# Patient Record
Sex: FEMALE | Race: WHITE | NOT HISPANIC OR LATINO | Employment: OTHER | URBAN - METROPOLITAN AREA
[De-identification: names, ages, dates, MRNs, and addresses within clinical notes are randomized per-mention and may not be internally consistent; named-entity substitution may affect disease eponyms.]

---

## 2017-01-03 ENCOUNTER — HOME CARE VISIT (OUTPATIENT)
Dept: HOME HEALTH SERVICES | Facility: HOME HEALTHCARE | Age: 67
End: 2017-01-03
Payer: MEDICARE

## 2017-01-04 VITALS
RESPIRATION RATE: 22 BRPM | HEART RATE: 89 BPM | TEMPERATURE: 98.5 F | DIASTOLIC BLOOD PRESSURE: 80 MMHG | SYSTOLIC BLOOD PRESSURE: 140 MMHG

## 2017-01-04 SDOH — ECONOMIC STABILITY: HOUSING INSECURITY: UNSAFE COOKING RANGE AREA: 0

## 2017-01-04 SDOH — ECONOMIC STABILITY: HOUSING INSECURITY: HOME SAFETY: HOME PRESENTING A FIRE HAZARD. NO EVIDENCE FOUND OF SMOKING MATERIALS PRESENT IN THE HOME.

## 2017-01-04 SDOH — ECONOMIC STABILITY: HOUSING INSECURITY
HOME SAFETY: OXYGEN SAFETY RISK ASSESSMENT PERFORMED. PATIENT DOES NOT HAVE A NO SMOKING SIGN POSTED IN THE HOME., PT WAS GIVEN A NO SMOKING SIGN AND PROVIDED EDUCATION ABOUT WHY IT IS IMPORTANT TO PLACE ONE. PATIENT DOES NOT HAVE A WORKING FIRE EXTINGUISHER PRES

## 2017-01-04 SDOH — ECONOMIC STABILITY: HOUSING INSECURITY
HOME SAFETY: ENT IN THE HOME., INSTRUCTED PATIENT/CAREGIVER TO GET ONE AS SOON AS POSSIBLE. SMOKE ALARMS ARE PRESENT AND FUNCTIONAL ON EACH LEVEL OF THE HOME. PATIENT DOES HAVE A FIRE ESCAPE PLAN DEVELOPED. PATIENT DOES NOT HAVE FLAMMABLE MATERIALS PRESENT IN THE

## 2017-01-04 SDOH — ECONOMIC STABILITY: HOUSING INSECURITY: UNSAFE APPLIANCES: 0

## 2017-01-04 ASSESSMENT — ACTIVITIES OF DAILY LIVING (ADL)
HOME_HEALTH_OASIS: 00
OASIS_M1830: 00

## 2017-01-10 VITALS
TEMPERATURE: 98.4 F | SYSTOLIC BLOOD PRESSURE: 140 MMHG | DIASTOLIC BLOOD PRESSURE: 80 MMHG | RESPIRATION RATE: 22 BRPM | HEART RATE: 89 BPM

## 2017-01-10 SDOH — ECONOMIC STABILITY: HOUSING INSECURITY: UNSAFE APPLIANCES: 0

## 2017-01-10 SDOH — ECONOMIC STABILITY: HOUSING INSECURITY: UNSAFE COOKING RANGE AREA: 0

## 2017-01-10 ASSESSMENT — ACTIVITIES OF DAILY LIVING (ADL)
OASIS_M1830: 00
HOME_HEALTH_OASIS: 00

## 2017-01-16 VITALS
TEMPERATURE: 97.8 F | DIASTOLIC BLOOD PRESSURE: 80 MMHG | HEART RATE: 89 BPM | SYSTOLIC BLOOD PRESSURE: 140 MMHG | RESPIRATION RATE: 16 BRPM

## 2017-01-16 ASSESSMENT — ACTIVITIES OF DAILY LIVING (ADL)
HOME_HEALTH_OASIS: 00
HOME_HEALTH_OASIS: 01
OASIS_M1830: 00

## 2017-01-16 ASSESSMENT — ENCOUNTER SYMPTOMS: DEBILITATING PAIN: 1

## 2017-01-27 ENCOUNTER — HOSPITAL ENCOUNTER (OUTPATIENT)
Dept: LAB | Facility: MEDICAL CENTER | Age: 67
End: 2017-01-27
Attending: INTERNAL MEDICINE
Payer: MEDICARE

## 2017-01-27 DIAGNOSIS — R79.89 LOW TSH LEVEL: ICD-10-CM

## 2017-01-27 DIAGNOSIS — E04.2 MULTIPLE THYROID NODULES: ICD-10-CM

## 2017-01-27 LAB
T3FREE SERPL-MCNC: 4.08 PG/ML (ref 2.4–4.2)
T4 FREE SERPL-MCNC: 1.29 NG/DL (ref 0.58–1.64)
TSH SERPL DL<=0.005 MIU/L-ACNC: 0.03 UIU/ML (ref 0.35–5.5)

## 2017-01-27 PROCEDURE — 36415 COLL VENOUS BLD VENIPUNCTURE: CPT

## 2017-01-27 PROCEDURE — 84481 FREE ASSAY (FT-3): CPT

## 2017-01-27 PROCEDURE — 84439 ASSAY OF FREE THYROXINE: CPT

## 2017-01-27 PROCEDURE — 84443 ASSAY THYROID STIM HORMONE: CPT

## 2017-02-07 ENCOUNTER — OFFICE VISIT (OUTPATIENT)
Dept: ENDOCRINOLOGY | Facility: MEDICAL CENTER | Age: 67
End: 2017-02-07
Payer: MEDICARE

## 2017-02-07 VITALS
DIASTOLIC BLOOD PRESSURE: 80 MMHG | OXYGEN SATURATION: 94 % | HEART RATE: 78 BPM | HEIGHT: 67 IN | WEIGHT: 183 LBS | BODY MASS INDEX: 28.72 KG/M2 | SYSTOLIC BLOOD PRESSURE: 122 MMHG

## 2017-02-07 DIAGNOSIS — E04.2 MULTIPLE THYROID NODULES: ICD-10-CM

## 2017-02-07 DIAGNOSIS — R79.89 LOW TSH LEVEL: Primary | ICD-10-CM

## 2017-02-07 PROCEDURE — G8482 FLU IMMUNIZE ORDER/ADMIN: HCPCS | Performed by: INTERNAL MEDICINE

## 2017-02-07 PROCEDURE — 1101F PT FALLS ASSESS-DOCD LE1/YR: CPT | Mod: 8P | Performed by: INTERNAL MEDICINE

## 2017-02-07 PROCEDURE — 4040F PNEUMOC VAC/ADMIN/RCVD: CPT | Performed by: INTERNAL MEDICINE

## 2017-02-07 PROCEDURE — 3014F SCREEN MAMMO DOC REV: CPT | Mod: 8P | Performed by: INTERNAL MEDICINE

## 2017-02-07 PROCEDURE — 1036F TOBACCO NON-USER: CPT | Performed by: INTERNAL MEDICINE

## 2017-02-07 PROCEDURE — 99214 OFFICE O/P EST MOD 30 MIN: CPT | Mod: 25 | Performed by: INTERNAL MEDICINE

## 2017-02-07 PROCEDURE — 3017F COLORECTAL CA SCREEN DOC REV: CPT | Mod: 8P | Performed by: INTERNAL MEDICINE

## 2017-02-07 PROCEDURE — G8420 CALC BMI NORM PARAMETERS: HCPCS | Performed by: INTERNAL MEDICINE

## 2017-02-07 PROCEDURE — G8598 ASA/ANTIPLAT THER USED: HCPCS | Performed by: INTERNAL MEDICINE

## 2017-02-07 RX ORDER — COLCHICINE 0.6 MG/1
0.6 TABLET ORAL DAILY
COMMUNITY
End: 2017-04-24

## 2017-02-07 NOTE — MR AVS SNAPSHOT
"        Latonia Clinton   2017 2:20 PM   Office Visit   MRN: [id number]    Department:  End[facility]inology [facility]   Dept Phone:  [phone]    Description:  Female : 1950   Provider:  Kishore Torrez M.D.           Allergies as of 2017     Allergen Noted Reactions    Nkda [No Known Drug Allergy] 2008         You were diagnosed with     Low TSH level   [037438]  -  Primary     Multiple thyroid nodules   [551866]         Vital Signs     Blood Pressure Pulse Height Weight Body Mass Index Oxygen Saturation    122/80 mmHg 78 1.702 m (5' 7\") 83.008 kg (183 lb) 28.66 kg/m2 94%    Smoking Status                   Former Smoker           Basic Information     Date Of Birth Sex Race Ethnicity Preferred Language    1950 Female White Non- English      Your appointments     Mar 27, 2017  3:00 PM   Established Patient Pul with Jessenia Byrne M.D.   [facility] (--)    [address]  [address]  [place] [address] [phone]            Aug 08, 2017  4:00 PM   Established Patient with Kishore Torrez M.D.   [facility])    [address]  [place] [address] [phone]           You will be receiving a confirmation call a few days before your appointment from our automated call confirmation system.              Problem List              ICD-10-CM Priority Class Noted - Resolved    Low TSH level R94.6   2015 - Present    Osteoporosis M81.0   Unknown - Present    Hyperlipidemia E78.5 Low  Unknown - Present    Hypertension I10   Unknown - Present    Renal disorder N28.9   Unknown - Present    Rheumatoid arthritis (CMS-[facility]) M06.9   Unknown - Present    Multiple thyroid nodules E04.2   2015 - Present    Thyrotoxicosis with toxic single thyroid nodule and without thyroid storm E05.10   2015 - Present    Acute bronchitis J20.9 High  2016 - Present    Acute respiratory failure (CMS-HCC) J96.00 " High  7/24/2016 - Present    Lung mass R91.8 Medium  7/24/2016 - Present    HTN (hypertension) I10 Low  7/24/2016 - Present    CAD (coronary artery disease) I25.10 Low  7/24/2016 - Present    Acute exacerbation of chronic obstructive pulmonary disease (COPD) (CMS-HCC) J44.1 High  7/24/2016 - Present    CKD (chronic kidney disease) stage 3, GFR 30-59 ml/min N18.3 Low  7/24/2016 - Present    Hyperthyroidism E05.90 Low  7/25/2016 - Present    DVT (deep venous thrombosis) (CMS-HCC) (Chronic) I82.409   7/28/2016 - Present    Deep vein thrombosis (DVT) (CMS-HCC) I82.409   7/28/2016 - Present    Lung nodule R91.1   10/27/2016 - Present    Lung cancer (CMS-HCC) C34.90   12/12/2016 - Present      Health Maintenance        Date Due Completion Dates    IMM DTaP/Tdap/Td Vaccine (1 - Tdap) 9/10/1968 ---    PAP SMEAR 9/10/1970 ---    COLONOSCOPY 9/10/1999 ---    IMM ZOSTER VACCINE 9/10/2009 ---    BONE DENSITY 9/10/2014 ---    MAMMOGRAM 11/27/2014 11/27/2013, 6/6/2012, 4/25/2011, 12/29/2009, 12/29/2009, 12/10/2008, 12/10/2008, 12/3/2007, 11/26/2007, 11/26/2007, 10/23/2006, 10/23/2006, 5/31/2005    IMM PNEUMOCOCCAL 65+ (ADULT) LOW/MEDIUM RISK SERIES (2 of 2 - PPSV23) 10/1/2016 10/1/2015            Current Immunizations     13-VALENT PCV PREVNAR 10/1/2015    Influenza TIV (IM) 10/5/2016      Below and/or attached are the medications your provider expects you to take. Review all of your home medications and newly ordered medications with your provider and/or pharmacist. Follow medication instructions as directed by your provider and/or pharmacist. Please keep your medication list with you and share with your provider. Update the information when medications are discontinued, doses are changed, or new medications (including over-the-counter products) are added; and carry medication information at all times in the event of emergency situations     Allergies:  NKDA - (reactions not documented)               Medications  Valid as of:  February 07, 2017 -  2:44 PM    Generic Name Brand Name Tablet Size Instructions for use    Acetaminophen (Tab) TYLENOL 500 MG Take 500 mg by mouth every 6 hours as needed.        Albuterol Sulfate (Aero Soln) albuterol 108 (90 BASE) MCG/ACT Inhale 2 Puffs by mouth every 6 hours as needed for Shortness of Breath.        Budesonide-Formoterol Fumarate (Aerosol) SYMBICORT 160-4.5 MCG/ACT Inhale 2 Puffs by mouth every day.        Colchicine (Tab) COLCRYS 0.6 MG Take 0.6 mg by mouth every day.        Dorzolamide HCl-Timolol Mal (Solution) COSOPT 22.3-6.8 MG/ML Place 1 Drop in both eyes 2 times a day. Both eyes        Doxycycline Hyclate (Tab) VIBRAMYCIN 100 MG Take 100 mg by mouth 2 times a day. 2 - ten day courses, she completed the first one and stopped the 2nd course last week.        Ergocalciferol   Take 1.25 mg by mouth every Monday. 1 tablet by mouth every 2 weeks        Hydrocodone-Acetaminophen (Tab) NORCO 5-325 MG Take 1-2 Tabs by mouth every four hours as needed.        Ipratropium-Albuterol (Solution) DUONEB 0.5-2.5 (3) MG/3ML 3 mL by Nebulization route every 6 hours as needed for Shortness of Breath.        Losartan Potassium (Tab) COZAAR 50 MG Take 50 mg by mouth every day.        Probiotic Product   Take 2 Caps by mouth every evening.        Propranolol HCl (Tab) INDERAL 60 MG Take 60 mg by mouth every day.        Simvastatin (Tab) ZOCOR 40 MG Take 40 mg by mouth every evening.        Warfarin Sodium (Tab) COUMADIN 3 MG Take 1.5-3 mg by mouth every day. 3 mg every day EXCEPT 1.5 mg every Monday and Friday        Zolpidem Tartrate (Tab) AMBIEN 10 MG Take 10 mg by mouth at bedtime as needed.        .                 Medicines prescribed today were sent to:     [id number] - [place], [place] - [address]    [address] [place] [address]    Phone: [phone] Fax: [phone]    Open 24 Hours?: No      Medication refill instructions:       If your prescription bottle indicates  you have medication refills left, it is not necessary to call your provider’s office. Please contact your pharmacy and they will refill your medication.    If your prescription bottle indicates you do not have any refills left, you may request refills at any time through one of the following ways: The online [employer] system (except Urgent Care), by calling your provider’s office, or by asking your pharmacy to contact your provider’s office with a refill request. Medication refills are processed only during regular business hours and may not be available until the next business day. Your provider may request additional information or to have a follow-up visit with you prior to refilling your medication.   *Please Note: Medication refills are assigned a new Rx number when refilled electronically. Your pharmacy may indicate that no refills were authorized even though a new prescription for the same medication is available at the pharmacy. Please request the medicine by name with the pharmacy before contacting your provider for a refill.        Your To Do List     Future Labs/Procedures Complete By Expires    FREE THYROXINE  As directed 8/10/2017    T3 FREE  As directed 8/10/2017    TSH  As directed 8/10/2017         [employer] Access Code: [email or web address]  Expires: 3/9/2017  2:44 PM    [employer]  A secure, online tool to manage your health information     [employer]’s [employer]® is a secure, online tool that connects you to your personalized health information from the privacy of your home -- day or night - making it very easy for you to manage your healthcare. Once the activation process is completed, you can even access your medical information using the [employer] tejinder, which is available for free in the Apple Tejinder store or Google Play store.     [employer] provides the following levels of access (as shown below):   My Chart Features   [facility] Doctor [facility]  Specialists [facility]  Urgent  Care Non-[facility]  Primary  Care  Doctor   Email your healthcare team securely and privately 24/7 X X X    Manage appointments: schedule your next appointment; view details of past/upcoming appointments X      Request prescription refills. X      View recent personal medical records, including lab and immunizations X X X X   View health record, including health history, allergies, medications X X X X   Read reports about your outpatient visits, procedures, consult and ER notes X X X X   See your discharge summary, which is a recap of your hospital and/or ER visit that includes your diagnosis, lab results, and care plan. X X       How to register for [employer]:  1. Go to  [email or web address].  2. Click on the Sign Up Now box, which takes you to the New Member Sign Up page. You will need to provide the following information:  a. Enter your [employer] Access Code exactly as it appears at the top of this page. (You will not need to use this code after you’ve completed the sign-up process. If you do not sign up before the expiration date, you must request a new code.)   b. Enter your date of birth.   c. Enter your home email address.   d. Click Submit, and follow the next screen’s instructions.  3. Create a [employer] ID. This will be your [employer] login ID and cannot be changed, so think of one that is secure and easy to remember.  4. Create a [employer] password. You can change your password at any time.  5. Enter your Password Reset Question and Answer. This can be used at a later time if you forget your password.   6. Enter your e-mail address. This allows you to receive e-mail notifications when new information is available in [employer].  7. Click Sign Up. You can now view your health information.    For assistance activating your [employer] account, call [phone]

## 2017-02-08 NOTE — PROGRESS NOTES
Chief Complaint   Patient presents with   • Follow-Up     low TSH / thyroid nodules        HPI:      1. Low TSH, euthyroid.    Everything remains about the same.  TSH remains suppressed at .03 but with normal free T4 of 1.2 and normal free T3 of 4.0.  No thyrotoxic symptoms.  Previously I have done pituitary screening and I can’t say that she has pituitary insufficiency either.  She does have an autonomous nodule in the right thyroid lobe.  These nodules can make predominantly T3.  I can’t say for sure that this is why her TSH is suppressed however.  On the other hand, I can’t bring myself to believe that she is thyrotoxic.  There is nothing about her that seems thyrotoxic and therefore treating her for that doesn’t make any sense to me.  So there is nothing I can do about that except observe.     2. Thyroid nodules.    Her thyroid ultrasound continues to show the nodules which are mostly unchanged.  Thyroid ultrasound from last August shows the 3 cm nodule in the right upper lobe perhaps slightly larger but this is the autonomous nodule so a low probability of being carcinoma.  It really has not changed that much.  2 mm bigger in one dimension, 3 mm smaller in another dimension and 4 mm bigger in another dimension.  There is internal flow but no microcalcifications.  There is another right lobe nodule that is predominantly cystic.     The patient has undergone resection of carcinoma from her lung and has tolerated that very well.  Apparently also had some pneumonia prior to that which is how the carcinoma was discovered.  She is recovering from that okay.      In general she is just not happy with how she feels.  She is still complaining of bad skin and finger nails and she has tried various remedies in the past like Biotin and other things that you can buy to help.  I quizzed her about her protein intake but she did not want to discuss that.     In terms of her bones, calcium, kidney stones, the nephrologist and  Dr. Rogers are monitoring all of that.     I will see her again in six months and update thyroid status.     ROS:  All other systems reported as negative or unchanged since last exam        Allergies:   Allergies   Allergen Reactions   • Nkda [No Known Drug Allergy]        Current medicines including changes today:  Current Outpatient Prescriptions   Medication Sig Dispense Refill   • colchicine (COLCRYS) 0.6 MG Tab Take 0.6 mg by mouth every day.     • acetaminophen (TYLENOL) 500 MG Tab Take 500 mg by mouth every 6 hours as needed.     • budesonide-formoterol (SYMBICORT) 160-4.5 MCG/ACT Aerosol Inhale 2 Puffs by mouth every day.     • propranolol (INDERAL) 60 MG tablet Take 60 mg by mouth every day.     • Ergocalciferol (VITAMIN D2 PO) Take 1.25 mg by mouth every Monday. 1 tablet by mouth every 2 weeks     • warfarin (COUMADIN) 3 MG Tab Take 1.5-3 mg by mouth every day. 3 mg every day EXCEPT 1.5 mg every Monday and Friday     • albuterol (PROAIR HFA) 108 (90 BASE) MCG/ACT Aero Soln inhalation aerosol Inhale 2 Puffs by mouth every 6 hours as needed for Shortness of Breath.     • Probiotic Product (PROBIOTIC DAILY PO) Take 2 Caps by mouth every evening.     • zolpidem (AMBIEN) 10 MG Tab Take 10 mg by mouth at bedtime as needed.     • simvastatin (ZOCOR) 40 MG Tab Take 40 mg by mouth every evening.     • losartan (COZAAR) 50 MG TABS Take 50 mg by mouth every day.     • COSOPT 2-0.5 % OP SOLN Place 1 Drop in both eyes 2 times a day. Both eyes     • hydrocodone-acetaminophen (NORCO) 5-325 MG Tab per tablet Take 1-2 Tabs by mouth every four hours as needed. 30 Tab 0   • doxycycline (VIBRAMYCIN) 100 MG Tab Take 100 mg by mouth 2 times a day. 2 - ten day courses, she completed the first one and stopped the 2nd course last week.     • ipratropium-albuterol (DUONEB) 0.5-2.5 (3) MG/3ML nebulizer solution 3 mL by Nebulization route every 6 hours as needed for Shortness of Breath. 30 Vial 0     No current facility-administered  "medications for this visit.        Past Medical History   Diagnosis Date   • CAD (coronary artery disease)      palpatations   • Renal disorder      had been on dialysis for 7 months for acute failure 2005   • Glaucoma      right eye   • Dialysis 2005     transient renal failure due to sepsis   • Backpain    • Hypertension    • Hyperlipidemia    • S/P appendectomy 1998    • S/P cholecystectomy 1998   • Osteoporosis    • S/P kyphoplasty 2006, 2007, 2013   • Renal stones 2013     post lithotripsy   • Rheumatoid arthritis (CMS-HCC)      question   • Multiple thyroid nodules 7/9/2015   • Rheumatoid arthritis (CMS-HCC)    • Bronchitis    • COPD (chronic obstructive pulmonary disease) (CMS-HCC)    • Hyperthyroidism    • Kidney stone    • Nasal drainage    • Chickenpox    • Emphysema of lung (CMS-HCC)    • Personal history of venous thrombosis and embolism 2004, 2012     arm dvt   • Asthma      inhalers daily   • Pneumonia 7/2016     per patient   • Lung cancer (CMS-HCC) 12/12/2016       PHYSICAL EXAM:    /80 mmHg  Pulse 78  Ht 1.702 m (5' 7\")  Wt 83.008 kg (183 lb)  BMI 28.66 kg/m2  SpO2 94%    Gen.   appears healthy     Skin   appropriate for sex and age    HEENT  unremarkable    Neck   thyroid gland is mildly enlarged right lobe greater than left lobe. No distinct nodules are palpable in the gland or elsewhere in the neck or supraclavicular areas.    Heart  regular    Extremities  no edema    Neuro  gait and station normal    Psych  appropriate    ASSESSMENT AND RECOMMENDATIONS    1. Low TSH level             Clinically euthyroid and no evidence for hypopituitarism  - FREE THYROXINE; Future  - T3 FREE; Future  - TSH; Future    2. Multiple thyroid nodules               Right lobe autonomous nodule but not causing clinical thyrotoxicosis      DISPOSITION: Return in about 6 months (around 8/7/2017).       Kishore Torrez M.D.    Copies to: Edwin Rogers M.D. [phone]  "

## 2017-02-13 ENCOUNTER — TELEPHONE (OUTPATIENT)
Dept: SLEEP MEDICINE | Facility: MEDICAL CENTER | Age: 67
End: 2017-02-13

## 2017-02-13 DIAGNOSIS — J44.9 CHRONIC OBSTRUCTIVE PULMONARY DISEASE, UNSPECIFIED COPD TYPE (HCC): ICD-10-CM

## 2017-02-13 RX ORDER — ALBUTEROL SULFATE 90 UG/1
2 AEROSOL, METERED RESPIRATORY (INHALATION) EVERY 6 HOURS PRN
Qty: 8.5 G | Refills: 5 | Status: SHIPPED | OUTPATIENT
Start: 2017-02-13 | End: 2017-06-16 | Stop reason: SDUPTHER

## 2017-02-14 NOTE — TELEPHONE ENCOUNTER
Pt is requesting samples of Spiriva 2.5 MCG and Symbicort 160- samples were given on 10/27/16  Pt is also requesting an rx for proair to be sent to pharmacy, states that her regular doctor will no longer prescribe it for her.      Last OV: 10/27/17  Next OV: 3/27/17  Acute exacerbation of COPD  Samples of Spiriva 2.5MCG and Symbicort 160, new rx for Proair

## 2017-03-27 ENCOUNTER — OFFICE VISIT (OUTPATIENT)
Dept: PULMONOLOGY | Facility: HOSPICE | Age: 67
End: 2017-03-27
Payer: MEDICARE

## 2017-03-27 VITALS
BODY MASS INDEX: 29.41 KG/M2 | SYSTOLIC BLOOD PRESSURE: 120 MMHG | TEMPERATURE: 97.3 F | HEIGHT: 67 IN | OXYGEN SATURATION: 96 % | RESPIRATION RATE: 16 BRPM | WEIGHT: 187.4 LBS | DIASTOLIC BLOOD PRESSURE: 76 MMHG | HEART RATE: 68 BPM

## 2017-03-27 DIAGNOSIS — C34.12 MALIGNANT NEOPLASM OF UPPER LOBE OF LEFT LUNG (HCC): ICD-10-CM

## 2017-03-27 DIAGNOSIS — J44.9 CHRONIC OBSTRUCTIVE PULMONARY DISEASE, UNSPECIFIED COPD TYPE (HCC): ICD-10-CM

## 2017-03-27 PROCEDURE — G8419 CALC BMI OUT NRM PARAM NOF/U: HCPCS | Performed by: INTERNAL MEDICINE

## 2017-03-27 PROCEDURE — 1101F PT FALLS ASSESS-DOCD LE1/YR: CPT | Mod: 8P | Performed by: INTERNAL MEDICINE

## 2017-03-27 PROCEDURE — 99214 OFFICE O/P EST MOD 30 MIN: CPT | Performed by: INTERNAL MEDICINE

## 2017-03-27 PROCEDURE — G8482 FLU IMMUNIZE ORDER/ADMIN: HCPCS | Performed by: INTERNAL MEDICINE

## 2017-03-27 PROCEDURE — 4040F PNEUMOC VAC/ADMIN/RCVD: CPT | Performed by: INTERNAL MEDICINE

## 2017-03-27 PROCEDURE — 1036F TOBACCO NON-USER: CPT | Performed by: INTERNAL MEDICINE

## 2017-03-27 PROCEDURE — G8598 ASA/ANTIPLAT THER USED: HCPCS | Performed by: INTERNAL MEDICINE

## 2017-03-27 PROCEDURE — 3017F COLORECTAL CA SCREEN DOC REV: CPT | Mod: 8P | Performed by: INTERNAL MEDICINE

## 2017-03-27 PROCEDURE — 3014F SCREEN MAMMO DOC REV: CPT | Mod: 8P | Performed by: INTERNAL MEDICINE

## 2017-03-27 PROCEDURE — G8432 DEP SCR NOT DOC, RNG: HCPCS | Performed by: INTERNAL MEDICINE

## 2017-03-27 NOTE — PROGRESS NOTES
Chief Complaint   Patient presents with   • Follow-Up     Follow up after surgery.       HPI: This patient is a 66 y.o. Female who returns for follow-up of lung cancer and COPD. She underwent thoracoscopic partial left upper lobectomy by Dr. Ganser 12/12/16 demonstrating adenocarcinoma with pleural involvement, pT2a.  She had an uneventful postoperative course, denies shortness of breath, wheezing or chest pain. She feels she has returned to her baseline. She has mild cough which she attributes to postnasal drainage. She has stage II COPD, on Symbicort 160 µg and Spiriva. Her FEV1 is 1.43 L or 54%. She has been relying on samples of her inhalers.  Over the past week she has had visual loss in the left eye, saw her ophthalmologist and has been referred to a retinal specialist this week.      Past Medical History   Diagnosis Date   • CAD (coronary artery disease)      palpatations   • Renal disorder      had been on dialysis for 7 months for acute failure 2005   • Glaucoma      right eye   • Dialysis 2005     transient renal failure due to sepsis   • Backpain    • Hypertension    • Hyperlipidemia    • S/P appendectomy 1998    • S/P cholecystectomy 1998   • Osteoporosis    • S/P kyphoplasty 2006, 2007, 2013   • Renal stones 2013     post lithotripsy   • Rheumatoid arthritis (CMS-HCC)      question   • Multiple thyroid nodules 7/9/2015   • Rheumatoid arthritis (CMS-HCC)    • Bronchitis    • COPD (chronic obstructive pulmonary disease) (CMS-HCC)    • Hyperthyroidism    • Kidney stone    • Nasal drainage    • Chickenpox    • Emphysema of lung (CMS-[facility])    • Personal history of venous thrombosis and embolism 2004, 2012     arm dvt   • Asthma      inhalers daily   • Pneumonia 7/2016     per patient   • Lung cancer (CMS-[facility]) 12/12/2016       Social History     Social History   • Marital Status: Single     Spouse Name: N/A   • Number of Children: N/A   • Years of Education: N/A     Occupational History   • Not on file.      Social History Main Topics   • Smoking status: Former Smoker -- 1.00 packs/day for 30 years     Types: Cigarettes     Quit date: 01/01/2000   • Smokeless tobacco: Never Used      Comment: 7 years ago   • Alcohol Use: 0.0 oz/week     0 Standard drinks or equivalent per week      Comment: x3 a week   • Drug Use: No   • Sexual Activity: Not on file     Other Topics Concern   • Not on file     Social History Narrative       Family History   Problem Relation Age of Onset   • Cancer Mother    • Heart Failure Father    • Heart Disease Brother        Current Outpatient Prescriptions on File Prior to Visit   Medication Sig Dispense Refill   • albuterol (PROAIR HFA) 108 (90 BASE) MCG/ACT Aero Soln inhalation aerosol Inhale 2 Puffs by mouth every 6 hours as needed for Shortness of Breath. 8.5 g 5   • colchicine (COLCRYS) 0.6 MG Tab Take 0.6 mg by mouth every day.     • acetaminophen (TYLENOL) 500 MG Tab Take 500 mg by mouth every 6 hours as needed.     • budesonide-formoterol (SYMBICORT) 160-4.5 MCG/ACT Aerosol Inhale 2 Puffs by mouth 2 Times a Day.     • ipratropium-albuterol (DUONEB) 0.5-2.5 (3) MG/3ML nebulizer solution 3 mL by Nebulization route every 6 hours as needed for Shortness of Breath. 30 Vial 0   • propranolol (INDERAL) 60 MG tablet Take 60 mg by mouth every day.     • Ergocalciferol (VITAMIN D2 PO) Take 1.25 mg by mouth every Monday. 1 tablet by mouth every 2 weeks     • warfarin (COUMADIN) 3 MG Tab Take 1.5-3 mg by mouth every day. 3 mg every day EXCEPT 1.5 mg every Monday and Friday     • Probiotic Product (PROBIOTIC DAILY PO) Take 2 Caps by mouth every evening.     • zolpidem (AMBIEN) 10 MG Tab Take 10 mg by mouth at bedtime as needed.     • simvastatin (ZOCOR) 40 MG Tab Take 40 mg by mouth every evening.     • losartan (COZAAR) 50 MG TABS Take 50 mg by mouth every day.     • COSOPT 2-0.5 % OP SOLN Place 1 Drop in both eyes 2 times a day. Both eyes     • hydrocodone-acetaminophen (NORCO) 5-325 MG Tab per  "tablet Take 1-2 Tabs by mouth every four hours as needed. (Patient not taking: Reported on 3/27/2017) 30 Tab 0   • doxycycline (VIBRAMYCIN) 100 MG Tab Take 100 mg by mouth 2 times a day. 2 - ten day courses, she completed the first one and stopped the 2nd course last week.       No current facility-administered medications on file prior to visit.       Allergies: Nkda    ROS:   Constitutional: Denies fevers, chills, night sweats, fatigue or weight loss  Eyes: +vision loss, denies pain, drainage, double vision  Ears, Nose, Throat: Denies earache, difficulty hearing, tinnitus, nasal congestion, hoarseness  Cardiovascular: Denies chest pain, tightness, palpitations, orthopnea or edema  Respiratory: As in history of present illness  Sleep: Denies daytime sleepiness, snoring, apneas, insomnia, morning headaches  GI: Denies heartburn, dysphagia, nausea, abdominal pain, diarrhea or constipation  : Denies frequent urination, hematuria, discharge or painful urination  Musculoskeletal: Denies back pain, painful joints, sore muscles  Neurological: Denies weakness or headaches  Skin: No rashes    Blood pressure 120/76, pulse 68, temperature 36.3 °C (97.3 °F), resp. rate 16, height 1.702 m (5' 7.01\"), weight 85.004 kg (187 lb 6.4 oz), SpO2 96 %.  Multi-Ox Readings  Multi Ox #1     O2 sat % at rest     O2 sat % on exertion     O2 sat average on exertion     Multi Ox #2     O2 sat % at rest     O2 sat % on exertion     O2 sat average on exertion       Oxygen Use     Oxygen Frequency     Duration of need     Is the patient mobile within the home?     CPAP Use?     BIPAP Use?     Servo Titration         Physical Exam:  Appearance: Well-nourished, well-developed, in no acute distress  HEENT: Normocephalic, atraumatic, white sclera, left pupil less reactive, oropharynx clear  Neck: No adenopathy or masses  Respiratory: no intercostal retractions or accessory muscle use  Lungs auscultation: Clear to auscultation " bilaterally  Cardiovascular: Regular rate rhythm. No murmurs, rubs or gallops.  No LE edema  Abdomen: soft, nondistended  Gait: Normal  Digits: No clubbing, cyanosis  Motor: No focal deficits  Orientation: Oriented to time, person and place    Diagnosis:  1. Malignant neoplasm of upper lobe of left lung (CMS-HCC)  CT-CHEST (THORAX) W/O    s/p partial [place] lobectomy 12/12/16   2. Chronic obstructive pulmonary disease, unspecified COPD type (CMS-HCC)     3.      Vision loss, unclear etiology     4.      Chronic anticoagulation      Plan:  The patient had successful resection of lung cancer, pT2a lesion, December 2016.  Surveillance chest CAT scan recommended in June 2017.  Her COPD is clinically stable. Continue Symbicort 160 µg and Spiriva. She is looking into her medical formulary regarding coverage.  She is pending evaluation by a retinal specialist for her visual impairment.  Return for after testing.

## 2017-03-27 NOTE — MR AVS SNAPSHOT
"        Latonia Clinton   3/27/2017 3:00 PM   Office Visit   MRN: [id number]    Department:  [facility]   Dept Phone:  [phone]    Description:  Female : 1950   Provider:  Jessenia Byrne M.D.           Reason for Visit     Follow-Up Follow up after surgery.      Allergies as of 3/27/2017     Allergen Noted Reactions    Nkda [No Known Drug Allergy] 2008         You were diagnosed with     Malignant neoplasm of upper lobe of left lung (CMS-HCC)   [2660640]   s/p partial AUSTIN lobectomy 16    Chronic obstructive pulmonary disease, unspecified COPD type (CMS-HCC)   [7008904]         Vital Signs     Blood Pressure Pulse Temperature Respirations Height Weight    120/76 mmHg 68 36.3 °C (97.3 °F) 16 1.702 m (5' 7.01\") 85.004 kg (187 lb 6.4 oz)    Body Mass Index Oxygen Saturation Smoking Status             29.34 kg/m2 96% Former Smoker         Basic Information     Date Of Birth Sex Race Ethnicity Preferred Language    1950 Female White Non- English      Your appointments     2017  3:20 PM   Established Patient Pul with Jessenia Byrne M.D.   [facility] (--)    [address]  [address]  [facility] [address] [phone]            Aug 08, 2017  4:00 PM   Established Patient with Ksihore Torrez M.D.   [facility]    [address], [address]  [facility] [address] [phone]           You will be receiving a confirmation call a few days before your appointment from our automated call confirmation system.              Problem List              ICD-10-CM Priority Class Noted - Resolved    Low TSH level R94.6   2015 - Present    Osteoporosis M81.0   Unknown - Present    Hyperlipidemia E78.5 Low  Unknown - Present    Hypertension I10   Unknown - Present    Renal disorder N28.9   Unknown - Present    Rheumatoid arthritis (CMS-HCC) M06.9   Unknown - Present    Multiple thyroid nodules E04.2   " 7/9/2015 - Present    Thyrotoxicosis with toxic single thyroid nodule and without thyroid storm E05.10   7/24/2015 - Present    Acute bronchitis J20.9 High  7/24/2016 - Present    Acute respiratory failure (CMS-HCC) J96.00 High  7/24/2016 - Present    Lung mass R91.8 Medium  7/24/2016 - Present    HTN (hypertension) I10 Low  7/24/2016 - Present    CAD (coronary artery disease) I25.10 Low  7/24/2016 - Present    Acute exacerbation of chronic obstructive pulmonary disease (COPD) (CMS-HCC) J44.1 High  7/24/2016 - Present    CKD (chronic kidney disease) stage 3, GFR 30-59 ml/min N18.3 Low  7/24/2016 - Present    Hyperthyroidism E05.90 Low  7/25/2016 - Present    DVT (deep venous thrombosis) (CMS-HCC) (Chronic) I82.409   7/28/2016 - Present    Deep vein thrombosis (DVT) (CMS-HCC) I82.409   7/28/2016 - Present    Lung nodule R91.1   10/27/2016 - Present    Lung cancer (CMS-HCC) C34.90   12/12/2016 - Present      Health Maintenance        Date Due Completion Dates    IMM DTaP/Tdap/Td Vaccine (1 - Tdap) 9/10/1968 ---    PAP SMEAR 9/10/1970 ---    COLONOSCOPY 9/10/1999 ---    IMM ZOSTER VACCINE 9/10/2009 ---    BONE DENSITY 9/10/2014 ---    MAMMOGRAM 11/27/2014 11/27/2013, 6/6/2012, 4/25/2011, 12/29/2009, 12/29/2009, 12/10/2008, 12/10/2008, 12/3/2007, 11/26/2007, 11/26/2007, 10/23/2006, 10/23/2006, 5/31/2005    IMM PNEUMOCOCCAL 65+ (ADULT) LOW/MEDIUM RISK SERIES (2 of 2 - PPSV23) 10/1/2016 10/1/2015            Current Immunizations     13-VALENT PCV PREVNAR 10/1/2015    Influenza TIV (IM) 10/5/2016      Below and/or attached are the medications your provider expects you to take. Review all of your home medications and newly ordered medications with your provider and/or pharmacist. Follow medication instructions as directed by your provider and/or pharmacist. Please keep your medication list with you and share with your provider. Update the information when medications are discontinued, doses are changed, or new medications  (including over-the-counter products) are added; and carry medication information at all times in the event of emergency situations     Allergies:  NKDA - (reactions not documented)               Medications  Valid as of: March 27, 2017 -  3:32 PM    Generic Name Brand Name Tablet Size Instructions for use    Acetaminophen (Tab) TYLENOL 500 MG Take 500 mg by mouth every 6 hours as needed.        Albuterol Sulfate (Aero Soln) albuterol 108 (90 BASE) MCG/ACT Inhale 2 Puffs by mouth every 6 hours as needed for Shortness of Breath.        Budesonide-Formoterol Fumarate (Aerosol) SYMBICORT 160-4.5 MCG/ACT Inhale 2 Puffs by mouth 2 Times a Day.        Colchicine (Tab) COLCRYS 0.6 MG Take 0.6 mg by mouth every day.        Dorzolamide HCl-Timolol Mal (Solution) COSOPT 22.3-6.8 MG/ML Place 1 Drop in both eyes 2 times a day. Both eyes        Doxycycline Hyclate (Tab) VIBRAMYCIN 100 MG Take 100 mg by mouth 2 times a day. 2 - ten day courses, she completed the first one and stopped the 2nd course last week.        Ergocalciferol   Take 1.25 mg by mouth every Monday. 1 tablet by mouth every 2 weeks        Hydrocodone-Acetaminophen (Tab) NORCO 5-325 MG Take 1-2 Tabs by mouth every four hours as needed.        Ipratropium-Albuterol (Solution) DUONEB 0.5-2.5 (3) MG/3ML 3 mL by Nebulization route every 6 hours as needed for Shortness of Breath.        Losartan Potassium (Tab) COZAAR 50 MG Take 50 mg by mouth every day.        Probiotic Product   Take 2 Caps by mouth every evening.        Propranolol HCl (Tab) INDERAL 60 MG Take 60 mg by mouth every day.        Simvastatin (Tab) ZOCOR 40 MG Take 40 mg by mouth every evening.        Warfarin Sodium (Tab) COUMADIN 3 MG Take 1.5-3 mg by mouth every day. 3 mg every day EXCEPT 1.5 mg every Monday and Friday        Zolpidem Tartrate (Tab) AMBIEN 10 MG Take 10 mg by mouth at bedtime as needed.        .                 Medicines prescribed today were sent to:     \[facility]\"" [facility] #[id number] -  [place], [place] - [address]    [address] [place] [address]    Phone: [phone] Fax: [phone]    Open 24 Hours?: No      Medication refill instructions:       If your prescription bottle indicates you have medication refills left, it is not necessary to call your provider’s office. Please contact your pharmacy and they will refill your medication.    If your prescription bottle indicates you do not have any refills left, you may request refills at any time through one of the following ways: The online HealthyTweet system (except Urgent Care), by calling your provider’s office, or by asking your pharmacy to contact your provider’s office with a refill request. Medication refills are processed only during regular business hours and may not be available until the next business day. Your provider may request additional information or to have a follow-up visit with you prior to refilling your medication.   *Please Note: Medication refills are assigned a new Rx number when refilled electronically. Your pharmacy may indicate that no refills were authorized even though a new prescription for the same medication is available at the pharmacy. Please request the medicine by name with the pharmacy before contacting your provider for a refill.        Your To Do List     Future Labs/Procedures Complete By Expires    CT-CHEST (THORAX) W/O  6/27/2017 3/27/2018         HealthyTweet Access Code: [email or web address]  Expires: 4/26/2017  3:32 PM    HealthyTweet  A secure, online tool to manage your health information     [employer]’s HealthyTweet® is a secure, online tool that connects you to your personalized health information from the privacy of your home -- day or night - making it very easy for you to manage your healthcare. Once the activation process is completed, you can even access your medical information using the HealthyTweet tejinder, which is available for free in the Apple Tejinder store or Google Play store.     HealthyTweet  provides the following levels of access (as shown below):   My Chart Features   [facility] Doctor [facility]  Specialists [facility]  Urgent  Care Non-[facility]  Primary Care  Doctor   Email your healthcare team securely and privately 24/7 X X X    Manage appointments: schedule your next appointment; view details of past/upcoming appointments X      Request prescription refills. X      View recent personal medical records, including lab and immunizations X X X X   View health record, including health history, allergies, medications X X X X   Read reports about your outpatient visits, procedures, consult and ER notes X X X X   See your discharge summary, which is a recap of your hospital and/or ER visit that includes your diagnosis, lab results, and care plan. X X       How to register for [employer]:  1. Go to  [email or web address].  2. Click on the Sign Up Now box, which takes you to the New Member Sign Up page. You will need to provide the following information:  a. Enter your Solutionary Access Code exactly as it appears at the top of this page. (You will not need to use this code after you’ve completed the sign-up process. If you do not sign up before the expiration date, you must request a new code.)   b. Enter your date of birth.   c. Enter your home email address.   d. Click Submit, and follow the next screen’s instructions.  3. Create a Solutionary ID. This will be your Solutionary login ID and cannot be changed, so think of one that is secure and easy to remember.  4. Create a Solutionary password. You can change your password at any time.  5. Enter your Password Reset Question and Answer. This can be used at a later time if you forget your password.   6. Enter your e-mail address. This allows you to receive e-mail notifications when new information is available in Solutionary.  7. Click Sign Up. You can now view your health information.    For assistance activating your Solutionary account, call [phone]

## 2017-04-11 ENCOUNTER — OFFICE VISIT (OUTPATIENT)
Dept: URGENT CARE | Facility: CLINIC | Age: 67
End: 2017-04-11
Payer: MEDICARE

## 2017-04-11 VITALS
OXYGEN SATURATION: 97 % | RESPIRATION RATE: 20 BRPM | DIASTOLIC BLOOD PRESSURE: 80 MMHG | SYSTOLIC BLOOD PRESSURE: 128 MMHG | HEART RATE: 80 BPM | TEMPERATURE: 98.6 F

## 2017-04-11 DIAGNOSIS — J22 LOWER RESPIRATORY INFECTION: ICD-10-CM

## 2017-04-11 DIAGNOSIS — B37.0 ORAL CANDIDIASIS: ICD-10-CM

## 2017-04-11 PROCEDURE — G8598 ASA/ANTIPLAT THER USED: HCPCS | Performed by: PHYSICIAN ASSISTANT

## 2017-04-11 PROCEDURE — G8432 DEP SCR NOT DOC, RNG: HCPCS | Performed by: PHYSICIAN ASSISTANT

## 2017-04-11 PROCEDURE — 1101F PT FALLS ASSESS-DOCD LE1/YR: CPT | Mod: 8P | Performed by: PHYSICIAN ASSISTANT

## 2017-04-11 PROCEDURE — 3017F COLORECTAL CA SCREEN DOC REV: CPT | Mod: 8P | Performed by: PHYSICIAN ASSISTANT

## 2017-04-11 PROCEDURE — 3014F SCREEN MAMMO DOC REV: CPT | Mod: 8P | Performed by: PHYSICIAN ASSISTANT

## 2017-04-11 PROCEDURE — G8419 CALC BMI OUT NRM PARAM NOF/U: HCPCS | Performed by: PHYSICIAN ASSISTANT

## 2017-04-11 PROCEDURE — 1036F TOBACCO NON-USER: CPT | Performed by: PHYSICIAN ASSISTANT

## 2017-04-11 PROCEDURE — 99214 OFFICE O/P EST MOD 30 MIN: CPT | Performed by: PHYSICIAN ASSISTANT

## 2017-04-11 PROCEDURE — 4040F PNEUMOC VAC/ADMIN/RCVD: CPT | Performed by: PHYSICIAN ASSISTANT

## 2017-04-11 RX ORDER — CLOTRIMAZOLE 10 MG/1
10 LOZENGE ORAL; TOPICAL
Qty: 35 TROCHE | Refills: 0 | Status: SHIPPED | OUTPATIENT
Start: 2017-04-11 | End: 2017-04-24

## 2017-04-11 RX ORDER — DOXYCYCLINE HYCLATE 100 MG
100 TABLET ORAL 2 TIMES DAILY
Qty: 20 TAB | Refills: 0 | Status: SHIPPED | OUTPATIENT
Start: 2017-04-11 | End: 2017-04-21

## 2017-04-11 ASSESSMENT — ENCOUNTER SYMPTOMS
FEVER: 0
SORE THROAT: 1
CHILLS: 0
MYALGIAS: 0
VOMITING: 0
DIARRHEA: 0
HEADACHES: 0
HEMOPTYSIS: 0
TINGLING: 0
DIZZINESS: 0
ABDOMINAL PAIN: 0
SENSORY CHANGE: 0
PALPITATIONS: 0
SPUTUM PRODUCTION: 1
RHINORRHEA: 1
NAUSEA: 0
COUGH: 1
WHEEZING: 1
FOCAL WEAKNESS: 0
SHORTNESS OF BREATH: 1

## 2017-04-11 ASSESSMENT — COPD QUESTIONNAIRES: COPD: 1

## 2017-04-12 NOTE — PROGRESS NOTES
Subjective:      Latonia Clinton is a 66 y.o. female who presents with Cough            Cough  This is a new problem. Episode onset: 1 week  The problem has been gradually worsening. The cough is productive of sputum. Associated symptoms include nasal congestion, postnasal drip, rhinorrhea, a sore throat, shortness of breath and wheezing. Pertinent negatives include no chest pain, chills, ear congestion, ear pain, fever, headaches, hemoptysis or myalgias. She has tried ipratropium inhaler, a beta-agonist inhaler and steroid inhaler for the symptoms. Her past medical history is significant for bronchitis, COPD and pneumonia. There is no history of asthma.   Patient has complicated history with history of pneumonia and respiratory failure.  She is susceptible to bronchitis and pneumonia.  She also complains of throat pain, tongue pain, and thinks she may have thrush- she complains of white spots on her tongue.     Past Medical History   Diagnosis Date   • CAD (coronary artery disease)      palpatations   • Renal disorder      had been on dialysis for 7 months for acute failure 2005   • Glaucoma      right eye   • Dialysis 2005     transient renal failure due to sepsis   • Backpain    • Hypertension    • Hyperlipidemia    • S/P appendectomy 1998    • S/P cholecystectomy 1998   • Osteoporosis    • S/P kyphoplasty 2006, 2007, 2013   • Renal stones 2013     post lithotripsy   • Rheumatoid arthritis (CMS-HCC)      question   • Multiple thyroid nodules 7/9/2015   • Rheumatoid arthritis (CMS-HCC)    • Bronchitis    • COPD (chronic obstructive pulmonary disease) (CMS-HCC)    • Hyperthyroidism    • Kidney stone    • Nasal drainage    • Chickenpox    • Emphysema of lung (CMS-HCC)    • Personal history of venous thrombosis and embolism 2004, 2012     arm dvt   • Asthma      inhalers daily   • Pneumonia 7/2016     per patient   • Lung cancer (CMS-[facility]) 12/12/2016       Past Surgical History   Procedure Laterality Date   •  Other     • Other abdominal surgery       gall bladder disease   • Pr enlarge breast with implant     • Pr reduction of large breast     • Other orthopedic surgery  2013     kyphoplasty   • Appendectomy       1990   • Cholecystectomy       1990   • Laminotomy     • Lung biopsy open     • Thoracoscopy Left 12/12/2016     Procedure: THORACOSCOPY W/WEDGE RESECTION UPPER LOBE MASS;  Surgeon: John H Ganser, M.D.;  Location: SURGERY [facility];  Service:        Family History   Problem Relation Age of Onset   • Cancer Mother    • Heart Failure Father    • Heart Disease Brother        Allergies   Allergen Reactions   • Nkda [No Known Drug Allergy]        Medications, Allergies, and current problem list reviewed today in Epic    Review of Systems   Constitutional: Positive for malaise/fatigue. Negative for fever and chills.   HENT: Positive for congestion, postnasal drip, rhinorrhea and sore throat. Negative for ear discharge and ear pain.    Respiratory: Positive for cough, sputum production, shortness of breath and wheezing. Negative for hemoptysis.    Cardiovascular: Negative for chest pain, palpitations and leg swelling.   Gastrointestinal: Negative for nausea, vomiting, abdominal pain and diarrhea.   Musculoskeletal: Negative for myalgias.   Neurological: Negative for dizziness, tingling, sensory change, focal weakness and headaches.     All other systems reviewed and are negative.        Objective:     /80 mmHg  Pulse 80  Temp(Src) 37 °C (98.6 °F)  Resp 20  SpO2 97%     Physical Exam   Constitutional: She is oriented to person, place, and time. She appears well-developed and well-nourished. No distress.   HENT:   Head: Normocephalic and atraumatic.   Right Ear: Tympanic membrane, external ear and ear canal normal.   Left Ear: Tympanic membrane, external ear and ear canal normal.   Nose: Mucosal edema and rhinorrhea (clear) present.   Mouth/Throat: Uvula is midline and mucous membranes are normal. No  oropharyngeal exudate or posterior oropharyngeal erythema.       Neck: Neck supple.   Cardiovascular: Normal rate, regular rhythm and normal heart sounds.  Exam reveals no gallop and no friction rub.    No murmur heard.  Pulmonary/Chest: Effort normal. No respiratory distress. She has wheezes (mild diffuse wheezes ). She has rhonchi (mild diffuse rhonchi).   Musculoskeletal: Normal range of motion. She exhibits no edema or tenderness.   Lymphadenopathy:     She has no cervical adenopathy.   Neurological: She is alert and oriented to person, place, and time. No cranial nerve deficit.   Skin: Skin is warm and dry. No rash noted.   Psychiatric: She has a normal mood and affect. Her behavior is normal. Judgment and thought content normal.               Assessment/Plan:     1. Lower respiratory infection  doxycycline (VIBRAMYCIN) 100 MG Tab   2. Oral candidiasis  clotrimazole (MYCELEX) 10 MG Kandy     Current outpatient prescriptions:   •  doxycycline (VIBRAMYCIN) 100 MG Tab, Take 1 Tab by mouth 2 times a day for 10 days., Disp: 20 Tab, Rfl: 0  •  clotrimazole (MYCELEX) 10 MG Kandy, Take 1 Kandy by mouth 5 Times a Day., Disp: 35 Kandy, Rfl: 0    - continue Rescue and maintenance inhalers.  - encouraged fluids, rest, humidification.    - informed patient that Doxycycline can affect INR. Monitor for abnormal bleeding. Suggested patient call her PCP,  tomorrow  To discuss checking her coumadin while on the antibiotic.      Differential diagnoses, Supportive care, and indications for immediate follow-up discussed with patient.   Instructed to return to clinic or nearest emergency department for any change in condition, further concerns, or worsening of symptoms.  The patient demonstrated a good understanding and agreed with the treatment plan.    Azucena Parker PA-C

## 2017-04-24 ENCOUNTER — APPOINTMENT (OUTPATIENT)
Dept: RADIOLOGY | Facility: MEDICAL CENTER | Age: 67
End: 2017-04-24
Attending: EMERGENCY MEDICINE
Payer: MEDICARE

## 2017-04-24 ENCOUNTER — HOSPITAL ENCOUNTER (EMERGENCY)
Facility: MEDICAL CENTER | Age: 67
End: 2017-04-24
Attending: EMERGENCY MEDICINE
Payer: MEDICARE

## 2017-04-24 VITALS
HEIGHT: 67 IN | DIASTOLIC BLOOD PRESSURE: 82 MMHG | WEIGHT: 184.08 LBS | RESPIRATION RATE: 24 BRPM | HEART RATE: 60 BPM | BODY MASS INDEX: 28.89 KG/M2 | SYSTOLIC BLOOD PRESSURE: 134 MMHG | TEMPERATURE: 98 F | OXYGEN SATURATION: 99 %

## 2017-04-24 DIAGNOSIS — M54.6 ACUTE BILATERAL THORACIC BACK PAIN: ICD-10-CM

## 2017-04-24 DIAGNOSIS — R07.89 CHEST WALL PAIN: ICD-10-CM

## 2017-04-24 LAB
ALBUMIN SERPL BCP-MCNC: 3.2 G/DL (ref 3.2–4.9)
ALBUMIN/GLOB SERPL: 1.4 G/DL
ALP SERPL-CCNC: 83 U/L (ref 30–99)
ALT SERPL-CCNC: 24 U/L (ref 2–50)
ANION GAP SERPL CALC-SCNC: 9 MMOL/L (ref 0–11.9)
APTT PPP: 49.6 SEC (ref 24.7–36)
AST SERPL-CCNC: 23 U/L (ref 12–45)
BASOPHILS # BLD AUTO: 0.3 % (ref 0–1.8)
BASOPHILS # BLD: 0.02 K/UL (ref 0–0.12)
BILIRUB SERPL-MCNC: 0.6 MG/DL (ref 0.1–1.5)
BNP SERPL-MCNC: 22 PG/ML (ref 0–100)
BUN SERPL-MCNC: 32 MG/DL (ref 8–22)
CALCIUM SERPL-MCNC: 9.6 MG/DL (ref 8.4–10.2)
CHLORIDE SERPL-SCNC: 113 MMOL/L (ref 96–112)
CO2 SERPL-SCNC: 18 MMOL/L (ref 20–33)
CREAT SERPL-MCNC: 1 MG/DL (ref 0.5–1.4)
EKG IMPRESSION: NORMAL
EOSINOPHIL # BLD AUTO: 0.09 K/UL (ref 0–0.51)
EOSINOPHIL NFR BLD: 1.2 % (ref 0–6.9)
ERYTHROCYTE [DISTWIDTH] IN BLOOD BY AUTOMATED COUNT: 50.9 FL (ref 35.9–50)
GFR SERPL CREATININE-BSD FRML MDRD: 55 ML/MIN/1.73 M 2
GLOBULIN SER CALC-MCNC: 2.3 G/DL (ref 1.9–3.5)
GLUCOSE SERPL-MCNC: 134 MG/DL (ref 65–99)
HCT VFR BLD AUTO: 43.9 % (ref 37–47)
HGB BLD-MCNC: 15 G/DL (ref 12–16)
IMM GRANULOCYTES # BLD AUTO: 0.02 K/UL (ref 0–0.11)
IMM GRANULOCYTES NFR BLD AUTO: 0.3 % (ref 0–0.9)
INR PPP: 3.62 (ref 0.87–1.13)
LYMPHOCYTES # BLD AUTO: 2.17 K/UL (ref 1–4.8)
LYMPHOCYTES NFR BLD: 29.4 % (ref 22–41)
MCH RBC QN AUTO: 34.4 PG (ref 27–33)
MCHC RBC AUTO-ENTMCNC: 34.2 G/DL (ref 33.6–35)
MCV RBC AUTO: 100.7 FL (ref 81.4–97.8)
MONOCYTES # BLD AUTO: 0.62 K/UL (ref 0–0.85)
MONOCYTES NFR BLD AUTO: 8.4 % (ref 0–13.4)
NEUTROPHILS # BLD AUTO: 4.45 K/UL (ref 2–7.15)
NEUTROPHILS NFR BLD: 60.4 % (ref 44–72)
NRBC # BLD AUTO: 0 K/UL
NRBC BLD AUTO-RTO: 0 /100 WBC
PLATELET # BLD AUTO: 148 K/UL (ref 164–446)
PMV BLD AUTO: 11.6 FL (ref 9–12.9)
POTASSIUM SERPL-SCNC: 3.7 MMOL/L (ref 3.6–5.5)
PROT SERPL-MCNC: 5.5 G/DL (ref 6–8.2)
PROTHROMBIN TIME: 35.8 SEC (ref 12–14.6)
RBC # BLD AUTO: 4.36 M/UL (ref 4.2–5.4)
SODIUM SERPL-SCNC: 140 MMOL/L (ref 135–145)
TROPONIN I SERPL-MCNC: <0.02 NG/ML (ref 0–0.04)
WBC # BLD AUTO: 7.4 K/UL (ref 4.8–10.8)

## 2017-04-24 PROCEDURE — 83880 ASSAY OF NATRIURETIC PEPTIDE: CPT

## 2017-04-24 PROCEDURE — 84484 ASSAY OF TROPONIN QUANT: CPT

## 2017-04-24 PROCEDURE — 99285 EMERGENCY DEPT VISIT HI MDM: CPT

## 2017-04-24 PROCEDURE — 96374 THER/PROPH/DIAG INJ IV PUSH: CPT

## 2017-04-24 PROCEDURE — 71250 CT THORAX DX C-: CPT

## 2017-04-24 PROCEDURE — 700102 HCHG RX REV CODE 250 W/ 637 OVERRIDE(OP): Performed by: EMERGENCY MEDICINE

## 2017-04-24 PROCEDURE — 80053 COMPREHEN METABOLIC PANEL: CPT

## 2017-04-24 PROCEDURE — 93005 ELECTROCARDIOGRAM TRACING: CPT

## 2017-04-24 PROCEDURE — 85730 THROMBOPLASTIN TIME PARTIAL: CPT

## 2017-04-24 PROCEDURE — 96376 TX/PRO/DX INJ SAME DRUG ADON: CPT

## 2017-04-24 PROCEDURE — 36415 COLL VENOUS BLD VENIPUNCTURE: CPT

## 2017-04-24 PROCEDURE — A9270 NON-COVERED ITEM OR SERVICE: HCPCS | Performed by: EMERGENCY MEDICINE

## 2017-04-24 PROCEDURE — 85025 COMPLETE CBC W/AUTO DIFF WBC: CPT

## 2017-04-24 PROCEDURE — 700105 HCHG RX REV CODE 258: Performed by: EMERGENCY MEDICINE

## 2017-04-24 PROCEDURE — 94760 N-INVAS EAR/PLS OXIMETRY 1: CPT

## 2017-04-24 PROCEDURE — 96375 TX/PRO/DX INJ NEW DRUG ADDON: CPT

## 2017-04-24 PROCEDURE — 72128 CT CHEST SPINE W/O DYE: CPT

## 2017-04-24 PROCEDURE — 700111 HCHG RX REV CODE 636 W/ 250 OVERRIDE (IP): Performed by: EMERGENCY MEDICINE

## 2017-04-24 PROCEDURE — 85610 PROTHROMBIN TIME: CPT

## 2017-04-24 RX ORDER — SODIUM CHLORIDE 9 MG/ML
1000 INJECTION, SOLUTION INTRAVENOUS ONCE
Status: COMPLETED | OUTPATIENT
Start: 2017-04-24 | End: 2017-04-24

## 2017-04-24 RX ORDER — DORZOLAMIDE HYDROCHLORIDE AND TIMOLOL MALEATE 20; 5 MG/ML; MG/ML
1 SOLUTION/ DROPS OPHTHALMIC 2 TIMES DAILY
Status: SHIPPED | COMMUNITY
End: 2018-06-17

## 2017-04-24 RX ORDER — ONDANSETRON 2 MG/ML
4 INJECTION INTRAMUSCULAR; INTRAVENOUS ONCE
Status: COMPLETED | OUTPATIENT
Start: 2017-04-24 | End: 2017-04-24

## 2017-04-24 RX ORDER — PROMETHAZINE HYDROCHLORIDE 25 MG/1
25 TABLET ORAL EVERY 6 HOURS PRN
Qty: 10 TAB | Refills: 0 | Status: SHIPPED | OUTPATIENT
Start: 2017-04-24 | End: 2017-07-14

## 2017-04-24 RX ORDER — ERGOCALCIFEROL 1.25 MG/1
50000 CAPSULE ORAL
Status: SHIPPED | COMMUNITY
End: 2019-10-04 | Stop reason: SDUPTHER

## 2017-04-24 RX ORDER — PROPRANOLOL HCL 60 MG
60 CAPSULE, EXTENDED RELEASE 24HR ORAL EVERY MORNING
Status: ON HOLD | COMMUNITY
End: 2019-02-28

## 2017-04-24 RX ORDER — ONDANSETRON 4 MG/1
4 TABLET, ORALLY DISINTEGRATING ORAL EVERY 8 HOURS PRN
Qty: 20 TAB | Refills: 0 | Status: SHIPPED | OUTPATIENT
Start: 2017-04-24 | End: 2018-06-17

## 2017-04-24 RX ORDER — TIOTROPIUM BROMIDE 18 UG/1
18 CAPSULE ORAL; RESPIRATORY (INHALATION) DAILY
Status: SHIPPED | COMMUNITY
End: 2017-07-14

## 2017-04-24 RX ORDER — HYDROCODONE BITARTRATE AND ACETAMINOPHEN 5; 325 MG/1; MG/1
1-2 TABLET ORAL EVERY 6 HOURS PRN
Qty: 20 TAB | Refills: 0 | Status: SHIPPED | OUTPATIENT
Start: 2017-04-24 | End: 2017-07-14

## 2017-04-24 RX ORDER — PROMETHAZINE HYDROCHLORIDE 25 MG/1
25 TABLET ORAL ONCE
Status: COMPLETED | OUTPATIENT
Start: 2017-04-24 | End: 2017-04-24

## 2017-04-24 RX ADMIN — HYDROMORPHONE HYDROCHLORIDE 1 MG: 1 INJECTION, SOLUTION INTRAMUSCULAR; INTRAVENOUS; SUBCUTANEOUS at 13:12

## 2017-04-24 RX ADMIN — ONDANSETRON 4 MG: 2 INJECTION, SOLUTION INTRAMUSCULAR; INTRAVENOUS at 16:35

## 2017-04-24 RX ADMIN — PROMETHAZINE HYDROCHLORIDE 25 MG: 25 TABLET ORAL at 18:01

## 2017-04-24 RX ADMIN — SODIUM CHLORIDE 1000 ML: 9 INJECTION, SOLUTION INTRAVENOUS at 13:12

## 2017-04-24 RX ADMIN — ONDANSETRON 4 MG: 2 INJECTION, SOLUTION INTRAMUSCULAR; INTRAVENOUS at 13:12

## 2017-04-24 RX ADMIN — HYDROMORPHONE HYDROCHLORIDE 1 MG: 1 INJECTION, SOLUTION INTRAMUSCULAR; INTRAVENOUS; SUBCUTANEOUS at 16:35

## 2017-04-24 ASSESSMENT — PAIN SCALES - GENERAL: PAINLEVEL_OUTOF10: 10

## 2017-04-24 NOTE — ED AVS SNAPSHOT
[employer] Access Code: [email or web address]  Expires: 4/26/2017  3:32 PM    Your email address is not on file at [facility].  Email Addresses are required for you to sign up for [employer], please contact [phone] to verify your personal information and to provide your email address prior to attempting to register for [employer].    Latonia Clinton  [address] [place] [address]    [employer]  A secure, online tool to manage your health information     [employer]’s [employer]® is a secure, online tool that connects you to your personalized health information from the privacy of your home -- day or night - making it very easy for you to manage your healthcare. Once the activation process is completed, you can even access your medical information using the [employer] tejinder, which is available for free in the Apple Tejinder store or Google Play store.     To learn more about [employer], visit [email or web address]    There are two levels of access available (as shown below):   My Chart Features  [facility] Primary Care Doctor [facility]  Specialists [facility]  Urgent  Care Non-[facility] Primary Care Doctor   Email your healthcare team securely and privately 24/7 X X X    Manage appointments: schedule your next appointment; view details of past/upcoming appointments X      Request prescription refills. X      View recent personal medical records, including lab and immunizations X X X X   View health record, including health history, allergies, medications X X X X   Read reports about your outpatient visits, procedures, consult and ER notes X X X X   See your discharge summary, which is a recap of your hospital and/or ER visit that includes your diagnosis, lab results, and care plan X X  X     How to register for [employer]:  Once your e-mail address has been verified, follow the following steps to sign up for [employer].     1. Go to  [email or web address]  2. Click on the Sign Up Now box, which takes you to the New Member Sign Up page. You will  need to provide the following information:  a. Enter your [employer] Access Code exactly as it appears at the top of this page. (You will not need to use this code after you’ve completed the sign-up process. If you do not sign up before the expiration date, you must request a new code.)   b. Enter your date of birth.   c. Enter your home email address.   d. Click Submit, and follow the next screen’s instructions.  3. Create a [employer] ID. This will be your [employer] login ID and cannot be changed, so think of one that is secure and easy to remember.  4. Create a [employer] password. You can change your password at any time.  5. Enter your Password Reset Question and Answer. This can be used at a later time if you forget your password.   6. Enter your e-mail address. This allows you to receive e-mail notifications when new information is available in [employer].  7. Click Sign Up. You can now view your health information.    For assistance activating your [employer] account, call [phone]

## 2017-04-24 NOTE — ED NOTES
C/o back pain radiating to chest x 5 days, states seen at urgent care 10 days ago for cough, ekg done no stemi

## 2017-04-24 NOTE — ED AVS SNAPSHOT
4/24/2017    Latonia Clinton  [address] [place] [address]    Dear Latonia:    [facility] wants to ensure your discharge home is safe and you or your loved ones have had all of your questions answered regarding your care after you leave the hospital.    Below is a list of resources and contact information should you have any questions regarding your hospital stay, follow-up instructions, or active medical symptoms.    Questions or Concerns Regarding… Contact   Medical Questions Related to Your Discharge  (7 days a week, 8am-5pm) Contact a Nurse Care Coordinator   [phone]   Medical Questions Not Related to Your Discharge  (24 hours a day / 7 days a week)  Contact the Nurse Health Line   [phone]    Medications or Discharge Instructions Refer to your discharge packet   or contact your [facility] Primary Care Provider   [phone]   Follow-up Appointment(s) Schedule your appointment via [employer]   or contact Scheduling [phone]   Billing Review your statement via [employer]  or contact Billing [phone]   Medical Records Review your records via [employer]   or contact Medical Records [phone]     You may receive a telephone call within two days of discharge. This call is to make certain you understand your discharge instructions and have the opportunity to have any questions answered. You can also easily access your medical information, test results and upcoming appointments via the [employer] free online health management tool. You can learn more and sign up at [employer]. For assistance setting up your [employer] account, please call [phone].    Once again, we want to ensure your discharge home is safe and that you have a clear understanding of any next steps in your care. If you have any questions or concerns, please do not hesitate to contact us, we are here for you. Thank you for choosing [facility] for your healthcare needs.    Sincerely,    Your [facility] Healthcare Team

## 2017-04-24 NOTE — ED PROVIDER NOTES
ED Provider Note    CHIEF COMPLAINT  Chief Complaint   Patient presents with   • Chest Pain   • Back Pain       HPI  Latonianavid Clinton is a 66 y.o. female who presents to the ED secondary to thoracic back pain. Patient has a history of lung cancer status post resection in December, also has a history of DVT and recent right eye blood clot. Patient states that she was treated for a bronchitis on the 11th, was given doxycycline for 10 days. She has been having a slight cough, 4-5 days ago she coughed and then had immediate thoracic back pain, bilateral, lower, radiating around bilateral lateral ribs into the central aspect of her chest. She does not feel short of breath, she's been taking Tylenol, the pain is sharp severe worse with movement and worse with breathing. The patient has no fevers no abdominal pain, some nausea.    REVIEW OF SYSTEMS  See HPI for further details. All other systems are negative.     PAST MEDICAL HISTORY  Past Medical History   Diagnosis Date   • CAD (coronary artery disease)      palpatations   • Renal disorder      had been on dialysis for 7 months for acute failure 2005   • Glaucoma      right eye   • Dialysis 2005     transient renal failure due to sepsis   • Backpain    • Hypertension    • Hyperlipidemia    • S/P appendectomy 1998    • S/P cholecystectomy 1998   • Osteoporosis    • S/P kyphoplasty 2006, 2007, 2013   • Renal stones 2013     post lithotripsy   • Rheumatoid arthritis (CMS-HCC)      question   • Multiple thyroid nodules 7/9/2015   • Rheumatoid arthritis (CMS-[facility])    • Bronchitis    • COPD (chronic obstructive pulmonary disease) (CMS-[facility])    • Hyperthyroidism    • Kidney stone    • Nasal drainage    • Chickenpox    • Emphysema of lung (CMS-[facility])    • Personal history of venous thrombosis and embolism 2004, 2012     arm dvt   • Asthma      inhalers daily   • Pneumonia 7/2016     per patient   • Lung cancer (CMS-[facility]) 12/12/2016       FAMILY HISTORY  Family History   Problem  Relation Age of Onset   • Cancer Mother    • Heart Failure Father    • Heart Disease Brother        SOCIAL HISTORY  Social History     Social History   • Marital Status: Single     Spouse Name: N/A   • Number of Children: N/A   • Years of Education: N/A     Social History Main Topics   • Smoking status: Former Smoker -- 1.00 packs/day for 30 years     Types: Cigarettes     Quit date: 01/01/2000   • Smokeless tobacco: Never Used      Comment: 7 years ago   • Alcohol Use: 0.0 oz/week     0 Standard drinks or equivalent per week      Comment: x3 a week   • Drug Use: No   • Sexual Activity: Not on file     Other Topics Concern   • Not on file     Social History Narrative       SURGICAL HISTORY  Past Surgical History   Procedure Laterality Date   • Other     • Other abdominal surgery       gall bladder disease   • Pr enlarge breast with implant     • Pr reduction of large breast     • Other orthopedic surgery  2013     kyphoplasty   • Appendectomy       1990   • Cholecystectomy       1990   • Laminotomy     • Lung biopsy open     • Thoracoscopy Left 12/12/2016     Procedure: THORACOSCOPY W/WEDGE RESECTION UPPER LOBE MASS;  Surgeon: John H Ganser, M.D.;  Location: SURGERY [facility];  Service:        CURRENT MEDICATIONS  Home Medications     Reviewed by Rosalina Lin (Pharmacy Tech) on 04/24/17 at 1141  Med List Status: Complete    Medication Last Dose Status    acetaminophen (TYLENOL) 500 MG Tab 4/24/2017 Active    albuterol (PROAIR HFA) 108 (90 BASE) MCG/ACT Aero Soln inhalation aerosol 4/23/2017 Active    budesonide-formoterol (SYMBICORT) 160-4.5 MCG/ACT Aerosol 4/24/2017 Active    dorzolamide-timolol (COSOPT) 22.3-6.8 MG/ML Solution 4/24/2017 Active    doxycycline (VIBRAMYCIN) 100 MG Tab complete Active    losartan (COZAAR) 50 MG TABS 4/24/2017 Active    propranolol LA (INDERAL LA) 60 MG CAPSULE SR 24 HR 4/24/2017 Active    simvastatin (ZOCOR) 40 MG Tab 4/23/2017 Active    tiotropium (SPIRIVA) 18 MCG Cap  "4/24/2017 Active    vitamin D, Ergocalciferol, (DRISDOL) 52345 UNITS Cap capsule 4/24/2017 Active    warfarin (COUMADIN) 3 MG Tab 4/23/2017 Active    zolpidem (AMBIEN) 10 MG Tab 4/23/2017 Active                ALLERGIES  Allergies   Allergen Reactions   • Nkda [No Known Drug Allergy]        PHYSICAL EXAM  VITAL SIGNS: /82 mmHg  Pulse 69  Temp(Src) 36.7 °C (98 °F)  Resp 16  Ht 1.702 m (5' 7\")  Wt 83.5 kg (184 lb 1.4 oz)  BMI 28.82 kg/m2  SpO2 98%  Constitutional: Well developed, Well nourished, mild to moderate distress, Non-toxic appearance.   HENT:  Atraumatic, Normocephalic, Oral pharynx with moist mucous membranes.   Eyes:  EOMI, PERRL, no scleral icterus.  Neck: Normal range of motion, No tenderness, Supple, No stridor.   Cardiovascular: Normal heart rate, Normal rhythm,   Thorax & Lungs: Normal breath sounds, No respiratory distress, No wheezing, No chest tenderness.   Abdomen: Bowel sounds normal, Soft, No tenderness, No masses, No pulsatile masses.   Skin: Warm, Dry, No erythema, No rash.   Back: Tenderness on palpation throughout the bilateral thoracic paraspinal musculature on the lower thoracic spine with tenderness of the bilateral lateral and inferior chest wall, inferior anterior chest wall, and parasternal area. No rashes.  Extremities: Intact distal pulses, No edema, No tenderness. No cords  Neurologic: Alert & oriented x 3, Normal motor function, Normal sensory function, No focal deficits noted. Gait wide based but no ataxia    RADIOLOGY/PROCEDURES  CT-CHEST (THORAX) W/O   Final Result      1.  Postoperative change of both upper lungs.   2.  No pneumonia or pneumothorax.   3.  Mild bilateral atelectasis.               CT-TSPINE W/O PLUS RECONS   Final Result         1. Mild superior endplate deformity of T4, age indeterminant.      2. Unchanged compression deformity of T5, T7 and T8 status post cement augmentation           EKG  Interpreted by myself shows normal sinus rhythm at rate of " 72, normal P waves, normal QRS, question of left atrial enlargement, normal ST and T waves, normal axis, normal intervals, overall nonspecific EKG, grossly unchanged from 8/1/16    COURSE & MEDICAL DECISION MAKING  Pertinent Labs & Imaging studies reviewed. (See chart for details)  Patient on blood thinners with history of DVT recently on doxycycline that can alter the INR is coming in with thoracic back pain worse with breathing, differential diagnosis includes muscular skeletal pain, pulmonary embolism, bleeding. Due to the wide differential I get a CT PE study if the patient's kidneys can tolerate it, give the patient pain medicine, check a CT of the thoracic spine, reevaluate    We're unable to get a appropriate IV for a CT PE study however the patient's INR is 3.3 and I doubt the patient has a pulmonary embolism with an INR of 3.3. I did a CT scan of the thoracic spine that shows a questionable endplate abnormality at C4, this could be the origination of the patient's symptoms however it is difficult to discern. Patient's CT scan of her chest does not show any bleeding or new growths. Patient is feeling improved after Dilaudid, Toradol, though somewhat nauseated after pain medicines, we'll give the patient a prescription for Phenergan, Zofran, Norco for pain, have the patient follow up with her spine specialist, return to the main hospital with worsening symptoms.    FINAL IMPRESSION  1. Acute bilateral thoracic back pain    2. Chest wall pain    .    Patient referred to primary care provider for blood pressure management    This dictation was created using voice recognition software. The accuracy of the dictation is limited to the abilities of the software. I expect there may be some errors of grammar and possibly content. The nursing notes were reviewed and certain aspects of this information were incorporated into this note.    Electronically signed by: Benny Cohen, 4/24/2017 12:02 PM

## 2017-04-24 NOTE — ED NOTES
Attempt IV, unable to obtain. Lance RN to attempt. Pt with call light in reach and gurney in low position for pt safety.

## 2017-04-24 NOTE — ED AVS SNAPSHOT
Home Care Instructions                                                                                                                Latonia Clinton   MRN: [id number]    Department:  [facility], Emergency Dept   Date of Visit:  4/24/2017            [facility], Emergency Dept    [address]    [place] [address]    Phone:  [phone]      You were seen by     Benny Cohen M.D.      Your Diagnosis Was     Acute bilateral thoracic back pain     M54.6       These are the medications you received during your hospitalization from 04/24/2017 1009 to 04/24/2017 1802     Date/Time Order Dose Route Action    04/24/2017 1312 NS infusion 1,000 mL 1,000 mL Intravenous New Bag    04/24/2017 1635 HYDROmorphone (DILAUDID) injection 0.5-1 mg 1 mg Intravenous Given    04/24/2017 1312 HYDROmorphone (DILAUDID) injection 0.5-1 mg 1 mg Intravenous Given    04/24/2017 1312 ondansetron (ZOFRAN) syringe/vial injection 4 mg 4 mg Intravenous Given    04/24/2017 1635 ondansetron (ZOFRAN) syringe/vial injection 4 mg 4 mg Intravenous Given    04/24/2017 1801 promethazine (PHENERGAN) tablet 25 mg 25 mg Oral Given      Follow-up Information     1. Follow up with [facility], Emergency Dept.    Specialty:  Emergency Medicine    Why:  If symptoms worsen    Contact information    [address] [id number] [phone]        2. Follow up with Edwin Rogers M.D..    Specialty:  Internal Medicine    Contact information    [address] [facility] [address]  [place] [address]  [phone]        Medication Information     Review all of your home medications and newly ordered medications with your primary doctor and/or pharmacist as soon as possible. Follow medication instructions as directed by your doctor and/or pharmacist.     Please keep your complete medication list with you and share with your physician. Update the information when medications  are discontinued, doses are changed, or new medications (including over-the-counter products) are added; and carry medication information at all times in the event of emergency situations.               Medication List      START taking these medications        Instructions    Morning Afternoon Evening Bedtime    hydrocodone-acetaminophen 5-325 MG Tabs per tablet   Commonly known as:  NORCO        Take 1-2 Tabs by mouth every 6 hours as needed.   Dose:  1-2 Tab                        ondansetron 4 MG Tbdp   Commonly known as:  ZOFRAN ODT        Take 1 Tab by mouth every 8 hours as needed for Nausea/Vomiting.   Dose:  4 mg                        promethazine 25 MG Tabs   Last time this was given:  25 mg on 4/24/2017  6:01 PM   Commonly known as:  PHENERGAN        Take 1 Tab by mouth every 6 hours as needed for Nausea/Vomiting.   Dose:  25 mg                          ASK your doctor about these medications        Instructions    Morning Afternoon Evening Bedtime    acetaminophen 500 MG Tabs   Commonly known as:  TYLENOL        Take 500 mg by mouth every 6 hours as needed.   Dose:  500 mg                        albuterol 108 (90 BASE) MCG/ACT Aers inhalation aerosol   Commonly known as:  PROAIR HFA        Inhale 2 Puffs by mouth every 6 hours as needed for Shortness of Breath.   Dose:  2 Puff                        budesonide-formoterol 160-4.5 MCG/ACT Aero   Commonly known as:  SYMBICORT        Inhale 2 Puffs by mouth 2 Times a Day.   Dose:  2 Puff                        dorzolamide-timolol 22.3-6.8 MG/ML Soln   Commonly known as:  COSOPT        Place 1 Drop in both eyes 2 times a day.   Dose:  1 Drop                        doxycycline 100 MG Tabs   Commonly known as:  VIBRAMYCIN        Take 100 mg by mouth 2 times a day. 2 - ten day courses, she completed the first one and stopped the 2nd course last week 4/21/2017   Dose:  100 mg                        losartan 50 MG Tabs   Commonly known as:  COZAAR        Take 50  mg by mouth every day.   Dose:  50 mg                        propranolol LA 60 MG Cp24   Commonly known as:  INDERAL LA        Take 60 mg by mouth every day.   Dose:  60 mg                        simvastatin 40 MG Tabs   Commonly known as:  ZOCOR        Take 40 mg by mouth every evening.   Dose:  40 mg                        tiotropium 18 MCG Caps   Commonly known as:  SPIRIVA        Inhale 18 mcg by mouth every day.   Dose:  18 mcg                        vitamin D (Ergocalciferol) 43340 UNITS Caps capsule   Commonly known as:  DRISDOL        Take 50,000 Units by mouth every 14 days.   Dose:  42206 Units                        warfarin 3 MG Tabs   Commonly known as:  COUMADIN        Take 1.5-3 mg by mouth every evening. 1.5 mg Mon/Wed/Fri and 3 mg all other days   Dose:  1.5-3 mg                        zolpidem 10 MG Tabs   Commonly known as:  AMBIEN        Take 10 mg by mouth at bedtime as needed for Sleep.   Dose:  10 mg                             Where to Get Your Medications      These medications were sent to [facility] #[id number] - [place] - [address]  [address] [address]     Phone:  [phone]    - ondansetron 4 MG Tbdp  - promethazine 25 MG Tabs      You can get these medications from any pharmacy     Bring a paper prescription for each of these medications    - hydrocodone-acetaminophen 5-325 MG Tabs per tablet            Procedures and tests performed during your visit     APTT    Btype Natriuretic Peptide    CBC w/ Differential    CONSENT FOR CONTRAST INJECTION    CT-CHEST (THORAX) W/O    CT-TSPINE W/O PLUS RECONS    Cardiac Monitoring    Complete Metabolic Panel (CMP)    EKG NOW    ESTIMATED GFR    PT/INR    Pulse Ox    Troponin STAT        Discharge Instructions       Please follow-up with your primary care provider for blood pressure management.      Back Pain, Adult  Back pain is very common in adults. The cause of back pain is rarely dangerous and the pain  often gets better over time. The cause of your back pain may not be known. Some common causes of back pain include:  · Strain of the muscles or ligaments supporting the spine.  · Wear and tear (degeneration) of the spinal disks.  · Arthritis.  · Direct injury to the back.  For many people, back pain may return. Since back pain is rarely dangerous, most people can learn to manage this condition on their own.  HOME CARE INSTRUCTIONS  Watch your back pain for any changes. The following actions may help to lessen any discomfort you are feeling:  · Remain active. It is stressful on your back to sit or  one place for long periods of time. Do not sit, drive, or  one place for more than 30 minutes at a time. Take short walks on even surfaces as soon as you are able. Try to increase the length of time you walk each day.  · Exercise regularly as directed by your health care provider. Exercise helps your back heal faster. It also helps avoid future injury by keeping your muscles strong and flexible.  · Do not stay in bed. Resting more than 1-2 days can delay your recovery.  · Pay attention to your body when you bend and lift. The most comfortable positions are those that put less stress on your recovering back. Always use proper lifting techniques, including:  ¨ Bending your knees.  ¨ Keeping the load close to your body.  ¨ Avoiding twisting.  · Find a comfortable position to sleep. Use a firm mattress and lie on your side with your knees slightly bent. If you lie on your back, put a pillow under your knees.  · Avoid feeling anxious or stressed. Stress increases muscle tension and can worsen back pain. It is important to recognize when you are anxious or stressed and learn ways to manage it, such as with exercise.  · Take medicines only as directed by your health care provider. Over-the-counter medicines to reduce pain and inflammation are often the most helpful. Your health care provider may prescribe muscle  relaxant drugs. These medicines help dull your pain so you can more quickly return to your normal activities and healthy exercise.  · Apply ice to the injured area:  ¨ Put ice in a plastic bag.  ¨ Place a towel between your skin and the bag.  ¨ Leave the ice on for 20 minutes, 2-3 times a day for the first 2-3 days. After that, ice and heat may be alternated to reduce pain and spasms.  · Maintain a healthy weight. Excess weight puts extra stress on your back and makes it difficult to maintain good posture.  SEEK MEDICAL CARE IF:  · You have pain that is not relieved with rest or medicine.  · You have increasing pain going down into the legs or buttocks.  · You have pain that does not improve in one week.  · You have night pain.  · You lose weight.  · You have a fever or chills.  SEEK IMMEDIATE MEDICAL CARE IF:   · You develop new bowel or bladder control problems.  · You have unusual weakness or numbness in your arms or legs.  · You develop nausea or vomiting.  · You develop abdominal pain.  · You feel faint.     This information is not intended to replace advice given to you by your health care provider. Make sure you discuss any questions you have with your health care provider.     Document Released: 12/18/2006 Document Revised: 01/08/2016 Document Reviewed: 04/21/2015  Elsevier Interactive Patient Education ©2016 [employer]            Patient Information     Patient Information    Following emergency treatment: all patient requiring follow-up care must return either to a private physician or a clinic if your condition worsens before you are able to obtain further medical attention, please return to the emergency room.     Billing Information    At [facility], we work to make the billing process streamlined for our patients.  Our Representatives are here to answer any questions you may have regarding your hospital bill.  If you have insurance coverage and have supplied your insurance information to us, we  will submit a claim to your insurer on your behalf.  Should you have any questions regarding your bill, we can be reached online or by phone as follows:  Online: You are able pay your bills online or live chat with our representatives about any billing questions you may have. We are here to help Monday - Friday from 8:00am to 7:30pm and 9:00am - 12:00pm on Saturdays.  Please visit [email or web address]  for more information.   Phone:  [phone] or [phone]    Please note that your emergency physician, surgeon, pathologist, radiologist, anesthesiologist, and other specialists are not employed by [facility] and will therefore bill separately for their services.  Please contact them directly for any questions concerning their bills at the numbers below:     Emergency Physician Services:  [phone]  [facility]:  [phone]  [facility]:  [phone]  [facility]:  [phone]    1. Your final bill may vary from the amount quoted upon discharge if all procedures are not complete at that time, or if your doctor has additional procedures of which we are not aware. You will receive an additional bill if you return to the Emergency Department at [facility] for suture removal regardless of the facility of which the sutures were placed.     2. Please arrange for settlement of this account at the emergency registration.    3. All self-pay accounts are due in full at the time of treatment.  If you are unable to meet this obligation then payment is expected within 4-5 days.     4. If you have had radiology studies (CT, X-ray, Ultrasound, MRI), you have received a preliminary result during your emergency department visit. Please contact the radiology department [phone] to receive a copy of your final result. Please discuss the Final result with your primary physician or with the follow up physician provided.     Crisis  Hotline:  [place] Crisis Hotline:  1-883-AWRXPJJ or 1-922.702.5383  [place] Crisis Hotline:    1-147.876.9398 or 320-614-0699         ED Discharge Follow Up Questions    1. In order to provide you with very good care, we would like to follow up with a phone call in the next few days.  May we have your permission to contact you?     YES /  NO    2. What is the best phone number to call you? (       )_____-__________    3. What is the best time to call you?      Morning  /  Afternoon  /  Evening                   Patient Signature:  ____________________________________________________________    Date:  ____________________________________________________________      Your appointments     Jul 03, 2017  3:20 PM   Established Patient Pul with Jessenia Byrne M.D.   [facility] (--)    [address]  [facility] [address]  [place] [id number] [phone]            Aug 08, 2017  4:00 PM   Established Patient with Kishore Torrez M.D.   [facility]    [address]  [place] [address] [phone]           You will be receiving a confirmation call a few days before your appointment from our automated call confirmation system.

## 2017-04-25 NOTE — DISCHARGE INSTRUCTIONS
Please follow-up with your primary care provider for blood pressure management.      Back Pain, Adult  Back pain is very common in adults. The cause of back pain is rarely dangerous and the pain often gets better over time. The cause of your back pain may not be known. Some common causes of back pain include:  · Strain of the muscles or ligaments supporting the spine.  · Wear and tear (degeneration) of the spinal disks.  · Arthritis.  · Direct injury to the back.  For many people, back pain may return. Since back pain is rarely dangerous, most people can learn to manage this condition on their own.  HOME CARE INSTRUCTIONS  Watch your back pain for any changes. The following actions may help to lessen any discomfort you are feeling:  · Remain active. It is stressful on your back to sit or  one place for long periods of time. Do not sit, drive, or  one place for more than 30 minutes at a time. Take short walks on even surfaces as soon as you are able. Try to increase the length of time you walk each day.  · Exercise regularly as directed by your health care provider. Exercise helps your back heal faster. It also helps avoid future injury by keeping your muscles strong and flexible.  · Do not stay in bed. Resting more than 1-2 days can delay your recovery.  · Pay attention to your body when you bend and lift. The most comfortable positions are those that put less stress on your recovering back. Always use proper lifting techniques, including:  ¨ Bending your knees.  ¨ Keeping the load close to your body.  ¨ Avoiding twisting.  · Find a comfortable position to sleep. Use a firm mattress and lie on your side with your knees slightly bent. If you lie on your back, put a pillow under your knees.  · Avoid feeling anxious or stressed. Stress increases muscle tension and can worsen back pain. It is important to recognize when you are anxious or stressed and learn ways to manage it, such as with  exercise.  · Take medicines only as directed by your health care provider. Over-the-counter medicines to reduce pain and inflammation are often the most helpful. Your health care provider may prescribe muscle relaxant drugs. These medicines help dull your pain so you can more quickly return to your normal activities and healthy exercise.  · Apply ice to the injured area:  ¨ Put ice in a plastic bag.  ¨ Place a towel between your skin and the bag.  ¨ Leave the ice on for 20 minutes, 2-3 times a day for the first 2-3 days. After that, ice and heat may be alternated to reduce pain and spasms.  · Maintain a healthy weight. Excess weight puts extra stress on your back and makes it difficult to maintain good posture.  SEEK MEDICAL CARE IF:  · You have pain that is not relieved with rest or medicine.  · You have increasing pain going down into the legs or buttocks.  · You have pain that does not improve in one week.  · You have night pain.  · You lose weight.  · You have a fever or chills.  SEEK IMMEDIATE MEDICAL CARE IF:   · You develop new bowel or bladder control problems.  · You have unusual weakness or numbness in your arms or legs.  · You develop nausea or vomiting.  · You develop abdominal pain.  · You feel faint.     This information is not intended to replace advice given to you by your health care provider. Make sure you discuss any questions you have with your health care provider.     Document Released: 12/18/2006 Document Revised: 01/08/2016 Document Reviewed: 04/21/2015  [employer] Patient Education ©2016 [employer]

## 2017-04-25 NOTE — ED NOTES
Discharge information and prescription information provided. Pt verbalized understanding of discharge instructions to follow up with PCP and to return to ER if condition worsens. Pt expressed the awareness of not driving or operating heavy machinery. Pt ambulated out of ER in a steady gait.  to drive home

## 2017-05-03 ENCOUNTER — HOSPITAL ENCOUNTER (OUTPATIENT)
Dept: RADIOLOGY | Facility: MEDICAL CENTER | Age: 67
End: 2017-05-03
Attending: ORTHOPAEDIC SURGERY
Payer: MEDICARE

## 2017-05-03 DIAGNOSIS — M54.6 PAIN IN THORACIC SPINE: ICD-10-CM

## 2017-05-03 PROCEDURE — 72070 X-RAY EXAM THORAC SPINE 2VWS: CPT

## 2017-05-15 ENCOUNTER — HOSPITAL ENCOUNTER (OUTPATIENT)
Dept: RADIOLOGY | Facility: MEDICAL CENTER | Age: 67
End: 2017-05-15
Attending: ORTHOPAEDIC SURGERY
Payer: MEDICARE

## 2017-05-15 DIAGNOSIS — M54.5 LOW BACK PAIN, UNSPECIFIED BACK PAIN LATERALITY, UNSPECIFIED CHRONICITY, WITH SCIATICA PRESENCE UNSPECIFIED: ICD-10-CM

## 2017-05-15 PROCEDURE — 72146 MRI CHEST SPINE W/O DYE: CPT

## 2017-05-23 ENCOUNTER — HOSPITAL ENCOUNTER (OUTPATIENT)
Facility: MEDICAL CENTER | Age: 67
End: 2017-05-23
Attending: ORTHOPAEDIC SURGERY | Admitting: ORTHOPAEDIC SURGERY
Payer: MEDICARE

## 2017-05-26 ENCOUNTER — HOSPITAL ENCOUNTER (OUTPATIENT)
Dept: RADIOLOGY | Facility: MEDICAL CENTER | Age: 67
End: 2017-05-26
Attending: ORTHOPAEDIC SURGERY
Payer: MEDICARE

## 2017-05-26 DIAGNOSIS — M54.5 LOW BACK PAIN, UNSPECIFIED BACK PAIN LATERALITY, UNSPECIFIED CHRONICITY, WITH SCIATICA PRESENCE UNSPECIFIED: ICD-10-CM

## 2017-05-26 PROCEDURE — 77080 DXA BONE DENSITY AXIAL: CPT

## 2017-06-16 DIAGNOSIS — R06.02 SOB (SHORTNESS OF BREATH): ICD-10-CM

## 2017-06-16 NOTE — TELEPHONE ENCOUNTER
Have we ever prescribed this med? Yes.  If yes, what date? 02/13/2017    Last OV: 03/27/2017 - Dr. Byrne    Next OV: 07/03/2017 - Dr. Byrne    DX: SOB    Medications: Proair

## 2017-06-22 ENCOUNTER — TELEPHONE (OUTPATIENT)
Dept: PULMONOLOGY | Facility: HOSPICE | Age: 67
End: 2017-06-22

## 2017-06-27 ENCOUNTER — HOSPITAL ENCOUNTER (OUTPATIENT)
Dept: RADIOLOGY | Facility: MEDICAL CENTER | Age: 67
End: 2017-06-27
Attending: INTERNAL MEDICINE
Payer: MEDICARE

## 2017-06-27 DIAGNOSIS — C34.12 MALIGNANT NEOPLASM OF UPPER LOBE OF LEFT LUNG (HCC): ICD-10-CM

## 2017-06-27 PROCEDURE — 71250 CT THORAX DX C-: CPT

## 2017-06-30 ENCOUNTER — HOSPITAL ENCOUNTER (OUTPATIENT)
Facility: MEDICAL CENTER | Age: 67
End: 2017-06-30
Attending: ORTHOPAEDIC SURGERY | Admitting: ORTHOPAEDIC SURGERY
Payer: MEDICARE

## 2017-07-03 ENCOUNTER — OFFICE VISIT (OUTPATIENT)
Dept: PULMONOLOGY | Facility: HOSPICE | Age: 67
End: 2017-07-03
Payer: MEDICARE

## 2017-07-03 VITALS
WEIGHT: 179.2 LBS | HEIGHT: 67 IN | HEART RATE: 80 BPM | DIASTOLIC BLOOD PRESSURE: 78 MMHG | BODY MASS INDEX: 28.12 KG/M2 | TEMPERATURE: 97.9 F | RESPIRATION RATE: 16 BRPM | OXYGEN SATURATION: 95 % | SYSTOLIC BLOOD PRESSURE: 118 MMHG

## 2017-07-03 DIAGNOSIS — R91.8 LUNG NODULES: ICD-10-CM

## 2017-07-03 DIAGNOSIS — Z85.118 HISTORY OF LUNG CANCER: ICD-10-CM

## 2017-07-03 DIAGNOSIS — J44.9 CHRONIC OBSTRUCTIVE PULMONARY DISEASE, UNSPECIFIED COPD TYPE (HCC): ICD-10-CM

## 2017-07-03 PROCEDURE — 99214 OFFICE O/P EST MOD 30 MIN: CPT | Performed by: INTERNAL MEDICINE

## 2017-07-03 RX ORDER — ALPRAZOLAM 0.25 MG/1
TABLET ORAL
Status: ON HOLD | COMMUNITY
Start: 2017-06-30 | End: 2018-06-18

## 2017-07-03 RX ORDER — HYDROCODONE BITARTRATE AND ACETAMINOPHEN 10; 325 MG/1; MG/1
0.5 TABLET ORAL EVERY 4 HOURS PRN
Status: ON HOLD | COMMUNITY
Start: 2017-05-18 | End: 2018-06-22

## 2017-07-03 RX ORDER — CEFDINIR 300 MG/1
CAPSULE ORAL
COMMUNITY
Start: 2017-05-31 | End: 2017-07-14

## 2017-07-03 RX ORDER — BUDESONIDE AND FORMOTEROL FUMARATE DIHYDRATE 160; 4.5 UG/1; UG/1
2 AEROSOL RESPIRATORY (INHALATION) 2 TIMES DAILY
Qty: 1 INHALER | Refills: 6 | Status: SHIPPED | OUTPATIENT
Start: 2017-07-03 | End: 2017-07-26

## 2017-07-03 NOTE — MR AVS SNAPSHOT
"        Latonia Clinton   7/3/2017 3:20 PM   Office Visit   MRN: [id number]    Department:  [facility]   Dept Phone:  [phone]    Description:  Female : 1950   Provider:  Jessenia Byrne M.D.           Reason for Visit     Results CT results.      Allergies as of 7/3/2017     Allergen Noted Reactions    Nkda [No Known Drug Allergy] 2008         You were diagnosed with     Lung nodules   [213557]         Vital Signs     Blood Pressure Pulse Temperature Respirations Height Weight    118/78 mmHg 80 36.6 °C (97.9 °F) 16 1.702 m (5' 7.01\") 81.285 kg (179 lb 3.2 oz)    Body Mass Index Oxygen Saturation Smoking Status             28.06 kg/m2 95% Former Smoker         Basic Information     Date Of Birth Sex Race Ethnicity Preferred Language    1950 Female White Non- English      Your appointments     Aug 08, 2017  4:00 PM   Established Patient with Kishore Torrez M.D.   [facility]    [address], [address]  [facility] [place] [facility] [address] [phone]           You will be receiving a confirmation call a few days before your appointment from our automated call confirmation system.            Georges 10, 2018  2:00 PM   Established Patient Pul with Jessenia Byrne M.D.   [facility] (--)    [address]  [facility] [address]  [facility] [address] [phone]              Problem List              ICD-10-CM Priority Class Noted - Resolved    Low TSH level R94.6   2015 - Present    Osteoporosis M81.0   Unknown - Present    Hyperlipidemia E78.5 Low  Unknown - Present    Hypertension I10   Unknown - Present    Renal disorder N28.9   Unknown - Present    Rheumatoid arthritis (CMS-HCC) M06.9   Unknown - Present    Multiple thyroid nodules E04.2   2015 - Present    Thyrotoxicosis with toxic single thyroid nodule and without thyroid storm E05.10   2015 - Present    Acute bronchitis J20.9 High  2016 - Present    Acute " respiratory failure (CMS-HCC) J96.00 High  7/24/2016 - Present    Lung mass R91.8 Medium  7/24/2016 - Present    HTN (hypertension) I10 Low  7/24/2016 - Present    CAD (coronary artery disease) I25.10 Low  7/24/2016 - Present    Acute exacerbation of chronic obstructive pulmonary disease (COPD) (CMS-HCC) J44.1 High  7/24/2016 - Present    CKD (chronic kidney disease) stage 3, GFR 30-59 ml/min N18.3 Low  7/24/2016 - Present    Hyperthyroidism E05.90 Low  7/25/2016 - Present    DVT (deep venous thrombosis) (CMS-HCC) (Chronic) I82.409   7/28/2016 - Present    Deep vein thrombosis (DVT) (CMS-HCC) I82.409   7/28/2016 - Present    Lung nodule R91.1   10/27/2016 - Present    Lung cancer (CMS-HCC) C34.90   12/12/2016 - Present      Health Maintenance        Date Due Completion Dates    IMM DTaP/Tdap/Td Vaccine (1 - Tdap) 9/10/1968 ---    PAP SMEAR 9/10/1970 ---    COLONOSCOPY 9/10/1999 ---    IMM ZOSTER VACCINE 9/10/2009 ---    MAMMOGRAM 11/27/2014 11/27/2013, 6/6/2012, 4/25/2011, 12/29/2009, 12/29/2009, 12/10/2008, 12/10/2008, 12/3/2007, 11/26/2007, 11/26/2007, 10/23/2006, 10/23/2006, 5/31/2005    IMM PNEUMOCOCCAL 65+ (ADULT) LOW/MEDIUM RISK SERIES (2 of 2 - PPSV23) 10/1/2016 10/1/2015    IMM INFLUENZA (1) 9/1/2017 10/5/2016    BONE DENSITY 5/26/2022 5/26/2017            Current Immunizations     13-VALENT PCV PREVNAR 10/1/2015    Influenza TIV (IM) 10/5/2016      Below and/or attached are the medications your provider expects you to take. Review all of your home medications and newly ordered medications with your provider and/or pharmacist. Follow medication instructions as directed by your provider and/or pharmacist. Please keep your medication list with you and share with your provider. Update the information when medications are discontinued, doses are changed, or new medications (including over-the-counter products) are added; and carry medication information at all times in the event of emergency situations      Allergies:  NKDA - (reactions not documented)               Medications  Valid as of: July 03, 2017 -  4:10 PM    Generic Name Brand Name Tablet Size Instructions for use    Acetaminophen (Tab) TYLENOL 500 MG Take 500 mg by mouth every 6 hours as needed.        Albuterol Sulfate (Aero Soln) PROAIR  (90 BASE) MCG/ACT INHALE TWO PUFFS BY MOUTH EVERY 6 HOURS AS NEEDED FOR SHORTNESS OF BREATH        ALPRAZolam (Tab) XANAX 0.25 MG         Budesonide-Formoterol Fumarate (Aerosol) SYMBICORT 160-4.5 MCG/ACT Inhale 2 Puffs by mouth 2 Times a Day.        Cefdinir (Cap) OMNICEF 300 MG         Dorzolamide HCl-Timolol Mal (Solution) COSOPT 22.3-6.8 MG/ML Place 1 Drop in both eyes 2 times a day.        Doxycycline Hyclate (Tab) VIBRAMYCIN 100 MG Take 100 mg by mouth 2 times a day. 2 - ten day courses, she completed the first one and stopped the 2nd course last week 4/21/2017        Ergocalciferol (Cap) DRISDOL 71928 UNITS Take 50,000 Units by mouth every 14 days.        Hydrocodone-Acetaminophen (Tab) NORCO 5-325 MG Take 1-2 Tabs by mouth every 6 hours as needed.        Hydrocodone-Acetaminophen (Tab) NORCO  MG         Losartan Potassium (Tab) COZAAR 50 MG Take 50 mg by mouth every day.        Ondansetron (TABLET DISPERSIBLE) ZOFRAN ODT 4 MG Take 1 Tab by mouth every 8 hours as needed for Nausea/Vomiting.        Promethazine HCl (Tab) PHENERGAN 25 MG Take 1 Tab by mouth every 6 hours as needed for Nausea/Vomiting.        Propranolol HCl (CAPSULE SR 24 HR) INDERAL LA 60 MG Take 60 mg by mouth every day.        Simvastatin (Tab) ZOCOR 40 MG Take 40 mg by mouth every evening.        Tiotropium Bromide Monohydrate (Cap) SPIRIVA 18 MCG Inhale 18 mcg by mouth every day.        Tiotropium Bromide Monohydrate (Aero Soln) Tiotropium Bromide Monohydrate 2.5 MCG/ACT Inhale 2 Inhalation by mouth every day. Assemble and prime.        Warfarin Sodium (Tab) COUMADIN 3 MG Take 1.5-3 mg by mouth every evening. 1.5 mg Mon/Wed/Fri and  3 mg all other days        Zolpidem Tartrate (Tab) AMBIEN 10 MG Take 10 mg by mouth at bedtime as needed for Sleep.        .                 Medicines prescribed today were sent to:     [id number] - [place], [place] - [address]    [address] [place] [address]    Phone: [phone] Fax: [phone]    Open 24 Hours?: No      Medication refill instructions:       If your prescription bottle indicates you have medication refills left, it is not necessary to call your provider’s office. Please contact your pharmacy and they will refill your medication.    If your prescription bottle indicates you do not have any refills left, you may request refills at any time through one of the following ways: The online Quartzy system (except Urgent Care), by calling your provider’s office, or by asking your pharmacy to contact your provider’s office with a refill request. Medication refills are processed only during regular business hours and may not be available until the next business day. Your provider may request additional information or to have a follow-up visit with you prior to refilling your medication.   *Please Note: Medication refills are assigned a new Rx number when refilled electronically. Your pharmacy may indicate that no refills were authorized even though a new prescription for the same medication is available at the pharmacy. Please request the medicine by name with the pharmacy before contacting your provider for a refill.        Your To Do List     Future Labs/Procedures Complete By Expires    CT-CHEST (THORAX) W/O  1/3/2018 7/3/2018         Quartzy Access Code: [email or web address]  Expires: 8/2/2017  3:13 PM    Quartzy  A secure, online tool to manage your health information     [employer]’s Quartzy® is a secure, online tool that connects you to your personalized health information from the privacy of your home -- day or night - making it very easy for you to manage your  healthcare. Once the activation process is completed, you can even access your medical information using the Learning Hyperdrive tejinder, which is available for free in the Apple Tejinder store or Google Play store.     Learning Hyperdrive provides the following levels of access (as shown below):   My Chart Features   [facility] Doctor [facility]  Specialists [facility]  Urgent  Care Non-[facility]  Primary Care  Doctor   Email your healthcare team securely and privately 24/7 X X X    Manage appointments: schedule your next appointment; view details of past/upcoming appointments X      Request prescription refills. X      View recent personal medical records, including lab and immunizations X X X X   View health record, including health history, allergies, medications X X X X   Read reports about your outpatient visits, procedures, consult and ER notes X X X X   See your discharge summary, which is a recap of your hospital and/or ER visit that includes your diagnosis, lab results, and care plan. X X       How to register for Learning Hyperdrive:  1. Go to  [email or web address].  2. Click on the Sign Up Now box, which takes you to the New Member Sign Up page. You will need to provide the following information:  a. Enter your Learning Hyperdrive Access Code exactly as it appears at the top of this page. (You will not need to use this code after you’ve completed the sign-up process. If you do not sign up before the expiration date, you must request a new code.)   b. Enter your date of birth.   c. Enter your home email address.   d. Click Submit, and follow the next screen’s instructions.  3. Create a Learning Hyperdrive ID. This will be your Learning Hyperdrive login ID and cannot be changed, so think of one that is secure and easy to remember.  4. Create a Learning Hyperdrive password. You can change your password at any time.  5. Enter your Password Reset Question and Answer. This can be used at a later time if you forget your password.   6. Enter your e-mail address. This allows you to receive e-mail  notifications when new information is available in [employer].  7. Click Sign Up. You can now view your health information.    For assistance activating your [employer] account, call [phone]

## 2017-07-03 NOTE — Clinical Note
Jessenia Byrne M.D.  [facility]   [address] [place] [address] [place] [address]  Phone: [phone] - Fax: [phone]           Encounter Date: 7/3/2017  Provider: Jessenia Byrne M.D.  Location of Care: [facility]      Patient:   Latonia Clinton   MR Number: [id number]   YOB: 1950     PROGRESS NOTE:  Chief Complaint   Patient presents with   • Results     CT results.       HPI: This patient is a 66 y.o. Female who returns for follow-up of lung cancer and COPD. She underwent thoracoscopic partial left upper lobectomy by Dr. Ganser 12/12/16 demonstrating adenocarcinoma with pleural involvement, pT2a. Follow-up chest CAT scan June 27, 2017 shows postsurgical changes in the left lung, with a new 6.2 mm left lower lobe nodule. There was also suggestion of left hydronephrosis with a possible obstructing stone. She has a known history of nephrolithiasis and follows with nephrology.   She has stage II COPD, on Symbicort 160 µg and Spiriva. Her exertional dyspnea has been stable. Denies cough, wheezing, hemoptysis, chest pain. Denies AECOPD. Her FEV1 is 1.43 L or 54%. She has been relying on samples of her inhalers on account of the expense.  She has developed a compression fracture and is pending kyphoplasty on July 25.      Past Medical History   Diagnosis Date   • CAD (coronary artery disease)      palpatations   • Renal disorder      had been on dialysis for 7 months for acute failure 2005   • Glaucoma      right eye   • Dialysis 2005     transient renal failure due to sepsis   • Backpain    • Hypertension    • Hyperlipidemia    • S/P appendectomy 1998    • S/P cholecystectomy 1998   • Osteoporosis    • S/P kyphoplasty 2006, 2007, 2013   • Renal stones 2013     post lithotripsy   • Rheumatoid arthritis (CMS-HCC)      question   • Multiple thyroid nodules 7/9/2015   • Rheumatoid arthritis (CMS-HCC)    • Bronchitis    • COPD  (chronic obstructive pulmonary disease) (CMS-HCC)    • Hyperthyroidism    • Kidney stone    • Nasal drainage    • Chickenpox    • Emphysema of lung (CMS-HCC)    • Personal history of venous thrombosis and embolism 2004, 2012     arm dvt   • Asthma      inhalers daily   • Pneumonia 7/2016     per patient   • Lung cancer (CMS-HCC) 12/12/2016       Social History     Social History   • Marital Status: Single     Spouse Name: N/A   • Number of Children: N/A   • Years of Education: N/A     Occupational History   • Not on file.     Social History Main Topics   • Smoking status: Former Smoker -- 1.00 packs/day for 30 years     Types: Cigarettes     Quit date: 01/01/2000   • Smokeless tobacco: Never Used      Comment: 7 years ago   • Alcohol Use: 0.0 oz/week     0 Standard drinks or equivalent per week      Comment: x3 a week   • Drug Use: No   • Sexual Activity: Not on file     Other Topics Concern   • Not on file     Social History Narrative       Family History   Problem Relation Age of Onset   • Cancer Mother    • Heart Failure Father    • Heart Disease Brother        Current Outpatient Prescriptions on File Prior to Visit   Medication Sig Dispense Refill   • PROAIR  (90 BASE) MCG/ACT Aero Soln inhalation aerosol INHALE TWO PUFFS BY MOUTH EVERY 6 HOURS AS NEEDED FOR SHORTNESS OF BREATH 1 Inhaler 5   • dorzolamide-timolol (COSOPT) 22.3-6.8 MG/ML Solution Place 1 Drop in both eyes 2 times a day.     • vitamin D, Ergocalciferol, (DRISDOL) 80683 UNITS Cap capsule Take 50,000 Units by mouth every 14 days.     • propranolol LA (INDERAL LA) 60 MG CAPSULE SR 24 HR Take 60 mg by mouth every day.     • tiotropium (SPIRIVA) 18 MCG Cap Inhale 18 mcg by mouth every day.     • ondansetron (ZOFRAN ODT) 4 MG TABLET DISPERSIBLE Take 1 Tab by mouth every 8 hours as needed for Nausea/Vomiting. 20 Tab 0   • promethazine (PHENERGAN) 25 MG Tab Take 1 Tab by mouth every 6 hours as needed for Nausea/Vomiting. 10 Tab 0   • acetaminophen  "(TYLENOL) 500 MG Tab Take 500 mg by mouth every 6 hours as needed.     • warfarin (COUMADIN) 3 MG Tab Take 1.5-3 mg by mouth every evening. 1.5 mg Mon/Wed/Fri and 3 mg all other days     • zolpidem (AMBIEN) 10 MG Tab Take 10 mg by mouth at bedtime as needed for Sleep.     • simvastatin (ZOCOR) 40 MG Tab Take 40 mg by mouth every evening.     • losartan (COZAAR) 50 MG TABS Take 50 mg by mouth every day.     • hydrocodone-acetaminophen (NORCO) 5-325 MG Tab per tablet Take 1-2 Tabs by mouth every 6 hours as needed. 20 Tab 0   • doxycycline (VIBRAMYCIN) 100 MG Tab Take 100 mg by mouth 2 times a day. 2 - ten day courses, she completed the first one and stopped the 2nd course last week 4/21/2017       No current facility-administered medications on file prior to visit.       Allergies: Nkda    ROS:   Constitutional: Denies fevers, chills, night sweats, fatigue or weight loss  Eyes: +vision loss, denies pain, drainage, double vision  Ears, Nose, Throat: Denies earache, difficulty hearing, tinnitus, nasal congestion, hoarseness  Cardiovascular: Denies chest pain, tightness, palpitations, orthopnea or edema  Respiratory: As in history of present illness  Sleep: Denies daytime sleepiness, snoring, apneas, insomnia, morning headaches  GI: Denies heartburn, dysphagia, nausea, abdominal pain, diarrhea or constipation  : Denies frequent urination, hematuria, discharge or painful urination  Musculoskeletal: + back pain, denies painful joints, sore muscles  Neurological: Denies weakness or headaches  Skin: No rashes    Blood pressure 118/78, pulse 80, temperature 36.6 °C (97.9 °F), resp. rate 16, height 1.702 m (5' 7.01\"), weight 81.285 kg (179 lb 3.2 oz), SpO2 95 %.    Physical Exam:  Appearance: Well-nourished, well-developed, in no acute distress  HEENT: Normocephalic, atraumatic, white sclera, PERRLA, oropharynx clear  Neck: No adenopathy or masses  Respiratory: no intercostal retractions or accessory muscle use  Lungs " auscultation: Clear to auscultation bilaterally  Cardiovascular: Regular rate rhythm. No murmurs, rubs or gallops.  No LE edema  Abdomen: soft, nondistended  Gait: Normal  Digits: No clubbing, cyanosis  Motor: No focal deficits  Orientation: Oriented to time, person and place    Diagnosis:  1. Lung nodules  budesonide-formoterol (SYMBICORT) 160-4.5 MCG/ACT Aerosol    Tiotropium Bromide Monohydrate (SPIRIVA RESPIMAT) 2.5 MCG/ACT Aero Soln    CT-CHEST (THORAX) W/O   2. History of lung cancer     3. Chronic obstructive pulmonary disease, unspecified COPD type (CMS-HCC)         Plan:  The patient had successful resection of her lung cancer, T2a lesion December 2016. Follow-up 6 month chest CAT scan identifies a new 6 mm nodule in the left lower lobe. The nodule is too small to be amenable to biopsy or PET imaging, and will require ongoing surveillance with next chest CAT scan in 6 months.  COPD is clinically stable. Continue Symbicort 160 µg and Spiriva.  Encourage follow-up with nephrology for her possible left hydronephrosis and nephrolithiasis.  Return in about 6 months (around 1/3/2018) for after CT scan.            Electronically signed by Jessenia Byrne M.D.  on 07/05/2017    No Recipients

## 2017-07-05 NOTE — PROGRESS NOTES
Chief Complaint   Patient presents with   • Results     CT results.       HPI: This patient is a 66 y.o. Female who returns for follow-up of lung cancer and COPD. She underwent thoracoscopic partial left upper lobectomy by Dr. Ganser 12/12/16 demonstrating adenocarcinoma with pleural involvement, pT2a. Follow-up chest CAT scan June 27, 2017 shows postsurgical changes in the left lung, with a new 6.2 mm left lower lobe nodule. There was also suggestion of left hydronephrosis with a possible obstructing stone. She has a known history of nephrolithiasis and follows with nephrology.   She has stage II COPD, on Symbicort 160 µg and Spiriva. Her exertional dyspnea has been stable. Denies cough, wheezing, hemoptysis, chest pain. Denies AECOPD. Her FEV1 is 1.43 L or 54%. She has been relying on samples of her inhalers on account of the expense.  She has developed a compression fracture and is pending kyphoplasty on July 25.      Past Medical History   Diagnosis Date   • CAD (coronary artery disease)      palpatations   • Renal disorder      had been on dialysis for 7 months for acute failure 2005   • Glaucoma      right eye   • Dialysis 2005     transient renal failure due to sepsis   • Backpain    • Hypertension    • Hyperlipidemia    • S/P appendectomy 1998    • S/P cholecystectomy 1998   • Osteoporosis    • S/P kyphoplasty 2006, 2007, 2013   • Renal stones 2013     post lithotripsy   • Rheumatoid arthritis (CMS-[facility])      question   • Multiple thyroid nodules 7/9/2015   • Rheumatoid arthritis (CMS-[facility])    • Bronchitis    • COPD (chronic obstructive pulmonary disease) (CMS-[facility])    • Hyperthyroidism    • Kidney stone    • Nasal drainage    • Chickenpox    • Emphysema of lung (CMS-[facility])    • Personal history of venous thrombosis and embolism 2004, 2012     arm dvt   • Asthma      inhalers daily   • Pneumonia 7/2016     per patient   • Lung cancer (CMS-[facility]) 12/12/2016       Social History     Social History   • Marital Status:  Single     Spouse Name: N/A   • Number of Children: N/A   • Years of Education: N/A     Occupational History   • Not on file.     Social History Main Topics   • Smoking status: Former Smoker -- 1.00 packs/day for 30 years     Types: Cigarettes     Quit date: 01/01/2000   • Smokeless tobacco: Never Used      Comment: 7 years ago   • Alcohol Use: 0.0 oz/week     0 Standard drinks or equivalent per week      Comment: x3 a week   • Drug Use: No   • Sexual Activity: Not on file     Other Topics Concern   • Not on file     Social History Narrative       Family History   Problem Relation Age of Onset   • Cancer Mother    • Heart Failure Father    • Heart Disease Brother        Current Outpatient Prescriptions on File Prior to Visit   Medication Sig Dispense Refill   • PROAIR  (90 BASE) MCG/ACT Aero Soln inhalation aerosol INHALE TWO PUFFS BY MOUTH EVERY 6 HOURS AS NEEDED FOR SHORTNESS OF BREATH 1 Inhaler 5   • dorzolamide-timolol (COSOPT) 22.3-6.8 MG/ML Solution Place 1 Drop in both eyes 2 times a day.     • vitamin D, Ergocalciferol, (DRISDOL) 03884 UNITS Cap capsule Take 50,000 Units by mouth every 14 days.     • propranolol LA (INDERAL LA) 60 MG CAPSULE SR 24 HR Take 60 mg by mouth every day.     • tiotropium (SPIRIVA) 18 MCG Cap Inhale 18 mcg by mouth every day.     • ondansetron (ZOFRAN ODT) 4 MG TABLET DISPERSIBLE Take 1 Tab by mouth every 8 hours as needed for Nausea/Vomiting. 20 Tab 0   • promethazine (PHENERGAN) 25 MG Tab Take 1 Tab by mouth every 6 hours as needed for Nausea/Vomiting. 10 Tab 0   • acetaminophen (TYLENOL) 500 MG Tab Take 500 mg by mouth every 6 hours as needed.     • warfarin (COUMADIN) 3 MG Tab Take 1.5-3 mg by mouth every evening. 1.5 mg Mon/Wed/Fri and 3 mg all other days     • zolpidem (AMBIEN) 10 MG Tab Take 10 mg by mouth at bedtime as needed for Sleep.     • simvastatin (ZOCOR) 40 MG Tab Take 40 mg by mouth every evening.     • losartan (COZAAR) 50 MG TABS Take 50 mg by mouth every  "day.     • hydrocodone-acetaminophen (NORCO) 5-325 MG Tab per tablet Take 1-2 Tabs by mouth every 6 hours as needed. 20 Tab 0   • doxycycline (VIBRAMYCIN) 100 MG Tab Take 100 mg by mouth 2 times a day. 2 - ten day courses, she completed the first one and stopped the 2nd course last week 4/21/2017       No current facility-administered medications on file prior to visit.       Allergies: Nkda    ROS:   Constitutional: Denies fevers, chills, night sweats, fatigue or weight loss  Eyes: +vision loss, denies pain, drainage, double vision  Ears, Nose, Throat: Denies earache, difficulty hearing, tinnitus, nasal congestion, hoarseness  Cardiovascular: Denies chest pain, tightness, palpitations, orthopnea or edema  Respiratory: As in history of present illness  Sleep: Denies daytime sleepiness, snoring, apneas, insomnia, morning headaches  GI: Denies heartburn, dysphagia, nausea, abdominal pain, diarrhea or constipation  : Denies frequent urination, hematuria, discharge or painful urination  Musculoskeletal: + back pain, denies painful joints, sore muscles  Neurological: Denies weakness or headaches  Skin: No rashes    Blood pressure 118/78, pulse 80, temperature 36.6 °C (97.9 °F), resp. rate 16, height 1.702 m (5' 7.01\"), weight 81.285 kg (179 lb 3.2 oz), SpO2 95 %.    Physical Exam:  Appearance: Well-nourished, well-developed, in no acute distress  HEENT: Normocephalic, atraumatic, white sclera, PERRLA, oropharynx clear  Neck: No adenopathy or masses  Respiratory: no intercostal retractions or accessory muscle use  Lungs auscultation: Clear to auscultation bilaterally  Cardiovascular: Regular rate rhythm. No murmurs, rubs or gallops.  No LE edema  Abdomen: soft, nondistended  Gait: Normal  Digits: No clubbing, cyanosis  Motor: No focal deficits  Orientation: Oriented to time, person and place    Diagnosis:  1. Lung nodules  budesonide-formoterol (SYMBICORT) 160-4.5 MCG/ACT Aerosol    Tiotropium Bromide Monohydrate " (SPIRIVA RESPIMAT) 2.5 MCG/ACT Aero Soln    CT-CHEST (THORAX) W/O   2. History of lung cancer     3. Chronic obstructive pulmonary disease, unspecified COPD type (CMS-HCC)         Plan:  The patient had successful resection of her lung cancer, T2a lesion December 2016. Follow-up 6 month chest CAT scan identifies a new 6 mm nodule in the left lower lobe. The nodule is too small to be amenable to biopsy or PET imaging, and will require ongoing surveillance with next chest CAT scan in 6 months.  COPD is clinically stable. Continue Symbicort 160 µg and Spiriva.  Encourage follow-up with nephrology for her possible left hydronephrosis and nephrolithiasis.  Return in about 6 months (around 1/3/2018) for after CT scan.

## 2017-07-12 DIAGNOSIS — E04.2 MULTIPLE THYROID NODULES: Primary | ICD-10-CM

## 2017-07-12 DIAGNOSIS — R79.89 LOW TSH LEVEL: ICD-10-CM

## 2017-07-13 ENCOUNTER — ANTICOAGULATION MONITORING (OUTPATIENT)
Dept: VASCULAR LAB | Facility: MEDICAL CENTER | Age: 67
End: 2017-07-13

## 2017-07-13 NOTE — PROGRESS NOTES
Spoke with pt on the phone. Her warfarin is managed by her PCP Dr. Edwin Rogers. Will discharge from our clinic    Yessi Grace, Nuno D

## 2017-07-14 ENCOUNTER — APPOINTMENT (OUTPATIENT)
Dept: ADMISSIONS | Facility: MEDICAL CENTER | Age: 67
End: 2017-07-14
Attending: ORTHOPAEDIC SURGERY
Payer: MEDICARE

## 2017-07-14 VITALS — BODY MASS INDEX: 27.85 KG/M2 | HEIGHT: 67 IN | WEIGHT: 177.47 LBS

## 2017-07-14 PROCEDURE — 85610 PROTHROMBIN TIME: CPT

## 2017-07-14 PROCEDURE — 36415 COLL VENOUS BLD VENIPUNCTURE: CPT

## 2017-07-14 PROCEDURE — 87086 URINE CULTURE/COLONY COUNT: CPT

## 2017-07-14 PROCEDURE — 81001 URINALYSIS AUTO W/SCOPE: CPT

## 2017-07-14 PROCEDURE — 85025 COMPLETE CBC W/AUTO DIFF WBC: CPT

## 2017-07-14 PROCEDURE — 87186 SC STD MICRODIL/AGAR DIL: CPT

## 2017-07-14 PROCEDURE — 85652 RBC SED RATE AUTOMATED: CPT

## 2017-07-14 PROCEDURE — 87389 HIV-1 AG W/HIV-1&-2 AB AG IA: CPT

## 2017-07-14 PROCEDURE — 85730 THROMBOPLASTIN TIME PARTIAL: CPT

## 2017-07-14 PROCEDURE — 87077 CULTURE AEROBIC IDENTIFY: CPT

## 2017-07-14 PROCEDURE — 80074 ACUTE HEPATITIS PANEL: CPT

## 2017-07-14 PROCEDURE — 80053 COMPREHEN METABOLIC PANEL: CPT

## 2017-07-17 NOTE — PROGRESS NOTES
Lab results faxed to Dr. Spain 7/17/17 @ 5123.  Left message for surgery scheduler at Dr. Spain's office 7/17/17 @ 1201 to notify of patient's positive urine result.

## 2017-07-26 ENCOUNTER — TELEPHONE (OUTPATIENT)
Dept: PULMONOLOGY | Facility: HOSPICE | Age: 67
End: 2017-07-26

## 2017-07-26 DIAGNOSIS — J44.9 CHRONIC OBSTRUCTIVE PULMONARY DISEASE, UNSPECIFIED COPD TYPE (HCC): ICD-10-CM

## 2017-07-26 NOTE — TELEPHONE ENCOUNTER
The pt called and stated that Symbicort is not covered under her insurance.  I offered to do a prior auth for her and she said that she is not really happy with Symbicort anyway.  She said that Davida is on her formulary and she wants to try it.  I told her that I would ask for approval for it and let her know.  Please advise, thank you.        Have we ever prescribed this med? No.  If yes, what date?     Last OV: 7/5/2017    Next OV: 1/10/2018    DX: COPD    Medications: Breo Ellipta 100/25 mcg

## 2017-08-21 ENCOUNTER — HOSPITAL ENCOUNTER (OUTPATIENT)
Dept: RADIOLOGY | Facility: MEDICAL CENTER | Age: 67
End: 2017-08-21
Attending: INTERNAL MEDICINE
Payer: MEDICARE

## 2017-08-21 ENCOUNTER — HOSPITAL ENCOUNTER (OUTPATIENT)
Dept: LAB | Facility: MEDICAL CENTER | Age: 67
End: 2017-08-21
Attending: INTERNAL MEDICINE
Payer: MEDICARE

## 2017-08-21 DIAGNOSIS — E04.2 MULTIPLE THYROID NODULES: ICD-10-CM

## 2017-08-21 DIAGNOSIS — R79.89 LOW TSH LEVEL: ICD-10-CM

## 2017-08-21 LAB
T3FREE SERPL-MCNC: 3.92 PG/ML (ref 2.4–4.2)
T4 FREE SERPL-MCNC: 1.53 NG/DL (ref 0.53–1.43)
TSH SERPL DL<=0.005 MIU/L-ACNC: 0.03 UIU/ML (ref 0.3–3.7)

## 2017-08-21 PROCEDURE — 76536 US EXAM OF HEAD AND NECK: CPT

## 2017-08-22 LAB — TSH RECEP AB SER-ACNC: <0.9 IU/L

## 2017-09-05 ENCOUNTER — OFFICE VISIT (OUTPATIENT)
Dept: ENDOCRINOLOGY | Facility: MEDICAL CENTER | Age: 67
End: 2017-09-05
Payer: MEDICARE

## 2017-09-05 VITALS
HEART RATE: 89 BPM | DIASTOLIC BLOOD PRESSURE: 82 MMHG | SYSTOLIC BLOOD PRESSURE: 128 MMHG | OXYGEN SATURATION: 94 % | BODY MASS INDEX: 28.09 KG/M2 | WEIGHT: 179 LBS | HEIGHT: 67 IN

## 2017-09-05 DIAGNOSIS — E04.2 MULTIPLE THYROID NODULES: ICD-10-CM

## 2017-09-05 DIAGNOSIS — E05.10 THYROTOXICOSIS WITH TOXIC SINGLE THYROID NODULE AND WITHOUT THYROID STORM: Primary | ICD-10-CM

## 2017-09-05 PROCEDURE — 99214 OFFICE O/P EST MOD 30 MIN: CPT | Performed by: INTERNAL MEDICINE

## 2017-09-06 NOTE — PROGRESS NOTES
Chief Complaint   Patient presents with   • Thyrotoxicosis     multiple thyroid nodules        HPI:        1. Multiple thyroid nodules.    Thyroid gland is asymptomatic.  Her most recent thyroid ultrasound done August 21 shows her multiple nodules to be basically unchanged.  The largest nodule in the right lobe is a hyper functioning nodule but the uptake was only 26% at five hours.  I am not sure that is enough uptake to consider treating with I-131 but I will consult .  It will leave other thyroid nodules untreated but this may be our best approach to her possible hypothyroidism.    Her situation has become more complicated.  Her TSH remains suppressed at .03.  Her free T4 now is slightly elevated at 1.5 (0.5-1.4) and free T3 upper normal at 3.9 (2.4-4.2).  The thyrotropin receptor antibody is negative.      My main problem is I am having difficulty deciding if she does have thyrotoxicosis and is it contributing to how she feels.  She is utterly exhausted and weak which could be part of the thyrotoxicosis.  She has other significant problems and these are indicated in the record.  Her most recent one is a thrombosis of a vessel in her left eye which has completely occluded her vision for the moment and we don’t know how much recovery she will get.  She also suffered another vertebral fracture with constant pain in her back and immobility.  Her most recent CT scan of her lung shows a new nodule which will be followed and not biopsied as of yet.  So she has a lot of things on her mind and is not in a position to make a decision about methimazole or I-131 therapy.  I am not sure it would do her much good but we should consider it.    ROS:  Three significant recent occurrences.           1.  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 6 weeks and no recovery of vision as yet.            2.  Recent vertebral fracture leading to  significant pain and immobility. Currently taking no medication for osteoporosis. She took Forteo for 18 months before was interrupted. I suggested that she consult Dr. Rogers to resume Forteo for at least the next 6 months which would help heal her vertebral fracture. She was going to have a kyphoplasty but it was determined to be too risky in her current condition          3.  Another pulmonary nodule has appeared. Biopsy deferred pending repeat CT scan in December    All other systems reported as negative or unchanged since last exam      Allergies:   Allergies   Allergen Reactions   • Nkda [No Known Drug Allergy]        Current medicines including changes today:  Current Outpatient Prescriptions   Medication Sig Dispense Refill   • Fluticasone Furoate-Vilanterol (BREO ELLIPTA) 200-25 MCG/INH AEROSOL POWDER, BREATH ACTIVATED Take 1 Inhalation by mouth every day. Rinse mouth after use. 1 Each 3   • hydrocodone/acetaminophen (NORCO)  MG Tab      • Tiotropium Bromide Monohydrate (SPIRIVA RESPIMAT) 2.5 MCG/ACT Aero Soln Inhale 2 Inhalation by mouth every day. Assemble and prime. 1 Inhaler 6   • PROAIR  (90 BASE) MCG/ACT Aero Soln inhalation aerosol INHALE TWO PUFFS BY MOUTH EVERY 6 HOURS AS NEEDED FOR SHORTNESS OF BREATH 1 Inhaler 5   • dorzolamide-timolol (COSOPT) 22.3-6.8 MG/ML Solution Place 1 Drop in both eyes 2 times a day.     • vitamin D, Ergocalciferol, (DRISDOL) 95651 UNITS Cap capsule Take 50,000 Units by mouth every 14 days.     • propranolol LA (INDERAL LA) 60 MG CAPSULE SR 24 HR Take 60 mg by mouth every morning.     • acetaminophen (TYLENOL) 500 MG Tab Take 500 mg by mouth every 6 hours as needed.     • warfarin (COUMADIN) 3 MG Tab Take 1.5-3 mg by mouth every evening. 1.5 mg  for 6 days and 3 mg for 2 days     • zolpidem (AMBIEN) 10 MG Tab Take 10 mg by mouth at bedtime as needed for Sleep.     • simvastatin (ZOCOR) 40 MG Tab Take 40 mg by mouth every evening.     • alprazolam (XANAX) 0.25  "MG Tab      • ondansetron (ZOFRAN ODT) 4 MG TABLET DISPERSIBLE Take 1 Tab by mouth every 8 hours as needed for Nausea/Vomiting. 20 Tab 0   • losartan (COZAAR) 50 MG TABS Take 50 mg by mouth every morning.       No current facility-administered medications for this visit.         Past Medical History:   Diagnosis Date   • Backpain 7/2017     thor. area   • Kidney stone 2017    bilateral   • Lung cancer (CMS-HCC) 12/12/2016   • Pneumonia 7/2016    per patient   • Cancer (CMS-HCC) 2016    left lung   • Multiple thyroid nodules 7/9/2015   • Renal stones 2013    post lithotripsy   • Sepsis (CMS-HCC) 2005   • Dialysis 2005    transient renal failure due to sepsis   • S/P appendectomy 1998    • S/P cholecystectomy 1998   • Asthma     inhalers daily   • Breath shortness     with exertion has O2 but does not use   • Bronchitis    • CAD (coronary artery disease)     palpatations   • Chickenpox    • COPD (chronic obstructive pulmonary disease) (CMS-HCC)    • Emphysema of lung (CMS-HCC)    • Glaucoma     right eye   • Hyperlipidemia    • Hypertension    • Hyperthyroidism    • Nasal drainage    • Osteoporosis    • Personal history of venous thrombosis and embolism 2004, 2012    right leg 2012, left arm dvt 2004 left eye 2017   • Renal disorder     had been on dialysis for 7 months for acute failure 2005   • Rheumatoid arthritis (CMS-HCC)     question   • Rheumatoid arthritis (CMS-HCC)    • S/P kyphoplasty 2006, 2007, 2013       PHYSICAL EXAM:    /82   Pulse 89   Ht 1.702 m (5' 7\")   Wt 81.2 kg (179 lb)   SpO2 94%   BMI 28.04 kg/m²      Gen.   Examined in her chair due to immobility and pain with movement    Skin   appropriate for sex and age    HEENT  unremarkable    Neck   thyroid gland is difficult to palpate in this position. Current ultrasound is basically unchanged    Heart  regular    Extremities  no edema, some tremor of the hands but not typical of thyrotoxicosis    Neuro  ambulation is very difficult and slow " due to pain and generalized weakness    Psych   very concerned about her condition and deteriorating health overall      ASSESSMENT AND RECOMMENDATIONS    1. Multiple thyroid nodules              Asymptomatic and stable by ultrasound. The largest nodule is a functional nodules    2. 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            She wants to defer for 3 months until she can reassess her general health.            Consider I-131 therapy of the toxic nodule  - FREE THYROXINE; Future  - T3 FREE; Future  - TSH; Future      DISPOSITION:   Discussed possible I-131 therapy with Dr. Hedrick                              Follow-up 3 months       Kishore Torrez M.D.    Copies to: Edwin Rogers M.D. [phone]

## 2017-09-08 ENCOUNTER — TELEPHONE (OUTPATIENT)
Dept: ENDOCRINOLOGY | Facility: MEDICAL CENTER | Age: 67
End: 2017-09-08

## 2017-09-08 DIAGNOSIS — E04.2 MULTIPLE THYROID NODULES: ICD-10-CM

## 2017-09-08 NOTE — TELEPHONE ENCOUNTER
Telephone conversation with Dr. Hedrick of nuclear medicine and subsequently with patient.    I reviewed the I-123 scan from 2015. There is definitely an autonomous functional nodule I think in the right side of the thyroid. Dr. Hedrick feels this would definitely be treatable with I-131. I discussed this prospect with the patient and she would be amenable to this approach. She has not had any IV contrast in recent times. The only people at home with her are 2 small dogs. If she was treated with I-131 she could board the dogs out to isolate them.    I will update an I-123 scan and uptake and further discuss with patient.    Kishore Torrez M.D.

## 2017-09-20 ENCOUNTER — HOSPITAL ENCOUNTER (OUTPATIENT)
Dept: RADIOLOGY | Facility: MEDICAL CENTER | Age: 67
End: 2017-09-20
Attending: INTERNAL MEDICINE
Payer: MEDICARE

## 2017-09-20 DIAGNOSIS — E04.2 MULTIPLE THYROID NODULES: ICD-10-CM

## 2017-09-20 PROCEDURE — 78014 THYROID IMAGING W/BLOOD FLOW: CPT

## 2017-10-08 ENCOUNTER — TELEPHONE (OUTPATIENT)
Dept: ENDOCRINOLOGY | Facility: MEDICAL CENTER | Age: 67
End: 2017-10-08

## 2017-10-08 NOTE — TELEPHONE ENCOUNTER
Telephone conversation with patient    I discussed the results of the second I-123 scan. Definitely a hot nodule or nodules in the right upper lobe. Not in the right lower lobe. We had previously discussed I-131 therapy. However this would not treat a thyroid cancer. The 2.8 cm nodule in the right lower lobe has some calcifications. I don't know that that makes it more suspicious. I would like to discuss this case again with Dr. Hedrick from nuclear medicine. We could treat with I-131 and then later find out what's left. There will be some nodules left that I know. We also could biopsy the right lower pole nodule to make sure that it is benign before proceeding with I-131 therapy. If the nodule turns out to be suspicious that the appropriate treatment would be thyroidectomy. I will further discuss with Dr. Hedrick.    Kishore Torrez M.D.    Copies to Dr. Edwin Rogers

## 2017-10-19 ENCOUNTER — TELEPHONE (OUTPATIENT)
Dept: ENDOCRINOLOGY | Facility: MEDICAL CENTER | Age: 67
End: 2017-10-19

## 2017-10-19 NOTE — TELEPHONE ENCOUNTER
Telephone conversation with patient    We reviewed her clinical circumstance. I have discussed I-131 therapy with Dr. Hedrick. He agrees the hot nodule is treatable with I-131. He is not very concerned about the other nodules in the gland that are not going to be treated. We can deal with those after receiving the results of her I-131 therapy. I'm not sure how much clinical benefit she will get from this treatment because she is only mildly thyrotoxic. She has overwhelming fatigue and muscle weakness which can be  symptoms of thyrotoxicosis. So I think it is worth treating.    However, she has other significant problems. She had a new insufficiency fracture of a thoracic vertebrae which is a recurrent event. She has serious osteoporosis that is not being treated with medication currently. She took Forteo for 18 months and switched insurance. The next insurance provider would not cover the remaining 6 months of Forteo. She switch to Prolia and developed some sort of skin problem after the shot. She does not think the shot gave her the skin problem but it was discontinued at that time. She has not had any for over a year. She is taking calcium and vitamin D.    The second significant problem is an undiagnosed pulmonary nodule. She is due to have another CT scan of her chest in January to see if this nodule then requires a biopsy. She is anticoagulated with Coumadin and therefore anything invasive becomes a major problem. 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    She would prefer to delay treating her mild thyrotoxicosis until after she finds out in January what she has to do about the nodule in her lung. I will see her at that time. In the meantime I will speak with Dr. Rogers about treating her osteoporosis more aggressively.    Kishore Torrez M.D.    Copies to Dr. Edwin Rogers

## 2017-11-13 DIAGNOSIS — R06.02 SOB (SHORTNESS OF BREATH): ICD-10-CM

## 2017-11-13 DIAGNOSIS — J44.9 CHRONIC OBSTRUCTIVE PULMONARY DISEASE, UNSPECIFIED COPD TYPE (HCC): ICD-10-CM

## 2017-11-13 NOTE — TELEPHONE ENCOUNTER
Caller Name: Latonia Clinton                 Call Back Number: [phone] (home)         Patient approves a detailed voicemail message: N\A    Have we ever prescribed this med? Yes.  If yes, what date? 6/16/17    Last OV: 7/3/17 MD Chavez     Next OV: 1/11/18 MD Chavez     DX: COPD     Medications:  Current Outpatient Prescriptions   Medication Sig Dispense Refill   • Fluticasone Furoate-Vilanterol (BREO ELLIPTA) 200-25 MCG/INH AEROSOL POWDER, BREATH ACTIVATED Take 1 Inhalation by mouth every day. Rinse mouth after use. 1 Each 3   • hydrocodone/acetaminophen (NORCO)  MG Tab      • alprazolam (XANAX) 0.25 MG Tab      • Tiotropium Bromide Monohydrate (SPIRIVA RESPIMAT) 2.5 MCG/ACT Aero Soln Inhale 2 Inhalation by mouth every day. Assemble and prime. 1 Inhaler 6   • PROAIR  (90 BASE) MCG/ACT Aero Soln inhalation aerosol INHALE TWO PUFFS BY MOUTH EVERY 6 HOURS AS NEEDED FOR SHORTNESS OF BREATH 1 Inhaler 5   • dorzolamide-timolol (COSOPT) 22.3-6.8 MG/ML Solution Place 1 Drop in both eyes 2 times a day.     • vitamin D, Ergocalciferol, (DRISDOL) 36175 UNITS Cap capsule Take 50,000 Units by mouth every 14 days.     • propranolol LA (INDERAL LA) 60 MG CAPSULE SR 24 HR Take 60 mg by mouth every morning.     • ondansetron (ZOFRAN ODT) 4 MG TABLET DISPERSIBLE Take 1 Tab by mouth every 8 hours as needed for Nausea/Vomiting. 20 Tab 0   • acetaminophen (TYLENOL) 500 MG Tab Take 500 mg by mouth every 6 hours as needed.     • warfarin (COUMADIN) 3 MG Tab Take 1.5-3 mg by mouth every evening. 1.5 mg  for 6 days and 3 mg for 2 days     • zolpidem (AMBIEN) 10 MG Tab Take 10 mg by mouth at bedtime as needed for Sleep.     • simvastatin (ZOCOR) 40 MG Tab Take 40 mg by mouth every evening.     • losartan (COZAAR) 50 MG TABS Take 50 mg by mouth every morning.       No current facility-administered medications for this visit.

## 2017-11-14 ENCOUNTER — HOSPITAL ENCOUNTER (OUTPATIENT)
Dept: RADIOLOGY | Facility: MEDICAL CENTER | Age: 67
End: 2017-11-14
Attending: FAMILY MEDICINE
Payer: MEDICARE

## 2017-11-14 DIAGNOSIS — Z12.31 VISIT FOR SCREENING MAMMOGRAM: ICD-10-CM

## 2017-11-14 PROCEDURE — G0202 SCR MAMMO BI INCL CAD: HCPCS

## 2017-11-21 DIAGNOSIS — J44.9 CHRONIC OBSTRUCTIVE PULMONARY DISEASE, UNSPECIFIED COPD TYPE (HCC): ICD-10-CM

## 2017-11-21 NOTE — TELEPHONE ENCOUNTER
Have we ever prescribed this med? Yes.  If yes, what date? 7/26/17    Last OV: 7/3/17    Next OV: 1/11/18    DX: COPD    Medications: Fluticasone Furoate-Vilanterol (BREO ELLIPTA) 200-25 MCG/INH AEROSOL POWDER, BREATH ACTIVATED

## 2018-01-09 ENCOUNTER — HOSPITAL ENCOUNTER (OUTPATIENT)
Dept: RADIOLOGY | Facility: MEDICAL CENTER | Age: 68
End: 2018-01-09
Attending: INTERNAL MEDICINE
Payer: MEDICARE

## 2018-01-09 DIAGNOSIS — R91.8 LUNG NODULES: ICD-10-CM

## 2018-01-09 PROCEDURE — 71250 CT THORAX DX C-: CPT

## 2018-01-11 ENCOUNTER — OFFICE VISIT (OUTPATIENT)
Dept: PULMONOLOGY | Facility: HOSPICE | Age: 68
End: 2018-01-11
Payer: MEDICARE

## 2018-01-11 VITALS
TEMPERATURE: 96.8 F | DIASTOLIC BLOOD PRESSURE: 74 MMHG | SYSTOLIC BLOOD PRESSURE: 118 MMHG | HEART RATE: 74 BPM | OXYGEN SATURATION: 97 % | BODY MASS INDEX: 28.79 KG/M2 | WEIGHT: 183.4 LBS | RESPIRATION RATE: 16 BRPM | HEIGHT: 67 IN

## 2018-01-11 DIAGNOSIS — Z85.118 HISTORY OF LUNG CANCER: ICD-10-CM

## 2018-01-11 DIAGNOSIS — J44.9 CHRONIC OBSTRUCTIVE PULMONARY DISEASE, UNSPECIFIED COPD TYPE (HCC): ICD-10-CM

## 2018-01-11 DIAGNOSIS — R91.8 LUNG NODULES: ICD-10-CM

## 2018-01-11 PROCEDURE — 99214 OFFICE O/P EST MOD 30 MIN: CPT | Performed by: INTERNAL MEDICINE

## 2018-01-11 RX ORDER — FLUTICASONE PROPIONATE AND SALMETEROL XINAFOATE 115; 21 UG/1; UG/1
2 AEROSOL, METERED RESPIRATORY (INHALATION) 2 TIMES DAILY
Qty: 1 INHALER | Refills: 6 | Status: SHIPPED | OUTPATIENT
Start: 2018-01-11 | End: 2018-05-21

## 2018-01-11 RX ORDER — ALBUTEROL SULFATE 2.5 MG/3ML
2.5 SOLUTION RESPIRATORY (INHALATION) EVERY 4 HOURS PRN
Qty: 30 BULLET | Refills: 2 | Status: ON HOLD | OUTPATIENT
Start: 2018-01-11 | End: 2018-06-18

## 2018-01-12 NOTE — PROGRESS NOTES
Chief Complaint   Patient presents with   • Results     CT results     HPI: This patient is a 67 y.o. Female who returns for follow-up of lung cancer and COPD. She underwent thoracoscopic partial left upper lobectomy by Dr. Ganser 12/12/16 demonstrating adenocarcinoma with pleural involvement, pT2a. Follow-up chest CAT scan June 27, 2017 shows postsurgical changes in the left lung, with a new 6.2 mm left lower lobe nodule. There was also suggestion of left hydronephrosis with a possible obstructing stone. (She has a known history of nephrolithiasis and follows with nephrology.) Follow-up chest CAT scan January 9, 2018 showed resolution of the left lower lobe nodule. There was a vague, groundglass opacity measuring 6 mm in the right lower lobe.   She has stage II COPD, on Breo and Spiriva. Her Breo is no longer covered on her formulary. Her exertional dyspnea has been stable. Denies cough, wheezing, hemoptysis, chest pain. Denies AECOPD. Her FEV1 is 1.43 L or 54%.       Past Medical History:   Diagnosis Date   • Asthma     inhalers daily   • Backpain 7/2017     thor. area   • Breath shortness     with exertion has O2 but does not use   • Bronchitis    • CAD (coronary artery disease)     palpatations   • Cancer (CMS-HCC) 2016    left lung   • Chickenpox    • COPD (chronic obstructive pulmonary disease) (CMS-HCC)    • Dialysis 2005    transient renal failure due to sepsis   • Emphysema of lung (CMS-HCC)    • Glaucoma     right eye   • Hyperlipidemia    • Hypertension    • Hyperthyroidism    • Kidney stone 2017    bilateral   • Lung cancer (CMS-HCC) 12/12/2016   • Multiple thyroid nodules 7/9/2015   • Nasal drainage    • Osteoporosis    • Personal history of venous thrombosis and embolism 2004, 2012    right leg 2012, left arm dvt 2004 left eye 2017   • Pneumonia 7/2016    per patient   • Renal disorder     had been on dialysis for 7 months for acute failure 2005   • Renal stones 2013    post lithotripsy   • Rheumatoid  arthritis (CMS-HCC)     question   • Rheumatoid arthritis (CMS-HCC)    • S/P appendectomy 1998    • S/P cholecystectomy 1998   • S/P kyphoplasty 2006, 2007, 2013   • Sepsis(534.86) 2005       Social History     Social History   • Marital status: Single     Spouse name: N/A   • Number of children: N/A   • Years of education: N/A     Occupational History   • Not on file.     Social History Main Topics   • Smoking status: Former Smoker     Packs/day: 1.00     Years: 30.00     Types: Cigarettes     Quit date: 1/1/2000   • Smokeless tobacco: Never Used      Comment: 7 years ago   • Alcohol use 0.0 oz/week      Comment: x3 a week   • Drug use: No   • Sexual activity: Not on file     Other Topics Concern   • Not on file     Social History Narrative   • No narrative on file       Family History   Problem Relation Age of Onset   • Cancer Mother    • Heart Failure Father    • Heart Disease Brother        Current Outpatient Prescriptions on File Prior to Visit   Medication Sig Dispense Refill   • BREO ELLIPTA 200-25 MCG/INH AEROSOL POWDER, BREATH ACTIVATED INHALE ONE DOSE BY MOUTH DAILY, RINSE MOUTH AFTER USE 1 Each 5   • PROAIR  (90 Base) MCG/ACT Aero Soln inhalation aerosol INHALE TWO PUFFS BY MOUTH EVERY 6 HOURS AS NEEDED FOR SHORTNESS OF BREATH 1 Inhaler 5   • hydrocodone/acetaminophen (NORCO)  MG Tab      • Tiotropium Bromide Monohydrate (SPIRIVA RESPIMAT) 2.5 MCG/ACT Aero Soln Inhale 2 Inhalation by mouth every day. Assemble and prime. 1 Inhaler 6   • dorzolamide-timolol (COSOPT) 22.3-6.8 MG/ML Solution Place 1 Drop in both eyes 2 times a day.     • vitamin D, Ergocalciferol, (DRISDOL) 10256 UNITS Cap capsule Take 50,000 Units by mouth every 14 days.     • propranolol LA (INDERAL LA) 60 MG CAPSULE SR 24 HR Take 60 mg by mouth every morning.     • acetaminophen (TYLENOL) 500 MG Tab Take 500 mg by mouth every 6 hours as needed.     • warfarin (COUMADIN) 3 MG Tab Take 1.5-3 mg by mouth every evening. 1.5 mg   "for 6 days and 3 mg for 2 days     • zolpidem (AMBIEN) 10 MG Tab Take 10 mg by mouth at bedtime as needed for Sleep.     • simvastatin (ZOCOR) 40 MG Tab Take 40 mg by mouth every evening.     • alprazolam (XANAX) 0.25 MG Tab      • ondansetron (ZOFRAN ODT) 4 MG TABLET DISPERSIBLE Take 1 Tab by mouth every 8 hours as needed for Nausea/Vomiting. 20 Tab 0   • losartan (COZAAR) 50 MG TABS Take 50 mg by mouth every morning.       No current facility-administered medications on file prior to visit.        Allergies: Nkda [no known drug allergy]    ROS:   Constitutional: Denies fevers, chills, night sweats, fatigue or weight loss  Eyes: Denies vision loss, pain, drainage, double vision  Ears, Nose, Throat: Denies earache, difficulty hearing, tinnitus, nasal congestion, hoarseness  Cardiovascular: Denies chest pain, tightness, palpitations, orthopnea or edema  Respiratory: As in history of present illness  Sleep: Denies daytime sleepiness, snoring, apneas, insomnia, morning headaches  GI: Denies heartburn, dysphagia, nausea, abdominal pain, diarrhea or constipation  : Denies frequent urination, hematuria, discharge or painful urination  Musculoskeletal: Denies back pain, painful joints, sore muscles  Neurological: Denies weakness or headaches  Skin: No rashes    Blood pressure 118/74, pulse 74, temperature 36 °C (96.8 °F), resp. rate 16, height 1.702 m (5' 7\"), weight 83.2 kg (183 lb 6.4 oz), SpO2 97 %.    Physical Exam:  Appearance: Well-nourished, well-developed, in no acute distress  HEENT: Normocephalic, atraumatic, white sclera, PERRLA, oropharynx clear  Neck: No adenopathy or masses  Respiratory: no intercostal retractions or accessory muscle use  Lungs auscultation: Clear to auscultation bilaterally, diminished breath sounds  Cardiovascular: Regular rate rhythm. No murmurs, rubs or gallops.  No LE edema  Abdomen: soft, nondistended  Gait: Normal  Digits: No clubbing, cyanosis  Motor: No focal deficits  Orientation: " Oriented to time, person and place    Diagnosis:  1. Chronic obstructive pulmonary disease, unspecified COPD type (CMS-HCC)     2. History of lung cancer  CT-CHEST (THORAX) W/O   3. Lung nodules         Plan:  The patient's COPD is clinically stable. We will switch Breo to Advair  µg 2 puffs twice a day. Continue Spiriva QD.  Her follow-up chest CAT scan shows no evidence of cancer recurrence. The left lower lobe nodule resolved. There is a vague opacity in the right lower lobe, which is nonspecific. We will continue surveillance with next chest CAT scan without contrast in 6 months.  Return in about 6 months (around 7/11/2018) for after CT scan.

## 2018-01-15 ENCOUNTER — APPOINTMENT (OUTPATIENT)
Dept: ENDOCRINOLOGY | Facility: MEDICAL CENTER | Age: 68
End: 2018-01-15
Payer: MEDICARE

## 2018-01-25 ENCOUNTER — HOSPITAL ENCOUNTER (OUTPATIENT)
Dept: LAB | Facility: MEDICAL CENTER | Age: 68
End: 2018-01-25
Attending: INTERNAL MEDICINE
Payer: MEDICARE

## 2018-01-25 ENCOUNTER — HOSPITAL ENCOUNTER (OUTPATIENT)
Dept: RADIOLOGY | Facility: MEDICAL CENTER | Age: 68
End: 2018-01-25
Attending: UROLOGY
Payer: MEDICARE

## 2018-01-25 DIAGNOSIS — E05.10 THYROTOXICOSIS WITH TOXIC SINGLE THYROID NODULE AND WITHOUT THYROID STORM: ICD-10-CM

## 2018-01-25 DIAGNOSIS — N20.0 URIC ACID NEPHROLITHIASIS: ICD-10-CM

## 2018-01-25 LAB
T3FREE SERPL-MCNC: 3.63 PG/ML (ref 2.4–4.2)
T4 FREE SERPL-MCNC: 1.16 NG/DL (ref 0.53–1.43)
TSH SERPL DL<=0.005 MIU/L-ACNC: <0.005 UIU/ML (ref 0.38–5.33)

## 2018-01-25 PROCEDURE — 74018 RADEX ABDOMEN 1 VIEW: CPT

## 2018-01-25 PROCEDURE — 84443 ASSAY THYROID STIM HORMONE: CPT

## 2018-01-25 PROCEDURE — 84481 FREE ASSAY (FT-3): CPT

## 2018-01-25 PROCEDURE — 36415 COLL VENOUS BLD VENIPUNCTURE: CPT

## 2018-01-25 PROCEDURE — 84439 ASSAY OF FREE THYROXINE: CPT

## 2018-01-27 ENCOUNTER — TELEPHONE (OUTPATIENT)
Dept: ENDOCRINOLOGY | Facility: MEDICAL CENTER | Age: 68
End: 2018-01-27

## 2018-01-27 DIAGNOSIS — E05.90 HYPERTHYROIDISM: ICD-10-CM

## 2018-01-27 DIAGNOSIS — R79.89 LOW TSH LEVEL: ICD-10-CM

## 2018-01-27 NOTE — TELEPHONE ENCOUNTER
Telephone conversation with patient    Repeat thyroid results are reviewed and they continue to evolve. TSH is now unmeasurable and previously low at 0.03. However, free T4 has come down from mildly elevated at 1.5 down to normal at 1.1. Free T3 was upper normal at 3.9 and now it is 3.6. Difficult to say that she is thyrotoxic based on these test results. So I think I will do some other pituitary screening to better understand the low TSH.    She has seen Dr. Rogers about osteoporosis and he will try to reinstitute Forteo for her last 6 months worth and follow that up with Prolia. That sounds like an excellent plan.    I will review these things again with her next month.    Kishore Torrez M.D.    Copies to Dr. Edwin Rogers

## 2018-02-20 ENCOUNTER — OFFICE VISIT (OUTPATIENT)
Dept: ENDOCRINOLOGY | Facility: MEDICAL CENTER | Age: 68
End: 2018-02-20
Payer: MEDICARE

## 2018-02-20 VITALS
HEART RATE: 91 BPM | HEIGHT: 67 IN | SYSTOLIC BLOOD PRESSURE: 124 MMHG | BODY MASS INDEX: 28.88 KG/M2 | WEIGHT: 184 LBS | DIASTOLIC BLOOD PRESSURE: 84 MMHG | OXYGEN SATURATION: 94 %

## 2018-02-20 DIAGNOSIS — E04.2 MULTIPLE THYROID NODULES: ICD-10-CM

## 2018-02-20 DIAGNOSIS — E05.10 THYROTOXICOSIS WITH TOXIC SINGLE THYROID NODULE AND WITHOUT THYROID STORM: ICD-10-CM

## 2018-02-20 PROCEDURE — 99214 OFFICE O/P EST MOD 30 MIN: CPT | Performed by: INTERNAL MEDICINE

## 2018-02-21 NOTE — PROGRESS NOTES
Chief Complaint   Patient presents with   • Thyrotoxicosis     multiple nodules        HPI:        1. Toxic thyroid nodule.    This patient is very difficult to sort symptoms in terms of cause and effect.  She has a very low grade hyperthyroidism related to an autonomous hot nodule in the right lobe.  Her TSH is completely suppressed.  Her free T4 however is mid range at 1.1 and her free T3 upper normal at 3.6.  She does not have definite thyrotoxic symptoms but she is weak and chronically fatigued which could be related to thyrotoxicosis and the myopathy that accompanies that.    My problem is she has other medical problems not the least right now is the discovery of kidney stones.  I think the prospect of lung cancer has been put on the back burner for the moment.      I have proposed a therapeutic trial of methimazole to lower her thyroid levels and see if she derives any benefit.  This may give us an indicator of whether or not she would benefit from I-131 ablation of her thyroid nodule.  She is not ready to undertake this because she is still dealing with the urologist about what to do with the kidney stones which may require some procedure.    Also she has put aside Forteo for the moment for the same reason.  She has six months of Forteo approved but she doesn’t want to take it to only have to stop it because of some other medical problem.      She will alert me when she is done with the kidney stone problem and I will prescribe methimazole.  Once we start methimazole I will see her a month later.    Finally, there is a question of whether or not the low TSH may have to do with pituitary insufficiency.  We addressed this back in 2015.  Her other pituitary hormones seem to be intact.  Cortisol was 22.5 with ACTH 14.  FSH appropriately elevated at 79.  IgF-1 normal and prolactin normal as well so I don’t think her low TSH is due to pituitary insufficiency.     ROS:  Chronic back pain primarily centered around  the sacroiliac joints bilaterally  Chronic fatigue  Has been approved for Forteo does not want to start it only to stop it again soon because of other problems   Kidney stones or the next priority.      Allergies:   Allergies   Allergen Reactions   • Nkda [No Known Drug Allergy]        Current medicines including changes today:  Current Outpatient Prescriptions   Medication Sig Dispense Refill   • BREO ELLIPTA 200-25 MCG/INH AEROSOL POWDER, BREATH ACTIVATED INHALE ONE DOSE BY MOUTH DAILY, RINSE MOUTH AFTER USE 1 Each 5   • PROAIR  (90 Base) MCG/ACT Aero Soln inhalation aerosol INHALE TWO PUFFS BY MOUTH EVERY 6 HOURS AS NEEDED FOR SHORTNESS OF BREATH 1 Inhaler 5   • hydrocodone/acetaminophen (NORCO)  MG Tab      • Tiotropium Bromide Monohydrate (SPIRIVA RESPIMAT) 2.5 MCG/ACT Aero Soln Inhale 2 Inhalation by mouth every day. Assemble and prime. 1 Inhaler 6   • dorzolamide-timolol (COSOPT) 22.3-6.8 MG/ML Solution Place 1 Drop in both eyes 2 times a day.     • vitamin D, Ergocalciferol, (DRISDOL) 93938 UNITS Cap capsule Take 50,000 Units by mouth every 14 days.     • propranolol LA (INDERAL LA) 60 MG CAPSULE SR 24 HR Take 60 mg by mouth every morning.     • acetaminophen (TYLENOL) 500 MG Tab Take 500 mg by mouth every 6 hours as needed.     • warfarin (COUMADIN) 3 MG Tab Take 1.5-3 mg by mouth every evening. 1.5 mg  for 6 days and 3 mg for 2 days     • zolpidem (AMBIEN) 10 MG Tab Take 10 mg by mouth at bedtime as needed for Sleep.     • simvastatin (ZOCOR) 40 MG Tab Take 40 mg by mouth every evening.     • losartan (COZAAR) 50 MG TABS Take 50 mg by mouth every morning.     • albuterol (PROVENTIL) 2.5mg/3ml Nebu Soln solution for nebulization 3 mL by Nebulization route every four hours as needed for Shortness of Breath. 30 Bullet 2   • fluticasone-salmeterol (ADVAIR HFA) 115-21 MCG/ACT inhaler Inhale 2 Puffs by mouth 2 times a day. Inhalation with spacer. Rinse mouth after each use. 1 Inhaler 6   •  "alprazolam (XANAX) 0.25 MG Tab      • ondansetron (ZOFRAN ODT) 4 MG TABLET DISPERSIBLE Take 1 Tab by mouth every 8 hours as needed for Nausea/Vomiting. 20 Tab 0     No current facility-administered medications for this visit.         Past Medical History:   Diagnosis Date   • Asthma     inhalers daily   • Backpain 7/2017     thor. area   • Breath shortness     with exertion has O2 but does not use   • Bronchitis    • CAD (coronary artery disease)     palpatations   • Cancer (CMS-HCC) 2016    left lung   • Chickenpox    • COPD (chronic obstructive pulmonary disease) (CMS-HCC)    • Dialysis 2005    transient renal failure due to sepsis   • Emphysema of lung (CMS-HCC)    • Glaucoma     right eye   • Hyperlipidemia    • Hypertension    • Hyperthyroidism    • Kidney stone 2017    bilateral   • Lung cancer (CMS-HCC) 12/12/2016   • Multiple thyroid nodules 7/9/2015   • Nasal drainage    • Osteoporosis    • Personal history of venous thrombosis and embolism 2004, 2012    right leg 2012, left arm dvt 2004 left eye 2017   • Pneumonia 7/2016    per patient   • Renal disorder     had been on dialysis for 7 months for acute failure 2005   • Renal stones 2013    post lithotripsy   • Rheumatoid arthritis (CMS-HCC)     question   • Rheumatoid arthritis (CMS-HCC)    • S/P appendectomy 1998    • S/P cholecystectomy 1998   • S/P kyphoplasty 2006, 2007, 2013   • Sepsis(995.91) 2005       PHYSICAL EXAM:    /84   Pulse 91   Ht 1.702 m (5' 7\")   Wt 83.5 kg (184 lb)   SpO2 94%   BMI 28.82 kg/m²       Gen.   appears tired, moves slowly    Skin   appropriate for sex and age    HEENT  unremarkable    Neck   nodule in the right thyroid lobe is easily palpable about 2 cm across. Nontender    Heart  regular    Extremities   small lesion in the popliteal fossa I think right leg. It's a cutaneous lesion. She is waiting for an appointment with a dermatologist    Neuro  gait and station normal but very slow and deliberate    Psych  " appropriate    ASSESSMENT AND RECOMMENDATIONS    1. Thyrotoxicosis with toxic single thyroid nodule           I believe she has a very low-grade thyrotoxicos it is impossible to determine how much of her symptomatology is related to this. I propose a therapeutic trial of methimazole after she settles her renal stone problems. I don't want to give her an ablative dose of I-131 until I am convinced this will help her            2. Multiple thyroid nodules           One definitely hot nodule but there are others that do not take up I-123      DISPOSITION: She will notify me when her renal stone problem is stabilized. At that point I will give her a therapeutic trial of low-dose methimazole       Kishore Torrez M.D.    Copies to: Edwin Rogers M.D. [phone]

## 2018-02-26 ENCOUNTER — HOSPITAL ENCOUNTER (OUTPATIENT)
Dept: RADIOLOGY | Facility: MEDICAL CENTER | Age: 68
End: 2018-02-26
Attending: PHYSICIAN ASSISTANT
Payer: MEDICARE

## 2018-02-26 DIAGNOSIS — N20.0 KIDNEY STONE: ICD-10-CM

## 2018-02-26 PROCEDURE — 76775 US EXAM ABDO BACK WALL LIM: CPT

## 2018-03-05 ENCOUNTER — HOSPITAL ENCOUNTER (OUTPATIENT)
Dept: RADIOLOGY | Facility: MEDICAL CENTER | Age: 68
End: 2018-03-05
Attending: UROLOGY
Payer: MEDICARE

## 2018-03-05 DIAGNOSIS — N20.0 CALCULUS OF KIDNEY: ICD-10-CM

## 2018-03-05 PROCEDURE — 74018 RADEX ABDOMEN 1 VIEW: CPT

## 2018-04-03 ENCOUNTER — OFFICE VISIT (OUTPATIENT)
Dept: URGENT CARE | Facility: CLINIC | Age: 68
End: 2018-04-03
Payer: MEDICARE

## 2018-04-03 VITALS
TEMPERATURE: 97.5 F | BODY MASS INDEX: 28.56 KG/M2 | HEART RATE: 77 BPM | HEIGHT: 67 IN | SYSTOLIC BLOOD PRESSURE: 162 MMHG | WEIGHT: 182 LBS | DIASTOLIC BLOOD PRESSURE: 98 MMHG | RESPIRATION RATE: 16 BRPM | OXYGEN SATURATION: 96 %

## 2018-04-03 DIAGNOSIS — L03.012 CELLULITIS OF FINGER OF LEFT HAND: ICD-10-CM

## 2018-04-03 PROCEDURE — 99213 OFFICE O/P EST LOW 20 MIN: CPT | Performed by: NURSE PRACTITIONER

## 2018-04-03 RX ORDER — CEFDINIR 300 MG/1
CAPSULE ORAL
COMMUNITY
Start: 2018-01-31 | End: 2018-06-17

## 2018-04-03 RX ORDER — BRIMONIDINE TARTRATE, TIMOLOL MALEATE 2; 5 MG/ML; MG/ML
1 SOLUTION/ DROPS OPHTHALMIC 2 TIMES DAILY
COMMUNITY
Start: 2018-03-28 | End: 2023-01-01

## 2018-04-03 RX ORDER — DOXYCYCLINE HYCLATE 100 MG
100 TABLET ORAL 2 TIMES DAILY
Qty: 14 TAB | Refills: 0 | Status: SHIPPED | OUTPATIENT
Start: 2018-04-03 | End: 2018-04-10

## 2018-04-04 NOTE — PROGRESS NOTES
Subjective:      Latonia Clinton is a 67 y.o. female who presents with Edema (Left index finger )            This is a new problem. Pt reports she had her INR checked 2 weeks ago and they pricked her left index finger. A few days later she noticed redness, swelling and tenderness to the same finger. The swelling and tenderness has only gotten worse in the last week. She denies any fever. There appears to be a build up of fluid under the skin where she was pricked. She reports she has been performing Epsom salt soaks with little relief.         Review of Systems   Musculoskeletal:        Swelling to left index finger   All other systems reviewed and are negative.    Past Medical History:   Diagnosis Date   • Asthma     inhalers daily   • Backpain 7/2017     thor. area   • Breath shortness     with exertion has O2 but does not use   • Bronchitis    • CAD (coronary artery disease)     palpatations   • Cancer (CMS-HCC) 2016    left lung   • Chickenpox    • COPD (chronic obstructive pulmonary disease) (CMS-HCC)    • Dialysis 2005    transient renal failure due to sepsis   • Emphysema of lung (CMS-HCC)    • Glaucoma     right eye   • Hyperlipidemia    • Hypertension    • Hyperthyroidism    • Kidney stone 2017    bilateral   • Lung cancer (CMS-HCC) 12/12/2016   • Multiple thyroid nodules 7/9/2015   • Nasal drainage    • Osteoporosis    • Personal history of venous thrombosis and embolism 2004, 2012    right leg 2012, left arm dvt 2004 left eye 2017   • Pneumonia 7/2016    per patient   • Renal disorder     had been on dialysis for 7 months for acute failure 2005   • Renal stones 2013    post lithotripsy   • Rheumatoid arthritis (CMS-HCC)     question   • Rheumatoid arthritis (CMS-HCC)    • S/P appendectomy 1998    • S/P cholecystectomy 1998   • S/P kyphoplasty 2006, 2007, 2013   • Sepsis(995.91) 2005      Past Surgical History:   Procedure Laterality Date   • THORACOSCOPY Left 12/12/2016    Procedure: THORACOSCOPY  "W/WEDGE RESECTION UPPER LOBE MASS;  Surgeon: John H Ganser, M.D.;  Location: SURGERY [facility];  Service:    • OTHER ORTHOPEDIC SURGERY  2013    kyphoplasty X3   • APPENDECTOMY      1990   • CHOLECYSTECTOMY      1990   • LAMINOTOMY     • LUNG BIOPSY OPEN     • OTHER     • OTHER ABDOMINAL SURGERY      gall bladder disease   • PB ENLARGE BREAST WITH IMPLANT     • PB REDUCTION OF LARGE BREAST        Social History     Social History   • Marital status: Single     Spouse name: N/A   • Number of children: N/A   • Years of education: N/A     Occupational History   • Not on file.     Social History Main Topics   • Smoking status: Former Smoker     Packs/day: 1.00     Years: 30.00     Types: Cigarettes     Quit date: 1/1/2000   • Smokeless tobacco: Never Used      Comment: 7 years ago   • Alcohol use 0.0 oz/week      Comment: x3 a week   • Drug use: No   • Sexual activity: Not on file     Other Topics Concern   • Not on file     Social History Narrative   • No narrative on file          Objective:     BP (!) 162/98   Pulse 77   Temp 36.4 °C (97.5 °F)   Resp 16   Ht 1.702 m (5' 7\")   Wt 82.6 kg (182 lb)   SpO2 96%   BMI 28.51 kg/m²      Physical Exam   Constitutional: She is oriented to person, place, and time. Vital signs are normal. She appears well-developed and well-nourished.   HENT:   Head: Normocephalic and atraumatic.   Eyes: EOM are normal. Pupils are equal, round, and reactive to light.   Neck: Normal range of motion.   Cardiovascular: Normal rate and regular rhythm.    Pulmonary/Chest: Effort normal.   Musculoskeletal: Normal range of motion.        Left hand: She exhibits tenderness and swelling.        Hands:  Neurological: She is alert and oriented to person, place, and time.   Skin: Skin is warm and dry. Capillary refill takes less than 2 seconds.   Psychiatric: She has a normal mood and affect. Her speech is normal and behavior is normal. Thought content normal.   Vitals reviewed.            "   Assessment/Plan:     1. Cellulitis of finger of left hand  - doxycycline (VIBRAMYCIN) 100 MG Tab; Take 1 Tab by mouth 2 times a day for 7 days.  Dispense: 14 Tab; Refill: 0    Discussed with patient her finger is in fact infected, likely from INR test  Continue Epsom salt soaks  Warm compresses TID to encourage drainage  Tylenol for pain  RTC in 2 days if no improvement or worsening symptoms  Supportive care, differential diagnoses, and indications for immediate follow-up discussed with patient.    Pathogenesis of diagnosis discussed including typical length and natural progression.      Instructed to return to  or nearest emergency department if symptoms fail to improve, for any change in condition, further concerns, or new concerning symptoms.  Patient states understanding of the plan of care and discharge instructions.

## 2018-04-16 DIAGNOSIS — R91.8 LUNG NODULES: ICD-10-CM

## 2018-04-16 DIAGNOSIS — J44.9 CHRONIC OBSTRUCTIVE PULMONARY DISEASE, UNSPECIFIED COPD TYPE (HCC): ICD-10-CM

## 2018-04-16 DIAGNOSIS — R06.02 SOB (SHORTNESS OF BREATH): ICD-10-CM

## 2018-04-16 RX ORDER — TIOTROPIUM BROMIDE INHALATION SPRAY 3.12 UG/1
SPRAY, METERED RESPIRATORY (INHALATION)
Qty: 1 INHALER | Refills: 5 | Status: ON HOLD | OUTPATIENT
Start: 2018-04-16 | End: 2018-06-18

## 2018-04-16 NOTE — TELEPHONE ENCOUNTER
Caller Name: Latonia Clinton                 Call Back Number: [phone] (home)         Patient approves a detailed voicemail message: N\A    Have we ever prescribed this med? Yes.  If yes, what date? 11/13/17    Last OV: 1/11/18 Dr. Byrne     Next OV: 7/16/18 Dr. Byrne     DX: COPD     Medications:  Current Outpatient Prescriptions   Medication Sig Dispense Refill   • cefdinir (OMNICEF) 300 MG Cap      • COMBIGAN 0.2-0.5 % Solution      • albuterol (PROVENTIL) 2.5mg/3ml Nebu Soln solution for nebulization 3 mL by Nebulization route every four hours as needed for Shortness of Breath. 30 Bullet 2   • fluticasone-salmeterol (ADVAIR HFA) 115-21 MCG/ACT inhaler Inhale 2 Puffs by mouth 2 times a day. Inhalation with spacer. Rinse mouth after each use. 1 Inhaler 6   • BREO ELLIPTA 200-25 MCG/INH AEROSOL POWDER, BREATH ACTIVATED INHALE ONE DOSE BY MOUTH DAILY, RINSE MOUTH AFTER USE 1 Each 5   • PROAIR  (90 Base) MCG/ACT Aero Soln inhalation aerosol INHALE TWO PUFFS BY MOUTH EVERY 6 HOURS AS NEEDED FOR SHORTNESS OF BREATH 1 Inhaler 5   • hydrocodone/acetaminophen (NORCO)  MG Tab      • alprazolam (XANAX) 0.25 MG Tab      • Tiotropium Bromide Monohydrate (SPIRIVA RESPIMAT) 2.5 MCG/ACT Aero Soln Inhale 2 Inhalation by mouth every day. Assemble and prime. 1 Inhaler 6   • dorzolamide-timolol (COSOPT) 22.3-6.8 MG/ML Solution Place 1 Drop in both eyes 2 times a day.     • vitamin D, Ergocalciferol, (DRISDOL) 27531 UNITS Cap capsule Take 50,000 Units by mouth every 14 days.     • propranolol LA (INDERAL LA) 60 MG CAPSULE SR 24 HR Take 60 mg by mouth every morning.     • ondansetron (ZOFRAN ODT) 4 MG TABLET DISPERSIBLE Take 1 Tab by mouth every 8 hours as needed for Nausea/Vomiting. 20 Tab 0   • acetaminophen (TYLENOL) 500 MG Tab Take 500 mg by mouth every 6 hours as needed.     • warfarin (COUMADIN) 3 MG Tab Take 1.5-3 mg by mouth every evening. 1.5 mg  for 6 days and 3 mg for 2 days     • zolpidem (AMBIEN) 10  MG Tab Take 10 mg by mouth at bedtime as needed for Sleep.     • simvastatin (ZOCOR) 40 MG Tab Take 40 mg by mouth every evening.     • losartan (COZAAR) 50 MG TABS Take 50 mg by mouth every morning.       No current facility-administered medications for this visit.

## 2018-04-16 NOTE — TELEPHONE ENCOUNTER
Caller Name: Latonia Clinton                 Call Back Number: [phone] (home)         Patient approves a detailed voicemail message: N\A    Have we ever prescribed this med? Yes.  If yes, what date? 7/3/18     Last OV: 1/11/18 Dr. chandra     Next OV: 7/16/18 Dr. Chandra     DX: COPD     Medications:  Current Outpatient Prescriptions   Medication Sig Dispense Refill   • cefdinir (OMNICEF) 300 MG Cap      • COMBIGAN 0.2-0.5 % Solution      • albuterol (PROVENTIL) 2.5mg/3ml Nebu Soln solution for nebulization 3 mL by Nebulization route every four hours as needed for Shortness of Breath. 30 Bullet 2   • fluticasone-salmeterol (ADVAIR HFA) 115-21 MCG/ACT inhaler Inhale 2 Puffs by mouth 2 times a day. Inhalation with spacer. Rinse mouth after each use. 1 Inhaler 6   • BREO ELLIPTA 200-25 MCG/INH AEROSOL POWDER, BREATH ACTIVATED INHALE ONE DOSE BY MOUTH DAILY, RINSE MOUTH AFTER USE 1 Each 5   • PROAIR  (90 Base) MCG/ACT Aero Soln inhalation aerosol INHALE TWO PUFFS BY MOUTH EVERY 6 HOURS AS NEEDED FOR SHORTNESS OF BREATH 1 Inhaler 5   • hydrocodone/acetaminophen (NORCO)  MG Tab      • alprazolam (XANAX) 0.25 MG Tab      • Tiotropium Bromide Monohydrate (SPIRIVA RESPIMAT) 2.5 MCG/ACT Aero Soln Inhale 2 Inhalation by mouth every day. Assemble and prime. 1 Inhaler 6   • dorzolamide-timolol (COSOPT) 22.3-6.8 MG/ML Solution Place 1 Drop in both eyes 2 times a day.     • vitamin D, Ergocalciferol, (DRISDOL) 18600 UNITS Cap capsule Take 50,000 Units by mouth every 14 days.     • propranolol LA (INDERAL LA) 60 MG CAPSULE SR 24 HR Take 60 mg by mouth every morning.     • ondansetron (ZOFRAN ODT) 4 MG TABLET DISPERSIBLE Take 1 Tab by mouth every 8 hours as needed for Nausea/Vomiting. 20 Tab 0   • acetaminophen (TYLENOL) 500 MG Tab Take 500 mg by mouth every 6 hours as needed.     • warfarin (COUMADIN) 3 MG Tab Take 1.5-3 mg by mouth every evening. 1.5 mg  for 6 days and 3 mg for 2 days     • zolpidem (AMBIEN) 10 MG  Tab Take 10 mg by mouth at bedtime as needed for Sleep.     • simvastatin (ZOCOR) 40 MG Tab Take 40 mg by mouth every evening.     • losartan (COZAAR) 50 MG TABS Take 50 mg by mouth every morning.       No current facility-administered medications for this visit.

## 2018-05-21 DIAGNOSIS — J44.9 CHRONIC OBSTRUCTIVE PULMONARY DISEASE, UNSPECIFIED COPD TYPE (HCC): ICD-10-CM

## 2018-05-21 NOTE — TELEPHONE ENCOUNTER
Have we ever prescribed this med? Yes.  If yes, what date? 11/21/2017    Last OV: 1/11/2018 - Dr. Byrne    Next OV: 07/16/2018 - Dr. Byrne    DX: COPD    Medications: Breo

## 2018-06-17 ENCOUNTER — HOSPITAL ENCOUNTER (INPATIENT)
Facility: MEDICAL CENTER | Age: 68
LOS: 5 days | DRG: 689 | End: 2018-06-22
Attending: EMERGENCY MEDICINE | Admitting: INTERNAL MEDICINE
Payer: MEDICARE

## 2018-06-17 ENCOUNTER — APPOINTMENT (OUTPATIENT)
Dept: RADIOLOGY | Facility: MEDICAL CENTER | Age: 68
DRG: 689 | End: 2018-06-17
Attending: EMERGENCY MEDICINE
Payer: MEDICARE

## 2018-06-17 DIAGNOSIS — J43.9 PULMONARY EMPHYSEMA, UNSPECIFIED EMPHYSEMA TYPE (HCC): ICD-10-CM

## 2018-06-17 DIAGNOSIS — S22.089A CLOSED FRACTURE OF ELEVENTH THORACIC VERTEBRA, UNSPECIFIED FRACTURE MORPHOLOGY, INITIAL ENCOUNTER (HCC): ICD-10-CM

## 2018-06-17 DIAGNOSIS — A49.9 URINARY TRACT BACTERIAL INFECTIONS: ICD-10-CM

## 2018-06-17 DIAGNOSIS — N39.0 URINARY TRACT BACTERIAL INFECTIONS: ICD-10-CM

## 2018-06-17 DIAGNOSIS — I82.4Z9 DEEP VEIN THROMBOSIS (DVT) OF DISTAL VEIN OF LOWER EXTREMITY, UNSPECIFIED CHRONICITY, UNSPECIFIED LATERALITY (HCC): Chronic | ICD-10-CM

## 2018-06-17 PROBLEM — R31.9 URINARY TRACT INFECTION WITH HEMATURIA: Status: ACTIVE | Noted: 2018-06-17

## 2018-06-17 PROBLEM — N20.1 OBSTRUCTION OF LEFT URETEROPELVIC JUNCTION (UPJ) DUE TO STONE: Status: ACTIVE | Noted: 2018-06-17

## 2018-06-17 LAB
ALBUMIN SERPL BCP-MCNC: 3.8 G/DL (ref 3.2–4.9)
ALBUMIN/GLOB SERPL: 1.4 G/DL
ALP SERPL-CCNC: 104 U/L (ref 30–99)
ALT SERPL-CCNC: 20 U/L (ref 2–50)
ANION GAP SERPL CALC-SCNC: 6 MMOL/L (ref 0–11.9)
APPEARANCE UR: ABNORMAL
APTT PPP: 36.3 SEC (ref 24.7–36)
AST SERPL-CCNC: 19 U/L (ref 12–45)
BACTERIA #/AREA URNS HPF: ABNORMAL /HPF
BASOPHILS # BLD AUTO: 0.3 % (ref 0–1.8)
BASOPHILS # BLD: 0.02 K/UL (ref 0–0.12)
BILIRUB SERPL-MCNC: 0.9 MG/DL (ref 0.1–1.5)
BILIRUB UR QL STRIP.AUTO: NEGATIVE
BUN SERPL-MCNC: 17 MG/DL (ref 8–22)
CALCIUM SERPL-MCNC: 9.5 MG/DL (ref 8.4–10.2)
CASTS URNS QL MICRO: ABNORMAL /LPF
CHLORIDE SERPL-SCNC: 109 MMOL/L (ref 96–112)
CO2 SERPL-SCNC: 21 MMOL/L (ref 20–33)
COLOR UR: YELLOW
CREAT SERPL-MCNC: 1.29 MG/DL (ref 0.5–1.4)
EOSINOPHIL # BLD AUTO: 0.17 K/UL (ref 0–0.51)
EOSINOPHIL NFR BLD: 2.5 % (ref 0–6.9)
EPI CELLS #/AREA URNS HPF: ABNORMAL /HPF
ERYTHROCYTE [DISTWIDTH] IN BLOOD BY AUTOMATED COUNT: 52.4 FL (ref 35.9–50)
GLOBULIN SER CALC-MCNC: 2.8 G/DL (ref 1.9–3.5)
GLUCOSE SERPL-MCNC: 117 MG/DL (ref 65–99)
GLUCOSE UR STRIP.AUTO-MCNC: NEGATIVE MG/DL
HCT VFR BLD AUTO: 40.4 % (ref 37–47)
HGB BLD-MCNC: 13.7 G/DL (ref 12–16)
IMM GRANULOCYTES # BLD AUTO: 0.02 K/UL (ref 0–0.11)
IMM GRANULOCYTES NFR BLD AUTO: 0.3 % (ref 0–0.9)
INR PPP: 1.91 (ref 0.87–1.13)
KETONES UR STRIP.AUTO-MCNC: NEGATIVE MG/DL
LACTATE BLD-SCNC: 1.4 MMOL/L (ref 0.5–2)
LEUKOCYTE ESTERASE UR QL STRIP.AUTO: ABNORMAL
LYMPHOCYTES # BLD AUTO: 1.09 K/UL (ref 1–4.8)
LYMPHOCYTES NFR BLD: 16.2 % (ref 22–41)
MCH RBC QN AUTO: 34.5 PG (ref 27–33)
MCHC RBC AUTO-ENTMCNC: 33.9 G/DL (ref 33.6–35)
MCV RBC AUTO: 101.8 FL (ref 81.4–97.8)
MICRO URNS: ABNORMAL
MONOCYTES # BLD AUTO: 0.53 K/UL (ref 0–0.85)
MONOCYTES NFR BLD AUTO: 7.9 % (ref 0–13.4)
MUCOUS THREADS #/AREA URNS HPF: ABNORMAL /HPF
NEUTROPHILS # BLD AUTO: 4.9 K/UL (ref 2–7.15)
NEUTROPHILS NFR BLD: 72.8 % (ref 44–72)
NITRITE UR QL STRIP.AUTO: POSITIVE
NRBC # BLD AUTO: 0 K/UL
NRBC BLD-RTO: 0 /100 WBC
PH UR STRIP.AUTO: 6.5 [PH]
PLATELET # BLD AUTO: 186 K/UL (ref 164–446)
PMV BLD AUTO: 10.4 FL (ref 9–12.9)
POTASSIUM SERPL-SCNC: 3.7 MMOL/L (ref 3.6–5.5)
PROT SERPL-MCNC: 6.6 G/DL (ref 6–8.2)
PROT UR QL STRIP: 30 MG/DL
PROTHROMBIN TIME: 21.6 SEC (ref 12–14.6)
RBC # BLD AUTO: 3.97 M/UL (ref 4.2–5.4)
RBC # URNS HPF: ABNORMAL /HPF
RBC UR QL AUTO: ABNORMAL
SODIUM SERPL-SCNC: 136 MMOL/L (ref 135–145)
SP GR UR STRIP.AUTO: 1.01
UNIDENT CRYS URNS QL MICRO: ABNORMAL /HPF
WBC # BLD AUTO: 6.7 K/UL (ref 4.8–10.8)
WBC #/AREA URNS HPF: ABNORMAL /HPF

## 2018-06-17 PROCEDURE — 80053 COMPREHEN METABOLIC PANEL: CPT

## 2018-06-17 PROCEDURE — 99223 1ST HOSP IP/OBS HIGH 75: CPT | Mod: AI | Performed by: INTERNAL MEDICINE

## 2018-06-17 PROCEDURE — 87077 CULTURE AEROBIC IDENTIFY: CPT

## 2018-06-17 PROCEDURE — 85025 COMPLETE CBC W/AUTO DIFF WBC: CPT

## 2018-06-17 PROCEDURE — 81001 URINALYSIS AUTO W/SCOPE: CPT

## 2018-06-17 PROCEDURE — 83605 ASSAY OF LACTIC ACID: CPT

## 2018-06-17 PROCEDURE — 700111 HCHG RX REV CODE 636 W/ 250 OVERRIDE (IP): Performed by: EMERGENCY MEDICINE

## 2018-06-17 PROCEDURE — 87086 URINE CULTURE/COLONY COUNT: CPT

## 2018-06-17 PROCEDURE — A9270 NON-COVERED ITEM OR SERVICE: HCPCS | Performed by: INTERNAL MEDICINE

## 2018-06-17 PROCEDURE — 74176 CT ABD & PELVIS W/O CONTRAST: CPT

## 2018-06-17 PROCEDURE — 96374 THER/PROPH/DIAG INJ IV PUSH: CPT

## 2018-06-17 PROCEDURE — 85610 PROTHROMBIN TIME: CPT

## 2018-06-17 PROCEDURE — 770006 HCHG ROOM/CARE - MED/SURG/GYN SEMI*

## 2018-06-17 PROCEDURE — 700111 HCHG RX REV CODE 636 W/ 250 OVERRIDE (IP)

## 2018-06-17 PROCEDURE — 36415 COLL VENOUS BLD VENIPUNCTURE: CPT

## 2018-06-17 PROCEDURE — 87186 SC STD MICRODIL/AGAR DIL: CPT

## 2018-06-17 PROCEDURE — 700111 HCHG RX REV CODE 636 W/ 250 OVERRIDE (IP): Performed by: INTERNAL MEDICINE

## 2018-06-17 PROCEDURE — 85730 THROMBOPLASTIN TIME PARTIAL: CPT

## 2018-06-17 PROCEDURE — 700102 HCHG RX REV CODE 250 W/ 637 OVERRIDE(OP): Performed by: INTERNAL MEDICINE

## 2018-06-17 PROCEDURE — 99285 EMERGENCY DEPT VISIT HI MDM: CPT

## 2018-06-17 PROCEDURE — 96375 TX/PRO/DX INJ NEW DRUG ADDON: CPT

## 2018-06-17 RX ORDER — POLYETHYLENE GLYCOL 3350 17 G/17G
1 POWDER, FOR SOLUTION ORAL
Status: DISCONTINUED | OUTPATIENT
Start: 2018-06-17 | End: 2018-06-22 | Stop reason: HOSPADM

## 2018-06-17 RX ORDER — LOSARTAN POTASSIUM 25 MG/1
50 TABLET ORAL EVERY MORNING
Status: DISCONTINUED | OUTPATIENT
Start: 2018-06-18 | End: 2018-06-22 | Stop reason: HOSPADM

## 2018-06-17 RX ORDER — ONDANSETRON 2 MG/ML
4 INJECTION INTRAMUSCULAR; INTRAVENOUS EVERY 4 HOURS PRN
Status: DISCONTINUED | OUTPATIENT
Start: 2018-06-17 | End: 2018-06-22 | Stop reason: HOSPADM

## 2018-06-17 RX ORDER — SIMVASTATIN 20 MG
40 TABLET ORAL NIGHTLY
Status: DISCONTINUED | OUTPATIENT
Start: 2018-06-18 | End: 2018-06-22 | Stop reason: HOSPADM

## 2018-06-17 RX ORDER — CYCLOBENZAPRINE HCL 10 MG
10 TABLET ORAL 3 TIMES DAILY PRN
Status: DISCONTINUED | OUTPATIENT
Start: 2018-06-17 | End: 2018-06-22 | Stop reason: HOSPADM

## 2018-06-17 RX ORDER — PROPRANOLOL HCL 60 MG
60 CAPSULE, EXTENDED RELEASE 24HR ORAL EVERY MORNING
Status: DISCONTINUED | OUTPATIENT
Start: 2018-06-18 | End: 2018-06-22 | Stop reason: HOSPADM

## 2018-06-17 RX ORDER — SULFAMETHOXAZOLE AND TRIMETHOPRIM 800; 160 MG/1; MG/1
1 TABLET ORAL 2 TIMES DAILY
Status: ON HOLD | COMMUNITY
Start: 2018-06-15 | End: 2018-06-22

## 2018-06-17 RX ORDER — HEPARIN SODIUM 1000 [USP'U]/ML
2600 INJECTION, SOLUTION INTRAVENOUS; SUBCUTANEOUS PRN
Status: DISCONTINUED | OUTPATIENT
Start: 2018-06-17 | End: 2018-06-18

## 2018-06-17 RX ORDER — MORPHINE SULFATE 4 MG/ML
2 INJECTION, SOLUTION INTRAMUSCULAR; INTRAVENOUS EVERY 4 HOURS PRN
Status: DISCONTINUED | OUTPATIENT
Start: 2018-06-17 | End: 2018-06-20

## 2018-06-17 RX ORDER — HEPARIN SODIUM 5000 [USP'U]/ML
5000 INJECTION, SOLUTION INTRAVENOUS; SUBCUTANEOUS EVERY 8 HOURS
Status: DISCONTINUED | OUTPATIENT
Start: 2018-06-17 | End: 2018-06-17

## 2018-06-17 RX ORDER — AMOXICILLIN 250 MG
2 CAPSULE ORAL 2 TIMES DAILY
Status: DISCONTINUED | OUTPATIENT
Start: 2018-06-17 | End: 2018-06-22 | Stop reason: HOSPADM

## 2018-06-17 RX ORDER — BISACODYL 10 MG
10 SUPPOSITORY, RECTAL RECTAL
Status: DISCONTINUED | OUTPATIENT
Start: 2018-06-17 | End: 2018-06-22 | Stop reason: HOSPADM

## 2018-06-17 RX ORDER — ONDANSETRON 2 MG/ML
4 INJECTION INTRAMUSCULAR; INTRAVENOUS ONCE
Status: COMPLETED | OUTPATIENT
Start: 2018-06-17 | End: 2018-06-17

## 2018-06-17 RX ORDER — MORPHINE SULFATE 4 MG/ML
INJECTION, SOLUTION INTRAMUSCULAR; INTRAVENOUS
Status: COMPLETED
Start: 2018-06-17 | End: 2018-06-17

## 2018-06-17 RX ORDER — HYDROCODONE BITARTRATE AND ACETAMINOPHEN 10; 325 MG/1; MG/1
1 TABLET ORAL EVERY 6 HOURS PRN
Status: DISCONTINUED | OUTPATIENT
Start: 2018-06-17 | End: 2018-06-20

## 2018-06-17 RX ORDER — SODIUM CHLORIDE 9 MG/ML
INJECTION, SOLUTION INTRAVENOUS CONTINUOUS
Status: DISCONTINUED | OUTPATIENT
Start: 2018-06-17 | End: 2018-06-20

## 2018-06-17 RX ORDER — ZOLPIDEM TARTRATE 5 MG/1
10 TABLET ORAL NIGHTLY PRN
Status: DISCONTINUED | OUTPATIENT
Start: 2018-06-17 | End: 2018-06-22 | Stop reason: HOSPADM

## 2018-06-17 RX ORDER — ONDANSETRON 4 MG/1
4 TABLET, ORALLY DISINTEGRATING ORAL EVERY 4 HOURS PRN
Status: DISCONTINUED | OUTPATIENT
Start: 2018-06-17 | End: 2018-06-22 | Stop reason: HOSPADM

## 2018-06-17 RX ORDER — ALPRAZOLAM 0.25 MG/1
0.25 TABLET ORAL
Status: DISCONTINUED | OUTPATIENT
Start: 2018-06-17 | End: 2018-06-17

## 2018-06-17 RX ADMIN — ONDANSETRON 4 MG: 2 INJECTION, SOLUTION INTRAMUSCULAR; INTRAVENOUS at 20:06

## 2018-06-17 RX ADMIN — ONDANSETRON 4 MG: 2 INJECTION, SOLUTION INTRAMUSCULAR; INTRAVENOUS at 22:58

## 2018-06-17 RX ADMIN — MORPHINE SULFATE 2 MG: 4 INJECTION INTRAVENOUS at 22:03

## 2018-06-17 RX ADMIN — HYDROCODONE BITARTRATE AND ACETAMINOPHEN 1 TABLET: 10; 325 TABLET ORAL at 22:58

## 2018-06-17 ASSESSMENT — ENCOUNTER SYMPTOMS
BLURRED VISION: 0
CHILLS: 0
DIZZINESS: 0
EYE DISCHARGE: 0
MYALGIAS: 0
NAUSEA: 0
EYE PAIN: 0
FEVER: 0
INSOMNIA: 0
WEIGHT LOSS: 0
DIARRHEA: 0
STRIDOR: 0
ABDOMINAL PAIN: 0
NECK PAIN: 0
EYE REDNESS: 0
COUGH: 0
BACK PAIN: 1
ORTHOPNEA: 0
SEIZURES: 0
HEARTBURN: 0
VOMITING: 0
FLANK PAIN: 1
FOCAL WEAKNESS: 0
NERVOUS/ANXIOUS: 0
HEADACHES: 0
PALPITATIONS: 0
SPUTUM PRODUCTION: 0
SHORTNESS OF BREATH: 0
DEPRESSION: 0

## 2018-06-17 ASSESSMENT — PAIN SCALES - GENERAL
PAINLEVEL_OUTOF10: 10
PAINLEVEL_OUTOF10: 10
PAINLEVEL_OUTOF10: 9

## 2018-06-17 ASSESSMENT — LIFESTYLE VARIABLES
EVER_SMOKED: YES
ALCOHOL_USE: NO

## 2018-06-17 ASSESSMENT — PATIENT HEALTH QUESTIONNAIRE - PHQ9
SUM OF ALL RESPONSES TO PHQ9 QUESTIONS 1 AND 2: 2
SUM OF ALL RESPONSES TO PHQ QUESTIONS 1-9: 4
8. MOVING OR SPEAKING SO SLOWLY THAT OTHER PEOPLE COULD HAVE NOTICED. OR THE OPPOSITE, BEING SO FIGETY OR RESTLESS THAT YOU HAVE BEEN MOVING AROUND A LOT MORE THAN USUAL: NOT AT ALL
7. TROUBLE CONCENTRATING ON THINGS, SUCH AS READING THE NEWSPAPER OR WATCHING TELEVISION: NOT AT ALL
3. TROUBLE FALLING OR STAYING ASLEEP OR SLEEPING TOO MUCH: SEVERAL DAYS
2. FEELING DOWN, DEPRESSED, IRRITABLE, OR HOPELESS: SEVERAL DAYS
1. LITTLE INTEREST OR PLEASURE IN DOING THINGS: SEVERAL DAYS
6. FEELING BAD ABOUT YOURSELF - OR THAT YOU ARE A FAILURE OR HAVE LET YOURSELF OR YOUR FAMILY DOWN: NOT AL ALL
9. THOUGHTS THAT YOU WOULD BE BETTER OFF DEAD, OR OF HURTING YOURSELF: NOT AT ALL
4. FEELING TIRED OR HAVING LITTLE ENERGY: SEVERAL DAYS
5. POOR APPETITE OR OVEREATING: NOT AT ALL

## 2018-06-18 ENCOUNTER — APPOINTMENT (OUTPATIENT)
Dept: RADIOLOGY | Facility: MEDICAL CENTER | Age: 68
DRG: 689 | End: 2018-06-18
Attending: UROLOGY
Payer: MEDICARE

## 2018-06-18 LAB
ALBUMIN SERPL BCP-MCNC: 3.7 G/DL (ref 3.2–4.9)
ALBUMIN/GLOB SERPL: 1.2 G/DL
ALP SERPL-CCNC: 108 U/L (ref 30–99)
ALT SERPL-CCNC: 18 U/L (ref 2–50)
ANION GAP SERPL CALC-SCNC: 12 MMOL/L (ref 0–11.9)
ANION GAP SERPL CALC-SCNC: 6 MMOL/L (ref 0–11.9)
APTT PPP: 114.2 SEC (ref 24.7–36)
APTT PPP: 36.5 SEC (ref 24.7–36)
APTT PPP: 66.6 SEC (ref 24.7–36)
AST SERPL-CCNC: 19 U/L (ref 12–45)
BASE EXCESS BLDA CALC-SCNC: -10 MMOL/L (ref -4–3)
BASOPHILS # BLD AUTO: 0.2 % (ref 0–1.8)
BASOPHILS # BLD: 0.02 K/UL (ref 0–0.12)
BILIRUB SERPL-MCNC: 0.7 MG/DL (ref 0.1–1.5)
BNP SERPL-MCNC: 190 PG/ML (ref 0–100)
BODY TEMPERATURE: 37.6 CENTIGRADE
BUN SERPL-MCNC: 16 MG/DL (ref 8–22)
BUN SERPL-MCNC: 17 MG/DL (ref 8–22)
CALCIUM SERPL-MCNC: 8.9 MG/DL (ref 8.4–10.2)
CALCIUM SERPL-MCNC: 9.3 MG/DL (ref 8.4–10.2)
CHLORIDE SERPL-SCNC: 108 MMOL/L (ref 96–112)
CHLORIDE SERPL-SCNC: 110 MMOL/L (ref 96–112)
CO2 SERPL-SCNC: 15 MMOL/L (ref 20–33)
CO2 SERPL-SCNC: 21 MMOL/L (ref 20–33)
CREAT SERPL-MCNC: 1.22 MG/DL (ref 0.5–1.4)
CREAT SERPL-MCNC: 1.43 MG/DL (ref 0.5–1.4)
EOSINOPHIL # BLD AUTO: 0.01 K/UL (ref 0–0.51)
EOSINOPHIL NFR BLD: 0.1 % (ref 0–6.9)
ERYTHROCYTE [DISTWIDTH] IN BLOOD BY AUTOMATED COUNT: 55.4 FL (ref 35.9–50)
ERYTHROCYTE [DISTWIDTH] IN BLOOD BY AUTOMATED COUNT: 55.8 FL (ref 35.9–50)
GLOBULIN SER CALC-MCNC: 3 G/DL (ref 1.9–3.5)
GLUCOSE SERPL-MCNC: 100 MG/DL (ref 65–99)
GLUCOSE SERPL-MCNC: 104 MG/DL (ref 65–99)
HCO3 BLDA-SCNC: 16 MMOL/L (ref 17–25)
HCT VFR BLD AUTO: 43.8 % (ref 37–47)
HCT VFR BLD AUTO: 45 % (ref 37–47)
HGB BLD-MCNC: 13.9 G/DL (ref 12–16)
HGB BLD-MCNC: 14.7 G/DL (ref 12–16)
IMM GRANULOCYTES # BLD AUTO: 0.03 K/UL (ref 0–0.11)
IMM GRANULOCYTES NFR BLD AUTO: 0.4 % (ref 0–0.9)
LACTATE BLD-SCNC: 1.4 MMOL/L (ref 0.5–2)
LYMPHOCYTES # BLD AUTO: 0.32 K/UL (ref 1–4.8)
LYMPHOCYTES NFR BLD: 3.8 % (ref 22–41)
MCH RBC QN AUTO: 34.2 PG (ref 27–33)
MCH RBC QN AUTO: 34.3 PG (ref 27–33)
MCHC RBC AUTO-ENTMCNC: 31.7 G/DL (ref 33.6–35)
MCHC RBC AUTO-ENTMCNC: 32.7 G/DL (ref 33.6–35)
MCV RBC AUTO: 105.1 FL (ref 81.4–97.8)
MCV RBC AUTO: 107.6 FL (ref 81.4–97.8)
MONOCYTES # BLD AUTO: 0.56 K/UL (ref 0–0.85)
MONOCYTES NFR BLD AUTO: 6.7 % (ref 0–13.4)
NEUTROPHILS # BLD AUTO: 7.46 K/UL (ref 2–7.15)
NEUTROPHILS NFR BLD: 88.8 % (ref 44–72)
NRBC # BLD AUTO: 0 K/UL
NRBC BLD-RTO: 0 /100 WBC
PCO2 BLDA: 36.5 MMHG (ref 26–37)
PCO2 TEMP ADJ BLDA: 37.5 MMHG (ref 26–37)
PH BLDA: 7.26 [PH] (ref 7.4–7.5)
PH TEMP ADJ BLDA: 7.25 [PH] (ref 7.4–7.5)
PLATELET # BLD AUTO: 151 K/UL (ref 164–446)
PLATELET # BLD AUTO: 167 K/UL (ref 164–446)
PMV BLD AUTO: 10.8 FL (ref 9–12.9)
PMV BLD AUTO: 10.9 FL (ref 9–12.9)
PO2 BLDA: 80.6 MMHG (ref 64–87)
PO2 TEMP ADJ BLDA: 83.8 MMHG (ref 64–87)
POTASSIUM SERPL-SCNC: 4.1 MMOL/L (ref 3.6–5.5)
POTASSIUM SERPL-SCNC: 4.2 MMOL/L (ref 3.6–5.5)
PROT SERPL-MCNC: 6.7 G/DL (ref 6–8.2)
RBC # BLD AUTO: 4.07 M/UL (ref 4.2–5.4)
RBC # BLD AUTO: 4.28 M/UL (ref 4.2–5.4)
SAO2 % BLDA: 94.9 % (ref 93–99)
SODIUM SERPL-SCNC: 135 MMOL/L (ref 135–145)
SODIUM SERPL-SCNC: 137 MMOL/L (ref 135–145)
WBC # BLD AUTO: 7 K/UL (ref 4.8–10.8)
WBC # BLD AUTO: 8.4 K/UL (ref 4.8–10.8)

## 2018-06-18 PROCEDURE — 700102 HCHG RX REV CODE 250 W/ 637 OVERRIDE(OP): Performed by: INTERNAL MEDICINE

## 2018-06-18 PROCEDURE — 94640 AIRWAY INHALATION TREATMENT: CPT

## 2018-06-18 PROCEDURE — 83880 ASSAY OF NATRIURETIC PEPTIDE: CPT

## 2018-06-18 PROCEDURE — 0TF48ZZ FRAGMENTATION IN LEFT KIDNEY PELVIS, VIA NATURAL OR ARTIFICIAL OPENING ENDOSCOPIC: ICD-10-PCS | Performed by: UROLOGY

## 2018-06-18 PROCEDURE — 501838 HCHG SUTURE GENERAL: Performed by: UROLOGY

## 2018-06-18 PROCEDURE — 80048 BASIC METABOLIC PNL TOTAL CA: CPT

## 2018-06-18 PROCEDURE — 700111 HCHG RX REV CODE 636 W/ 250 OVERRIDE (IP)

## 2018-06-18 PROCEDURE — 99233 SBSQ HOSP IP/OBS HIGH 50: CPT | Performed by: INTERNAL MEDICINE

## 2018-06-18 PROCEDURE — C1758 CATHETER, URETERAL: HCPCS | Performed by: UROLOGY

## 2018-06-18 PROCEDURE — 770022 HCHG ROOM/CARE - ICU (200)

## 2018-06-18 PROCEDURE — 83605 ASSAY OF LACTIC ACID: CPT

## 2018-06-18 PROCEDURE — C2617 STENT, NON-COR, TEM W/O DEL: HCPCS | Performed by: UROLOGY

## 2018-06-18 PROCEDURE — 85027 COMPLETE CBC AUTOMATED: CPT

## 2018-06-18 PROCEDURE — 160002 HCHG RECOVERY MINUTES (STAT): Performed by: UROLOGY

## 2018-06-18 PROCEDURE — 80053 COMPREHEN METABOLIC PANEL: CPT

## 2018-06-18 PROCEDURE — A9270 NON-COVERED ITEM OR SERVICE: HCPCS | Performed by: INTERNAL MEDICINE

## 2018-06-18 PROCEDURE — 501329 HCHG SET, CYSTO IRRIG Y TUR: Performed by: UROLOGY

## 2018-06-18 PROCEDURE — 36600 WITHDRAWAL OF ARTERIAL BLOOD: CPT

## 2018-06-18 PROCEDURE — 700101 HCHG RX REV CODE 250

## 2018-06-18 PROCEDURE — 160036 HCHG PACU - EA ADDL 30 MINS PHASE I: Performed by: UROLOGY

## 2018-06-18 PROCEDURE — 0T778DZ DILATION OF LEFT URETER WITH INTRALUMINAL DEVICE, VIA NATURAL OR ARTIFICIAL OPENING ENDOSCOPIC: ICD-10-PCS | Performed by: UROLOGY

## 2018-06-18 PROCEDURE — 700105 HCHG RX REV CODE 258: Performed by: INTERNAL MEDICINE

## 2018-06-18 PROCEDURE — 160039 HCHG SURGERY MINUTES - EA ADDL 1 MIN LEVEL 3: Performed by: UROLOGY

## 2018-06-18 PROCEDURE — 700101 HCHG RX REV CODE 250: Performed by: HOSPITALIST

## 2018-06-18 PROCEDURE — 94760 N-INVAS EAR/PLS OXIMETRY 1: CPT

## 2018-06-18 PROCEDURE — 700117 HCHG RX CONTRAST REV CODE 255

## 2018-06-18 PROCEDURE — 160028 HCHG SURGERY MINUTES - 1ST 30 MINS LEVEL 3: Performed by: UROLOGY

## 2018-06-18 PROCEDURE — BT1F1ZZ FLUOROSCOPY OF LEFT KIDNEY, URETER AND BLADDER USING LOW OSMOLAR CONTRAST: ICD-10-PCS | Performed by: UROLOGY

## 2018-06-18 PROCEDURE — 99233 SBSQ HOSP IP/OBS HIGH 50: CPT | Performed by: HOSPITALIST

## 2018-06-18 PROCEDURE — 160048 HCHG OR STATISTICAL LEVEL 1-5: Performed by: UROLOGY

## 2018-06-18 PROCEDURE — 160009 HCHG ANES TIME/MIN: Performed by: UROLOGY

## 2018-06-18 PROCEDURE — C1769 GUIDE WIRE: HCPCS | Performed by: UROLOGY

## 2018-06-18 PROCEDURE — 85730 THROMBOPLASTIN TIME PARTIAL: CPT

## 2018-06-18 PROCEDURE — 700111 HCHG RX REV CODE 636 W/ 250 OVERRIDE (IP): Performed by: INTERNAL MEDICINE

## 2018-06-18 PROCEDURE — 82803 BLOOD GASES ANY COMBINATION: CPT

## 2018-06-18 PROCEDURE — 85025 COMPLETE CBC W/AUTO DIFF WBC: CPT

## 2018-06-18 PROCEDURE — 160035 HCHG PACU - 1ST 60 MINS PHASE I: Performed by: UROLOGY

## 2018-06-18 PROCEDURE — 500879 HCHG PACK, CYSTO: Performed by: UROLOGY

## 2018-06-18 DEVICE — STENT UROLOGICAL POLARIS 6X26  ULTRA: Type: IMPLANTABLE DEVICE | Site: URETER | Status: NON-FUNCTIONAL

## 2018-06-18 RX ORDER — SODIUM CHLORIDE, SODIUM LACTATE, POTASSIUM CHLORIDE, CALCIUM CHLORIDE 600; 310; 30; 20 MG/100ML; MG/100ML; MG/100ML; MG/100ML
1000 INJECTION, SOLUTION INTRAVENOUS
Status: DISCONTINUED | OUTPATIENT
Start: 2018-06-18 | End: 2018-06-21

## 2018-06-18 RX ORDER — HALOPERIDOL 5 MG/ML
INJECTION INTRAMUSCULAR
Status: COMPLETED
Start: 2018-06-18 | End: 2018-06-18

## 2018-06-18 RX ORDER — HEPARIN SODIUM 1000 [USP'U]/ML
3200 INJECTION, SOLUTION INTRAVENOUS; SUBCUTANEOUS PRN
Status: DISCONTINUED | OUTPATIENT
Start: 2018-06-18 | End: 2018-06-20

## 2018-06-18 RX ORDER — WARFARIN SODIUM 2 MG/1
2 TABLET ORAL DAILY
Status: ON HOLD | COMMUNITY
End: 2018-06-22

## 2018-06-18 RX ORDER — SULFAMETHOXAZOLE AND TRIMETHOPRIM 800; 160 MG/1; MG/1
1 TABLET ORAL 2 TIMES DAILY
Status: ON HOLD | COMMUNITY
Start: 2018-05-17 | End: 2018-06-22

## 2018-06-18 RX ORDER — ONDANSETRON 2 MG/ML
INJECTION INTRAMUSCULAR; INTRAVENOUS
Status: COMPLETED
Start: 2018-06-18 | End: 2018-06-18

## 2018-06-18 RX ORDER — SODIUM CHLORIDE, SODIUM LACTATE, POTASSIUM CHLORIDE, CALCIUM CHLORIDE 600; 310; 30; 20 MG/100ML; MG/100ML; MG/100ML; MG/100ML
1000 INJECTION, SOLUTION INTRAVENOUS ONCE
Status: COMPLETED | OUTPATIENT
Start: 2018-06-18 | End: 2018-06-19

## 2018-06-18 RX ORDER — IPRATROPIUM BROMIDE AND ALBUTEROL SULFATE 2.5; .5 MG/3ML; MG/3ML
3 SOLUTION RESPIRATORY (INHALATION)
Status: DISCONTINUED | OUTPATIENT
Start: 2018-06-18 | End: 2018-06-20

## 2018-06-18 RX ORDER — LABETALOL HYDROCHLORIDE 5 MG/ML
INJECTION, SOLUTION INTRAVENOUS
Status: COMPLETED
Start: 2018-06-18 | End: 2018-06-18

## 2018-06-18 RX ORDER — TIOTROPIUM BROMIDE 18 UG/1
1 CAPSULE ORAL; RESPIRATORY (INHALATION)
Status: DISCONTINUED | OUTPATIENT
Start: 2018-06-19 | End: 2018-06-19

## 2018-06-18 RX ORDER — BUDESONIDE AND FORMOTEROL FUMARATE DIHYDRATE 160; 4.5 UG/1; UG/1
2 AEROSOL RESPIRATORY (INHALATION)
Status: DISCONTINUED | OUTPATIENT
Start: 2018-06-19 | End: 2018-06-19

## 2018-06-18 RX ORDER — ATROPA BELLADONNA AND OPIUM 16.2; 6 MG/1; MG/1
30 SUPPOSITORY RECTAL EVERY 8 HOURS PRN
Status: DISCONTINUED | OUTPATIENT
Start: 2018-06-18 | End: 2018-06-22 | Stop reason: HOSPADM

## 2018-06-18 RX ADMIN — SIMVASTATIN 40 MG: 20 TABLET, FILM COATED ORAL at 20:49

## 2018-06-18 RX ADMIN — LABETALOL HYDROCHLORIDE 10 MG: 5 INJECTION, SOLUTION INTRAVENOUS at 19:00

## 2018-06-18 RX ADMIN — PROPRANOLOL HYDROCHLORIDE 60 MG: 60 CAPSULE, EXTENDED RELEASE ORAL at 08:55

## 2018-06-18 RX ADMIN — ZOLPIDEM TARTRATE 10 MG: 5 TABLET, FILM COATED ORAL at 00:07

## 2018-06-18 RX ADMIN — HEPARIN SODIUM 25000 UNITS: 5000 INJECTION, SOLUTION INTRAVENOUS at 00:18

## 2018-06-18 RX ADMIN — CEFTRIAXONE SODIUM 2 G: 2 INJECTION, POWDER, FOR SOLUTION INTRAMUSCULAR; INTRAVENOUS at 08:49

## 2018-06-18 RX ADMIN — HALOPERIDOL LACTATE 1 MG: 5 INJECTION, SOLUTION INTRAMUSCULAR at 19:03

## 2018-06-18 RX ADMIN — STANDARDIZED SENNA CONCENTRATE AND DOCUSATE SODIUM 2 TABLET: 8.6; 5 TABLET, FILM COATED ORAL at 20:49

## 2018-06-18 RX ADMIN — ALBUTEROL SULFATE 2.5 MG: 2.5 SOLUTION RESPIRATORY (INHALATION) at 18:38

## 2018-06-18 RX ADMIN — HYDROCODONE BITARTRATE AND ACETAMINOPHEN 1 TABLET: 10; 325 TABLET ORAL at 13:25

## 2018-06-18 RX ADMIN — SODIUM CHLORIDE: 9 INJECTION, SOLUTION INTRAVENOUS at 00:22

## 2018-06-18 RX ADMIN — MORPHINE SULFATE 2 MG: 4 INJECTION INTRAVENOUS at 02:03

## 2018-06-18 RX ADMIN — LOSARTAN POTASSIUM 50 MG: 25 TABLET ORAL at 08:55

## 2018-06-18 RX ADMIN — MORPHINE SULFATE 2 MG: 4 INJECTION INTRAVENOUS at 11:01

## 2018-06-18 RX ADMIN — ONDANSETRON 4 MG: 2 INJECTION INTRAMUSCULAR; INTRAVENOUS at 18:30

## 2018-06-18 RX ADMIN — HEPARIN SODIUM 1200 UNITS/HR: 5000 INJECTION, SOLUTION INTRAVENOUS at 22:40

## 2018-06-18 RX ADMIN — IPRATROPIUM BROMIDE AND ALBUTEROL SULFATE 3 ML: .5; 3 SOLUTION RESPIRATORY (INHALATION) at 20:36

## 2018-06-18 RX ADMIN — SODIUM CHLORIDE, POTASSIUM CHLORIDE, SODIUM LACTATE AND CALCIUM CHLORIDE 1000 ML: 600; 310; 30; 20 INJECTION, SOLUTION INTRAVENOUS at 23:53

## 2018-06-18 RX ADMIN — SODIUM CHLORIDE: 9 INJECTION, SOLUTION INTRAVENOUS at 20:50

## 2018-06-18 RX ADMIN — SODIUM CHLORIDE: 9 INJECTION, SOLUTION INTRAVENOUS at 11:01

## 2018-06-18 RX ADMIN — MORPHINE SULFATE 2 MG: 4 INJECTION INTRAVENOUS at 20:49

## 2018-06-18 RX ADMIN — MORPHINE SULFATE 2 MG: 4 INJECTION INTRAVENOUS at 06:46

## 2018-06-18 RX ADMIN — LABETALOL HYDROCHLORIDE 10 MG: 5 INJECTION, SOLUTION INTRAVENOUS at 19:20

## 2018-06-18 ASSESSMENT — ENCOUNTER SYMPTOMS
BACK PAIN: 1
PALPITATIONS: 0
INSOMNIA: 0
CARDIOVASCULAR NEGATIVE: 1
BRUISES/BLEEDS EASILY: 0
SHORTNESS OF BREATH: 0
HEADACHES: 0
ABDOMINAL PAIN: 0
PSYCHIATRIC NEGATIVE: 1
NERVOUS/ANXIOUS: 0
EYE PAIN: 0
ORTHOPNEA: 0
LOSS OF CONSCIOUSNESS: 0
VOMITING: 0
SEIZURES: 0
FOCAL WEAKNESS: 0
DEPRESSION: 0
RESPIRATORY NEGATIVE: 1
MYALGIAS: 0
GASTROINTESTINAL NEGATIVE: 1
CHILLS: 0
NECK PAIN: 0
NEUROLOGICAL NEGATIVE: 1
DIZZINESS: 0
NAUSEA: 0
BLURRED VISION: 0
WEAKNESS: 1
EYE REDNESS: 0
EYE DISCHARGE: 0
HEARTBURN: 0
FEVER: 0
WEIGHT LOSS: 0
SPUTUM PRODUCTION: 0
CHILLS: 1
WEAKNESS: 0
FLANK PAIN: 1
DIARRHEA: 0
STRIDOR: 0
COUGH: 0

## 2018-06-18 ASSESSMENT — COPD QUESTIONNAIRES
COPD SCREENING SCORE: 6
DO YOU EVER COUGH UP ANY MUCUS OR PHLEGM?: YES, EVERY DAY
HAVE YOU SMOKED AT LEAST 100 CIGARETTES IN YOUR ENTIRE LIFE: YES
DURING THE PAST 4 WEEKS HOW MUCH DID YOU FEEL SHORT OF BREATH: NONE/LITTLE OF THE TIME

## 2018-06-18 ASSESSMENT — PAIN SCALES - GENERAL
PAINLEVEL_OUTOF10: 0
PAINLEVEL_OUTOF10: 2
PAINLEVEL_OUTOF10: 0
PAINLEVEL_OUTOF10: 8
PAINLEVEL_OUTOF10: 8
PAINLEVEL_OUTOF10: 0
PAINLEVEL_OUTOF10: ASSUMED PAIN PRESENT
PAINLEVEL_OUTOF10: 6
PAINLEVEL_OUTOF10: 0
PAINLEVEL_OUTOF10: 0
PAINLEVEL_OUTOF10: 8
PAINLEVEL_OUTOF10: 0
PAINLEVEL_OUTOF10: ASSUMED PAIN PRESENT
PAINLEVEL_OUTOF10: 5

## 2018-06-18 ASSESSMENT — PATIENT HEALTH QUESTIONNAIRE - PHQ9
2. FEELING DOWN, DEPRESSED, IRRITABLE, OR HOPELESS: NOT AT ALL
1. LITTLE INTEREST OR PLEASURE IN DOING THINGS: NOT AT ALL
SUM OF ALL RESPONSES TO PHQ9 QUESTIONS 1 AND 2: 0

## 2018-06-18 ASSESSMENT — LIFESTYLE VARIABLES: EVER_SMOKED: YES

## 2018-06-18 NOTE — PROGRESS NOTES
Med Rec partially complete.   Pt reports taking multiple course of ABX in the last 30 days but is unsure of medications, duration and instructions.  Pt pharmacy not open.  Will update med rec after speaking to pharmacy

## 2018-06-18 NOTE — PROGRESS NOTES
Med Rec complete per Pt at bedside   Allergies Reviewed    Pt started a 7 day course of BACTRIM DS on 6/15    Pt completed a 5 day course of BACTRIM DS on 5/21    Pt also completed 2 courses of ABX past 30 day window  12 day course of CEFDINIR on 5/17  7 day course of DOXYCYLINE in 04/2018

## 2018-06-18 NOTE — PROGRESS NOTES
[facility] Progress Note    Date of Service: 2018    Chief Complaint  67 y.o. female admitted 2018 with back pain, nephrolithiasis and UTI, on coumadin for DVT.    Interval Problem Update  Continued back pain, fatigue. Awaiting surgery.    Consultants/Specialty  Urology.    Disposition  TBD. Will need to be re-anticoagulated and stable after surgery.        Review of Systems   Constitutional: Positive for chills and malaise/fatigue. Negative for fever and weight loss.   HENT: Negative.    Respiratory: Negative.  Negative for cough and shortness of breath.    Cardiovascular: Negative.  Negative for chest pain and leg swelling.   Gastrointestinal: Negative.  Negative for abdominal pain, nausea and vomiting.   Genitourinary: Positive for flank pain. Negative for dysuria.   Musculoskeletal: Positive for back pain. Negative for myalgias.   Neurological: Negative.  Negative for dizziness, loss of consciousness and weakness.   Endo/Heme/Allergies: Negative.  Does not bruise/bleed easily.   Psychiatric/Behavioral: Negative.  Negative for depression. The patient is not nervous/anxious.    All other systems reviewed and are negative.     Physical Exam  Laboratory/Imaging   Hemodynamics  Temp (24hrs), Av.9 °C (98.4 °F), Min:36.4 °C (97.6 °F), Max:37.1 °C (98.8 °F)   Temperature: 36.9 °C (98.4 °F)  Pulse  Av.8  Min: 64  Max: 83    Blood Pressure : 108/57, NIBP: (!) 165/90      Respiratory      Respiration: 16, Pulse Oximetry: 92 %        RUL Breath Sounds: Clear, RML Breath Sounds: Clear, RLL Breath Sounds: Diminished, AUSTIN Breath Sounds: Clear, LLL Breath Sounds: Diminished    Fluids    Intake/Output Summary (Last 24 hours) at 18 1622  Last data filed at 18 1300   Gross per 24 hour   Intake                0 ml   Output              475 ml   Net             -475 ml       Nutrition  Orders Placed This Encounter   Procedures   • Diet NPO     Standing Status:   Standing     Number of  Occurrences:   1     Order Specific Question:   Restrict to:     Answer:   Ice Chips [2]     Physical Exam   Constitutional: She appears well-developed and well-nourished. No distress.   Patient seen and examined by me.   HENT:   Nose: Nose normal.   Mouth/Throat: Oropharynx is clear and moist. No oropharyngeal exudate.   Eyes: Conjunctivae are normal. Right eye exhibits no discharge. Left eye exhibits no discharge. No scleral icterus.   Neck: No JVD present. No tracheal deviation present.   Cardiovascular: Normal rate, regular rhythm and normal heart sounds.    Pulmonary/Chest: Effort normal and breath sounds normal. No stridor. No respiratory distress. She has no wheezes. She has no rales. She exhibits no tenderness.   Abdominal: Soft. Bowel sounds are normal. She exhibits no distension. There is no tenderness. There is CVA tenderness.   Musculoskeletal: She exhibits tenderness (mid back). She exhibits no edema.   Neurological: She is alert. No cranial nerve deficit. She exhibits normal muscle tone.   Skin: Skin is warm and dry. She is not diaphoretic. No pallor.   Psychiatric: She has a normal mood and affect. Her behavior is normal.   Nursing note and vitals reviewed.      Recent Labs      06/17/18 1922 06/18/18   0622   WBC  6.7  7.0   RBC  3.97*  4.28   HEMOGLOBIN  13.7  14.7   HEMATOCRIT  40.4  45.0   MCV  101.8*  105.1*   MCH  34.5*  34.3*   MCHC  33.9  32.7*   RDW  52.4*  55.4*   PLATELETCT  186  167   MPV  10.4  10.9     Recent Labs      06/17/18   1922 06/18/18   0622   SODIUM  136  135   POTASSIUM  3.7  4.2   CHLORIDE  109  108   CO2  21  21   GLUCOSE  117*  104*   BUN  17  16   CREATININE  1.29  1.22   CALCIUM  9.5  9.3     Recent Labs      06/17/18   1930 06/18/18   0622  06/18/18   1215   APTT  36.3*  66.6*  114.2*   INR  1.91*   --    --                   Assessment/Plan     Closed T11 fracture (HCC)- (present on admission)   Assessment & Plan    An L1 fracture subacute  The patient sees [facility]  regional spine and she has appointment with him in a few days  Please contact him once her kidney stone issue is addressed        Urinary tract infection with hematuria- (present on admission)   Assessment & Plan    On IV ceftriaxone  Follow cultures, continue post op        Obstruction of left ureteropelvic junction (UPJ) due to stone- (present on admission)   Assessment & Plan    2 cm in size  Dr. Barboza taking patient to OR for stent today  Pain control   IV ceftriaxone for associated infection        DVT (deep venous thrombosis) (HCC)- (present on admission)   Assessment & Plan    INR 1.6   heparin drip before intervention for her left kidney stone, held for procedure, resume post op and resume warfarin tomorrow as long as she doesn't have any hematuria          Quality-Core Measures   Reviewed items::  Labs reviewed, Medications reviewed and Radiology images reviewed  Gonzalez catheter::  No Gonzalez  DVT prophylaxis pharmacological::  Heparin

## 2018-06-18 NOTE — ED PROVIDER NOTES
ED Provider Note  CHIEF COMPLAINT  Chief Complaint   Patient presents with   • Flank Pain     Bilat since yesterday Hx chronic UTI x 1 mon also has chronic  back pain  Pt not sure which it is       HPI  Latonianavid Clinton is a 67 y.o. female who presents for evaluation of bilateral flank pain. The patient has a history of chronic urinary tract infection she recently has been on multiple courses of antibiotics to include Septra. She has a history of ureterolithiasis. She was started on antibiotics Friday by her urologist. She complains of bilateral lower back pain she has a history of chronic back pain is scheduled for some type of procedure next week I think possibly an epidural. She is taking Coumadin for history of DVT. She has no shortness of breath. She has a little nausea. Denies any acute cough    REVIEW OF SYSTEMS  See HPI for further details. 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 area   • Breath shortness     with exertion has O2 but does not use   • Bronchitis    • CAD (coronary artery disease)     palpatations   • Cancer (HCC) 2016    left lung   • Chickenpox    • COPD (chronic obstructive pulmonary disease) (HCC)    • Dialysis 2005    transient renal failure due to sepsis   • Emphysema of lung (HCC)    • Glaucoma     right eye   • Hyperlipidemia    • Hypertension    • Hyperthyroidism    • Kidney stone 2017    bilateral   • Lung cancer (HCC) 12/12/2016   • Multiple thyroid nodules 7/9/2015   • Nasal drainage    • Osteoporosis    • Personal history of venous thrombosis and embolism 2004, 2012    right leg 2012, left arm dvt 2004 left eye 2017   • Pneumonia 7/2016    per patient   • Renal disorder     had been on dialysis for 7 months for acute failure  "2005   • Renal stones 2013    post lithotripsy   • Rheumatoid arthritis (HCC)     question   • Rheumatoid arthritis (HCC)    • S/P appendectomy 1998    • S/P cholecystectomy 1998   • S/P kyphoplasty 2006, 2007, 2013   • Sepsis(636.94) 2005       FAMILY HISTORY  Family History   Problem Relation Age of Onset   • Cancer Mother    • Heart Failure Father    • Heart Disease Brother        SOCIAL HISTORY  Social History     Social History   • Marital status: Single     Spouse name: N/A   • Number of children: N/A   • Years of education: N/A     Social History Main Topics   • Smoking status: Former Smoker     Packs/day: 1.00     Years: 30.00     Types: Cigarettes     Quit date: 1/1/2000   • Smokeless tobacco: Never Used      Comment: 7 years ago   • Alcohol use 0.0 oz/week      Comment: x3 a week   • Drug use: No   • Sexual activity: Not on file     Other Topics Concern   • Not on file     Social History Narrative   • No narrative on file       SURGICAL HISTORY  Past Surgical History:   Procedure Laterality Date   • THORACOSCOPY Left 12/12/2016    Procedure: THORACOSCOPY W/WEDGE RESECTION UPPER LOBE MASS;  Surgeon: John H Ganser, M.D.;  Location: SURGERY [facility];  Service:    • OTHER ORTHOPEDIC SURGERY  2013    kyphoplasty X3   • APPENDECTOMY      1990   • CHOLECYSTECTOMY      1990   • LAMINOTOMY     • LUNG BIOPSY OPEN     • OTHER     • OTHER ABDOMINAL SURGERY      gall bladder disease   • PB ENLARGE BREAST WITH IMPLANT     • PB REDUCTION OF LARGE BREAST         CURRENT MEDICATIONS  Home Medications    **Home medications have not yet been reviewed for this encounter**          ALLERGIES  Allergies   Allergen Reactions   • Nkda [No Known Drug Allergy]        PHYSICAL EXAM  VITAL SIGNS: /81   Pulse 75   Temp 37.1 °C (98.8 °F)   Resp 16   Ht 1.702 m (5' 7\")   Wt 85.4 kg (188 lb 4.4 oz)   SpO2 93%   BMI 29.49 kg/m²   Constitutional :  Well developed, Well nourished, appears to be in moderate severe pain " Non-toxic appearance.   HENT: Head is atraumatic normocephalic moist mucous membranes  Eyes: Normal-appearing nonicteric  Neck: Normal range of motion, No tenderness, Supple, No stridor.   Lymphatic: No cervical adenopathy is noted.   Cardiovascular: Normal heart rate, Normal rhythm, No murmurs, No rubs, No gallops.   Thorax & Lungs: Equal breath sounds bilaterally no rales rhonchi  Skin: Warm, Dry, No erythema, No rash.   Abdomen is soft no tenderness no rebound guarding or tenderness left flank is noted tenderness in the lower thoracic lumbar region of her spine to palpation  Extremities no cyanosis or edema  Neurological the patient is awake alert without any focal neurological findings  Psychiatric no impairment of judgment appropriate mood and affect    CT-RENAL COLIC EVALUATION(A/P W/O)   Final Result      1.  2 cm left calculus has moved to the UPJ and causes new moderate hydronephrosis      2.  Stable 1 cm right nephrolith does not cause hydronephrosis and there are additional smaller left nephroliths      3.  Severe osteopenia with new from 2016 T11 and L1 fractures resulting in at most mild central canal encroachment at L1 and this is subacute-acute. T11 fracture is likely chronic but new from 2016      4.  Multiple chronic findings including prior cholecystectomy, renal and hepatic cysts, left adrenal gland masses compatible with benign adenomas          Results for orders placed or performed during the hospital encounter of 06/17/18   CBC WITH DIFFERENTIAL   Result Value Ref Range    WBC 6.7 4.8 - 10.8 K/uL    RBC 3.97 (L) 4.20 - 5.40 M/uL    Hemoglobin 13.7 12.0 - 16.0 g/dL    Hematocrit 40.4 37.0 - 47.0 %    .8 (H) 81.4 - 97.8 fL    MCH 34.5 (H) 27.0 - 33.0 pg    MCHC 33.9 33.6 - 35.0 g/dL    RDW 52.4 (H) 35.9 - 50.0 fL    Platelet Count 186 164 - 446 K/uL    MPV 10.4 9.0 - 12.9 fL    Neutrophils-Polys 72.80 (H) 44.00 - 72.00 %    Lymphocytes 16.20 (L) 22.00 - 41.00 %    Monocytes 7.90 0.00 -  13.40 %    Eosinophils 2.50 0.00 - 6.90 %    Basophils 0.30 0.00 - 1.80 %    Immature Granulocytes 0.30 0.00 - 0.90 %    Nucleated RBC 0.00 /100 WBC    Neutrophils (Absolute) 4.90 2.00 - 7.15 K/uL    Lymphs (Absolute) 1.09 1.00 - 4.80 K/uL    Monos (Absolute) 0.53 0.00 - 0.85 K/uL    Eos (Absolute) 0.17 0.00 - 0.51 K/uL    Baso (Absolute) 0.02 0.00 - 0.12 K/uL    Immature Granulocytes (abs) 0.02 0.00 - 0.11 K/uL    NRBC (Absolute) 0.00 K/uL   COMP METABOLIC PANEL   Result Value Ref Range    Sodium 136 135 - 145 mmol/L    Potassium 3.7 3.6 - 5.5 mmol/L    Chloride 109 96 - 112 mmol/L    Co2 21 20 - 33 mmol/L    Anion Gap 6.0 0.0 - 11.9    Glucose 117 (H) 65 - 99 mg/dL    Bun 17 8 - 22 mg/dL    Creatinine 1.29 0.50 - 1.40 mg/dL    Calcium 9.5 8.4 - 10.2 mg/dL    AST(SGOT) 19 12 - 45 U/L    ALT(SGPT) 20 2 - 50 U/L    Alkaline Phosphatase 104 (H) 30 - 99 U/L    Total Bilirubin 0.9 0.1 - 1.5 mg/dL    Albumin 3.8 3.2 - 4.9 g/dL    Total Protein 6.6 6.0 - 8.2 g/dL    Globulin 2.8 1.9 - 3.5 g/dL    A-G Ratio 1.4 g/dL   LACTIC ACID   Result Value Ref Range    Lactic Acid 1.4 0.5 - 2.0 mmol/L   URINALYSIS CULTURE, IF INDICATED   Result Value Ref Range    Color Yellow     Character Sl Cloudy (A)     Specific Gravity 1.010 <1.035    Ph 6.5 5.0 - 8.0    Glucose Negative Negative mg/dL    Ketones Negative Negative mg/dL    Protein 30 (A) Negative mg/dL    Bilirubin Negative Negative    Nitrite Positive (A) Negative    Leukocyte Esterase Large (A) Negative    Occult Blood Moderate (A) Negative    Micro Urine Req Microscopic    ESTIMATED GFR   Result Value Ref Range    GFR If African American 50 (A) >60 mL/min/1.73 m 2    GFR If Non  41 (A) >60 mL/min/1.73 m 2   URINE MICROSCOPIC (W/UA)   Result Value Ref Range    -150 (A) /hpf    RBC 10-20 (A) /hpf    Bacteria Moderate (A) None /hpf    Epithelial Cells Few Few /hpf    Mucous Threads Rare /hpf    Urine Crystals Few Amorphous /hpf    Urine Casts 0-2 Hyaline  /lpf         COURSE & MEDICAL DECISION MAKING  Pertinent Labs & Imaging studies reviewed. (See chart for details)  The patient is presenting for evaluation of pain to her flank and back. She is found to have multiple compression fractures probably subacute. In addition she is found to have evidence of acute urinary tract infection but no evidence of sepsis. She will require antibiotic treatment intravenously as she has been on doxycycline as an outpatient and Septra and Bactrim for 2 courses I believe. The findings were discussed with urologist Dr. perkins who will see the patient in consultation. In addition the patient is admitted to the hospital staff for medical therapy to include antibiotics hydration and management of anticoagulation    FINAL IMPRESSION  1. Urinary tract infection  2. 2 cm left UPJ calculus  3. Multiple compression fractures      Electronically signed by: Prateek Apodaca, 6/17/2018

## 2018-06-18 NOTE — PROGRESS NOTES
Admit to 309-1 from ED. Oriented to room and equipment. Call light instructions given, in reach at all times. Assessment completed. Medicated per MAR for sleep, nausea, and pain. New IV started left forearm, IVF and heparin infusing without issue.

## 2018-06-18 NOTE — PROGRESS NOTES
Resting with eyes closed. Respirations even and unlabored. No c/o. Repositions self. All lines patent, infusing without issue. Call light in reach.

## 2018-06-18 NOTE — CONSULTS
UROLOGY Consult Note:    Kameron Schultz  Date & Time note created:    6/18/2018   8:52 AM     Referring MD:  Dr. Apodaca    Patient ID:   Name:             Latonia Clinton   YOB: 1950  Age:                 67 y.o.  female   MRN:               [id number]                                                             Reason for Consult:      UPJ calculus, UTI    History of Present Illness:    66 yo female with history of stones and recurrent UTI with prior stone management by Dr. Tinoco and recent care for her UTIs through her PCP and Ryan Martínez. She has chronic pain in her upper and lower back managed with Norco with known compression fractures in the lumbar spine, however 2 days ago she experienced a different kind of pain described as sharp and ascending into her flank more so on the right than the left with radiation into her lower abdomen, again more so on the right than the left. She had some associated nausea but no emesis. She denies fevers sweats or chills. Over the past 2 months she has had a persistent UTI and has been on 2 courses of Doxycycline followed by Bactrim which she was on at time of presentation in the ER. She has not had dysuria but does endorse urinary frequency.     Review of Systems:      Cardiovascular: no chest pain   Respiratory: Cough with phlegm production.   Gastrointestinal/Hepatic: No constipation or diarrhea.  FROS was completed and is otherwise negative.          Past Medical History:   Past Medical History:   Diagnosis Date   • Asthma     inhalers daily   • Backpain 7/2017     thor. area   • Breath shortness     with exertion has O2 but does not use   • Bronchitis    • CAD (coronary artery disease)     palpatations   • Cancer (HCC) 2016    left lung   • Chickenpox    • COPD (chronic obstructive pulmonary disease) (HCC)    • Dialysis 2005    transient renal failure due to sepsis   • Emphysema of lung (HCC)    • Glaucoma     right eye   • Hyperlipidemia    •  Hypertension    • Hyperthyroidism    • Kidney stone 2017    bilateral   • Lung cancer (HCC) 12/12/2016   • Multiple thyroid nodules 7/9/2015   • Nasal drainage    • Osteoporosis    • Personal history of venous thrombosis and embolism 2004, 2012    right leg 2012, left arm dvt 2004 left eye 2017   • Pneumonia 7/2016    per patient   • Renal disorder     had been on dialysis for 7 months for acute failure 2005   • Renal stones 2013    post lithotripsy   • Rheumatoid arthritis (HCC)     question   • Rheumatoid arthritis (HCC)    • S/P appendectomy 1998    • S/P cholecystectomy 1998   • S/P kyphoplasty 2006, 2007, 2013   • Sepsis(995.91) 2005   Venous occlusion OS with limited vision      Active Hospital Problems    Diagnosis   • Obstruction of left ureteropelvic junction (UPJ) due to stone [N20.1]   • Urinary tract infection with hematuria [N39.0, R31.9]   • Closed T11 fracture ([facility]) [S22.089A]   • DVT (deep venous thrombosis) ([facility]) [I82.409]       Past Surgical History:  Past Surgical History:   Procedure Laterality Date   • THORACOSCOPY Left 12/12/2016    Procedure: THORACOSCOPY W/WEDGE RESECTION UPPER LOBE MASS;  Surgeon: John H Ganser, M.D.;  Location: SURGERY [facility];  Service:    • OTHER ORTHOPEDIC SURGERY  2013    kyphoplasty X3   • APPENDECTOMY      1990   • CHOLECYSTECTOMY      1990   • LAMINOTOMY     • LUNG BIOPSY OPEN     • OTHER     • OTHER ABDOMINAL SURGERY      gall bladder disease   • PB ENLARGE BREAST WITH IMPLANT     • PB REDUCTION OF LARGE BREAST     Lithotripsy with stent placement      Hospital Medications:    Current Facility-Administered Medications:   •  HYDROcodone/acetaminophen (NORCO)  MG per tablet 1 Tab, 1 Tab, Oral, Q6HRS PRN, Ravin Moe M.D., 1 Tab at 06/17/18 8351  •  losartan (COZAAR) tablet 50 mg, 50 mg, Oral, QARavin SHEN M.D.  •  propranolol LA (INDERAL LA) capsule 60 mg, 60 mg, Oral, Ravin RUBI M.D.  •  simvastatin (ZOCOR) tablet 40 mg, 40 mg, Oral, Nightly,  Ravin Moe M.D.  •  zolpidem (AMBIEN) tablet 10 mg, 10 mg, Oral, HS PRN, Ravin Moe M.D., 10 mg at 06/18/18 0007  •  senna-docusate (PERICOLACE or SENOKOT S) 8.6-50 MG per tablet 2 Tab, 2 Tab, Oral, BID **AND** polyethylene glycol/lytes (MIRALAX) PACKET 1 Packet, 1 Packet, Oral, QDAY PRN **AND** magnesium hydroxide (MILK OF MAGNESIA) suspension 30 mL, 30 mL, Oral, QDAY PRN **AND** bisacodyl (DULCOLAX) suppository 10 mg, 10 mg, Rectal, QDAY PRN, Ravin Moe M.D.  •  NS infusion, , Intravenous, Continuous, Ravin Moe M.D., Last Rate: 75 mL/hr at 06/18/18 0022  •  ondansetron (ZOFRAN) syringe/vial injection 4 mg, 4 mg, Intravenous, Q4HRS PRN, Ravin Moe M.D., 4 mg at 06/17/18 2258  •  ondansetron (ZOFRAN ODT) dispertab 4 mg, 4 mg, Oral, Q4HRS PRN, Ravin Moe M.D.  •  morphine (pf) 4 mg/ml injection 2 mg, 2 mg, Intravenous, Q4HRS PRN, Ravin Moe M.D., 2 mg at 06/18/18 0646  •  cefTRIAXone (ROCEPHIN) 2 g in  mL IVPB, 2 g, Intravenous, Q24HRS, Ravin Moe M.D.  •  cyclobenzaprine (FLEXERIL) tablet 10 mg, 10 mg, Oral, TID PRN, Ravin Moe M.D.  •  heparin injection 2,600 Units, 2,600 Units, Intravenous, PRN **AND** heparin infusion 25,000 units in 500 ml 0.45% nacl, , Intravenous, Continuous, Last Rate: 21 mL/hr at 06/18/18 0714, 1,050 Units at 06/18/18 0714 **AND** Action is required: Protocol 440 Heparin Weight Based has been implemented, , , Once **AND** Protocol 440 Heparin Weight Based DO NOT GIVE ANY HEPARIN BOLUS TO STROKE PATIENT, , , CONTINUOUS **AND** Protocol 440 Heparin Weight Based Discontinue Enoxaparin (Lovenox), Dabigatran (Pradaxa), Rivaroxaban (Xarelto), Apixaban (Eliquis), Edoxaban (Savaysa, Lixiana), Fondaparinux (Arixtra) and Argatroban prior to heparin administration, , , Once **AND** Protocol 440 Heparin Weight Based Draw baseline aPTT, PT, and platelet count if not already done, , , CONTINUOUS **AND** Protocol 440 Heparin Weight Based Draw aPTT 6 hours after beginning infusion. , , ,  CONTINUOUS **AND** Protocol 440 Heparin Weight Based Record Patient Data, , , CONTINUOUS **AND** Protocol 440 Heparin Weight Based INSTRUCTIONS, , , CONTINUOUS **AND** Protocol 440 Heparin Weight Based Review aPTT results 6 hours after infusion is begun as detailed, , , CONTINUOUS **AND** Protocol 440 Heparin Weight Based Draw Platelet count every three days. Contact MD if platelet is 50% lower than baseline count., , , CONTINUOUS **AND** Protocol 440 Heparin Weight Based Adjust heparin to maintain aPTT between 55-96 sec, , , CONTINUOUS **AND** Protocol 440 Heparin Weight Based Order aPTT 6 hours after any rate change or hold until aPTT is therapeutic (55-96 seconds), , , CONTINUOUS **AND** Protocol 440 Heparin Weight Based Documentation and verification, , , CONTINUOUS, Ravin Moe M.D.    Current Outpatient Medications:  Prescriptions Prior to Admission   Medication Sig Dispense Refill Last Dose   • Tiotropium Bromide Monohydrate (SPIRIVA RESPIMAT) 2.5 MCG/ACT Aero Soln Inhale 1 Puff by mouth 2 Times a Day.   6/17/2018 at 1600   • warfarin (COUMADIN) 2 MG Tab Take 2 mg by mouth every day.   6/17/2018 at 1600   • sulfamethoxazole-trimethoprim (BACTRIM DS) 800-160 MG tablet Take 1 Tab by mouth 2 times a day.      • BREO ELLIPTA 200-25 MCG/INH AEROSOL POWDER, BREATH ACTIVATED INHALE ONE DOSE BY MOUTH DAILY. RINSE MOUTH AFTER USE. 1 Each 11 6/17/2018 at 0600   • PROAIR  (90 Base) MCG/ACT Aero Soln inhalation aerosol INHALE TWO PUFFS BY MOUTH EVERY 6 HOURS AS NEEDED FOR SHORTNESS OF BREATH 1 Inhaler 3 6/17/2018 at 2300   • COMBIGAN 0.2-0.5 % Solution Place 1 Drop in both eyes 2 Times a Day.   6/17/2018 at 2100   • hydrocodone/acetaminophen (NORCO)  MG Tab Take 0.5 Tabs by mouth every four hours as needed.   6/17/2018 at 1600   • vitamin D, Ergocalciferol, (DRISDOL) 95524 UNITS Cap capsule Take 50,000 Units by mouth every 14 days.   6/11/2018 at AM   • propranolol LA (INDERAL LA) 60 MG CAPSULE SR 24 HR Take  "60 mg by mouth every morning.   6/17/2018 at 0600   • acetaminophen (TYLENOL) 500 MG Tab Take 500 mg by mouth every 6 hours as needed.   6/16/2018 at 0600   • zolpidem (AMBIEN) 10 MG Tab Take 10 mg by mouth at bedtime as needed for Sleep.   6/16/2018 at HS   • simvastatin (ZOCOR) 40 MG Tab Take 40 mg by mouth every evening.   6/17/2018 at 1600   • losartan (COZAAR) 50 MG TABS Take 50 mg by mouth every morning.   6/17/2018 at 0600       Medication Allergy:  Allergies   Allergen Reactions   • Nkda [No Known Drug Allergy]        Family History:  Family History   Problem Relation Age of Onset   • Cancer Mother    • Heart Failure Father    • Heart Disease Brother        Social History:  Social History     Social History   • Marital status: Single     Spouse name: N/A   • Number of children: N/A   • Years of education: N/A     Occupational History   • Not on file.     Social History Main Topics   • Smoking status: Former Smoker     Packs/day: 1.00     Years: 30.00     Types: Cigarettes     Quit date: 1/1/2000   • Smokeless tobacco: Never Used      Comment: 7 years ago   • Alcohol use 0.0 oz/week      Comment: x3 a week   • Drug use: No   • Sexual activity: Not on file     Other Topics Concern   • Not on file     Social History Narrative   • No narrative on file         Physical Exam:  Vitals/ General Appearance:   Weight/BMI: Body mass index is 28.9 kg/m².  Blood pressure 118/62, pulse 83, temperature 37 °C (98.6 °F), resp. rate 16, height 1.702 m (5' 7\"), weight 83.7 kg (184 lb 8.4 oz), SpO2 94 %, not currently breastfeeding.  Vitals:    06/17/18 2002 06/17/18 2210 06/17/18 2252 06/18/18 0734   BP:   (!) 164/83 118/62   Pulse: 73 79 64 83   Resp:   18 16   Temp:   36.4 °C (97.6 °F) 37 °C (98.6 °F)   SpO2: 94% 90% 90% 94%   Weight:   83.7 kg (184 lb 8.4 oz)    Height:         Oxygen Therapy:  Pulse Oximetry: 94 %, O2 (LPM): 3, O2 Delivery: Nasal Cannula    Constitutional:   Well developed, Well nourished, Mild " distress  HENMT:  Normocephalic, Atraumatic, Oropharynx moist mucous membranes, No oral exudates, Nose normal.    Eyes:  EOMI, Conjunctiva normal, No discharge.  Neck:  Normal range of motion  Cardiovascular:  Normal heart rate, Normal rhythm, No murmurs, No rubs, No gallops.     Lungs:  Normal breath sounds, breath sounds clear to auscultation bilaterally,  no crackles, no wheezing.   Abdomen: Abd protuberant. Hypoactive BS. Mild tenderness diffusely throughout.  Skin: Warm, Dry, No erythema, No rash, no induration.  Neurologic: Alert & oriented x 3, No focal deficits noted  Psychiatric: Affect normal, Judgment normal, Mood normal.      MDM (Data Review):     Records reviewed and summarized in current documentation    Lab Data Review:  Recent Results (from the past 24 hour(s))   CBC WITH DIFFERENTIAL    Collection Time: 06/17/18  7:22 PM   Result Value Ref Range    WBC 6.7 4.8 - 10.8 K/uL    RBC 3.97 (L) 4.20 - 5.40 M/uL    Hemoglobin 13.7 12.0 - 16.0 g/dL    Hematocrit 40.4 37.0 - 47.0 %    .8 (H) 81.4 - 97.8 fL    MCH 34.5 (H) 27.0 - 33.0 pg    MCHC 33.9 33.6 - 35.0 g/dL    RDW 52.4 (H) 35.9 - 50.0 fL    Platelet Count 186 164 - 446 K/uL    MPV 10.4 9.0 - 12.9 fL    Neutrophils-Polys 72.80 (H) 44.00 - 72.00 %    Lymphocytes 16.20 (L) 22.00 - 41.00 %    Monocytes 7.90 0.00 - 13.40 %    Eosinophils 2.50 0.00 - 6.90 %    Basophils 0.30 0.00 - 1.80 %    Immature Granulocytes 0.30 0.00 - 0.90 %    Nucleated RBC 0.00 /100 WBC    Neutrophils (Absolute) 4.90 2.00 - 7.15 K/uL    Lymphs (Absolute) 1.09 1.00 - 4.80 K/uL    Monos (Absolute) 0.53 0.00 - 0.85 K/uL    Eos (Absolute) 0.17 0.00 - 0.51 K/uL    Baso (Absolute) 0.02 0.00 - 0.12 K/uL    Immature Granulocytes (abs) 0.02 0.00 - 0.11 K/uL    NRBC (Absolute) 0.00 K/uL   COMP METABOLIC PANEL    Collection Time: 06/17/18  7:22 PM   Result Value Ref Range    Sodium 136 135 - 145 mmol/L    Potassium 3.7 3.6 - 5.5 mmol/L    Chloride 109 96 - 112 mmol/L    Co2 21 20 -  33 mmol/L    Anion Gap 6.0 0.0 - 11.9    Glucose 117 (H) 65 - 99 mg/dL    Bun 17 8 - 22 mg/dL    Creatinine 1.29 0.50 - 1.40 mg/dL    Calcium 9.5 8.4 - 10.2 mg/dL    AST(SGOT) 19 12 - 45 U/L    ALT(SGPT) 20 2 - 50 U/L    Alkaline Phosphatase 104 (H) 30 - 99 U/L    Total Bilirubin 0.9 0.1 - 1.5 mg/dL    Albumin 3.8 3.2 - 4.9 g/dL    Total Protein 6.6 6.0 - 8.2 g/dL    Globulin 2.8 1.9 - 3.5 g/dL    A-G Ratio 1.4 g/dL   LACTIC ACID    Collection Time: 06/17/18  7:22 PM   Result Value Ref Range    Lactic Acid 1.4 0.5 - 2.0 mmol/L   ESTIMATED GFR    Collection Time: 06/17/18  7:22 PM   Result Value Ref Range    GFR If African American 50 (A) >60 mL/min/1.73 m 2    GFR If Non  41 (A) >60 mL/min/1.73 m 2   APTT    Collection Time: 06/17/18  7:30 PM   Result Value Ref Range    APTT 36.3 (H) 24.7 - 36.0 sec   PROTHROMBIN TIME    Collection Time: 06/17/18  7:30 PM   Result Value Ref Range    PT 21.6 (H) 12.0 - 14.6 sec    INR 1.91 (H) 0.87 - 1.13   URINALYSIS CULTURE, IF INDICATED    Collection Time: 06/17/18  8:36 PM   Result Value Ref Range    Color Yellow     Character Sl Cloudy (A)     Specific Gravity 1.010 <1.035    Ph 6.5 5.0 - 8.0    Glucose Negative Negative mg/dL    Ketones Negative Negative mg/dL    Protein 30 (A) Negative mg/dL    Bilirubin Negative Negative    Nitrite Positive (A) Negative    Leukocyte Esterase Large (A) Negative    Occult Blood Moderate (A) Negative    Micro Urine Req Microscopic    URINE MICROSCOPIC (W/UA)    Collection Time: 06/17/18  8:36 PM   Result Value Ref Range    -150 (A) /hpf    RBC 10-20 (A) /hpf    Bacteria Moderate (A) None /hpf    Epithelial Cells Few Few /hpf    Mucous Threads Rare /hpf    Urine Crystals Few Amorphous /hpf    Urine Casts 0-2 Hyaline /lpf   CBC without Differential    Collection Time: 06/18/18  6:22 AM   Result Value Ref Range    WBC 7.0 4.8 - 10.8 K/uL    RBC 4.28 4.20 - 5.40 M/uL    Hemoglobin 14.7 12.0 - 16.0 g/dL    Hematocrit 45.0 37.0  - 47.0 %    .1 (H) 81.4 - 97.8 fL    MCH 34.3 (H) 27.0 - 33.0 pg    MCHC 32.7 (L) 33.6 - 35.0 g/dL    RDW 55.4 (H) 35.9 - 50.0 fL    Platelet Count 167 164 - 446 K/uL    MPV 10.9 9.0 - 12.9 fL   Comp Metabolic Panel (CMP)    Collection Time: 06/18/18  6:22 AM   Result Value Ref Range    Sodium 135 135 - 145 mmol/L    Potassium 4.2 3.6 - 5.5 mmol/L    Chloride 108 96 - 112 mmol/L    Co2 21 20 - 33 mmol/L    Anion Gap 6.0 0.0 - 11.9    Glucose 104 (H) 65 - 99 mg/dL    Calcium 9.3 8.4 - 10.2 mg/dL    Bun 16 8 - 22 mg/dL    Creatinine 1.22 0.50 - 1.40 mg/dL    AST(SGOT) 19 12 - 45 U/L    ALT(SGPT) 18 2 - 50 U/L    Alkaline Phosphatase 108 (H) 30 - 99 U/L    Total Bilirubin 0.7 0.1 - 1.5 mg/dL    Albumin 3.7 3.2 - 4.9 g/dL    Total Protein 6.7 6.0 - 8.2 g/dL    Globulin 3.0 1.9 - 3.5 g/dL    A-G Ratio 1.2 g/dL   APTT    Collection Time: 06/18/18  6:22 AM   Result Value Ref Range    APTT 66.6 (H) 24.7 - 36.0 sec   ESTIMATED GFR    Collection Time: 06/18/18  6:22 AM   Result Value Ref Range    GFR If  53 (A) >60 mL/min/1.73 m 2    GFR If Non  44 (A) >60 mL/min/1.73 m 2       Imaging/Procedures Review:    Reviewed    MDM (Assessment and Plan):     Active Hospital Problems    Diagnosis   • Obstruction of left ureteropelvic junction (UPJ) due to stone [N20.1]   • Urinary tract infection with hematuria [N39.0, R31.9]   • Closed T11 fracture ([facility]) [S22.379B]   • DVT (deep venous thrombosis) ([facility]) [I82.409]   Right renal stone      66 yo female with moderately obstructive 2 cm left UPJ stone, 1 cm non obstructing right renal stone and urinary tract infection. She is non toxic but does have new flank pain. Recommendation will be to place a left ureteral stent to decompress the kidney and continue with antibiotics to treat the UTI pending results of her culture with definitive stone treatment as an outpatient once her infection clears. She has had stent bother in the past and this certainly  could be bothersome to her again. As well, we discussed the other findings on her CT in relation to her spine which are also likely contributing to her pain so there is no guarantee that placing a stent will completely alleviate her pain complaints. Dr. Yang is aware of this consult and will coordinate OR. Thank you for this consult.

## 2018-06-18 NOTE — ASSESSMENT & PLAN NOTE
An L1 fracture subacute  The patient sees [facility] spine and she has appointment with him in a few days

## 2018-06-18 NOTE — RESPIRATORY CARE
COPD EDUCATION by COPD CLINICAL EDUCATOR  6/18/2018 at 3:32 PM by Linn Velez     Patient seen for COPD readmission education. Patient completed our program upon last admission in 2016 has been doing well, pt refused COPD program but agreed to a refresher on certain topics. Discussion included: determination of the factors that led to their current hospital admission, reiteration of the Action Plan and steps they can take to avoid an exacerbation, proper medication technique, and importance of obtaining a doctors appointment when they start feeling ill. Patient is not here for COPD but for an Obstruction (UPJ). Question and answer session followed.

## 2018-06-18 NOTE — PROGRESS NOTES
Resting on and off. Respirations shallow. C/o feeling short of breath. O2 sat on room air 82%, placed on 3L O2. Sat increased to 94%. Assist to bedpan to void, straining urine. IV infusing without issue. Call light in reach.

## 2018-06-18 NOTE — ASSESSMENT & PLAN NOTE
2 cm in size  Post LEFT RETROGRADE PYELOGRAM   LEFT FLEXIBLE URETEROSCOPY WITH LASER LITHOTRIPSY TO ALLOW GUIDE WIRE PLACEMENT - Wound Class: Clean Contaminated  LEFT STENT PLACEMENT   Pain control  Cont on IV abx and follow up cultures

## 2018-06-18 NOTE — H&P
Hospital Medicine History and Physical      Date of Service  6/17/2018    Chief Complaint  Chief Complaint   Patient presents with   • Flank Pain     Bilat since yesterday Hx chronic UTI x 1 mon also has chronic  back pain  Pt not sure which it is       History of Presenting Illness  Mary Beth is a 67 y.o. female PMH of DVT, COPD, multiple back surgeries, kidney stones, who presents with bilateral flank pain and low back pain since yesterday.  She had multiple back surgery in the past and she is see  from [facility] for her back and she has appointment with him in the coming days.   In the ER she has CT scan of the abdomen done and found to have 2 cm stone in the left UPJ junction with moderate hydronephrosis as well as multiple right kidney stone with no obstruction.  Urology Dr. Barboza was consulted in the ER.  She was also found to have urinary tract infection.  Denies any fever chills.    Primary Care Physician  Edwin Rogers M.D.      Code Status  Full code    Review of Systems  Review of Systems   Constitutional: Negative for chills, fever and weight loss.   HENT: Negative for congestion and nosebleeds.    Eyes: Negative for blurred vision, pain, discharge and redness.   Respiratory: Negative for cough, sputum production, shortness of breath and stridor.    Cardiovascular: Negative for chest pain, palpitations and orthopnea.   Gastrointestinal: Negative for abdominal pain, diarrhea, heartburn, nausea and vomiting.   Genitourinary: Positive for flank pain. Negative for dysuria, frequency and urgency.   Musculoskeletal: Positive for back pain. Negative for myalgias and neck pain.   Skin: Negative for itching and rash.   Neurological: Negative for dizziness, focal weakness, seizures and headaches.   Psychiatric/Behavioral: Negative for depression. The patient is not nervous/anxious and does not have insomnia.      Please see HPI, all other systems were reviewed and are negative (AMA/CMS  criteria)     Past Medical History  Past Medical History:   Diagnosis Date   • Backpain 7/2017     thor. area   • Kidney stone 2017    bilateral   • Lung cancer (HCC) 12/12/2016   • Pneumonia 7/2016    per patient   • Cancer (HCC) 2016    left lung   • Multiple thyroid nodules 7/9/2015   • Renal stones 2013    post lithotripsy   • Sepsis(995.91) 2005   • Dialysis 2005    transient renal failure due to sepsis   • S/P appendectomy 1998    • S/P cholecystectomy 1998   • Asthma     inhalers daily   • Breath shortness     with exertion has O2 but does not use   • Bronchitis    • CAD (coronary artery disease)     palpatations   • Chickenpox    • COPD (chronic obstructive pulmonary disease) (HCC)    • Emphysema of lung (HCC)    • Glaucoma     right eye   • Hyperlipidemia    • Hypertension    • Hyperthyroidism    • Nasal drainage    • Osteoporosis    • Personal history of venous thrombosis and embolism 2004, 2012    right leg 2012, left arm dvt 2004 left eye 2017   • Renal disorder     had been on dialysis for 7 months for acute failure 2005   • Rheumatoid arthritis (HCC)     question   • Rheumatoid arthritis (HCC)    • S/P kyphoplasty 2006, 2007, 2013       Surgical History  Past Surgical History:   Procedure Laterality Date   • THORACOSCOPY Left 12/12/2016    Procedure: THORACOSCOPY W/WEDGE RESECTION UPPER LOBE MASS;  Surgeon: John H Ganser, M.D.;  Location: SURGERY [facility];  Service:    • OTHER ORTHOPEDIC SURGERY  2013    kyphoplasty X3   • APPENDECTOMY      1990   • CHOLECYSTECTOMY      1990   • LAMINOTOMY     • LUNG BIOPSY OPEN     • OTHER     • OTHER ABDOMINAL SURGERY      gall bladder disease   • PB ENLARGE BREAST WITH IMPLANT     • PB REDUCTION OF LARGE BREAST         Medications  No current facility-administered medications on file prior to encounter.      Current Outpatient Prescriptions on File Prior to Encounter   Medication Sig Dispense Refill   • BREO ELLIPTA 200-25 MCG/INH AEROSOL POWDER, BREATH  ACTIVATED INHALE ONE DOSE BY MOUTH DAILY. RINSE MOUTH AFTER USE. 1 Each 11   • SPIRIVA RESPIMAT 2.5 MCG/ACT Aero Soln INHALE TWO PUFFS BY MOUTH DAILY. ASSEMBLE AND PRIME. 1 Inhaler 5   • PROAIR  (90 Base) MCG/ACT Aero Soln inhalation aerosol INHALE TWO PUFFS BY MOUTH EVERY 6 HOURS AS NEEDED FOR SHORTNESS OF BREATH 1 Inhaler 3   • COMBIGAN 0.2-0.5 % Solution      • albuterol (PROVENTIL) 2.5mg/3ml Nebu Soln solution for nebulization 3 mL by Nebulization route every four hours as needed for Shortness of Breath. 30 Bullet 2   • hydrocodone/acetaminophen (NORCO)  MG Tab      • alprazolam (XANAX) 0.25 MG Tab      • vitamin D, Ergocalciferol, (DRISDOL) 63317 UNITS Cap capsule Take 50,000 Units by mouth every 14 days.     • propranolol LA (INDERAL LA) 60 MG CAPSULE SR 24 HR Take 60 mg by mouth every morning.     • acetaminophen (TYLENOL) 500 MG Tab Take 500 mg by mouth every 6 hours as needed.     • warfarin (COUMADIN) 3 MG Tab Take 1.5-3 mg by mouth every evening. 1.5 mg  for 6 days and 3 mg for 2 days     • zolpidem (AMBIEN) 10 MG Tab Take 10 mg by mouth at bedtime as needed for Sleep.     • simvastatin (ZOCOR) 40 MG Tab Take 40 mg by mouth every evening.     • losartan (COZAAR) 50 MG TABS Take 50 mg by mouth every morning.       Family History  Family History   Problem Relation Age of Onset   • Cancer Mother    • Heart Failure Father    • Heart Disease Brother          Social History  Social History   Substance Use Topics   • Smoking status: Former Smoker     Packs/day: 1.00     Years: 30.00     Types: Cigarettes     Quit date: 2000   • Smokeless tobacco: Never Used      Comment: 7 years ago   • Alcohol use 0.0 oz/week      Comment: x3 a week       Allergies  Allergies   Allergen Reactions   • Nkda [No Known Drug Allergy]         Physical Exam  Laboratory   Hemodynamics  Temp (24hrs), Av.1 °C (98.8 °F), Min:37.1 °C (98.8 °F), Max:37.1 °C (98.8 °F)   Temperature: 37.1 °C (98.8 °F)  Pulse  Av   Min: 73  Max: 75    Blood Pressure : 145/81, NIBP: 141/80      Respiratory      Respiration: 16, Pulse Oximetry: 94 %             Physical Exam   Constitutional: She is oriented to person, place, and time. No distress.   HENT:   Head: Normocephalic and atraumatic.   Mouth/Throat: Oropharynx is clear and moist.   Eyes: Conjunctivae and EOM are normal. Pupils are equal, round, and reactive to light.   Neck: Normal range of motion. Neck supple. No tracheal deviation present. No thyromegaly present.   Cardiovascular: Normal rate and regular rhythm.    No murmur heard.  Pulmonary/Chest: Effort normal and breath sounds normal. No respiratory distress. She has no wheezes.   Abdominal: Soft. Bowel sounds are normal. She exhibits no distension. There is no tenderness.   Musculoskeletal: She exhibits tenderness. She exhibits no edema.   Neurological: She is alert and oriented to person, place, and time. No cranial nerve deficit.   Skin: Skin is warm and dry. She is not diaphoretic. No erythema.   Psychiatric: She has a normal mood and affect. Her behavior is normal. Thought content normal.       Recent Labs      06/17/18 1922   WBC  6.7   RBC  3.97*   HEMOGLOBIN  13.7   HEMATOCRIT  40.4   MCV  101.8*   MCH  34.5*   MCHC  33.9   RDW  52.4*   PLATELETCT  186   MPV  10.4     Recent Labs      06/17/18 1922   SODIUM  136   POTASSIUM  3.7   CHLORIDE  109   CO2  21   GLUCOSE  117*   BUN  17   CREATININE  1.29   CALCIUM  9.5     Recent Labs      06/17/18 1922   ALTSGPT  20   ASTSGOT  19   ALKPHOSPHAT  104*   TBILIRUBIN  0.9   GLUCOSE  117*                 Lab Results   Component Value Date    TROPONINI <0.02 04/24/2017       Imaging  CT-RENAL COLIC EVALUATION(A/P W/O)   Final Result      1.  2 cm left calculus has moved to the UPJ and causes new moderate hydronephrosis      2.  Stable 1 cm right nephrolith does not cause hydronephrosis and there are additional smaller left nephroliths      3.  Severe osteopenia with new from  2016 T11 and L1 fractures resulting in at most mild central canal encroachment at L1 and this is subacute-acute. T11 fracture is likely chronic but new from 2016      4.  Multiple chronic findings including prior cholecystectomy, renal and hepatic cysts, left adrenal gland masses compatible with benign adenomas             Assessment/Plan     I anticipate this patient will require at least two midnights for appropriate medical management, necessitating inpatient admission.    Closed T11 fracture (HCC)- (present on admission)   Assessment & Plan    An L1 fracture subacute  The patient see  from [facility] and she has appointment with him in a few days  Please contact him once her kidney stone issue is addressed        Urinary tract infection with hematuria- (present on admission)   Assessment & Plan    On IV ceftriaxone  Follow culture        Obstruction of left ureteropelvic junction (UPJ) due to stone- (present on admission)   Assessment & Plan    2 cm in size  Dr. Barboza consulted  N.p.o.  Pain control  On IV ceftriaxone        DVT (deep venous thrombosis) ([facility])- (present on admission)   Assessment & Plan    INR 1.6  Currently started on heparin drip for possible intervention for her left kidney stone            Prophylaxis:  heparin drip

## 2018-06-18 NOTE — PROGRESS NOTES
Urology PA notified pt is in a heparin gtt  States he notified surgeon  Currently in OR  No orders to stop heparin at this time  Pt remains NPO

## 2018-06-18 NOTE — ASSESSMENT & PLAN NOTE
she will would like to switch to xarelto, Sw faxed information to pharmacy and her copay is around $144 , pt states that she is ok with it. Discussed with pharmacy and will switch her over to xarelto.

## 2018-06-18 NOTE — ED NOTES
Assisted in pt care, blood sent to lab. Pt to CT via [email or web address]. Pt denies need for pain medication at this time. Pt unable to provide urine sample at this time but aware one is needed. Report to tSeph LEMUS

## 2018-06-19 ENCOUNTER — APPOINTMENT (OUTPATIENT)
Dept: RADIOLOGY | Facility: MEDICAL CENTER | Age: 68
DRG: 689 | End: 2018-06-19
Attending: INTERNAL MEDICINE
Payer: MEDICARE

## 2018-06-19 LAB
ANION GAP SERPL CALC-SCNC: 8 MMOL/L (ref 0–11.9)
APTT PPP: 179.3 SEC (ref 24.7–36)
APTT PPP: 179.4 SEC (ref 24.7–36)
APTT PPP: 54.8 SEC (ref 24.7–36)
BASOPHILS # BLD AUTO: 0.2 % (ref 0–1.8)
BASOPHILS # BLD: 0.02 K/UL (ref 0–0.12)
BUN SERPL-MCNC: 21 MG/DL (ref 8–22)
CALCIUM SERPL-MCNC: 8.7 MG/DL (ref 8.4–10.2)
CHLORIDE SERPL-SCNC: 106 MMOL/L (ref 96–112)
CO2 SERPL-SCNC: 20 MMOL/L (ref 20–33)
CREAT SERPL-MCNC: 1.68 MG/DL (ref 0.5–1.4)
EOSINOPHIL # BLD AUTO: 0.01 K/UL (ref 0–0.51)
EOSINOPHIL NFR BLD: 0.1 % (ref 0–6.9)
ERYTHROCYTE [DISTWIDTH] IN BLOOD BY AUTOMATED COUNT: 54.5 FL (ref 35.9–50)
GLUCOSE SERPL-MCNC: 244 MG/DL (ref 65–99)
HCT VFR BLD AUTO: 38.8 % (ref 37–47)
HGB BLD-MCNC: 12.7 G/DL (ref 12–16)
IMM GRANULOCYTES # BLD AUTO: 0.06 K/UL (ref 0–0.11)
IMM GRANULOCYTES NFR BLD AUTO: 0.5 % (ref 0–0.9)
INR PPP: 2.16 (ref 0.87–1.13)
LACTATE BLD-SCNC: 1.1 MMOL/L (ref 0.5–2)
LYMPHOCYTES # BLD AUTO: 0.41 K/UL (ref 1–4.8)
LYMPHOCYTES NFR BLD: 3.2 % (ref 22–41)
MCH RBC QN AUTO: 34.5 PG (ref 27–33)
MCHC RBC AUTO-ENTMCNC: 32.7 G/DL (ref 33.6–35)
MCV RBC AUTO: 105.4 FL (ref 81.4–97.8)
MONOCYTES # BLD AUTO: 0.54 K/UL (ref 0–0.85)
MONOCYTES NFR BLD AUTO: 4.2 % (ref 0–13.4)
NEUTROPHILS # BLD AUTO: 11.68 K/UL (ref 2–7.15)
NEUTROPHILS NFR BLD: 91.8 % (ref 44–72)
NRBC # BLD AUTO: 0 K/UL
NRBC BLD-RTO: 0 /100 WBC
PLATELET # BLD AUTO: 170 K/UL (ref 164–446)
PMV BLD AUTO: 10.9 FL (ref 9–12.9)
POTASSIUM SERPL-SCNC: 4.3 MMOL/L (ref 3.6–5.5)
PROTHROMBIN TIME: 23.8 SEC (ref 12–14.6)
RBC # BLD AUTO: 3.68 M/UL (ref 4.2–5.4)
SODIUM SERPL-SCNC: 134 MMOL/L (ref 135–145)
WBC # BLD AUTO: 12.7 K/UL (ref 4.8–10.8)

## 2018-06-19 PROCEDURE — 85025 COMPLETE CBC W/AUTO DIFF WBC: CPT

## 2018-06-19 PROCEDURE — 700102 HCHG RX REV CODE 250 W/ 637 OVERRIDE(OP): Performed by: UROLOGY

## 2018-06-19 PROCEDURE — 94760 N-INVAS EAR/PLS OXIMETRY 1: CPT

## 2018-06-19 PROCEDURE — 85730 THROMBOPLASTIN TIME PARTIAL: CPT

## 2018-06-19 PROCEDURE — 700111 HCHG RX REV CODE 636 W/ 250 OVERRIDE (IP): Performed by: INTERNAL MEDICINE

## 2018-06-19 PROCEDURE — 700105 HCHG RX REV CODE 258: Performed by: INTERNAL MEDICINE

## 2018-06-19 PROCEDURE — 71045 X-RAY EXAM CHEST 1 VIEW: CPT

## 2018-06-19 PROCEDURE — 85610 PROTHROMBIN TIME: CPT

## 2018-06-19 PROCEDURE — A9270 NON-COVERED ITEM OR SERVICE: HCPCS | Performed by: INTERNAL MEDICINE

## 2018-06-19 PROCEDURE — A9270 NON-COVERED ITEM OR SERVICE: HCPCS | Performed by: UROLOGY

## 2018-06-19 PROCEDURE — 94640 AIRWAY INHALATION TREATMENT: CPT

## 2018-06-19 PROCEDURE — 770022 HCHG ROOM/CARE - ICU (200)

## 2018-06-19 PROCEDURE — 99223 1ST HOSP IP/OBS HIGH 75: CPT | Performed by: INTERNAL MEDICINE

## 2018-06-19 PROCEDURE — 99233 SBSQ HOSP IP/OBS HIGH 50: CPT | Performed by: INTERNAL MEDICINE

## 2018-06-19 PROCEDURE — 700102 HCHG RX REV CODE 250 W/ 637 OVERRIDE(OP): Performed by: INTERNAL MEDICINE

## 2018-06-19 PROCEDURE — 700101 HCHG RX REV CODE 250: Performed by: HOSPITALIST

## 2018-06-19 PROCEDURE — 83605 ASSAY OF LACTIC ACID: CPT

## 2018-06-19 PROCEDURE — 80048 BASIC METABOLIC PNL TOTAL CA: CPT

## 2018-06-19 RX ORDER — WARFARIN SODIUM 2.5 MG/1
2.5 TABLET ORAL
Status: DISCONTINUED | OUTPATIENT
Start: 2018-06-19 | End: 2018-06-19

## 2018-06-19 RX ORDER — TIOTROPIUM BROMIDE 18 UG/1
1 CAPSULE ORAL; RESPIRATORY (INHALATION)
Status: DISCONTINUED | OUTPATIENT
Start: 2018-06-19 | End: 2018-06-20

## 2018-06-19 RX ORDER — WARFARIN SODIUM 2 MG/1
2 TABLET ORAL
Status: COMPLETED | OUTPATIENT
Start: 2018-06-19 | End: 2018-06-19

## 2018-06-19 RX ADMIN — MORPHINE SULFATE 2 MG: 4 INJECTION INTRAVENOUS at 20:23

## 2018-06-19 RX ADMIN — WARFARIN SODIUM 2 MG: 2 TABLET ORAL at 18:22

## 2018-06-19 RX ADMIN — IPRATROPIUM BROMIDE AND ALBUTEROL SULFATE 3 ML: .5; 3 SOLUTION RESPIRATORY (INHALATION) at 19:15

## 2018-06-19 RX ADMIN — TIOTROPIUM BROMIDE 1 CAPSULE: 18 CAPSULE ORAL; RESPIRATORY (INHALATION) at 08:13

## 2018-06-19 RX ADMIN — MORPHINE SULFATE 2 MG: 4 INJECTION INTRAVENOUS at 12:22

## 2018-06-19 RX ADMIN — SODIUM CHLORIDE: 9 INJECTION, SOLUTION INTRAVENOUS at 16:19

## 2018-06-19 RX ADMIN — HYDROCODONE BITARTRATE AND ACETAMINOPHEN 1 TABLET: 10; 325 TABLET ORAL at 18:25

## 2018-06-19 RX ADMIN — IPRATROPIUM BROMIDE AND ALBUTEROL SULFATE 3 ML: .5; 3 SOLUTION RESPIRATORY (INHALATION) at 15:45

## 2018-06-19 RX ADMIN — IPRATROPIUM BROMIDE AND ALBUTEROL SULFATE 3 ML: .5; 3 SOLUTION RESPIRATORY (INHALATION) at 06:59

## 2018-06-19 RX ADMIN — HYDROCODONE BITARTRATE AND ACETAMINOPHEN 1 TABLET: 10; 325 TABLET ORAL at 00:29

## 2018-06-19 RX ADMIN — ZOLPIDEM TARTRATE 10 MG: 5 TABLET, FILM COATED ORAL at 23:41

## 2018-06-19 RX ADMIN — MORPHINE SULFATE 2 MG: 4 INJECTION INTRAVENOUS at 07:32

## 2018-06-19 RX ADMIN — HYDROCODONE BITARTRATE AND ACETAMINOPHEN 1 TABLET: 10; 325 TABLET ORAL at 09:53

## 2018-06-19 RX ADMIN — LOSARTAN POTASSIUM 50 MG: 25 TABLET ORAL at 09:46

## 2018-06-19 RX ADMIN — CEFTRIAXONE SODIUM 2 G: 2 INJECTION, POWDER, FOR SOLUTION INTRAMUSCULAR; INTRAVENOUS at 09:46

## 2018-06-19 RX ADMIN — IPRATROPIUM BROMIDE AND ALBUTEROL SULFATE 3 ML: .5; 3 SOLUTION RESPIRATORY (INHALATION) at 10:29

## 2018-06-19 RX ADMIN — ZOLPIDEM TARTRATE 10 MG: 5 TABLET, FILM COATED ORAL at 00:32

## 2018-06-19 RX ADMIN — PROPRANOLOL HYDROCHLORIDE 60 MG: 60 CAPSULE, EXTENDED RELEASE ORAL at 09:47

## 2018-06-19 RX ADMIN — SIMVASTATIN 40 MG: 20 TABLET, FILM COATED ORAL at 20:24

## 2018-06-19 RX ADMIN — STANDARDIZED SENNA CONCENTRATE AND DOCUSATE SODIUM 2 TABLET: 8.6; 5 TABLET, FILM COATED ORAL at 20:24

## 2018-06-19 RX ADMIN — STANDARDIZED SENNA CONCENTRATE AND DOCUSATE SODIUM 2 TABLET: 8.6; 5 TABLET, FILM COATED ORAL at 09:46

## 2018-06-19 RX ADMIN — MORPHINE SULFATE 2 MG: 4 INJECTION INTRAVENOUS at 16:16

## 2018-06-19 ASSESSMENT — ENCOUNTER SYMPTOMS
HEADACHES: 0
PALPITATIONS: 0
BACK PAIN: 1
BLURRED VISION: 0
BRUISES/BLEEDS EASILY: 0
ABDOMINAL PAIN: 0
DIARRHEA: 0
VOMITING: 0
COUGH: 0
SHORTNESS OF BREATH: 0
DIZZINESS: 0
FLANK PAIN: 1
MYALGIAS: 0
FEVER: 0
WEAKNESS: 1
DEPRESSION: 0
EYE REDNESS: 0
STRIDOR: 0
HEARTBURN: 0
NAUSEA: 0

## 2018-06-19 ASSESSMENT — PAIN SCALES - GENERAL
PAINLEVEL_OUTOF10: 7
PAINLEVEL_OUTOF10: 9
PAINLEVEL_OUTOF10: 8
PAINLEVEL_OUTOF10: 7
PAINLEVEL_OUTOF10: 8
PAINLEVEL_OUTOF10: 7

## 2018-06-19 NOTE — PROGRESS NOTES
Meg from Lab called with critical result of pH - 7.25 at 2120. Critical lab result read back to Urick.   Dr. Zamora notified of critical lab result at 2135.  Critical lab result read back by Dr. Zamora.

## 2018-06-19 NOTE — PROGRESS NOTES
Received report over the telephone from [facility] PACU RN.  Patient transferred to ICU with 2 x RN assistance.  Patient is complaining of 8/10 flank pain.  VS stable.  Patient is spO2 > 94% on 12L oxymask.  Patient voided x 2, pink tinged, Dr. Moe aware.

## 2018-06-19 NOTE — PROGRESS NOTES
called with critical result of APTT of 179.4 at 1410. Result read back to . Changes to heparin drip per protocol, see MAR.

## 2018-06-19 NOTE — FLOWSHEET NOTE
06/19/18 1545   Interdisciplinary Plan of Care-Goals (Indications)   Bronchodilator Indications History / Diagnosis   Interdisciplinary Plan of Care-Outcomes    Bronchodilator Outcome Patient at Stable Baseline   RT Assessment of Delivered Medications   Evaluation of Medication Delivery Daily Yes-- Pt /Family has been Instructed in use of Respiratory Medications and Adverse Reactions   SVN Group   #SVN Performed Yes   Given By: Mouthpiece   Incentive Spirometry Group   Breathing Exercises Yes   Incentive Spirometer Volume 1250 mL   Chest Exam   Respiration (!) 21   Pulse 85   Heart Rate (Monitored) 83   Breath Sounds   Pre/Post Intervention Pre Intervention Assessment   RUL Breath Sounds Clear   RML Breath Sounds Diminished   RLL Breath Sounds Diminished   AUSTIN Breath Sounds Clear   LLL Breath Sounds Diminished   Oxygen   Pulse Oximetry 96 %   O2 (LPM) 3   O2 Daily Delivery Respiratory  Silicone Nasal Cannula

## 2018-06-19 NOTE — CARE PLAN
Problem: Pain Management  Goal: Pain level will decrease to patient's comfort goal  Outcome: PROGRESSING AS EXPECTED   06/19/18 0000 06/19/18 0600   OTHER   Pain Scale 0 - 10  8 --    Non Verbal Scale  --  Sleeping   Comfort Goal Sleep Comfortably --      Intervention: Follow pain managment plan developed in collaboration with patient and Interdisciplinary Team  IV Morphine / Norco given.  Frequent reassessment / reposition.

## 2018-06-19 NOTE — FLOWSHEET NOTE
06/19/18 1029   Interdisciplinary Plan of Care-Goals (Indications)   Bronchodilator Indications History / Diagnosis   Interdisciplinary Plan of Care-Outcomes    Bronchodilator Outcome Patient at Stable Baseline   SVN Group   #SVN Performed Yes   Given By: Mouthpiece   Incentive Spirometry Group   Incentive Spirometry Instruction Yes   Breathing Exercises Yes   Incentive Spirometer Volume 1250 mL   Incentive Spirometer Date Last Changed 06/19/18   Incentive Spirometer Next Change Date (Q 30 Days) 07/19/18   Respiratory WDL   Respiratory (WDL) X   Chest Exam   Respiration (!) 22   Pulse 93   Heart Rate (Monitored) 93   Breath Sounds   Pre/Post Intervention Pre Intervention Assessment   RUL Breath Sounds Diminished   RML Breath Sounds Diminished   RLL Breath Sounds Diminished   AUSTIN Breath Sounds Clear   LLL Breath Sounds Diminished   Oximetry   #Pulse Oximetry (Single Determination) Yes   Continuous Oximetry Yes   Oxygen   Home O2 Use Prior To Admission? No   Pulse Oximetry 93 %   O2 (LPM) 3   O2 Daily Delivery Respiratory  Silicone Nasal Cannula

## 2018-06-19 NOTE — PROGRESS NOTES
Communicated with Sissy at [facility] regarding Heparin GTT.  Instructed to treat first dose change as maintenance change.  PTT - 179.3.  Hold for 1 hour and reduce by large drip rate.

## 2018-06-19 NOTE — CARE PLAN
Problem: Urinary Elimination:  Goal: Ability to reestablish a normal urinary elimination pattern will improve  Outcome: PROGRESSING AS EXPECTED   06/19/18 0400   OTHER   Urinary Elimination Frequency

## 2018-06-19 NOTE — PROGRESS NOTES
Dr. Moe at bedside.  Discussed heparin gtts.  New orders received.  Will continue to monitor and coordinate care.

## 2018-06-19 NOTE — ASSESSMENT & PLAN NOTE
Likely related to anesthesia/sedation/atelectasis  Pt also has history of COPD  Still requiring O2.   Will monitor wean down as tolerated

## 2018-06-19 NOTE — FLOWSHEET NOTE
06/19/18 0814   MDI/DPI Group   #MDI/DPI Given MDI/DPI x 2   Chest Exam   Respiration (!) 29   Pulse 91   Heart Rate (Monitored) 92   Oxygen   Pulse Oximetry 90 %

## 2018-06-19 NOTE — PROGRESS NOTES
[facility] Hospitalist Progress Note    Date of Service: 2018    Chief Complaint  67 y.o. female admitted 2018 with back pain, nephrolithiasis and UTI, on coumadin for DVT.    Interval Problem Update  Pt seen and examined, her O2 requirement have dropped now on only 2-3 L of O2 NC.   Will start her back on warfarin today, for her h/o DVT will cont heparin for brodgin, will discus with SW to see if xarelto will be an option for her depending on her copay.  Urology following appreciate rec.     Consultants/Specialty  Urology.  PMA    Disposition  TBD. Will need to be re-anticoagulated and stable after surgery.        Review of Systems   Constitutional: Positive for malaise/fatigue. Negative for fever.   HENT: Negative for ear discharge.    Eyes: Negative for blurred vision and redness.   Respiratory: Negative for cough, shortness of breath and stridor.    Cardiovascular: Negative for chest pain and palpitations.   Gastrointestinal: Negative for abdominal pain, diarrhea, heartburn, nausea and vomiting.   Genitourinary: Positive for flank pain. Negative for dysuria.   Musculoskeletal: Positive for back pain. Negative for myalgias.   Skin: Negative for itching and rash.   Neurological: Positive for weakness. Negative for dizziness and headaches.   Endo/Heme/Allergies: Does not bruise/bleed easily.   Psychiatric/Behavioral: Negative for depression.      Physical Exam  Laboratory/Imaging   Hemodynamics  Temp (24hrs), Av.1 °C (98.7 °F), Min:36.3 °C (97.3 °F), Max:37.6 °C (99.7 °F)   Temperature: 36.7 °C (98.1 °F)  Pulse  Av.9  Min: 64  Max: 106 Heart Rate (Monitored): 83  Blood Pressure : (!) 223/104, NIBP: 115/60      Respiratory      Respiration: (!) 21, Pulse Oximetry: 96 %, O2 Daily Delivery Respiratory : Silicone Nasal Cannula     Given By:: Mouthpiece, #MDI/DPI Given: MDI/DPI x 2, Work Of Breathing / Effort: Shallow  RUL Breath Sounds: Clear, RML Breath Sounds: Diminished, RLL Breath Sounds: Diminished,  AUSTIN Breath Sounds: Clear, LLL Breath Sounds: Diminished    Fluids    Intake/Output Summary (Last 24 hours) at 06/19/18 1548  Last data filed at 06/19/18 1400   Gross per 24 hour   Intake           4761.5 ml   Output              850 ml   Net           3911.5 ml       Nutrition  Orders Placed This Encounter   Procedures   • DIET ORDER     Standing Status:   Standing     Number of Occurrences:   1     Order Specific Question:   Diet:     Answer:   Regular [1]     Physical Exam   Constitutional: She is oriented to person, place, and time. She appears well-developed and well-nourished. No distress.   Patient seen and examined by me.   HENT:   Head: Normocephalic and atraumatic.   Eyes: Conjunctivae and EOM are normal. Pupils are equal, round, and reactive to light. Right eye exhibits no discharge. Left eye exhibits no discharge. No scleral icterus.   Neck: Normal range of motion. Neck supple. No JVD present.   Cardiovascular: Normal rate, regular rhythm and normal heart sounds.    No murmur heard.  Pulmonary/Chest: Effort normal and breath sounds normal. No stridor. No respiratory distress. She has no wheezes.   Abdominal: Soft. Bowel sounds are normal. She exhibits no distension. There is no tenderness. There is CVA tenderness.   Musculoskeletal: She exhibits tenderness (mid back). She exhibits no edema.   Neurological: She is alert and oriented to person, place, and time. No cranial nerve deficit.   Skin: Skin is warm and dry. She is not diaphoretic. No erythema. No pallor.   Psychiatric: She has a normal mood and affect. Her behavior is normal. Thought content normal.   Nursing note and vitals reviewed.      Recent Labs      06/18/18   0622  06/18/18   2219  06/19/18   1025   WBC  7.0  8.4  12.7*   RBC  4.28  4.07*  3.68*   HEMOGLOBIN  14.7  13.9  12.7   HEMATOCRIT  45.0  43.8  38.8   MCV  105.1*  107.6*  105.4*   MCH  34.3*  34.2*  34.5*   MCHC  32.7*  31.7*  32.7*   RDW  55.4*  55.8*  54.5*   PLATELETCT  167  151*   170   MPV  10.9  10.8  10.9     Recent Labs      06/18/18   0622  06/18/18   2219  06/19/18   1025   SODIUM  135  137  134*   POTASSIUM  4.2  4.1  4.3   CHLORIDE  108  110  106   CO2  21  15*  20   GLUCOSE  104*  100*  244*   BUN  16  17  21   CREATININE  1.22  1.43*  1.68*   CALCIUM  9.3  8.9  8.7     Recent Labs      06/17/18   1930   06/18/18   2219  06/19/18   0440  06/19/18   1220  06/19/18   1239   APTT  36.3*   < >  36.5*  179.3*   --   179.4*   INR  1.91*   --    --    --   2.16*   --     < > = values in this interval not displayed.     Recent Labs      06/18/18 2219   BNPBTYPENAT  190*              Assessment/Plan     Acute respiratory failure with hypoxia (HCC)- (present on admission)   Assessment & Plan    Likely related to anesthesia/sedation/atelectasis  Improving  PMA following appreciate rec.         Closed T11 fracture (HCC)- (present on admission)   Assessment & Plan    An L1 fracture subacute  The patient sees [facility] spine and she has appointment with him in a few days  Please contact him once her kidney stone issue is addressed        Urinary tract infection with hematuria- (present on admission)   Assessment & Plan    On IV ceftriaxone  Follow cultures, continue post op        Obstruction of left ureteropelvic junction (UPJ) due to stone- (present on admission)   Assessment & Plan    2 cm in size  Post LEFT RETROGRADE PYELOGRAM   LEFT FLEXIBLE URETEROSCOPY WITH LASER LITHOTRIPSY TO ALLOW GUIDE WIRE PLACEMENT - Wound Class: Clean Contaminated  LEFT STENT PLACEMENT   Pain control  Cont on IV abx and follow up cultures         DVT (deep venous thrombosis) (HCC)- (present on admission)   Assessment & Plan    Was on heparin drip  Restarting back on warfarin today, will see if she can be able to afford the new AC, like xarelto           Quality-Core Measures   Reviewed items::  Labs reviewed, Medications reviewed and Radiology images reviewed  Gonzalez catheter::  No Gonzalez  DVT prophylaxis  pharmacological::  Heparin      Critical care time spent 40 minutes without overlap, exclude procedure time.

## 2018-06-19 NOTE — PROGRESS NOTES
[facility] Hospitalist Progress Note    Date of Service: 2018    Chief Complaint  67 y.o. female admitted 2018 with back pain, nephrolithiasis and UTI, on coumadin for DVT.    Interval Problem Update  Patient just came out from surgery and directly admitted to ICU due to hypoxia. I was informed by Dr. Yang about the patient status. PMA was consulted by Dr. Yang as well.  Patient was evaluated and seen in ICU. She is alert and orientated. Currently on 10L of o2 mask. Denies any chest pain, SOB. Still a little lethargic. Patient was intubated during her kidney stone procedure earlier today. Ordered ABG, CXR: stat, BNP.    Consultants/Specialty  Urology.  PMA    Disposition  TBD. Will need to be re-anticoagulated and stable after surgery.        Review of Systems   Constitutional: Positive for malaise/fatigue. Negative for chills, fever and weight loss.   HENT: Negative for congestion and nosebleeds.    Eyes: Negative for blurred vision, pain, discharge and redness.   Respiratory: Negative for cough, sputum production, shortness of breath and stridor.    Cardiovascular: Negative for chest pain, palpitations, orthopnea and leg swelling.   Gastrointestinal: Negative for abdominal pain, diarrhea, heartburn, nausea and vomiting.   Genitourinary: Positive for flank pain. Negative for dysuria, frequency and urgency.   Musculoskeletal: Positive for back pain. Negative for myalgias and neck pain.   Skin: Negative for itching and rash.   Neurological: Positive for weakness. Negative for dizziness, focal weakness, seizures, loss of consciousness and headaches.   Endo/Heme/Allergies: Does not bruise/bleed easily.   Psychiatric/Behavioral: Negative for depression. The patient is not nervous/anxious and does not have insomnia.       Physical Exam  Laboratory/Imaging   Hemodynamics  Temp (24hrs), Av.7 °C (98.1 °F), Min:36.3 °C (97.3 °F), Max:37 °C (98.6 °F)   Temperature: 37 °C (98.6 °F)  Pulse  Av.6  Min: 64  Max: 83  Heart Rate (Monitored): 78  Blood Pressure : (!) 223/104, NIBP: (!) 167/79      Respiratory      Respiration: 13, Pulse Oximetry: 97 %, O2 Daily Delivery Respiratory : OxyMask     Given By:: Mouthpiece, Work Of Breathing / Effort: Shallow  RUL Breath Sounds: Diminished;Expiratory Wheezes, RML Breath Sounds: Diminished;Expiratory Wheezes, RLL Breath Sounds: Diminished, AUSTIN Breath Sounds: Diminished;Expiratory Wheezes, LLL Breath Sounds: Diminished    Fluids    Intake/Output Summary (Last 24 hours) at 06/18/18 2100  Last data filed at 06/18/18 1942   Gross per 24 hour   Intake              950 ml   Output              475 ml   Net              475 ml       Nutrition  No orders of the defined types were placed in this encounter.    Physical Exam   Constitutional: She is oriented to person, place, and time. She appears well-developed and well-nourished. No distress.   Patient seen and examined by me.   HENT:   Head: Normocephalic and atraumatic.   Nose: Nose normal.   Mouth/Throat: Oropharynx is clear and moist. No oropharyngeal exudate.   Eyes: Conjunctivae and EOM are normal. Pupils are equal, round, and reactive to light. Right eye exhibits no discharge. Left eye exhibits no discharge. No scleral icterus.   Neck: Normal range of motion. Neck supple. No JVD present. No tracheal deviation present. No thyromegaly present.   Cardiovascular: Normal rate, regular rhythm and normal heart sounds.    No murmur heard.  Pulmonary/Chest: Effort normal and breath sounds normal. No stridor. No respiratory distress. She has no wheezes. She has no rales. She exhibits no tenderness.   Abdominal: Soft. Bowel sounds are normal. She exhibits no distension. There is no tenderness. There is CVA tenderness.   Musculoskeletal: She exhibits tenderness (mid back). She exhibits no edema.   Neurological: She is alert and oriented to person, place, and time. No cranial nerve deficit. She exhibits normal muscle tone.   Skin: Skin is warm and dry.  She is not diaphoretic. No erythema. No pallor.   Psychiatric: She has a normal mood and affect. Her behavior is normal. Thought content normal.   Nursing note and vitals reviewed.      Recent Labs      06/17/18 1922 06/18/18 0622   WBC  6.7  7.0   RBC  3.97*  4.28   HEMOGLOBIN  13.7  14.7   HEMATOCRIT  40.4  45.0   MCV  101.8*  105.1*   MCH  34.5*  34.3*   MCHC  33.9  32.7*   RDW  52.4*  55.4*   PLATELETCT  186  167   MPV  10.4  10.9     Recent Labs      06/17/18 1922 06/18/18 0622   SODIUM  136  135   POTASSIUM  3.7  4.2   CHLORIDE  109  108   CO2  21  21   GLUCOSE  117*  104*   BUN  17  16   CREATININE  1.29  1.22   CALCIUM  9.5  9.3     Recent Labs      06/17/18 1930 06/18/18 0622 06/18/18   1215   APTT  36.3*  66.6*  114.2*   INR  1.91*   --    --                   Assessment/Plan     Acute respiratory failure with hypoxia (HCC)- (present on admission)   Assessment & Plan    Likely related to anesthesia/sedation/atelectasis  On aggressive breathing treatment  RT protocol  IS  Check ABG, CXR, BNP  Titrate o2 as needed  PMA notified by Dr. Yang        Closed T11 fracture (HCC)- (present on admission)   Assessment & Plan    An L1 fracture subacute  The patient sees [facility] spine and she has appointment with him in a few days  Please contact him once her kidney stone issue is addressed        Urinary tract infection with hematuria- (present on admission)   Assessment & Plan    On IV ceftriaxone  Follow cultures, continue post op        Obstruction of left ureteropelvic junction (UPJ) due to stone- (present on admission)   Assessment & Plan    2 cm in size  Post LEFT RETROGRADE PYELOGRAM   LEFT FLEXIBLE URETEROSCOPY WITH LASER LITHOTRIPSY TO ALLOW GUIDE WIRE PLACEMENT - Wound Class: Clean Contaminated  LEFT STENT PLACEMENT   Pain control  IV ceftriaxone   Follow culture        DVT (deep venous thrombosis) (HCC)- (present on admission)   Assessment & Plan    INR 1.6  On warfarin  To restart AC  once ok by Dr. Yang          Quality-Core Measures   Reviewed items::  Labs reviewed, Medications reviewed and Radiology images reviewed  Gonzalez catheter::  No Gonzalez  DVT prophylaxis pharmacological::  Heparin      Critical care time spent 40 minutes without overlap, exclude procedure time.

## 2018-06-19 NOTE — PROGRESS NOTES
Inpatient Anticoagulation Service Note    Date: 6/19/2018  Reason for Anticoagulation: Deep Vein Thrombosis        Hemoglobin Value: 12.7  Hematocrit Value: 38.8  Lab Platelet Value: 170  Target INR: 2.0 to 3.0    INR from last 7 days     Date/Time INR Value    06/17/18 1930 (!)  1.91        06/19/18 1220                                                                         2.16     Dose from last 7 days     Date/Time Dose (mg)    06/19/18 1300  2        Average Dose (mg): 2  Significant Interactions: Antibiotics, Statin  Bridge Therapy: No     Reversal Agent Administered: Not Applicable    Plan:  Warfarin 2 mg tonight. Monitor INR closely.  Education Material Provided?: No  Pharmacist suggested discharge dosing: Resume home warfarin regimen.      Ang Garcia PharmD Candidate  Akanksha PAZ. Ph..

## 2018-06-19 NOTE — OR SURGEON
Immediate Post OP Note    PreOp Diagnosis: Left Proximal ureteral stone                                 Left flank pain                                 Left hydronephrosis                                 UTI    PostOp Diagnosis: As above                                  Impacted Left proximal Ureteral Stone    Procedure(s):  CYSTOSCOPY- Wound Class: Clean Contaminated  LEFT RETROGRADE PYELOGRAM   LEFT FLEXIBLE URETEROSCOPY WITH LASER LITHOTRIPSY TO ALLOW GUIDE WIRE PLACEMENT - Wound Class: Clean Contaminated  LEFT STENT PLACEMENT - Wound Class: Clean Contaminated    Surgeon(s):  Beck Yang M.D.    Anesthesiologist/Type of Anesthesia:  Anesthesiologist: Sherley Krishnamurthy M.D.  Anesthesia Technician: Medina Ivey/General    Surgical Staff:  Circulator: Esha Basilio R.N.  Scrub Person: Rosanne Cabezas  Radiology Technologist: Rosalinda Caceres    Specimens removed if any:  None    Estimated Blood Loss: 15ml    Findings: Retrograde pyelogram: High grade obstruction due to impacted stone with slight extravasation noted due to guide wire perforation of ureter.  Complications: Small guide wire perforation of the lateral ureter just distal to the edge of the dense and impacted stone        6/18/2018 6:20 PM Beck Yang M.D.

## 2018-06-19 NOTE — OR NURSING
1819- Pt to pacu via bed with side rails up.  VSS. Pt appears dyspneic, pt on NC.  O2 sat 87% on 4L NC. Switched to mask at 8L. O2 sat improved, now 97%.  1835- Pt was c/o nausea, see mar for antiemetic given. Pt states need to urinate, on bedpan now.  Pt c/o difficulty breathing, plan to give neb, see mar.  1843- Pt receiving neb treatment at this time.  1906- Report to Collette LEMUS.

## 2018-06-19 NOTE — OR NURSING
1906: Care assumed of dyspneic pt unable to speak in complete sentences, recently medicated for nausea and BP. Dr Krishnamurthy just updated by offgoing RN. Dr Yang at bedside and calling for consult to have pt transferred to ICU. Lower extremities mottled.  1915: Shirley care given and linens changed post pt voided small amt urine in bed    1930: Pt remains dyspneic  1942: Pt slightly less dyspneic, legs less mottled, now able to speak 2-3 words together, transferred to ICU via bed.

## 2018-06-19 NOTE — PROGRESS NOTES
Surgical Progress Note    Author: Kameron Schultz Date & Time created: 2018   9:37 AM     Interval Events:   POD #1  Patient seen and examined.  Left ureteral stent placement yesterday with Dr. Yang. Hypoxia subsequently and now in ICU. Less O2 demain (3.5 lpm) and without respiratory complaints. Pain remains in lower back and bilateral flanks. No fevers.     Review of Systems   Constitutional: Negative for fever.   Respiratory: Negative for cough.    Cardiovascular: Negative for chest pain.   Gastrointestinal: Negative for abdominal pain.   Genitourinary: Positive for flank pain.   Musculoskeletal: Positive for back pain.     Hemodynamics:  Temp (24hrs), Av.1 °C (98.7 °F), Min:36.3 °C (97.3 °F), Max:37.6 °C (99.7 °F)  Temperature: 36.9 °C (98.4 °F)  Pulse  Av.1  Min: 64  Max: 100Heart Rate (Monitored): 92  Blood Pressure : (!) 223/104, NIBP: 132/63     Respiratory:    Respiration: (!) 29, Pulse Oximetry: 90 %, O2 Daily Delivery Respiratory : Silicone Nasal Cannula     Given By:: Mouthpiece, #MDI/DPI Given: MDI/DPI x 2, Work Of Breathing / Effort: Shallow  RUL Breath Sounds: Clear, RML Breath Sounds: Clear;Diminished, RLL Breath Sounds: Diminished, AUSTIN Breath Sounds: Clear, LLL Breath Sounds: Diminished  Neuro:  GCS       Fluids:    Intake/Output Summary (Last 24 hours) at 18 0937  Last data filed at 18 0600   Gross per 24 hour   Intake             3544 ml   Output              900 ml   Net             2644 ml     Weight: 85.9 kg (189 lb 6 oz)  Current Diet Order   Procedures   • DIET ORDER     Physical Exam   Constitutional: She is oriented to person, place, and time. She appears well-developed and well-nourished. No distress.   Pulmonary/Chest: Effort normal.   Abdominal: Soft. She exhibits no distension. There is no tenderness.   Neurological: She is alert and oriented to person, place, and time.   Skin: Skin is warm and dry.   Psychiatric: She has a normal mood and affect.   Nursing  note and vitals reviewed.    Labs:  Recent Results (from the past 24 hour(s))   APTT    Collection Time: 06/18/18 12:15 PM   Result Value Ref Range    APTT 114.2 (HH) 24.7 - 36.0 sec   ABG - LAB    Collection Time: 06/18/18  8:45 PM   Result Value Ref Range    Ph 7.26 (LL) 7.40 - 7.50    Pco2 36.5 26.0 - 37.0 mmHg    Po2 80.6 64.0 - 87.0 mmHg    O2 Saturation 94.9 93.0 - 99.0 %    Hco3 16 (L) 17 - 25 mmol/L    Base Excess -10 (L) -4 - 3 mmol/L    Body Temp 37.6 Centigrade    Ph -TC 7.25 (LL) 7.40 - 7.50    Pco2 -TC 37.5 (H) 26.0 - 37.0 mmHg    Po2 -TC 83.8 64.0 - 87.0 mmHg   BTYPE NATRIURETIC PEPTIDE    Collection Time: 06/18/18 10:19 PM   Result Value Ref Range    B Natriuretic Peptide 190 (H) 0 - 100 pg/mL   CBC WITH DIFFERENTIAL    Collection Time: 06/18/18 10:19 PM   Result Value Ref Range    WBC 8.4 4.8 - 10.8 K/uL    RBC 4.07 (L) 4.20 - 5.40 M/uL    Hemoglobin 13.9 12.0 - 16.0 g/dL    Hematocrit 43.8 37.0 - 47.0 %    .6 (H) 81.4 - 97.8 fL    MCH 34.2 (H) 27.0 - 33.0 pg    MCHC 31.7 (L) 33.6 - 35.0 g/dL    RDW 55.8 (H) 35.9 - 50.0 fL    Platelet Count 151 (L) 164 - 446 K/uL    MPV 10.8 9.0 - 12.9 fL    Neutrophils-Polys 88.80 (H) 44.00 - 72.00 %    Lymphocytes 3.80 (L) 22.00 - 41.00 %    Monocytes 6.70 0.00 - 13.40 %    Eosinophils 0.10 0.00 - 6.90 %    Basophils 0.20 0.00 - 1.80 %    Immature Granulocytes 0.40 0.00 - 0.90 %    Nucleated RBC 0.00 /100 WBC    Neutrophils (Absolute) 7.46 (H) 2.00 - 7.15 K/uL    Lymphs (Absolute) 0.32 (L) 1.00 - 4.80 K/uL    Monos (Absolute) 0.56 0.00 - 0.85 K/uL    Eos (Absolute) 0.01 0.00 - 0.51 K/uL    Baso (Absolute) 0.02 0.00 - 0.12 K/uL    Immature Granulocytes (abs) 0.03 0.00 - 0.11 K/uL    NRBC (Absolute) 0.00 K/uL   BASIC METABOLIC PANEL    Collection Time: 06/18/18 10:19 PM   Result Value Ref Range    Sodium 137 135 - 145 mmol/L    Potassium 4.1 3.6 - 5.5 mmol/L    Chloride 110 96 - 112 mmol/L    Co2 15 (L) 20 - 33 mmol/L    Glucose 100 (H) 65 - 99 mg/dL    Bun 17  8 - 22 mg/dL    Creatinine 1.43 (H) 0.50 - 1.40 mg/dL    Calcium 8.9 8.4 - 10.2 mg/dL    Anion Gap 12.0 (H) 0.0 - 11.9   LACTIC ACID    Collection Time: 06/18/18 10:19 PM   Result Value Ref Range    Lactic Acid 1.4 0.5 - 2.0 mmol/L   APTT    Collection Time: 06/18/18 10:19 PM   Result Value Ref Range    APTT 36.5 (H) 24.7 - 36.0 sec   ESTIMATED GFR    Collection Time: 06/18/18 10:19 PM   Result Value Ref Range    GFR If  44 (A) >60 mL/min/1.73 m 2    GFR If Non  36 (A) >60 mL/min/1.73 m 2   LACTIC ACID    Collection Time: 06/19/18  4:40 AM   Result Value Ref Range    Lactic Acid 1.1 0.5 - 2.0 mmol/L   APTT    Collection Time: 06/19/18  4:40 AM   Result Value Ref Range    APTT 179.3 (HH) 24.7 - 36.0 sec     Medical Decision Making, by Problem:  Active Hospital Problems    Diagnosis   • Acute respiratory failure with hypoxia ([facility]) [J96.01]     Priority: High   • Obstruction of left ureteropelvic junction (UPJ) due to stone [N20.1]   • Urinary tract infection with hematuria [N39.0, R31.9]   • Closed T11 fracture ([facility]) [S22.089A]   • DVT (deep venous thrombosis) ([facility]) [I82.409]   S/P left ureteral stent placement 6/18      Plan:  Antibiotics pending cultures  Left stent to remain  Plan outpatient stone surgery once recovers and infection treated  Can resume Coumadin and no need to bridge for CULTS planned in 3-4 weeks    Quality Measures:  Quality-Core Measures   Reviewed items::  Labs reviewed and Medications reviewed      Discussed patient condition with RN, Patient and hospitalist and urology-Dr. Yang

## 2018-06-19 NOTE — OP REPORT
DATE OF SERVICE:  06/18/2018    PREOPERATIVE DIAGNOSES:  1.  Left proximal ureteral stone.  2.  Left flank pain.  3.  Left hydronephrosis.  4.  Urinary tract infection.    OPERATION PROCEDURE PERFORMED:  1.  Rigid cystourethroscopy.  2.  Attempted left stent placement.  3.  Left retrograde pyelogram.  4.  Left flexible ureteroscopy with holmium laser lithotripsy of outer edge of   stone to allow a guidewire placement in impacted stone.  5.  Left 6-Latvian x 26 cm double-J stent placement.    SURGEON:  Beck Yang MD    ANESTHESIA:  General laryngeal mask.    ANESTHESIOLOGIST:  Sherley Krishnamurthy MD    POSTOPERATIVE DIAGNOSES:  1.  Left proximal ureteral stone.  2.  Left flank pain.  3.  Left hydronephrosis.  4.  Urinary tract infection.  5.  Impacted ureteral stone with a guidewire microperforation of ureter.    INDICATIONS:  The patient is a 77-year-old woman with a history of a UPJ stone   for several months.  She has presented to the emergency room early this   morning with left flank pain, right back pain and has multiple medical   problems.  She had a urinary tract infection with nitrite positive urine and   was started on Rocephin and has hydroureteronephrosis as well as a large 18 mm   UPJ stone.  Given the presence of infection, I explained to the patient that   I have recommended cystoscopy and stent placement to allow drainage and then   we would attempt a secondary procedure to treat her stone.  The patient was   previously told she would likely require percutaneous nephrolithotripsy.  I   explained to the patient that retrograde approach can be performed even while   she is on Coumadin.  I discussed the importance of the stent to establish   drainage and adequate treatment for the UTI and then we could proceed with   definitive treatment.  Prior to surgery, I discussed the risk of procedure   including, but not limited to risk of urinary tract infection, which she   already has, risk of urosepsis,  which could be exacerbated by instrumentation,   a risk of inability to place the stent requiring a nephrostomy tube, a risk   of ureteral perforation with the need to leave the stent in for a longer   period of time and subsequent risk of stricture.  I explained to the patient   that a stone in this location can lead to stricture if it is not treated.  We   also discussed the perioperative risk of stent migration and associated stent   pain, urgency, frequency as well as the perioperative risk of deep vein   thrombosis, pulmonary embolism, aspiration pneumonia, heart attack, stroke and   death.  Informed consent was given to me by the patient to proceed with   cystoscopy and left stent placement under general anesthesia, she was marked   in preoperative holding area on her left thigh with my initials and letter Y   for proper site surgery.    DESCRIPTION OF DETAIL:  After informed consent was obtained, the patient was   brought to the operating room and placed in supine.  Bilateral sequential   compression devices were in place and activated.  General laryngeal mask   anesthetic administered in a balanced fashion.  The patient was positioned in   a modified lithotomy.  The operative area was Betadine prepped and draped in   the usual sterile fashion.  A surgical time-out was called and all members of   the operative team agree as to the patient's name and procedure to be   performed without objections, attention was directed to the procedure.  I   began the procedure by passing a 25-Jamaican rigid cystoscope with 30-degree   lens per urethra.  The urethra is normal.  The right ureteral orifice is   orthotopic and has clear efflux.  The bladder does not show evidence of   cystitis and the left ureter is normal, but there is no efflux.  I cannulated   the left ureteral orifice with a 0.38 straight tip ZIPwire.  I pushed the wire   up to the level of what appeared to be the stone under fluoroscopy and I   could not get  with the back and forward motion the guidewire to advance.  I   subsequently passed a 6-Hungarian open open-ended ureteral catheter over the wire   and passed it up to the level of stone with the back and forward motion and I   was unsuccessful and the guidewire kept coiling back at the level of the   stone.  Subsequently, I was concerned that the stone might be impacted so I   left the wire in place, withdrew the scope and then advanced without   difficulty a 10-Hungarian dual lumen access catheter over the wire up to the   level of the stone at which point, I shot a retrograde ureteropyelogram, which   showed high-grade obstruction and a small amount of contrast extravasated   what appeared to be lateral and there is some contrast in the renal pelvis,   but is clearly a high-grade obstructed stone.  At this point, it was clear to   me that because of the guidewire perforation that I would only be able to   establish drainage with either percutaneous nephrostomy, which would require   transfer in this patient who requires anticoagulant therapy versus   ureteroscopy and drilling a hole through the stone to get a stent in place.  I   elected to proceed with the procedure as the urine did not have any odor the   bladder was not inflamed and she does not appear to have any signs of toxicity   or sepsis.  Subsequently, with that safety wire in place at the level of the   stone, I passed a disposable Mixbook digital scope over the wire up   to the level of the stone.  Once I got to the stone with hydrodistention, I   was able to see where the guidewire had perforated the ureter is very small   and right at the level of the distal component of the stone.  The outer part   of the stone had the calcium oxalate dihydrate appearance, but the inner part   was strongly dense.  I used a 200 micron laser fiber at a frequency of 5 Hz   and originally 400 millijoules to remove the calcium oxalate dihydrate   component.  When I  got to the inner core, this was very dense and required 800   millijoules.  I was able to treat the stone, but I have had a hard time   finding the edge of the ureter.  I continued to use fluoroscopic imaging and   actually had anesthesia paralyze the patient to facilitate treatment, which   allowed me anteriorly to drill a hole through the stone and get free into the   kidney.  I was able to advance the scope into the kidney.  The laser wire was   withdrawn at this point in time, I passed a safety wire up into the upper pole   of hydronephrotic calyceal system and once I had my safety wire in place, I   withdrew the flexible ureteroscope.  I did not make an attempt to treat the   entire stone due to the consideration of the prior infection.  At this point   in time, with the safety wire in place, I backloaded the wire through the   cystoscope and pushed up a 6-Croatian x 26 cm double-J stent.  When I withdrew   the wire, it was coiled in the upper pole calyceal anatomy, I used a flexible   grasper to pull it back right into the renal pelvis and the stent had now   crossed the stone, the entire ureter and was draining under high pressure   blood-tinged urine into the bladder.  At the end of the case, the bladder was   drained.  She tolerated the procedure well without complication.  Her   laryngeal mask anesthetic was removed at the end of the case and she was   transferred to recovery room where she arrived in stable condition.       ____________________________________     MD ROGER RONDON / EDWINA    DD:  06/18/2018 18:38:45  DT:  06/18/2018 19:17:48    D#:  [id number]  Job#:  [id number]

## 2018-06-19 NOTE — FLOWSHEET NOTE
06/19/18 0700   Events/Summary/Plan   Non-Invasive Resp Device Site Inspection Completed Intact   Interdisciplinary Plan of Care-Goals (Indications)   Bronchodilator Indications History / Diagnosis   Interdisciplinary Plan of Care-Outcomes    Bronchodilator Outcome Patient at Stable Baseline   Education   Education Yes - Pt. / Family has been Instructed in use of Respiratory Equipment;Yes - Pt. / Family has been Instructed in use of Respiratory Medications and Adverse Reactions   RT Assessment of Delivered Medications   Evaluation of Medication Delivery Daily Yes-- Pt /Family has been Instructed in use of Respiratory Medications and Adverse Reactions   SVN Group   #SVN Performed Yes   Given By: Mouthpiece   Respiratory WDL   Respiratory (WDL) X   Chest Exam   Respiration (!) 22   Pulse 87   Heart Rate (Monitored) 88   Breath Sounds   Pre/Post Intervention Pre Intervention Assessment   RUL Breath Sounds Clear   RML Breath Sounds Clear;Diminished   RLL Breath Sounds Diminished   AUSTIN Breath Sounds Clear   LLL Breath Sounds Diminished   Oximetry   #Pulse Oximetry (Single Determination) Yes   Continuous Oximetry Yes   Oxygen   Home O2 Use Prior To Admission? No   Pulse Oximetry 96 %   O2 (LPM) 4  (titrated to 2 lpm.)   O2 Daily Delivery Respiratory  Silicone Nasal Cannula

## 2018-06-19 NOTE — CARE PLAN
Problem: Communication  Goal: The ability to communicate needs accurately and effectively will improve  Outcome: PROGRESSING AS EXPECTED  Communication will remain effective with patient. Updated on POC as changes made/initiated. Encouraged to contact RN if any questions/concerns arise.     Problem: Pain Management  Goal: Pain level will decrease to patient's comfort goal  Outcome: PROGRESSING SLOWER THAN EXPECTED  Pt able to verbalize and demonstrate use of 0-10 pain scale. Medicated as needed for lower/left side back and flank pain.

## 2018-06-20 LAB
ANION GAP SERPL CALC-SCNC: 4 MMOL/L (ref 0–11.9)
APTT PPP: 45.6 SEC (ref 24.7–36)
APTT PPP: 61.2 SEC (ref 24.7–36)
BACTERIA UR CULT: ABNORMAL
BACTERIA UR CULT: ABNORMAL
BASOPHILS # BLD AUTO: 0.1 % (ref 0–1.8)
BASOPHILS # BLD: 0.01 K/UL (ref 0–0.12)
BUN SERPL-MCNC: 29 MG/DL (ref 8–22)
CALCIUM SERPL-MCNC: 8.5 MG/DL (ref 8.4–10.2)
CHLORIDE SERPL-SCNC: 111 MMOL/L (ref 96–112)
CO2 SERPL-SCNC: 20 MMOL/L (ref 20–33)
CREAT SERPL-MCNC: 1.34 MG/DL (ref 0.5–1.4)
EOSINOPHIL # BLD AUTO: 0.01 K/UL (ref 0–0.51)
EOSINOPHIL NFR BLD: 0.1 % (ref 0–6.9)
ERYTHROCYTE [DISTWIDTH] IN BLOOD BY AUTOMATED COUNT: 55.8 FL (ref 35.9–50)
GLUCOSE SERPL-MCNC: 170 MG/DL (ref 65–99)
HCT VFR BLD AUTO: 34.6 % (ref 37–47)
HGB BLD-MCNC: 11.3 G/DL (ref 12–16)
IMM GRANULOCYTES # BLD AUTO: 0.04 K/UL (ref 0–0.11)
IMM GRANULOCYTES NFR BLD AUTO: 0.4 % (ref 0–0.9)
INR PPP: 1.91 (ref 0.87–1.13)
LYMPHOCYTES # BLD AUTO: 0.54 K/UL (ref 1–4.8)
LYMPHOCYTES NFR BLD: 5.5 % (ref 22–41)
MCH RBC QN AUTO: 34.9 PG (ref 27–33)
MCHC RBC AUTO-ENTMCNC: 32.7 G/DL (ref 33.6–35)
MCV RBC AUTO: 106.8 FL (ref 81.4–97.8)
MONOCYTES # BLD AUTO: 0.62 K/UL (ref 0–0.85)
MONOCYTES NFR BLD AUTO: 6.3 % (ref 0–13.4)
NEUTROPHILS # BLD AUTO: 8.65 K/UL (ref 2–7.15)
NEUTROPHILS NFR BLD: 87.6 % (ref 44–72)
NRBC # BLD AUTO: 0 K/UL
NRBC BLD-RTO: 0 /100 WBC
PLATELET # BLD AUTO: 132 K/UL (ref 164–446)
PMV BLD AUTO: 11.1 FL (ref 9–12.9)
POTASSIUM SERPL-SCNC: 4.8 MMOL/L (ref 3.6–5.5)
PROTHROMBIN TIME: 21.6 SEC (ref 12–14.6)
RBC # BLD AUTO: 3.24 M/UL (ref 4.2–5.4)
SIGNIFICANT IND 70042: ABNORMAL
SITE SITE: ABNORMAL
SODIUM SERPL-SCNC: 135 MMOL/L (ref 135–145)
SOURCE SOURCE: ABNORMAL
WBC # BLD AUTO: 9.9 K/UL (ref 4.8–10.8)

## 2018-06-20 PROCEDURE — 85025 COMPLETE CBC W/AUTO DIFF WBC: CPT

## 2018-06-20 PROCEDURE — 85730 THROMBOPLASTIN TIME PARTIAL: CPT

## 2018-06-20 PROCEDURE — 700105 HCHG RX REV CODE 258: Performed by: INTERNAL MEDICINE

## 2018-06-20 PROCEDURE — 700102 HCHG RX REV CODE 250 W/ 637 OVERRIDE(OP): Performed by: INTERNAL MEDICINE

## 2018-06-20 PROCEDURE — 99233 SBSQ HOSP IP/OBS HIGH 50: CPT | Performed by: INTERNAL MEDICINE

## 2018-06-20 PROCEDURE — 85610 PROTHROMBIN TIME: CPT

## 2018-06-20 PROCEDURE — 80048 BASIC METABOLIC PNL TOTAL CA: CPT

## 2018-06-20 PROCEDURE — 770020 HCHG ROOM/CARE - TELE (206)

## 2018-06-20 PROCEDURE — 700111 HCHG RX REV CODE 636 W/ 250 OVERRIDE (IP): Performed by: INTERNAL MEDICINE

## 2018-06-20 PROCEDURE — 94760 N-INVAS EAR/PLS OXIMETRY 1: CPT

## 2018-06-20 PROCEDURE — A9270 NON-COVERED ITEM OR SERVICE: HCPCS | Performed by: INTERNAL MEDICINE

## 2018-06-20 RX ORDER — ALBUTEROL SULFATE 90 UG/1
2 AEROSOL, METERED RESPIRATORY (INHALATION) EVERY 4 HOURS PRN
Status: DISCONTINUED | OUTPATIENT
Start: 2018-06-20 | End: 2018-06-22 | Stop reason: HOSPADM

## 2018-06-20 RX ORDER — TIOTROPIUM BROMIDE 18 UG/1
1 CAPSULE ORAL; RESPIRATORY (INHALATION) DAILY
Status: DISCONTINUED | OUTPATIENT
Start: 2018-06-20 | End: 2018-06-22 | Stop reason: HOSPADM

## 2018-06-20 RX ORDER — WARFARIN SODIUM 3 MG/1
3 TABLET ORAL
Status: DISCONTINUED | OUTPATIENT
Start: 2018-06-20 | End: 2018-06-20

## 2018-06-20 RX ORDER — LEVOFLOXACIN 5 MG/ML
750 INJECTION, SOLUTION INTRAVENOUS EVERY 24 HOURS
Status: DISCONTINUED | OUTPATIENT
Start: 2018-06-20 | End: 2018-06-21

## 2018-06-20 RX ORDER — IPRATROPIUM BROMIDE AND ALBUTEROL SULFATE 2.5; .5 MG/3ML; MG/3ML
3 SOLUTION RESPIRATORY (INHALATION)
Status: DISCONTINUED | OUTPATIENT
Start: 2018-06-20 | End: 2018-06-22 | Stop reason: HOSPADM

## 2018-06-20 RX ORDER — HYDROCODONE BITARTRATE AND ACETAMINOPHEN 10; 325 MG/1; MG/1
1 TABLET ORAL EVERY 4 HOURS PRN
Status: DISCONTINUED | OUTPATIENT
Start: 2018-06-20 | End: 2018-06-22 | Stop reason: HOSPADM

## 2018-06-20 RX ORDER — MORPHINE SULFATE 4 MG/ML
1 INJECTION, SOLUTION INTRAMUSCULAR; INTRAVENOUS EVERY 6 HOURS PRN
Status: DISCONTINUED | OUTPATIENT
Start: 2018-06-20 | End: 2018-06-22 | Stop reason: HOSPADM

## 2018-06-20 RX ADMIN — ONDANSETRON 4 MG: 2 INJECTION, SOLUTION INTRAMUSCULAR; INTRAVENOUS at 10:33

## 2018-06-20 RX ADMIN — ZOLPIDEM TARTRATE 10 MG: 5 TABLET, FILM COATED ORAL at 23:29

## 2018-06-20 RX ADMIN — LOSARTAN POTASSIUM 50 MG: 25 TABLET ORAL at 08:11

## 2018-06-20 RX ADMIN — CYCLOBENZAPRINE HYDROCHLORIDE 10 MG: 10 TABLET, FILM COATED ORAL at 21:35

## 2018-06-20 RX ADMIN — HYDROCODONE BITARTRATE AND ACETAMINOPHEN 1 TABLET: 10; 325 TABLET ORAL at 10:33

## 2018-06-20 RX ADMIN — HEPARIN SODIUM 3200 UNITS: 1000 INJECTION, SOLUTION INTRAVENOUS; SUBCUTANEOUS at 11:36

## 2018-06-20 RX ADMIN — HEPARIN SODIUM 850 UNITS/HR: 5000 INJECTION, SOLUTION INTRAVENOUS at 05:14

## 2018-06-20 RX ADMIN — HYDROCODONE BITARTRATE AND ACETAMINOPHEN 1 TABLET: 10; 325 TABLET ORAL at 15:14

## 2018-06-20 RX ADMIN — HYDROCODONE BITARTRATE AND ACETAMINOPHEN 1 TABLET: 10; 325 TABLET ORAL at 19:13

## 2018-06-20 RX ADMIN — MORPHINE SULFATE 2 MG: 4 INJECTION INTRAVENOUS at 08:03

## 2018-06-20 RX ADMIN — MORPHINE SULFATE 2 MG: 4 INJECTION INTRAVENOUS at 00:54

## 2018-06-20 RX ADMIN — SIMVASTATIN 40 MG: 20 TABLET, FILM COATED ORAL at 20:33

## 2018-06-20 RX ADMIN — CEFTRIAXONE SODIUM 2 G: 2 INJECTION, POWDER, FOR SOLUTION INTRAMUSCULAR; INTRAVENOUS at 08:04

## 2018-06-20 RX ADMIN — HYDROCODONE BITARTRATE AND ACETAMINOPHEN 1 TABLET: 10; 325 TABLET ORAL at 23:28

## 2018-06-20 RX ADMIN — LEVOFLOXACIN 750 MG: 5 INJECTION, SOLUTION INTRAVENOUS at 14:39

## 2018-06-20 RX ADMIN — MORPHINE SULFATE 1 MG: 4 INJECTION INTRAVENOUS at 14:37

## 2018-06-20 RX ADMIN — RIVAROXABAN 20 MG: 10 TABLET, FILM COATED ORAL at 17:42

## 2018-06-20 RX ADMIN — SODIUM CHLORIDE: 9 INJECTION, SOLUTION INTRAVENOUS at 08:10

## 2018-06-20 RX ADMIN — PROPRANOLOL HYDROCHLORIDE 60 MG: 60 CAPSULE, EXTENDED RELEASE ORAL at 08:11

## 2018-06-20 ASSESSMENT — ENCOUNTER SYMPTOMS
STRIDOR: 0
ABDOMINAL PAIN: 0
CHILLS: 0
BRUISES/BLEEDS EASILY: 0
NAUSEA: 0
DIZZINESS: 0
MYALGIAS: 0
DIARRHEA: 0
COUGH: 0
SHORTNESS OF BREATH: 0
BACK PAIN: 1
BLURRED VISION: 0
FLANK PAIN: 1
PALPITATIONS: 0
EYE REDNESS: 0
HEARTBURN: 0
WEAKNESS: 1
FEVER: 0
HEADACHES: 0
VOMITING: 0
DEPRESSION: 0

## 2018-06-20 ASSESSMENT — PAIN SCALES - GENERAL
PAINLEVEL_OUTOF10: 6
PAINLEVEL_OUTOF10: 7
PAINLEVEL_OUTOF10: 7
PAINLEVEL_OUTOF10: 6
PAINLEVEL_OUTOF10: 7
PAINLEVEL_OUTOF10: 3

## 2018-06-20 NOTE — PROGRESS NOTES
[phone] - report from Gonzales LEMUS.  Pt resting comfortably in bed.  A&O x 4.  Up to transfer to bs, steady on feet.  Fall precautions in effect.  Pt calls approp for asst oob.  c/o back/abd/gen pain, see mar for medication interventions. Denies sob, dizziness, nausea. See flowsheet for assess.  poc reviewed with pt, pt vu, all questions answered.  Tele = sr,  Vss.   Good po intake with am meal, sophie diet well.     Up freq t/o day to bs to void.  1730 - pt transferred to  312-2 via w/c with all belongings. Report to Sang LEMUS who assumes care.

## 2018-06-20 NOTE — FLOWSHEET NOTE
06/19/18 1915   Events/Summary/Plan   Events/Summary/Plan SVN with MP with pt upright in bed: IS not performed now as Dr Zamora here   Interdisciplinary Plan of Care-Goals (Indications)   Bronchodilator Indications History / Diagnosis;Strong Subjective / Objective Improvement   Interdisciplinary Plan of Care-Outcomes    Bronchodilator Outcome Improved Vital Signs and Measures of Gas Exchange   Education   Education Yes - Pt. / Family has been Instructed in use of Respiratory Medications and Adverse Reactions   RT Assessment of Delivered Medications   Evaluation of Medication Delivery Daily Yes-- Pt /Family has been Instructed in use of Respiratory Medications and Adverse Reactions   SVN Group   #SVN Performed Yes   Given By: Mouthpiece   Date SVN Last Changed 06/18/18   Date SVN Next Change Due (Q 7 Days) 06/25/18   Chest Exam   Work Of Breathing / Effort Increased Work of Breathing   Respiration (!) 26   Pulse 83   Heart Rate (Monitored) 82   Breath Sounds   Pre/Post Intervention Pre Intervention Assessment   RUL Breath Sounds Clear   RML Breath Sounds Diminished   RLL Breath Sounds Diminished   AUSTIN Breath Sounds Clear   LLL Breath Sounds Diminished   Secretions   Cough Non Productive   Oximetry   #Pulse Oximetry (Single Determination) Yes   Continuous Oximetry Yes   Oxygen   Home O2 Use Prior To Admission? No   Pulse Oximetry 93 %   O2 (LPM) 3   O2 Daily Delivery Respiratory  Silicone Nasal Cannula

## 2018-06-20 NOTE — CARE PLAN
Problem: Oxygenation:  Goal: Maintain adequate oxygenation dependent on patient condition  Outcome: PROGRESSING AS EXPECTED  Patient remains on 3 lpm, patient wears no oxygen at home.  Intervention: Manage oxygen therapy by monitoring pulse oximetry and/or ABG values  Patient remains on continuous pulse oximetry.  Intervention: Levels of oxygenation will improve to baseline  Patient wears no oxygen at home.      Problem: Bronchoconstriction:  Goal: Improve in air movement and diminished wheezing  Outcome: PROGRESSING AS EXPECTED  Patient receiving QID treatments per RT protocol, f/b  Breo and Spiriva.  Intervention: Implement inhaled treatments  Following QID per RT protocol.  Intervention: Evaluate and manage medication effects  Patient has a subjective improvement with SVN.

## 2018-06-20 NOTE — PROGRESS NOTES
Report to ALLAN Rolon. Pt resting in bed. No needs at this time. Gonzales aware of pt meds stored in pharmacy (bactrim and culturelle)

## 2018-06-20 NOTE — PROGRESS NOTES
Inpatient Anticoagulation Service Note    Date: 6/20/2018  Reason for Anticoagulation: Deep Vein Thrombosis        Hemoglobin Value: (!) 11.3  Hematocrit Value: (!) 34.6  Lab Platelet Value: (!) 132  Target INR: 2.0 to 3.0    INR from last 7 days     Date/Time INR Value    06/20/18 0415 (!)  1.91    06/19/18 1220 (!)  2.16    06/17/18 1930 (!)  1.91        Dose from last 7 days     Date/Time Dose (mg)    06/20/18 0415  2.5    06/19/18 1300  3        Average Dose (mg): 2  Significant Interactions: Antibiotics, Statin  Bridge Therapy: No    Reversal Agent Administered: Not Applicable    Plan:  Warfarin 3 mg tonight for INR 1.91. Monitor INR closely.  Education Material Provided?: No  Pharmacist suggested discharge dosing: Resume home warfarin regimen     Ang Garcia PharmD Candidate  Akanksha PAZ. Ph.

## 2018-06-20 NOTE — PROGRESS NOTES
Received bedside report from Jane LEMUS.  Patient is currently resting in bed without signs/symptoms of distress.  Pain treatment plan reviewed.  Patient is up with 1 x assist to commode and tolerating increasing activity.  Total UOP - 475 (frequent small voids without evidence of bleeding).  3L NC with spO2 > 90%.  Heparin gtt verified at 700U/hr with next APTT scheduled for 2100.  Warfarin given at 1800.  Will continue to monitor.  POC reviewed and safety protocols in place.

## 2018-06-20 NOTE — CARE PLAN
Problem: Safety  Goal: Will remain free from injury  Outcome: PROGRESSING AS EXPECTED  Injury risk due to decreased mobility and anticoagulation.  Safety protocols in place.    Problem: Pain Management  Goal: Pain level will decrease to patient's comfort goal  Outcome: PROGRESSING SLOWER THAN EXPECTED   06/19/18 0600 06/19/18 1400 06/20/18 0000   OTHER   Pain Scale 0 - 10  --  --  7   Non Verbal Scale  Sleeping --  --    CPOT Total Score --  0 --    Comfort Goal --  --  Comfort at Rest;Comfort with Movement     Intervention: Follow pain managment plan developed in collaboration with patient and Interdisciplinary Team  Patient has consistent 7/10 pain, but states that the current pain management strategy has been effective for her.  Frequent reassessments, medication, and comfort measures in place.      Problem: Respiratory:  Goal: Respiratory status will improve  Outcome: PROGRESSING AS EXPECTED  Patient admitted to ICU on 12L oxymask and is currently on 3L NC.  Will continue to coordinate with RT and encourage use of IS.    Problem: Urinary Elimination:  Goal: Ability to reestablish a normal urinary elimination pattern will improve  Outcome: PROGRESSING AS EXPECTED   06/19/18 2000   OTHER   Urinary Elimination Frequency;Urgency       Problem: Mobility  Goal: Risk for activity intolerance will decrease  Outcome: PROGRESSING AS EXPECTED  Patient mobilizing with 1 x assist to bedside commode.  Tolerating well and gaining strength.

## 2018-06-20 NOTE — FLOWSHEET NOTE
This note also relates to the following rows which could not be included:  Respiration - Cannot attach notes to unvalidated device data  Pulse - Cannot attach notes to unvalidated device data  Heart Rate (Monitored) - Cannot attach notes to unvalidated device data  Pulse Oximetry - Cannot attach notes to unvalidated device data       06/20/18 0145   Chest Exam   Work Of Breathing / Effort Mild   Oximetry   Continuous Oximetry Yes   Oxygen   Home O2 Use Prior To Admission? No   O2 (LPM) 3   O2 Daily Delivery Respiratory  OxyMask

## 2018-06-20 NOTE — FLOWSHEET NOTE
This note also relates to the following rows which could not be included:  Pulse Oximetry - Cannot attach notes to unvalidated device data       06/19/18 2100   Events/Summary/Plan   Events/Summary/Plan IS observed @ 1250 mls   Interdisciplinary Plan of Care-Goals (Indications)   Hyperinflation Protocol Indications Atelectasis Documented by Chest X-Ray   Interdisciplinary Plan of Care-Outcomes    Hyperinflation Protocol Goals/Outcome Improvement in Repeat CXR   Education   Education Yes - Pt. / Family has been Instructed in use of Respiratory Equipment   Incentive Spirometry Group   Incentive Spirometry Instruction Yes   Breathing Exercises Yes   Incentive Spirometer Volume 1250 mL   Incentive Spirometer Date Last Changed 06/19/18   Incentive Spirometer Next Change Date (Q 30 Days) 07/19/18   Chest Exam   Work Of Breathing / Effort Mild   Respiration 20   Pulse 83   Heart Rate (Monitored) 82   Breath Sounds   RUL Breath Sounds Clear   RML Breath Sounds Diminished   RLL Breath Sounds Diminished   AUSTIN Breath Sounds Clear   LLL Breath Sounds Diminished   Secretions   Cough Non Productive   Oximetry   Continuous Oximetry Yes   Oxygen   Home O2 Use Prior To Admission? No   O2 (LPM) 3   O2 Daily Delivery Respiratory  Silicone Nasal Cannula

## 2018-06-20 NOTE — FLOWSHEET NOTE
06/20/18 0127   Events/Summary/Plan   Events/Summary/Plan pt placed on oxy mask from n/c   Chest Exam   Respiration (!) 25   Pulse 72   Heart Rate (Monitored) 72   Oximetry   Continuous Oximetry Yes   Oxygen   Home O2 Use Prior To Admission? No   Pulse Oximetry 88 %   O2 (LPM) 3   O2 Daily Delivery Respiratory  OxyMask

## 2018-06-20 NOTE — PROGRESS NOTES
Pulmonary Critical Care Progress Note      REASON FOR CONSULTATION:  Acute hypoxic respiratory failure     Chief Complaint: Flank pain and SOB    HISTORY OF PRESENT ILLNESS:  67 yof pmhx of COPD followed by Dr. 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 Presented to the [facility] ER on 6/17/18 for flank pain, she was found to have a 2 cm obstructing stone at the left UPJ with moderate hydronephrosis and a UTI with moderate bacturia and 100-150 WBC/hpf. She was taken to the OR on 6/17/18 by Dr Yang for lithotripsy and left ureteral stent placement. Post-operatively the patient had increased dyspnea and supplemental O2 requirements (10L oxymask). A CXR demonstrated atelectasis and post-operative labs showed a metabolic acidosis which has since resolved. She was placed on the RT protocol and her home Spiriva/Breo Ellipta. These treatments in conjunction with pain control and IS have decreased her O2 requirements to 3L via NC currently and her work of breath has returned to normal.     PFTs 10/24/16: FEV1 1.43, 54% predicted     She takes spiriva, Breo ellipta and PRN albuterol at home, she has been on O2 in the past but does not currently use.  She has had a CT chest on 6/27/17 which showed a 6.2 mm LLL nodule, this was followed up with an additional CT on 1/9/18 in which the nodule was found to be resolved however showed a new RLL 6mm nodule. She Is scheduled for a repeat CT this month and is quiet anxious to have this completed during this hospitalization.         ROS:  Respiratory: positive shortness of breath, Cardiac: negative, GI: negative.  All other systems negative.    Physical Exam   GENERAL:  Well  nourished, well developed age appropriate appearing female in no acute distress  HEENT:  Normocephalic, atraumatic, EOMI, external ears and nose normal  NECK:  Supple, trachea midline  PULM:   diminished bilaterally without any appreciable wheezing, no tachypnea  CVS:  Regular rate and rhythm  ABDOMEN:  Soft, non-tender  EXTREMITIES:  No clubbing, cyanosis or edema  SKIN:  Warm, dry  NEURO: AOx4, no focal deficits noted     Interval Events:  24 hour interval history reviewed    6/20 - on low flow O2, weaning, on IV heparin, BP a bit lower this AM, HR 97; sats in the high 90s; WBC improved, more azotemia, cxr yesterday showed patch right basilar density and hypoinflation; complaints of surgical pain today; no respiratory distress        PFSH:  No change.    Respiratory:     Pulse Oximetry: 97 %  Chest Tube Drains:          Exam: unlabored respirations, no intercostal retractions or accessory muscle use  ImagingAvailable data reviewed   Recent Labs      06/18/18   2045   [id number]  7.26*   [id number]  36.5   [id number]  80.6   [id number]  94.9   ARTHCO3  16*   ARTBE  -10*       HemoDynamics:  Pulse: 74, Heart Rate (Monitored): 74  NIBP: (!) 94/60       Exam: regular rate and rhythm  Imaging: Available data reviewed  Recent Labs      06/18/18   2219   BNPBTYPENAT  190*       Neuro:  GCS         Exam: no focal deficits noted  Imaging: Available data reviewed    Fluids:  Intake/Output       06/18/18 0700 - 06/19/18 0659 06/19/18 0700 - 06/20/18 0659 06/20/18 0700 - 06/21/18 0659      4331-4804 2704-0656 Total 1254-1210 9987-1843 Total 7553-6434 5311-9171 Total       Intake    P.O.  --  240 240  1280  240 1520  --  -- --    P.O. --  240 1520 -- -- --    I.V.  --  3304 3304  967.7  492.1 1459.8  --  -- --    Crystalloid Intake -- 950 950 -- -- -- -- -- --    Heparin Volume -- 754 754 217.7 192.1 409.8 -- -- --    IV Volume (Fluid Bolus) -- 1000 1000 -- 300 300 -- -- --    IV Volume (IV Maintenance Fluid) -- 600 600  750 -- 750 -- -- --    Total Intake -- 3544 3544 2247.7 732.1 2979.8 -- -- --       Output    Urine  300  600 900  475  525 1000  --  -- --    Number of Times Voided -- 18 x 18 x 4 x 21 x 25 x -- -- --    Urine Void (mL) (non-catheter) 300 600 900  -- -- --    Total Output 300 600 900  -- -- --       Net I/O     -300 2944 2644 1772.7 207.1 1979.8 -- -- --           Recent Labs      18   1025  18   0415   SODIUM  137  134*  135   POTASSIUM  4.1  4.3  4.8   CHLORIDE  110  106  111   CO2  15*  20  20   BUN  17  21  29*   CREATININE  1.43*  1.68*  1.34   CALCIUM  8.9  8.7  8.5       GI/Nutrition:  Exam: abdomen is soft and non-tender  Imaging: Available data reviewed  NPO  Liver Function  Recent Labs      18   1025  18   0415   ALTSGPT     --    --    --    ASTSGOT     --    --    --    ALKPHOSPHAT  104*  108*   --    --    --    TBILIRUBIN  0.9  0.7   --    --    --    GLUCOSE  117*  104*  100*  244*  170*       Heme:  Recent Labs      18   19318   1025  18   1220  18   1239  18   2114  18   0415   RBC   --    < >  4.07*   --   3.68*   --    --    --   3.24*   HEMOGLOBIN   --    < >  13.9   --   12.7   --    --    --   11.3*   HEMATOCRIT   --    < >  43.8   --   38.8   --    --    --   34.6*   PLATELETCT   --    < >  151*   --   170   --    --    --   132*   PROTHROMBTM  21.6*   --    --    --    --   23.8*   --    --   21.6*   APTT  36.3*   < >  36.5*   < >   --    --   179.4*  54.8*  61.2*   INR  1.91*   --    --    --    --   2.16*   --    --   1.91*    < > = values in this interval not displayed.       Infectious Disease:  Temp  Av.8 °C (98.2 °F)  Min: 36.4 °C (97.6 °F)  Max: 37 °C (98.6 °F)  Micro: reviewed  Recent Labs      18   1922  18   0622  18   2219  18   1025  18   0415   WBC  6.7  7.0   8.4  12.7*  9.9   NEUTSPOLYS  72.80*   --   88.80*  91.80*  87.60*   LYMPHOCYTES  16.20*   --   3.80*  3.20*  5.50*   MONOCYTES  7.90   --   6.70  4.20  6.30   EOSINOPHILS  2.50   --   0.10  0.10  0.10   BASOPHILS  0.30   --   0.20  0.20  0.10   ASTSGOT  19  19   --    --    --    ALTSGPT  20  18   --    --    --    ALKPHOSPHAT  104*  108*   --    --    --    TBILIRUBIN  0.9  0.7   --    --    --      Current Facility-Administered Medications   Medication Dose Frequency Provider Last Rate Last Dose   • tiotropium (SPIRIVA) 18 MCG inhalation capsule 1 Cap  1 Cap QDAILY (RT) Beck Yang M.D.   1 Cap at 06/19/18 0813   • Fluticasone Furoate-Vilanterol (BREO) inhaler 1 Puff  1 Puff DAILY Arcelia Velasquez M.D.   1 Puff at 06/19/18 0814   • MD ALERT... warfarin (COUMADIN) per pharmacy protocol   pharmacy to dose Arcelia Velasquez M.D.       • lactated ringers infusion  1,000 mL Intra-Op Once PRN Beck Yang M.D.       • opium-belladonna (B&O SUPPRETTES) suppository 30 mg  30 mg Q8HRS PRN Beck Yang M.D.       • ipratropium-albuterol (DUONEB) nebulizer solution  3 mL 4X/DAY (RT) Jen Alford M.D.   3 mL at 06/19/18 1915   • Respiratory Care per Protocol   Continuous RT Jen Alford M.D.       • heparin injection 3,200 Units  3,200 Units PRN Ravin Moe M.D.        And   • heparin infusion 25,000 units in 500 ml 0.45% nacl   Continuous Ravin Moe M.D. 17 mL/hr at 06/20/18 0514 850 Units/hr at 06/20/18 0514   • albuterol (PROVENTIL) 2.5mg/0.5ml nebulizer solution 2.5 mg  2.5 mg Q4H PRN (RT) Ravin Moe M.D.       • HYDROcodone/acetaminophen (NORCO)  MG per tablet 1 Tab  1 Tab Q6HRS PRN Ravin Moe M.D.   1 Tab at 06/19/18 1825   • losartan (COZAAR) tablet 50 mg  50 mg ELICIA Moe M.D.   50 mg at 06/19/18 0946   • propranolol LA (INDERAL LA) capsule 60 mg  60 mg ELICIA Moe M.D.   60 mg at 06/19/18 0947   • simvastatin (ZOCOR) tablet 40 mg  40 mg Nightly Ravin Moe M.D.   40 mg at 06/19/18 2024   • zolpidem  (AMBIEN) tablet 10 mg  10 mg HS PRN Ravin Moe M.D.   10 mg at 06/19/18 2341   • senna-docusate (PERICOLACE or SENOKOT S) 8.6-50 MG per tablet 2 Tab  2 Tab BID aRvin Moe M.D.   2 Tab at 06/19/18 2024    And   • polyethylene glycol/lytes (MIRALAX) PACKET 1 Packet  1 Packet QDAY PRN Ravin Moe M.D.        And   • magnesium hydroxide (MILK OF MAGNESIA) suspension 30 mL  30 mL QDAY PRN Ravin Moe M.D.        And   • bisacodyl (DULCOLAX) suppository 10 mg  10 mg QDAY PRN Ravin Moe M.D.       • NS infusion   Continuous Ravin Moe M.D. 75 mL/hr at 06/19/18 1619     • ondansetron (ZOFRAN) syringe/vial injection 4 mg  4 mg Q4HRS PRN Ravin Moe M.D.   4 mg at 06/17/18 2258   • ondansetron (ZOFRAN ODT) dispertab 4 mg  4 mg Q4HRS PRN Ravin Moe M.D.       • morphine (pf) 4 mg/ml injection 2 mg  2 mg Q4HRS PRN Ravin Moe M.D.   2 mg at 06/20/18 0054   • cefTRIAXone (ROCEPHIN) 2 g in  mL IVPB  2 g Q24HRS Ravin Moe M.D.   Stopped at 06/19/18 1016   • cyclobenzaprine (FLEXERIL) tablet 10 mg  10 mg TID PRN Ravin Moe M.D.         Last reviewed on 6/18/2018  5:09 PM by Esha Basilio R.N.    Quality  Measures:  Radiology images reviewed, Medications reviewed, Labs reviewed and EKG reviewed                    ASSESSMENT/PLAN:  Acute on chronic hypoxic respiratory failure              -likely due to sedation and atelectasis compounded by baseline underlying lung disease              -continue aggressive pulmonary toilet, IS and mobility as tolerated              -RT/O2 Protocols              -Titrate supplemental FiO2 to maintain SpO2 >92%     COPD              -not currently bronchospastic on exam              -continue home breo and Spiriva              -will hold steroids for now as she is clinically improving              -RT/O2 Protocols              -Titrate supplemental FiO2 to maintain SpO2 >92%     Left UPJ stone with obstructive nephropathy              -POD #1 from lithotripsy and stent  placement              -complicated by ISAMAR              -monitor UOP              -Urology on board     UTI              -rocephin              -f/u culture     ISAMAR              -likely 2/2 obstructive stone              -strict I/Os              -IVF              -renal dose meds, avoid nephrotoxins     L1 and T11 fractures              -likely due to severe osteopenia              -f/u with [facility]              -pain control     History of multiple DVTs              -on chronic anticoagulation              -heparin infusion for bridging              -consider DOAC if able as patient states she struggles to maintain her INR check appointments     HTN              -continue home losartan (caution in ISAMAR) and proprolol     HLP              -continue zocor     Prophylaxis: heparin

## 2018-06-20 NOTE — FLOWSHEET NOTE
06/20/18 0715   Therapy Not Performed   Type of Therapy Not Performed SVN   Reason Therapy Not Performed PRN, No Indication   Incentive Spirometry Group   Breathing Exercises Yes   Incentive Spirometer Volume 1000 mL   Chest Exam   Respiration 16   Pulse 80   Heart Rate (Monitored) 79   Breath Sounds   RUL Breath Sounds Clear   RML Breath Sounds Clear;Diminished   RLL Breath Sounds Diminished   AUSTIN Breath Sounds Clear   LLL Breath Sounds Diminished   Oximetry   #Pulse Oximetry (Single Determination) Yes   Oxygen   Home O2 Use Prior To Admission? No   Pulse Oximetry 96 %   O2 (LPM) 2   O2 Daily Delivery Respiratory  Silicone Nasal Cannula

## 2018-06-20 NOTE — FLOWSHEET NOTE
06/20/18 0145   Events/Summary/Plan   Events/Summary/Plan pt Sp02 >92% on 3 LPM oxy mask   Chest Exam   Work Of Breathing / Effort Mild   Oximetry   Continuous Oximetry Yes   Oxygen   Home O2 Use Prior To Admission? No   O2 (LPM) 3   O2 Daily Delivery Respiratory  OxyMask

## 2018-06-20 NOTE — PROGRESS NOTES
Surgical Progress Note    Author: Kameron Schultz Date & Time created: 2018   8:53 AM     Interval Events:   POD #2  Patient seen and examined.  Less O2 demand. Hospitalist plans on resuming anticoagulation in the form of Xarelto or Coumadin. Still with pain in lower back and left flank, although states less stent bother than previous. Has urinary urgency. No fevers. Culture pending.    Review of Systems   Constitutional: Negative for chills and fever.   Genitourinary: Positive for flank pain, frequency and urgency.   Musculoskeletal: Positive for back pain.     Hemodynamics:  Temp (24hrs), Av.7 °C (98.1 °F), Min:36.4 °C (97.6 °F), Max:37 °C (98.6 °F)  Temperature: 36.5 °C (97.7 °F)  Pulse  Av.7  Min: 64  Max: 106Heart Rate (Monitored): 74  NIBP: (!) 99/62     Respiratory:    Respiration: 12, Pulse Oximetry: 95 %, O2 Daily Delivery Respiratory : Silicone Nasal Cannula     Given By:: Mouthpiece, Work Of Breathing / Effort: Mild  RUL Breath Sounds: Clear, RML Breath Sounds: Clear;Diminished, RLL Breath Sounds: Diminished, AUSTIN Breath Sounds: Clear, LLL Breath Sounds: Diminished  Neuro:  GCS       Fluids:    Intake/Output Summary (Last 24 hours) at 18 0853  Last data filed at 18 0600   Gross per 24 hour   Intake          3396.25 ml   Output              950 ml   Net          2446.25 ml        Current Diet Order   Procedures   • DIET ORDER     Physical Exam   Constitutional: She is oriented to person, place, and time. She appears well-developed and well-nourished. No distress.   Pulmonary/Chest: Effort normal.   Neurological: She is alert and oriented to person, place, and time.   Skin: Skin is warm and dry.   Psychiatric: She has a normal mood and affect.   Nursing note and vitals reviewed.    Labs:  Recent Results (from the past 24 hour(s))   CBC WITH DIFFERENTIAL    Collection Time: 18 10:25 AM   Result Value Ref Range    WBC 12.7 (H) 4.8 - 10.8 K/uL    RBC 3.68 (L) 4.20 - 5.40 M/uL     Hemoglobin 12.7 12.0 - 16.0 g/dL    Hematocrit 38.8 37.0 - 47.0 %    .4 (H) 81.4 - 97.8 fL    MCH 34.5 (H) 27.0 - 33.0 pg    MCHC 32.7 (L) 33.6 - 35.0 g/dL    RDW 54.5 (H) 35.9 - 50.0 fL    Platelet Count 170 164 - 446 K/uL    MPV 10.9 9.0 - 12.9 fL    Neutrophils-Polys 91.80 (H) 44.00 - 72.00 %    Lymphocytes 3.20 (L) 22.00 - 41.00 %    Monocytes 4.20 0.00 - 13.40 %    Eosinophils 0.10 0.00 - 6.90 %    Basophils 0.20 0.00 - 1.80 %    Immature Granulocytes 0.50 0.00 - 0.90 %    Nucleated RBC 0.00 /100 WBC    Neutrophils (Absolute) 11.68 (H) 2.00 - 7.15 K/uL    Lymphs (Absolute) 0.41 (L) 1.00 - 4.80 K/uL    Monos (Absolute) 0.54 0.00 - 0.85 K/uL    Eos (Absolute) 0.01 0.00 - 0.51 K/uL    Baso (Absolute) 0.02 0.00 - 0.12 K/uL    Immature Granulocytes (abs) 0.06 0.00 - 0.11 K/uL    NRBC (Absolute) 0.00 K/uL   BASIC METABOLIC PANEL    Collection Time: 06/19/18 10:25 AM   Result Value Ref Range    Sodium 134 (L) 135 - 145 mmol/L    Potassium 4.3 3.6 - 5.5 mmol/L    Chloride 106 96 - 112 mmol/L    Co2 20 20 - 33 mmol/L    Glucose 244 (H) 65 - 99 mg/dL    Bun 21 8 - 22 mg/dL    Creatinine 1.68 (H) 0.50 - 1.40 mg/dL    Calcium 8.7 8.4 - 10.2 mg/dL    Anion Gap 8.0 0.0 - 11.9   ESTIMATED GFR    Collection Time: 06/19/18 10:25 AM   Result Value Ref Range    GFR If  37 (A) >60 mL/min/1.73 m 2    GFR If Non African American 30 (A) >60 mL/min/1.73 m 2   PROTHROMBIN TIME    Collection Time: 06/19/18 12:20 PM   Result Value Ref Range    PT 23.8 (H) 12.0 - 14.6 sec    INR 2.16 (H) 0.87 - 1.13   APTT    Collection Time: 06/19/18 12:39 PM   Result Value Ref Range    APTT 179.4 (HH) 24.7 - 36.0 sec   APTT    Collection Time: 06/19/18  9:14 PM   Result Value Ref Range    APTT 54.8 (H) 24.7 - 36.0 sec   CBC WITH DIFFERENTIAL    Collection Time: 06/20/18  4:15 AM   Result Value Ref Range    WBC 9.9 4.8 - 10.8 K/uL    RBC 3.24 (L) 4.20 - 5.40 M/uL    Hemoglobin 11.3 (L) 12.0 - 16.0 g/dL    Hematocrit 34.6 (L)  37.0 - 47.0 %    .8 (H) 81.4 - 97.8 fL    MCH 34.9 (H) 27.0 - 33.0 pg    MCHC 32.7 (L) 33.6 - 35.0 g/dL    RDW 55.8 (H) 35.9 - 50.0 fL    Platelet Count 132 (L) 164 - 446 K/uL    MPV 11.1 9.0 - 12.9 fL    Neutrophils-Polys 87.60 (H) 44.00 - 72.00 %    Lymphocytes 5.50 (L) 22.00 - 41.00 %    Monocytes 6.30 0.00 - 13.40 %    Eosinophils 0.10 0.00 - 6.90 %    Basophils 0.10 0.00 - 1.80 %    Immature Granulocytes 0.40 0.00 - 0.90 %    Nucleated RBC 0.00 /100 WBC    Neutrophils (Absolute) 8.65 (H) 2.00 - 7.15 K/uL    Lymphs (Absolute) 0.54 (L) 1.00 - 4.80 K/uL    Monos (Absolute) 0.62 0.00 - 0.85 K/uL    Eos (Absolute) 0.01 0.00 - 0.51 K/uL    Baso (Absolute) 0.01 0.00 - 0.12 K/uL    Immature Granulocytes (abs) 0.04 0.00 - 0.11 K/uL    NRBC (Absolute) 0.00 K/uL   BASIC METABOLIC PANEL    Collection Time: 06/20/18  4:15 AM   Result Value Ref Range    Sodium 135 135 - 145 mmol/L    Potassium 4.8 3.6 - 5.5 mmol/L    Chloride 111 96 - 112 mmol/L    Co2 20 20 - 33 mmol/L    Glucose 170 (H) 65 - 99 mg/dL    Bun 29 (H) 8 - 22 mg/dL    Creatinine 1.34 0.50 - 1.40 mg/dL    Calcium 8.5 8.4 - 10.2 mg/dL    Anion Gap 4.0 0.0 - 11.9   PROTHROMBIN TIME    Collection Time: 06/20/18  4:15 AM   Result Value Ref Range    PT 21.6 (H) 12.0 - 14.6 sec    INR 1.91 (H) 0.87 - 1.13   APTT    Collection Time: 06/20/18  4:15 AM   Result Value Ref Range    APTT 61.2 (H) 24.7 - 36.0 sec   ESTIMATED GFR    Collection Time: 06/20/18  4:15 AM   Result Value Ref Range    GFR If  47 (A) >60 mL/min/1.73 m 2    GFR If Non  39 (A) >60 mL/min/1.73 m 2     Medical Decision Making, by Problem:  Active Hospital Problems    Diagnosis   • Acute respiratory failure with hypoxia ([facility]) [J96.01]     Priority: High   • Obstruction of left ureteropelvic junction (UPJ) due to stone [N20.1]   • Urinary tract infection with hematuria [N39.0, R31.9]   • Closed T11 fracture ([facility]) [S22.755X]   • DVT (deep venous thrombosis) ([facility])  [I82.409]     Plan:  Antibiotics pending culture  Ureteral stent to remain  Outpatient CULTS in about 3 weeks  OK to resume anticoagulation and no need to bridge prior to surgery    Quality Measures:  Quality-Core Measures   Reviewed items::  Labs reviewed and Medications reviewed  Gonzalez catheter::  No Gonzalez      Discussed patient condition with Patient and hospitalist and urology-Dr. Yang

## 2018-06-20 NOTE — CONSULTS
Pulmonary & Critical Care Consult Note    DATE OF CONSULTATION:  6/19/2018     REFERRING PHYSICIAN:  Arcelia Velasquez M.D.     CONSULTANT:  Meir Zamora DO     REASON FOR CONSULTATION:  Acute hypoxic respiratory failure     HISTORY OF PRESENT ILLNESS:  67 yof pmhx of COPD followed by Dr. 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 Presented to the [facility] on 6/17/18 for flank pain, she was found to have a 2 cm obstructing stone at the left UPJ with moderate hydronephrosis and a UTI with moderate bacturia and 100-150 WBC/hpf. She was taken to the OR on 6/17/18 by Dr Yang for lithotripsy and left ureteral stent placement. Post-operatively the patient had increased dyspnea and supplemental O2 requirements (10L oxymask). A CXR demonstrated atelectasis and post-operative labs showed a metabolic acidosis which has since resolved. She was placed on the RT protocol and her home Spiriva/Breo Ellipta. These treatments in conjunction with pain control and IS have decreased her O2 requirements to 3L via NC currently and her work of breath has returned to normal.     PFTs 10/24/16: FEV1 1.43, 54% predicted    She takes spiriva, Breo ellipta and PRN albuterol at home, she has been on O2 in the past but does not currently use.  She has had a CT chest on 6/27/17 which showed a 6.2 mm LLL nodule, this was followed up with an additional CT on 1/9/18 in which the nodule was found to be resolved however showed a new RLL 6mm nodule. She Is scheduled for a repeat CT this month and is quiet anxious to have this completed during this hospitalization.    PAST MEDICAL HISTORY:  Past Medical History:   Diagnosis Date   • Asthma      inhalers daily   • Backpain 7/2017     thor. area   • Breath shortness     with exertion has O2 but does not use   • Bronchitis    • CAD (coronary artery disease)     palpatations   • Cancer (HCC) 2016    left lung   • Chickenpox    • COPD (chronic obstructive pulmonary disease) (HCC)    • Dialysis 2005    transient renal failure due to sepsis   • Emphysema of lung (HCC)    • Glaucoma     right eye   • Hyperlipidemia    • Hypertension    • Hyperthyroidism    • Kidney stone 2017    bilateral   • Lung cancer (HCC) 12/12/2016   • Multiple thyroid nodules 7/9/2015   • Nasal drainage    • Osteoporosis    • Personal history of venous thrombosis and embolism 2004, 2012    right leg 2012, left arm dvt 2004 left eye 2017   • Pneumonia 7/2016    per patient   • Renal disorder     had been on dialysis for 7 months for acute failure 2005   • Renal stones 2013    post lithotripsy   • Rheumatoid arthritis (HCC)     question   • Rheumatoid arthritis (HCC)    • S/P appendectomy 1998    • S/P cholecystectomy 1998   • S/P kyphoplasty 2006, 2007, 2013   • Sepsis(995.91) 2005        PAST SURGICAL HISTORY:  Past Surgical History:   Procedure Laterality Date   • CYSTOSCOPY STENT PLACEMENT Left 6/18/2018    Procedure: CYSTOSCOPY STENT PLACEMENT;  Surgeon: Beck Yang M.D.;  Location: [facility];  Service: Urology   • LITHOTRIPSY Left 6/18/2018    Procedure: LITHOTRIPSY;  Surgeon: Beck Yang M.D.;  Location: [facility];  Service: Urology   • LASERTRIPSY Left 6/18/2018    Procedure: LASERTRIPSY;  Surgeon: Beck Yang M.D.;  Location: [facility];  Service: Urology   • URETEROSCOPY Left 6/18/2018    Procedure: URETEROSCOPY;  Surgeon: Beck Yagn M.D.;  Location: [facility];  Service: Urology   • THORACOSCOPY Left 12/12/2016    Procedure: THORACOSCOPY W/WEDGE RESECTION UPPER LOBE MASS;  Surgeon: John H Ganser, M.D.;  Location: [facility];  Service:    • OTHER  ORTHOPEDIC SURGERY  2013    kyphoplasty X3   • APPENDECTOMY      1990   • CHOLECYSTECTOMY      1990   • LAMINOTOMY     • LUNG BIOPSY OPEN     • OTHER     • OTHER ABDOMINAL SURGERY      gall bladder disease   • PB ENLARGE BREAST WITH IMPLANT     • PB REDUCTION OF LARGE BREAST          ALLERGIES:   Nkda [no known drug allergy]     MEDICATIONS PRIOR TO ADMISSION:  No current facility-administered medications on file prior to encounter.      Current Outpatient Prescriptions on File Prior to Encounter   Medication Sig Dispense Refill   • BREO ELLIPTA 200-25 MCG/INH AEROSOL POWDER, BREATH ACTIVATED INHALE ONE DOSE BY MOUTH DAILY. RINSE MOUTH AFTER USE. 1 Each 11   • PROAIR  (90 Base) MCG/ACT Aero Soln inhalation aerosol INHALE TWO PUFFS BY MOUTH EVERY 6 HOURS AS NEEDED FOR SHORTNESS OF BREATH 1 Inhaler 3   • COMBIGAN 0.2-0.5 % Solution Place 1 Drop in both eyes 2 Times a Day.     • hydrocodone/acetaminophen (NORCO)  MG Tab Take 0.5 Tabs by mouth every four hours as needed.     • vitamin D, Ergocalciferol, (DRISDOL) 28317 UNITS Cap capsule Take 50,000 Units by mouth every 14 days.     • propranolol LA (INDERAL LA) 60 MG CAPSULE SR 24 HR Take 60 mg by mouth every morning.     • acetaminophen (TYLENOL) 500 MG Tab Take 500 mg by mouth every 6 hours as needed.     • zolpidem (AMBIEN) 10 MG Tab Take 10 mg by mouth at bedtime as needed for Sleep.     • simvastatin (ZOCOR) 40 MG Tab Take 40 mg by mouth every evening.     • losartan (COZAAR) 50 MG TABS Take 50 mg by mouth every morning.         SOCIAL HISTORY:  Social History     Social History   • Marital status: Single     Spouse name: N/A   • Number of children: N/A   • Years of education: N/A     Occupational History   • Not on file.     Social History Main Topics   • Smoking status: Former Smoker     Packs/day: 1.00     Years: 30.00     Types: Cigarettes     Quit date: 1/1/2000   • Smokeless tobacco: Never Used      Comment: 7 years ago   • Alcohol use 0.0  "oz/week      Comment: x3 a week   • Drug use: No   • Sexual activity: Not on file     Other Topics Concern   • Not on file     Social History Narrative   • No narrative on file       FAMILY HISTORY:  Family History   Problem Relation Age of Onset   • Cancer Mother    • Heart Failure Father    • Heart Disease Brother         REVIEW OF SYSTEMS:  Constitutional: No fever, no chills, + malaise  Respiratory: No cough, no hemoptysis, no dyspnea  Cardiovascular: No chest pain, no palpitations, no orthopnea, no PND, no lower extremity swelling  GI: No nausea, no vomiting, no abdominal pain, no diarrhea, no constipation, no hematochezia, no melena  : no dysuria, no frequency, no urgency, +flank pain  Endocrine: No polyuria, no polydipsia, no hair loss  Hematology: No easy bruising, no bleeding  Musculoskeletal: No joint swelling, no joint erythema, no arthralgia, no myalgias, +back pain  Neuro: No seizures, no numbness, no tingling sensation, no extremity weakness, no change in vision.  Psychiatry: no depression, no suicidal ideation     PHYSICAL EXAMINATION:  BP (!) 223/104   Pulse 84   Temp 37 °C (98.6 °F)   Resp (!) 26   Ht 1.702 m (5' 7\")   Wt 85.9 kg (189 lb 6 oz)   SpO2 93%   Breastfeeding? No   BMI 29.66 kg/m²   GENERAL:  Well nourished, well developed age appropriate appearing female in no acute distress  HEENT:  Normocephalic, atraumatic, EOMI, external ears and nose normal  NECK:  Supple, trachea midline  PULM:   diminished bilaterally without any appreciable wheezing, no tachypnea  CVS:  Regular rate and rhythm  ABDOMEN:  Soft, non-tender  EXTREMITIES:  No clubbing, cyanosis or edema  SKIN:  Warm, dry  NEURO: AOx4, no focal deficits noted    LABORATORY DATA:    Lab Results   Component Value Date/Time    WBC 12.7 (H) 06/19/2018 10:25 AM    RBC 3.68 (L) 06/19/2018 10:25 AM    HEMOGLOBIN 12.7 06/19/2018 10:25 AM    HEMATOCRIT 38.8 06/19/2018 10:25 AM    .4 (H) 06/19/2018 10:25 AM    MCH 34.5 (H) " 06/19/2018 10:25 AM    MCHC 32.7 (L) 06/19/2018 10:25 AM    MPV 10.9 06/19/2018 10:25 AM    NEUTSPOLYS 91.80 (H) 06/19/2018 10:25 AM    LYMPHOCYTES 3.20 (L) 06/19/2018 10:25 AM    MONOCYTES 4.20 06/19/2018 10:25 AM    EOSINOPHILS 0.10 06/19/2018 10:25 AM    BASOPHILS 0.20 06/19/2018 10:25 AM      Lab Results   Component Value Date/Time    SODIUM 134 (L) 06/19/2018 10:25 AM    POTASSIUM 4.3 06/19/2018 10:25 AM    CHLORIDE 106 06/19/2018 10:25 AM    CO2 20 06/19/2018 10:25 AM    GLUCOSE 244 (H) 06/19/2018 10:25 AM    BUN 21 06/19/2018 10:25 AM    CREATININE 1.68 (H) 06/19/2018 10:25 AM    CREATININE 1.7 (H) 09/19/2008 08:40 PM      Lab Results   Component Value Date/Time    PROTHROMBTM 23.8 (H) 06/19/2018 12:20 PM    INR 2.16 (H) 06/19/2018 12:20 PM         IMAGING:   CXR (personally reviewed)  DX-PORTABLE FLUORO > 1 HOUR   Final Result         Portable fluoroscopy utilized for 165.2 seconds.         DX-CHEST-PORTABLE (1 VIEW)   Final Result      Mild hypoinflation of RIGHT lung base atelectasis.      CT-RENAL COLIC EVALUATION(A/P W/O)   Final Result      1.  2 cm left calculus has moved to the UPJ and causes new moderate hydronephrosis      2.  Stable 1 cm right nephrolith does not cause hydronephrosis and there are additional smaller left nephroliths      3.  Severe osteopenia with new from 2016 T11 and L1 fractures resulting in at most mild central canal encroachment at L1 and this is subacute-acute. T11 fracture is likely chronic but new from 2016      4.  Multiple chronic findings including prior cholecystectomy, renal and hepatic cysts, left adrenal gland masses compatible with benign adenomas         ASSESSMENT/PLAN:  Acute on chronic hypoxic respiratory failure   -likely due to sedation and atelectasis compounded by baseline underlying lung disease   -continue aggressive pulmonary toilet, IS and mobility as tolerated   -RT/O2 Protocols   -Titrate supplemental FiO2 to maintain SpO2 >92%    COPD   -not currently  bronchospastic on exam   -continue home breo and Spiriva   -will hold steroids for now as she is clinically improving   -RT/O2 Protocols   -Titrate supplemental FiO2 to maintain SpO2 >92%     Left UPJ stone with obstructive nephropathy   -POD #1 from lithotripsy and stent placement   -complicated by ISAMAR   -monitor UOP   -Urology on board    UTI   -rocephin   -f/u culture    ISAMAR   -likely 2/2 obstructive stone   -strict I/Os   -IVF   -renal dose meds, avoid nephrotoxins    L1 and T11 fractures   -likely due to severe osteopenia   -f/u with Any Spine   -pain control    History of multiple DVTs   -on chronic anticoagulation   -heparin infusion for bridging   -consider DOAC if able as patient states she struggles to maintain her INR check appointments    HTN   -continue home losartan (caution in ISAMAR) and proprolol    HLP   -continue zocor    Prophylaxis: heparin    Meir Zamora, DO  Critical Care Medicine    Please note that this dictation was created using voice recognition software. The accuracy of the dictation is limited to the abilities of the software.I have made every reasonable attempt to correct obvious errors, but I expect that there are errors of grammar and possibly content that I did not discover before finalizing the note.

## 2018-06-21 ENCOUNTER — PATIENT OUTREACH (OUTPATIENT)
Dept: HEALTH INFORMATION MANAGEMENT | Facility: OTHER | Age: 68
End: 2018-06-21

## 2018-06-21 LAB
ANION GAP SERPL CALC-SCNC: 4 MMOL/L (ref 0–11.9)
BASOPHILS # BLD AUTO: 0.1 % (ref 0–1.8)
BASOPHILS # BLD: 0.01 K/UL (ref 0–0.12)
BUN SERPL-MCNC: 24 MG/DL (ref 8–22)
CALCIUM SERPL-MCNC: 9.2 MG/DL (ref 8.4–10.2)
CHLORIDE SERPL-SCNC: 115 MMOL/L (ref 96–112)
CO2 SERPL-SCNC: 22 MMOL/L (ref 20–33)
CREAT SERPL-MCNC: 1.22 MG/DL (ref 0.5–1.4)
EOSINOPHIL # BLD AUTO: 0.11 K/UL (ref 0–0.51)
EOSINOPHIL NFR BLD: 1.6 % (ref 0–6.9)
ERYTHROCYTE [DISTWIDTH] IN BLOOD BY AUTOMATED COUNT: 57.4 FL (ref 35.9–50)
GLUCOSE SERPL-MCNC: 107 MG/DL (ref 65–99)
HCT VFR BLD AUTO: 36.4 % (ref 37–47)
HGB BLD-MCNC: 11.7 G/DL (ref 12–16)
IMM GRANULOCYTES # BLD AUTO: 0.04 K/UL (ref 0–0.11)
IMM GRANULOCYTES NFR BLD AUTO: 0.6 % (ref 0–0.9)
INR PPP: 3.47 (ref 0.87–1.13)
LYMPHOCYTES # BLD AUTO: 0.88 K/UL (ref 1–4.8)
LYMPHOCYTES NFR BLD: 13 % (ref 22–41)
MCH RBC QN AUTO: 34.4 PG (ref 27–33)
MCHC RBC AUTO-ENTMCNC: 32.1 G/DL (ref 33.6–35)
MCV RBC AUTO: 107.1 FL (ref 81.4–97.8)
MONOCYTES # BLD AUTO: 0.62 K/UL (ref 0–0.85)
MONOCYTES NFR BLD AUTO: 9.1 % (ref 0–13.4)
NEUTROPHILS # BLD AUTO: 5.13 K/UL (ref 2–7.15)
NEUTROPHILS NFR BLD: 75.6 % (ref 44–72)
NRBC # BLD AUTO: 0 K/UL
NRBC BLD-RTO: 0 /100 WBC
PLATELET # BLD AUTO: 158 K/UL (ref 164–446)
PMV BLD AUTO: 10.7 FL (ref 9–12.9)
POTASSIUM SERPL-SCNC: 4.3 MMOL/L (ref 3.6–5.5)
PROTHROMBIN TIME: 34.4 SEC (ref 12–14.6)
RBC # BLD AUTO: 3.4 M/UL (ref 4.2–5.4)
SODIUM SERPL-SCNC: 141 MMOL/L (ref 135–145)
WBC # BLD AUTO: 6.8 K/UL (ref 4.8–10.8)

## 2018-06-21 PROCEDURE — 700101 HCHG RX REV CODE 250: Performed by: INTERNAL MEDICINE

## 2018-06-21 PROCEDURE — 700102 HCHG RX REV CODE 250 W/ 637 OVERRIDE(OP): Performed by: INTERNAL MEDICINE

## 2018-06-21 PROCEDURE — 85610 PROTHROMBIN TIME: CPT

## 2018-06-21 PROCEDURE — 85025 COMPLETE CBC W/AUTO DIFF WBC: CPT

## 2018-06-21 PROCEDURE — 770020 HCHG ROOM/CARE - TELE (206)

## 2018-06-21 PROCEDURE — 94667 MNPJ CHEST WALL 1ST: CPT

## 2018-06-21 PROCEDURE — 94760 N-INVAS EAR/PLS OXIMETRY 1: CPT

## 2018-06-21 PROCEDURE — 94668 MNPJ CHEST WALL SBSQ: CPT

## 2018-06-21 PROCEDURE — 80048 BASIC METABOLIC PNL TOTAL CA: CPT

## 2018-06-21 PROCEDURE — 94640 AIRWAY INHALATION TREATMENT: CPT

## 2018-06-21 PROCEDURE — A9270 NON-COVERED ITEM OR SERVICE: HCPCS | Performed by: INTERNAL MEDICINE

## 2018-06-21 PROCEDURE — 99232 SBSQ HOSP IP/OBS MODERATE 35: CPT | Performed by: INTERNAL MEDICINE

## 2018-06-21 RX ORDER — LEVOFLOXACIN 250 MG/1
750 TABLET, FILM COATED ORAL EVERY 24 HOURS
Status: DISCONTINUED | OUTPATIENT
Start: 2018-06-21 | End: 2018-06-21

## 2018-06-21 RX ORDER — LEVOFLOXACIN 500 MG/1
500 TABLET, FILM COATED ORAL EVERY 24 HOURS
Status: DISCONTINUED | OUTPATIENT
Start: 2018-06-22 | End: 2018-06-22 | Stop reason: HOSPADM

## 2018-06-21 RX ADMIN — HYDROCODONE BITARTRATE AND ACETAMINOPHEN 1 TABLET: 10; 325 TABLET ORAL at 07:24

## 2018-06-21 RX ADMIN — HYDROCODONE BITARTRATE AND ACETAMINOPHEN 1 TABLET: 10; 325 TABLET ORAL at 22:36

## 2018-06-21 RX ADMIN — PROPRANOLOL HYDROCHLORIDE 60 MG: 60 CAPSULE, EXTENDED RELEASE ORAL at 08:56

## 2018-06-21 RX ADMIN — CYCLOBENZAPRINE HYDROCHLORIDE 10 MG: 10 TABLET, FILM COATED ORAL at 08:56

## 2018-06-21 RX ADMIN — LEVOFLOXACIN 750 MG: 250 TABLET, FILM COATED ORAL at 08:59

## 2018-06-21 RX ADMIN — IPRATROPIUM BROMIDE AND ALBUTEROL SULFATE 3 ML: .5; 3 SOLUTION RESPIRATORY (INHALATION) at 09:50

## 2018-06-21 RX ADMIN — HYDROCODONE BITARTRATE AND ACETAMINOPHEN 1 TABLET: 10; 325 TABLET ORAL at 11:33

## 2018-06-21 RX ADMIN — RIVAROXABAN 20 MG: 10 TABLET, FILM COATED ORAL at 17:32

## 2018-06-21 RX ADMIN — SIMVASTATIN 40 MG: 20 TABLET, FILM COATED ORAL at 22:35

## 2018-06-21 RX ADMIN — LOSARTAN POTASSIUM 50 MG: 25 TABLET ORAL at 08:56

## 2018-06-21 RX ADMIN — IPRATROPIUM BROMIDE AND ALBUTEROL SULFATE 3 ML: .5; 3 SOLUTION RESPIRATORY (INHALATION) at 14:41

## 2018-06-21 RX ADMIN — HYDROCODONE BITARTRATE AND ACETAMINOPHEN 1 TABLET: 10; 325 TABLET ORAL at 17:30

## 2018-06-21 RX ADMIN — CYCLOBENZAPRINE HYDROCHLORIDE 10 MG: 10 TABLET, FILM COATED ORAL at 17:30

## 2018-06-21 RX ADMIN — TIOTROPIUM BROMIDE 1 CAPSULE: 18 CAPSULE ORAL; RESPIRATORY (INHALATION) at 08:45

## 2018-06-21 ASSESSMENT — ENCOUNTER SYMPTOMS
BACK PAIN: 1
PALPITATIONS: 0
NAUSEA: 0
HEARTBURN: 0
DIZZINESS: 0
VOMITING: 0
FEVER: 0
BLURRED VISION: 0
STRIDOR: 0
BRUISES/BLEEDS EASILY: 0
MYALGIAS: 0
HEADACHES: 0
ABDOMINAL PAIN: 0
DIARRHEA: 0
SHORTNESS OF BREATH: 0
WEAKNESS: 1
FLANK PAIN: 1
DEPRESSION: 0
EYE REDNESS: 0
COUGH: 0

## 2018-06-21 ASSESSMENT — PAIN SCALES - GENERAL
PAINLEVEL_OUTOF10: 7
PAINLEVEL_OUTOF10: 6
PAINLEVEL_OUTOF10: 8

## 2018-06-21 NOTE — CARE PLAN
Problem: Communication  Goal: The ability to communicate needs accurately and effectively will improve  Outcome: PROGRESSING AS EXPECTED    Intervention: [facility] patient and significant other/support system to call light to alert staff of needs  Pt understands to use the call light before getting out of bed      Problem: Safety  Goal: Will remain free from injury  Outcome: PROGRESSING AS EXPECTED    Intervention: Provide assistance with mobility  Pt is steady on her feet

## 2018-06-21 NOTE — FLOWSHEET NOTE
Oximetry 97% on 2 L nasal cannula at rest.  Oximetry 90% on room air at rest.  Ambulated with patient down alex, difficult to get readings as her hands were very cold and not sure how accurate.  Room air walking oximetry 84-86% and 93 on 4 L nasal cannula walking.     06/21/18 1441   RT Assessment of Delivered Medications   Evaluation of Medication Delivery Daily Yes-- Pt /Family has been Instructed in use of Respiratory Medications and Adverse Reactions   SVN Group   #SVN Performed Yes   Given By: Mouthpiece   PEP/CPT Group   PEP/CPT/Airway Clearance Therapy Yes   #PEP/CPT (Manual) Subsequent Subsequent   PEP/CPT Method Positive Airway Pressure Device   Incentive Spirometry Group   Breathing Exercises Yes   Incentive Spirometer Volume 1100 mL  ([id number])   Chest Exam   Respiration 18   Pulse 70   Breath Sounds   RUL Breath Sounds Clear   RML Breath Sounds Clear   RLL Breath Sounds Diminished   AUSTIN Breath Sounds Clear   LLL Breath Sounds Diminished   Oxygen   Pulse Oximetry 97 %   O2 (LPM) 2   O2 Daily Delivery Respiratory  Silicone Nasal Cannula

## 2018-06-21 NOTE — PROGRESS NOTES
Assumed care of pt at 1900, report given by day RN  Pt is resting comfortably in bed, denies needs at this time. Pain meds given by day RN. No s/s of distress.  Safety check complete  WCTM

## 2018-06-21 NOTE — PROGRESS NOTES
[facility] Hospitalist Progress Note    Date of Service: 2018    Chief Complaint  67 y.o. female admitted 2018 with back pain, nephrolithiasis and UTI, on coumadin for DVT.    Interval Problem Update  - No overnight events , her O2 requirement have dropped now on only 2-3 L of O2 NC.   for her h/o DVT she will would like to switch to xarelto, Sw faxed information to pharmacy and her copay is around $144 , pt states that she is ok with it. Discussed with pharmacy and will switch her over to xarelto.  Urology following appreciate rec.     - Pt seen and examined, afebrile, no overnight events still requiring O2 2-3 L. Switched to xarelto yesterday.      Consultants/Specialty  Urology.  PMA    Disposition  TBD. Will need to be re-anticoagulated and stable after surgery.        Review of Systems   Constitutional: Positive for malaise/fatigue. Negative for fever.   HENT: Negative for ear discharge.    Eyes: Negative for blurred vision and redness.   Respiratory: Negative for cough, shortness of breath and stridor.    Cardiovascular: Negative for chest pain and palpitations.   Gastrointestinal: Negative for abdominal pain, diarrhea, heartburn, nausea and vomiting.   Genitourinary: Positive for flank pain. Negative for dysuria.   Musculoskeletal: Positive for back pain. Negative for myalgias.   Skin: Negative for itching and rash.   Neurological: Positive for weakness. Negative for dizziness and headaches.   Endo/Heme/Allergies: Does not bruise/bleed easily.   Psychiatric/Behavioral: Negative for depression.      Physical Exam  Laboratory/Imaging   Hemodynamics  Temp (24hrs), Av.7 °C (98.1 °F), Min:36.6 °C (97.9 °F), Max:36.8 °C (98.3 °F)   Temperature: 36.8 °C (98.3 °F)  Pulse  Av.4  Min: 59  Max: 106    Blood Pressure : 123/69      Respiratory      Respiration: 16, Pulse Oximetry: 95 %, O2 Daily Delivery Respiratory : Silicone Nasal Cannula     Given By:: Mouthpiece, #MDI/DPI Given:  (RN gave  inhalers), PEP/CPT Method: Positive Airway Pressure Device (TheraPEP), Work Of Breathing / Effort: Shallow  RUL Breath Sounds: Clear, RML Breath Sounds: Clear, RLL Breath Sounds: Diminished, AUSTIN Breath Sounds: Clear, LLL Breath Sounds: Diminished    Fluids    Intake/Output Summary (Last 24 hours) at 06/21/18 1401  Last data filed at 06/20/18 1700   Gross per 24 hour   Intake              150 ml   Output              150 ml   Net                0 ml       Nutrition  Orders Placed This Encounter   Procedures   • DIET ORDER     Standing Status:   Standing     Number of Occurrences:   1     Order Specific Question:   Diet:     Answer:   Regular [1]     Physical Exam   Constitutional: She is oriented to person, place, and time. She appears well-developed and well-nourished. No distress.   Patient seen and examined by me.   HENT:   Head: Normocephalic and atraumatic.   Eyes: Conjunctivae and EOM are normal. Pupils are equal, round, and reactive to light. Right eye exhibits no discharge. Left eye exhibits no discharge. No scleral icterus.   Neck: Normal range of motion. Neck supple. No JVD present.   Cardiovascular: Normal rate, regular rhythm and normal heart sounds.    No murmur heard.  Pulmonary/Chest: Effort normal and breath sounds normal. No stridor. No respiratory distress. She has no wheezes.   Abdominal: Soft. Bowel sounds are normal. She exhibits no distension. There is no tenderness. There is CVA tenderness.   Musculoskeletal: She exhibits tenderness (mid back). She exhibits no edema.   Neurological: She is alert and oriented to person, place, and time. No cranial nerve deficit.   Skin: Skin is warm and dry. She is not diaphoretic. No erythema. No pallor.   Psychiatric: She has a normal mood and affect. Her behavior is normal. Thought content normal.   Nursing note and vitals reviewed.      Recent Labs      06/19/18   1025  06/20/18   0415  06/21/18   0528   WBC  12.7*  9.9  6.8   RBC  3.68*  3.24*  3.40*    HEMOGLOBIN  12.7  11.3*  11.7*   HEMATOCRIT  38.8  34.6*  36.4*   MCV  105.4*  106.8*  107.1*   MCH  34.5*  34.9*  34.4*   MCHC  32.7*  32.7*  32.1*   RDW  54.5*  55.8*  57.4*   PLATELETCT  170  132*  158*   MPV  10.9  11.1  10.7     Recent Labs      06/19/18   1025  06/20/18   0415  06/21/18   0528   SODIUM  134*  135  141   POTASSIUM  4.3  4.8  4.3   CHLORIDE  106  111  115*   CO2  20  20  22   GLUCOSE  244*  170*  107*   BUN  21  29*  24*   CREATININE  1.68*  1.34  1.22   CALCIUM  8.7  8.5  9.2     Recent Labs      06/19/18   1220   06/19/18   2114  06/20/18   0415  06/20/18   1040   APTT   --    < >  54.8*  61.2*  45.6*   INR  2.16*   --    --   1.91*   --     < > = values in this interval not displayed.     Recent Labs      06/18/18   2219   BNPBTYPENAT  190*              Assessment/Plan     Acute respiratory failure with hypoxia (HCC)- (present on admission)   Assessment & Plan    Likely related to anesthesia/sedation/atelectasis  Pt also has history of COPD  Still requiring O2.   Will monitor wean down as tolerated         Closed T11 fracture (HCC)- (present on admission)   Assessment & Plan    An L1 fracture subacute  The patient sees [facility] and she has appointment with him in a few days          Urinary tract infection with hematuria- (present on admission)   Assessment & Plan    Ucx growing Citrobacter freundii   Switching to Levaquin as is resistant to ceftriaxone         Obstruction of left ureteropelvic junction (UPJ) due to stone- (present on admission)   Assessment & Plan    2 cm in size  Post LEFT RETROGRADE PYELOGRAM   LEFT FLEXIBLE URETEROSCOPY WITH LASER LITHOTRIPSY TO ALLOW GUIDE WIRE PLACEMENT - Wound Class: Clean Contaminated  LEFT STENT PLACEMENT   Pain control  Cont on IV abx and follow up cultures         DVT (deep venous thrombosis) (HCC)- (present on admission)   Assessment & Plan    she will would like to switch to xarelto, Sw faxed information to pharmacy and her copay  is around $144 , pt states that she is ok with it. Discussed with pharmacy and will switch her over to xarelto.            Quality-Core Measures   Reviewed items::  Labs reviewed, Medications reviewed and Radiology images reviewed  Gonzalez catheter::  No Gonzalez  DVT prophylaxis pharmacological::  Heparin      Critical care time spent 40 minutes without overlap, exclude procedure time.

## 2018-06-21 NOTE — FLOWSHEET NOTE
Per CNA, ambulating room air oximetry 84-86%.  Patient started on PEP device, encouraged to use her PEP and IS Q 1 and to walk a couple more times today in hopes of improving her room air oximetry.  She states that she has a concentrator at home that she does not use and a tank.  She states she cannot pull a tank around due to her back pain.     06/21/18 0940   Interdisciplinary Plan of Care-Goals (Indications)   Bronchodilator Indications History / Diagnosis   Hyperinflation Protocol Indications Atelectasis Documented by Chest X-Ray   RT Assessment of Delivered Medications   Evaluation of Medication Delivery Daily Yes-- Pt /Family has been Instructed in use of Respiratory Medications and Adverse Reactions   SVN Group   #SVN Performed Yes   Given By: Mouthpiece   MDI/DPI Group   #MDI/DPI Given (RN gave inhalers)   PEP/CPT Group   PEP/CPT/Airway Clearance Therapy Yes   #PEP/CPT (Manual) Initial Initial   PEP/CPT Method Positive Airway Pressure Device  (TheraPEP)   Incentive Spirometry Group   Incentive Spirometer Volume 1100 mL  (8903-6514 max)   Chest Exam   Respiration 16   Pulse 73   Breath Sounds   RUL Breath Sounds Clear   RML Breath Sounds Clear   RLL Breath Sounds Diminished   AUSTIN Breath Sounds Clear   LLL Breath Sounds Diminished   Secretions   Cough Moist;Non Productive   Oxygen   Home O2 Use Prior To Admission? (says she has a set up but does not use it)   Pulse Oximetry 95 %   O2 (LPM) 2   O2 Daily Delivery Respiratory  Silicone Nasal Cannula

## 2018-06-21 NOTE — CARE PLAN
Problem: Discharge Barriers/Planning  Goal: Patient's continuum of care needs will be met    Intervention: Collaborate with Transitional Care Team and Interdisciplinary Team to meet discharge needs  When asking for discharge, pt states that she has an oxygen concentrator in her living room that she was sent home with a couple of years ago and she only used it when she felt she needed it.        Problem: Respiratory:  Goal: Respiratory status will improve    Intervention: Assess and monitor pulmonary status  Trying to wean from oxygen and performed ambulation study and she desated to 84-86%.

## 2018-06-22 VITALS
OXYGEN SATURATION: 92 % | RESPIRATION RATE: 16 BRPM | DIASTOLIC BLOOD PRESSURE: 72 MMHG | WEIGHT: 189.38 LBS | BODY MASS INDEX: 29.72 KG/M2 | HEIGHT: 67 IN | TEMPERATURE: 97.5 F | HEART RATE: 77 BPM | SYSTOLIC BLOOD PRESSURE: 144 MMHG

## 2018-06-22 LAB
ANION GAP SERPL CALC-SCNC: 7 MMOL/L (ref 0–11.9)
BASOPHILS # BLD AUTO: 0.2 % (ref 0–1.8)
BASOPHILS # BLD: 0.01 K/UL (ref 0–0.12)
BUN SERPL-MCNC: 24 MG/DL (ref 8–22)
CALCIUM SERPL-MCNC: 9.4 MG/DL (ref 8.4–10.2)
CHLORIDE SERPL-SCNC: 113 MMOL/L (ref 96–112)
CO2 SERPL-SCNC: 19 MMOL/L (ref 20–33)
CREAT SERPL-MCNC: 1.24 MG/DL (ref 0.5–1.4)
EOSINOPHIL # BLD AUTO: 0.12 K/UL (ref 0–0.51)
EOSINOPHIL NFR BLD: 2.4 % (ref 0–6.9)
ERYTHROCYTE [DISTWIDTH] IN BLOOD BY AUTOMATED COUNT: 56.9 FL (ref 35.9–50)
GLUCOSE SERPL-MCNC: 97 MG/DL (ref 65–99)
HCT VFR BLD AUTO: 34.2 % (ref 37–47)
HGB BLD-MCNC: 11.1 G/DL (ref 12–16)
IMM GRANULOCYTES # BLD AUTO: 0.03 K/UL (ref 0–0.11)
IMM GRANULOCYTES NFR BLD AUTO: 0.6 % (ref 0–0.9)
INR PPP: 2.17 (ref 0.87–1.13)
LYMPHOCYTES # BLD AUTO: 1.13 K/UL (ref 1–4.8)
LYMPHOCYTES NFR BLD: 22.2 % (ref 22–41)
MCH RBC QN AUTO: 34.6 PG (ref 27–33)
MCHC RBC AUTO-ENTMCNC: 32.5 G/DL (ref 33.6–35)
MCV RBC AUTO: 106.5 FL (ref 81.4–97.8)
MONOCYTES # BLD AUTO: 0.64 K/UL (ref 0–0.85)
MONOCYTES NFR BLD AUTO: 12.6 % (ref 0–13.4)
NEUTROPHILS # BLD AUTO: 3.16 K/UL (ref 2–7.15)
NEUTROPHILS NFR BLD: 62 % (ref 44–72)
NRBC # BLD AUTO: 0 K/UL
NRBC BLD-RTO: 0 /100 WBC
PLATELET # BLD AUTO: 163 K/UL (ref 164–446)
PMV BLD AUTO: 11.1 FL (ref 9–12.9)
POTASSIUM SERPL-SCNC: 4.8 MMOL/L (ref 3.6–5.5)
PROTHROMBIN TIME: 23.9 SEC (ref 12–14.6)
RBC # BLD AUTO: 3.21 M/UL (ref 4.2–5.4)
SODIUM SERPL-SCNC: 139 MMOL/L (ref 135–145)
WBC # BLD AUTO: 5.1 K/UL (ref 4.8–10.8)

## 2018-06-22 PROCEDURE — 94668 MNPJ CHEST WALL SBSQ: CPT

## 2018-06-22 PROCEDURE — 700101 HCHG RX REV CODE 250: Performed by: INTERNAL MEDICINE

## 2018-06-22 PROCEDURE — 85025 COMPLETE CBC W/AUTO DIFF WBC: CPT

## 2018-06-22 PROCEDURE — A9270 NON-COVERED ITEM OR SERVICE: HCPCS | Performed by: INTERNAL MEDICINE

## 2018-06-22 PROCEDURE — 700102 HCHG RX REV CODE 250 W/ 637 OVERRIDE(OP): Performed by: INTERNAL MEDICINE

## 2018-06-22 PROCEDURE — 80048 BASIC METABOLIC PNL TOTAL CA: CPT

## 2018-06-22 PROCEDURE — 99239 HOSP IP/OBS DSCHRG MGMT >30: CPT | Performed by: INTERNAL MEDICINE

## 2018-06-22 PROCEDURE — 85610 PROTHROMBIN TIME: CPT

## 2018-06-22 PROCEDURE — 94640 AIRWAY INHALATION TREATMENT: CPT

## 2018-06-22 PROCEDURE — 94760 N-INVAS EAR/PLS OXIMETRY 1: CPT

## 2018-06-22 RX ORDER — LEVOFLOXACIN 500 MG/1
500 TABLET, FILM COATED ORAL EVERY 24 HOURS
Qty: 5 TAB | Refills: 0 | Status: SHIPPED | OUTPATIENT
Start: 2018-06-23 | End: 2018-07-06

## 2018-06-22 RX ORDER — HYDROCODONE BITARTRATE AND ACETAMINOPHEN 10; 325 MG/1; MG/1
0.5 TABLET ORAL EVERY 6 HOURS PRN
Qty: 12 TAB | Refills: 0 | Status: SHIPPED | OUTPATIENT
Start: 2018-06-22 | End: 2018-06-25

## 2018-06-22 RX ORDER — IPRATROPIUM BROMIDE AND ALBUTEROL SULFATE 2.5; .5 MG/3ML; MG/3ML
3 SOLUTION RESPIRATORY (INHALATION) EVERY 4 HOURS PRN
Qty: 30 BULLET | Refills: 0 | Status: SHIPPED | OUTPATIENT
Start: 2018-06-22 | End: 2018-07-06

## 2018-06-22 RX ORDER — CYCLOBENZAPRINE HCL 10 MG
10 TABLET ORAL 3 TIMES DAILY PRN
Qty: 10 TAB | Refills: 0 | Status: SHIPPED | OUTPATIENT
Start: 2018-06-22 | End: 2018-08-30

## 2018-06-22 RX ADMIN — PROPRANOLOL HYDROCHLORIDE 60 MG: 60 CAPSULE, EXTENDED RELEASE ORAL at 08:48

## 2018-06-22 RX ADMIN — CYCLOBENZAPRINE HYDROCHLORIDE 10 MG: 10 TABLET, FILM COATED ORAL at 08:47

## 2018-06-22 RX ADMIN — LOSARTAN POTASSIUM 50 MG: 25 TABLET ORAL at 08:47

## 2018-06-22 RX ADMIN — LEVOFLOXACIN 500 MG: 500 TABLET, FILM COATED ORAL at 08:46

## 2018-06-22 RX ADMIN — IPRATROPIUM BROMIDE AND ALBUTEROL SULFATE 3 ML: .5; 3 SOLUTION RESPIRATORY (INHALATION) at 09:48

## 2018-06-22 RX ADMIN — HYDROCODONE BITARTRATE AND ACETAMINOPHEN 1 TABLET: 10; 325 TABLET ORAL at 08:46

## 2018-06-22 RX ADMIN — TIOTROPIUM BROMIDE 1 CAPSULE: 18 CAPSULE ORAL; RESPIRATORY (INHALATION) at 08:52

## 2018-06-22 RX ADMIN — CYCLOBENZAPRINE HYDROCHLORIDE 10 MG: 10 TABLET, FILM COATED ORAL at 00:15

## 2018-06-22 RX ADMIN — HYDROCODONE BITARTRATE AND ACETAMINOPHEN 1 TABLET: 10; 325 TABLET ORAL at 15:40

## 2018-06-22 RX ADMIN — ZOLPIDEM TARTRATE 10 MG: 5 TABLET, FILM COATED ORAL at 00:15

## 2018-06-22 ASSESSMENT — PAIN SCALES - GENERAL: PAINLEVEL_OUTOF10: 6

## 2018-06-22 NOTE — PROGRESS NOTES
Bedside report received from Sang LEMUS. Plan of care discussed. Pt resting in bed with safety precautions in place.

## 2018-06-22 NOTE — DISCHARGE PLANNING
Received Choice form at 1100  Agency/Facility Name: [facility]  Referral sent per Choice form at 1130.  Choice obtained be Garret JAIN

## 2018-06-22 NOTE — PROGRESS NOTES
Pulmonary Critical Care Progress Note      REASON FOR CONSULTATION:  Acute hypoxic respiratory failure     Chief Complaint: Flank pain and SOB    HISTORY OF PRESENT ILLNESS:  67 yof pmhx of COPD followed by Dr. 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 Presented to the [facility] ER on 6/17/18 for flank pain, she was found to have a 2 cm obstructing stone at the left UPJ with moderate hydronephrosis and a UTI with moderate bacturia and 100-150 WBC/hpf. She was taken to the OR on 6/17/18 by Dr Yang for lithotripsy and left ureteral stent placement. Post-operatively the patient had increased dyspnea and supplemental O2 requirements (10L oxymask). A CXR demonstrated atelectasis and post-operative labs showed a metabolic acidosis which has since resolved. She was placed on the RT protocol and her home Spiriva/Breo Ellipta. These treatments in conjunction with pain control and IS have decreased her O2 requirements to 3L via NC currently and her work of breath has returned to normal.     PFTs 10/24/16: FEV1 1.43, 54% predicted     She takes spiriva, Breo ellipta and PRN albuterol at home, she has been on O2 in the past but does not currently use.  She has had a CT chest on 6/27/17 which showed a 6.2 mm LLL nodule, this was followed up with an additional CT on 1/9/18 in which the nodule was found to be resolved however showed a new RLL 6mm nodule. She Is scheduled for a repeat CT this month and is quiet anxious to have this completed during this hospitalization.    She is not particularly happy about the need for supplemental oxygen.  She says she can do it at night with a concentrator.  Physically handling oxygen  cylinders may be too much considering her history of back issues.         ROS:  Respiratory: positive shortness of breath, Cardiac: negative, GI: negative.  All other systems negative.    Physical Exam   GENERAL:  Well nourished, well developed age appropriate appearing female in no acute distress  HEENT:  Normocephalic, atraumatic, EOMI, external ears and nose normal  NECK:  Supple, trachea midline  PULM:   diminished bilaterally without any appreciable wheezing, no tachypnea  CVS:  Regular rate and rhythm  ABDOMEN:  Soft, non-tender  EXTREMITIES:  No clubbing, cyanosis or edema  SKIN:  Warm, dry  NEURO: AOx4, no focal deficits noted     Interval Events:  24 hour interval history reviewed            PFSH:  No change.    Respiratory:     Pulse Oximetry: 92 % (at rest)  Chest Tube Drains:          Exam: unlabored respirations, no intercostal retractions or accessory muscle use  ImagingAvailable data reviewed         Invalid input(s): [id number]    HemoDynamics:  Pulse: 77  Blood Pressure : 144/72       Exam: regular rate and rhythm  Imaging: Available data reviewed        Neuro:  GCS         Exam: no focal deficits noted  Imaging: Available data reviewed    Fluids:  Intake/Output       06/20/18 0700 - 06/21/18 0659 06/21/18 0700 - 06/22/18 0659 06/22/18 0700 - 06/23/18 0659      [id number] 3595-3408 Total 7669-3544 6848-9716 Total 0603-3138 8253-8028 Total       Intake    P.O.  740  -- 740  200  -- 200  --  -- --    P.O. 740 -- 740 200 -- 200 -- -- --    I.V.  748  -- 748  --  -- --  --  -- --    Heparin Volume 213 -- 213 -- -- -- -- -- --    IV Piggyback Volume (ABX) 250 -- 250 -- -- -- -- -- --    IV Volume (IV Maintenance Fluid) 285 -- 285 -- -- -- -- -- --    Total Intake 1488 -- 1488 200 -- 200 -- -- --       Output    Urine  850  -- 850  --  -- --  --  -- --    Number of Times Voided -- -- -- 3 x -- 3 x 1 x -- 1 x    Urine Void (mL) (non-catheter) 850 -- 850 -- -- -- -- -- --    Total Output 850 -- 850 -- --  -- -- -- --       Net I/O     638 -- 638 200 -- 200 -- -- --           Recent Labs      18   0510   SODIUM  135  141  139   POTASSIUM  4.8  4.3  4.8   CHLORIDE  111  115*  113*   CO2  20  22  19*   BUN  29*  24*  24*   CREATININE  1.34  1.22  1.24   CALCIUM  8.5  9.2  9.4       GI/Nutrition:  Exam: abdomen is soft and non-tender  Imaging: Available data reviewed  NPO  Liver Function  Recent Labs      18   0528  18   0510   GLUCOSE  170*  107*  97       Heme:  Recent Labs      18   2114  18   1040  18   0510  18   0511   RBC   --   3.24*   --   3.40*   --   3.21*   HEMOGLOBIN   --   11.3*   --   11.7*   --   11.1*   HEMATOCRIT   --   34.6*   --   36.4*   --   34.2*   PLATELETCT   --   132*   --   158*   --   163*   PROTHROMBTM   --   21.6*   --   34.4*  23.9*   --    APTT  54.8*  61.2*  45.6*   --    --    --    INR   --   1.91*   --   3.47*  2.17*   --        Infectious Disease:  Temp  Av.6 °C (97.8 °F)  Min: 36.4 °C (97.5 °F)  Max: 36.8 °C (98.2 °F)  Micro: reviewed  Recent Labs      1828  18   0511   WBC  9.9  6.8  5.1   NEUTSPOLYS  87.60*  75.60*  62.00   LYMPHOCYTES  5.50*  13.00*  22.20   MONOCYTES  6.30  9.10  12.60   EOSINOPHILS  0.10  1.60  2.40   BASOPHILS  0.10  0.10  0.20     Current Facility-Administered Medications   Medication Dose Frequency Provider Last Rate Last Dose   • levoFLOXacin (LEVAQUIN) tablet 500 mg  500 mg Q24HRS Arcelia Velasquez M.D.   500 mg at 18 0846   • tiotropium (SPIRIVA) 18 MCG inhalation capsule 1 Cap  1 Cap DAILY Miguel Christianson M.D.   1 Cap at 18 0852   • ipratropium-albuterol (DUONEB) nebulizer solution  3 mL Q4H PRN (RT) Miguel Christianson M.D.   3 mL at 18 0948   • albuterol inhaler 2 Puff  2 Puff Q4HRS PRN Miguel Christianson M.D.       • HYDROcodone/acetaminophen (NORCO)  MG per tablet 1 Tab  1 Tab  Q4HRS PRN Moe Velasquez M.D.   1 Tab at 06/22/18 0846   • morphine (pf) 4 mg/ml injection 1 mg  1 mg Q6HRS PRN Moe Velasquez M.D.   1 mg at 06/20/18 1437   • rivaroxaban (XARELTO) tablet 20 mg  20 mg PM MEAL Arcelia Velasuqez M.D.   20 mg at 06/21/18 1732   • Fluticasone Furoate-Vilanterol (BREO) inhaler 1 Puff  1 Puff DAILY Arcelia Velasquez M.D.   1 Puff at 06/22/18 0855   • opium-belladonna (B&O SUPPRETTES) suppository 30 mg  30 mg Q8HRS PRN Beck Yang M.D.       • Respiratory Care per Protocol   Continuous RT Jen Alford M.D.       • losartan (COZAAR) tablet 50 mg  50 mg ELICIA Moe M.D.   50 mg at 06/22/18 0847   • propranolol LA (INDERAL LA) capsule 60 mg  60 mg ELICIA Moe M.D.   60 mg at 06/22/18 0848   • simvastatin (ZOCOR) tablet 40 mg  40 mg Nightly Ravin Moe M.D.   40 mg at 06/21/18 2235   • zolpidem (AMBIEN) tablet 10 mg  10 mg HS PRN Ravin Moe M.D.   10 mg at 06/22/18 0015   • senna-docusate (PERICOLACE or SENOKOT S) 8.6-50 MG per tablet 2 Tab  2 Tab BID Ravin Moe M.D.   2 Tab at 06/19/18 2024    And   • polyethylene glycol/lytes (MIRALAX) PACKET 1 Packet  1 Packet QDAY PRN Ravin Moe M.D.        And   • magnesium hydroxide (MILK OF MAGNESIA) suspension 30 mL  30 mL QDAY PRN Ravin Moe M.D.        And   • bisacodyl (DULCOLAX) suppository 10 mg  10 mg QDAY PRN Ravin Moe M.D.       • ondansetron (ZOFRAN) syringe/vial injection 4 mg  4 mg Q4HRS PRN Ravin Moe M.D.   4 mg at 06/20/18 1033   • ondansetron (ZOFRAN ODT) dispertab 4 mg  4 mg Q4HRS PRN Ravin Moe M.D.       • cyclobenzaprine (FLEXERIL) tablet 10 mg  10 mg TID PRN Ravin Moe M.D.   10 mg at 06/22/18 0847     Last reviewed on 6/18/2018  5:09 PM by Esha Basilio R.N.    Quality  Measures:   Radiology images reviewed, Medications reviewed, Labs reviewed and EKG reviewed                    ASSESSMENT/PLAN:  Acute on chronic hypoxic respiratory failure              -likely due to sedation and atelectasis compounded by baseline  underlying lung disease              -continue aggressive pulmonary toilet, IS and mobility as tolerated              -RT/O2 Protocols              -Titrate supplemental FiO2 to maintain SpO2 >92%   -Probable discharge on supplemental oxygen     COPD              -not currently bronchospastic on exam              -continue home breo and Spiriva              -will hold steroids for now as she is clinically improving              -RT/O2 Protocols              -Titrate supplemental FiO2 to maintain SpO2 >92%    History of lung cancer and now small pulmonary nodules   -May have to delay repeat CT scanning   -She does have a small right lower lobe nodule but now that may be obscured by right lower lobe infiltrate/area of atelectasis   -Will need follow-up with Dr. Byrne     Left UPJ stone with obstructive nephropathy              -POD #1 from lithotripsy and stent placement              -complicated by ISAMAR              -monitor UOP              -Urology on board     UTI              -rocephin              -f/u culture     ISAMAR              -likely 2/2 obstructive stone              -strict I/Os              -IVF              -renal dose meds, avoid nephrotoxins     L1 and T11 fractures              -likely due to severe osteopenia              -f/u with [facility]              -pain control     History of multiple DVTs              -on chronic anticoagulation              -heparin infusion for bridging              -consider DOAC if able as patient states she struggles to maintain her INR check appointments     HTN              -continue home losartan (caution in ISAMAR) and proprolol     HLP              -continue zocor     Prophylaxis: heparin

## 2018-06-22 NOTE — PROGRESS NOTES
Report received from Sylvia pt sleeping at the time.  Pt awoke with vital signs and wanted her pain medication.  Medicated and morning assessment done about 0730.  poc discussed about weaning oxygen after breakfast.  Morning medications given about 0900 and flexeril.  CNA walked with pt in the hallways and she desated to 84-86% and came up to about 90-1 with ambulation.

## 2018-06-22 NOTE — CARE PLAN
Problem: Oxygenation:  Goal: Maintain adequate oxygenation dependent on patient condition    Intervention: Manage oxygen therapy by monitoring pulse oximetry and/or ABG values  Oximetry 97 on 2 L at rest, 92 on room air at rest.  CNA did walking oximetries, and got low numbers with room air walking 85% (same we bot got yesterday).  Home O2 has been ordered.      Problem: Bronchoconstriction:  Goal: Improve in air movement and diminished wheezing    Intervention: Implement inhaled treatments  Patient received 1 PRN nebulizer today (2 yesterday).  Breath sounds clear and diminished bases.  Both yestereday and today we worked frequently on her IS and PEP device.  She will be taking these home with her.  No SOB noted.  She mostly c/o post-surgical pain.

## 2018-06-22 NOTE — PROGRESS NOTES
Tele strip at 0657 shows SB at 64.      Measurements from am strip were as follows:  PA=0.20  QRS=0.08  QT=0.40    Tele Shift Summary:    Rhythm : SB-SR  Rate : 62-84  Ectopy : Per CCT Wendy, pt had occasional to rare PVCs.     Telemetry monitoring strips placed in pt's chart.

## 2018-06-22 NOTE — DISCHARGE SUMMARY
Discharge Summary    CHIEF COMPLAINT ON ADMISSION  Chief Complaint   Patient presents with   • Flank Pain     Bilat since yesterday Hx chronic UTI x 1 mon also has chronic  back pain  Pt not sure which it is       Reason for Admission  back/flank pain     Admission Date  6/17/2018    CODE STATUS  Full Code    HPI & HOSPITAL COURSE  This is a 67 y.o. female  PMH of DVT, COPD, multiple back surgeries, kidney stones, who presents with bilateral flank pain and low back pain. In the ER she has CT scan of the abdomen done and found to have 2 cm stone in the left UPJ junction with moderate hydronephrosis as well as multiple right kidney stone with no obstruction. Urology was consulted and pt had  Left ureteral stent placement. Pt become hypoxic after it, requiring 10 L of O2 mask. She was transferred to [facility] for close monitoring, her symptoms continued to improve, most likely related to anesthesia, but she also has history of COPD, and she also oxygen at home but never used it.   We did a home  O2 eval here and she will need to be discharged home on 2 L of O2   Pt also on warfarin as outpt for h/o DVT but wants to try the new anticoagulation, so we started pt on xarelto, SW faxed it to the pharmacy to se how much her copay would be and it came at $144, pt states that it is ok and she would like to continue on xarelto and can afford it.  Pt now feeling better.   She will be discharged home today and will need to follow up with urology as outpt     The patient met 2-midnight criteria for an inpatient stay at the time of discharge.    Discharge Date  6/22/18        DISCHARGE DIAGNOSES  Active Problems:    Acute respiratory failure with hypoxia (HCC) POA: Yes    DVT (deep venous thrombosis) ([facility]) (Chronic) POA: Yes    Obstruction of left ureteropelvic junction (UPJ) due to stone POA: Yes    Urinary tract infection with hematuria POA: Yes    Closed T11 fracture ([facility]) POA: Yes  Resolved Problems:    * No resolved hospital  problems. *      FOLLOW UP  Future Appointments  Date Time Provider Department Center   7/16/2018 2:40 PM Jessenia Byrne M.D. PULM None     Edwin Rogers M.D.  [address] [facility] [address]  [place] [address]  [phone]    Schedule an appointment as soon as possible for a visit in 1 week  Hospital follow-up appointment with PCP    Beck Yang M.D.  [address]  [address]  [place] [address]  [phone]    Go in 11 days  Appointment time at 1pm for pre-op apt and surgery on July 9th at 12 pm      MEDICATIONS ON DISCHARGE     Medication List      START taking these medications      Instructions   cyclobenzaprine 10 MG Tabs  Commonly known as:  FLEXERIL   Take 1 Tab by mouth 3 times a day as needed for Muscle Spasms.  Dose:  10 mg     ipratropium-albuterol 0.5-2.5 (3) MG/3ML nebulizer solution  Commonly known as:  DUONEB   3 mL by Nebulization route every four hours as needed for Shortness of Breath.  Dose:  3 mL     levoFLOXacin 500 MG tablet  Start taking on:  6/23/2018  Commonly known as:  LEVAQUIN   Take 1 Tab by mouth every 24 hours.  Dose:  500 mg     rivaroxaban 20 MG Tabs tablet  Commonly known as:  XARELTO   Take 1 Tab by mouth with dinner.  Dose:  20 mg        CHANGE how you take these medications      Instructions   HYDROcodone/acetaminophen  MG Tabs  What changed:  when to take this  Commonly known as:  NORCO   Take 0.5 Tabs by mouth every 6 hours as needed for up to 3 days.  Dose:  0.5 Tab        CONTINUE taking these medications      Instructions   BREO ELLIPTA 200-25 MCG/INH Aepb  Generic drug:  Fluticasone Furoate-Vilanterol   INHALE ONE DOSE BY MOUTH DAILY. RINSE MOUTH AFTER USE.     COMBIGAN 0.2-0.5 % Soln  Generic drug:  Brimonidine Tartrate-Timolol   Place 1 Drop in both eyes 2 Times a Day.  Dose:  1 Drop     losartan 50 MG Tabs  Commonly known as:  COZAAR   Take 50 mg by mouth every morning.  Dose:  50 mg     PROAIR  (90 Base) MCG/ACT Aers inhalation aerosol  Generic drug:  albuterol    INHALE TWO PUFFS BY MOUTH EVERY 6 HOURS AS NEEDED FOR SHORTNESS OF BREATH     propranolol LA 60 MG Cp24  Commonly known as:  INDERAL LA   Take 60 mg by mouth every morning.  Dose:  60 mg     simvastatin 40 MG Tabs  Commonly known as:  ZOCOR   Take 40 mg by mouth every evening.  Dose:  40 mg     SPIRIVA RESPIMAT 2.5 MCG/ACT Aers  Generic drug:  Tiotropium Bromide Monohydrate   Inhale 1 Puff by mouth 2 Times a Day.  Dose:  1 Puff     vitamin D (Ergocalciferol) 44841 units Caps capsule  Commonly known as:  DRISDOL   Take 50,000 Units by mouth every 14 days.  Dose:  42547 Units     zolpidem 10 MG Tabs  Commonly known as:  AMBIEN   Take 10 mg by mouth at bedtime as needed for Sleep.  Dose:  10 mg        STOP taking these medications    acetaminophen 500 MG Tabs  Commonly known as:  TYLENOL     sulfamethoxazole-trimethoprim 800-160 MG tablet  Commonly known as:  BACTRIM DS     warfarin 2 MG Tabs  Commonly known as:  COUMADIN            Allergies  Allergies   Allergen Reactions   • Nkda [No Known Drug Allergy]        DIET  Orders Placed This Encounter   Procedures   • DIET ORDER     Standing Status:   Standing     Number of Occurrences:   1     Order Specific Question:   Diet:     Answer:   Regular [1]       ACTIVITY  As tolerated.      CONSULTATIONS  Urology     PROCEDURES  Left ureteral stent placement     LABORATORY  Lab Results   Component Value Date    SODIUM 139 06/22/2018    POTASSIUM 4.8 06/22/2018    CHLORIDE 113 (H) 06/22/2018    CO2 19 (L) 06/22/2018    GLUCOSE 97 06/22/2018    BUN 24 (H) 06/22/2018    CREATININE 1.24 06/22/2018    CREATININE 1.7 (H) 09/19/2008        Lab Results   Component Value Date    WBC 5.1 06/22/2018    HEMOGLOBIN 11.1 (L) 06/22/2018    HEMATOCRIT 34.2 (L) 06/22/2018    PLATELETCT 163 (L) 06/22/2018        Total time of the discharge process exceeds 41 minutes.

## 2018-06-22 NOTE — FACE TO FACE
Face to Face Note  -  Durable Medical Equipment    Arcelia Velasquez M.D. - NPI: [id number]  I certify that this patient is under my care and that they had a durable medical equipment(DME)face to face encounter by myself that meets the physician DME face-to-face encounter requirements with this patient on:    Date of encounter:   Patient:                    MRN:                       YOB: 2019  Latonia Clinton  [id number]  1950     The encounter with the patient was in whole, or in part, for the following medical condition, which is the primary reason for durable medical equipment:  COPD    I certify that, based on my findings, the following durable medical equipment is medically necessary:  Oxygen.    HOME O2 Saturation Measurements:(Values must be present for Home Oxygen orders)  Room air sat at rest: 88  Room air sat with amb: 85  With liters of O2: 2, O2 sat at rest with O2: 91  With Liters of O2: 2, O2 sat with amb with O2 : 96  Is the patient mobile?: Yes    My Clinical findings support the need for the above equipment due to:  Hypoxia

## 2018-06-22 NOTE — DISCHARGE INSTRUCTIONS
Discharge Instructions    Discharged to home by taxi with self. Discharged via wheelchair, hospital escort: Yes.  Special equipment needed: Oxygen    Be sure to schedule a follow-up appointment with your primary care doctor or any specialists as instructed.     Discharge Plan:   Influenza Vaccine Indication: Not indicated: Previously immunized this influenza season and > 8 years of age    I understand that a diet low in cholesterol, fat, and sodium is recommended for good health. Unless I have been given specific instructions below for another diet, I accept this instruction as my diet prescription.   Other diet: keep yourself hydrated.      Special Instructions:   Ureteral Stent Implantation, Care After  Refer to this sheet in the next few weeks. These instructions provide you with information on caring for yourself after your procedure. Your health care provider may also give you more specific instructions. Your treatment has been planned according to current medical practices, but problems sometimes occur. Call your health care provider if you have any problems or questions after your procedure.  WHAT TO EXPECT AFTER THE PROCEDURE  You should be back to normal activity within 48 hours after the procedure. Nausea and vomiting may occur and are commonly the result of anesthesia.  It is common to experience sharp pain in the back or lower abdomen and penis with voiding. This is caused by movement of the ends of the stent with the act of urinating. It usually goes away within minutes after you have stopped urinating.  HOME CARE INSTRUCTIONS  Make sure to drink plenty of fluids. You may have small amounts of bleeding, causing your urine to be red. This is normal. Certain movements may trigger pain or a feeling that you need to urinate. You may be given medicines to prevent infection or bladder spasms. Be sure to take all medicines as directed. Only take over-the-counter or prescription medicines for pain, discomfort,  or fever as directed by your health care provider. Do not take aspirin, as this can make bleeding worse.  Your stent will be left in until the blockage is resolved. This may take 2 weeks or longer, depending on the reason for stent implantation. You may have an X-ray exam to make sure your ureter is open and that the stent has not moved out of position (migrated). The stent can be removed by your health care provider in the office. Medicines may be given for comfort while the stent is being removed. Be sure to keep all follow-up appointments so your health care provider can check that you are healing properly.  SEEK MEDICAL CARE IF:  · You experience increasing pain.  · Your pain medicine is not working.  SEEK IMMEDIATE MEDICAL CARE IF:  · Your urine is dark red or has blood clots.  · You are leaking urine (incontinent).  · You have a fever, chills, feeling sick to your stomach (nausea), or vomiting.  · Your pain is not relieved by pain medicine.  · The end of the stent comes out of the urethra.  · You are unable to urinate.     This information is not intended to replace advice given to you by your health care provider. Make sure you discuss any questions you have with your health care provider.     Document Released: 08/20/2014 Document Revised: 12/23/2014 Document Reviewed: 08/20/2014  [employer] Interactive Patient Education ©2016 [employer]      Ureteral Stent Implantation  Introduction  Ureteral stent implantation is a procedure to insert (implant) a flexible, soft, plastic tube (stent) into a tube (ureter) that drains urine from the kidneys. The stent supports the ureter while it heals and helps to drain urine from the kidneys. You may have a ureteral stent implanted after having a procedure to remove a blockage from the ureter (ureterolysis or pyeloplasty). You may also have a stent implanted to open the flow of urine when you have a blockage caused by a kidney stone, tumor, blood clot, or infection.  You  have two ureters, one on each side of the body. The ureters connect the kidneys to the organ that holds urine until it passes out of the body (bladder). The stent is placed so that one end is in the kidney, and one end is in the bladder. The stent is usually taken out after your ureter has healed. Depending on your condition, you may have a stent for just a few weeks, or you may have a long-term stent that will need to be replaced every few months.  Tell a health care provider about:  · Any allergies you have.  · All medicines you are taking, including vitamins, herbs, eye drops, creams, and over-the-counter medicines.  · Any problems you or family members have had with anesthetic medicines.  · Any blood disorders you have.  · Any surgeries you have had.  · Any medical conditions you have.  · Whether you are pregnant or may be pregnant.  What are the risks?  Generally, this is a safe procedure. However, problems may occur, including:  · Infection.  · Bleeding.  · Allergic reactions to medicines.  · Damage to other structures or organs. Tearing (perforation) of the ureter is possible.  · Movement of the stent away from where it is placed during surgery (migration).  What happens before the procedure?  · Ask your health care provider about:  ¨ Changing or stopping your regular medicines. This is especially important if you are taking diabetes medicines or blood thinners.  ¨ Taking medicines such as aspirin and ibuprofen. These medicines can thin your blood. Do not take these medicines before your procedure if your health care provider instructs you not to.  · Follow instructions from your health care provider about eating or drinking restrictions.  · Do not drink alcohol and do not use any tobacco products before your procedure, as told by your health care provider.  · You may be given antibiotic medicine to help prevent infection.  · Plan to have someone take you home after the procedure.  · If you go home right  after the procedure, plan to have someone with you for 24 hours.  What happens during the procedure?  · An IV tube will be inserted into one of your veins.  · You will be given a medicine to make you fall asleep (general anesthetic). You may also be given a medicine to help you relax (sedative).  · A thin, tube-shaped instrument with a light and tiny camera at the end (cystoscope) will be inserted into your urethra. The urethra is the tube that drains urine from the bladder out of the body. In men, the urethra opens at the end of the penis. In women, the urethra opens in front of the vaginal opening.  · The cystoscope will be passed into your bladder.  · A thin wire (guide wire) will be passed through your bladder and into your ureter. This is used to guide the stent into your ureter.  · The stent will be inserted into your ureter.  · The guide wire and the cystoscope will be removed.  · A flexible tube (catheter) will be inserted through your urethra so that one end is in your bladder. This helps to drain urine from your bladder.  The procedure may vary among hospitals and health care providers.  What happens after the procedure?  · Your blood pressure, heart rate, breathing rate, and blood oxygen level will be monitored often until the medicines you were given have worn off.  · You may continue to receive medicine and fluids through an IV tube.  · You may have some soreness or pain in your abdomen and urethra. Medicines will be available to help you.  · You will be encouraged to get up and walk around as soon as you can.  · You will have a catheter draining your urine.  · You will have some blood in your urine.  · Do not drive for 24 hours if you received a sedative.  This information is not intended to replace advice given to you by your health care provider. Make sure you discuss any questions you have with your health care provider.  Document Released: 12/15/2001 Document Revised: 05/25/2017 Document Reviewed:  07/01/2016  © 2017 [employer]      · Is patient discharged on Warfarin / Coumadin?   No     Depression / Suicide Risk    As you are discharged from this [facility] facility, it is important to learn how to keep safe from harming yourself.    Recognize the warning signs:  · Abrupt changes in personality, positive or negative- including increase in energy   · Giving away possessions  · Change in eating patterns- significant weight changes-  positive or negative  · Change in sleeping patterns- unable to sleep or sleeping all the time   · Unwillingness or inability to communicate  · Depression  · Unusual sadness, discouragement and loneliness  · Talk of wanting to die  · Neglect of personal appearance   · Rebelliousness- reckless behavior  · Withdrawal from people/activities they love  · Confusion- inability to concentrate     If you or a loved one observes any of these behaviors or has concerns about self-harm, here's what you can do:  · Talk about it- your feelings and reasons for harming yourself  · Remove any means that you might use to hurt yourself (examples: pills, rope, extension cords, firearm)  · Get professional help from the community (Mental Health, Substance Abuse, psychological counseling)  · Do not be alone:Call your Safe Contact- someone whom you trust who will be there for you.  · Call your local CRISIS HOTLINE 285-9654 or 699-618-8632  · Call your local Children's Mobile Crisis Response Team [place] (579) 037-8642 or www.[employer]  · Call the toll free National Suicide Prevention Hotlines   · National Suicide Prevention Lifeline 687-399-LKES (7145)  · National Hope Line Network 800-SUICIDE (049-7267)

## 2018-06-23 ENCOUNTER — PATIENT OUTREACH (OUTPATIENT)
Dept: HEALTH INFORMATION MANAGEMENT | Facility: OTHER | Age: 68
End: 2018-06-23

## 2018-06-25 ENCOUNTER — TELEPHONE (OUTPATIENT)
Dept: VASCULAR LAB | Facility: MEDICAL CENTER | Age: 68
End: 2018-06-25

## 2018-06-25 NOTE — TELEPHONE ENCOUNTER
Spoke with pt regarding anticoagulation referral we received at discharge.   Pt was previously on warfarin and being managed by her PCP, was switched to Xarelto inpatient.   At this time pt is not ready to make any appts. Not sure what she wants to do. However does know that she would like to speak with someone regarding HH services, which she originally declined at the time of DC. Will forward this note on to the [facility] RN that has outreached to pt to f/u with this.     Deja Cheatham, PharmD

## 2018-07-05 ENCOUNTER — TELEPHONE (OUTPATIENT)
Dept: PULMONOLOGY | Facility: HOSPICE | Age: 68
End: 2018-07-05

## 2018-07-05 NOTE — TELEPHONE ENCOUNTER
I left a voicemail to see if the patient had her CT chest without contrast prior to her upcoming appointment on 7/16/18. If not she may need to R/S her appointment.

## 2018-07-06 DIAGNOSIS — Z01.811 PRE-OPERATIVE RESPIRATORY EXAMINATION: ICD-10-CM

## 2018-07-06 DIAGNOSIS — Z01.812 PRE-PROCEDURAL LABORATORY EXAMINATION: ICD-10-CM

## 2018-07-06 LAB
ALBUMIN SERPL BCP-MCNC: 4 G/DL (ref 3.2–4.9)
ALBUMIN/GLOB SERPL: 1.3 G/DL
ALP SERPL-CCNC: 115 U/L (ref 30–99)
ALT SERPL-CCNC: 14 U/L (ref 2–50)
ANION GAP SERPL CALC-SCNC: 14 MMOL/L (ref 0–11.9)
APPEARANCE UR: ABNORMAL
AST SERPL-CCNC: 17 U/L (ref 12–45)
BACTERIA #/AREA URNS HPF: ABNORMAL /HPF
BILIRUB SERPL-MCNC: 0.6 MG/DL (ref 0.1–1.5)
BILIRUB UR QL STRIP.AUTO: NEGATIVE
BUN SERPL-MCNC: 35 MG/DL (ref 8–22)
CALCIUM SERPL-MCNC: 10.4 MG/DL (ref 8.5–10.5)
CHLORIDE SERPL-SCNC: 98 MMOL/L (ref 96–112)
CO2 SERPL-SCNC: 23 MMOL/L (ref 20–33)
COLOR UR: ABNORMAL
CREAT SERPL-MCNC: 2.27 MG/DL (ref 0.5–1.4)
EKG IMPRESSION: NORMAL
EPI CELLS #/AREA URNS HPF: ABNORMAL /HPF
GLOBULIN SER CALC-MCNC: 3.2 G/DL (ref 1.9–3.5)
GLUCOSE SERPL-MCNC: 120 MG/DL (ref 65–99)
GLUCOSE UR STRIP.AUTO-MCNC: NEGATIVE MG/DL
GRAN CASTS #/AREA URNS LPF: ABNORMAL /LPF
HYALINE CASTS #/AREA URNS LPF: ABNORMAL /LPF
KETONES UR STRIP.AUTO-MCNC: ABNORMAL MG/DL
LEUKOCYTE ESTERASE UR QL STRIP.AUTO: ABNORMAL
MICRO URNS: ABNORMAL
NITRITE UR QL STRIP.AUTO: NEGATIVE
PH UR STRIP.AUTO: 5 [PH]
POTASSIUM SERPL-SCNC: 4.9 MMOL/L (ref 3.6–5.5)
PROT SERPL-MCNC: 7.2 G/DL (ref 6–8.2)
PROT UR QL STRIP: 300 MG/DL
RBC # URNS HPF: >150 /HPF
RBC UR QL AUTO: ABNORMAL
SODIUM SERPL-SCNC: 135 MMOL/L (ref 135–145)
SP GR UR STRIP.AUTO: 1.02
UROBILINOGEN UR STRIP.AUTO-MCNC: 1 MG/DL
WBC #/AREA URNS HPF: ABNORMAL /HPF

## 2018-07-06 PROCEDURE — 80053 COMPREHEN METABOLIC PANEL: CPT

## 2018-07-06 PROCEDURE — 93010 ELECTROCARDIOGRAM REPORT: CPT | Performed by: INTERNAL MEDICINE

## 2018-07-06 PROCEDURE — 36415 COLL VENOUS BLD VENIPUNCTURE: CPT

## 2018-07-06 PROCEDURE — 87086 URINE CULTURE/COLONY COUNT: CPT

## 2018-07-06 PROCEDURE — 81001 URINALYSIS AUTO W/SCOPE: CPT

## 2018-07-06 PROCEDURE — 93005 ELECTROCARDIOGRAM TRACING: CPT

## 2018-07-06 RX ORDER — HYDROCODONE BITARTRATE AND ACETAMINOPHEN 10; 325 MG/1; MG/1
1 TABLET ORAL DAILY
COMMUNITY
End: 2019-04-29

## 2018-07-08 LAB
BACTERIA UR CULT: NORMAL
SIGNIFICANT IND 70042: NORMAL
SITE SITE: NORMAL
SOURCE SOURCE: NORMAL

## 2018-07-09 ENCOUNTER — APPOINTMENT (OUTPATIENT)
Dept: RADIOLOGY | Facility: MEDICAL CENTER | Age: 68
End: 2018-07-09
Attending: UROLOGY
Payer: MEDICARE

## 2018-07-09 ENCOUNTER — HOSPITAL ENCOUNTER (OUTPATIENT)
Facility: MEDICAL CENTER | Age: 68
End: 2018-07-09
Attending: UROLOGY | Admitting: UROLOGY
Payer: MEDICARE

## 2018-07-09 VITALS
WEIGHT: 172.4 LBS | SYSTOLIC BLOOD PRESSURE: 136 MMHG | BODY MASS INDEX: 27.06 KG/M2 | HEART RATE: 76 BPM | OXYGEN SATURATION: 95 % | RESPIRATION RATE: 16 BRPM | DIASTOLIC BLOOD PRESSURE: 75 MMHG | TEMPERATURE: 97.6 F | HEIGHT: 67 IN

## 2018-07-09 PROCEDURE — C1769 GUIDE WIRE: HCPCS | Performed by: UROLOGY

## 2018-07-09 PROCEDURE — 160009 HCHG ANES TIME/MIN: Performed by: UROLOGY

## 2018-07-09 PROCEDURE — C2617 STENT, NON-COR, TEM W/O DEL: HCPCS | Performed by: UROLOGY

## 2018-07-09 PROCEDURE — 160046 HCHG PACU - 1ST 60 MINS PHASE II: Performed by: UROLOGY

## 2018-07-09 PROCEDURE — 160047 HCHG PACU  - EA ADDL 30 MINS PHASE II: Performed by: UROLOGY

## 2018-07-09 PROCEDURE — 501329 HCHG SET, CYSTO IRRIG Y TUR: Performed by: UROLOGY

## 2018-07-09 PROCEDURE — 160002 HCHG RECOVERY MINUTES (STAT): Performed by: UROLOGY

## 2018-07-09 PROCEDURE — 700102 HCHG RX REV CODE 250 W/ 637 OVERRIDE(OP)

## 2018-07-09 PROCEDURE — 700111 HCHG RX REV CODE 636 W/ 250 OVERRIDE (IP)

## 2018-07-09 PROCEDURE — 500879 HCHG PACK, CYSTO: Performed by: UROLOGY

## 2018-07-09 PROCEDURE — 700101 HCHG RX REV CODE 250

## 2018-07-09 PROCEDURE — 160041 HCHG SURGERY MINUTES - EA ADDL 1 MIN LEVEL 4: Performed by: UROLOGY

## 2018-07-09 PROCEDURE — 160036 HCHG PACU - EA ADDL 30 MINS PHASE I: Performed by: UROLOGY

## 2018-07-09 PROCEDURE — 160025 RECOVERY II MINUTES (STATS): Performed by: UROLOGY

## 2018-07-09 PROCEDURE — 160035 HCHG PACU - 1ST 60 MINS PHASE I: Performed by: UROLOGY

## 2018-07-09 PROCEDURE — 160048 HCHG OR STATISTICAL LEVEL 1-5: Performed by: UROLOGY

## 2018-07-09 PROCEDURE — 160029 HCHG SURGERY MINUTES - 1ST 30 MINS LEVEL 4: Performed by: UROLOGY

## 2018-07-09 PROCEDURE — A9270 NON-COVERED ITEM OR SERVICE: HCPCS

## 2018-07-09 DEVICE — STENT UROLOGICAL POLARIS 6X26  ULTRA: Type: IMPLANTABLE DEVICE | Status: FUNCTIONAL

## 2018-07-09 RX ORDER — SODIUM CHLORIDE, SODIUM LACTATE, POTASSIUM CHLORIDE, CALCIUM CHLORIDE 600; 310; 30; 20 MG/100ML; MG/100ML; MG/100ML; MG/100ML
INJECTION, SOLUTION INTRAVENOUS CONTINUOUS
Status: DISCONTINUED | OUTPATIENT
Start: 2018-07-09 | End: 2018-07-09 | Stop reason: HOSPADM

## 2018-07-09 RX ORDER — SODIUM CHLORIDE 9 MG/ML
INJECTION, SOLUTION INTRAVENOUS CONTINUOUS
Status: DISCONTINUED | OUTPATIENT
Start: 2018-07-09 | End: 2018-07-09

## 2018-07-09 RX ORDER — MAGNESIUM HYDROXIDE 1200 MG/15ML
LIQUID ORAL
Status: DISCONTINUED | OUTPATIENT
Start: 2018-07-09 | End: 2018-07-09 | Stop reason: HOSPADM

## 2018-07-09 RX ORDER — OXYCODONE HCL 5 MG/5 ML
SOLUTION, ORAL ORAL
Status: COMPLETED
Start: 2018-07-09 | End: 2018-07-09

## 2018-07-09 RX ORDER — MAGNESIUM HYDROXIDE 1200 MG/15ML
LIQUID ORAL
Status: COMPLETED | OUTPATIENT
Start: 2018-07-09 | End: 2018-07-09

## 2018-07-09 RX ORDER — HALOPERIDOL 5 MG/ML
INJECTION INTRAMUSCULAR
Status: COMPLETED
Start: 2018-07-09 | End: 2018-07-09

## 2018-07-09 RX ORDER — LIDOCAINE HYDROCHLORIDE 10 MG/ML
0.5 INJECTION, SOLUTION INFILTRATION; PERINEURAL
Status: DISCONTINUED | OUTPATIENT
Start: 2018-07-09 | End: 2018-07-09 | Stop reason: HOSPADM

## 2018-07-09 RX ADMIN — HALOPERIDOL LACTATE 1 MG: 5 INJECTION, SOLUTION INTRAMUSCULAR at 15:49

## 2018-07-09 RX ADMIN — OXYCODONE HYDROCHLORIDE 10 MG: 5 SOLUTION ORAL at 15:48

## 2018-07-09 RX ADMIN — FENTANYL CITRATE 50 MCG: 50 INJECTION, SOLUTION INTRAMUSCULAR; INTRAVENOUS at 15:49

## 2018-07-09 RX ADMIN — SODIUM CHLORIDE, SODIUM LACTATE, POTASSIUM CHLORIDE, CALCIUM CHLORIDE: 600; 310; 30; 20 INJECTION, SOLUTION INTRAVENOUS at 14:15

## 2018-07-09 ASSESSMENT — PAIN SCALES - GENERAL
PAINLEVEL_OUTOF10: 1
PAINLEVEL_OUTOF10: 7
PAINLEVEL_OUTOF10: 4
PAINLEVEL_OUTOF10: 2
PAINLEVEL_OUTOF10: 2
PAINLEVEL_OUTOF10: 7
PAINLEVEL_OUTOF10: 0
PAINLEVEL_OUTOF10: 4

## 2018-07-09 NOTE — OR SURGEON
Immediate Post OP Note    PreOp Diagnosis: Left ureteral stone                                Left indwelling stent                                    PostOp Diagnosis: As above    Procedure(s):  Cystoscopy with left stent removal-Wound Class: Clean Contaminated  LEFT URETEROSCOPY WITH LASER LITHOTRIPSY OF URETERAL STONE - Wound Class: Clean Contaminated  LEFT STENT PLACEMENT    Surgeon(s):  Beck Yang M.D.    Anesthesiologist/Type of Anesthesia:  Anesthesiologist: Román Barker D.O.; Camilo Lucero III, M.D./General LMA    Surgical Staff:  Circulator: Clarissa Addison R.N.  Laser Staff: Elias Ventura Circulator: Joann Lipscomb R.N.  Relief Scrub: Douglas Miranda R.N.  Scrub Person: Satnam Rowan    Specimens removed if any: None    Estimated Blood Loss: N/A    Findings: 12mm stone in the proximal left ureter.    Complications: None  Drains: 6 Macedonian x 26 cm stent        7/9/2018 3:36 PM Beck Yang M.D.

## 2018-07-09 NOTE — DISCHARGE INSTRUCTIONS
ACTIVITY: Rest and take it easy for the first 24 hours.  A responsible adult is recommended to remain with you during that time.  It is normal to feel sleepy.  We encourage you to not do anything that requires balance, judgment or coordination.    MILD FLU-LIKE SYMPTOMS ARE NORMAL. YOU MAY EXPERIENCE GENERALIZED MUSCLE ACHES, THROAT IRRITATION, HEADACHE AND/OR SOME NAUSEA.    FOR 24 HOURS DO NOT:  Drive, operate machinery or run household appliances.  Drink beer or alcoholic beverages.   Make important decisions or sign legal documents.    SPECIAL INSTRUCTIONS:     Diet: Clears advance diet to regular.   Follow-up with Dr. Yang for cystoscopy and left stent removal in 2-3 days    Cystoscopy, Care After  Refer to this sheet in the next few weeks. These instructions provide you with information on caring for yourself after your procedure. Your caregiver may also give you more specific instructions. Your treatment has been planned according to current medical practices, but problems sometimes occur. Call your caregiver if you have any problems or questions after your procedure.  HOME CARE INSTRUCTIONS   Things you can do to ease any discomfort after your procedure include:  · Drinking enough water and fluids to keep your urine clear or pale yellow.  · Taking a warm bath to relieve any burning feelings.  SEEK IMMEDIATE MEDICAL CARE IF:   · You have an increase in blood in your urine.  · You notice blood clots in your urine.  · You have difficulty passing urine.  · You have the chills.  · You have abdominal pain.  · You have a fever or persistent symptoms for more than 2-3 days.  · You have a fever and your symptoms suddenly get worse.  MAKE SURE YOU:   · Understand these instructions.  · Will watch your condition.  · Will get help right away if you are not doing well or get worse.     This information is not intended to replace advice given to you by your health care provider. Make sure you discuss any questions you  have with your health care provider.      DIET: To avoid nausea, slowly advance diet as tolerated, avoiding spicy or greasy foods for the first day.  Add more substantial food to your diet according to your physician's instructions.  Babies can be fed formula or breast milk as soon as they are hungry.  INCREASE FLUIDS AND FIBER TO AVOID CONSTIPATION.    SURGICAL DRESSING/BATHING: Follow instructions given to you by MD. Call with any questions.    FOLLOW-UP APPOINTMENT:  A follow-up appointment should be arranged with your doctor in 2-3 days; call to schedule.    You should CALL YOUR PHYSICIAN if you develop:  Fever greater than 101 degrees F.  Pain not relieved by medication, or persistent nausea or vomiting.  Excessive bleeding (blood soaking through dressing) or unexpected drainage from the wound.  Extreme redness or swelling around the incision site, drainage of pus or foul smelling drainage.  Inability to urinate or empty your bladder within 8 hours.  Problems with breathing or chest pain.    You should call 911 if you develop problems with breathing or chest pain.  If you are unable to contact your doctor or surgical center, you should go to the nearest emergency room or urgent care center.  Physician's telephone #: [phone]    If any questions arise, call your doctor.  If your doctor is not available, please feel free to call the Surgical Center at [phone].  The Center is open Monday through Friday from 7AM to 7PM.  You can also call the [facility] HOTLINE open 24 hours/day, 7 days/week and speak to a nurse at [phone], or toll free at [phone].    A registered nurse may call you a few days after your surgery to see how you are doing after your procedure.    MEDICATIONS: Resume taking daily medication.  Take prescribed pain medication with food.  If no medication is prescribed, you may take non-aspirin pain medication if needed.  PAIN MEDICATION CAN BE VERY CONSTIPATING.  Take a stool  softener or laxative such as senokot, pericolace, or milk of magnesia if needed.    No prescriptions given.  Last pain medication given at 3:45pm.    If your physician has prescribed pain medication that includes Acetaminophen (Tylenol), do not take additional Acetaminophen (Tylenol) while taking the prescribed medication.    Depression / Suicide Risk    As you are discharged from this [facility] facility, it is important to learn how to keep safe from harming yourself.    Recognize the warning signs:  · Abrupt changes in personality, positive or negative- including increase in energy   · Giving away possessions  · Change in eating patterns- significant weight changes-  positive or negative  · Change in sleeping patterns- unable to sleep or sleeping all the time   · Unwillingness or inability to communicate  · Depression  · Unusual sadness, discouragement and loneliness  · Talk of wanting to die  · Neglect of personal appearance   · Rebelliousness- reckless behavior  · Withdrawal from people/activities they love  · Confusion- inability to concentrate     If you or a loved one observes any of these behaviors or has concerns about self-harm, here's what you can do:  · Talk about it- your feelings and reasons for harming yourself  · Remove any means that you might use to hurt yourself (examples: pills, rope, extension cords, firearm)  · Get professional help from the community (Mental Health, Substance Abuse, psychological counseling)  · Do not be alone:Call your Safe Contact- someone whom you trust who will be there for you.  · Call your local CRISIS HOTLINE 669-9980 or 706-659-8462  · Call your local Children's Mobile Crisis Response Team [place] (392) 185-7466 or www.[employer]  · Call the toll free National Suicide Prevention Hotlines   · National Suicide Prevention Lifeline 773-297-TQAF (6666)  · National Hope Line Network 800-SUICIDE (113-8243)

## 2018-07-10 NOTE — OR NURSING
Pt's VSS; denies N/V; states pain is at tolerable level. D/c orders received. IV dc'd. Pt changed into clothing with assistance. Pt up and ambulated to BR, steady gait, voided adequately. Discharge instructions given; pt and family verbalized understanding and questions answered. Patient states ready to d/c home. No prescriptions given. Pt dc'd in w/c with RN in stable condition.

## 2018-07-10 NOTE — OP REPORT
DATE OF SERVICE:  07/09/2018    PREOPERATIVE DIAGNOSES:  1.  Left proximal ureteral stone.  2.  Left indwelling stent.    POSTOPERATIVE DIAGNOSES:  1.  Left proximal ureteral stone.  2.  Left indwelling stent.    PROCEDURES PERFORMED:  1.  Rigid cystourethroscopy with removal of left stent.  2.  Left flexible ureteroscopy with laser lithotripsy of ureteral stone.  3.  Left 6-Iraqi x 26 cm stent placement.    SURGEON:  Beck Yang MD    ANESTHESIA:  General laryngeal mask.    ANESTHESIOLOGIST:  Román Barker DO    COMPLICATIONS:  None.    DRAINS:  A 6-Iraqi x 26 cm stent in the left ureter.    INDICATIONS:  The patient is a 67-year-old woman with a history of urosepsis   who had a proximal ureteral stone and underwent a cystoscopy and stent   placement at [facility].  During that placement, a ureteroscopy was   performed to facilitate drilling a hole in the stone as it was completely   occluded.  This resulted in placement of the stent, and with antibiotic, she   improved.  She presented to the office, discussed treatment options and I   recommended cystoscopy, retrograde ureteroscopy as opposed to shock wave   lithotripsy as the stone was quite dense and there was significant embedment   into the ureteral wall.  Prior to surgery, I discussed with the patient the   risks of procedure including but not limited to risk of perioperative urinary   tract infection, a risk of ureteral perforation, a risk of urosepsis, a risk   of need for secondary procedures.  She is also aware that a stent may be   repositioned and if it repositioned, it will need to be removed in the office   setting.  I have also discussed the risk of stent pain, associated risk of   stent migration as well as urgency, frequency and hematuria and the   perioperative risk of deep vein thrombosis, pulmonary embolism, aspiration   pneumonia, heart attack, stroke and death.  Informed consent was given to me   by the patient to proceed and she  is marked after reviewing her CAT scan in   the preoperative holding area with my initials and the letter Y for proper   site surgery on her left thigh.    DESCRIPTION IN DETAIL:  After informed consent was obtained, the patient was   brought to the operating room and placed in supine.  A general laryngeal mask   anesthetic was administered by Dr. Barker in a balanced fashion.  The   patient received intravenous Ancef and bilateral sequential compression   devices are in place and operational.  The patient was positioned in modified   lithotomy.  The operative area was Betadine prepped and draped in the usual   sterile fashion.  A surgical time-out was called.  All members of the   operative team agree as to the patient name, the fact that it is the left   side, without objections, attention was directed towards the procedure.    I began the procedure by passing a 25-[place] rigid cystoscope per urethra with   a 30-degree lens.  Urethra was normal.  Right ureteral orifice was   orthotopic, had clear efflux.  The left ureteral orifice showed edema and a   stent emanating from the left ureteral orifice.  I cannulated the left   ureteral orifice with the stent in place with a 0.35 sensor wire, pushed this   up into the kidney as documented in fluoroscopy and then removed the left   stent.  Over the guidewire, I passed an 11-[place] tapered to 13-[place]   ureteral access sheath up to the level of the proximal ureter just below the   stone.  I then advanced the digital disposable flexible ureteroscope up to the   level of the stone.  The stone was darkish black in color, appeared fairly   dense and it was embedded in the medial aspect of the ureteral wall.    Utilizing a 200 micron laser fiber at a frequency of 5 Hz and 800 millijoules,   I fragmented the stone into numerous small pieces.  Some of the stones were   stuck in the ureteral wall.  These were pushed out and advanced up into the   upper and mid pole calyceal  anatomy where they were treated under direct   vision with a 200 micron laser fiber.  After they were broken into very small   pieces, I reexamined the intrarenal anatomy, there were some stone debris in   the mid pole and upper pole, but none of the fragments were large.  Lower pole   anatomy was normal and the proximal ureter at the UPJ was normal where the   stone had previously been positioned and was stuck, there was a fair amount of   edema on the medial aspect of the ureteral wall, so I elected to leave the   stent, I pushed up a 0.35 sensor wire into the kidney, withdrew the   ureteroscope and access sheath, back loaded this into the cystoscope and   pushed up a 6-Turkish x 26 cm double-J stent in the usual fashion.  When I   withdrew the wire, there was a good coil in the bladder, good coil in the   renal pelvis as documented fluoroscopically and the eyelets were seen draining   blood tinged urine.  Bladder is drained.  She tolerated the procedure well   without any complication, was awakened in the operating room and transferred   to recovery room where she arrived in stable condition.       ____________________________________     MD ROGER RONDON / EDWINA    DD:  07/09/2018 17:01:03  DT:  07/09/2018 17:22:11    D#:  [id number]  Job#:  [id number]

## 2018-07-12 ENCOUNTER — TELEPHONE (OUTPATIENT)
Dept: VASCULAR LAB | Facility: MEDICAL CENTER | Age: 68
End: 2018-07-12

## 2018-07-13 NOTE — TELEPHONE ENCOUNTER
Spoke with pt regarding anticoagulation referral. She is not ready to make an appointment would like us to call her at a later dated to schedule.     Yessi Grace, Pharm D

## 2018-07-16 ENCOUNTER — OFFICE VISIT (OUTPATIENT)
Dept: PULMONOLOGY | Facility: HOSPICE | Age: 68
End: 2018-07-16
Payer: MEDICARE

## 2018-07-16 VITALS
SYSTOLIC BLOOD PRESSURE: 124 MMHG | WEIGHT: 171 LBS | HEART RATE: 81 BPM | OXYGEN SATURATION: 96 % | HEIGHT: 67 IN | TEMPERATURE: 97.2 F | RESPIRATION RATE: 20 BRPM | DIASTOLIC BLOOD PRESSURE: 82 MMHG | BODY MASS INDEX: 26.84 KG/M2

## 2018-07-16 DIAGNOSIS — Z85.118 HISTORY OF LUNG CANCER: ICD-10-CM

## 2018-07-16 DIAGNOSIS — J44.9 CHRONIC OBSTRUCTIVE PULMONARY DISEASE, UNSPECIFIED COPD TYPE (HCC): ICD-10-CM

## 2018-07-16 DIAGNOSIS — R91.8 PULMONARY NODULES: ICD-10-CM

## 2018-07-16 PROCEDURE — 99214 OFFICE O/P EST MOD 30 MIN: CPT | Performed by: INTERNAL MEDICINE

## 2018-07-16 NOTE — PROGRESS NOTES
"Chief Complaint   Patient presents with   • Hospital Follow-up     Pt was seen in [facility] by      HPI: This patient is a 67 y.o. Female who returns for follow-up of lung cancer and COPD. She underwent thoracoscopic partial left upper lobectomy by Dr. Ganser 12/12/16 demonstrating adenocarcinoma with pleural involvement, pT2a. Follow-up chest CAT scan June 27, 2017 shows postsurgical changes in the left lung, with a new 6.2 mm left lower lobe nodule. Follow-up chest CAT scan January 9, 2018 showed resolution of the left lower lobe nodule. There was a vague, groundglass opacity measuring 6 mm in the right lower lobe.   She was scheduled for her six-month follow-up chest CAT scan however was hospitalized in the interim with acute hypoxic respiratory failure following  lithotripsy and ureteral stent placement for hydronephrosis. She postoperatively required oxygen up to 10 L/min.  She was followed by my pulmonary colleagues and was ultimately discharged on oxygen, which she has has since weaned off daytime O2.  Her exertional dyspnea has been somehat worse from baseline however she also has pain related to her stent and back affecting her breathing.  She denies sputum purulence, wheezing, chest pain or edema.  Abdominal CAT scan during hospitalization showed a 4 mm right lower lobe groundglass nodule.  She has stage II COPD (FEV1 1.43 L or 54%), and has been compliant with Breo and Spiriva.       Past Medical History:   Diagnosis Date   • Anesthesia     \"Abnormal blood pressure and breathing\"  \"couldn't breathe\"   • Asthma     inhalers daily   • Backpain 7/2017     thor. area   • Blood clot in vein 07/06/2018    \"Currently have a blood clot in my left eye\"   • Bowel habit changes 07/06/2018    Constipation   • Breath shortness     with exertion has O2 but does not use   • Bronchitis    • CAD (coronary artery disease)     palpatations   • Cancer (HCC) 2016    left lung   • Chickenpox    • COPD (chronic obstructive " "pulmonary disease) (HCC)    • Dialysis 2005    transient renal failure due to sepsis   • Emphysema of lung (HCC)    • Glaucoma     right eye   • Hemorrhagic disorder (HCC)    • Hyperlipidemia    • Hypertension    • Hyperthyroidism    • Kidney stone     bilateral   • Lung cancer (HCC) 12/12/2016   • Multiple thyroid nodules 7/9/2015   • Nasal drainage    • Osteoporosis    • Pain 07/06/2018    Back pain   • Personal history of venous thrombosis and embolism 2004, 2012    right leg 2012, left arm dvt 2004 left eye 2017   • Pneumonia 7/2016    per patient   • Renal disorder     had been on dialysis for 7 months for acute failure 2005   • Renal stones 2013    post lithotripsy   • Rheumatoid arthritis (HCC)     question   • Rheumatoid arthritis (HCC)    • S/P appendectomy 1998    • S/P cholecystectomy 1998   • S/P kyphoplasty 2006, 2007, 2013   • Sepsis(995.91) 2005   • Shortness of breath 07/06/2018    Chronic current problem. \"A couple of years now\".  O2 concentrator at night - pt states she doesn't use it.       Social History     Social History   • Marital status: Single     Spouse name: N/A   • Number of children: N/A   • Years of education: N/A     Occupational History   • Not on file.     Social History Main Topics   • Smoking status: Former Smoker     Packs/day: 1.00     Years: 30.00     Types: Cigarettes     Quit date: 1/1/2000   • Smokeless tobacco: Never Used      Comment: 7 years ago   • Alcohol use 0.0 oz/week      Comment: x2 a week   • Drug use: No   • Sexual activity: Not on file     Other Topics Concern   • Not on file     Social History Narrative   • No narrative on file       Family History   Problem Relation Age of Onset   • Cancer Mother    • Heart Failure Father    • Heart Disease Brother        Current Outpatient Prescriptions on File Prior to Visit   Medication Sig Dispense Refill   • HYDROcodone/acetaminophen (NORCO)  MG Tab Take 1-2 Tabs by mouth as needed.     • rivaroxaban (XARELTO) 20 MG " Tab tablet Take 1 Tab by mouth with dinner. 30 Tab 0   • cyclobenzaprine (FLEXERIL) 10 MG Tab Take 1 Tab by mouth 3 times a day as needed for Muscle Spasms. 10 Tab 0   • Tiotropium Bromide Monohydrate (SPIRIVA RESPIMAT) 2.5 MCG/ACT Aero Soln Inhale 1 Puff by mouth 2 Times a Day.     • BREO ELLIPTA 200-25 MCG/INH AEROSOL POWDER, BREATH ACTIVATED INHALE ONE DOSE BY MOUTH DAILY. RINSE MOUTH AFTER USE. 1 Each 11   • PROAIR  (90 Base) MCG/ACT Aero Soln inhalation aerosol INHALE TWO PUFFS BY MOUTH EVERY 6 HOURS AS NEEDED FOR SHORTNESS OF BREATH 1 Inhaler 3   • COMBIGAN 0.2-0.5 % Solution Place 1 Drop in both eyes 2 Times a Day.     • vitamin D, Ergocalciferol, (DRISDOL) 78792 UNITS Cap capsule Take 50,000 Units by mouth every 14 days.     • propranolol LA (INDERAL LA) 60 MG CAPSULE SR 24 HR Take 60 mg by mouth every morning.     • zolpidem (AMBIEN) 10 MG Tab Take 10 mg by mouth at bedtime as needed for Sleep.     • simvastatin (ZOCOR) 40 MG Tab Take 40 mg by mouth every evening.     • losartan (COZAAR) 50 MG TABS Take 50 mg by mouth every morning.       No current facility-administered medications on file prior to visit.        Allergies: Nkda [no known drug allergy]    ROS:   Constitutional: Denies fevers, chills, night sweats, fatigue or weight loss  Eyes: Denies vision loss, pain, drainage, double vision  Ears, Nose, Throat: Denies earache, difficulty hearing, tinnitus, nasal congestion, hoarseness  Cardiovascular: Denies chest pain, tightness, palpitations, orthopnea or edema  Respiratory: As in HPI  Sleep: Denies daytime sleepiness, snoring, apneas, insomnia, morning headaches  GI: Denies heartburn, dysphagia, nausea, abdominal pain, diarrhea or constipation  : Denies frequent urination, hematuria, discharge or painful urination  Musculoskeletal: +back pain, denies painful joints, sore muscles  Neurological: Denies weakness or headaches  Skin: No rashes    Blood pressure 124/82, pulse 81, temperature 36.2 °C  "(97.2 °F), resp. rate 20, height 1.702 m (5' 7\"), weight 77.6 kg (171 lb), SpO2 96 %.    Physical Exam:  Appearance: Well-nourished, well-developed, in no acute distress  HEENT: Normocephalic, atraumatic, white sclera, PERRLA, oropharynx clear  Neck: No adenopathy or masses  Respiratory: no intercostal retractions or accessory muscle use  Lungs auscultation: Clear to auscultation bilaterally, diminished breath sounds  Cardiovascular: Regular rate rhythm. No murmurs, rubs or gallops.  No LE edema  Abdomen: soft, nondistended  Gait: Normal  Digits: No clubbing, cyanosis  Motor: No focal deficits  Orientation: Oriented to time, person and place    Diagnosis:  1. Chronic obstructive pulmonary disease, unspecified COPD type (HCC)     2. Pulmonary nodules  CT-CHEST (THORAX) W/O   3. History of lung cancer         Plan:  The patient suffered acute hypoxic respiratory failure following lithotripsy, has now weaned off daytime oxygen and is returning to baseline.  She is compliant with Breo and Spiriva inhalers for COPD.  She is due for her six-month follow-up chest CAT scan for surveillance of lung cancer, which we will reschedule as outpatient.  Return in about 3 months (around 10/16/2018).      "

## 2018-07-23 ENCOUNTER — TELEPHONE (OUTPATIENT)
Dept: VASCULAR LAB | Facility: MEDICAL CENTER | Age: 68
End: 2018-07-23

## 2018-07-23 NOTE — TELEPHONE ENCOUNTER
Called pt see about setting appt to establish care for Xarelto.   Pt prefers that we call back next week to try to schedule as she gets her stent out later this week.   Pt prefers [facility] in the afternoon.     Deja Cheatham, PharmD

## 2018-07-31 ENCOUNTER — TELEPHONE (OUTPATIENT)
Dept: VASCULAR LAB | Facility: MEDICAL CENTER | Age: 68
End: 2018-07-31

## 2018-08-01 NOTE — TELEPHONE ENCOUNTER
Pt not ready to schedule anticoagulation clinic. Reports will call our clinic when ready.     Yessi Grace, Pharm D

## 2018-08-10 DIAGNOSIS — J44.9 CHRONIC OBSTRUCTIVE PULMONARY DISEASE, UNSPECIFIED COPD TYPE (HCC): ICD-10-CM

## 2018-08-10 DIAGNOSIS — R06.02 SOB (SHORTNESS OF BREATH): ICD-10-CM

## 2018-08-10 NOTE — TELEPHONE ENCOUNTER
Have we ever prescribed this med? Yes.  If yes, what date? 4/16/18    Last OV: 7/16/18    Next OV: 10/30/18    DX: SOB (shortness of breath) (R06.02); Chronic obstructive pulmonary disease, unspecified COPD type (HCC) (J44.9    Medications: PROAIR  (90 Base) MCG/ACT Aero Soln inhalation aerosol

## 2018-08-22 ENCOUNTER — APPOINTMENT (OUTPATIENT)
Dept: RADIOLOGY | Facility: MEDICAL CENTER | Age: 68
End: 2018-08-22
Attending: PHYSICAL MEDICINE & REHABILITATION
Payer: MEDICARE

## 2018-08-30 ENCOUNTER — APPOINTMENT (OUTPATIENT)
Dept: RADIOLOGY | Facility: MEDICAL CENTER | Age: 68
End: 2018-08-30
Attending: EMERGENCY MEDICINE
Payer: MEDICARE

## 2018-08-30 ENCOUNTER — HOSPITAL ENCOUNTER (OUTPATIENT)
Facility: MEDICAL CENTER | Age: 68
End: 2018-09-02
Attending: EMERGENCY MEDICINE | Admitting: HOSPITALIST
Payer: MEDICARE

## 2018-08-30 DIAGNOSIS — K59.00 CONSTIPATION, UNSPECIFIED CONSTIPATION TYPE: ICD-10-CM

## 2018-08-30 PROBLEM — K59.03 DRUG-INDUCED CONSTIPATION: Status: ACTIVE | Noted: 2018-08-30

## 2018-08-30 LAB
ANION GAP SERPL CALC-SCNC: 8 MMOL/L (ref 0–11.9)
BASOPHILS # BLD AUTO: 0.2 % (ref 0–1.8)
BASOPHILS # BLD: 0.01 K/UL (ref 0–0.12)
BUN SERPL-MCNC: 36 MG/DL (ref 8–22)
CALCIUM SERPL-MCNC: 10.1 MG/DL (ref 8.4–10.2)
CHLORIDE SERPL-SCNC: 106 MMOL/L (ref 96–112)
CO2 SERPL-SCNC: 20 MMOL/L (ref 20–33)
CREAT SERPL-MCNC: 1.85 MG/DL (ref 0.5–1.4)
EOSINOPHIL # BLD AUTO: 0.16 K/UL (ref 0–0.51)
EOSINOPHIL NFR BLD: 2.6 % (ref 0–6.9)
ERYTHROCYTE [DISTWIDTH] IN BLOOD BY AUTOMATED COUNT: 46 FL (ref 35.9–50)
GLUCOSE SERPL-MCNC: 136 MG/DL (ref 65–99)
HCT VFR BLD AUTO: 39.5 % (ref 37–47)
HGB BLD-MCNC: 13.2 G/DL (ref 12–16)
IMM GRANULOCYTES # BLD AUTO: 0.02 K/UL (ref 0–0.11)
IMM GRANULOCYTES NFR BLD AUTO: 0.3 % (ref 0–0.9)
LYMPHOCYTES # BLD AUTO: 1.9 K/UL (ref 1–4.8)
LYMPHOCYTES NFR BLD: 31.3 % (ref 22–41)
MCH RBC QN AUTO: 33.4 PG (ref 27–33)
MCHC RBC AUTO-ENTMCNC: 33.4 G/DL (ref 33.6–35)
MCV RBC AUTO: 100 FL (ref 81.4–97.8)
MONOCYTES # BLD AUTO: 0.53 K/UL (ref 0–0.85)
MONOCYTES NFR BLD AUTO: 8.7 % (ref 0–13.4)
NEUTROPHILS # BLD AUTO: 3.45 K/UL (ref 2–7.15)
NEUTROPHILS NFR BLD: 56.9 % (ref 44–72)
NRBC # BLD AUTO: 0 K/UL
NRBC BLD-RTO: 0 /100 WBC
PLATELET # BLD AUTO: 154 K/UL (ref 164–446)
PMV BLD AUTO: 12.4 FL (ref 9–12.9)
POTASSIUM SERPL-SCNC: 5.2 MMOL/L (ref 3.6–5.5)
RBC # BLD AUTO: 3.95 M/UL (ref 4.2–5.4)
SODIUM SERPL-SCNC: 134 MMOL/L (ref 135–145)
TSH SERPL DL<=0.005 MIU/L-ACNC: 0.01 UIU/ML (ref 0.38–5.33)
WBC # BLD AUTO: 6.1 K/UL (ref 4.8–10.8)

## 2018-08-30 PROCEDURE — 700105 HCHG RX REV CODE 258: Performed by: EMERGENCY MEDICINE

## 2018-08-30 PROCEDURE — A9270 NON-COVERED ITEM OR SERVICE: HCPCS | Performed by: HOSPITALIST

## 2018-08-30 PROCEDURE — 85025 COMPLETE CBC W/AUTO DIFF WBC: CPT

## 2018-08-30 PROCEDURE — 94760 N-INVAS EAR/PLS OXIMETRY 1: CPT

## 2018-08-30 PROCEDURE — 84443 ASSAY THYROID STIM HORMONE: CPT

## 2018-08-30 PROCEDURE — 700102 HCHG RX REV CODE 250 W/ 637 OVERRIDE(OP): Performed by: HOSPITALIST

## 2018-08-30 PROCEDURE — 96372 THER/PROPH/DIAG INJ SC/IM: CPT

## 2018-08-30 PROCEDURE — 700111 HCHG RX REV CODE 636 W/ 250 OVERRIDE (IP): Performed by: EMERGENCY MEDICINE

## 2018-08-30 PROCEDURE — 74176 CT ABD & PELVIS W/O CONTRAST: CPT

## 2018-08-30 PROCEDURE — G0378 HOSPITAL OBSERVATION PER HR: HCPCS

## 2018-08-30 PROCEDURE — 36415 COLL VENOUS BLD VENIPUNCTURE: CPT

## 2018-08-30 PROCEDURE — 80048 BASIC METABOLIC PNL TOTAL CA: CPT

## 2018-08-30 PROCEDURE — 99285 EMERGENCY DEPT VISIT HI MDM: CPT

## 2018-08-30 PROCEDURE — 700105 HCHG RX REV CODE 258: Performed by: HOSPITALIST

## 2018-08-30 PROCEDURE — 99224 PR SUBSEQUENT OBSERVATION CARE,LEVEL I: CPT | Performed by: HOSPITALIST

## 2018-08-30 PROCEDURE — 700111 HCHG RX REV CODE 636 W/ 250 OVERRIDE (IP): Performed by: HOSPITALIST

## 2018-08-30 RX ORDER — BISACODYL 10 MG
10 SUPPOSITORY, RECTAL RECTAL EVERY 8 HOURS
Status: DISCONTINUED | OUTPATIENT
Start: 2018-08-30 | End: 2018-08-31

## 2018-08-30 RX ORDER — BISACODYL 10 MG
10 SUPPOSITORY, RECTAL RECTAL
Status: DISCONTINUED | OUTPATIENT
Start: 2018-08-30 | End: 2018-09-02 | Stop reason: HOSPADM

## 2018-08-30 RX ORDER — HYDROCODONE BITARTRATE AND ACETAMINOPHEN 5; 325 MG/1; MG/1
1 TABLET ORAL EVERY 6 HOURS PRN
Status: DISCONTINUED | OUTPATIENT
Start: 2018-08-30 | End: 2018-09-02 | Stop reason: HOSPADM

## 2018-08-30 RX ORDER — ACETAMINOPHEN 325 MG/1
650 TABLET ORAL EVERY 6 HOURS PRN
Status: DISCONTINUED | OUTPATIENT
Start: 2018-08-30 | End: 2018-09-02 | Stop reason: HOSPADM

## 2018-08-30 RX ORDER — ONDANSETRON 4 MG/1
4 TABLET, ORALLY DISINTEGRATING ORAL EVERY 4 HOURS PRN
Status: DISCONTINUED | OUTPATIENT
Start: 2018-08-30 | End: 2018-09-02 | Stop reason: HOSPADM

## 2018-08-30 RX ORDER — TIMOLOL MALEATE 5 MG/ML
1 SOLUTION/ DROPS OPHTHALMIC 2 TIMES DAILY
Status: DISCONTINUED | OUTPATIENT
Start: 2018-08-30 | End: 2018-08-30

## 2018-08-30 RX ORDER — BUDESONIDE AND FORMOTEROL FUMARATE DIHYDRATE 160; 4.5 UG/1; UG/1
2 AEROSOL RESPIRATORY (INHALATION)
Status: DISCONTINUED | OUTPATIENT
Start: 2018-08-30 | End: 2018-08-31

## 2018-08-30 RX ORDER — ZOLPIDEM TARTRATE 5 MG/1
10 TABLET ORAL NIGHTLY PRN
Status: DISCONTINUED | OUTPATIENT
Start: 2018-08-30 | End: 2018-09-02 | Stop reason: HOSPADM

## 2018-08-30 RX ORDER — BRIMONIDINE TARTRATE AND TIMOLOL MALEATE 2; 5 MG/ML; MG/ML
1 SOLUTION OPHTHALMIC 2 TIMES DAILY
Status: DISCONTINUED | OUTPATIENT
Start: 2018-08-30 | End: 2018-09-02 | Stop reason: HOSPADM

## 2018-08-30 RX ORDER — AMOXICILLIN 250 MG
2 CAPSULE ORAL 2 TIMES DAILY
Status: DISCONTINUED | OUTPATIENT
Start: 2018-08-30 | End: 2018-09-02 | Stop reason: HOSPADM

## 2018-08-30 RX ORDER — BUDESONIDE AND FORMOTEROL FUMARATE DIHYDRATE 160; 4.5 UG/1; UG/1
2 AEROSOL RESPIRATORY (INHALATION)
Status: DISCONTINUED | OUTPATIENT
Start: 2018-08-31 | End: 2018-08-30

## 2018-08-30 RX ORDER — TIOTROPIUM BROMIDE 18 UG/1
1 CAPSULE ORAL; RESPIRATORY (INHALATION) DAILY
Status: DISCONTINUED | OUTPATIENT
Start: 2018-08-31 | End: 2018-09-02 | Stop reason: HOSPADM

## 2018-08-30 RX ORDER — ALBUTEROL SULFATE 90 UG/1
2 AEROSOL, METERED RESPIRATORY (INHALATION) EVERY 6 HOURS PRN
Status: DISCONTINUED | OUTPATIENT
Start: 2018-08-30 | End: 2018-09-02 | Stop reason: HOSPADM

## 2018-08-30 RX ORDER — SODIUM CHLORIDE 9 MG/ML
INJECTION, SOLUTION INTRAVENOUS CONTINUOUS
Status: DISCONTINUED | OUTPATIENT
Start: 2018-08-30 | End: 2018-09-02 | Stop reason: HOSPADM

## 2018-08-30 RX ORDER — ALBUTEROL SULFATE 90 UG/1
2 AEROSOL, METERED RESPIRATORY (INHALATION) EVERY 6 HOURS PRN
Status: DISCONTINUED | OUTPATIENT
Start: 2018-08-30 | End: 2018-08-30

## 2018-08-30 RX ORDER — SIMVASTATIN 20 MG
40 TABLET ORAL NIGHTLY
Status: DISCONTINUED | OUTPATIENT
Start: 2018-08-30 | End: 2018-09-02 | Stop reason: HOSPADM

## 2018-08-30 RX ORDER — BISACODYL 10 MG
10 SUPPOSITORY, RECTAL RECTAL EVERY 4 HOURS
Status: DISCONTINUED | OUTPATIENT
Start: 2018-08-30 | End: 2018-08-30

## 2018-08-30 RX ORDER — SODIUM CHLORIDE 9 MG/ML
1000 INJECTION, SOLUTION INTRAVENOUS ONCE
Status: COMPLETED | OUTPATIENT
Start: 2018-08-30 | End: 2018-08-30

## 2018-08-30 RX ORDER — POLYETHYLENE GLYCOL 3350 17 G/17G
1 POWDER, FOR SOLUTION ORAL
Status: DISCONTINUED | OUTPATIENT
Start: 2018-08-30 | End: 2018-09-02 | Stop reason: HOSPADM

## 2018-08-30 RX ORDER — BRIMONIDINE TARTRATE 2 MG/ML
1 SOLUTION/ DROPS OPHTHALMIC 2 TIMES DAILY
Status: DISCONTINUED | OUTPATIENT
Start: 2018-08-30 | End: 2018-08-30

## 2018-08-30 RX ORDER — PROPRANOLOL HCL 60 MG
60 CAPSULE, EXTENDED RELEASE 24HR ORAL EVERY MORNING
Status: DISCONTINUED | OUTPATIENT
Start: 2018-08-31 | End: 2018-09-02 | Stop reason: HOSPADM

## 2018-08-30 RX ORDER — LOSARTAN POTASSIUM 25 MG/1
50 TABLET ORAL EVERY MORNING
Status: DISCONTINUED | OUTPATIENT
Start: 2018-08-31 | End: 2018-09-02 | Stop reason: HOSPADM

## 2018-08-30 RX ORDER — ERGOCALCIFEROL 1.25 MG/1
50000 CAPSULE ORAL
Status: DISCONTINUED | OUTPATIENT
Start: 2018-09-03 | End: 2018-09-02 | Stop reason: HOSPADM

## 2018-08-30 RX ORDER — BRIMONIDINE TARTRATE AND TIMOLOL MALEATE 2; 5 MG/ML; MG/ML
1 SOLUTION OPHTHALMIC 2 TIMES DAILY
Status: DISCONTINUED | OUTPATIENT
Start: 2018-08-30 | End: 2018-08-30

## 2018-08-30 RX ORDER — ONDANSETRON 2 MG/ML
4 INJECTION INTRAMUSCULAR; INTRAVENOUS EVERY 4 HOURS PRN
Status: DISCONTINUED | OUTPATIENT
Start: 2018-08-30 | End: 2018-09-02 | Stop reason: HOSPADM

## 2018-08-30 RX ADMIN — BUDESONIDE AND FORMOTEROL FUMARATE DIHYDRATE 2 PUFF: 160; 4.5 AEROSOL RESPIRATORY (INHALATION) at 20:05

## 2018-08-30 RX ADMIN — ALBUTEROL SULFATE 2 PUFF: 90 AEROSOL, METERED RESPIRATORY (INHALATION) at 18:15

## 2018-08-30 RX ADMIN — Medication 2 TABLET: at 18:12

## 2018-08-30 RX ADMIN — ONDANSETRON 4 MG: 4 TABLET, ORALLY DISINTEGRATING ORAL at 18:34

## 2018-08-30 RX ADMIN — SIMVASTATIN 40 MG: 20 TABLET, FILM COATED ORAL at 21:13

## 2018-08-30 RX ADMIN — METHYLNALTREXONE BROMIDE 12 MG: 12 INJECTION, SOLUTION SUBCUTANEOUS at 10:47

## 2018-08-30 RX ADMIN — BISACODYL 10 MG: 10 SUPPOSITORY RECTAL at 21:48

## 2018-08-30 RX ADMIN — SODIUM CHLORIDE 1000 ML: 9 INJECTION, SOLUTION INTRAVENOUS at 10:48

## 2018-08-30 RX ADMIN — BRIMONIDINE TARTRATE AND TIMOLOL MALEATE 1 DROP: 2; 5 SOLUTION OPHTHALMIC at 22:43

## 2018-08-30 RX ADMIN — RIVAROXABAN 20 MG: 20 TABLET, FILM COATED ORAL at 18:32

## 2018-08-30 RX ADMIN — SODIUM CHLORIDE: 9 INJECTION, SOLUTION INTRAVENOUS at 16:16

## 2018-08-30 RX ADMIN — BISACODYL 10 MG: 10 SUPPOSITORY RECTAL at 16:13

## 2018-08-30 RX ADMIN — ZOLPIDEM TARTRATE 10 MG: 5 TABLET, FILM COATED ORAL at 22:42

## 2018-08-30 ASSESSMENT — LIFESTYLE VARIABLES
ON A TYPICAL DAY WHEN YOU DRINK ALCOHOL HOW MANY DRINKS DO YOU HAVE: 1
ALCOHOL_USE: YES
AVERAGE NUMBER OF DAYS PER WEEK YOU HAVE A DRINK CONTAINING ALCOHOL: 3
TOTAL SCORE: 0
HOW MANY TIMES IN THE PAST YEAR HAVE YOU HAD 5 OR MORE DRINKS IN A DAY: 0
HAVE YOU EVER FELT YOU SHOULD CUT DOWN ON YOUR DRINKING: NO
CONSUMPTION TOTAL: NEGATIVE
HAVE PEOPLE ANNOYED YOU BY CRITICIZING YOUR DRINKING: NO
EVER HAD A DRINK FIRST THING IN THE MORNING TO STEADY YOUR NERVES TO GET RID OF A HANGOVER: NO
TOTAL SCORE: 0
EVER FELT BAD OR GUILTY ABOUT YOUR DRINKING: NO
EVER_SMOKED: YES
TOTAL SCORE: 0

## 2018-08-30 ASSESSMENT — PATIENT HEALTH QUESTIONNAIRE - PHQ9
SUM OF ALL RESPONSES TO PHQ9 QUESTIONS 1 AND 2: 0
1. LITTLE INTEREST OR PLEASURE IN DOING THINGS: NOT AT ALL
2. FEELING DOWN, DEPRESSED, IRRITABLE, OR HOPELESS: NOT AT ALL
SUM OF ALL RESPONSES TO PHQ9 QUESTIONS 1 AND 2: 0
2. FEELING DOWN, DEPRESSED, IRRITABLE, OR HOPELESS: NOT AT ALL
1. LITTLE INTEREST OR PLEASURE IN DOING THINGS: NOT AT ALL

## 2018-08-30 ASSESSMENT — COGNITIVE AND FUNCTIONAL STATUS - GENERAL
DAILY ACTIVITIY SCORE: 24
SUGGESTED CMS G CODE MODIFIER MOBILITY: CH
MOBILITY SCORE: 24
SUGGESTED CMS G CODE MODIFIER DAILY ACTIVITY: CH

## 2018-08-30 ASSESSMENT — PAIN SCALES - GENERAL
PAINLEVEL_OUTOF10: 0
PAINLEVEL_OUTOF10: 3
PAINLEVEL_OUTOF10: 3

## 2018-08-30 NOTE — H&P
Hospital Medicine History & Physical Note    Date of Service  8/30/2018    Primary Care Physician  Edwin Rogers M.D.    Consultants  None    Code Status  Full code    Chief Complaint  Constipation    History of Presenting Illness  67 y.o. female who presented 8/30/2018 with history of compression fractures to the spine on chronic Norco for 1 year. She states she was constipated for 1 week. She took over-the-counter milk of magnesia last week and developed lots of bowel movements she took Imodium twice a day 2 tablets on Tuesday Wednesday and Thursday of last week resulting in no bowel movement since Monday no bowel movement in the last 3 days. She did receive an enema as well as I will start in the ER she did have some liquidy stool in the bedside commode. She is complaining of persistent pain in her rectum. CT abdomen and pelvis showing obstipation with large amount of stool in the rectal vault. She has had decreased appetite but no emesis.  The patient is on anticoagulation for DVT and several other clots proximal 4 years ago. 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 She also has had chronic kidney disease stage III and had been on dialysis previously she currently is followed by Dr. Martinez nephrology.    Review of Systems  ROS    Past Medical History   has a past medical history of Anesthesia; Asthma; Backpain (7/2017); Blood clot in vein (07/06/2018); Bowel habit changes (07/06/2018); Breath shortness; Bronchitis; CAD (coronary artery disease); Cancer ([facility]) (2016); Chickenpox; COPD (chronic obstructive pulmonary disease) (HCC); Dialysis (2005); Emphysema of lung ([facility]); Glaucoma; Hemorrhagic disorder ([facility]); Hyperlipidemia; Hypertension; Hyperthyroidism; Kidney stone; Lung cancer ([facility]) (12/12/2016); Multiple thyroid nodules (7/9/2015); Nasal drainage; Osteoporosis; Pain (07/06/2018); Personal history of venous thrombosis and embolism (2004, 2012); Pneumonia (7/2016);  Renal disorder; Renal stones (2013); Rheumatoid arthritis (HCC); Rheumatoid arthritis (HCC); S/P appendectomy (1998 ); S/P cholecystectomy (1998); S/P kyphoplasty (2006, 2007, 2013); Sepsis(995.91) (2005); and Shortness of breath (07/06/2018). She also has no past medical history of Angina; Arrhythmia; Breast cancer (HCC); CATARACT; Congestive heart failure (HCC); Diabetes; Fall; Heart murmur; Heart valve disease; Indigestion; Jaundice; Liver disease; Myocardial infarct (HCC); Other specified symptom associated with female genital organs; Pacemaker; Psychiatric disorder; Psychiatric problem; Rheumatic fever; Seizure (HCC); Seizure disorder (HCC); Stroke (HCC); or Unspecified urinary incontinence.    Surgical History   has a past surgical history that includes other; other abdominal surgery; pr enlarge breast with implant; pr reduction of large breast; appendectomy; cholecystectomy; laminotomy; lung biopsy open; thoracoscopy (Left, 12/12/2016); other orthopedic surgery (2013); cystoscopy stent placement (Left, 6/18/2018); lithotripsy (Left, 6/18/2018); lasertripsy (Left, 6/18/2018); ureteroscopy (Left, 6/18/2018); cystoscopy stent placement (7/9/2018); ureteroscopy (Left, 7/9/2018); and lasertripsy (Left, 7/9/2018).     Family History  family history includes Cancer in her mother; Heart Disease in her brother; Heart Failure in her father.     Social History   reports that she quit smoking about 18 years ago. Her smoking use included Cigarettes. She has a 30.00 pack-year smoking history. She has never used smokeless tobacco. She reports that she drinks alcohol. She reports that she does not use drugs.    Allergies  Allergies   Allergen Reactions   • Nkda [No Known Drug Allergy]        Medications  Prior to Admission Medications   Prescriptions Last Dose Informant Patient Reported? Taking?   BREO ELLIPTA 200-25 MCG/INH AEROSOL POWDER, BREATH ACTIVATED 8/30/2018 at 0700 Patient No No   Sig: INHALE ONE DOSE BY MOUTH  DAILY. RINSE MOUTH AFTER USE.   COMBIGAN 0.2-0.5 % Solution 8/30/2018 at 0700 Patient Yes No   Sig: Place 1 Drop in both eyes 2 Times a Day.   HYDROcodone/acetaminophen (NORCO)  MG Tab 8/30/2018 at 0600 Patient Yes No   Sig: Take 1-2 Tabs by mouth as needed.   PROAIR  (90 Base) MCG/ACT Aero Soln inhalation aerosol PRN at PRN Patient No No   Sig: INHALE TWO PUFFS BY MOUTH EVERY 6 HOURS AS NEEDED FOR SHORTNESS OF BREATH   Tiotropium Bromide Monohydrate (SPIRIVA RESPIMAT) 2.5 MCG/ACT Aero Soln 8/30/2018 at 0700 Patient Yes No   Sig: Inhale 1 Puff by mouth 2 Times a Day.   losartan (COZAAR) 50 MG TABS 8/30/2018 at 0700 Patient Yes No   Sig: Take 50 mg by mouth every morning.   propranolol LA (INDERAL LA) 60 MG CAPSULE SR 24 HR 8/30/2018 at 0900 Patient Yes No   Sig: Take 60 mg by mouth every morning.   rivaroxaban (XARELTO) 20 MG Tab tablet 8/29/2018 at 2000 Patient No No   Sig: Take 1 Tab by mouth with dinner.   simvastatin (ZOCOR) 40 MG Tab 8/29/2018 at 2100 Patient Yes No   Sig: Take 40 mg by mouth every evening.   vitamin D, Ergocalciferol, (DRISDOL) 08518 UNITS Cap capsule 8/20/2018 at AM Patient Yes No   Sig: Take 50,000 Units by mouth every 14 days.   zolpidem (AMBIEN) 10 MG Tab 8/29/2018 at 0000 Patient Yes No   Sig: Take 10 mg by mouth at bedtime as needed for Sleep.      Facility-Administered Medications: None       Physical Exam  Blood Pressure : 110/78   Temperature: 36.7 °C (98.1 °F)   Pulse: 71   Respiration: 16   Pulse Oximetry: 100 %     Physical Exam    Laboratory:  Recent Labs      08/30/18   0945   WBC  6.1   RBC  3.95*   HEMOGLOBIN  13.2   HEMATOCRIT  39.5   MCV  100.0*   MCH  33.4*   MCHC  33.4*   RDW  46.0   PLATELETCT  154*   MPV  12.4     Recent Labs      08/30/18   0945   SODIUM  134*   POTASSIUM  5.2   CHLORIDE  106   CO2  20   GLUCOSE  136*   BUN  36*   CREATININE  1.85*   CALCIUM  10.1     Recent Labs      08/30/18   0945   GLUCOSE  136*                 Lab Results   Component  Value Date    TROPONINI <0.02 04/24/2017       Urinalysis:    No results found     Imaging:  CT-RENAL COLIC EVALUATION(A/P W/O)   Final Result      1.  Prominent constipation.      2.  No evidence of bowel obstruction or focal inflammatory change.      3.  Multiple bilateral renal calyceal stones which measure up to 12 mm in size. There are left ureteropelvic junction stones which measure up to 5 mm size. There is mild dilatation of the left renal pelvis.      4.  Hepatic and renal cysts.      5.  Prior cholecystectomy.      6.  Sigmoid diverticulosis.            Assessment/Plan:  I anticipate this patient is appropriate for observation status at this time.    * Drug-induced constipation   Assessment & Plan    Patient has chronic Norco use a 10/3/25 every 6 hours. She took milk of magnesia at home for constipation and then with loose bowel movements took Imodium for 3 days 2 pills twice daily. She is now back constipation. Currently she has CT evidence of large stool rectal vault. He is given an enema with some results in the ER. Will continue with MiraLAX and Dulcolax suppositories Cutter Norco back from 10    To 5 mg every 8 hours prn. She was given Relistor in the ER with some success.         Closed T11 fracture (HCC)- (present on admission)   Assessment & Plan    Multiple spine compression fractures per patient due to osteoporosis. Takes Norco 10/3/25 every 6 hours for pain.        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 She is currently on Xarelto 20 mg daily at bedtime. She states the last time she was taken off several toe she had a retinal blood clot and vision loss. To the left eye        Osteoporosis- (present on admission)   Assessment & Plan    History of multiple compression fractures giving rise to chronic pain syndrome.        Hyperthyroidism- (present on admission)   Assessment & Plan    Check a TSH level.         CKD (chronic kidney disease) stage 3, GFR 30-59 ml/min- (present on admission)   Assessment & Plan    Patient has chronic kidney disease stage III with GFR from 37-52.  Creatinine is currently at 1.8 to the GFR 33..        HTN (hypertension)- (present on admission)   Assessment & Plan    Continue home blood pressure medications            VTE prophylaxis: xarelto

## 2018-08-30 NOTE — ED PROVIDER NOTES
ED Provider Note    CHIEF COMPLAINT  Chief Complaint   Patient presents with   • Constipation     Last BM was on saturday after taking MOM       HPI  Latonia Clinton is a 67 y.o. female who presents to the Emergency Department with complaint of constipation, unable to have a bowel movement for a week.  She feels bloated, no actual abdominal pain.  Patient with multiple prior abdominal surgeries.  She did take MOM a week ago, that created a lot of diarrhea, then she took immodium.  She has not had any fevers or vomiting.  Patient is on chronic narcotics, on Vicodin, two thoracic fractures.   Discomfort feels like a band around her stomach.   Taking norco for over a year.        REVIEW OF SYSTEMS  Positive for diarrhea, vomiting negative for fever abdominal pain bloody stool or bloody vomit.  As above all other systems are negative.    PAST MEDICAL HISTORY   has a past medical history of Anesthesia; Asthma; Backpain (7/2017); Blood clot in vein (07/06/2018); Bowel habit changes (07/06/2018); Breath shortness; Bronchitis; CAD (coronary artery disease); Cancer ([facility]) (2016); Chickenpox; COPD (chronic obstructive pulmonary disease) ([facility]); Dialysis (2005); Emphysema of lung ([facility]); Glaucoma; Hemorrhagic disorder ([facility]); Hyperlipidemia; Hypertension; Hyperthyroidism; Kidney stone; Lung cancer ([facility]) (12/12/2016); Multiple thyroid nodules (7/9/2015); Nasal drainage; Osteoporosis; Pain (07/06/2018); Personal history of venous thrombosis and embolism (2004, 2012); Pneumonia (7/2016); Renal disorder; Renal stones (2013); Rheumatoid arthritis ([facility]); Rheumatoid arthritis ([facility]); S/P appendectomy (1998 ); S/P cholecystectomy (1998); S/P kyphoplasty (2006, 2007, 2013); Sepsis(995.91) (2005); and Shortness of breath (07/06/2018). She also has no past medical history of Angina; Arrhythmia; Breast cancer ([facility]); CATARACT; Congestive heart failure ([facility]); Diabetes; Fall; Heart murmur; Heart valve disease; Indigestion; Jaundice; Liver  disease; Myocardial infarct (HCC); Other specified symptom associated with female genital organs; Pacemaker; Psychiatric disorder; Psychiatric problem; Rheumatic fever; Seizure (HCC); Seizure disorder (HCC); Stroke (HCC); or Unspecified urinary incontinence.    FAMILY HISTORY  Family History   Problem Relation Age of Onset   • Cancer Mother    • Heart Failure Father    • Heart Disease Brother         SOCIAL HISTORY  Social History     Social History Main Topics   • Smoking status: Former Smoker     Packs/day: 1.00     Years: 30.00     Types: Cigarettes     Quit date: 1/1/2000   • Smokeless tobacco: Never Used      Comment: 7 years ago   • Alcohol use 0.0 oz/week      Comment: x2 a week   • Drug use: No   • Sexual activity: Not on file       SURGICAL HISTORY   has a past surgical history that includes other; other abdominal surgery; enlarge breast with implant; reduction of large breast; appendectomy; cholecystectomy; laminotomy; lung biopsy open; thoracoscopy (Left, 12/12/2016); other orthopedic surgery (2013); cystoscopy stent placement (Left, 6/18/2018); lithotripsy (Left, 6/18/2018); lasertripsy (Left, 6/18/2018); ureteroscopy (Left, 6/18/2018); cystoscopy stent placement (7/9/2018); ureteroscopy (Left, 7/9/2018); and lasertripsy (Left, 7/9/2018).    CURRENT MEDICATIONS  Reviewed.  See Encounter Summary.  Include No current facility-administered medications for this encounter.     Current Outpatient Prescriptions:   •  PROAIR  (90 Base) MCG/ACT Aero Soln inhalation aerosol, INHALE TWO PUFFS BY MOUTH EVERY 6 HOURS AS NEEDED FOR SHORTNESS OF BREATH, Disp: 1 Inhaler, Rfl: 2  •  HYDROcodone/acetaminophen (NORCO)  MG Tab, Take 1-2 Tabs by mouth as needed., Disp: , Rfl:   •  rivaroxaban (XARELTO) 20 MG Tab tablet, Take 1 Tab by mouth with dinner., Disp: 30 Tab, Rfl: 0  •  Tiotropium Bromide Monohydrate (SPIRIVA RESPIMAT) 2.5 MCG/ACT Aero Soln, Inhale 1 Puff by mouth 2 Times a Day., Disp: , Rfl:   •  BREO  "ELLIPTA 200-25 MCG/INH AEROSOL POWDER, BREATH ACTIVATED, INHALE ONE DOSE BY MOUTH DAILY. RINSE MOUTH AFTER USE., Disp: 1 Each, Rfl: 11  •  COMBIGAN 0.2-0.5 % Solution, Place 1 Drop in both eyes 2 Times a Day., Disp: , Rfl:   •  vitamin D, Ergocalciferol, (DRISDOL) 72920 UNITS Cap capsule, Take 50,000 Units by mouth every 14 days., Disp: , Rfl:   •  propranolol LA (INDERAL LA) 60 MG CAPSULE SR 24 HR, Take 60 mg by mouth every morning., Disp: , Rfl:   •  zolpidem (AMBIEN) 10 MG Tab, Take 10 mg by mouth at bedtime as needed for Sleep., Disp: , Rfl:   •  simvastatin (ZOCOR) 40 MG Tab, Take 40 mg by mouth every evening., Disp: , Rfl:   •  losartan (COZAAR) 50 MG TABS, Take 50 mg by mouth every morning., Disp: , Rfl:       ALLERGIES  Allergies   Allergen Reactions   • Nkda [No Known Drug Allergy]        PHYSICAL EXAM  VITAL SIGNS: /78   Pulse 88   Temp 36.7 °C (98.1 °F)   Resp 16   Ht 1.702 m (5' 7\")   Wt 76.5 kg (168 lb 10.4 oz)   SpO2 94%   BMI 26.41 kg/m²   Constitutional: Alert, able to answer questions  HENT: Nose is normal in appearance, external ears are normal,  moist mucous membranes  Eyes: Anicteric,  pupils are equal round and reactive, there is no conjunctival drainage or pallor   Neck: The trachea is midline, there is no obvious mass or meningeal signs  Cardiovascular: Good perfusion,  regular rate and rhythm without murmurs gallops or rubs  Thorax & Lungs: Respiratory rate and effort are normal. There is normal chest excursion with respiration.  No wheezes rhonchi or rales noted.  Abdomen: Abdomen is normal in appearance, no gross peritoneal signs  normal bowel sounds, no pain with cough  :   No CVA tenderness to palpation  Musculoskeletal: No deformities noted in all 4 extremities.   Skin: Visualized skin is warm without rash.  Neurologic:  Cranial nerves II through XII are intact there is no focal abnormality noted.  Psychiatric: Normal mood and mentation    RADIOLOGY/PROCEDURES  Imaging " Studies:    CT-RENAL COLIC EVALUATION(A/P W/O)   Final Result      1.  Prominent constipation.      2.  No evidence of bowel obstruction or focal inflammatory change.      3.  Multiple bilateral renal calyceal stones which measure up to 12 mm in size. There are left ureteropelvic junction stones which measure up to 5 mm size. There is mild dilatation of the left renal pelvis.      4.  Hepatic and renal cysts.      5.  Prior cholecystectomy.      6.  Sigmoid diverticulosis.            Pertinent Labs   Results for orders placed or performed during the hospital encounter of 08/30/18   CBC WITH DIFFERENTIAL   Result Value Ref Range    WBC 6.1 4.8 - 10.8 K/uL    RBC 3.95 (L) 4.20 - 5.40 M/uL    Hemoglobin 13.2 12.0 - 16.0 g/dL    Hematocrit 39.5 37.0 - 47.0 %    .0 (H) 81.4 - 97.8 fL    MCH 33.4 (H) 27.0 - 33.0 pg    MCHC 33.4 (L) 33.6 - 35.0 g/dL    RDW 46.0 35.9 - 50.0 fL    Platelet Count 154 (L) 164 - 446 K/uL    MPV 12.4 9.0 - 12.9 fL    Neutrophils-Polys 56.90 44.00 - 72.00 %    Lymphocytes 31.30 22.00 - 41.00 %    Monocytes 8.70 0.00 - 13.40 %    Eosinophils 2.60 0.00 - 6.90 %    Basophils 0.20 0.00 - 1.80 %    Immature Granulocytes 0.30 0.00 - 0.90 %    Nucleated RBC 0.00 /100 WBC    Neutrophils (Absolute) 3.45 2.00 - 7.15 K/uL    Lymphs (Absolute) 1.90 1.00 - 4.80 K/uL    Monos (Absolute) 0.53 0.00 - 0.85 K/uL    Eos (Absolute) 0.16 0.00 - 0.51 K/uL    Baso (Absolute) 0.01 0.00 - 0.12 K/uL    Immature Granulocytes (abs) 0.02 0.00 - 0.11 K/uL    NRBC (Absolute) 0.00 K/uL   BMP   Result Value Ref Range    Sodium 134 (L) 135 - 145 mmol/L    Potassium 5.2 3.6 - 5.5 mmol/L    Chloride 106 96 - 112 mmol/L    Co2 20 20 - 33 mmol/L    Glucose 136 (H) 65 - 99 mg/dL    Bun 36 (H) 8 - 22 mg/dL    Creatinine 1.85 (H) 0.50 - 1.40 mg/dL    Calcium 10.1 8.4 - 10.2 mg/dL    Anion Gap 8.0 0.0 - 11.9   ESTIMATED GFR   Result Value Ref Range    GFR If  33 (A) >60 mL/min/1.73 m 2    GFR If Non African  American 27 (A) >60 mL/min/1.73 m 2         COURSE & MEDICAL DECISION MAKING  Nursing notes and vital signs were reviewed. (See chart for details)  The patients  records were reviewed, history was obtained from the patient;     The patient presents with constipation, unable to be relieved with milk of magnesia but the patient did take Imodium which may be complicating process and additionally is on chronic  narcotics, and the differential diagnosis includes but is not limited to constipation, bowel obstruction, obstipation or ileus.       Initial orders in the Emergency Department included CT renal colic CBC CMP Rella*and initial treatment in the Emergency Department included Relistar as the patient has chronic narcotic use and has had no bowel movement for over 1 week and the patient received soapsuds enema    ED testing reveals no obstruction on CT with significant constipation.On repeat evaluation of the patient, there was a minimal no response to medications, *no signficantly abnormal vital signs, the patient's repeat exam was worseneing with increased constipation and some blood in stool.    Additional work-up planned includes admission to the hospital the hospitalist.  This was discussed with hospitalist    FINAL IMPRESSION  1.  Constipation  2.        DISPOSITION  Admit    Electronically signed by: Denita Topete, 8/30/2018 9:12 AM

## 2018-08-30 NOTE — ED NOTES
Pt unable to retain fluid any longer than during intake. Sitting on commode having second BM, blood produced with this BM. Enema stopped at this time. ERP updated. ERP approved stopping enema.

## 2018-08-30 NOTE — PROGRESS NOTES
Pt up to floor via gurney. Transferred to bed safely. Provided with communication folder and warm blanket. VS obtained.

## 2018-08-30 NOTE — ED NOTES
Enema started, pt has difficulty retaining fluid. One watery brown BM produced. Pt helped back to bed.

## 2018-08-30 NOTE — PROGRESS NOTES
Assessment and admission completed. Pt AAOx4, no complaints of pain, n/v, dizziness. Ambulated to bathroom on arrival with steady gait. Pt meds taken to pharmacy for identification/safekeeping, pt informed of home med policy. Pt provided with snacks/drinks per diet order. Will return for due medications.

## 2018-08-30 NOTE — CARE PLAN
Problem: Communication  Goal: The ability to communicate needs accurately and effectively will improve  Outcome: PROGRESSING AS EXPECTED  POC discussed including suppositories and stool softeners, mobility, hydration. Pt oriented to environment including call light and communication folder, MD rounding, unit routine. Pt verbalizes understanding of POC.    Problem: Bowel/Gastric:  Goal: Normal bowel function is maintained or improved  Outcome: PROGRESSING AS EXPECTED  Pt on bowel protocol including suppositories, stool softeners and diet orders. Pt encouraged to mobilize/ambulate to promote bowel motility. Normal bowel sounds noted. Pt verbalizes understanding of bowel POC.

## 2018-08-30 NOTE — ED NOTES
Pt assisted to reposition in [facility]. No needs or complaints at this time. VS stable, call bell use reinforced.

## 2018-08-31 LAB
ANION GAP SERPL CALC-SCNC: 12 MMOL/L (ref 0–11.9)
BASOPHILS # BLD AUTO: 0.3 % (ref 0–1.8)
BASOPHILS # BLD: 0.03 K/UL (ref 0–0.12)
BUN SERPL-MCNC: 28 MG/DL (ref 8–22)
CALCIUM SERPL-MCNC: 9.8 MG/DL (ref 8.4–10.2)
CHLORIDE SERPL-SCNC: 112 MMOL/L (ref 96–112)
CO2 SERPL-SCNC: 15 MMOL/L (ref 20–33)
CREAT SERPL-MCNC: 1.53 MG/DL (ref 0.5–1.4)
EOSINOPHIL # BLD AUTO: 0.02 K/UL (ref 0–0.51)
EOSINOPHIL NFR BLD: 0.2 % (ref 0–6.9)
ERYTHROCYTE [DISTWIDTH] IN BLOOD BY AUTOMATED COUNT: 45.7 FL (ref 35.9–50)
GLUCOSE SERPL-MCNC: 168 MG/DL (ref 65–99)
HCT VFR BLD AUTO: 47 % (ref 37–47)
HGB BLD-MCNC: 15.6 G/DL (ref 12–16)
IMM GRANULOCYTES # BLD AUTO: 0.04 K/UL (ref 0–0.11)
IMM GRANULOCYTES NFR BLD AUTO: 0.3 % (ref 0–0.9)
LYMPHOCYTES # BLD AUTO: 0.97 K/UL (ref 1–4.8)
LYMPHOCYTES NFR BLD: 8.5 % (ref 22–41)
MCH RBC QN AUTO: 33.1 PG (ref 27–33)
MCHC RBC AUTO-ENTMCNC: 33.2 G/DL (ref 33.6–35)
MCV RBC AUTO: 99.8 FL (ref 81.4–97.8)
MONOCYTES # BLD AUTO: 0.84 K/UL (ref 0–0.85)
MONOCYTES NFR BLD AUTO: 7.3 % (ref 0–13.4)
NEUTROPHILS # BLD AUTO: 9.53 K/UL (ref 2–7.15)
NEUTROPHILS NFR BLD: 83.4 % (ref 44–72)
NRBC # BLD AUTO: 0 K/UL
NRBC BLD-RTO: 0 /100 WBC
PLATELET # BLD AUTO: 216 K/UL (ref 164–446)
PMV BLD AUTO: 11.5 FL (ref 9–12.9)
POTASSIUM SERPL-SCNC: 3.9 MMOL/L (ref 3.6–5.5)
RBC # BLD AUTO: 4.71 M/UL (ref 4.2–5.4)
SODIUM SERPL-SCNC: 139 MMOL/L (ref 135–145)
WBC # BLD AUTO: 11.4 K/UL (ref 4.8–10.8)

## 2018-08-31 PROCEDURE — A9270 NON-COVERED ITEM OR SERVICE: HCPCS | Performed by: HOSPITALIST

## 2018-08-31 PROCEDURE — G0378 HOSPITAL OBSERVATION PER HR: HCPCS

## 2018-08-31 PROCEDURE — 700102 HCHG RX REV CODE 250 W/ 637 OVERRIDE(OP): Performed by: HOSPITALIST

## 2018-08-31 PROCEDURE — 700105 HCHG RX REV CODE 258: Performed by: HOSPITALIST

## 2018-08-31 PROCEDURE — 99225 PR SUBSEQUENT OBSERVATION CARE,LEVEL II: CPT | Performed by: HOSPITALIST

## 2018-08-31 PROCEDURE — 76937 US GUIDE VASCULAR ACCESS: CPT

## 2018-08-31 PROCEDURE — 85025 COMPLETE CBC W/AUTO DIFF WBC: CPT

## 2018-08-31 PROCEDURE — 80048 BASIC METABOLIC PNL TOTAL CA: CPT

## 2018-08-31 PROCEDURE — 94760 N-INVAS EAR/PLS OXIMETRY 1: CPT

## 2018-08-31 RX ORDER — LACTULOSE 20 G/30ML
30 SOLUTION ORAL 3 TIMES DAILY
Status: DISCONTINUED | OUTPATIENT
Start: 2018-08-31 | End: 2018-09-01

## 2018-08-31 RX ORDER — BUDESONIDE AND FORMOTEROL FUMARATE DIHYDRATE 160; 4.5 UG/1; UG/1
2 AEROSOL RESPIRATORY (INHALATION) 2 TIMES DAILY
Status: DISCONTINUED | OUTPATIENT
Start: 2018-08-31 | End: 2018-09-02 | Stop reason: HOSPADM

## 2018-08-31 RX ADMIN — BRIMONIDINE TARTRATE AND TIMOLOL MALEATE 1 DROP: 2; 5 SOLUTION OPHTHALMIC at 18:00

## 2018-08-31 RX ADMIN — Medication 2 TABLET: at 17:07

## 2018-08-31 RX ADMIN — PROPRANOLOL HYDROCHLORIDE 60 MG: 60 CAPSULE, EXTENDED RELEASE ORAL at 06:17

## 2018-08-31 RX ADMIN — SIMVASTATIN 40 MG: 20 TABLET, FILM COATED ORAL at 21:11

## 2018-08-31 RX ADMIN — Medication 2 TABLET: at 06:14

## 2018-08-31 RX ADMIN — LACTULOSE 30 ML: 20 SOLUTION ORAL at 17:07

## 2018-08-31 RX ADMIN — RIVAROXABAN 20 MG: 20 TABLET, FILM COATED ORAL at 17:07

## 2018-08-31 RX ADMIN — LACTULOSE 30 ML: 20 SOLUTION ORAL at 11:22

## 2018-08-31 RX ADMIN — BUDESONIDE AND FORMOTEROL FUMARATE DIHYDRATE 2 PUFF: 160; 4.5 AEROSOL RESPIRATORY (INHALATION) at 09:20

## 2018-08-31 RX ADMIN — ALBUTEROL SULFATE 2 PUFF: 90 AEROSOL, METERED RESPIRATORY (INHALATION) at 02:03

## 2018-08-31 RX ADMIN — BRIMONIDINE TARTRATE AND TIMOLOL MALEATE 1 DROP: 2; 5 SOLUTION OPHTHALMIC at 06:14

## 2018-08-31 RX ADMIN — TIOTROPIUM BROMIDE 1 CAPSULE: 18 CAPSULE ORAL; RESPIRATORY (INHALATION) at 06:18

## 2018-08-31 RX ADMIN — ALBUTEROL SULFATE 2 PUFF: 90 AEROSOL, METERED RESPIRATORY (INHALATION) at 21:16

## 2018-08-31 RX ADMIN — BUDESONIDE AND FORMOTEROL FUMARATE DIHYDRATE 2 PUFF: 160; 4.5 AEROSOL RESPIRATORY (INHALATION) at 17:08

## 2018-08-31 RX ADMIN — ACETAMINOPHEN 650 MG: 325 TABLET, FILM COATED ORAL at 21:11

## 2018-08-31 RX ADMIN — SODIUM CHLORIDE 1000 ML: 9 INJECTION, SOLUTION INTRAVENOUS at 09:09

## 2018-08-31 RX ADMIN — LOSARTAN POTASSIUM 50 MG: 25 TABLET, FILM COATED ORAL at 06:17

## 2018-08-31 ASSESSMENT — PATIENT HEALTH QUESTIONNAIRE - PHQ9
2. FEELING DOWN, DEPRESSED, IRRITABLE, OR HOPELESS: NOT AT ALL
SUM OF ALL RESPONSES TO PHQ9 QUESTIONS 1 AND 2: 0
1. LITTLE INTEREST OR PLEASURE IN DOING THINGS: NOT AT ALL

## 2018-08-31 ASSESSMENT — ENCOUNTER SYMPTOMS
BACK PAIN: 0
CONSTIPATION: 1
FEVER: 0
ABDOMINAL PAIN: 0
HEADACHES: 0
COUGH: 0
BLURRED VISION: 0
NECK PAIN: 0
TINGLING: 0
CHILLS: 0
SHORTNESS OF BREATH: 0
DIZZINESS: 0
INSOMNIA: 0
EYE PAIN: 0
PALPITATIONS: 0
VOMITING: 0
DEPRESSION: 0
NAUSEA: 0
SORE THROAT: 0

## 2018-08-31 ASSESSMENT — PAIN SCALES - GENERAL
PAINLEVEL_OUTOF10: 3
PAINLEVEL_OUTOF10: 3
PAINLEVEL_OUTOF10: 1

## 2018-08-31 NOTE — PROGRESS NOTES
Dr. RACHAEL Alford at bedside. POC discussed. Pt questions answered. OK to d/c Dulcolax suppository per Dr. Alford. Continue Lactulose and Senna.

## 2018-08-31 NOTE — FLOWSHEET NOTE
08/31/18 0920   Interdisciplinary Plan of Care-Goals (Indications)   Bronchodilator Indications History / Diagnosis   RT Assessment of Delivered Medications   Evaluation of Medication Delivery Daily Yes-- Pt /Family has been Instructed in use of Respiratory Medications and Adverse Reactions   MDI/DPI Group   #MDI/DPI Given MDI/DPI x 1   Chest Exam   Respiration 18   Pulse 91   Breath Sounds   RUL Breath Sounds Clear   RML Breath Sounds Clear   RLL Breath Sounds Clear   AUSTIN Breath Sounds Clear   LLL Breath Sounds Clear   Oxygen   Pulse Oximetry 96 %   O2 (LPM) 0   O2 Daily Delivery Respiratory  Room Air with O2 Available

## 2018-08-31 NOTE — PROGRESS NOTES
ER nurse tried twice to start an IV on the pt. Currently waiting for a nurse who is ultrasound certified to start IV.

## 2018-08-31 NOTE — ASSESSMENT & PLAN NOTE
Multiple spine compression fractures per patient due to osteoporosis. Takes Norco 10/3/25 every 6 hours for pain.

## 2018-08-31 NOTE — PROGRESS NOTES
Tele Summary @1167    Rhythm Sinus Rhythm  Ectopy rare PVC per Wendy, MT  HR 83, runs 70's-80's  IA 0.20  QRS 0.08  QT 0.38

## 2018-08-31 NOTE — ASSESSMENT & PLAN NOTE
Patient has chronic Norco use a 10/3/25 every 6 hours. She took milk of magnesia at home for constipation and then with loose bowel movements took Imodium for 3 days 2 pills twice daily.    Lactulose trial only wiht overflow diarrhea  Kub done this am and shows persistent lower rectal stool  Repeat enema today

## 2018-08-31 NOTE — PROGRESS NOTES
22 g in left hand infiltrated. x3 attempts, 2 with US without success.     Bedside report given to Janet LEMUS. POC discussed. Pt resting comfortably in bed. Safety precautions in place.

## 2018-08-31 NOTE — PROGRESS NOTES
Pt is A&Ox4, resting comfortably in bed. Assessment completed and pain 3/10. Pt describes pain as aching in her stomach. Due medication have been given. Bed is in lowest postion, and side rails up x2, pt calls when needs assistance and call light within reach.

## 2018-08-31 NOTE — PROGRESS NOTES
Assessment complete. NS running at 100mL/hr. Pt complains of lower abdominal cramping and frequent urge to defecate. Only small amounts of watery stool produced. Pt resting in bed. Call light in reach.

## 2018-08-31 NOTE — PROGRESS NOTES
ICU phoned regarding insertion of new US guided IV. Per Ely, no ICU nurse is available due to new admit in their unit. Ely suggested to contact PICC nurse for assistance. Per imaging dept. PICC nurse has gone home for the day. ER charge Erum will send available nurse to help.

## 2018-08-31 NOTE — ASSESSMENT & PLAN NOTE
Tsh suppressed  Needs outpatient follow up  Consider methimazole.   Apparently has not followed up for this yet but did see endocrine

## 2018-08-31 NOTE — FLOWSHEET NOTE
08/31/18 0200   Events/Summary/Plan   Events/Summary/Plan sophie MDI standing at bedside   Interdisciplinary Plan of Care-Goals (Indications)   Bronchodilator Indications History / Diagnosis   Interdisciplinary Plan of Care-Outcomes    Bronchodilator Outcome Patient at Stable Baseline   Education   Education Yes - Pt. / Family has been Instructed in use of Respiratory Medications and Adverse Reactions   RT Assessment of Delivered Medications   Evaluation of Medication Delivery Daily Yes-- Pt /Family has been Instructed in use of Respiratory Medications and Adverse Reactions   MDI/DPI Group   #MDI/DPI Given MDI/DPI x 1   Chest Exam   Respiration 20   Pulse 84   Work Of Breathing / Effort Mild   Breath Sounds   Pre/Post Intervention Post Intervention Assessment   RUL Breath Sounds Clear   RML Breath Sounds Clear   RLL Breath Sounds Clear   AUSTIN Breath Sounds Clear   LLL Breath Sounds Clear   Secretions   Cough Non Productive   Oximetry   #Pulse Oximetry (Single Determination) Yes   Oxygen   Home O2 Use Prior To Admission? No   Pulse Oximetry 96 %   O2 Daily Delivery Respiratory  Room Air with O2 Available

## 2018-08-31 NOTE — PROGRESS NOTES
MD Arroyoli and updated him on loose ans watery stool with dark blood. Pt has orders for CBC at 0300.     No news orders from MD.

## 2018-08-31 NOTE — PROGRESS NOTES
Received report from Vianca LEMUS. Discussed plan of care and safety measures in place. No further needs at this time.

## 2018-08-31 NOTE — FLOWSHEET NOTE
08/30/18 2005   Events/Summary/Plan   Events/Summary/Plan MDI with spacer f/w rinsing mouth with water   Interdisciplinary Plan of Care-Goals (Indications)   Bronchodilator Indications History / Diagnosis   Interdisciplinary Plan of Care-Outcomes    Bronchodilator Outcome Patient at Stable Baseline   Education   Education Yes - Pt. / Family has been Instructed in use of Respiratory Equipment;Yes - Pt. / Family has been Instructed in use of Respiratory Medications and Adverse Reactions   RT Assessment of Delivered Medications   Evaluation of Medication Delivery Daily Yes-- Pt /Family has been Instructed in use of Respiratory Medications and Adverse Reactions   MDI/DPI Group   #MDI/DPI Given MDI/DPI x 1   Respiratory WDL   Respiratory (WDL) X   Chest Exam   Respiration 18   Pulse 91   Work Of Breathing / Effort Mild   Breath Sounds   Pre/Post Intervention (on commode at this time but wanted MDI- will return for B/S)   RUL Breath Sounds Clear  (B/S auscultated after pt finished with commode)   RML Breath Sounds Clear   RLL Breath Sounds Clear   AUSTIN Breath Sounds Clear   LLL Breath Sounds Clear   Oximetry   #Pulse Oximetry (Single Determination) Yes   Oxygen   Home O2 Use Prior To Admission? No   Pulse Oximetry 98 %   O2 (LPM) 0   O2 Daily Delivery Respiratory  Room Air with O2 Available

## 2018-08-31 NOTE — CARE PLAN
Problem: Safety  Goal: Will remain free from injury  Outcome: PROGRESSING AS EXPECTED  Non-skid socks in use. Call light in reach. Pt encouraged to call for assistance. Bedside commode in use. Hourly rounding complete. Pt verbalized understanding of safety care plan.     Problem: Bowel/Gastric:  Goal: Normal bowel function is maintained or improved  Outcome: PROGRESSING AS EXPECTED  Pt on bowel protocol including lactulaose, stool softeners and diet orders. Pt encouraged to mobilize/ambulate to promote bowel motility. Normal bowel sounds noted. Pt verbalizes understanding of bowel plan of care.

## 2018-08-31 NOTE — ASSESSMENT & PLAN NOTE
Patient has chronic kidney disease stage III with GFR from 37-52.  Creatinine is currently at 1.8 to the GFR 33..

## 2018-08-31 NOTE — PROGRESS NOTES
Report received from Janet LEMUS. POC discussed. Pt resting comfortably in bed. Safety precautions in place.

## 2018-08-31 NOTE — PROGRESS NOTES
[facility] Hospitalist Progress Note    Date of Service: 2018    Chief Complaint  67 y.o. female admitted 2018 with constipation    Interval Problem Update  Having small liquid stools  Frequent abdominal cramps .  No overt BM yet    Consultants/Specialty  none    Disposition  Hospital         Review of Systems   Constitutional: Negative for chills and fever.   HENT: Negative for sore throat.    Eyes: Negative for blurred vision and pain.   Respiratory: Negative for cough and shortness of breath.    Cardiovascular: Negative for chest pain and palpitations.   Gastrointestinal: Positive for constipation. Negative for abdominal pain, nausea and vomiting.   Genitourinary: Negative for dysuria and urgency.   Musculoskeletal: Negative for back pain and neck pain.   Skin: Negative for itching and rash.   Neurological: Negative for dizziness, tingling and headaches.   Psychiatric/Behavioral: Negative for depression. The patient does not have insomnia.    All other systems reviewed and are negative.     Physical Exam  Laboratory/Imaging   Hemodynamics  Temp (24hrs), Av.7 °C (98.1 °F), Min:36.4 °C (97.5 °F), Max:36.9 °C (98.5 °F)   Temperature: 36.4 °C (97.5 °F)  Pulse  Av.5  Min: 64  Max: 99    Blood Pressure : 118/61      Respiratory      Respiration: 18, Pulse Oximetry: 97 %, O2 Daily Delivery Respiratory : Room Air with O2 Available     #MDI/DPI Given: MDI/DPI x 1, Work Of Breathing / Effort: Mild  RUL Breath Sounds: Clear, RML Breath Sounds: Clear, RLL Breath Sounds: Clear, AUSTIN Breath Sounds: Clear, LLL Breath Sounds: Clear    Fluids    Intake/Output Summary (Last 24 hours) at 18 1454  Last data filed at 18 1407   Gross per 24 hour   Intake             1040 ml   Output                0 ml   Net             1040 ml       Nutrition  Orders Placed This Encounter   Procedures   • Diet Order Clear Liquids - No Red Foods     Standing Status:   Standing     Number of Occurrences:   1     Order Specific  Question:   Diet:     Answer:   Clear Liquids - No Red Foods [12]     Physical Exam   Constitutional: She is oriented to person, place, and time. She appears well-developed and well-nourished. No distress.   Patient seen and examined  Discussed plan with RN   HENT:   Right Ear: External ear normal.   Left Ear: External ear normal.   Nose: Nose normal.   Eyes: Conjunctivae are normal. Right eye exhibits no discharge. Left eye exhibits no discharge.   Neck: No JVD present.   Cardiovascular: Regular rhythm and normal heart sounds.    No murmur heard.  Cap refill 2sec  Pulses 2+ throughout     Pulmonary/Chest: Effort normal and breath sounds normal. No stridor. No respiratory distress. She has no wheezes. She has no rales.   Abdominal: Soft. Bowel sounds are normal. She exhibits distension. There is no tenderness.   Musculoskeletal: She exhibits no edema or tenderness.   Neurological: She is alert and oriented to person, place, and time.   Skin: Skin is warm and dry. She is not diaphoretic. No erythema.   Normal skin  Color.    Psychiatric: She has a normal mood and affect. Her behavior is normal.   Nursing note and vitals reviewed.      Recent Labs      08/30/18   0945  08/31/18   0552   WBC  6.1  11.4*   RBC  3.95*  4.71   HEMOGLOBIN  13.2  15.6   HEMATOCRIT  39.5  47.0   MCV  100.0*  99.8*   MCH  33.4*  33.1*   MCHC  33.4*  33.2*   RDW  46.0  45.7   PLATELETCT  154*  216   MPV  12.4  11.5     Recent Labs      08/30/18   0945  08/31/18   0552   SODIUM  134*  139   POTASSIUM  5.2  3.9   CHLORIDE  106  112   CO2  20  15*   GLUCOSE  136*  168*   BUN  36*  28*   CREATININE  1.85*  1.53*   CALCIUM  10.1  9.8                      Assessment/Plan     * Drug-induced constipation   Assessment & Plan    Patient has chronic Norco use a 10/3/25 every 6 hours. She took milk of magnesia at home for constipation and then with loose bowel movements took Imodium for 3 days 2 pills twice daily.    Lactulose trial ongoing now.   Repeat  enema with no relief          Closed T11 fracture (HCC)- (present on admission)   Assessment & Plan    Multiple spine compression fractures per patient due to osteoporosis. Takes Norco 10/3/25 every 6 hours for pain.        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 She is currently on Xarelto 20 mg daily at bedtime. She states the last time she was taken off several toe she had a retinal blood clot and vision loss. To the left eye        Osteoporosis- (present on admission)   Assessment & Plan    History of multiple compression fractures giving rise to chronic pain syndrome.        Hyperthyroidism- (present on admission)   Assessment & Plan    Tsh suppressed  Needs outpatient follow up  Consider methimazole.           CKD (chronic kidney disease) stage 3, GFR 30-59 ml/min- (present on admission)   Assessment & Plan    Patient has chronic kidney disease stage III with GFR from 37-52.  Creatinine is currently at 1.8 to the GFR 33..        HTN (hypertension)- (present on admission)   Assessment & Plan    Continue home blood pressure medications          Quality-Core Measures   Reviewed items::  EKG reviewed, Labs reviewed, Medications reviewed and Radiology images reviewed  Gonzalez catheter::  No Gonzalez  Ulcer Prophylaxis::  Not indicated  Assessed for rehabilitation services:  Patient was assess for and/or received rehabilitation services during this hospitalization

## 2018-08-31 NOTE — CARE PLAN
Problem: Infection  Goal: Will remain free from infection  Outcome: PROGRESSING AS EXPECTED  Discussed infection prevention with patient. All questions answered. Pt demonstrated infection prevention techniques and hand hygiene.     Problem: Bowel/Gastric:  Goal: Normal bowel function is maintained or improved  Outcome: PROGRESSING AS EXPECTED  Giving medications per MAR. Suppository q 8hrs and drinking purine juice. Advised pt to ambulate hallway.

## 2018-09-01 ENCOUNTER — APPOINTMENT (OUTPATIENT)
Dept: RADIOLOGY | Facility: MEDICAL CENTER | Age: 68
End: 2018-09-01
Attending: HOSPITALIST
Payer: MEDICARE

## 2018-09-01 PROCEDURE — 700102 HCHG RX REV CODE 250 W/ 637 OVERRIDE(OP): Performed by: HOSPITALIST

## 2018-09-01 PROCEDURE — 74018 RADEX ABDOMEN 1 VIEW: CPT

## 2018-09-01 PROCEDURE — 99225 PR SUBSEQUENT OBSERVATION CARE,LEVEL II: CPT | Performed by: HOSPITALIST

## 2018-09-01 PROCEDURE — A9270 NON-COVERED ITEM OR SERVICE: HCPCS | Performed by: HOSPITALIST

## 2018-09-01 PROCEDURE — 96374 THER/PROPH/DIAG INJ IV PUSH: CPT

## 2018-09-01 PROCEDURE — G0378 HOSPITAL OBSERVATION PER HR: HCPCS

## 2018-09-01 PROCEDURE — 700105 HCHG RX REV CODE 258: Performed by: HOSPITALIST

## 2018-09-01 PROCEDURE — 700111 HCHG RX REV CODE 636 W/ 250 OVERRIDE (IP): Performed by: HOSPITALIST

## 2018-09-01 PROCEDURE — 700101 HCHG RX REV CODE 250: Performed by: HOSPITALIST

## 2018-09-01 RX ORDER — ENEMA 19; 7 G/133ML; G/133ML
1 ENEMA RECTAL 2 TIMES DAILY
Status: COMPLETED | OUTPATIENT
Start: 2018-09-01 | End: 2018-09-01

## 2018-09-01 RX ADMIN — BRIMONIDINE TARTRATE AND TIMOLOL MALEATE 1 DROP: 2; 5 SOLUTION OPHTHALMIC at 05:50

## 2018-09-01 RX ADMIN — SODIUM CHLORIDE: 9 INJECTION, SOLUTION INTRAVENOUS at 00:32

## 2018-09-01 RX ADMIN — TIOTROPIUM BROMIDE 1 CAPSULE: 18 CAPSULE ORAL; RESPIRATORY (INHALATION) at 05:47

## 2018-09-01 RX ADMIN — PROPRANOLOL HYDROCHLORIDE 60 MG: 60 CAPSULE, EXTENDED RELEASE ORAL at 05:46

## 2018-09-01 RX ADMIN — BRIMONIDINE TARTRATE AND TIMOLOL MALEATE 1 DROP: 2; 5 SOLUTION OPHTHALMIC at 18:00

## 2018-09-01 RX ADMIN — SIMVASTATIN 40 MG: 20 TABLET, FILM COATED ORAL at 20:42

## 2018-09-01 RX ADMIN — SODIUM CHLORIDE: 9 INJECTION, SOLUTION INTRAVENOUS at 20:43

## 2018-09-01 RX ADMIN — BUDESONIDE AND FORMOTEROL FUMARATE DIHYDRATE 2 PUFF: 160; 4.5 AEROSOL RESPIRATORY (INHALATION) at 17:06

## 2018-09-01 RX ADMIN — RIVAROXABAN 20 MG: 20 TABLET, FILM COATED ORAL at 17:06

## 2018-09-01 RX ADMIN — SODIUM CHLORIDE 1000 ML: 9 INJECTION, SOLUTION INTRAVENOUS at 11:40

## 2018-09-01 RX ADMIN — LOSARTAN POTASSIUM 50 MG: 25 TABLET, FILM COATED ORAL at 05:38

## 2018-09-01 RX ADMIN — BUDESONIDE AND FORMOTEROL FUMARATE DIHYDRATE 2 PUFF: 160; 4.5 AEROSOL RESPIRATORY (INHALATION) at 05:49

## 2018-09-01 RX ADMIN — SODIUM PHOSPHATE 133 ML: 7; 19 ENEMA RECTAL at 13:10

## 2018-09-01 RX ADMIN — Medication 2 TABLET: at 17:06

## 2018-09-01 RX ADMIN — ONDANSETRON 4 MG: 2 INJECTION INTRAMUSCULAR; INTRAVENOUS at 10:05

## 2018-09-01 RX ADMIN — SODIUM PHOSPHATE 133 ML: 7; 19 ENEMA RECTAL at 12:47

## 2018-09-01 ASSESSMENT — PAIN SCALES - GENERAL
PAINLEVEL_OUTOF10: 0

## 2018-09-01 ASSESSMENT — ENCOUNTER SYMPTOMS
TINGLING: 0
NECK PAIN: 0
NAUSEA: 1
PALPITATIONS: 0
DEPRESSION: 0
SHORTNESS OF BREATH: 0
BLURRED VISION: 0
DIARRHEA: 1
FEVER: 0
COUGH: 0
INSOMNIA: 0
SORE THROAT: 0
DIZZINESS: 0
CHILLS: 0
HEADACHES: 0
EYE PAIN: 0
VOMITING: 0
ABDOMINAL PAIN: 1
BACK PAIN: 0

## 2018-09-01 ASSESSMENT — PATIENT HEALTH QUESTIONNAIRE - PHQ9
SUM OF ALL RESPONSES TO PHQ9 QUESTIONS 1 AND 2: 0
1. LITTLE INTEREST OR PLEASURE IN DOING THINGS: NOT AT ALL
2. FEELING DOWN, DEPRESSED, IRRITABLE, OR HOPELESS: NOT AT ALL

## 2018-09-01 NOTE — PROGRESS NOTES
Two fleet enemas administered. Explained procedure to pt. Tolerated well. Small amount of formed stool produced.

## 2018-09-01 NOTE — PROGRESS NOTES
Assessment completed,alert and oriented  to person,place and time.No edema, on room air sat 96%. Complained of lower back pain 3/10 at this time.Medication given per MAR. Frequent small loose stools .States understanding of POC. Call light at reach, Bed locked in low position.

## 2018-09-01 NOTE — CARE PLAN
Problem: Safety  Goal: Will remain free from injury  Outcome: PROGRESSING AS EXPECTED  Non-skid socks in use. Bedside commode in use. Hourly rounding complete. Call light in reach. Pt encouraged to call for help. Pt verbalized understanding of safety care plan.     Problem: Bowel/Gastric:  Goal: Normal bowel function is maintained or improved  Outcome: PROGRESSING AS EXPECTED  Pt on bowel protocol including lactulose, stool softeners and diet orders. Pt encouraged to mobilize/ambulate to promote bowel motility. Normal bowel sounds noted. Pt verbalizes understanding of bowel plan of care.

## 2018-09-01 NOTE — PROGRESS NOTES
[facility] Hospitalist Progress Note    Date of Service: 2018    Chief Complaint  67 y.o. female admitted 2018 with constipation    Interval Problem Update  Having small liquid stools  Frequent abdominal cramps .  No overt BM yet still  Kub done this am.       Consultants/Specialty  none    Disposition  Hospital         Review of Systems   Constitutional: Negative for chills and fever.   HENT: Negative for sore throat.    Eyes: Negative for blurred vision and pain.   Respiratory: Negative for cough and shortness of breath.    Cardiovascular: Negative for chest pain and palpitations.   Gastrointestinal: Positive for abdominal pain, diarrhea and nausea. Negative for vomiting.   Genitourinary: Negative for dysuria and urgency.   Musculoskeletal: Negative for back pain and neck pain.   Skin: Negative for itching and rash.   Neurological: Negative for dizziness, tingling and headaches.   Psychiatric/Behavioral: Negative for depression. The patient does not have insomnia.    All other systems reviewed and are negative.     Physical Exam  Laboratory/Imaging   Hemodynamics  Temp (24hrs), Av.5 °C (97.7 °F), Min:36.3 °C (97.3 °F), Max:36.8 °C (98.2 °F)   Temperature: 36.8 °C (98.2 °F)  Pulse  Av.3  Min: 64  Max: 134    Blood Pressure : 119/79      Respiratory      Respiration: 16, Pulse Oximetry: 98 %, O2 Daily Delivery Respiratory : Room Air with O2 Available     #MDI/DPI Given: MDI/DPI x 1, Work Of Breathing / Effort: Mild  RUL Breath Sounds: Clear, RML Breath Sounds: Clear, RLL Breath Sounds: Clear, AUSTIN Breath Sounds: Clear, LLL Breath Sounds: Clear    Fluids    Intake/Output Summary (Last 24 hours) at 18 0919  Last data filed at 18 0600   Gross per 24 hour   Intake             2100 ml   Output                0 ml   Net             2100 ml       Nutrition  Orders Placed This Encounter   Procedures   • Diet Order Clear Liquids - No Red Foods     Standing Status:   Standing     Number of  Occurrences:   1     Order Specific Question:   Diet:     Answer:   Clear Liquids - No Red Foods [12]     Physical Exam   Constitutional: She is oriented to person, place, and time. She appears well-developed and well-nourished. No distress.   Patient seen and examined  Discussed plan with RN   HENT:   Right Ear: External ear normal.   Left Ear: External ear normal.   Nose: Nose normal.   Eyes: EOM are normal. No scleral icterus.   Neck: Neck supple. No thyromegaly present.   Cardiovascular: Regular rhythm and normal heart sounds.    No murmur heard.       Pulmonary/Chest: Effort normal and breath sounds normal. No respiratory distress. She has no rales.   Abdominal: Soft. Bowel sounds are normal. She exhibits distension. There is no tenderness.   Musculoskeletal: She exhibits no edema or tenderness.   Neurological: She is alert and oriented to person, place, and time.   Skin: Skin is warm and dry. She is not diaphoretic. No erythema.   Psychiatric: She has a normal mood and affect. Her behavior is normal.   Nursing note and vitals reviewed.      Recent Labs      08/30/18   0945  08/31/18   0552   WBC  6.1  11.4*   RBC  3.95*  4.71   HEMOGLOBIN  13.2  15.6   HEMATOCRIT  39.5  47.0   MCV  100.0*  99.8*   MCH  33.4*  33.1*   MCHC  33.4*  33.2*   RDW  46.0  45.7   PLATELETCT  154*  216   MPV  12.4  11.5     Recent Labs      08/30/18   0945  08/31/18   0552   SODIUM  134*  139   POTASSIUM  5.2  3.9   CHLORIDE  106  112   CO2  20  15*   GLUCOSE  136*  168*   BUN  36*  28*   CREATININE  1.85*  1.53*   CALCIUM  10.1  9.8                      Assessment/Plan     * Drug-induced constipation   Assessment & Plan    Patient has chronic Norco use a 10/3/25 every 6 hours. She took milk of magnesia at home for constipation and then with loose bowel movements took Imodium for 3 days 2 pills twice daily.    Lactulose trial only wiht overflow diarrhea  Kub done this am and shows persistent lower rectal stool  Repeat enema today           Closed T11 fracture (HCC)- (present on admission)   Assessment & Plan    Multiple spine compression fractures per patient due to osteoporosis. Takes Norco 10/3/25 every 6 hours for pain.        Deep vein thrombosis (DVT) (HCC)- (present on admission)   Assessment & Plan    History of multiple DVTs  Continue Xarelto         Osteoporosis- (present on admission)   Assessment & Plan    History of multiple compression fractures giving rise to chronic pain syndrome.        Hyperthyroidism- (present on admission)   Assessment & Plan    Tsh suppressed  Needs outpatient follow up  Consider methimazole.   Apparently has not followed up for this yet but did see endocrine           CKD (chronic kidney disease) stage 3, GFR 30-59 ml/min- (present on admission)   Assessment & Plan    Patient has chronic kidney disease stage III with GFR from 37-52.  Creatinine is currently at 1.8 to the GFR 33..        HTN (hypertension)- (present on admission)   Assessment & Plan    Continue home blood pressure medications          Quality-Core Measures   Reviewed items::  EKG reviewed, Labs reviewed, Medications reviewed and Radiology images reviewed  Gonzalez catheter::  No Gonzalez  Ulcer Prophylaxis::  Not indicated  Assessed for rehabilitation services:  Patient was assess for and/or received rehabilitation services during this hospitalization

## 2018-09-01 NOTE — PROGRESS NOTES
Bedside report given to  Bonnie LEMUS.Pt  Resting in the bed.Safety precautions in place and all the lines remain patent.

## 2018-09-01 NOTE — PROGRESS NOTES
1819 IVF re-started after getting an iv access on left AC by Samantha, ICU RN US guided PIV.     1910 Patient's sitting up in bed, no changes in status. Bedside report given to Oncoming NOC RN (Tochukwu).

## 2018-09-01 NOTE — PROGRESS NOTES
Assessment complete. Pt reports no pain, some abd discomfort. Complains of nausea, declined zofran. Call light in reach.

## 2018-09-01 NOTE — CARE PLAN
Problem: Safety  Goal: Will remain free from injury  Outcome: PROGRESSING AS EXPECTED  Hourly rounding.  Non-skid socks. Bed locked & in low position. Personal belongings and call light  within reach. .      Problem: Venous Thromboembolism (VTW)/Deep Vein Thrombosis (DVT) Prevention:  Goal: Patient will participate in Venous Thrombosis (VTE)/Deep Vein Thrombosis (DVT)Prevention Measures  Outcome: PROGRESSING AS EXPECTED  Xarelto in use for prophylaxis    Problem: Knowledge Deficit  Goal: Knowledge of disease process/condition, treatment plan, diagnostic tests, and medications will improve  Outcome: PROGRESSING AS EXPECTED  Information regarding disease process/condition explained,question answered.  Updated with plan of care, verbalized understanding.

## 2018-09-01 NOTE — PROGRESS NOTES
Report received from King LEMUS. POC discussed. Pt resting comfortably in bed. Safety precautions in place.

## 2018-09-01 NOTE — PROGRESS NOTES
US-guided for insertion of PIV procedure explained to patient. Vein visualized for access. Image of vein obtained and entered into . Aseptic technique was used for cannulation of 20 gauge PIV catheter placed in left AC. Patency observed with blood return and flushed with NS without difficulty. PIV dressing applied. Patient tolerated procedure.

## 2018-09-02 VITALS
BODY MASS INDEX: 25.26 KG/M2 | SYSTOLIC BLOOD PRESSURE: 114 MMHG | WEIGHT: 160.94 LBS | TEMPERATURE: 98.2 F | OXYGEN SATURATION: 96 % | HEART RATE: 68 BPM | RESPIRATION RATE: 16 BRPM | HEIGHT: 67 IN | DIASTOLIC BLOOD PRESSURE: 59 MMHG

## 2018-09-02 LAB
ANION GAP SERPL CALC-SCNC: 9 MMOL/L (ref 0–11.9)
BASOPHILS # BLD AUTO: 0.2 % (ref 0–1.8)
BASOPHILS # BLD: 0.01 K/UL (ref 0–0.12)
BUN SERPL-MCNC: 25 MG/DL (ref 8–22)
CALCIUM SERPL-MCNC: 8.5 MG/DL (ref 8.4–10.2)
CHLORIDE SERPL-SCNC: 117 MMOL/L (ref 96–112)
CO2 SERPL-SCNC: 17 MMOL/L (ref 20–33)
CREAT SERPL-MCNC: 1.23 MG/DL (ref 0.5–1.4)
EOSINOPHIL # BLD AUTO: 0.05 K/UL (ref 0–0.51)
EOSINOPHIL NFR BLD: 1 % (ref 0–6.9)
ERYTHROCYTE [DISTWIDTH] IN BLOOD BY AUTOMATED COUNT: 47 FL (ref 35.9–50)
GLUCOSE SERPL-MCNC: 109 MG/DL (ref 65–99)
HCT VFR BLD AUTO: 32.3 % (ref 37–47)
HGB BLD-MCNC: 10.8 G/DL (ref 12–16)
IMM GRANULOCYTES # BLD AUTO: 0.01 K/UL (ref 0–0.11)
IMM GRANULOCYTES NFR BLD AUTO: 0.2 % (ref 0–0.9)
LYMPHOCYTES # BLD AUTO: 1.29 K/UL (ref 1–4.8)
LYMPHOCYTES NFR BLD: 26.3 % (ref 22–41)
MCH RBC QN AUTO: 33.2 PG (ref 27–33)
MCHC RBC AUTO-ENTMCNC: 33.4 G/DL (ref 33.6–35)
MCV RBC AUTO: 99.4 FL (ref 81.4–97.8)
MONOCYTES # BLD AUTO: 0.54 K/UL (ref 0–0.85)
MONOCYTES NFR BLD AUTO: 11 % (ref 0–13.4)
NEUTROPHILS # BLD AUTO: 3.01 K/UL (ref 2–7.15)
NEUTROPHILS NFR BLD: 61.3 % (ref 44–72)
NRBC # BLD AUTO: 0 K/UL
NRBC BLD-RTO: 0 /100 WBC
PLATELET # BLD AUTO: 148 K/UL (ref 164–446)
PMV BLD AUTO: 11.7 FL (ref 9–12.9)
POTASSIUM SERPL-SCNC: 3.9 MMOL/L (ref 3.6–5.5)
RBC # BLD AUTO: 3.25 M/UL (ref 4.2–5.4)
SODIUM SERPL-SCNC: 143 MMOL/L (ref 135–145)
WBC # BLD AUTO: 4.9 K/UL (ref 4.8–10.8)

## 2018-09-02 PROCEDURE — 700105 HCHG RX REV CODE 258: Performed by: HOSPITALIST

## 2018-09-02 PROCEDURE — 80048 BASIC METABOLIC PNL TOTAL CA: CPT

## 2018-09-02 PROCEDURE — 85025 COMPLETE CBC W/AUTO DIFF WBC: CPT

## 2018-09-02 PROCEDURE — 700102 HCHG RX REV CODE 250 W/ 637 OVERRIDE(OP): Performed by: HOSPITALIST

## 2018-09-02 PROCEDURE — A9270 NON-COVERED ITEM OR SERVICE: HCPCS | Performed by: HOSPITALIST

## 2018-09-02 PROCEDURE — G0378 HOSPITAL OBSERVATION PER HR: HCPCS

## 2018-09-02 PROCEDURE — 99217 PR OBSERVATION CARE DISCHARGE: CPT | Performed by: HOSPITALIST

## 2018-09-02 RX ORDER — DOCUSATE SODIUM 100 MG/1
100 CAPSULE, LIQUID FILLED ORAL 2 TIMES DAILY
Qty: 60 CAP | Refills: 1 | Status: SHIPPED | OUTPATIENT
Start: 2018-09-02 | End: 2019-02-10

## 2018-09-02 RX ADMIN — BUDESONIDE AND FORMOTEROL FUMARATE DIHYDRATE 2 PUFF: 160; 4.5 AEROSOL RESPIRATORY (INHALATION) at 05:56

## 2018-09-02 RX ADMIN — TIOTROPIUM BROMIDE 1 CAPSULE: 18 CAPSULE ORAL; RESPIRATORY (INHALATION) at 05:54

## 2018-09-02 RX ADMIN — Medication 2 TABLET: at 05:52

## 2018-09-02 RX ADMIN — SODIUM CHLORIDE: 9 INJECTION, SOLUTION INTRAVENOUS at 07:28

## 2018-09-02 RX ADMIN — BRIMONIDINE TARTRATE AND TIMOLOL MALEATE 1 DROP: 2; 5 SOLUTION OPHTHALMIC at 05:58

## 2018-09-02 RX ADMIN — LOSARTAN POTASSIUM 50 MG: 25 TABLET, FILM COATED ORAL at 05:52

## 2018-09-02 RX ADMIN — PROPRANOLOL HYDROCHLORIDE 60 MG: 60 CAPSULE, EXTENDED RELEASE ORAL at 05:57

## 2018-09-02 ASSESSMENT — PAIN SCALES - GENERAL
PAINLEVEL_OUTOF10: 0
PAINLEVEL_OUTOF10: 0

## 2018-09-02 NOTE — PROGRESS NOTES
Report given to King LEMUS. POC discussed. Pt resting comfortably in bed. Safety precautions in place.

## 2018-09-02 NOTE — DISCHARGE INSTRUCTIONS
Discharge Instructions    Discharged to home by car with self. Discharged via wheelchair, hospital escort: Yes.  Special equipment needed: Not Applicable    Be sure to schedule a follow-up appointment with your primary care doctor or any specialists as instructed.     Discharge Plan:   Diet Plan: Discussed  Activity Level: Discussed  Confirmed Follow up Appointment: Patient to Call and Schedule Appointment  Confirmed Symptoms Management: Discussed  Medication Reconciliation Updated: Yes  Influenza Vaccine Indication: Not indicated: Previously immunized this influenza season and > 8 years of age    I understand that a diet low in cholesterol, fat, and sodium is recommended for good health. Unless I have been given specific instructions below for another diet, I accept this instruction as my diet prescription.   Other diet: diet as tolerated        · Is patient discharged on Warfarin / Coumadin?   No       Constipation, Adult  Constipation is when a person has fewer bowel movements in a week than normal, has difficulty having a bowel movement, or has stools that are dry, hard, or larger than normal. Constipation may be caused by an underlying condition. It may become worse with age if a person takes certain medicines and does not take in enough fluids.  Follow these instructions at home:  Eating and drinking  · Eat foods that have a lot of fiber, such as fresh fruits and vegetables, whole grains, and beans.  · Limit foods that are high in fat, low in fiber, or overly processed, such as french fries, hamburgers, cookies, candies, and soda.  · Drink enough fluid to keep your urine clear or pale yellow.  General instructions  · Exercise regularly or as told by your health care provider.  · Go to the restroom when you have the urge to go. Do not hold it in.  · Take over-the-counter and prescription medicines only as told by your health care provider. These include any fiber supplements.  · Practice pelvic floor retraining  exercises, such as deep breathing while relaxing the lower abdomen and pelvic floor relaxation during bowel movements.  · Watch your condition for any changes.  · Keep all follow-up visits as told by your health care provider. This is important.  Contact a health care provider if:  · You have pain that gets worse.  · You have a fever.  · You do not have a bowel movement after 4 days.  · You vomit.  · You are not hungry.  · You lose weight.  · You are bleeding from the anus.  · You have thin, pencil-like stools.  Get help right away if:  · You have a fever and your symptoms suddenly get worse.  · You leak stool or have blood in your stool.  · Your abdomen is bloated.  · You have severe pain in your abdomen.  · You feel dizzy or you faint.  This information is not intended to replace advice given to you by your health care provider. Make sure you discuss any questions you have with your health care provider.  Document Released: 09/15/2005 Document Revised: 07/07/2017 Document Reviewed: 06/07/2017  [employer] Interactive Patient Education © 2017 [employer]      Depression / Suicide Risk    As you are discharged from this [facility] facility, it is important to learn how to keep safe from harming yourself.    Recognize the warning signs:  · Abrupt changes in personality, positive or negative- including increase in energy   · Giving away possessions  · Change in eating patterns- significant weight changes-  positive or negative  · Change in sleeping patterns- unable to sleep or sleeping all the time   · Unwillingness or inability to communicate  · Depression  · Unusual sadness, discouragement and loneliness  · Talk of wanting to die  · Neglect of personal appearance   · Rebelliousness- reckless behavior  · Withdrawal from people/activities they love  · Confusion- inability to concentrate     If you or a loved one observes any of these behaviors or has concerns about self-harm, here's what you can do:  · Talk about  it- your feelings and reasons for harming yourself  · Remove any means that you might use to hurt yourself (examples: pills, rope, extension cords, firearm)  · Get professional help from the community (Mental Health, Substance Abuse, psychological counseling)  · Do not be alone:Call your Safe Contact- someone whom you trust who will be there for you.  · Call your local CRISIS HOTLINE 554-3082 or 494-643-4185  · Call your local Children's Mobile Crisis Response Team [place] (046) 138-6680 or www.WeFi  · Call the toll free National Suicide Prevention Hotlines   · National Suicide Prevention Lifeline 280-423-KWUA (3534)  · National Hope Line Network 800-SUICIDE (700-2914)

## 2018-09-02 NOTE — CARE PLAN
Problem: Knowledge Deficit  Goal: Knowledge of disease process/condition, treatment plan, diagnostic tests, and medications will improve  Outcome: MET Date Met: 09/02/18    Intervention: Assess knowledge level of disease process/condition, treatment plan, diagnostic tests, and medications  Pt understands the need for possible disimpaction outcome and poss need for more laxatives      Problem: Discharge Barriers/Planning  Goal: Patient's continuum of care needs will be met    Intervention: Explain discharge instructions and medication reconcilliation to patient and significant other/support system  Pt provided with education for use of laxatives, colace, and f/u with primary care MD and GI  Verbalizes understanding

## 2018-09-02 NOTE — PROGRESS NOTES
Assessment completed,alert and oriented  to person,place and time.No edema, on room air sat 96%. Denied pain at this time. Less frequent loose small stool. Due medication given per MAR. States understanding of POC.Call light at reach.

## 2018-09-02 NOTE — PROGRESS NOTES
Bedside report given to  Marisol LEMUS.Pt  Resting in the bed.Safety precautions in place and all the lines remain patent.

## 2018-09-02 NOTE — CARE PLAN
Problem: Safety  Goal: Will remain free from injury  Outcome: PROGRESSING AS EXPECTED  Hourly rounding.  Non-skid socks. Bed locked & in low position. Personal belongings and call light  within reach. .      Problem: Infection  Goal: Will remain free from infection  Outcome: PROGRESSING AS EXPECTED  Standard precaution in place. Educated  and encouraged regarding proper hand washing techniques.

## 2018-09-02 NOTE — PROGRESS NOTES
1743 - Paged Dr. RACHAEL Alford regarding lactulose.     1745 - Per Dr. Alford, ok to discontinue lactulose.

## 2018-09-02 NOTE — CARE PLAN
Problem: Discharge Barriers/Planning  Goal: Patient's continuum of care needs will be met  Outcome: MET Date Met: 09/02/18    Intervention: Explain discharge instructions and medication reconcilliation to patient and significant other/support system  Pt provided with education for use of laxatives, colace, and f/u with primary care MD and GI  Verbalizes understanding

## 2018-09-02 NOTE — DISCHARGE SUMMARY
Discharge Summary    CHIEF COMPLAINT ON ADMISSION  Chief Complaint   Patient presents with   • Constipation     Last BM was on saturday after taking MOM       Reason for Admission  Constipation     Admission Date  8/30/2018    CODE STATUS  Full Code    HPI & HOSPITAL COURSE  This is a 67 y.o. female here with abdominal pain found to have severe constipation. She was admitted and given several enemas in addition to lactulose. Eventually she did release a concretion and copious stool. She was dehydrated and found to be in acute renal failure which improved with IV fluids. She will maintain on a stool softener upon discharge and follow with her PCP. She is hyperthyroid which could have contributed to this. She will follow up with endocrine for this.         Therefore, she is discharged in good and stable condition to home with close outpatient follow-up.        Discharge Date  9/2/18    FOLLOW UP ITEMS POST DISCHARGE  none    DISCHARGE DIAGNOSES  Principal Problem:    Drug-induced constipation POA: Unknown  Active Problems:    HTN (hypertension) POA: Yes    CKD (chronic kidney disease) stage 3, GFR 30-59 ml/min POA: Yes    Hyperthyroidism POA: Yes    Osteoporosis POA: Yes    Deep vein thrombosis (DVT) ([facility]) POA: Yes      Overview: Diagnois update 10/1/2016    Closed T11 fracture ([facility]) POA: Yes  Resolved Problems:    * No resolved hospital problems. *      FOLLOW UP  Future Appointments  Date Time Provider Department Center   11/15/2018 2:00 PM Jessenia Byrne M.D. [facility] None     No follow-up provider specified.    MEDICATIONS ON DISCHARGE     Medication List      START taking these medications      Instructions   docusate sodium 100 MG Caps  Commonly known as:  COLACE   Take 1 Cap by mouth 2 times a day.  Dose:  100 mg        CONTINUE taking these medications      Instructions   BREO ELLIPTA 200-25 MCG/INH Aepb  Generic drug:  Fluticasone Furoate-Vilanterol   INHALE ONE DOSE BY MOUTH DAILY. RINSE MOUTH AFTER USE.      COMBIGAN 0.2-0.5 % Soln  Generic drug:  Brimonidine Tartrate-Timolol   Place 1 Drop in both eyes 2 Times a Day.  Dose:  1 Drop     losartan 50 MG Tabs  Commonly known as:  COZAAR   Take 50 mg by mouth every morning.  Dose:  50 mg     NORCO  MG Tabs  Generic drug:  HYDROcodone/acetaminophen   Take 1-2 Tabs by mouth as needed.  Dose:  1-2 Tab     PROAIR  (90 Base) MCG/ACT Aers inhalation aerosol  Generic drug:  albuterol   INHALE TWO PUFFS BY MOUTH EVERY 6 HOURS AS NEEDED FOR SHORTNESS OF BREATH     propranolol LA 60 MG Cp24  Commonly known as:  INDERAL LA   Take 60 mg by mouth every morning.  Dose:  60 mg     rivaroxaban 20 MG Tabs tablet  Commonly known as:  XARELTO   Take 1 Tab by mouth with dinner.  Dose:  20 mg     simvastatin 40 MG Tabs  Commonly known as:  ZOCOR   Take 40 mg by mouth every evening.  Dose:  40 mg     SPIRIVA RESPIMAT 2.5 MCG/ACT Aers  Generic drug:  Tiotropium Bromide Monohydrate   Inhale 1 Puff by mouth 2 Times a Day.  Dose:  1 Puff     vitamin D (Ergocalciferol) 09620 units Caps capsule  Commonly known as:  DRISDOL   Take 50,000 Units by mouth every 14 days.  Dose:  05163 Units     zolpidem 10 MG Tabs  Commonly known as:  AMBIEN   Take 10 mg by mouth at bedtime as needed for Sleep.  Dose:  10 mg            Allergies  Allergies   Allergen Reactions   • Nkda [No Known Drug Allergy]        DIET  Orders Placed This Encounter   Procedures   • Diet Order Clear Liquids - No Red Foods     Standing Status:   Standing     Number of Occurrences:   1     Order Specific Question:   Diet:     Answer:   Clear Liquids - No Red Foods [12]       ACTIVITY  As tolerated.  Weight bearing as tolerated    CONSULTATIONS  none    PROCEDURES  none    LABORATORY  Lab Results   Component Value Date    SODIUM 143 09/02/2018    POTASSIUM 3.9 09/02/2018    CHLORIDE 117 (H) 09/02/2018    CO2 17 (L) 09/02/2018    GLUCOSE 109 (H) 09/02/2018    BUN 25 (H) 09/02/2018    CREATININE 1.23 09/02/2018    CREATININE 1.7  (H) 09/19/2008        Lab Results   Component Value Date    WBC 4.9 09/02/2018    HEMOGLOBIN 10.8 (L) 09/02/2018    HEMATOCRIT 32.3 (L) 09/02/2018    PLATELETCT 148 (L) 09/02/2018        Total time of the discharge process exceeds 33 minutes.

## 2018-09-02 NOTE — PROGRESS NOTES
Pt had mod to large formed BM and liquid BM  Requesting to have inpaction checked in about a half hour

## 2018-09-11 ENCOUNTER — HOSPITAL ENCOUNTER (OUTPATIENT)
Dept: LAB | Facility: MEDICAL CENTER | Age: 68
End: 2018-09-11
Attending: INTERNAL MEDICINE
Payer: MEDICARE

## 2018-09-11 ENCOUNTER — OFFICE VISIT (OUTPATIENT)
Dept: ENDOCRINOLOGY | Facility: MEDICAL CENTER | Age: 68
End: 2018-09-11
Payer: MEDICARE

## 2018-09-11 VITALS
RESPIRATION RATE: 16 BRPM | WEIGHT: 171 LBS | DIASTOLIC BLOOD PRESSURE: 60 MMHG | OXYGEN SATURATION: 92 % | HEART RATE: 88 BPM | HEIGHT: 67 IN | SYSTOLIC BLOOD PRESSURE: 112 MMHG | BODY MASS INDEX: 26.84 KG/M2

## 2018-09-11 DIAGNOSIS — E04.2 MULTIPLE THYROID NODULES: ICD-10-CM

## 2018-09-11 DIAGNOSIS — E05.10 THYROTOXICOSIS WITH TOXIC SINGLE THYROID NODULE AND WITHOUT THYROID STORM: Primary | ICD-10-CM

## 2018-09-11 DIAGNOSIS — E05.10 THYROTOXICOSIS WITH TOXIC SINGLE THYROID NODULE AND WITHOUT THYROID STORM: ICD-10-CM

## 2018-09-11 DIAGNOSIS — M54.5 LOW BACK PAIN, UNSPECIFIED BACK PAIN LATERALITY, UNSPECIFIED CHRONICITY, WITH SCIATICA PRESENCE UNSPECIFIED: ICD-10-CM

## 2018-09-11 LAB
T3FREE SERPL-MCNC: 3.34 PG/ML (ref 2.4–4.2)
T4 FREE SERPL-MCNC: 1.18 NG/DL (ref 0.53–1.43)
TSH SERPL DL<=0.005 MIU/L-ACNC: 0.01 UIU/ML (ref 0.38–5.33)

## 2018-09-11 PROCEDURE — 99214 OFFICE O/P EST MOD 30 MIN: CPT | Performed by: INTERNAL MEDICINE

## 2018-09-11 PROCEDURE — 84443 ASSAY THYROID STIM HORMONE: CPT

## 2018-09-11 PROCEDURE — 84439 ASSAY OF FREE THYROXINE: CPT

## 2018-09-11 PROCEDURE — 36415 COLL VENOUS BLD VENIPUNCTURE: CPT

## 2018-09-11 PROCEDURE — 84481 FREE ASSAY (FT-3): CPT

## 2018-09-12 ENCOUNTER — APPOINTMENT (OUTPATIENT)
Dept: RADIOLOGY | Facility: MEDICAL CENTER | Age: 68
End: 2018-09-12
Attending: PHYSICAL MEDICINE & REHABILITATION
Payer: MEDICARE

## 2018-09-12 DIAGNOSIS — M54.6 PAIN IN THORACIC SPINE: ICD-10-CM

## 2018-09-12 PROCEDURE — 72146 MRI CHEST SPINE W/O DYE: CPT

## 2018-09-12 PROCEDURE — 72148 MRI LUMBAR SPINE W/O DYE: CPT

## 2018-09-13 ENCOUNTER — TELEPHONE (OUTPATIENT)
Dept: ENDOCRINOLOGY | Facility: MEDICAL CENTER | Age: 68
End: 2018-09-13

## 2018-09-13 DIAGNOSIS — E04.1 THYROID NODULE, HOT: Primary | ICD-10-CM

## 2018-09-13 DIAGNOSIS — R79.89 LOW TSH LEVEL: ICD-10-CM

## 2018-09-13 NOTE — TELEPHONE ENCOUNTER
Telephone conversation with patient    I reviewed her current thyroid tests. Her TSH remains suppressed at 0.01. Free T4 remains normal at 1.1 and free T3 also normal at 3.3 (2.4-4.2).  She does have a definite functional nodule in the right thyroid lobe that suppresses uptake in the rest of the thyroid to a large extent but not completely. No definite symptoms of thyrotoxicosis so I am reluctant to treat her thyroid nodule with I-131 since I don't believe it will improve how she feels. We do need to keep monitoring and if there is any inkling that she has thyrotoxic symptoms we will treat.    Her low TSH could be a result of being very ill and not truly indicative of a thyrotoxic state.    Kishore Torrez M.D.

## 2018-09-14 ENCOUNTER — TELEPHONE (OUTPATIENT)
Dept: ENDOCRINOLOGY | Facility: MEDICAL CENTER | Age: 68
End: 2018-09-14

## 2018-09-14 NOTE — TELEPHONE ENCOUNTER
I called pt and lvm letting her know I am mailing out her lab slips and we need to r/s her 10/9/18 leatha to 3 months out per Dr Torrez.

## 2018-10-09 ENCOUNTER — APPOINTMENT (OUTPATIENT)
Dept: ENDOCRINOLOGY | Facility: MEDICAL CENTER | Age: 68
End: 2018-10-09
Payer: MEDICARE

## 2018-10-13 ENCOUNTER — HOSPITAL ENCOUNTER (OUTPATIENT)
Dept: RADIOLOGY | Facility: MEDICAL CENTER | Age: 68
End: 2018-10-13
Attending: INTERNAL MEDICINE
Payer: MEDICARE

## 2018-10-13 DIAGNOSIS — R91.8 PULMONARY NODULES: ICD-10-CM

## 2018-10-13 PROCEDURE — 71250 CT THORAX DX C-: CPT

## 2018-11-04 DIAGNOSIS — J44.9 CHRONIC OBSTRUCTIVE PULMONARY DISEASE, UNSPECIFIED COPD TYPE (HCC): ICD-10-CM

## 2018-11-05 DIAGNOSIS — R06.02 SOB (SHORTNESS OF BREATH): ICD-10-CM

## 2018-11-05 DIAGNOSIS — J44.9 CHRONIC OBSTRUCTIVE PULMONARY DISEASE, UNSPECIFIED COPD TYPE (HCC): ICD-10-CM

## 2018-11-05 RX ORDER — TIOTROPIUM BROMIDE INHALATION SPRAY 3.12 UG/1
SPRAY, METERED RESPIRATORY (INHALATION)
Qty: 1 INHALER | Refills: 2 | Status: SHIPPED | OUTPATIENT
Start: 2018-11-05 | End: 2019-02-10

## 2018-11-05 NOTE — TELEPHONE ENCOUNTER
Have we ever prescribed this med? Yes.  If yes, what date? 08/10/2018    Last OV: 07/16/2018 - Dr. Byrne    Next OV: 11/15/2018 - Dr. Byrne    DX: SOB    Medications: proair

## 2018-11-05 NOTE — TELEPHONE ENCOUNTER
Have we ever prescribed this med? Yes.  If yes, what date?     Last OV: 07/16/2018 - Dr. Byrne    Next OV: 11/15/2018 - Dr. Byrne    DX: COPD    Medications: Spiriva

## 2018-11-15 ENCOUNTER — OFFICE VISIT (OUTPATIENT)
Dept: PULMONOLOGY | Facility: HOSPICE | Age: 68
End: 2018-11-15
Payer: MEDICARE

## 2018-11-15 VITALS
OXYGEN SATURATION: 95 % | SYSTOLIC BLOOD PRESSURE: 122 MMHG | RESPIRATION RATE: 16 BRPM | WEIGHT: 173.4 LBS | BODY MASS INDEX: 27.21 KG/M2 | TEMPERATURE: 96.8 F | DIASTOLIC BLOOD PRESSURE: 82 MMHG | HEART RATE: 92 BPM | HEIGHT: 67 IN

## 2018-11-15 DIAGNOSIS — Z85.118 HISTORY OF LUNG CANCER: ICD-10-CM

## 2018-11-15 DIAGNOSIS — J44.9 CHRONIC OBSTRUCTIVE PULMONARY DISEASE, UNSPECIFIED COPD TYPE (HCC): ICD-10-CM

## 2018-11-15 DIAGNOSIS — R91.8 PULMONARY NODULES: ICD-10-CM

## 2018-11-15 PROCEDURE — 99214 OFFICE O/P EST MOD 30 MIN: CPT | Performed by: INTERNAL MEDICINE

## 2018-11-15 NOTE — PROGRESS NOTES
"Chief Complaint   Patient presents with   • Results     CT results     HPI: This patient is a 68 y.o. Female who returns for follow-up of lung cancer and COPD. She underwent thoracoscopic partial left upper lobectomy by Dr. Ganser 12/12/16 demonstrating adenocarcinoma with pleural involvement, pT2a. Follow-up chest CAT scan June 27, 2017 shows postsurgical changes in the left lung, with a new 6.2 mm left lower lobe nodule. Follow-up chest CAT scan January 9, 2018 showed resolution of the left lower lobe nodule. There was a vague, groundglass opacity measuring 6 mm in the right lower lobe.  Chest CAT scan in October 2018 showed interval decrease in size of the right lower lobe nodule to 4 mm, stable remaining nodule.  A vague right upper lobe groundglass nodule was also noted.    Over the past year she developed acute hypoxic respiratory failure following  lithotripsy and ureteral stent placement. She postoperatively required oxygen up to 10 L/min.    She has since weaned off oxygen.  She feels back to her baseline however complains of chronic cough with sputum production. She denies sputum purulence, wheezing, chest pain or edema. She has stage II COPD (FEV1 1.43 L or 54%), and has been compliant with Breo and Spiriva.       Past Medical History:   Diagnosis Date   • Anesthesia     \"Abnormal blood pressure and breathing\"  \"couldn't breathe\"   • Asthma     inhalers daily   • Backpain 7/2017     thor. area   • Blood clot in vein 07/06/2018    \"Currently have a blood clot in my left eye\"   • Bowel habit changes 07/06/2018    Constipation   • Breath shortness     with exertion has O2 but does not use   • Bronchitis    • CAD (coronary artery disease)     palpatations   • Cancer (HCC) 2016    left lung   • Chickenpox    • COPD (chronic obstructive pulmonary disease) (HCC)    • Dialysis 2005    transient renal failure due to sepsis   • Emphysema of lung (HCC)    • Glaucoma     right eye   • Hemorrhagic disorder (HCC)    • " "Hyperlipidemia    • Hypertension    • Hyperthyroidism    • Kidney stone     bilateral   • Lung cancer (HCC) 12/12/2016   • Multiple thyroid nodules 7/9/2015   • Nasal drainage    • Osteoporosis    • Pain 07/06/2018    Back pain   • Personal history of venous thrombosis and embolism 2004, 2012    right leg 2012, left arm dvt 2004 left eye 2017   • Pneumonia 7/2016    per patient   • Renal disorder     had been on dialysis for 7 months for acute failure 2005   • Renal stones 2013    post lithotripsy   • Rheumatoid arthritis (HCC)     question   • Rheumatoid arthritis (HCC)    • S/P appendectomy 1998    • S/P cholecystectomy 1998   • S/P kyphoplasty 2006, 2007, 2013   • Sepsis(995.91) 2005   • Shortness of breath 07/06/2018    Chronic current problem. \"A couple of years now\".  O2 concentrator at night - pt states she doesn't use it.   • Thyroid nodule, hot 2017    functional \"hot\" right thyroid nodule without thyrotoxicosis       Social History     Social History   • Marital status: Single     Spouse name: N/A   • Number of children: N/A   • Years of education: N/A     Occupational History   • Not on file.     Social History Main Topics   • Smoking status: Former Smoker     Packs/day: 1.00     Years: 30.00     Types: Cigarettes     Quit date: 1/1/2000   • Smokeless tobacco: Never Used      Comment: 7 years ago   • Alcohol use 0.0 oz/week      Comment: x2 a week   • Drug use: No   • Sexual activity: Not on file     Other Topics Concern   • Not on file     Social History Narrative   • No narrative on file       Family History   Problem Relation Age of Onset   • Cancer Mother    • Heart Failure Father    • Heart Disease Brother        Current Outpatient Prescriptions on File Prior to Visit   Medication Sig Dispense Refill   • SPIRIVA RESPIMAT 2.5 MCG/ACT Aero Soln INHALE TWO PUFFS BY MOUTH DAILY. ASSEMBLE AND PRIME. 1 Inhaler 2   • PROAIR  (90 Base) MCG/ACT Aero Soln inhalation aerosol INHALE TWO PUFFS BY MOUTH " EVERY 6 HOURS AS NEEDED FOR SHORTNESS OF BREATH 1 Inhaler 11   • docusate sodium (COLACE) 100 MG Cap Take 1 Cap by mouth 2 times a day. 60 Cap 1   • HYDROcodone/acetaminophen (NORCO)  MG Tab Take 1-2 Tabs by mouth as needed.     • rivaroxaban (XARELTO) 20 MG Tab tablet Take 1 Tab by mouth with dinner. 30 Tab 0   • Tiotropium Bromide Monohydrate (SPIRIVA RESPIMAT) 2.5 MCG/ACT Aero Soln Inhale 1 Puff by mouth 2 Times a Day.     • BREO ELLIPTA 200-25 MCG/INH AEROSOL POWDER, BREATH ACTIVATED INHALE ONE DOSE BY MOUTH DAILY. RINSE MOUTH AFTER USE. 1 Each 11   • COMBIGAN 0.2-0.5 % Solution Place 1 Drop in both eyes 2 Times a Day.     • vitamin D, Ergocalciferol, (DRISDOL) 90895 UNITS Cap capsule Take 50,000 Units by mouth every 14 days.     • propranolol LA (INDERAL LA) 60 MG CAPSULE SR 24 HR Take 60 mg by mouth every morning.     • zolpidem (AMBIEN) 10 MG Tab Take 10 mg by mouth at bedtime as needed for Sleep.     • simvastatin (ZOCOR) 40 MG Tab Take 40 mg by mouth every evening.     • losartan (COZAAR) 50 MG TABS Take 50 mg by mouth every morning.       No current facility-administered medications on file prior to visit.        Allergies: Nkda [no known drug allergy]    ROS:   Constitutional: Denies fevers, chills, night sweats, fatigue or weight loss  Eyes: Denies vision loss, pain, drainage, double vision  Ears, Nose, Throat: Denies earache, difficulty hearing, tinnitus, nasal congestion, hoarseness  Cardiovascular: Denies chest pain, tightness, palpitations, orthopnea or edema  Respiratory: As in HPI  Sleep: Denies daytime sleepiness, snoring, apneas, insomnia, morning headaches  GI: Denies heartburn, dysphagia, nausea, abdominal pain, diarrhea or constipation  : Denies frequent urination, hematuria, discharge or painful urination  Musculoskeletal: Denies back pain, painful joints, sore muscles  Neurological: Denies weakness or headaches  Skin: No rashes    Blood pressure 122/82, pulse 92, temperature 36 °C  "(96.8 °F), temperature source Temporal, resp. rate 16, height 1.702 m (5' 7\"), weight 78.7 kg (173 lb 6.4 oz), SpO2 95 %, not currently breastfeeding.    Physical Exam:  Appearance: Well-nourished, well-developed, in no acute distress  HEENT: Normocephalic, atraumatic, white sclera, PERRLA, oropharynx clear  Neck: No adenopathy or masses  Respiratory: no intercostal retractions or accessory muscle use  Lungs auscultation: Clear to auscultation bilaterally  Cardiovascular: Regular rate rhythm. No murmurs, rubs or gallops.  No LE edema  Abdomen: soft, nondistended  Gait: Normal  Digits: No clubbing, cyanosis  Motor: No focal deficits  Orientation: Oriented to time, person and place    Diagnosis:  1. History of lung cancer  CT-CHEST (THORAX) W/O   2. Chronic obstructive pulmonary disease, unspecified COPD type (HCC)  Fluticasone-Umeclidin-Vilant (TRELEGY ELLIPTA) 100-62.5-25 MCG/INH AEROSOL POWDER, BREATH ACTIVATED   3. Pulmonary nodules         Plan:  The patient's surveillance chest CAT scan shows stable pulmonary micronodules.  We will continue monitoring by chest imaging every 6 months.  Trial on Trelegy Ellipta 1 puff QD for COPD.  She will discontinue Breo and Spiriva while on Trelegy.   She is up-to-date on immunizations.  Return in about 5 months (around 5/1/2019).      "

## 2018-11-26 ENCOUNTER — HOSPITAL ENCOUNTER (OUTPATIENT)
Dept: RADIOLOGY | Facility: MEDICAL CENTER | Age: 68
End: 2018-11-26
Attending: UROLOGY
Payer: MEDICARE

## 2018-11-26 DIAGNOSIS — N20.1 CALCULUS OF URETER: ICD-10-CM

## 2018-11-26 PROCEDURE — 74018 RADEX ABDOMEN 1 VIEW: CPT

## 2019-01-10 ENCOUNTER — OFFICE VISIT (OUTPATIENT)
Dept: URGENT CARE | Facility: CLINIC | Age: 69
End: 2019-01-10
Payer: MEDICARE

## 2019-01-10 ENCOUNTER — APPOINTMENT (OUTPATIENT)
Dept: RADIOLOGY | Facility: IMAGING CENTER | Age: 69
End: 2019-01-10
Attending: PHYSICIAN ASSISTANT
Payer: MEDICARE

## 2019-01-10 VITALS
DIASTOLIC BLOOD PRESSURE: 68 MMHG | BODY MASS INDEX: 26.37 KG/M2 | SYSTOLIC BLOOD PRESSURE: 104 MMHG | RESPIRATION RATE: 16 BRPM | WEIGHT: 168 LBS | TEMPERATURE: 98.3 F | OXYGEN SATURATION: 92 % | HEART RATE: 64 BPM | HEIGHT: 67 IN

## 2019-01-10 DIAGNOSIS — R05.9 COUGH: ICD-10-CM

## 2019-01-10 DIAGNOSIS — J44.1 ACUTE EXACERBATION OF CHRONIC OBSTRUCTIVE PULMONARY DISEASE (COPD) (HCC): Primary | ICD-10-CM

## 2019-01-10 PROCEDURE — 71046 X-RAY EXAM CHEST 2 VIEWS: CPT | Mod: 26 | Performed by: PHYSICIAN ASSISTANT

## 2019-01-10 PROCEDURE — 99214 OFFICE O/P EST MOD 30 MIN: CPT | Performed by: PHYSICIAN ASSISTANT

## 2019-01-10 RX ORDER — PREDNISONE 20 MG/1
TABLET ORAL
Qty: 30 TAB | Refills: 0 | Status: SHIPPED | OUTPATIENT
Start: 2019-01-10 | End: 2019-02-10

## 2019-01-10 RX ORDER — BENZONATATE 100 MG/1
100 CAPSULE ORAL 3 TIMES DAILY PRN
Qty: 15 CAP | Refills: 0 | Status: SHIPPED | OUTPATIENT
Start: 2019-01-10 | End: 2019-01-15

## 2019-01-10 RX ORDER — AZITHROMYCIN 250 MG/1
TABLET, FILM COATED ORAL
Qty: 1 QUANTITY SUFFICIENT | Refills: 0 | Status: ON HOLD | OUTPATIENT
Start: 2019-01-10 | End: 2019-01-29

## 2019-01-10 RX ORDER — IPRATROPIUM BROMIDE AND ALBUTEROL SULFATE 2.5; .5 MG/3ML; MG/3ML
3 SOLUTION RESPIRATORY (INHALATION) 4 TIMES DAILY
Qty: 30 BULLET | Refills: 0 | Status: SHIPPED | OUTPATIENT
Start: 2019-01-10 | End: 2019-02-10

## 2019-01-10 ASSESSMENT — ENCOUNTER SYMPTOMS
DIARRHEA: 0
MYALGIAS: 0
WHEEZING: 1
VOMITING: 0
SORE THROAT: 1
SHORTNESS OF BREATH: 1
SPUTUM PRODUCTION: 0
ABDOMINAL PAIN: 0
CHILLS: 0
SINUS PAIN: 0
NAUSEA: 0
FEVER: 0
CONSTIPATION: 0
COUGH: 1

## 2019-01-10 ASSESSMENT — COPD QUESTIONNAIRES: COPD: 1

## 2019-01-10 NOTE — PROGRESS NOTES
Subjective:   Latonia Clinton is a 68 y.o. female who presents for Cough (6-7 weeks )        Cough   This is a new problem. Episode onset: 6 week. The problem has been unchanged. The problem occurs constantly. The cough is productive of sputum. Associated symptoms include a sore throat, shortness of breath and wheezing. Pertinent negatives include no chills, ear congestion, ear pain, fever, myalgias, nasal congestion or postnasal drip. Treatments tried: Flonase and antihitamine  The treatment provided no relief. Her past medical history is significant for bronchitis, COPD (Breo, Spiriva ) and pneumonia. There is no history of asthma or environmental allergies.     Review of Systems   Constitutional: Negative for chills, fever and malaise/fatigue.   HENT: Positive for sore throat. Negative for congestion, ear discharge, ear pain, postnasal drip and sinus pain.    Respiratory: Positive for cough, shortness of breath and wheezing. Negative for sputum production.    Gastrointestinal: Negative for abdominal pain, constipation, diarrhea, nausea and vomiting.   Musculoskeletal: Negative for myalgias.   Endo/Heme/Allergies: Negative for environmental allergies.   All other systems reviewed and are negative.      PMH:  has a past medical history of Anesthesia; Asthma; Backpain (7/2017); Blood clot in vein (07/06/2018); Bowel habit changes (07/06/2018); Breath shortness; Bronchitis; CAD (coronary artery disease); Cancer ([facility]) (2016); Chickenpox; COPD (chronic obstructive pulmonary disease) ([facility]); Dialysis (2005); Emphysema of lung ([facility]); Glaucoma; Hemorrhagic disorder ([facility]); Hyperlipidemia; Hypertension; Hyperthyroidism; Kidney stone; Lung cancer ([facility]) (12/12/2016); Multiple thyroid nodules (7/9/2015); Nasal drainage; Osteoporosis; Pain (07/06/2018); Personal history of venous thrombosis and embolism (2004, 2012); Pneumonia (7/2016); Renal disorder; Renal stones (2013); Rheumatoid arthritis ([facility]); Rheumatoid arthritis  (HCC); S/P appendectomy (1998 ); S/P cholecystectomy (1998); S/P kyphoplasty (2006, 2007, 2013); Sepsis(995.91) (2005); Shortness of breath (07/06/2018); and Thyroid nodule, hot (2017). She also has no past medical history of Angina; Arrhythmia; Breast cancer (HCC); CATARACT; Congestive heart failure (HCC); Diabetes; Fall; Heart murmur; Heart valve disease; Indigestion; Jaundice; Liver disease; Myocardial infarct (HCC); Other specified symptom associated with female genital organs; Pacemaker; Psychiatric disorder; Psychiatric problem; Rheumatic fever; Seizure (HCC); Seizure disorder (HCC); Stroke (HCC); or Unspecified urinary incontinence.  MEDS:   Current Outpatient Prescriptions:   •  predniSONE (DELTASONE) 20 MG Tab, Take 40 mg PO daily for 5 days, Disp: 30 Tab, Rfl: 0  •  azithromycin (ZITHROMAX) 250 MG Tab, Take two tabs po day one followed by one tab po day two through five with food, Disp: 1 Quantity Sufficient, Rfl: 0  •  benzonatate (TESSALON) 100 MG Cap, Take 1 Cap by mouth 3 times a day as needed for Cough for up to 5 days., Disp: 15 Cap, Rfl: 0  •  ipratropium-albuterol (DUONEB) 0.5-2.5 (3) MG/3ML nebulizer solution, 3 mL by Nebulization route 4 times a day., Disp: 30 Bullet, Rfl: 0  •  SPIRIVA RESPIMAT 2.5 MCG/ACT Aero Soln, INHALE TWO PUFFS BY MOUTH DAILY. ASSEMBLE AND PRIME., Disp: 1 Inhaler, Rfl: 2  •  PROAIR  (90 Base) MCG/ACT Aero Soln inhalation aerosol, INHALE TWO PUFFS BY MOUTH EVERY 6 HOURS AS NEEDED FOR SHORTNESS OF BREATH, Disp: 1 Inhaler, Rfl: 11  •  HYDROcodone/acetaminophen (NORCO)  MG Tab, Take 1-2 Tabs by mouth as needed., Disp: , Rfl:   •  rivaroxaban (XARELTO) 20 MG Tab tablet, Take 1 Tab by mouth with dinner., Disp: 30 Tab, Rfl: 0  •  Tiotropium Bromide Monohydrate (SPIRIVA RESPIMAT) 2.5 MCG/ACT Aero Soln, Inhale 1 Puff by mouth 2 Times a Day., Disp: , Rfl:   •  BREO ELLIPTA 200-25 MCG/INH AEROSOL POWDER, BREATH ACTIVATED, INHALE ONE DOSE BY MOUTH DAILY. RINSE MOUTH AFTER  USE., Disp: 1 Each, Rfl: 11  •  COMBIGAN 0.2-0.5 % Solution, Place 1 Drop in both eyes 2 Times a Day., Disp: , Rfl:   •  vitamin D, Ergocalciferol, (DRISDOL) 31161 UNITS Cap capsule, Take 50,000 Units by mouth every 14 days., Disp: , Rfl:   •  propranolol LA (INDERAL LA) 60 MG CAPSULE SR 24 HR, Take 60 mg by mouth every morning., Disp: , Rfl:   •  zolpidem (AMBIEN) 10 MG Tab, Take 10 mg by mouth at bedtime as needed for Sleep., Disp: , Rfl:   •  simvastatin (ZOCOR) 40 MG Tab, Take 40 mg by mouth every evening., Disp: , Rfl:   •  losartan (COZAAR) 50 MG TABS, Take 50 mg by mouth every morning., Disp: , Rfl:   •  enoxaparin (LOVENOX) 80 MG/0.8ML Solution inj, Inject 1 Syringe as instructed every 12 hours., Disp: , Rfl:   •  Fluticasone-Umeclidin-Vilant (TRELEGY ELLIPTA) 100-62.5-25 MCG/INH AEROSOL POWDER, BREATH ACTIVATED, Inhale 1 Puff by mouth 1 time daily as needed., Disp: 1 Each, Rfl: 0  •  docusate sodium (COLACE) 100 MG Cap, Take 1 Cap by mouth 2 times a day., Disp: 60 Cap, Rfl: 1  ALLERGIES:   Allergies   Allergen Reactions   • Nkda [No Known Drug Allergy]      SURGHX:   Past Surgical History:   Procedure Laterality Date   • CYSTOSCOPY STENT PLACEMENT  7/9/2018    Procedure: Cystoscopy,  Left removal of stent ,  Left Stent Placement;  Surgeon: Beck Yang M.D.;  Location: [facility];  Service: Urology   • URETEROSCOPY Left 7/9/2018    Procedure: URETEROSCOPY;  Surgeon: Beck Yang M.D.;  Location: [facility];  Service: Urology   • LASERTRIPSY Left 7/9/2018    Procedure: LASERTRIPSY-LITHO;  Surgeon: Beck Yang M.D.;  Location: [facility];  Service: Urology   • CYSTOSCOPY STENT PLACEMENT Left 6/18/2018    Procedure: CYSTOSCOPY STENT PLACEMENT;  Surgeon: Beck Yang M.D.;  Location: SURGERY [facility];  Service: Urology   • LITHOTRIPSY Left 6/18/2018    Procedure: LITHOTRIPSY;  Surgeon: Beck Yang M.D.;  Location: [facility];  Service:  "Urology   • LASERTRIPSY Left 6/18/2018    Procedure: LASERTRIPSY;  Surgeon: Beck Yang M.D.;  Location: SURGERY [facility];  Service: Urology   • URETEROSCOPY Left 6/18/2018    Procedure: URETEROSCOPY;  Surgeon: Beck Yang M.D.;  Location: SURGERY [facility];  Service: Urology   • THORACOSCOPY Left 12/12/2016    Procedure: THORACOSCOPY W/WEDGE RESECTION UPPER LOBE MASS;  Surgeon: John H Ganser, M.D.;  Location: SUR[facility]ERY [facility];  Service:    • OTHER ORTHOPEDIC SURGERY  2013    kyphoplasty X3   • APPENDECTOMY      1990   • CHOLECYSTECTOMY      1990   • LAMINOTOMY     • LUNG BIOPSY OPEN     • OTHER     • OTHER ABDOMINAL SURGERY      gall bladder disease   • PB ENLARGE BREAST WITH IMPLANT     • PB REDUCTION OF LARGE BREAST       SOCHX:  reports that she quit smoking about 19 years ago. Her smoking use included Cigarettes. She has a 30.00 pack-year smoking history. She has never used smokeless tobacco. She reports that she drinks alcohol. She reports that she does not use drugs.  Family History   Problem Relation Age of Onset   • Cancer Mother    • Heart Failure Father    • Heart Disease Brother         Objective:   /68   Pulse 64   Temp 36.8 °C (98.3 °F) (Temporal)   Resp 16   Ht 1.702 m (5' 7.01\")   Wt 76.2 kg (168 lb)   LMP  (LMP Unknown)   SpO2 92%   BMI 26.31 kg/m²     Physical Exam   Constitutional: She is oriented to person, place, and time. She appears well-developed and well-nourished. No distress.   HENT:   Head: Normocephalic and atraumatic.   Right Ear: Hearing, tympanic membrane and ear canal normal.   Left Ear: Hearing, tympanic membrane and ear canal normal.   Mouth/Throat: Posterior oropharyngeal edema and posterior oropharyngeal erythema present. No oropharyngeal exudate.   Eyes: Pupils are equal, round, and reactive to light. Conjunctivae are normal.   Neck: Normal range of motion. Neck supple.   Cardiovascular: Normal rate and regular rhythm.  "   Pulmonary/Chest: Effort normal. No respiratory distress. She has wheezes. She has no rales.   Lymphadenopathy:     She has no cervical adenopathy.   Neurological: She is alert and oriented to person, place, and time.   Skin: Skin is warm and dry.   Psychiatric: She has a normal mood and affect. Her behavior is normal.   Vitals reviewed.    XR:   FINDINGS:    The cardiac silhouette  and mediastinal contours are normal.    No discrete opacity, pleural fluid or pneumothorax.    There is evidence of prior vertebral augmentation. The bones are osteopenic. There are old rib fractures.       Impression       No acute cardiopulmonary findings.             Assessment/Plan:     1. Acute exacerbation of chronic obstructive pulmonary disease (COPD) (HCC)  predniSONE (DELTASONE) 20 MG Tab    azithromycin (ZITHROMAX) 250 MG Tab    benzonatate (TESSALON) 100 MG Cap    REFERRAL TO FOLLOW-UP WITH PRIMARY CARE    ipratropium-albuterol (DUONEB) 0.5-2.5 (3) MG/3ML nebulizer solution   2. Cough  DX-CHEST-2 VIEWS     Repeat SPO2 94%, HR 74 s/p duoneb treatment. SOB much improved.     Per my read, no active disease or consolidation seen on x-ray.  Agree with radiology report. Reviewed supportive care measures.     Follow-up with primary care provider within 7-10 days, referral initiated.  If symptoms worsen or persist patient can contact me or return to clinic for follow-up.  Red flags and STRICT emergency room precautions discussed.  Patient verbalizes understanding of information.

## 2019-01-24 ENCOUNTER — APPOINTMENT (OUTPATIENT)
Dept: RADIOLOGY | Facility: MEDICAL CENTER | Age: 69
DRG: 189 | End: 2019-01-24
Attending: EMERGENCY MEDICINE
Payer: MEDICARE

## 2019-01-24 ENCOUNTER — HOSPITAL ENCOUNTER (INPATIENT)
Facility: MEDICAL CENTER | Age: 69
LOS: 3 days | DRG: 189 | End: 2019-01-27
Attending: EMERGENCY MEDICINE | Admitting: HOSPITALIST
Payer: MEDICARE

## 2019-01-24 ENCOUNTER — APPOINTMENT (OUTPATIENT)
Dept: RADIOLOGY | Facility: MEDICAL CENTER | Age: 69
DRG: 189 | End: 2019-01-24
Attending: HOSPITALIST
Payer: MEDICARE

## 2019-01-24 DIAGNOSIS — S32.020A CLOSED COMPRESSION FRACTURE OF SECOND LUMBAR VERTEBRA, INITIAL ENCOUNTER: ICD-10-CM

## 2019-01-24 DIAGNOSIS — R09.02 HYPOXEMIA: ICD-10-CM

## 2019-01-24 DIAGNOSIS — J44.1 ACUTE EXACERBATION OF CHRONIC OBSTRUCTIVE PULMONARY DISEASE (COPD) (HCC): ICD-10-CM

## 2019-01-24 PROBLEM — S32.010A CLOSED WEDGE COMPRESSION FRACTURE OF FIRST LUMBAR VERTEBRA (HCC): Status: ACTIVE | Noted: 2019-01-24

## 2019-01-24 LAB
ACTION RANGE TRIGGERED IACRT: NO
ALBUMIN SERPL BCP-MCNC: 3.5 G/DL (ref 3.2–4.9)
ALBUMIN/GLOB SERPL: 1.1 G/DL
ALP SERPL-CCNC: 83 U/L (ref 30–99)
ALT SERPL-CCNC: 19 U/L (ref 2–50)
ANION GAP SERPL CALC-SCNC: 10 MMOL/L (ref 0–11.9)
AST SERPL-CCNC: 18 U/L (ref 12–45)
BASE EXCESS BLDA CALC-SCNC: -4 MMOL/L (ref -4–3)
BASE EXCESS BLDA CALC-SCNC: -4 MMOL/L (ref -4–3)
BASOPHILS # BLD AUTO: 0.3 % (ref 0–1.8)
BASOPHILS # BLD: 0.02 K/UL (ref 0–0.12)
BILIRUB SERPL-MCNC: 0.9 MG/DL (ref 0.1–1.5)
BNP SERPL-MCNC: 66 PG/ML (ref 0–100)
BODY TEMPERATURE: ABNORMAL DEGREES
BUN SERPL-MCNC: 22 MG/DL (ref 8–22)
CALCIUM SERPL-MCNC: 9.7 MG/DL (ref 8.4–10.2)
CHLORIDE SERPL-SCNC: 106 MMOL/L (ref 96–112)
CO2 BLDA-SCNC: 22 MMOL/L (ref 20–33)
CO2 BLDA-SCNC: 22 MMOL/L (ref 20–33)
CO2 SERPL-SCNC: 22 MMOL/L (ref 20–33)
CREAT SERPL-MCNC: 1.18 MG/DL (ref 0.5–1.4)
EOSINOPHIL # BLD AUTO: 0.67 K/UL (ref 0–0.51)
EOSINOPHIL NFR BLD: 9.2 % (ref 0–6.9)
ERYTHROCYTE [DISTWIDTH] IN BLOOD BY AUTOMATED COUNT: 42.6 FL (ref 35.9–50)
FLUAV RNA SPEC QL NAA+PROBE: NEGATIVE
FLUBV RNA SPEC QL NAA+PROBE: NEGATIVE
GLOBULIN SER CALC-MCNC: 3.2 G/DL (ref 1.9–3.5)
GLUCOSE SERPL-MCNC: 124 MG/DL (ref 65–99)
HCO3 BLDA-SCNC: 20.8 MMOL/L (ref 17–25)
HCO3 BLDA-SCNC: 20.8 MMOL/L (ref 17–25)
HCT VFR BLD AUTO: 43.4 % (ref 37–47)
HGB BLD-MCNC: 15.2 G/DL (ref 12–16)
HOROWITZ INDEX BLDA+IHG-RTO: 3500 MM[HG]
HOROWITZ INDEX BLDA+IHG-RTO: 3500 MM[HG]
IMM GRANULOCYTES # BLD AUTO: 0.01 K/UL (ref 0–0.11)
IMM GRANULOCYTES NFR BLD AUTO: 0.1 % (ref 0–0.9)
INST. QUALIFIED PATIENT IIQPT: YES
LACTATE BLD-SCNC: 0.8 MMOL/L (ref 0.5–2)
LACTATE BLD-SCNC: 1.3 MMOL/L (ref 0.5–2)
LYMPHOCYTES # BLD AUTO: 1.44 K/UL (ref 1–4.8)
LYMPHOCYTES NFR BLD: 19.8 % (ref 22–41)
MCH RBC QN AUTO: 33.9 PG (ref 27–33)
MCHC RBC AUTO-ENTMCNC: 35 G/DL (ref 33.6–35)
MCV RBC AUTO: 96.7 FL (ref 81.4–97.8)
MONOCYTES # BLD AUTO: 0.49 K/UL (ref 0–0.85)
MONOCYTES NFR BLD AUTO: 6.7 % (ref 0–13.4)
NEUTROPHILS # BLD AUTO: 4.66 K/UL (ref 2–7.15)
NEUTROPHILS NFR BLD: 63.9 % (ref 44–72)
NRBC # BLD AUTO: 0 K/UL
NRBC BLD-RTO: 0 /100 WBC
O2/TOTAL GAS SETTING VFR VENT: 2 %
O2/TOTAL GAS SETTING VFR VENT: 2 %
PCO2 BLDA: 36.8 MMHG (ref 26–37)
PCO2 BLDA: 36.8 MMHG (ref 26–37)
PH BLDA: 7.36 [PH] (ref 7.4–7.5)
PH BLDA: 7.36 [PH] (ref 7.4–7.5)
PLATELET # BLD AUTO: 202 K/UL (ref 164–446)
PMV BLD AUTO: 11.2 FL (ref 9–12.9)
PO2 BLDA: 70 MMHG (ref 64–87)
PO2 BLDA: 70 MMHG (ref 64–87)
POTASSIUM SERPL-SCNC: 3.8 MMOL/L (ref 3.6–5.5)
PROCALCITONIN SERPL-MCNC: 0.05 NG/ML
PROT SERPL-MCNC: 6.7 G/DL (ref 6–8.2)
RBC # BLD AUTO: 4.49 M/UL (ref 4.2–5.4)
SAO2 % BLDA: 93 % (ref 93–99)
SAO2 % BLDA: 93 % (ref 93–99)
SODIUM SERPL-SCNC: 138 MMOL/L (ref 135–145)
SPECIMEN DRAWN FROM PATIENT: ABNORMAL
SPECIMEN DRAWN FROM PATIENT: ABNORMAL
TROPONIN I SERPL-MCNC: <0.02 NG/ML (ref 0–0.04)
WBC # BLD AUTO: 7.3 K/UL (ref 4.8–10.8)

## 2019-01-24 PROCEDURE — 72148 MRI LUMBAR SPINE W/O DYE: CPT

## 2019-01-24 PROCEDURE — 94640 AIRWAY INHALATION TREATMENT: CPT

## 2019-01-24 PROCEDURE — 96374 THER/PROPH/DIAG INJ IV PUSH: CPT

## 2019-01-24 PROCEDURE — 84145 PROCALCITONIN (PCT): CPT

## 2019-01-24 PROCEDURE — A9270 NON-COVERED ITEM OR SERVICE: HCPCS | Performed by: HOSPITALIST

## 2019-01-24 PROCEDURE — 700101 HCHG RX REV CODE 250: Performed by: EMERGENCY MEDICINE

## 2019-01-24 PROCEDURE — 87502 INFLUENZA DNA AMP PROBE: CPT

## 2019-01-24 PROCEDURE — 700105 HCHG RX REV CODE 258: Performed by: HOSPITALIST

## 2019-01-24 PROCEDURE — 82803 BLOOD GASES ANY COMBINATION: CPT | Mod: 91

## 2019-01-24 PROCEDURE — 71045 X-RAY EXAM CHEST 1 VIEW: CPT

## 2019-01-24 PROCEDURE — 36600 WITHDRAWAL OF ARTERIAL BLOOD: CPT

## 2019-01-24 PROCEDURE — 700111 HCHG RX REV CODE 636 W/ 250 OVERRIDE (IP): Performed by: HOSPITALIST

## 2019-01-24 PROCEDURE — 80053 COMPREHEN METABOLIC PANEL: CPT

## 2019-01-24 PROCEDURE — 83880 ASSAY OF NATRIURETIC PEPTIDE: CPT

## 2019-01-24 PROCEDURE — 85025 COMPLETE CBC W/AUTO DIFF WBC: CPT

## 2019-01-24 PROCEDURE — 700112 HCHG RX REV CODE 229: Performed by: HOSPITALIST

## 2019-01-24 PROCEDURE — 94760 N-INVAS EAR/PLS OXIMETRY 1: CPT

## 2019-01-24 PROCEDURE — 700101 HCHG RX REV CODE 250: Performed by: HOSPITALIST

## 2019-01-24 PROCEDURE — 700102 HCHG RX REV CODE 250 W/ 637 OVERRIDE(OP): Performed by: HOSPITALIST

## 2019-01-24 PROCEDURE — 72131 CT LUMBAR SPINE W/O DYE: CPT

## 2019-01-24 PROCEDURE — 99285 EMERGENCY DEPT VISIT HI MDM: CPT

## 2019-01-24 PROCEDURE — 36415 COLL VENOUS BLD VENIPUNCTURE: CPT

## 2019-01-24 PROCEDURE — 84484 ASSAY OF TROPONIN QUANT: CPT

## 2019-01-24 PROCEDURE — 700111 HCHG RX REV CODE 636 W/ 250 OVERRIDE (IP): Performed by: EMERGENCY MEDICINE

## 2019-01-24 PROCEDURE — 83605 ASSAY OF LACTIC ACID: CPT | Mod: 91

## 2019-01-24 PROCEDURE — 770006 HCHG ROOM/CARE - MED/SURG/GYN SEMI*

## 2019-01-24 PROCEDURE — 99223 1ST HOSP IP/OBS HIGH 75: CPT | Mod: AI | Performed by: HOSPITALIST

## 2019-01-24 RX ORDER — BRIMONIDINE TARTRATE AND TIMOLOL MALEATE 2; 5 MG/ML; MG/ML
1 SOLUTION OPHTHALMIC 2 TIMES DAILY
Status: DISCONTINUED | OUTPATIENT
Start: 2019-01-24 | End: 2019-01-27 | Stop reason: HOSPADM

## 2019-01-24 RX ORDER — ONDANSETRON 4 MG/1
4 TABLET, ORALLY DISINTEGRATING ORAL EVERY 4 HOURS PRN
Status: DISCONTINUED | OUTPATIENT
Start: 2019-01-24 | End: 2019-01-27 | Stop reason: HOSPADM

## 2019-01-24 RX ORDER — BRIMONIDINE TARTRATE AND TIMOLOL MALEATE 2; 5 MG/ML; MG/ML
1 SOLUTION OPHTHALMIC 2 TIMES DAILY
Status: DISCONTINUED | OUTPATIENT
Start: 2019-01-24 | End: 2019-01-24

## 2019-01-24 RX ORDER — BUDESONIDE AND FORMOTEROL FUMARATE DIHYDRATE 160; 4.5 UG/1; UG/1
2 AEROSOL RESPIRATORY (INHALATION)
Status: DISCONTINUED | OUTPATIENT
Start: 2019-01-24 | End: 2019-01-27 | Stop reason: HOSPADM

## 2019-01-24 RX ORDER — AMOXICILLIN 250 MG
2 CAPSULE ORAL 2 TIMES DAILY
Status: DISCONTINUED | OUTPATIENT
Start: 2019-01-24 | End: 2019-01-27 | Stop reason: HOSPADM

## 2019-01-24 RX ORDER — ONDANSETRON 2 MG/ML
4 INJECTION INTRAMUSCULAR; INTRAVENOUS EVERY 4 HOURS PRN
Status: DISCONTINUED | OUTPATIENT
Start: 2019-01-24 | End: 2019-01-27 | Stop reason: HOSPADM

## 2019-01-24 RX ORDER — IPRATROPIUM BROMIDE AND ALBUTEROL SULFATE 2.5; .5 MG/3ML; MG/3ML
3 SOLUTION RESPIRATORY (INHALATION)
Status: DISCONTINUED | OUTPATIENT
Start: 2019-01-24 | End: 2019-01-26

## 2019-01-24 RX ORDER — ZOLPIDEM TARTRATE 5 MG/1
10 TABLET ORAL NIGHTLY PRN
Status: DISCONTINUED | OUTPATIENT
Start: 2019-01-24 | End: 2019-01-27 | Stop reason: HOSPADM

## 2019-01-24 RX ORDER — DOCUSATE SODIUM 100 MG/1
100 CAPSULE, LIQUID FILLED ORAL 2 TIMES DAILY
Status: DISCONTINUED | OUTPATIENT
Start: 2019-01-24 | End: 2019-01-27 | Stop reason: HOSPADM

## 2019-01-24 RX ORDER — METHYLPREDNISOLONE SODIUM SUCCINATE 125 MG/2ML
125 INJECTION, POWDER, LYOPHILIZED, FOR SOLUTION INTRAMUSCULAR; INTRAVENOUS ONCE
Status: COMPLETED | OUTPATIENT
Start: 2019-01-24 | End: 2019-01-24

## 2019-01-24 RX ORDER — TIMOLOL MALEATE 5 MG/ML
1 SOLUTION/ DROPS OPHTHALMIC 2 TIMES DAILY
Status: DISCONTINUED | OUTPATIENT
Start: 2019-01-24 | End: 2019-01-24

## 2019-01-24 RX ORDER — DOXYCYCLINE 100 MG/1
100 TABLET ORAL EVERY 12 HOURS
Status: DISCONTINUED | OUTPATIENT
Start: 2019-01-24 | End: 2019-01-27 | Stop reason: HOSPADM

## 2019-01-24 RX ORDER — PROPRANOLOL HCL 60 MG
60 CAPSULE, EXTENDED RELEASE 24HR ORAL EVERY MORNING
Status: DISCONTINUED | OUTPATIENT
Start: 2019-01-24 | End: 2019-01-27 | Stop reason: HOSPADM

## 2019-01-24 RX ORDER — BENZONATATE 100 MG/1
100 CAPSULE ORAL 3 TIMES DAILY PRN
Status: DISCONTINUED | OUTPATIENT
Start: 2019-01-24 | End: 2019-01-27 | Stop reason: HOSPADM

## 2019-01-24 RX ORDER — BISACODYL 10 MG
10 SUPPOSITORY, RECTAL RECTAL
Status: DISCONTINUED | OUTPATIENT
Start: 2019-01-24 | End: 2019-01-27 | Stop reason: HOSPADM

## 2019-01-24 RX ORDER — METHYLPREDNISOLONE SODIUM SUCCINATE 40 MG/ML
40 INJECTION, POWDER, LYOPHILIZED, FOR SOLUTION INTRAMUSCULAR; INTRAVENOUS EVERY 6 HOURS
Status: DISCONTINUED | OUTPATIENT
Start: 2019-01-24 | End: 2019-01-27 | Stop reason: HOSPADM

## 2019-01-24 RX ORDER — LOSARTAN POTASSIUM 25 MG/1
50 TABLET ORAL EVERY MORNING
Status: DISCONTINUED | OUTPATIENT
Start: 2019-01-24 | End: 2019-01-27 | Stop reason: HOSPADM

## 2019-01-24 RX ORDER — BRIMONIDINE TARTRATE 2 MG/ML
1 SOLUTION/ DROPS OPHTHALMIC EVERY 12 HOURS
Status: DISCONTINUED | OUTPATIENT
Start: 2019-01-24 | End: 2019-01-24

## 2019-01-24 RX ORDER — ACETAMINOPHEN 500 MG
1000 TABLET ORAL EVERY 6 HOURS PRN
Status: DISCONTINUED | OUTPATIENT
Start: 2019-01-24 | End: 2019-01-27 | Stop reason: HOSPADM

## 2019-01-24 RX ORDER — POLYETHYLENE GLYCOL 3350 17 G/17G
1 POWDER, FOR SOLUTION ORAL
Status: DISCONTINUED | OUTPATIENT
Start: 2019-01-24 | End: 2019-01-27 | Stop reason: HOSPADM

## 2019-01-24 RX ORDER — IPRATROPIUM BROMIDE AND ALBUTEROL SULFATE 2.5; .5 MG/3ML; MG/3ML
3 SOLUTION RESPIRATORY (INHALATION)
Status: DISCONTINUED | OUTPATIENT
Start: 2019-01-24 | End: 2019-01-27 | Stop reason: HOSPADM

## 2019-01-24 RX ORDER — SIMVASTATIN 20 MG
40 TABLET ORAL NIGHTLY
Status: DISCONTINUED | OUTPATIENT
Start: 2019-01-24 | End: 2019-01-27 | Stop reason: HOSPADM

## 2019-01-24 RX ORDER — TIOTROPIUM BROMIDE 18 UG/1
1 CAPSULE ORAL; RESPIRATORY (INHALATION)
Status: DISCONTINUED | OUTPATIENT
Start: 2019-01-24 | End: 2019-01-27 | Stop reason: HOSPADM

## 2019-01-24 RX ADMIN — IPRATROPIUM BROMIDE AND ALBUTEROL SULFATE 3 ML: .5; 3 SOLUTION RESPIRATORY (INHALATION) at 19:32

## 2019-01-24 RX ADMIN — CEFTRIAXONE SODIUM 2 G: 2 INJECTION, POWDER, FOR SOLUTION INTRAMUSCULAR; INTRAVENOUS at 16:48

## 2019-01-24 RX ADMIN — DOCUSATE SODIUM 100 MG: 100 CAPSULE, LIQUID FILLED ORAL at 16:47

## 2019-01-24 RX ADMIN — METHYLPREDNISOLONE SODIUM SUCCINATE 125 MG: 125 INJECTION, POWDER, FOR SOLUTION INTRAMUSCULAR; INTRAVENOUS at 13:11

## 2019-01-24 RX ADMIN — IPRATROPIUM BROMIDE AND ALBUTEROL SULFATE 3 ML: .5; 3 SOLUTION RESPIRATORY (INHALATION) at 16:14

## 2019-01-24 RX ADMIN — BENZONATATE 100 MG: 100 CAPSULE ORAL at 20:32

## 2019-01-24 RX ADMIN — RIVAROXABAN 20 MG: 20 TABLET, FILM COATED ORAL at 16:48

## 2019-01-24 RX ADMIN — IPRATROPIUM BROMIDE AND ALBUTEROL SULFATE 3 ML: .5; 3 SOLUTION RESPIRATORY (INHALATION) at 22:20

## 2019-01-24 RX ADMIN — METHYLPREDNISOLONE SODIUM SUCCINATE 40 MG: 40 INJECTION, POWDER, FOR SOLUTION INTRAMUSCULAR; INTRAVENOUS at 23:59

## 2019-01-24 RX ADMIN — BUDESONIDE AND FORMOTEROL FUMARATE DIHYDRATE 2 PUFF: 160; 4.5 AEROSOL RESPIRATORY (INHALATION) at 16:15

## 2019-01-24 RX ADMIN — ACETAMINOPHEN 1000 MG: 500 TABLET, FILM COATED ORAL at 23:59

## 2019-01-24 RX ADMIN — ALBUTEROL SULFATE 2.5 MG: 2.5 SOLUTION RESPIRATORY (INHALATION) at 12:29

## 2019-01-24 RX ADMIN — IPRATROPIUM BROMIDE 0.5 MG: 0.5 SOLUTION RESPIRATORY (INHALATION) at 12:29

## 2019-01-24 RX ADMIN — SIMVASTATIN 40 MG: 20 TABLET, FILM COATED ORAL at 20:32

## 2019-01-24 RX ADMIN — BRIMONIDINE TARTRATE AND TIMOLOL MALEATE 1 DROP: 2; 5 SOLUTION OPHTHALMIC at 20:34

## 2019-01-24 RX ADMIN — TIOTROPIUM BROMIDE 1 CAPSULE: 18 CAPSULE ORAL; RESPIRATORY (INHALATION) at 16:15

## 2019-01-24 RX ADMIN — ACETAMINOPHEN 1000 MG: 500 TABLET, FILM COATED ORAL at 17:27

## 2019-01-24 RX ADMIN — METHYLPREDNISOLONE SODIUM SUCCINATE 40 MG: 40 INJECTION, POWDER, FOR SOLUTION INTRAMUSCULAR; INTRAVENOUS at 16:47

## 2019-01-24 RX ADMIN — ZOLPIDEM TARTRATE 10 MG: 5 TABLET ORAL at 23:59

## 2019-01-24 ASSESSMENT — ENCOUNTER SYMPTOMS
NEUROLOGICAL NEGATIVE: 1
SHORTNESS OF BREATH: 1
FEVER: 0
FALLS: 0
MYALGIAS: 0
BACK PAIN: 1
LOSS OF CONSCIOUSNESS: 0
NAUSEA: 0
GASTROINTESTINAL NEGATIVE: 1
CHILLS: 0
WEAKNESS: 0
NERVOUS/ANXIOUS: 0
ABDOMINAL PAIN: 0
CARDIOVASCULAR NEGATIVE: 1
COUGH: 1
PSYCHIATRIC NEGATIVE: 1
VOMITING: 0
DEPRESSION: 0
WHEEZING: 1
SPUTUM PRODUCTION: 1
WEIGHT LOSS: 0
DIZZINESS: 0
FLANK PAIN: 0
CONSTITUTIONAL NEGATIVE: 1
BRUISES/BLEEDS EASILY: 0

## 2019-01-24 ASSESSMENT — LIFESTYLE VARIABLES
CONSUMPTION TOTAL: NEGATIVE
TOTAL SCORE: 0
EVER FELT BAD OR GUILTY ABOUT YOUR DRINKING: NO
HOW MANY TIMES IN THE PAST YEAR HAVE YOU HAD 5 OR MORE DRINKS IN A DAY: 0
ALCOHOL_USE: YES
EVER HAD A DRINK FIRST THING IN THE MORNING TO STEADY YOUR NERVES TO GET RID OF A HANGOVER: NO
TOTAL SCORE: 0
ON A TYPICAL DAY WHEN YOU DRINK ALCOHOL HOW MANY DRINKS DO YOU HAVE: 1
AVERAGE NUMBER OF DAYS PER WEEK YOU HAVE A DRINK CONTAINING ALCOHOL: 1
HAVE YOU EVER FELT YOU SHOULD CUT DOWN ON YOUR DRINKING: NO
EVER_SMOKED: YES
TOTAL SCORE: 0
HAVE PEOPLE ANNOYED YOU BY CRITICIZING YOUR DRINKING: NO

## 2019-01-24 ASSESSMENT — COGNITIVE AND FUNCTIONAL STATUS - GENERAL
SUGGESTED CMS G CODE MODIFIER MOBILITY: CI
CLIMB 3 TO 5 STEPS WITH RAILING: A LITTLE
DAILY ACTIVITIY SCORE: 24
MOBILITY SCORE: 23
SUGGESTED CMS G CODE MODIFIER DAILY ACTIVITY: CH

## 2019-01-24 ASSESSMENT — COPD QUESTIONNAIRES
DO YOU EVER COUGH UP ANY MUCUS OR PHLEGM?: YES, A FEW DAYS A WEEK OR MONTH
HAVE YOU SMOKED AT LEAST 100 CIGARETTES IN YOUR ENTIRE LIFE: YES
DURING THE PAST 4 WEEKS HOW MUCH DID YOU FEEL SHORT OF BREATH: SOME OF THE TIME
COPD SCREENING SCORE: 7

## 2019-01-24 ASSESSMENT — PATIENT HEALTH QUESTIONNAIRE - PHQ9
SUM OF ALL RESPONSES TO PHQ9 QUESTIONS 1 AND 2: 2
SUM OF ALL RESPONSES TO PHQ QUESTIONS 1-9: 5
2. FEELING DOWN, DEPRESSED, IRRITABLE, OR HOPELESS: SEVERAL DAYS
1. LITTLE INTEREST OR PLEASURE IN DOING THINGS: SEVERAL DAYS
5. POOR APPETITE OR OVEREATING: SEVERAL DAYS
6. FEELING BAD ABOUT YOURSELF - OR THAT YOU ARE A FAILURE OR HAVE LET YOURSELF OR YOUR FAMILY DOWN: SEVERAL DAYS
7. TROUBLE CONCENTRATING ON THINGS, SUCH AS READING THE NEWSPAPER OR WATCHING TELEVISION: NOT AT ALL
3. TROUBLE FALLING OR STAYING ASLEEP OR SLEEPING TOO MUCH: NOT AT ALL
9. THOUGHTS THAT YOU WOULD BE BETTER OFF DEAD, OR OF HURTING YOURSELF: NOT AT ALL
8. MOVING OR SPEAKING SO SLOWLY THAT OTHER PEOPLE COULD HAVE NOTICED. OR THE OPPOSITE, BEING SO FIGETY OR RESTLESS THAT YOU HAVE BEEN MOVING AROUND A LOT MORE THAN USUAL: NOT AT ALL
4. FEELING TIRED OR HAVING LITTLE ENERGY: SEVERAL DAYS

## 2019-01-24 NOTE — ASSESSMENT & PLAN NOTE
improved  Prolonged exacerbation failing outpatient therapy with oral azithromycin and oral prednisone.  Copd exacerbation order set ongoing .  Continue inhaled steroid, DuoNeb treatments every 4 hours.  RT protocol.  Empiric antibiotics given intractable nature of her symptoms including ceftriaxone IV and oral doxycycline.  IV methylprednisolone 40 mg every 8 hours.  Supplemental oxygen as needed.  COPD education.

## 2019-01-24 NOTE — H&P
Hospital Medicine History & Physical Note    Date of Service  1/24/2019    Primary Care Physician  Keira Mendiola P.A.-C.    Consultants  None.    Code Status  Full code.    Chief Complaint  Cough, shortness of breath and back pain.    History of Presenting Illness  68 y.o. female who presented 1/24/2019 with failure of outpatient treatment of COPD exacerbation.  She has been complaining of ongoing COPD flare symptoms since the beginning of December.  She has had 2 courses of antibiotics and steroids and is currently on a course of azithromycin oral prednisone without improvement.  On arrival to the emergency department she was 87% on room air she was also using accessory muscles, tachypneic and speaking in 2-3 word sentences.  After breathing treatments and course of steroids in the emergency department as well as supplemental oxygen she is breathing much better and feels better.    In addition patient has new low back pain in addition to her chronic back pain that she has from multiple compression fractures.  She has had 2 kyphoplasties in the past.  Patient describes no falling or trauma of any kind at home.  She was on Prolia injections in the past but has been off of them secondary to other health problems and the loss of her primary care provider who retired.  The entity that took over his office has not been accommodating to help her resume the injections.  In addition she has been unable to get into their office to get Lovenox bridging in order to get an injection in her upper back from pain management.  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  Therefore she must be bridged with Lovenox for procedures.    Review of Systems  Review of Systems   Constitutional: Negative.  Negative for chills, fever, malaise/fatigue and weight loss.   HENT: Negative.    Respiratory: Positive for cough,  sputum production, shortness of breath and wheezing.    Cardiovascular: Negative.  Negative for chest pain and leg swelling.   Gastrointestinal: Negative.  Negative for abdominal pain, nausea and vomiting.   Genitourinary: Negative.  Negative for dysuria and flank pain.   Musculoskeletal: Positive for back pain. Negative for falls and myalgias.   Neurological: Negative.  Negative for dizziness, loss of consciousness and weakness.   Endo/Heme/Allergies: Negative.  Does not bruise/bleed easily.   Psychiatric/Behavioral: Negative.  Negative for depression. The patient is not nervous/anxious.    All other systems reviewed and are negative.      Past Medical History   has a past medical history of Anesthesia; Asthma; Backpain (7/2017); Blood clot in vein (07/06/2018); Bowel habit changes (07/06/2018); Breath shortness; Bronchitis; CAD (coronary artery disease); Cancer ([facility]) (2016); Chickenpox; COPD (chronic obstructive pulmonary disease) (HCC); Dialysis (2005); Emphysema of lung (HCC); Glaucoma; Hemorrhagic disorder ([facility]); Hyperlipidemia; Hypertension; Hyperthyroidism; Kidney stone; Lung cancer ([facility]) (12/12/2016); Multiple thyroid nodules (7/9/2015); Nasal drainage; Osteoporosis; Pain (07/06/2018); Personal history of venous thrombosis and embolism (2004, 2012); Pneumonia (7/2016); Renal disorder; Renal stones (2013); Rheumatoid arthritis (HCC); Rheumatoid arthritis (HCC); S/P appendectomy (1998 ); S/P cholecystectomy (1998); S/P kyphoplasty (2006, 2007, 2013); Sepsis(995.91) (2005); Shortness of breath (07/06/2018); and Thyroid nodule, hot (2017). She also has no past medical history of Angina; Arrhythmia; Breast cancer (HCC); CATARACT; Congestive heart failure (HCC); Diabetes; Fall; Heart murmur; Heart valve disease; Indigestion; Jaundice; Liver disease; Myocardial infarct (HCC); Other specified symptom associated with female genital organs; Pacemaker; Psychiatric disorder; Psychiatric problem; Rheumatic fever; Seizure  (HCC); Seizure disorder (HCC); Stroke (HCC); or Unspecified urinary incontinence.    Surgical History   has a past surgical history that includes other; other abdominal surgery; pr enlarge breast with implant; pr reduction of large breast; appendectomy; cholecystectomy; laminotomy; lung biopsy open; thoracoscopy (Left, 12/12/2016); other orthopedic surgery (2013); cystoscopy stent placement (Left, 6/18/2018); lithotripsy (Left, 6/18/2018); lasertripsy (Left, 6/18/2018); ureteroscopy (Left, 6/18/2018); cystoscopy stent placement (7/9/2018); ureteroscopy (Left, 7/9/2018); and lasertripsy (Left, 7/9/2018).     Family History  family history includes Cancer in her mother; Heart Disease in her brother; Heart Failure in her father.     Social History   reports that she quit smoking about 19 years ago. Her smoking use included Cigarettes. She has a 30.00 pack-year smoking history. She has never used smokeless tobacco. She reports that she drinks alcohol. She reports that she does not use drugs.    Allergies  Allergies   Allergen Reactions   • Nkda [No Known Drug Allergy]        Medications  Prior to Admission Medications   Prescriptions Last Dose Informant Patient Reported? Taking?   BREO ELLIPTA 200-25 MCG/INH AEROSOL POWDER, BREATH ACTIVATED  Patient No No   Sig: INHALE ONE DOSE BY MOUTH DAILY. RINSE MOUTH AFTER USE.   COMBIGAN 0.2-0.5 % Solution  Patient Yes No   Sig: Place 1 Drop in both eyes 2 Times a Day.   Fluticasone-Umeclidin-Vilant (TRELEGY ELLIPTA) 100-62.5-25 MCG/INH AEROSOL POWDER, BREATH ACTIVATED   No No   Sig: Inhale 1 Puff by mouth 1 time daily as needed.   HYDROcodone/acetaminophen (NORCO)  MG Tab  Patient Yes No   Sig: Take 1-2 Tabs by mouth as needed.   PROAIR  (90 Base) MCG/ACT Aero Soln inhalation aerosol   No No   Sig: INHALE TWO PUFFS BY MOUTH EVERY 6 HOURS AS NEEDED FOR SHORTNESS OF BREATH   SPIRIVA RESPIMAT 2.5 MCG/ACT Aero Soln   No No   Sig: INHALE TWO PUFFS BY MOUTH DAILY. ASSEMBLE  AND PRIME.   Tiotropium Bromide Monohydrate (SPIRIVA RESPIMAT) 2.5 MCG/ACT Aero Soln  Patient Yes No   Sig: Inhale 1 Puff by mouth 2 Times a Day.   azithromycin (ZITHROMAX) 250 MG Tab   No No   Sig: Take two tabs po day one followed by one tab po day two through five with food   docusate sodium (COLACE) 100 MG Cap   No No   Sig: Take 1 Cap by mouth 2 times a day.   enoxaparin (LOVENOX) 80 MG/0.8ML Solution inj   Yes No   Sig: Inject 1 Syringe as instructed every 12 hours.   ipratropium-albuterol (DUONEB) 0.5-2.5 (3) MG/3ML nebulizer solution   No No   Sig: 3 mL by Nebulization route 4 times a day.   losartan (COZAAR) 50 MG TABS  Patient Yes No   Sig: Take 50 mg by mouth every morning.   predniSONE (DELTASONE) 20 MG Tab   No No   Sig: Take 40 mg PO daily for 5 days   propranolol LA (INDERAL LA) 60 MG CAPSULE SR 24 HR  Patient Yes No   Sig: Take 60 mg by mouth every morning.   rivaroxaban (XARELTO) 20 MG Tab tablet  Patient No No   Sig: Take 1 Tab by mouth with dinner.   simvastatin (ZOCOR) 40 MG Tab  Patient Yes No   Sig: Take 40 mg by mouth every evening.   vitamin D, Ergocalciferol, (DRISDOL) 95368 UNITS Cap capsule  Patient Yes No   Sig: Take 50,000 Units by mouth every 14 days.   zolpidem (AMBIEN) 10 MG Tab  Patient Yes No   Sig: Take 10 mg by mouth at bedtime as needed for Sleep.      Facility-Administered Medications: None       Physical Exam  Temp:  [37.7 °C (99.8 °F)] 37.7 °C (99.8 °F)  Pulse:  [79-97] 83  Resp:  [24] 24  BP: (120-140)/(77-85) 140/85  SpO2:  [87 %-94 %] 93 %    Physical Exam   Constitutional: She is oriented to person, place, and time. She appears well-developed and well-nourished. No distress.   Plan of care discussed with bedside RN.   HENT:   Nose: Nose normal.   Mouth/Throat: Oropharynx is clear and moist. No oropharyngeal exudate.   Eyes: Conjunctivae are normal. Right eye exhibits no discharge. Left eye exhibits no discharge. No scleral icterus.   Neck: No JVD present. No tracheal  deviation present.   Cardiovascular: Normal rate, regular rhythm and normal heart sounds.    Pulmonary/Chest: Effort normal. No accessory muscle usage or stridor. No respiratory distress. She has decreased breath sounds. She has no wheezes. She has no rhonchi. She has no rales. She exhibits no tenderness.   Abdominal: Soft. Bowel sounds are normal. She exhibits no distension. There is no tenderness.   Musculoskeletal: She exhibits tenderness (lumbar spinous processes). She exhibits no edema.   Neurological: She is alert and oriented to person, place, and time. No cranial nerve deficit. She exhibits normal muscle tone.   Skin: Skin is warm and dry. She is not diaphoretic. There is pallor.   Psychiatric: She has a normal mood and affect. Her behavior is normal. Judgment and thought content normal.   Nursing note and vitals reviewed.      Laboratory:  Recent Labs      01/24/19   1250   WBC  7.3   RBC  4.49   HEMOGLOBIN  15.2   HEMATOCRIT  43.4   MCV  96.7   MCH  33.9*   MCHC  35.0   RDW  42.6   PLATELETCT  202   MPV  11.2     Recent Labs      01/24/19   1250   SODIUM  138   POTASSIUM  3.8   CHLORIDE  106   CO2  22   GLUCOSE  124*   BUN  22   CREATININE  1.18   CALCIUM  9.7     Recent Labs      01/24/19   1250   ALTSGPT  19   ASTSGOT  18   ALKPHOSPHAT  83   TBILIRUBIN  0.9   GLUCOSE  124*         Recent Labs      01/24/19   1250   BNPBTYPENAT  66         Recent Labs      01/24/19   1250   TROPONINI  <0.02       Urinalysis:    No results found     Imaging:  CT-LSPINE W/O PLUS RECONS   Final Result      1.  New compression deformity at the L2 vertebral body with approximately 30-40% loss of height.      2.  Stable compression deformity of the L1 vertebral body.      3.  Osteopenia.      4.  Levoscoliosis.      5.  Minimal anterolisthesis at L3-4.      DX-CHEST-PORTABLE (1 VIEW)   Final Result      Stable hyperinflation, cardiac silhouette enlargement and pulmonary arterial hypertension      MR-LUMBAR SPINE-W/O    (Results  Pending)         Assessment/Plan:  I anticipate this patient will require at least two midnights for appropriate medical management, necessitating inpatient admission.    Acute exacerbation of chronic obstructive pulmonary disease (COPD) ([facility])- (present on admission)   Assessment & Plan    Prolonged exacerbation failing outpatient therapy with oral azithromycin and oral prednisone.  Copd exacerbation order set completed.  Continue inhaled steroid, DuoNeb treatments every 4 hours.  RT protocol.  Empiric antibiotics given intractable nature of her symptoms including ceftriaxone IV and oral doxycycline.  IV methylprednisolone 40 mg every 8 hours.  Supplemental oxygen as needed.  COPD education.     Acute respiratory failure with hypoxia ([facility])- (present on admission)   Assessment & Plan    87% on room air at the time of admission.  she has a concentrator at home but has not needed to use it yet.  Treat COPD exacerbation.  RT protocol     Closed wedge compression fracture of first lumbar vertebra ([facility])- (present on admission)   Assessment & Plan    MRI of spine, assess if patient could benefit from another kyphoplasty procedure.  Treat COPD exacerbation first then consider outpatient kyphoplasty.  She will need to be bridged with Lovenox while her Xarelto is held.  Patient has had thrombotic event when taken off of anticoagulation for procedures in the past and needs to be bridged.     DVT (deep venous thrombosis) ([facility])- (present on admission)   Assessment & Plan    6 years ago.  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  Continue Xarelto.     Osteoporosis- (present on admission)   Assessment & Plan    Patient was previously on Prolia injections and has been off them secondary to complicating health problems.  Recommend that she establish with a new primary care provider and investigate resuming these.  Due to her history of renal failure she is not able to be on  bisphosphonates.     CKD (chronic kidney disease) stage 3, GFR 30-59 ml/min ([facility])- (present on admission)   Assessment & Plan    Stable at baseline.  History of prior dialysis.  Avoid nephrotoxins.     HTN (hypertension)- (present on admission)   Assessment & Plan    Continue losartan and propranolol.         VTE prophylaxis: xarelto

## 2019-01-24 NOTE — ED NOTES
Pt returned from CT, resting in [facility] with side rails raised and call bell in reach.

## 2019-01-24 NOTE — ASSESSMENT & PLAN NOTE
Patient was previously on Prolia injections and has been off them secondary to complicating health problems.  Recommend that she establish with a new primary care provider and investigate resuming these.  Due to her history of renal failure she is not able to be on bisphosphonates.

## 2019-01-24 NOTE — FLOWSHEET NOTE
01/24/19 1229   Events/Summary/Plan   Events/Summary/Plan ER TX   Interdisciplinary Plan of Care-Goals (Indications)   Bronchodilator Indications PFT / Reduced Airflow   Interdisciplinary Plan of Care-Outcomes    Bronchodilator Outcome Patient at Stable Baseline   Education   Education Yes - Pt. / Family has been Instructed in use of Respiratory Equipment;Yes - Pt. / Family has been Instructed in use of Respiratory Medications and Adverse Reactions   RT Assessment of Delivered Medications   Evaluation of Medication Delivery Daily Yes-- Pt /Family has been Instructed in use of Respiratory Medications and Adverse Reactions   SVN Group   #SVN Performed Yes   Given By: Mouthpiece   Date SVN Last Changed 01/24/19   Date SVN Next Change Due (Q 7 Days) 01/31/19   Respiratory WDL   Respiratory (WDL) X   Chest Exam   Work Of Breathing / Effort Moderate   Respiration (!) 24   Heart Rate (Monitored) 84   Breath Sounds   Pre/Post Intervention Pre Intervention Assessment   RUL Breath Sounds Clear;Diminished   RML Breath Sounds Diminished   RLL Breath Sounds Diminished   [place]IN Breath Sounds Diminished   Oximetry   #Pulse Oximetry (Single Determination) Yes   Oxygen   Home O2 Use Prior To Admission? No   Pulse Oximetry 93 %   O2 (LPM) 1.5   O2 Daily Delivery Respiratory  Silicone Nasal Cannula

## 2019-01-24 NOTE — ED NOTES
Pt roomed, hypoxic on arrival to room. Placed on 2L O2. Hx non-O2 dep COPD  Pt has had cough since 1/10. Pt took z pack, tessalon, and prednisone and has not had improvement in s/s.

## 2019-01-24 NOTE — ED PROVIDER NOTES
"ED Provider Note    CHIEF COMPLAINT  Chief Complaint   Patient presents with   • Shortness of Breath     Hx COPD.  Seen 1/10/19 at  and given prednisone and Zpack.  Improved, then returned 1 week ago.  Speaking in 2-3 word sentences. 90% RA.   • Cough     productive   • Low Back Pain     Hx osteoporosis.  Denies trauma.  States \"I think I have a fracture\"       HPI  Latonia Clinton is a 68 y.o. female who presents to the Emergency Department with a history of COPD, she started feeling poorly earlier in January, she was given prednisone and Azithromycin and improved.   About one week ago she began with a productive cough, is speaking in 2-3 word sentences.  The patient states she has been progressively more short of breath.  She is not on home oxygen, she has never been intubated or hospitalized.  She does have known back pain and starts states that she started having increased back pain after stopping the recent prednisone, there was no associated trauma.  She denies any focal numbness weakness or loss of bowel or bladder function        REVIEW OF SYSTEMS  As above all other systems are negative.    PAST MEDICAL HISTORY   has a past medical history of Anesthesia; Asthma; Backpain (7/2017); Blood clot in vein (07/06/2018); Bowel habit changes (07/06/2018); Breath shortness; Bronchitis; CAD (coronary artery disease); Cancer ([facility]) (2016); Chickenpox; COPD (chronic obstructive pulmonary disease) ([facility]); Dialysis (2005); Emphysema of lung ([facility]); Glaucoma; Hemorrhagic disorder ([facility]); Hyperlipidemia; Hypertension; Hyperthyroidism; Kidney stone; Lung cancer ([facility]) (12/12/2016); Multiple thyroid nodules (7/9/2015); Nasal drainage; Osteoporosis; Pain (07/06/2018); Personal history of venous thrombosis and embolism (2004, 2012); Pneumonia (7/2016); Renal disorder; Renal stones (2013); Rheumatoid arthritis ([facility]); Rheumatoid arthritis ([facility]); S/P appendectomy (1998 ); S/P cholecystectomy (1998); S/P kyphoplasty (2006, 2007, " 2013); Sepsis(995.91) (2005); Shortness of breath (07/06/2018); and Thyroid nodule, hot (2017). She also has no past medical history of Angina; Arrhythmia; Breast cancer (HCC); CATARACT; Congestive heart failure (HCC); Diabetes; Fall; Heart murmur; Heart valve disease; Indigestion; Jaundice; Liver disease; Myocardial infarct (HCC); Other specified symptom associated with female genital organs; Pacemaker; Psychiatric disorder; Psychiatric problem; Rheumatic fever; Seizure (HCC); Seizure disorder (HCC); Stroke (HCC); or Unspecified urinary incontinence.    FAMILY HISTORY  Family History   Problem Relation Age of Onset   • Cancer Mother    • Heart Failure Father    • Heart Disease Brother         SOCIAL HISTORY  Social History     Social History Main Topics   • Smoking status: Former Smoker     Packs/day: 1.00     Years: 30.00     Types: Cigarettes     Quit date: 1/1/2000   • Smokeless tobacco: Never Used      Comment: 7 years ago   • Alcohol use 0.0 oz/week      Comment: x2 a week   • Drug use: No   • Sexual activity: Not on file       SURGICAL HISTORY   has a past surgical history that includes other; other abdominal surgery; enlarge breast with implant; reduction of large breast; appendectomy; cholecystectomy; laminotomy; lung biopsy open; thoracoscopy (Left, 12/12/2016); other orthopedic surgery (2013); cystoscopy stent placement (Left, 6/18/2018); lithotripsy (Left, 6/18/2018); lasertripsy (Left, 6/18/2018); ureteroscopy (Left, 6/18/2018); cystoscopy stent placement (7/9/2018); ureteroscopy (Left, 7/9/2018); and lasertripsy (Left, 7/9/2018).    CURRENT MEDICATIONS  Reviewed.  See Encounter Summary.  Include   Current Facility-Administered Medications:   •  Respiratory Care per Protocol, , Nebulization, Continuous RT, Denita Topete M.D.    Current Outpatient Prescriptions:   •  predniSONE (DELTASONE) 20 MG Tab, Take 40 mg PO daily for 5 days, Disp: 30 Tab, Rfl: 0  •  azithromycin (ZITHROMAX) 250 MG Tab, Take  two tabs po day one followed by one tab po day two through five with food, Disp: 1 Quantity Sufficient, Rfl: 0  •  ipratropium-albuterol (DUONEB) 0.5-2.5 (3) MG/3ML nebulizer solution, 3 mL by Nebulization route 4 times a day., Disp: 30 Bullet, Rfl: 0  •  enoxaparin (LOVENOX) 80 MG/0.8ML Solution inj, Inject 1 Syringe as instructed every 12 hours., Disp: , Rfl:   •  Fluticasone-Umeclidin-Vilant (TRELEGY ELLIPTA) 100-62.5-25 MCG/INH AEROSOL POWDER, BREATH ACTIVATED, Inhale 1 Puff by mouth 1 time daily as needed., Disp: 1 Each, Rfl: 0  •  SPIRIVA RESPIMAT 2.5 MCG/ACT Aero Soln, INHALE TWO PUFFS BY MOUTH DAILY. ASSEMBLE AND PRIME., Disp: 1 Inhaler, Rfl: 2  •  PROAIR  (90 Base) MCG/ACT Aero Soln inhalation aerosol, INHALE TWO PUFFS BY MOUTH EVERY 6 HOURS AS NEEDED FOR SHORTNESS OF BREATH, Disp: 1 Inhaler, Rfl: 11  •  docusate sodium (COLACE) 100 MG Cap, Take 1 Cap by mouth 2 times a day., Disp: 60 Cap, Rfl: 1  •  HYDROcodone/acetaminophen (NORCO)  MG Tab, Take 1-2 Tabs by mouth as needed., Disp: , Rfl:   •  rivaroxaban (XARELTO) 20 MG Tab tablet, Take 1 Tab by mouth with dinner., Disp: 30 Tab, Rfl: 0  •  Tiotropium Bromide Monohydrate (SPIRIVA RESPIMAT) 2.5 MCG/ACT Aero Soln, Inhale 1 Puff by mouth 2 Times a Day., Disp: , Rfl:   •  BREO ELLIPTA 200-25 MCG/INH AEROSOL POWDER, BREATH ACTIVATED, INHALE ONE DOSE BY MOUTH DAILY. RINSE MOUTH AFTER USE., Disp: 1 Each, Rfl: 11  •  COMBIGAN 0.2-0.5 % Solution, Place 1 Drop in both eyes 2 Times a Day., Disp: , Rfl:   •  vitamin D, Ergocalciferol, (DRISDOL) 38732 UNITS Cap capsule, Take 50,000 Units by mouth every 14 days., Disp: , Rfl:   •  propranolol LA (INDERAL LA) 60 MG CAPSULE SR 24 HR, Take 60 mg by mouth every morning., Disp: , Rfl:   •  zolpidem (AMBIEN) 10 MG Tab, Take 10 mg by mouth at bedtime as needed for Sleep., Disp: , Rfl:   •  simvastatin (ZOCOR) 40 MG Tab, Take 40 mg by mouth every evening., Disp: , Rfl:   •  losartan (COZAAR) 50 MG TABS, Take 50 mg  "by mouth every morning., Disp: , Rfl:       ALLERGIES  Allergies   Allergen Reactions   • Nkda [No Known Drug Allergy]        PHYSICAL EXAM  VITAL SIGNS: /85   Pulse 86   Temp 37.7 °C (99.8 °F) (Temporal)   Resp (!) 24   Ht 1.702 m (5' 7\")   Wt 75.4 kg (166 lb 5.4 oz)   LMP  (LMP Unknown)   SpO2 93%   BMI 26.05 kg/m²   Constitutional: Labored respirations speaks in 2-3 word sentences, accessory muscle use noted, able to answer questions  HENT: Nose is normal in appearance, external ears are normal,  moist mucous membranes  Eyes: Anicteric,  pupils are equal round and reactive, there is no conjunctival drainage or pallor   Neck: The trachea is midline, there is no obvious mass or meningeal signs  Cardiovascular: Good perfusion, regular rate and rhythm without murmurs gallops or rubs  Thorax & Lungs: Respiratory rate and effort are labored and shallow there is normal chest excursion with respiration.  Wheezing noted no rhonchi or rales noted.  Abdomen: Abdomen is normal in appearance, no gross peritoneal signs normal bowel sounds, no pain with cough  : No CVA tenderness to palpation  Musculoskeletal: No deformities noted in all 4 extremities.   Skin: Visualized skin is warm without rash.  Neurologic:  Cranial nerves II through XII are intact there is no focal abnormality noted.  Psychiatric: Normal mood and mentation    RADIOLOGY/PROCEDURES  Imaging Studies:    CT-LSPINE W/O PLUS RECONS   Final Result      1.  New compression deformity at the L2 vertebral body with approximately 30-40% loss of height.      2.  Stable compression deformity of the L1 vertebral body.      3.  Osteopenia.      4.  Levoscoliosis.      5.  Minimal anterolisthesis at L3-4.      DX-CHEST-PORTABLE (1 VIEW)   Final Result      Stable hyperinflation, cardiac silhouette enlargement and pulmonary arterial hypertension            Pertinent Labs   Results for orders placed or performed during the hospital encounter of 01/24/19   CBC " w/ Differential   Result Value Ref Range    WBC 7.3 4.8 - 10.8 K/uL    RBC 4.49 4.20 - 5.40 M/uL    Hemoglobin 15.2 12.0 - 16.0 g/dL    Hematocrit 43.4 37.0 - 47.0 %    MCV 96.7 81.4 - 97.8 fL    MCH 33.9 (H) 27.0 - 33.0 pg    MCHC 35.0 33.6 - 35.0 g/dL    RDW 42.6 35.9 - 50.0 fL    Platelet Count 202 164 - 446 K/uL    MPV 11.2 9.0 - 12.9 fL    Neutrophils-Polys 63.90 44.00 - 72.00 %    Lymphocytes 19.80 (L) 22.00 - 41.00 %    Monocytes 6.70 0.00 - 13.40 %    Eosinophils 9.20 (H) 0.00 - 6.90 %    Basophils 0.30 0.00 - 1.80 %    Immature Granulocytes 0.10 0.00 - 0.90 %    Nucleated RBC 0.00 /100 WBC    Neutrophils (Absolute) 4.66 2.00 - 7.15 K/uL    Lymphs (Absolute) 1.44 1.00 - 4.80 K/uL    Monos (Absolute) 0.49 0.00 - 0.85 K/uL    Eos (Absolute) 0.67 (H) 0.00 - 0.51 K/uL    Baso (Absolute) 0.02 0.00 - 0.12 K/uL    Immature Granulocytes (abs) 0.01 0.00 - 0.11 K/uL    NRBC (Absolute) 0.00 K/uL   Complete Metabolic Panel (CMP)   Result Value Ref Range    Sodium 138 135 - 145 mmol/L    Potassium 3.8 3.6 - 5.5 mmol/L    Chloride 106 96 - 112 mmol/L    Co2 22 20 - 33 mmol/L    Anion Gap 10.0 0.0 - 11.9    Glucose 124 (H) 65 - 99 mg/dL    Bun 22 8 - 22 mg/dL    Creatinine 1.18 0.50 - 1.40 mg/dL    Calcium 9.7 8.4 - 10.2 mg/dL    AST(SGOT) 18 12 - 45 U/L    ALT(SGPT) 19 2 - 50 U/L    Alkaline Phosphatase 83 30 - 99 U/L    Total Bilirubin 0.9 0.1 - 1.5 mg/dL    Albumin 3.5 3.2 - 4.9 g/dL    Total Protein 6.7 6.0 - 8.2 g/dL    Globulin 3.2 1.9 - 3.5 g/dL    A-G Ratio 1.1 g/dL   Btype Natriuretic Peptide   Result Value Ref Range    B Natriuretic Peptide 66 0 - 100 pg/mL   Troponin STAT   Result Value Ref Range    Troponin I <0.02 0.00 - 0.04 ng/mL   Influenza By PCR, A/B   Result Value Ref Range    Influenza virus A RNA Negative Negative    Influenza virus B, PCR Negative Negative   ESTIMATED GFR   Result Value Ref Range    GFR If  55 (A) >60 mL/min/1.73 m 2    GFR If Non  45 (A) >60  mL/min/1.73 m 2   ISTAT LACTATE   Result Value Ref Range    iStat Lactate 1.3 0.5 - 2.0 mmol/L   ISTAT ARTERIAL BLOOD GAS   Result Value Ref Range    Ph 7.361 (L) 7.400 - 7.500    Pco2 36.8 26.0 - 37.0 mmHg    Po2 70 64 - 87 mmHg    Tco2 22 20 - 33 mmol/L    S02 93 93 - 99 %    Hco3 20.8 17.0 - 25.0 mmol/L    BE -4 -4 - 3 mmol/L    O2 Therapy 2 %    iPF Ratio 3500     Specimen Arterial        COURSE & MEDICAL DECISION MAKING  Nursing notes and vital signs were reviewed. (See chart for details)  The patients records were reviewed, history was obtained from the patient;     The patient presents with acute shortness of breath and back pain, and the differential diagnosis includes but is not limited to probable COPD exacerbation, pneumonia, bronchitis influenza with her symptomatology and low back pain she may have a recurrent compression fracture think is.       Initial orders in the Emergency Department included chest x-ray ABG CBC INR she  and initial treatment in the Emergency Department included Solu-Medrol and albuterol Atrovent nebulization.      On repeat evaluation of the patient, there was a partial response to medications, the patient is hypoxic here she is requiring 2 L of oxygen which she does not use at base line, she still has work of breathing after initial nebulization and will be admitted for stabilization of probable COPD exacerbation.  Additionally she does have a new compression fracture however this is less than 50% of the height of the vertebrae     FINAL IMPRESSION  1. Acute dyspnea with hypoxemia  2. Lumbar compression fracture       DISPOSITION  Admit      Electronically signed by: Denita Topete, 1/24/2019 12:20 PM

## 2019-01-24 NOTE — ASSESSMENT & PLAN NOTE
MRI of spine and my discussion with Dr Summers in IR shows she is a good candidate for a kyphoplasty procedure.  Treat COPD exacerbation first then will set her up for a monday kyphoplasty.  She will be bridged with a heparin drip while her Xarelto is held.  Patient has had thrombotic event when taken off of anticoagulation for procedures in the past and needs to be bridged.  Plan on transfer tomorrow .

## 2019-01-24 NOTE — ED TRIAGE NOTES
"Latonia Joshua Clinton 68 y.o. female   To triage in .    Chief Complaint   Patient presents with   • Shortness of Breath     Hx COPD.  Seen 1/10/19 at  and given prednisone and Zpack.  Improved, then returned 1 week ago.  Speaking in 2-3 word sentences. 90% RA.   • Cough     productive   • Low Back Pain     Hx osteoporosis.  Denies trauma.  States \"I think I have a fracture\"      Unable to stand because of back pain.  Stated weight in triage.    Pt returned to lob and educated on triage process.  Advised to notify RN with changes or concerns.    "

## 2019-01-24 NOTE — ASSESSMENT & PLAN NOTE
6 years ago.  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  Off Xarelto for 3 days now.   Continue heparin gtt for planned kypho

## 2019-01-24 NOTE — ASSESSMENT & PLAN NOTE
87% on room air at the time of admission.  she has a concentrator at home but has not needed to use it yet.  Treating COPD exacerbation. Improving   Steroids ongoing. tatum not change to PO given she failed outpatient po steroids.   RT protocol

## 2019-01-25 PROBLEM — N17.9 ARF (ACUTE RENAL FAILURE) (HCC): Status: ACTIVE | Noted: 2019-01-25

## 2019-01-25 LAB
ANION GAP SERPL CALC-SCNC: 7 MMOL/L (ref 0–11.9)
APTT PPP: 31.9 SEC (ref 24.7–36)
APTT PPP: 93.8 SEC (ref 24.7–36)
BASOPHILS # BLD AUTO: 0 % (ref 0–1.8)
BASOPHILS # BLD: 0 K/UL (ref 0–0.12)
BUN SERPL-MCNC: 31 MG/DL (ref 8–22)
CALCIUM SERPL-MCNC: 9.5 MG/DL (ref 8.4–10.2)
CHLORIDE SERPL-SCNC: 107 MMOL/L (ref 96–112)
CO2 SERPL-SCNC: 22 MMOL/L (ref 20–33)
CREAT SERPL-MCNC: 1.52 MG/DL (ref 0.5–1.4)
EOSINOPHIL # BLD AUTO: 0 K/UL (ref 0–0.51)
EOSINOPHIL NFR BLD: 0 % (ref 0–6.9)
ERYTHROCYTE [DISTWIDTH] IN BLOOD BY AUTOMATED COUNT: 44.7 FL (ref 35.9–50)
GLUCOSE SERPL-MCNC: 181 MG/DL (ref 65–99)
HCT VFR BLD AUTO: 42.2 % (ref 37–47)
HGB BLD-MCNC: 14 G/DL (ref 12–16)
IMM GRANULOCYTES # BLD AUTO: 0.03 K/UL (ref 0–0.11)
IMM GRANULOCYTES NFR BLD AUTO: 0.5 % (ref 0–0.9)
INR PPP: 1.17 (ref 0.87–1.13)
LYMPHOCYTES # BLD AUTO: 0.59 K/UL (ref 1–4.8)
LYMPHOCYTES NFR BLD: 10.2 % (ref 22–41)
MCH RBC QN AUTO: 33.3 PG (ref 27–33)
MCHC RBC AUTO-ENTMCNC: 33.2 G/DL (ref 33.6–35)
MCV RBC AUTO: 100.5 FL (ref 81.4–97.8)
MONOCYTES # BLD AUTO: 0.14 K/UL (ref 0–0.85)
MONOCYTES NFR BLD AUTO: 2.4 % (ref 0–13.4)
NEUTROPHILS # BLD AUTO: 5.01 K/UL (ref 2–7.15)
NEUTROPHILS NFR BLD: 86.9 % (ref 44–72)
NRBC # BLD AUTO: 0 K/UL
NRBC BLD-RTO: 0 /100 WBC
PLATELET # BLD AUTO: 173 K/UL (ref 164–446)
PMV BLD AUTO: 11.2 FL (ref 9–12.9)
POTASSIUM SERPL-SCNC: 3.9 MMOL/L (ref 3.6–5.5)
PROTHROMBIN TIME: 14.8 SEC (ref 12–14.6)
RBC # BLD AUTO: 4.2 M/UL (ref 4.2–5.4)
SODIUM SERPL-SCNC: 136 MMOL/L (ref 135–145)
WBC # BLD AUTO: 5.8 K/UL (ref 4.8–10.8)

## 2019-01-25 PROCEDURE — 99497 ADVNCD CARE PLAN 30 MIN: CPT | Performed by: HOSPITALIST

## 2019-01-25 PROCEDURE — 700111 HCHG RX REV CODE 636 W/ 250 OVERRIDE (IP): Performed by: HOSPITALIST

## 2019-01-25 PROCEDURE — 700102 HCHG RX REV CODE 250 W/ 637 OVERRIDE(OP): Performed by: HOSPITALIST

## 2019-01-25 PROCEDURE — A9270 NON-COVERED ITEM OR SERVICE: HCPCS | Performed by: HOSPITALIST

## 2019-01-25 PROCEDURE — 99233 SBSQ HOSP IP/OBS HIGH 50: CPT | Mod: 25 | Performed by: HOSPITALIST

## 2019-01-25 PROCEDURE — 770006 HCHG ROOM/CARE - MED/SURG/GYN SEMI*

## 2019-01-25 PROCEDURE — 85730 THROMBOPLASTIN TIME PARTIAL: CPT | Mod: 91

## 2019-01-25 PROCEDURE — 85025 COMPLETE CBC W/AUTO DIFF WBC: CPT

## 2019-01-25 PROCEDURE — 700101 HCHG RX REV CODE 250: Performed by: HOSPITALIST

## 2019-01-25 PROCEDURE — 80048 BASIC METABOLIC PNL TOTAL CA: CPT

## 2019-01-25 PROCEDURE — 94760 N-INVAS EAR/PLS OXIMETRY 1: CPT

## 2019-01-25 PROCEDURE — 700105 HCHG RX REV CODE 258: Performed by: HOSPITALIST

## 2019-01-25 PROCEDURE — 700112 HCHG RX REV CODE 229: Performed by: HOSPITALIST

## 2019-01-25 PROCEDURE — 94640 AIRWAY INHALATION TREATMENT: CPT

## 2019-01-25 PROCEDURE — 85610 PROTHROMBIN TIME: CPT

## 2019-01-25 RX ORDER — MORPHINE SULFATE 4 MG/ML
1-4 INJECTION, SOLUTION INTRAMUSCULAR; INTRAVENOUS
Status: DISCONTINUED | OUTPATIENT
Start: 2019-01-25 | End: 2019-01-27 | Stop reason: HOSPADM

## 2019-01-25 RX ORDER — HEPARIN SODIUM 1000 [USP'U]/ML
2600 INJECTION, SOLUTION INTRAVENOUS; SUBCUTANEOUS PRN
Status: DISCONTINUED | OUTPATIENT
Start: 2019-01-25 | End: 2019-01-27 | Stop reason: HOSPADM

## 2019-01-25 RX ORDER — SODIUM CHLORIDE, SODIUM LACTATE, POTASSIUM CHLORIDE, CALCIUM CHLORIDE 600; 310; 30; 20 MG/100ML; MG/100ML; MG/100ML; MG/100ML
INJECTION, SOLUTION INTRAVENOUS CONTINUOUS
Status: DISCONTINUED | OUTPATIENT
Start: 2019-01-25 | End: 2019-01-27 | Stop reason: HOSPADM

## 2019-01-25 RX ORDER — OXYCODONE HYDROCHLORIDE 5 MG/1
5-10 TABLET ORAL EVERY 4 HOURS PRN
Status: DISCONTINUED | OUTPATIENT
Start: 2019-01-25 | End: 2019-01-27 | Stop reason: HOSPADM

## 2019-01-25 RX ORDER — HEPARIN SODIUM 1000 [USP'U]/ML
5000 INJECTION, SOLUTION INTRAVENOUS; SUBCUTANEOUS ONCE
Status: COMPLETED | OUTPATIENT
Start: 2019-01-25 | End: 2019-01-25

## 2019-01-25 RX ADMIN — DOXYCYCLINE 100 MG: 100 TABLET, FILM COATED ORAL at 18:04

## 2019-01-25 RX ADMIN — IPRATROPIUM BROMIDE AND ALBUTEROL SULFATE 3 ML: .5; 3 SOLUTION RESPIRATORY (INHALATION) at 18:31

## 2019-01-25 RX ADMIN — BENZONATATE 100 MG: 100 CAPSULE ORAL at 16:24

## 2019-01-25 RX ADMIN — IPRATROPIUM BROMIDE AND ALBUTEROL SULFATE 3 ML: .5; 3 SOLUTION RESPIRATORY (INHALATION) at 22:17

## 2019-01-25 RX ADMIN — TIOTROPIUM BROMIDE 1 CAPSULE: 18 CAPSULE ORAL; RESPIRATORY (INHALATION) at 06:54

## 2019-01-25 RX ADMIN — IPRATROPIUM BROMIDE AND ALBUTEROL SULFATE 3 ML: .5; 3 SOLUTION RESPIRATORY (INHALATION) at 14:50

## 2019-01-25 RX ADMIN — METHYLPREDNISOLONE SODIUM SUCCINATE 40 MG: 40 INJECTION, POWDER, FOR SOLUTION INTRAMUSCULAR; INTRAVENOUS at 05:54

## 2019-01-25 RX ADMIN — BRIMONIDINE TARTRATE AND TIMOLOL MALEATE 1 DROP: 2; 5 SOLUTION OPHTHALMIC at 18:07

## 2019-01-25 RX ADMIN — METHYLPREDNISOLONE SODIUM SUCCINATE 40 MG: 40 INJECTION, POWDER, FOR SOLUTION INTRAMUSCULAR; INTRAVENOUS at 23:19

## 2019-01-25 RX ADMIN — DOXYCYCLINE 100 MG: 100 TABLET, FILM COATED ORAL at 05:53

## 2019-01-25 RX ADMIN — DOCUSATE SODIUM AND SENNOSIDES 2 TABLET: 8.6; 5 TABLET, FILM COATED ORAL at 18:05

## 2019-01-25 RX ADMIN — BRIMONIDINE TARTRATE AND TIMOLOL MALEATE 1 DROP: 2; 5 SOLUTION OPHTHALMIC at 06:07

## 2019-01-25 RX ADMIN — BUDESONIDE AND FORMOTEROL FUMARATE DIHYDRATE 2 PUFF: 160; 4.5 AEROSOL RESPIRATORY (INHALATION) at 06:54

## 2019-01-25 RX ADMIN — METHYLPREDNISOLONE SODIUM SUCCINATE 40 MG: 40 INJECTION, POWDER, FOR SOLUTION INTRAMUSCULAR; INTRAVENOUS at 18:04

## 2019-01-25 RX ADMIN — IPRATROPIUM BROMIDE AND ALBUTEROL SULFATE 3 ML: .5; 3 SOLUTION RESPIRATORY (INHALATION) at 10:40

## 2019-01-25 RX ADMIN — DOCUSATE SODIUM 100 MG: 100 CAPSULE, LIQUID FILLED ORAL at 05:53

## 2019-01-25 RX ADMIN — PROPRANOLOL HYDROCHLORIDE 60 MG: 60 CAPSULE, EXTENDED RELEASE ORAL at 06:07

## 2019-01-25 RX ADMIN — IPRATROPIUM BROMIDE AND ALBUTEROL SULFATE 3 ML: .5; 3 SOLUTION RESPIRATORY (INHALATION) at 06:54

## 2019-01-25 RX ADMIN — BUDESONIDE AND FORMOTEROL FUMARATE DIHYDRATE 2 PUFF: 160; 4.5 AEROSOL RESPIRATORY (INHALATION) at 18:31

## 2019-01-25 RX ADMIN — SODIUM CHLORIDE, POTASSIUM CHLORIDE, SODIUM LACTATE AND CALCIUM CHLORIDE: 600; 310; 30; 20 INJECTION, SOLUTION INTRAVENOUS at 23:12

## 2019-01-25 RX ADMIN — ACETAMINOPHEN 1000 MG: 500 TABLET, FILM COATED ORAL at 12:43

## 2019-01-25 RX ADMIN — OXYCODONE HYDROCHLORIDE 5 MG: 5 TABLET ORAL at 12:43

## 2019-01-25 RX ADMIN — HEPARIN SODIUM 5000 UNITS: 1000 INJECTION, SOLUTION INTRAVENOUS; SUBCUTANEOUS at 16:25

## 2019-01-25 RX ADMIN — SODIUM CHLORIDE, POTASSIUM CHLORIDE, SODIUM LACTATE AND CALCIUM CHLORIDE: 600; 310; 30; 20 INJECTION, SOLUTION INTRAVENOUS at 12:48

## 2019-01-25 RX ADMIN — DOCUSATE SODIUM 100 MG: 100 CAPSULE, LIQUID FILLED ORAL at 18:05

## 2019-01-25 RX ADMIN — OXYCODONE HYDROCHLORIDE 5 MG: 5 TABLET ORAL at 18:05

## 2019-01-25 RX ADMIN — DOCUSATE SODIUM AND SENNOSIDES 2 TABLET: 8.6; 5 TABLET, FILM COATED ORAL at 05:54

## 2019-01-25 RX ADMIN — HEPARIN SODIUM 1050 UNITS/HR: 5000 INJECTION, SOLUTION INTRAVENOUS at 16:25

## 2019-01-25 RX ADMIN — IPRATROPIUM BROMIDE AND ALBUTEROL SULFATE 3 ML: .5; 3 SOLUTION RESPIRATORY (INHALATION) at 02:47

## 2019-01-25 RX ADMIN — LOSARTAN POTASSIUM 50 MG: 25 TABLET, FILM COATED ORAL at 06:03

## 2019-01-25 RX ADMIN — ACETAMINOPHEN 1000 MG: 500 TABLET, FILM COATED ORAL at 06:07

## 2019-01-25 RX ADMIN — METHYLPREDNISOLONE SODIUM SUCCINATE 40 MG: 40 INJECTION, POWDER, FOR SOLUTION INTRAMUSCULAR; INTRAVENOUS at 12:43

## 2019-01-25 RX ADMIN — SIMVASTATIN 40 MG: 20 TABLET, FILM COATED ORAL at 22:07

## 2019-01-25 NOTE — FLOWSHEET NOTE
01/25/19 1040   Interdisciplinary Plan of Care-Goals (Indications)   Bronchodilator Indications PFT / Reduced Airflow   Interdisciplinary Plan of Care-Outcomes    Bronchodilator Outcome Patient at Stable Baseline   RT Assessment of Delivered Medications   Evaluation of Medication Delivery Daily Yes-- Pt /Family has been Instructed in use of Respiratory Medications and Adverse Reactions   SVN Group   #SVN Performed Yes   Given By: Mouthpiece   Incentive Spirometry Group   Incentive Spirometry Instruction Yes   Breathing Exercises Yes   Incentive Spirometer Volume 2000 mL   Respiratory WDL   Respiratory (WDL) X   Chest Exam   Work Of Breathing / Effort Shallow   Respiration 20   Heart Rate (Monitored) 88   Breath Sounds   Pre/Post Intervention Post Intervention Assessment   RUL Breath Sounds Diminished;Clear   RML Breath Sounds Diminished;Clear   RLL Breath Sounds Diminished   AUSTIN Breath Sounds Clear   LLL Breath Sounds Clear;Diminished   Oximetry   #Pulse Oximetry (Single Determination) Yes   Oxygen   Pulse Oximetry 94 %   O2 (LPM) 1   O2 Daily Delivery Respiratory  Silicone Nasal Cannula

## 2019-01-25 NOTE — FLOWSHEET NOTE
01/24/19 1936   Interdisciplinary Plan of Care-Goals (Indications)   Bronchodilator Indications PFT / Reduced Airflow   Interdisciplinary Plan of Care-Outcomes    Bronchodilator Outcome Patient at Stable Baseline   RT Assessment of Delivered Medications   Evaluation of Medication Delivery Daily Yes-- Pt /Family has been Instructed in use of Respiratory Medications and Adverse Reactions   SVN Group   #SVN Performed Yes   Given By: Mouthpiece   Date SVN Last Changed 01/24/19   Date SVN Next Change Due (Q 7 Days) 01/31/19   Respiratory WDL   Respiratory (WDL) X   Chest Exam   Respiration 20   Pulse (!) 104   Breath Sounds   Pre/Post Intervention Pre Intervention Assessment   RUL Breath Sounds Diminished   RML Breath Sounds Diminished   RLL Breath Sounds Diminished   AUSTIN Breath Sounds Diminished   LLL Breath Sounds Diminished   Secretions   Cough Congested;Non Productive   How Sputum Obtained Spontaneous   Oximetry   #Pulse Oximetry (Single Determination) Yes   Oxygen   Pulse Oximetry 93 %   O2 (LPM) 2   O2 Daily Delivery Respiratory  Silicone Nasal Cannula

## 2019-01-25 NOTE — PROGRESS NOTES
[facility] Medicine Daily Progress Note    Date of Service  1/25/2019    Chief Complaint  68 y.o. female admitted 1/24/2019 with cough, dyspnea and back pain.     Hospital Course    She has been admitted with evidence for a COPD exacerbation and a new compression fracture of L2.        Interval Problem Update  Her breathing is not improved nor is her back pain. We discussed these in detail. Respiratory therapy is doing treatments on her. We discussed a possible kyphoplasty. She has had them in the past with her neurosurgeon but does not want to wait to seen him for this one given her pain. I called and discussed her MRi findings today with interventional radiology. They wlil be willing to do her procedure here. She unfortunately needs to be off of anticoagulation. I stopped her xarelto and started her on a heparin drip today and we are planning on a transfer to [facility] on sunday for a planned kyphoplasty on Monday.     I reviewed Her CT  1.  New compression deformity at the L2 vertebral body with approximately 30-40% loss of height.  2.  Stable compression deformity of the L1 vertebral body.  3.  Osteopenia.  4.  Levoscoliosis.  5.  Minimal anterolisthesis at L3-4.  Stable hyperinflation, cardiac silhouette enlargement and pulmonary arterial hypertension    I reviewed her MRI  1.  Acute compression fracture of L2 with concave deformity of inferior endplate and moderate loss of height, as seen on recent CT lumbar spine.  2.  Chronic moderate compression of L1 and T11.  3.  No significant central canal narrowing.    I reviewed her previous pathology   FINAL DIAGNOSIS:  A. 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    Consultants/Specialty  IR as above.     Code Status  FULL    [employer] care planning- In addition to 35min e/m time today I spent 18minutes discussing code status with her today. We discussed cpr, intubation, statistics surrounding these and the mechanics of a code. I  provided her with a POLST form and explained how it is filled out and the meaning of the areas of the form. She would like to think on a decision over night and we will fill it out tomorrow.     Disposition  Home maybe once improved. Possible snf not clear.     Review of Systems  Review of Systems   Constitutional: Positive for diaphoresis. Negative for chills and fever.   HENT: Negative for sore throat.    Eyes: Negative for blurred vision and pain.   Respiratory: Positive for cough and shortness of breath.    Cardiovascular: Negative for chest pain and palpitations.   Gastrointestinal: Negative for abdominal pain, nausea and vomiting.   Genitourinary: Negative for dysuria and urgency.   Musculoskeletal: Positive for back pain. Negative for neck pain.   Skin: Negative for itching and rash.   Neurological: Negative for dizziness, tingling and headaches.   Psychiatric/Behavioral: Negative for depression. The patient does not have insomnia.    All other systems reviewed and are negative.       Physical Exam  Temp:  [36.2 °C (97.2 °F)-37.7 °C (99.8 °F)] 36.2 °C (97.2 °F)  Pulse:  [] 87  Resp:  [16-24] 18  BP: ()/(51-85) 96/51  SpO2:  [87 %-96 %] 94 %    Physical Exam   Constitutional: She is oriented to person, place, and time. She appears well-developed and well-nourished. No distress.   Patient seen and examined  Discussed plan with ALLAN NEVEST:   Right Ear: External ear normal.   Left Ear: External ear normal.   Nose: Nose normal.   Eyes: Conjunctivae are normal. Right eye exhibits no discharge. Left eye exhibits no discharge.   Neck: No JVD present.   Cardiovascular: Regular rhythm and normal heart sounds.    No murmur heard.  Cap refill 2sec  Pulses 2+ throughout     Pulmonary/Chest: No stridor. She is in respiratory distress. She has wheezes. She has no rales.   Abdominal: Soft. Bowel sounds are normal. She exhibits no distension. There is no tenderness.   Musculoskeletal: She exhibits no edema or  tenderness.   Neurological: She is alert and oriented to person, place, and time.   Skin: Skin is warm. She is diaphoretic. No erythema. There is pallor.   Normal skin  Color.    Psychiatric: She has a normal mood and affect. Her behavior is normal.   Nursing note and vitals reviewed.      Fluids    Intake/Output Summary (Last 24 hours) at 01/25/19 0834  Last data filed at 01/25/19 0607   Gross per 24 hour   Intake             1020 ml   Output                0 ml   Net             1020 ml       Laboratory  Recent Labs      01/24/19   1250  01/25/19   0405   WBC  7.3  5.8   RBC  4.49  4.20   HEMOGLOBIN  15.2  14.0   HEMATOCRIT  43.4  42.2   MCV  96.7  100.5*   MCH  33.9*  33.3*   MCHC  35.0  33.2*   RDW  42.6  44.7   PLATELETCT  202  173   MPV  11.2  11.2     Recent Labs      01/24/19   1250  01/25/19   0405   SODIUM  138  136   POTASSIUM  3.8  3.9   CHLORIDE  106  107   CO2  22  22   GLUCOSE  124*  181*   BUN  22  31*   CREATININE  1.18  1.52*   CALCIUM  9.7  9.5         Recent Labs      01/24/19   1250   BNPBTYPENAT  66           Imaging  MR-LUMBAR SPINE-W/O   Final Result      1.  Acute compression fracture of L2 with concave deformity of inferior endplate and moderate loss of height, as seen on recent CT lumbar spine.   2.  Chronic moderate compression of L1 and T11.   3.  No significant central canal narrowing.      CT-LSPINE W/O PLUS RECONS   Final Result      1.  New compression deformity at the L2 vertebral body with approximately 30-40% loss of height.      2.  Stable compression deformity of the L1 vertebral body.      3.  Osteopenia.      4.  Levoscoliosis.      5.  Minimal anterolisthesis at L3-4.      DX-CHEST-PORTABLE (1 VIEW)   Final Result      Stable hyperinflation, cardiac silhouette enlargement and pulmonary arterial hypertension           Assessment/Plan  Acute exacerbation of chronic obstructive pulmonary disease (COPD) (HCC)- (present on admission)   Assessment & Plan    Not improved  Prolonged  exacerbation failing outpatient therapy with oral azithromycin and oral prednisone.  Copd exacerbation order set ongoing .  Continue inhaled steroid, DuoNeb treatments every 4 hours.  RT protocol.  Empiric antibiotics given intractable nature of her symptoms including ceftriaxone IV and oral doxycycline.  IV methylprednisolone 40 mg every 8 hours.  Supplemental oxygen as needed.  COPD education.     Acute respiratory failure with hypoxia (HCC)- (present on admission)   Assessment & Plan    87% on room air at the time of admission.  she has a concentrator at home but has not needed to use it yet.  Treat COPD exacerbation.  Steroids  RT protocol     Closed wedge compression fracture of first lumbar vertebra (HCC)- (present on admission)   Assessment & Plan    MRI of spine and my discussion today with Dr Summers in  shows she is a good candidate for a kyphoplasty procedure.  Treat COPD exacerbation first then will set her up for a monday kyphoplasty.  She will be bridged with a heparin drip while her Xarelto is held.  Patient has had thrombotic event when taken off of anticoagulation for procedures in the past and needs to be bridged.     Lung cancer ([facility])- (present on admission)   Assessment & Plan    I reviewed her bioply and path results. She has had a resection. No obvious recurrence or pathalogic fracture seen for now. She follows with oncology.      DVT (deep venous thrombosis) ([facility])- (present on admission)   Assessment & Plan    6 years ago.  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  Stop Xarelto.  Start heparin gtt for planned kypho     Rheumatoid arthritis (HCC)- (present on admission)   Assessment & Plan    ?treatment. Unclear right now. Not on any. No obvious flare.     Osteoporosis- (present on admission)   Assessment & Plan    Patient was previously on Prolia injections and has been off them secondary to complicating health problems.  Recommend that  she establish with a new primary care provider and investigate resuming these.  Due to her history of renal failure she is not able to be on bisphosphonates.     CKD (chronic kidney disease) stage 3, GFR 30-59 ml/min ([facility])- (present on admission)   Assessment & Plan    Stable at baseline.  History of prior dialysis.  Avoid nephrotoxins.     CAD (coronary artery disease)- (present on admission)   Assessment & Plan    No flare. Trend on tele. Continue meds.      HTN (hypertension)- (present on admission)   Assessment & Plan    Continue losartan and propranolol.     Hyperlipidemia- (present on admission)   Assessment & Plan    statin          VTE prophylaxis: heparin drip

## 2019-01-25 NOTE — PROGRESS NOTES
Bedside report received from Bethany LEMUS. Pt resting in bed, RR even and unlabored. Safety precautions in place, call light within reach.

## 2019-01-25 NOTE — FLOWSHEET NOTE
01/25/19 1450   Interdisciplinary Plan of Care-Goals (Indications)   Bronchodilator Indications PFT / Reduced Airflow   Interdisciplinary Plan of Care-Outcomes    Bronchodilator Outcome Patient at Stable Baseline   RT Assessment of Delivered Medications   Evaluation of Medication Delivery Daily Yes-- Pt /Family has been Instructed in use of Respiratory Medications and Adverse Reactions   SVN Group   #SVN Performed Yes   Given By: Mouthpiece   Respiratory WDL   Respiratory (WDL) X   Chest Exam   Respiration 20   Heart Rate (Monitored) 92   Breath Sounds   Pre/Post Intervention Pre Intervention Assessment   RUL Breath Sounds Clear;Diminished   RML Breath Sounds Clear;Diminished   RLL Breath Sounds Diminished   AUSTIN Breath Sounds Clear;Diminished   LLL Breath Sounds Diminished   Oximetry   #Pulse Oximetry (Single Determination) Yes   Oxygen   Pulse Oximetry 91 %   O2 Daily Delivery Respiratory  Room Air with O2 Available

## 2019-01-25 NOTE — PROGRESS NOTES
Assessment complete, medicated per MAR. Discussed POC, lab tests and medications allowed time to ask questions. Pt resting in bed, RR even, mild work of breathing. Pt educated to call for assistance. Declines needs at this time. Safety precautions in place.    2100 pt provided with beverages and warm blanket. Declines additional needs at this time.    0115 pt laying in bed, eyes closed, RR even and unlabored. Intermittent cough present.    0555 discussed pursed lip breathing for if pt feels SOB at a later time. She is concerned that she may be readmitted for the same thing at a later time. Medicated per MAR.

## 2019-01-25 NOTE — DIETARY
"Nutrition services: Day 1 of admit.  Latonia Clinton is a 68 y.o. female with admitting DX of COPD exacerbation with acute respiratory failure. She also is having back pain. Hx includes DVT, osteoporosis and compression fracture to lumbar spine. Pt with positive screen for poor PO intake PTA. Current PO is good. Pt reports that she likes the food. She requested a copy to the menu. Dietary is aware. She was eating poorly for a few weeks at home due to feeling ill. UBW 1 year ago was 184#. Pt with 16# (8.9%) weight loss. Weight loss is not significant. Pt reports that she will usually graze on foods throughout the day. She has access to food reporting that she will go out to restaurants and markets to purchase foods. She has frozen meals and Atkins shakes available at home to prepare or drink when she is not up to preparing a meal. She also takes an MVI daily. Her BMI indicates she is overweight.       Assessment:  Height: 170.2 cm (5' 7\")  Weight: 76.6 kg (168 lb 14 oz)  Body mass index is 26.45 kg/m².  Diet/Intake: Regular/%    Evaluation:   1. Labs: 1/25: glucose=181 (may be elevated due to steroids)  2. Meds: Solu-Medrol  3. Fluids: PO  4. GI: BM on 1/24  5. Skin: intact    Malnutrition Risk: No current indication that pt is malnourished.    Recommendations/Plan:  1. Provide diet as ordered.   2. Encourage intake of >50%  3. Document intake of all meals  as % taken in ADL's to provide interdisciplinary communication across all shifts.   4. Monitor weight.  5. Nutrition rep will continue to see patient for ongoing meal and snack preferences. Provide copies of menus.  6. RD will follow PRN.             "

## 2019-01-25 NOTE — FLOWSHEET NOTE
01/24/19 1615   Interdisciplinary Plan of Care-Goals (Indications)   Bronchodilator Indications PFT / Reduced Airflow   Interdisciplinary Plan of Care-Outcomes    Bronchodilator Outcome Patient at Stable Baseline   Education   Education Yes - Pt. / Family has been Instructed in use of Respiratory Equipment;Yes - Pt. / Family has been Instructed in use of Respiratory Medications and Adverse Reactions   RT Assessment of Delivered Medications   Evaluation of Medication Delivery Daily Yes-- Pt /Family has been Instructed in use of Respiratory Medications and Adverse Reactions   SVN Group   #SVN Performed Yes   Given By: Mouthpiece   MDI/DPI Group   #MDI/DPI Given MDI/DPI x 2   Respiratory WDL   Respiratory (WDL) X   Chest Exam   Work Of Breathing / Effort Mild   Respiration (!) 22   Heart Rate (Monitored) 90   Breath Sounds   Pre/Post Intervention Pre Intervention Assessment   RUL Breath Sounds Clear;Diminished   RML Breath Sounds Clear;Diminished   RLL Breath Sounds Diminished   AUSTIN Breath Sounds Diminished   LLL Breath Sounds Diminished   Oxygen   O2 (LPM) 2  (already started TX, so I could'nt check OX)   O2 Daily Delivery Respiratory  Silicone Nasal Cannula

## 2019-01-25 NOTE — PROGRESS NOTES
Bedside report received from Adriana LEMUS. Assumed care. POC discussed. Pt resting comfortably in bed. Safety precautions in place.

## 2019-01-25 NOTE — PROGRESS NOTES
2 RN skin check completed with Vianca RN    Pressure points checked skin intact, non-reddened  Dry heels, scab noted on left hand

## 2019-01-25 NOTE — FLOWSHEET NOTE
01/25/19 0248   Interdisciplinary Plan of Care-Goals (Indications)   Bronchodilator Indications PFT / Reduced Airflow   Interdisciplinary Plan of Care-Outcomes    Bronchodilator Outcome Patient at Stable Baseline   RT Assessment of Delivered Medications   Evaluation of Medication Delivery Daily Yes-- Pt /Family has been Instructed in use of Respiratory Medications and Adverse Reactions   SVN Group   #SVN Performed Yes   Given By: Mouthpiece   Respiratory WDL   Respiratory (WDL) X   Breath Sounds   Pre/Post Intervention Pre Intervention Assessment   RUL Breath Sounds Diminished   RML Breath Sounds Diminished   RLL Breath Sounds Diminished   AUSTIN Breath Sounds Diminished   LLL Breath Sounds Diminished   Secretions   Cough Moist;Non Productive   How Sputum Obtained Spontaneous   Oximetry   #Pulse Oximetry (Single Determination) Yes   Oxygen   Pulse Oximetry 96 %   O2 (LPM) 2   O2 Daily Delivery Respiratory  Silicone Nasal Cannula

## 2019-01-25 NOTE — PROGRESS NOTES
1200--MD rounded at bedside. Had long discussion regarding POC and code status. Pt provided with POLST, would like to think about it and go over it tomorrow with MD. Pt to have kyphoplasty on Monday after discontinuing her Xarelto. Pt agreeable to transfer to [facility] this weekend for procedure. Pt weaned off of O2, ambulated hallway with CNA. SpO2 down to 88% at the lowest on room air. Pt tolerated well.   1245--Due meds given. IVF infusing. Medicated for pain per MAR. No other needs at this time.

## 2019-01-25 NOTE — FLOWSHEET NOTE
01/24/19 2221   Interdisciplinary Plan of Care-Goals (Indications)   Bronchodilator Indications PFT / Reduced Airflow   Interdisciplinary Plan of Care-Outcomes    Bronchodilator Outcome Patient at Stable Baseline   RT Assessment of Delivered Medications   Evaluation of Medication Delivery Daily Yes-- Pt /Family has been Instructed in use of Respiratory Medications and Adverse Reactions   SVN Group   #SVN Performed Yes   Given By: Mouthpiece   Respiratory WDL   Respiratory (WDL) X   Chest Exam   Respiration 16   Pulse 100   Breath Sounds   Pre/Post Intervention Post Intervention Assessment   RUL Breath Sounds Clear   RML Breath Sounds Diminished   RLL Breath Sounds Diminished   AUSTIN Breath Sounds Clear   LLL Breath Sounds Diminished;Fine Crackles   Oximetry   #Pulse Oximetry (Single Determination) Yes   Oxygen   Pulse Oximetry 94 %   O2 (LPM) 2   O2 Daily Delivery Respiratory  Silicone Nasal Cannula

## 2019-01-25 NOTE — FLOWSHEET NOTE
01/25/19 0655   Events/Summary/Plan   Non-Invasive Resp Device Site Inspection Completed Intact   Interdisciplinary Plan of Care-Goals (Indications)   Bronchodilator Indications PFT / Reduced Airflow   Interdisciplinary Plan of Care-Outcomes    Bronchodilator Outcome Patient at Stable Baseline   Education   Education Yes - Pt. / Family has been Instructed in use of Respiratory Equipment;Yes - Pt. / Family has been Instructed in use of Respiratory Medications and Adverse Reactions   RT Assessment of Delivered Medications   Evaluation of Medication Delivery Daily Yes-- Pt /Family has been Instructed in use of Respiratory Medications and Adverse Reactions   SVN Group   #SVN Performed Yes   Given By: Mouthpiece   MDI/DPI Group   #MDI/DPI Given MDI/DPI x 2   Incentive Spirometry Group   Incentive Spirometry Instruction Yes   Breathing Exercises Yes   Incentive Spirometer Volume 2000 mL   Incentive Spirometer Date Last Changed 01/25/19   Incentive Spirometer Next Change Date (Q 30 Days) 02/25/19   Respiratory WDL   Respiratory (WDL) X   Chest Exam   Work Of Breathing / Effort Shallow   Respiration 18   Heart Rate (Monitored) 88   Breath Sounds   Pre/Post Intervention Pre Intervention Assessment   RUL Breath Sounds Diminished;Clear   RML Breath Sounds Diminished   RLL Breath Sounds Diminished   AUSTIN Breath Sounds Diminished;Clear   LLL Breath Sounds Diminished   Oximetry   #Pulse Oximetry (Single Determination) Yes   Oxygen   Pulse Oximetry 94 %   O2 (LPM) 2   O2 Daily Delivery Respiratory  Silicone Nasal Cannula

## 2019-01-25 NOTE — PROGRESS NOTES
"Assessment completed. Pt AAOx4, c/o back pain 6-7/10. Pt denies interventions for pain including heat packs, repositioning, new pain medications, etc. States \"I don't want to get involved in pain pills. And I can't take Ibuprofen.\" RN educated on other options such as tramadol or lidocaine patches, pt declines still. Will f/u with MD regarding pain management. BP noted to be low compared to baseline, pt denies dizziness or other hypotensive symptoms. No fever, WBC, elevated HR. Pt EOB for breakfast. Declines needs at this time. Call light in reach.  "

## 2019-01-26 LAB
ALBUMIN SERPL BCP-MCNC: 3.1 G/DL (ref 3.2–4.9)
ALBUMIN/GLOB SERPL: 1.1 G/DL
ALP SERPL-CCNC: 71 U/L (ref 30–99)
ALT SERPL-CCNC: 16 U/L (ref 2–50)
ANION GAP SERPL CALC-SCNC: 7 MMOL/L (ref 0–11.9)
APTT PPP: 78.7 SEC (ref 24.7–36)
AST SERPL-CCNC: 17 U/L (ref 12–45)
BASOPHILS # BLD AUTO: 0.1 % (ref 0–1.8)
BASOPHILS # BLD: 0.01 K/UL (ref 0–0.12)
BILIRUB SERPL-MCNC: 0.5 MG/DL (ref 0.1–1.5)
BUN SERPL-MCNC: 41 MG/DL (ref 8–22)
CALCIUM SERPL-MCNC: 9.5 MG/DL (ref 8.4–10.2)
CHLORIDE SERPL-SCNC: 109 MMOL/L (ref 96–112)
CO2 SERPL-SCNC: 22 MMOL/L (ref 20–33)
CREAT SERPL-MCNC: 1.38 MG/DL (ref 0.5–1.4)
EOSINOPHIL # BLD AUTO: 0 K/UL (ref 0–0.51)
EOSINOPHIL NFR BLD: 0 % (ref 0–6.9)
ERYTHROCYTE [DISTWIDTH] IN BLOOD BY AUTOMATED COUNT: 43.5 FL (ref 35.9–50)
GLOBULIN SER CALC-MCNC: 2.7 G/DL (ref 1.9–3.5)
GLUCOSE SERPL-MCNC: 166 MG/DL (ref 65–99)
HCT VFR BLD AUTO: 37.4 % (ref 37–47)
HGB BLD-MCNC: 12.5 G/DL (ref 12–16)
IMM GRANULOCYTES # BLD AUTO: 0.08 K/UL (ref 0–0.11)
IMM GRANULOCYTES NFR BLD AUTO: 0.6 % (ref 0–0.9)
LYMPHOCYTES # BLD AUTO: 0.94 K/UL (ref 1–4.8)
LYMPHOCYTES NFR BLD: 7.1 % (ref 22–41)
MCH RBC QN AUTO: 32.6 PG (ref 27–33)
MCHC RBC AUTO-ENTMCNC: 33.4 G/DL (ref 33.6–35)
MCV RBC AUTO: 97.4 FL (ref 81.4–97.8)
MONOCYTES # BLD AUTO: 0.53 K/UL (ref 0–0.85)
MONOCYTES NFR BLD AUTO: 4 % (ref 0–13.4)
NEUTROPHILS # BLD AUTO: 11.72 K/UL (ref 2–7.15)
NEUTROPHILS NFR BLD: 88.2 % (ref 44–72)
NRBC # BLD AUTO: 0 K/UL
NRBC BLD-RTO: 0 /100 WBC
PLATELET # BLD AUTO: 180 K/UL (ref 164–446)
PMV BLD AUTO: 10.6 FL (ref 9–12.9)
POTASSIUM SERPL-SCNC: 4.2 MMOL/L (ref 3.6–5.5)
PROT SERPL-MCNC: 5.8 G/DL (ref 6–8.2)
RBC # BLD AUTO: 3.84 M/UL (ref 4.2–5.4)
SODIUM SERPL-SCNC: 138 MMOL/L (ref 135–145)
WBC # BLD AUTO: 13.3 K/UL (ref 4.8–10.8)

## 2019-01-26 PROCEDURE — 700101 HCHG RX REV CODE 250: Performed by: HOSPITALIST

## 2019-01-26 PROCEDURE — 700102 HCHG RX REV CODE 250 W/ 637 OVERRIDE(OP): Performed by: HOSPITALIST

## 2019-01-26 PROCEDURE — A9270 NON-COVERED ITEM OR SERVICE: HCPCS | Performed by: HOSPITALIST

## 2019-01-26 PROCEDURE — 85730 THROMBOPLASTIN TIME PARTIAL: CPT

## 2019-01-26 PROCEDURE — 700105 HCHG RX REV CODE 258: Performed by: HOSPITALIST

## 2019-01-26 PROCEDURE — 700112 HCHG RX REV CODE 229: Performed by: HOSPITALIST

## 2019-01-26 PROCEDURE — 85025 COMPLETE CBC W/AUTO DIFF WBC: CPT

## 2019-01-26 PROCEDURE — 700111 HCHG RX REV CODE 636 W/ 250 OVERRIDE (IP): Performed by: HOSPITALIST

## 2019-01-26 PROCEDURE — 94640 AIRWAY INHALATION TREATMENT: CPT

## 2019-01-26 PROCEDURE — 94760 N-INVAS EAR/PLS OXIMETRY 1: CPT

## 2019-01-26 PROCEDURE — 770006 HCHG ROOM/CARE - MED/SURG/GYN SEMI*

## 2019-01-26 PROCEDURE — 80053 COMPREHEN METABOLIC PANEL: CPT

## 2019-01-26 PROCEDURE — 99233 SBSQ HOSP IP/OBS HIGH 50: CPT | Mod: 25 | Performed by: HOSPITALIST

## 2019-01-26 PROCEDURE — 99497 ADVNCD CARE PLAN 30 MIN: CPT | Performed by: HOSPITALIST

## 2019-01-26 RX ORDER — IPRATROPIUM BROMIDE AND ALBUTEROL SULFATE 2.5; .5 MG/3ML; MG/3ML
3 SOLUTION RESPIRATORY (INHALATION)
Status: DISCONTINUED | OUTPATIENT
Start: 2019-01-26 | End: 2019-01-27 | Stop reason: HOSPADM

## 2019-01-26 RX ORDER — FLUTICASONE PROPIONATE 50 MCG
2 SPRAY, SUSPENSION (ML) NASAL DAILY
Status: DISCONTINUED | OUTPATIENT
Start: 2019-01-26 | End: 2019-01-27 | Stop reason: HOSPADM

## 2019-01-26 RX ADMIN — DOCUSATE SODIUM AND SENNOSIDES 2 TABLET: 8.6; 5 TABLET, FILM COATED ORAL at 16:47

## 2019-01-26 RX ADMIN — METHYLPREDNISOLONE SODIUM SUCCINATE 40 MG: 40 INJECTION, POWDER, FOR SOLUTION INTRAMUSCULAR; INTRAVENOUS at 13:39

## 2019-01-26 RX ADMIN — ZOLPIDEM TARTRATE 10 MG: 5 TABLET ORAL at 00:07

## 2019-01-26 RX ADMIN — IPRATROPIUM BROMIDE AND ALBUTEROL SULFATE 3 ML: .5; 3 SOLUTION RESPIRATORY (INHALATION) at 19:07

## 2019-01-26 RX ADMIN — PROPRANOLOL HYDROCHLORIDE 60 MG: 60 CAPSULE, EXTENDED RELEASE ORAL at 06:09

## 2019-01-26 RX ADMIN — BUDESONIDE AND FORMOTEROL FUMARATE DIHYDRATE 2 PUFF: 160; 4.5 AEROSOL RESPIRATORY (INHALATION) at 06:29

## 2019-01-26 RX ADMIN — BRIMONIDINE TARTRATE AND TIMOLOL MALEATE 1 DROP: 2; 5 SOLUTION OPHTHALMIC at 16:48

## 2019-01-26 RX ADMIN — MAGNESIUM HYDROXIDE 30 ML: 400 SUSPENSION ORAL at 23:02

## 2019-01-26 RX ADMIN — METHYLPREDNISOLONE SODIUM SUCCINATE 40 MG: 40 INJECTION, POWDER, FOR SOLUTION INTRAMUSCULAR; INTRAVENOUS at 23:03

## 2019-01-26 RX ADMIN — IPRATROPIUM BROMIDE AND ALBUTEROL SULFATE 3 ML: .5; 3 SOLUTION RESPIRATORY (INHALATION) at 10:12

## 2019-01-26 RX ADMIN — DOXYCYCLINE 100 MG: 100 TABLET, FILM COATED ORAL at 16:48

## 2019-01-26 RX ADMIN — HEPARIN SODIUM 25000 UNITS: 5000 INJECTION, SOLUTION INTRAVENOUS at 16:49

## 2019-01-26 RX ADMIN — IPRATROPIUM BROMIDE AND ALBUTEROL SULFATE 3 ML: .5; 3 SOLUTION RESPIRATORY (INHALATION) at 06:29

## 2019-01-26 RX ADMIN — IPRATROPIUM BROMIDE AND ALBUTEROL SULFATE 3 ML: .5; 3 SOLUTION RESPIRATORY (INHALATION) at 14:40

## 2019-01-26 RX ADMIN — ACETAMINOPHEN 1000 MG: 500 TABLET, FILM COATED ORAL at 19:21

## 2019-01-26 RX ADMIN — DOXYCYCLINE 100 MG: 100 TABLET, FILM COATED ORAL at 06:09

## 2019-01-26 RX ADMIN — BENZONATATE 100 MG: 100 CAPSULE ORAL at 19:22

## 2019-01-26 RX ADMIN — TIOTROPIUM BROMIDE 1 CAPSULE: 18 CAPSULE ORAL; RESPIRATORY (INHALATION) at 06:29

## 2019-01-26 RX ADMIN — METHYLPREDNISOLONE SODIUM SUCCINATE 40 MG: 40 INJECTION, POWDER, FOR SOLUTION INTRAMUSCULAR; INTRAVENOUS at 06:08

## 2019-01-26 RX ADMIN — DOCUSATE SODIUM 100 MG: 100 CAPSULE, LIQUID FILLED ORAL at 06:09

## 2019-01-26 RX ADMIN — ZOLPIDEM TARTRATE 10 MG: 5 TABLET ORAL at 23:02

## 2019-01-26 RX ADMIN — FLUTICASONE PROPIONATE 100 MCG: 50 SPRAY, METERED NASAL at 13:39

## 2019-01-26 RX ADMIN — IPRATROPIUM BROMIDE AND ALBUTEROL SULFATE 3 ML: .5; 3 SOLUTION RESPIRATORY (INHALATION) at 02:20

## 2019-01-26 RX ADMIN — BRIMONIDINE TARTRATE AND TIMOLOL MALEATE 1 DROP: 2; 5 SOLUTION OPHTHALMIC at 06:10

## 2019-01-26 RX ADMIN — SODIUM CHLORIDE, POTASSIUM CHLORIDE, SODIUM LACTATE AND CALCIUM CHLORIDE: 600; 310; 30; 20 INJECTION, SOLUTION INTRAVENOUS at 19:22

## 2019-01-26 RX ADMIN — DOCUSATE SODIUM AND SENNOSIDES 2 TABLET: 8.6; 5 TABLET, FILM COATED ORAL at 06:09

## 2019-01-26 RX ADMIN — METHYLPREDNISOLONE SODIUM SUCCINATE 40 MG: 40 INJECTION, POWDER, FOR SOLUTION INTRAMUSCULAR; INTRAVENOUS at 16:48

## 2019-01-26 RX ADMIN — LOSARTAN POTASSIUM 50 MG: 25 TABLET, FILM COATED ORAL at 06:06

## 2019-01-26 RX ADMIN — SIMVASTATIN 40 MG: 20 TABLET, FILM COATED ORAL at 20:17

## 2019-01-26 RX ADMIN — BUDESONIDE AND FORMOTEROL FUMARATE DIHYDRATE 2 PUFF: 160; 4.5 AEROSOL RESPIRATORY (INHALATION) at 19:07

## 2019-01-26 ASSESSMENT — ENCOUNTER SYMPTOMS
NAUSEA: 0
WHEEZING: 1
VOMITING: 0
COUGH: 1
HEADACHES: 0
DEPRESSION: 0
ABDOMINAL PAIN: 0
EYE PAIN: 0
INSOMNIA: 0
DIZZINESS: 0
DIAPHORESIS: 1
PALPITATIONS: 0
COUGH: 0
FEVER: 0
NECK PAIN: 0
BACK PAIN: 1
TINGLING: 0
BLURRED VISION: 0
SORE THROAT: 0
SHORTNESS OF BREATH: 1
CHILLS: 0

## 2019-01-26 NOTE — FLOWSHEET NOTE
01/26/19 0629   Interdisciplinary Plan of Care-Goals (Indications)   Bronchodilator Indications History / Diagnosis   SVN Group   #SVN Performed Yes   Given By: Mouthpiece   MDI/DPI Group   #MDI/DPI Given MDI/DPI x 2   Incentive Spirometry Group   Incentive Spirometer Volume 1750 mL   Chest Exam   Respiration 18   Pulse 98   Breath Sounds   RUL Breath Sounds Clear   RML Breath Sounds Clear;Diminished   RLL Breath Sounds Clear;Diminished   AUSTIN Breath Sounds Clear   LLL Breath Sounds Clear;Diminished   Oxygen   Home O2 Use Prior To Admission? No   Pulse Oximetry 93 %   O2 (LPM) 0   O2 Daily Delivery Respiratory  Room Air with O2 Available

## 2019-01-26 NOTE — FLOWSHEET NOTE
01/25/19 2211   Interdisciplinary Plan of Care-Goals (Indications)   Bronchodilator Indications History / Diagnosis   Interdisciplinary Plan of Care-Outcomes    Bronchodilator Outcome Patient at Stable Baseline   Education   Education Yes - Pt. / Family has been Instructed in use of Respiratory Equipment;Yes - Pt. / Family has been Instructed in use of Respiratory Medications and Adverse Reactions   RT Assessment of Delivered Medications   Evaluation of Medication Delivery Daily Yes-- Pt /Family has been Instructed in use of Respiratory Medications and Adverse Reactions   SVN Group   #SVN Performed Yes   Given By: Mouthpiece   Chest Exam   Respiration 18   Pulse 88   Breath Sounds   Pre/Post Intervention Pre Intervention Assessment   RUL Breath Sounds Diminished   AUSTIN Breath Sounds Diminished   Secretions   Cough Dry   Oximetry   #Pulse Oximetry (Single Determination) Yes   Oxygen   Pulse Oximetry 94 %   O2 Daily Delivery Respiratory  Room Air with O2 Available

## 2019-01-26 NOTE — FLOWSHEET NOTE
01/26/19 0220   Interdisciplinary Plan of Care-Goals (Indications)   Bronchodilator Indications History / Diagnosis   Interdisciplinary Plan of Care-Outcomes    Bronchodilator Outcome Diminished Wheezing and Volume of Air Movement Increased   Education   Education Yes - Pt. / Family has been Instructed in use of Respiratory Equipment;Yes - Pt. / Family has been Instructed in use of Respiratory Medications and Adverse Reactions   RT Assessment of Delivered Medications   Evaluation of Medication Delivery Daily Yes-- Pt /Family has been Instructed in use of Respiratory Medications and Adverse Reactions   SVN Group   #SVN Performed Yes   Given By: Mouthpiece   Respiratory WDL   Respiratory (WDL) X   Chest Exam   Respiration 18   Pulse 90   Breath Sounds   Pre/Post Intervention Pre Intervention Assessment   RUL Breath Sounds Diminished  (wheezy cough)   AUSITN Breath Sounds Diminished   Secretions   Cough Dry   Oximetry   #Pulse Oximetry (Single Determination) Yes   Oxygen   Pulse Oximetry 92 %   O2 Daily Delivery Respiratory  Room Air with O2 Available

## 2019-01-26 NOTE — PROGRESS NOTES
Heparin initiated per orders. Baseline aptt and PT obtained. Pt medicated for cough per MAR. Provided with hot tea for cough. Pt ambulated hallway x2 today and tolerated well. 2 new IV's placed for LR and heparin infusions. APTT ordered for 2230. No other needs at this time.

## 2019-01-26 NOTE — PROGRESS NOTES
Assessment complete, pt up to bathroom and back to bed. Discussed POC, [facility] to [facility] and upcoming surgery, allowed time to ask questions. Pt resting in bed, RR even and unlabored, no longer on O2. Pt educated to call for assistance. Declines needs at this time. Safety precautions in place.    2235 pt sitting up in bed using incentive spirometer. Declines needs at this time.    0000 Heparin verified, no change needed. Explained need for blood test (aptt), pt verbalized understanding.    0240 pt up to bathroom and back to bed after RT.     0610 pt medicated per MAR. Lab was in room prior to medications. Waiting for aptt. Declines needs at this time.

## 2019-01-26 NOTE — FLOWSHEET NOTE
01/26/19 1012   RT Assessment of Delivered Medications   Evaluation of Medication Delivery Daily Yes-- Pt /Family has been Instructed in use of Respiratory Medications and Adverse Reactions   SVN Group   #SVN Performed Yes   Given By: Mouthpiece   Chest Exam   Respiration 18   Pulse 100   Breath Sounds   RUL Breath Sounds Clear   RML Breath Sounds Clear;Diminished   RLL Breath Sounds Clear;Diminished   AUSTIN Breath Sounds Clear   LLL Breath Sounds Clear;Diminished   Oxygen   Pulse Oximetry 98 %   O2 (LPM) 0   O2 Daily Delivery Respiratory  Room Air with O2 Available

## 2019-01-26 NOTE — FLOWSHEET NOTE
01/25/19 1831   Interdisciplinary Plan of Care-Goals (Indications)   Bronchodilator Indications History / Diagnosis   Interdisciplinary Plan of Care-Outcomes    Bronchodilator Outcome Patient at Stable Baseline   Education   Education Yes - Pt. / Family has been Instructed in use of Respiratory Equipment;Yes - Pt. / Family has been Instructed in use of Respiratory Medications and Adverse Reactions   RT Assessment of Delivered Medications   Evaluation of Medication Delivery Daily Yes-- Pt /Family has been Instructed in use of Respiratory Medications and Adverse Reactions   SVN Group   #SVN Performed Yes   Given By: Mouthpiece   MDI/DPI Group   #MDI/DPI Given MDI/DPI x 1   Chest Exam   Respiration 19   Pulse 88   Breath Sounds   Pre/Post Intervention Pre Intervention Assessment   RUL Breath Sounds Clear;Diminished   AUSTIN Breath Sounds Diminished;Clear   Secretions   Cough Dry   Oximetry   #Pulse Oximetry (Single Determination) Yes   Oxygen   Pulse Oximetry 92 %   O2 (LPM) 0   O2 Daily Delivery Respiratory  Room Air with O2 Available

## 2019-01-26 NOTE — PROGRESS NOTES
Bedside report received from Vianca LEMUS. Pt resting in bed, RR even and unlabored. Heparin drip infusing and verified. Safety precautions in place, call light within reach.

## 2019-01-26 NOTE — ASSESSMENT & PLAN NOTE
I have reviewed her biopsy and path results. She has had a resection. No obvious recurrence or pathalogic fracture seen for now. She follows with oncology.

## 2019-01-26 NOTE — PROGRESS NOTES
[facility] Medicine Daily Progress Note    Date of Service  1/26/2019    Chief Complaint  68 y.o. female admitted 1/24/2019 with cough, dyspnea and back pain.     Hospital Course    She has been admitted with evidence for a COPD exacerbation and a new compression fracture of L2.        Interval Problem Update  1/25- Her breathing is not improved nor is her back pain. We discussed these in detail. Respiratory therapy is doing treatments on her. We discussed a possible kyphoplasty. She has had them in the past with her neurosurgeon but does not want to wait to seen him for this one given her pain. I called and discussed her MRi findings today with interventional radiology. They wlil be willing to do her procedure here. She unfortunately needs to be off of anticoagulation. I stopped her xarelto and started her on a heparin drip today and we are planning on a transfer to [facility] on sunday for a planned kyphoplasty on Monday.   1/26- breathing is a little better. Pain is not really better. No other events over night.       CT  1.  New compression deformity at the L2 vertebral body with approximately 30-40% loss of height.  2.  Stable compression deformity of the L1 vertebral body.  3.  Osteopenia.  4.  Levoscoliosis.  5.  Minimal anterolisthesis at L3-4.  Stable hyperinflation, cardiac silhouette enlargement and pulmonary arterial hypertension     MRI  1.  Acute compression fracture of L2 with concave deformity of inferior endplate and moderate loss of height, as seen on recent CT lumbar spine.  2.  Chronic moderate compression of L1 and T11.  3.  No significant central canal narrowing.    I reviewed her previous pathology   FINAL DIAGNOSIS:  A. 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    Consultants/Specialty  IR as above.     Code Status  DNR    Advanced care planning- in addition to 36min e/m time we spent an additional 17min today from 8 to 817am finishing our discussion about her  POLST form, DNR, feedings, level of treatment were discussed.. We filled out a POLST and signed it together. DNR orders were placed into the chart.     Disposition  Home maybe once improved. Possible snf not clear.     Review of Systems  Review of Systems   Constitutional: Negative for chills and fever.   HENT: Negative for sore throat.    Eyes: Negative for blurred vision and pain.   Respiratory: Positive for shortness of breath and wheezing. Negative for cough.    Cardiovascular: Negative for chest pain and palpitations.   Gastrointestinal: Negative for abdominal pain, nausea and vomiting.   Genitourinary: Negative for dysuria and urgency.   Musculoskeletal: Positive for back pain. Negative for neck pain.   Skin: Negative for itching and rash.   Neurological: Negative for dizziness, tingling and headaches.   Psychiatric/Behavioral: Negative for depression. The patient does not have insomnia.    All other systems reviewed and are negative.       Physical Exam  Temp:  [36.4 °C (97.6 °F)-36.8 °C (98.2 °F)] 36.6 °C (97.8 °F)  Pulse:  [] 85  Resp:  [18-20] 18  BP: (100-140)/(63-82) 116/68  SpO2:  [90 %-94 %] 92 %    Physical Exam   Constitutional: She is oriented to person, place, and time. She appears well-developed and well-nourished. No distress.   Patient seen and examined  Discussed plan with ALLAN NEVEST:   Right Ear: External ear normal.   Left Ear: External ear normal.   Nose: Nose normal.   Eyes: Conjunctivae are normal. Right eye exhibits no discharge. Left eye exhibits no discharge.   Neck: No JVD present.   Cardiovascular: Regular rhythm and normal heart sounds.    No murmur heard.  Cap refill 2sec  Pulses 2+ throughout     Pulmonary/Chest: Effort normal. No stridor. No respiratory distress. She has wheezes. She has rales.   Abdominal: Soft. Bowel sounds are normal. She exhibits no distension. There is no tenderness.   Musculoskeletal: She exhibits no edema or tenderness.   Neurological: She is alert and  oriented to person, place, and time.   Skin: Skin is warm and dry. She is not diaphoretic. No erythema. There is pallor.   Normal skin  Color.    Psychiatric: She has a normal mood and affect. Her behavior is normal.   Nursing note and vitals reviewed.      Fluids    Intake/Output Summary (Last 24 hours) at 01/26/19 0856  Last data filed at 01/26/19 0800   Gross per 24 hour   Intake          2553.25 ml   Output                0 ml   Net          2553.25 ml       Laboratory  Recent Labs      01/24/19   1250  01/25/19   0405  01/26/19   0603   WBC  7.3  5.8  13.3*   RBC  4.49  4.20  3.84*   HEMOGLOBIN  15.2  14.0  12.5   HEMATOCRIT  43.4  42.2  37.4   MCV  96.7  100.5*  97.4   MCH  33.9*  33.3*  32.6   MCHC  35.0  33.2*  33.4*   RDW  42.6  44.7  43.5   PLATELETCT  202  173  180   MPV  11.2  11.2  10.6     Recent Labs      01/24/19   1250  01/25/19   0405  01/26/19   0603   SODIUM  138  136  138   POTASSIUM  3.8  3.9  4.2   CHLORIDE  106  107  109   CO2  22  22  22   GLUCOSE  124*  181*  166*   BUN  22  31*  41*   CREATININE  1.18  1.52*  1.38   CALCIUM  9.7  9.5  9.5     Recent Labs      01/25/19   1519  01/25/19   2238  01/26/19   0603   APTT  31.9  93.8*  78.7*   INR  1.17*   --    --      Recent Labs      01/24/19   1250   BNPBTYPENAT  66           Imaging  MR-LUMBAR SPINE-W/O   Final Result      1.  Acute compression fracture of L2 with concave deformity of inferior endplate and moderate loss of height, as seen on recent CT lumbar spine.   2.  Chronic moderate compression of L1 and T11.   3.  No significant central canal narrowing.      CT-LSPINE W/O PLUS RECONS   Final Result      1.  New compression deformity at the L2 vertebral body with approximately 30-40% loss of height.      2.  Stable compression deformity of the L1 vertebral body.      3.  Osteopenia.      4.  Levoscoliosis.      5.  Minimal anterolisthesis at L3-4.      DX-CHEST-PORTABLE (1 VIEW)   Final Result      Stable hyperinflation, cardiac  silhouette enlargement and pulmonary arterial hypertension           Assessment/Plan  Acute exacerbation of chronic obstructive pulmonary disease (COPD) ([facility])- (present on admission)   Assessment & Plan    Not improved  Prolonged exacerbation failing outpatient therapy with oral azithromycin and oral prednisone.  Copd exacerbation order set ongoing .  Continue inhaled steroid, DuoNeb treatments every 4 hours.  RT protocol.  Empiric antibiotics given intractable nature of her symptoms including ceftriaxone IV and oral doxycycline.  IV methylprednisolone 40 mg every 8 hours.  Supplemental oxygen as needed.  COPD education.     Acute respiratory failure with hypoxia ([facility])- (present on admission)   Assessment & Plan    87% on room air at the time of admission.  she has a concentrator at home but has not needed to use it yet.  Treat COPD exacerbation.  Steroids  RT protocol     Closed wedge compression fracture of first lumbar vertebra ([facility])- (present on admission)   Assessment & Plan    MRI of spine and my discussion with Dr Summers in  shows she is a good candidate for a kyphoplasty procedure.  Treat COPD exacerbation first then will set her up for a monday kyphoplasty.  She will be bridged with a heparin drip while her Xarelto is held.  Patient has had thrombotic event when taken off of anticoagulation for procedures in the past and needs to be bridged.  Plan on transfer tomorrow .     Lung cancer ([facility])- (present on admission)   Assessment & Plan    I have reviewed her biopsy and path results. She has had a resection. No obvious recurrence or pathalogic fracture seen for now. She follows with oncology.      DVT (deep venous thrombosis) ([facility])- (present on admission)   Assessment & Plan    6 years ago.  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  Stop Xarelto.  Continue heparin gtt for planned kypho     Rheumatoid arthritis ([facility])- (present on admission)    Assessment & Plan    ?treatment. Unclear right now. Not on any. No obvious flare.     Osteoporosis- (present on admission)   Assessment & Plan    Patient was previously on Prolia injections and has been off them secondary to complicating health problems.  Recommend that she establish with a new primary care provider and investigate resuming these.  Due to her history of renal failure she is not able to be on bisphosphonates.     CKD (chronic kidney disease) stage 3, GFR 30-59 ml/min ([facility])- (present on admission)   Assessment & Plan    Stable at baseline.  History of prior dialysis.  Avoid nephrotoxins.     CAD (coronary artery disease)- (present on admission)   Assessment & Plan    No flare. Trend on tele. Continue meds.      HTN (hypertension)- (present on admission)   Assessment & Plan    Continue losartan and propranolol.     Hyperlipidemia- (present on admission)   Assessment & Plan    statin          VTE prophylaxis: heparin drip

## 2019-01-26 NOTE — PROGRESS NOTES
Bedside report given to Adriana LEMUS. POC discussed. Pt resting comfortably in bed. Safety precautions in place.

## 2019-01-27 ENCOUNTER — HOSPITAL ENCOUNTER (INPATIENT)
Facility: MEDICAL CENTER | Age: 69
LOS: 3 days | DRG: 981 | End: 2019-01-30
Attending: HOSPITALIST | Admitting: HOSPITALIST
Payer: MEDICARE

## 2019-01-27 ENCOUNTER — HOSPITAL ENCOUNTER (OUTPATIENT)
Facility: MEDICAL CENTER | Age: 69
End: 2019-01-27
Payer: MEDICARE

## 2019-01-27 VITALS
BODY MASS INDEX: 26.51 KG/M2 | HEIGHT: 67 IN | DIASTOLIC BLOOD PRESSURE: 81 MMHG | RESPIRATION RATE: 18 BRPM | SYSTOLIC BLOOD PRESSURE: 144 MMHG | OXYGEN SATURATION: 93 % | WEIGHT: 168.87 LBS | HEART RATE: 80 BPM | TEMPERATURE: 97.5 F

## 2019-01-27 DIAGNOSIS — F51.01 PRIMARY INSOMNIA: ICD-10-CM

## 2019-01-27 LAB
ANION GAP SERPL CALC-SCNC: 6 MMOL/L (ref 0–11.9)
APTT PPP: 24.5 SEC (ref 24.7–36)
APTT PPP: 58.7 SEC (ref 24.7–36)
BASOPHILS # BLD AUTO: 0.1 % (ref 0–1.8)
BASOPHILS # BLD: 0.01 K/UL (ref 0–0.12)
BUN SERPL-MCNC: 37 MG/DL (ref 8–22)
CALCIUM SERPL-MCNC: 9.3 MG/DL (ref 8.4–10.2)
CHLORIDE SERPL-SCNC: 111 MMOL/L (ref 96–112)
CO2 SERPL-SCNC: 22 MMOL/L (ref 20–33)
CREAT SERPL-MCNC: 1.17 MG/DL (ref 0.5–1.4)
EKG IMPRESSION: NORMAL
EOSINOPHIL # BLD AUTO: 0.01 K/UL (ref 0–0.51)
EOSINOPHIL NFR BLD: 0.1 % (ref 0–6.9)
ERYTHROCYTE [DISTWIDTH] IN BLOOD BY AUTOMATED COUNT: 45 FL (ref 35.9–50)
GLUCOSE SERPL-MCNC: 134 MG/DL (ref 65–99)
HCT VFR BLD AUTO: 37.7 % (ref 37–47)
HGB BLD-MCNC: 12.6 G/DL (ref 12–16)
IMM GRANULOCYTES # BLD AUTO: 0.08 K/UL (ref 0–0.11)
IMM GRANULOCYTES NFR BLD AUTO: 0.8 % (ref 0–0.9)
LYMPHOCYTES # BLD AUTO: 1 K/UL (ref 1–4.8)
LYMPHOCYTES NFR BLD: 10.1 % (ref 22–41)
MCH RBC QN AUTO: 32.8 PG (ref 27–33)
MCHC RBC AUTO-ENTMCNC: 33.4 G/DL (ref 33.6–35)
MCV RBC AUTO: 98.2 FL (ref 81.4–97.8)
MONOCYTES # BLD AUTO: 0.42 K/UL (ref 0–0.85)
MONOCYTES NFR BLD AUTO: 4.2 % (ref 0–13.4)
NEUTROPHILS # BLD AUTO: 8.37 K/UL (ref 2–7.15)
NEUTROPHILS NFR BLD: 84.7 % (ref 44–72)
NRBC # BLD AUTO: 0 K/UL
NRBC BLD-RTO: 0 /100 WBC
PLATELET # BLD AUTO: 178 K/UL (ref 164–446)
PMV BLD AUTO: 11 FL (ref 9–12.9)
POTASSIUM SERPL-SCNC: 4.3 MMOL/L (ref 3.6–5.5)
RBC # BLD AUTO: 3.84 M/UL (ref 4.2–5.4)
SODIUM SERPL-SCNC: 139 MMOL/L (ref 135–145)
WBC # BLD AUTO: 9.9 K/UL (ref 4.8–10.8)

## 2019-01-27 PROCEDURE — 700102 HCHG RX REV CODE 250 W/ 637 OVERRIDE(OP): Performed by: HOSPITALIST

## 2019-01-27 PROCEDURE — A9270 NON-COVERED ITEM OR SERVICE: HCPCS | Performed by: HOSPITALIST

## 2019-01-27 PROCEDURE — 85730 THROMBOPLASTIN TIME PARTIAL: CPT | Mod: 91

## 2019-01-27 PROCEDURE — 80048 BASIC METABOLIC PNL TOTAL CA: CPT

## 2019-01-27 PROCEDURE — 93005 ELECTROCARDIOGRAM TRACING: CPT | Performed by: HOSPITALIST

## 2019-01-27 PROCEDURE — 700111 HCHG RX REV CODE 636 W/ 250 OVERRIDE (IP): Performed by: HOSPITALIST

## 2019-01-27 PROCEDURE — 700101 HCHG RX REV CODE 250: Performed by: HOSPITALIST

## 2019-01-27 PROCEDURE — 770006 HCHG ROOM/CARE - MED/SURG/GYN SEMI*

## 2019-01-27 PROCEDURE — 700101 HCHG RX REV CODE 250

## 2019-01-27 PROCEDURE — 700112 HCHG RX REV CODE 229: Performed by: HOSPITALIST

## 2019-01-27 PROCEDURE — 94760 N-INVAS EAR/PLS OXIMETRY 1: CPT

## 2019-01-27 PROCEDURE — 85730 THROMBOPLASTIN TIME PARTIAL: CPT

## 2019-01-27 PROCEDURE — 36415 COLL VENOUS BLD VENIPUNCTURE: CPT

## 2019-01-27 PROCEDURE — 93010 ELECTROCARDIOGRAM REPORT: CPT | Performed by: INTERNAL MEDICINE

## 2019-01-27 PROCEDURE — 700105 HCHG RX REV CODE 258: Performed by: HOSPITALIST

## 2019-01-27 PROCEDURE — 94640 AIRWAY INHALATION TREATMENT: CPT

## 2019-01-27 PROCEDURE — 99223 1ST HOSP IP/OBS HIGH 75: CPT | Mod: AI | Performed by: HOSPITALIST

## 2019-01-27 PROCEDURE — 85025 COMPLETE CBC W/AUTO DIFF WBC: CPT

## 2019-01-27 RX ORDER — FLUTICASONE PROPIONATE 50 MCG
2 SPRAY, SUSPENSION (ML) NASAL DAILY
Status: CANCELLED | OUTPATIENT
Start: 2019-01-28

## 2019-01-27 RX ORDER — IPRATROPIUM BROMIDE AND ALBUTEROL SULFATE 2.5; .5 MG/3ML; MG/3ML
SOLUTION RESPIRATORY (INHALATION)
Status: COMPLETED
Start: 2019-01-27 | End: 2019-01-27

## 2019-01-27 RX ORDER — OXYCODONE HYDROCHLORIDE 5 MG/1
5-10 TABLET ORAL EVERY 4 HOURS PRN
Status: DISCONTINUED | OUTPATIENT
Start: 2019-01-27 | End: 2019-01-30 | Stop reason: HOSPADM

## 2019-01-27 RX ORDER — METHYLPREDNISOLONE SODIUM SUCCINATE 40 MG/ML
40 INJECTION, POWDER, LYOPHILIZED, FOR SOLUTION INTRAMUSCULAR; INTRAVENOUS EVERY 6 HOURS
Status: CANCELLED | OUTPATIENT
Start: 2019-01-27 | End: 2019-01-27

## 2019-01-27 RX ORDER — BISACODYL 10 MG
10 SUPPOSITORY, RECTAL RECTAL
Status: CANCELLED | OUTPATIENT
Start: 2019-01-27

## 2019-01-27 RX ORDER — ACETAMINOPHEN 500 MG
1000 TABLET ORAL EVERY 6 HOURS PRN
Status: CANCELLED | OUTPATIENT
Start: 2019-01-27

## 2019-01-27 RX ORDER — PROPRANOLOL HCL 60 MG
60 CAPSULE, EXTENDED RELEASE 24HR ORAL EVERY MORNING
Status: DISCONTINUED | OUTPATIENT
Start: 2019-01-28 | End: 2019-01-30 | Stop reason: HOSPADM

## 2019-01-27 RX ORDER — IPRATROPIUM BROMIDE AND ALBUTEROL SULFATE 2.5; .5 MG/3ML; MG/3ML
3 SOLUTION RESPIRATORY (INHALATION)
Status: DISCONTINUED | OUTPATIENT
Start: 2019-01-27 | End: 2019-01-30

## 2019-01-27 RX ORDER — ZOLPIDEM TARTRATE 5 MG/1
10 TABLET ORAL NIGHTLY PRN
Status: DISCONTINUED | OUTPATIENT
Start: 2019-01-27 | End: 2019-01-28

## 2019-01-27 RX ORDER — IPRATROPIUM BROMIDE AND ALBUTEROL SULFATE 2.5; .5 MG/3ML; MG/3ML
3 SOLUTION RESPIRATORY (INHALATION)
Status: CANCELLED | OUTPATIENT
Start: 2019-01-27

## 2019-01-27 RX ORDER — BENZONATATE 100 MG/1
100 CAPSULE ORAL 3 TIMES DAILY PRN
Status: CANCELLED | OUTPATIENT
Start: 2019-01-27

## 2019-01-27 RX ORDER — ACETAMINOPHEN 500 MG
1000 TABLET ORAL EVERY 6 HOURS PRN
Status: DISCONTINUED | OUTPATIENT
Start: 2019-01-27 | End: 2019-01-30 | Stop reason: HOSPADM

## 2019-01-27 RX ORDER — IPRATROPIUM BROMIDE AND ALBUTEROL SULFATE 2.5; .5 MG/3ML; MG/3ML
3 SOLUTION RESPIRATORY (INHALATION)
Status: DISCONTINUED | OUTPATIENT
Start: 2019-01-27 | End: 2019-01-27

## 2019-01-27 RX ORDER — DOXYCYCLINE 100 MG/1
100 TABLET ORAL EVERY 12 HOURS
Status: COMPLETED | OUTPATIENT
Start: 2019-01-27 | End: 2019-01-29

## 2019-01-27 RX ORDER — BUDESONIDE AND FORMOTEROL FUMARATE DIHYDRATE 160; 4.5 UG/1; UG/1
2 AEROSOL RESPIRATORY (INHALATION)
Status: DISCONTINUED | OUTPATIENT
Start: 2019-01-27 | End: 2019-01-30 | Stop reason: HOSPADM

## 2019-01-27 RX ORDER — AMOXICILLIN 250 MG
2 CAPSULE ORAL 2 TIMES DAILY
Status: DISCONTINUED | OUTPATIENT
Start: 2019-01-27 | End: 2019-01-30 | Stop reason: HOSPADM

## 2019-01-27 RX ORDER — LOSARTAN POTASSIUM 25 MG/1
50 TABLET ORAL EVERY MORNING
Status: CANCELLED | OUTPATIENT
Start: 2019-01-28

## 2019-01-27 RX ORDER — LOSARTAN POTASSIUM 50 MG/1
50 TABLET ORAL EVERY MORNING
Status: DISCONTINUED | OUTPATIENT
Start: 2019-01-28 | End: 2019-01-30 | Stop reason: HOSPADM

## 2019-01-27 RX ORDER — FLUTICASONE PROPIONATE 50 MCG
2 SPRAY, SUSPENSION (ML) NASAL DAILY
Status: DISCONTINUED | OUTPATIENT
Start: 2019-01-28 | End: 2019-01-30 | Stop reason: HOSPADM

## 2019-01-27 RX ORDER — SIMVASTATIN 20 MG
40 TABLET ORAL NIGHTLY
Status: CANCELLED | OUTPATIENT
Start: 2019-01-27

## 2019-01-27 RX ORDER — DOCUSATE SODIUM 100 MG/1
100 CAPSULE, LIQUID FILLED ORAL 2 TIMES DAILY
Status: DISCONTINUED | OUTPATIENT
Start: 2019-01-27 | End: 2019-01-30 | Stop reason: HOSPADM

## 2019-01-27 RX ORDER — BRIMONIDINE TARTRATE AND TIMOLOL MALEATE 2; 5 MG/ML; MG/ML
1 SOLUTION OPHTHALMIC 2 TIMES DAILY
Status: DISCONTINUED | OUTPATIENT
Start: 2019-01-27 | End: 2019-01-30 | Stop reason: HOSPADM

## 2019-01-27 RX ORDER — MORPHINE SULFATE 4 MG/ML
1-4 INJECTION, SOLUTION INTRAMUSCULAR; INTRAVENOUS
Status: DISCONTINUED | OUTPATIENT
Start: 2019-01-27 | End: 2019-01-30 | Stop reason: HOSPADM

## 2019-01-27 RX ORDER — TIOTROPIUM BROMIDE 18 UG/1
1 CAPSULE ORAL; RESPIRATORY (INHALATION)
Status: DISCONTINUED | OUTPATIENT
Start: 2019-01-28 | End: 2019-01-30 | Stop reason: HOSPADM

## 2019-01-27 RX ORDER — HEPARIN SODIUM 1000 [USP'U]/ML
2600 INJECTION, SOLUTION INTRAVENOUS; SUBCUTANEOUS PRN
Status: CANCELLED | OUTPATIENT
Start: 2019-01-27

## 2019-01-27 RX ORDER — OXYCODONE HYDROCHLORIDE 5 MG/1
5-10 TABLET ORAL EVERY 4 HOURS PRN
Status: CANCELLED | OUTPATIENT
Start: 2019-01-27

## 2019-01-27 RX ORDER — PROPRANOLOL HCL 60 MG
60 CAPSULE, EXTENDED RELEASE 24HR ORAL EVERY MORNING
Status: CANCELLED | OUTPATIENT
Start: 2019-01-28

## 2019-01-27 RX ORDER — ONDANSETRON 4 MG/1
4 TABLET, ORALLY DISINTEGRATING ORAL EVERY 4 HOURS PRN
Status: CANCELLED | OUTPATIENT
Start: 2019-01-27

## 2019-01-27 RX ORDER — BRIMONIDINE TARTRATE AND TIMOLOL MALEATE 2; 5 MG/ML; MG/ML
1 SOLUTION OPHTHALMIC 2 TIMES DAILY
Status: CANCELLED | OUTPATIENT
Start: 2019-01-27

## 2019-01-27 RX ORDER — ONDANSETRON 2 MG/ML
4 INJECTION INTRAMUSCULAR; INTRAVENOUS EVERY 4 HOURS PRN
Status: CANCELLED | OUTPATIENT
Start: 2019-01-27

## 2019-01-27 RX ORDER — DOXYCYCLINE 100 MG/1
100 TABLET ORAL EVERY 12 HOURS
Status: CANCELLED | OUTPATIENT
Start: 2019-01-27 | End: 2019-01-29

## 2019-01-27 RX ORDER — ZOLPIDEM TARTRATE 5 MG/1
10 TABLET ORAL NIGHTLY PRN
Status: CANCELLED | OUTPATIENT
Start: 2019-01-27

## 2019-01-27 RX ORDER — SIMVASTATIN 40 MG
40 TABLET ORAL NIGHTLY
Status: DISCONTINUED | OUTPATIENT
Start: 2019-01-27 | End: 2019-01-30 | Stop reason: HOSPADM

## 2019-01-27 RX ORDER — SODIUM CHLORIDE, SODIUM LACTATE, POTASSIUM CHLORIDE, CALCIUM CHLORIDE 600; 310; 30; 20 MG/100ML; MG/100ML; MG/100ML; MG/100ML
INJECTION, SOLUTION INTRAVENOUS CONTINUOUS
Status: CANCELLED | OUTPATIENT
Start: 2019-01-27

## 2019-01-27 RX ORDER — IPRATROPIUM BROMIDE AND ALBUTEROL SULFATE 2.5; .5 MG/3ML; MG/3ML
3 SOLUTION RESPIRATORY (INHALATION)
Status: DISCONTINUED | OUTPATIENT
Start: 2019-01-27 | End: 2019-01-30 | Stop reason: HOSPADM

## 2019-01-27 RX ORDER — BUDESONIDE AND FORMOTEROL FUMARATE DIHYDRATE 160; 4.5 UG/1; UG/1
2 AEROSOL RESPIRATORY (INHALATION)
Status: CANCELLED | OUTPATIENT
Start: 2019-01-27

## 2019-01-27 RX ORDER — AMOXICILLIN 250 MG
2 CAPSULE ORAL 2 TIMES DAILY
Status: CANCELLED | OUTPATIENT
Start: 2019-01-27

## 2019-01-27 RX ORDER — HEPARIN SODIUM 1000 [USP'U]/ML
2600 INJECTION, SOLUTION INTRAVENOUS; SUBCUTANEOUS PRN
Status: DISCONTINUED | OUTPATIENT
Start: 2019-01-27 | End: 2019-01-29

## 2019-01-27 RX ORDER — HEPARIN SODIUM 1000 [USP'U]/ML
5000 INJECTION, SOLUTION INTRAVENOUS; SUBCUTANEOUS ONCE
Status: COMPLETED | OUTPATIENT
Start: 2019-01-27 | End: 2019-01-27

## 2019-01-27 RX ORDER — POLYETHYLENE GLYCOL 3350 17 G/17G
1 POWDER, FOR SOLUTION ORAL
Status: CANCELLED | OUTPATIENT
Start: 2019-01-27

## 2019-01-27 RX ORDER — BENZONATATE 100 MG/1
100 CAPSULE ORAL 3 TIMES DAILY PRN
Status: DISCONTINUED | OUTPATIENT
Start: 2019-01-27 | End: 2019-01-30 | Stop reason: HOSPADM

## 2019-01-27 RX ORDER — MORPHINE SULFATE 4 MG/ML
1-4 INJECTION, SOLUTION INTRAMUSCULAR; INTRAVENOUS
Status: CANCELLED | OUTPATIENT
Start: 2019-01-27

## 2019-01-27 RX ORDER — POLYETHYLENE GLYCOL 3350 17 G/17G
1 POWDER, FOR SOLUTION ORAL
Status: DISCONTINUED | OUTPATIENT
Start: 2019-01-27 | End: 2019-01-30 | Stop reason: HOSPADM

## 2019-01-27 RX ORDER — ONDANSETRON 4 MG/1
4 TABLET, ORALLY DISINTEGRATING ORAL EVERY 4 HOURS PRN
Status: DISCONTINUED | OUTPATIENT
Start: 2019-01-27 | End: 2019-01-30 | Stop reason: HOSPADM

## 2019-01-27 RX ORDER — METHYLPREDNISOLONE SODIUM SUCCINATE 40 MG/ML
40 INJECTION, POWDER, LYOPHILIZED, FOR SOLUTION INTRAMUSCULAR; INTRAVENOUS EVERY 6 HOURS
Status: COMPLETED | OUTPATIENT
Start: 2019-01-27 | End: 2019-01-27

## 2019-01-27 RX ORDER — ONDANSETRON 2 MG/ML
4 INJECTION INTRAMUSCULAR; INTRAVENOUS EVERY 4 HOURS PRN
Status: DISCONTINUED | OUTPATIENT
Start: 2019-01-27 | End: 2019-01-30 | Stop reason: HOSPADM

## 2019-01-27 RX ORDER — SODIUM CHLORIDE, SODIUM LACTATE, POTASSIUM CHLORIDE, CALCIUM CHLORIDE 600; 310; 30; 20 MG/100ML; MG/100ML; MG/100ML; MG/100ML
INJECTION, SOLUTION INTRAVENOUS CONTINUOUS
Status: DISCONTINUED | OUTPATIENT
Start: 2019-01-27 | End: 2019-01-30 | Stop reason: HOSPADM

## 2019-01-27 RX ORDER — BISACODYL 10 MG
10 SUPPOSITORY, RECTAL RECTAL
Status: DISCONTINUED | OUTPATIENT
Start: 2019-01-27 | End: 2019-01-30 | Stop reason: HOSPADM

## 2019-01-27 RX ORDER — DOCUSATE SODIUM 100 MG/1
100 CAPSULE, LIQUID FILLED ORAL 2 TIMES DAILY
Status: CANCELLED | OUTPATIENT
Start: 2019-01-27

## 2019-01-27 RX ORDER — TIOTROPIUM BROMIDE 18 UG/1
1 CAPSULE ORAL; RESPIRATORY (INHALATION)
Status: CANCELLED | OUTPATIENT
Start: 2019-01-28

## 2019-01-27 RX ADMIN — METHYLPREDNISOLONE SODIUM SUCCINATE 40 MG: 40 INJECTION, POWDER, FOR SOLUTION INTRAMUSCULAR; INTRAVENOUS at 06:29

## 2019-01-27 RX ADMIN — ACETAMINOPHEN 1000 MG: 500 TABLET ORAL at 22:22

## 2019-01-27 RX ADMIN — BRIMONIDINE TARTRATE AND TIMOLOL MALEATE 1 DROP: 2; 5 SOLUTION OPHTHALMIC at 17:58

## 2019-01-27 RX ADMIN — IPRATROPIUM BROMIDE AND ALBUTEROL SULFATE 3 ML: .5; 3 SOLUTION RESPIRATORY (INHALATION) at 22:48

## 2019-01-27 RX ADMIN — POLYETHYLENE GLYCOL 3350 1 PACKET: 17 POWDER, FOR SOLUTION ORAL at 19:51

## 2019-01-27 RX ADMIN — ZOLPIDEM TARTRATE 10 MG: 5 TABLET ORAL at 22:21

## 2019-01-27 RX ADMIN — BUDESONIDE AND FORMOTEROL FUMARATE DIHYDRATE 2 PUFF: 160; 4.5 AEROSOL RESPIRATORY (INHALATION) at 18:47

## 2019-01-27 RX ADMIN — SIMVASTATIN 40 MG: 40 TABLET, FILM COATED ORAL at 19:51

## 2019-01-27 RX ADMIN — SODIUM CHLORIDE, POTASSIUM CHLORIDE, SODIUM LACTATE AND CALCIUM CHLORIDE: 600; 310; 30; 20 INJECTION, SOLUTION INTRAVENOUS at 15:58

## 2019-01-27 RX ADMIN — IPRATROPIUM BROMIDE AND ALBUTEROL SULFATE 3 ML: 2.5; .5 SOLUTION RESPIRATORY (INHALATION) at 15:24

## 2019-01-27 RX ADMIN — DOXYCYCLINE 100 MG: 100 TABLET, FILM COATED ORAL at 18:25

## 2019-01-27 RX ADMIN — HEPARIN SODIUM 5000 UNITS: 1000 INJECTION, SOLUTION INTRAVENOUS; SUBCUTANEOUS at 20:36

## 2019-01-27 RX ADMIN — DOXYCYCLINE 100 MG: 100 TABLET, FILM COATED ORAL at 06:20

## 2019-01-27 RX ADMIN — IPRATROPIUM BROMIDE AND ALBUTEROL SULFATE 3 ML: .5; 3 SOLUTION RESPIRATORY (INHALATION) at 18:47

## 2019-01-27 RX ADMIN — SODIUM CHLORIDE, POTASSIUM CHLORIDE, SODIUM LACTATE AND CALCIUM CHLORIDE: 600; 310; 30; 20 INJECTION, SOLUTION INTRAVENOUS at 22:27

## 2019-01-27 RX ADMIN — ACETAMINOPHEN 1000 MG: 500 TABLET ORAL at 15:57

## 2019-01-27 RX ADMIN — LOSARTAN POTASSIUM 50 MG: 25 TABLET, FILM COATED ORAL at 06:20

## 2019-01-27 RX ADMIN — METHYLPREDNISOLONE SODIUM SUCCINATE 40 MG: 40 INJECTION, POWDER, FOR SOLUTION INTRAMUSCULAR; INTRAVENOUS at 17:58

## 2019-01-27 RX ADMIN — FLUTICASONE PROPIONATE 100 MCG: 50 SPRAY, METERED NASAL at 06:27

## 2019-01-27 RX ADMIN — DOCUSATE SODIUM 100 MG: 100 CAPSULE, LIQUID FILLED ORAL at 06:20

## 2019-01-27 RX ADMIN — DOCUSATE SODIUM AND SENNOSIDES 2 TABLET: 8.6; 5 TABLET, FILM COATED ORAL at 06:20

## 2019-01-27 RX ADMIN — TIOTROPIUM BROMIDE 1 CAPSULE: 18 CAPSULE ORAL; RESPIRATORY (INHALATION) at 06:49

## 2019-01-27 RX ADMIN — IPRATROPIUM BROMIDE AND ALBUTEROL SULFATE 3 ML: .5; 3 SOLUTION RESPIRATORY (INHALATION) at 06:46

## 2019-01-27 RX ADMIN — BUDESONIDE AND FORMOTEROL FUMARATE DIHYDRATE 2 PUFF: 160; 4.5 AEROSOL RESPIRATORY (INHALATION) at 06:49

## 2019-01-27 RX ADMIN — IPRATROPIUM BROMIDE AND ALBUTEROL SULFATE 3 ML: .5; 3 SOLUTION RESPIRATORY (INHALATION) at 11:20

## 2019-01-27 RX ADMIN — PROPRANOLOL HYDROCHLORIDE 60 MG: 60 CAPSULE, EXTENDED RELEASE ORAL at 06:26

## 2019-01-27 RX ADMIN — ACETAMINOPHEN 1000 MG: 500 TABLET, FILM COATED ORAL at 06:20

## 2019-01-27 RX ADMIN — HEPARIN SODIUM 1050 UNITS/HR: 5000 INJECTION, SOLUTION INTRAVENOUS at 20:25

## 2019-01-27 RX ADMIN — SENNOSIDES AND DOCUSATE SODIUM 2 TABLET: 8.6; 5 TABLET ORAL at 17:57

## 2019-01-27 RX ADMIN — BRIMONIDINE TARTRATE AND TIMOLOL MALEATE 1 DROP: 2; 5 SOLUTION OPHTHALMIC at 06:28

## 2019-01-27 RX ADMIN — IPRATROPIUM BROMIDE AND ALBUTEROL SULFATE 3 ML: .5; 3 SOLUTION RESPIRATORY (INHALATION) at 15:24

## 2019-01-27 RX ADMIN — DOCUSATE SODIUM 100 MG: 100 CAPSULE, LIQUID FILLED ORAL at 17:57

## 2019-01-27 ASSESSMENT — COPD QUESTIONNAIRES
HAVE YOU SMOKED AT LEAST 100 CIGARETTES IN YOUR ENTIRE LIFE: NO/DON'T KNOW
COPD SCREENING SCORE: 6
DURING THE PAST 4 WEEKS HOW MUCH DID YOU FEEL SHORT OF BREATH: MOST  OR ALL OF THE TIME
DO YOU EVER COUGH UP ANY MUCUS OR PHLEGM?: YES, EVERY DAY

## 2019-01-27 ASSESSMENT — LIFESTYLE VARIABLES
HOW MANY TIMES IN THE PAST YEAR HAVE YOU HAD 5 OR MORE DRINKS IN A DAY: 0
HAVE YOU EVER FELT YOU SHOULD CUT DOWN ON YOUR DRINKING: NO
EVER HAD A DRINK FIRST THING IN THE MORNING TO STEADY YOUR NERVES TO GET RID OF A HANGOVER: NO
EVER_SMOKED: YES
TOTAL SCORE: 0
CONSUMPTION TOTAL: NEGATIVE
TOTAL SCORE: 0
EVER FELT BAD OR GUILTY ABOUT YOUR DRINKING: NO
ON A TYPICAL DAY WHEN YOU DRINK ALCOHOL HOW MANY DRINKS DO YOU HAVE: 1
HAVE PEOPLE ANNOYED YOU BY CRITICIZING YOUR DRINKING: NO
AVERAGE NUMBER OF DAYS PER WEEK YOU HAVE A DRINK CONTAINING ALCOHOL: 2
TOTAL SCORE: 0
ALCOHOL_USE: YES

## 2019-01-27 ASSESSMENT — COGNITIVE AND FUNCTIONAL STATUS - GENERAL
SUGGESTED CMS G CODE MODIFIER DAILY ACTIVITY: CH
DAILY ACTIVITIY SCORE: 24
MOBILITY SCORE: 23
CLIMB 3 TO 5 STEPS WITH RAILING: A LITTLE
SUGGESTED CMS G CODE MODIFIER MOBILITY: CI

## 2019-01-27 ASSESSMENT — PATIENT HEALTH QUESTIONNAIRE - PHQ9
1. LITTLE INTEREST OR PLEASURE IN DOING THINGS: NOT AT ALL
5. POOR APPETITE OR OVEREATING: NOT AT ALL
9. THOUGHTS THAT YOU WOULD BE BETTER OFF DEAD, OR OF HURTING YOURSELF: NOT AT ALL
SUM OF ALL RESPONSES TO PHQ QUESTIONS 1-9: 2
SUM OF ALL RESPONSES TO PHQ9 QUESTIONS 1 AND 2: 1
3. TROUBLE FALLING OR STAYING ASLEEP OR SLEEPING TOO MUCH: NOT AT ALL
2. FEELING DOWN, DEPRESSED, IRRITABLE, OR HOPELESS: SEVERAL DAYS
7. TROUBLE CONCENTRATING ON THINGS, SUCH AS READING THE NEWSPAPER OR WATCHING TELEVISION: NOT AT ALL
6. FEELING BAD ABOUT YOURSELF - OR THAT YOU ARE A FAILURE OR HAVE LET YOURSELF OR YOUR FAMILY DOWN: SEVERAL DAYS
8. MOVING OR SPEAKING SO SLOWLY THAT OTHER PEOPLE COULD HAVE NOTICED. OR THE OPPOSITE, BEING SO FIGETY OR RESTLESS THAT YOU HAVE BEEN MOVING AROUND A LOT MORE THAN USUAL: NOT AT ALL
4. FEELING TIRED OR HAVING LITTLE ENERGY: NOT AT ALL

## 2019-01-27 ASSESSMENT — ENCOUNTER SYMPTOMS
ABDOMINAL PAIN: 0
SORE THROAT: 0
COUGH: 0
DEPRESSION: 0
DIZZINESS: 0
NAUSEA: 0
NECK PAIN: 0
WHEEZING: 1
CHILLS: 0
TINGLING: 0
INSOMNIA: 0
EYE PAIN: 0
SHORTNESS OF BREATH: 1
BACK PAIN: 1
HEADACHES: 0
PALPITATIONS: 0
BLURRED VISION: 0
VOMITING: 0
FEVER: 0

## 2019-01-27 NOTE — FLOWSHEET NOTE
01/26/19 1907   Interdisciplinary Plan of Care-Goals (Indications)   Bronchodilator Indications History / Diagnosis   Interdisciplinary Plan of Care-Outcomes    Bronchodilator Outcome Patient at Stable Baseline   Education   Education Yes - Pt. / Family has been Instructed in use of Respiratory Equipment;Yes - Pt. / Family has been Instructed in use of Respiratory Medications and Adverse Reactions   RT Assessment of Delivered Medications   Evaluation of Medication Delivery Daily Yes-- Pt /Family has been Instructed in use of Respiratory Medications and Adverse Reactions   SVN Group   #SVN Performed Yes   Given By: Mouthpiece   MDI/DPI Group   #MDI/DPI Given MDI/DPI x 1   Respiratory WDL   Respiratory (WDL) WDL   Chest Exam   Work Of Breathing / Effort Mild   Respiration 18   Pulse 92   Breath Sounds   Pre/Post Intervention Pre Intervention Assessment   RUL Breath Sounds Clear   RML Breath Sounds Clear   RLL Breath Sounds Diminished   AUSTIN Breath Sounds Clear   LLL Breath Sounds Diminished   Oximetry   #Pulse Oximetry (Single Determination) Yes   Oxygen   Pulse Oximetry 92 %   O2 (LPM) 0   FiO2% 21 %   O2 Daily Delivery Respiratory  Room Air with O2 Available

## 2019-01-27 NOTE — PROGRESS NOTES
Report called to [facility] main. Pt left unit via EMS with all belongings, paperwork, POLST, eye drops. Pharmacy, RN, respiratory unable to locate inhalers. VS WNL, denies pain.

## 2019-01-27 NOTE — FLOWSHEET NOTE
01/27/19 0645   Interdisciplinary Plan of Care-Goals (Indications)   Bronchodilator Indications History / Diagnosis   RT Assessment of Delivered Medications   Evaluation of Medication Delivery Daily Yes-- Pt /Family has been Instructed in use of Respiratory Medications and Adverse Reactions   SVN Group   #SVN Performed Yes   Given By: Mouthpiece   MDI/DPI Group   #MDI/DPI Given MDI/DPI x 2   Incentive Spirometry Group   Incentive Spirometer Volume 1500 mL   Respiratory WDL   Respiratory (WDL) X   Chest Exam   Respiration 18   Pulse 85   Breath Sounds   Pre/Post Intervention (minimal wheeze with coughing)   RUL Breath Sounds Clear   RML Breath Sounds Clear   RLL Breath Sounds Diminished   AUSTIN Breath Sounds Clear   LLL Breath Sounds Diminished   Oxygen   Home O2 Use Prior To Admission? No   Pulse Oximetry 93 %   O2 (LPM) 0   O2 Daily Delivery Respiratory  Room Air with O2 Available

## 2019-01-27 NOTE — CARE PLAN
Problem: Communication  Goal: The ability to communicate needs accurately and effectively will improve  Outcome: PROGRESSING AS EXPECTED  Pt oriented to call light system, room, and phone. Pt understanding when to call for assistance.    Problem: Infection  Goal: Will remain free from infection  Outcome: PROGRESSING AS EXPECTED  Pt provided infection prevention packet. Pt taught proper oral care for infection prevention.

## 2019-01-27 NOTE — DISCHARGE PLANNING
Anticipated Discharge Disposition: Transfer to [facility]    Action: LSW received t/c from transfer center ([facility]) stating that pt has open bed available for transfer on S525/1. LSW requested [facility] transport pt at 1400. Awaiting acceptance for time requested by NELLI. MD updated.    Barriers to Discharge: None    Plan: Pending [facility] confirming 1400 transort time.

## 2019-01-27 NOTE — FLOWSHEET NOTE
01/27/19 1120   RT Assessment of Delivered Medications   Evaluation of Medication Delivery Daily Yes-- Pt /Family has been Instructed in use of Respiratory Medications and Adverse Reactions   SVN Group   #SVN Performed Yes   Given By: Mouthpiece   Chest Exam   Respiration 18   Pulse 80   Breath Sounds   RUL Breath Sounds Clear   RML Breath Sounds Clear;Diminished   RLL Breath Sounds Diminished   AUSTIN Breath Sounds Clear   LLL Breath Sounds Diminished   Secretions   Cough Congested;Moist;Non Productive   Oxygen   Pulse Oximetry 93 %   O2 Daily Delivery Respiratory  Room Air with O2 Available

## 2019-01-27 NOTE — PROGRESS NOTES
Received bedside report from ALLAN Martin. Plan of care discussed. Safety precautions in place. Call light and personal belongings within reach. Patient has no needs at this time.

## 2019-01-27 NOTE — H&P
Hospital Medicine History & Physical Note    Date of Service  1/27/2019    Primary Care Physician  Keira Mendiola P.A.-C.    Consultants  IR    Code Status  DNR- we have filled out a POLST form on this admission.     Chief Complaint  Back pain and shortness of breath.     History of Presenting Illness  68 y.o. female who presented 1/24/2019 with complaints of back pain and shortness of breath. She has been complaining of ongoing COPD flare symptoms since the beginning of December.  She has had 2 courses of antibiotics and steroids and is currently on a course of azithromycin oral prednisone without improvement.  On arrival to the emergency department she was 87% on room air she was also using accessory muscles, tachypneic and speaking in 2-3 word sentences. In addition she was complaining of severe back pain and has a history of compression fractures. She was admitted and treated for a COPD flare with IV steroids and RT protocols. An MRI revealed an acute L2 compression fracture. Her breathing improved through her hospital course. I discussed her findings with Dr Summers in IR and she is a candidate for a kyphoplasty. She is chronically anticoagulated for a DVT. She has been taken off xarelto and bridged with heparin for the last 3 days and will be transferred to [facility] for a kyphoplasty. Her pain has not improved. She should be stable enough now from a respiratory standpoint for this procedure.     Review of Systems  Review of Systems   Constitutional: Negative for chills and fever.   HENT: Negative for sore throat.    Eyes: Negative for blurred vision and pain.   Respiratory: Positive for shortness of breath and wheezing. Negative for cough.    Cardiovascular: Negative for chest pain and palpitations.   Gastrointestinal: Negative for abdominal pain, nausea and vomiting.   Genitourinary: Negative for dysuria and urgency.   Musculoskeletal: Positive for back pain. Negative for neck pain.   Skin: Negative  for itching and rash.   Neurological: Negative for dizziness, tingling and headaches.   Psychiatric/Behavioral: Negative for depression. The patient does not have insomnia.    All other systems reviewed and are negative.      Past Medical History   has a past medical history of Anesthesia; Asthma; Backpain (7/2017); Blood clot in vein (07/06/2018); Bowel habit changes (07/06/2018); Breath shortness; Bronchitis; CAD (coronary artery disease); Cancer (HCC) (2016); Chickenpox; COPD (chronic obstructive pulmonary disease) (HCC); Dialysis (2005); Emphysema of lung (HCC); Glaucoma; Hemorrhagic disorder (HCC); Hyperlipidemia; Hypertension; Hyperthyroidism; Kidney stone; Lung cancer (HCC) (12/12/2016); Multiple thyroid nodules (7/9/2015); Nasal drainage; Osteoporosis; Pain (07/06/2018); Personal history of venous thrombosis and embolism (2004, 2012); Pneumonia (7/2016); Renal disorder; Renal stones (2013); Rheumatoid arthritis (HCC); Rheumatoid arthritis (HCC); S/P appendectomy (1998 ); S/P cholecystectomy (1998); S/P kyphoplasty (2006, 2007, 2013); Sepsis(995.91) (2005); Shortness of breath (07/06/2018); and Thyroid nodule, hot (2017). She also has no past medical history of Angina; Arrhythmia; Breast cancer (HCC); CATARACT; Congestive heart failure (HCC); Diabetes; Fall; Heart murmur; Heart valve disease; Indigestion; Jaundice; Liver disease; Myocardial infarct (HCC); Other specified symptom associated with female genital organs; Pacemaker; Psychiatric disorder; Psychiatric problem; Rheumatic fever; Seizure (HCC); Seizure disorder (HCC); Stroke (HCC); or Unspecified urinary incontinence.    Surgical History   has a past surgical history that includes other; other abdominal surgery; pr enlarge breast with implant; pr reduction of large breast; appendectomy; cholecystectomy; laminotomy; lung biopsy open; thoracoscopy (Left, 12/12/2016); other orthopedic surgery (2013); cystoscopy stent placement (Left, 6/18/2018);  lithotripsy (Left, 6/18/2018); lasertripsy (Left, 6/18/2018); ureteroscopy (Left, 6/18/2018); cystoscopy stent placement (7/9/2018); ureteroscopy (Left, 7/9/2018); and lasertripsy (Left, 7/9/2018).     Family History  family history includes Cancer in her mother; Heart Disease in her brother; Heart Failure in her father.     Social History   reports that she quit smoking about 19 years ago. Her smoking use included Cigarettes. She has a 30.00 pack-year smoking history. She has never used smokeless tobacco. She reports that she drinks alcohol. She reports that she does not use drugs.    Allergies  Allergies   Allergen Reactions   • Nkda [No Known Drug Allergy]        Medications  Prior to Admission Medications   Prescriptions Last Dose Informant Patient Reported? Taking?   BREO ELLIPTA 200-25 MCG/INH AEROSOL POWDER, BREATH ACTIVATED 1/24/2019 at 0700 Patient No No   Sig: INHALE ONE DOSE BY MOUTH DAILY. RINSE MOUTH AFTER USE.   COMBIGAN 0.2-0.5 % Solution 1/24/2019 at 0700 Patient Yes No   Sig: Place 1 Drop in both eyes 2 Times a Day.   Fluticasone-Umeclidin-Vilant (TRELEGY ELLIPTA) 100-62.5-25 MCG/INH AEROSOL POWDER, BREATH ACTIVATED   No No   Sig: Inhale 1 Puff by mouth 1 time daily as needed.   HYDROcodone/acetaminophen (NORCO)  MG Tab 1/23/2019 at 1400 Patient Yes No   Sig: Take 1-2 Tabs by mouth as needed.   PROAIR  (90 Base) MCG/ACT Aero Soln inhalation aerosol 1/24/2019 at 1000  No No   Sig: INHALE TWO PUFFS BY MOUTH EVERY 6 HOURS AS NEEDED FOR SHORTNESS OF BREATH   SPIRIVA RESPIMAT 2.5 MCG/ACT Aero Soln 1/24/2019 at 1700  No No   Sig: INHALE TWO PUFFS BY MOUTH DAILY. ASSEMBLE AND PRIME.   Tiotropium Bromide Monohydrate (SPIRIVA RESPIMAT) 2.5 MCG/ACT Aero Soln 1/24/2019 at 1700 Patient Yes No   Sig: Inhale 1 Puff by mouth 2 Times a Day.   azithromycin (ZITHROMAX) 250 MG Tab   No No   Sig: Take two tabs po day one followed by one tab po day two through five with food   docusate sodium (COLACE) 100  MG Cap 1/24/2019 at 1700  No No   Sig: Take 1 Cap by mouth 2 times a day.   enoxaparin (LOVENOX) 80 MG/0.8ML Solution inj   Yes No   Sig: Inject 1 Syringe as instructed every 12 hours.   ipratropium-albuterol (DUONEB) 0.5-2.5 (3) MG/3ML nebulizer solution 1/24/2019 at 1700  No No   Sig: 3 mL by Nebulization route 4 times a day.   losartan (COZAAR) 50 MG TABS 1/24/2019 at 0700 Patient Yes No   Sig: Take 50 mg by mouth every morning.   predniSONE (DELTASONE) 20 MG Tab 1/14/2019 at 0700  No No   Sig: Take 40 mg PO daily for 5 days   propranolol LA (INDERAL LA) 60 MG CAPSULE SR 24 HR 1/24/2019 at 0700 Patient Yes No   Sig: Take 60 mg by mouth every morning.   rivaroxaban (XARELTO) 20 MG Tab tablet 1/24/2019 at 1700 Patient No No   Sig: Take 1 Tab by mouth with dinner.   simvastatin (ZOCOR) 40 MG Tab 1/23/2019 at 2200 Patient Yes No   Sig: Take 40 mg by mouth every evening.   vitamin D, Ergocalciferol, (DRISDOL) 40449 UNITS Cap capsule 1/21/2019 at 0700 Patient Yes No   Sig: Take 50,000 Units by mouth every 14 days.   zolpidem (AMBIEN) 10 MG Tab 1/23/2019 at 2330 Patient Yes No   Sig: Take 10 mg by mouth at bedtime as needed for Sleep.      Facility-Administered Medications: None       Physical Exam  Temp:  [36.4 °C (97.5 °F)-36.6 °C (97.8 °F)] 36.4 °C (97.5 °F)  Pulse:  [] 79  Resp:  [18-20] 18  BP: (110-158)/(58-91) 144/81  SpO2:  [90 %-98 %] 91 %    Physical Exam   Constitutional: She is oriented to person, place, and time. She appears well-developed and well-nourished. No distress.   Patient seen and examined  Discussed plan with ALLAN NEVEST:   Right Ear: External ear normal.   Left Ear: External ear normal.   Nose: Nose normal.   Eyes: Conjunctivae are normal. Right eye exhibits no discharge. Left eye exhibits no discharge.   Neck: No JVD present.   Cardiovascular: Regular rhythm and normal heart sounds.    No murmur heard.  Cap refill 2sec  Pulses 2+ throughout     Pulmonary/Chest: Effort normal. No stridor. No  respiratory distress. She has wheezes. She has no rales.   Decreased bs throughout. Improved wheezing from admission.    Abdominal: Soft. Bowel sounds are normal. She exhibits no distension. There is no tenderness.   Musculoskeletal: She exhibits tenderness. She exhibits no edema.   Neurological: She is alert and oriented to person, place, and time.   Skin: Skin is warm and dry. She is not diaphoretic. No erythema.   Normal skin  Color.    Psychiatric: She has a normal mood and affect. Her behavior is normal.   Nursing note and vitals reviewed.      Laboratory:  Recent Labs      01/25/19   0405  01/26/19   0603  01/27/19   0602   WBC  5.8  13.3*  9.9   RBC  4.20  3.84*  3.84*   HEMOGLOBIN  14.0  12.5  12.6   HEMATOCRIT  42.2  37.4  37.7   MCV  100.5*  97.4  98.2*   MCH  33.3*  32.6  32.8   MCHC  33.2*  33.4*  33.4*   RDW  44.7  43.5  45.0   PLATELETCT  173  180  178   MPV  11.2  10.6  11.0     Recent Labs      01/25/19   0405  01/26/19   0603  01/27/19   0602   SODIUM  136  138  139   POTASSIUM  3.9  4.2  4.3   CHLORIDE  107  109  111   CO2  22  22  22   GLUCOSE  181*  166*  134*   BUN  31*  41*  37*   CREATININE  1.52*  1.38  1.17   CALCIUM  9.5  9.5  9.3     Recent Labs      01/24/19   1250  01/25/19   0405  01/26/19   0603  01/27/19   0602   ALTSGPT  19   --   16   --    ASTSGOT  18   --   17   --    ALKPHOSPHAT  83   --   71   --    TBILIRUBIN  0.9   --   0.5   --    GLUCOSE  124*  181*  166*  134*     Recent Labs      01/25/19   1519  01/25/19   2238  01/26/19   0603  01/27/19   0602   APTT  31.9  93.8*  78.7*  58.7*   INR  1.17*   --    --    --      Recent Labs      01/24/19   1250   BNPBTYPENAT  66         Recent Labs      01/24/19   1250   TROPONINI  <0.02       Urinalysis:    No results found     Imaging:  MR-LUMBAR SPINE-W/O   Final Result      1.  Acute compression fracture of L2 with concave deformity of inferior endplate and moderate loss of height, as seen on recent CT lumbar spine.   2.  Chronic  moderate compression of L1 and T11.   3.  No significant central canal narrowing.      CT-LSPINE W/O PLUS RECONS   Final Result      1.  New compression deformity at the L2 vertebral body with approximately 30-40% loss of height.      2.  Stable compression deformity of the L1 vertebral body.      3.  Osteopenia.      4.  Levoscoliosis.      5.  Minimal anterolisthesis at L3-4.      DX-CHEST-PORTABLE (1 VIEW)   Final Result      Stable hyperinflation, cardiac silhouette enlargement and pulmonary arterial hypertension            Assessment/Plan:  I anticipate this patient will require at least two midnights for appropriate medical management, necessitating inpatient admission.    Acute exacerbation of chronic obstructive pulmonary disease (COPD) (HCC)- (present on admission)   Assessment & Plan    improved  Prolonged exacerbation failing outpatient therapy with oral azithromycin and oral prednisone.  Copd exacerbation order set ongoing .  Continue inhaled steroid, DuoNeb treatments every 4 hours.  RT protocol.  Empiric antibiotics given intractable nature of her symptoms including ceftriaxone IV and oral doxycycline.  IV methylprednisolone 40 mg every 8 hours.  Supplemental oxygen as needed.  COPD education.     Acute respiratory failure with hypoxia (HCC)- (present on admission)   Assessment & Plan    87% on room air at the time of admission.  she has a concentrator at home but has not needed to use it yet.  Treating COPD exacerbation. Improving   Steroids ongoing. tatum not change to PO given she failed outpatient po steroids.   RT protocol     Closed wedge compression fracture of first lumbar vertebra (HCC)- (present on admission)   Assessment & Plan    MRI of spine and my discussion with Dr Summers in IR shows she is a good candidate for a kyphoplasty procedure.  Treat COPD exacerbation first then will set her up for a monday kyphoplasty.  She will be bridged with a heparin drip while her Xarelto is held.  Patient has  had thrombotic event when taken off of anticoagulation for procedures in the past and needs to be bridged.  Plan on transfer tomorrow .     Lung cancer ([facility])- (present on admission)   Assessment & Plan    I have reviewed her biopsy and path results. She has had a resection. No obvious recurrence or pathalogic fracture seen for now. She follows with oncology.      DVT (deep venous thrombosis) ([facility])- (present on admission)   Assessment & Plan    6 years ago.  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  Off Xarelto for 3 days now.   Continue heparin gtt for planned kypho     Rheumatoid arthritis ([facility])   Assessment & Plan    ?treatment. Unclear right now. Not on any. No obvious flare.     Osteoporosis- (present on admission)   Assessment & Plan    Patient was previously on Prolia injections and has been off them secondary to complicating health problems.  Recommend that she establish with a new primary care provider and investigate resuming these.  Due to her history of renal failure she is not able to be on bisphosphonates.     CKD (chronic kidney disease) stage 3, GFR 30-59 ml/min ([facility])- (present on admission)   Assessment & Plan    Stable at baseline.  History of prior dialysis.  Avoid nephrotoxins.     CAD (coronary artery disease)- (present on admission)   Assessment & Plan    No flare. Trend on tele. Continue meds.      HTN (hypertension)- (present on admission)   Assessment & Plan    Continue losartan and propranolol.     Hyperlipidemia- (present on admission)   Assessment & Plan    statin         VTE prophylaxis: heparin gtt

## 2019-01-27 NOTE — PROGRESS NOTES
Gave bedside report to ALLAN Martin. Plan of care discussed. Safety precautions in place. Personal belongings and call light are with in reach. Patient has no additional needs at this time.

## 2019-01-27 NOTE — PROGRESS NOTES
Patient is a direct admit from [facility].   Dr RACHAEL Alford is accepting the patient.   ADT signed and held, needs to be released when the patient arrives on the unit.

## 2019-01-27 NOTE — PROGRESS NOTES
Pt received on unit. Assumed care of pt. Pt oriented to room. Call light within reach. Bed locked and in lowest position. Call light within reach. Pt calling appropriately.

## 2019-01-27 NOTE — DISCHARGE INSTRUCTIONS
Discharge Instructions    Discharged to home by ambulance with escort. Discharged via ambulance, hospital escort: Yes.  Special equipment needed: Not Applicable    Be sure to schedule a follow-up appointment with your primary care doctor or any specialists as instructed.     Discharge Plan:   Diet Plan: Discussed  Activity Level: Discussed  Confirmed Follow up Appointment: Appointment Scheduled  Confirmed Symptoms Management: Discussed  Medication Reconciliation Updated: Yes  Influenza Vaccine Indication: Not indicated: Previously immunized this influenza season and > 8 years of age    I understand that a diet low in cholesterol, fat, and sodium is recommended for good health. Unless I have been given specific instructions below for another diet, I accept this instruction as my diet prescription.   Other diet: Regular    Special Instructions: None    · Is patient discharged on Warfarin / Coumadin?   No       Chronic Obstructive Pulmonary Disease  Chronic obstructive pulmonary disease (COPD) is a common lung problem. In COPD, the flow of air from the lungs is limited. The way your lungs work will probably never return to normal, but there are things you can do to improve your lungs and make yourself feel better. Your doctor may treat your condition with:  · Medicines.  · Oxygen.  · Lung surgery.  · Changes to your diet.  · Rehabilitation. This may involve a team of specialists.  Follow these instructions at home:  · Take all medicines as told by your doctor.  · Avoid medicines or cough syrups that dry up your airway (such as antihistamines) and do not allow you to get rid of thick spit. You do not need to avoid them if told differently by your doctor.  · If you smoke, stop. Smoking makes the problem worse.  · Avoid being around things that make your breathing worse (like smoke, chemicals, and fumes).  · Use oxygen therapy and therapy to help improve your lungs (pulmonary rehabilitation) if told by your doctor. If you  need home oxygen therapy, ask your doctor if you should buy a tool to measure your oxygen level (oximeter).  · Avoid people who have a sickness you can catch (contagious).  · Avoid going outside when it is very hot, cold, or humid.  · Eat healthy foods. Eat smaller meals more often. Rest before meals.  · Stay active, but remember to also rest.  · Make sure to get all the shots (vaccines) your doctor recommends. Ask your doctor if you need a pneumonia shot.  · Learn and use tips on how to relax.  · Learn and use tips on how to control your breathing as told by your doctor. Try:  1. Breathing in (inhaling) through your nose for 1 second. Then, pucker your lips and breath out (exhale) through your lips for 2 seconds.  2. Putting one hand on your belly (abdomen). Breathe in slowly through your nose for 1 second. Your hand on your belly should move out. Pucker your lips and breathe out slowly through your lips. Your hand on your belly should move in as you breathe out.  · Learn and use controlled coughing to clear thick spit from your lungs. The steps are:  1. Lean your head a little forward.  2. Breathe in deeply.  3. Try to hold your breath for 3 seconds.  4. Keep your mouth slightly open while coughing 2 times.  5. Spit any thick spit out into a tissue.  6. Rest and do the steps again 1 or 2 times as needed.  Contact a doctor if:  · You cough up more thick spit than usual.  · There is a change in the color or thickness of the spit.  · It is harder to breathe than usual.  · Your breathing is faster than usual.  Get help right away if:  · You have shortness of breath while resting.  · You have shortness of breath that stops you from:  ¨ Being able to talk.  ¨ Doing normal activities.  · You chest hurts for longer than 5 minutes.  · Your skin color is more blue than usual.  · Your pulse oximeter shows that you have low oxygen for longer than 5 minutes.  This information is not intended to replace advice given to you by  your health care provider. Make sure you discuss any questions you have with your health care provider.  Document Released: 06/05/2009 Document Revised: 05/25/2017 Document Reviewed: 08/14/2014  [employer]vier Interactive Patient Education © 2017 [employer]        Spinal Compression Fracture  A spinal compression fracture is a collapse of the bones that form the spine (vertebrae). With this type of fracture, the vertebrae become squashed (compressed) into a wedge shape. Most compression fractures happen in the middle or lower part of the spine.  What are the causes?  This condition may be caused by:  · Thinning and loss of density in the bones (osteoporosis). This is the most common cause.  · A fall.  · A car or motorcycle accident.  · Cancer.  · Trauma, such as a heavy, direct hit to the head.  What increases the risk?  You may be at greater risk for a spinal compression fracture if you:  · Are 50 years old or older.  · Have osteoporosis.  · Have certain types of cancer, including:  ¨ Multiple myeloma.  ¨ Lymphoma.  ¨ Prostate cancer.  ¨ Lung cancer.  ¨ Breast cancer.  What are the signs or symptoms?  Symptoms of this condition include:  · Severe pain.  · Pain that gets worse over time.  · Pain that is worse when you stand, walk, sit, or bend.  · Sudden pain that is so bad that it is hard for you to move.  · Bending or humping of the spine.  · Gradual loss of height.  · Numbness, tingling, or weakness in the back and legs.  · Trouble walking.  Your symptoms will depend on the cause of the fracture and how quickly it develops. For example, fractures that are caused by osteoporosis can cause few symptoms, no symptoms, or symptoms that develop slowly over time.  How is this diagnosed?  This condition may be diagnosed based on symptoms, medical history, and a physical exam. During the physical exam, your health care provider may tap along the length of your spine to check for tenderness. Tests may be done to confirm the  diagnosis. They may include:  · A bone density test to check for osteoporosis.  · Imaging tests, such as a spine X-ray, a CT scan, or MRI.  How is this treated?  Treatment for this condition depends on the cause and severity of the condition. Some fractures, such as those that are caused by osteoporosis, may heal on their own with supportive care. This may include:  · Pain medicine.  · Rest.  · A back brace.  · Physical therapy exercises.  · Medicine that reduces bone pain.  · Calcium and vitamin D supplements.  Fractures that cause the back to become misshapen, cause nerve pain or weakness, or do not respond to other treatment may be treated with a surgical procedure, such as:  · Vertebroplasty. In this procedure, bone cement is injected into the collapsed vertebrae to stabilize them.  · Balloon kyphoplasty. In this procedure, the collapsed vertebrae are expanded with a balloon and then bone cement is injected into them.  · Spinal fusion. In this procedure, the collapsed vertebrae are connected (fused) to normal vertebrae.  Follow these instructions at home:  General instructions  · Take medicines only as directed by your health care provider.  · Do not drive or operate heavy machinery while taking pain medicine.  · If directed, apply ice to the injured area:  ¨ Put ice in a plastic bag.  ¨ Place a towel between your skin and the bag.  ¨ Leave the ice on for 30 minutes every two hours at first. Then apply the ice as needed.  · Wear your neck brace or back brace as directed by your health care provider.  · Do not drink alcohol. Alcohol can interfere with your treatment.  · Keep all follow-up visits as directed by your health care provider. This is important. It can help to prevent permanent injury, disability, and long-lasting (chronic) pain.  Activity  · Stay in bed (on bed rest) only as directed by your health care provider. Being on bed rest for too long can make your condition worse.  · Return to your normal  activities as directed by your health care provider. Ask what activities are safe for you.  · Do exercises to improve motion and strength in your back (physical therapy), as recommended by your health care provider.  · Exercise regularly as directed by your health care provider.  Contact a health care provider if:  · You have a fever.  · You develop a cough that makes your pain worse.  · Your pain medicine is not helping.  · Your pain does not get better over time.  · You cannot return to your normal activities as planned or expected.  Get help right away if:  · Your pain is very bad and it suddenly gets worse.  · You are unable to move any body part (paralysis) that is below the level of your injury.  · You have numbness, tingling, or weakness in any body part that is below the level of your injury.  · You cannot control your bladder or bowels.  This information is not intended to replace advice given to you by your health care provider. Make sure you discuss any questions you have with your health care provider.  Document Released: 12/18/2006 Document Revised: 08/15/2017 Document Reviewed: 12/22/2015  [employer] Interactive Patient Education © 2017 [employer]    Depression / Suicide Risk    As you are discharged from this [facility] facility, it is important to learn how to keep safe from harming yourself.    Recognize the warning signs:  · Abrupt changes in personality, positive or negative- including increase in energy   · Giving away possessions  · Change in eating patterns- significant weight changes-  positive or negative  · Change in sleeping patterns- unable to sleep or sleeping all the time   · Unwillingness or inability to communicate  · Depression  · Unusual sadness, discouragement and loneliness  · Talk of wanting to die  · Neglect of personal appearance   · Rebelliousness- reckless behavior  · Withdrawal from people/activities they love  · Confusion- inability to concentrate     If you or a loved  one observes any of these behaviors or has concerns about self-harm, here's what you can do:  · Talk about it- your feelings and reasons for harming yourself  · Remove any means that you might use to hurt yourself (examples: pills, rope, extension cords, firearm)  · Get professional help from the community (Mental Health, Substance Abuse, psychological counseling)  · Do not be alone:Call your Safe Contact- someone whom you trust who will be there for you.  · Call your local CRISIS HOTLINE 585-1881 or 811-219-2992  · Call your local Children's Mobile Crisis Response Team [place] (922) 248-2343 or www.[employer]  · Call the toll free National Suicide Prevention Hotlines   · National Suicide Prevention Lifeline 197-537-YSCY (3540)  · National Hope Line Network 800-SUICIDE (660-7571)

## 2019-01-27 NOTE — DISCHARGE PLANNING
Received Transport Form @ 0810  Spoke to Felix AIKEN    Transport is scheduled for 01-27-19 @1400 going to [facility] room [id number].

## 2019-01-28 ENCOUNTER — APPOINTMENT (OUTPATIENT)
Dept: RADIOLOGY | Facility: MEDICAL CENTER | Age: 69
DRG: 981 | End: 2019-01-28
Attending: HOSPITALIST
Payer: MEDICARE

## 2019-01-28 PROBLEM — S32.010A CLOSED COMPRESSION FRACTURE OF FIRST LUMBAR VERTEBRA (HCC): Status: ACTIVE | Noted: 2019-01-28

## 2019-01-28 PROBLEM — S32.020A CLOSED COMPRESSION FRACTURE OF SECOND LUMBAR VERTEBRA (HCC): Status: ACTIVE | Noted: 2019-01-28

## 2019-01-28 LAB
APTT PPP: 25.8 SEC (ref 24.7–36)
APTT PPP: 26 SEC (ref 24.7–36)
APTT PPP: 26.8 SEC (ref 24.7–36)

## 2019-01-28 PROCEDURE — 770006 HCHG ROOM/CARE - MED/SURG/GYN SEMI*

## 2019-01-28 PROCEDURE — 700102 HCHG RX REV CODE 250 W/ 637 OVERRIDE(OP): Performed by: HOSPITALIST

## 2019-01-28 PROCEDURE — 700105 HCHG RX REV CODE 258: Performed by: HOSPITALIST

## 2019-01-28 PROCEDURE — 36415 COLL VENOUS BLD VENIPUNCTURE: CPT

## 2019-01-28 PROCEDURE — 700101 HCHG RX REV CODE 250: Performed by: HOSPITALIST

## 2019-01-28 PROCEDURE — 94640 AIRWAY INHALATION TREATMENT: CPT

## 2019-01-28 PROCEDURE — 700112 HCHG RX REV CODE 229: Performed by: HOSPITALIST

## 2019-01-28 PROCEDURE — A9270 NON-COVERED ITEM OR SERVICE: HCPCS | Performed by: HOSPITALIST

## 2019-01-28 PROCEDURE — 99232 SBSQ HOSP IP/OBS MODERATE 35: CPT | Performed by: HOSPITALIST

## 2019-01-28 PROCEDURE — 85730 THROMBOPLASTIN TIME PARTIAL: CPT

## 2019-01-28 RX ORDER — ZOLPIDEM TARTRATE 5 MG/1
5 TABLET ORAL NIGHTLY PRN
Status: DISCONTINUED | OUTPATIENT
Start: 2019-01-28 | End: 2019-01-30 | Stop reason: HOSPADM

## 2019-01-28 RX ADMIN — SODIUM CHLORIDE, POTASSIUM CHLORIDE, SODIUM LACTATE AND CALCIUM CHLORIDE: 600; 310; 30; 20 INJECTION, SOLUTION INTRAVENOUS at 09:02

## 2019-01-28 RX ADMIN — SENNOSIDES AND DOCUSATE SODIUM 2 TABLET: 8.6; 5 TABLET ORAL at 06:01

## 2019-01-28 RX ADMIN — FLUTICASONE PROPIONATE 100 MCG: 50 SPRAY, METERED NASAL at 06:04

## 2019-01-28 RX ADMIN — DOCUSATE SODIUM 100 MG: 100 CAPSULE, LIQUID FILLED ORAL at 06:01

## 2019-01-28 RX ADMIN — IPRATROPIUM BROMIDE AND ALBUTEROL SULFATE 3 ML: .5; 3 SOLUTION RESPIRATORY (INHALATION) at 10:31

## 2019-01-28 RX ADMIN — ZOLPIDEM TARTRATE 5 MG: 5 TABLET ORAL at 23:05

## 2019-01-28 RX ADMIN — IPRATROPIUM BROMIDE AND ALBUTEROL SULFATE 3 ML: .5; 3 SOLUTION RESPIRATORY (INHALATION) at 19:10

## 2019-01-28 RX ADMIN — DOXYCYCLINE 100 MG: 100 TABLET, FILM COATED ORAL at 06:28

## 2019-01-28 RX ADMIN — BRIMONIDINE TARTRATE AND TIMOLOL MALEATE 1 DROP: 2; 5 SOLUTION OPHTHALMIC at 06:03

## 2019-01-28 RX ADMIN — BRIMONIDINE TARTRATE AND TIMOLOL MALEATE 1 DROP: 2; 5 SOLUTION OPHTHALMIC at 18:16

## 2019-01-28 RX ADMIN — ACETAMINOPHEN 1000 MG: 500 TABLET ORAL at 06:30

## 2019-01-28 RX ADMIN — IPRATROPIUM BROMIDE AND ALBUTEROL SULFATE 3 ML: .5; 3 SOLUTION RESPIRATORY (INHALATION) at 06:37

## 2019-01-28 RX ADMIN — DOXYCYCLINE 100 MG: 100 TABLET, FILM COATED ORAL at 19:44

## 2019-01-28 RX ADMIN — MAGNESIUM CITRATE 296 ML: 1.75 LIQUID ORAL at 18:10

## 2019-01-28 RX ADMIN — IPRATROPIUM BROMIDE AND ALBUTEROL SULFATE 3 ML: .5; 3 SOLUTION RESPIRATORY (INHALATION) at 23:00

## 2019-01-28 RX ADMIN — PROPRANOLOL HYDROCHLORIDE 60 MG: 60 CAPSULE, EXTENDED RELEASE ORAL at 06:01

## 2019-01-28 RX ADMIN — BUDESONIDE AND FORMOTEROL FUMARATE DIHYDRATE 2 PUFF: 160; 4.5 AEROSOL RESPIRATORY (INHALATION) at 06:38

## 2019-01-28 RX ADMIN — LOSARTAN POTASSIUM 50 MG: 50 TABLET ORAL at 06:01

## 2019-01-28 RX ADMIN — ACETAMINOPHEN 1000 MG: 500 TABLET ORAL at 23:07

## 2019-01-28 RX ADMIN — SIMVASTATIN 40 MG: 40 TABLET, FILM COATED ORAL at 23:05

## 2019-01-28 RX ADMIN — IPRATROPIUM BROMIDE AND ALBUTEROL SULFATE 3 ML: .5; 3 SOLUTION RESPIRATORY (INHALATION) at 16:29

## 2019-01-28 RX ADMIN — ACETAMINOPHEN 1000 MG: 500 TABLET ORAL at 13:53

## 2019-01-28 RX ADMIN — BUDESONIDE AND FORMOTEROL FUMARATE DIHYDRATE 2 PUFF: 160; 4.5 AEROSOL RESPIRATORY (INHALATION) at 19:10

## 2019-01-28 ASSESSMENT — ENCOUNTER SYMPTOMS
BRUISES/BLEEDS EASILY: 0
SHORTNESS OF BREATH: 0
BACK PAIN: 1
SORE THROAT: 0
COUGH: 0
SPUTUM PRODUCTION: 0
CHILLS: 0
HEMOPTYSIS: 0
FOCAL WEAKNESS: 0
ABDOMINAL PAIN: 0
FEVER: 0
HALLUCINATIONS: 0
NERVOUS/ANXIOUS: 0
DIARRHEA: 0
FLANK PAIN: 0
LOSS OF CONSCIOUSNESS: 0
EYE PAIN: 0
SEIZURES: 0
PALPITATIONS: 0
SPEECH CHANGE: 0
BLOOD IN STOOL: 0
STRIDOR: 0
EYE REDNESS: 0
EYE DISCHARGE: 0
VOMITING: 0

## 2019-01-28 NOTE — PROGRESS NOTES
Assumed patient care at 0700. Received report from night shift. Assessment completed. A&Ox4. C/o 4/10 back pain, declines intervention. No s/s of distress. Moderate fall risk, alarm refused despite education, treaded socks on, precautions in place; personal possessions and call light placed within reach. Patient calls appropriately for assistance. POC discussed with pt: NPO for kyphoplasty, heparin gtt held as of midnight per MD. Communication board updated. Pt denies any additional needs at this time.

## 2019-01-28 NOTE — PROGRESS NOTES
Initial message sent to pharmacy for heparin at 0067. Medication not received. Call placed to pharmacy to explain that medication could not be pulled from [facility]. Pharmacy to send pt medication.

## 2019-01-28 NOTE — PROGRESS NOTES
"Received alert and oriented x 4. Check vitals sign and recorded accordingly and due med given per MAR. Monitor sign and symptoms of respiratory distress and treatment given accordingly per MAR.Medicated per MAR and reassessed every 2 hours per protocol. Call light within reach. Up self, calls for assistance using the rest room. Still c/o back pain due med given as ordered. Awaiting APTT result to initiate Heparin weight based protocol . Instructed NPO midnight for Kypoplasty tomorrow. Needs attended. Will continue to monitor./92   Pulse 85   Temp 36 °C (96.8 °F) (Temporal)   Resp 16   Ht 1.702 m (5' 7\")   Wt 82.1 kg (181 lb)   LMP  (LMP Unknown)   SpO2 93%   Breastfeeding? No   BMI 28.35 kg/m² .  "

## 2019-01-28 NOTE — PROGRESS NOTES
Accepted and admitted from [facility] as ordered by Dr. Alford.  For details refer to H&P per Dr. Alford

## 2019-01-28 NOTE — CARE PLAN
Problem: Safety  Goal: Will remain free from injury    Intervention: Provide assistance with mobility   01/27/19 0433   OTHER   Assistance / Tolerance Standby Assist;Tolerates Well   Calls for assistance when ambulating.      Problem: Knowledge Deficit  Goal: Knowledge of disease process/condition, treatment plan, diagnostic tests, and medications will improve    Intervention: Assess knowledge level of disease process/condition, treatment plan, diagnostic tests, and medications  Education given regarding Heparin weight based protocol and blood draws schedule per protocol, verbalized understanding. Instructed NPO at midnight for Kyphoplasty tomorrow.

## 2019-01-28 NOTE — H&P
H&P  Date of Service: 1/27/2019 8:39 AM  Beck Alford M.D.   [facility]      []Hide copied text  Hospital Medicine History & Physical Note     Date of Service  1/27/2019     Primary Care Physician  Keira Mendiola P.A.-C.     Consultants  IR     Code Status  DNR- we have filled out a POLST form on this admission.      Chief Complaint  Back pain and shortness of breath.      History of Presenting Illness  68 y.o. female who presented 1/24/2019 with complaints of back pain and shortness of breath. She has been complaining of ongoing COPD flare symptoms since the beginning of December.  She has had 2 courses of antibiotics and steroids and is currently on a course of azithromycin oral prednisone without improvement.  On arrival to the emergency department she was 87% on room air she was also using accessory muscles, tachypneic and speaking in 2-3 word sentences. In addition she was complaining of severe back pain and has a history of compression fractures. She was admitted and treated for a COPD flare with IV steroids and RT protocols. An MRI revealed an acute L2 compression fracture. Her breathing improved through her hospital course. I discussed her findings with Dr Summers in IR and she is a candidate for a kyphoplasty. She is chronically anticoagulated for a DVT. She has been taken off xarelto and bridged with heparin for the last 3 days and will be transferred to [facility] for a kyphoplasty. Her pain has not improved. She should be stable enough now from a respiratory standpoint for this procedure.      Review of Systems  Review of Systems   Constitutional: Negative for chills and fever.   HENT: Negative for sore throat.    Eyes: Negative for blurred vision and pain.   Respiratory: Positive for shortness of breath and wheezing. Negative for cough.    Cardiovascular: Negative for chest pain and palpitations.   Gastrointestinal: Negative for abdominal pain, nausea and vomiting.   Genitourinary:  Negative for dysuria and urgency.   Musculoskeletal: Positive for back pain. Negative for neck pain.   Skin: Negative for itching and rash.   Neurological: Negative for dizziness, tingling and headaches.   Psychiatric/Behavioral: Negative for depression. The patient does not have insomnia.    All other systems reviewed and are negative.        Past Medical History   has a past medical history of Anesthesia; Asthma; Backpain (7/2017); Blood clot in vein (07/06/2018); Bowel habit changes (07/06/2018); Breath shortness; Bronchitis; CAD (coronary artery disease); Cancer (HCC) (2016); Chickenpox; COPD (chronic obstructive pulmonary disease) (HCC); Dialysis (2005); Emphysema of lung (HCC); Glaucoma; Hemorrhagic disorder (HCC); Hyperlipidemia; Hypertension; Hyperthyroidism; Kidney stone; Lung cancer (HCC) (12/12/2016); Multiple thyroid nodules (7/9/2015); Nasal drainage; Osteoporosis; Pain (07/06/2018); Personal history of venous thrombosis and embolism (2004, 2012); Pneumonia (7/2016); Renal disorder; Renal stones (2013); Rheumatoid arthritis (HCC); Rheumatoid arthritis (HCC); S/P appendectomy (1998 ); S/P cholecystectomy (1998); S/P kyphoplasty (2006, 2007, 2013); Sepsis(995.91) (2005); Shortness of breath (07/06/2018); and Thyroid nodule, hot (2017). She also has no past medical history of Angina; Arrhythmia; Breast cancer (HCC); CATARACT; Congestive heart failure (HCC); Diabetes; Fall; Heart murmur; Heart valve disease; Indigestion; Jaundice; Liver disease; Myocardial infarct (HCC); Other specified symptom associated with female genital organs; Pacemaker; Psychiatric disorder; Psychiatric problem; Rheumatic fever; Seizure (HCC); Seizure disorder (HCC); Stroke (HCC); or Unspecified urinary incontinence.     Surgical History   has a past surgical history that includes other; other abdominal surgery; pr enlarge breast with implant; pr reduction of large breast; appendectomy; cholecystectomy; laminotomy; lung biopsy open;  thoracoscopy (Left, 12/12/2016); other orthopedic surgery (2013); cystoscopy stent placement (Left, 6/18/2018); lithotripsy (Left, 6/18/2018); lasertripsy (Left, 6/18/2018); ureteroscopy (Left, 6/18/2018); cystoscopy stent placement (7/9/2018); ureteroscopy (Left, 7/9/2018); and lasertripsy (Left, 7/9/2018).      Family History  family history includes Cancer in her mother; Heart Disease in her brother; Heart Failure in her father.      Social History   reports that she quit smoking about 19 years ago. Her smoking use included Cigarettes. She has a 30.00 pack-year smoking history. She has never used smokeless tobacco. She reports that she drinks alcohol. She reports that she does not use drugs.     Allergies       Allergies   Allergen Reactions   • Nkda [No Known Drug Allergy]           Medications  Prior to Admission Medications   Prescriptions Last Dose Informant Patient Reported? Taking?   BREO ELLIPTA 200-25 MCG/INH AEROSOL POWDER, BREATH ACTIVATED 1/24/2019 at 0700 Patient No No   Sig: INHALE ONE DOSE BY MOUTH DAILY. RINSE MOUTH AFTER USE.   COMBIGAN 0.2-0.5 % Solution 1/24/2019 at 0700 Patient Yes No   Sig: Place 1 Drop in both eyes 2 Times a Day.   Fluticasone-Umeclidin-Vilant (TRELEGY ELLIPTA) 100-62.5-25 MCG/INH AEROSOL POWDER, BREATH ACTIVATED     No No   Sig: Inhale 1 Puff by mouth 1 time daily as needed.   HYDROcodone/acetaminophen (NORCO)  MG Tab 1/23/2019 at 1400 Patient Yes No   Sig: Take 1-2 Tabs by mouth as needed.   PROAIR  (90 Base) MCG/ACT Aero Soln inhalation aerosol 1/24/2019 at 1000   No No   Sig: INHALE TWO PUFFS BY MOUTH EVERY 6 HOURS AS NEEDED FOR SHORTNESS OF BREATH   SPIRIVA RESPIMAT 2.5 MCG/ACT Aero Soln 1/24/2019 at 1700   No No   Sig: INHALE TWO PUFFS BY MOUTH DAILY. ASSEMBLE AND PRIME.   Tiotropium Bromide Monohydrate (SPIRIVA RESPIMAT) 2.5 MCG/ACT Aero Soln 1/24/2019 at 1700 Patient Yes No   Sig: Inhale 1 Puff by mouth 2 Times a Day.   azithromycin (ZITHROMAX) 250 MG Tab      No No   Sig: Take two tabs po day one followed by one tab po day two through five with food   docusate sodium (COLACE) 100 MG Cap 1/24/2019 at 1700   No No   Sig: Take 1 Cap by mouth 2 times a day.   enoxaparin (LOVENOX) 80 MG/0.8ML Solution inj     Yes No   Sig: Inject 1 Syringe as instructed every 12 hours.   ipratropium-albuterol (DUONEB) 0.5-2.5 (3) MG/3ML nebulizer solution 1/24/2019 at 1700   No No   Sig: 3 mL by Nebulization route 4 times a day.   losartan (COZAAR) 50 MG TABS 1/24/2019 at 0700 Patient Yes No   Sig: Take 50 mg by mouth every morning.   predniSONE (DELTASONE) 20 MG Tab 1/14/2019 at 0700   No No   Sig: Take 40 mg PO daily for 5 days   propranolol LA (INDERAL LA) 60 MG CAPSULE SR 24 HR 1/24/2019 at 0700 Patient Yes No   Sig: Take 60 mg by mouth every morning.   rivaroxaban (XARELTO) 20 MG Tab tablet 1/24/2019 at 1700 Patient No No   Sig: Take 1 Tab by mouth with dinner.   simvastatin (ZOCOR) 40 MG Tab 1/23/2019 at 2200 Patient Yes No   Sig: Take 40 mg by mouth every evening.   vitamin D, Ergocalciferol, (DRISDOL) 91131 UNITS Cap capsule 1/21/2019 at 0700 Patient Yes No   Sig: Take 50,000 Units by mouth every 14 days.   zolpidem (AMBIEN) 10 MG Tab 1/23/2019 at 2330 Patient Yes No   Sig: Take 10 mg by mouth at bedtime as needed for Sleep.      Facility-Administered Medications: None         Physical Exam  Temp:  [36.4 °C (97.5 °F)-36.6 °C (97.8 °F)] 36.4 °C (97.5 °F)  Pulse:  [] 79  Resp:  [18-20] 18  BP: (110-158)/(58-91) 144/81  SpO2:  [90 %-98 %] 91 %     Physical Exam   Constitutional: She is oriented to person, place, and time. She appears well-developed and well-nourished. No distress.   Patient seen and examined  Discussed plan with ALLAN NEVEST:   Right Ear: External ear normal.   Left Ear: External ear normal.   Nose: Nose normal.   Eyes: Conjunctivae are normal. Right eye exhibits no discharge. Left eye exhibits no discharge.   Neck: No JVD present.   Cardiovascular: Regular rhythm  and normal heart sounds.    No murmur heard.  Cap refill 2sec  Pulses 2+ throughout     Pulmonary/Chest: Effort normal. No stridor. No respiratory distress. She has wheezes. She has no rales.   Decreased bs throughout. Improved wheezing from admission.    Abdominal: Soft. Bowel sounds are normal. She exhibits no distension. There is no tenderness.   Musculoskeletal: She exhibits tenderness. She exhibits no edema.   Neurological: She is alert and oriented to person, place, and time.   Skin: Skin is warm and dry. She is not diaphoretic. No erythema.   Normal skin  Color.    Psychiatric: She has a normal mood and affect. Her behavior is normal.   Nursing note and vitals reviewed.        Laboratory:        Recent Labs      01/25/19   0405  01/26/19   0603  01/27/19   0602   WBC  5.8  13.3*  9.9   RBC  4.20  3.84*  3.84*   HEMOGLOBIN  14.0  12.5  12.6   HEMATOCRIT  42.2  37.4  37.7   MCV  100.5*  97.4  98.2*   MCH  33.3*  32.6  32.8   MCHC  33.2*  33.4*  33.4*   RDW  44.7  43.5  45.0   PLATELETCT  173  180  178   MPV  11.2  10.6  11.0            Recent Labs      01/25/19   0405  01/26/19   0603  01/27/19   0602   SODIUM  136  138  139   POTASSIUM  3.9  4.2  4.3   CHLORIDE  107  109  111   CO2  22  22  22   GLUCOSE  181*  166*  134*   BUN  31*  41*  37*   CREATININE  1.52*  1.38  1.17   CALCIUM  9.5  9.5  9.3             Recent Labs      01/24/19   1250  01/25/19   0405  01/26/19   0603  01/27/19   0602   ALTSGPT  19   --   16   --    ASTSGOT  18   --   17   --    ALKPHOSPHAT  83   --   71   --    TBILIRUBIN  0.9   --   0.5   --    GLUCOSE  124*  181*  166*  134*             Recent Labs      01/25/19   1519  01/25/19   2238  01/26/19   0603  01/27/19   0602   APTT  31.9  93.8*  78.7*  58.7*   INR  1.17*   --    --    --           Recent Labs      01/24/19   1250   BNPBTYPENAT  66              Recent Labs      01/24/19   1250   TROPONINI  <0.02         Urinalysis:    No results found      Imaging:  MR-LUMBAR SPINE-W/O    Final Result       1.  Acute compression fracture of L2 with concave deformity of inferior endplate and moderate loss of height, as seen on recent CT lumbar spine.   2.  Chronic moderate compression of L1 and T11.   3.  No significant central canal narrowing.       CT-LSPINE W/O PLUS RECONS   Final Result       1.  New compression deformity at the L2 vertebral body with approximately 30-40% loss of height.       2.  Stable compression deformity of the L1 vertebral body.       3.  Osteopenia.       4.  Levoscoliosis.       5.  Minimal anterolisthesis at L3-4.       DX-CHEST-PORTABLE (1 VIEW)   Final Result       Stable hyperinflation, cardiac silhouette enlargement and pulmonary arterial hypertension                Assessment/Plan:  I anticipate this patient will require at least two midnights for appropriate medical management, necessitating inpatient admission.         Acute exacerbation of chronic obstructive pulmonary disease (COPD) (HCC)- (present on admission)   Assessment & Plan     improved  Prolonged exacerbation failing outpatient therapy with oral azithromycin and oral prednisone.  Copd exacerbation order set ongoing .  Continue inhaled steroid, DuoNeb treatments every 4 hours.  RT protocol.  Empiric antibiotics given intractable nature of her symptoms including ceftriaxone IV and oral doxycycline.  IV methylprednisolone 40 mg every 8 hours.  Supplemental oxygen as needed.  COPD education.      Acute respiratory failure with hypoxia (HCC)- (present on admission)   Assessment & Plan     87% on room air at the time of admission.  she has a concentrator at home but has not needed to use it yet.  Treating COPD exacerbation. Improving   Steroids ongoing. tatum not change to PO given she failed outpatient po steroids.   RT protocol      Closed wedge compression fracture of first lumbar vertebra (HCC)- (present on admission)   Assessment & Plan     MRI of spine and my discussion with Dr Summers in IR shows she is a  good candidate for a kyphoplasty procedure.  Treat COPD exacerbation first then will set her up for a monday kyphoplasty.  She will be bridged with a heparin drip while her Xarelto is held.  Patient has had thrombotic event when taken off of anticoagulation for procedures in the past and needs to be bridged.  Plan on transfer tomorrow .      Lung cancer (HCC)- (present on admission)   Assessment & Plan     I have reviewed her biopsy and path results. She has had a resection. No obvious recurrence or pathalogic fracture seen for now. She follows with oncology.       DVT (deep venous thrombosis) (Formerly [facility])- (present on admission)   Assessment & Plan     6 years ago.  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  Off Xarelto for 3 days now.   Continue heparin gtt for planned kypho      Rheumatoid arthritis (Former[facility])   Assessment & Plan     ?treatment. Unclear right now. Not on any. No obvious flare.      Osteoporosis- (present on admission)   Assessment & Plan     Patient was previously on Prolia injections and has been off them secondary to complicating health problems.  Recommend that she establish with a new primary care provider and investigate resuming these.  Due to her history of renal failure she is not able to be on bisphosphonates.      CKD (chronic kidney disease) stage 3, GFR 30-59 ml/min (Formerly [facility])- (present on admission)   Assessment & Plan     Stable at baseline.  History of prior dialysis.  Avoid nephrotoxins.      CAD (coronary artery disease)- (present on admission)   Assessment & Plan     No flare. Trend on tele. Continue meds.       HTN (hypertension)- (present on admission)   Assessment & Plan     Continue losartan and propranolol.      Hyperlipidemia- (present on admission)   Assessment & Plan     statin            VTE prophylaxis: heparin gtt

## 2019-01-28 NOTE — PROGRESS NOTES
Heparin weight based initiated as instructed after APTT result came back at 2035.Called MD on call for order Initial bolus Heparin 5000 units iv per protocol. Will continue to monitor.

## 2019-01-28 NOTE — CARE PLAN
Problem: Knowledge Deficit  Goal: Knowledge of disease process/condition, treatment plan, diagnostic tests, and medications will improve    Intervention: Assess knowledge level of disease process/condition, treatment plan, diagnostic tests, and medications  Patient NPO as of midnight for Kyphoplasty this afternoon. Discussed with pt and MD at bedside. Questions answered.       Problem: Pain Management  Goal: Pain level will decrease to patient's comfort goal    Intervention: Follow pain managment plan developed in collaboration with patient and Interdisciplinary Team  Back pain managed with PRN tylenol. Patient reports adequate relief. Will continue to assess.

## 2019-01-28 NOTE — PROGRESS NOTES
· 2 RN skin check complete.   · Devices in place NA.  · Skin assessed under devices NA.  · Confirmed pressure ulcers found on NA.  · New potential pressure ulcers noted on NA. Wound consult placed? NA. Photo uploaded? NA. MIDAS completed? NA  · The following interventions are in place pt turning self and up self.    All bony prominences assess, skin assessed, and behind ears, all skin is intact.

## 2019-01-28 NOTE — PROGRESS NOTES
Patient heparin gtt restarted at previous rate of 1050 units/hr per MD. No re-bolus. Next aptt scheduled at 2000.

## 2019-01-28 NOTE — PROGRESS NOTES
[facility] Medicine Daily Progress Note    Date of Service  1/28/2019    Chief Complaint  Back pain shortness of breath, transferred from [facility] to [place] for kyphoplasty     Hospital Course      68 y.o. female with past medical history of chronic obstructive pulmonary disease, coronary artery disease, hypertension, dyslipidemia, chronic kidney disease stage III lung cancer, history of deep vein thrombosis on chronic anticoagulation, rheumatoid arthritis and osteoporosis transferred from [facility] to [facility] on 1/27/2019 for kyphoplasty.  She was initially admitted at [facility] for his COPD exacerbation and was found to have a compression fracture at L2.          Interval Problem Update  Denies having shortness of breath appointment, saturating well on room air.  Encourage incentive spirometry.  Discussed the CODE STATUS with the patient.  She wants to be DNAR, with okay for intubation if needed.   Stiffness swelling left arm, Hx of multiple DVTs, checking US venous  Continues to have back pain, plan for kyphoplasty.  Either later today or tomorrow.  Has been getting heparin drip for bridging anticoagulation.  Last hemoglobin was 12.6, platelets 178, WBC 9.9  I am ordering a CBC to monitor hemoglobin and platelet as the patient is on heparin drip  Last BMP reflects a creatinine of 1.17, and a BUN of 37  I am ordering a BMP to follow-up on renal function  Discussed with patient, patient's nurse and with multidisciplinary team during rounds including , pharmacist and charge nurse.     Consultants/Specialty  IR for kyphoplasty     Code Status  DNAR/DNI    Disposition  TBD    Review of Systems  Review of Systems   Constitutional: Negative for chills and fever.   HENT: Negative for congestion and sore throat.    Eyes: Negative for pain, discharge and redness.   Respiratory: Negative for cough, hemoptysis, sputum production, shortness of breath and stridor.    Cardiovascular:  Negative for chest pain, palpitations and leg swelling.   Gastrointestinal: Negative for abdominal pain, blood in stool, diarrhea and vomiting.   Genitourinary: Negative for flank pain, hematuria and urgency.   Musculoskeletal: Positive for back pain.   Skin:        Stiffness swelling left arm   Neurological: Negative for speech change, focal weakness, seizures and loss of consciousness.   Endo/Heme/Allergies: Does not bruise/bleed easily.   Psychiatric/Behavioral: Negative for hallucinations and suicidal ideas. The patient is not nervous/anxious.         Physical Exam  Temp:  [36 °C (96.8 °F)-36.8 °C (98.2 °F)] 36.7 °C (98.1 °F)  Pulse:  [65-85] 81  Resp:  [16-20] 20  BP: (118-151)/(75-92) 118/75  SpO2:  [92 %-94 %] 93 %    Physical Exam   Constitutional: She is oriented to person, place, and time. She appears well-developed and well-nourished. No distress.   HENT:   Head: Normocephalic and atraumatic.   Right Ear: External ear normal.   Left Ear: External ear normal.   Eyes: Pupils are equal, round, and reactive to light. Conjunctivae are normal. Right eye exhibits no discharge. Left eye exhibits no discharge.   Neck: Normal range of motion. Neck supple. No JVD present. No thyromegaly present.   Cardiovascular: Exam reveals no gallop and no friction rub.    No murmur heard.  Pulmonary/Chest: Effort normal and breath sounds normal. No stridor. No respiratory distress. She has no wheezes. She has no rales. She exhibits no tenderness.   Abdominal: Soft. Bowel sounds are normal. She exhibits no distension. There is no tenderness. There is no rebound and no guarding.   Musculoskeletal: Normal range of motion. She exhibits edema and deformity (Rheumatoid arthritis deformities of both hands).   Stiffness swelling left arm   Spinal tenderness lower and mid back      Neurological: She is alert and oriented to person, place, and time. No cranial nerve deficit. Coordination normal.   Skin: Skin is warm and dry. She is not  diaphoretic. No erythema.   Psychiatric: She has a normal mood and affect. Judgment normal.   Nursing note and vitals reviewed.      Fluids    Intake/Output Summary (Last 24 hours) at 01/28/19 1647  Last data filed at 01/28/19 0600   Gross per 24 hour   Intake             1100 ml   Output                0 ml   Net             1100 ml       Laboratory  Recent Labs      01/26/19   0603  01/27/19   0602   WBC  13.3*  9.9   RBC  3.84*  3.84*   HEMOGLOBIN  12.5  12.6   HEMATOCRIT  37.4  37.7   MCV  97.4  98.2*   MCH  32.6  32.8   MCHC  33.4*  33.4*   RDW  43.5  45.0   PLATELETCT  180  178   MPV  10.6  11.0     Recent Labs      01/26/19   0603  01/27/19   0602   SODIUM  138  139   POTASSIUM  4.2  4.3   CHLORIDE  109  111   CO2  22  22   GLUCOSE  166*  134*   BUN  41*  37*   CREATININE  1.38  1.17   CALCIUM  9.5  9.3     Recent Labs      01/27/19   1906  01/28/19   0257  01/28/19   0851   APTT  24.5*  26.8  25.8               Imaging  IR-VERTEBROPLASTY    (Results Pending)   US-EXTREMITY VENOUS UPPER UNILAT LEFT    (Results Pending)        Assessment/Plan  * Closed compression fracture of second lumbar vertebra ([facility])- (present on admission)   Assessment & Plan    Pain control, IR consulted   Plan for kyphoplasty     Acute exacerbation of chronic obstructive pulmonary disease (COPD) ([facility])- (present on admission)   Assessment & Plan    Was treated intravenous steroids at [facility]  Oxygen as needed, Respiratory protocol, Bronchodilators, Incentive spirometry  Doxycycline      Lung cancer ([facility])- (present on admission)   Assessment & Plan    Follow with oncology as instructed      DVT (deep venous thrombosis) ([facility])- (present on admission)   Assessment & Plan    History of multiple deep vein thrombosis episodes  On chronic anticoagulation since thrombosis in her left eye resulting in blindness  Has been on heparin drip and off Xarelto for planned kyphoplasty      Rheumatoid arthritis ([facility])- (present on admission)    Assessment & Plan    With deformities in her hands   Not in a flare right now  Pain control     Osteoporosis- (present on admission)   Assessment & Plan    Not on biphosphonate due to chronic kidney disease  Was on Prolia injection  Gonzalez cath with primary care      CKD (chronic kidney disease) stage 3, GFR 30-59 ml/min (Former[facility])- (present on admission)   Assessment & Plan    Currently stable at stage III  Avoid nephrotoxins as much as possible  Monitor inputs and outputs      CAD (coronary artery disease)- (present on admission)   Assessment & Plan    Beta-blockers, ACE inhibitor, statin  Not currently in chest pain       HTN (hypertension)- (present on admission)   Assessment & Plan    Currently controlled, continue to monitor   Losartan, propranolol     Hyperlipidemia- (present on admission)   Assessment & Plan    Simvastatin          VTE prophylaxis: Was on Xarelto transition to heparin drip for anticipated kyphoplasty consider restarting Xarelto post kyphoplasty.

## 2019-01-28 NOTE — PROGRESS NOTES
Heparin not received on unit. Call place to pharmacy. Pharmacy stating that medication was tubed. Medication not on unit. Pharmacy to resend.

## 2019-01-28 NOTE — PROGRESS NOTES
Heparin received on unit. MD paged regarding delay in receiving heparin. MD ordered aPTT to be redrawn and to restart the heparin without an initial rebolus.

## 2019-01-29 ENCOUNTER — APPOINTMENT (OUTPATIENT)
Dept: RADIOLOGY | Facility: MEDICAL CENTER | Age: 69
DRG: 981 | End: 2019-01-29
Attending: HOSPITALIST
Payer: MEDICARE

## 2019-01-29 LAB
ANION GAP SERPL CALC-SCNC: 5 MMOL/L (ref 0–11.9)
APTT PPP: 28.3 SEC (ref 24.7–36)
BASOPHILS # BLD AUTO: 0.1 % (ref 0–1.8)
BASOPHILS # BLD: 0.01 K/UL (ref 0–0.12)
BUN SERPL-MCNC: 35 MG/DL (ref 8–22)
CALCIUM SERPL-MCNC: 10.2 MG/DL (ref 8.5–10.5)
CHLORIDE SERPL-SCNC: 107 MMOL/L (ref 96–112)
CO2 SERPL-SCNC: 28 MMOL/L (ref 20–33)
CREAT SERPL-MCNC: 1.13 MG/DL (ref 0.5–1.4)
EOSINOPHIL # BLD AUTO: 0.21 K/UL (ref 0–0.51)
EOSINOPHIL NFR BLD: 3.1 % (ref 0–6.9)
ERYTHROCYTE [DISTWIDTH] IN BLOOD BY AUTOMATED COUNT: 46.6 FL (ref 35.9–50)
GLUCOSE SERPL-MCNC: 91 MG/DL (ref 65–99)
HCT VFR BLD AUTO: 40.1 % (ref 37–47)
HGB BLD-MCNC: 13.1 G/DL (ref 12–16)
IMM GRANULOCYTES # BLD AUTO: 0.03 K/UL (ref 0–0.11)
IMM GRANULOCYTES NFR BLD AUTO: 0.4 % (ref 0–0.9)
LYMPHOCYTES # BLD AUTO: 2.88 K/UL (ref 1–4.8)
LYMPHOCYTES NFR BLD: 43.2 % (ref 22–41)
MCH RBC QN AUTO: 32.7 PG (ref 27–33)
MCHC RBC AUTO-ENTMCNC: 32.7 G/DL (ref 33.6–35)
MCV RBC AUTO: 100 FL (ref 81.4–97.8)
MONOCYTES # BLD AUTO: 0.55 K/UL (ref 0–0.85)
MONOCYTES NFR BLD AUTO: 8.2 % (ref 0–13.4)
NEUTROPHILS # BLD AUTO: 2.99 K/UL (ref 2–7.15)
NEUTROPHILS NFR BLD: 45 % (ref 44–72)
NRBC # BLD AUTO: 0 K/UL
NRBC BLD-RTO: 0 /100 WBC
PLATELET # BLD AUTO: 203 K/UL (ref 164–446)
PMV BLD AUTO: 10.7 FL (ref 9–12.9)
POTASSIUM SERPL-SCNC: 4.7 MMOL/L (ref 3.6–5.5)
RBC # BLD AUTO: 4.01 M/UL (ref 4.2–5.4)
SODIUM SERPL-SCNC: 140 MMOL/L (ref 135–145)
WBC # BLD AUTO: 6.7 K/UL (ref 4.8–10.8)

## 2019-01-29 PROCEDURE — 770006 HCHG ROOM/CARE - MED/SURG/GYN SEMI*

## 2019-01-29 PROCEDURE — 99232 SBSQ HOSP IP/OBS MODERATE 35: CPT | Performed by: HOSPITALIST

## 2019-01-29 PROCEDURE — 700112 HCHG RX REV CODE 229: Performed by: HOSPITALIST

## 2019-01-29 PROCEDURE — 85025 COMPLETE CBC W/AUTO DIFF WBC: CPT

## 2019-01-29 PROCEDURE — A9270 NON-COVERED ITEM OR SERVICE: HCPCS | Performed by: HOSPITALIST

## 2019-01-29 PROCEDURE — 700102 HCHG RX REV CODE 250 W/ 637 OVERRIDE(OP): Performed by: HOSPITALIST

## 2019-01-29 PROCEDURE — 0QS03ZZ REPOSITION LUMBAR VERTEBRA, PERCUTANEOUS APPROACH: ICD-10-PCS | Performed by: RADIOLOGY

## 2019-01-29 PROCEDURE — 700101 HCHG RX REV CODE 250: Performed by: HOSPITALIST

## 2019-01-29 PROCEDURE — 99153 MOD SED SAME PHYS/QHP EA: CPT

## 2019-01-29 PROCEDURE — 700111 HCHG RX REV CODE 636 W/ 250 OVERRIDE (IP): Performed by: RADIOLOGY

## 2019-01-29 PROCEDURE — 94640 AIRWAY INHALATION TREATMENT: CPT

## 2019-01-29 PROCEDURE — 85730 THROMBOPLASTIN TIME PARTIAL: CPT

## 2019-01-29 PROCEDURE — 93971 EXTREMITY STUDY: CPT | Mod: 26 | Performed by: SURGERY

## 2019-01-29 PROCEDURE — 0QU03JZ SUPPLEMENT LUMBAR VERTEBRA WITH SYNTHETIC SUBSTITUTE, PERCUTANEOUS APPROACH: ICD-10-PCS | Performed by: RADIOLOGY

## 2019-01-29 PROCEDURE — 93971 EXTREMITY STUDY: CPT | Mod: LT

## 2019-01-29 PROCEDURE — 80048 BASIC METABOLIC PNL TOTAL CA: CPT

## 2019-01-29 PROCEDURE — 700111 HCHG RX REV CODE 636 W/ 250 OVERRIDE (IP)

## 2019-01-29 PROCEDURE — 94760 N-INVAS EAR/PLS OXIMETRY 1: CPT

## 2019-01-29 RX ORDER — ONDANSETRON 2 MG/ML
4 INJECTION INTRAMUSCULAR; INTRAVENOUS EVERY 8 HOURS PRN
Status: CANCELLED | OUTPATIENT
Start: 2019-01-29 | End: 2019-01-30

## 2019-01-29 RX ORDER — TIOTROPIUM BROMIDE 18 UG/1
18 CAPSULE ORAL; RESPIRATORY (INHALATION) DAILY
Qty: 30 CAP | Refills: 3 | Status: SHIPPED | OUTPATIENT
Start: 2019-01-30 | End: 2019-02-10

## 2019-01-29 RX ORDER — DOXYCYCLINE 100 MG/1
100 TABLET ORAL EVERY 12 HOURS
Qty: 6 TAB | Refills: 0 | Status: SHIPPED | OUTPATIENT
Start: 2019-01-29 | End: 2019-02-01

## 2019-01-29 RX ORDER — CEFAZOLIN SODIUM 1 G/3ML
INJECTION, POWDER, FOR SOLUTION INTRAMUSCULAR; INTRAVENOUS
Status: COMPLETED
Start: 2019-01-29 | End: 2019-01-29

## 2019-01-29 RX ORDER — ONDANSETRON 2 MG/ML
4 INJECTION INTRAMUSCULAR; INTRAVENOUS PRN
Status: ACTIVE | OUTPATIENT
Start: 2019-01-29 | End: 2019-01-29

## 2019-01-29 RX ORDER — MIDAZOLAM HYDROCHLORIDE 1 MG/ML
INJECTION INTRAMUSCULAR; INTRAVENOUS
Status: COMPLETED
Start: 2019-01-29 | End: 2019-01-29

## 2019-01-29 RX ORDER — SODIUM CHLORIDE 9 MG/ML
500 INJECTION, SOLUTION INTRAVENOUS
Status: ACTIVE | OUTPATIENT
Start: 2019-01-29 | End: 2019-01-29

## 2019-01-29 RX ORDER — MIDAZOLAM HYDROCHLORIDE 1 MG/ML
.5-2 INJECTION INTRAMUSCULAR; INTRAVENOUS PRN
Status: ACTIVE | OUTPATIENT
Start: 2019-01-29 | End: 2019-01-29

## 2019-01-29 RX ORDER — FLUTICASONE PROPIONATE 50 MCG
2 SPRAY, SUSPENSION (ML) NASAL DAILY
Qty: 16 G | Refills: 3 | Status: SHIPPED | OUTPATIENT
Start: 2019-01-30 | End: 2019-02-10

## 2019-01-29 RX ORDER — ALBUTEROL SULFATE 90 UG/1
2 AEROSOL, METERED RESPIRATORY (INHALATION)
Status: DISCONTINUED | OUTPATIENT
Start: 2019-01-29 | End: 2019-01-29

## 2019-01-29 RX ORDER — BUPIVACAINE HYDROCHLORIDE 2.5 MG/ML
INJECTION, SOLUTION EPIDURAL; INFILTRATION; INTRACAUDAL
Status: COMPLETED
Start: 2019-01-29 | End: 2019-01-29

## 2019-01-29 RX ORDER — OXYCODONE HYDROCHLORIDE 10 MG/1
10 TABLET ORAL
Status: CANCELLED | OUTPATIENT
Start: 2019-01-29 | End: 2019-01-30

## 2019-01-29 RX ORDER — ALBUTEROL SULFATE 90 UG/1
2 AEROSOL, METERED RESPIRATORY (INHALATION)
Status: DISCONTINUED | OUTPATIENT
Start: 2019-01-29 | End: 2019-01-30 | Stop reason: HOSPADM

## 2019-01-29 RX ORDER — HYDROMORPHONE HYDROCHLORIDE 1 MG/ML
0.5 INJECTION, SOLUTION INTRAMUSCULAR; INTRAVENOUS; SUBCUTANEOUS
Status: CANCELLED | OUTPATIENT
Start: 2019-01-29 | End: 2019-01-30

## 2019-01-29 RX ORDER — OXYCODONE HYDROCHLORIDE 5 MG/1
5 TABLET ORAL
Status: CANCELLED | OUTPATIENT
Start: 2019-01-29 | End: 2019-01-30

## 2019-01-29 RX ORDER — CEFAZOLIN SODIUM 2 G/100ML
2 INJECTION, SOLUTION INTRAVENOUS ONCE
Status: COMPLETED | OUTPATIENT
Start: 2019-01-29 | End: 2019-01-29

## 2019-01-29 RX ADMIN — ZOLPIDEM TARTRATE 5 MG: 5 TABLET ORAL at 23:58

## 2019-01-29 RX ADMIN — LOSARTAN POTASSIUM 50 MG: 50 TABLET ORAL at 05:47

## 2019-01-29 RX ADMIN — DOCUSATE SODIUM 100 MG: 100 CAPSULE, LIQUID FILLED ORAL at 05:47

## 2019-01-29 RX ADMIN — SIMVASTATIN 40 MG: 40 TABLET, FILM COATED ORAL at 20:29

## 2019-01-29 RX ADMIN — FENTANYL CITRATE 50 MCG: 50 INJECTION, SOLUTION INTRAMUSCULAR; INTRAVENOUS at 17:46

## 2019-01-29 RX ADMIN — DOCUSATE SODIUM 100 MG: 100 CAPSULE, LIQUID FILLED ORAL at 18:50

## 2019-01-29 RX ADMIN — ACETAMINOPHEN 1000 MG: 500 TABLET ORAL at 18:46

## 2019-01-29 RX ADMIN — MIDAZOLAM 1 MG: 1 INJECTION INTRAMUSCULAR; INTRAVENOUS at 18:01

## 2019-01-29 RX ADMIN — FENTANYL CITRATE 50 MCG: 50 INJECTION, SOLUTION INTRAMUSCULAR; INTRAVENOUS at 18:14

## 2019-01-29 RX ADMIN — FLUTICASONE PROPIONATE 100 MCG: 50 SPRAY, METERED NASAL at 05:48

## 2019-01-29 RX ADMIN — IPRATROPIUM BROMIDE AND ALBUTEROL SULFATE 3 ML: .5; 3 SOLUTION RESPIRATORY (INHALATION) at 15:51

## 2019-01-29 RX ADMIN — FENTANYL CITRATE 50 MCG: 50 INJECTION, SOLUTION INTRAMUSCULAR; INTRAVENOUS at 18:03

## 2019-01-29 RX ADMIN — IPRATROPIUM BROMIDE AND ALBUTEROL SULFATE 3 ML: .5; 3 SOLUTION RESPIRATORY (INHALATION) at 06:30

## 2019-01-29 RX ADMIN — SENNOSIDES AND DOCUSATE SODIUM 2 TABLET: 8.6; 5 TABLET ORAL at 05:47

## 2019-01-29 RX ADMIN — BUPIVACAINE HYDROCHLORIDE 20 ML: 2.5 INJECTION, SOLUTION EPIDURAL; INFILTRATION; INTRACAUDAL; PERINEURAL at 18:05

## 2019-01-29 RX ADMIN — RIVAROXABAN 20 MG: 20 TABLET, FILM COATED ORAL at 20:29

## 2019-01-29 RX ADMIN — BRIMONIDINE TARTRATE AND TIMOLOL MALEATE 1 DROP: 2; 5 SOLUTION OPHTHALMIC at 18:50

## 2019-01-29 RX ADMIN — MIDAZOLAM 1 MG: 1 INJECTION INTRAMUSCULAR; INTRAVENOUS at 17:55

## 2019-01-29 RX ADMIN — IPRATROPIUM BROMIDE AND ALBUTEROL SULFATE 3 ML: .5; 3 SOLUTION RESPIRATORY (INHALATION) at 09:40

## 2019-01-29 RX ADMIN — OXYCODONE HYDROCHLORIDE 5 MG: 5 TABLET ORAL at 21:51

## 2019-01-29 RX ADMIN — MIDAZOLAM 1 MG: 1 INJECTION INTRAMUSCULAR; INTRAVENOUS at 18:10

## 2019-01-29 RX ADMIN — MIDAZOLAM 1 MG: 1 INJECTION INTRAMUSCULAR; INTRAVENOUS at 17:53

## 2019-01-29 RX ADMIN — DOXYCYCLINE 100 MG: 100 TABLET, FILM COATED ORAL at 09:27

## 2019-01-29 RX ADMIN — PROPRANOLOL HYDROCHLORIDE 60 MG: 60 CAPSULE, EXTENDED RELEASE ORAL at 05:47

## 2019-01-29 RX ADMIN — CEFAZOLIN 2 G: 330 INJECTION, POWDER, FOR SOLUTION INTRAMUSCULAR; INTRAVENOUS at 17:53

## 2019-01-29 RX ADMIN — ALBUTEROL SULFATE 2 PUFF: 90 AEROSOL, METERED RESPIRATORY (INHALATION) at 20:42

## 2019-01-29 RX ADMIN — FENTANYL CITRATE 50 MCG: 50 INJECTION, SOLUTION INTRAMUSCULAR; INTRAVENOUS at 17:56

## 2019-01-29 RX ADMIN — TIOTROPIUM BROMIDE 1 CAPSULE: 18 CAPSULE ORAL; RESPIRATORY (INHALATION) at 06:30

## 2019-01-29 RX ADMIN — BUDESONIDE AND FORMOTEROL FUMARATE DIHYDRATE 2 PUFF: 160; 4.5 AEROSOL RESPIRATORY (INHALATION) at 06:30

## 2019-01-29 RX ADMIN — BRIMONIDINE TARTRATE AND TIMOLOL MALEATE 1 DROP: 2; 5 SOLUTION OPHTHALMIC at 05:47

## 2019-01-29 RX ADMIN — IPRATROPIUM BROMIDE AND ALBUTEROL SULFATE 3 ML: .5; 3 SOLUTION RESPIRATORY (INHALATION) at 22:20

## 2019-01-29 ASSESSMENT — ENCOUNTER SYMPTOMS
CONSTIPATION: 0
LOSS OF CONSCIOUSNESS: 0
EYES NEGATIVE: 1
DIZZINESS: 0
DIARRHEA: 0
COUGH: 0
VOMITING: 0
DEPRESSION: 1
ABDOMINAL PAIN: 0
FEVER: 0
BLOOD IN STOOL: 0
NAUSEA: 0
DOUBLE VISION: 0
HEARTBURN: 0
NERVOUS/ANXIOUS: 0
CONSTITUTIONAL NEGATIVE: 1
BACK PAIN: 1
SEIZURES: 0
FOCAL WEAKNESS: 0
CHILLS: 0
DIAPHORESIS: 0
WHEEZING: 0
NEUROLOGICAL NEGATIVE: 1
HEADACHES: 0
HEMOPTYSIS: 0
PALPITATIONS: 0
RESPIRATORY NEGATIVE: 1
BRUISES/BLEEDS EASILY: 0
GASTROINTESTINAL NEGATIVE: 1
CARDIOVASCULAR NEGATIVE: 1

## 2019-01-29 NOTE — DISCHARGE SUMMARY
Discharge Summary    CHIEF COMPLAINT ON ADMISSION  No chief complaint on file.      Reason for Admission  COPD Exacerbation     Admission Date  1/27/2019    CODE STATUS  DNAR, I OK    HPI & HOSPITAL COURSE    Patient is admitted with back pain secondary to compression fractures.  The patient has completed kyphoplasty.  After the kyphoplasty she was feeling a lot better.  The patient at this point is resuming anticoagulation with Xarelto because of a history of deep venous thrombosis behind her left eye.  With the recent study the patient also has shown to have a DVT in the left forearm.  The patient will need to continue with anticoagulation of course. Patient was placed on a heparin infusion while she was off the Xarelto.  At this point the patient's condition is stabilizing.  I have revised her medications and updated her medication list.  She will be on Xarelto 21 days at the 50 mg twice daily because the patient was discovered to have a DVT in the left forearm.  We are not sure if this was a failure of the current anticoagulation versus the fact that the patient was off the anticoagulation too long.  Thus at this point by guidelines it is best to resume the 21-day twice daily 50 mg dose and then she may resume with her daily 20 mg dose.  She has been given new prescriptions and also medications she was missing.  She does have an appointment with her primary care physician patient at this point is otherwise safe and stable for discharge with follow-up with her primary care physician.    Therefore, she is discharged in good and stable condition to home with close outpatient follow-up.    The patient met 2-midnight criteria for an inpatient stay at the time of discharge.    Discharge Date  1/30/2019    FOLLOW UP ITEMS POST DISCHARGE  Follow-up with the primary care physician on February 13    DISCHARGE DIAGNOSES  Principal Problem:    Closed compression fracture of second lumbar vertebra (HCC) POA: Yes  Active  Problems:    Acute exacerbation of chronic obstructive pulmonary disease (COPD) ([facility]) POA: Yes    Hyperlipidemia POA: Yes    HTN (hypertension) POA: Yes    CAD (coronary artery disease) POA: Yes    CKD (chronic kidney disease) stage 3, GFR 30-59 ml/min ([facility]) POA: Yes    Osteoporosis POA: Yes    Rheumatoid arthritis ([facility]) POA: Yes      Overview: question    DVT (deep venous thrombosis) ([facility]) (Chronic) POA: Yes    Lung cancer ([facility]) POA: Yes  Resolved Problems:    * No resolved hospital problems. *      FOLLOW UP  Future Appointments  Date Time Provider Department Center   5/7/2019 1:30 PM Jessenia Byrne M.D. PULM None     Barber Joyner M.D.  [id number]  [facility] [id number]  [phone]    Go on 2/4/2019  PLEASE ARRIVE AT 11:10AM FOR YOUR 11:20AM APPOINTMENT. THANK YOU      MEDICATIONS ON DISCHARGE     Medication List      START taking these medications      Instructions   doxycycline monohydrate 100 MG tablet  Commonly known as:  ADOXA   Take 1 Tab by mouth every 12 hours for 3 days.  Dose:  100 mg     fluticasone 50 MCG/ACT nasal spray  Commonly known as:  FLONASE   Spray 2 Sprays in nose every day.  Dose:  2 Spray        CHANGE how you take these medications      Instructions   * rivaroxaban 20 MG Tabs tablet  What changed:  Another medication with the same name was added. Make sure you understand how and when to take each.  Commonly known as:  XARELTO   Take 1 Tab by mouth with dinner.  Dose:  20 mg     * rivaroxaban 15 MG Tabs tablet  What changed:  You were already taking a medication with the same name, and this prescription was added. Make sure you understand how and when to take each.  Commonly known as:  XARELTO   Take 1 Tab by mouth 2 Times a Day for 21 days.  Dose:  15 mg     * SPIRIVA RESPIMAT 2.5 MCG/ACT Aers  What changed:  Another medication with the same name was added. Make sure you understand how and when to take each.  Generic drug:  Tiotropium Bromide Monohydrate   Inhale  1 Puff by mouth 2 Times a Day.  Dose:  1 Puff     * SPIRIVA RESPIMAT 2.5 MCG/ACT Aers  What changed:  Another medication with the same name was added. Make sure you understand how and when to take each.  Generic drug:  Tiotropium Bromide Monohydrate   INHALE TWO PUFFS BY MOUTH DAILY. ASSEMBLE AND PRIME.     * tiotropium 18 MCG Caps  What changed:  You were already taking a medication with the same name, and this prescription was added. Make sure you understand how and when to take each.  Commonly known as:  SPIRIVA   Inhale 1 Cap by mouth every day.  Dose:  18 mcg        * This list has 5 medication(s) that are the same as other medications prescribed for you. Read the directions carefully, and ask your doctor or other care provider to review them with you.            CONTINUE taking these medications      Instructions   BREO ELLIPTA 200-25 MCG/INH Aepb  Generic drug:  Fluticasone Furoate-Vilanterol   INHALE ONE DOSE BY MOUTH DAILY. RINSE MOUTH AFTER USE.     COMBIGAN 0.2-0.5 % Soln  Generic drug:  Brimonidine Tartrate-Timolol   Place 1 Drop in both eyes 2 Times a Day.  Dose:  1 Drop     docusate sodium 100 MG Caps  Commonly known as:  COLACE   Take 1 Cap by mouth 2 times a day.  Dose:  100 mg     Fluticasone-Umeclidin-Vilant 100-62.5-25 MCG/INH Aepb  Commonly known as:  TRELEGY ELLIPTA   Doctor's comments:  Pls provide 1 months samples  Inhale 1 Puff by mouth 1 time daily as needed.  Dose:  1 Puff     ipratropium-albuterol 0.5-2.5 (3) MG/3ML nebulizer solution  Commonly known as:  DUONEB   3 mL by Nebulization route 4 times a day.  Dose:  3 mL     losartan 50 MG Tabs  Commonly known as:  COZAAR   Take 50 mg by mouth every morning.  Dose:  50 mg     NORCO  MG Tabs  Generic drug:  HYDROcodone/acetaminophen   Take 1-2 Tabs by mouth as needed.  Dose:  1-2 Tab     predniSONE 20 MG Tabs  Commonly known as:  DELTASONE   Take 40 mg PO daily for 5 days     PROAIR  (90 Base) MCG/ACT Aers inhalation  aerosol  Generic drug:  albuterol   INHALE TWO PUFFS BY MOUTH EVERY 6 HOURS AS NEEDED FOR SHORTNESS OF BREATH     propranolol LA 60 MG Cp24  Commonly known as:  INDERAL LA   Take 60 mg by mouth every morning.  Dose:  60 mg     simvastatin 40 MG Tabs  Commonly known as:  ZOCOR   Take 40 mg by mouth every evening.  Dose:  40 mg     vitamin D (Ergocalciferol) 53900 units Caps capsule  Commonly known as:  DRISDOL   Take 50,000 Units by mouth every 14 days.  Dose:  76654 Units     zolpidem 10 MG Tabs  Commonly known as:  AMBIEN   Take 1 Tab by mouth at bedtime as needed for Sleep for up to 30 days.  Dose:  10 mg        STOP taking these medications    azithromycin 250 MG Tabs  Commonly known as:  ZITHROMAX     LOVENOX 80 MG/0.8ML Soln inj  Generic drug:  enoxaparin            Allergies  Allergies   Allergen Reactions   • Nkda [No Known Drug Allergy]        DIET  Orders Placed This Encounter   Procedures   • Diet Order Regular     Standing Status:   Standing     Number of Occurrences:   1     Order Specific Question:   Diet:     Answer:   Regular [1]       ACTIVITY  As tolerated.  Weight bearing as tolerated    CONSULTATIONS  Interventional radiology    PROCEDURES  Kyphoplasty of lumbar 2 vertebrae done on 1/29/2019    LABORATORY  Lab Results   Component Value Date    SODIUM 140 01/29/2019    POTASSIUM 4.7 01/29/2019    CHLORIDE 107 01/29/2019    CO2 28 01/29/2019    GLUCOSE 91 01/29/2019    BUN 35 (H) 01/29/2019    CREATININE 1.13 01/29/2019    CREATININE 1.7 (H) 09/19/2008        Lab Results   Component Value Date    WBC 6.7 01/29/2019    HEMOGLOBIN 13.1 01/29/2019    HEMATOCRIT 40.1 01/29/2019    PLATELETCT 203 01/29/2019        Total time of the discharge process exceeds 35 minutes.

## 2019-01-29 NOTE — PROGRESS NOTES
Spoke with patient along with Dr. Montes, patient agreeable to kyphoplasty under moderate sedation. Called IR and spoke with Hussein who says they will be able to bring her down between 1530 and 1600.

## 2019-01-29 NOTE — ASSESSMENT & PLAN NOTE
Continue at this point with blood pressure management optimization keep systolic blood pressure less than 140 diastolic less than 90.

## 2019-01-29 NOTE — ASSESSMENT & PLAN NOTE
Patient is to undergo kyphoplasty today.  The patient may discharge afterwards or may need to spend the night depending on the pain.

## 2019-01-29 NOTE — ASSESSMENT & PLAN NOTE
Patient's most recent ultrasound does show a basilic vein thrombosis in the left forearm.  Continue with anticoagulation

## 2019-01-29 NOTE — CARE PLAN
Problem: Safety  Goal: Will remain free from falls  Outcome: PROGRESSING AS EXPECTED      Problem: Venous Thromboembolism (VTW)/Deep Vein Thrombosis (DVT) Prevention:  Goal: Patient will participate in Venous Thrombosis (VTE)/Deep Vein Thrombosis (DVT)Prevention Measures  Outcome: PROGRESSING SLOWER THAN EXPECTED    Intervention: Assess and monitor for anticoagulation complications  Patient not on heparin gtt due to loss of IV access.

## 2019-01-29 NOTE — PROGRESS NOTES
"During 2000 assessment, patient's right upper forearm PIV was found to be infiltrated. When pulled, PIV lumen found to be kinked. CIC RN called to bedside to attempt PIV stick. Unsuccessful x 2. MD on-call contacted at 2050 for an EJ order. ED Charge contacted at 2055 for EJ placement. Primary RN informed that a ED RN would be sent up as soon as possible but \"not anytime soon.\" Floor Charge RN aware.    0206: Contacted ED Charge again, who stated he's now able to send up staff to place EJ.   "

## 2019-01-29 NOTE — PROGRESS NOTES
Patient resting in bed. States 8/10 back pain. Refuses pain medication. Patient aware procedure at 1330 this afternoon. Patient NPO. Call light within reach.

## 2019-01-29 NOTE — RESPIRATORY CARE
COPD EDUCATION by COPD CLINICAL EDUCATOR  1/29/2019 at 1:37 PM by Maisha Bowen     Patient interviewed by COPD education team. Patient refused COPD program at this time. A comprehensive packet including information about COPD, treatments, and smoking cessation given.

## 2019-01-29 NOTE — PROGRESS NOTES
Patient belongings locked in lock box at nurses station prior to surgery. Contents are 1 cell phone, 1 white cell phone  and 1 black wallet containing $113 cash.

## 2019-01-29 NOTE — DISCHARGE PLANNING
Pt discussed in IDT rounds. Procedure today. MD to order PT/OT evals for tomorrow to assist with discharge disposition recommendations.

## 2019-01-29 NOTE — ASSESSMENT & PLAN NOTE
Patient is on a low-fat low-cholesterol diet and statin.  Lab Results   Component Value Date/Time    CHOLSTRLTOT 194 03/01/2007 03:25 PM    LDL 96 03/01/2007 03:25 PM    HDL 78 (H) 03/01/2007 03:25 PM    TRIGLYCERIDE 102 03/01/2007 03:25 PM

## 2019-01-29 NOTE — PROGRESS NOTES
Patient PTT was drawn at 0318 and order was placed at 0320. As of now, PTT result has not come back. When lab was contacted about delay, reason given was due to laboratory's downtime and nature of lab order. Heparin gtt. was due to be turned off at 0600, in preparation of scheduled kyphoplasty.

## 2019-01-29 NOTE — PROGRESS NOTES
Multiple attempts made to establish second piv for continuous fluids. One successful iv placed, currently running heparin. MD aware second iv still pending. Per MD, okay to have one piv for heparin gtt and stop Lactated ringers and initiate LR in the am at 0600 once heparin gtt is stopped for kyphoplasty.

## 2019-01-29 NOTE — CARE PLAN
Problem: Bronchoconstriction:  Goal: Improve in air movement and diminished wheezing    Intervention: Implement inhaled treatments  DUO Q4  Symbicort BID  Spiriva QD  Tolerating treatments well

## 2019-01-29 NOTE — THERAPY
PT consult received, Pt to undergo kyphoplasty today, hold PT evaluation until after procedure is completed.      Azucena Fowler PT/DPT  Pager# [phone]

## 2019-01-29 NOTE — THERAPY
Occupational Therapy Contact Note  OT consult rec'd. Kyphoplasty scheduled for this PM. Hold OT eval until post-sx. Will re-attempt as appropriate/able.

## 2019-01-30 ENCOUNTER — PATIENT OUTREACH (OUTPATIENT)
Dept: HEALTH INFORMATION MANAGEMENT | Facility: OTHER | Age: 69
End: 2019-01-30

## 2019-01-30 VITALS
TEMPERATURE: 97.6 F | BODY MASS INDEX: 27.72 KG/M2 | HEIGHT: 67 IN | DIASTOLIC BLOOD PRESSURE: 65 MMHG | HEART RATE: 69 BPM | SYSTOLIC BLOOD PRESSURE: 108 MMHG | OXYGEN SATURATION: 97 % | RESPIRATION RATE: 18 BRPM | WEIGHT: 176.59 LBS

## 2019-01-30 PROCEDURE — 700101 HCHG RX REV CODE 250: Performed by: HOSPITALIST

## 2019-01-30 PROCEDURE — 99239 HOSP IP/OBS DSCHRG MGMT >30: CPT | Performed by: HOSPITALIST

## 2019-01-30 PROCEDURE — 700112 HCHG RX REV CODE 229: Performed by: HOSPITALIST

## 2019-01-30 PROCEDURE — 97165 OT EVAL LOW COMPLEX 30 MIN: CPT

## 2019-01-30 PROCEDURE — 94640 AIRWAY INHALATION TREATMENT: CPT

## 2019-01-30 PROCEDURE — 94760 N-INVAS EAR/PLS OXIMETRY 1: CPT

## 2019-01-30 PROCEDURE — 700102 HCHG RX REV CODE 250 W/ 637 OVERRIDE(OP): Performed by: HOSPITALIST

## 2019-01-30 PROCEDURE — A9270 NON-COVERED ITEM OR SERVICE: HCPCS | Performed by: HOSPITALIST

## 2019-01-30 PROCEDURE — 97161 PT EVAL LOW COMPLEX 20 MIN: CPT

## 2019-01-30 RX ORDER — IPRATROPIUM BROMIDE AND ALBUTEROL SULFATE 2.5; .5 MG/3ML; MG/3ML
3 SOLUTION RESPIRATORY (INHALATION)
Status: DISCONTINUED | OUTPATIENT
Start: 2019-01-30 | End: 2019-01-30 | Stop reason: HOSPADM

## 2019-01-30 RX ORDER — ZOLPIDEM TARTRATE 10 MG/1
10 TABLET ORAL NIGHTLY PRN
Qty: 30 TAB | Refills: 0 | Status: SHIPPED | OUTPATIENT
Start: 2019-01-30 | End: 2019-01-30

## 2019-01-30 RX ORDER — ZOLPIDEM TARTRATE 10 MG/1
10 TABLET ORAL NIGHTLY PRN
Qty: 30 TAB | Refills: 0 | Status: SHIPPED | OUTPATIENT
Start: 2019-01-30 | End: 2019-02-10

## 2019-01-30 RX ADMIN — DOCUSATE SODIUM 100 MG: 100 CAPSULE, LIQUID FILLED ORAL at 05:22

## 2019-01-30 RX ADMIN — OXYCODONE HYDROCHLORIDE 5 MG: 5 TABLET ORAL at 05:22

## 2019-01-30 RX ADMIN — FLUTICASONE PROPIONATE 100 MCG: 50 SPRAY, METERED NASAL at 05:22

## 2019-01-30 RX ADMIN — OXYCODONE HYDROCHLORIDE 10 MG: 5 TABLET ORAL at 10:28

## 2019-01-30 RX ADMIN — LOSARTAN POTASSIUM 50 MG: 50 TABLET ORAL at 05:22

## 2019-01-30 RX ADMIN — ACETAMINOPHEN 1000 MG: 500 TABLET ORAL at 05:22

## 2019-01-30 RX ADMIN — IPRATROPIUM BROMIDE AND ALBUTEROL SULFATE 3 ML: .5; 3 SOLUTION RESPIRATORY (INHALATION) at 08:09

## 2019-01-30 RX ADMIN — BUDESONIDE AND FORMOTEROL FUMARATE DIHYDRATE 2 PUFF: 160; 4.5 AEROSOL RESPIRATORY (INHALATION) at 08:09

## 2019-01-30 RX ADMIN — PROPRANOLOL HYDROCHLORIDE 60 MG: 60 CAPSULE, EXTENDED RELEASE ORAL at 05:24

## 2019-01-30 RX ADMIN — TIOTROPIUM BROMIDE 1 CAPSULE: 18 CAPSULE ORAL; RESPIRATORY (INHALATION) at 08:09

## 2019-01-30 RX ADMIN — SENNOSIDES AND DOCUSATE SODIUM 2 TABLET: 8.6; 5 TABLET ORAL at 05:22

## 2019-01-30 RX ADMIN — BRIMONIDINE TARTRATE AND TIMOLOL MALEATE 1 DROP: 2; 5 SOLUTION OPHTHALMIC at 05:23

## 2019-01-30 ASSESSMENT — COGNITIVE AND FUNCTIONAL STATUS - GENERAL
HELP NEEDED FOR BATHING: A LITTLE
SUGGESTED CMS G CODE MODIFIER MOBILITY: CH
SUGGESTED CMS G CODE MODIFIER DAILY ACTIVITY: CJ
DRESSING REGULAR LOWER BODY CLOTHING: A LITTLE
DAILY ACTIVITIY SCORE: 22
MOBILITY SCORE: 24

## 2019-01-30 ASSESSMENT — GAIT ASSESSMENTS
DEVIATION: BRADYKINETIC;SHUFFLED GAIT
DISTANCE (FEET): 300
GAIT LEVEL OF ASSIST: STAND BY ASSIST

## 2019-01-30 ASSESSMENT — ACTIVITIES OF DAILY LIVING (ADL): TOILETING: INDEPENDENT

## 2019-01-30 NOTE — OR SURGEON
Immediate Post- Operative Note        PostOp Diagnosis: ACUTE L2 COMPRESSION FX    Procedure(s): L2 KYPHOPLASTY      Estimated Blood Loss: Less than 5 ml        Complications: None            1/29/2019     5:49 PM     Romie Santa

## 2019-01-30 NOTE — THERAPY
"Occupational Therapy Evaluation completed.   Functional Status: Pt supine on arrival. Educated on log roll for comfort. Req SBA for supine>sit. Completed FB dressing with SBA including shoes at EOB. Sit>stand with SBA. Ambulated OORoom using wall/furniture for stabilization with SBA and no AD. Returned to room and completed toileting w/SPV and toilet txf with SBA. Left seated EOB on request. RN notified.  Plan of Care: Patient with no further skilled OT needs in the acute care setting at this time  Discharge Recommendations:  Equipment: Grab Bars. Post-acute therapy: Recommend home health or outpatient transitional care services for continued occupational therapy services    See \"Rehab Therapy-Acute\" Patient Summary Report for complete documentation.    Pt is a 67 yo female admitted with SOB and back pain, now s/p L2 kyphoplasty for compression fx. PMHx of osteoporosis, compression fxs, COPD, DVT, CAD, CA, HTN, PNA, and RA. Pt completed ADLs/txfs with SBA-SPV, and demo'd good insight into need to get additional help with IADLs as she lives alone. Recommend no further acute OT, but recommend home health or outpatient transitional care services for continued occupational therapy services.    "

## 2019-01-30 NOTE — PROGRESS NOTES
Assumed care of pt at 0700. Received report from RN. Pt A&Ox4. Assessment complete. Labs reviewed. Pt reports pain of 6/10, currently declining intervention. Pt sitting at EOB eating breakfast this morning. Pt denies SOB, chest pain, dizziness. Pt to be discharged this morning. Pt and RN discussed plan of care. Pt questions answered. Pt needs are met at this time. Bed in lowest and locked position. Call light within reach. Upper bed rails up. Hourly rounding in place.

## 2019-01-30 NOTE — PROGRESS NOTES
Report received. Assumed care, assessment complete. Pt is A & O x 4.  Pt reports pain at 7/10, declines intervention. Fall precautions and appropriate signs in place. P RN extension number provided. Pt educated regarding fall precautions. Bed alarm refused with education. Pt denies any additional needs at this time. Call light within reach.

## 2019-01-30 NOTE — CARE PLAN
Problem: Discharge Barriers/Planning  Goal: Patient's continuum of care needs will be met    Intervention: Collaborate with Transitional Care Team and Interdisciplinary Team to meet discharge needs  PT expected to DC home to self care 1/30      Problem: Pain Management  Goal: Pain level will decrease to patient's comfort goal    Intervention: Follow pain managment plan developed in collaboration with patient and Interdisciplinary Team  Back pain managed with prn Tylenol and oxycodone

## 2019-01-30 NOTE — PROGRESS NOTES
[facility] Medicine Daily Progress Note    Date of Service  1/29/2019    Chief Complaint  68 y.o. female admitted 1/27/2019 with back pain    Hospital Course    Patient is admitted with back pain secondary to compression fractures.  The patient at this point is pending kyphoplasty.  Patient's anticoagulation has been stopped as she was on chronic anticoagulation because of history of DVT.  Patient was placed on a heparin infusion while she was off the Xarelto.  The patient has not also developed a DVT in the left forearm despite of the heparin infusion.  Once the kyphoplasty is done patient may resume on Xarelto treatment and discharged home.      Interval Problem Update  Today the patient's kyphoplasty once again was not adequately scheduled.  Patient was kept n.p.o. and off of any anticoagulation.  At this point interventional radiology was able to take the patient with conscious sedation this afternoon we will see if afterwards the patient's pain is controlled and if she can discharge home.    Consultants/Specialty  Interventional radiology for kyphoplasty    Code Status  DNR, intubation okay    Disposition  To be determined    Review of Systems  Review of Systems   Constitutional: Negative.  Negative for chills, diaphoresis and fever.   HENT: Negative.    Eyes: Negative.  Negative for double vision.   Respiratory: Negative.  Negative for cough, hemoptysis and wheezing.    Cardiovascular: Negative.  Negative for chest pain, palpitations and leg swelling.   Gastrointestinal: Negative.  Negative for abdominal pain, blood in stool, constipation, diarrhea, heartburn, nausea and vomiting.   Genitourinary: Negative.  Negative for frequency, hematuria and urgency.   Musculoskeletal: Positive for back pain. Negative for joint pain.   Skin: Negative.  Negative for itching and rash.   Neurological: Negative.  Negative for dizziness, focal weakness, seizures, loss of consciousness and headaches.   Endo/Heme/Allergies:  Negative.  Does not bruise/bleed easily.   Psychiatric/Behavioral: Positive for depression. Negative for suicidal ideas. The patient is not nervous/anxious.    All other systems reviewed and are negative.       Physical Exam  Temp:  [36.1 °C (97 °F)-37.3 °C (99.2 °F)] 36.1 °C (97 °F)  Pulse:  [61-81] 80  Resp:  [18-22] 18  BP: (117-168)/() 145/113  SpO2:  [92 %-99 %] 92 %    Physical Exam   Constitutional: She is oriented to person, place, and time. She appears well-developed and well-nourished.   HENT:   Head: Normocephalic and atraumatic.   Right Ear: External ear normal.   Left Ear: External ear normal.   Nose: Nose normal.   Mouth/Throat: Oropharynx is clear and moist.   Eyes: Pupils are equal, round, and reactive to light. Conjunctivae and EOM are normal.   Neck: Normal range of motion. Neck supple. No JVD present. No thyromegaly present.   Cardiovascular: Normal rate and regular rhythm.    No murmur heard.  Pulmonary/Chest: She has no wheezes. She has no rales. She exhibits no tenderness.   Abdominal: She exhibits no distension and no mass. There is no tenderness. There is no rebound and no guarding.   Musculoskeletal: Normal range of motion. She exhibits edema (Left arm) and tenderness (Low back and mid back).   Lymphadenopathy:     She has no cervical adenopathy.   Neurological: She is alert and oriented to person, place, and time. She has normal reflexes. No cranial nerve deficit.   Skin: Skin is warm and dry. No rash noted. No erythema.   Psychiatric: She has a normal mood and affect.   Nursing note and vitals reviewed.      Fluids  No intake or output data in the 24 hours ending 01/29/19 1742    Laboratory  Recent Labs      01/27/19   0602  01/29/19   0318   WBC  9.9  6.7   RBC  3.84*  4.01*   HEMOGLOBIN  12.6  13.1   HEMATOCRIT  37.7  40.1   MCV  98.2*  100.0*   MCH  32.8  32.7   MCHC  33.4*  32.7*   RDW  45.0  46.6   PLATELETCT  178  203   MPV  11.0  10.7     Recent Labs      01/27/19   0602   01/29/19 0318   SODIUM  139  140   POTASSIUM  4.3  4.7   CHLORIDE  111  107   CO2  22  28   GLUCOSE  134*  91   BUN  37*  35*   CREATININE  1.17  1.13   CALCIUM  9.3  10.2     Recent Labs      01/28/19   0851  01/28/19 1951 01/29/19 0318   APTT  25.8  26.0  28.3               Imaging  US-EXTREMITY VENOUS UPPER UNILAT LEFT   Final Result      IR-VERTEBROPLASTY    (Results Pending)        Assessment/Plan  * Closed compression fracture of second lumbar vertebra ([facility])- (present on admission)   Assessment & Plan    Patient is to undergo kyphoplasty today.  The patient may discharge afterwards or may need to spend the night depending on the pain.       Acute exacerbation of chronic obstructive pulmonary disease (COPD) ([facility])- (present on admission)   Assessment & Plan    COPD exacerbation has resolved.  The patient is at this point at her baseline.     Lung cancer ([facility])- (present on admission)   Assessment & Plan    Continue with oncology follow-up     DVT (deep venous thrombosis) ([facility])- (present on admission)   Assessment & Plan    Patient's most recent ultrasound does show a basilic vein thrombosis in the left forearm.  Continue with anticoagulation     Rheumatoid arthritis ([facility])- (present on admission)   Assessment & Plan    Continue with pain management.     Osteoporosis- (present on admission)   Assessment & Plan    Chronic and the patient will need to be evaluated routine DEXA scanning as an outpatient.     CKD (chronic kidney disease) stage 3, GFR 30-59 ml/min ([facility])- (present on admission)   Assessment & Plan    Patient's kidney disease is stage III and the patient at this point is not on dialysis.     CAD (coronary artery disease)- (present on admission)   Assessment & Plan    Tinea with medical management optimization.  Currently patient does not have any chest pain.     HTN (hypertension)- (present on admission)   Assessment & Plan    Continue at this point with blood pressure management optimization keep  systolic blood pressure less than 140 diastolic less than 90.     Hyperlipidemia- (present on admission)   Assessment & Plan    Patient is on a low-fat low-cholesterol diet and statin.  Lab Results   Component Value Date/Time    CHOLSTRLTOT 194 03/01/2007 03:25 PM    LDL 96 03/01/2007 03:25 PM    HDL 78 (H) 03/01/2007 03:25 PM    TRIGLYCERIDE 102 03/01/2007 03:25 PM               VTE prophylaxis: None, resume Xarelto after surgery

## 2019-01-30 NOTE — PROGRESS NOTES
Dr. Montes stated ok for patient to discharge tonight 4 hours after procedure or per IR recommendation. If it is too late at night patient may stay and be discharged in the morning. Ok to resume Xarelto after surgery.

## 2019-01-30 NOTE — DISCHARGE INSTRUCTIONS
Discharge Instructions    Discharged to home by car with relative. Discharged via wheelchair, hospital escort: Yes.  Special equipment needed: Not Applicable    Be sure to schedule a follow-up appointment with your primary care doctor or any specialists as instructed.     Discharge Plan:   Diet Plan: Discussed  Activity Level: Discussed  Confirmed Follow up Appointment: Appointment Scheduled  Confirmed Symptoms Management: Discussed  Medication Reconciliation Updated: Yes  Influenza Vaccine Indication: Not indicated: Previously immunized this influenza season and > 8 years of age    I understand that a diet low in cholesterol, fat, and sodium is recommended for good health. Unless I have been given specific instructions below for another diet, I accept this instruction as my diet prescription.   Other diet: Regular    Special Instructions: None    · Is patient discharged on Warfarin / Coumadin?   No     Depression / Suicide Risk    As you are discharged from this [facility] facility, it is important to learn how to keep safe from harming yourself.    Recognize the warning signs:  · Abrupt changes in personality, positive or negative- including increase in energy   · Giving away possessions  · Change in eating patterns- significant weight changes-  positive or negative  · Change in sleeping patterns- unable to sleep or sleeping all the time   · Unwillingness or inability to communicate  · Depression  · Unusual sadness, discouragement and loneliness  · Talk of wanting to die  · Neglect of personal appearance   · Rebelliousness- reckless behavior  · Withdrawal from people/activities they love  · Confusion- inability to concentrate     If you or a loved one observes any of these behaviors or has concerns about self-harm, here's what you can do:  · Talk about it- your feelings and reasons for harming yourself  · Remove any means that you might use to hurt yourself (examples: pills, rope, extension cords,  firearm)  · Get professional help from the community (Mental Health, Substance Abuse, psychological counseling)  · Do not be alone:Call your Safe Contact- someone whom you trust who will be there for you.  · Call your local CRISIS HOTLINE 055-1346 or 205-755-8614  · Call your local Children's Mobile Crisis Response Team [place] (586) 526-0166 or www.Curiously  · Call the toll free National Suicide Prevention Hotlines   · National Suicide Prevention Lifeline 789-223-QXFH (0873)  · National Hope Line Network 800-SUICIDE (145-6160)

## 2019-01-30 NOTE — CARE PLAN
Problem: Safety  Goal: Will remain free from falls  Outcome: PROGRESSING AS EXPECTED  Pt is up SBA to the bathroom, pt calls appropriately, call light within reach, bed in lowest and locked position.     Problem: Discharge Barriers/Planning  Goal: Patient's continuum of care needs will be met  Outcome: PROGRESSING AS EXPECTED  Pt to be discharged today to home, pt agreeable to discharge at this time.

## 2019-01-30 NOTE — PROGRESS NOTES
Kyphoplasty for treatment of L2 compression fractures performed by Dr Santa. Procedure BARs explaine to patient by physician and consent obtained. Patient assisted to prone position. Patient was continuously assessed and monitored; ETCO2 26-32 with consistent waveform. Procedure completed without problems. Puncture sites CDI, no swelling; sterile dressings applied. Patient tolerated procedure quite well, was logrolled to her bed, awake and appropriate. Returned to her room by Mak and bedside handoff to Kaylie LEMUS.

## 2019-01-30 NOTE — THERAPY
"Physical Therapy Evaluation completed.   Bed Mobility:  Supine to Sit: Stand by Assist  Transfers: Sit to Stand: Stand by Assist  Gait: Level Of Assist: Stand by Assist with No Equipment Needed       Plan of Care: Patient with no further skilled PT needs in the acute care setting at this time and Patient demonstrates safety with mobility in this environment at this time.   Discharge Recommendations: Equipment: No Equipment Needed. Post-acute therapy Discharge to home with outpatient or home health for additional skilled therapy services.    See \"Rehab Therapy-Acute\" Patient Summary Report for complete documentation.       Pt is a 69 y/o female s/p kyphoplasty. Pt able to demonstrate spinal precautions well without cuing and was able to perform functional mobility with adequate safety awareness and activity tolerance. Pt with no further acute PT needs at this time and recommend DC home from a PT perspective. Pt would benefit from outpatient PT for advanced strengthening and balance work once discharged.   "

## 2019-02-07 ENCOUNTER — TELEPHONE (OUTPATIENT)
Dept: PULMONOLOGY | Facility: HOSPICE | Age: 69
End: 2019-02-07

## 2019-02-07 NOTE — TELEPHONE ENCOUNTER
MEDICATION PRIOR AUTHORIZATION NEEDED:    1. Name of Medication: Albuterol HFA 90 mcg    2. Requested By (Name of Pharmacy): [facility]     3. Is insurance on file current? yes    4. What is the name & phone number of the 3rd party payor? [facility] [phone]

## 2019-02-10 ENCOUNTER — APPOINTMENT (OUTPATIENT)
Dept: RADIOLOGY | Facility: MEDICAL CENTER | Age: 69
DRG: 644 | End: 2019-02-10
Attending: INTERNAL MEDICINE
Payer: MEDICARE

## 2019-02-10 ENCOUNTER — HOSPITAL ENCOUNTER (INPATIENT)
Facility: MEDICAL CENTER | Age: 69
LOS: 4 days | DRG: 644 | End: 2019-02-14
Attending: EMERGENCY MEDICINE | Admitting: INTERNAL MEDICINE
Payer: MEDICARE

## 2019-02-10 ENCOUNTER — APPOINTMENT (OUTPATIENT)
Dept: RADIOLOGY | Facility: MEDICAL CENTER | Age: 69
DRG: 644 | End: 2019-02-10
Payer: MEDICARE

## 2019-02-10 DIAGNOSIS — J44.1 ACUTE EXACERBATION OF CHRONIC OBSTRUCTIVE PULMONARY DISEASE (COPD) (HCC): ICD-10-CM

## 2019-02-10 PROBLEM — M54.9 CHRONIC BACK PAIN: Status: ACTIVE | Noted: 2019-02-10

## 2019-02-10 PROBLEM — G89.29 CHRONIC BACK PAIN: Status: ACTIVE | Noted: 2019-02-10

## 2019-02-10 LAB
ALBUMIN SERPL BCP-MCNC: 3.2 G/DL (ref 3.2–4.9)
ALBUMIN/GLOB SERPL: 1.1 G/DL
ALP SERPL-CCNC: 93 U/L (ref 30–99)
ALT SERPL-CCNC: 24 U/L (ref 2–50)
ANION GAP SERPL CALC-SCNC: 10 MMOL/L (ref 0–11.9)
AST SERPL-CCNC: 19 U/L (ref 12–45)
BASOPHILS # BLD AUTO: 0.2 % (ref 0–1.8)
BASOPHILS # BLD: 0.01 K/UL (ref 0–0.12)
BILIRUB SERPL-MCNC: 0.9 MG/DL (ref 0.1–1.5)
BUN SERPL-MCNC: 26 MG/DL (ref 8–22)
CALCIUM SERPL-MCNC: 10.3 MG/DL (ref 8.4–10.2)
CHLORIDE SERPL-SCNC: 110 MMOL/L (ref 96–112)
CO2 SERPL-SCNC: 16 MMOL/L (ref 20–33)
CREAT SERPL-MCNC: 1.14 MG/DL (ref 0.5–1.4)
EKG IMPRESSION: NORMAL
EOSINOPHIL # BLD AUTO: 0.02 K/UL (ref 0–0.51)
EOSINOPHIL NFR BLD: 0.4 % (ref 0–6.9)
ERYTHROCYTE [DISTWIDTH] IN BLOOD BY AUTOMATED COUNT: 41.5 FL (ref 35.9–50)
GLOBULIN SER CALC-MCNC: 3 G/DL (ref 1.9–3.5)
GLUCOSE SERPL-MCNC: 178 MG/DL (ref 65–99)
HCT VFR BLD AUTO: 40.1 % (ref 37–47)
HGB BLD-MCNC: 13.7 G/DL (ref 12–16)
IMM GRANULOCYTES # BLD AUTO: 0.02 K/UL (ref 0–0.11)
IMM GRANULOCYTES NFR BLD AUTO: 0.4 % (ref 0–0.9)
LYMPHOCYTES # BLD AUTO: 0.72 K/UL (ref 1–4.8)
LYMPHOCYTES NFR BLD: 14.2 % (ref 22–41)
MCH RBC QN AUTO: 32.5 PG (ref 27–33)
MCHC RBC AUTO-ENTMCNC: 34.2 G/DL (ref 33.6–35)
MCV RBC AUTO: 95 FL (ref 81.4–97.8)
MONOCYTES # BLD AUTO: 0.08 K/UL (ref 0–0.85)
MONOCYTES NFR BLD AUTO: 1.6 % (ref 0–13.4)
NEUTROPHILS # BLD AUTO: 4.21 K/UL (ref 2–7.15)
NEUTROPHILS NFR BLD: 83.2 % (ref 44–72)
NRBC # BLD AUTO: 0 K/UL
NRBC BLD-RTO: 0 /100 WBC
PLATELET # BLD AUTO: 197 K/UL (ref 164–446)
PMV BLD AUTO: 10.8 FL (ref 9–12.9)
POTASSIUM SERPL-SCNC: 4.4 MMOL/L (ref 3.6–5.5)
PROCALCITONIN SERPL-MCNC: 0.06 NG/ML
PROT SERPL-MCNC: 6.2 G/DL (ref 6–8.2)
RBC # BLD AUTO: 4.22 M/UL (ref 4.2–5.4)
SODIUM SERPL-SCNC: 136 MMOL/L (ref 135–145)
TROPONIN I SERPL-MCNC: <0.02 NG/ML (ref 0–0.04)
WBC # BLD AUTO: 5.1 K/UL (ref 4.8–10.8)

## 2019-02-10 PROCEDURE — 84145 PROCALCITONIN (PCT): CPT

## 2019-02-10 PROCEDURE — 700111 HCHG RX REV CODE 636 W/ 250 OVERRIDE (IP): Performed by: EMERGENCY MEDICINE

## 2019-02-10 PROCEDURE — 770006 HCHG ROOM/CARE - MED/SURG/GYN SEMI*

## 2019-02-10 PROCEDURE — 99285 EMERGENCY DEPT VISIT HI MDM: CPT

## 2019-02-10 PROCEDURE — 99497 ADVNCD CARE PLAN 30 MIN: CPT | Performed by: INTERNAL MEDICINE

## 2019-02-10 PROCEDURE — 99223 1ST HOSP IP/OBS HIGH 75: CPT | Mod: AI,25 | Performed by: INTERNAL MEDICINE

## 2019-02-10 PROCEDURE — 94760 N-INVAS EAR/PLS OXIMETRY 1: CPT

## 2019-02-10 PROCEDURE — 700101 HCHG RX REV CODE 250: Performed by: INTERNAL MEDICINE

## 2019-02-10 PROCEDURE — 93005 ELECTROCARDIOGRAM TRACING: CPT

## 2019-02-10 PROCEDURE — 700101 HCHG RX REV CODE 250: Performed by: EMERGENCY MEDICINE

## 2019-02-10 PROCEDURE — 700102 HCHG RX REV CODE 250 W/ 637 OVERRIDE(OP): Performed by: INTERNAL MEDICINE

## 2019-02-10 PROCEDURE — 96374 THER/PROPH/DIAG INJ IV PUSH: CPT

## 2019-02-10 PROCEDURE — A9270 NON-COVERED ITEM OR SERVICE: HCPCS | Performed by: INTERNAL MEDICINE

## 2019-02-10 PROCEDURE — 84484 ASSAY OF TROPONIN QUANT: CPT

## 2019-02-10 PROCEDURE — 94640 AIRWAY INHALATION TREATMENT: CPT

## 2019-02-10 PROCEDURE — 85025 COMPLETE CBC W/AUTO DIFF WBC: CPT

## 2019-02-10 PROCEDURE — 80053 COMPREHEN METABOLIC PANEL: CPT

## 2019-02-10 PROCEDURE — 93005 ELECTROCARDIOGRAM TRACING: CPT | Performed by: EMERGENCY MEDICINE

## 2019-02-10 PROCEDURE — 700111 HCHG RX REV CODE 636 W/ 250 OVERRIDE (IP): Performed by: INTERNAL MEDICINE

## 2019-02-10 PROCEDURE — 71045 X-RAY EXAM CHEST 1 VIEW: CPT

## 2019-02-10 PROCEDURE — 36415 COLL VENOUS BLD VENIPUNCTURE: CPT

## 2019-02-10 RX ORDER — PREDNISONE 20 MG/1
40 TABLET ORAL DAILY
Status: ON HOLD | COMMUNITY
End: 2019-02-14

## 2019-02-10 RX ORDER — AMOXICILLIN 250 MG
2 CAPSULE ORAL 2 TIMES DAILY
Status: DISCONTINUED | OUTPATIENT
Start: 2019-02-10 | End: 2019-02-14 | Stop reason: HOSPADM

## 2019-02-10 RX ORDER — METHYLPREDNISOLONE SODIUM SUCCINATE 125 MG/2ML
125 INJECTION, POWDER, LYOPHILIZED, FOR SOLUTION INTRAMUSCULAR; INTRAVENOUS ONCE
Status: COMPLETED | OUTPATIENT
Start: 2019-02-10 | End: 2019-02-10

## 2019-02-10 RX ORDER — PREDNISONE 20 MG/1
40 TABLET ORAL DAILY
Status: ON HOLD | COMMUNITY
Start: 2019-01-10 | End: 2019-02-14

## 2019-02-10 RX ORDER — AZITHROMYCIN 250 MG/1
250-500 TABLET, FILM COATED ORAL DAILY
Status: ON HOLD | COMMUNITY
Start: 2019-01-10 | End: 2019-02-14

## 2019-02-10 RX ORDER — ZOLPIDEM TARTRATE 5 MG/1
10 TABLET ORAL
Status: DISCONTINUED | OUTPATIENT
Start: 2019-02-10 | End: 2019-02-14 | Stop reason: HOSPADM

## 2019-02-10 RX ORDER — IPRATROPIUM BROMIDE AND ALBUTEROL SULFATE 2.5; .5 MG/3ML; MG/3ML
3 SOLUTION RESPIRATORY (INHALATION) 2 TIMES DAILY
COMMUNITY
End: 2019-02-21 | Stop reason: SDUPTHER

## 2019-02-10 RX ORDER — SIMVASTATIN 20 MG
40 TABLET ORAL NIGHTLY
Status: DISCONTINUED | OUTPATIENT
Start: 2019-02-10 | End: 2019-02-14 | Stop reason: HOSPADM

## 2019-02-10 RX ORDER — PROPRANOLOL HCL 60 MG
60 CAPSULE, EXTENDED RELEASE 24HR ORAL EVERY MORNING
Status: DISCONTINUED | OUTPATIENT
Start: 2019-02-11 | End: 2019-02-13

## 2019-02-10 RX ORDER — ACETAMINOPHEN 325 MG/1
650 TABLET ORAL EVERY 6 HOURS PRN
Status: DISCONTINUED | OUTPATIENT
Start: 2019-02-10 | End: 2019-02-14 | Stop reason: HOSPADM

## 2019-02-10 RX ORDER — BUDESONIDE AND FORMOTEROL FUMARATE DIHYDRATE 160; 4.5 UG/1; UG/1
2 AEROSOL RESPIRATORY (INHALATION)
Status: DISCONTINUED | OUTPATIENT
Start: 2019-02-10 | End: 2019-02-14 | Stop reason: HOSPADM

## 2019-02-10 RX ORDER — HYDRALAZINE HYDROCHLORIDE 20 MG/ML
10 INJECTION INTRAMUSCULAR; INTRAVENOUS EVERY 4 HOURS PRN
Status: DISCONTINUED | OUTPATIENT
Start: 2019-02-10 | End: 2019-02-14 | Stop reason: HOSPADM

## 2019-02-10 RX ORDER — ACETAMINOPHEN 500 MG
1000 TABLET ORAL 3 TIMES DAILY
COMMUNITY
End: 2023-01-01

## 2019-02-10 RX ORDER — ZOLPIDEM TARTRATE 10 MG/1
10 TABLET ORAL
COMMUNITY
End: 2019-03-07

## 2019-02-10 RX ORDER — IPRATROPIUM BROMIDE AND ALBUTEROL SULFATE 2.5; .5 MG/3ML; MG/3ML
3 SOLUTION RESPIRATORY (INHALATION)
Status: DISCONTINUED | OUTPATIENT
Start: 2019-02-10 | End: 2019-02-11

## 2019-02-10 RX ORDER — DOCUSATE SODIUM 100 MG/1
300 CAPSULE, LIQUID FILLED ORAL EVERY EVENING
COMMUNITY
End: 2023-01-01

## 2019-02-10 RX ORDER — BRIMONIDINE TARTRATE AND TIMOLOL MALEATE 2; 5 MG/ML; MG/ML
1 SOLUTION OPHTHALMIC 2 TIMES DAILY
Status: DISCONTINUED | OUTPATIENT
Start: 2019-02-10 | End: 2019-02-14 | Stop reason: HOSPADM

## 2019-02-10 RX ORDER — FLUTICASONE PROPIONATE 50 MCG
1 SPRAY, SUSPENSION (ML) NASAL PRN
COMMUNITY
End: 2019-04-29

## 2019-02-10 RX ORDER — METHYLPREDNISOLONE SODIUM SUCCINATE 40 MG/ML
40 INJECTION, POWDER, LYOPHILIZED, FOR SOLUTION INTRAMUSCULAR; INTRAVENOUS EVERY 6 HOURS
Status: COMPLETED | OUTPATIENT
Start: 2019-02-10 | End: 2019-02-13

## 2019-02-10 RX ORDER — ONDANSETRON 2 MG/ML
4 INJECTION INTRAMUSCULAR; INTRAVENOUS EVERY 4 HOURS PRN
Status: DISCONTINUED | OUTPATIENT
Start: 2019-02-10 | End: 2019-02-14 | Stop reason: HOSPADM

## 2019-02-10 RX ORDER — SODIUM CHLORIDE AND POTASSIUM CHLORIDE 150; 900 MG/100ML; MG/100ML
INJECTION, SOLUTION INTRAVENOUS CONTINUOUS
Status: DISCONTINUED | OUTPATIENT
Start: 2019-02-10 | End: 2019-02-13

## 2019-02-10 RX ORDER — POLYETHYLENE GLYCOL 3350 17 G/17G
1 POWDER, FOR SOLUTION ORAL
Status: DISCONTINUED | OUTPATIENT
Start: 2019-02-10 | End: 2019-02-14 | Stop reason: HOSPADM

## 2019-02-10 RX ORDER — HYDROCODONE BITARTRATE AND ACETAMINOPHEN 10; 325 MG/1; MG/1
1 TABLET ORAL DAILY
Status: DISCONTINUED | OUTPATIENT
Start: 2019-02-11 | End: 2019-02-14 | Stop reason: HOSPADM

## 2019-02-10 RX ORDER — TIOTROPIUM BROMIDE 18 UG/1
1 CAPSULE ORAL; RESPIRATORY (INHALATION)
Status: DISCONTINUED | OUTPATIENT
Start: 2019-02-11 | End: 2019-02-11

## 2019-02-10 RX ORDER — DOXYCYCLINE HYCLATE 100 MG
100 TABLET ORAL 2 TIMES DAILY
Status: ON HOLD | COMMUNITY
Start: 2019-01-29 | End: 2019-02-14

## 2019-02-10 RX ORDER — BISACODYL 10 MG
10 SUPPOSITORY, RECTAL RECTAL
Status: DISCONTINUED | OUTPATIENT
Start: 2019-02-10 | End: 2019-02-14 | Stop reason: HOSPADM

## 2019-02-10 RX ORDER — ONDANSETRON 4 MG/1
4 TABLET, ORALLY DISINTEGRATING ORAL EVERY 4 HOURS PRN
Status: DISCONTINUED | OUTPATIENT
Start: 2019-02-10 | End: 2019-02-14 | Stop reason: HOSPADM

## 2019-02-10 RX ORDER — IPRATROPIUM BROMIDE AND ALBUTEROL SULFATE 2.5; .5 MG/3ML; MG/3ML
3 SOLUTION RESPIRATORY (INHALATION)
Status: DISCONTINUED | OUTPATIENT
Start: 2019-02-10 | End: 2019-02-14 | Stop reason: HOSPADM

## 2019-02-10 RX ADMIN — SIMVASTATIN 40 MG: 20 TABLET, FILM COATED ORAL at 20:09

## 2019-02-10 RX ADMIN — POTASSIUM CHLORIDE AND SODIUM CHLORIDE: 900; 150 INJECTION, SOLUTION INTRAVENOUS at 20:10

## 2019-02-10 RX ADMIN — IPRATROPIUM BROMIDE AND ALBUTEROL SULFATE 3 ML: .5; 3 SOLUTION RESPIRATORY (INHALATION) at 23:38

## 2019-02-10 RX ADMIN — Medication 2 TABLET: at 23:22

## 2019-02-10 RX ADMIN — RIVAROXABAN 15 MG: 15 TABLET, FILM COATED ORAL at 20:09

## 2019-02-10 RX ADMIN — METHYLPREDNISOLONE SODIUM SUCCINATE 125 MG: 125 INJECTION, POWDER, FOR SOLUTION INTRAMUSCULAR; INTRAVENOUS at 16:45

## 2019-02-10 RX ADMIN — BRIMONIDINE TARTRATE AND TIMOLOL MALEATE 1 DROP: 2; 5 SOLUTION OPHTHALMIC at 23:30

## 2019-02-10 RX ADMIN — METHYLPREDNISOLONE SODIUM SUCCINATE 40 MG: 40 INJECTION, POWDER, FOR SOLUTION INTRAMUSCULAR; INTRAVENOUS at 20:08

## 2019-02-10 RX ADMIN — ACETAMINOPHEN 650 MG: 325 TABLET, FILM COATED ORAL at 20:18

## 2019-02-10 RX ADMIN — ALBUTEROL SULFATE 2.5 MG: 2.5 SOLUTION RESPIRATORY (INHALATION) at 16:56

## 2019-02-10 ASSESSMENT — COGNITIVE AND FUNCTIONAL STATUS - GENERAL
SUGGESTED CMS G CODE MODIFIER DAILY ACTIVITY: CH
MOBILITY SCORE: 24
DAILY ACTIVITIY SCORE: 24
SUGGESTED CMS G CODE MODIFIER MOBILITY: CH

## 2019-02-10 ASSESSMENT — PATIENT HEALTH QUESTIONNAIRE - PHQ9
3. TROUBLE FALLING OR STAYING ASLEEP OR SLEEPING TOO MUCH: NOT AT ALL
2. FEELING DOWN, DEPRESSED, IRRITABLE, OR HOPELESS: NOT AT ALL
SUM OF ALL RESPONSES TO PHQ QUESTIONS 1-9: 0
SUM OF ALL RESPONSES TO PHQ9 QUESTIONS 1 AND 2: 0
4. FEELING TIRED OR HAVING LITTLE ENERGY: NOT AT ALL
9. THOUGHTS THAT YOU WOULD BE BETTER OFF DEAD, OR OF HURTING YOURSELF: NOT AT ALL
7. TROUBLE CONCENTRATING ON THINGS, SUCH AS READING THE NEWSPAPER OR WATCHING TELEVISION: NOT AT ALL
8. MOVING OR SPEAKING SO SLOWLY THAT OTHER PEOPLE COULD HAVE NOTICED. OR THE OPPOSITE, BEING SO FIGETY OR RESTLESS THAT YOU HAVE BEEN MOVING AROUND A LOT MORE THAN USUAL: NOT AT ALL
6. FEELING BAD ABOUT YOURSELF - OR THAT YOU ARE A FAILURE OR HAVE LET YOURSELF OR YOUR FAMILY DOWN: NOT AL ALL
5. POOR APPETITE OR OVEREATING: NOT AT ALL
1. LITTLE INTEREST OR PLEASURE IN DOING THINGS: NOT AT ALL

## 2019-02-10 ASSESSMENT — COPD QUESTIONNAIRES
DO YOU EVER COUGH UP ANY MUCUS OR PHLEGM?: YES, A FEW DAYS A WEEK OR MONTH
COPD SCREENING SCORE: 7
DURING THE PAST 4 WEEKS HOW MUCH DID YOU FEEL SHORT OF BREATH: SOME OF THE TIME
HAVE YOU SMOKED AT LEAST 100 CIGARETTES IN YOUR ENTIRE LIFE: YES

## 2019-02-10 ASSESSMENT — ENCOUNTER SYMPTOMS
PALPITATIONS: 0
LOSS OF CONSCIOUSNESS: 0
DEPRESSION: 0
SHORTNESS OF BREATH: 1
ABDOMINAL PAIN: 0
STRIDOR: 0
COUGH: 0
BACK PAIN: 1
MYALGIAS: 0
FEVER: 0
HEADACHES: 0
DIZZINESS: 0
CONSTIPATION: 0
DIARRHEA: 0
FALLS: 0
SPUTUM PRODUCTION: 0
CHILLS: 0
NAUSEA: 0
TINGLING: 0
WEAKNESS: 0
VOMITING: 0

## 2019-02-10 ASSESSMENT — LIFESTYLE VARIABLES
TOTAL SCORE: 0
EVER_SMOKED: YES
EVER FELT BAD OR GUILTY ABOUT YOUR DRINKING: NO
TOTAL SCORE: 0
EVER HAD A DRINK FIRST THING IN THE MORNING TO STEADY YOUR NERVES TO GET RID OF A HANGOVER: NO
HOW MANY TIMES IN THE PAST YEAR HAVE YOU HAD 5 OR MORE DRINKS IN A DAY: 0
ALCOHOL_USE: YES
HAVE PEOPLE ANNOYED YOU BY CRITICIZING YOUR DRINKING: NO
EVER_SMOKED: YES
AVERAGE NUMBER OF DAYS PER WEEK YOU HAVE A DRINK CONTAINING ALCOHOL: 0
CONSUMPTION TOTAL: NEGATIVE
HAVE YOU EVER FELT YOU SHOULD CUT DOWN ON YOUR DRINKING: NO
TOTAL SCORE: 0
ON A TYPICAL DAY WHEN YOU DRINK ALCOHOL HOW MANY DRINKS DO YOU HAVE: 1

## 2019-02-10 NOTE — ED NOTES
Pt assessed, c/o SOB that started this am. Pt also weak. Recurrent issues with COPD and SOB. Denies fever. Will cont to monitor

## 2019-02-10 NOTE — ED TRIAGE NOTES
Pt comes in by NELLI  C/o SOB the last couple of days   Took 40 mg prednisone around 8:30 this morning to see if it would help  Has not  Hx of COPD   Was in hospital in January for back surgery   Speaking in full sentences  Mild SOB noted at rest

## 2019-02-11 PROBLEM — E04.2 MULTINODULAR GOITER: Status: ACTIVE | Noted: 2018-09-13

## 2019-02-11 PROBLEM — E87.20 METABOLIC ACIDOSIS: Status: ACTIVE | Noted: 2019-02-11

## 2019-02-11 PROBLEM — N17.9 ARF (ACUTE RENAL FAILURE) (HCC): Status: ACTIVE | Noted: 2019-02-11

## 2019-02-11 LAB
ANION GAP SERPL CALC-SCNC: 10 MMOL/L (ref 0–11.9)
BUN SERPL-MCNC: 38 MG/DL (ref 8–22)
CALCIUM SERPL-MCNC: 9.7 MG/DL (ref 8.4–10.2)
CHLORIDE SERPL-SCNC: 108 MMOL/L (ref 96–112)
CO2 SERPL-SCNC: 18 MMOL/L (ref 20–33)
CREAT SERPL-MCNC: 1.63 MG/DL (ref 0.5–1.4)
ERYTHROCYTE [DISTWIDTH] IN BLOOD BY AUTOMATED COUNT: 42.8 FL (ref 35.9–50)
GLUCOSE SERPL-MCNC: 230 MG/DL (ref 65–99)
HCT VFR BLD AUTO: 38.4 % (ref 37–47)
HGB BLD-MCNC: 13.1 G/DL (ref 12–16)
MCH RBC QN AUTO: 32.9 PG (ref 27–33)
MCHC RBC AUTO-ENTMCNC: 34.1 G/DL (ref 33.6–35)
MCV RBC AUTO: 96.5 FL (ref 81.4–97.8)
PLATELET # BLD AUTO: 187 K/UL (ref 164–446)
PMV BLD AUTO: 11.1 FL (ref 9–12.9)
POTASSIUM SERPL-SCNC: 4.2 MMOL/L (ref 3.6–5.5)
RBC # BLD AUTO: 3.98 M/UL (ref 4.2–5.4)
SODIUM SERPL-SCNC: 136 MMOL/L (ref 135–145)
WBC # BLD AUTO: 5.4 K/UL (ref 4.8–10.8)

## 2019-02-11 PROCEDURE — A9270 NON-COVERED ITEM OR SERVICE: HCPCS | Performed by: HOSPITALIST

## 2019-02-11 PROCEDURE — 71275 CT ANGIOGRAPHY CHEST: CPT

## 2019-02-11 PROCEDURE — 770006 HCHG ROOM/CARE - MED/SURG/GYN SEMI*

## 2019-02-11 PROCEDURE — 700117 HCHG RX CONTRAST REV CODE 255: Performed by: INTERNAL MEDICINE

## 2019-02-11 PROCEDURE — 94640 AIRWAY INHALATION TREATMENT: CPT

## 2019-02-11 PROCEDURE — 700102 HCHG RX REV CODE 250 W/ 637 OVERRIDE(OP): Performed by: INTERNAL MEDICINE

## 2019-02-11 PROCEDURE — 700111 HCHG RX REV CODE 636 W/ 250 OVERRIDE (IP): Performed by: INTERNAL MEDICINE

## 2019-02-11 PROCEDURE — 99233 SBSQ HOSP IP/OBS HIGH 50: CPT | Performed by: HOSPITALIST

## 2019-02-11 PROCEDURE — 700102 HCHG RX REV CODE 250 W/ 637 OVERRIDE(OP): Performed by: HOSPITALIST

## 2019-02-11 PROCEDURE — 700101 HCHG RX REV CODE 250: Performed by: HOSPITALIST

## 2019-02-11 PROCEDURE — 700101 HCHG RX REV CODE 250: Performed by: INTERNAL MEDICINE

## 2019-02-11 PROCEDURE — A9270 NON-COVERED ITEM OR SERVICE: HCPCS | Performed by: INTERNAL MEDICINE

## 2019-02-11 PROCEDURE — 80048 BASIC METABOLIC PNL TOTAL CA: CPT

## 2019-02-11 PROCEDURE — 94760 N-INVAS EAR/PLS OXIMETRY 1: CPT

## 2019-02-11 PROCEDURE — 85027 COMPLETE CBC AUTOMATED: CPT

## 2019-02-11 PROCEDURE — 99223 1ST HOSP IP/OBS HIGH 75: CPT | Performed by: INTERNAL MEDICINE

## 2019-02-11 RX ORDER — TIOTROPIUM BROMIDE 18 UG/1
1 CAPSULE ORAL; RESPIRATORY (INHALATION) DAILY
Status: DISCONTINUED | OUTPATIENT
Start: 2019-02-11 | End: 2019-02-14 | Stop reason: HOSPADM

## 2019-02-11 RX ORDER — FLUTICASONE PROPIONATE 50 MCG
1 SPRAY, SUSPENSION (ML) NASAL
Status: DISCONTINUED | OUTPATIENT
Start: 2019-02-11 | End: 2019-02-14 | Stop reason: HOSPADM

## 2019-02-11 RX ORDER — IPRATROPIUM BROMIDE AND ALBUTEROL SULFATE 2.5; .5 MG/3ML; MG/3ML
3 SOLUTION RESPIRATORY (INHALATION)
Status: DISCONTINUED | OUTPATIENT
Start: 2019-02-11 | End: 2019-02-14 | Stop reason: HOSPADM

## 2019-02-11 RX ORDER — METHIMAZOLE 5 MG/1
5 TABLET ORAL 2 TIMES DAILY
Status: DISCONTINUED | OUTPATIENT
Start: 2019-02-11 | End: 2019-02-14 | Stop reason: HOSPADM

## 2019-02-11 RX ORDER — LOSARTAN POTASSIUM 25 MG/1
50 TABLET ORAL EVERY MORNING
Status: DISCONTINUED | OUTPATIENT
Start: 2019-02-11 | End: 2019-02-14

## 2019-02-11 RX ADMIN — BRIMONIDINE TARTRATE AND TIMOLOL MALEATE 1 DROP: 2; 5 SOLUTION OPHTHALMIC at 18:17

## 2019-02-11 RX ADMIN — IPRATROPIUM BROMIDE AND ALBUTEROL SULFATE 3 ML: .5; 3 SOLUTION RESPIRATORY (INHALATION) at 07:16

## 2019-02-11 RX ADMIN — METHIMAZOLE 5 MG: 5 TABLET ORAL at 18:16

## 2019-02-11 RX ADMIN — TIOTROPIUM BROMIDE 1 CAPSULE: 18 CAPSULE ORAL; RESPIRATORY (INHALATION) at 07:16

## 2019-02-11 RX ADMIN — METHYLPREDNISOLONE SODIUM SUCCINATE 40 MG: 40 INJECTION, POWDER, FOR SOLUTION INTRAMUSCULAR; INTRAVENOUS at 00:39

## 2019-02-11 RX ADMIN — Medication 2 TABLET: at 05:03

## 2019-02-11 RX ADMIN — Medication 2 TABLET: at 18:17

## 2019-02-11 RX ADMIN — BRIMONIDINE TARTRATE AND TIMOLOL MALEATE 1 DROP: 2; 5 SOLUTION OPHTHALMIC at 05:04

## 2019-02-11 RX ADMIN — HYDROCODONE BITARTRATE AND ACETAMINOPHEN 1 TABLET: 10; 325 TABLET ORAL at 05:04

## 2019-02-11 RX ADMIN — POTASSIUM CHLORIDE AND SODIUM CHLORIDE: 900; 150 INJECTION, SOLUTION INTRAVENOUS at 05:04

## 2019-02-11 RX ADMIN — METHYLPREDNISOLONE SODIUM SUCCINATE 40 MG: 40 INJECTION, POWDER, FOR SOLUTION INTRAMUSCULAR; INTRAVENOUS at 18:16

## 2019-02-11 RX ADMIN — ACETAMINOPHEN 650 MG: 325 TABLET, FILM COATED ORAL at 13:04

## 2019-02-11 RX ADMIN — ZOLPIDEM TARTRATE 10 MG: 5 TABLET ORAL at 23:11

## 2019-02-11 RX ADMIN — RIVAROXABAN 20 MG: 20 TABLET, FILM COATED ORAL at 18:16

## 2019-02-11 RX ADMIN — IPRATROPIUM BROMIDE AND ALBUTEROL SULFATE 3 ML: .5; 3 SOLUTION RESPIRATORY (INHALATION) at 19:30

## 2019-02-11 RX ADMIN — METHYLPREDNISOLONE SODIUM SUCCINATE 40 MG: 40 INJECTION, POWDER, FOR SOLUTION INTRAMUSCULAR; INTRAVENOUS at 11:36

## 2019-02-11 RX ADMIN — ACETAMINOPHEN 650 MG: 325 TABLET, FILM COATED ORAL at 21:16

## 2019-02-11 RX ADMIN — METHYLPREDNISOLONE SODIUM SUCCINATE 40 MG: 40 INJECTION, POWDER, FOR SOLUTION INTRAMUSCULAR; INTRAVENOUS at 23:10

## 2019-02-11 RX ADMIN — IPRATROPIUM BROMIDE AND ALBUTEROL SULFATE 3 ML: .5; 3 SOLUTION RESPIRATORY (INHALATION) at 11:14

## 2019-02-11 RX ADMIN — POTASSIUM CHLORIDE AND SODIUM CHLORIDE: 900; 150 INJECTION, SOLUTION INTRAVENOUS at 23:11

## 2019-02-11 RX ADMIN — IOHEXOL 70 ML: 350 INJECTION, SOLUTION INTRAVENOUS at 00:15

## 2019-02-11 RX ADMIN — METHIMAZOLE 5 MG: 5 TABLET ORAL at 11:35

## 2019-02-11 RX ADMIN — IPRATROPIUM BROMIDE AND ALBUTEROL SULFATE 3 ML: .5; 3 SOLUTION RESPIRATORY (INHALATION) at 03:43

## 2019-02-11 RX ADMIN — METHYLPREDNISOLONE SODIUM SUCCINATE 40 MG: 40 INJECTION, POWDER, FOR SOLUTION INTRAMUSCULAR; INTRAVENOUS at 05:03

## 2019-02-11 RX ADMIN — PROPRANOLOL HYDROCHLORIDE 60 MG: 60 CAPSULE, EXTENDED RELEASE ORAL at 09:11

## 2019-02-11 RX ADMIN — LOSARTAN POTASSIUM 50 MG: 25 TABLET, FILM COATED ORAL at 11:35

## 2019-02-11 RX ADMIN — RIVAROXABAN 15 MG: 15 TABLET, FILM COATED ORAL at 05:04

## 2019-02-11 RX ADMIN — BUDESONIDE AND FORMOTEROL FUMARATE DIHYDRATE 2 PUFF: 160; 4.5 AEROSOL RESPIRATORY (INHALATION) at 19:30

## 2019-02-11 RX ADMIN — ZOLPIDEM TARTRATE 10 MG: 5 TABLET ORAL at 00:39

## 2019-02-11 RX ADMIN — SIMVASTATIN 40 MG: 20 TABLET, FILM COATED ORAL at 21:16

## 2019-02-11 RX ADMIN — IPRATROPIUM BROMIDE AND ALBUTEROL SULFATE 3 ML: .5; 3 SOLUTION RESPIRATORY (INHALATION) at 14:43

## 2019-02-11 RX ADMIN — BUDESONIDE AND FORMOTEROL FUMARATE DIHYDRATE 2 PUFF: 160; 4.5 AEROSOL RESPIRATORY (INHALATION) at 07:16

## 2019-02-11 ASSESSMENT — ENCOUNTER SYMPTOMS
SHORTNESS OF BREATH: 1
ABDOMINAL PAIN: 0
NECK PAIN: 0
WHEEZING: 0
BACK PAIN: 0
COUGH: 0
CHILLS: 0
EYE PAIN: 0
TINGLING: 0
BLURRED VISION: 0
FEVER: 0
HEADACHES: 0
INSOMNIA: 0
VOMITING: 0
DEPRESSION: 0
PALPITATIONS: 0
NAUSEA: 0
BACK PAIN: 1
DIZZINESS: 0
GASTROINTESTINAL NEGATIVE: 1
EYES NEGATIVE: 1
NEUROLOGICAL NEGATIVE: 1
PSYCHIATRIC NEGATIVE: 1
SORE THROAT: 0

## 2019-02-11 NOTE — ASSESSMENT & PLAN NOTE
-Improved  -Failed outpatient steroids and is not improving with IV steroids as expected, unlikely exacerbation  -severe baseline disease but currently she is really not wheezing, C/O chest pressure  -CTA negative for pe and she is on xarelto  -Discussed with pulm and they suggest rule out cardiac etiology I will order a stress test, which was negative  -Echo normal aside from RVSP 30  -May be related to very poorly controlled hyperthyroidism  -Watch on tele  -Continue methimazole  -PO steroids, x5d  -Spiriva, Duonb, Symbicort

## 2019-02-11 NOTE — PROGRESS NOTES
"Hospital Medicine Daily Progress Note    Date of Service  2/11/2019    Chief Complaint  68 y.o. female admitted 2/10/2019 with dyspnea    Hospital Course    She was admitted with evidence of a copd flare.  This has been a recent recurrent issue.       Interval Problem Update  She is not improved. I called pulmonary and they will kindly consult. I reviewed her resords. She has not had her thyroid treated 2/2 \"lack of symptoms\" per endocrine notes. Her back sera since her recent discharge is resolved.     Consultants/Specialty  pulmonary    Code Status  full    Disposition  Unclear yet. Possible home once improved.     Review of Systems  Review of Systems   Constitutional: Negative for chills and fever.   HENT: Negative for sore throat.    Eyes: Negative for blurred vision and pain.   Respiratory: Positive for shortness of breath. Negative for cough.    Cardiovascular: Negative for chest pain and palpitations.   Gastrointestinal: Negative for abdominal pain, nausea and vomiting.   Genitourinary: Negative for dysuria and urgency.   Musculoskeletal: Negative for back pain and neck pain.   Skin: Negative for itching and rash.   Neurological: Negative for dizziness, tingling and headaches.   Psychiatric/Behavioral: Negative for depression. The patient does not have insomnia.    All other systems reviewed and are negative.       Physical Exam  Temp:  [36.2 °C (97.1 °F)-37 °C (98.6 °F)] 36.4 °C (97.6 °F)  Pulse:  [] 90  Resp:  [16-22] 18  BP: ()/(60-73) 109/71  SpO2:  [90 %-96 %] 93 %    Physical Exam   Constitutional: She is oriented to person, place, and time. She appears well-developed and well-nourished. No distress.   Patient seen and examined  Discussed plan with ALLAN NEVEST:   Right Ear: External ear normal.   Left Ear: External ear normal.   Nose: Nose normal.   Eyes: Conjunctivae are normal. Right eye exhibits no discharge. Left eye exhibits no discharge.   Neck: No JVD present.   Cardiovascular: Regular " rhythm and normal heart sounds.    No murmur heard.  Cap refill 2sec  Pulses 2+ throughout     Pulmonary/Chest: Effort normal and breath sounds normal. No stridor. No respiratory distress. She has no wheezes. She has no rales.   Decrease BS   Abdominal: Soft. Bowel sounds are normal. She exhibits no distension. There is no tenderness.   Musculoskeletal: She exhibits no edema or tenderness.   Neurological: She is alert and oriented to person, place, and time.   Skin: Skin is warm and dry. She is not diaphoretic. No erythema.   Normal skin  Color.    Psychiatric: She has a normal mood and affect. Her behavior is normal.   Nursing note and vitals reviewed.      Fluids    Intake/Output Summary (Last 24 hours) at 02/11/19 0952  Last data filed at 02/11/19 0600   Gross per 24 hour   Intake          1056.17 ml   Output                0 ml   Net          1056.17 ml       Laboratory  Recent Labs      02/10/19   1515  02/11/19   0058   WBC  5.1  5.4   RBC  4.22  3.98*   HEMOGLOBIN  13.7  13.1   HEMATOCRIT  40.1  38.4   MCV  95.0  96.5   MCH  32.5  32.9   MCHC  34.2  34.1   RDW  41.5  42.8   PLATELETCT  197  187   MPV  10.8  11.1     Recent Labs      02/10/19   1515  02/11/19   0058   SODIUM  136  136   POTASSIUM  4.4  4.2   CHLORIDE  110  108   CO2  16*  18*   GLUCOSE  178*  230*   BUN  26*  38*   CREATININE  1.14  1.63*   CALCIUM  10.3*  9.7                   Imaging  CT-CTA CHEST PULMONARY ARTERY W/ RECONS   Final Result      1.   No evidence of pulmonary emboli or other acute intrathoracic abnormality.   2. Thyroid findings as noted above, unchanged from the prior scan.   3. Stable bilateral adrenal gland nodules.               DX-CHEST-PORTABLE (1 VIEW)   Final Result      Stable hyperinflation, cardiac silhouette enlargement and pulmonary arterial hypertension           Assessment/Plan  Acute exacerbation of chronic obstructive pulmonary disease (COPD) (HCC)- (present on admission)   Assessment & Plan    -Failed  outpatient steroids  I question if this is really a COPD flare.   She certainly has severe baseline disease but currently she is really not wheezing and comlains of dyspnea that is unchanged in the past 2 months.   She has had a thorough workup  I am concerned this could be her hyperthyroidism  Trial of methimazole  Pulmonary consultation for opinion.   Inhalers an dIV steroids.        ARF (acute renal failure) ([facility])   Assessment & Plan    Trial of iv fluids. Worse.      Metabolic acidosis   Assessment & Plan    Trial of iv fluids.      Chronic back pain- (present on admission)   Assessment & Plan    -Continue symptomatic care with home dose of Norco     Multinodular goiter- (present on admission)   Assessment & Plan    ??leading to sx as untreated  Trial of methimazole and if this helps she might need I131.      CKD (chronic kidney disease) stage 3, GFR 30-59 ml/min ([facility])- (present on admission)   Assessment & Plan    -At baseline, good urinary output  -Repeat BMP in the morning     HTN (hypertension)- (present on admission)   Assessment & Plan    -Did have a blood pressure in the 90s during ER visit, is generally on propranolol and losartan  -For now we will hold off on losartan, discontinue propranolol and Place PRN hydralazine  -Adjust as needed          VTE prophylaxis: lovenox.

## 2019-02-11 NOTE — CARE PLAN
Problem: Oxygenation:  Goal: Maintain adequate oxygenation dependent on patient condition  Outcome: PROGRESSING AS EXPECTED  Monitor oxygenation for SpO2 >90%    Problem: Bronchoconstriction:  Goal: Improve in air movement and diminished wheezing  Outcome: PROGRESSING AS EXPECTED  Patient does claim a benefit from bronchodilator therapy. There was no change heard  Intervention: Implement inhaled treatments  Patient is receiving Duoneb Q4 and Symbicort BID.  Intervention: Evaluate and manage medication effects  Patient will be evaluated before and after medication delivery to assess effectiveness of therapy.

## 2019-02-11 NOTE — FLOWSHEET NOTE
02/11/19 0345   Interdisciplinary Plan of Care-Goals (Indications)   Bronchodilator Indications History / Diagnosis   Interdisciplinary Plan of Care-Outcomes    Bronchodilator Outcome Patient at Stable Baseline   Education   Education Yes - Pt. / Family has been Instructed in use of Respiratory Equipment;Yes - Pt. / Family has been Instructed in use of Respiratory Medications and Adverse Reactions   RT Assessment of Delivered Medications   Evaluation of Medication Delivery Daily Yes-- Pt /Family has been Instructed in use of Respiratory Medications and Adverse Reactions   SVN Group   #SVN Performed Yes   Given By: Mouthpiece   Respiratory WDL   Respiratory (WDL) X   Chest Exam   Work Of Breathing / Effort Mild   Respiration 18   Pulse 96   Breath Sounds   Pre/Post Intervention Pre Intervention Assessment   RUL Breath Sounds Clear   RML Breath Sounds Diminished   RLL Breath Sounds Diminished   AUSTIN Breath Sounds Clear   LLL Breath Sounds Diminished   Oximetry   #Pulse Oximetry (Single Determination) Yes   Oxygen   Pulse Oximetry 92 %   O2 (LPM) 0   FiO2% 21 %   O2 Daily Delivery Respiratory  Room Air with O2 Available

## 2019-02-11 NOTE — PROGRESS NOTES
Pt arrived to unit from ER accompanied by transport.   Pt AAOx4. Pt oriented to unit, room and plan of care discussed. Pt instructed on call light usage and encouraged to call for any questions, needs, or concerns, and prior to getting out of bed. Pt agrees with the plan and declines any needs at this time.

## 2019-02-11 NOTE — PROGRESS NOTES
0000: Patient off unit with Gloria, CNA via wheelchair to CT scan.     0015: Patient returned to unit with Gloria, CNA via wheelchair.

## 2019-02-11 NOTE — ED PROVIDER NOTES
"ED Provider Note    CHIEF COMPLAINT  Chief Complaint   Patient presents with   • Shortness of Breath     Hx of COPD  flare up of SOB the last couple of days   took 40 mg of \"old\"prednisone around 8:30 this am       HPI  Latonia Clinton is a 68 y.o. female here for evaluation of shortness of breath.  The patient states that she recently had kyphoplasty done approximately 1 week ago, and states that this was done without complication.  Over the last few days she has had increasing shortness of breath, but she does have a lengthy history of the same.  She states that this is more chronic in nature, and is not new.  She does follow with pulmonary, and has had a history of lung CA with resection.  Patient states that she has no back pain, and no abdominal pain.  Nothing alleviates or exacerbates her symptoms, but she is concerned that she has something else going on, that has been undiscovered.  Patient is currently on Xarelto 30 mg twice daily.  She was placed on this after leaving her hospital stay a week ago.    PAST MEDICAL HISTORY   has a past medical history of Anesthesia; Asthma; Backpain (7/2017); Blood clot in vein (07/06/2018); Bowel habit changes (07/06/2018); Breath shortness; Bronchitis; CAD (coronary artery disease); Cancer ([facility]) (2016); Chickenpox; COPD (chronic obstructive pulmonary disease) ([facility]); Dialysis (2005); Emphysema of lung ([facility]); Glaucoma; Hemorrhagic disorder ([facility]); Hyperlipidemia; Hypertension; Hyperthyroidism; Kidney stone; Lung cancer ([facility]) (12/12/2016); Multiple thyroid nodules (7/9/2015); Nasal drainage; Osteoporosis; Pain (07/06/2018); Personal history of venous thrombosis and embolism (2004, 2012); Pneumonia (7/2016); Renal disorder; Renal stones (2013); Rheumatoid arthritis (HCC); Rheumatoid arthritis ([facility]); S/P appendectomy (1998 ); S/P cholecystectomy (1998); S/P kyphoplasty (2006, 2007, 2013); Sepsis(995.91) (2005); Shortness of breath (07/06/2018); and Thyroid nodule, hot " (2017).    SOCIAL HISTORY  Social History     Social History Main Topics   • Smoking status: Former Smoker     Packs/day: 1.00     Years: 30.00     Types: Cigarettes     Quit date: 1/1/2000   • Smokeless tobacco: Never Used      Comment: 7 years ago   • Alcohol use 0.0 oz/week      Comment: x2 a week   • Drug use: No   • Sexual activity: Not on file       Family History  No known CAD    SURGICAL HISTORY   has a past surgical history that includes other; other abdominal surgery; enlarge breast with implant; reduction of large breast; appendectomy; cholecystectomy; laminotomy; lung biopsy open; thoracoscopy (Left, 12/12/2016); other orthopedic surgery (2013); cystoscopy stent placement (Left, 6/18/2018); lithotripsy (Left, 6/18/2018); lasertripsy (Left, 6/18/2018); ureteroscopy (Left, 6/18/2018); cystoscopy stent placement (7/9/2018); ureteroscopy (Left, 7/9/2018); and lasertripsy (Left, 7/9/2018).    CURRENT MEDICATIONS  Home Medications    **Home medications have not yet been reviewed for this encounter**         ALLERGIES  Allergies   Allergen Reactions   • Nkda [No Known Drug Allergy]        REVIEW OF SYSTEMS  See HPI for further details. Review of systems as above, otherwise all other systems are negative.     PHYSICAL EXAM  Constitutional: Well developed, well nourished.  Mild acute distress.  HEENT: Normocephalic, atraumatic. Posterior pharynx clear and moist.  Eyes:  EOMI. Normal sclera.  Neck: Supple, Full range of motion, nontender.  Chest/Pulmonary: Diminished breath sounds, equal expansion  Cardio: Tachycardic rate and rhythm with no murmur.   Abdomen: Soft, nontender. No peritoneal signs. No guarding. No palpable masses.  Musculoskeletal: No deformity, no edema, neurovascular intact.   Neuro: Clear speech, appropriate, cooperative, cranial nerves II-XII grossly intact.  Psych: Normal mood and affect     Results for orders placed or performed during the hospital encounter of 02/10/19   CBC WITH  DIFFERENTIAL   Result Value Ref Range    WBC 5.1 4.8 - 10.8 K/uL    RBC 4.22 4.20 - 5.40 M/uL    Hemoglobin 13.7 12.0 - 16.0 g/dL    Hematocrit 40.1 37.0 - 47.0 %    MCV 95.0 81.4 - 97.8 fL    MCH 32.5 27.0 - 33.0 pg    MCHC 34.2 33.6 - 35.0 g/dL    RDW 41.5 35.9 - 50.0 fL    Platelet Count 197 164 - 446 K/uL    MPV 10.8 9.0 - 12.9 fL    Neutrophils-Polys 83.20 (H) 44.00 - 72.00 %    Lymphocytes 14.20 (L) 22.00 - 41.00 %    Monocytes 1.60 0.00 - 13.40 %    Eosinophils 0.40 0.00 - 6.90 %    Basophils 0.20 0.00 - 1.80 %    Immature Granulocytes 0.40 0.00 - 0.90 %    Nucleated RBC 0.00 /100 WBC    Neutrophils (Absolute) 4.21 2.00 - 7.15 K/uL    Lymphs (Absolute) 0.72 (L) 1.00 - 4.80 K/uL    Monos (Absolute) 0.08 0.00 - 0.85 K/uL    Eos (Absolute) 0.02 0.00 - 0.51 K/uL    Baso (Absolute) 0.01 0.00 - 0.12 K/uL    Immature Granulocytes (abs) 0.02 0.00 - 0.11 K/uL    NRBC (Absolute) 0.00 K/uL   COMP METABOLIC PANEL   Result Value Ref Range    Sodium 136 135 - 145 mmol/L    Potassium 4.4 3.6 - 5.5 mmol/L    Chloride 110 96 - 112 mmol/L    Co2 16 (L) 20 - 33 mmol/L    Anion Gap 10.0 0.0 - 11.9    Glucose 178 (H) 65 - 99 mg/dL    Bun 26 (H) 8 - 22 mg/dL    Creatinine 1.14 0.50 - 1.40 mg/dL    Calcium 10.3 (H) 8.4 - 10.2 mg/dL    AST(SGOT) 19 12 - 45 U/L    ALT(SGPT) 24 2 - 50 U/L    Alkaline Phosphatase 93 30 - 99 U/L    Total Bilirubin 0.9 0.1 - 1.5 mg/dL    Albumin 3.2 3.2 - 4.9 g/dL    Total Protein 6.2 6.0 - 8.2 g/dL    Globulin 3.0 1.9 - 3.5 g/dL    A-G Ratio 1.1 g/dL   ESTIMATED GFR   Result Value Ref Range    GFR If  57 (A) >60 mL/min/1.73 m 2    GFR If Non  47 (A) >60 mL/min/1.73 m 2   TROPONIN   Result Value Ref Range    Troponin I <0.02 0.00 - 0.04 ng/mL   EKG   Result Value Ref Range    Report       [facility]    Test Date:  2019-02-10  Pt Name:    ROSARIO MARTINEZ              Department: [facility]  MRN:        [id number]                      Room:        -ROOM 10  Gender:     Female                       Technician: ANJALIBRICE  :        [id number]                   Requested By:ER TRIAGE PROTOCOL  Order #:    [id number]                    Reading MD:    Measurements  Intervals                                Axis  Rate:       90                           P:          70  LA:         185                          QRS:        20  QRSD:       86                           T:          60  QT:         356  QTc:        436    Interpretive Statements  Sinus rhythm  Ventricular premature complex  Probable inferior infarct, old  Baseline wander in lead(s) V6  Compared to ECG 2019 15:39:42  Ventricular premature complex(es) now present  Myocardial infarct finding now present       DX-CHEST-PORTABLE (1 VIEW)   Final Result      Stable hyperinflation, cardiac silhouette enlargement and pulmonary arterial hypertension          PROCEDURES     MEDICAL RECORD  I have reviewed patient's medical record and pertinent results are listed above.    COURSE & MEDICAL DECISION MAKING  I have reviewed any medical record information, laboratory studies and radiographic results as noted above.    The pt will be admitted to Dr. Dickinson, for copd exacerbation.  She will be admitted in guarded condition.   The patient is compliant with her Xarelto use, of 50 mg twice a day.  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  She has not had a CT of the chest secondary to being on the Xarelto at 15 mg twice daily, but may be considered if she does not improve after steroids and breathing treatments.    FINAL IMPRESSION  Copd exacerbation       Electronically signed by: Cristopher Wells, 2/10/2019 4:22 PM

## 2019-02-11 NOTE — PROGRESS NOTES
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 States her last HD was 2005, but they are unable to remove fistula.   PIV attempted x 2 in LUE without success. She reports having US guided PIVs and EJs placed with prior admissions due to difficult PIV sticks.  WillDANIELLE at bedside and successfully placed USG PIV to L AC. IVF restarted. CT called to notify of new IV for CTA. No answer, message left.

## 2019-02-11 NOTE — PROGRESS NOTES
Pt is A&Ox4, resting comfortably in bed. Assessment completed. Due medication have been given. Bed is in lowest postion, bed alarm is on, and side rails up x2, pt calls when needs assistance and call light within reach.

## 2019-02-11 NOTE — PROGRESS NOTES
Bedside report received from ALLAN Graves.  Patient resting comfortably in bed.   POC and medications reviewed.   Safety precautions in place.   Needs addressed; all questions answered at this time.

## 2019-02-11 NOTE — RESPIRATORY CARE
COPD EDUCATION by COPD CLINICAL EDUCATOR  2/11/2019 at 12:48 PM by Linn Velez     Patient reviewed by COPD education team. Patient already in program for COPD.  Patient has Pulmonary appointment in May 2019, patient interested in Pulmonary Rehab but is fearful due to brittle bones and lack of overall feeling good.

## 2019-02-11 NOTE — CONSULTS
Pulmonary Consultation    Date of consult: 2/11/2019    Referring Physician  Beck Alford M.D.    Reason for Consultation  Recurrent COPD readmissions    History of Presenting Illness  68 y.o. female who presented 2/10/2019 with moderately severe COPD and is oxygen dependent  She has a hx of lung ca s/p partial LUlobectomy 12/12/16 with + adenocarcinoma and pleural involvement pT2a  She has been following closely with Dr. Byrne for recurrent GG pulmonary nodules and her COPD - last seen 11/15/2018  HX of RUE DVT -- on xarelto  For her COPD she is on Breo and spiriva  PFTs done [id number] 1.43 L or 54%  Last hospitalization 1/27/19 -1/30/19 for COPD, 8/30/18 - 9/2/18 for constipation, 6/17/18 - 6/22/18 for kidney stones, 7/24/16 to 7/27/16 : COPD  Her symptoms on this admission is inconsistent with COPD  She has chest pressure midsternum and pain that comes and goes  She is markedly weakened since 1/27  Noticed that she can't go to the store or do even minimal activity without chest discomfort and SOB  She is having difficulty using her inhalers    Code Status  Full Code    Review of Systems  Review of Systems   Constitutional: Positive for malaise/fatigue.   HENT: Negative.    Eyes: Negative.    Respiratory: Positive for shortness of breath. Negative for wheezing.    Cardiovascular: Positive for chest pain.   Gastrointestinal: Negative.    Genitourinary: Negative.    Musculoskeletal: Positive for back pain.   Skin: Negative.    Neurological: Negative.    Endo/Heme/Allergies: Negative.    Psychiatric/Behavioral: Negative.        Past Medical History   has a past medical history of Anesthesia; Asthma; Backpain (7/2017); Blood clot in vein (07/06/2018); Bowel habit changes (07/06/2018); Breath shortness; Bronchitis; CAD (coronary artery disease); Cancer ([facility]) (2016); Chickenpox; COPD (chronic obstructive pulmonary disease) ([facility]); Dialysis (2005); Emphysema of lung ([facility]); Glaucoma; Hemorrhagic disorder ([facility]);  Hyperlipidemia; Hypertension; Hyperthyroidism; Kidney stone; Lung cancer (HCC) (12/12/2016); Multiple thyroid nodules (7/9/2015); Nasal drainage; Osteoporosis; Pain (07/06/2018); Personal history of venous thrombosis and embolism (2004, 2012); Pneumonia (7/2016); Renal disorder; Renal stones (2013); Rheumatoid arthritis (HCC); Rheumatoid arthritis (HCC); S/P appendectomy (1998 ); S/P cholecystectomy (1998); S/P kyphoplasty (2006, 2007, 2013); Sepsis(995.91) (2005); Shortness of breath (07/06/2018); and Thyroid nodule, hot (2017). She also has no past medical history of Angina; Arrhythmia; Breast cancer (HCC); CATARACT; Congestive heart failure (HCC); Diabetes; Fall; Heart murmur; Heart valve disease; Indigestion; Jaundice; Liver disease; Myocardial infarct (HCC); Other specified symptom associated with female genital organs; Pacemaker; Psychiatric disorder; Psychiatric problem; Rheumatic fever; Seizure (HCC); Seizure disorder (HCC); Stroke (HCC); or Unspecified urinary incontinence.    Surgical History   has a past surgical history that includes other; other abdominal surgery; pr enlarge breast with implant; pr reduction of large breast; appendectomy; cholecystectomy; laminotomy; lung biopsy open; thoracoscopy (Left, 12/12/2016); other orthopedic surgery (2013); cystoscopy stent placement (Left, 6/18/2018); lithotripsy (Left, 6/18/2018); lasertripsy (Left, 6/18/2018); ureteroscopy (Left, 6/18/2018); cystoscopy stent placement (7/9/2018); ureteroscopy (Left, 7/9/2018); and lasertripsy (Left, 7/9/2018).    Family History  family history includes Cancer in her mother; Heart Disease in her brother; Heart Failure in her father.    Social History   reports that she quit smoking about 19 years ago. Her smoking use included Cigarettes. She has a 30.00 pack-year smoking history. She has never used smokeless tobacco. She reports that she drinks alcohol. She reports that she does not use drugs.    Medications  Home Medications      Reviewed by Rosalina Herrmann (Pharmacy Tech) on 02/10/19 at 1637  Med List Status: Complete   Medication Last Dose Status   acetaminophen (TYLENOL) 500 MG Tab 2/10/2019 Active   azithromycin (ZITHROMAX) 250 MG Tab 1/14/2019 Active   BREO ELLIPTA 200-25 MCG/INH AEROSOL POWDER, BREATH ACTIVATED 2/10/2019 Active   COMBIGAN 0.2-0.5 % Solution 2/10/2019 Active   docusate sodium (COLACE) 100 MG Cap 2/9/2019 Active   doxycycline (VIBRAMYCIN) 100 MG Tab 1/31/2019 Active   fluticasone (FLONASE) 50 MCG/ACT nasal spray 2/10/2019 Active   HYDROcodone/acetaminophen (NORCO)  MG Tab 2/10/2019 Active   ipratropium-albuterol (DUONEB) 0.5-2.5 (3) MG/3ML nebulizer solution 2/10/2019 Active   losartan (COZAAR) 50 MG TABS 2/10/2019 Active   multivitamin (THERAGRAN) Tab 2/10/2019 Active   predniSONE (DELTASONE) 20 MG Tab 1/14/2019 Active   predniSONE (DELTASONE) 20 MG Tab 2/10/2019 Active   PROAIR  (90 Base) MCG/ACT Aero Soln inhalation aerosol 2/10/2019 Active   propranolol LA (INDERAL LA) 60 MG CAPSULE SR 24 HR 2/10/2019 Active   rivaroxaban (XARELTO) 15 MG Tab tablet 2/9/2019 Active   rivaroxaban (XARELTO) 20 MG Tab tablet 1/29/2019 Active   simvastatin (ZOCOR) 40 MG Tab 2/9/2019 Active   Tiotropium Bromide Monohydrate (SPIRIVA RESPIMAT) 2.5 MCG/ACT Aero Soln 2/10/2019 Active   vitamin D, Ergocalciferol, (DRISDOL) 90920 UNITS Cap capsule 2/4/2019 Active   zolpidem (AMBIEN) 10 MG Tab 2/10/2019 Active              Current Facility-Administered Medications   Medication Dose Route Frequency Provider Last Rate Last Dose   • methimazole (TAPAZOLE) tablet 5 mg  5 mg Oral BID Beck Alford M.D.   5 mg at 02/11/19 1816   • fluticasone (FLONASE) nasal spray 50 mcg  1 Spray Nasal QDAY PRN Beck Alford M.D.       • losartan (COZAAR) tablet 50 mg  50 mg Oral QAM Beck Alford M.D.   50 mg at 02/11/19 1135   • rivaroxaban (XARELTO) tablet 20 mg  20 mg Oral PM MEAL Beck Alford M.D.   20 mg at 02/11/19 1816   •  tiotropium (SPIRIVA) 18 MCG inhalation capsule 1 Cap  1 Cap Inhalation DAILY Beck Alford M.D.   Stopped at 02/11/19 1130   • ipratropium-albuterol (DUONEB) nebulizer solution  3 mL Nebulization 4X/DAY (RT) Beck Alford M.D.   3 mL at 02/11/19 1930   • acetaminophen (TYLENOL) tablet 650 mg  650 mg Oral Q6HRS PRN RACHAEL LeachO.   650 mg at 02/11/19 1304   • HYDROcodone/acetaminophen (NORCO)  MG per tablet 1 Tab  1 Tab Oral DAILY RACHAEL LeachO.   1 Tab at 02/11/19 0504   • propranolol LA (INDERAL LA) capsule 60 mg  60 mg Oral QAM RACHAEL LeachO.   60 mg at 02/11/19 0911   • simvastatin (ZOCOR) tablet 40 mg  40 mg Oral Nightly RACHAEL LeachO.   40 mg at 02/10/19 2009   • zolpidem (AMBIEN) tablet 10 mg  10 mg Oral QHS ANYA Leach.O.   10 mg at 02/11/19 0039   • Respiratory Care per Protocol   Nebulization Continuous RT Marc Dickinson D.O.       • senna-docusate (PERICOLACE or SENOKOT S) 8.6-50 MG per tablet 2 Tab  2 Tab Oral BID ANAY Leach.O.   2 Tab at 02/11/19 1817    And   • polyethylene glycol/lytes (MIRALAX) PACKET 1 Packet  1 Packet Oral QDAY PRN Marc Dickinson D.O.        And   • magnesium hydroxide (MILK OF MAGNESIA) suspension 30 mL  30 mL Oral QDAY PRN Marc Dickinson D.O.        And   • bisacodyl (DULCOLAX) suppository 10 mg  10 mg Rectal QDAY PRN RACHAEL LeachO.       • 0.9 % NaCl with KCl 20 mEq infusion   Intravenous Continuous RACHAEL LeachO. 83 mL/hr at 02/11/19 0504     • hydrALAZINE (APRESOLINE) injection 10 mg  10 mg Intravenous Q4HRS PRN Marc Dickinson D.O.       • ondansetron (ZOFRAN) syringe/vial injection 4 mg  4 mg Intravenous Q4HRS PRN Marc Dickinson D.O.       • ondansetron (ZOFRAN ODT) dispertab 4 mg  4 mg Oral Q4HRS PRN Marc Dickinson D.O.       • methylPREDNISolone (SOLU-MEDROL) 40 MG injection 40 mg  40 mg Intravenous Q6HRS RACHAEL LeachO.   40 mg at 02/11/19 1816   • ipratropium-albuterol (DUONEB) nebulizer  solution  3 mL Nebulization Q4H PRN (RT) Marc Dickinson D.O.       • budesonide-formoterol (SYMBICORT) 160-4.5 MCG/ACT inhaler 2 Puff  2 Puff Inhalation BID (RT) Marc Dickinson D.O.   2 Puff at 02/11/19 1930   • Brimonidine Tartrate-Timolol 0.2-0.5 % SOLN 1 Drop  1 Drop Both Eyes BID Agnieszka Banegas M.D.   1 Drop at 02/11/19 1817       Allergies  Allergies   Allergen Reactions   • Nkda [No Known Drug Allergy]        Vital Signs last 24 hours  Temp:  [36.3 °C (97.4 °F)-36.8 °C (98.3 °F)] 36.3 °C (97.4 °F)  Pulse:  [] 86  Resp:  [16-18] 18  BP: (109-115)/(58-71) 115/58  SpO2:  [90 %-96 %] 96 %    Physical Exam  Physical Exam   Constitutional: She appears well-developed and well-nourished. No distress.   HENT:   Head: Normocephalic and atraumatic.   Right Ear: External ear normal.   Left Ear: External ear normal.   Nose: Nose normal.   Mouth/Throat: Oropharynx is clear and moist. No oropharyngeal exudate.   Eyes: Pupils are equal, round, and reactive to light. Conjunctivae and EOM are normal. Right eye exhibits no discharge. Left eye exhibits no discharge. No scleral icterus.   Neck: Normal range of motion. Neck supple. No JVD present. No tracheal deviation present. No thyromegaly present.   Cardiovascular: Normal rate and regular rhythm.    Pulmonary/Chest: Effort normal and breath sounds normal. No stridor. No respiratory distress. She has no wheezes. She has no rales. She exhibits no tenderness.   Abdominal: Soft. Bowel sounds are normal. She exhibits no distension and no mass. There is no tenderness. There is no rebound and no guarding.   Musculoskeletal: She exhibits no edema, tenderness or deformity.   Lymphadenopathy:     She has no cervical adenopathy.   Neurological: No cranial nerve deficit. Coordination normal.   Skin: Skin is warm and dry. No rash noted. She is not diaphoretic. No erythema. No pallor.   Psychiatric: She has a normal mood and affect. Her behavior is normal. Judgment and thought  content normal.       Fluids    Intake/Output Summary (Last 24 hours) at 02/11/19 2020  Last data filed at 02/11/19 1800   Gross per 24 hour   Intake          1812.17 ml   Output                0 ml   Net          1812.17 ml       Laboratory  Labs:  Recent Labs      02/10/19   1515  02/11/19 0058   WBC  5.1  5.4   RBC  4.22  3.98*   HEMOGLOBIN  13.7  13.1   HEMATOCRIT  40.1  38.4   MCV  95.0  96.5   MCH  32.5  32.9   MCHC  34.2  34.1   RDW  41.5  42.8   PLATELETCT  197  187   MPV  10.8  11.1             Recent Labs      02/10/19   1515   TROPONINI  <0.02     Recent Labs      02/10/19   1515  02/11/19   0058   SODIUM  136  136   POTASSIUM  4.4  4.2   CHLORIDE  110  108   CO2  16*  18*   GLUCOSE  178*  230*   BUN  26*  38*     Recent Labs      02/10/19   1515  02/11/19   0058   SODIUM  136  136   POTASSIUM  4.4  4.2   CHLORIDE  110  108   CO2  16*  18*   BUN  26*  38*   CREATININE  1.14  1.63*   CALCIUM  10.3*  9.7     Results for orders placed or performed during the hospital encounter of 07/24/16   ECHOCARDIOGRAM COMP W/O CONT   Result Value Ref Range    Eject.Frac. MOD BP 66.32     Eject.Frac. MOD 4C 65.12     Eject.Frac. MOD 2C 57.29     Left Ventrical Ejection Fraction 65          Imaging:   CT-CTA CHEST PULMONARY ARTERY W/ RECONS   Final Result      1.   No evidence of pulmonary emboli or other acute intrathoracic abnormality.   2. Thyroid findings as noted above, unchanged from the prior scan.   3. Stable bilateral adrenal gland nodules.               DX-CHEST-PORTABLE (1 VIEW)   Final Result      Stable hyperinflation, cardiac silhouette enlargement and pulmonary arterial hypertension        Imaging  CXR personally viewed no I or E  Cardiomegaly    Assessment/Plan  Acute exacerbation of chronic obstructive pulmonary disease (COPD) (HCC)- (present on admission)   Assessment & Plan    I do agree with Dr. Alford that this admission do not think this is consistent with a COPD exacerbation  She may have lost  some lung function and deconditioned and with her back pain splinting  However the nonspecific chest pain is concerning  Ofcourse the nonspecific pain could be just reflux due to steroids (silent reflux) but ? If cardiac in nature  Will discuss further with DR. Alford         D/w patient

## 2019-02-11 NOTE — FLOWSHEET NOTE
02/10/19 2341   Interdisciplinary Plan of Care-Goals (Indications)   Bronchodilator Indications History / Diagnosis   Interdisciplinary Plan of Care-Outcomes    Bronchodilator Outcome Patient at Stable Baseline   Education   Education Yes - Pt. / Family has been Instructed in use of Respiratory Equipment;Yes - Pt. / Family has been Instructed in use of Respiratory Medications and Adverse Reactions   RT Assessment of Delivered Medications   Evaluation of Medication Delivery Daily Yes-- Pt /Family has been Instructed in use of Respiratory Medications and Adverse Reactions   SVN Group   #SVN Performed Yes   Given By: Mouthpiece   Respiratory WDL   Respiratory (WDL) X   Chest Exam   Work Of Breathing / Effort Mild   Respiration 18   Pulse (!) 112   Breath Sounds   Pre/Post Intervention Pre Intervention Assessment   RUL Breath Sounds Clear   RML Breath Sounds Diminished   RLL Breath Sounds Diminished   AUSTIN Breath Sounds Clear   LLL Breath Sounds Diminished   Oxygen   Pulse Oximetry 92 %   O2 (LPM) 0   FiO2% 21 %   O2 Daily Delivery Respiratory  Room Air with O2 Available

## 2019-02-11 NOTE — CARE PLAN
Problem: Safety  Goal: Will remain free from falls  Outcome: PROGRESSING AS EXPECTED  Educated on fall risk; non-skid socks applied; bed low in locked position    Problem: Respiratory:  Goal: Respiratory status will improve  Outcome: PROGRESSING SLOWER THAN EXPECTED  Shortness of breath with exertion

## 2019-02-11 NOTE — PROGRESS NOTES
Patient concerned about having IV Contrast with history of CKD. Lab work reviewed with patient. Discussed importance of CTA to rule out PE.  Spoke with tech in CT to confirm at what kidney function they hold IV Contrast. Per CT, stated they do not hold unless GFR < 30.  Patient currently has GFR of 47 and creatinine WNL. Information provided to patient and discussed initiation of IVF and increasing PO fluid intake   post CTA to promote contrast excretion. Patient verbalizes understanding and is agreeable at this time. Consent form completed and faxed by   primary RN Natalia. No further questions/concerns at this time.

## 2019-02-11 NOTE — ED NOTES
Med rec updated and complete  Allergies reviewed  Pt reports that she was on XARELTO 20MG, but stopped on 1/29/2019, per doctor and was told to started taking XARELTO 15MG 1 tablet twice a day on 1/30/2019 for 21 day course and then to go back on 20MG once a day.  Pt had an old RX bottles of PREDNISONE 20MG and took 40MG today (2/10/2019) @ 0830.  Pt was given a sample of TRELEGY 100-62.5-25MCG, but has not taken it yet.

## 2019-02-11 NOTE — PROGRESS NOTES
· 2 RN skin check complete with ALLAN De La Torre.  · Devices in place: None.  · Skin assessed - No s/s of breakdown noted. Small, dry closed scabs to L FA.  · Confirmed pressure ulcers found on: None  · New potential pressure ulcers noted on: None. Wound consult: N/A  · The following interventions in place: Skin assessment qshift and PRN. Encourage turns/repositioning in bed and ambulation at least TID. Maintain adequate PO intake.

## 2019-02-11 NOTE — PROGRESS NOTES
"During admission profile/medication reconciliation patient reported that she had a home medication \"Combigan\" eye drops for glaucoma. This medication was not ordered by admitting physician.     2240:  Paged Dr. Banegas to obtain orders for Combigan per patient request.     2250:   Dr. Banegas stated that it was Okay for patient to use home medication of Combigan as prescribed.    2300:  Orders entered for Combigan home medication.         "

## 2019-02-11 NOTE — FLOWSHEET NOTE
02/11/19 0716   Interdisciplinary Plan of Care-Goals (Indications)   Bronchodilator Indications PFT / Reduced Airflow   Interdisciplinary Plan of Care-Outcomes    Bronchodilator Outcome Patient at Stable Baseline   Education   Education Yes - Pt. / Family has been Instructed in use of Respiratory Equipment;Yes - Pt. / Family has been Instructed in use of Respiratory Medications and Adverse Reactions   RT Assessment of Delivered Medications   Evaluation of Medication Delivery Daily Yes-- Pt /Family has been Instructed in use of Respiratory Medications and Adverse Reactions   SVN Group   #SVN Performed Yes   Given By: Mouthpiece   MDI/DPI Group   #MDI/DPI Given MDI/DPI x 2   Respiratory WDL   Respiratory (WDL) X   Chest Exam   Work Of Breathing / Effort Shallow   Respiration 18   Heart Rate (Monitored) 86   Breath Sounds   Pre/Post Intervention Post Intervention Assessment   RUL Breath Sounds Clear   RML Breath Sounds Clear;Diminished   RLL Breath Sounds Diminished   AUSTIN Breath Sounds Clear   LLL Breath Sounds Diminished   Oximetry   #Pulse Oximetry (Single Determination) Yes   Oxygen   Pulse Oximetry 93 %   O2 Daily Delivery Respiratory  Room Air with O2 Available

## 2019-02-11 NOTE — H&P
Hospital Medicine History & Physical Note    Date of Service  2/10/2019    Primary Care Physician  Barber Joyner M.D.    Consultants  None    Code Status  DNAR, elevation okay, I did have a prolonged discussion with her, she also had one last time she was here and at that point in time she was full DNR, this was switched to intubation okay.  She does state that a short course of intubation would be okay but she would not want to be on a ventilator for significant amount of time  Advance care planning time spent > 16 minutes, from [id number].    Chief Complaint  Shortness of breath    History of Presenting Illness  68 y.o. female who presented 2/10/2019 with shortness of breath.  Patient states her shortness of breath got worse again on Tuesday, Wednesday it worsened further, on Thursday she was so short of breath she could not walk.  She states then on Friday she started using oxygen but this did not help.  She describes a chest tightness that goes across her chest, she cannot take a deep breath, has worsening tightness when she does.  She does have a chronic cough and states that it has been no worse, denies any fever or chills.  Patient was recently admitted, had a COPD exacerbation at that time and was placed on IV Solu-Medrol as well as antibiotics.  Patient during that hospital stay was transferred to [facility] for a kyphoplasty.  Patient does have some chronic back pain.  During previous hospital stay, upper extremity DVT was noted, Xarelto was increased to 15 mg twice daily.  She states she has been taking it as prescribed.    Review of Systems  Review of Systems   Constitutional: Positive for malaise/fatigue. Negative for chills and fever.   HENT: Negative for congestion.    Respiratory: Positive for shortness of breath. Negative for cough, sputum production and stridor.    Cardiovascular: Positive for chest pain. Negative for palpitations and leg swelling.   Gastrointestinal: Negative for abdominal pain,  constipation, diarrhea, nausea and vomiting.   Genitourinary: Negative for dysuria and urgency.   Musculoskeletal: Positive for back pain. Negative for falls and myalgias.   Neurological: Negative for dizziness, tingling, loss of consciousness, weakness and headaches.   Psychiatric/Behavioral: Negative for depression and suicidal ideas.   All other systems reviewed and are negative.      Past Medical History   has a past medical history of Anesthesia; Asthma; Backpain (7/2017); Blood clot in vein (07/06/2018); Bowel habit changes (07/06/2018); Breath shortness; Bronchitis; CAD (coronary artery disease); Cancer (HCC) (2016); Chickenpox; COPD (chronic obstructive pulmonary disease) (HCC); Dialysis (2005); Emphysema of lung (HCC); Glaucoma; Hemorrhagic disorder (HCC); Hyperlipidemia; Hypertension; Hyperthyroidism; Kidney stone; Lung cancer (HCC) (12/12/2016); Multiple thyroid nodules (7/9/2015); Nasal drainage; Osteoporosis; Pain (07/06/2018); Personal history of venous thrombosis and embolism (2004, 2012); Pneumonia (7/2016); Renal disorder; Renal stones (2013); Rheumatoid arthritis (HCC); Rheumatoid arthritis (HCC); S/P appendectomy (1998 ); S/P cholecystectomy (1998); S/P kyphoplasty (2006, 2007, 2013); Sepsis(995.91) (2005); Shortness of breath (07/06/2018); and Thyroid nodule, hot (2017). She also has no past medical history of Angina; Arrhythmia; Breast cancer (HCC); CATARACT; Congestive heart failure (HCC); Diabetes; Fall; Heart murmur; Heart valve disease; Indigestion; Jaundice; Liver disease; Myocardial infarct (HCC); Other specified symptom associated with female genital organs; Pacemaker; Psychiatric disorder; Psychiatric problem; Rheumatic fever; Seizure (HCC); Seizure disorder (HCC); Stroke (HCC); or Unspecified urinary incontinence.    Surgical History   has a past surgical history that includes other; other abdominal surgery; pr enlarge breast with implant; pr reduction of large breast; appendectomy;  cholecystectomy; laminotomy; lung biopsy open; thoracoscopy (Left, 12/12/2016); other orthopedic surgery (2013); cystoscopy stent placement (Left, 6/18/2018); lithotripsy (Left, 6/18/2018); lasertripsy (Left, 6/18/2018); ureteroscopy (Left, 6/18/2018); cystoscopy stent placement (7/9/2018); ureteroscopy (Left, 7/9/2018); and lasertripsy (Left, 7/9/2018).     Family History  family history includes Cancer in her mother; Heart Disease in her brother; Heart Failure in her father.     Social History   reports that she quit smoking about 19 years ago. Her smoking use included Cigarettes. She has a 30.00 pack-year smoking history. She has never used smokeless tobacco. She reports that she drinks alcohol. She reports that she does not use drugs.    Allergies  Allergies   Allergen Reactions   • Nkda [No Known Drug Allergy]        Medications  Prior to Admission Medications   Prescriptions Last Dose Informant Patient Reported? Taking?   BREO ELLIPTA 200-25 MCG/INH AEROSOL POWDER, BREATH ACTIVATED 2/10/2019 at 0700 Patient No No   Sig: INHALE ONE DOSE BY MOUTH DAILY. RINSE MOUTH AFTER USE.   COMBIGAN 0.2-0.5 % Solution 2/10/2019 at 0900 Patient Yes No   Sig: Place 1 Drop in both eyes 2 Times a Day.   HYDROcodone/acetaminophen (NORCO)  MG Tab 2/10/2019 at 0900 Patient Yes No   Sig: Take 1 Tab by mouth every day.   PROAIR  (90 Base) MCG/ACT Aero Soln inhalation aerosol 2/10/2019 at 1430 Patient No No   Sig: INHALE TWO PUFFS BY MOUTH EVERY 6 HOURS AS NEEDED FOR SHORTNESS OF BREATH   Tiotropium Bromide Monohydrate (SPIRIVA RESPIMAT) 2.5 MCG/ACT Aero Soln 2/10/2019 at 0900 Patient Yes No   Sig: Inhale 1 Puff by mouth 2 Times a Day.   acetaminophen (TYLENOL) 500 MG Tab 2/10/2019 at 0800 Patient Yes Yes   Sig: Take 1,000 mg by mouth every 6 hours as needed for Moderate Pain.   azithromycin (ZITHROMAX) 250 MG Tab 1/14/2019 at Finished Patient Yes Yes   Sig: Take 250-500 mg by mouth every day. Pt started on 1/10/2019 for  5 day course.   docusate sodium (COLACE) 100 MG Cap 2/9/2019 at 1800 Patient Yes Yes   Sig: Take 300 mg by mouth every evening.   doxycycline (VIBRAMYCIN) 100 MG Tab 1/31/2019 at Finished Patient Yes Yes   Sig: Take 100 mg by mouth 2 times a day. Pt started on 1/29/2019 for 3 day course.   fluticasone (FLONASE) 50 MCG/ACT nasal spray 2/10/2019 at 0900 Patient Yes Yes   Sig: Spray 1 Spray in nose as needed.   ipratropium-albuterol (DUONEB) 0.5-2.5 (3) MG/3ML nebulizer solution 2/10/2019 at 1000 Patient Yes Yes   Sig: 3 mL by Nebulization route 2 Times a Day.   losartan (COZAAR) 50 MG TABS 2/10/2019 at 0600 Patient Yes No   Sig: Take 50 mg by mouth every morning.   multivitamin (THERAGRAN) Tab 2/10/2019 at 0900 Patient Yes Yes   Sig: Take 1 Tab by mouth every day.   predniSONE (DELTASONE) 20 MG Tab 1/14/2019 at Finished Patient Yes Yes   Sig: Take 40 mg by mouth every day. Pt started on 1/10/2019 for 5 day course, pt takes 40MG once a day for 5 day.   predniSONE (DELTASONE) 20 MG Tab 2/10/2019 at 0830 Patient Yes Yes   Sig: Take 40 mg by mouth every day. Old RX   propranolol LA (INDERAL LA) 60 MG CAPSULE SR 24 HR 2/10/2019 at 0600 Patient Yes No   Sig: Take 60 mg by mouth every morning.   rivaroxaban (XARELTO) 15 MG Tab tablet 2/9/2019 at 2200 Patient Yes Yes   Sig: Take 15 mg by mouth 2 Times a Day. Pt started on 1/30/2019 for 21 day course, then switches to 20MG.   rivaroxaban (XARELTO) 20 MG Tab tablet 1/29/2019 at STOPPED Patient No No   Sig: Take 1 Tab by mouth with dinner.   simvastatin (ZOCOR) 40 MG Tab 2/9/2019 at 2200 Patient Yes No   Sig: Take 40 mg by mouth every evening.   vitamin D, Ergocalciferol, (DRISDOL) 20139 UNITS Cap capsule 2/4/2019 at 1600 Patient Yes No   Sig: Take 50,000 Units by mouth every 14 days.   zolpidem (AMBIEN) 10 MG Tab 2/10/2019 at 0000 Patient Yes Yes   Sig: Take 10 mg by mouth every bedtime.      Facility-Administered Medications: None       Physical Exam  Temp:  [37 °C (98.6 °F)]  37 °C (98.6 °F)  Pulse:  [] 99  Resp:  [16-22] 18  BP: (90)/(60) 90/60  SpO2:  [91 %-95 %] 94 %    Physical Exam   Constitutional: She is oriented to person, place, and time. She appears well-developed. She is cooperative. No distress.   HENT:   Head: Normocephalic and atraumatic. Not macrocephalic and not microcephalic. Head is without raccoon's eyes and without De La Fuente's sign.   Right Ear: External ear normal.   Left Ear: External ear normal.   Mouth/Throat: Oropharynx is clear and moist. No oropharyngeal exudate.   Eyes: Conjunctivae are normal. Right eye exhibits no discharge. Left eye exhibits no discharge. No scleral icterus.   Neck: Neck supple. No tracheal deviation present.   Cardiovascular: Normal rate, regular rhythm and intact distal pulses.  Exam reveals no gallop, no distant heart sounds and no friction rub.    Murmur heard.  Pulmonary/Chest: Effort normal. No accessory muscle usage or stridor. No tachypnea and no bradypnea. No respiratory distress. She has decreased breath sounds. She has no wheezes. She has no rhonchi. She has no rales. She exhibits no tenderness.   Abdominal: Soft. Bowel sounds are normal. She exhibits no distension. There is no hepatosplenomegaly, splenomegaly or hepatomegaly. There is no tenderness. There is no rebound and no guarding.   Musculoskeletal: Normal range of motion. She exhibits no edema or tenderness.   Lymphadenopathy:     She has no cervical adenopathy.   Neurological: She is alert and oriented to person, place, and time. No cranial nerve deficit. She displays no seizure activity.   Skin: Skin is warm, dry and intact. No rash noted. She is not diaphoretic. No erythema. No pallor.   Psychiatric: Her speech is normal and behavior is normal. Judgment and thought content normal. Cognition and memory are normal.   Odd affect    Nursing note and vitals reviewed.      Laboratory:  Recent Labs      02/10/19   1515   WBC  5.1   RBC  4.22   HEMOGLOBIN  13.7   HEMATOCRIT   40.1   MCV  95.0   MCH  32.5   MCHC  34.2   RDW  41.5   PLATELETCT  197   MPV  10.8     Recent Labs      02/10/19   1515   SODIUM  136   POTASSIUM  4.4   CHLORIDE  110   CO2  16*   GLUCOSE  178*   BUN  26*   CREATININE  1.14   CALCIUM  10.3*     Recent Labs      02/10/19   1515   ALTSGPT  24   ASTSGOT  19   ALKPHOSPHAT  93   TBILIRUBIN  0.9   GLUCOSE  178*                 Recent Labs      02/10/19   1515   TROPONINI  <0.02       Urinalysis:    No results found     Imaging:  DX-CHEST-PORTABLE (1 VIEW)   Final Result      Stable hyperinflation, cardiac silhouette enlargement and pulmonary arterial hypertension      CT-CTA CHEST PULMONARY ARTERY W/ RECONS    (Results Pending)         Assessment/Plan:  I anticipate this patient will require at least two midnights for appropriate medical management, necessitating inpatient admission.    Acute exacerbation of chronic obstructive pulmonary disease (COPD) ([facility])- (present on admission)   Assessment & Plan    -Failed outpatient steroids  -Patient during last hospital stay was placed on Solu-Medrol, at discharge sent home on prednisone which she has been taking  -Start IV Solu-Medrol as well as respiratory care per protocol  -At this time I do not feel she has a pneumonia, obtain pro-calcitonin  -She complains of a chest tightness is worse with a deep breath, this just could be due to the COPD exacerbation however she did have a blood clot on normal dose of Xarelto so was increased, concern that could be a pulmonary embolism especially considering she was appropriately treated previously for a COPD exacerbation and has not improved  -Obtain CTA of the chest     Chronic back pain   Assessment & Plan    -Continue symptomatic care with home dose of Norco     CKD (chronic kidney disease) stage 3, GFR 30-59 ml/min ([facility])- (present on admission)   Assessment & Plan    -At baseline, good urinary output  -Repeat BMP in the morning     HTN (hypertension)- (present on admission)    Assessment & Plan    -Did have a blood pressure in the 90s during ER visit, is generally on propranolol and losartan  -For now we will hold off on losartan, discontinue propranolol and Place PRN hydralazine  -Adjust as needed         VTE prophylaxis: Xarelto

## 2019-02-11 NOTE — ASSESSMENT & PLAN NOTE
-Did have a blood pressure in the 90s during ER visit, is generally on propranolol and losartan  -DC losartan,  -Continue propranolol (will help with hyperthyroid sx)  -PRN hydralazine

## 2019-02-11 NOTE — PROGRESS NOTES
2030: Pt off unit via wheelchair with CHELSEA Mccord to CT.    2125: Pt returned to floor via wheelchair with CHELSEA Mccord. Unable to perform CT d/t infiltrated IV with 2x unsuccessful insertion attempts. Will re-attempt IV insertion and contact CT again.

## 2019-02-11 NOTE — FLOWSHEET NOTE
02/10/19 1656   Events/Summary/Plan   Events/Summary/Plan ER TX   Interdisciplinary Plan of Care-Goals (Indications)   Bronchodilator Indications PFT / Reduced Airflow   Interdisciplinary Plan of Care-Outcomes    Bronchodilator Outcome Patient at Stable Baseline   Education   Education Yes - Pt. / Family has been Instructed in use of Respiratory Equipment;Yes - Pt. / Family has been Instructed in use of Respiratory Medications and Adverse Reactions   RT Assessment of Delivered Medications   Evaluation of Medication Delivery Daily Yes-- Pt /Family has been Instructed in use of Respiratory Medications and Adverse Reactions   SVN Group   #SVN Performed Yes   Given By: Mouthpiece   Date SVN Last Changed 02/10/19   Date SVN Next Change Due (Q 7 Days) 02/17/19   Respiratory WDL   Respiratory (WDL) X   Chest Exam   Respiration (!) 22   Pulse (!) 102   Heart Rate (Monitored) 98   Breath Sounds   Pre/Post Intervention Pre Intervention Assessment   RUL Breath Sounds Clear;Diminished   RML Breath Sounds Clear;Diminished   RLL Breath Sounds Diminished   AUSTIN Breath Sounds Clear;Diminished   LLL Breath Sounds Diminished   Oximetry   #Pulse Oximetry (Single Determination) Yes   Oxygen   Pulse Oximetry 95 %   O2 Daily Delivery Respiratory  Room Air with O2 Available

## 2019-02-12 ENCOUNTER — APPOINTMENT (OUTPATIENT)
Dept: CARDIOLOGY | Facility: MEDICAL CENTER | Age: 69
DRG: 644 | End: 2019-02-12
Attending: INTERNAL MEDICINE
Payer: MEDICARE

## 2019-02-12 PROBLEM — D53.9 MACROCYTIC ANEMIA: Status: ACTIVE | Noted: 2019-02-12

## 2019-02-12 LAB
ALBUMIN SERPL BCP-MCNC: 2.8 G/DL (ref 3.2–4.9)
ALBUMIN/GLOB SERPL: 1.2 G/DL
ALP SERPL-CCNC: 77 U/L (ref 30–99)
ALT SERPL-CCNC: 18 U/L (ref 2–50)
ANION GAP SERPL CALC-SCNC: 6 MMOL/L (ref 0–11.9)
AST SERPL-CCNC: 17 U/L (ref 12–45)
BASOPHILS # BLD AUTO: 0.1 % (ref 0–1.8)
BASOPHILS # BLD: 0.01 K/UL (ref 0–0.12)
BILIRUB SERPL-MCNC: 0.5 MG/DL (ref 0.1–1.5)
BUN SERPL-MCNC: 30 MG/DL (ref 8–22)
CALCIUM SERPL-MCNC: 9.7 MG/DL (ref 8.4–10.2)
CHLORIDE SERPL-SCNC: 114 MMOL/L (ref 96–112)
CO2 SERPL-SCNC: 19 MMOL/L (ref 20–33)
CREAT SERPL-MCNC: 1.16 MG/DL (ref 0.5–1.4)
EOSINOPHIL # BLD AUTO: 0 K/UL (ref 0–0.51)
EOSINOPHIL NFR BLD: 0 % (ref 0–6.9)
ERYTHROCYTE [DISTWIDTH] IN BLOOD BY AUTOMATED COUNT: 46.3 FL (ref 35.9–50)
FERRITIN SERPL-MCNC: 108.4 NG/ML (ref 10–291)
GLOBULIN SER CALC-MCNC: 2.4 G/DL (ref 1.9–3.5)
GLUCOSE SERPL-MCNC: 139 MG/DL (ref 65–99)
HCT VFR BLD AUTO: 36 % (ref 37–47)
HGB BLD-MCNC: 11.7 G/DL (ref 12–16)
IMM GRANULOCYTES # BLD AUTO: 0.09 K/UL (ref 0–0.11)
IMM GRANULOCYTES NFR BLD AUTO: 0.8 % (ref 0–0.9)
IRON SATN MFR SERPL: 31 % (ref 15–55)
IRON SERPL-MCNC: 93 UG/DL (ref 40–170)
LV EJECT FRACT  99904: 65
LV EJECT FRACT MOD 2C 99903: 45.94
LV EJECT FRACT MOD 4C 99902: 77.2
LV EJECT FRACT MOD BP 99901: 64.23
LYMPHOCYTES # BLD AUTO: 1.2 K/UL (ref 1–4.8)
LYMPHOCYTES NFR BLD: 11.3 % (ref 22–41)
MCH RBC QN AUTO: 32.6 PG (ref 27–33)
MCHC RBC AUTO-ENTMCNC: 32.5 G/DL (ref 33.6–35)
MCV RBC AUTO: 100.3 FL (ref 81.4–97.8)
MONOCYTES # BLD AUTO: 0.66 K/UL (ref 0–0.85)
MONOCYTES NFR BLD AUTO: 6.2 % (ref 0–13.4)
NEUTROPHILS # BLD AUTO: 8.68 K/UL (ref 2–7.15)
NEUTROPHILS NFR BLD: 81.6 % (ref 44–72)
NRBC # BLD AUTO: 0 K/UL
NRBC BLD-RTO: 0 /100 WBC
PLATELET # BLD AUTO: 158 K/UL (ref 164–446)
PMV BLD AUTO: 11.8 FL (ref 9–12.9)
POTASSIUM SERPL-SCNC: 4.9 MMOL/L (ref 3.6–5.5)
PROT SERPL-MCNC: 5.2 G/DL (ref 6–8.2)
RBC # BLD AUTO: 3.59 M/UL (ref 4.2–5.4)
SODIUM SERPL-SCNC: 139 MMOL/L (ref 135–145)
TIBC SERPL-MCNC: 302 UG/DL (ref 250–450)
TROPONIN I SERPL-MCNC: <0.02 NG/ML (ref 0–0.04)
TROPONIN I SERPL-MCNC: <0.02 NG/ML (ref 0–0.04)
WBC # BLD AUTO: 10.6 K/UL (ref 4.8–10.8)

## 2019-02-12 PROCEDURE — 76937 US GUIDE VASCULAR ACCESS: CPT

## 2019-02-12 PROCEDURE — 83540 ASSAY OF IRON: CPT

## 2019-02-12 PROCEDURE — 700102 HCHG RX REV CODE 250 W/ 637 OVERRIDE(OP): Performed by: HOSPITALIST

## 2019-02-12 PROCEDURE — 94760 N-INVAS EAR/PLS OXIMETRY 1: CPT

## 2019-02-12 PROCEDURE — 94640 AIRWAY INHALATION TREATMENT: CPT

## 2019-02-12 PROCEDURE — 82728 ASSAY OF FERRITIN: CPT

## 2019-02-12 PROCEDURE — A9270 NON-COVERED ITEM OR SERVICE: HCPCS | Performed by: HOSPITALIST

## 2019-02-12 PROCEDURE — 700111 HCHG RX REV CODE 636 W/ 250 OVERRIDE (IP): Performed by: INTERNAL MEDICINE

## 2019-02-12 PROCEDURE — A9270 NON-COVERED ITEM OR SERVICE: HCPCS | Performed by: INTERNAL MEDICINE

## 2019-02-12 PROCEDURE — 93306 TTE W/DOPPLER COMPLETE: CPT

## 2019-02-12 PROCEDURE — 83550 IRON BINDING TEST: CPT

## 2019-02-12 PROCEDURE — 700102 HCHG RX REV CODE 250 W/ 637 OVERRIDE(OP): Performed by: INTERNAL MEDICINE

## 2019-02-12 PROCEDURE — 85025 COMPLETE CBC W/AUTO DIFF WBC: CPT

## 2019-02-12 PROCEDURE — 84484 ASSAY OF TROPONIN QUANT: CPT

## 2019-02-12 PROCEDURE — 99232 SBSQ HOSP IP/OBS MODERATE 35: CPT | Performed by: INTERNAL MEDICINE

## 2019-02-12 PROCEDURE — 93306 TTE W/DOPPLER COMPLETE: CPT | Mod: 26 | Performed by: INTERNAL MEDICINE

## 2019-02-12 PROCEDURE — 700101 HCHG RX REV CODE 250: Performed by: INTERNAL MEDICINE

## 2019-02-12 PROCEDURE — 80053 COMPREHEN METABOLIC PANEL: CPT

## 2019-02-12 PROCEDURE — 700101 HCHG RX REV CODE 250: Performed by: HOSPITALIST

## 2019-02-12 PROCEDURE — 770006 HCHG ROOM/CARE - MED/SURG/GYN SEMI*

## 2019-02-12 RX ADMIN — Medication 2 TABLET: at 05:40

## 2019-02-12 RX ADMIN — METHYLPREDNISOLONE SODIUM SUCCINATE 40 MG: 40 INJECTION, POWDER, FOR SOLUTION INTRAMUSCULAR; INTRAVENOUS at 05:43

## 2019-02-12 RX ADMIN — Medication 2 TABLET: at 17:26

## 2019-02-12 RX ADMIN — METHIMAZOLE 5 MG: 5 TABLET ORAL at 05:43

## 2019-02-12 RX ADMIN — RIVAROXABAN 20 MG: 20 TABLET, FILM COATED ORAL at 17:27

## 2019-02-12 RX ADMIN — BRIMONIDINE TARTRATE AND TIMOLOL MALEATE 1 DROP: 2; 5 SOLUTION OPHTHALMIC at 17:30

## 2019-02-12 RX ADMIN — BRIMONIDINE TARTRATE AND TIMOLOL MALEATE 1 DROP: 2; 5 SOLUTION OPHTHALMIC at 06:00

## 2019-02-12 RX ADMIN — ZOLPIDEM TARTRATE 10 MG: 5 TABLET ORAL at 23:22

## 2019-02-12 RX ADMIN — BUDESONIDE AND FORMOTEROL FUMARATE DIHYDRATE 2 PUFF: 160; 4.5 AEROSOL RESPIRATORY (INHALATION) at 07:12

## 2019-02-12 RX ADMIN — LOSARTAN POTASSIUM 50 MG: 25 TABLET, FILM COATED ORAL at 05:43

## 2019-02-12 RX ADMIN — BUDESONIDE AND FORMOTEROL FUMARATE DIHYDRATE 2 PUFF: 160; 4.5 AEROSOL RESPIRATORY (INHALATION) at 19:53

## 2019-02-12 RX ADMIN — IPRATROPIUM BROMIDE AND ALBUTEROL SULFATE 3 ML: .5; 3 SOLUTION RESPIRATORY (INHALATION) at 19:53

## 2019-02-12 RX ADMIN — METHYLPREDNISOLONE SODIUM SUCCINATE 40 MG: 40 INJECTION, POWDER, FOR SOLUTION INTRAMUSCULAR; INTRAVENOUS at 13:48

## 2019-02-12 RX ADMIN — METHYLPREDNISOLONE SODIUM SUCCINATE 40 MG: 40 INJECTION, POWDER, FOR SOLUTION INTRAMUSCULAR; INTRAVENOUS at 17:28

## 2019-02-12 RX ADMIN — METHIMAZOLE 5 MG: 5 TABLET ORAL at 17:27

## 2019-02-12 RX ADMIN — ACETAMINOPHEN 650 MG: 325 TABLET, FILM COATED ORAL at 13:51

## 2019-02-12 RX ADMIN — HYDROCODONE BITARTRATE AND ACETAMINOPHEN 1 TABLET: 10; 325 TABLET ORAL at 05:42

## 2019-02-12 RX ADMIN — POTASSIUM CHLORIDE AND SODIUM CHLORIDE: 900; 150 INJECTION, SOLUTION INTRAVENOUS at 13:48

## 2019-02-12 RX ADMIN — IPRATROPIUM BROMIDE AND ALBUTEROL SULFATE 3 ML: .5; 3 SOLUTION RESPIRATORY (INHALATION) at 14:47

## 2019-02-12 RX ADMIN — PROPRANOLOL HYDROCHLORIDE 60 MG: 60 CAPSULE, EXTENDED RELEASE ORAL at 05:43

## 2019-02-12 RX ADMIN — METHYLPREDNISOLONE SODIUM SUCCINATE 40 MG: 40 INJECTION, POWDER, FOR SOLUTION INTRAMUSCULAR; INTRAVENOUS at 23:22

## 2019-02-12 RX ADMIN — TIOTROPIUM BROMIDE 1 CAPSULE: 18 CAPSULE ORAL; RESPIRATORY (INHALATION) at 05:43

## 2019-02-12 RX ADMIN — IPRATROPIUM BROMIDE AND ALBUTEROL SULFATE 3 ML: .5; 3 SOLUTION RESPIRATORY (INHALATION) at 07:12

## 2019-02-12 RX ADMIN — SIMVASTATIN 40 MG: 20 TABLET, FILM COATED ORAL at 20:23

## 2019-02-12 ASSESSMENT — ENCOUNTER SYMPTOMS
VOMITING: 0
SORE THROAT: 0
SHORTNESS OF BREATH: 1
HALLUCINATIONS: 0
NAUSEA: 0
MYALGIAS: 0
COUGH: 0
HEADACHES: 0
FEVER: 0
CHILLS: 0
DIZZINESS: 0
BACK PAIN: 0
DEPRESSION: 0
BLOOD IN STOOL: 0
WEAKNESS: 0
NERVOUS/ANXIOUS: 1
ABDOMINAL PAIN: 0
DIARRHEA: 0
HEARTBURN: 0
PALPITATIONS: 0
FOCAL WEAKNESS: 0

## 2019-02-12 NOTE — CARE PLAN
Problem: Infection  Goal: Will remain free from infection  Outcome: PROGRESSING AS EXPECTED  Educated patient on infection prevention and hand hygiene.     Problem: Knowledge Deficit  Goal: Knowledge of disease process/condition, treatment plan, diagnostic tests, and medications will improve  Outcome: PROGRESSING AS EXPECTED  Discussed plan of care with patient and all questions answered.

## 2019-02-12 NOTE — PROGRESS NOTES
Received report from day shift RN Janet. Plan of care discussed at bedside. Patient resting comfortably in bed at this time with no complaints. Safety precautions and hourly rounding in place.

## 2019-02-12 NOTE — FLOWSHEET NOTE
02/11/19 1932   Events/Summary/Plan   Events/Summary/Plan SVN MDI   Interdisciplinary Plan of Care-Goals (Indications)   Bronchodilator Indications Strong Subjective / Objective Improvement   Interdisciplinary Plan of Care-Outcomes    Bronchodilator Outcome Patient at Stable Baseline   Education   Education Yes - Pt. / Family has been Instructed in use of Respiratory Equipment;Yes - Pt. / Family has been Instructed in use of Respiratory Medications and Adverse Reactions   RT Assessment of Delivered Medications   Evaluation of Medication Delivery Daily Yes-- Pt /Family has been Instructed in use of Respiratory Medications and Adverse Reactions   SVN Group   #SVN Performed Yes   Given By: Mouthpiece   MDI/DPI Group   #MDI/DPI Given MDI/DPI x 1   Respiratory WDL   Respiratory (WDL) X   Chest Exam   Respiration 18   Pulse 86   Breath Sounds   Pre/Post Intervention Post Intervention Assessment   RUL Breath Sounds Clear   RML Breath Sounds Clear;Diminished   RLL Breath Sounds Diminished   AUSTIN Breath Sounds Clear   LLL Breath Sounds Diminished   Oximetry   #Pulse Oximetry (Single Determination) Yes   Oxygen   Pulse Oximetry 96 %   O2 Daily Delivery Respiratory  Room Air with O2 Available   Room Air Challenge Pass

## 2019-02-12 NOTE — PROGRESS NOTES
Assessment completed and due medications given, patient is requesting ambien at later time when solu-medrol is to be given. Requested incentive spirometer, self motivated effort. Fluids infusing per protocol.

## 2019-02-12 NOTE — ASSESSMENT & PLAN NOTE
Worsening anemia, denies any history of bleeding.  Denies alcohol  Check iron studies normal  ?From thyroid as well  CBC in AM

## 2019-02-12 NOTE — PROGRESS NOTES
Hospital Medicine Daily Progress Note    Date of Service  2/12/2019    Chief Complaint  68 y.o. female admitted 2/10/2019 with SOB    Hospital Course    History of hyperthyroidism not on therapy, hypertension, history of DVT on Xarelto admitted with shortness of breath after failing outpatient COPD treatment.  Found to have profound hyperthyroidism      Interval Problem Update  2/12: Started on methimazole, still on RA, stress test ordered, discussed with pulmonology.    Consultants/Specialty  Dr. Apple    Code Status  Full    Disposition  F/U Stress, echo    Review of Systems  Review of Systems   Constitutional: Negative for chills, fever and malaise/fatigue.   HENT: Negative for sore throat.    Respiratory: Positive for shortness of breath. Negative for cough.    Cardiovascular: Negative for chest pain and palpitations.        Chest tightness   Gastrointestinal: Negative for abdominal pain, blood in stool, diarrhea, heartburn, nausea and vomiting.   Genitourinary: Negative for dysuria and frequency.   Musculoskeletal: Negative for back pain and myalgias.   Neurological: Negative for dizziness, focal weakness, weakness and headaches.   Psychiatric/Behavioral: Negative for depression and hallucinations. The patient is nervous/anxious.    All other systems reviewed and are negative.       Physical Exam  Temp:  [36.3 °C (97.4 °F)-36.9 °C (98.4 °F)] 36.9 °C (98.4 °F)  Pulse:  [65-98] 73  Resp:  [18] 18  BP: (104-155)/(60-81) 114/75  SpO2:  [91 %-96 %] 95 %    Physical Exam   Constitutional: She appears well-developed and well-nourished. She appears distressed.   Eyes: Pupils are equal, round, and reactive to light. No scleral icterus.   Exophthalmos   Neck: No JVD present. No thyromegaly present.   Cardiovascular: Normal rate and regular rhythm.    No murmur heard.  Pulmonary/Chest: Effort normal. No respiratory distress. She has no wheezes. She has no rales.   Abdominal: Soft. Bowel sounds are normal. She exhibits no  distension. There is no tenderness.   Musculoskeletal: Normal range of motion. She exhibits no edema or tenderness.   Neurological: She is alert. No cranial nerve deficit. Coordination normal.   Skin: Skin is warm. No rash noted. No erythema.   Psychiatric: She has a normal mood and affect. Her behavior is normal.       Fluids    Intake/Output Summary (Last 24 hours) at 02/12/19 1511  Last data filed at 02/12/19 0935   Gross per 24 hour   Intake             2195 ml   Output                0 ml   Net             2195 ml       Laboratory  Recent Labs      02/10/19   1515  02/11/19 0058  02/12/19 0357   WBC  5.1  5.4  10.6   RBC  4.22  3.98*  3.59*   HEMOGLOBIN  13.7  13.1  11.7*   HEMATOCRIT  40.1  38.4  36.0*   MCV  95.0  96.5  100.3*   MCH  32.5  32.9  32.6   MCHC  34.2  34.1  32.5*   RDW  41.5  42.8  46.3   PLATELETCT  197  187  158*   MPV  10.8  11.1  11.8     Recent Labs      02/10/19   1515  02/11/19 0058 02/12/19 0357   SODIUM  136  136  139   POTASSIUM  4.4  4.2  4.9   CHLORIDE  110  108  114*   CO2  16*  18*  19*   GLUCOSE  178*  230*  139*   BUN  26*  38*  30*   CREATININE  1.14  1.63*  1.16   CALCIUM  10.3*  9.7  9.7                   Imaging  CT-CTA CHEST PULMONARY ARTERY W/ RECONS   Final Result      1.   No evidence of pulmonary emboli or other acute intrathoracic abnormality.   2. Thyroid findings as noted above, unchanged from the prior scan.   3. Stable bilateral adrenal gland nodules.               DX-CHEST-PORTABLE (1 VIEW)   Final Result      Stable hyperinflation, cardiac silhouette enlargement and pulmonary arterial hypertension      NM-CARDIAC STRESS TEST    (Results Pending)   EC-ECHOCARDIOGRAM COMPLETE W/O CONT    (Results Pending)        Assessment/Plan  Acute exacerbation of chronic obstructive pulmonary disease (COPD) (HCC)- (present on admission)   Assessment & Plan    -Failed outpatient steroids and is not improving with IV steroids as expected  -severe baseline disease but  currently she is really not wheezing, C/O chest pressure  -CTA negative  -Discussed with pulm and they suggest rule out cardiac etiology I will order a stress test  -May be related to very poorly controlled hyperthyroidism  -Check echo  -Continue methimazole  -IV steroids.        Macrocytic anemia   Assessment & Plan    Worsening anemia, denies any history of bleeding.  Denies alcohol  Check iron studies to evaluate for chronic blood loss in the setting of Xarelto therapy  CBC in AM     ARF (acute renal failure) ([facility])   Assessment & Plan    Improved     Chronic back pain- (present on admission)   Assessment & Plan    -Continue symptomatic care with home dose of Norco     Multinodular goiter- (present on admission)   Assessment & Plan    With hyperthyroidism which is very poorly controlled, she has obvious exophthalmos on exam  This may be leading to her presenting symptoms  Trial of methimazole, may take 1-2 days to notice an effect  Propranolol     DVT (deep venous thrombosis) ([facility])- (present on admission)   Assessment & Plan    History of upper extremity DVT she is on Xarelto.  Continue     CKD (chronic kidney disease) stage 3, GFR 30-59 ml/min ([facility])- (present on admission)   Assessment & Plan    -At baseline, good urinary output  -Repeat BMP in the morning     HTN (hypertension)- (present on admission)   Assessment & Plan    -Did have a blood pressure in the 90s during ER visit, is generally on propranolol and losartan  -DC losartan,  -Continue propranolol (will help with hyperthyroid sx)  -PRN hydralazine          VTE prophylaxis: Xarelto

## 2019-02-12 NOTE — ASSESSMENT & PLAN NOTE
History of upper extremity DVT 1/29 started on Xarelto.  Continue 15 BID through 2/15 --> 20mg nightly

## 2019-02-12 NOTE — PROGRESS NOTES
US-guided for insertion of PIV procedure explained to patient. Vein visualized for access. Aseptic technique was used for cannulation of 20 gauge PIV catheter placed in left upper arm. Patency observed with blood return and flushed with NS without difficulty. PIV dressing applied. Patient tolerated procedure.

## 2019-02-12 NOTE — CARE PLAN
Problem: Communication  Goal: The ability to communicate needs accurately and effectively will improve  Outcome: PROGRESSING AS EXPECTED    Intervention: [place] patient and significant other/support system to call light to alert staff of needs  Patient oriented to call light system and all relevant hospital policies. Call light within reach and used appropriately when in need of assistance.        Problem: Pain Management  Goal: Pain level will decrease to patient's comfort goal  Outcome: PROGRESSING AS EXPECTED    Intervention: Follow pain managment plan developed in collaboration with patient and Interdisciplinary Team  Patient educated on 0-10 pain scale, verbalizes understanding of teaching. Uses appropriately. Pharm and non-pharm measures implemented. Pain regularly assessed including its location, severity, etc.

## 2019-02-12 NOTE — ASSESSMENT & PLAN NOTE
I do agree with Dr. Alford that this admission do not think this is consistent with a COPD exacerbation  She may have lost some lung function and deconditioned and with her back pain splinting  However the nonspecific chest pain is concerning  Ofcourse the nonspecific pain could be just reflux due to steroids (silent reflux) but ? If cardiac in nature  Will discuss further with DR. Alford

## 2019-02-12 NOTE — FLOWSHEET NOTE
02/12/19 1447   Interdisciplinary Plan of Care-Goals (Indications)   Bronchodilator Indications History / Diagnosis   Interdisciplinary Plan of Care-Outcomes    Bronchodilator Outcome Patient at Stable Baseline   RT Assessment of Delivered Medications   Evaluation of Medication Delivery Daily Yes-- Pt /Family has been Instructed in use of Respiratory Medications and Adverse Reactions   SVN Group   #SVN Performed Yes   Given By: Mouthpiece   Respiratory WDL   Respiratory (WDL) X   Chest Exam   Respiration 18   Heart Rate (Monitored) 74   Breath Sounds   RUL Breath Sounds Diminished   RML Breath Sounds Diminished   RLL Breath Sounds Diminished   AUSITN Breath Sounds Diminished   LLL Breath Sounds Diminished   Oxygen   O2 Daily Delivery Respiratory  Room Air with O2 Available      supine position/ultrasound assessment/sterile technique, catheter placed/ultrasound guidance/location identified, draped/prepped, sterile technique used/sterile dressing applied

## 2019-02-12 NOTE — FLOWSHEET NOTE
02/12/19 0713   Events/Summary/Plan   Non-Invasive Resp Device Site Inspection Completed Intact   Interdisciplinary Plan of Care-Goals (Indications)   Bronchodilator Indications History / Diagnosis   Interdisciplinary Plan of Care-Outcomes    Bronchodilator Outcome Patient at Stable Baseline   Education   Education Yes - Pt. / Family has been Instructed in use of Respiratory Equipment;Yes - Pt. / Family has been Instructed in use of Respiratory Medications and Adverse Reactions   RT Assessment of Delivered Medications   Evaluation of Medication Delivery Daily Yes-- Pt /Family has been Instructed in use of Respiratory Medications and Adverse Reactions   SVN Group   #SVN Performed Yes   Given By: Mouthpiece   MDI/DPI Group   #MDI/DPI Given MDI/DPI x 1   Incentive Spirometry Group   Incentive Spirometry Instruction Yes   Breathing Exercises Yes   Incentive Spirometer Volume 1500 mL   Incentive Spirometer Date Last Changed 02/12/19   Incentive Spirometer Next Change Date (Q 30 Days) 03/12/19   Respiratory WDL   Respiratory (WDL) X   Chest Exam   Work Of Breathing / Effort Mild   Respiration 18   Heart Rate (Monitored) 74   Breath Sounds   Pre/Post Intervention Pre Intervention Assessment   RUL Breath Sounds Diminished;Clear   RML Breath Sounds Diminished;Clear   RLL Breath Sounds Diminished   AUSTIN Breath Sounds Diminished;Clear   LLL Breath Sounds Diminished   Oximetry   #Pulse Oximetry (Single Determination) Yes   Oxygen   Pulse Oximetry 93 %   O2 Daily Delivery Respiratory  Room Air with O2 Available

## 2019-02-13 ENCOUNTER — APPOINTMENT (OUTPATIENT)
Dept: RADIOLOGY | Facility: MEDICAL CENTER | Age: 69
DRG: 644 | End: 2019-02-13
Attending: INTERNAL MEDICINE
Payer: MEDICARE

## 2019-02-13 LAB
ALBUMIN SERPL BCP-MCNC: 3.2 G/DL (ref 3.2–4.9)
BUN SERPL-MCNC: 33 MG/DL (ref 8–22)
CALCIUM SERPL-MCNC: 9.4 MG/DL (ref 8.4–10.2)
CHLORIDE SERPL-SCNC: 114 MMOL/L (ref 96–112)
CO2 SERPL-SCNC: 19 MMOL/L (ref 20–33)
CREAT SERPL-MCNC: 1.35 MG/DL (ref 0.5–1.4)
GLUCOSE SERPL-MCNC: 135 MG/DL (ref 65–99)
MAGNESIUM SERPL-MCNC: 1.8 MG/DL (ref 1.5–2.5)
PHOSPHATE SERPL-MCNC: 3.3 MG/DL (ref 2.5–4.5)
POTASSIUM SERPL-SCNC: 5.1 MMOL/L (ref 3.6–5.5)
SODIUM SERPL-SCNC: 138 MMOL/L (ref 135–145)
TROPONIN I SERPL-MCNC: 0.03 NG/ML (ref 0–0.04)

## 2019-02-13 PROCEDURE — 99232 SBSQ HOSP IP/OBS MODERATE 35: CPT | Performed by: INTERNAL MEDICINE

## 2019-02-13 PROCEDURE — 700102 HCHG RX REV CODE 250 W/ 637 OVERRIDE(OP): Performed by: HOSPITALIST

## 2019-02-13 PROCEDURE — 84484 ASSAY OF TROPONIN QUANT: CPT

## 2019-02-13 PROCEDURE — 770020 HCHG ROOM/CARE - TELE (206)

## 2019-02-13 PROCEDURE — 94640 AIRWAY INHALATION TREATMENT: CPT

## 2019-02-13 PROCEDURE — 700101 HCHG RX REV CODE 250: Performed by: HOSPITALIST

## 2019-02-13 PROCEDURE — 83735 ASSAY OF MAGNESIUM: CPT

## 2019-02-13 PROCEDURE — A9270 NON-COVERED ITEM OR SERVICE: HCPCS | Performed by: INTERNAL MEDICINE

## 2019-02-13 PROCEDURE — A9502 TC99M TETROFOSMIN: HCPCS

## 2019-02-13 PROCEDURE — 78452 HT MUSCLE IMAGE SPECT MULT: CPT | Mod: 26 | Performed by: INTERNAL MEDICINE

## 2019-02-13 PROCEDURE — A9270 NON-COVERED ITEM OR SERVICE: HCPCS | Performed by: HOSPITALIST

## 2019-02-13 PROCEDURE — 700101 HCHG RX REV CODE 250: Performed by: INTERNAL MEDICINE

## 2019-02-13 PROCEDURE — 700102 HCHG RX REV CODE 250 W/ 637 OVERRIDE(OP): Performed by: INTERNAL MEDICINE

## 2019-02-13 PROCEDURE — 700111 HCHG RX REV CODE 636 W/ 250 OVERRIDE (IP)

## 2019-02-13 PROCEDURE — 80069 RENAL FUNCTION PANEL: CPT

## 2019-02-13 PROCEDURE — 700111 HCHG RX REV CODE 636 W/ 250 OVERRIDE (IP): Performed by: INTERNAL MEDICINE

## 2019-02-13 PROCEDURE — 93018 CV STRESS TEST I&R ONLY: CPT | Performed by: INTERNAL MEDICINE

## 2019-02-13 PROCEDURE — 94760 N-INVAS EAR/PLS OXIMETRY 1: CPT

## 2019-02-13 RX ORDER — PREDNISONE 20 MG/1
20 TABLET ORAL DAILY
Status: DISCONTINUED | OUTPATIENT
Start: 2019-02-13 | End: 2019-02-14 | Stop reason: HOSPADM

## 2019-02-13 RX ORDER — REGADENOSON 0.08 MG/ML
INJECTION, SOLUTION INTRAVENOUS
Status: COMPLETED
Start: 2019-02-13 | End: 2019-02-13

## 2019-02-13 RX ORDER — PROPRANOLOL HCL 60 MG
60 CAPSULE, EXTENDED RELEASE 24HR ORAL EVERY MORNING
Status: DISCONTINUED | OUTPATIENT
Start: 2019-02-13 | End: 2019-02-14 | Stop reason: HOSPADM

## 2019-02-13 RX ADMIN — PREDNISONE 20 MG: 20 TABLET ORAL at 15:33

## 2019-02-13 RX ADMIN — REGADENOSON 0.4 MG: 0.08 INJECTION, SOLUTION INTRAVENOUS at 09:35

## 2019-02-13 RX ADMIN — METHYLPREDNISOLONE SODIUM SUCCINATE 40 MG: 40 INJECTION, POWDER, FOR SOLUTION INTRAMUSCULAR; INTRAVENOUS at 12:38

## 2019-02-13 RX ADMIN — METHYLPREDNISOLONE SODIUM SUCCINATE 40 MG: 40 INJECTION, POWDER, FOR SOLUTION INTRAMUSCULAR; INTRAVENOUS at 06:10

## 2019-02-13 RX ADMIN — POTASSIUM CHLORIDE AND SODIUM CHLORIDE: 900; 150 INJECTION, SOLUTION INTRAVENOUS at 03:00

## 2019-02-13 RX ADMIN — BUDESONIDE AND FORMOTEROL FUMARATE DIHYDRATE 2 PUFF: 160; 4.5 AEROSOL RESPIRATORY (INHALATION) at 19:01

## 2019-02-13 RX ADMIN — ACETAMINOPHEN 650 MG: 325 TABLET, FILM COATED ORAL at 19:57

## 2019-02-13 RX ADMIN — LOSARTAN POTASSIUM 50 MG: 25 TABLET, FILM COATED ORAL at 06:10

## 2019-02-13 RX ADMIN — BUDESONIDE AND FORMOTEROL FUMARATE DIHYDRATE 2 PUFF: 160; 4.5 AEROSOL RESPIRATORY (INHALATION) at 07:00

## 2019-02-13 RX ADMIN — ZOLPIDEM TARTRATE 10 MG: 5 TABLET ORAL at 23:04

## 2019-02-13 RX ADMIN — ACETAMINOPHEN 650 MG: 325 TABLET, FILM COATED ORAL at 12:40

## 2019-02-13 RX ADMIN — Medication 2 TABLET: at 06:10

## 2019-02-13 RX ADMIN — TIOTROPIUM BROMIDE 1 CAPSULE: 18 CAPSULE ORAL; RESPIRATORY (INHALATION) at 06:10

## 2019-02-13 RX ADMIN — IPRATROPIUM BROMIDE AND ALBUTEROL SULFATE 3 ML: .5; 3 SOLUTION RESPIRATORY (INHALATION) at 11:06

## 2019-02-13 RX ADMIN — IPRATROPIUM BROMIDE AND ALBUTEROL SULFATE 3 ML: .5; 3 SOLUTION RESPIRATORY (INHALATION) at 07:00

## 2019-02-13 RX ADMIN — PROPRANOLOL HYDROCHLORIDE 60 MG: 60 CAPSULE, EXTENDED RELEASE ORAL at 12:35

## 2019-02-13 RX ADMIN — SIMVASTATIN 40 MG: 20 TABLET, FILM COATED ORAL at 19:57

## 2019-02-13 RX ADMIN — RIVAROXABAN 15 MG: 15 TABLET, FILM COATED ORAL at 17:13

## 2019-02-13 RX ADMIN — IPRATROPIUM BROMIDE AND ALBUTEROL SULFATE 3 ML: .5; 3 SOLUTION RESPIRATORY (INHALATION) at 19:00

## 2019-02-13 RX ADMIN — METHIMAZOLE 5 MG: 5 TABLET ORAL at 17:12

## 2019-02-13 RX ADMIN — BRIMONIDINE TARTRATE AND TIMOLOL MALEATE 1 DROP: 2; 5 SOLUTION OPHTHALMIC at 06:00

## 2019-02-13 RX ADMIN — HYDROCODONE BITARTRATE AND ACETAMINOPHEN 1 TABLET: 10; 325 TABLET ORAL at 06:10

## 2019-02-13 RX ADMIN — METHIMAZOLE 5 MG: 5 TABLET ORAL at 06:10

## 2019-02-13 RX ADMIN — BRIMONIDINE TARTRATE AND TIMOLOL MALEATE 1 DROP: 2; 5 SOLUTION OPHTHALMIC at 17:11

## 2019-02-13 RX ADMIN — Medication 2 TABLET: at 17:13

## 2019-02-13 ASSESSMENT — ENCOUNTER SYMPTOMS
DIZZINESS: 0
HEARTBURN: 0
BLOOD IN STOOL: 0
DIARRHEA: 0
ABDOMINAL PAIN: 0
FEVER: 0
VOMITING: 0
COUGH: 0
WEAKNESS: 1
SHORTNESS OF BREATH: 0
BACK PAIN: 0
NAUSEA: 0
HEADACHES: 0
FOCAL WEAKNESS: 0
SORE THROAT: 0
DEPRESSION: 0
PALPITATIONS: 0
MYALGIAS: 0
CHILLS: 0
NERVOUS/ANXIOUS: 1
HALLUCINATIONS: 0

## 2019-02-13 NOTE — PROGRESS NOTES
Hospital Medicine Daily Progress Note    Date of Service  2/13/2019    Chief Complaint  68 y.o. female admitted 2/10/2019 with SOB    Hospital Course    History of hyperthyroidism not on therapy, hypertension, history of DVT on Xarelto admitted with shortness of breath after failing outpatient COPD treatment.  Found to have profound hyperthyroidism      Interval Problem Update  2/12: Started on methimazole, still on RA, stress test ordered, discussed with pulmonology.  2/13: SOB better, reproducible, tender central chest discomfort is better however still present occasionally.  Stress negative, echo normal    Consultants/Specialty  Dr. Apple    Code Status  Full    Disposition  F/U Symptoms, ambulate today  Endocrine f/u   Outpatient sleep study    Review of Systems  Review of Systems   Constitutional: Negative for chills, fever and malaise/fatigue.   HENT: Negative for sore throat.    Respiratory: Negative for cough and shortness of breath (Improved).    Cardiovascular: Negative for chest pain (Central, tender to palpation) and palpitations.        Chest tightness   Gastrointestinal: Negative for abdominal pain, blood in stool, diarrhea, heartburn, nausea and vomiting.   Genitourinary: Negative for dysuria and frequency.   Musculoskeletal: Negative for back pain and myalgias.   Neurological: Positive for weakness. Negative for dizziness, focal weakness and headaches.   Psychiatric/Behavioral: Negative for depression and hallucinations. The patient is nervous/anxious.    All other systems reviewed and are negative.       Physical Exam  Temp:  [36.3 °C (97.3 °F)-36.7 °C (98 °F)] 36.4 °C (97.5 °F)  Pulse:  [80-92] 88  Resp:  [17-18] 18  BP: (120-155)/(67-93) 129/73  SpO2:  [92 %-96 %] 93 %    Physical Exam   Constitutional: She appears well-developed and well-nourished. No distress.   Eyes: Pupils are equal, round, and reactive to light. No scleral icterus.   Exophthalmos   Neck: No JVD present. No tracheal deviation  present.   Cardiovascular: Normal rate and regular rhythm.    No murmur heard.  Pulmonary/Chest: Effort normal. No respiratory distress. She has no wheezes. She has no rales. She exhibits tenderness.   Abdominal: Soft. There is no tenderness. There is no rebound.   Musculoskeletal: Normal range of motion. She exhibits no edema or deformity.   Neurological: She is alert. No cranial nerve deficit. Coordination normal.   Skin: Skin is warm. No rash noted. No erythema.   Psychiatric: She has a normal mood and affect. Her behavior is normal.       Fluids    Intake/Output Summary (Last 24 hours) at 02/13/19 1506  Last data filed at 02/13/19 0720   Gross per 24 hour   Intake          2119.67 ml   Output                0 ml   Net          2119.67 ml       Laboratory  Recent Labs      02/10/19   1515  02/11/19   0058  02/12/19   0357   WBC  5.1  5.4  10.6   RBC  4.22  3.98*  3.59*   HEMOGLOBIN  13.7  13.1  11.7*   HEMATOCRIT  40.1  38.4  36.0*   MCV  95.0  96.5  100.3*   MCH  32.5  32.9  32.6   MCHC  34.2  34.1  32.5*   RDW  41.5  42.8  46.3   PLATELETCT  197  187  158*   MPV  10.8  11.1  11.8     Recent Labs      02/11/19   0058  02/12/19   0357  02/13/19   0430   SODIUM  136  139  138   POTASSIUM  4.2  4.9  5.1   CHLORIDE  108  114*  114*   CO2  18*  19*  19*   GLUCOSE  230*  139*  135*   BUN  38*  30*  33*   CREATININE  1.63*  1.16  1.35   CALCIUM  9.7  9.7  9.4                   Imaging  NM-CARDIAC STRESS TEST   Final Result      EC-ECHOCARDIOGRAM COMPLETE W/O CONT   Final Result      CT-CTA CHEST PULMONARY ARTERY W/ RECONS   Final Result      1.   No evidence of pulmonary emboli or other acute intrathoracic abnormality.   2. Thyroid findings as noted above, unchanged from the prior scan.   3. Stable bilateral adrenal gland nodules.               DX-CHEST-PORTABLE (1 VIEW)   Final Result      Stable hyperinflation, cardiac silhouette enlargement and pulmonary arterial hypertension           Assessment/Plan  Acute  exacerbation of chronic obstructive pulmonary disease (COPD) ([facility])- (present on admission)   Assessment & Plan    -Improved  -Failed outpatient steroids and is not improving with IV steroids as expected, unlikely exacerbation  -severe baseline disease but currently she is really not wheezing, C/O chest pressure  -CTA negative for pe and she is on xarelto  -Discussed with pulm and they suggest rule out cardiac etiology I will order a stress test, which was negative  -Echo normal aside from RVSP 30  -May be related to very poorly controlled hyperthyroidism  -Watch on tele  -Continue methimazole  -PO steroids, x5d  -Spiriva, Duonb, Symbicort       Macrocytic anemia   Assessment & Plan    Worsening anemia, denies any history of bleeding.  Denies alcohol  Check iron studies normal  ?From thyroid as well  CBC in AM     ARF (acute renal failure) ([facility])   Assessment & Plan    Improved     Chronic back pain- (present on admission)   Assessment & Plan    She had recent surgery  -Continue symptomatic care with home dose of Norco     Multinodular goiter- (present on admission)   Assessment & Plan    With hyperthyroidism which is very poorly controlled, she has obvious exophthalmos on exam  This may be leading to her presenting symptoms  Continue trial of methimazole, she has improved  Propranolol     DVT (deep venous thrombosis) ([facility])- (present on admission)   Assessment & Plan    History of upper extremity DVT 1/29 started on Xarelto.  Continue 15 BID through 2/15 --> 20mg nightly     CKD (chronic kidney disease) stage 3, GFR 30-59 ml/min ([facility])- (present on admission)   Assessment & Plan    -At baseline, good urinary output  -Repeat BMP in the morning     HTN (hypertension)- (present on admission)   Assessment & Plan    -Did have a blood pressure in the 90s during ER visit, is generally on propranolol and losartan  -DC losartan,  -Continue propranolol (will help with hyperthyroid sx)  -PRN hydralazine          VTE prophylaxis:  Xarelto

## 2019-02-13 NOTE — PROGRESS NOTES
Report received from ALLAN Lay. Patient resting comfortably in bed. Declines any needs at this time. Call light in reach.

## 2019-02-13 NOTE — PROGRESS NOTES
Pt. felicity @ 9650 from ALLAN Gannon. Denies pain/SOB. Bed in low/locked position, call bell within reach. No acute distress at this time, will continue to monitor.

## 2019-02-13 NOTE — FLOWSHEET NOTE
02/13/19 0700   Events/Summary/Plan   Non-Invasive Resp Device Site Inspection Completed Intact   Interdisciplinary Plan of Care-Goals (Indications)   Bronchodilator Indications PFT / Reduced Airflow   Interdisciplinary Plan of Care-Outcomes    Bronchodilator Outcome Improved Patient Appearance with Decreased use of Accessory Muscles   Education   Education Yes - Pt. / Family has been Instructed in use of Respiratory Equipment;Yes - Pt. / Family has been Instructed in use of Respiratory Medications and Adverse Reactions   RT Assessment of Delivered Medications   Evaluation of Medication Delivery Daily Yes-- Pt /Family has been Instructed in use of Respiratory Medications and Adverse Reactions   SVN Group   #SVN Performed Yes   Given By: Mouthpiece   Date SVN Last Changed 02/13/19   Date SVN Next Change Due (Q 7 Days) 02/20/19   MDI/DPI Group   #MDI/DPI Given MDI/DPI x 1   Respiratory WDL   Respiratory (WDL) X   Chest Exam   Respiration 18   Heart Rate (Monitored) 82   Breath Sounds   Pre/Post Intervention Pre Intervention Assessment   RUL Breath Sounds Diminished;Clear   RML Breath Sounds Diminished   RLL Breath Sounds Diminished   AUSTIN Breath Sounds Diminished;Clear   LLL Breath Sounds Diminished   Oximetry   #Pulse Oximetry (Single Determination) Yes   Oxygen   Pulse Oximetry 95 %   O2 Daily Delivery Respiratory  Room Air with O2 Available

## 2019-02-13 NOTE — PROGRESS NOTES
Patient placed on tele per MD orders.   Report given to ALLAN Domínguez. Patient resting comfortably in bed. Declines any needs at this time. Call light in reach.

## 2019-02-13 NOTE — PROGRESS NOTES
Due medications given and assessment completed, patient requesting Ambien at later time.    Due to disruptive roommate, this patient was moved to [facility] [place] [facility] [place] at [place]. All belongings transferred with patient.

## 2019-02-13 NOTE — PROGRESS NOTES
Received report from day shift RN Davon. Plan of care discussed at bedside. Patient resting comfortably in bed at this time with no complaints. Safety precautions and hourly rounding in place.

## 2019-02-13 NOTE — DISCHARGE PLANNING
Anticipated Discharge Disposition: Home when stable    Action: Discussed pt's case in MDT rounds, pending tests currently. Anticipate pt will be d/c home with no needs once stable.     Barriers to Discharge: N/A.     Plan: As above, anticipate d/c home when stable. RN CM/LSW to continue to follow for any further needs.       Care Transition Team Assessment    Information Source  Orientation : Oriented x 4  Information Given By: Patient  Informant's Name:  (MDT rounds, staff, medical records)  Who is responsible for making decisions for patient? : Patient         Elopement Risk  Legal Hold: No  Ambulatory or Self Mobile in Wheelchair: Yes  Disoriented: No  Psychiatric Symptoms: None  History of Wandering: No  Elopement this Admit: No  Vocalizing Wanting to Leave: No  Displays Behaviors, Body Language Wanting to Leave: No-Not at Risk for Elopement  Elopement Risk: Not at Risk for Elopement    Interdisciplinary Discharge Planning  Primary Care Physician: Dr. Joyner  Patient or legal guardian wants to designate a caregiver (see row info): No  Housing / Facility: 1 Story Apartment / Condo  Durable Medical Equipment: Home Oxygen, Walker    Discharge Preparedness  What is your plan after discharge?: Home with help, Uncertain - pending medical team collaboration  Prior Functional Level: Ambulatory, Independent with Activities of Daily Living, Independent with Medication Management    Functional Assesment  Prior Functional Level: Ambulatory, Independent with Activities of Daily Living, Independent with Medication Management    Finances  Financial Barriers to Discharge: No  Prescription Coverage: Yes    Vision / Hearing Impairment  Vision Impairment : Yes  Right Eye Vision: Wears Glasses  Left Eye Vision: Blind, Wears Glasses  Hearing Impairment : No    Values / Beliefs / Concerns  Values / Beliefs Concerns : No    Advance Directive  Advance Directive?: POLST    Domestic Abuse  Have you ever been the victim of abuse or  violence?: No  Physical Abuse or Sexual Abuse: No  Verbal Abuse or Emotional Abuse: No  Possible Abuse Reported to:: Not Applicable         Discharge Risks or Barriers  Discharge risks or barriers?: Complex medical needs  Patient risk factors: Complex medical needs    Anticipated Discharge Information  Anticipated discharge disposition: [facility]

## 2019-02-13 NOTE — FLOWSHEET NOTE
02/12/19 1953   Events/Summary/Plan   Events/Summary/Plan SVN MDI   Interdisciplinary Plan of Care-Goals (Indications)   Bronchodilator Indications History / Diagnosis   Interdisciplinary Plan of Care-Outcomes    Bronchodilator Outcome Patient at Stable Baseline   Education   Education Yes - Pt. / Family has been Instructed in use of Respiratory Equipment;Yes - Pt. / Family has been Instructed in use of Respiratory Medications and Adverse Reactions   RT Assessment of Delivered Medications   Evaluation of Medication Delivery Daily Yes-- Pt /Family has been Instructed in use of Respiratory Medications and Adverse Reactions   SVN Group   #SVN Performed Yes   Given By: Mouthpiece   MDI/DPI Group   #MDI/DPI Given MDI/DPI x 1   Respiratory WDL   Respiratory (WDL) X   Chest Exam   Respiration 17   Pulse 86   Breath Sounds   Pre/Post Intervention Post Intervention Assessment   RUL Breath Sounds Clear   RML Breath Sounds Clear;Diminished   RLL Breath Sounds Diminished   AUSTIN Breath Sounds Clear   LLL Breath Sounds Diminished   Oximetry   #Pulse Oximetry (Single Determination) Yes   Oxygen   Pulse Oximetry 94 %   O2 Daily Delivery Respiratory  Room Air with O2 Available   Room Air Challenge Pass

## 2019-02-13 NOTE — PROGRESS NOTES
Nursing care plan includes knowledge deficit, potential for discomfort, potential for compromised cardiac output. POC includes teaching, comfort measures and reassurance, and access to code cart, cardiology stand by and availability of rapid response team. Pt verbalizes good understanding of benefits and risks of pharmacological cardiac stress test. Informed consent obtained. Lexiscan given, pt developed the following symptoms SOB and moderate chest pressure. VS stable, major symptoms resolved. To waiting room, caffeinated fluids and/or snacks given. Nursing goals met.

## 2019-02-13 NOTE — CARE PLAN
Problem: Safety  Goal: Will remain free from injury  Outcome: PROGRESSING AS EXPECTED    Intervention: Provide assistance with mobility  Patient calls for assistance when needed, is a standby assist. Dyspneic on exertion, rest periods provided.      Problem: Psychosocial Needs:  Goal: Level of anxiety will decrease  Outcome: PROGRESSING AS EXPECTED    Intervention: Identify and develop with patient strategies to cope with anxiety triggers  Patient verbalizes minimal feelings of anxiety, non-pharm interventions provided.

## 2019-02-13 NOTE — FLOWSHEET NOTE
02/13/19 1106   Interdisciplinary Plan of Care-Goals (Indications)   Bronchodilator Indications PFT / Reduced Airflow   Interdisciplinary Plan of Care-Outcomes    Bronchodilator Outcome Improved Patient Appearance with Decreased use of Accessory Muscles   RT Assessment of Delivered Medications   Evaluation of Medication Delivery Daily Yes-- Pt /Family has been Instructed in use of Respiratory Medications and Adverse Reactions   SVN Group   #SVN Performed Yes   Given By: Mouthpiece   Respiratory WDL   Respiratory (WDL) X   Chest Exam   Respiration 18   Heart Rate (Monitored) 72   Breath Sounds   Pre/Post Intervention Pre Intervention Assessment   RUL Breath Sounds Tubular   RML Breath Sounds Tubular;Diminished   RLL Breath Sounds Tubular;Diminished   AUSTIN Breath Sounds Diminished;Tubular   LLL Breath Sounds Diminished;Tubular   Oxygen   O2 Daily Delivery Respiratory  Room Air with O2 Available

## 2019-02-14 ENCOUNTER — PATIENT OUTREACH (OUTPATIENT)
Dept: HEALTH INFORMATION MANAGEMENT | Facility: OTHER | Age: 69
End: 2019-02-14

## 2019-02-14 VITALS
WEIGHT: 164.68 LBS | DIASTOLIC BLOOD PRESSURE: 83 MMHG | BODY MASS INDEX: 25.85 KG/M2 | TEMPERATURE: 97.4 F | SYSTOLIC BLOOD PRESSURE: 121 MMHG | HEIGHT: 67 IN | HEART RATE: 81 BPM | RESPIRATION RATE: 18 BRPM | OXYGEN SATURATION: 99 %

## 2019-02-14 LAB
ALBUMIN SERPL BCP-MCNC: 3.4 G/DL (ref 3.2–4.9)
ALBUMIN/GLOB SERPL: 1.3 G/DL
ALP SERPL-CCNC: 79 U/L (ref 30–99)
ALT SERPL-CCNC: 20 U/L (ref 2–50)
ANION GAP SERPL CALC-SCNC: 6 MMOL/L (ref 0–11.9)
AST SERPL-CCNC: 16 U/L (ref 12–45)
BASOPHILS # BLD AUTO: 0.1 % (ref 0–1.8)
BASOPHILS # BLD: 0.01 K/UL (ref 0–0.12)
BILIRUB SERPL-MCNC: 0.8 MG/DL (ref 0.1–1.5)
BUN SERPL-MCNC: 34 MG/DL (ref 8–22)
CALCIUM SERPL-MCNC: 9.8 MG/DL (ref 8.4–10.2)
CHLORIDE SERPL-SCNC: 112 MMOL/L (ref 96–112)
CO2 SERPL-SCNC: 18 MMOL/L (ref 20–33)
CREAT SERPL-MCNC: 1.19 MG/DL (ref 0.5–1.4)
EOSINOPHIL # BLD AUTO: 0.01 K/UL (ref 0–0.51)
EOSINOPHIL NFR BLD: 0.1 % (ref 0–6.9)
ERYTHROCYTE [DISTWIDTH] IN BLOOD BY AUTOMATED COUNT: 44.4 FL (ref 35.9–50)
GLOBULIN SER CALC-MCNC: 2.7 G/DL (ref 1.9–3.5)
GLUCOSE SERPL-MCNC: 111 MG/DL (ref 65–99)
HCT VFR BLD AUTO: 41.3 % (ref 37–47)
HGB BLD-MCNC: 13.4 G/DL (ref 12–16)
IMM GRANULOCYTES # BLD AUTO: 0.05 K/UL (ref 0–0.11)
IMM GRANULOCYTES NFR BLD AUTO: 0.7 % (ref 0–0.9)
LYMPHOCYTES # BLD AUTO: 1.33 K/UL (ref 1–4.8)
LYMPHOCYTES NFR BLD: 19.8 % (ref 22–41)
MAGNESIUM SERPL-MCNC: 1.8 MG/DL (ref 1.5–2.5)
MCH RBC QN AUTO: 32.2 PG (ref 27–33)
MCHC RBC AUTO-ENTMCNC: 32.4 G/DL (ref 33.6–35)
MCV RBC AUTO: 99.3 FL (ref 81.4–97.8)
MONOCYTES # BLD AUTO: 0.48 K/UL (ref 0–0.85)
MONOCYTES NFR BLD AUTO: 7.1 % (ref 0–13.4)
NEUTROPHILS # BLD AUTO: 4.85 K/UL (ref 2–7.15)
NEUTROPHILS NFR BLD: 72.2 % (ref 44–72)
NRBC # BLD AUTO: 0 K/UL
NRBC BLD-RTO: 0 /100 WBC
PLATELET # BLD AUTO: 139 K/UL (ref 164–446)
PMV BLD AUTO: 11.6 FL (ref 9–12.9)
POTASSIUM SERPL-SCNC: 4.9 MMOL/L (ref 3.6–5.5)
PROT SERPL-MCNC: 6.1 G/DL (ref 6–8.2)
RBC # BLD AUTO: 4.16 M/UL (ref 4.2–5.4)
SODIUM SERPL-SCNC: 136 MMOL/L (ref 135–145)
WBC # BLD AUTO: 6.7 K/UL (ref 4.8–10.8)

## 2019-02-14 PROCEDURE — 99239 HOSP IP/OBS DSCHRG MGMT >30: CPT | Performed by: INTERNAL MEDICINE

## 2019-02-14 PROCEDURE — 85025 COMPLETE CBC W/AUTO DIFF WBC: CPT

## 2019-02-14 PROCEDURE — 83735 ASSAY OF MAGNESIUM: CPT

## 2019-02-14 PROCEDURE — A9270 NON-COVERED ITEM OR SERVICE: HCPCS | Performed by: INTERNAL MEDICINE

## 2019-02-14 PROCEDURE — 94640 AIRWAY INHALATION TREATMENT: CPT

## 2019-02-14 PROCEDURE — 94760 N-INVAS EAR/PLS OXIMETRY 1: CPT

## 2019-02-14 PROCEDURE — A9270 NON-COVERED ITEM OR SERVICE: HCPCS | Performed by: HOSPITALIST

## 2019-02-14 PROCEDURE — 700102 HCHG RX REV CODE 250 W/ 637 OVERRIDE(OP): Performed by: INTERNAL MEDICINE

## 2019-02-14 PROCEDURE — 700111 HCHG RX REV CODE 636 W/ 250 OVERRIDE (IP): Performed by: INTERNAL MEDICINE

## 2019-02-14 PROCEDURE — 80053 COMPREHEN METABOLIC PANEL: CPT

## 2019-02-14 PROCEDURE — 700102 HCHG RX REV CODE 250 W/ 637 OVERRIDE(OP): Performed by: HOSPITALIST

## 2019-02-14 PROCEDURE — 700101 HCHG RX REV CODE 250: Performed by: HOSPITALIST

## 2019-02-14 RX ORDER — METHIMAZOLE 5 MG/1
5 TABLET ORAL 2 TIMES DAILY
Qty: 90 TAB | Refills: 2 | Status: SHIPPED | OUTPATIENT
Start: 2019-02-14 | End: 2019-04-24

## 2019-02-14 RX ORDER — LOSARTAN POTASSIUM 25 MG/1
100 TABLET ORAL EVERY MORNING
Status: DISCONTINUED | OUTPATIENT
Start: 2019-02-15 | End: 2019-02-14 | Stop reason: HOSPADM

## 2019-02-14 RX ADMIN — METHIMAZOLE 5 MG: 5 TABLET ORAL at 05:56

## 2019-02-14 RX ADMIN — BUDESONIDE AND FORMOTEROL FUMARATE DIHYDRATE 2 PUFF: 160; 4.5 AEROSOL RESPIRATORY (INHALATION) at 06:42

## 2019-02-14 RX ADMIN — BRIMONIDINE TARTRATE AND TIMOLOL MALEATE 1 DROP: 2; 5 SOLUTION OPHTHALMIC at 06:00

## 2019-02-14 RX ADMIN — HYDROCODONE BITARTRATE AND ACETAMINOPHEN 1 TABLET: 10; 325 TABLET ORAL at 05:56

## 2019-02-14 RX ADMIN — RIVAROXABAN 15 MG: 15 TABLET, FILM COATED ORAL at 05:56

## 2019-02-14 RX ADMIN — Medication 2 TABLET: at 05:56

## 2019-02-14 RX ADMIN — TIOTROPIUM BROMIDE 1 CAPSULE: 18 CAPSULE ORAL; RESPIRATORY (INHALATION) at 05:56

## 2019-02-14 RX ADMIN — IPRATROPIUM BROMIDE AND ALBUTEROL SULFATE 3 ML: .5; 3 SOLUTION RESPIRATORY (INHALATION) at 10:46

## 2019-02-14 RX ADMIN — IPRATROPIUM BROMIDE AND ALBUTEROL SULFATE 3 ML: .5; 3 SOLUTION RESPIRATORY (INHALATION) at 06:38

## 2019-02-14 RX ADMIN — PREDNISONE 20 MG: 20 TABLET ORAL at 05:56

## 2019-02-14 RX ADMIN — PROPRANOLOL HYDROCHLORIDE 60 MG: 60 CAPSULE, EXTENDED RELEASE ORAL at 05:57

## 2019-02-14 RX ADMIN — LOSARTAN POTASSIUM 50 MG: 25 TABLET, FILM COATED ORAL at 05:57

## 2019-02-14 NOTE — CARE PLAN
Problem: Safety  Goal: Will remain free from falls  Outcome: PROGRESSING AS EXPECTED  Fall precautions in place, call light in reach.  Discussed POC.  Verbalized udnerstanding.  Will continue to monitor.    Problem: Discharge Barriers/Planning  Goal: Patient's continuum of care needs will be met  Outcome: PROGRESSING AS EXPECTED  Educated on new medication, follow up appointments.  Verbalized understanding.  Will continue to monitor.

## 2019-02-14 NOTE — PROGRESS NOTES
Tele strip at 1146 shows SR at 89.      Measurements from am strip were as follows:  GA=0.20  QRS=0.08  QT=0.36    Tele Shift Summary:    Rhythm : SR to ST  Rate :   Ectopy : Per CCT Wendy, pt had rare PVCs.     Telemetry monitoring strips placed in pt's chart.

## 2019-02-14 NOTE — FLOWSHEET NOTE
02/13/19 1902   Events/Summary/Plan   Events/Summary/Plan SVN MDI   Interdisciplinary Plan of Care-Goals (Indications)   Bronchodilator Indications History / Diagnosis   Interdisciplinary Plan of Care-Outcomes    Bronchodilator Outcome Patient at Stable Baseline   Education   Education Yes - Pt. / Family has been Instructed in use of Respiratory Equipment;Yes - Pt. / Family has been Instructed in use of Respiratory Medications and Adverse Reactions   RT Assessment of Delivered Medications   Evaluation of Medication Delivery Daily Yes-- Pt /Family has been Instructed in use of Respiratory Medications and Adverse Reactions   SVN Group   #SVN Performed Yes   Given By: Mouthpiece   MDI/DPI Group   #MDI/DPI Given MDI/DPI x 1   Respiratory WDL   Respiratory (WDL) X   Chest Exam   Respiration 16   Pulse 71   Breath Sounds   Pre/Post Intervention Post Intervention Assessment   RUL Breath Sounds Clear   RML Breath Sounds Diminished   RLL Breath Sounds Diminished   AUSTIN Breath Sounds Diminished   LLL Breath Sounds Diminished   Oximetry   #Pulse Oximetry (Single Determination) Yes   Oxygen   Pulse Oximetry 98 %   O2 Daily Delivery Respiratory  Room Air with O2 Available

## 2019-02-14 NOTE — DISCHARGE INSTRUCTIONS
Discharge Instructions    Discharged to home by taxi with self. Discharged via wheelchair, hospital escort: Yes.  Special equipment needed: Not Applicable    Be sure to schedule a follow-up appointment with your primary care doctor or any specialists as instructed.     Discharge Plan:   Influenza Vaccine Indication: Not indicated: Previously immunized this influenza season and > 8 years of age    I understand that a diet low in cholesterol, fat, and sodium is recommended for good health. Unless I have been given specific instructions below for another diet, I accept this instruction as my diet prescription.   Other diet: Regular, low salt    Special Instructions: None    · Is patient discharged on Warfarin / Coumadin?   No     Depression / Suicide Risk    As you are discharged from this [facility] facility, it is important to learn how to keep safe from harming yourself.    Recognize the warning signs:  · Abrupt changes in personality, positive or negative- including increase in energy   · Giving away possessions  · Change in eating patterns- significant weight changes-  positive or negative  · Change in sleeping patterns- unable to sleep or sleeping all the time   · Unwillingness or inability to communicate  · Depression  · Unusual sadness, discouragement and loneliness  · Talk of wanting to die  · Neglect of personal appearance   · Rebelliousness- reckless behavior  · Withdrawal from people/activities they love  · Confusion- inability to concentrate     If you or a loved one observes any of these behaviors or has concerns about self-harm, here's what you can do:  · Talk about it- your feelings and reasons for harming yourself  · Remove any means that you might use to hurt yourself (examples: pills, rope, extension cords, firearm)  · Get professional help from the community (Mental Health, Substance Abuse, psychological counseling)  · Do not be alone:Call your Safe Contact- someone whom you trust who will be  there for you.  · Call your local CRISIS HOTLINE 416-9801 or 984-515-2863  · Call your local Children's Mobile Crisis Response Team [place] (977) 658-7099 or www.PPI  · Call the toll free National Suicide Prevention Hotlines   · National Suicide Prevention Lifeline 857-154-KVAM (4681)  · National Hope Line Network 800-SUICIDE (954-9791)      Methimazole tablets  What is this medicine?  METHIMAZOLE (meth IM a zole) prevents the thyroid gland from producing too much thyroid hormone. It is used to treat a condition known as hyperthyroidism.  This medicine may be used for other purposes; ask your health care provider or pharmacist if you have questions.  COMMON BRAND NAME(S): Northyx, Tapazole  What should I tell my health care provider before I take this medicine?  They need to know if you have any of these conditions:  -bone marrow disease  -liver disease  -an unusual or allergic reaction to methimazole, other medicines, foods, dyes, or preservatives  -pregnant or trying to get pregnant  -breast-feeding  How should I use this medicine?  Take this medicine by mouth with a glass of water. Follow the directions on the prescription label. You can take this medicine with or without food. However, you should always take it the same way to make sure the effects are the same. Take your doses at regular intervals. Do not take your medicine more often than directed. Do not stop taking this medicine except on the advice of your doctor or health care professional.  Talk to your pediatrician regarding the use of this medicine in children. Special care may be needed. While this drug may be prescribed for children for selected conditions, precautions do apply.  Overdosage: If you think you have taken too much of this medicine contact a poison control center or emergency room at once.  NOTE: This medicine is only for you. Do not share this medicine with others.  What if I miss a dose?  If you miss a dose, take it  as soon as you can. If it is almost time for your next dose, take only that dose. Do not take double or extra doses.  What may interact with this medicine?  Do not take this medicine with any of the following medications:  -sodium iodide  -thyroid hormones  This medicine may also interact with the following medications:  -certain medicines for high blood pressure like metoprolol and propranolol  -digoxin  -theophylline  -warfarin  This list may not describe all possible interactions. Give your health care provider a list of all the medicines, herbs, non-prescription drugs, or dietary supplements you use. Also tell them if you smoke, drink alcohol, or use illegal drugs. Some items may interact with your medicine.  What should I watch for while using this medicine?  Visit your doctor or health care professional for regular checks on your progress. Your thyroid hormone levels will need to be checked.  This medicine can reduce your resistance to infection. Contact your doctor or health care professional if you have any infection or injury. Avoid people who have colds, flu, bronchitis or other infectious disease. Do not have any vaccinations without asking your doctor or health care professional. Avoid people who have recently received oral polio vaccine.  What side effects may I notice from receiving this medicine?  Side effects that you should report to your doctor or health care professional as soon as possible:  -black, tarry stools  -fever, sore throat, hoarseness  -numbness or tingling in the hands or feet  -severe redness or itching of the skin, or dry cracked skin  -stomach pain  -swelling of the feet or legs  -unusual bleeding or bruising, pinpoint red spots on the skin  -unusual or sudden weight increase  -unusually weak or tired  -yellowing of skin or eyes  Side effects that usually do not require medical attention (report to your doctor or health care professional if they continue or are  bothersome):  -headache  -nausea, vomiting  -mild skin rash, itching  -muscle aches and pains  This list may not describe all possible side effects. Call your doctor for medical advice about side effects. You may report side effects to FDA at 0-150-FDA-6045.  Where should I keep my medicine?  Keep out of the reach of children.  Store at room temperature between 15 and 30 degrees C (59 and 86 degrees F). Throw away any unused medicine after the expiration date.  NOTE: This sheet is a summary. It may not cover all possible information. If you have questions about this medicine, talk to your doctor, pharmacist, or health care provider.  © 2018 Elsevier/Gold Standard (2009-07-06 15:59:43)      Hyperthyroidism  Hyperthyroidism is when the thyroid is too active (overactive). Your thyroid is a large gland that is located in your neck. The thyroid helps to control how your body uses food (metabolism). When your thyroid is overactive, it produces too much of a hormone called thyroxine.  What are the causes?  Causes of hyperthyroidism may include:  · Graves disease. This is when your immune system attacks the thyroid gland. This is the most common cause.  · Inflammation of the thyroid gland.  · Tumor in the thyroid gland or somewhere else.  · Excessive use of thyroid medicines, including:  ¨ Prescription thyroid supplement.  ¨ Herbal supplements that mimic thyroid hormones.  · Solid or fluid-filled lumps within your thyroid gland (thyroid nodules).  · Excessive ingestion of iodine.  What increases the risk?  · Being female.  · Having a family history of thyroid conditions.  What are the signs or symptoms?  Signs and symptoms of hyperthyroidism may include:  · Nervousness.  · Inability to tolerate heat.  · Unexplained weight loss.  · Diarrhea.  · Change in the texture of hair or skin.  · Heart skipping beats or making extra beats.  · Rapid heart rate.  · Loss of menstruation.  · Shaky  hands.  · Fatigue.  · Restlessness.  · Increased appetite.  · Sleep problems.  · Enlarged thyroid gland or nodules.  How is this diagnosed?  Diagnosis of hyperthyroidism may include:  · Medical history and physical exam.  · Blood tests.  · Ultrasound tests.  How is this treated?  Treatment may include:  · Medicines to control your thyroid.  · Surgery to remove your thyroid.  · Radiation therapy.  Follow these instructions at home:  · Take medicines only as directed by your health care provider.  · Do not use any tobacco products, including cigarettes, chewing tobacco, or electronic cigarettes. If you need help quitting, ask your health care provider.  · Do not exercise or do physical activity until your health care provider approves.  · Keep all follow-up appointments as directed by your health care provider. This is important.  Contact a health care provider if:  · Your symptoms do not get better with treatment.  · You have fever.  · You are taking thyroid replacement medicine and you:  ¨ Have depression.  ¨ Feel mentally and physically slow.  ¨ Have weight gain.  Get help right away if:  · You have decreased alertness or a change in your awareness.  · You have abdominal pain.  · You feel dizzy.  · You have a rapid heartbeat.  · You have an irregular heartbeat.  This information is not intended to replace advice given to you by your health care provider. Make sure you discuss any questions you have with your health care provider.  Document Released: 12/18/2006 Document Revised: 05/18/2017 Document Reviewed: 05/05/2015  [employer] Interactive Patient Education © 2017 [employer]

## 2019-02-14 NOTE — PROGRESS NOTES
Assessment completed, due meds given along with PRN tylenol for back ache. Patient ambulating in the halls regularly, steady gait noted.

## 2019-02-14 NOTE — CARE PLAN
Problem: Knowledge Deficit  Goal: Knowledge of the prescribed therapeutic regimen will improve  Outcome: PROGRESSING AS EXPECTED    Intervention: Discuss information regarding therpeutic regimen and document in education  Patient aware of therapeutic regimen (PO steroids, stress test results, etc)      Problem: Pain Management  Goal: Pain level will decrease to patient's comfort goal  Outcome: PROGRESSING AS EXPECTED    Intervention: Follow pain managment plan developed in collaboration with patient and Interdisciplinary Team  Scheduled norco and PRN tylenol available for breakthrough chronic back pain.

## 2019-02-14 NOTE — FLOWSHEET NOTE
02/14/19 0637   Events/Summary/Plan   Events/Summary/Plan SVN and mdi   Interdisciplinary Plan of Care-Goals (Indications)   Bronchodilator Indications History / Diagnosis   Interdisciplinary Plan of Care-Outcomes    Bronchodilator Outcome Patient at Stable Baseline   SVN Group   #SVN Performed Yes   Given By: Mouthpiece   MDI/DPI Group   #MDI/DPI Given MDI/DPI x 1   Incentive Spirometry Group   Incentive Spirometer Volume 1500 mL   Chest Exam   Work Of Breathing / Effort Mild   Respiration 18  (post 18)   Pulse 78  (post 63)   Heart Rate (Monitored) 78   Breath Sounds   Pre/Post Intervention Pre Intervention Assessment  (increased aeration following)   RUL Breath Sounds Clear   RML Breath Sounds Clear   RLL Breath Sounds Diminished   AUSTIN Breath Sounds Clear   LLL Breath Sounds Diminished   Oximetry   #Pulse Oximetry (Single Determination) Yes   Oxygen   Pulse Oximetry 92 %   O2 Daily Delivery Respiratory  Room Air with O2 Available

## 2019-02-14 NOTE — PROGRESS NOTES
Received report from day shift RN Sang. Plan of care discussed at bedside. Patient resting comfortably in bed at this time with no complaints. Safety precautions and hourly rounding in place.

## 2019-02-14 NOTE — PROGRESS NOTES
Telemetry Shift Summary    Rhythm SR/ST  HR Range 60s-110s; HR briefly dropped down to 49  Ectopy none  Measurements 0.18/0.08/0.38        Normal Values  Rhythm SR  HR Range    Measurements 0.12-0.20 / 0.06-0.10  / 0.30-0.52

## 2019-02-14 NOTE — FLOWSHEET NOTE
02/14/19 1049   Events/Summary/Plan   Events/Summary/Plan SVN    Interdisciplinary Plan of Care-Goals (Indications)   Bronchodilator Indications History / Diagnosis   Interdisciplinary Plan of Care-Outcomes    Bronchodilator Outcome Patient at Stable Baseline   SVN Group   #SVN Performed Yes   Given By: Mouthpiece   Chest Exam   Work Of Breathing / Effort Mild   Respiration 18  (post 18)   Pulse 81  (post 71)   Heart Rate (Monitored) 81   Breath Sounds   Pre/Post Intervention Pre Intervention Assessment  (increased aeration following svn)   RUL Breath Sounds Diminished   RML Breath Sounds Diminished   RLL Breath Sounds Diminished   AUSTIN Breath Sounds Diminished   LLL Breath Sounds Diminished   Oximetry   #Pulse Oximetry (Single Determination) Yes   Oxygen   Pulse Oximetry 99 %   O2 Daily Delivery Respiratory  Room Air with O2 Available

## 2019-02-14 NOTE — PROGRESS NOTES
Discussed d/c w/ pt, pt verbalized understanding of dc instructions.  Pt declined  to schedule f/u for her, pt to call PCP to schedule f/u.  Pt wheeled to the  by staff.

## 2019-02-15 NOTE — DISCHARGE SUMMARY
"Discharge Summary    CHIEF COMPLAINT ON ADMISSION  Chief Complaint   Patient presents with   • Shortness of Breath     Hx of COPD  flare up of SOB the last couple of days   took 40 mg of \"old\"prednisone around 8:30 this am       Reason for Admission  EMS     Admission Date  2/10/2019    CODE STATUS  Prior    HPI & HOSPITAL COURSE  This is a 68 y.o. female here with a History of hyperthyroidism not on therapy, recent back surgery with chronic back pain, hypertension, history of DVT on Xarelto admitted with shortness of breath after failing outpatient COPD treatment.  She was found to have profound hyperthyroidism, with clinical evidence of exopthalmos and it was though that her SOB was likely related to her thyroid given that she was not hypoxic and had no wheezing on exam.  Pulmonology was consulted and they agreed it also was unliley related to COPD.  CTA of the chest was negative.  She was ruled out for cardiac etiology with a negative stress test, had no arrhythmias on tele, but this was thought a possibility as she never had the same severe SOB symptoms while on the tele monitor.  Echo revealed MILD PAH and she was recommended to get an outpatient sleep study.  She was started on methimazole treatment and slowly felt better each day.  She will need to f/u with her endocrinologist to repeat TSH and for further evaluation of a goiter palpated on exam which patient says she has had in the past.    Therefore, she is discharged in good and stable condition to home with close outpatient follow-up.    The patient met 2-midnight criteria for an inpatient stay at the time of discharge.    Discharge Date  2/14/2019    FOLLOW UP ITEMS POST DISCHARGE  F/U with endocrinology    DISCHARGE DIAGNOSES  Active Problems:    Acute exacerbation of chronic obstructive pulmonary disease (COPD) ([facility]) POA: Yes    HTN (hypertension) POA: Yes    CKD (chronic kidney disease) stage 3, GFR 30-59 ml/min ([facility]) POA: Yes    DVT (deep venous " thrombosis) ([facility]) (Chronic) POA: Yes    Multinodular goiter POA: Yes    Chronic back pain POA: Yes    ARF (acute renal failure) ([facility]) POA: Unknown    Macrocytic anemia POA: Unknown  Resolved Problems:    * No resolved hospital problems. *      FOLLOW UP  Future Appointments  Date Time Provider Department Center   5/7/2019 1:30 PM Jessenia Byrne M.D. PULM None     Barber Joyner M.D.  [id number] [facility] [address]  [facility] [address]  [phone]    Schedule an appointment as soon as possible for a visit in 1 week        MEDICATIONS ON DISCHARGE     Medication List      START taking these medications      Instructions   methimazole 5 MG Tabs  Commonly known as:  TAPAZOLE   Take 1 Tab by mouth 2 Times a Day.  Dose:  5 mg        CONTINUE taking these medications      Instructions   acetaminophen 500 MG Tabs  Commonly known as:  TYLENOL   Take 1,000 mg by mouth every 6 hours as needed for Moderate Pain.  Dose:  1000 mg     AMBIEN 10 MG Tabs  Generic drug:  zolpidem   Take 10 mg by mouth every bedtime.  Dose:  10 mg     BREO ELLIPTA 200-25 MCG/INH Aepb  Generic drug:  Fluticasone Furoate-Vilanterol   INHALE ONE DOSE BY MOUTH DAILY. RINSE MOUTH AFTER USE.     COMBIGAN 0.2-0.5 % Soln  Generic drug:  Brimonidine Tartrate-Timolol   Place 1 Drop in both eyes 2 Times a Day.  Dose:  1 Drop     docusate sodium 100 MG Caps  Commonly known as:  COLACE   Take 300 mg by mouth every evening.  Dose:  300 mg     fluticasone 50 MCG/ACT nasal spray  Commonly known as:  FLONASE   Spray 1 Spray in nose as needed.  Dose:  1 Spray     ipratropium-albuterol 0.5-2.5 (3) MG/3ML nebulizer solution  Commonly known as:  DUONEB   3 mL by Nebulization route 2 Times a Day.  Dose:  3 mL     losartan 50 MG Tabs  Commonly known as:  COZAAR   Take 50 mg by mouth every morning.  Dose:  50 mg     multivitamin Tabs   Take 1 Tab by mouth every day.  Dose:  1 Tab     NORCO  MG Tabs  Generic drug:  HYDROcodone/acetaminophen   Take 1 Tab by mouth  every day.  Dose:  1 Tab     PROAIR  (90 Base) MCG/ACT Aers inhalation aerosol  Generic drug:  albuterol   INHALE TWO PUFFS BY MOUTH EVERY 6 HOURS AS NEEDED FOR SHORTNESS OF BREATH     propranolol LA 60 MG Cp24  Commonly known as:  INDERAL LA   Take 60 mg by mouth every morning.  Dose:  60 mg     * rivaroxaban 20 MG Tabs tablet  Commonly known as:  XARELTO   Take 1 Tab by mouth with dinner.  Dose:  20 mg     * XARELTO 15 MG Tabs tablet  Generic drug:  rivaroxaban   Take 15 mg by mouth 2 Times a Day. Pt started on 1/30/2019 for 21 day course, then switches to 20MG.  Dose:  15 mg     simvastatin 40 MG Tabs  Commonly known as:  ZOCOR   Take 40 mg by mouth every evening.  Dose:  40 mg     SPIRIVA RESPIMAT 2.5 MCG/ACT Aers  Generic drug:  Tiotropium Bromide Monohydrate   Inhale 1 Puff by mouth 2 Times a Day.  Dose:  1 Puff     vitamin D (Ergocalciferol) 45034 units Caps capsule  Commonly known as:  DRISDOL   Take 50,000 Units by mouth every 14 days.  Dose:  40798 Units        * This list has 2 medication(s) that are the same as other medications prescribed for you. Read the directions carefully, and ask your doctor or other care provider to review them with you.            STOP taking these medications    azithromycin 250 MG Tabs  Commonly known as:  ZITHROMAX     doxycycline 100 MG Tabs  Commonly known as:  VIBRAMYCIN     predniSONE 20 MG Tabs  Commonly known as:  DELTASONE            Allergies  Allergies   Allergen Reactions   • Nkda [No Known Drug Allergy]        DIET  No orders of the defined types were placed in this encounter.      ACTIVITY  As tolerated.  Weight bearing as tolerated    CONSULTATIONS  Dr. Apple - Pulmonology     PROCEDURES  None    LABORATORY  Lab Results   Component Value Date    SODIUM 136 02/14/2019    POTASSIUM 4.9 02/14/2019    CHLORIDE 112 02/14/2019    CO2 18 (L) 02/14/2019    GLUCOSE 111 (H) 02/14/2019    BUN 34 (H) 02/14/2019    CREATININE 1.19 02/14/2019    CREATININE 1.7 (H)  09/19/2008        Lab Results   Component Value Date    WBC 6.7 02/14/2019    HEMOGLOBIN 13.4 02/14/2019    HEMATOCRIT 41.3 02/14/2019    PLATELETCT 139 (L) 02/14/2019        Total time of the discharge process exceeds 40 minutes.

## 2019-02-21 ENCOUNTER — TELEPHONE (OUTPATIENT)
Dept: ENDOCRINOLOGY | Facility: MEDICAL CENTER | Age: 69
End: 2019-02-21

## 2019-02-21 ENCOUNTER — TELEPHONE (OUTPATIENT)
Dept: SCHEDULING | Facility: IMAGING CENTER | Age: 69
End: 2019-02-21

## 2019-02-21 DIAGNOSIS — J44.9 CHRONIC OBSTRUCTIVE PULMONARY DISEASE, UNSPECIFIED COPD TYPE (HCC): ICD-10-CM

## 2019-02-21 RX ORDER — IPRATROPIUM BROMIDE AND ALBUTEROL SULFATE 2.5; .5 MG/3ML; MG/3ML
3 SOLUTION RESPIRATORY (INHALATION) 2 TIMES DAILY
Qty: 120 BULLET | Refills: 1 | Status: ON HOLD | OUTPATIENT
Start: 2019-02-21 | End: 2019-02-28

## 2019-02-21 NOTE — TELEPHONE ENCOUNTER
1. Caller Name: Latonia Clinton                                    Call Back Number: [phone]         Patient approves a detailed voicemail message: yes    2. What are the patient's symptoms (location & severity)? Trouble breathing     3. Is this a new symptom Yes    4. When did it start? 1 month ago    5. Action taken per Active Symptom Guide: Patient has been in the hospital twice with panic attacks. Was diagnosed with hyperthyroidism and  believes that is the cause. Says she was given methimazole but when she took it she felt like she was having trouble breathing so she stopped. Not sure if from anxiety or from medicine. Please advise if she should conitnue the medication.

## 2019-02-21 NOTE — TELEPHONE ENCOUNTER
Have we ever prescribed this med? No.  If yes, what date? Received this in the hospital 2/11/19 Beck Alford M.D.    Last OV: 11/15/18    Next OV: 5/7/19    DX: COPD    Medications: ipratropium-albuterol (DUONEB) nebulizer solution     Pt just Dc'd from [facility] 2/14/19 for SOB

## 2019-02-23 ENCOUNTER — HOSPITAL ENCOUNTER (INPATIENT)
Facility: MEDICAL CENTER | Age: 69
LOS: 5 days | DRG: 190 | End: 2019-02-28
Attending: EMERGENCY MEDICINE | Admitting: INTERNAL MEDICINE
Payer: MEDICARE

## 2019-02-23 ENCOUNTER — APPOINTMENT (OUTPATIENT)
Dept: RADIOLOGY | Facility: MEDICAL CENTER | Age: 69
DRG: 190 | End: 2019-02-23
Attending: EMERGENCY MEDICINE
Payer: MEDICARE

## 2019-02-23 DIAGNOSIS — J44.9 CHRONIC OBSTRUCTIVE PULMONARY DISEASE, UNSPECIFIED COPD TYPE (HCC): ICD-10-CM

## 2019-02-23 LAB
ALBUMIN SERPL BCP-MCNC: 3.8 G/DL (ref 3.2–4.9)
ALBUMIN/GLOB SERPL: 1.3 G/DL
ALP SERPL-CCNC: 82 U/L (ref 30–99)
ALT SERPL-CCNC: 24 U/L (ref 2–50)
ANION GAP SERPL CALC-SCNC: 10 MMOL/L (ref 0–11.9)
AST SERPL-CCNC: 22 U/L (ref 12–45)
BASE EXCESS BLDV CALC-SCNC: -5 MMOL/L
BASOPHILS # BLD AUTO: 0.2 % (ref 0–1.8)
BASOPHILS # BLD: 0.03 K/UL (ref 0–0.12)
BILIRUB SERPL-MCNC: 0.8 MG/DL (ref 0.1–1.5)
BNP SERPL-MCNC: 56 PG/ML (ref 0–100)
BODY TEMPERATURE: ABNORMAL CENTIGRADE
BUN SERPL-MCNC: 18 MG/DL (ref 8–22)
CALCIUM SERPL-MCNC: 9.7 MG/DL (ref 8.4–10.2)
CHLORIDE SERPL-SCNC: 107 MMOL/L (ref 96–112)
CO2 SERPL-SCNC: 20 MMOL/L (ref 20–33)
CREAT SERPL-MCNC: 1.22 MG/DL (ref 0.5–1.4)
EKG IMPRESSION: NORMAL
EOSINOPHIL # BLD AUTO: 0.76 K/UL (ref 0–0.51)
EOSINOPHIL NFR BLD: 5.6 % (ref 0–6.9)
ERYTHROCYTE [DISTWIDTH] IN BLOOD BY AUTOMATED COUNT: 42.9 FL (ref 35.9–50)
GLOBULIN SER CALC-MCNC: 2.9 G/DL (ref 1.9–3.5)
GLUCOSE SERPL-MCNC: 131 MG/DL (ref 65–99)
HCO3 BLDV-SCNC: 21 MMOL/L (ref 24–28)
HCT VFR BLD AUTO: 43.6 % (ref 37–47)
HGB BLD-MCNC: 14.8 G/DL (ref 12–16)
IMM GRANULOCYTES # BLD AUTO: 0.05 K/UL (ref 0–0.11)
IMM GRANULOCYTES NFR BLD AUTO: 0.4 % (ref 0–0.9)
LACTATE BLD-SCNC: 1.1 MMOL/L (ref 0.5–2)
LACTATE BLD-SCNC: 1.6 MMOL/L (ref 0.5–2)
LACTATE BLD-SCNC: 1.9 MMOL/L (ref 0.5–2)
LACTATE BLD-SCNC: 3 MMOL/L (ref 0.5–2)
LYMPHOCYTES # BLD AUTO: 1.83 K/UL (ref 1–4.8)
LYMPHOCYTES NFR BLD: 13.4 % (ref 22–41)
MCH RBC QN AUTO: 32.7 PG (ref 27–33)
MCHC RBC AUTO-ENTMCNC: 33.9 G/DL (ref 33.6–35)
MCV RBC AUTO: 96.2 FL (ref 81.4–97.8)
MONOCYTES # BLD AUTO: 0.66 K/UL (ref 0–0.85)
MONOCYTES NFR BLD AUTO: 4.8 % (ref 0–13.4)
NEUTROPHILS # BLD AUTO: 10.28 K/UL (ref 2–7.15)
NEUTROPHILS NFR BLD: 75.6 % (ref 44–72)
NRBC # BLD AUTO: 0 K/UL
NRBC BLD-RTO: 0 /100 WBC
PCO2 BLDV: 42.5 MMHG (ref 41–51)
PH BLDV: 7.32 [PH] (ref 7.31–7.45)
PLATELET # BLD AUTO: 207 K/UL (ref 164–446)
PMV BLD AUTO: 10.4 FL (ref 9–12.9)
PO2 BLDV: 73.4 MMHG (ref 25–40)
POTASSIUM SERPL-SCNC: 4 MMOL/L (ref 3.6–5.5)
PROT SERPL-MCNC: 6.7 G/DL (ref 6–8.2)
RBC # BLD AUTO: 4.53 M/UL (ref 4.2–5.4)
SAO2 % BLDV: 94.1 %
SODIUM SERPL-SCNC: 137 MMOL/L (ref 135–145)
TROPONIN I SERPL-MCNC: 0.02 NG/ML (ref 0–0.04)
TSH SERPL DL<=0.005 MIU/L-ACNC: 0.07 UIU/ML (ref 0.38–5.33)
WBC # BLD AUTO: 13.6 K/UL (ref 4.8–10.8)

## 2019-02-23 PROCEDURE — 700102 HCHG RX REV CODE 250 W/ 637 OVERRIDE(OP): Performed by: EMERGENCY MEDICINE

## 2019-02-23 PROCEDURE — 99223 1ST HOSP IP/OBS HIGH 75: CPT | Mod: AI | Performed by: INTERNAL MEDICINE

## 2019-02-23 PROCEDURE — 700105 HCHG RX REV CODE 258: Performed by: EMERGENCY MEDICINE

## 2019-02-23 PROCEDURE — 84484 ASSAY OF TROPONIN QUANT: CPT

## 2019-02-23 PROCEDURE — 99285 EMERGENCY DEPT VISIT HI MDM: CPT

## 2019-02-23 PROCEDURE — 700101 HCHG RX REV CODE 250: Performed by: EMERGENCY MEDICINE

## 2019-02-23 PROCEDURE — 700102 HCHG RX REV CODE 250 W/ 637 OVERRIDE(OP)

## 2019-02-23 PROCEDURE — 71045 X-RAY EXAM CHEST 1 VIEW: CPT

## 2019-02-23 PROCEDURE — 96374 THER/PROPH/DIAG INJ IV PUSH: CPT

## 2019-02-23 PROCEDURE — A9270 NON-COVERED ITEM OR SERVICE: HCPCS | Performed by: INTERNAL MEDICINE

## 2019-02-23 PROCEDURE — 94760 N-INVAS EAR/PLS OXIMETRY 1: CPT

## 2019-02-23 PROCEDURE — 82803 BLOOD GASES ANY COMBINATION: CPT

## 2019-02-23 PROCEDURE — 83605 ASSAY OF LACTIC ACID: CPT | Mod: 91

## 2019-02-23 PROCEDURE — 700111 HCHG RX REV CODE 636 W/ 250 OVERRIDE (IP): Performed by: INTERNAL MEDICINE

## 2019-02-23 PROCEDURE — A9270 NON-COVERED ITEM OR SERVICE: HCPCS

## 2019-02-23 PROCEDURE — 304561 HCHG STAT O2

## 2019-02-23 PROCEDURE — 83880 ASSAY OF NATRIURETIC PEPTIDE: CPT

## 2019-02-23 PROCEDURE — 94640 AIRWAY INHALATION TREATMENT: CPT

## 2019-02-23 PROCEDURE — A9270 NON-COVERED ITEM OR SERVICE: HCPCS | Performed by: EMERGENCY MEDICINE

## 2019-02-23 PROCEDURE — 94002 VENT MGMT INPAT INIT DAY: CPT

## 2019-02-23 PROCEDURE — 700102 HCHG RX REV CODE 250 W/ 637 OVERRIDE(OP): Performed by: INTERNAL MEDICINE

## 2019-02-23 PROCEDURE — 85025 COMPLETE CBC W/AUTO DIFF WBC: CPT

## 2019-02-23 PROCEDURE — 700111 HCHG RX REV CODE 636 W/ 250 OVERRIDE (IP): Performed by: EMERGENCY MEDICINE

## 2019-02-23 PROCEDURE — 87040 BLOOD CULTURE FOR BACTERIA: CPT | Mod: 91

## 2019-02-23 PROCEDURE — 770020 HCHG ROOM/CARE - TELE (206)

## 2019-02-23 PROCEDURE — 36415 COLL VENOUS BLD VENIPUNCTURE: CPT

## 2019-02-23 PROCEDURE — 93005 ELECTROCARDIOGRAM TRACING: CPT | Performed by: EMERGENCY MEDICINE

## 2019-02-23 PROCEDURE — 84443 ASSAY THYROID STIM HORMONE: CPT

## 2019-02-23 PROCEDURE — 80053 COMPREHEN METABOLIC PANEL: CPT

## 2019-02-23 PROCEDURE — 700101 HCHG RX REV CODE 250: Performed by: INTERNAL MEDICINE

## 2019-02-23 PROCEDURE — 96375 TX/PRO/DX INJ NEW DRUG ADDON: CPT

## 2019-02-23 RX ORDER — ACETAMINOPHEN 500 MG
500 TABLET ORAL ONCE
Status: COMPLETED | OUTPATIENT
Start: 2019-02-23 | End: 2019-02-23

## 2019-02-23 RX ORDER — IPRATROPIUM BROMIDE AND ALBUTEROL SULFATE 2.5; .5 MG/3ML; MG/3ML
3 SOLUTION RESPIRATORY (INHALATION) 2 TIMES DAILY
Status: DISCONTINUED | OUTPATIENT
Start: 2019-02-23 | End: 2019-02-23

## 2019-02-23 RX ORDER — METHYLPREDNISOLONE SODIUM SUCCINATE 125 MG/2ML
125 INJECTION, POWDER, LYOPHILIZED, FOR SOLUTION INTRAMUSCULAR; INTRAVENOUS ONCE
Status: COMPLETED | OUTPATIENT
Start: 2019-02-23 | End: 2019-02-23

## 2019-02-23 RX ORDER — DOCUSATE SODIUM 100 MG/1
300 CAPSULE, LIQUID FILLED ORAL 2 TIMES DAILY PRN
Status: DISCONTINUED | OUTPATIENT
Start: 2019-02-23 | End: 2019-02-28 | Stop reason: HOSPADM

## 2019-02-23 RX ORDER — BRIMONIDINE TARTRATE AND TIMOLOL MALEATE 2; 5 MG/ML; MG/ML
1 SOLUTION OPHTHALMIC 2 TIMES DAILY
Status: DISCONTINUED | OUTPATIENT
Start: 2019-02-23 | End: 2019-02-23

## 2019-02-23 RX ORDER — HYDROCODONE BITARTRATE AND ACETAMINOPHEN 10; 325 MG/1; MG/1
1 TABLET ORAL DAILY
Status: DISCONTINUED | OUTPATIENT
Start: 2019-02-23 | End: 2019-02-28 | Stop reason: HOSPADM

## 2019-02-23 RX ORDER — NITROGLYCERIN 0.4 MG/1
0.4 TABLET SUBLINGUAL ONCE
Status: COMPLETED | OUTPATIENT
Start: 2019-02-23 | End: 2019-02-23

## 2019-02-23 RX ORDER — ACETAMINOPHEN 500 MG
TABLET ORAL
Status: COMPLETED
Start: 2019-02-23 | End: 2019-02-23

## 2019-02-23 RX ORDER — SIMVASTATIN 20 MG
40 TABLET ORAL NIGHTLY
Status: DISCONTINUED | OUTPATIENT
Start: 2019-02-23 | End: 2019-02-28 | Stop reason: HOSPADM

## 2019-02-23 RX ORDER — ZOLPIDEM TARTRATE 5 MG/1
5 TABLET ORAL
Status: DISCONTINUED | OUTPATIENT
Start: 2019-02-23 | End: 2019-02-23

## 2019-02-23 RX ORDER — BRIMONIDINE TARTRATE 2 MG/ML
1 SOLUTION/ DROPS OPHTHALMIC 2 TIMES DAILY
Status: DISCONTINUED | OUTPATIENT
Start: 2019-02-23 | End: 2019-02-28 | Stop reason: HOSPADM

## 2019-02-23 RX ORDER — BISACODYL 10 MG
10 SUPPOSITORY, RECTAL RECTAL
Status: DISCONTINUED | OUTPATIENT
Start: 2019-02-23 | End: 2019-02-28 | Stop reason: HOSPADM

## 2019-02-23 RX ORDER — SODIUM CHLORIDE 9 MG/ML
1000 INJECTION, SOLUTION INTRAVENOUS ONCE
Status: COMPLETED | OUTPATIENT
Start: 2019-02-23 | End: 2019-02-23

## 2019-02-23 RX ORDER — ERGOCALCIFEROL 1.25 MG/1
50000 CAPSULE ORAL
Status: DISCONTINUED | OUTPATIENT
Start: 2019-03-04 | End: 2019-02-28 | Stop reason: HOSPADM

## 2019-02-23 RX ORDER — FLUTICASONE PROPIONATE 50 MCG
1 SPRAY, SUSPENSION (ML) NASAL
Status: DISCONTINUED | OUTPATIENT
Start: 2019-02-23 | End: 2019-02-28 | Stop reason: HOSPADM

## 2019-02-23 RX ORDER — ACETAMINOPHEN 500 MG
500 TABLET ORAL EVERY 6 HOURS PRN
Status: DISCONTINUED | OUTPATIENT
Start: 2019-02-23 | End: 2019-02-24

## 2019-02-23 RX ORDER — NITROGLYCERIN 0.4 MG/1
0.8 TABLET SUBLINGUAL ONCE
Status: DISCONTINUED | OUTPATIENT
Start: 2019-02-23 | End: 2019-02-23

## 2019-02-23 RX ORDER — IPRATROPIUM BROMIDE AND ALBUTEROL SULFATE 2.5; .5 MG/3ML; MG/3ML
3 SOLUTION RESPIRATORY (INHALATION)
Status: DISCONTINUED | OUTPATIENT
Start: 2019-02-23 | End: 2019-02-24

## 2019-02-23 RX ORDER — PROPRANOLOL HCL 60 MG
60 CAPSULE, EXTENDED RELEASE 24HR ORAL EVERY MORNING
Status: DISCONTINUED | OUTPATIENT
Start: 2019-02-23 | End: 2019-02-24

## 2019-02-23 RX ORDER — TIOTROPIUM BROMIDE 18 UG/1
1 CAPSULE ORAL; RESPIRATORY (INHALATION)
Status: DISCONTINUED | OUTPATIENT
Start: 2019-02-23 | End: 2019-02-28 | Stop reason: HOSPADM

## 2019-02-23 RX ORDER — POLYETHYLENE GLYCOL 3350 17 G/17G
1 POWDER, FOR SOLUTION ORAL
Status: DISCONTINUED | OUTPATIENT
Start: 2019-02-23 | End: 2019-02-28 | Stop reason: HOSPADM

## 2019-02-23 RX ORDER — BUDESONIDE AND FORMOTEROL FUMARATE DIHYDRATE 160; 4.5 UG/1; UG/1
2 AEROSOL RESPIRATORY (INHALATION)
Status: DISCONTINUED | OUTPATIENT
Start: 2019-02-23 | End: 2019-02-28 | Stop reason: HOSPADM

## 2019-02-23 RX ORDER — ZOLPIDEM TARTRATE 5 MG/1
10 TABLET ORAL
Status: DISCONTINUED | OUTPATIENT
Start: 2019-02-23 | End: 2019-02-28 | Stop reason: HOSPADM

## 2019-02-23 RX ORDER — TIMOLOL MALEATE 5 MG/ML
1 SOLUTION/ DROPS OPHTHALMIC 2 TIMES DAILY
Status: DISCONTINUED | OUTPATIENT
Start: 2019-02-23 | End: 2019-02-28 | Stop reason: HOSPADM

## 2019-02-23 RX ORDER — METHIMAZOLE 5 MG/1
5 TABLET ORAL 2 TIMES DAILY
Status: DISCONTINUED | OUTPATIENT
Start: 2019-02-23 | End: 2019-02-24

## 2019-02-23 RX ORDER — AMOXICILLIN 250 MG
2 CAPSULE ORAL 2 TIMES DAILY
Status: DISCONTINUED | OUTPATIENT
Start: 2019-02-23 | End: 2019-02-28 | Stop reason: HOSPADM

## 2019-02-23 RX ORDER — SODIUM CHLORIDE 9 MG/ML
250 INJECTION, SOLUTION INTRAVENOUS ONCE
Status: COMPLETED | OUTPATIENT
Start: 2019-02-24 | End: 2019-02-24

## 2019-02-23 RX ORDER — NITROGLYCERIN 0.4 MG/1
0.4 TABLET SUBLINGUAL ONCE
Status: DISCONTINUED | OUTPATIENT
Start: 2019-02-23 | End: 2019-02-23

## 2019-02-23 RX ORDER — LOSARTAN POTASSIUM 25 MG/1
50 TABLET ORAL EVERY MORNING
Status: DISCONTINUED | OUTPATIENT
Start: 2019-02-23 | End: 2019-02-28 | Stop reason: HOSPADM

## 2019-02-23 RX ORDER — ONDANSETRON 2 MG/ML
4 INJECTION INTRAMUSCULAR; INTRAVENOUS ONCE
Status: COMPLETED | OUTPATIENT
Start: 2019-02-23 | End: 2019-02-23

## 2019-02-23 RX ORDER — METHYLPREDNISOLONE SODIUM SUCCINATE 40 MG/ML
60 INJECTION, POWDER, LYOPHILIZED, FOR SOLUTION INTRAMUSCULAR; INTRAVENOUS EVERY 6 HOURS
Status: DISCONTINUED | OUTPATIENT
Start: 2019-02-23 | End: 2019-02-25

## 2019-02-23 RX ADMIN — BUDESONIDE AND FORMOTEROL FUMARATE DIHYDRATE 2 PUFF: 160; 4.5 AEROSOL RESPIRATORY (INHALATION) at 18:47

## 2019-02-23 RX ADMIN — SODIUM CHLORIDE 1000 ML: 9 INJECTION, SOLUTION INTRAVENOUS at 05:40

## 2019-02-23 RX ADMIN — ALBUTEROL SULFATE 10 MG/HR: 5 SOLUTION RESPIRATORY (INHALATION) at 05:16

## 2019-02-23 RX ADMIN — NITROGLYCERIN 0.4 MG: 0.4 TABLET SUBLINGUAL at 05:21

## 2019-02-23 RX ADMIN — TIOTROPIUM BROMIDE 1 CAPSULE: 18 CAPSULE ORAL; RESPIRATORY (INHALATION) at 15:01

## 2019-02-23 RX ADMIN — HYDROCODONE BITARTRATE AND ACETAMINOPHEN 1 TABLET: 10; 325 TABLET ORAL at 16:45

## 2019-02-23 RX ADMIN — METHYLPREDNISOLONE SODIUM SUCCINATE 60 MG: 40 INJECTION, POWDER, FOR SOLUTION INTRAMUSCULAR; INTRAVENOUS at 16:48

## 2019-02-23 RX ADMIN — Medication 500 MG: at 11:00

## 2019-02-23 RX ADMIN — RIVAROXABAN 20 MG: 20 TABLET, FILM COATED ORAL at 17:00

## 2019-02-23 RX ADMIN — BRIMONIDINE TARTRATE 1 DROP: 2 SOLUTION/ DROPS OPHTHALMIC at 21:30

## 2019-02-23 RX ADMIN — IPRATROPIUM BROMIDE AND ALBUTEROL SULFATE 3 ML: .5; 3 SOLUTION RESPIRATORY (INHALATION) at 18:46

## 2019-02-23 RX ADMIN — IPRATROPIUM BROMIDE AND ALBUTEROL SULFATE 3 ML: .5; 3 SOLUTION RESPIRATORY (INHALATION) at 10:55

## 2019-02-23 RX ADMIN — METHIMAZOLE 5 MG: 5 TABLET ORAL at 16:46

## 2019-02-23 RX ADMIN — METHYLPREDNISOLONE SODIUM SUCCINATE 125 MG: 125 INJECTION, POWDER, FOR SOLUTION INTRAMUSCULAR; INTRAVENOUS at 05:28

## 2019-02-23 RX ADMIN — METHYLPREDNISOLONE SODIUM SUCCINATE 60 MG: 40 INJECTION, POWDER, FOR SOLUTION INTRAMUSCULAR; INTRAVENOUS at 23:20

## 2019-02-23 RX ADMIN — STANDARDIZED SENNA CONCENTRATE AND DOCUSATE SODIUM 2 TABLET: 8.6; 5 TABLET, FILM COATED ORAL at 16:45

## 2019-02-23 RX ADMIN — ACETAMINOPHEN 500 MG: 500 TABLET, COATED ORAL at 11:00

## 2019-02-23 RX ADMIN — IPRATROPIUM BROMIDE AND ALBUTEROL SULFATE 3 ML: .5; 3 SOLUTION RESPIRATORY (INHALATION) at 15:01

## 2019-02-23 RX ADMIN — ZOLPIDEM TARTRATE 10 MG: 5 TABLET ORAL at 23:10

## 2019-02-23 RX ADMIN — SIMVASTATIN 40 MG: 20 TABLET, FILM COATED ORAL at 21:30

## 2019-02-23 RX ADMIN — TIMOLOL MALEATE 1 DROP: 5 SOLUTION OPHTHALMIC at 21:30

## 2019-02-23 RX ADMIN — ONDANSETRON 4 MG: 2 INJECTION INTRAMUSCULAR; INTRAVENOUS at 05:32

## 2019-02-23 RX ADMIN — ACETAMINOPHEN 500 MG: 500 TABLET, FILM COATED ORAL at 21:47

## 2019-02-23 ASSESSMENT — COGNITIVE AND FUNCTIONAL STATUS - GENERAL
MOBILITY SCORE: 24
DAILY ACTIVITIY SCORE: 24
SUGGESTED CMS G CODE MODIFIER MOBILITY: CH
SUGGESTED CMS G CODE MODIFIER DAILY ACTIVITY: CH

## 2019-02-23 ASSESSMENT — LIFESTYLE VARIABLES
TOTAL SCORE: 0
EVER FELT BAD OR GUILTY ABOUT YOUR DRINKING: NO
HOW MANY TIMES IN THE PAST YEAR HAVE YOU HAD 5 OR MORE DRINKS IN A DAY: 0
HAVE PEOPLE ANNOYED YOU BY CRITICIZING YOUR DRINKING: NO
ON A TYPICAL DAY WHEN YOU DRINK ALCOHOL HOW MANY DRINKS DO YOU HAVE: 2
TOTAL SCORE: 0
CONSUMPTION TOTAL: NEGATIVE
EVER HAD A DRINK FIRST THING IN THE MORNING TO STEADY YOUR NERVES TO GET RID OF A HANGOVER: NO
HAVE YOU EVER FELT YOU SHOULD CUT DOWN ON YOUR DRINKING: NO
ALCOHOL_USE: YES
AVERAGE NUMBER OF DAYS PER WEEK YOU HAVE A DRINK CONTAINING ALCOHOL: 0
TOTAL SCORE: 0
EVER_SMOKED: YES

## 2019-02-23 ASSESSMENT — ENCOUNTER SYMPTOMS
FEVER: 0
GASTROINTESTINAL NEGATIVE: 1
EYES NEGATIVE: 1
MUSCULOSKELETAL NEGATIVE: 1
PSYCHIATRIC NEGATIVE: 1
WEAKNESS: 1
SHORTNESS OF BREATH: 1
WHEEZING: 1
CHILLS: 0
PALPITATIONS: 1

## 2019-02-23 ASSESSMENT — PATIENT HEALTH QUESTIONNAIRE - PHQ9
1. LITTLE INTEREST OR PLEASURE IN DOING THINGS: NOT AT ALL
5. POOR APPETITE OR OVEREATING: NOT AT ALL
9. THOUGHTS THAT YOU WOULD BE BETTER OFF DEAD, OR OF HURTING YOURSELF: NOT AT ALL
3. TROUBLE FALLING OR STAYING ASLEEP OR SLEEPING TOO MUCH: NOT AT ALL
4. FEELING TIRED OR HAVING LITTLE ENERGY: NOT AT ALL
7. TROUBLE CONCENTRATING ON THINGS, SUCH AS READING THE NEWSPAPER OR WATCHING TELEVISION: NOT AT ALL
SUM OF ALL RESPONSES TO PHQ9 QUESTIONS 1 AND 2: 0
8. MOVING OR SPEAKING SO SLOWLY THAT OTHER PEOPLE COULD HAVE NOTICED. OR THE OPPOSITE, BEING SO FIGETY OR RESTLESS THAT YOU HAVE BEEN MOVING AROUND A LOT MORE THAN USUAL: NOT AT ALL
SUM OF ALL RESPONSES TO PHQ QUESTIONS 1-9: 0
2. FEELING DOWN, DEPRESSED, IRRITABLE, OR HOPELESS: NOT AT ALL
6. FEELING BAD ABOUT YOURSELF - OR THAT YOU ARE A FAILURE OR HAVE LET YOURSELF OR YOUR FAMILY DOWN: NOT AL ALL

## 2019-02-23 ASSESSMENT — PULMONARY FUNCTION TESTS
EPAP_CMH2O: 8

## 2019-02-23 NOTE — ASSESSMENT & PLAN NOTE
Was recently started on methimazole, unfortunately she stopped it because she thought the medicine is giving her shortness of breath.  Dr. Alford restarted at higher rate  Also changed the propranalol to coreg

## 2019-02-23 NOTE — RESPIRATORY CARE
Pt tolerating BiPap well - fi02 now at 30% with Sp02 in the mid 90s. WOB is much improved, Will continue to monitor, and continue to titrate BiPap as able.

## 2019-02-23 NOTE — ED NOTES
Pt appears comfortable.  RR 15 -O2 sat 99%- resting quietly on BIPAP.  Waiting for intensivist for admit to ICU.  ICU aware of pending admit.

## 2019-02-23 NOTE — ASSESSMENT & PLAN NOTE
Patient has moderate to severe COPD  Do not believe this was a COPD exacerbation  Assess medication changes to treat her hyperthyroid and also anxiety  For the chronic cough added azithromycin 250 mg daily   Slow taper of prednisone and close follow up in clinic  Continue symbicort and spiriva  Titrate oxygen

## 2019-02-23 NOTE — FLOWSHEET NOTE
02/23/19 0519   Events/Summary/Plan   Events/Summary/Plan 10 mg albuterol via aerogen   General Vent Information   Pulse Oximetry 98 %   Heart Rate (Monitored) (!) 104   BiPAP for Respiratory Failure   #BiPap Initial Day  Yes   IPAP (cmH2O) 12   EPAP (cm H2O) 8   FiO2 40   Alarms Set / Checked Yes   Non-Invasive Temp (C) (heater turned on)   Respiratory Rate Patient 30   Spontaneous Vt (ml) 688   Spontaneous Ve (LPM) 671   Continuous Nebulizer Group   Continuous Nebulizer Q 1 Hour Yes   Breath Sounds   Pre/Post Intervention Pre Intervention Assessment   RUL Breath Sounds Diminished   RML Breath Sounds Diminished   RLL Breath Sounds Diminished   AUSTIN Breath Sounds Diminished   LLL Breath Sounds Diminished

## 2019-02-23 NOTE — ED TRIAGE NOTES
" Pt BIB EMS  with c/o    Chief Complaint   Patient presents with   • Respiratory Distress       BP (!) 195/98   Pulse (!) 109   Temp 37.3 °C (99.2 °F) (Temporal)   Resp (!) 34   Ht 1.702 m (5' 7\")   Wt 72 kg (158 lb 11.7 oz)   LMP  (LMP Unknown)   SpO2 97%   BMI 24.86 kg/m²   "

## 2019-02-23 NOTE — ED PROVIDER NOTES
ED Provider Note    CHIEF COMPLAINT  Chief Complaint   Patient presents with   • Respiratory Distress       HPI  Latonia Clinton is a 68 y.o. female who presents To the emergency department with chief complaint of respiratory distress.  Patient is actually well known to the system and has a history of COPD and hyperthyroidism which has been untreated until her last admissions.  She was just discharged on the 14th of this month after being admitted for respiratory distress secondary to combination of COPD and possible pulmonary hypertension versus just deconditioning with her history of partial left upper lobectomy in the setting of severe hyperthyroidism.  She supposed to be taking Tapazole but the patient tells me that she stopped taking it on Tuesday because she started to feel unwell and thought that that may be the source or the cause.  She states she has had worsening respiratory distress all week and using her albuterol which is not been helping.  She denies any chest pain she feels very short of breath.  Patient endorses some bilateral chest discomfort that was happening all week with associated panic attacks as well she is never really had before this chest pain is similar to the pain she was having on her last admission prior to her stress test which was normal and her echo and her CT pulmonary angiogram.    On EMS arrival this evening she was satting 87% on her normal 3 L with a respiratory rate in the 30s and looking pale and unwell.  She was given 8 mg of albuterol and 1 g of ipratropium prior to arrival   patient denies any fevers chills hemoptysis or productive sputum no leg swelling and is urinating without difficulty    REVIEW OF SYSTEMS  Positives as above. Pertinent negatives include nausea vomiting hemoptysis fevers chills leg swelling  All other review of systems are negative    PAST MEDICAL HISTORY   has a past medical history of Anesthesia; Asthma; Backpain (7/2017); Blood clot in vein  (07/06/2018); Bowel habit changes (07/06/2018); Breath shortness; Bronchitis; CAD (coronary artery disease); Cancer ([facility]) (2016); Chickenpox; COPD (chronic obstructive pulmonary disease) (HCC); Dialysis (2005); Emphysema of lung (HCC); Glaucoma; Hemorrhagic disorder (HCC); Hyperlipidemia; Hypertension; Hyperthyroidism; Kidney stone; Lung cancer (HCC) (12/12/2016); Multiple thyroid nodules (7/9/2015); Nasal drainage; Osteoporosis; Pain (07/06/2018); Personal history of venous thrombosis and embolism (2004, 2012); Pneumonia (7/2016); Renal disorder; Renal stones (2013); Rheumatoid arthritis (HCC); Rheumatoid arthritis (HCC); S/P appendectomy (1998 ); S/P cholecystectomy (1998); S/P kyphoplasty (2006, 2007, 2013); Sepsis(995.91) (2005); Shortness of breath (07/06/2018); and Thyroid nodule, hot (2017).    SOCIAL HISTORY  Social History     Social History Main Topics   • Smoking status: Former Smoker     Packs/day: 1.00     Years: 30.00     Types: Cigarettes     Quit date: 1/1/2000   • Smokeless tobacco: Never Used      Comment: 7 years ago   • Alcohol use 0.0 oz/week      Comment: x2 a week   • Drug use: No   • Sexual activity: Not on file       SURGICAL HISTORY   has a past surgical history that includes other; other abdominal surgery; enlarge breast with implant; reduction of large breast; appendectomy; cholecystectomy; laminotomy; lung biopsy open; thoracoscopy (Left, 12/12/2016); other orthopedic surgery (2013); cystoscopy stent placement (Left, 6/18/2018); lithotripsy (Left, 6/18/2018); lasertripsy (Left, 6/18/2018); ureteroscopy (Left, 6/18/2018); cystoscopy stent placement (7/9/2018); ureteroscopy (Left, 7/9/2018); and lasertripsy (Left, 7/9/2018).    CURRENT MEDICATIONS  Home Medications    **Home medications have not yet been reviewed for this encounter**         ALLERGIES  Allergies   Allergen Reactions   • Nkda [No Known Drug Allergy]        PHYSICAL EXAM  VITAL SIGNS: BP (!) 195/98   Pulse (!) 109   Temp  "37.3 °C (99.2 °F) (Temporal)   Resp (!) 34   Ht 1.702 m (5' 7\")   Wt 72 kg (158 lb 11.7 oz)   LMP  (LMP Unknown)   SpO2 97%   BMI 24.86 kg/m²    Pulse ox interpretation: I interpret this pulse ox as normal.  Constitutional: Alert in moderate distress  HENT: Normocephalic atraumatic, MMM  Eyes: PER, Conjunctiva normal, Non-icteric.  Exophthalmos  Neck: Normal range of motion, No tenderness, Supple, No stridor.   Cardiovascular: Tachycardic, regular rhythm, no murmurs.   Thorax & Lungs: Extremely diminished breath sounds bilaterally with some faint wheezes at the apices, tachypnea speaking only 3-4 word sentences.  Symmetrical expansion  abdomen: Bowel sounds normal, Soft, No tenderness, No pulsatile masses. No peritoneal signs.  Skin: Warm, Dry, No erythema, No rash.   Back: No bony tenderness, No CVA tenderness.   Extremities: Intact distal pulses, No edema, No tenderness, No cyanosis right upper extremity with the fistula in place  Neurologic: Alert and oriented x3, No focal deficits noted.       DIFFERENTIAL DIAGNOSIS AND WORK UP PLAN    This is a 68 y.o. female who presents with noncompliance with her methimazole I suspect there is a component of both hyperthyroidism and possible underlying COPD exacerbation.  Due to her respiratory distress and tachypnea she was placed on a BiPAP machine on arrival we will give her another albuterol treatment as well as some steroids.  She recently had a workup that showed a normal ejection fraction and a CT pulmonary angiogram without evidence of PEs that she has had than prior.  She has no lower extremity swelling will evaluate for signs of infection likely she will need be admitted to the hospital for further management    DIAGNOSTIC STUDIES / PROCEDURES    EKG  Results for orders placed or performed during the hospital encounter of 02/23/19   EKG   Result Value Ref Range    Report       [facility] Emergency Dept.    Test Date:  2019-02-23  Pt " Name:    ROSARIO MARTINEZ              Department: [facility]  MRN:        [id number]                      [facility]  Gender:     Female                       Technician: MEL  :        [id number]                   Requested By:EVE LANG  Order #:    [id number]                    Reading MD: Eve Lang MD    Measurements  Intervals                                Axis  Rate:       112                          P:          82  ID:         179                          QRS:        71  QRSD:       95                           T:          53  QT:         322  QTc:        440    Interpretive Statements  Sinus tachycardia at a rate of 112  No ST elevations no ST depressions no abnormal T wave inversions no  pathopneumonic Q waves normal intervals normal axis  Compared to ECG 02/10/2019 15:26:51  Unchanged from prior beyond tachycardia  Electronically Signed On 2019 5:54:58 PST by Eve Lang MD         LABS  Pertinent Lab Findings    Labs Reviewed   CBC WITH DIFFERENTIAL - Abnormal; Notable for the following:        Result Value    WBC 13.6 (*)     Neutrophils-Polys 75.60 (*)     Lymphocytes 13.40 (*)     Neutrophils (Absolute) 10.28 (*)     Eos (Absolute) 0.76 (*)     All other components within normal limits   COMP METABOLIC PANEL - Abnormal; Notable for the following:     Glucose 131 (*)     All other components within normal limits   TSH - Abnormal; Notable for the following:     TSH 0.070 (*)     All other components within normal limits   VENOUS BLOOD GAS - Abnormal; Notable for the following:     Venous Bg Po2 73.4 (*)     Venous Bg Hco3 21 (*)     All other components within normal limits   ESTIMATED GFR - Abnormal; Notable for the following:     GFR If  53 (*)     GFR If Non  44 (*)     All other components within normal limits   TROPONIN   BTYPE NATRIURETIC PEPTIDE   LACTIC ACID   BLOOD CULTURE    Narrative:     Per Hospital Policy: Only change Specimen Src:  "to \"Line\" if  specified by physician order.   BLOOD CULTURE    Narrative:     Per Hospital Policy: Only change Specimen Src: to \"Line\" if  specified by physician order.   LACTIC ACID       RADIOLOGY  DX-CHEST-PORTABLE (1 VIEW)   Final Result         1.  No acute cardiopulmonary disease.        The radiologist's interpretation of all radiological studies have been reviewed by me.    IV Placement Procedure Note: (with ultrasound guidance)   The patient was consented all risks benefits and alternatives were discussed.   LOCATION: L AC  POSITION: trendelenburg.   PROCEDURE NOTE: Indication, poor access.   Sterile prep, gown, and drape protocols were used. Using ultrasound guidance, IV was placed with ultrasound guidance successfully by cannulating the AC vein with ultrasound guidance. We used a 20-gauge IV and had good flash and was able to cannulate fully and normal saline flowed easily. There is no localized infiltration. The line was secured by nursing staff.    COURSE & MEDICAL DECISION MAKING  Pertinent Labs & Imaging studies reviewed. (See chart for details)    5:27 AM  Patient is doing well on the BiPAP she was given some nitroglycerin by myself to help lower blood pressure she is receiving the albuterol right now will likely need to be admitted to the ICU.  I called Dr. Junior but states he will be able to get down here and is off at 6 recommended that we just examined the intensivist comes on for admission which I agreed with.  Patient was written for IV steroids for the possible COPD-like component and blood cultures were drawn  In the setting of extreme hypertension patient complaining of some chest discomfort she will be given a single sublingual nitro as well    5:39 AM  After nitro the patient's pressure dropped quite a bit she will be given some IV fluids she is otherwise doing well and mentating normally and the BiPAP is helping with her breathing and her respiratory rate is greatly improved    5:57 " "AM  Reassess patient the bedside she is resting much more comfortably respiratory rate is greatly improved she is doing well on the BiPAP blood pressure improved with the IV fluids she feels better is moving more air and resting comfortably.  We will still admit the patient to the unit on the BiPAP for further management    6:12 AM  Spoke w Dr Blue who will admit the patient and will come down the ED in ~ 1 hour to do so    /68  Pulse (!) 105   Temp 37.3 °C (99.2 °F) (Temporal)   Resp (!) 24   Ht 1.702 m (5' 7\")   Wt 72 kg (158 lb 11.7 oz)   LMP  (LMP Unknown)   SpO2 96%   BMI 24.86 kg/m²       CRITICAL CARE  The very real possibilty of a deterioration of this patient's condition required the highest level of my preparedness for sudden, emergent intervention.  I provided critical care services, which included medication orders, frequent reevaluations of the patient's condition and response to treatment, ordering and reviewing test results, and discussing the case with various consultants.  The critical care time associated with the care of the patient was 35 minutes. Review chart for interventions. This time is exclusive of any other billable procedures.     DISPOSITION:  Patient will be admitted to Dr Blue in critical condition.      FINAL IMPRESSION  1. Respiratory failure  2. Chest pain    Critical care time 35 mn    Electronically signed by: Eve Torres, 2/23/2019 5:05 AM    This dictation has been created using voice recognition software and/or scribes. The accuracy of the dictation is limited by the abilities of the software and the expertise of the scribes. I expect there may be some errors of grammar and possibly content. I made every attempt to manually correct the errors within my dictation. However, errors related to voice recognition software and/or scribes may still exist and should be interpreted within the appropriate context.    "

## 2019-02-23 NOTE — ASSESSMENT & PLAN NOTE
At this time it is unclear to me as CXR unchanged  She was on RA and can be titrated pretty quickly down off O2

## 2019-02-23 NOTE — FLOWSHEET NOTE
Patient taken off BIPAP at 1030 to rest her nose and talk on phone.  She is tolerating 4 L nasal cannula well.     02/23/19 1055   Interdisciplinary Plan of Care-Goals (Indications)   Bronchodilator Indications History / Diagnosis   RT Assessment of Delivered Medications   Evaluation of Medication Delivery Daily Yes-- Pt /Family has been Instructed in use of Respiratory Medications and Adverse Reactions   SVN Group   #SVN Performed Yes   Given By: Mouthpiece   Chest Exam   Work Of Breathing / Effort Mild   Respiration 20   Pulse (!) 110   Heart Rate (Monitored) (!) 110   Breath Sounds   RUL Breath Sounds Clear;Diminished   RML Breath Sounds Diminished   RLL Breath Sounds Diminished   AUSTIN Breath Sounds Clear;Diminished   LLL Breath Sounds Diminished   Oximetry   Continuous Oximetry Yes   Oxygen   Home O2 Use Prior To Admission? Yes   Home O2 Delivery Method Nasal Cannula   Home O2 Frequency of Use As Needed for SOB   Pulse Oximetry 96 %   O2 (LPM) 4   O2 Daily Delivery Respiratory  Silicone Nasal Cannula

## 2019-02-23 NOTE — H&P
Pulmonary History & Physical Note    Date of Service  2/23/2019    Primary Care Physician  Barber Joyner M.D.          Code Status  Full code    Chief Complaint  Chest tightness and shortness of breath and hypoxemia    History of Presenting Illness  68 y.o. female who presented 2/23/2019 with history of moderate to severe COPD, and history of lung cancer treated in 2016 with lumpectomy presented this morning around 5 AM to the emergency room complaining of severe chest tightness and shortness of breath.  The patient was recently admitted then discharged from the hospital from 02/10/2019 to 02/14/2019 with similar complaint.  At that time his symptoms thought to be secondary to hyperthyroidism with chest pain and fatigue.    The patient was given bronchodilators in the ER.  Also he was given nitroglycerin sublingual which caused her transient low blood pressure responded to IV fluids.  The patient was also started on steroids IV and BiPAP.  She improved  after these measures.    Critical care consultation requested because the patient continued to need BiPAP.    Chest x-ray was personally reviewed did not show any acute findings like pulmonary edema or infiltrates.  It was consistent with COPD.  Review of Systems  Review of Systems   Constitutional: Positive for malaise/fatigue. Negative for chills and fever.   HENT: Negative.    Eyes: Negative.    Respiratory: Positive for shortness of breath and wheezing.    Cardiovascular: Positive for chest pain and palpitations.   Gastrointestinal: Negative.    Genitourinary: Negative.    Musculoskeletal: Negative.    Skin: Negative for itching and rash.   Neurological: Positive for weakness.   Endo/Heme/Allergies: Negative.    Psychiatric/Behavioral: Negative.        Past Medical History   has a past medical history of Anesthesia; Asthma; Backpain (7/2017); Blood clot in vein (07/06/2018); Bowel habit changes (07/06/2018); Breath shortness; Bronchitis; CAD (coronary artery  disease); Cancer (HCC) (2016); Chickenpox; COPD (chronic obstructive pulmonary disease) (HCC); Dialysis (2005); Emphysema of lung (HCC); Glaucoma; Hemorrhagic disorder (HCC); Hyperlipidemia; Hypertension; Hyperthyroidism; Kidney stone; Lung cancer (HCC) (12/12/2016); Multiple thyroid nodules (7/9/2015); Nasal drainage; Osteoporosis; Pain (07/06/2018); Personal history of venous thrombosis and embolism (2004, 2012); Pneumonia (7/2016); Renal disorder; Renal stones (2013); Rheumatoid arthritis (HCC); Rheumatoid arthritis (HCC); S/P appendectomy (1998 ); S/P cholecystectomy (1998); S/P kyphoplasty (2006, 2007, 2013); Sepsis(995.91) (2005); Shortness of breath (07/06/2018); and Thyroid nodule, hot (2017). She also has no past medical history of Angina; Arrhythmia; Breast cancer (HCC); CATARACT; Congestive heart failure (HCC); Diabetes; Fall; Heart murmur; Heart valve disease; Indigestion; Jaundice; Liver disease; Myocardial infarct (HCC); Other specified symptom associated with female genital organs; Pacemaker; Psychiatric disorder; Psychiatric problem; Rheumatic fever; Seizure (HCC); Seizure disorder (HCC); Stroke (HCC); or Unspecified urinary incontinence.    Surgical History   has a past surgical history that includes other; other abdominal surgery; pr enlarge breast with implant; pr reduction of large breast; appendectomy; cholecystectomy; laminotomy; lung biopsy open; thoracoscopy (Left, 12/12/2016); other orthopedic surgery (2013); cystoscopy stent placement (Left, 6/18/2018); lithotripsy (Left, 6/18/2018); lasertripsy (Left, 6/18/2018); ureteroscopy (Left, 6/18/2018); cystoscopy stent placement (7/9/2018); ureteroscopy (Left, 7/9/2018); and lasertripsy (Left, 7/9/2018).     Family History  family history includes Cancer in her mother; Heart Disease in her brother; Heart Failure in her father.     Social History   reports that she quit smoking about 19 years ago. Her smoking use included Cigarettes. She has a  30.00 pack-year smoking history. She has never used smokeless tobacco. She reports that she drinks alcohol. She reports that she does not use drugs.    Allergies  Allergies   Allergen Reactions   • Nkda [No Known Drug Allergy]        Medications  Prior to Admission Medications   Prescriptions Last Dose Informant Patient Reported? Taking?   BREO ELLIPTA 200-25 MCG/INH AEROSOL POWDER, BREATH ACTIVATED 2/22/2019 at AM Patient No No   Sig: INHALE ONE DOSE BY MOUTH DAILY. RINSE MOUTH AFTER USE.   COMBIGAN 0.2-0.5 % Solution 2/22/2019 at 2300 Patient Yes No   Sig: Place 1 Drop in both eyes 2 Times a Day.   HYDROcodone/acetaminophen (NORCO)  MG Tab 2/22/2019 at 1400 Patient Yes No   Sig: Take 1 Tab by mouth every day.   PROAIR  (90 Base) MCG/ACT Aero Soln inhalation aerosol 2/23/2019 at AM Patient No No   Sig: INHALE TWO PUFFS BY MOUTH EVERY 6 HOURS AS NEEDED FOR SHORTNESS OF BREATH   Tiotropium Bromide Monohydrate (SPIRIVA RESPIMAT) 2.5 MCG/ACT Aero Soln 2/22/2019 at 1900 Patient Yes No   Sig: Inhale 1 Puff by mouth 2 Times a Day.   acetaminophen (TYLENOL) 500 MG Tab 2/22/2019 at 1800 Patient Yes No   Sig: Take 1,000 mg by mouth every 6 hours as needed for Moderate Pain.   docusate sodium (COLACE) 100 MG Cap 2/22/2019 at 1900 Patient Yes No   Sig: Take 300 mg by mouth every evening.   fluticasone (FLONASE) 50 MCG/ACT nasal spray 3 DAYS at PRN Patient Yes No   Sig: Spray 1 Spray in nose as needed.   ipratropium-albuterol (DUONEB) 0.5-2.5 (3) MG/3ML nebulizer solution 2/22/2019 at PM Patient No No   Sig: 3 mL by Nebulization route 2 Times a Day.   losartan (COZAAR) 50 MG TABS 2/22/2019 at 0600 Patient Yes No   Sig: Take 50 mg by mouth every morning.   methimazole (TAPAZOLE) 5 MG Tab 2/19/2019 at STOPPED Patient No No   Sig: Take 1 Tab by mouth 2 Times a Day.   multivitamin (THERAGRAN) Tab 2/22/2019 at 0600 Patient Yes No   Sig: Take 1 Tab by mouth every day.   propranolol LA (INDERAL LA) 60 MG CAPSULE SR 24 HR  2/22/2019 at 0600 Patient Yes No   Sig: Take 60 mg by mouth every morning.   rivaroxaban (XARELTO) 20 MG Tab tablet 2/22/2019 at 1700 Patient No No   Sig: Take 1 Tab by mouth with dinner.   simvastatin (ZOCOR) 40 MG Tab 2/22/2019 at 1900 Patient Yes No   Sig: Take 40 mg by mouth every evening.   vitamin D, Ergocalciferol, (DRISDOL) 46680 UNITS Cap capsule 2/18/2019 Patient Yes No   Sig: Take 50,000 Units by mouth every 14 days.   zolpidem (AMBIEN) 10 MG Tab PRN at PRN Patient Yes No   Sig: Take 10 mg by mouth every bedtime.      Facility-Administered Medications: None       Physical Exam  Temp:  [36.3 °C (97.4 °F)-37.3 °C (99.2 °F)] 36.3 °C (97.4 °F)  Pulse:  [] 101  Resp:  [13-43] 18  BP: (101-195)/(67-98) 101/67  SpO2:  [91 %-100 %] 94 %    Physical Exam   Constitutional: She is oriented to person, place, and time. She appears well-developed and well-nourished. No distress.   HENT:   Head: Normocephalic and atraumatic.   Mouth/Throat: No oropharyngeal exudate.   Eyes: Pupils are equal, round, and reactive to light. Right eye exhibits no discharge. Left eye exhibits no discharge. No scleral icterus.   Neck: Neck supple. No JVD present. No thyromegaly present.   Cardiovascular: Normal rate, regular rhythm and normal heart sounds.    Pulmonary/Chest: No respiratory distress. She has wheezes. She has no rales.   Diminished air movement   Abdominal: Soft. Bowel sounds are normal. She exhibits no distension. There is no tenderness.   Musculoskeletal: She exhibits no edema or deformity.   Neurological: She is alert and oriented to person, place, and time. No cranial nerve deficit. Coordination normal.   Skin: Skin is warm and dry.   Psychiatric: She has a normal mood and affect. Her behavior is normal.       Laboratory:  Recent Labs      02/23/19   0525   WBC  13.6*   RBC  4.53   HEMOGLOBIN  14.8   HEMATOCRIT  43.6   MCV  96.2   MCH  32.7   MCHC  33.9   RDW  42.9   PLATELETCT  207   MPV  10.4     Recent Labs       02/23/19   0525   SODIUM  137   POTASSIUM  4.0   CHLORIDE  107   CO2  20   GLUCOSE  131*   BUN  18   CREATININE  1.22   CALCIUM  9.7     Recent Labs      02/23/19   0525   ALTSGPT  24   ASTSGOT  22   ALKPHOSPHAT  82   TBILIRUBIN  0.8   GLUCOSE  131*         Recent Labs      02/23/19   0525   BNPBTYPENAT  56         Recent Labs      02/23/19   0525   TROPONINI  0.02       Urinalysis:    No results found     Imaging:  DX-CHEST-PORTABLE (1 VIEW)   Final Result         1.  No acute cardiopulmonary disease.            Assessment/Plan:      I anticipate this patient will require at least two midnights for appropriate medical management, necessitating inpatient admission.    * Acute exacerbation of chronic obstructive pulmonary disease (COPD) (HCC)- (present on admission)   Assessment & Plan    Patient has moderate to severe COPD  Came today with chest tightness wheezing and cough  Chest x-ray did not show any acute infiltrates  Worsening of respiratory symptoms are most likely secondary to COPD exacerbation.  Given Solu-Medrol in the emergency room and we will continue this in the floor  Continue respiratory protocol  Continue bronchodilators  Continue home COPD medications     Closed compression fracture of second lumbar vertebra (HCC)- (present on admission)   Assessment & Plan    Continue pain management     Hyperthyroidism- (present on admission)   Assessment & Plan    Was recently started on methimazole, unfortunately she stopped it because she thought the medicine is giving her shortness of breath.  We will restart methimazole     Acute respiratory failure with hypoxia (HCC)- (present on admission)   Assessment & Plan    Secondary to COPD exacerbation  She needed BiPAP in the emergency room to help with the work of breathing  Now on nasal cannula and doing better  Continue systemic steroids  Continue bronchodilators  Watch for need for noninvasive mechanical ventilation     DVT (deep venous thrombosis) (HCC)-  (present on admission)   Assessment & Plan    Continue with Xarelto       Hypertension- (present on admission)   Assessment & Plan    Monitor blood pressure  Continue home medications         VTE prophylaxis: Patient is in Xarelto

## 2019-02-23 NOTE — ED NOTES
Med rec complete per patient with medication list (returned)  Allergies reviewed    Patient stopped Methimazole 2-19-19, she stated she was having a hard time breathing like a panic attack and that was the only new medication she had started

## 2019-02-23 NOTE — ED NOTES
Pt care assumed. Pt resting in bed. Pt given more water and another blanket per request. No other needs at this time.

## 2019-02-23 NOTE — FLOWSHEET NOTE
02/23/19 1501   RT Assessment of Delivered Medications   Evaluation of Medication Delivery Daily Yes-- Pt /Family has been Instructed in use of Respiratory Medications and Adverse Reactions   SVN Group   #SVN Performed Yes   Given By: Mouthpiece   MDI/DPI Group   #MDI/DPI Given MDI/DPI x 1   Chest Exam   Respiration 18   Pulse (!) 101   Breath Sounds   RUL Breath Sounds Clear;Diminished   RML Breath Sounds Diminished   RLL Breath Sounds Diminished   AUSTIN Breath Sounds Clear;Diminished   LLL Breath Sounds Diminished   Oxygen   Pulse Oximetry 94 %   O2 (LPM) 2   O2 Daily Delivery Respiratory  Silicone Nasal Cannula

## 2019-02-24 LAB
ALBUMIN SERPL BCP-MCNC: 3.2 G/DL (ref 3.2–4.9)
ALBUMIN/GLOB SERPL: 1.2 G/DL
ALP SERPL-CCNC: 82 U/L (ref 30–99)
ALT SERPL-CCNC: 19 U/L (ref 2–50)
ANION GAP SERPL CALC-SCNC: 8 MMOL/L (ref 0–11.9)
AST SERPL-CCNC: 28 U/L (ref 12–45)
BASOPHILS # BLD AUTO: 0.2 % (ref 0–1.8)
BASOPHILS # BLD: 0.02 K/UL (ref 0–0.12)
BILIRUB SERPL-MCNC: 0.5 MG/DL (ref 0.1–1.5)
BUN SERPL-MCNC: 28 MG/DL (ref 8–22)
CALCIUM SERPL-MCNC: 9.3 MG/DL (ref 8.4–10.2)
CHLORIDE SERPL-SCNC: 105 MMOL/L (ref 96–112)
CO2 SERPL-SCNC: 20 MMOL/L (ref 20–33)
CREAT SERPL-MCNC: 1.62 MG/DL (ref 0.5–1.4)
EOSINOPHIL # BLD AUTO: 0.01 K/UL (ref 0–0.51)
EOSINOPHIL NFR BLD: 0.1 % (ref 0–6.9)
ERYTHROCYTE [DISTWIDTH] IN BLOOD BY AUTOMATED COUNT: 45 FL (ref 35.9–50)
GLOBULIN SER CALC-MCNC: 2.7 G/DL (ref 1.9–3.5)
GLUCOSE SERPL-MCNC: 205 MG/DL (ref 65–99)
HCT VFR BLD AUTO: 37.4 % (ref 37–47)
HGB BLD-MCNC: 12.4 G/DL (ref 12–16)
IMM GRANULOCYTES # BLD AUTO: 0.06 K/UL (ref 0–0.11)
IMM GRANULOCYTES NFR BLD AUTO: 0.6 % (ref 0–0.9)
LACTATE BLD-SCNC: 2 MMOL/L (ref 0.5–2)
LACTATE BLD-SCNC: 2.4 MMOL/L (ref 0.5–2)
LYMPHOCYTES # BLD AUTO: 0.83 K/UL (ref 1–4.8)
LYMPHOCYTES NFR BLD: 7.7 % (ref 22–41)
MCH RBC QN AUTO: 32.5 PG (ref 27–33)
MCHC RBC AUTO-ENTMCNC: 33.2 G/DL (ref 33.6–35)
MCV RBC AUTO: 97.9 FL (ref 81.4–97.8)
MONOCYTES # BLD AUTO: 0.13 K/UL (ref 0–0.85)
MONOCYTES NFR BLD AUTO: 1.2 % (ref 0–13.4)
NEUTROPHILS # BLD AUTO: 9.79 K/UL (ref 2–7.15)
NEUTROPHILS NFR BLD: 90.2 % (ref 44–72)
NRBC # BLD AUTO: 0 K/UL
NRBC BLD-RTO: 0 /100 WBC
PLATELET # BLD AUTO: 174 K/UL (ref 164–446)
PMV BLD AUTO: 10.7 FL (ref 9–12.9)
POTASSIUM SERPL-SCNC: 4.7 MMOL/L (ref 3.6–5.5)
PROT SERPL-MCNC: 5.9 G/DL (ref 6–8.2)
RBC # BLD AUTO: 3.82 M/UL (ref 4.2–5.4)
SODIUM SERPL-SCNC: 133 MMOL/L (ref 135–145)
WBC # BLD AUTO: 10.8 K/UL (ref 4.8–10.8)

## 2019-02-24 PROCEDURE — 85025 COMPLETE CBC W/AUTO DIFF WBC: CPT

## 2019-02-24 PROCEDURE — 84481 FREE ASSAY (FT-3): CPT

## 2019-02-24 PROCEDURE — 700105 HCHG RX REV CODE 258: Performed by: FAMILY MEDICINE

## 2019-02-24 PROCEDURE — 770020 HCHG ROOM/CARE - TELE (206)

## 2019-02-24 PROCEDURE — 80053 COMPREHEN METABOLIC PANEL: CPT

## 2019-02-24 PROCEDURE — A9270 NON-COVERED ITEM OR SERVICE: HCPCS | Performed by: HOSPITALIST

## 2019-02-24 PROCEDURE — 94760 N-INVAS EAR/PLS OXIMETRY 1: CPT

## 2019-02-24 PROCEDURE — 700101 HCHG RX REV CODE 250: Performed by: INTERNAL MEDICINE

## 2019-02-24 PROCEDURE — 700105 HCHG RX REV CODE 258: Performed by: HOSPITALIST

## 2019-02-24 PROCEDURE — 700111 HCHG RX REV CODE 636 W/ 250 OVERRIDE (IP): Performed by: INTERNAL MEDICINE

## 2019-02-24 PROCEDURE — 700102 HCHG RX REV CODE 250 W/ 637 OVERRIDE(OP): Performed by: HOSPITALIST

## 2019-02-24 PROCEDURE — 700102 HCHG RX REV CODE 250 W/ 637 OVERRIDE(OP): Performed by: INTERNAL MEDICINE

## 2019-02-24 PROCEDURE — 83605 ASSAY OF LACTIC ACID: CPT

## 2019-02-24 PROCEDURE — 99232 SBSQ HOSP IP/OBS MODERATE 35: CPT | Performed by: HOSPITALIST

## 2019-02-24 PROCEDURE — 94640 AIRWAY INHALATION TREATMENT: CPT

## 2019-02-24 PROCEDURE — 700111 HCHG RX REV CODE 636 W/ 250 OVERRIDE (IP): Performed by: FAMILY MEDICINE

## 2019-02-24 PROCEDURE — A9270 NON-COVERED ITEM OR SERVICE: HCPCS | Performed by: INTERNAL MEDICINE

## 2019-02-24 RX ORDER — METHIMAZOLE 5 MG/1
10 TABLET ORAL 2 TIMES DAILY
Status: DISCONTINUED | OUTPATIENT
Start: 2019-02-24 | End: 2019-02-28 | Stop reason: HOSPADM

## 2019-02-24 RX ORDER — SODIUM CHLORIDE 9 MG/ML
500 INJECTION, SOLUTION INTRAVENOUS ONCE
Status: COMPLETED | OUTPATIENT
Start: 2019-02-24 | End: 2019-02-24

## 2019-02-24 RX ORDER — ONDANSETRON 2 MG/ML
4 INJECTION INTRAMUSCULAR; INTRAVENOUS EVERY 6 HOURS PRN
Status: DISCONTINUED | OUTPATIENT
Start: 2019-02-24 | End: 2019-02-28 | Stop reason: HOSPADM

## 2019-02-24 RX ORDER — ACETAMINOPHEN 500 MG
1000 TABLET ORAL 3 TIMES DAILY PRN
Status: DISCONTINUED | OUTPATIENT
Start: 2019-02-24 | End: 2019-02-28 | Stop reason: HOSPADM

## 2019-02-24 RX ORDER — PROPRANOLOL HCL 60 MG
60 CAPSULE, EXTENDED RELEASE 24HR ORAL EVERY MORNING
Status: DISCONTINUED | OUTPATIENT
Start: 2019-02-25 | End: 2019-02-24

## 2019-02-24 RX ORDER — IPRATROPIUM BROMIDE AND ALBUTEROL SULFATE 2.5; .5 MG/3ML; MG/3ML
3 SOLUTION RESPIRATORY (INHALATION)
Status: DISCONTINUED | OUTPATIENT
Start: 2019-02-25 | End: 2019-02-28 | Stop reason: HOSPADM

## 2019-02-24 RX ORDER — PROPRANOLOL HCL 60 MG
30 CAPSULE, EXTENDED RELEASE 24HR ORAL EVERY MORNING
Status: DISCONTINUED | OUTPATIENT
Start: 2019-02-25 | End: 2019-02-24

## 2019-02-24 RX ORDER — CARVEDILOL 3.12 MG/1
3.12 TABLET ORAL 2 TIMES DAILY WITH MEALS
Status: DISCONTINUED | OUTPATIENT
Start: 2019-02-25 | End: 2019-02-28 | Stop reason: HOSPADM

## 2019-02-24 RX ADMIN — ONDANSETRON 4 MG: 2 INJECTION INTRAMUSCULAR; INTRAVENOUS at 06:41

## 2019-02-24 RX ADMIN — ZOLPIDEM TARTRATE 10 MG: 5 TABLET ORAL at 23:32

## 2019-02-24 RX ADMIN — IPRATROPIUM BROMIDE AND ALBUTEROL SULFATE 3 ML: .5; 3 SOLUTION RESPIRATORY (INHALATION) at 06:06

## 2019-02-24 RX ADMIN — IPRATROPIUM BROMIDE AND ALBUTEROL SULFATE 3 ML: .5; 3 SOLUTION RESPIRATORY (INHALATION) at 19:07

## 2019-02-24 RX ADMIN — METHIMAZOLE 5 MG: 5 TABLET ORAL at 05:31

## 2019-02-24 RX ADMIN — BRIMONIDINE TARTRATE 1 DROP: 2 SOLUTION/ DROPS OPHTHALMIC at 21:02

## 2019-02-24 RX ADMIN — METHYLPREDNISOLONE SODIUM SUCCINATE 60 MG: 40 INJECTION, POWDER, FOR SOLUTION INTRAMUSCULAR; INTRAVENOUS at 23:31

## 2019-02-24 RX ADMIN — TIMOLOL MALEATE 1 DROP: 5 SOLUTION OPHTHALMIC at 05:38

## 2019-02-24 RX ADMIN — HYDROCODONE BITARTRATE AND ACETAMINOPHEN 1 TABLET: 10; 325 TABLET ORAL at 05:30

## 2019-02-24 RX ADMIN — SODIUM CHLORIDE 500 ML: 9 INJECTION, SOLUTION INTRAVENOUS at 12:18

## 2019-02-24 RX ADMIN — IPRATROPIUM BROMIDE AND ALBUTEROL SULFATE 3 ML: .5; 3 SOLUTION RESPIRATORY (INHALATION) at 10:44

## 2019-02-24 RX ADMIN — IPRATROPIUM BROMIDE AND ALBUTEROL SULFATE 3 ML: .5; 3 SOLUTION RESPIRATORY (INHALATION) at 14:37

## 2019-02-24 RX ADMIN — BUDESONIDE AND FORMOTEROL FUMARATE DIHYDRATE 2 PUFF: 160; 4.5 AEROSOL RESPIRATORY (INHALATION) at 06:09

## 2019-02-24 RX ADMIN — TIOTROPIUM BROMIDE 1 CAPSULE: 18 CAPSULE ORAL; RESPIRATORY (INHALATION) at 06:09

## 2019-02-24 RX ADMIN — ACETAMINOPHEN 1000 MG: 500 TABLET, FILM COATED ORAL at 15:03

## 2019-02-24 RX ADMIN — LOSARTAN POTASSIUM 50 MG: 25 TABLET, FILM COATED ORAL at 05:30

## 2019-02-24 RX ADMIN — BRIMONIDINE TARTRATE 1 DROP: 2 SOLUTION/ DROPS OPHTHALMIC at 05:38

## 2019-02-24 RX ADMIN — METHYLPREDNISOLONE SODIUM SUCCINATE 60 MG: 40 INJECTION, POWDER, FOR SOLUTION INTRAMUSCULAR; INTRAVENOUS at 12:17

## 2019-02-24 RX ADMIN — STANDARDIZED SENNA CONCENTRATE AND DOCUSATE SODIUM 2 TABLET: 8.6; 5 TABLET, FILM COATED ORAL at 05:30

## 2019-02-24 RX ADMIN — METHYLPREDNISOLONE SODIUM SUCCINATE 60 MG: 40 INJECTION, POWDER, FOR SOLUTION INTRAMUSCULAR; INTRAVENOUS at 18:22

## 2019-02-24 RX ADMIN — SIMVASTATIN 40 MG: 20 TABLET, FILM COATED ORAL at 21:02

## 2019-02-24 RX ADMIN — RIVAROXABAN 20 MG: 20 TABLET, FILM COATED ORAL at 18:21

## 2019-02-24 RX ADMIN — STANDARDIZED SENNA CONCENTRATE AND DOCUSATE SODIUM 2 TABLET: 8.6; 5 TABLET, FILM COATED ORAL at 18:21

## 2019-02-24 RX ADMIN — METHYLPREDNISOLONE SODIUM SUCCINATE 60 MG: 40 INJECTION, POWDER, FOR SOLUTION INTRAMUSCULAR; INTRAVENOUS at 05:34

## 2019-02-24 RX ADMIN — PROPRANOLOL HYDROCHLORIDE 60 MG: 60 CAPSULE, EXTENDED RELEASE ORAL at 05:31

## 2019-02-24 RX ADMIN — BUDESONIDE AND FORMOTEROL FUMARATE DIHYDRATE 2 PUFF: 160; 4.5 AEROSOL RESPIRATORY (INHALATION) at 19:08

## 2019-02-24 RX ADMIN — TIMOLOL MALEATE 1 DROP: 5 SOLUTION OPHTHALMIC at 21:02

## 2019-02-24 RX ADMIN — METHIMAZOLE 10 MG: 5 TABLET ORAL at 18:21

## 2019-02-24 RX ADMIN — SODIUM CHLORIDE 250 ML: 9 INJECTION, SOLUTION INTRAVENOUS at 00:05

## 2019-02-24 RX ADMIN — ACETAMINOPHEN 1000 MG: 500 TABLET, FILM COATED ORAL at 23:32

## 2019-02-24 ASSESSMENT — ENCOUNTER SYMPTOMS
PALPITATIONS: 0
TINGLING: 0
BLURRED VISION: 0
SHORTNESS OF BREATH: 1
CHILLS: 0
NECK PAIN: 0
EYE PAIN: 0
DIZZINESS: 0
ABDOMINAL PAIN: 0
COUGH: 0
FEVER: 0
DEPRESSION: 0
SORE THROAT: 0
VOMITING: 0
BACK PAIN: 0
HEADACHES: 0
INSOMNIA: 0
NAUSEA: 0

## 2019-02-24 ASSESSMENT — PATIENT HEALTH QUESTIONNAIRE - PHQ9
SUM OF ALL RESPONSES TO PHQ9 QUESTIONS 1 AND 2: 0
2. FEELING DOWN, DEPRESSED, IRRITABLE, OR HOPELESS: NOT AT ALL
1. LITTLE INTEREST OR PLEASURE IN DOING THINGS: NOT AT ALL

## 2019-02-24 ASSESSMENT — LIFESTYLE VARIABLES: EVER_SMOKED: YES

## 2019-02-24 NOTE — FLOWSHEET NOTE
02/24/19 0606   Interdisciplinary Plan of Care-Goals (Indications)   Bronchodilator Indications History / Diagnosis   RT Assessment of Delivered Medications   Evaluation of Medication Delivery Daily Yes-- Pt /Family has been Instructed in use of Respiratory Medications and Adverse Reactions   SVN Group   #SVN Performed Yes   Given By: Mouthpiece   MDI/DPI Group   #MDI/DPI Given MDI/DPI x 2   Incentive Spirometry Group   Incentive Spirometry Instruction Yes   Incentive Spirometer Volume 1500 mL   Chest Exam   Work Of Breathing / Effort Mild   Respiration 18   Pulse 96   Breath Sounds   RUL Breath Sounds Clear;Diminished   RML Breath Sounds Diminished   RLL Breath Sounds Diminished   AUSTIN Breath Sounds Clear;Diminished   LLL Breath Sounds Diminished   Oxygen   Home O2 Frequency of Use As Needed for SOB   Pulse Oximetry 95 %   O2 (LPM) 3   O2 Daily Delivery Respiratory  Silicone Nasal Cannula

## 2019-02-24 NOTE — PROGRESS NOTES
Telemetry Shift Summary    Rhythm SR/ST  HR Range 70s-100s  Ectopy rPVC, rPAC  Measurements 0.20/0.08/0.34        Normal Values  Rhythm SR  HR Range    Measurements 0.12-0.20 / 0.06-0.10  / 0.30-0.52

## 2019-02-24 NOTE — PROGRESS NOTES
MD rounded at bedside. Continue current treatment. Verbal order to increase tylenol to 1000mg TID PRN. Discussed adjusting thyroid medications and propanolol with pt. MD to f/u with pt's endocrinologist tomorrow. Repeat lactic and morning labs pending.

## 2019-02-24 NOTE — PROGRESS NOTES
· 2 RN skin check complete with ALLAN Graves. Pt has scabs and bruising on upper extremities. Redness noted to left elbow, blanching. Skin otherwise intact.  · Devices in place nasal cannula.  · Skin assessed under devices intact.  · Confirmed pressure ulcers found on NONE.  · New potential pressure ulcers noted on NONE.   · The following interventions in place silicone nasal cannula, pt moving self side to side in bed, pillows for repositioning.

## 2019-02-24 NOTE — PROGRESS NOTES
"Assessment completed. Pt AAOx4. C/o tenderness in mid lower rib cage bilaterally, denies active chest pain, states this \"tenderness\" has been present \"for a long time.\" Pt remembers MD attributing it to costochondritis. Pain worse with palpation, exacerbated by coughing. 5/10, pt declines further interventions at this time. No other complaints. Pt oriented to unit routine and environment, given welcome folder. POC discussed.  "

## 2019-02-24 NOTE — PROGRESS NOTES
Bedside report received from Flex LEMUS. Assumed care. POC discussed. Pt resting comfortably in bed. Safety precautions in place.

## 2019-02-24 NOTE — PROGRESS NOTES
Received bedside report from ALLAN Coley. Plan of care discussed. Safety precautions in place. Call light and personal belongings within reach. Patient has no needs at this time.

## 2019-02-24 NOTE — PROGRESS NOTES
Assessment completed. Pt AAOx4. C/o pain 4/10, generalized, declines interventions. Pt sitting up EOB for breakfast. No other needs at this time. Call light in reach.

## 2019-02-24 NOTE — PROGRESS NOTES
Dr. English notified of blood pressure at 2316 79/38 with a repeat blood pressure at 2318 90/59 and a lactic of 3.0. New order received for 250 mL normal saline bolus x1. Order read back and verified.

## 2019-02-24 NOTE — PROGRESS NOTES
[facility] Medicine Daily Progress Note    Date of Service  2/24/2019    Chief Complaint  68 y.o. female admitted 2/23/2019 with severe chest tightness and shortness of breath.     Hospital Course    She was admitted with a recurrent COPD exacerbation.       Interval Problem Update  I added the COPD order set today. I ordered labs for today. I ordered a stat lactic acid as hers went up at 2038 to 3 from 1.1 last night. I increased her tapazole as her TSh is still suppressed.     cxr:  1.  No acute cardiopulmonary disease.      Consultants/Specialty  none    Code Status  full    Disposition  Home once improved likely    Review of Systems  Review of Systems   Constitutional: Negative for chills and fever.   HENT: Negative for sore throat.    Eyes: Negative for blurred vision and pain.   Respiratory: Positive for shortness of breath. Negative for cough.    Cardiovascular: Negative for chest pain and palpitations.   Gastrointestinal: Negative for abdominal pain, nausea and vomiting.   Genitourinary: Negative for dysuria and urgency.   Musculoskeletal: Negative for back pain and neck pain.   Skin: Negative for itching and rash.   Neurological: Negative for dizziness, tingling and headaches.   Psychiatric/Behavioral: Negative for depression. The patient does not have insomnia.    All other systems reviewed and are negative.       Physical Exam  Temp:  [36.3 °C (97.3 °F)-36.8 °C (98.2 °F)] 36.6 °C (97.8 °F)  Pulse:  [] 82  Resp:  [13-30] 18  BP: ()/(38-72) 95/65  SpO2:  [92 %-100 %] 94 %    Physical Exam   Constitutional: She is oriented to person, place, and time. She appears well-developed and well-nourished. No distress.   Patient seen and examined  Discussed plan with ALLAN NEVEST:   Right Ear: External ear normal.   Left Ear: External ear normal.   Nose: Nose normal.   Eyes: Conjunctivae are normal. Right eye exhibits no discharge. Left eye exhibits no discharge.   Neck: No JVD present.   Cardiovascular:  Regular rhythm and normal heart sounds.    No murmur heard.  Cap refill 2sec  Pulses 2+ throughout     Pulmonary/Chest: Effort normal and breath sounds normal. No stridor. No respiratory distress. She has no wheezes. She has no rales.   Decreased bs   Abdominal: Soft. Bowel sounds are normal. She exhibits no distension. There is no tenderness.   Musculoskeletal: She exhibits edema. She exhibits no tenderness.   Neurological: She is alert and oriented to person, place, and time.   Skin: Skin is warm and dry. She is not diaphoretic. No erythema.   Normal skin  Color.    Psychiatric: She has a normal mood and affect. Her behavior is normal.   Nursing note and vitals reviewed.      Fluids    Intake/Output Summary (Last 24 hours) at 02/24/19 0956  Last data filed at 02/24/19 0900   Gross per 24 hour   Intake              360 ml   Output                0 ml   Net              360 ml       Laboratory  Recent Labs      02/23/19   0525   WBC  13.6*   RBC  4.53   HEMOGLOBIN  14.8   HEMATOCRIT  43.6   MCV  96.2   MCH  32.7   MCHC  33.9   RDW  42.9   PLATELETCT  207   MPV  10.4     Recent Labs      02/23/19   0525   SODIUM  137   POTASSIUM  4.0   CHLORIDE  107   CO2  20   GLUCOSE  131*   BUN  18   CREATININE  1.22   CALCIUM  9.7         Recent Labs      02/23/19   0525   BNPBTYPENAT  56           Imaging  DX-CHEST-PORTABLE (1 VIEW)   Final Result         1.  No acute cardiopulmonary disease.           Assessment/Plan  * Acute exacerbation of chronic obstructive pulmonary disease (COPD) ([facility])- (present on admission)   Assessment & Plan    Rt protocol.   Steroids  Supportive care  Inhalers  Taper off propranolol       Closed compression fracture of second lumbar vertebra (HCC)- (present on admission)   Assessment & Plan    Pain control.      Hyperthyroidism- (present on admission)   Assessment & Plan    Stopped taking her meds at home. I encouraged her to take them.      Acute respiratory failure with hypoxia ([facility])- (present on  admission)   Assessment & Plan    Improving. Severe copd being treated.      DVT (deep venous thrombosis) (HCC)- (present on admission)   Assessment & Plan    conitnue meds     Hypertension- (present on admission)   Assessment & Plan    Continue meds          VTE prophylaxis: lovenox.

## 2019-02-24 NOTE — PROGRESS NOTES
Gave bedside report to ALLAN Coley. Plan of care discussed. Safety precautions in place. Personal belongings and call light are with in reach. Patient has no additional needs at this time.

## 2019-02-24 NOTE — PROGRESS NOTES
Bedside report given to Flex LEMUS. POC discussed. Pt resting comfortably in bed. Safety precautions in place.

## 2019-02-24 NOTE — CARE PLAN
Problem: Communication  Goal: The ability to communicate needs accurately and effectively will improve  Outcome: PROGRESSING AS EXPECTED  POC discussed including respiratory treatments, O2 management, mobility, use of IS/deep breathing/coughing. Pt oriented to unit routine and environment. Questions addressed. Pt verbalizes understanding of POC.    Problem: Respiratory:  Goal: Respiratory status will improve  Outcome: PROGRESSING SLOWER THAN EXPECTED  Pt lungs severely diminished on exam. RT protocol in place. O2 titrated to meet needs. Pt using IS, coughing/deep breathing. Pt encouraged to mobilize as tolerated.  Pt verbalizes understanding of respiratory POC.

## 2019-02-24 NOTE — FLOWSHEET NOTE
02/24/19 1044   SVN Group   #SVN Performed Yes   Given By: Mouthpiece   Chest Exam   Respiration 18   Pulse 84   Breath Sounds   RUL Breath Sounds Clear;Diminished   RML Breath Sounds Diminished   RLL Breath Sounds Diminished   AUSTIN Breath Sounds Clear;Diminished   LLL Breath Sounds Diminished   Oxygen   Pulse Oximetry 94 %   O2 (LPM) 3   O2 Daily Delivery Respiratory  Silicone Nasal Cannula

## 2019-02-24 NOTE — PROGRESS NOTES
Telemetry Shift Summary    Rhythm ST  HR Range 100's-120's  Ectopy none  Measurements .16/.08/.32        Normal Values  Rhythm SR  HR Range    Measurements 0.12-0.20 / 0.06-0.10  / 0.30-0.52

## 2019-02-25 ENCOUNTER — APPOINTMENT (OUTPATIENT)
Dept: RADIOLOGY | Facility: MEDICAL CENTER | Age: 69
DRG: 190 | End: 2019-02-25
Attending: HOSPITALIST
Payer: MEDICARE

## 2019-02-25 LAB
ALBUMIN SERPL BCP-MCNC: 3.1 G/DL (ref 3.2–4.9)
ALBUMIN/GLOB SERPL: 1.2 G/DL
ALP SERPL-CCNC: 74 U/L (ref 30–99)
ALT SERPL-CCNC: 20 U/L (ref 2–50)
ANION GAP SERPL CALC-SCNC: 9 MMOL/L (ref 0–11.9)
AST SERPL-CCNC: 22 U/L (ref 12–45)
BASOPHILS # BLD AUTO: 0.1 % (ref 0–1.8)
BASOPHILS # BLD: 0.01 K/UL (ref 0–0.12)
BILIRUB SERPL-MCNC: 0.5 MG/DL (ref 0.1–1.5)
BUN SERPL-MCNC: 26 MG/DL (ref 8–22)
CALCIUM SERPL-MCNC: 9.7 MG/DL (ref 8.4–10.2)
CHLORIDE SERPL-SCNC: 109 MMOL/L (ref 96–112)
CO2 SERPL-SCNC: 19 MMOL/L (ref 20–33)
CREAT SERPL-MCNC: 1.61 MG/DL (ref 0.5–1.4)
EOSINOPHIL # BLD AUTO: 0 K/UL (ref 0–0.51)
EOSINOPHIL NFR BLD: 0 % (ref 0–6.9)
ERYTHROCYTE [DISTWIDTH] IN BLOOD BY AUTOMATED COUNT: 45.8 FL (ref 35.9–50)
GLOBULIN SER CALC-MCNC: 2.5 G/DL (ref 1.9–3.5)
GLUCOSE SERPL-MCNC: 158 MG/DL (ref 65–99)
HCT VFR BLD AUTO: 36.6 % (ref 37–47)
HGB BLD-MCNC: 12 G/DL (ref 12–16)
IMM GRANULOCYTES # BLD AUTO: 0.06 K/UL (ref 0–0.11)
IMM GRANULOCYTES NFR BLD AUTO: 0.5 % (ref 0–0.9)
LYMPHOCYTES # BLD AUTO: 0.83 K/UL (ref 1–4.8)
LYMPHOCYTES NFR BLD: 6.6 % (ref 22–41)
MCH RBC QN AUTO: 32.3 PG (ref 27–33)
MCHC RBC AUTO-ENTMCNC: 32.8 G/DL (ref 33.6–35)
MCV RBC AUTO: 98.4 FL (ref 81.4–97.8)
MONOCYTES # BLD AUTO: 0.22 K/UL (ref 0–0.85)
MONOCYTES NFR BLD AUTO: 1.7 % (ref 0–13.4)
NEUTROPHILS # BLD AUTO: 11.52 K/UL (ref 2–7.15)
NEUTROPHILS NFR BLD: 91.1 % (ref 44–72)
NRBC # BLD AUTO: 0 K/UL
NRBC BLD-RTO: 0 /100 WBC
PLATELET # BLD AUTO: 171 K/UL (ref 164–446)
PMV BLD AUTO: 10.5 FL (ref 9–12.9)
POTASSIUM SERPL-SCNC: 4.3 MMOL/L (ref 3.6–5.5)
PROT SERPL-MCNC: 5.6 G/DL (ref 6–8.2)
RBC # BLD AUTO: 3.72 M/UL (ref 4.2–5.4)
SODIUM SERPL-SCNC: 137 MMOL/L (ref 135–145)
T3FREE SERPL-MCNC: 2.94 PG/ML (ref 2.4–4.2)
WBC # BLD AUTO: 12.6 K/UL (ref 4.8–10.8)

## 2019-02-25 PROCEDURE — 71045 X-RAY EXAM CHEST 1 VIEW: CPT

## 2019-02-25 PROCEDURE — A9270 NON-COVERED ITEM OR SERVICE: HCPCS | Performed by: HOSPITALIST

## 2019-02-25 PROCEDURE — 700111 HCHG RX REV CODE 636 W/ 250 OVERRIDE (IP): Performed by: INTERNAL MEDICINE

## 2019-02-25 PROCEDURE — 700102 HCHG RX REV CODE 250 W/ 637 OVERRIDE(OP): Performed by: INTERNAL MEDICINE

## 2019-02-25 PROCEDURE — 80053 COMPREHEN METABOLIC PANEL: CPT

## 2019-02-25 PROCEDURE — 99233 SBSQ HOSP IP/OBS HIGH 50: CPT | Performed by: HOSPITALIST

## 2019-02-25 PROCEDURE — A9270 NON-COVERED ITEM OR SERVICE: HCPCS | Performed by: INTERNAL MEDICINE

## 2019-02-25 PROCEDURE — 700111 HCHG RX REV CODE 636 W/ 250 OVERRIDE (IP): Performed by: HOSPITALIST

## 2019-02-25 PROCEDURE — 85025 COMPLETE CBC W/AUTO DIFF WBC: CPT

## 2019-02-25 PROCEDURE — 700102 HCHG RX REV CODE 250 W/ 637 OVERRIDE(OP): Performed by: FAMILY MEDICINE

## 2019-02-25 PROCEDURE — 770020 HCHG ROOM/CARE - TELE (206)

## 2019-02-25 PROCEDURE — 94640 AIRWAY INHALATION TREATMENT: CPT

## 2019-02-25 PROCEDURE — 94760 N-INVAS EAR/PLS OXIMETRY 1: CPT

## 2019-02-25 PROCEDURE — 700101 HCHG RX REV CODE 250: Performed by: HOSPITALIST

## 2019-02-25 PROCEDURE — 700105 HCHG RX REV CODE 258: Performed by: HOSPITALIST

## 2019-02-25 PROCEDURE — 700102 HCHG RX REV CODE 250 W/ 637 OVERRIDE(OP): Performed by: HOSPITALIST

## 2019-02-25 PROCEDURE — 99233 SBSQ HOSP IP/OBS HIGH 50: CPT | Performed by: INTERNAL MEDICINE

## 2019-02-25 PROCEDURE — A9270 NON-COVERED ITEM OR SERVICE: HCPCS | Performed by: FAMILY MEDICINE

## 2019-02-25 RX ORDER — AZITHROMYCIN 250 MG/1
250 TABLET, FILM COATED ORAL DAILY
Status: DISCONTINUED | OUTPATIENT
Start: 2019-02-25 | End: 2019-02-28 | Stop reason: HOSPADM

## 2019-02-25 RX ORDER — SODIUM CHLORIDE, SODIUM LACTATE, POTASSIUM CHLORIDE, CALCIUM CHLORIDE 600; 310; 30; 20 MG/100ML; MG/100ML; MG/100ML; MG/100ML
INJECTION, SOLUTION INTRAVENOUS CONTINUOUS
Status: DISCONTINUED | OUTPATIENT
Start: 2019-02-25 | End: 2019-02-28 | Stop reason: HOSPADM

## 2019-02-25 RX ORDER — DIAZEPAM 5 MG/1
5 TABLET ORAL 2 TIMES DAILY
Status: DISCONTINUED | OUTPATIENT
Start: 2019-02-25 | End: 2019-02-28 | Stop reason: HOSPADM

## 2019-02-25 RX ORDER — PREDNISONE 20 MG/1
60 TABLET ORAL DAILY
Status: DISCONTINUED | OUTPATIENT
Start: 2019-02-25 | End: 2019-02-28 | Stop reason: HOSPADM

## 2019-02-25 RX ADMIN — IPRATROPIUM BROMIDE AND ALBUTEROL SULFATE 3 ML: .5; 3 SOLUTION RESPIRATORY (INHALATION) at 15:02

## 2019-02-25 RX ADMIN — SODIUM CHLORIDE, POTASSIUM CHLORIDE, SODIUM LACTATE AND CALCIUM CHLORIDE: 600; 310; 30; 20 INJECTION, SOLUTION INTRAVENOUS at 20:26

## 2019-02-25 RX ADMIN — METHIMAZOLE 10 MG: 5 TABLET ORAL at 18:04

## 2019-02-25 RX ADMIN — ACETAMINOPHEN 1000 MG: 500 TABLET, FILM COATED ORAL at 10:10

## 2019-02-25 RX ADMIN — BUDESONIDE AND FORMOTEROL FUMARATE DIHYDRATE 2 PUFF: 160; 4.5 AEROSOL RESPIRATORY (INHALATION) at 08:03

## 2019-02-25 RX ADMIN — DIAZEPAM 5 MG: 5 TABLET ORAL at 11:54

## 2019-02-25 RX ADMIN — SIMVASTATIN 40 MG: 20 TABLET, FILM COATED ORAL at 20:18

## 2019-02-25 RX ADMIN — IPRATROPIUM BROMIDE AND ALBUTEROL SULFATE 3 ML: .5; 3 SOLUTION RESPIRATORY (INHALATION) at 08:00

## 2019-02-25 RX ADMIN — ACETAMINOPHEN 1000 MG: 500 TABLET, FILM COATED ORAL at 20:18

## 2019-02-25 RX ADMIN — IPRATROPIUM BROMIDE AND ALBUTEROL SULFATE 3 ML: .5; 3 SOLUTION RESPIRATORY (INHALATION) at 11:17

## 2019-02-25 RX ADMIN — AZITHROMYCIN 250 MG: 250 TABLET, FILM COATED ORAL at 18:04

## 2019-02-25 RX ADMIN — CARVEDILOL 3.12 MG: 3.12 TABLET, FILM COATED ORAL at 18:04

## 2019-02-25 RX ADMIN — GUAIFENESIN 200 MG: 100 SOLUTION ORAL at 23:43

## 2019-02-25 RX ADMIN — ZOLPIDEM TARTRATE 10 MG: 5 TABLET ORAL at 23:06

## 2019-02-25 RX ADMIN — CARVEDILOL 3.12 MG: 3.12 TABLET, FILM COATED ORAL at 07:46

## 2019-02-25 RX ADMIN — HYDROCODONE BITARTRATE AND ACETAMINOPHEN 1 TABLET: 10; 325 TABLET ORAL at 05:41

## 2019-02-25 RX ADMIN — IPRATROPIUM BROMIDE AND ALBUTEROL SULFATE 3 ML: .5; 3 SOLUTION RESPIRATORY (INHALATION) at 19:48

## 2019-02-25 RX ADMIN — SODIUM CHLORIDE, POTASSIUM CHLORIDE, SODIUM LACTATE AND CALCIUM CHLORIDE: 600; 310; 30; 20 INJECTION, SOLUTION INTRAVENOUS at 10:10

## 2019-02-25 RX ADMIN — STANDARDIZED SENNA CONCENTRATE AND DOCUSATE SODIUM 2 TABLET: 8.6; 5 TABLET, FILM COATED ORAL at 05:41

## 2019-02-25 RX ADMIN — RIVAROXABAN 20 MG: 20 TABLET, FILM COATED ORAL at 18:04

## 2019-02-25 RX ADMIN — LOSARTAN POTASSIUM 50 MG: 25 TABLET, FILM COATED ORAL at 05:41

## 2019-02-25 RX ADMIN — DIAZEPAM 5 MG: 5 TABLET ORAL at 18:04

## 2019-02-25 RX ADMIN — TIMOLOL MALEATE 1 DROP: 5 SOLUTION OPHTHALMIC at 05:42

## 2019-02-25 RX ADMIN — BRIMONIDINE TARTRATE 1 DROP: 2 SOLUTION/ DROPS OPHTHALMIC at 05:42

## 2019-02-25 RX ADMIN — METHIMAZOLE 10 MG: 5 TABLET ORAL at 05:41

## 2019-02-25 RX ADMIN — TIOTROPIUM BROMIDE 1 CAPSULE: 18 CAPSULE ORAL; RESPIRATORY (INHALATION) at 08:05

## 2019-02-25 RX ADMIN — BUDESONIDE AND FORMOTEROL FUMARATE DIHYDRATE 2 PUFF: 160; 4.5 AEROSOL RESPIRATORY (INHALATION) at 19:49

## 2019-02-25 RX ADMIN — BRIMONIDINE TARTRATE 1 DROP: 2 SOLUTION/ DROPS OPHTHALMIC at 20:19

## 2019-02-25 RX ADMIN — TIMOLOL MALEATE 1 DROP: 5 SOLUTION OPHTHALMIC at 20:19

## 2019-02-25 RX ADMIN — METHYLPREDNISOLONE SODIUM SUCCINATE 60 MG: 40 INJECTION, POWDER, FOR SOLUTION INTRAMUSCULAR; INTRAVENOUS at 05:41

## 2019-02-25 RX ADMIN — STANDARDIZED SENNA CONCENTRATE AND DOCUSATE SODIUM 2 TABLET: 8.6; 5 TABLET, FILM COATED ORAL at 18:04

## 2019-02-25 RX ADMIN — PREDNISONE 60 MG: 20 TABLET ORAL at 11:54

## 2019-02-25 ASSESSMENT — ENCOUNTER SYMPTOMS
SHORTNESS OF BREATH: 1
EYE PAIN: 0
PALPITATIONS: 0
GASTROINTESTINAL NEGATIVE: 1
FEVER: 0
BLURRED VISION: 0
SORE THROAT: 0
PSYCHIATRIC NEGATIVE: 1
DIZZINESS: 0
PALPITATIONS: 1
EYES NEGATIVE: 1
MUSCULOSKELETAL NEGATIVE: 1
ABDOMINAL PAIN: 0
COUGH: 0
WEAKNESS: 1
BACK PAIN: 0
CHILLS: 0
NAUSEA: 0
DEPRESSION: 0
TINGLING: 0
WHEEZING: 1
NECK PAIN: 0
INSOMNIA: 0

## 2019-02-25 NOTE — ASSESSMENT & PLAN NOTE
Patient has home O2 2L  Did not disclose on admit  Wean to 2 L if possible  Will need Xport home w/ O2 as she has no one to bring her portable tanks and she cannot lift them

## 2019-02-25 NOTE — PROGRESS NOTES
Pt medicated for pain per MAR. No other changes in pt status.    Telemetry Shift Summary    Rhythm SR  HR Range 70's-90's  Ectopy none  Measurements .18/.08/.42        Normal Values  Rhythm SR  HR Range    Measurements 0.12-0.20 / 0.06-0.10  / 0.30-0.52

## 2019-02-25 NOTE — PROGRESS NOTES
Hourly rounding complete. PIV leaking. Removed and new PIV inserted. Pt tolerated well. Safety precautions in place. Call light in reach.

## 2019-02-25 NOTE — H&P
Pulmonary follow up    Date of Service  2/25/2019    Primary Care Physician  Barber Joyner M.D.          Code Status  Full code    Chief Complaint  Chest tightness and shortness of breath and hypoxemia    History of Presenting Illness  68 y.o. female who presented 2/23/2019 with history of moderate to severe COPD, and history of lung cancer treated in 2016 with lumpectomy presented this morning around 5 AM to the emergency room complaining of severe chest tightness and shortness of breath.  The patient was recently admitted then discharged from the hospital from 02/10/2019 to 02/14/2019 with similar complaint.  At that time his symptoms thought to be secondary to hyperthyroidism with chest pain and fatigue.    The patient was given bronchodilators in the ER.  Also he was given nitroglycerin sublingual which caused her transient low blood pressure responded to IV fluids.  The patient was also started on steroids IV and BiPAP.  She improved  after these measures.  She had been discharged 2 days prior doing great and on RA. Cardiac work up was negative    She was started on BIPAP of a few hrs when she came to the ER    Chest x-ray was personally reviewed did not show any acute findings like pulmonary edema or infiltrates.  It was consistent with COPD.    Review of Systems  Review of Systems   Constitutional: Positive for malaise/fatigue. Negative for chills and fever.   HENT: Negative.    Eyes: Negative.    Respiratory: Positive for shortness of breath and wheezing.    Cardiovascular: Positive for chest pain and palpitations.   Gastrointestinal: Negative.    Genitourinary: Negative.    Musculoskeletal: Negative.    Skin: Negative for itching and rash.   Neurological: Positive for weakness.   Endo/Heme/Allergies: Negative.    Psychiatric/Behavioral: Negative.             Physical Exam  Temp:  [36.5 °C (97.7 °F)-36.9 °C (98.5 °F)] 36.5 °C (97.7 °F)  Pulse:  [] 88  Resp:  [18-20] 20  BP: (113-122)/(60-97)  119/67  SpO2:  [90 %-97 %] 94 %    Physical Exam   Constitutional: She is oriented to person, place, and time. She appears well-developed and well-nourished. No distress.   HENT:   Head: Normocephalic and atraumatic.   Mouth/Throat: No oropharyngeal exudate.   Eyes: Pupils are equal, round, and reactive to light. Right eye exhibits no discharge. Left eye exhibits no discharge. No scleral icterus.   Neck: Neck supple. No JVD present. No thyromegaly present.   Cardiovascular: Normal rate, regular rhythm and normal heart sounds.    Pulmonary/Chest: No respiratory distress. She has no wheezes. She has no rales.   Diminished air movement   Abdominal: Soft. Bowel sounds are normal. She exhibits no distension. There is no tenderness.   Musculoskeletal: She exhibits no edema or deformity.   Neurological: She is alert and oriented to person, place, and time. No cranial nerve deficit. Coordination normal.   Skin: Skin is warm and dry.   Psychiatric: She has a normal mood and affect. Her behavior is normal.       Laboratory:  Recent Labs      02/23/19 0525 02/24/19 1029 02/25/19 0428   WBC  13.6*  10.8  12.6*   RBC  4.53  3.82*  3.72*   HEMOGLOBIN  14.8  12.4  12.0   HEMATOCRIT  43.6  37.4  36.6*   MCV  96.2  97.9*  98.4*   MCH  32.7  32.5  32.3   MCHC  33.9  33.2*  32.8*   RDW  42.9  45.0  45.8   PLATELETCT  207  174  171   MPV  10.4  10.7  10.5     Recent Labs      02/23/19 0525 02/24/19 1029 02/25/19   0428   SODIUM  137  133*  137   POTASSIUM  4.0  4.7  4.3   CHLORIDE  107  105  109   CO2  20  20  19*   GLUCOSE  131*  205*  158*   BUN  18  28*  26*   CREATININE  1.22  1.62*  1.61*   CALCIUM  9.7  9.3  9.7     Recent Labs      02/23/19 0525 02/24/19 1029 02/25/19   0428   ALTSGPT  24  19  20   ASTSGOT  22  28  22   ALKPHOSPHAT  82  82  74   TBILIRUBIN  0.8  0.5  0.5   GLUCOSE  131*  205*  158*         Recent Labs      02/23/19   0525   BNPBTYPENAT  56         Recent Labs      02/23/19   0525   TROPONINI   0.02       Urinalysis:    No results found     Imaging:  DX-CHEST-PORTABLE (1 VIEW)   Final Result         1. Increasing bibasilar opacities, likely atelectasis.      DX-CHEST-PORTABLE (1 VIEW)   Final Result         1.  No acute cardiopulmonary disease.            Assessment/Plan:  * Acute exacerbation of chronic obstructive pulmonary disease (COPD) ([facility])- (present on admission)   Assessment & Plan    Patient has moderate to severe COPD  Do not believe this was a COPD exacerbation  Assess medication changes to treat her hyperthyroid and also anxiety  For the chronic cough added azithromycin 250 mg daily   Slow taper of prednisone and close follow up in clinic  Continue symbicort and spiriva  Titrate oxygen     Closed compression fracture of second lumbar vertebra (HCC)- (present on admission)   Assessment & Plan    Continue pain management     Hyperthyroidism- (present on admission)   Assessment & Plan    Was recently started on methimazole, unfortunately she stopped it because she thought the medicine is giving her shortness of breath.  Dr. Alford restarted at higher rate  Also changed the propranalol to coreg     Acute respiratory failure with hypoxia ([facility])- (present on admission)   Assessment & Plan    At this time it is unclear to me as CXR unchanged  She was on RA and can be titrated pretty quickly down off O2     DVT (deep venous thrombosis) ([facility])- (present on admission)   Assessment & Plan    Continue with Xarelto       Hypertension- (present on admission)   Assessment & Plan    Monitor blood pressure  Changed to coreg        d/w Dr. Alford    VTE prophylaxis: Patient is in Xarelto

## 2019-02-25 NOTE — PROGRESS NOTES
Assessment complete. Pt denies pain. Due meds given. PIV flushes well. Safety precautions in place. Call light in reach.

## 2019-02-25 NOTE — PROGRESS NOTES
Hospital Medicine Daily Progress Note    Date of Service  2/25/2019    Chief Complaint  68 y.o. female admitted 2/23/2019 with severe chest tightness and shortness of breath.     Hospital Course    She was admitted with a recurrent COPD exacerbation. She also has untreated hyperthyroidism that may be contributing to her symptoms. She was noncompliant with her therapy after last admission as she thought her methimazole might be causing panic attacks. This has been restarted. She has also been taken off of propranolol in favor of coreg in an effort to stop her recurrent admission for pulmonary issues.        Interval Problem Update  2/24- I added the COPD order set today. I ordered labs for today. I ordered a stat lactic acid as hers went up at 2038 to 3 from 1.1 last night. I increased her tapazole as her TSh is still suppressed.   cxr:  1.  No acute cardiopulmonary disease.    2/25-- Renal failure not improved despite fluids yesterday. I reviewed her cxr again:  1. Increasing bibasilar opacities, likely atelectasis.  Her metabolic acidosis is worse. I added an LR infuison.   i discussed her situation with pulmonary and endocrine today. They both agree that her SOB may not be copd related. I continued her methimazole and added valium for anxiety. I changed her steroids to oral prednisone.       Consultants/Specialty  none    Code Status  full    Disposition  Home once improved likely    Review of Systems  Review of Systems   Constitutional: Negative for chills and fever.   HENT: Negative for sore throat.    Eyes: Negative for blurred vision and pain.   Respiratory: Positive for shortness of breath. Negative for cough.    Cardiovascular: Negative for chest pain and palpitations.   Gastrointestinal: Negative for abdominal pain and nausea.   Genitourinary: Negative for dysuria and urgency.   Musculoskeletal: Negative for back pain and neck pain.   Skin: Negative for itching and rash.   Neurological: Negative for  dizziness and tingling.   Psychiatric/Behavioral: Negative for depression. The patient does not have insomnia.    All other systems reviewed and are negative.       Physical Exam  Temp:  [36.3 °C (97.3 °F)-36.9 °C (98.5 °F)] 36.8 °C (98.2 °F)  Pulse:  [] 92  Resp:  [18-20] 18  BP: ()/(59-97) 122/97  SpO2:  [90 %-97 %] 92 %    Physical Exam   Constitutional: She is oriented to person, place, and time. She appears well-developed and well-nourished. No distress.   Patient seen and examined  Discussed plan with RN   HENT:   Right Ear: External ear normal.   Left Ear: External ear normal.   Nose: Nose normal.   Eyes: Pupils are equal, round, and reactive to light. Conjunctivae are normal. Right eye exhibits no discharge.   Neck: No JVD present.   Cardiovascular: Regular rhythm and normal heart sounds.    No murmur heard.  Cap refill 2sec  Pulses 2+ throughout     Pulmonary/Chest: Effort normal and breath sounds normal. No stridor. No respiratory distress. She has no rales.   Decreased bs   Abdominal: Soft. Bowel sounds are normal. She exhibits no distension. There is no tenderness.   Musculoskeletal: She exhibits edema. She exhibits no tenderness.   Neurological: She is alert and oriented to person, place, and time.   Skin: Skin is warm and dry. She is not diaphoretic. No erythema.   Normal skin  Color.    Psychiatric: She has a normal mood and affect. Her behavior is normal.   Nursing note and vitals reviewed.      Fluids    Intake/Output Summary (Last 24 hours) at 02/25/19 0828  Last data filed at 02/24/19 1218   Gross per 24 hour   Intake              740 ml   Output                0 ml   Net              740 ml       Laboratory  Recent Labs      02/23/19   0525  02/24/19   1029  02/25/19   0428   WBC  13.6*  10.8  12.6*   RBC  4.53  3.82*  3.72*   HEMOGLOBIN  14.8  12.4  12.0   HEMATOCRIT  43.6  37.4  36.6*   MCV  96.2  97.9*  98.4*   MCH  32.7  32.5  32.3   MCHC  33.9  33.2*  32.8*   RDW  42.9  45.0   45.8   PLATELETCT  207  174  171   MPV  10.4  10.7  10.5     Recent Labs      02/23/19   0525  02/24/19   1029  02/25/19   0428   SODIUM  137  133*  137   POTASSIUM  4.0  4.7  4.3   CHLORIDE  107  105  109   CO2  20  20  19*   GLUCOSE  131*  205*  158*   BUN  18  28*  26*   CREATININE  1.22  1.62*  1.61*   CALCIUM  9.7  9.3  9.7         Recent Labs      02/23/19   0525   BNPBTYPENAT  56           Imaging  DX-CHEST-PORTABLE (1 VIEW)   Final Result         1. Increasing bibasilar opacities, likely atelectasis.      DX-CHEST-PORTABLE (1 VIEW)   Final Result         1.  No acute cardiopulmonary disease.           Assessment/Plan  * Acute exacerbation of chronic obstructive pulmonary disease (COPD) ([facility])- (present on admission)   Assessment & Plan    Rt protocol.   Steroids  Supportive care  Inhalers  Taper off propranolol       Closed compression fracture of second lumbar vertebra ([facility])- (present on admission)   Assessment & Plan    Pain control.      Hyperthyroidism- (present on admission)   Assessment & Plan    Stopped taking her meds at home. I encouraged her to take them.      Acute respiratory failure with hypoxia ([facility])- (present on admission)   Assessment & Plan    Improving. Severe copd being treated.      DVT (deep venous thrombosis) ([facility])- (present on admission)   Assessment & Plan    conitnue meds     Hypertension- (present on admission)   Assessment & Plan    Continue meds          VTE prophylaxis: lovenox.

## 2019-02-25 NOTE — PROGRESS NOTES
Bedside report given to Britt LEMUS. POC discussed. Pt resting comfortably in bed. Safety precautions in place.

## 2019-02-25 NOTE — PROGRESS NOTES
Report given to Ely LEMUS. POC discussed. Pt resting comfortably in bed. Safety precautions in place.

## 2019-02-25 NOTE — FLOWSHEET NOTE
02/24/19 1437   Interdisciplinary Plan of Care-Goals (Indications)   Bronchodilator Indications History / Diagnosis   Interdisciplinary Plan of Care-Outcomes    Bronchodilator Outcome Patient at Stable Baseline   Education   Education Yes - Pt. / Family has been Instructed in use of Respiratory Equipment;Yes - Pt. / Family has been Instructed in use of Respiratory Medications and Adverse Reactions   RT Assessment of Delivered Medications   Evaluation of Medication Delivery Daily Yes-- Pt /Family has been Instructed in use of Respiratory Medications and Adverse Reactions   SVN Group   #SVN Performed Yes   Given By: Mouthpiece   Respiratory WDL   Respiratory (WDL) X   Chest Exam   Work Of Breathing / Effort Mild   Respiration 20   Pulse 92   Breath Sounds   RUL Breath Sounds Clear;Diminished   RML Breath Sounds Diminished   RLL Breath Sounds Diminished   AUSTIN Breath Sounds Clear;Diminished   LLL Breath Sounds Diminished   Oximetry   #Pulse Oximetry (Single Determination) Yes   Oxygen   Home O2 Use Prior To Admission? Yes   Home O2 Delivery Method Nasal Cannula   Home O2 Frequency of Use As Needed for SOB   Pulse Oximetry 94 %   O2 (LPM) 3   O2 Daily Delivery Respiratory  Silicone Nasal Cannula

## 2019-02-25 NOTE — FLOWSHEET NOTE
02/25/19 0800   Events/Summary/Plan   Non-Invasive Resp Device Site Inspection Completed Intact   Interdisciplinary Plan of Care-Goals (Indications)   Bronchodilator Indications History / Diagnosis   Interdisciplinary Plan of Care-Outcomes    Bronchodilator Outcome Patient at Stable Baseline   Education   Education Yes - Pt. / Family has been Instructed in use of Respiratory Equipment;Yes - Pt. / Family has been Instructed in use of Respiratory Medications and Adverse Reactions   RT Assessment of Delivered Medications   Evaluation of Medication Delivery Daily Yes-- Pt /Family has been Instructed in use of Respiratory Medications and Adverse Reactions   SVN Group   #SVN Performed Yes   Given By: Mouthpiece   Date SVN Next Change Due (Q 7 Days) 03/02/19   MDI/DPI Group   #MDI/DPI Given MDI/DPI x 2   Chest Exam   Work Of Breathing / Effort Mild   Respiration 18   Pulse 92   Breath Sounds   RUL Breath Sounds Clear   RML Breath Sounds Diminished   RLL Breath Sounds Diminished   AUSTIN Breath Sounds Clear   LLL Breath Sounds Diminished   Secretions   Cough Productive;Moist   How Sputum Obtained Spontaneous   Oxygen   Pulse Oximetry 92 %   O2 (LPM) 3   O2 Daily Delivery Respiratory  Silicone Nasal Cannula

## 2019-02-25 NOTE — PROGRESS NOTES
Report received from Vianca LEMUS. POC discussed. Pt resting comfortably in bed. Safety precautions in place.

## 2019-02-25 NOTE — FLOWSHEET NOTE
02/24/19 1909   Interdisciplinary Plan of Care-Goals (Indications)   Bronchodilator Indications History / Diagnosis   Interdisciplinary Plan of Care-Outcomes    Bronchodilator Outcome Patient at Stable Baseline   Education   Education Yes - Pt. / Family has been Instructed in use of Respiratory Equipment;Yes - Pt. / Family has been Instructed in use of Respiratory Medications and Adverse Reactions   RT Assessment of Delivered Medications   Evaluation of Medication Delivery Daily Yes-- Pt /Family has been Instructed in use of Respiratory Medications and Adverse Reactions   SVN Group   #SVN Performed Yes   Given By: Mouthpiece   MDI/DPI Group   #MDI/DPI Given MDI/DPI x 1   Respiratory WDL   Respiratory (WDL) X   Chest Exam   Work Of Breathing / Effort Mild   Respiration 18   Pulse (!) 107   Breath Sounds   Pre/Post Intervention Pre Intervention Assessment   RUL Breath Sounds Clear   RML Breath Sounds Diminished   RLL Breath Sounds Diminished   AUSTIN Breath Sounds Clear   LLL Breath Sounds Diminished   Oximetry   Continuous Oximetry Yes   Oxygen   Pulse Oximetry 97 %   O2 (LPM) 3   O2 Daily Delivery Respiratory  Silicone Nasal Cannula

## 2019-02-25 NOTE — PROGRESS NOTES
Beside report received from Britt LEMUS. Safety precautions in place. Call light within reach. Pt resting in bed.

## 2019-02-25 NOTE — CARE PLAN
Problem: Oxygenation:  Goal: Maintain adequate oxygenation dependent on patient condition  Outcome: PROGRESSING AS EXPECTED  Monitor oxygenation for SpO2 >90%  Intervention: Manage oxygen therapy by monitoring pulse oximetry and/or ABG values  Oxygen is currently required at 3 lpm nasal cannula for SpO2 >90%  Intervention: Levels of oxygenation will improve to baseline  Patient did require oxygen at home prior to this admission.       Problem: Bronchoconstriction:  Goal: Improve in air movement and diminished wheezing  Outcome: PROGRESSING AS EXPECTED  Patient does claim a benefit from bronchodilator therapy. There was a increase in aeration and a decrease in wheezes heard  Intervention: Implement inhaled treatments  Patient is receiving Duoneb Qid and Symbicort BID.  Intervention: Evaluate and manage medication effects  Patient will be evaluated before and after medication delivery to assess effectiveness of therapy.

## 2019-02-25 NOTE — FLOWSHEET NOTE
02/25/19 1121   Events/Summary/Plan   Non-Invasive Resp Device Site Inspection Completed Intact   Interdisciplinary Plan of Care-Goals (Indications)   Bronchodilator Indications History / Diagnosis   Interdisciplinary Plan of Care-Outcomes    Bronchodilator Outcome Patient at Stable Baseline   Education   Education Yes - Pt. / Family has been Instructed in use of Respiratory Medications and Adverse Reactions   RT Assessment of Delivered Medications   Evaluation of Medication Delivery Daily Yes-- Pt /Family has been Instructed in use of Respiratory Medications and Adverse Reactions   SVN Group   #SVN Performed Yes   Given By: Mouthpiece   Date SVN Next Change Due (Q 7 Days) 03/02/19   Chest Exam   Work Of Breathing / Effort Mild   Respiration 18   Pulse 92   Breath Sounds   RUL Breath Sounds Clear   RML Breath Sounds Clear;Diminished   RLL Breath Sounds Fine Crackles;Diminished   AUSTIN Breath Sounds Clear   LLL Breath Sounds Fine Crackles;Diminished   Secretions   Cough Productive;Moist   How Sputum Obtained Spontaneous   Oximetry   #Pulse Oximetry (Single Determination) Yes   Oxygen   Pulse Oximetry 93 %   O2 (LPM) 3   O2 Daily Delivery Respiratory  Silicone Nasal Cannula

## 2019-02-25 NOTE — RESPIRATORY CARE
COPD EDUCATION by COPD CLINICAL EDUCATOR  2/25/2019 at 11:05 AM by Linn Velez     Patient seen for COPD readmission education. Patient completed our program upon last admission.  Discussion included: determination of the factors that led to their current hospital admission, reiteration of the Action Plan and steps they can take to avoid an exacerbation, proper medication technique and importance of obtaining a doctors appointment when they start feeling ill. Question and answer session followed.

## 2019-02-25 NOTE — DISCHARGE PLANNING
Anticipated Discharge Disposition: Home    Action: Pt lives in [place].  Pt drives short distances but she is blind in L eye.  Her brother and friend live in town and can help her got to store and take her to MD appointments.  Pt also uses a cab.  Pt has a concentrator from [employer] at home which she got 2 years ago.  She started to use it again but doesn't know how much she uses and doesn't know how to change the settings.  She is independent with ADL's and is looking for someone to help her clean.  She requests a small portable O2 (inogen type) on discharge because she is states the tanks are too hard to carry and she is afraid it will hurt her back.  RN DEAN told pt she would have to obtain this outpatient as O2 companies have requirements that have to be met before she can qualify, otherwise she could pay out of pocket for one. Pt declined a referral to meals on wheel and RTC access.       Barriers to Discharge: medical clearance,     Plan: Follow up with O2 needs.     Care Transition Team Assessment    Information Source  Orientation : Oriented x 4  Information Given By: Patient  Who is responsible for making decisions for patient? : Patient    Readmission Evaluation  Is this a readmission?: Yes - unplanned readmission  Why do you think you were readmitted?: short of breathe  Were there new prescriptions you were supposed to fill after you were discharged?: Yes, prescriptions filled  Did you understand your discharge instructions?: Yes  Did you have enough support after your last discharge?: Yes    Elopement Risk  Legal Hold: No  Ambulatory or Self Mobile in Wheelchair: Yes  Disoriented: No  Psychiatric Symptoms: None  History of Wandering: No  Elopement this Admit: No  Vocalizing Wanting to Leave: No  Displays Behaviors, Body Language Wanting to Leave: No-Not at Risk for Elopement  Elopement Risk: Not at Risk for Elopement    Interdisciplinary Discharge Planning  Does Admitting Nurse Feel This Could be a Complex  Discharge?: No  Primary Care Physician: Dr. Joyner  Lives with - Patient's Self Care Capacity: Alone and Able to Care For Self  Patient or legal guardian wants to designate a caregiver (see row info): No  Support Systems: Friends / Neighbors  Housing / Facility: 1 Story House  Do You Take your Prescribed Medications Regularly: Yes  Able to Return to Previous ADL's: Yes  Mobility Issues: No  Prior Services: None  Assistance Needed: Unknown at this Time  Durable Medical Equipment: Unknown    Discharge Preparedness  What is your plan after discharge?: Home with help  What are your discharge supports?: Sibling  Prior Functional Level: Ambulatory, Drives Self, Independent with Activities of Daily Living, Independent with Medication Management, Uses Walker  Difficulity with ADLs: None  Difficulity with IADLs: Cooking, Driving, Laundry  Difficulity with IADL Comments: Pt states she get SOB and that she is blind in L eye.     Functional Assesment  Prior Functional Level: Ambulatory, Drives Self, Independent with Activities of Daily Living, Independent with Medication Management, Uses Walker    Finances  Financial Barriers to Discharge: No  Prescription Coverage: Yes    Vision / Hearing Impairment  Vision Impairment : Yes  Right Eye Vision: Impaired, Wears Glasses  Left Eye Vision: Impaired, Wears Glasses  Hearing Impairment : No    Values / Beliefs / Concerns  Values / Beliefs Concerns : No    Advance Directive  Advance Directive?: POLST    Domestic Abuse  Have you ever been the victim of abuse or violence?: No  Physical Abuse or Sexual Abuse: No  Verbal Abuse or Emotional Abuse: No  Possible Abuse Reported to:: Not Applicable    Psychological Assessment  History of Substance Abuse: None  History of Psychiatric Problems: No    Discharge Risks or Barriers  Discharge risks or barriers?: Transportation, Complex medical needs  Patient risk factors: Cognitive / sensory / physical deficit, Complex medical needs,  Readmission    Anticipated Discharge Information  Anticipated discharge disposition: [facility], Home

## 2019-02-26 PROBLEM — J98.11 ATELECTASIS: Status: ACTIVE | Noted: 2019-02-26

## 2019-02-26 PROBLEM — F32.A DEPRESSION: Status: ACTIVE | Noted: 2019-02-26

## 2019-02-26 LAB
ALBUMIN SERPL BCP-MCNC: 2.8 G/DL (ref 3.2–4.9)
ALBUMIN/GLOB SERPL: 1.3 G/DL
ALP SERPL-CCNC: 63 U/L (ref 30–99)
ALT SERPL-CCNC: 17 U/L (ref 2–50)
ANION GAP SERPL CALC-SCNC: 7 MMOL/L (ref 0–11.9)
AST SERPL-CCNC: 20 U/L (ref 12–45)
BASOPHILS # BLD AUTO: 0.1 % (ref 0–1.8)
BASOPHILS # BLD: 0.01 K/UL (ref 0–0.12)
BILIRUB SERPL-MCNC: 0.8 MG/DL (ref 0.1–1.5)
BUN SERPL-MCNC: 28 MG/DL (ref 8–22)
CALCIUM SERPL-MCNC: 9.3 MG/DL (ref 8.4–10.2)
CHLORIDE SERPL-SCNC: 110 MMOL/L (ref 96–112)
CO2 SERPL-SCNC: 21 MMOL/L (ref 20–33)
CREAT SERPL-MCNC: 1.38 MG/DL (ref 0.5–1.4)
EOSINOPHIL # BLD AUTO: 0.01 K/UL (ref 0–0.51)
EOSINOPHIL NFR BLD: 0.1 % (ref 0–6.9)
ERYTHROCYTE [DISTWIDTH] IN BLOOD BY AUTOMATED COUNT: 47.9 FL (ref 35.9–50)
GLOBULIN SER CALC-MCNC: 2.1 G/DL (ref 1.9–3.5)
GLUCOSE SERPL-MCNC: 170 MG/DL (ref 65–99)
HCT VFR BLD AUTO: 33.4 % (ref 37–47)
HGB BLD-MCNC: 10.8 G/DL (ref 12–16)
IMM GRANULOCYTES # BLD AUTO: 0.06 K/UL (ref 0–0.11)
IMM GRANULOCYTES NFR BLD AUTO: 0.6 % (ref 0–0.9)
LACTATE BLD-SCNC: 2 MMOL/L (ref 0.5–2)
LYMPHOCYTES # BLD AUTO: 0.65 K/UL (ref 1–4.8)
LYMPHOCYTES NFR BLD: 6.5 % (ref 22–41)
MCH RBC QN AUTO: 32.5 PG (ref 27–33)
MCHC RBC AUTO-ENTMCNC: 32.3 G/DL (ref 33.6–35)
MCV RBC AUTO: 100.6 FL (ref 81.4–97.8)
MONOCYTES # BLD AUTO: 0.47 K/UL (ref 0–0.85)
MONOCYTES NFR BLD AUTO: 4.7 % (ref 0–13.4)
NEUTROPHILS # BLD AUTO: 8.79 K/UL (ref 2–7.15)
NEUTROPHILS NFR BLD: 88 % (ref 44–72)
NRBC # BLD AUTO: 0 K/UL
NRBC BLD-RTO: 0 /100 WBC
PLATELET # BLD AUTO: 157 K/UL (ref 164–446)
PMV BLD AUTO: 11.3 FL (ref 9–12.9)
POTASSIUM SERPL-SCNC: 5 MMOL/L (ref 3.6–5.5)
PROT SERPL-MCNC: 4.9 G/DL (ref 6–8.2)
RBC # BLD AUTO: 3.32 M/UL (ref 4.2–5.4)
SODIUM SERPL-SCNC: 138 MMOL/L (ref 135–145)
WBC # BLD AUTO: 10 K/UL (ref 4.8–10.8)

## 2019-02-26 PROCEDURE — A9270 NON-COVERED ITEM OR SERVICE: HCPCS | Performed by: HOSPITALIST

## 2019-02-26 PROCEDURE — 700101 HCHG RX REV CODE 250: Performed by: HOSPITALIST

## 2019-02-26 PROCEDURE — 80053 COMPREHEN METABOLIC PANEL: CPT

## 2019-02-26 PROCEDURE — 94640 AIRWAY INHALATION TREATMENT: CPT

## 2019-02-26 PROCEDURE — 94760 N-INVAS EAR/PLS OXIMETRY 1: CPT

## 2019-02-26 PROCEDURE — 85025 COMPLETE CBC W/AUTO DIFF WBC: CPT

## 2019-02-26 PROCEDURE — A9270 NON-COVERED ITEM OR SERVICE: HCPCS | Performed by: FAMILY MEDICINE

## 2019-02-26 PROCEDURE — 700105 HCHG RX REV CODE 258: Performed by: HOSPITALIST

## 2019-02-26 PROCEDURE — 700111 HCHG RX REV CODE 636 W/ 250 OVERRIDE (IP): Performed by: HOSPITALIST

## 2019-02-26 PROCEDURE — 700102 HCHG RX REV CODE 250 W/ 637 OVERRIDE(OP): Performed by: HOSPITALIST

## 2019-02-26 PROCEDURE — 99232 SBSQ HOSP IP/OBS MODERATE 35: CPT | Performed by: INTERNAL MEDICINE

## 2019-02-26 PROCEDURE — 700102 HCHG RX REV CODE 250 W/ 637 OVERRIDE(OP): Performed by: INTERNAL MEDICINE

## 2019-02-26 PROCEDURE — A9270 NON-COVERED ITEM OR SERVICE: HCPCS | Performed by: INTERNAL MEDICINE

## 2019-02-26 PROCEDURE — 83605 ASSAY OF LACTIC ACID: CPT

## 2019-02-26 PROCEDURE — 700102 HCHG RX REV CODE 250 W/ 637 OVERRIDE(OP): Performed by: FAMILY MEDICINE

## 2019-02-26 PROCEDURE — 770006 HCHG ROOM/CARE - MED/SURG/GYN SEMI*

## 2019-02-26 RX ADMIN — METHIMAZOLE 10 MG: 5 TABLET ORAL at 19:40

## 2019-02-26 RX ADMIN — GUAIFENESIN 200 MG: 100 SOLUTION ORAL at 22:23

## 2019-02-26 RX ADMIN — BRIMONIDINE TARTRATE 1 DROP: 2 SOLUTION/ DROPS OPHTHALMIC at 20:00

## 2019-02-26 RX ADMIN — AZITHROMYCIN 250 MG: 250 TABLET, FILM COATED ORAL at 06:10

## 2019-02-26 RX ADMIN — STANDARDIZED SENNA CONCENTRATE AND DOCUSATE SODIUM 2 TABLET: 8.6; 5 TABLET, FILM COATED ORAL at 19:40

## 2019-02-26 RX ADMIN — METHIMAZOLE 10 MG: 5 TABLET ORAL at 06:09

## 2019-02-26 RX ADMIN — ZOLPIDEM TARTRATE 10 MG: 5 TABLET ORAL at 23:26

## 2019-02-26 RX ADMIN — RIVAROXABAN 20 MG: 20 TABLET, FILM COATED ORAL at 19:40

## 2019-02-26 RX ADMIN — CARVEDILOL 3.12 MG: 3.12 TABLET, FILM COATED ORAL at 08:55

## 2019-02-26 RX ADMIN — HYDROCODONE BITARTRATE AND ACETAMINOPHEN 1 TABLET: 10; 325 TABLET ORAL at 06:10

## 2019-02-26 RX ADMIN — IPRATROPIUM BROMIDE AND ALBUTEROL SULFATE 3 ML: .5; 3 SOLUTION RESPIRATORY (INHALATION) at 07:08

## 2019-02-26 RX ADMIN — TIOTROPIUM BROMIDE 1 CAPSULE: 18 CAPSULE ORAL; RESPIRATORY (INHALATION) at 07:13

## 2019-02-26 RX ADMIN — SODIUM CHLORIDE, POTASSIUM CHLORIDE, SODIUM LACTATE AND CALCIUM CHLORIDE: 600; 310; 30; 20 INJECTION, SOLUTION INTRAVENOUS at 16:07

## 2019-02-26 RX ADMIN — IPRATROPIUM BROMIDE AND ALBUTEROL SULFATE 3 ML: .5; 3 SOLUTION RESPIRATORY (INHALATION) at 11:11

## 2019-02-26 RX ADMIN — SIMVASTATIN 40 MG: 20 TABLET, FILM COATED ORAL at 22:25

## 2019-02-26 RX ADMIN — LOSARTAN POTASSIUM 50 MG: 25 TABLET, FILM COATED ORAL at 06:10

## 2019-02-26 RX ADMIN — PREDNISONE 60 MG: 20 TABLET ORAL at 06:10

## 2019-02-26 RX ADMIN — TIMOLOL MALEATE 1 DROP: 5 SOLUTION OPHTHALMIC at 06:13

## 2019-02-26 RX ADMIN — DIAZEPAM 5 MG: 5 TABLET ORAL at 19:40

## 2019-02-26 RX ADMIN — BUDESONIDE AND FORMOTEROL FUMARATE DIHYDRATE 2 PUFF: 160; 4.5 AEROSOL RESPIRATORY (INHALATION) at 07:12

## 2019-02-26 RX ADMIN — TIMOLOL MALEATE 1 DROP: 5 SOLUTION OPHTHALMIC at 20:00

## 2019-02-26 RX ADMIN — IPRATROPIUM BROMIDE AND ALBUTEROL SULFATE 3 ML: .5; 3 SOLUTION RESPIRATORY (INHALATION) at 19:45

## 2019-02-26 RX ADMIN — DIAZEPAM 5 MG: 5 TABLET ORAL at 06:09

## 2019-02-26 RX ADMIN — SODIUM CHLORIDE, POTASSIUM CHLORIDE, SODIUM LACTATE AND CALCIUM CHLORIDE: 600; 310; 30; 20 INJECTION, SOLUTION INTRAVENOUS at 22:25

## 2019-02-26 RX ADMIN — GUAIFENESIN 200 MG: 100 SOLUTION ORAL at 06:16

## 2019-02-26 RX ADMIN — IPRATROPIUM BROMIDE AND ALBUTEROL SULFATE 3 ML: .5; 3 SOLUTION RESPIRATORY (INHALATION) at 15:13

## 2019-02-26 RX ADMIN — CARVEDILOL 3.12 MG: 3.12 TABLET, FILM COATED ORAL at 19:40

## 2019-02-26 RX ADMIN — BRIMONIDINE TARTRATE 1 DROP: 2 SOLUTION/ DROPS OPHTHALMIC at 06:13

## 2019-02-26 RX ADMIN — SODIUM CHLORIDE, POTASSIUM CHLORIDE, SODIUM LACTATE AND CALCIUM CHLORIDE: 600; 310; 30; 20 INJECTION, SOLUTION INTRAVENOUS at 06:10

## 2019-02-26 RX ADMIN — ACETAMINOPHEN 1000 MG: 500 TABLET, FILM COATED ORAL at 14:27

## 2019-02-26 RX ADMIN — STANDARDIZED SENNA CONCENTRATE AND DOCUSATE SODIUM 2 TABLET: 8.6; 5 TABLET, FILM COATED ORAL at 06:10

## 2019-02-26 RX ADMIN — BUDESONIDE AND FORMOTEROL FUMARATE DIHYDRATE 2 PUFF: 160; 4.5 AEROSOL RESPIRATORY (INHALATION) at 19:45

## 2019-02-26 ASSESSMENT — ENCOUNTER SYMPTOMS
WHEEZING: 1
FOCAL WEAKNESS: 0
ABDOMINAL PAIN: 0
PALPITATIONS: 0
WEAKNESS: 1
NERVOUS/ANXIOUS: 1
VOMITING: 0
FEVER: 0
SORE THROAT: 0
HEADACHES: 0
HEARTBURN: 0
NAUSEA: 1
BACK PAIN: 1
POLYDIPSIA: 0
DEPRESSION: 0
EYE REDNESS: 0
NAUSEA: 0
BRUISES/BLEEDS EASILY: 0
COUGH: 1
BLURRED VISION: 0
HEMOPTYSIS: 0
CHILLS: 0
EYE DISCHARGE: 0
SINUS PAIN: 0
SHORTNESS OF BREATH: 1
SPUTUM PRODUCTION: 1
DOUBLE VISION: 0
CONSTIPATION: 0
DIARRHEA: 0
DIAPHORESIS: 0
NECK PAIN: 0
DIZZINESS: 0
MEMORY LOSS: 0

## 2019-02-26 NOTE — PROGRESS NOTES
Pulmonary medicine progress Note    Date of admission  2/23/2019    Chief Complaint  68 y.o. female admitted 2/23/2019 with a history of moderate to severe COPD, and history of lung cancer treated in 2016 with lumpectomy presented this morning around 5 AM to the emergency room complaining of severe chest tightness and shortness of breath.  The patient was recently admitted then discharged from the hospital from 02/10/2019 to 02/14/2019 with similar complaint.  At that time her symptoms thought to be secondary to hyperthyroidism with chest pain and fatigue.     The patient was given bronchodilators in the ER.  Also she was given nitroglycerin sublingual which caused her transient low blood pressure responded to IV fluids.  The patient was also started on steroids IV and BiPAP.  She improved  after these measures.  She had been discharged 2 days prior doing great and on RA. Cardiac work up was negative     She was started on BIPAP for a few hrs when she came to the ER.      Interval Problem Update  Reviewed last 24 hour events:  She is less dyspneic overall but still short of breath while walking short distances.  Arterial oxygen saturation is satisfactory with a nasal cannula set to 3 L/min flow rate.  Her cough produces small amounts of purulent sputum.  She is afebrile and hemodynamically stable and she has no chest pain.    Review of Systems  Review of Systems   Constitutional: Negative for chills, diaphoresis and fever.   HENT: Negative for hearing loss and tinnitus.    Eyes: Negative for blurred vision and double vision.   Respiratory: Positive for cough, sputum production and shortness of breath. Negative for hemoptysis.    Cardiovascular: Negative for chest pain and palpitations.   Gastrointestinal: Positive for nausea. Negative for abdominal pain and heartburn.   Genitourinary: Negative for dysuria and frequency.   Musculoskeletal: Negative for joint pain and neck pain.   Skin: Negative for itching and rash.    Neurological: Negative for dizziness and headaches.   Endo/Heme/Allergies: Negative for polydipsia. Does not bruise/bleed easily.   Psychiatric/Behavioral: Negative for depression and memory loss.        Vital Signs for last 24 hours   Temp:  [36.3 °C (97.3 °F)-36.7 °C (98.1 °F)] 36.3 °C (97.4 °F)  Pulse:  [68-99] 99  Resp:  [18-20] 20  BP: (107-143)/(55-83) 107/83  SpO2:  [93 %-96 %] 93 %    Hemodynamic parameters for last 24 hours   She remains in sinus rhythm with a stable blood pressure    Respiratory Information for the last 24 hours    She is less dyspneic overall but still short of breath while walking short distances.  Arterial oxygen saturation is satisfactory with a nasal cannula set to 3 L/min flow rate.  Her cough produces small amounts of purulent sputum.    Physical Exam   Physical Exam   Constitutional: She is oriented to person, place, and time. She appears well-developed and well-nourished.   HENT:   Head: Normocephalic and atraumatic.   Eyes: Pupils are equal, round, and reactive to light. Conjunctivae are normal.   Neck: Normal range of motion. Neck supple. No thyromegaly present.   Cardiovascular: Normal rate and regular rhythm.  Exam reveals no gallop.    No murmur heard.  Pulmonary/Chest: She is in respiratory distress. She has wheezes. She has no rales. She exhibits no tenderness.   Diminished but symmetrical bilateral breath sounds.   Abdominal: She exhibits no distension. There is no tenderness.   Musculoskeletal: She exhibits no edema or tenderness.   Neurological: She is alert and oriented to person, place, and time.   Skin: Skin is warm and dry.   Psychiatric: She has a normal mood and affect. Thought content normal.       Medications  Current Facility-Administered Medications   Medication Dose Route Frequency Provider Last Rate Last Dose   • lactated ringers infusion   Intravenous Continuous Beck Alford M.D. 100 mL/hr at 02/26/19 0610     • predniSONE (DELTASONE) tablet 60 mg  60  mg Oral DAILY Beck Alford M.D.   60 mg at 02/26/19 0610   • diazePAM (VALIUM) tablet 5 mg  5 mg Oral BID Beck lAford M.D.   5 mg at 02/26/19 0609   • azithromycin (ZITHROMAX) tablet 250 mg  250 mg Oral DAILY Donita Haque M.D.   250 mg at 02/26/19 0610   • guaiFENesin (ROBITUSSIN) 100 MG/5ML solution 200 mg  10 mL Oral Q4HRS PRN Willie English M.D.   200 mg at 02/26/19 0616   • ondansetron (ZOFRAN) syringe/vial injection 4 mg  4 mg Intravenous Q6HRS PRN Willie English M.D.   4 mg at 02/24/19 0641   • methimazole (TAPAZOLE) tablet 10 mg  10 mg Oral BID Beck Alford M.D.   10 mg at 02/26/19 0609   • acetaminophen (TYLENOL) tablet 1,000 mg  1,000 mg Oral TID PRN Beck Alford M.D.   1,000 mg at 02/26/19 1427   • carvedilol (COREG) tablet 3.125 mg  3.125 mg Oral BID WITH MEALS Beck Alford M.D.   3.125 mg at 02/26/19 0855   • ipratropium-albuterol (DUONEB) nebulizer solution  3 mL Nebulization 4X/DAY (RT) Jen Alford M.D.   3 mL at 02/26/19 1111   • losartan (COZAAR) tablet 50 mg  50 mg Oral QAM Cherelle Blue M.D.   50 mg at 02/26/19 0610   • HYDROcodone/acetaminophen (NORCO)  MG per tablet 1 Tab  1 Tab Oral DAILY Cherelle Blue M.D.   1 Tab at 02/26/19 0610   • fluticasone (FLONASE) nasal spray 50 mcg  1 Spray Nasal QDAY PRN Cherelle Blue M.D.       • docusate sodium (COLACE) capsule 300 mg  300 mg Oral BID PRN Cherelle Blue M.D.       • rivaroxaban (XARELTO) tablet 20 mg  20 mg Oral PM MEAL Cherelle Blue M.D.   20 mg at 02/25/19 1804   • simvastatin (ZOCOR) tablet 40 mg  40 mg Oral Nightly Cherelle Blue M.D.   40 mg at 02/25/19 2018   • tiotropium (SPIRIVA) 18 MCG inhalation capsule 1 Cap  1 Cap Inhalation QDAILY (RT) Cherelle Blue M.D.   1 Cap at 02/26/19 0713   • [START ON 3/4/2019] vitamin D (Ergocalciferol) (DRISDOL) capsule 50,000 Units  50,000 Units Oral Q14 DAYS Cherelle Blue M.D.       • senna-docusate (PERICOLACE or SENOKOT S) 8.6-50 MG per tablet 2  Tab  2 Tab Oral BID Cherelle Blue M.D.   2 Tab at 02/26/19 0610    And   • polyethylene glycol/lytes (MIRALAX) PACKET 1 Packet  1 Packet Oral QDAY PRN Cherelle Blue M.D.        And   • magnesium hydroxide (MILK OF MAGNESIA) suspension 30 mL  30 mL Oral QDAY PRN Cherelle Blue M.D.        And   • bisacodyl (DULCOLAX) suppository 10 mg  10 mg Rectal QDAY PRN Cherelle Blue M.D.       • Respiratory Care per Protocol   Nebulization Continuous RT Cherelle Blue M.D.       • budesonide-formoterol (SYMBICORT) 160-4.5 MCG/ACT inhaler 2 Puff  2 Puff Inhalation BID (RT) Cherelle Blue M.D.   2 Puff at 02/26/19 0712   • brimonidine (ALPHAGAN) 0.2 % ophthalmic solution 1 Drop  1 Drop Both Eyes BID Cherelle Blue M.D.   1 Drop at 02/26/19 0613    And   • timolol (TIMOPTIC) 0.5 % ophthalmic solution 1 Drop  1 Drop Both Eyes BID Cherelle Blue M.D.   1 Drop at 02/26/19 0613   • zolpidem (AMBIEN) tablet 10 mg  10 mg Oral QHS Cherelle Blue M.D.   10 mg at 02/25/19 2306       Fluids    Intake/Output Summary (Last 24 hours) at 02/26/19 1447  Last data filed at 02/26/19 0900   Gross per 24 hour   Intake              480 ml   Output                0 ml   Net              480 ml       Laboratory          Recent Labs      02/24/19   1029  02/25/19   0428  02/26/19   0143   SODIUM  133*  137  138   POTASSIUM  4.7  4.3  5.0   CHLORIDE  105  109  110   CO2  20  19*  21   BUN  28*  26*  28*   CREATININE  1.62*  1.61*  1.38   CALCIUM  9.3  9.7  9.3     Recent Labs      02/24/19   1029  02/25/19   0428  02/26/19   0143   ALTSGPT  19  20  17   ASTSGOT  28  22  20   ALKPHOSPHAT  82  74  63   TBILIRUBIN  0.5  0.5  0.8   GLUCOSE  205*  158*  170*     Recent Labs      02/24/19   1029  02/25/19   0428  02/26/19   0143   WBC  10.8  12.6*  10.0   NEUTSPOLYS  90.20*  91.10*  88.00*   LYMPHOCYTES  7.70*  6.60*  6.50*   MONOCYTES  1.20  1.70  4.70   EOSINOPHILS  0.10  0.00  0.10   BASOPHILS  0.20  0.10  0.10   ASTSGOT  28  22  20   ALTSGPT  19  20  17    ALKPHOSPHAT  82  74  63   TBILIRUBIN  0.5  0.5  0.8     Recent Labs      02/24/19   1029  02/25/19   0428  02/26/19   0143   RBC  3.82*  3.72*  3.32*   HEMOGLOBIN  12.4  12.0  10.8*   HEMATOCRIT  37.4  36.6*  33.4*   PLATELETCT  174  171  157*       Imaging  X-Ray:  I have personally reviewed the images and compared with prior images.  The film on February 24 demonstrates interstitial prominence, greatest at the lung bases and more noticeable on the left side.    Assessment/Plan  * Acute exacerbation of chronic obstructive pulmonary disease (COPD) ([facility])- (present on admission)   Assessment & Plan    Patient has moderate to severe COPD  For the chronic cough added azithromycin 250 mg daily   Slow taper of prednisone and close follow up in clinic  Continue symbicort and spiriva  Titrate oxygen.  Ambulate and await improvement in functional status.  She lives alone.     Closed compression fracture of second lumbar vertebra ([facility])- (present on admission)   Assessment & Plan    Continue pain management     Hyperthyroidism- (present on admission)   Assessment & Plan    Was recently started on methimazole, unfortunately she stopped it because she thought the medicine is giving her shortness of breath.  Dr. Alford restarted at higher rate  Also changed the propranalol to coreg     Acute on chronic respiratory failure with hypoxemia ([facility])- (present on admission)   Assessment & Plan    Improving but still requiring supplemental oxygen.     DVT (deep venous thrombosis) ([facility])- (present on admission)   Assessment & Plan    Continue with Xarelto       Hypertension- (present on admission)   Assessment & Plan    Monitor blood pressure  Changed to coreg          VTE:  On rivaroxaban  Ulcer: Not Indicated  Lines: None    I have performed a physical exam and reviewed and updated ROS and Plan today (2/26/2019). In review of yesterday's note (2/25/2019), there are no changes except as documented above.     Discussed patient condition  and risk of morbidity and/or mortality with Hospitalist, RT and Patient

## 2019-02-26 NOTE — FLOWSHEET NOTE
02/26/19 0700   Events/Summary/Plan   Non-Invasive Resp Device Site Inspection Completed Intact   Interdisciplinary Plan of Care-Goals (Indications)   Bronchodilator Indications History / Diagnosis   Interdisciplinary Plan of Care-Outcomes    Bronchodilator Outcome Patient at Stable Baseline   Education   Education Yes - Pt. / Family has been Instructed in use of Respiratory Equipment;Yes - Pt. / Family has been Instructed in use of Respiratory Medications and Adverse Reactions   RT Assessment of Delivered Medications   Evaluation of Medication Delivery Daily Yes-- Pt /Family has been Instructed in use of Respiratory Medications and Adverse Reactions   SVN Group   #SVN Performed Yes   Given By: Mouthpiece   Date SVN Next Change Due (Q 7 Days) 03/02/19   MDI/DPI Group   #MDI/DPI Given MDI/DPI x 2   Chest Exam   Work Of Breathing / Effort Mild   Respiration 20   Pulse 88   Breath Sounds   RUL Breath Sounds Clear   RML Breath Sounds Diminished   RLL Breath Sounds Diminished   AUSTIN Breath Sounds Clear   LLL Breath Sounds Diminished   Secretions   Cough Dry;Non Productive   Oximetry   #Pulse Oximetry (Single Determination) Yes   Oxygen   Home O2 LPM Flow 3 LPM   Home O2 Delivery Method Nasal Cannula   Pulse Oximetry 94 %   O2 (LPM) 3   O2 Daily Delivery Respiratory  Silicone Nasal Cannula

## 2019-02-26 NOTE — PROGRESS NOTES
Hospital Medicine Daily Progress Note    Date of Service  2/26/2019    Chief Complaint  SOB    Hospital Course    *68 y.o. female with PMH COPD (2L NC) admitted 2/23/2019 with worsening SOB and needing to keep her oxygen on when previously she could elect not to use.*      Interval Problem Update  Seen with RN  Patient has no family to assist her, she states she cannot manage the heavy portable bottles 2/2 back pain and weakness. She has been told by multiple persons this hospitalization that portable concentrators cannot be ordered from the hospital setting. Encouraged OOB and IS usage, discussed and seen w/ RN    Consultants/Specialty  Pulmonary admitted her    Code Status  Discrepancy- appears to be DNR /I OK per POLST    Disposition  home    Review of Systems  Review of Systems   Constitutional: Positive for malaise/fatigue.   HENT: Negative for sinus pain and sore throat.    Eyes: Negative for discharge and redness.   Respiratory: Positive for cough, sputum production (minimal), shortness of breath and wheezing.    Cardiovascular: Negative for chest pain.   Gastrointestinal: Negative for abdominal pain, constipation, diarrhea, nausea and vomiting.   Genitourinary: Negative for dysuria.   Musculoskeletal: Positive for back pain (chronic).   Skin: Negative for itching.   Neurological: Positive for weakness. Negative for focal weakness.   Psychiatric/Behavioral: The patient is nervous/anxious.         Physical Exam  Temp:  [36.3 °C (97.3 °F)-36.7 °C (98.1 °F)] 36.4 °C (97.5 °F)  Pulse:  [68-99] 68  Resp:  [18-20] 18  BP: (117-143)/(55-78) 120/55  SpO2:  [93 %-96 %] 96 %    Physical Exam   Constitutional: She is oriented to person, place, and time. She appears well-developed and well-nourished. No distress.   HENT:   Head: Normocephalic and atraumatic.   Right Ear: External ear normal.   Left Ear: External ear normal.   Nose: Nose normal.   Mouth/Throat: Oropharynx is clear and moist.   Eyes: Pupils are equal,  round, and reactive to light. Conjunctivae and EOM are normal. Right eye exhibits no discharge. Left eye exhibits no discharge. No scleral icterus.   proptosis   Neck: Neck supple.   Cardiovascular: Normal rate and regular rhythm.    Pulmonary/Chest: No respiratory distress. She has wheezes. She has no rales. She exhibits no tenderness.   Shallow effort, decreased breath sounds, mild faint wheezes   Abdominal: Soft. Bowel sounds are normal. She exhibits no distension. There is no tenderness.   Musculoskeletal: She exhibits no edema or tenderness.   Neurological: She is alert and oriented to person, place, and time. No cranial nerve deficit.   Skin: Skin is warm and dry. She is not diaphoretic.   Psychiatric:   Depressed demeanor   Nursing note and vitals reviewed.      Fluids    Intake/Output Summary (Last 24 hours) at 02/26/19 1045  Last data filed at 02/26/19 0900   Gross per 24 hour   Intake              480 ml   Output                0 ml   Net              480 ml       Laboratory  Recent Labs      02/24/19   1029  02/25/19   0428  02/26/19   0143   WBC  10.8  12.6*  10.0   RBC  3.82*  3.72*  3.32*   HEMOGLOBIN  12.4  12.0  10.8*   HEMATOCRIT  37.4  36.6*  33.4*   MCV  97.9*  98.4*  100.6*   MCH  32.5  32.3  32.5   MCHC  33.2*  32.8*  32.3*   RDW  45.0  45.8  47.9   PLATELETCT  174  171  157*   MPV  10.7  10.5  11.3     Recent Labs      02/24/19   1029  02/25/19   0428  02/26/19   0143   SODIUM  133*  137  138   POTASSIUM  4.7  4.3  5.0   CHLORIDE  105  109  110   CO2  20  19*  21   GLUCOSE  205*  158*  170*   BUN  28*  26*  28*   CREATININE  1.62*  1.61*  1.38   CALCIUM  9.3  9.7  9.3                   Imaging  DX-CHEST-PORTABLE (1 VIEW)   Final Result         1. Increasing bibasilar opacities, likely atelectasis.      DX-CHEST-PORTABLE (1 VIEW)   Final Result         1.  No acute cardiopulmonary disease.         Reviewed image    Assessment/Plan  * Acute exacerbation of chronic obstructive pulmonary disease  (COPD) ([facility])- (present on admission)   Assessment & Plan    Rt protocol.   Steroids  No wheezing  Wean O2 to baseline     Closed compression fracture of second lumbar vertebra ([facility])- (present on admission)   Assessment & Plan    Pain control.      Hyperthyroidism- (present on admission)   Assessment & Plan    Stopped taking her meds at home. I encouraged her to take them.      Acute on chronic respiratory failure with hypoxemia ([facility])- (present on admission)   Assessment & Plan    Patient has home O2 2L  Did not disclose on admit  Wean to 2 L  Will need Xport home w/ O2 as she has no one to bring her portable tanks and she cannot lift them     Depression   Assessment & Plan    Suspected based on patient's demenaor     Atelectasis   Assessment & Plan    Bilateral  IS and ambulate     DVT (deep venous thrombosis) ([facility])- (present on admission)   Assessment & Plan    conitnue meds     Hypertension- (present on admission)   Assessment & Plan    Continue meds          VTE prophylaxis: Xarelto

## 2019-02-26 NOTE — PROGRESS NOTES
Telemetry Shift Summary    Rhythm SR  HR Range 60's-80's  Ectopy none  Measurements .18/.08/.38        Normal Values  Rhythm SR  HR Range    Measurements 0.12-0.20 / 0.06-0.10  / 0.30-0.52

## 2019-02-26 NOTE — PROGRESS NOTES
Report received. Care assumed. Resting in bed. Alert and oriented. No c/o. IV infusing without issue. Call light in reach.

## 2019-02-26 NOTE — PROGRESS NOTES
Due medications given without issue. Medicated per MAR for cough. No other c/o. Call light in reach.

## 2019-02-26 NOTE — CARE PLAN
Problem: Oxygenation:  Goal: Maintain adequate oxygenation dependent on patient condition  Outcome: MET Date Met: 02/26/19  Monitor oxygenation for SpO2 >90%  Intervention: Manage oxygen therapy by monitoring pulse oximetry and/or ABG values  Oxygen is currently required at 3 lpm nasal cannula for SpO2 >90%  Intervention: Levels of oxygenation will improve to baseline  Patient did require 3 lpm oxygen at home prior to this admission.       Problem: Bronchoconstriction:  Goal: Improve in air movement and diminished wheezing  Outcome: PROGRESSING AS EXPECTED  Patient does claim a benefit from bronchodilator therapy. There was no change heard  Intervention: Implement inhaled treatments  Patient is receiving Duoneb Qid and Symbicort BID.  Intervention: Evaluate and manage medication effects  Patient will be evaluated before and after therapy.

## 2019-02-26 NOTE — PROGRESS NOTES
Due medication given per MAR. C/o cough, MD paged for orders. Iv infusing without issue. Call light in reach.

## 2019-02-26 NOTE — PROGRESS NOTES
0815--Assessment completed. Pt AAOx4, fatigued this morning. Sitting up in bed for breakfast. Due meds given. Pt states no complaints this AM. No needs at this time. Call light in reach. Pt agreeable to walk later today to assess for O2 needs.

## 2019-02-26 NOTE — PROGRESS NOTES
Assessment completed. Sitting up in bed. Alert and oriented. Respirations even and unlabored. NC in place. C/o pain, medicated per MAR. Call light in reach.

## 2019-02-26 NOTE — PROGRESS NOTES
"1000- Pt up to the restroom with stand by assist. Pt back to bed. Pt states \" I feel very anxious and scared to go home alone MD aware.    1200- Valium given to pt. Pt educated about PO steroids. Pt verbalizes understanding.MD at bedside.     1315- Pt resting in bed with eyes closed. Pt declines any needs at this time.    1415- Pt verbalized concern. Pt states \" the lung doctor was talking about giving me long term antibiotics or some kind of medication? Can you ask the MD about this?\" MD aware of concern.    1600- Pt weaned down to 2 L NCPt tolerating well.    1730- Medication administration complete. Pt up to the bathroom with stand by assist. Pt back to bed. Call light within reach.       "

## 2019-02-26 NOTE — CARE PLAN
Problem: Respiratory:  Goal: Respiratory status will improve  Outcome: PROGRESSING AS EXPECTED  Pt weaned off O2 as tolerated. Ambulating with assistance, spO2 <88% on RA. Pt encouraged to use IS. Up in cardiac chair. RT seeing pt. Pt verbalizes understanding of respiratory POC.    Problem: Pain Management  Goal: Pain level will decrease to patient's comfort goal  Outcome: PROGRESSING AS EXPECTED  Pt has chronic pain, takes scheduled norco. Tylenol PRN. Pt offered ice/hot packs, declines. Pt encouraged to reposition and/or increase mobilization. Pt verbalizes understanding of pain management.

## 2019-02-26 NOTE — FLOWSHEET NOTE
02/25/19 1950   Events/Summary/Plan   Events/Summary/Plan SVN    Interdisciplinary Plan of Care-Goals (Indications)   Bronchodilator Indications History / Diagnosis   Interdisciplinary Plan of Care-Outcomes    Bronchodilator Outcome Patient at Stable Baseline   Education   Education Yes - Pt. / Family has been Instructed in use of Respiratory Equipment;Yes - Pt. / Family has been Instructed in use of Respiratory Medications and Adverse Reactions   RT Assessment of Delivered Medications   Evaluation of Medication Delivery Daily Yes-- Pt /Family has been Instructed in use of Respiratory Medications and Adverse Reactions   SVN Group   #SVN Performed Yes   Given By: Mouthpiece   MDI/DPI Group   #MDI/DPI Given MDI/DPI x 1   Respiratory WDL   Respiratory (WDL) X   Chest Exam   Respiration 18   Pulse 99   Breath Sounds   Pre/Post Intervention Post Intervention Assessment   RUL Breath Sounds Clear   RML Breath Sounds Clear;Diminished   RLL Breath Sounds Fine Crackles;Diminished   AUSTIN Breath Sounds Clear   LLL Breath Sounds Fine Crackles;Diminished   Oximetry   #Pulse Oximetry (Single Determination) Yes   Oxygen   Pulse Oximetry 95 %   O2 (LPM) 3   O2 Daily Delivery Respiratory  Silicone Nasal Cannula

## 2019-02-26 NOTE — PROGRESS NOTES
Telemetry Shift Summary    Rhythm SR  HR Range 70-96  Ectopy Rare bigem  Measurements 0.18/0.10/0.38        Normal Values  Rhythm SR  HR Range    Measurements 0.12-0.20 / 0.06-0.10  / 0.30-0.52

## 2019-02-26 NOTE — FLOWSHEET NOTE
02/26/19 1111   Events/Summary/Plan   Non-Invasive Resp Device Site Inspection Completed Intact   Interdisciplinary Plan of Care-Goals (Indications)   Bronchodilator Indications History / Diagnosis   Interdisciplinary Plan of Care-Outcomes    Bronchodilator Outcome Patient at Stable Baseline   Education   Education Yes - Pt. / Family has been Instructed in use of Respiratory Equipment   RT Assessment of Delivered Medications   Evaluation of Medication Delivery Daily Yes-- Pt /Family has been Instructed in use of Respiratory Medications and Adverse Reactions   SVN Group   #SVN Performed Yes   Given By: Mouthpiece   Chest Exam   Work Of Breathing / Effort Mild   Respiration 18   Pulse 72   Breath Sounds   RUL Breath Sounds Clear   RML Breath Sounds Diminished   RLL Breath Sounds Diminished   AUSTIN Breath Sounds Clear   LLL Breath Sounds Diminished   Secretions   Cough Congested   Oxygen   Pulse Oximetry 95 %   O2 (LPM) 3

## 2019-02-26 NOTE — PROGRESS NOTES
"MD rounded at bedside. Plan to wean O2 if possible for pt to d/c. Pt states she has O2 concentrator and portable tanks at home but \"can't use them because they are too heavy and I've had so many spinal fractures.\" Pt has been having friends help her with groceries/medications due to inability to leave her home. Will ambulate pt with and without O2. Pt encouraged to use IS.  "

## 2019-02-26 NOTE — PROGRESS NOTES
Bedside report received from Lashae LEMUS. Assumed care. POC discussed. Pt resting comfortably in bed. Safety precautions in place.

## 2019-02-26 NOTE — PROGRESS NOTES
Resting on and off. Respirations even and unlabored. Calls as needed for assist to bathroom. C/o nausea when ambulating, declined intervention. Call light in reach.

## 2019-02-27 PROBLEM — F33.1 DEPRESSION, MAJOR, RECURRENT, MODERATE (HCC): Status: ACTIVE | Noted: 2019-02-26

## 2019-02-27 PROCEDURE — A9270 NON-COVERED ITEM OR SERVICE: HCPCS | Performed by: INTERNAL MEDICINE

## 2019-02-27 PROCEDURE — 700105 HCHG RX REV CODE 258: Performed by: HOSPITALIST

## 2019-02-27 PROCEDURE — 94760 N-INVAS EAR/PLS OXIMETRY 1: CPT

## 2019-02-27 PROCEDURE — 700102 HCHG RX REV CODE 250 W/ 637 OVERRIDE(OP): Performed by: INTERNAL MEDICINE

## 2019-02-27 PROCEDURE — A9270 NON-COVERED ITEM OR SERVICE: HCPCS | Performed by: HOSPITALIST

## 2019-02-27 PROCEDURE — 700102 HCHG RX REV CODE 250 W/ 637 OVERRIDE(OP): Performed by: FAMILY MEDICINE

## 2019-02-27 PROCEDURE — 700102 HCHG RX REV CODE 250 W/ 637 OVERRIDE(OP): Performed by: HOSPITALIST

## 2019-02-27 PROCEDURE — 700101 HCHG RX REV CODE 250: Performed by: HOSPITALIST

## 2019-02-27 PROCEDURE — 99232 SBSQ HOSP IP/OBS MODERATE 35: CPT | Performed by: INTERNAL MEDICINE

## 2019-02-27 PROCEDURE — 700111 HCHG RX REV CODE 636 W/ 250 OVERRIDE (IP): Performed by: HOSPITALIST

## 2019-02-27 PROCEDURE — 94640 AIRWAY INHALATION TREATMENT: CPT

## 2019-02-27 PROCEDURE — A9270 NON-COVERED ITEM OR SERVICE: HCPCS | Performed by: FAMILY MEDICINE

## 2019-02-27 PROCEDURE — 770006 HCHG ROOM/CARE - MED/SURG/GYN SEMI*

## 2019-02-27 RX ORDER — BENZONATATE 100 MG/1
100 CAPSULE ORAL 3 TIMES DAILY PRN
Status: DISCONTINUED | OUTPATIENT
Start: 2019-02-27 | End: 2019-02-28 | Stop reason: HOSPADM

## 2019-02-27 RX ORDER — MIRTAZAPINE 15 MG/1
15 TABLET, ORALLY DISINTEGRATING ORAL
Status: DISCONTINUED | OUTPATIENT
Start: 2019-02-27 | End: 2019-02-28 | Stop reason: HOSPADM

## 2019-02-27 RX ADMIN — METHIMAZOLE 10 MG: 5 TABLET ORAL at 06:10

## 2019-02-27 RX ADMIN — SODIUM CHLORIDE, POTASSIUM CHLORIDE, SODIUM LACTATE AND CALCIUM CHLORIDE: 600; 310; 30; 20 INJECTION, SOLUTION INTRAVENOUS at 06:09

## 2019-02-27 RX ADMIN — TIMOLOL MALEATE 1 DROP: 5 SOLUTION OPHTHALMIC at 06:13

## 2019-02-27 RX ADMIN — BRIMONIDINE TARTRATE 1 DROP: 2 SOLUTION/ DROPS OPHTHALMIC at 21:10

## 2019-02-27 RX ADMIN — STANDARDIZED SENNA CONCENTRATE AND DOCUSATE SODIUM 2 TABLET: 8.6; 5 TABLET, FILM COATED ORAL at 17:31

## 2019-02-27 RX ADMIN — IPRATROPIUM BROMIDE AND ALBUTEROL SULFATE 3 ML: .5; 3 SOLUTION RESPIRATORY (INHALATION) at 12:30

## 2019-02-27 RX ADMIN — BENZONATATE 100 MG: 100 CAPSULE ORAL at 17:42

## 2019-02-27 RX ADMIN — SIMVASTATIN 40 MG: 20 TABLET, FILM COATED ORAL at 21:10

## 2019-02-27 RX ADMIN — GUAIFENESIN 200 MG: 100 SOLUTION ORAL at 02:19

## 2019-02-27 RX ADMIN — DIAZEPAM 5 MG: 5 TABLET ORAL at 06:12

## 2019-02-27 RX ADMIN — RIVAROXABAN 20 MG: 20 TABLET, FILM COATED ORAL at 17:32

## 2019-02-27 RX ADMIN — IPRATROPIUM BROMIDE AND ALBUTEROL SULFATE 3 ML: .5; 3 SOLUTION RESPIRATORY (INHALATION) at 08:25

## 2019-02-27 RX ADMIN — TIMOLOL MALEATE 1 DROP: 5 SOLUTION OPHTHALMIC at 21:11

## 2019-02-27 RX ADMIN — PREDNISONE 60 MG: 20 TABLET ORAL at 06:11

## 2019-02-27 RX ADMIN — TIOTROPIUM BROMIDE 1 CAPSULE: 18 CAPSULE ORAL; RESPIRATORY (INHALATION) at 08:26

## 2019-02-27 RX ADMIN — METHIMAZOLE 10 MG: 5 TABLET ORAL at 17:31

## 2019-02-27 RX ADMIN — MIRTAZAPINE 15 MG: 15 TABLET, ORALLY DISINTEGRATING ORAL at 21:10

## 2019-02-27 RX ADMIN — ACETAMINOPHEN 1000 MG: 500 TABLET, FILM COATED ORAL at 19:06

## 2019-02-27 RX ADMIN — LOSARTAN POTASSIUM 50 MG: 25 TABLET, FILM COATED ORAL at 06:10

## 2019-02-27 RX ADMIN — BUDESONIDE AND FORMOTEROL FUMARATE DIHYDRATE 2 PUFF: 160; 4.5 AEROSOL RESPIRATORY (INHALATION) at 20:15

## 2019-02-27 RX ADMIN — SODIUM CHLORIDE, POTASSIUM CHLORIDE, SODIUM LACTATE AND CALCIUM CHLORIDE: 600; 310; 30; 20 INJECTION, SOLUTION INTRAVENOUS at 14:10

## 2019-02-27 RX ADMIN — BUDESONIDE AND FORMOTEROL FUMARATE DIHYDRATE 2 PUFF: 160; 4.5 AEROSOL RESPIRATORY (INHALATION) at 08:26

## 2019-02-27 RX ADMIN — ZOLPIDEM TARTRATE 10 MG: 5 TABLET ORAL at 23:25

## 2019-02-27 RX ADMIN — IPRATROPIUM BROMIDE AND ALBUTEROL SULFATE 3 ML: .5; 3 SOLUTION RESPIRATORY (INHALATION) at 20:11

## 2019-02-27 RX ADMIN — BENZONATATE 100 MG: 100 CAPSULE ORAL at 06:10

## 2019-02-27 RX ADMIN — AZITHROMYCIN 250 MG: 250 TABLET, FILM COATED ORAL at 06:12

## 2019-02-27 RX ADMIN — HYDROCODONE BITARTRATE AND ACETAMINOPHEN 1 TABLET: 10; 325 TABLET ORAL at 06:11

## 2019-02-27 RX ADMIN — SODIUM CHLORIDE, POTASSIUM CHLORIDE, SODIUM LACTATE AND CALCIUM CHLORIDE 1000 ML: 600; 310; 30; 20 INJECTION, SOLUTION INTRAVENOUS at 23:28

## 2019-02-27 RX ADMIN — STANDARDIZED SENNA CONCENTRATE AND DOCUSATE SODIUM 2 TABLET: 8.6; 5 TABLET, FILM COATED ORAL at 06:09

## 2019-02-27 RX ADMIN — CARVEDILOL 3.12 MG: 3.12 TABLET, FILM COATED ORAL at 09:03

## 2019-02-27 RX ADMIN — ACETAMINOPHEN 1000 MG: 500 TABLET, FILM COATED ORAL at 10:25

## 2019-02-27 RX ADMIN — BRIMONIDINE TARTRATE 1 DROP: 2 SOLUTION/ DROPS OPHTHALMIC at 06:13

## 2019-02-27 RX ADMIN — CARVEDILOL 3.12 MG: 3.12 TABLET, FILM COATED ORAL at 17:31

## 2019-02-27 RX ADMIN — IPRATROPIUM BROMIDE AND ALBUTEROL SULFATE 3 ML: .5; 3 SOLUTION RESPIRATORY (INHALATION) at 15:13

## 2019-02-27 ASSESSMENT — ENCOUNTER SYMPTOMS
SORE THROAT: 0
VOMITING: 0
NAUSEA: 0
SINUS PAIN: 0
COUGH: 1
ABDOMINAL PAIN: 0
SPUTUM PRODUCTION: 1
WHEEZING: 1
EYE REDNESS: 0
SHORTNESS OF BREATH: 1
FOCAL WEAKNESS: 0
DIARRHEA: 0
CONSTIPATION: 0
EYE DISCHARGE: 0
NERVOUS/ANXIOUS: 1
DEPRESSION: 1
WEAKNESS: 1
BACK PAIN: 1

## 2019-02-27 NOTE — CARE PLAN
Problem: Safety  Goal: Will remain free from falls  Outcome: PROGRESSING AS EXPECTED  Pt encouraged to call for assistance as needed. Safety precautions in place. Regular rounding.    Problem: Respiratory:  Goal: Respiratory status will improve  Outcome: PROGRESSING AS EXPECTED  O2 in place. IS encouraged. RT protocol.

## 2019-02-27 NOTE — PROGRESS NOTES
Report received from Vianca LEMUS. Plan of care discussed. Pt resting in bed with safety precautions in place.

## 2019-02-27 NOTE — FLOWSHEET NOTE
02/27/19 0827   Events/Summary/Plan   Events/Summary/Plan Tx given   Non-Invasive Resp Device Site Inspection Completed Intact   Interdisciplinary Plan of Care-Goals (Indications)   Bronchodilator Indications History / Diagnosis   Interdisciplinary Plan of Care-Outcomes    Bronchodilator Outcome Patient at Stable Baseline   Education   Education Yes - Pt. / Family has been Instructed in use of Respiratory Medications and Adverse Reactions;Yes - Pt. / Family has been Instructed in use of Respiratory Equipment   RT Assessment of Delivered Medications   Evaluation of Medication Delivery Daily Yes-- Pt /Family has been Instructed in use of Respiratory Medications and Adverse Reactions   SVN Group   #SVN Performed Yes   Given By: Mouthpiece   MDI/DPI Group   #MDI/DPI Given MDI/DPI x 2   Chest Exam   Respiration 18   Pulse 83   Breath Sounds   Pre/Post Intervention Post Intervention Assessment   RUL Breath Sounds Clear;Diminished   RML Breath Sounds Diminished   RLL Breath Sounds Diminished   AUSTIN Breath Sounds Clear;Diminished   LLL Breath Sounds Diminished   Secretions   Cough Non Productive   How Sputum Obtained Spontaneous   Oximetry   #Pulse Oximetry (Single Determination) Yes   Oxygen   Pulse Oximetry 93 %   O2 (LPM) 3   O2 Daily Delivery Respiratory  Silicone Nasal Cannula

## 2019-02-27 NOTE — FLOWSHEET NOTE
02/26/19 1945   Interdisciplinary Plan of Care-Goals (Indications)   Bronchodilator Indications History / Diagnosis   RT Assessment of Delivered Medications   Evaluation of Medication Delivery Daily Yes-- Pt /Family has been Instructed in use of Respiratory Medications and Adverse Reactions   SVN Group   #SVN Performed Yes   Given By: Mouthpiece   MDI/DPI Group   #MDI/DPI Given MDI/DPI x 1   Chest Exam   Work Of Breathing / Effort Mild   Respiration 18   Pulse (!) 105   Breath Sounds   RUL Breath Sounds Clear   RML Breath Sounds Diminished   RLL Breath Sounds Diminished   AUSTIN Breath Sounds Clear   LLL Breath Sounds Diminished   Secretions   Cough Congested;Non Productive   Oxygen   Home O2 Frequency of Use As Needed for SOB   Pulse Oximetry 97 %   O2 (LPM) 3   O2 Daily Delivery Respiratory  Silicone Nasal Cannula

## 2019-02-27 NOTE — PROGRESS NOTES
Bedside report given to Vianca LEMUS. Plan of care discussed. Pt resting in bed with safety precautions in place.

## 2019-02-27 NOTE — PROGRESS NOTES
Hospital Medicine Daily Progress Note    Date of Service  2/27/2019    Chief Complaint  SOB    Hospital Course    *68 y.o. female with PMH COPD (2L NC) admitted 2/23/2019 with worsening SOB and needing to keep her oxygen on when previously she could elect not to use.*      Interval Problem Update  Seen with RN  She complains of more fatigue and tiredness this morning but took some Valium  She remains anxious about the care plan  Discussed past history of depression she was on Lexapro but was not feeling any better so just stopped it, she said this was many years ago she denies suicidal ideation.  She is willing to try another medication    Consultants/Specialty  Pulmonary admitted her    Code Status  Discrepancy- appears to be DNR /I OK per POLST    Disposition  home    Review of Systems  Review of Systems   Constitutional: Positive for malaise/fatigue (worse- ? 2/2 valium).   HENT: Negative for sinus pain and sore throat.    Eyes: Negative for discharge and redness.   Respiratory: Positive for cough, sputum production (less- not expectorating much), shortness of breath and wheezing (worse this AM).    Cardiovascular: Negative for chest pain.   Gastrointestinal: Negative for abdominal pain, constipation, diarrhea, nausea and vomiting.   Genitourinary: Negative for dysuria.   Musculoskeletal: Positive for back pain (chronic).   Skin: Negative for itching.   Neurological: Positive for weakness. Negative for focal weakness.   Psychiatric/Behavioral: Positive for depression. Negative for suicidal ideas. The patient is nervous/anxious.         Physical Exam  Temp:  [36.3 °C (97.4 °F)-37.1 °C (98.7 °F)] 36.9 °C (98.4 °F)  Pulse:  [] 83  Resp:  [18-20] 18  BP: (107-145)/(70-83) 125/76  SpO2:  [93 %-97 %] 93 %    Physical Exam   Constitutional: She is oriented to person, place, and time. She appears well-developed and well-nourished. No distress.   HENT:   Head: Normocephalic and atraumatic.   Right Ear: External ear  normal.   Left Ear: External ear normal.   Nose: Nose normal.   Mouth/Throat: Oropharynx is clear and moist.   Eyes: Pupils are equal, round, and reactive to light. Conjunctivae and EOM are normal. Right eye exhibits no discharge. Left eye exhibits no discharge. No scleral icterus.   proptosis   Neck: Neck supple.   Cardiovascular: Normal rate and regular rhythm.    Pulmonary/Chest: No respiratory distress. She has wheezes. She has no rales. She exhibits no tenderness.   Poor effort with minimal breath sounds and mild wheeze  Breath sounds seem worse than yesterday   Abdominal: Soft. Bowel sounds are normal. She exhibits no distension. There is no tenderness.   Musculoskeletal: She exhibits no edema or tenderness.   Neurological: She is alert and oriented to person, place, and time. No cranial nerve deficit.   Skin: Skin is warm and dry. She is not diaphoretic.   Psychiatric:   Depressed apathetic   Nursing note and vitals reviewed.      Fluids    Intake/Output Summary (Last 24 hours) at 02/27/19 0927  Last data filed at 02/27/19 0700   Gross per 24 hour   Intake             1440 ml   Output                0 ml   Net             1440 ml       Laboratory  Recent Labs      02/24/19   1029  02/25/19   0428  02/26/19   0143   WBC  10.8  12.6*  10.0   RBC  3.82*  3.72*  3.32*   HEMOGLOBIN  12.4  12.0  10.8*   HEMATOCRIT  37.4  36.6*  33.4*   MCV  97.9*  98.4*  100.6*   MCH  32.5  32.3  32.5   MCHC  33.2*  32.8*  32.3*   RDW  45.0  45.8  47.9   PLATELETCT  174  171  157*   MPV  10.7  10.5  11.3     Recent Labs      02/24/19   1029  02/25/19   0428  02/26/19   0143   SODIUM  133*  137  138   POTASSIUM  4.7  4.3  5.0   CHLORIDE  105  109  110   CO2  20  19*  21   GLUCOSE  205*  158*  170*   BUN  28*  26*  28*   CREATININE  1.62*  1.61*  1.38   CALCIUM  9.3  9.7  9.3                   Imaging  DX-CHEST-PORTABLE (1 VIEW)   Final Result         1. Increasing bibasilar opacities, likely atelectasis.      DX-CHEST-PORTABLE (1  VIEW)   Final Result         1.  No acute cardiopulmonary disease.         Reviewed image    Assessment/Plan  * Acute exacerbation of chronic obstructive pulmonary disease (COPD) ([facility])- (present on admission)   Assessment & Plan    Rt protocol.   Steroids  Mild wheezing today  OOB to chair w/ IS  Wean O2 to baseline if possible     Closed compression fracture of second lumbar vertebra ([facility])- (present on admission)   Assessment & Plan    Pain control.      Hyperthyroidism- (present on admission)   Assessment & Plan    Non compliant- resumed     Acute on chronic respiratory failure with hypoxemia ([facility])- (present on admission)   Assessment & Plan    Patient has home O2 2L  Did not disclose on admit  Wean to 2 L if possible  Will need Xport home w/ O2 as she has no one to bring her portable tanks and she cannot lift them     Depression, major, recurrent, moderate ([facility])   Assessment & Plan    Stopped lexapro years ago- didn't work  Willing to try new med- starting remeron     Atelectasis   Assessment & Plan    Bilateral  IS and ambulate     DVT (deep venous thrombosis) ([facility])- (present on admission)   Assessment & Plan    On xarelto     Hypertension- (present on admission)   Assessment & Plan    Currently @ goal          VTE prophylaxis: Xarelto

## 2019-02-27 NOTE — FLOWSHEET NOTE
02/27/19 1232   Events/Summary/Plan   Events/Summary/Plan Tx given, decreased O2 to 2L (home O2 PRN)   Non-Invasive Resp Device Site Inspection Completed Intact   Interdisciplinary Plan of Care-Goals (Indications)   Bronchodilator Indications History / Diagnosis   Interdisciplinary Plan of Care-Outcomes    Bronchodilator Outcome Patient at Stable Baseline   Education   Education Yes - Pt. / Family has been Instructed in use of Respiratory Medications and Adverse Reactions;Yes - Pt. / Family has been Instructed in use of Respiratory Equipment   RT Assessment of Delivered Medications   Evaluation of Medication Delivery Daily Yes-- Pt /Family has been Instructed in use of Respiratory Medications and Adverse Reactions   SVN Group   #SVN Performed Yes   Given By: Mouthpiece   Chest Exam   Respiration 18   Pulse 92   Breath Sounds   Pre/Post Intervention Post Intervention Assessment   RUL Breath Sounds Clear   RML Breath Sounds Clear;Diminished   RLL Breath Sounds Diminished   AUSTIN Breath Sounds Clear   LLL Breath Sounds Diminished   Oximetry   #Pulse Oximetry (Single Determination) Yes   Oxygen   Pulse Oximetry 95 %   O2 (LPM) 2   O2 Daily Delivery Respiratory  Silicone Nasal Cannula

## 2019-02-28 VITALS
SYSTOLIC BLOOD PRESSURE: 144 MMHG | WEIGHT: 168.43 LBS | HEIGHT: 67 IN | RESPIRATION RATE: 18 BRPM | BODY MASS INDEX: 26.44 KG/M2 | DIASTOLIC BLOOD PRESSURE: 76 MMHG | HEART RATE: 98 BPM | TEMPERATURE: 97.8 F | OXYGEN SATURATION: 92 %

## 2019-02-28 LAB
BACTERIA BLD CULT: NORMAL
BACTERIA BLD CULT: NORMAL
SIGNIFICANT IND 70042: NORMAL
SIGNIFICANT IND 70042: NORMAL
SITE SITE: NORMAL
SITE SITE: NORMAL
SOURCE SOURCE: NORMAL
SOURCE SOURCE: NORMAL

## 2019-02-28 PROCEDURE — A9270 NON-COVERED ITEM OR SERVICE: HCPCS | Performed by: INTERNAL MEDICINE

## 2019-02-28 PROCEDURE — 94640 AIRWAY INHALATION TREATMENT: CPT

## 2019-02-28 PROCEDURE — 700102 HCHG RX REV CODE 250 W/ 637 OVERRIDE(OP): Performed by: INTERNAL MEDICINE

## 2019-02-28 PROCEDURE — 700102 HCHG RX REV CODE 250 W/ 637 OVERRIDE(OP): Performed by: FAMILY MEDICINE

## 2019-02-28 PROCEDURE — A9270 NON-COVERED ITEM OR SERVICE: HCPCS | Performed by: HOSPITALIST

## 2019-02-28 PROCEDURE — 94760 N-INVAS EAR/PLS OXIMETRY 1: CPT

## 2019-02-28 PROCEDURE — 99239 HOSP IP/OBS DSCHRG MGMT >30: CPT | Performed by: INTERNAL MEDICINE

## 2019-02-28 PROCEDURE — 700101 HCHG RX REV CODE 250: Performed by: HOSPITALIST

## 2019-02-28 PROCEDURE — 700102 HCHG RX REV CODE 250 W/ 637 OVERRIDE(OP): Performed by: HOSPITALIST

## 2019-02-28 PROCEDURE — A9270 NON-COVERED ITEM OR SERVICE: HCPCS | Performed by: FAMILY MEDICINE

## 2019-02-28 PROCEDURE — 700111 HCHG RX REV CODE 636 W/ 250 OVERRIDE (IP): Performed by: HOSPITALIST

## 2019-02-28 RX ORDER — AZITHROMYCIN 250 MG/1
TABLET, FILM COATED ORAL
Qty: 30 TAB | Refills: 11 | Status: SHIPPED | OUTPATIENT
Start: 2019-03-01 | End: 2021-02-16

## 2019-02-28 RX ORDER — IPRATROPIUM BROMIDE AND ALBUTEROL SULFATE 2.5; .5 MG/3ML; MG/3ML
3 SOLUTION RESPIRATORY (INHALATION) EVERY 4 HOURS PRN
Qty: 100 BULLET | Refills: 2 | Status: SHIPPED | OUTPATIENT
Start: 2019-02-28 | End: 2019-04-29

## 2019-02-28 RX ORDER — CARVEDILOL 3.12 MG/1
3.12 TABLET ORAL 2 TIMES DAILY WITH MEALS
Qty: 60 TAB | Refills: 3 | Status: SHIPPED | OUTPATIENT
Start: 2019-02-28 | End: 2019-04-24

## 2019-02-28 RX ORDER — MIRTAZAPINE 15 MG/1
15 TABLET, ORALLY DISINTEGRATING ORAL
Qty: 30 TAB | Refills: 6 | Status: SHIPPED | OUTPATIENT
Start: 2019-02-28 | End: 2019-03-07

## 2019-02-28 RX ORDER — TIMOLOL MALEATE 5 MG/ML
1 SOLUTION/ DROPS OPHTHALMIC 2 TIMES DAILY
Qty: 1 BOTTLE | Refills: 3 | Status: SHIPPED | OUTPATIENT
Start: 2019-02-28 | End: 2019-04-29

## 2019-02-28 RX ORDER — BENZONATATE 100 MG/1
100 CAPSULE ORAL 3 TIMES DAILY PRN
Qty: 60 CAP | Refills: 0 | Status: SHIPPED | OUTPATIENT
Start: 2019-02-28 | End: 2019-04-29

## 2019-02-28 RX ADMIN — CARVEDILOL 3.12 MG: 3.12 TABLET, FILM COATED ORAL at 07:30

## 2019-02-28 RX ADMIN — GUAIFENESIN 200 MG: 100 SOLUTION ORAL at 02:30

## 2019-02-28 RX ADMIN — METHIMAZOLE 10 MG: 5 TABLET ORAL at 05:32

## 2019-02-28 RX ADMIN — BRIMONIDINE TARTRATE 1 DROP: 2 SOLUTION/ DROPS OPHTHALMIC at 05:31

## 2019-02-28 RX ADMIN — STANDARDIZED SENNA CONCENTRATE AND DOCUSATE SODIUM 2 TABLET: 8.6; 5 TABLET, FILM COATED ORAL at 05:31

## 2019-02-28 RX ADMIN — IPRATROPIUM BROMIDE AND ALBUTEROL SULFATE 3 ML: .5; 3 SOLUTION RESPIRATORY (INHALATION) at 07:25

## 2019-02-28 RX ADMIN — LOSARTAN POTASSIUM 50 MG: 25 TABLET, FILM COATED ORAL at 05:32

## 2019-02-28 RX ADMIN — ACETAMINOPHEN 1000 MG: 500 TABLET, FILM COATED ORAL at 11:37

## 2019-02-28 RX ADMIN — BUDESONIDE AND FORMOTEROL FUMARATE DIHYDRATE 2 PUFF: 160; 4.5 AEROSOL RESPIRATORY (INHALATION) at 07:28

## 2019-02-28 RX ADMIN — TIMOLOL MALEATE 1 DROP: 5 SOLUTION OPHTHALMIC at 05:31

## 2019-02-28 RX ADMIN — PREDNISONE 60 MG: 20 TABLET ORAL at 05:31

## 2019-02-28 RX ADMIN — HYDROCODONE BITARTRATE AND ACETAMINOPHEN 1 TABLET: 10; 325 TABLET ORAL at 05:32

## 2019-02-28 RX ADMIN — AZITHROMYCIN 250 MG: 250 TABLET, FILM COATED ORAL at 05:32

## 2019-02-28 RX ADMIN — BENZONATATE 100 MG: 100 CAPSULE ORAL at 05:38

## 2019-02-28 RX ADMIN — TIOTROPIUM BROMIDE 1 CAPSULE: 18 CAPSULE ORAL; RESPIRATORY (INHALATION) at 07:30

## 2019-02-28 RX ADMIN — IPRATROPIUM BROMIDE AND ALBUTEROL SULFATE 3 ML: .5; 3 SOLUTION RESPIRATORY (INHALATION) at 12:06

## 2019-02-28 NOTE — DISCHARGE INSTRUCTIONS
Discharge Instructions    Discharged to home by car with relative. Discharged via wheelchair, hospital escort: Yes.  Special equipment needed: Not Applicable    Be sure to schedule a follow-up appointment with your primary care doctor or any specialists as instructed.     Discharge Plan:   Influenza Vaccine Indication: Not indicated: Previously immunized this influenza season and > 8 years of age    I understand that a diet low in cholesterol, fat, and sodium is recommended for good health. Unless I have been given specific instructions below for another diet, I accept this instruction as my diet prescription.   Other diet: regular    Special Instructions: None    · Is patient discharged on Warfarin / Coumadin?   No     Depression / Suicide Risk    As you are discharged from this [facility] facility, it is important to learn how to keep safe from harming yourself.    Recognize the warning signs:  · Abrupt changes in personality, positive or negative- including increase in energy   · Giving away possessions  · Change in eating patterns- significant weight changes-  positive or negative  · Change in sleeping patterns- unable to sleep or sleeping all the time   · Unwillingness or inability to communicate  · Depression  · Unusual sadness, discouragement and loneliness  · Talk of wanting to die  · Neglect of personal appearance   · Rebelliousness- reckless behavior  · Withdrawal from people/activities they love  · Confusion- inability to concentrate     If you or a loved one observes any of these behaviors or has concerns about self-harm, here's what you can do:  · Talk about it- your feelings and reasons for harming yourself  · Remove any means that you might use to hurt yourself (examples: pills, rope, extension cords, firearm)  · Get professional help from the community (Mental Health, Substance Abuse, psychological counseling)  · Do not be alone:Call your Safe Contact- someone whom you trust who will be there for  "you.  · Call your local CRISIS HOTLINE 863-2837 or 264-130-8156  · Call your local Children's Mobile Crisis Response Team [place] (060) 966-0793 or www.Native  · Call the toll free National Suicide Prevention Hotlines   · National Suicide Prevention Lifeline 258-201-CVQV (1321)  · National Hope Line Network 800-SUICIDE (362-9082)    MY COPD ACTION PLAN     It is recommended that patients and physicians /healthcare providers complete this action plan together. This plan should be discussed at each physician visit and updated as needed.    The green, yellow and red zones show groups of symptoms of COPD. This list of symptoms is not comprehensive, and you may experience other symptoms. In the \"Actions\" column, your healthcare provider has recommended actions for you to take based on your symptoms.    Patient Name: Latonia Clinton   YOB: 1950   Last Updated on:     Green Zone:  I am doing well today Actions   •  Usual activitiy and exercise level •  Take daily medications   •  Usual amounts of cough and phlegm/mucus •  Use oxygen as prescribed   •  Sleep well at night •  Continue regular exercise/diet plan   •  Appetite is good •  At all times avoid cigarette smoke, inhaled irritants     Daily Medications (these medications are taken every day):                Yellow Zone:  I am having a bad day or a COPD flare Actions   •  More breathless than usual •  Continue daily medications   •  I have less energy for my daily activities •  Use quick relief inhaler as ordered   •  Increased or thicker phlegm/mucus •  Use oxygen as prescribed   •  Using quick relief inhaler/nebulizer more often •  Get plenty of rest   •  Swelling of ankles more than usual •  Use pursed lip breathing   •  More coughing than usual •  At all times avoid cigarette smoke, inhaled irritants   •  I feel like I have a \"chest cold\"   •  Poor sleep and my symptoms woke me up   •  My appetite is not good   •  My medicine is " not helping    •  Call provider immediately if symptoms don’t improve     Continue daily medications, add rescue medications:               Medications to be used during a flare up, (as Discussed with Provider):              Red Zone:  I need urgent medical care Actions   •  Severe shortness of breath even at rest •  Call 911 or seek medical care immediately   •  Not able to do any activity because of breathing    •  Fever or shaking chills    •  Feeling confused or very drowsy     •  Chest pains    •  Coughing up blood

## 2019-02-28 NOTE — PROGRESS NOTES
Patient given discharge information, verbalized understanding. She stated that she needed an appointment with a PCP within a week because she needed a refill of her losartan. There is an appointment made for her. Patient is aware. IV discontinued. Transport taking patient to front lobby.

## 2019-02-28 NOTE — PROGRESS NOTES
Patient resting in bed. Does c/o pain. Will admin pain med per order. Patient aware of discharge. She will find a ride home and f/u with RN. She is waiting to have a BM because she does not want to have an accident during transport. Bed in low locked position, call bell within reach. Continue to monitor.

## 2019-02-28 NOTE — PROGRESS NOTES
Report given to Shelly LEMUS. POC discussed. Pt resting comfortably in bed. Safety precautions in place.

## 2019-02-28 NOTE — PROGRESS NOTES
Hourly rounding complete. Pt resting comfortably in bed with eyes closed. Unlabored breathing. Safety precautions in place. Call light in reach.

## 2019-02-28 NOTE — PROGRESS NOTES
Report given to night shift RN. POC and orders reviewed. All needs met. Safety precautions maintained.

## 2019-02-28 NOTE — PROGRESS NOTES
Report received from Tennille LEMUS and Vianca LEMUS. POC discussed. Pt resting comfortably in bed. Safety precautions in place.

## 2019-02-28 NOTE — PROGRESS NOTES
Assessment complete. Pt reports pain in head/back has improved to 2/10 following PRN tylenol. PIV infusing. Due meds given. Safety precautions in place. Call light in reach.

## 2019-02-28 NOTE — FLOWSHEET NOTE
02/28/19 0725   Interdisciplinary Plan of Care-Goals (Indications)   Bronchodilator Indications History / Diagnosis   SVN Group   #SVN Performed Yes   Given By: Mouthpiece   MDI/DPI Group   #MDI/DPI Given MDI/DPI x 2   Chest Exam   Respiration 16   Pulse 74   Breath Sounds   RUL Breath Sounds Clear   RML Breath Sounds Diminished   RLL Breath Sounds Diminished   AUSTIN Breath Sounds Clear   LLL Breath Sounds Diminished   Oxygen   Home O2 Frequency of Use As Needed for SOB   Pulse Oximetry 95 %   O2 (LPM) 1.5   O2 Daily Delivery Respiratory  Silicone Nasal Cannula

## 2019-02-28 NOTE — FLOWSHEET NOTE
Reviewed home respiratory meds and oxygen, patient encouraged to purchase a home oximeter.     02/28/19 1206   RT Assessment of Delivered Medications   Evaluation of Medication Delivery Daily Yes-- Pt /Family has been Instructed in use of Respiratory Medications and Adverse Reactions   SVN Group   #SVN Performed Yes   Given By: Mouthpiece   Chest Exam   Respiration 18   Pulse 98  (just walked from bathroom)   Breath Sounds   RUL Breath Sounds Clear   RML Breath Sounds Diminished   RLL Breath Sounds Diminished   AUSTIN Breath Sounds Clear   LLL Breath Sounds Diminished   Oxygen   Pulse Oximetry 92 %  (oxygen has been off about 5+ minutes, walking to bathroom)   O2 (LPM) 0   O2 Daily Delivery Respiratory  Room Air with O2 Available

## 2019-02-28 NOTE — FLOWSHEET NOTE
02/27/19 2015   Events/Summary/Plan   Events/Summary/Plan SVN MDI   Interdisciplinary Plan of Care-Goals (Indications)   Bronchodilator Indications History / Diagnosis   Interdisciplinary Plan of Care-Outcomes    Bronchodilator Outcome Patient at Stable Baseline   Education   Education Yes - Pt. / Family has been Instructed in use of Respiratory Equipment;Yes - Pt. / Family has been Instructed in use of Respiratory Medications and Adverse Reactions   RT Assessment of Delivered Medications   Evaluation of Medication Delivery Daily Yes-- Pt /Family has been Instructed in use of Respiratory Medications and Adverse Reactions   SVN Group   #SVN Performed Yes   Given By: Mouthpiece   MDI/DPI Group   #MDI/DPI Given MDI/DPI x 1   Respiratory WDL   Respiratory (WDL) X   Chest Exam   Work Of Breathing / Effort Mild   Respiration 18   Pulse 91   Breath Sounds   Pre/Post Intervention Post Intervention Assessment   RUL Breath Sounds Clear   RML Breath Sounds Clear;Diminished   RLL Breath Sounds Diminished   AUSTIN Breath Sounds Clear   LLL Breath Sounds Diminished   Oximetry   #Pulse Oximetry (Single Determination) Yes   Oxygen   Pulse Oximetry 96 %   O2 (LPM) 1   O2 Daily Delivery Respiratory  Silicone Nasal Cannula

## 2019-03-01 ENCOUNTER — PATIENT OUTREACH (OUTPATIENT)
Dept: HEALTH INFORMATION MANAGEMENT | Facility: OTHER | Age: 69
End: 2019-03-01

## 2019-03-01 NOTE — DISCHARGE SUMMARY
Discharge Summary    CHIEF COMPLAINT ON ADMISSION  Chief Complaint   Patient presents with   • Respiratory Distress       Reason for Admission  COPD with acute exacerbation (HCC)     Admission Date  2/23/2019    CODE STATUS  Prior    HPI & HOSPITAL COURSE    *68 y.o. female with PMH COPD (2L NC) admitted 2/23/2019 with worsening SOB and needing to keep her oxygen on when previously she could elect not to use.*      She had increasing shortness of breath and was unable to get an appointment with her primary care physician for several weeks, she found that she needed to use her oxygen all of the time which was problematic because due to osteoporosis of the spine and compression fractures and chronic back pain she is unable to manage the portable tanks.  She therefore became housebound.  She continued to be more short of breath and presented to the ER.  Chest x-ray showed bilateral atelectasis, patient was encouraged to ambulate around the unit and use incentive spirometry, with aggressive respiratory care she was able to saturate in the 90s on room air by the time of discharge.  However her pulmonary function is overall poor, and she will require portable oxygen concentrator due to her back pain and issues, unfortunately we are unable to complete the adequate preauthorization in order for her to receive this equipment.  Therefore she will follow-up in the discharge clinic in order to initiate this process, ultimately she will follow-up with her primary care doctor as well but the appointment is not until the end of April.     During her hospitalization it was noted she had apathy, low energy and depressed affect.  She admitted to being depressed for some time and had taken Lexapro in the past without significant improvement.  She denied suicidal ideation.  She was willing to try new medication and Remeron was initiated.    Therefore, she is discharged in good and stable condition to home with close outpatient  follow-up.    The patient met 2-midnight criteria for an inpatient stay at the time of discharge.    Discharge Date  2/28/2019    FOLLOW UP ITEMS POST DISCHARGE  PCP    DISCHARGE DIAGNOSES  Principal Problem:    Acute exacerbation of chronic obstructive pulmonary disease (COPD) ([facility]) POA: Yes  Active Problems:    Acute on chronic respiratory failure with hypoxemia ([facility]) POA: Yes    Hyperthyroidism POA: Yes    Closed compression fracture of second lumbar vertebra ([facility]) POA: Yes    Hypertension POA: Yes    DVT (deep venous thrombosis) ([facility]) (Chronic) POA: Yes    Atelectasis POA: Unknown    Depression, major, recurrent, moderate ([facility]) POA: Unknown  Resolved Problems:    * No resolved hospital problems. *      FOLLOW UP  Future Appointments  Date Time Provider Department Center   3/1/2019 9:40 AM [facility]NAGER [address]   3/6/2019 8:20 AM CHARLENE Butterfield   3/7/2019 8:20 AM Elena Ramesh M.D. [address]   4/24/2019 3:40 PM CHARLENE Butterfield   4/29/2019 3:00 PM Marybeth Lynch M.D. [facility]   5/7/2019 1:30 PM Jessenia Byrne M.D. [facility] None     Elena Ramesh M.D.  [address] [facility]  [address]  [place] [address]  [phone]      The apppointment with Dr. Ramesh is with the hospital follow up clinic. This is a one time hospital follow up appointment before you establish with Dr. Lynch.     Barber Joyner M.D.  [id number] [facility]  [address]  [place] [address]  [phone]            MEDICATIONS ON DISCHARGE     Medication List      START taking these medications      Instructions   azithromycin 250 MG Tabs  Commonly known as:  ZITHROMAX   1 daily for COPD     benzonatate 100 MG Caps  Commonly known as:  TESSALON   Take 1 Cap by mouth 3 times a day as needed for Cough.  Dose:  100 mg     carvedilol 3.125 MG Tabs  Commonly known as:  COREG   Take 1 Tab by mouth 2 times a day, with meals.  Dose:  3.125 mg     mirtazapine 15 MG  Tbdp  Commonly known as:  REMERON   Take 1 Tab by mouth every bedtime.  Dose:  15 mg     timolol 0.5 % Soln  Commonly known as:  TIMOPTIC   Place 1 Drop in both eyes 2 Times a Day.  Dose:  1 Drop        CHANGE how you take these medications      Instructions   ipratropium-albuterol 0.5-2.5 (3) MG/3ML nebulizer solution  What changed:  · when to take this  · reasons to take this  Commonly known as:  DUONEB   3 mL by Nebulization route every four hours as needed for Shortness of Breath.  Dose:  3 mL        CONTINUE taking these medications      Instructions   acetaminophen 500 MG Tabs  Commonly known as:  TYLENOL   Take 1,000 mg by mouth every 6 hours as needed for Moderate Pain.  Dose:  1000 mg     AMBIEN 10 MG Tabs  Generic drug:  zolpidem   Take 10 mg by mouth every bedtime.  Dose:  10 mg     BREO ELLIPTA 200-25 MCG/INH Aepb  Generic drug:  Fluticasone Furoate-Vilanterol   INHALE ONE DOSE BY MOUTH DAILY. RINSE MOUTH AFTER USE.     COMBIGAN 0.2-0.5 % Soln  Generic drug:  Brimonidine Tartrate-Timolol   Place 1 Drop in both eyes 2 Times a Day.  Dose:  1 Drop     docusate sodium 100 MG Caps  Commonly known as:  COLACE   Take 300 mg by mouth every evening.  Dose:  300 mg     fluticasone 50 MCG/ACT nasal spray  Commonly known as:  FLONASE   Spray 1 Spray in nose as needed.  Dose:  1 Spray     losartan 50 MG Tabs  Commonly known as:  COZAAR   Take 50 mg by mouth every morning.  Dose:  50 mg     methimazole 5 MG Tabs  Commonly known as:  TAPAZOLE   Take 1 Tab by mouth 2 Times a Day.  Dose:  5 mg     multivitamin Tabs   Take 1 Tab by mouth every day.  Dose:  1 Tab     NORCO  MG Tabs  Generic drug:  HYDROcodone/acetaminophen   Take 1 Tab by mouth every day.  Dose:  1 Tab     PROAIR  (90 Base) MCG/ACT Aers inhalation aerosol  Generic drug:  albuterol   INHALE TWO PUFFS BY MOUTH EVERY 6 HOURS AS NEEDED FOR SHORTNESS OF BREATH     rivaroxaban 20 MG Tabs tablet  Commonly known as:  XARELTO   Take 1 Tab by mouth with  dinner.  Dose:  20 mg     simvastatin 40 MG Tabs  Commonly known as:  ZOCOR   Take 40 mg by mouth every evening.  Dose:  40 mg     SPIRIVA RESPIMAT 2.5 MCG/ACT Aers  Generic drug:  Tiotropium Bromide Monohydrate   Inhale 1 Puff by mouth 2 Times a Day.  Dose:  1 Puff     vitamin D (Ergocalciferol) 25284 units Caps capsule  Commonly known as:  DRISDOL   Take 50,000 Units by mouth every 14 days.  Dose:  73284 Units        STOP taking these medications    propranolol LA 60 MG Cp24  Commonly known as:  INDERAL LA            Allergies  Allergies   Allergen Reactions   • Nkda [No Known Drug Allergy]        DIET  Regular    ACTIVITY  As tolerated.  Weight bearing as tolerated    CONSULTATIONS  none    PROCEDURES  none    LABORATORY  Lab Results   Component Value Date    SODIUM 138 02/26/2019    POTASSIUM 5.0 02/26/2019    CHLORIDE 110 02/26/2019    CO2 21 02/26/2019    GLUCOSE 170 (H) 02/26/2019    BUN 28 (H) 02/26/2019    CREATININE 1.38 02/26/2019    CREATININE 1.7 (H) 09/19/2008        Lab Results   Component Value Date    WBC 10.0 02/26/2019    HEMOGLOBIN 10.8 (L) 02/26/2019    HEMATOCRIT 33.4 (L) 02/26/2019    PLATELETCT 157 (L) 02/26/2019        Total time of the discharge process exceeds 34 minutes.

## 2019-03-04 NOTE — TELEPHONE ENCOUNTER
DOCUMENTATION OF PRIOR AUTH STATUS    1. Medication name and dose: Albuterol HFA 90 mcg    2. Name and Phone # of Prescription coverage company: [employer] [phone]    3. Date Prior Auth was submitted: 3/4/2019    4. What information was given to obtain insurance decision: Clinical notes    5. Prior Auth letter Approved or Denied: Approved through 3/3/2020    6. Pharmacy notified: Yes    7. Patient notified: Yes

## 2019-03-05 DIAGNOSIS — J44.9 CHRONIC OBSTRUCTIVE PULMONARY DISEASE, UNSPECIFIED COPD TYPE (HCC): ICD-10-CM

## 2019-03-05 RX ORDER — IPRATROPIUM BROMIDE AND ALBUTEROL SULFATE 2.5; .5 MG/3ML; MG/3ML
3 SOLUTION RESPIRATORY (INHALATION) EVERY 4 HOURS PRN
Qty: 120 ML | Refills: 11 | Status: SHIPPED | OUTPATIENT
Start: 2019-03-05 | End: 2019-09-18

## 2019-03-05 NOTE — DOCUMENTATION QUERY
[facility]                                                                         Query Response Note      PATIENT:               ROSARIO MARTINEZ  ACCT #:                  [id number]  MRN:                       [id number]  :                       1950  ADMIT DATE:       2019 4:59 AM  DISCH DATE:        2019 2:01 PM  RESPONDING  PROVIDER #:        [id number]           RESPONSE TEXT:    Acute    QUERY TEXT:    Acuity Specificity [id number]    Renal Failure is documented in the Medical Record. Please specify the acuity of this condition.    NOTE:  If the appropriate response is not listed below, please respond with a new note.    The patient's Clinical Indicators include:  Progress Note 2019  --- Renal Failure not improved despite fluids yesterday    Labs  thru   BUN 26-28  Creatinine 1.6-1.62  GFR 38  Query created by: Carmela Galdamez on 2019 2:52 PM        Electronically signed by:  REBEL COE MD 3/5/2019 7:39 AM

## 2019-03-05 NOTE — TELEPHONE ENCOUNTER
Patient would like prednisone. She has been taking the z pack from the hospital visit 02/28/19. She is short of breath and is coughing up green mucus.

## 2019-03-06 ENCOUNTER — APPOINTMENT (OUTPATIENT)
Dept: ENDOCRINOLOGY | Facility: MEDICAL CENTER | Age: 69
End: 2019-03-06
Payer: MEDICARE

## 2019-03-07 ENCOUNTER — OFFICE VISIT (OUTPATIENT)
Dept: MEDICAL GROUP | Facility: MEDICAL CENTER | Age: 69
End: 2019-03-07
Payer: MEDICARE

## 2019-03-07 VITALS
RESPIRATION RATE: 16 BRPM | DIASTOLIC BLOOD PRESSURE: 74 MMHG | TEMPERATURE: 96.8 F | BODY MASS INDEX: 26.68 KG/M2 | HEIGHT: 67 IN | OXYGEN SATURATION: 93 % | HEART RATE: 65 BPM | SYSTOLIC BLOOD PRESSURE: 120 MMHG | WEIGHT: 170 LBS

## 2019-03-07 DIAGNOSIS — G47.9 SLEEP DISTURBANCE: ICD-10-CM

## 2019-03-07 DIAGNOSIS — J98.11 ATELECTASIS: ICD-10-CM

## 2019-03-07 DIAGNOSIS — N18.30 CKD (CHRONIC KIDNEY DISEASE) STAGE 3, GFR 30-59 ML/MIN (HCC): ICD-10-CM

## 2019-03-07 DIAGNOSIS — D53.9 MACROCYTIC ANEMIA: ICD-10-CM

## 2019-03-07 DIAGNOSIS — Z09 HOSPITAL DISCHARGE FOLLOW-UP: ICD-10-CM

## 2019-03-07 DIAGNOSIS — J44.1 ACUTE EXACERBATION OF CHRONIC OBSTRUCTIVE PULMONARY DISEASE (COPD) (HCC): ICD-10-CM

## 2019-03-07 PROCEDURE — 99496 TRANSJ CARE MGMT HIGH F2F 7D: CPT | Performed by: FAMILY MEDICINE

## 2019-03-07 RX ORDER — ZOLPIDEM TARTRATE 5 MG/1
5 TABLET ORAL NIGHTLY PRN
Qty: 30 TAB | Refills: 1 | Status: SHIPPED | OUTPATIENT
Start: 2019-03-07 | End: 2019-04-06

## 2019-03-07 NOTE — PROGRESS NOTES
Patient's oxygen saturation on RA while restin%.  Patient's oxygen saturation on RA while ambulatin%.

## 2019-03-07 NOTE — PATIENT INSTRUCTIONS
If you need further assistance, or have any questions; concerns or lingering symptoms before seeing your Primary Care Provider or specialist.     Do not hesitate to contact us.     Please contact us at the Post-Hospital Follow Up Program at [phone] and ask for the Medical Assistant for Dr Ramesh.   Our offices hours are Monday-Friday 8 am-5 pm.

## 2019-03-07 NOTE — PROGRESS NOTES
Subjective:     Latonia Clinton is a 68 y.o. female who presents for Hospital Follow-up.  Chart and discharge summary reviewed.   Date of discharge 2/28/19.  48- hour post discharge RN call completed on 3/1/19 and documented in the medical record by Haylie Reeder, RN:   POST DISCHARGE CALL:  Discharge Date:2/28/2019   Date of Outreach Call: 3/1/2019  9:53 AM  Now that you're home, how are you doing? Fair  Comment:Pt reports she still feels short of breath at  times. Reports she does have oxygen at home and has been  wearing at 2 liters. Pt states she has a pulmonary provider,  Dr. Byrne. Pt will call to schedule a follow-up appt  Do you have questions about your medications? No    Did you fill your medications? Yes  Comment:Pt will contact Dr. Byrne's office to see if she  needs to continue on prednisone.     Do you have a follow-up appointment scheduled?Yes  Comment:PCP 3/7/19    Discharging Department: [facility]    Number of Attempts: 1  Current or previous attempts completed within two business days of discharge? Yes  Provided education regarding treatment plan, medication, self-management, ADLs? Yes  Has patient completed Advance Directive? If yes, advise them to bring to appointment. No  Care Manager phone number provided? Yes  Is there anything else I can help you with? No        HPI: Recently hospitalized for COPD with acute exacerbation.  She presented with worsening shortness of breath.  Chest x-ray showed bilateral atelectasis.  She was treated with aggressive respiratory care and incentive spirometry.  She continues using her nebulizer, Spiriva, Breo Ellipta.  She has oxygen for use at night but does not feel like it is helping her breathe easier.  She is followed by Dr. Byrne.  She has a history of lung cancer treated with thoracoscopic partial left upper lobectomy done by Dr. Ganser in December 2016 which demonstrated adenocarcinoma with pleural involvement.    She has osteoporosis and  is followed by Dr. Emerson.  She is treated with Prolia.  She has a history of compression fractures.  He is also following her for possible thyrotoxicosis.  She was recently started on Tapazole.    Other chronic problems include chronic kidney disease, chronic insomnia.  She is followed by a nephrologist.  She requests a refill of Ambien 10 mg which she says she has taken nightly for a long time.    She was started on Remeron for presumed depression but it made her too drowsy so she stopped it and she feels that she does not need it.    Labs notable for macrocytic anemia which appears to be chronic.  I could not find a B12 level.    Since returning home, patient reports feeling lousy.  She continues to have trouble breathing.  She has a pulmonary follow-up in 4 days.     The patient has questions regarding hospitalization or discharge: All of her questions were answered.  The patient has weakness; denies difficulty taking care of self at home.  Patient reports taking most of her medications as instructed.  She discontinued the Remeron.    Current Outpatient Prescriptions   Medication Sig Dispense Refill   • zolpidem (AMBIEN) 5 MG Tab Take 1 Tab by mouth at bedtime as needed for Sleep for up to 30 days. 30 Tab 1   • ipratropium-albuterol (DUONEB) 0.5-2.5 (3) MG/3ML nebulizer solution 3 mL by Nebulization route every four hours as needed for Shortness of Breath. 120 mL 11   • SPIRIVA RESPIMAT 2.5 MCG/ACT Aero Soln INHALE TWO PUFFS BY MOUTH DAILY (ASSEMBLE AND PRIME) 1 Inhaler 3   • ipratropium-albuterol (DUONEB) 0.5-2.5 (3) MG/3ML nebulizer solution 3 mL by Nebulization route every four hours as needed for Shortness of Breath. 100 Bullet 2   • carvedilol (COREG) 3.125 MG Tab Take 1 Tab by mouth 2 times a day, with meals. 60 Tab 3   • benzonatate (TESSALON) 100 MG Cap Take 1 Cap by mouth 3 times a day as needed for Cough. 60 Cap 0   • azithromycin (ZITHROMAX) 250 MG Tab 1 daily for COPD 30 Tab 11   • timolol  "(TIMOPTIC) 0.5 % Solution Place 1 Drop in both eyes 2 Times a Day. 1 Bottle 3   • methimazole (TAPAZOLE) 5 MG Tab Take 1 Tab by mouth 2 Times a Day. 90 Tab 2   • docusate sodium (COLACE) 100 MG Cap Take 300 mg by mouth every evening.     • fluticasone (FLONASE) 50 MCG/ACT nasal spray Spray 1 Spray in nose as needed.     • acetaminophen (TYLENOL) 500 MG Tab Take 1,000 mg by mouth every 6 hours as needed for Moderate Pain.     • multivitamin (THERAGRAN) Tab Take 1 Tab by mouth every day.     • rivaroxaban (XARELTO) 20 MG Tab tablet Take 1 Tab by mouth with dinner. 30 Tab 3   • PROAIR  (90 Base) MCG/ACT Aero Soln inhalation aerosol INHALE TWO PUFFS BY MOUTH EVERY 6 HOURS AS NEEDED FOR SHORTNESS OF BREATH 1 Inhaler 11   • HYDROcodone/acetaminophen (NORCO)  MG Tab Take 1 Tab by mouth every day.     • BREO ELLIPTA 200-25 MCG/INH AEROSOL POWDER, BREATH ACTIVATED INHALE ONE DOSE BY MOUTH DAILY. RINSE MOUTH AFTER USE. 1 Each 11   • COMBIGAN 0.2-0.5 % Solution Place 1 Drop in both eyes 2 Times a Day.     • vitamin D, Ergocalciferol, (DRISDOL) 78162 UNITS Cap capsule Take 50,000 Units by mouth every 14 days.     • simvastatin (ZOCOR) 40 MG Tab Take 40 mg by mouth every evening.     • losartan (COZAAR) 50 MG TABS Take 50 mg by mouth every morning.       No current facility-administered medications for this visit.        Allergies:   Nkda [no known drug allergy]    Social History:  Social History   Substance Use Topics   • Smoking status: Former Smoker     Packs/day: 1.00     Years: 30.00     Types: Cigarettes     Quit date: 1/1/2000   • Smokeless tobacco: Never Used      Comment: 7 years ago   • Alcohol use 0.0 oz/week      Comment: x2 a week        Family History:  Family History   Problem Relation Age of Onset   • Cancer Mother    • Heart Failure Father    • Heart Disease Brother        Past Medical History:   Diagnosis Date   • Anesthesia     \"Abnormal blood pressure and breathing\"  \"couldn't breathe\"   • Asthma " "    inhalers daily   • Backpain 7/2017     thor. area   • Blood clot in vein 07/06/2018    \"Currently have a blood clot in my left eye\"   • Bowel habit changes 07/06/2018    Constipation   • Breath shortness     with exertion has O2 but does not use   • Bronchitis    • CAD (coronary artery disease)     palpatations   • Cancer (HCC) 2016    left lung   • Chickenpox    • COPD (chronic obstructive pulmonary disease) (HCC)    • Depression 2/26/2019   • Dialysis 2005    transient renal failure due to sepsis   • Emphysema of lung (HCC)    • Glaucoma     right eye   • Hemorrhagic disorder (HCC)    • Hyperlipidemia    • Hypertension    • Hyperthyroidism    • Kidney stone     bilateral   • Lung cancer (HCC) 12/12/2016   • Multiple thyroid nodules 7/9/2015   • Nasal drainage    • Osteoporosis    • Pain 07/06/2018    Back pain   • Personal history of venous thrombosis and embolism 2004, 2012    right leg 2012, left arm dvt 2004 left eye 2017   • Pneumonia 7/2016    per patient   • Renal disorder     had been on dialysis for 7 months for acute failure 2005   • Renal stones 2013    post lithotripsy   • Rheumatoid arthritis (HCC)     question   • Rheumatoid arthritis (HCC)    • S/P appendectomy 1998    • S/P cholecystectomy 1998   • S/P kyphoplasty 2006, 2007, 2013   • Sepsis(995.91) 2005   • Shortness of breath 07/06/2018    Chronic current problem. \"A couple of years now\".  O2 concentrator at night - pt states she doesn't use it.   • Thyroid nodule, hot 2017    functional \"hot\" right thyroid nodule without thyrotoxicosis       Surgical History  Past Surgical History:   Procedure Laterality Date   • CYSTOSCOPY STENT PLACEMENT  7/9/2018    Procedure: Cystoscopy,  Left removal of stent ,  Left Stent Placement;  Surgeon: Beck Yang M.D.;  Location: [facility];  Service: Urology   • URETEROSCOPY Left 7/9/2018    Procedure: URETEROSCOPY;  Surgeon: Beck Yang M.D.;  Location: [facility];  Service: " Urology   • LASERTRIPSY Left 7/9/2018    Procedure: LASERTRIPSY-LITHO;  Surgeon: Beck Yang M.D.;  Location: [facility];  Service: Urology   • CYSTOSCOPY STENT PLACEMENT Left 6/18/2018    Procedure: CYSTOSCOPY STENT PLACEMENT;  Surgeon: Beck Yang M.D.;  Location: [facility];  Service: Urology   • LITHOTRIPSY Left 6/18/2018    Procedure: LITHOTRIPSY;  Surgeon: Beck Yang M.D.;  Location: [facility];  Service: Urology   • LASERTRIPSY Left 6/18/2018    Procedure: LASERTRIPSY;  Surgeon: Beck Yang M.D.;  Location: [facility];  Service: Urology   • URETEROSCOPY Left 6/18/2018    Procedure: URETEROSCOPY;  Surgeon: Beck Yang M.D.;  Location: [facility];  Service: Urology   • THORACOSCOPY Left 12/12/2016    Procedure: THORACOSCOPY W/WEDGE RESECTION UPPER LOBE MASS;  Surgeon: John H Ganser, M.D.;  Location: [facility];  Service:    • OTHER ORTHOPEDIC SURGERY  2013    kyphoplasty X3   • APPENDECTOMY      1990   • CHOLECYSTECTOMY      1990   • LAMINOTOMY     • LUNG BIOPSY OPEN     • OTHER     • OTHER ABDOMINAL SURGERY      gall bladder disease   • PB ENLARGE BREAST WITH IMPLANT     • PB REDUCTION OF LARGE BREAST         ROS:    Constitutional:  Denies fever, chills, night sweats  HENT: Denies head congestion, ear pain or drainage, decreased hearing, sore throat  Eyes: Denies visual changes, eye drainage or redness, eye pain  Neck: Denies pain, swollen glands, decreased range of motion  Lungs: Denies cough.  Complains of shortness of breath and sometimes wheezing.  Cardiovascular: Denies chest pain, orthopnea, lower extremity edema, palpitations  Abdominal: Denies abdominal pain, change in bowel or bladder habits, nausea or vomiting  Musculoskeletal: Chronic back pain related to compression fractures.  Endocrine: Denies unexplained weight changes, heat or cold intolerance, hair loss, polyuria or polydipsia  Neurological:  "Denies dizziness, headache, confusion, focal weakness or numbness, memory loss  Psychiatric: Denies depression, anxiety, insomnia       Objective:     Blood pressure 120/74, pulse 65, temperature 36 °C (96.8 °F), temperature source Temporal, resp. rate 16, height 1.702 m (5' 7\"), weight 77.1 kg (170 lb), SpO2 93 %, not currently breastfeeding.     Physical Exam:    GEN:  Alert, oriented, in no distress  HEENT:  PERRLA, EOMI  NECK;  Supple without cervical adenopathy  LUNGS:  Clear to auscultation with occasional wheeze at the bases and mildly decreased breath sounds, more at the bases.  CV:  Heart RRR without murmurs or S3 or S4  EXT:  Without cyanosis, clubbing, or edema.  NEURO:  Cranial nerves II through XII intact.  Motor function and sensation intact.  SKIN: No rashes or suspicious lesions.  PSYCH:  Behavior is appropriate.    Ambulating oxygen test: Oxygen saturation on room air while restin%                                         Oxygen saturation on room air while ambulatin%      Assessment and Plan:     1. Hospital follow-up:   Hospitalization and results reviewed with patient. High risk conditions requiring teaching or care coordination were identified and addressed.The patient demonstrate understanding of admission and underlying conditions. The patient understands discharge instructions and when to seek medical attention. Medications reviewed including instructions regarding high risk medications, dosing and side effects.    The patient is able to safely adhere to ADL/IADL, treatment and medication regimen, self-manage of high-risk conditions? Yes  The patient requires physical therapy/home health/DME referral? No   The patient requires referral to care coordination/behavioral health/social work?  No   Patient requires referral for pharmacy consult? No   Advance directive/POLST on file?  No   Required counseling on advance directive?  Deferred    2. Acute exacerbation of chronic obstructive " pulmonary disease (COPD) ([facility])  -Clinically she still feels short of breath.  She says that when she lies on her left side she feels fine.  She suspects that her spinal abnormalities may be contributory factor and I concur with this.  Her oxygen test shows a minimal oxygen saturation of 91% so she would not qualify for a portable oxygen concentrator as was suggested in the discharge summary.  She also does not feel that the oxygen helps.    3. CKD (chronic kidney disease) stage 3, GFR 30-59 ml/min ([facility])  -Stable.  Nephrology follow-up as scheduled    4. Atelectasis  -She is encouraged to enter the pulmonary rehab program which she said was ordered for her but she does not know the status of this.    5. Sleep disturbance  -She is counseled that Ambien is not recommended after age 65 due to risks outweighing the benefits.  She has been taking it chronically so I have agreed to refill it at a 5 mg dosage instead of 10 mg.  I encouraged her to work with her PCP to taper off of it and find alternatives.  I also recommended and gave her information on cognitive behavioral therapy for insomnia.  - zolpidem (AMBIEN) 5 MG Tab; Take 1 Tab by mouth at bedtime as needed for Sleep for up to 30 days.  Dispense: 30 Tab; Refill: 1    6. Macrocytic anemia  -Chronic.  B12 level can be checked by PCP if not done recently.        Medication Reconciliation  Medication list at end of encounter:   Current Outpatient Prescriptions   Medication Sig Dispense Refill   • zolpidem (AMBIEN) 5 MG Tab Take 1 Tab by mouth at bedtime as needed for Sleep for up to 30 days. 30 Tab 1   • ipratropium-albuterol (DUONEB) 0.5-2.5 (3) MG/3ML nebulizer solution 3 mL by Nebulization route every four hours as needed for Shortness of Breath. 120 mL 11   • SPIRIVA RESPIMAT 2.5 MCG/ACT Aero Soln INHALE TWO PUFFS BY MOUTH DAILY (ASSEMBLE AND PRIME) 1 Inhaler 3   • ipratropium-albuterol (DUONEB) 0.5-2.5 (3) MG/3ML nebulizer solution 3 mL by Nebulization route every  four hours as needed for Shortness of Breath. 100 Bullet 2   • carvedilol (COREG) 3.125 MG Tab Take 1 Tab by mouth 2 times a day, with meals. 60 Tab 3   • benzonatate (TESSALON) 100 MG Cap Take 1 Cap by mouth 3 times a day as needed for Cough. 60 Cap 0   • azithromycin (ZITHROMAX) 250 MG Tab 1 daily for COPD 30 Tab 11   • timolol (TIMOPTIC) 0.5 % Solution Place 1 Drop in both eyes 2 Times a Day. 1 Bottle 3   • methimazole (TAPAZOLE) 5 MG Tab Take 1 Tab by mouth 2 Times a Day. 90 Tab 2   • docusate sodium (COLACE) 100 MG Cap Take 300 mg by mouth every evening.     • fluticasone (FLONASE) 50 MCG/ACT nasal spray Spray 1 Spray in nose as needed.     • acetaminophen (TYLENOL) 500 MG Tab Take 1,000 mg by mouth every 6 hours as needed for Moderate Pain.     • multivitamin (THERAGRAN) Tab Take 1 Tab by mouth every day.     • rivaroxaban (XARELTO) 20 MG Tab tablet Take 1 Tab by mouth with dinner. 30 Tab 3   • PROAIR  (90 Base) MCG/ACT Aero Soln inhalation aerosol INHALE TWO PUFFS BY MOUTH EVERY 6 HOURS AS NEEDED FOR SHORTNESS OF BREATH 1 Inhaler 11   • HYDROcodone/acetaminophen (NORCO)  MG Tab Take 1 Tab by mouth every day.     • BREO ELLIPTA 200-25 MCG/INH AEROSOL POWDER, BREATH ACTIVATED INHALE ONE DOSE BY MOUTH DAILY. RINSE MOUTH AFTER USE. 1 Each 11   • COMBIGAN 0.2-0.5 % Solution Place 1 Drop in both eyes 2 Times a Day.     • vitamin D, Ergocalciferol, (DRISDOL) 30394 UNITS Cap capsule Take 50,000 Units by mouth every 14 days.     • simvastatin (ZOCOR) 40 MG Tab Take 40 mg by mouth every evening.     • losartan (COZAAR) 50 MG TABS Take 50 mg by mouth every morning.       No current facility-administered medications for this visit.        Primary care follow-up:    Recommended followup:  With new Pcp   Future Appointments       Provider Department [place]    3/11/2019 4:30 PM [facility] Medicine     4/24/2019 3:40 PM Kishore Torrez M.D. [facility]    4/29/2019 3:00 PM Marybeth Lynch M.D. [facility]    5/7/2019 1:30 PM Jessenia Byrne M.D. [facility] Pulmonary Medicine           Patient Instruction  Patient provided with educational material on discharge diagnosis and management of symptoms/red flags. Patient instructed to keep follow-up appointments and to bring written questions and and actual medications to each office visit. Patient instructed to call PCP/specialist with any problems/questions/concerns. Patient verbalizes understanding and has no further questions at this time.    Face-to-face transitional care management services with high medical decision complexity were provided.

## 2019-03-11 ENCOUNTER — OFFICE VISIT (OUTPATIENT)
Dept: PULMONOLOGY | Facility: HOSPICE | Age: 69
End: 2019-03-11
Payer: MEDICARE

## 2019-03-11 VITALS
HEIGHT: 68 IN | SYSTOLIC BLOOD PRESSURE: 126 MMHG | BODY MASS INDEX: 25.46 KG/M2 | HEART RATE: 100 BPM | DIASTOLIC BLOOD PRESSURE: 74 MMHG | OXYGEN SATURATION: 91 % | RESPIRATION RATE: 16 BRPM | TEMPERATURE: 97.3 F | WEIGHT: 168 LBS

## 2019-03-11 DIAGNOSIS — C34.12 MALIGNANT NEOPLASM OF UPPER LOBE OF LEFT LUNG (HCC): ICD-10-CM

## 2019-03-11 DIAGNOSIS — R00.0 TACHYCARDIA: ICD-10-CM

## 2019-03-11 DIAGNOSIS — J44.9 CHRONIC OBSTRUCTIVE PULMONARY DISEASE, UNSPECIFIED COPD TYPE (HCC): ICD-10-CM

## 2019-03-11 PROCEDURE — 99214 OFFICE O/P EST MOD 30 MIN: CPT | Performed by: NURSE PRACTITIONER

## 2019-03-11 PROCEDURE — 94761 N-INVAS EAR/PLS OXIMETRY MLT: CPT | Performed by: NURSE PRACTITIONER

## 2019-03-11 RX ORDER — ALBUTEROL SULFATE 90 UG/1
AEROSOL, METERED RESPIRATORY (INHALATION)
COMMUNITY
Start: 2019-03-04 | End: 2019-12-10

## 2019-03-12 NOTE — PROGRESS NOTES
CC:  Here for f/u pulmonary issues as listed below    HPI:   Latonia presents today for follow up for sick visit with chest tightness and hospital f/u.  Past medical history of chronic pain, CKD stage III, depression, DVT, hypertension, RA.  She also has history of lung cancer and had a partial left upper lobectomy by Dr. Ganser December 2016 demonstratingadenocarcinoma with pleural involvement, pT2a.     Patient was admitted February 23, 2019 and discharged on the 28th for COPD exacerbation.  Chest x-ray completed showed no acute cardiopulmonary process.  And 2 days later showed increasing bibasilar opacities, likely atelectasis.  She was placed on respiratory protocol, and encouraged activity.  She was discharged in stable condition.    She has a 2-month history of chest tightness with some relief along with chest pain, that continues.  She has a history of a kyphoplasty completed in January 2019 along with back fractures.  She did have workup with CTA completed February 11, 2019 showing no evidence of PE.  Stable bilateral adrenal gland nodules.  Stable thyroid findings.  Echocardiogram from February 2019 shows estimated ejection fraction be 65-70%, mild concentric left ventricular hypertrophy, RVSP is estimated to be 30 mmHg.  While she was in the hospital they gave her a prednisone taper starting at 60mg she reports, which she found helped some in regards to chest tightness and shortness of breath.  She feels her activity level has decreased significantly due to the symptoms.  She is in chronic pain following a pain specialist and utilizing Norco.  Completed a Multi-ox on her on room air at rest she was 93% and desaturated to a low of 90%. HR at 79bpm at rest, increase to 100 with exertion.  She is using Trelegy daily.  Rescue inhaler and nebulizer rarely, with no improvement of chest tightness and shortness of breath.      PFTs from 2016 indicate a Fev1 of 1.43L or 54% predicted with a mild bronchodilator  "response, Fev1/FVC ratio of 66, DLCO 86%.  She is a former smoker, 30-pack-year history, quit in 2000.      Patient Active Problem List    Diagnosis Date Noted   • Closed compression fracture of second lumbar vertebra (Formerly [facility]) 01/28/2019     Priority: High   • Hyperthyroidism 07/25/2016     Priority: High   • Acute bronchitis 07/24/2016     Priority: High   • Acute on chronic respiratory failure with hypoxemia (Formerly [facility]) 07/24/2016     Priority: High   • Chronic obstructive pulmonary disease (HCC) 07/24/2016     Priority: High   • Lung mass 07/24/2016     Priority: Medium   • HTN (hypertension) 07/24/2016     Priority: Low   • CAD (coronary artery disease) 07/24/2016     Priority: Low   • CKD (chronic kidney disease) stage 3, GFR 30-59 ml/min (Formerly [facility]) 07/24/2016     Priority: Low   • Hyperlipidemia      Priority: Low   • Tachycardia 03/22/2019   • Atelectasis 02/26/2019   • Depression, major, recurrent, moderate (Formerly [facility]) 02/26/2019   • Macrocytic anemia 02/12/2019   • ARF (acute renal failure) (Formerly [facility]) 02/11/2019   • Chronic back pain 02/10/2019   • Closed wedge compression fracture of first lumbar vertebra (Formerly [facility]) 01/24/2019   • Multinodular goiter 09/13/2018   • Drug-induced constipation 08/30/2018   • Obstruction of left ureteropelvic junction (UPJ) due to stone 06/17/2018   • Urinary tract infection with hematuria 06/17/2018   • Closed T11 fracture (Formerly [facility]) 06/17/2018   • Lung cancer (Formerly [facility]) 12/12/2016   • Lung nodule 10/27/2016   • DVT (deep venous thrombosis) (Formerly [facility]) 07/28/2016   • Deep vein thrombosis (DVT) (Formerly [facility]) 07/28/2016   • Thyrotoxicosis with toxic single thyroid nodule and without thyroid storm 07/24/2015   • Multiple thyroid nodules 07/09/2015   • Low TSH level 06/16/2015   • Osteoporosis    • Hypertension    • Renal disorder    • Rheumatoid arthritis (Formerly [facility])        Past Medical History:   Diagnosis Date   • Anesthesia     \"Abnormal blood pressure and breathing\"  \"couldn't breathe\"   • Asthma     inhalers daily   • Backpain " "7/2017     thor. area   • Blood clot in vein 07/06/2018    \"Currently have a blood clot in my left eye\"   • Bowel habit changes 07/06/2018    Constipation   • Breath shortness     with exertion has O2 but does not use   • Bronchitis    • CAD (coronary artery disease)     palpatations   • Cancer (HCC) 2016    left lung   • Chickenpox    • COPD (chronic obstructive pulmonary disease) (HCC)    • Depression 2/26/2019   • Dialysis 2005    transient renal failure due to sepsis   • Emphysema of lung (HCC)    • Glaucoma     right eye   • Hemorrhagic disorder (HCC)    • Hyperlipidemia    • Hypertension    • Hyperthyroidism    • Kidney stone     bilateral   • Lung cancer (HCC) 12/12/2016   • Multiple thyroid nodules 7/9/2015   • Nasal drainage    • Osteoporosis    • Pain 07/06/2018    Back pain   • Personal history of venous thrombosis and embolism 2004, 2012    right leg 2012, left arm dvt 2004 left eye 2017   • Pneumonia 7/2016    per patient   • Renal disorder     had been on dialysis for 7 months for acute failure 2005   • Renal stones 2013    post lithotripsy   • Rheumatoid arthritis (HCC)     question   • Rheumatoid arthritis (HCC)    • S/P appendectomy 1998    • S/P cholecystectomy 1998   • S/P kyphoplasty 2006, 2007, 2013   • Sepsis(995.91) 2005   • Shortness of breath 07/06/2018    Chronic current problem. \"A couple of years now\".  O2 concentrator at night - pt states she doesn't use it.   • Thyroid nodule, hot 2017    functional \"hot\" right thyroid nodule without thyrotoxicosis       Past Surgical History:   Procedure Laterality Date   • CYSTOSCOPY STENT PLACEMENT  7/9/2018    Procedure: Cystoscopy,  Left removal of stent ,  Left Stent Placement;  Surgeon: Beck Yang M.D.;  Location: SURGERY [facility];  Service: Urology   • URETEROSCOPY Left 7/9/2018    Procedure: URETEROSCOPY;  Surgeon: Beck Yang M.D.;  Location: SURGERY [facility];  Service: Urology   • LASERTRIPSY Left 7/9/2018    Procedure: " LASERTRIPSY-LITHO;  Surgeon: Beck Yang M.D.;  Location: [facility];  Service: Urology   • CYSTOSCOPY STENT PLACEMENT Left 6/18/2018    Procedure: CYSTOSCOPY STENT PLACEMENT;  Surgeon: Beck Yang M.D.;  Location: [facility];  Service: Urology   • LITHOTRIPSY Left 6/18/2018    Procedure: LITHOTRIPSY;  Surgeon: Beck Yang M.D.;  Location: [facility];  Service: Urology   • LASERTRIPSY Left 6/18/2018    Procedure: LASERTRIPSY;  Surgeon: Beck Yang M.D.;  Location: [facility];  Service: Urology   • URETEROSCOPY Left 6/18/2018    Procedure: URETEROSCOPY;  Surgeon: Beck Yang M.D.;  Location: [facility];  Service: Urology   • THORACOSCOPY Left 12/12/2016    Procedure: THORACOSCOPY W/WEDGE RESECTION UPPER LOBE MASS;  Surgeon: John H Ganser, M.D.;  Location: [facility];  Service:    • OTHER ORTHOPEDIC SURGERY  2013    kyphoplasty X3   • APPENDECTOMY      1990   • CHOLECYSTECTOMY      1990   • LAMINOTOMY     • LUNG BIOPSY OPEN     • OTHER     • OTHER ABDOMINAL SURGERY      gall bladder disease   • PB ENLARGE BREAST WITH IMPLANT     • PB REDUCTION OF LARGE BREAST         Family History   Problem Relation Age of Onset   • Cancer Mother    • Heart Failure Father    • Heart Disease Brother        Social History   Substance Use Topics   • Smoking status: Former Smoker     Packs/day: 1.00     Years: 30.00     Types: Cigarettes     Quit date: 1/1/2000   • Smokeless tobacco: Never Used      Comment: 7 years ago   • Alcohol use 0.0 oz/week      Comment: x2 a week       Current Outpatient Prescriptions   Medication Sig Dispense Refill   • zolpidem (AMBIEN) 5 MG Tab Take 1 Tab by mouth at bedtime as needed for Sleep for up to 30 days. 30 Tab 1   • SPIRIVA RESPIMAT 2.5 MCG/ACT Aero Soln INHALE TWO PUFFS BY MOUTH DAILY (ASSEMBLE AND PRIME) 1 Inhaler 3   • ipratropium-albuterol (DUONEB) 0.5-2.5 (3) MG/3ML nebulizer solution 3 mL by  Nebulization route every four hours as needed for Shortness of Breath. 100 Bullet 2   • carvedilol (COREG) 3.125 MG Tab Take 1 Tab by mouth 2 times a day, with meals. 60 Tab 3   • azithromycin (ZITHROMAX) 250 MG Tab 1 daily for COPD 30 Tab 11   • methimazole (TAPAZOLE) 5 MG Tab Take 1 Tab by mouth 2 Times a Day. 90 Tab 2   • docusate sodium (COLACE) 100 MG Cap Take 300 mg by mouth every evening.     • fluticasone (FLONASE) 50 MCG/ACT nasal spray Spray 1 Spray in nose as needed.     • acetaminophen (TYLENOL) 500 MG Tab Take 1,000 mg by mouth every 6 hours as needed for Moderate Pain.     • multivitamin (THERAGRAN) Tab Take 1 Tab by mouth every day.     • rivaroxaban (XARELTO) 20 MG Tab tablet Take 1 Tab by mouth with dinner. 30 Tab 3   • PROAIR  (90 Base) MCG/ACT Aero Soln inhalation aerosol INHALE TWO PUFFS BY MOUTH EVERY 6 HOURS AS NEEDED FOR SHORTNESS OF BREATH 1 Inhaler 11   • HYDROcodone/acetaminophen (NORCO)  MG Tab Take 1 Tab by mouth every day.     • BREO ELLIPTA 200-25 MCG/INH AEROSOL POWDER, BREATH ACTIVATED INHALE ONE DOSE BY MOUTH DAILY. RINSE MOUTH AFTER USE. 1 Each 11   • COMBIGAN 0.2-0.5 % Solution Place 1 Drop in both eyes 2 Times a Day.     • vitamin D, Ergocalciferol, (DRISDOL) 43171 UNITS Cap capsule Take 50,000 Units by mouth every 14 days.     • simvastatin (ZOCOR) 40 MG Tab Take 40 mg by mouth every evening.     • losartan (COZAAR) 50 MG TABS Take 50 mg by mouth every morning.     • albuterol 108 (90 Base) MCG/ACT Aero Soln inhalation aerosol      • ipratropium-albuterol (DUONEB) 0.5-2.5 (3) MG/3ML nebulizer solution 3 mL by Nebulization route every four hours as needed for Shortness of Breath. 120 mL 11   • benzonatate (TESSALON) 100 MG Cap Take 1 Cap by mouth 3 times a day as needed for Cough. (Patient not taking: Reported on 3/11/2019) 60 Cap 0   • timolol (TIMOPTIC) 0.5 % Solution Place 1 Drop in both eyes 2 Times a Day. (Patient not taking: Reported on 3/11/2019) 1 Bottle 3  "    No current facility-administered medications for this visit.           Allergies: Nkda [no known drug allergy]          ROS   Gen: Denies fever, chills, unintentional weight loss, fatigue, night sweats  E/N/T: Denies nasal congestion, ear pain  Resp:Denies wheezing, cough, sputum production, hemoptysis  CV: Denies chest pain, palpitations  Sleep:Denies morning headache  Neuro: Denies frequent headaches, weakness, dizziness  GI: Denies acid reflux, N/V  See HPI.  All other systems reviewed and negative          Vital signs for this encounter:  Vitals:    03/11/19 1600 03/11/19 1619   Height:  1.727 m (5' 8\")   Weight:  76.2 kg (168 lb)   Weight % change since last entry.:  0 %   BP:  126/74   Pulse:  100   BMI (Calculated):  25.54   Resp:  16   Temp:  36.3 °C (97.3 °F)   TempSrc:  Temporal   O2 sat % room air: (!) 91 %                  Physical Exam:   Appearance: well developed, well nourished, no acute distress.   Eyes: PERRL, EOM intact, sclere white, conjunctiva moist.  Ears: no lesions or deformities.  Hearing: grossly intact.  Nose: no lesions or deformities.  Dentition: good dentition.   Oropharynx: tongue normal, posterior pharynx without erythema or exudate.  Neck: supple, trachea midline, no masses.  Respiratory effort: no intercostal retractions or use of accessory muscles.  Lung auscultation: Bilateral diminished   Heart auscultation: no murmur, rub, or gallop   Extremities: no cyanosis or edema.  Abdomen: soft, non-tender, no masses.  Gait and station: without difficulty   Digits and Nails: no clubbing, cyanosis, petechiae, or nodes.  Cranial nerves: grossly normal.  Motor: no focal deficits observed.   Skin: no rashes, lesions, or ulcers noted.  Orientation: oriented to time, place, and person.  Mood and affect: mood and affect appropriate, normal interaction with examiner.      Assessment   1. Chronic obstructive pulmonary disease, unspecified COPD type (HCC)  Multiple Oximetry    Multiple Oximetry "    REFERRAL TO [facility] IMPROVEMENT PROGRAMS (HIP) Services Requested: Pulmonary Rehab; Reason for Visit: COPD/Emphysema    CANCELED: REFERRAL TO CARDIOLOGY   2. Tachycardia  CANCELED: REFERRAL TO CARDIOLOGY   3. Malignant neoplasm of upper lobe of left lung (HCC)         Patient was seen for 25 minutes, more than 50% of time spent in face to face review, counseling, and arranging future evaluation and follow up of medical conditions and care relating to hospital f/u and current symptoms.  Reviewed case with Dr. Garcia who is impression is her shortness of breath and chest tightness is related to her history of kyphoplasty not related to her lungs.  Would recommend follow-up with a pain specialist for uncontrolled pain and referred back to orthopedic surgeon for further workup.  Patient is clinically stable from pulmonary standpoint and will have the following changes per plan.     PLAN:   Patient Instructions   1) Continue Trelelgy  2) Continue rescue and nebulizer as needed  3) Continue nocturnal oxygen  4) Referral to pulmonary rehab  5) Follow up with pain specialist and back specialist  6) Vaccines: Up to date with Prevar 13, Pneumovax 23, flu  7) Return in about 2 months (around 5/11/2019), or keep appt with Dr. Byrne.

## 2019-03-12 NOTE — PATIENT INSTRUCTIONS
1) Continue Trelelgy  2) Continue rescue and nebulizer as needed  3) Continue nocturnal oxygen  4) Referral to pulmonary rehab  5) Follow up with pain specialist and back specialist  6) Vaccines: Up to date with Prevar 13, Pneumovax 23, flu  7) Return in about 2 months (around 5/11/2019), or keep appt with Dr. Byrne.

## 2019-03-20 ENCOUNTER — TELEPHONE (OUTPATIENT)
Dept: ENDOCRINOLOGY | Facility: MEDICAL CENTER | Age: 69
End: 2019-03-20

## 2019-03-20 NOTE — TELEPHONE ENCOUNTER
Patient called today to see if Dr. Torrez wanted to order labs prior to her upcoming appointment. Patient also said that she was recently in the hospital and they put her on thyroid medication.

## 2019-03-20 NOTE — TELEPHONE ENCOUNTER
The Pt has a fv with Dr. Torrez on 04/24/19.    She was seen on 02/23/19 at [facility] and they have added some medications.

## 2019-03-21 DIAGNOSIS — E05.90 THYROTOXICOSIS WITHOUT THYROID STORM, UNSPECIFIED THYROTOXICOSIS TYPE: ICD-10-CM

## 2019-03-21 DIAGNOSIS — R79.89 LOW TSH LEVEL: ICD-10-CM

## 2019-03-22 PROBLEM — R00.0 TACHYCARDIA: Status: ACTIVE | Noted: 2019-03-22

## 2019-03-27 NOTE — TELEPHONE ENCOUNTER
Phone Number Called: [phone] (home)     Message: Spoke to Pt, she has received the orders in the mail and will ge then done before her next appointment     Left Message for patient to call back:N/A

## 2019-04-18 ENCOUNTER — HOSPITAL ENCOUNTER (OUTPATIENT)
Dept: LAB | Facility: MEDICAL CENTER | Age: 69
End: 2019-04-18
Attending: INTERNAL MEDICINE
Payer: MEDICARE

## 2019-04-18 DIAGNOSIS — R79.89 LOW TSH LEVEL: ICD-10-CM

## 2019-04-18 DIAGNOSIS — E05.90 THYROTOXICOSIS WITHOUT THYROID STORM, UNSPECIFIED THYROTOXICOSIS TYPE: ICD-10-CM

## 2019-04-18 PROCEDURE — 86376 MICROSOMAL ANTIBODY EACH: CPT

## 2019-04-18 PROCEDURE — 36415 COLL VENOUS BLD VENIPUNCTURE: CPT

## 2019-04-18 PROCEDURE — 84481 FREE ASSAY (FT-3): CPT

## 2019-04-18 PROCEDURE — 84439 ASSAY OF FREE THYROXINE: CPT

## 2019-04-18 PROCEDURE — 83520 IMMUNOASSAY QUANT NOS NONAB: CPT

## 2019-04-18 PROCEDURE — 84443 ASSAY THYROID STIM HORMONE: CPT

## 2019-04-19 LAB
T3FREE SERPL-MCNC: 2.95 PG/ML (ref 2.4–4.2)
T4 FREE SERPL-MCNC: 0.59 NG/DL (ref 0.53–1.43)
THYROPEROXIDASE AB SERPL-ACNC: <0.2 IU/ML (ref 0–9)
TSH SERPL DL<=0.005 MIU/L-ACNC: 5.57 UIU/ML (ref 0.38–5.33)

## 2019-04-22 LAB — TSH RECEP AB SER-ACNC: <0.9 IU/L

## 2019-04-24 ENCOUNTER — OFFICE VISIT (OUTPATIENT)
Dept: ENDOCRINOLOGY | Facility: MEDICAL CENTER | Age: 69
End: 2019-04-24
Payer: MEDICARE

## 2019-04-24 ENCOUNTER — OFFICE VISIT (OUTPATIENT)
Dept: CARDIOLOGY | Facility: MEDICAL CENTER | Age: 69
End: 2019-04-24
Payer: MEDICARE

## 2019-04-24 VITALS
HEART RATE: 80 BPM | BODY MASS INDEX: 28.07 KG/M2 | HEIGHT: 68 IN | WEIGHT: 185.2 LBS | DIASTOLIC BLOOD PRESSURE: 82 MMHG | OXYGEN SATURATION: 92 % | SYSTOLIC BLOOD PRESSURE: 132 MMHG

## 2019-04-24 VITALS
DIASTOLIC BLOOD PRESSURE: 90 MMHG | OXYGEN SATURATION: 96 % | WEIGHT: 185 LBS | HEIGHT: 68 IN | BODY MASS INDEX: 28.04 KG/M2 | SYSTOLIC BLOOD PRESSURE: 122 MMHG | HEART RATE: 84 BPM

## 2019-04-24 DIAGNOSIS — E05.10 THYROTOXICOSIS WITH TOXIC SINGLE THYROID NODULE AND WITHOUT THYROID STORM: ICD-10-CM

## 2019-04-24 DIAGNOSIS — Z78.0 MENOPAUSE: ICD-10-CM

## 2019-04-24 DIAGNOSIS — R00.0 TACHYCARDIA: ICD-10-CM

## 2019-04-24 DIAGNOSIS — I51.7 RIGHT VENTRICULAR DILATION: ICD-10-CM

## 2019-04-24 DIAGNOSIS — S32.020D CLOSED COMPRESSION FRACTURE OF L2 LUMBAR VERTEBRA WITH ROUTINE HEALING, SUBSEQUENT ENCOUNTER: ICD-10-CM

## 2019-04-24 DIAGNOSIS — M80.80XG OTHER OSTEOPOROSIS WITH CURRENT PATHOLOGICAL FRACTURE WITH DELAYED HEALING, SUBSEQUENT ENCOUNTER: ICD-10-CM

## 2019-04-24 DIAGNOSIS — E78.1 PURE HYPERGLYCERIDEMIA: ICD-10-CM

## 2019-04-24 DIAGNOSIS — I10 ESSENTIAL HYPERTENSION: ICD-10-CM

## 2019-04-24 PROCEDURE — 99203 OFFICE O/P NEW LOW 30 MIN: CPT | Performed by: INTERNAL MEDICINE

## 2019-04-24 PROCEDURE — 99214 OFFICE O/P EST MOD 30 MIN: CPT | Performed by: INTERNAL MEDICINE

## 2019-04-24 RX ORDER — CLOBETASOL PROPIONATE 0.5 MG/G
CREAM TOPICAL
COMMUNITY
Start: 2019-04-08 | End: 2019-12-19

## 2019-04-24 RX ORDER — HYDROCODONE BITARTRATE AND ACETAMINOPHEN 5; 325 MG/1; MG/1
1-2 TABLET ORAL EVERY 4 HOURS PRN
COMMUNITY
End: 2019-07-29

## 2019-04-24 RX ORDER — KETOCONAZOLE 20 MG/G
CREAM TOPICAL
COMMUNITY
Start: 2019-04-08 | End: 2019-05-01

## 2019-04-24 RX ORDER — METHIMAZOLE 5 MG/1
5 TABLET ORAL DAILY
Qty: 30 TAB | Refills: 0
Start: 2019-04-24 | End: 2019-09-25

## 2019-04-24 RX ORDER — CARVEDILOL 3.12 MG/1
3.12 TABLET ORAL 2 TIMES DAILY WITH MEALS
COMMUNITY
End: 2019-04-29

## 2019-04-24 ASSESSMENT — ENCOUNTER SYMPTOMS
SHORTNESS OF BREATH: 1
WEIGHT LOSS: 0
DEPRESSION: 1
CARDIOVASCULAR NEGATIVE: 1
PALPITATIONS: 0
BACK PAIN: 1
BLURRED VISION: 1
GASTROINTESTINAL NEGATIVE: 1
LOSS OF CONSCIOUSNESS: 0
FALLS: 0
NEUROLOGICAL NEGATIVE: 1

## 2019-04-24 NOTE — PROGRESS NOTES
"Chief Complaint   Patient presents with   • Tachycardia       Subjective:   Latonia Clinton is a 68 y.o. female who is self-referred for evaluation of her heart and heart rate.  She feels that her heart rate is too fast.  The patient has had occasional palpitations in the past.  She has anEKG that was performed on , which was the day of her hospitalization that revealed a sinus tachycardia at 112/min and was otherwise normal.    She was admitted to the hospital in February with exacerbation of COPD and had extensive cardiac workup at that time, including a myocardial perfusion scan that showed no evidence of ischemia or infarction.  She also had an echocardiogram and I have personally reviewed these images.  The LV dimension and contractility is normal.  The RV is at least moderately enlarged.  The Doppler calculation of pulmonary pressure was 30 mmHg, however reviewing the actual images reveals that the Doppler TVI of the tricuspid valve was of poor technical quality and the pulmonary pressures are likely underestimated.  She was discharged on low-dose carvedilol apparently for blood pressure control and for palpitations.  There is no mention of any significant arrhythmias the discharge summary.    Cardiac risk factor profile positive for hypertension and hyperlipidemia as well as prior cigarette smoking.  Family history Brother had heart attack at age 52.  Mother  of his renal cancer.  Father  of a \"cardiac arrest\" at age 83.    Past Medical History:   Diagnosis Date   • Anesthesia     \"Abnormal blood pressure and breathing\"  \"couldn't breathe\"   • Asthma     inhalers daily   • Backpain 2017     thor. area   • Blood clot in vein 2018    \"Currently have a blood clot in my left eye\"   • Bowel habit changes 2018    Constipation   • Breath shortness     with exertion has O2 but does not use   • Bronchitis    • CAD (coronary artery disease)     palpatations   • Cancer (HCC)     " "left lung   • Chickenpox    • COPD (chronic obstructive pulmonary disease) (HCC)    • Depression 2/26/2019   • Dialysis 2005    transient renal failure due to sepsis   • Emphysema of lung (HCC)    • Glaucoma     right eye   • Hemorrhagic disorder (HCC)    • Hyperlipidemia    • Hypertension    • Hyperthyroidism    • Kidney stone     bilateral   • Lung cancer (HCC) 12/12/2016   • Multiple thyroid nodules 7/9/2015   • Nasal drainage    • Osteoporosis    • Pain 07/06/2018    Back pain   • Personal history of venous thrombosis and embolism 2004, 2012    right leg 2012, left arm dvt 2004 left eye 2017   • Pneumonia 7/2016    per patient   • Renal disorder     had been on dialysis for 7 months for acute failure 2005   • Renal stones 2013    post lithotripsy   • Rheumatoid arthritis (HCC)     question   • Rheumatoid arthritis (HCC)    • S/P appendectomy 1998    • S/P cholecystectomy 1998   • S/P kyphoplasty 2006, 2007, 2013   • Sepsis(995.91) 2005   • Shortness of breath 07/06/2018    Chronic current problem. \"A couple of years now\".  O2 concentrator at night - pt states she doesn't use it.   • Thyroid nodule, hot 2017    functional \"hot\" right thyroid nodule without thyrotoxicosis     Past Surgical History:   Procedure Laterality Date   • CYSTOSCOPY STENT PLACEMENT  7/9/2018    Procedure: Cystoscopy,  Left removal of stent ,  Left Stent Placement;  Surgeon: Beck Yang M.D.;  Location: [facility];  Service: Urology   • URETEROSCOPY Left 7/9/2018    Procedure: URETEROSCOPY;  Surgeon: Beck Yang M.D.;  Location: [facility];  Service: Urology   • LASERTRIPSY Left 7/9/2018    Procedure: LASERTRIPSY-LITHO;  Surgeon: Beck Yang M.D.;  Location: [facility];  Service: Urology   • CYSTOSCOPY STENT PLACEMENT Left 6/18/2018    Procedure: CYSTOSCOPY STENT PLACEMENT;  Surgeon: Beck Yang M.D.;  Location: [facility];  Service: Urology   • LITHOTRIPSY Left 6/18/2018    " Procedure: LITHOTRIPSY;  Surgeon: Beck Yang M.D.;  Location: [facility];  Service: Urology   • LASERTRIPSY Left 6/18/2018    Procedure: LASERTRIPSY;  Surgeon: Beck Yang M.D.;  Location: SURGERY [facility];  Service: Urology   • URETEROSCOPY Left 6/18/2018    Procedure: URETEROSCOPY;  Surgeon: Beck Yang M.D.;  Location: SURGERY [facility];  Service: Urology   • THORACOSCOPY Left 12/12/2016    Procedure: THORACOSCOPY W/WEDGE RESECTION UPPER LOBE MASS;  Surgeon: John H Ganser, M.D.;  Location: [facility];  Service:    • OTHER ORTHOPEDIC SURGERY  2013    kyphoplasty X3   • APPENDECTOMY      1990   • CHOLECYSTECTOMY      1990   • LAMINOTOMY     • LUNG BIOPSY OPEN     • OTHER     • OTHER ABDOMINAL SURGERY      gall bladder disease   • PB ENLARGE BREAST WITH IMPLANT     • PB REDUCTION OF LARGE BREAST       Family History   Problem Relation Age of Onset   • Cancer Mother    • Heart Failure Father    • Heart Disease Brother      Social History     Social History   • Marital status: Single     Spouse name: N/A   • Number of children: N/A   • Years of education: N/A     Occupational History   • Not on file.     Social History Main Topics   • Smoking status: Former Smoker     Packs/day: 1.00     Years: 30.00     Types: Cigarettes     Quit date: 1/1/2000   • Smokeless tobacco: Never Used      Comment: 7 years ago   • Alcohol use 0.0 oz/week      Comment: x2 a week   • Drug use: No   • Sexual activity: Not on file     Other Topics Concern   • Not on file     Social History Narrative   • No narrative on file     Allergies   Allergen Reactions   • Nkda [No Known Drug Allergy]      Outpatient Encounter Prescriptions as of 4/24/2019   Medication Sig Dispense Refill   • HYDROcodone-acetaminophen (NORCO) 5-325 MG Tab per tablet Take 1-2 Tabs by mouth every four hours as needed.     • SPIRIVA RESPIMAT 2.5 MCG/ACT Aero Soln INHALE TWO PUFFS BY MOUTH DAILY (ASSEMBLE AND PRIME) 1  Inhaler 3   • ipratropium-albuterol (DUONEB) 0.5-2.5 (3) MG/3ML nebulizer solution 3 mL by Nebulization route every four hours as needed for Shortness of Breath. 100 Bullet 2   • azithromycin (ZITHROMAX) 250 MG Tab 1 daily for COPD 30 Tab 11   • methimazole (TAPAZOLE) 5 MG Tab Take 1 Tab by mouth 2 Times a Day. 90 Tab 2   • docusate sodium (COLACE) 100 MG Cap Take 300 mg by mouth every evening.     • acetaminophen (TYLENOL) 500 MG Tab Take 1,000 mg by mouth every 6 hours as needed for Moderate Pain.     • multivitamin (THERAGRAN) Tab Take 1 Tab by mouth every day.     • rivaroxaban (XARELTO) 20 MG Tab tablet Take 1 Tab by mouth with dinner. 30 Tab 3   • PROAIR  (90 Base) MCG/ACT Aero Soln inhalation aerosol INHALE TWO PUFFS BY MOUTH EVERY 6 HOURS AS NEEDED FOR SHORTNESS OF BREATH 1 Inhaler 11   • BREO ELLIPTA 200-25 MCG/INH AEROSOL POWDER, BREATH ACTIVATED INHALE ONE DOSE BY MOUTH DAILY. RINSE MOUTH AFTER USE. 1 Each 11   • COMBIGAN 0.2-0.5 % Solution Place 1 Drop in both eyes 2 Times a Day.     • vitamin D, Ergocalciferol, (DRISDOL) 04862 UNITS Cap capsule Take 50,000 Units by mouth every 14 days.     • simvastatin (ZOCOR) 40 MG Tab Take 40 mg by mouth every evening.     • losartan (COZAAR) 50 MG TABS Take 50 mg by mouth every morning.     • albuterol 108 (90 Base) MCG/ACT Aero Soln inhalation aerosol      • ipratropium-albuterol (DUONEB) 0.5-2.5 (3) MG/3ML nebulizer solution 3 mL by Nebulization route every four hours as needed for Shortness of Breath. 120 mL 11   • benzonatate (TESSALON) 100 MG Cap Take 1 Cap by mouth 3 times a day as needed for Cough. (Patient not taking: Reported on 3/11/2019) 60 Cap 0   • timolol (TIMOPTIC) 0.5 % Solution Place 1 Drop in both eyes 2 Times a Day. (Patient not taking: Reported on 3/11/2019) 1 Bottle 3   • [DISCONTINUED] carvedilol (COREG) 3.125 MG Tab Take 1 Tab by mouth 2 times a day, with meals. 60 Tab 3   • fluticasone (FLONASE) 50 MCG/ACT nasal spray Spray 1 Spray in  "nose as needed.     • HYDROcodone/acetaminophen (NORCO)  MG Tab Take 1 Tab by mouth every day.       No facility-administered encounter medications on file as of 4/24/2019.      Review of Systems   Constitutional: Positive for malaise/fatigue. Negative for weight loss.   HENT: Negative.    Eyes: Positive for blurred vision.        History of decreased vision   Respiratory: Positive for shortness of breath.    Cardiovascular: Negative.  Negative for chest pain, palpitations and leg swelling.   Gastrointestinal: Negative.    Musculoskeletal: Positive for back pain. Negative for falls and joint pain.   Skin: Negative.    Neurological: Negative.  Negative for loss of consciousness.   Endo/Heme/Allergies: Negative.    Psychiatric/Behavioral: Positive for depression.        Objective:   /90 (BP Location: Left arm, Patient Position: Sitting, BP Cuff Size: Adult)   Pulse 84   Ht 1.727 m (5' 8\")   Wt 83.9 kg (185 lb)   LMP  (LMP Unknown)   SpO2 96%   BMI 28.13 kg/m²     Physical Exam   Constitutional: She is oriented to person, place, and time. No distress.   HENT:   Head: Normocephalic and atraumatic.   Eyes: Pupils are equal, round, and reactive to light. No scleral icterus.   Neck: No JVD present.   Cardiovascular: Normal rate and regular rhythm.  Exam reveals gallop and S4.    No murmur heard.  Pulmonary/Chest: No respiratory distress. She has no wheezes.   Decreased breath sounds.  No wheezes   Abdominal: Soft. She exhibits no distension. There is no tenderness.   Musculoskeletal: She exhibits no edema.   Neurological: She is alert and oriented to person, place, and time.   Skin: Skin is warm.   Psychiatric: She exhibits a depressed mood.       Assessment:     1. Right ventricular dilation     2. Tachycardia     3. Essential hypertension     4. Pure hyperglyceridemia         Medical Decision Making:  Today's Assessment / Status / Plan:   Right ventricular enlargement: Echo images personally reviewed " reviewed.  The RV is at least moderately enlarged.  I think the pulmonary artery pressures are underestimated looking at the Doppler tracings.  The images reviewed with the patient.  The LV dimension and contractility is normal and a recent myocardial perfusion scan demonstrates no ischemia.  This was also reviewed with the patient and she was reassured.    Rapid heart rate: Patient is concerned about her heart rate.  On exam today her resting heart rate is 70 bpm.  We discussed the fact that heart rates are elevated with respiratory distress and also with pain which she has chronically from her back.  I do not think that the carvedilol is any much to her regimen and this should be discontinued particularly in light of her lung disease.    Hypertension: Currently well controlled blood pressure 121/70 by my reading.  If her pressure is elevated I would recommend increasing the losartan rather than adding a second agent to her regimen.    Would not recommend any further cardiac testing at this time.  She is reassured that her LV function is normal and that there is a wide spectrum of heart rate.  I do not see any indication for any antiarrhythmic therapy at this time.  She will be reevaluated in 1 year.

## 2019-04-25 NOTE — PROGRESS NOTES
Chief Complaint   Patient presents with   • Thyrotoxicosis     Functional toxic nodule right lobe   • Osteoporosis     Recent L2 insufficiency compression fracture /status post kyphoplasty        HPI:     Osteoporosis     This has been a very difficult lady to diagnose and treat.  She always has fatigue and exhaustion and it is hard to know the primary cause.  In any event, she ended up in the hospital in January of this year with immobilizing pain secondary to compression fractures.  While in the hospital, she was discovered to have a DVT in her left forearm.  That complicated doing a kyphoplasty because she was anti-coagulated.  The cause of that DVT is unclear.  She eventually had a kyphoplasty of L2 which looked like an acute compression on MR. there was no trauma or fall associated with this L2 fracture    She had previously taken Forteo for about 1 ½ years so that was used up in terms of timing.  She then was on Prolia which for some reason or other only had one or two shots and then that was left by the wayside and I’m not sure why.      We definitely need to reinstitute Prolia to prevent further bone loss and fractures.  She is in agreement.      Thyrotoxicosis    She was then most recently hospitalized in February of this year.  She was hospitalized with shortness of breath, presumably related to COPD.  Then she was felt to have “profound hyperthyroidism” with exophthalmos. This was felt to possibly be the cause of her shortness of breath.  She was therefore started on methimazole 5 mg twice a day and began to feel better.  With respect to her thyrotoxicosis, I have been following this for an extended period of time.  She has a hot nodule in her right lobe but the uptake is only 19% so it was not feasible to treat that with I-131.  The profound hyperthyroidism found in the hospital was based on a low TSH which she has had for a couple of years but always with normal T4 and T3 hormones.  I have been  reluctant to treat her based on the low TSH because I couldn’t convince myself she was thyrotoxic.  Low TSH can occur based on age and chronic illness without thyrotoxicosis.  The only time that she had an actual elevated T4 was once in August 2017 when her T4 was marginally elevated at 1.5 with a normal T3 of 3.9 and suppressed TSH.      Since being on methimazole 5 mg twice a day she is becoming hypothyroid now.  Her TSH is up to 5.5 and her free T4 is low normal at 0.5 and her free T3 is low normal at 2.9.      The doctors were mislead by a somewhat prominent left eye compared to the right eye, feeling that was Graves' disease exophthalmos.  Her thyrotropin receptor antibody has been negative on two occasions and that tends to rule out Grave’s disease and exophthalmos related to Grave’s ophthalmopathy.  In any event, it is confusing.    She is feeling better and that may be because her back pain has been relieved by her kyphoplasty.        We do need to reduce her methimazole down to 5 mg a day.  I will have her do thyroid blood levels every month on a standing order basis to monitor her methimazole but I won’t discontinue it right now.  I will see her again in three months and we will reevaluate the need for methimazole.      ROS:  Complaining of very soft fingernails and sensitivity fingertips.  Question of a subungual fungus.  She is going to consult a dermatologist.  There is a bit of yellowish tint to her nails      Allergies:   Allergies   Allergen Reactions   • Nkda [No Known Drug Allergy]        Current medicines including changes today:  Current Outpatient Prescriptions   Medication Sig Dispense Refill   • carvedilol (COREG) 3.125 MG Tab Take 3.125 mg by mouth 2 times a day, with meals.     • methimazole (TAPAZOLE) 5 MG Tab Take 1 Tab by mouth every day. 30 Tab 0   • albuterol 108 (90 Base) MCG/ACT Aero Soln inhalation aerosol      • SPIRIVA RESPIMAT 2.5 MCG/ACT Aero Soln INHALE TWO PUFFS BY MOUTH DAILY  (ASSEMBLE AND PRIME) 1 Inhaler 3   • azithromycin (ZITHROMAX) 250 MG Tab 1 daily for COPD 30 Tab 11   • multivitamin (THERAGRAN) Tab Take 1 Tab by mouth every day.     • rivaroxaban (XARELTO) 20 MG Tab tablet Take 1 Tab by mouth with dinner. 30 Tab 3   • HYDROcodone/acetaminophen (NORCO)  MG Tab Take 1 Tab by mouth every day.     • BREO ELLIPTA 200-25 MCG/INH AEROSOL POWDER, BREATH ACTIVATED INHALE ONE DOSE BY MOUTH DAILY. RINSE MOUTH AFTER USE. 1 Each 11   • vitamin D, Ergocalciferol, (DRISDOL) 56629 UNITS Cap capsule Take 50,000 Units by mouth every 14 days.     • simvastatin (ZOCOR) 40 MG Tab Take 40 mg by mouth every evening.     • losartan (COZAAR) 50 MG TABS Take 50 mg by mouth every morning.     • HYDROcodone-acetaminophen (NORCO) 5-325 MG Tab per tablet Take 1-2 Tabs by mouth every four hours as needed.     • ALBUTEROL INH      • clobetasol (TEMOVATE) 0.05 % Cream      • ketoconazole (NIZORAL) 2 % Cream      • ipratropium-albuterol (DUONEB) 0.5-2.5 (3) MG/3ML nebulizer solution 3 mL by Nebulization route every four hours as needed for Shortness of Breath. (Patient not taking: Reported on 4/24/2019) 120 mL 11   • ipratropium-albuterol (DUONEB) 0.5-2.5 (3) MG/3ML nebulizer solution 3 mL by Nebulization route every four hours as needed for Shortness of Breath. (Patient not taking: Reported on 4/24/2019) 100 Bullet 2   • benzonatate (TESSALON) 100 MG Cap Take 1 Cap by mouth 3 times a day as needed for Cough. (Patient not taking: Reported on 3/11/2019) 60 Cap 0   • timolol (TIMOPTIC) 0.5 % Solution Place 1 Drop in both eyes 2 Times a Day. (Patient not taking: Reported on 3/11/2019) 1 Bottle 3   • docusate sodium (COLACE) 100 MG Cap Take 300 mg by mouth every evening.     • fluticasone (FLONASE) 50 MCG/ACT nasal spray Spray 1 Spray in nose as needed.     • acetaminophen (TYLENOL) 500 MG Tab Take 1,000 mg by mouth every 6 hours as needed for Moderate Pain.     • PROAIR  (90 Base) MCG/ACT Aero Soln  "inhalation aerosol INHALE TWO PUFFS BY MOUTH EVERY 6 HOURS AS NEEDED FOR SHORTNESS OF BREATH (Patient not taking: Reported on 4/24/2019) 1 Inhaler 11   • COMBIGAN 0.2-0.5 % Solution Place 1 Drop in both eyes 2 Times a Day.       No current facility-administered medications for this visit.         Past Medical History:   Diagnosis Date   • Anesthesia     \"Abnormal blood pressure and breathing\"  \"couldn't breathe\"   • Asthma     inhalers daily   • Backpain 7/2017     thor. area   • Blood clot in vein 07/06/2018    \"Currently have a blood clot in my left eye\"   • Bowel habit changes 07/06/2018    Constipation   • Breath shortness     with exertion has O2 but does not use   • Bronchitis    • CAD (coronary artery disease)     palpatations   • Cancer (HCC) 2016    left lung   • Chickenpox    • COPD (chronic obstructive pulmonary disease) ([facility])    • Depression 2/26/2019   • Dialysis 2005    transient renal failure due to sepsis   • Emphysema of lung ([facility])    • Glaucoma     right eye   • Hemorrhagic disorder ([facility])    • Hyperlipidemia    • Hypertension    • Hyperthyroidism    • Kidney stone     bilateral   • Lung cancer (HCC) 12/12/2016   • Multiple thyroid nodules 7/9/2015   • Nasal drainage    • Osteoporosis    • Pain 07/06/2018    Back pain   • Personal history of venous thrombosis and embolism 2004, 2012    right leg 2012, left arm dvt 2004 left eye 2017   • Pneumonia 7/2016    per patient   • Renal disorder     had been on dialysis for 7 months for acute failure 2005   • Renal stones 2013    post lithotripsy   • Rheumatoid arthritis (HCC)     question   • Rheumatoid arthritis (HCC)    • S/P appendectomy 1998    • S/P cholecystectomy 1998   • S/P kyphoplasty 2006, 2007, 2013   • Sepsis(995.91) 2005   • Shortness of breath 07/06/2018    Chronic current problem. \"A couple of years now\".  O2 concentrator at night - pt states she doesn't use it.   • Thyroid nodule, hot 2017    functional \"hot\" right thyroid nodule without " "thyrotoxicosis       PHYSICAL EXAM:    /82 (BP Location: Left arm, Patient Position: Sitting, BP Cuff Size: Adult)   Pulse 80   Ht 1.727 m (5' 7.99\")   Wt 84 kg (185 lb 3.2 oz)   LMP  (LMP Unknown)   SpO2 92%   BMI 28.17 kg/m²     Gen.   appears comfortable and very well made up and dressed up.  No obvious distress    Skin   appropriate for sex and age    HEENT  unremarkable    Neck    large right thyroid lobe nodule about 2 cm.  Smooth.  Nontender    Heart  regular    Extremities  no edema    Neuro  gait and station normal    Psych  appropriate      ASSESSMENT AND RECOMMENDATIONS    1. Thyrotoxicosis with toxic single thyroid nodule                Thyrotropin receptor antibody is negative.                 Functional nodule right lobe noted on I-123 scan in the past                Currently slightly hypothyroid on methimazole 10 mg/day.                Decrease methimazole to 5 mg/day                Monitor thyroid levels monthly and report by my chart or telephone  - methimazole (TAPAZOLE) 5 MG Tab;     2.  osteoporosis with current pathological fracture with delayed healing, subsequent encounter              She had been on Prolia and somehow that has lapsed.               She definitely needs to resume Prolia in addition to calcium and vitamin D                    3. Closed compression fracture of L2 lumbar vertebra with routine healing, subsequent encounter                This fracture was an insufficiency fracture without trauma or fall                      Kyphoplasty has given her significant relief from back pain.        DISPOSITION: Resume Prolia injections  Discuss thyroid levels by telephone each month                             Return to office in 3 months      Kishore Torrez M.D.    Copies to: Barber Joyner M.D. [phone]  "

## 2019-04-29 ENCOUNTER — OFFICE VISIT (OUTPATIENT)
Dept: MEDICAL GROUP | Facility: MEDICAL CENTER | Age: 69
End: 2019-04-29
Payer: MEDICARE

## 2019-04-29 VITALS
HEART RATE: 78 BPM | HEIGHT: 67 IN | SYSTOLIC BLOOD PRESSURE: 130 MMHG | RESPIRATION RATE: 14 BRPM | BODY MASS INDEX: 29.24 KG/M2 | TEMPERATURE: 98.3 F | DIASTOLIC BLOOD PRESSURE: 76 MMHG | OXYGEN SATURATION: 97 % | WEIGHT: 186.29 LBS

## 2019-04-29 DIAGNOSIS — Z23 NEED FOR VACCINATION: ICD-10-CM

## 2019-04-29 DIAGNOSIS — N20.1 OBSTRUCTION OF LEFT URETEROPELVIC JUNCTION (UPJ) DUE TO STONE: ICD-10-CM

## 2019-04-29 DIAGNOSIS — G47.09 OTHER INSOMNIA: ICD-10-CM

## 2019-04-29 DIAGNOSIS — M80.00XD AGE-RELATED OSTEOPOROSIS WITH CURRENT PATHOLOGICAL FRACTURE WITH ROUTINE HEALING, SUBSEQUENT ENCOUNTER: ICD-10-CM

## 2019-04-29 DIAGNOSIS — R91.8 LUNG MASS: ICD-10-CM

## 2019-04-29 DIAGNOSIS — I10 ESSENTIAL HYPERTENSION: ICD-10-CM

## 2019-04-29 DIAGNOSIS — E05.90 HYPERTHYROIDISM: ICD-10-CM

## 2019-04-29 DIAGNOSIS — S32.020S CLOSED COMPRESSION FRACTURE OF SECOND LUMBAR VERTEBRA, SEQUELA: ICD-10-CM

## 2019-04-29 DIAGNOSIS — J44.9 CHRONIC OBSTRUCTIVE PULMONARY DISEASE, UNSPECIFIED COPD TYPE (HCC): ICD-10-CM

## 2019-04-29 DIAGNOSIS — Z12.31 ENCOUNTER FOR SCREENING MAMMOGRAM FOR MALIGNANT NEOPLASM OF BREAST: ICD-10-CM

## 2019-04-29 DIAGNOSIS — I82.401 ACUTE DEEP VEIN THROMBOSIS (DVT) OF RIGHT LOWER EXTREMITY, UNSPECIFIED VEIN (HCC): Chronic | ICD-10-CM

## 2019-04-29 DIAGNOSIS — H40.89 OTHER SPECIFIED GLAUCOMA, UNSPECIFIED LATERALITY: ICD-10-CM

## 2019-04-29 DIAGNOSIS — M10.071 IDIOPATHIC GOUT OF RIGHT FOOT, UNSPECIFIED CHRONICITY: ICD-10-CM

## 2019-04-29 PROBLEM — N17.9 ARF (ACUTE RENAL FAILURE) (HCC): Status: RESOLVED | Noted: 2019-02-11 | Resolved: 2019-04-29

## 2019-04-29 PROBLEM — R31.9 URINARY TRACT INFECTION WITH HEMATURIA: Status: RESOLVED | Noted: 2018-06-17 | Resolved: 2019-04-29

## 2019-04-29 PROBLEM — N39.0 URINARY TRACT INFECTION WITH HEMATURIA: Status: RESOLVED | Noted: 2018-06-17 | Resolved: 2019-04-29

## 2019-04-29 PROBLEM — M10.9 GOUT OF FOOT: Status: ACTIVE | Noted: 2019-04-29

## 2019-04-29 PROBLEM — R00.0 TACHYCARDIA: Status: RESOLVED | Noted: 2019-03-22 | Resolved: 2019-04-29

## 2019-04-29 PROCEDURE — 99204 OFFICE O/P NEW MOD 45 MIN: CPT | Performed by: INTERNAL MEDICINE

## 2019-04-29 RX ORDER — ONDANSETRON 4 MG/1
4 TABLET, FILM COATED ORAL EVERY 4 HOURS PRN
COMMUNITY
End: 2019-09-26 | Stop reason: SDUPTHER

## 2019-04-29 RX ORDER — RAMELTEON 8 MG/1
8 TABLET ORAL
Qty: 30 TAB | Refills: 6 | Status: SHIPPED | OUTPATIENT
Start: 2019-04-29 | End: 2019-05-13

## 2019-04-29 RX ORDER — COLCHICINE 0.6 MG/1
0.6 TABLET ORAL DAILY
COMMUNITY
End: 2019-05-13 | Stop reason: SDUPTHER

## 2019-04-29 NOTE — ASSESSMENT & PLAN NOTE
Patient is regularly followed by the pulmonary team.  Says she currently uses her schedule inhalers, rescue albuterol inhaler once or twice per day (mainly due to habit per patient).  Oxygen saturation typically 95 to 98% on room air.  States she has been on azithromycin daily for the next year due to her lungs.  Patient will discuss long-term antibiotics with her pulmonary team.

## 2019-04-29 NOTE — ASSESSMENT & PLAN NOTE
Has weaned down to the Ambien 5 mg dose.  Patient understands that there can be a risk of this medication especially with her age, underlying health conditions.  She is willing to try a new medication for insomnia that can be safer.  She indicates she will not mix the 2 medications.

## 2019-04-29 NOTE — ASSESSMENT & PLAN NOTE
Patient has had multiple DVT episodes.  Sounds like around 2004 she had renal failure due to E. coli sepsis, was on dialysis for 8 months, indicates fistula is in the right arm.  Had DVT in arm x2 around 2004 and also most recently January 2019.  She feels that possibly 1 of the arm DVTs could have been due to vascular access.  Right lower extremity approximately 2014. Venous emboli to left eye per patient, currently being treated with injections every 10 weeks by her ophthalmologist..  CT lung 2/10/2019, no pulmonary emboli.  1 thrombus was in the setting of not being bridged off warfarin.  She does have history of underlying lung disease, unclear how active she is.  History of lung cancer, being followed by her pulmonology team per notes she had decrease in size of her right lower lobe nodule seen on CT 10/13/2018.  Currently feels overwhelmed with multiple specialty appointments, though agreeable to anticoagulation clinic, she does not feel ready to have any hematology/oncology evaluation.  Patient does understand she needs to maintain on indefinite anticoagulation currently she is on Xarelto.

## 2019-04-29 NOTE — ASSESSMENT & PLAN NOTE
History nephrolithiasis, she says she regularly sees her urologist Dr. Yang and has pending interval ultrasound.

## 2019-04-29 NOTE — ASSESSMENT & PLAN NOTE
This is being fully evaluated by her endocrinologist who she is closely followed by and methimazole was recently reduced in dose.  Most recent endocrine note was briefly reviewed.

## 2019-04-29 NOTE — ASSESSMENT & PLAN NOTE
Patient indicates she has had multiple compression fractures, history of kyphoplasty.  In the past kyphoplasty helped her thoracic spine.  Having some lumbar pain now, patient was offered x-ray but prefers to follow-up with her pain specialist instead.  She sees her pain specialist Wednesday at [facility].  She has been having epidural injections for pain relief.  Her specialist also prescribes her Vicodin.

## 2019-04-30 ENCOUNTER — TELEPHONE (OUTPATIENT)
Dept: VASCULAR LAB | Facility: MEDICAL CENTER | Age: 69
End: 2019-04-30

## 2019-04-30 NOTE — PROGRESS NOTES
CC:  Diagnoses of Idiopathic gout of right foot, unspecified chronicity, Other specified glaucoma, unspecified laterality, Encounter for screening mammogram for malignant neoplasm of breast, Other insomnia, Acute deep vein thrombosis (DVT) of right lower extremity, unspecified vein (HCC), Need for vaccination, Hyperthyroidism, Closed compression fracture of second lumbar vertebra, sequela, Lung mass, Age-related osteoporosis with current pathological fracture with routine healing, subsequent encounter, Essential hypertension, Obstruction of left ureteropelvic junction (UPJ) due to stone, and Chronic obstructive pulmonary disease, unspecified COPD type (HCC) were pertinent to this visit.    HISTORY OF THE PRESENT ILLNESS: Patient is a 68 y.o. female. This pleasant patient is here today to establish care.    DVT (deep venous thrombosis) (HCC)  Patient has had multiple DVT episodes.  Sounds like around 2004 she had renal failure due to E. coli sepsis, was on dialysis for 8 months, indicates fistula is in the right arm.  Had DVT in arm x2 around 2004 and also most recently January 2019.  She feels that possibly 1 of the arm DVTs could have been due to vascular access.  Right lower extremity approximately 2014. Venous emboli to left eye per patient, currently being treated with injections every 10 weeks by her ophthalmologist..  CT lung 2/10/2019, no pulmonary emboli.  1 thrombus was in the setting of not being bridged off warfarin.  She does have history of underlying lung disease, unclear how active she is.  History of lung cancer, being followed by her pulmonology team per notes she had decrease in size of her right lower lobe nodule seen on CT 10/13/2018.  Currently feels overwhelmed with multiple specialty appointments, though agreeable to anticoagulation clinic, she does not feel ready to have any hematology/oncology evaluation.  Patient does understand she needs to maintain on indefinite anticoagulation currently  she is on Xarelto.    Hyperthyroidism  This is being fully evaluated by her endocrinologist who she is closely followed by and methimazole was recently reduced in dose.  Most recent endocrine note was briefly reviewed.      Closed compression fracture of second lumbar vertebra (HCC)  Patient indicates she has had multiple compression fractures, history of kyphoplasty.  In the past kyphoplasty helped her thoracic spine.  Having some lumbar pain now, patient was offered x-ray but prefers to follow-up with her pain specialist instead.  She sees her pain specialist Wednesday at [facility].  She has been having epidural injections for pain relief.  Her specialist also prescribes her Vicodin.    Lung mass  This is being followed by her pulmonary team, CT was briefly reviewed from 10/13/2018.    Osteoporosis  Followed by endocrine, per patient pending DEXA scan and will be on Prolia.    Hypertension  Blood pressure is reasonably controlled on losartan.    Obstruction of left ureteropelvic junction (UPJ) due to stone  History nephrolithiasis, she says she regularly sees her urologist Dr. Yang and has pending interval ultrasound.    Gout of foot  States gout is currently controlled, wants to have colchicine as needed.    Other specified glaucoma  Patient is regularly evaluated by Dr. Cochran her specialist.    Other insomnia  Has weaned down to the Ambien 5 mg dose.  Patient understands that there can be a risk of this medication especially with her age, underlying health conditions.  She is willing to try a new medication for insomnia that can be safer.  She indicates she will not mix the 2 medications.    Chronic obstructive pulmonary disease (HCC)  Patient is regularly followed by the pulmonary team.  Says she currently uses her schedule inhalers, rescue albuterol inhaler once or twice per day (mainly due to habit per patient).  Oxygen saturation typically 95 to 98% on room air.  States she has been on  azithromycin daily for the next year due to her lungs.  Patient will discuss long-term antibiotics with her pulmonary team.    Additionally patient says she had what sounds like a small bowel obstruction over summer, was already seen GI at [facility], who recommended colonoscopy which she has declined due to her health issues.  States she has no GI symptoms concerns today.    History of remote ruptured breast implant, which was replaced in the late 1990s, she is agreeable to screening mammogram.  No current breast symptoms.    Seeing derm for nail issues--was told thyroid, but endocrine note indicates otherwise. She will f/u derm.     Allergies: Nkda [no known drug allergy]    Current Outpatient Prescriptions Ordered in [facility]   Medication Sig Dispense Refill   • colchicine (COLCRYS) 0.6 MG Tab Take 0.6 mg by mouth every day.     • ondansetron (ZOFRAN) 4 MG Tab tablet Take 4 mg by mouth every four hours as needed for Nausea/Vomiting.     • ramelteon (ROZEREM) 8 MG tablet Take 1 Tab by mouth every bedtime. 30 Tab 6   • Zoster Vac Recomb Adjuvanted (SHINGRIX) 50 MCG/0.5ML Recon Susp 0.5 mL by Intramuscular route Once for 1 dose. 0.5 mL 1   • HYDROcodone-acetaminophen (NORCO) 5-325 MG Tab per tablet Take 1-2 Tabs by mouth every four hours as needed.     • ALBUTEROL INH      • clobetasol (TEMOVATE) 0.05 % Cream      • ketoconazole (NIZORAL) 2 % Cream      • methimazole (TAPAZOLE) 5 MG Tab Take 1 Tab by mouth every day. 30 Tab 0   • albuterol 108 (90 Base) MCG/ACT Aero Soln inhalation aerosol      • ipratropium-albuterol (DUONEB) 0.5-2.5 (3) MG/3ML nebulizer solution 3 mL by Nebulization route every four hours as needed for Shortness of Breath. 120 mL 11   • SPIRIVA RESPIMAT 2.5 MCG/ACT Aero Soln INHALE TWO PUFFS BY MOUTH DAILY (ASSEMBLE AND PRIME) 1 Inhaler 3   • azithromycin (ZITHROMAX) 250 MG Tab 1 daily for COPD 30 Tab 11   • docusate sodium (COLACE) 100 MG Cap Take 300 mg by mouth every evening.     •  "acetaminophen (TYLENOL) 500 MG Tab Take 1,000 mg by mouth every 6 hours as needed for Moderate Pain.     • multivitamin (THERAGRAN) Tab Take 1 Tab by mouth every day.     • rivaroxaban (XARELTO) 20 MG Tab tablet Take 1 Tab by mouth with dinner. 30 Tab 3   • PROAIR  (90 Base) MCG/ACT Aero Soln inhalation aerosol INHALE TWO PUFFS BY MOUTH EVERY 6 HOURS AS NEEDED FOR SHORTNESS OF BREATH 1 Inhaler 11   • BREO ELLIPTA 200-25 MCG/INH AEROSOL POWDER, BREATH ACTIVATED INHALE ONE DOSE BY MOUTH DAILY. RINSE MOUTH AFTER USE. 1 Each 11   • COMBIGAN 0.2-0.5 % Solution Place 1 Drop in both eyes 2 Times a Day.     • vitamin D, Ergocalciferol, (DRISDOL) 32542 UNITS Cap capsule Take 50,000 Units by mouth every 14 days.     • simvastatin (ZOCOR) 40 MG Tab Take 40 mg by mouth every evening.     • losartan (COZAAR) 50 MG TABS Take 50 mg by mouth every morning.       No current [facility]-ordered facility-administered medications on file.        Past Medical History:   Diagnosis Date   • Anesthesia     \"Abnormal blood pressure and breathing\"  \"couldn't breathe\"   • Asthma     inhalers daily   • Backpain 7/2017     thor. area   • Blood clot in vein 07/06/2018    \"Currently have a blood clot in my left eye\"   • Bowel habit changes 07/06/2018    Constipation   • Breath shortness     with exertion has O2 but does not use   • Bronchitis    • CAD (coronary artery disease)     palpatations   • Cancer (HCC) 2016    left lung   • Chickenpox    • COPD (chronic obstructive pulmonary disease) (HCC)    • Depression 2/26/2019   • Dialysis 2005    transient renal failure due to sepsis   • Emphysema of lung (HCC)    • Glaucoma     right eye   • Hemorrhagic disorder (HCC)    • Hyperlipidemia    • Hypertension    • Hyperthyroidism    • Kidney stone     bilateral   • Lung cancer (HCC) 12/12/2016   • Multiple thyroid nodules 7/9/2015   • Nasal drainage    • Osteoporosis    • Pain 07/06/2018    Back pain   • Personal history of venous thrombosis and " "embolism 2004, 2012    right leg 2012, left arm dvt 2004 left eye 2017   • Pneumonia 7/2016    per patient   • Renal disorder     had been on dialysis for 7 months for acute failure 2005   • Renal stones 2013    post lithotripsy   • Rheumatoid arthritis (HCC)     question   • Rheumatoid arthritis (HCC)    • S/P appendectomy 1998    • S/P cholecystectomy 1998   • S/P kyphoplasty 2006, 2007, 2013   • Sepsis(995.91) 2005   • Shortness of breath 07/06/2018    Chronic current problem. \"A couple of years now\".  O2 concentrator at night - pt states she doesn't use it.   • Thyroid nodule, hot 2017    functional \"hot\" right thyroid nodule without thyrotoxicosis       Past Surgical History:   Procedure Laterality Date   • CYSTOSCOPY STENT PLACEMENT  7/9/2018    Procedure: Cystoscopy,  Left removal of stent ,  Left Stent Placement;  Surgeon: Beck Yang M.D.;  Location: [facility];  Service: Urology   • URETEROSCOPY Left 7/9/2018    Procedure: URETEROSCOPY;  Surgeon: Beck Yang M.D.;  Location: [facility];  Service: Urology   • LASERTRIPSY Left 7/9/2018    Procedure: LASERTRIPSY-LITHO;  Surgeon: Beck Yang M.D.;  Location: [facility];  Service: Urology   • CYSTOSCOPY STENT PLACEMENT Left 6/18/2018    Procedure: CYSTOSCOPY STENT PLACEMENT;  Surgeon: Beck Yang M.D.;  Location: [facility];  Service: Urology   • LITHOTRIPSY Left 6/18/2018    Procedure: LITHOTRIPSY;  Surgeon: Beck Yang M.D.;  Location: [facility];  Service: Urology   • LASERTRIPSY Left 6/18/2018    Procedure: LASERTRIPSY;  Surgeon: Beck Yang M.D.;  Location: [facility];  Service: Urology   • URETEROSCOPY Left 6/18/2018    Procedure: URETEROSCOPY;  Surgeon: Beck Yang M.D.;  Location: [facility];  Service: Urology   • THORACOSCOPY Left 12/12/2016    Procedure: THORACOSCOPY W/WEDGE RESECTION UPPER LOBE MASS;  Surgeon: John H Ganser, M.D.;  " "Location: SURGERY [facility];  Service:    • OTHER ORTHOPEDIC SURGERY  2013    kyphoplasty X3   • APPENDECTOMY      1990   • CHOLECYSTECTOMY      1990   • LAMINOTOMY     • LUNG BIOPSY OPEN     • OTHER     • OTHER ABDOMINAL SURGERY      gall bladder disease   • PB ENLARGE BREAST WITH IMPLANT     • PB REDUCTION OF LARGE BREAST         Social History   Substance Use Topics   • Smoking status: Former Smoker     Packs/day: 1.00     Years: 30.00     Types: Cigarettes     Quit date: 1/1/2000   • Smokeless tobacco: Never Used      Comment: 7 years ago   • Alcohol use 0.0 oz/week      Comment: x2 a week       Social History     Social History Narrative   • No narrative on file       Family History   Problem Relation Age of Onset   • Cancer Mother    • Heart Failure Father    • Heart Disease Brother        ROS:     - Constitutional: Negative for fever, chills    - Eyes:   Negative for blurry vision, eye pain, discharge    - ENT:  Negative for hearing changes, ear pain, ear discharge, rhinorrhea, sinus congestion, sore throat     - Respiratory: Negative for wheezing, and crackles.      - Cardiovascular: Negative for chest pain, palpitations, orthopnea, and bilateral lower extremity edema.     - Gastrointestinal: Negative for heartburn, nausea, vomiting, abdominal pain, hematochezia, melena, diarrhea, constipation, and greasy/foul-smelling stools.     - Genitourinary: Negative for dysuria      - Skin:see hpi nails    - Neurological: Negative for vertigo    - Endo:Negative for polyuria     - Hem/lymphatic: Negative for  swollen glands    -Allergic/immun: Negative for allergic rhinitis    - Psychiatric/Behavioral: Negative for depression, suicidal/homicidal ideation and memory loss.      Exam: /76 (BP Location: Right arm, Patient Position: Sitting, BP Cuff Size: Adult)   Pulse 78   Temp 36.8 °C (98.3 °F) (Temporal)   Resp 14   Ht 1.702 m (5' 7\")   Wt 84.5 kg (186 lb 4.6 oz)   SpO2 97%  Body mass index is 29.18 " kg/m².    General: Normal appearing. No distress.  EYES: Conjunctiva clear lids without ptosis, pupils equal  EARS: Normal shape and contour   NOSE, THROAT: nasal mucosa benign. oropharynx is without erythema, edema or exudates.   Neck: Supple without LAD. Thyroid is not enlarged.  Pulmonary: Clear to ausculation.  Normal effort. No rales or wheezing.  Cardiovascular: Regular rate and rhythm without significant murmur.   Abdomen: Soft, nontender, nondistended. Normal bowel sounds.  Neurologic: Cranial nerves grossly nonfocal  Lymph: No cervical, supraclavicular nodes palpable  Skin: Warm and dry.  No obvious lesions.  Musculoskeletal: Normal gait. No extremity cyanosis, clubbing, or edema.  Psych: Normal mood and affect. Alert and oriented x3. Judgment and insight is normal.         Assessment/Plan  1. Idiopathic gout of right foot, unspecified chronicity  Currently asymptomatic, reasonable continue colchicine as needed.  Future check uric acid.    2. Other specified glaucoma, unspecified laterality  Follow-up ophthalmology    3. Encounter for screening mammogram for malignant neoplasm of breast  - MA-MAMMO SCREEN BILAT IMPLANTS MICHAEL CAD; Future    4. Other insomnia  She says in the past she did not want to take mirtazapine.  Prefers Ambien however we discussed risks of this drug given age.  She is willing to try new medication and follow-up with me in 2 weeks, as she is very concerned the new medication may not work.  - ramelteon (ROZEREM) 8 MG tablet; Take 1 Tab by mouth every bedtime.  Dispense: 30 Tab; Refill: 6    5. Acute deep vein thrombosis (DVT) of right lower extremity, unspecified vein (HCC)  History of multiple DVT episodes.  Unclear etiology.  Should remain on indefinite Xarelto.  Patient will think about a hematology/oncology referral.  Also noted hemoglobin trends, was 15.6 8/30/2018, more recently in the 10 range.  She has had very frequent blood draws could be component of phlebotomy.  MCV slightly  elevated at 100.6.  Additionally complex medical history with underlying renal disease, considerations of thyroid disease, etc.  We will have to consider further work-up at next appointment.  She denied any focal sites of bleeding today.  - REFERRAL TO ANTICOAGULATION MONITORING    6. Need for vaccination  Shingles prescription given    7. Hyperthyroidism  Follow-up endocrine, her methimazole is being weaned    8. Closed compression fracture of second lumbar vertebra, sequela  Status post kyphoplasty, follow-up pain management which is pending this week.    9. Lung mass  Follow-up pulmonary, per imaging 10/13/2018 decrease in right lower lobe mass    10. Age-related osteoporosis with current pathological fracture with routine healing, subsequent encounter  Follow-up endocrine and pain management    11. Essential hypertension  Blood pressure is reasonable on losartan will monitor    12. Obstruction of left ureteropelvic junction (UPJ) due to stone  Follow-up urology and pending ultrasound    13. Chronic obstructive pulmonary disease, unspecified COPD type (HCC)  Patient acute symptoms are controlled, follow-up pulmonary      Due to complexity, and patient desire, we will have her return to clinic in 2 weeks        Please note that this dictation was created using voice recognition software. I have made every reasonable attempt to correct obvious errors, but I expect that there are errors of grammar and possibly content that I did not discover before finalizing the note.

## 2019-04-30 NOTE — TELEPHONE ENCOUNTER
Spoke with pt on regarding referral to anticoagulation. She may be having upcoming epidural and will need to stop her DOAC. 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 She did not have calendar with her so provided our clinic number for her to call and set up appt with our clinic ASAP to establish care and discuss possibility of holding DOAC.     Yessi Grace, Pharm D

## 2019-05-02 ENCOUNTER — HOSPITAL ENCOUNTER (OUTPATIENT)
Dept: RADIOLOGY | Facility: MEDICAL CENTER | Age: 69
End: 2019-05-02
Attending: INTERNAL MEDICINE
Payer: MEDICARE

## 2019-05-02 DIAGNOSIS — Z85.118 HISTORY OF LUNG CANCER: ICD-10-CM

## 2019-05-02 PROCEDURE — 71250 CT THORAX DX C-: CPT

## 2019-05-07 ENCOUNTER — OFFICE VISIT (OUTPATIENT)
Dept: PULMONOLOGY | Facility: HOSPICE | Age: 69
End: 2019-05-07
Payer: MEDICARE

## 2019-05-07 ENCOUNTER — TELEPHONE (OUTPATIENT)
Dept: VASCULAR LAB | Facility: MEDICAL CENTER | Age: 69
End: 2019-05-07

## 2019-05-07 VITALS
HEART RATE: 69 BPM | SYSTOLIC BLOOD PRESSURE: 140 MMHG | DIASTOLIC BLOOD PRESSURE: 80 MMHG | RESPIRATION RATE: 16 BRPM | WEIGHT: 187 LBS | HEIGHT: 67 IN | BODY MASS INDEX: 29.35 KG/M2 | OXYGEN SATURATION: 95 %

## 2019-05-07 DIAGNOSIS — J44.9 CHRONIC OBSTRUCTIVE PULMONARY DISEASE, UNSPECIFIED COPD TYPE (HCC): ICD-10-CM

## 2019-05-07 DIAGNOSIS — C34.12 MALIGNANT NEOPLASM OF UPPER LOBE OF LEFT LUNG (HCC): ICD-10-CM

## 2019-05-07 PROCEDURE — 99213 OFFICE O/P EST LOW 20 MIN: CPT | Performed by: INTERNAL MEDICINE

## 2019-05-07 RX ORDER — DOXYCYCLINE HYCLATE 100 MG/1
100 CAPSULE ORAL 2 TIMES DAILY
Qty: 20 CAP | Refills: 1 | Status: SHIPPED | OUTPATIENT
Start: 2019-05-07 | End: 2019-09-18

## 2019-05-07 RX ORDER — PREDNISONE 10 MG/1
TABLET ORAL
Qty: 18 TAB | Refills: 1 | Status: SHIPPED | OUTPATIENT
Start: 2019-05-07 | End: 2019-05-13

## 2019-05-07 NOTE — PROGRESS NOTES
"Chief Complaint   Patient presents with   • Follow-Up     Chronic obstructive pulmonary disease, unspecified COPD type ([facility])       HPI: This patient is a 68 y.o. Female who returns for COPD.  50s from 2016 showed FEV1 1.43 L or 54% predicted, DLCO: 86% predicted.  She quit smoking in 2000.  She is compliant with Breo 200ug and Spiriva inhalers.  She was hospitalized January 2019 for AECOPD, although symptoms were not consistent with an acute exacerbation and ultimately felt secondary to her hyperthyroidism.  She was then rehospitalized February 2019 for AECOPD and followed by pulmonary.  She is on prophylactic daily azithromycin.  She was discharged on oxygen tanks however has difficulty carrying canisters and since then has not qualified for portable oxygen due to normal oxygen saturations. She has compression fractures and severe associated back pain which she feels affects her breathing.  She has not seen any improvement in her dyspnea with inhaler/nebilizer use.  Denies cough, wheezing, edema.  She has a history of lung cancer status post partial left upper lobectomy December 2016 for adenocarcinoma, pT2a.  Follow-up chest CAT scan May 2, 2019 showed postsurgical changes with stable 6 mm groundglass nodule in the right upper lobe, 3 mm nodule in the right upper lobe and 2 mm nodules in the right lower lobe.      Past Medical History:   Diagnosis Date   • Anesthesia     \"Abnormal blood pressure and breathing\"  \"couldn't breathe\"   • Asthma     inhalers daily   • Backpain 7/2017     thor. area   • Blood clot in vein 07/06/2018    \"Currently have a blood clot in my left eye\"   • Bowel habit changes 07/06/2018    Constipation   • Breath shortness     with exertion has O2 but does not use   • Bronchitis    • CAD (coronary artery disease)     palpatations   • Cancer (HCC) 2016    left lung   • Chickenpox    • COPD (chronic obstructive pulmonary disease) ([facility])    • Depression 2/26/2019   • Dialysis 2005    transient " "renal failure due to sepsis   • Emphysema of lung (HCC)    • Glaucoma     right eye   • Hemorrhagic disorder (HCC)    • Hyperlipidemia    • Hypertension    • Hyperthyroidism    • Kidney stone     bilateral   • Lung cancer (HCC) 12/12/2016   • Multiple thyroid nodules 7/9/2015   • Nasal drainage    • Osteoporosis    • Pain 07/06/2018    Back pain   • Personal history of venous thrombosis and embolism 2004, 2012    right leg 2012, left arm dvt 2004 left eye 2017   • Pneumonia 7/2016    per patient   • Renal disorder     had been on dialysis for 7 months for acute failure 2005   • Renal stones 2013    post lithotripsy   • Rheumatoid arthritis (HCC)     question   • Rheumatoid arthritis (HCC)    • S/P appendectomy 1998    • S/P cholecystectomy 1998   • S/P kyphoplasty 2006, 2007, 2013   • Sepsis(995.91) 2005   • Shortness of breath 07/06/2018    Chronic current problem. \"A couple of years now\".  O2 concentrator at night - pt states she doesn't use it.   • Thyroid nodule, hot 2017    functional \"hot\" right thyroid nodule without thyrotoxicosis       Social History     Social History   • Marital status: Single     Spouse name: N/A   • Number of children: N/A   • Years of education: N/A     Occupational History   • Not on file.     Social History Main Topics   • Smoking status: Former Smoker     Packs/day: 1.00     Years: 30.00     Types: Cigarettes     Quit date: 1/1/2000   • Smokeless tobacco: Never Used      Comment: 7 years ago   • Alcohol use 0.0 oz/week      Comment: x2 a week   • Drug use: No   • Sexual activity: Not on file     Other Topics Concern   • Not on file     Social History Narrative   • No narrative on file       Family History   Problem Relation Age of Onset   • Cancer Mother    • Heart Failure Father    • Heart Disease Brother        Current Outpatient Prescriptions on File Prior to Visit   Medication Sig Dispense Refill   • ondansetron (ZOFRAN) 4 MG Tab tablet Take 4 mg by mouth every four hours as " needed for Nausea/Vomiting.     • HYDROcodone-acetaminophen (NORCO) 5-325 MG Tab per tablet Take 1-2 Tabs by mouth every four hours as needed.     • clobetasol (TEMOVATE) 0.05 % Cream      • methimazole (TAPAZOLE) 5 MG Tab Take 1 Tab by mouth every day. 30 Tab 0   • SPIRIVA RESPIMAT 2.5 MCG/ACT Aero Soln INHALE TWO PUFFS BY MOUTH DAILY (ASSEMBLE AND PRIME) 1 Inhaler 3   • azithromycin (ZITHROMAX) 250 MG Tab 1 daily for COPD 30 Tab 11   • docusate sodium (COLACE) 100 MG Cap Take 300 mg by mouth every evening.     • multivitamin (THERAGRAN) Tab Take 1 Tab by mouth every day.     • rivaroxaban (XARELTO) 20 MG Tab tablet Take 1 Tab by mouth with dinner. 30 Tab 3   • PROAIR  (90 Base) MCG/ACT Aero Soln inhalation aerosol INHALE TWO PUFFS BY MOUTH EVERY 6 HOURS AS NEEDED FOR SHORTNESS OF BREATH 1 Inhaler 11   • BREO ELLIPTA 200-25 MCG/INH AEROSOL POWDER, BREATH ACTIVATED INHALE ONE DOSE BY MOUTH DAILY. RINSE MOUTH AFTER USE. 1 Each 11   • COMBIGAN 0.2-0.5 % Solution Place 1 Drop in both eyes 2 Times a Day.     • vitamin D, Ergocalciferol, (DRISDOL) 77381 UNITS Cap capsule Take 50,000 Units by mouth every 14 days.     • simvastatin (ZOCOR) 40 MG Tab Take 40 mg by mouth every evening.     • losartan (COZAAR) 50 MG TABS Take 50 mg by mouth every morning.     • colchicine (COLCRYS) 0.6 MG Tab Take 0.6 mg by mouth every day.     • ramelteon (ROZEREM) 8 MG tablet Take 1 Tab by mouth every bedtime. (Patient not taking: Reported on 5/7/2019) 30 Tab 6   • ALBUTEROL INH      • albuterol 108 (90 Base) MCG/ACT Aero Soln inhalation aerosol      • ipratropium-albuterol (DUONEB) 0.5-2.5 (3) MG/3ML nebulizer solution 3 mL by Nebulization route every four hours as needed for Shortness of Breath. 120 mL 11   • acetaminophen (TYLENOL) 500 MG Tab Take 1,000 mg by mouth every 6 hours as needed for Moderate Pain.       No current facility-administered medications on file prior to visit.        Allergies: Nkda [no known drug  "allergy]    ROS:   Constitutional: Denies fevers, chills, night sweats, fatigue or weight loss  Eyes: Denies vision loss, pain, drainage, double vision  Ears, Nose, Throat: Denies earache, difficulty hearing, tinnitus, nasal congestion, hoarseness  Cardiovascular: Denies chest pain, tightness, palpitations, orthopnea or edema  Respiratory: As in HPI  Sleep: Denies daytime sleepiness, snoring, apneas, insomnia, morning headaches  GI: Denies heartburn, dysphagia, nausea, abdominal pain, diarrhea or constipation  : Denies frequent urination, hematuria, discharge or painful urination  Musculoskeletal: +back pain, denies painful joints, sore muscles  Neurological: Denies weakness or headaches  Skin: No rashes    /80 (BP Location: Left arm, Patient Position: Sitting, BP Cuff Size: Adult)   Pulse 69   Resp 16   Ht 1.702 m (5' 7\")   Wt 84.8 kg (187 lb)   SpO2 95%     Physical Exam:  Appearance: Well-nourished, well-developed, in no acute distress  HEENT: Normocephalic, atraumatic, white sclera, PERRLA, oropharynx clear  Neck: No adenopathy or masses  Respiratory: no intercostal retractions or accessory muscle use  Lungs auscultation: Clear to auscultation bilaterally  Cardiovascular: Regular rate rhythm. No murmurs, rubs or gallops.  No LE edema  Abdomen: soft, nondistended  Gait: Normal  Digits: No clubbing, cyanosis  Motor: No focal deficits  Orientation: Oriented to time, person and place    Diagnosis:  1. Chronic obstructive pulmonary disease, unspecified COPD type (HCC)  doxycycline (VIBRAMYCIN) 100 MG Cap    predniSONE (DELTASONE) 10 MG Tab   2. Malignant neoplasm of upper lobe of left lung (HCC)     3.      Pulmonary nodules    Plan:  The patient has had recurrent hospitalizations for AECOPD, is compliant with smoking abstinence, Breo and Spiriva inhalers, and prophylactic azithromycin.  Chest CAT scan shows no acute process.  She has a history of lung cancer with no evidence of recurrent malignancy.  " Recommend surveillance chest CAT scan in 6 months for follow-up of pulmonary nodules.  Recommend pulmonary rehabilitation-referral submitted.  Prescription for doxycycline and prednisone taper to keep on hand at home for AECOPD.  Continue Breo 200ug and Spiriva inhalers.  Return in about 3 months (around 8/7/2019).

## 2019-05-07 NOTE — TELEPHONE ENCOUNTER
Spoke with pt on the phone regarding anticoagulation referral. She reports her procedure is not scheduled. She does not want to make appt until her procedure is scheduled. She states she will call us and make appointment.     Yessi Grace, Pharm D

## 2019-05-13 ENCOUNTER — OFFICE VISIT (OUTPATIENT)
Dept: MEDICAL GROUP | Facility: MEDICAL CENTER | Age: 69
End: 2019-05-13
Payer: MEDICARE

## 2019-05-13 ENCOUNTER — HOSPITAL ENCOUNTER (OUTPATIENT)
Facility: MEDICAL CENTER | Age: 69
End: 2019-05-13
Attending: INTERNAL MEDICINE
Payer: MEDICARE

## 2019-05-13 VITALS
HEIGHT: 67 IN | DIASTOLIC BLOOD PRESSURE: 88 MMHG | TEMPERATURE: 97.1 F | OXYGEN SATURATION: 95 % | SYSTOLIC BLOOD PRESSURE: 142 MMHG | WEIGHT: 187 LBS | HEART RATE: 93 BPM | BODY MASS INDEX: 29.35 KG/M2 | RESPIRATION RATE: 14 BRPM

## 2019-05-13 DIAGNOSIS — D53.9 MACROCYTIC ANEMIA: ICD-10-CM

## 2019-05-13 DIAGNOSIS — I10 ESSENTIAL HYPERTENSION: ICD-10-CM

## 2019-05-13 DIAGNOSIS — G47.09 OTHER INSOMNIA: ICD-10-CM

## 2019-05-13 DIAGNOSIS — R30.0 DYSURIA: ICD-10-CM

## 2019-05-13 DIAGNOSIS — M10.072 IDIOPATHIC GOUT OF LEFT FOOT, UNSPECIFIED CHRONICITY: ICD-10-CM

## 2019-05-13 DIAGNOSIS — Z12.11 SCREENING FOR COLON CANCER: ICD-10-CM

## 2019-05-13 PROCEDURE — 99214 OFFICE O/P EST MOD 30 MIN: CPT | Performed by: INTERNAL MEDICINE

## 2019-05-13 PROCEDURE — 87086 URINE CULTURE/COLONY COUNT: CPT

## 2019-05-13 RX ORDER — COLCHICINE 0.6 MG/1
0.6 TABLET ORAL DAILY
Qty: 30 TAB | Refills: 1 | Status: SHIPPED | OUTPATIENT
Start: 2019-05-13 | End: 2019-09-18

## 2019-05-13 RX ORDER — SULFAMETHOXAZOLE AND TRIMETHOPRIM 800; 160 MG/1; MG/1
1 TABLET ORAL 2 TIMES DAILY
Qty: 6 TAB | Refills: 0 | Status: SHIPPED | OUTPATIENT
Start: 2019-05-13 | End: 2019-05-16

## 2019-05-13 RX ORDER — ZOLPIDEM TARTRATE 5 MG/1
5 TABLET ORAL NIGHTLY PRN
Qty: 30 TAB | Refills: 3 | Status: SHIPPED | OUTPATIENT
Start: 2019-06-02 | End: 2019-07-02

## 2019-05-13 RX ORDER — LOSARTAN POTASSIUM 50 MG/1
75 TABLET ORAL DAILY
Qty: 45 TAB | Refills: 3 | Status: SHIPPED | OUTPATIENT
Start: 2019-05-13 | End: 2019-06-13 | Stop reason: SDUPTHER

## 2019-05-14 ENCOUNTER — TELEPHONE (OUTPATIENT)
Dept: ENDOCRINOLOGY | Facility: MEDICAL CENTER | Age: 69
End: 2019-05-14

## 2019-05-14 DIAGNOSIS — R30.0 DYSURIA: ICD-10-CM

## 2019-05-14 NOTE — TELEPHONE ENCOUNTER
1. Caller Name: Latonia Clinton                                             Call Back Number: [phone] (home)         Patient approves a detailed voicemail message: yes    Patient called stating she was told she can get her Prolia injection here in the office rather that the outpatient infusion center. She has not heard anything and is following up.    Please advise, no order for Prolia has been sent to a specialty pharmacy.

## 2019-05-14 NOTE — PROGRESS NOTES
CC:  Diagnoses of Other insomnia, Dysuria, Macrocytic anemia, Screening for colon cancer, Idiopathic gout of left foot, unspecified chronicity, and Essential hypertension were pertinent to this visit.    HISTORY OF THE PRESENT ILLNESS: Patient is a 68 y.o. female. This pleasant patient is here today to follow-up.    Blood pressure has remained minimally elevated last few clinic appointments, was also rechecked today in clinic with similar recording.  She feels it is likely elevated at home and wishes to have higher dose of losartan for control.  Says her nephrologist was thinking about increasing this medication dose as well.  She is pending nephrology follow-up soon for her chronic kidney disease.    States she had recent labs last Thursday to include iron, cholesterol, vitamin D, blood counts, etc.  She is aware of her history of macrocytic anemia.  She does not have a pending order for B12 so she is agreeable to have this tested.  Patient remembers that she is due for colonoscopy, she prefers the stool cards for screening at this time.  Says she can follow-up with digestive health at any time as well, this is on her list to do.  Occasionally has some constipation she is using a stool softener with daily bowel movements.  No new GI symptoms.  No rectal bleeding or other sites of blood loss.  Her endocrinologist has her on Prolia and she is pending bone density scan as well as mammogram on 5/31/2019.  Endocrinologist also pending her thyroid disease she says her methimazole was decreased to the 5 mg dose and is being further titrated closely.    States she would like to have gout medication on hand for when she has an attack.  No current gout attacks.  She is aware of dietary triggers.  She understands to hold her statin while taking colchicine.    Chronic insomnia, was controlled well on Ambien.  She never drinks alcohol with Ambien or operates vehicle, etc.  She could not get the Ralmetron because it is not on  her formulary.    Has had dysuria for a few days, urinary frequency as well. No fever/ chills, hematuria, flank pain. Pending follow-up with her urologist in June.  She does feel she has an acute UTI at this time.  Is chronically on azithromycin for her lungs, also has doxycycline as needed by her pulmonary team (not currently taking).      Allergies: Nkda [no known drug allergy]    Current Outpatient Prescriptions Ordered in [facility]   Medication Sig Dispense Refill   • [START ON 6/2/2019] zolpidem (AMBIEN) 5 MG Tab Take 1 Tab by mouth at bedtime as needed for Sleep for up to 30 days. 30 Tab 3   • colchicine (COLCRYS) 0.6 MG Tab Take 1 Tab by mouth every day. 30 Tab 1   • losartan (COZAAR) 50 MG Tab Take 1.5 Tabs by mouth every day. 45 Tab 3   • doxycycline (VIBRAMYCIN) 100 MG Cap Take 1 Cap by mouth 2 times a day. Take until gone. 20 Cap 1   • ondansetron (ZOFRAN) 4 MG Tab tablet Take 4 mg by mouth every four hours as needed for Nausea/Vomiting.     • HYDROcodone-acetaminophen (NORCO) 5-325 MG Tab per tablet Take 1-2 Tabs by mouth every four hours as needed.     • ALBUTEROL INH      • clobetasol (TEMOVATE) 0.05 % Cream      • methimazole (TAPAZOLE) 5 MG Tab Take 1 Tab by mouth every day. 30 Tab 0   • albuterol 108 (90 Base) MCG/ACT Aero Soln inhalation aerosol      • ipratropium-albuterol (DUONEB) 0.5-2.5 (3) MG/3ML nebulizer solution 3 mL by Nebulization route every four hours as needed for Shortness of Breath. 120 mL 11   • SPIRIVA RESPIMAT 2.5 MCG/ACT Aero Soln INHALE TWO PUFFS BY MOUTH DAILY (ASSEMBLE AND PRIME) 1 Inhaler 3   • azithromycin (ZITHROMAX) 250 MG Tab 1 daily for COPD 30 Tab 11   • docusate sodium (COLACE) 100 MG Cap Take 300 mg by mouth every evening.     • acetaminophen (TYLENOL) 500 MG Tab Take 1,000 mg by mouth every 6 hours as needed for Moderate Pain.     • multivitamin (THERAGRAN) Tab Take 1 Tab by mouth every day.     • rivaroxaban (XARELTO) 20 MG Tab tablet Take 1 Tab by mouth with dinner. 30  "Tab 3   • PROAIR  (90 Base) MCG/ACT Aero Soln inhalation aerosol INHALE TWO PUFFS BY MOUTH EVERY 6 HOURS AS NEEDED FOR SHORTNESS OF BREATH 1 Inhaler 11   • BREO ELLIPTA 200-25 MCG/INH AEROSOL POWDER, BREATH ACTIVATED INHALE ONE DOSE BY MOUTH DAILY. RINSE MOUTH AFTER USE. 1 Each 11   • COMBIGAN 0.2-0.5 % Solution Place 1 Drop in both eyes 2 Times a Day.     • vitamin D, Ergocalciferol, (DRISDOL) 90217 UNITS Cap capsule Take 50,000 Units by mouth every 14 days.     • simvastatin (ZOCOR) 40 MG Tab Take 40 mg by mouth every evening.       No current Epic-ordered facility-administered medications on file.        Past Medical History:   Diagnosis Date   • Anesthesia     \"Abnormal blood pressure and breathing\"  \"couldn't breathe\"   • Asthma     inhalers daily   • Backpain 7/2017     thor. area   • Blood clot in vein 07/06/2018    \"Currently have a blood clot in my left eye\"   • Bowel habit changes 07/06/2018    Constipation   • Breath shortness     with exertion has O2 but does not use   • Bronchitis    • CAD (coronary artery disease)     palpatations   • Cancer (HCC) 2016    left lung   • Chickenpox    • COPD (chronic obstructive pulmonary disease) ([facility])    • Depression 2/26/2019   • Dialysis 2005    transient renal failure due to sepsis   • Emphysema of lung ([facility])    • Glaucoma     right eye   • Hemorrhagic disorder ([facility])    • Hyperlipidemia    • Hypertension    • Hyperthyroidism    • Kidney stone     bilateral   • Lung cancer (HCC) 12/12/2016   • Multiple thyroid nodules 7/9/2015   • Nasal drainage    • Osteoporosis    • Pain 07/06/2018    Back pain   • Personal history of venous thrombosis and embolism 2004, 2012    right leg 2012, left arm dvt 2004 left eye 2017   • Pneumonia 7/2016    per patient   • Renal disorder     had been on dialysis for 7 months for acute failure 2005   • Renal stones 2013    post lithotripsy   • Rheumatoid arthritis ([facility])     question   • Rheumatoid arthritis (HCC)    • S/P " "appendectomy 1998    • S/P cholecystectomy 1998   • S/P kyphoplasty 2006, 2007, 2013   • Sepsis(995.91) 2005   • Shortness of breath 07/06/2018    Chronic current problem. \"A couple of years now\".  O2 concentrator at night - pt states she doesn't use it.   • Thyroid nodule, hot 2017    functional \"hot\" right thyroid nodule without thyrotoxicosis       Past Surgical History:   Procedure Laterality Date   • CYSTOSCOPY STENT PLACEMENT  7/9/2018    Procedure: Cystoscopy,  Left removal of stent ,  Left Stent Placement;  Surgeon: Beck Yang M.D.;  Location: [facility];  Service: Urology   • URETEROSCOPY Left 7/9/2018    Procedure: URETEROSCOPY;  Surgeon: Beck Yang M.D.;  Location: [facility];  Service: Urology   • LASERTRIPSY Left 7/9/2018    Procedure: LASERTRIPSY-LITHO;  Surgeon: Beck Yang M.D.;  Location: [facility];  Service: Urology   • CYSTOSCOPY STENT PLACEMENT Left 6/18/2018    Procedure: CYSTOSCOPY STENT PLACEMENT;  Surgeon: Beck Yang M.D.;  Location: [facility];  Service: Urology   • LITHOTRIPSY Left 6/18/2018    Procedure: LITHOTRIPSY;  Surgeon: Beck Yang M.D.;  Location: [facility];  Service: Urology   • LASERTRIPSY Left 6/18/2018    Procedure: LASERTRIPSY;  Surgeon: Beck Yang M.D.;  Location: [facility];  Service: Urology   • URETEROSCOPY Left 6/18/2018    Procedure: URETEROSCOPY;  Surgeon: Beck Yang M.D.;  Location: [facility];  Service: Urology   • THORACOSCOPY Left 12/12/2016    Procedure: THORACOSCOPY W/WEDGE RESECTION UPPER LOBE MASS;  Surgeon: John H Ganser, M.D.;  Location: [facility];  Service:    • OTHER ORTHOPEDIC SURGERY  2013    kyphoplasty X3   • APPENDECTOMY      1990   • CHOLECYSTECTOMY      1990   • LAMINOTOMY     • LUNG BIOPSY OPEN     • OTHER     • OTHER ABDOMINAL SURGERY      gall bladder disease   • PB ENLARGE BREAST WITH IMPLANT     • PB REDUCTION OF " "LARGE BREAST         Social History   Substance Use Topics   • Smoking status: Former Smoker     Packs/day: 1.00     Years: 30.00     Types: Cigarettes     Quit date: 1/1/2000   • Smokeless tobacco: Never Used      Comment: 7 years ago   • Alcohol use 0.0 oz/week      Comment: x2 a week       Social History     Social History Narrative   • No narrative on file       Family History   Problem Relation Age of Onset   • Cancer Mother    • Heart Failure Father    • Heart Disease Brother        ROS:     - Constitutional: Negative for fever, chills     - Eyes:   Negative for   eye pain     - ENT:  Negative for  ear pain     - Respiratory: Negative for change in cough/ sputum production, or other pulm change    - Cardiovascular: Negative for chest pain     - Gastrointestinal: +constipation     - Genitourinary:see hpi    - Musculoskeletal: see hpi    - Skin: Negative for rash, itching, cyanotic skin color change.     - Neurological: Negative for vertigo    - Endo:Negative for  excessive thirst    - Hem/lymphatic: Negative for swollen glands    -Allergic/immun: Negative for allergic rhinitis    - Psychiatric/Behavioral: Negative for  suicidal/homicidal ideation      Exam: /88 (BP Location: Left arm, Patient Position: Sitting, BP Cuff Size: Adult)   Pulse 93   Temp 36.2 °C (97.1 °F) (Temporal)   Resp 14   Ht 1.702 m (5' 7\")   Wt 84.8 kg (187 lb)   SpO2 95%  Body mass index is 29.29 kg/m².    General: Normal appearing. No distress.  EYES: Conjunctiva clear lids without ptosis, pupils equal  EARS: Normal shape and contour   Pulmonary: Clear to ausculation.  Normal effort. No rales or wheezing.  Cardiovascular: Regular rate and rhythm without significant murmur.   Abdomen: Soft, nontender, nondistended. Normal bowel sounds.  Neurologic: Cranial nerves grossly nonfocal  No cva ttp, no bladder ttp  Skin: Warm and dry.  No obvious lesions.  Musculoskeletal: Normal gait. No extremity cyanosis, clubbing, or edema.  Psych: " Normal mood and affect. Alert and oriented x3. Judgment and insight is normal.      Assessment/Plan  1. Other insomnia  Obtained and reviewed patient utilization report from [facility] pharmacy database on 5/13/2019 6:20 PM  prior to writing prescription for controlled substance II, III or IV per [place] bill . Based on assessment of the report,my physical exam if necessary and the patient's health problem, the prescription is medically necessary.   - zolpidem (AMBIEN) 5 MG Tab; Take 1 Tab by mouth at bedtime as needed for Sleep for up to 30 days.  Dispense: 30 Tab; Refill: 3    2. Dysuria  U/a showed tr blood/LE, neg nitrate. Culture pending. Pt wishes to start empiric abx tx now.   - URINALYSIS; Future  - URINE CULTURE(NEW); Future  - Bactrim     3. Macrocytic anemia  Pt to bring copy of recent labs from last Thursday for my review which include cbc/iron panel. Sunburst cards ordered.  Anemia could be multifactorial: phlebotomy from Feb, renal, thyroid, etc.  - VITAMIN B12; Future    4. Screening for colon cancer  Chronic constipation, no acute gi change in symptoms.  - COLOGLONA (FIT DNA)    5. Idiopathic gout of left foot, unspecified chronicity  Advised patient if she does require colchicine to hold her statin while taking colchicine.  She is aware of dietary triggers of gout.    6. Essential hypertension  This is uncontrolled, will increase losartan from 50 to 75 mg and follow-up in clinic.      Return to clinic 1 month per patient request        Please note that this dictation was created using voice recognition software. I have made every reasonable attempt to correct obvious errors, but I expect that there are errors of grammar and possibly content that I did not discover before finalizing the note.

## 2019-05-16 LAB
BACTERIA UR CULT: NORMAL
SIGNIFICANT IND 70042: NORMAL
SITE SITE: NORMAL
SOURCE SOURCE: NORMAL

## 2019-05-17 ENCOUNTER — TELEPHONE (OUTPATIENT)
Dept: ENDOCRINOLOGY | Facility: MEDICAL CENTER | Age: 69
End: 2019-05-17

## 2019-05-17 NOTE — TELEPHONE ENCOUNTER
Telephone conversation    We are arranging Prolia authorization but she does not want to go to the infusion center because of the inconvenience.  So we will redirect the authorization to a specialty pharmacy to be given in the office.    Kishore Torrez M.D.

## 2019-05-27 ENCOUNTER — HOSPITAL ENCOUNTER (OUTPATIENT)
Dept: LAB | Facility: MEDICAL CENTER | Age: 69
End: 2019-05-27
Attending: INTERNAL MEDICINE
Payer: MEDICARE

## 2019-05-27 DIAGNOSIS — E05.10 THYROTOXICOSIS WITH TOXIC SINGLE THYROID NODULE AND WITHOUT THYROID STORM: ICD-10-CM

## 2019-05-27 DIAGNOSIS — D53.9 MACROCYTIC ANEMIA: ICD-10-CM

## 2019-05-27 LAB
T3FREE SERPL-MCNC: 3.09 PG/ML (ref 2.4–4.2)
T4 FREE SERPL-MCNC: 0.84 NG/DL (ref 0.53–1.43)
TSH SERPL DL<=0.005 MIU/L-ACNC: 0.67 UIU/ML (ref 0.38–5.33)
VIT B12 SERPL-MCNC: 194 PG/ML (ref 211–911)

## 2019-05-27 PROCEDURE — 84481 FREE ASSAY (FT-3): CPT

## 2019-05-27 PROCEDURE — 84439 ASSAY OF FREE THYROXINE: CPT

## 2019-05-27 PROCEDURE — 36415 COLL VENOUS BLD VENIPUNCTURE: CPT

## 2019-05-27 PROCEDURE — 84443 ASSAY THYROID STIM HORMONE: CPT

## 2019-05-27 PROCEDURE — 82607 VITAMIN B-12: CPT

## 2019-05-29 ENCOUNTER — TELEPHONE (OUTPATIENT)
Dept: MEDICAL GROUP | Facility: MEDICAL CENTER | Age: 69
End: 2019-05-29

## 2019-05-29 NOTE — TELEPHONE ENCOUNTER
----- Message from Marybeth Lynch M.D. sent at 5/28/2019  4:54 PM PDT -----  Please kindly instruct patient that the B12 level is low.  They should take at least 1000 mcg of vitamin B12 per day.  We will discuss this further at their next scheduled primary care appointment.

## 2019-05-29 NOTE — TELEPHONE ENCOUNTER
1. Caller Name: Latonia Clinton       Call Back Number: [phone] (home)         Patient approves a detailed voicemail message: N\A    Patient no further concerns at this moment.

## 2019-05-31 ENCOUNTER — HOSPITAL ENCOUNTER (OUTPATIENT)
Dept: RADIOLOGY | Facility: MEDICAL CENTER | Age: 69
End: 2019-05-31
Attending: INTERNAL MEDICINE
Payer: MEDICARE

## 2019-05-31 DIAGNOSIS — Z12.31 ENCOUNTER FOR SCREENING MAMMOGRAM FOR MALIGNANT NEOPLASM OF BREAST: ICD-10-CM

## 2019-05-31 DIAGNOSIS — M80.80XG OTHER OSTEOPOROSIS WITH CURRENT PATHOLOGICAL FRACTURE WITH DELAYED HEALING, SUBSEQUENT ENCOUNTER: ICD-10-CM

## 2019-05-31 DIAGNOSIS — S32.020D CLOSED COMPRESSION FRACTURE OF L2 LUMBAR VERTEBRA WITH ROUTINE HEALING, SUBSEQUENT ENCOUNTER: ICD-10-CM

## 2019-05-31 DIAGNOSIS — Z78.0 MENOPAUSE: ICD-10-CM

## 2019-05-31 PROCEDURE — 77063 BREAST TOMOSYNTHESIS BI: CPT

## 2019-05-31 PROCEDURE — 77080 DXA BONE DENSITY AXIAL: CPT

## 2019-06-01 ENCOUNTER — TELEPHONE (OUTPATIENT)
Dept: ENDOCRINOLOGY | Facility: MEDICAL CENTER | Age: 69
End: 2019-06-01

## 2019-06-02 NOTE — TELEPHONE ENCOUNTER
Telephone conversation with patient.    Bone density is reviewed and it has declined significantly over the past 2 years.  We are waiting for insurance authorization for Prolia.  [employer]eo has been used in the past and apparently she has used up her allowance.    It is difficult for her to get from her home to the infusion center.  It is much better for her to come to the office which is why we are trying to get it authorized to be done in the office.    Thyroid levels are good taking methimazole 5 mg/day.  No dose change and repeat thyroid levels in 1 month..    Kishore Torrez M.D.

## 2019-06-03 ENCOUNTER — TELEPHONE (OUTPATIENT)
Dept: MEDICAL GROUP | Facility: MEDICAL CENTER | Age: 69
End: 2019-06-03

## 2019-06-03 ENCOUNTER — TELEPHONE (OUTPATIENT)
Dept: VASCULAR LAB | Facility: MEDICAL CENTER | Age: 69
End: 2019-06-03

## 2019-06-03 NOTE — TELEPHONE ENCOUNTER
----- Message from Marybeht Lynch M.D. sent at 6/3/2019 10:00 AM PDT -----  Please kindly instruct patient that mammogram was normal.  She should let us know if she has any breast concerns prior to her next annual screening mammogram.

## 2019-06-03 NOTE — TELEPHONE ENCOUNTER
1. Caller Name: Latonia Clinton                                           Call Back Number: [phone] (home)         Patient approves a detailed voicemail message: N\A    Called and LVM stating we have received her imaging results if she would like these released to please give us a call back at [phone].

## 2019-06-03 NOTE — TELEPHONE ENCOUNTER
Spoke with pt regarding referral to anticoagulation from Dr. Lynch for Xarelto. She reports she is not going to do her procedure as she is too afraid to go off her anticoagulation. She will contact us if she decides to do her procedure.       Yessi Grace, PharmD

## 2019-06-11 ENCOUNTER — HOSPITAL ENCOUNTER (OUTPATIENT)
Dept: RADIOLOGY | Facility: MEDICAL CENTER | Age: 69
End: 2019-06-11
Attending: UROLOGY
Payer: MEDICARE

## 2019-06-11 DIAGNOSIS — N20.1 CALCULUS OF URETER: ICD-10-CM

## 2019-06-11 PROCEDURE — 76775 US EXAM ABDO BACK WALL LIM: CPT

## 2019-06-11 PROCEDURE — 74018 RADEX ABDOMEN 1 VIEW: CPT

## 2019-06-13 ENCOUNTER — OFFICE VISIT (OUTPATIENT)
Dept: MEDICAL GROUP | Facility: MEDICAL CENTER | Age: 69
End: 2019-06-13
Payer: MEDICARE

## 2019-06-13 ENCOUNTER — ANTICOAGULATION MONITORING (OUTPATIENT)
Dept: VASCULAR LAB | Facility: MEDICAL CENTER | Age: 69
End: 2019-06-13

## 2019-06-13 VITALS
WEIGHT: 191.6 LBS | BODY MASS INDEX: 30.07 KG/M2 | DIASTOLIC BLOOD PRESSURE: 80 MMHG | HEART RATE: 78 BPM | SYSTOLIC BLOOD PRESSURE: 138 MMHG | HEIGHT: 67 IN | RESPIRATION RATE: 16 BRPM | OXYGEN SATURATION: 96 % | TEMPERATURE: 98 F

## 2019-06-13 DIAGNOSIS — M81.0 AGE-RELATED OSTEOPOROSIS WITHOUT CURRENT PATHOLOGICAL FRACTURE: ICD-10-CM

## 2019-06-13 DIAGNOSIS — R30.0 DYSURIA: ICD-10-CM

## 2019-06-13 DIAGNOSIS — I10 ESSENTIAL HYPERTENSION: ICD-10-CM

## 2019-06-13 DIAGNOSIS — I82.401 ACUTE DEEP VEIN THROMBOSIS (DVT) OF RIGHT LOWER EXTREMITY, UNSPECIFIED VEIN (HCC): Chronic | ICD-10-CM

## 2019-06-13 PROCEDURE — 99214 OFFICE O/P EST MOD 30 MIN: CPT | Performed by: INTERNAL MEDICINE

## 2019-06-13 RX ORDER — LOSARTAN POTASSIUM 50 MG/1
100 TABLET ORAL DAILY
Qty: 90 TAB | Refills: 3 | Status: SHIPPED
Start: 2019-06-13 | End: 2019-12-19

## 2019-06-13 NOTE — PROGRESS NOTES
LM on VM that Pt has not established care yet needs labs then needs to come into clinic to be seen. VENITA Bland.

## 2019-06-14 ENCOUNTER — TELEPHONE (OUTPATIENT)
Dept: MEDICAL GROUP | Facility: MEDICAL CENTER | Age: 69
End: 2019-06-14

## 2019-06-14 NOTE — PROGRESS NOTES
"CC:  Diagnoses of Essential hypertension, Dysuria, and Age-related osteoporosis without current pathological fracture were pertinent to this visit.    HISTORY OF THE PRESENT ILLNESS: Patient is a 68 y.o. female. This pleasant patient is here today to follow-up.      Latonia does not seem like herself today.  She seems very down, she became teary-eyed but did not wish to discuss it.      She indicates she still has a dysuria status post work-up to include labs, culture 5/13/2019 showed mixed skin janette.  Previously tried empiric antibiotics which was not helpful.  She also sees a nephrologist Dr. Kitchen and recent renal ultrasound showed bilateral nonobstructing renal calcifications, no hydronephrosis.  Ultimately she says she is pending a urology appointment soon.  She knows her uric acid level was elevated at 9 on recent labs done by her nephrologist but she has not started allopurinol yet.    Still concerned about her nails, it \"chewed up appearance\"she saw a dermatologist, waiting for follow-up.  Tried biotin without benefit.    Her endocrinologist ordered DEXA scan, showed osteoporosis, she says that they are trying to get Prolia for her.  Her 10-year FRAX score overall was 32.8%, at the hip was 10.8%, T-scores were in osteoporosis range.    She is pending her colonoscopy.  Mammogram was in the normal limits.    Blood pressure not optimally controlled on the higher dose losartan she is willing to go to 100 mg.  She is pending follow-up with nephrology in July and will have repeat electrolytes at that time.    CAD listed on her problems, she denies this.  She would like this removed.        Allergies: Nkda [no known drug allergy]    Current Outpatient Prescriptions Ordered in [facility]   Medication Sig Dispense Refill   • losartan (COZAAR) 50 MG Tab Take 2 Tabs by mouth every day. 90 Tab 3   • zolpidem (AMBIEN) 5 MG Tab Take 1 Tab by mouth at bedtime as needed for Sleep for up to 30 days. 30 Tab 3   • colchicine " (COLCRYS) 0.6 MG Tab Take 1 Tab by mouth every day. 30 Tab 1   • doxycycline (VIBRAMYCIN) 100 MG Cap Take 1 Cap by mouth 2 times a day. Take until gone. 20 Cap 1   • ondansetron (ZOFRAN) 4 MG Tab tablet Take 4 mg by mouth every four hours as needed for Nausea/Vomiting.     • HYDROcodone-acetaminophen (NORCO) 5-325 MG Tab per tablet Take 1-2 Tabs by mouth every four hours as needed.     • ALBUTEROL INH      • clobetasol (TEMOVATE) 0.05 % Cream      • methimazole (TAPAZOLE) 5 MG Tab Take 1 Tab by mouth every day. 30 Tab 0   • albuterol 108 (90 Base) MCG/ACT Aero Soln inhalation aerosol      • ipratropium-albuterol (DUONEB) 0.5-2.5 (3) MG/3ML nebulizer solution 3 mL by Nebulization route every four hours as needed for Shortness of Breath. 120 mL 11   • SPIRIVA RESPIMAT 2.5 MCG/ACT Aero Soln INHALE TWO PUFFS BY MOUTH DAILY (ASSEMBLE AND PRIME) 1 Inhaler 3   • azithromycin (ZITHROMAX) 250 MG Tab 1 daily for COPD 30 Tab 11   • docusate sodium (COLACE) 100 MG Cap Take 300 mg by mouth every evening.     • acetaminophen (TYLENOL) 500 MG Tab Take 1,000 mg by mouth every 6 hours as needed for Moderate Pain.     • multivitamin (THERAGRAN) Tab Take 1 Tab by mouth every day.     • rivaroxaban (XARELTO) 20 MG Tab tablet Take 1 Tab by mouth with dinner. 30 Tab 3   • PROAIR  (90 Base) MCG/ACT Aero Soln inhalation aerosol INHALE TWO PUFFS BY MOUTH EVERY 6 HOURS AS NEEDED FOR SHORTNESS OF BREATH 1 Inhaler 11   • BREO ELLIPTA 200-25 MCG/INH AEROSOL POWDER, BREATH ACTIVATED INHALE ONE DOSE BY MOUTH DAILY. RINSE MOUTH AFTER USE. 1 Each 11   • COMBIGAN 0.2-0.5 % Solution Place 1 Drop in both eyes 2 Times a Day.     • vitamin D, Ergocalciferol, (DRISDOL) 78571 UNITS Cap capsule Take 50,000 Units by mouth every 14 days.     • simvastatin (ZOCOR) 40 MG Tab Take 40 mg by mouth every evening.       No current Epic-ordered facility-administered medications on file.        Past Medical History:   Diagnosis Date   • Anesthesia      "\"Abnormal blood pressure and breathing\"  \"couldn't breathe\"   • Asthma     inhalers daily   • Backpain 7/2017     thor. area   • Blood clot in vein 07/06/2018    \"Currently have a blood clot in my left eye\"   • Bowel habit changes 07/06/2018    Constipation   • Breath shortness     with exertion has O2 but does not use   • Bronchitis    • CAD (coronary artery disease)     palpatations   • Cancer (HCC) 2016    left lung   • Chickenpox    • COPD (chronic obstructive pulmonary disease) (HCC)    • Depression 2/26/2019   • Dialysis 2005    transient renal failure due to sepsis   • Emphysema of lung (HCC)    • Glaucoma     right eye   • Hemorrhagic disorder (HCC)    • Hyperlipidemia    • Hypertension    • Hyperthyroidism    • Kidney stone     bilateral   • Lung cancer (HCC) 12/12/2016   • Multiple thyroid nodules 7/9/2015   • Nasal drainage    • Osteoporosis    • Pain 07/06/2018    Back pain   • Personal history of venous thrombosis and embolism 2004, 2012    right leg 2012, left arm dvt 2004 left eye 2017   • Pneumonia 7/2016    per patient   • Renal disorder     had been on dialysis for 7 months for acute failure 2005   • Renal stones 2013    post lithotripsy   • Rheumatoid arthritis (HCC)     question   • Rheumatoid arthritis (HCC)    • S/P appendectomy 1998    • S/P cholecystectomy 1998   • S/P kyphoplasty 2006, 2007, 2013   • Sepsis(995.91) 2005   • Shortness of breath 07/06/2018    Chronic current problem. \"A couple of years now\".  O2 concentrator at night - pt states she doesn't use it.   • Thyroid nodule, hot 2017    functional \"hot\" right thyroid nodule without thyrotoxicosis       Past Surgical History:   Procedure Laterality Date   • CYSTOSCOPY STENT PLACEMENT  7/9/2018    Procedure: Cystoscopy,  Left removal of stent ,  Left Stent Placement;  Surgeon: Beck Yang M.D.;  Location: SUR[facility];  Service: Urology   • URETEROSCOPY Left 7/9/2018    Procedure: URETEROSCOPY;  Surgeon: Beck Yang, " M.D.;  Location: [facility];  Service: Urology   • LASERTRIPSY Left 7/9/2018    Procedure: LASERTRIPSY-LITHO;  Surgeon: Beck Yang M.D.;  Location: [facility];  Service: Urology   • CYSTOSCOPY STENT PLACEMENT Left 6/18/2018    Procedure: CYSTOSCOPY STENT PLACEMENT;  Surgeon: Beck Yagn M.D.;  Location: [facility];  Service: Urology   • LITHOTRIPSY Left 6/18/2018    Procedure: LITHOTRIPSY;  Surgeon: Beck Yang M.D.;  Location: [facility];  Service: Urology   • LASERTRIPSY Left 6/18/2018    Procedure: LASERTRIPSY;  Surgeon: Beck Yang M.D.;  Location: [facility];  Service: Urology   • URETEROSCOPY Left 6/18/2018    Procedure: URETEROSCOPY;  Surgeon: Beck Yang M.D.;  Location: [facility];  Service: Urology   • THORACOSCOPY Left 12/12/2016    Procedure: THORACOSCOPY W/WEDGE RESECTION UPPER LOBE MASS;  Surgeon: John H Ganser, M.D.;  Location: [facility];  Service:    • OTHER ORTHOPEDIC SURGERY  2013    kyphoplasty X3   • APPENDECTOMY      1990   • CHOLECYSTECTOMY      1990   • LAMINOTOMY     • LUNG BIOPSY OPEN     • OTHER     • OTHER ABDOMINAL SURGERY      gall bladder disease   • PB ENLARGE BREAST WITH IMPLANT     • PB REDUCTION OF LARGE BREAST         Social History   Substance Use Topics   • Smoking status: Former Smoker     Packs/day: 1.00     Years: 30.00     Types: Cigarettes     Quit date: 1/1/2000   • Smokeless tobacco: Never Used      Comment: 7 years ago   • Alcohol use 0.0 oz/week      Comment: x2 a week       Social History     Social History Narrative   • No narrative on file       Family History   Problem Relation Age of Onset   • Cancer Mother    • Heart Failure Father    • Heart Disease Brother        ROS:     - Constitutional: Negative for fever, chills     - Respiratory: Negative for cough    - Cardiovascular: Negative for chest pain    - Gastrointestinal: Negative for abdominal pain    -  "Genitourinary: see hpi      - Psychiatric/Behavioral: + depression    Exam: /80 (BP Location: Left arm, Patient Position: Sitting, BP Cuff Size: Adult)   Pulse 78   Temp 36.7 °C (98 °F) (Temporal)   Resp 16   Ht 1.702 m (5' 7\")   Wt 86.9 kg (191 lb 9.6 oz)   SpO2 96%  Body mass index is 30.01 kg/m².    General: Normal appearing. No distress.  EYES: Conjunctiva clear lids without ptosis, pupils equal  Pulmonary: Clear to ausculation.  Normal effort. No rales or wheezing.  Cardiovascular: Regular rate and rhythm without significant murmur.   Abdomen: Soft, nontender, nondistended. Normal bowel sounds.  Neurologic: Cranial nerves grossly nonfocal  Skin: Warm and dry.  No obvious lesions.  Musculoskeletal: Normal gait. No extremity cyanosis, clubbing, or edema.  Psych: depressed affect. Alert and oriented x3. Judgment and insight is normal.        Assessment/Plan  1. Essential hypertension  Increase losartan to 100mg daily, goal bp 120/80  - losartan (COZAAR) 50 MG Tab; Take 2 Tabs by mouth every day.  Dispense: 90 Tab; Refill: 3  - Basic Metabolic Panel; Future    2.  Osteoporosis  Patient is working with endocrinologist to try to start Prolia.     3.  Dysuria  Status post empiric antibiotics, urinalysis, renal ultrasound did show calcified findings and uric acid is high she has been recommended allopurinol by her nephrologist and pending urology follow-up.    4.  Mood  Patient will let me know if ever she wants to return to clinic on any day.  She did not seem to be interested in talking about this but was obviously depressed. Team will reach out to her tomorrow by phone.   Offered trial of medications for mood or psychology consultation she denied both.    Pt will drop off dmv paperwork for completion    Return cologuard      Please note that this dictation was created using voice recognition software. I have made every reasonable attempt to correct obvious errors, but I expect that there are errors of " grammar and possibly content that I did not discover before finalizing the note.

## 2019-06-14 NOTE — TELEPHONE ENCOUNTER
1. Caller Name: Latonia Clinton                                           Call Back Number: [phone] (home)         Patient approves a detailed voicemail message: N\A    Called spoke with patient. I informed her I did some DMV research and found the paper. I asked how she was going she said good, she did happy after I told her about the dmv paper work.

## 2019-06-20 ENCOUNTER — TELEPHONE (OUTPATIENT)
Dept: VASCULAR LAB | Facility: MEDICAL CENTER | Age: 69
End: 2019-06-20

## 2019-06-20 ENCOUNTER — HOSPITAL ENCOUNTER (OUTPATIENT)
Dept: LAB | Facility: MEDICAL CENTER | Age: 69
End: 2019-06-20
Attending: INTERNAL MEDICINE
Payer: MEDICARE

## 2019-06-20 ENCOUNTER — HOSPITAL ENCOUNTER (OUTPATIENT)
Dept: LAB | Facility: MEDICAL CENTER | Age: 69
End: 2019-06-20
Attending: NURSE PRACTITIONER
Payer: MEDICARE

## 2019-06-20 DIAGNOSIS — E05.10 THYROTOXICOSIS WITH TOXIC SINGLE THYROID NODULE AND WITHOUT THYROID STORM: ICD-10-CM

## 2019-06-20 LAB
ANION GAP SERPL CALC-SCNC: 10 MMOL/L (ref 0–11.9)
BASOPHILS # BLD AUTO: 0.7 % (ref 0–1.8)
BASOPHILS # BLD: 0.03 K/UL (ref 0–0.12)
BUN SERPL-MCNC: 26 MG/DL (ref 8–22)
CALCIUM SERPL-MCNC: 10.3 MG/DL (ref 8.5–10.5)
CHLORIDE SERPL-SCNC: 112 MMOL/L (ref 96–112)
CO2 SERPL-SCNC: 21 MMOL/L (ref 20–33)
CREAT SERPL-MCNC: 1.36 MG/DL (ref 0.5–1.4)
EOSINOPHIL # BLD AUTO: 0.18 K/UL (ref 0–0.51)
EOSINOPHIL NFR BLD: 4.1 % (ref 0–6.9)
ERYTHROCYTE [DISTWIDTH] IN BLOOD BY AUTOMATED COUNT: 49 FL (ref 35.9–50)
FASTING STATUS PATIENT QL REPORTED: NORMAL
GLUCOSE SERPL-MCNC: 92 MG/DL (ref 65–99)
HCT VFR BLD AUTO: 43 % (ref 37–47)
HGB BLD-MCNC: 13.7 G/DL (ref 12–16)
IMM GRANULOCYTES # BLD AUTO: 0.01 K/UL (ref 0–0.11)
IMM GRANULOCYTES NFR BLD AUTO: 0.2 % (ref 0–0.9)
LYMPHOCYTES # BLD AUTO: 1.44 K/UL (ref 1–4.8)
LYMPHOCYTES NFR BLD: 32.7 % (ref 22–41)
MCH RBC QN AUTO: 33.7 PG (ref 27–33)
MCHC RBC AUTO-ENTMCNC: 31.9 G/DL (ref 33.6–35)
MCV RBC AUTO: 105.7 FL (ref 81.4–97.8)
MONOCYTES # BLD AUTO: 0.46 K/UL (ref 0–0.85)
MONOCYTES NFR BLD AUTO: 10.4 % (ref 0–13.4)
NEUTROPHILS # BLD AUTO: 2.29 K/UL (ref 2–7.15)
NEUTROPHILS NFR BLD: 51.9 % (ref 44–72)
NRBC # BLD AUTO: 0 K/UL
NRBC BLD-RTO: 0 /100 WBC
PLATELET # BLD AUTO: 201 K/UL (ref 164–446)
PMV BLD AUTO: 11.3 FL (ref 9–12.9)
POTASSIUM SERPL-SCNC: 4.3 MMOL/L (ref 3.6–5.5)
RBC # BLD AUTO: 4.07 M/UL (ref 4.2–5.4)
SODIUM SERPL-SCNC: 143 MMOL/L (ref 135–145)
T3FREE SERPL-MCNC: 3.15 PG/ML (ref 2.4–4.2)
T4 FREE SERPL-MCNC: 0.89 NG/DL (ref 0.53–1.43)
TSH SERPL DL<=0.005 MIU/L-ACNC: 0.56 UIU/ML (ref 0.38–5.33)
WBC # BLD AUTO: 4.4 K/UL (ref 4.8–10.8)

## 2019-06-20 PROCEDURE — 84443 ASSAY THYROID STIM HORMONE: CPT

## 2019-06-20 PROCEDURE — 36415 COLL VENOUS BLD VENIPUNCTURE: CPT

## 2019-06-20 PROCEDURE — 85025 COMPLETE CBC W/AUTO DIFF WBC: CPT

## 2019-06-20 PROCEDURE — 80048 BASIC METABOLIC PNL TOTAL CA: CPT

## 2019-06-20 PROCEDURE — 84439 ASSAY OF FREE THYROXINE: CPT

## 2019-06-20 PROCEDURE — 84481 FREE ASSAY (FT-3): CPT

## 2019-06-20 NOTE — TELEPHONE ENCOUNTER
Spoke with the pt regarding establishing care with  clinic. She has a number of health concerns and was under the impression that she did not need to have any testing done. Her brother does not have any done after all. She will go to the lab then when lab work is back we will need to call her for an appt. She prefers [facility]. VENITA Bland.

## 2019-06-24 ENCOUNTER — TELEPHONE (OUTPATIENT)
Dept: ENDOCRINOLOGY | Facility: MEDICAL CENTER | Age: 69
End: 2019-06-24

## 2019-06-25 ENCOUNTER — TELEPHONE (OUTPATIENT)
Dept: ENDOCRINOLOGY | Facility: MEDICAL CENTER | Age: 69
End: 2019-06-25

## 2019-06-25 NOTE — TELEPHONE ENCOUNTER
Phone Number Called: [phone] (home)       Call outcome: left message for patient to call back regarding message below    Message: Lm for Meaghan that we get a fax from [facility] that the prolia injection is not covered here in the office. She will have to continue to go to the infusion center every 6 months to get that injection. Gave their [phone] to call for an appointment.

## 2019-06-25 NOTE — TELEPHONE ENCOUNTER
----- Message from Kishore Torrez M.D. sent at 6/23/2019  3:20 PM PDT -----  Please tell patient that her thyroid blood levels from June 20 are normal.  No change indicated.  Repeat again before the July 24 appointment.

## 2019-06-25 NOTE — TELEPHONE ENCOUNTER
Phone Number Called: [phone] (home)       Call outcome: spoke to patient regarding message below    Message: Spoke to patient relayed message

## 2019-07-01 ENCOUNTER — ANTICOAGULATION VISIT (OUTPATIENT)
Dept: MEDICAL GROUP | Facility: MEDICAL CENTER | Age: 69
End: 2019-07-01
Payer: MEDICARE

## 2019-07-01 VITALS
TEMPERATURE: 89 F | SYSTOLIC BLOOD PRESSURE: 154 MMHG | HEART RATE: 89 BPM | OXYGEN SATURATION: 98 % | DIASTOLIC BLOOD PRESSURE: 75 MMHG

## 2019-07-01 DIAGNOSIS — Z79.01 CHRONIC ANTICOAGULATION: ICD-10-CM

## 2019-07-01 DIAGNOSIS — J44.9 CHRONIC OBSTRUCTIVE PULMONARY DISEASE, UNSPECIFIED COPD TYPE (HCC): ICD-10-CM

## 2019-07-01 DIAGNOSIS — I82.4Y9 DEEP VEIN THROMBOSIS (DVT) OF PROXIMAL LOWER EXTREMITY, UNSPECIFIED CHRONICITY, UNSPECIFIED LATERALITY (HCC): ICD-10-CM

## 2019-07-01 LAB — INR PPP: 1 (ref 2–3.5)

## 2019-07-01 PROCEDURE — 99211 OFF/OP EST MAY X REQ PHY/QHP: CPT | Performed by: INTERNAL MEDICINE

## 2019-07-01 PROCEDURE — 85610 PROTHROMBIN TIME: CPT | Performed by: INTERNAL MEDICINE

## 2019-07-01 RX ORDER — TOLTERODINE TARTRATE 2 MG/1
2 TABLET, EXTENDED RELEASE ORAL 2 TIMES DAILY
COMMUNITY
End: 2019-07-29

## 2019-07-01 RX ORDER — ALLOPURINOL 100 MG/1
100 TABLET ORAL DAILY
Status: ON HOLD | COMMUNITY
Start: 2019-05-20 | End: 2023-01-01 | Stop reason: SDUPTHER

## 2019-07-01 NOTE — PROGRESS NOTES
Target end date:indefinite     Indication: DVT     Drug: Xarelto 20mg qd with dinner         Health Status Since Last Assessment   Patient denies any new relevant medical problems, ED visits or hospitalizations   Patient denies any embolic events (stroke/tia/systemic embolism)    Adherence with DOAC Therapy   Pt has zero missed any doses in the average week    Bleeding Risk Assessment     none Epistaxis   Pt denies any excessive or unusual bleeding/hematomas.  Pt denies any GI bleeds or hematemesis.  Pt denies any concerning daily headache or sub dural hematoma symptoms.     Pt denies any hematuria or abnormal vaginal bleeding.   Latest Hemoglobin 13.7   ETOH overuse 1-2 drinks per week     Creatinine Clearance/Renal Function     Latest ClCr 35-45ml/min     Drug Interactions   Platelets: 201   ASA/other antiplatelets none   NSAID none   Other drug interactions none   x Verified no anticonvulsant or azole therapy, education provided for future use.     Examination   Blood Pressure 156/88   Symptomatic hypotension none   Significant gait impairment/imbalance/high risk for falls? No however, pt is unsteady on her feet today, reports not feeling well.  Pt c/o chest pain, has a history of lung mass.  Pt will f/u with PCP    Final Assessment and Recommendations:   Patient appears stable from the anticoagulation staindpoint.     Benefits of continued DOAC therapy outweigh risks for this patient   Recommend pt continue with current DOAC therapy   (with dose adjustment)     Other Actions: cmp/ cbc hemogram ordered prior to next visit    Follow up:   Will follow up with patient 6  months.    Clarissa Murillo, Clinical Pharmacist, CDE, CACP

## 2019-07-02 RX ORDER — SIMVASTATIN 40 MG
40 TABLET ORAL EVERY EVENING
Qty: 90 TAB | Refills: 3 | Status: SHIPPED | OUTPATIENT
Start: 2019-07-02 | End: 2020-09-01

## 2019-07-03 ENCOUNTER — TELEPHONE (OUTPATIENT)
Dept: ENDOCRINOLOGY | Facility: MEDICAL CENTER | Age: 69
End: 2019-07-03

## 2019-07-03 NOTE — TELEPHONE ENCOUNTER
1. Caller Name: Latonia                                         Call Back Number: [phone] (home)         Patient approves a detailed voicemail message: no    Please place order for calcium test before she gets prolia injection they are requiring she has that. Scheduled for 7/24 for Prolia

## 2019-07-04 DIAGNOSIS — M81.0 AGE-RELATED OSTEOPOROSIS WITHOUT CURRENT PATHOLOGICAL FRACTURE: ICD-10-CM

## 2019-07-08 NOTE — TELEPHONE ENCOUNTER
Phone Number Called: [phone] (home)       Call outcome: left message for patient to call back regarding message below    Message: lm for patient that labs have been ordered and ready to be drawn

## 2019-07-18 ENCOUNTER — HOSPITAL ENCOUNTER (OUTPATIENT)
Dept: LAB | Facility: MEDICAL CENTER | Age: 69
End: 2019-07-18
Attending: INTERNAL MEDICINE
Payer: MEDICARE

## 2019-07-18 DIAGNOSIS — I10 ESSENTIAL HYPERTENSION: ICD-10-CM

## 2019-07-18 DIAGNOSIS — M81.0 AGE-RELATED OSTEOPOROSIS WITHOUT CURRENT PATHOLOGICAL FRACTURE: ICD-10-CM

## 2019-07-18 LAB
ANION GAP SERPL CALC-SCNC: 8 MMOL/L (ref 0–11.9)
BUN SERPL-MCNC: 35 MG/DL (ref 8–22)
CA-I SERPL-SCNC: 1.4 MMOL/L (ref 1.1–1.3)
CALCIUM SERPL-MCNC: 9.9 MG/DL (ref 8.5–10.5)
CHLORIDE SERPL-SCNC: 113 MMOL/L (ref 96–112)
CO2 SERPL-SCNC: 21 MMOL/L (ref 20–33)
CREAT SERPL-MCNC: 1.26 MG/DL (ref 0.5–1.4)
CREAT SERPL-MCNC: 1.27 MG/DL (ref 0.5–1.4)
GLUCOSE SERPL-MCNC: 100 MG/DL (ref 65–99)
POTASSIUM SERPL-SCNC: 4.3 MMOL/L (ref 3.6–5.5)
SODIUM SERPL-SCNC: 142 MMOL/L (ref 135–145)
T3FREE SERPL-MCNC: 3.15 PG/ML (ref 2.4–4.2)
T4 FREE SERPL-MCNC: 0.76 NG/DL (ref 0.53–1.43)
TSH SERPL DL<=0.005 MIU/L-ACNC: 0.3 UIU/ML (ref 0.38–5.33)

## 2019-07-18 PROCEDURE — 84481 FREE ASSAY (FT-3): CPT

## 2019-07-18 PROCEDURE — 36415 COLL VENOUS BLD VENIPUNCTURE: CPT

## 2019-07-18 PROCEDURE — 82330 ASSAY OF CALCIUM: CPT

## 2019-07-18 PROCEDURE — 80048 BASIC METABOLIC PNL TOTAL CA: CPT

## 2019-07-18 PROCEDURE — 82565 ASSAY OF CREATININE: CPT

## 2019-07-18 PROCEDURE — 84443 ASSAY THYROID STIM HORMONE: CPT

## 2019-07-18 PROCEDURE — 84439 ASSAY OF FREE THYROXINE: CPT

## 2019-07-24 ENCOUNTER — OFFICE VISIT (OUTPATIENT)
Dept: ENDOCRINOLOGY | Facility: MEDICAL CENTER | Age: 69
End: 2019-07-24
Payer: MEDICARE

## 2019-07-24 VITALS
SYSTOLIC BLOOD PRESSURE: 126 MMHG | DIASTOLIC BLOOD PRESSURE: 86 MMHG | WEIGHT: 199 LBS | HEART RATE: 102 BPM | BODY MASS INDEX: 31.23 KG/M2 | OXYGEN SATURATION: 95 % | HEIGHT: 67 IN

## 2019-07-24 DIAGNOSIS — E05.10 THYROTOXICOSIS WITH TOXIC SINGLE THYROID NODULE AND WITHOUT THYROID STORM: ICD-10-CM

## 2019-07-24 DIAGNOSIS — E05.90 HYPERTHYROIDISM: ICD-10-CM

## 2019-07-24 DIAGNOSIS — M81.0 AGE-RELATED OSTEOPOROSIS WITHOUT CURRENT PATHOLOGICAL FRACTURE: ICD-10-CM

## 2019-07-24 PROCEDURE — 99214 OFFICE O/P EST MOD 30 MIN: CPT | Performed by: INTERNAL MEDICINE

## 2019-07-24 RX ORDER — HYDROCODONE BITARTRATE AND ACETAMINOPHEN 10; 325 MG/1; MG/1
1-2 TABLET ORAL EVERY 6 HOURS PRN
COMMUNITY
End: 2022-05-12

## 2019-07-25 ENCOUNTER — HOSPITAL ENCOUNTER (OUTPATIENT)
Dept: PULMONOLOGY | Facility: MEDICAL CENTER | Age: 69
End: 2019-07-25
Attending: INTERNAL MEDICINE
Payer: MEDICARE

## 2019-07-25 PROCEDURE — G0424 PULMONARY REHAB W EXER: HCPCS

## 2019-07-25 NOTE — PROGRESS NOTES
Chief Complaint   Patient presents with   • Thyrotoxicosis     Toxic thyroid nodule        HPI:        1. Thyrotoxicosis.    This has been a very difficult condition to sort out and treat.  She has always had a very low TSH, either absolutely low or low normal.  On only one occasion has she had an elevated free T4 and that was back in July 2016.  I have not felt that she is clinically thyrotoxic but that has been very difficult to sort out over time because of multiple other significant medical problems.  She did have an I-123 uptake and scan in July 2015.  At that time her five hour uptake was elevated at 26% and an autonomous hyperfunctioning nodule in the right lobe was identified.  Follow up scan was done September 2017 and she was again noted to have the hyper functioning nodule in the right lobe with a 19% uptake also considered abnormal but not as significant as previous.  This sort of came to a head in April of this year when she was admitted to the hospital with shortness of breath.  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  Shortly after that her TSH had gone up to 5.5 and free T4 low normal at 0.5 and free T3 also low normal at 2.9.  She was feeling better however so I reduced her methimazole to 5 mg a day to continue.  At the time that she was started on methimazole I can find no elevated T4 or T3.  In February her TSH was suppressed at .07.  Thyrotropin receptor antibodies have been negative on two occasions at that time and since.     Currently she is gaining weight and feeling more tired than ever.  She feels worse now than she did before she took methimazole.  Currently her TSH is low normal at 0.3.  Her free T4 is also low normal at 0.7 and her free T3 is mid range at 3.1.      I will ask her to reduce her methimazole to 2.5 mg per day and recheck  levels in one month.  I will also get TSI antibodies to see if those are positive.  Her eyes are somewhat prominent, left more than right and it gives the appearance of exophthalmos consistent with Grave’s.  Her thyroid gland is still prominent in the right lobe and so I think she does have a hot or functional nodule in the right lobe possibly causing mild thyrotoxicosis but probably not Grave’s disease.    On top of all of this she has other disabilities.  She has significant osteoporosis and I have been trying to get her started on Prolia and I think we will finally accomplish this next week.    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  She has a tophus on her right thumb.  Another little spot on the other hand.  Her uric acid is 11 and her doctors have changed her to allopurinol.      I will follow her thyroid levels as we go.  I will try to do some over the phone because she has so many appointments with so many doctors she gets discouraged.      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  One prominent tophus on one thumb and another small spot on an index finger.  I advised against surgical intervention.  Allopurinol has been prescribed      Allergies:   Allergies   Allergen Reactions   • Nkda [No Known Drug Allergy]        Current medicines including changes today:  Current Outpatient Prescriptions   Medication Sig Dispense Refill   • HYDROcodone/acetaminophen (NORCO)  MG Tab Take 1-2 Tabs by mouth every 6 hours as needed.     • BREO ELLIPTA 200-25 MCG/INH AEROSOL POWDER, BREATH ACTIVATED INHALE ONE DOSE BY MOUTH DAILY. RINSE MOUTH AFTER USE. 1 Each 5   • simvastatin (ZOCOR) 40 MG Tab Take 1 Tab by mouth every evening. 90 Tab 3   • allopurinol (ZYLOPRIM) 100 MG Tab      • Fluticasone-Umeclidin-Vilant (TRELEGY ELLIPTA) 100-62.5-25 MCG/INH AEROSOL POWDER, BREATH ACTIVATED Inhale  by mouth.     • rivaroxaban (XARELTO) 20 MG  Tab tablet Take 1 Tab by mouth with dinner. 90 Tab 1   • losartan (COZAAR) 50 MG Tab Take 2 Tabs by mouth every day. (Patient taking differently: Take 75 mg by mouth every day.) 90 Tab 3   • doxycycline (VIBRAMYCIN) 100 MG Cap Take 1 Cap by mouth 2 times a day. Take until gone. 20 Cap 1   • ondansetron (ZOFRAN) 4 MG Tab tablet Take 4 mg by mouth every four hours as needed for Nausea/Vomiting.     • HYDROcodone-acetaminophen (NORCO) 5-325 MG Tab per tablet Take 1-2 Tabs by mouth every four hours as needed.     • clobetasol (TEMOVATE) 0.05 % Cream      • methimazole (TAPAZOLE) 5 MG Tab Take 1 Tab by mouth every day. 30 Tab 0   • albuterol 108 (90 Base) MCG/ACT Aero Soln inhalation aerosol      • ipratropium-albuterol (DUONEB) 0.5-2.5 (3) MG/3ML nebulizer solution 3 mL by Nebulization route every four hours as needed for Shortness of Breath. 120 mL 11   • SPIRIVA RESPIMAT 2.5 MCG/ACT Aero Soln INHALE TWO PUFFS BY MOUTH DAILY (ASSEMBLE AND PRIME) 1 Inhaler 3   • azithromycin (ZITHROMAX) 250 MG Tab 1 daily for COPD 30 Tab 11   • docusate sodium (COLACE) 100 MG Cap Take 300 mg by mouth every evening.     • acetaminophen (TYLENOL) 500 MG Tab Take 1,000 mg by mouth every 6 hours as needed for Moderate Pain.     • multivitamin (THERAGRAN) Tab Take 1 Tab by mouth every day.     • PROAIR  (90 Base) MCG/ACT Aero Soln inhalation aerosol INHALE TWO PUFFS BY MOUTH EVERY 6 HOURS AS NEEDED FOR SHORTNESS OF BREATH 1 Inhaler 11   • COMBIGAN 0.2-0.5 % Solution Place 1 Drop in both eyes 2 Times a Day.     • vitamin D, Ergocalciferol, (DRISDOL) 74191 UNITS Cap capsule Take 50,000 Units by mouth every 14 days.     • tolterodine (DETROL) 2 MG Tab Take 2 mg by mouth 2 times a day.     • colchicine (COLCRYS) 0.6 MG Tab Take 1 Tab by mouth every day. (Patient not taking: Reported on 7/24/2019) 30 Tab 1     No current facility-administered medications for this visit.         Past Medical History:   Diagnosis Date   • Anesthesia      "\"Abnormal blood pressure and breathing\"  \"couldn't breathe\"   • Asthma     inhalers daily   • Backpain 7/2017     thor. area   • Blood clot in vein 07/06/2018    \"Currently have a blood clot in my left eye\"   • Bowel habit changes 07/06/2018    Constipation   • Breath shortness     with exertion has O2 but does not use   • Bronchitis    • CAD (coronary artery disease)     palpatations   • Cancer (HCC) 2016    left lung   • Chickenpox    • COPD (chronic obstructive pulmonary disease) (HCC)    • Depression 2/26/2019   • Dialysis 2005    transient renal failure due to sepsis   • Emphysema of lung (HCC)    • Glaucoma     right eye   • Hemorrhagic disorder (HCC)    • Hyperlipidemia    • Hypertension    • Hyperthyroidism    • Kidney stone     bilateral   • Lung cancer (HCC) 12/12/2016   • Multiple thyroid nodules 7/9/2015   • Nasal drainage    • Osteoporosis    • Pain 07/06/2018    Back pain   • Personal history of venous thrombosis and embolism 2004, 2012    right leg 2012, left arm dvt 2004 left eye 2017   • Pneumonia 7/2016    per patient   • Renal disorder     had been on dialysis for 7 months for acute failure 2005   • Renal stones 2013    post lithotripsy   • Rheumatoid arthritis (HCC)     question   • Rheumatoid arthritis (HCC)    • S/P appendectomy 1998    • S/P cholecystectomy 1998   • S/P kyphoplasty 2006, 2007, 2013   • Sepsis(995.91) 2005   • Shortness of breath 07/06/2018    Chronic current problem. \"A couple of years now\".  O2 concentrator at night - pt states she doesn't use it.   • Thyroid nodule, hot 2017    functional \"hot\" right thyroid nodule without thyrotoxicosis       PHYSICAL EXAM:    /86 (BP Location: Left arm, Patient Position: Sitting, BP Cuff Size: Adult)   Pulse (!) 102   Ht 1.702 m (5' 7.01\")   Wt 90.3 kg (199 lb)   LMP  (LMP Unknown)   SpO2 95%   BMI 31.16 kg/m²     Gen.   appears exhausted but not in acute distress    Skin   gouty tophus on one thumb    HEENT    eyes are " prominent with left more prominent than right which gives the appearance of Graves' ophthalmolpathy    Neck   right lobe of the thyroid is prominent where her functional nodule is.  Nontender    Heart  regular    Extremities   mild ankle edema    Neuro  gait and station normal    Psych  appropriate    ASSESSMENT AND RECOMMENDATIONS    1. Hyperthyroidism              See HPI  - FREE THYROXINE; Future  - T3 FREE; Future  - TSH; Future  - TSI; Future    2. 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 Age-related osteoporosis without current pathological fracture             Finally we will start Prolia next week      DISPOSITION: Thyroid blood tests and telephone follow-up next month      Kishore Torrez M.D.    Copies to: Marybeth Lynch M.D. [phone]

## 2019-07-29 ENCOUNTER — OUTPATIENT INFUSION SERVICES (OUTPATIENT)
Dept: ONCOLOGY | Facility: MEDICAL CENTER | Age: 69
End: 2019-07-29
Attending: INTERNAL MEDICINE
Payer: MEDICARE

## 2019-07-29 VITALS
HEIGHT: 66 IN | OXYGEN SATURATION: 96 % | SYSTOLIC BLOOD PRESSURE: 145 MMHG | BODY MASS INDEX: 32.03 KG/M2 | TEMPERATURE: 98 F | DIASTOLIC BLOOD PRESSURE: 73 MMHG | WEIGHT: 199.3 LBS | HEART RATE: 85 BPM | RESPIRATION RATE: 18 BRPM

## 2019-07-29 PROCEDURE — 96372 THER/PROPH/DIAG INJ SC/IM: CPT

## 2019-07-29 PROCEDURE — 700111 HCHG RX REV CODE 636 W/ 250 OVERRIDE (IP): Mod: JG | Performed by: INTERNAL MEDICINE

## 2019-07-29 RX ORDER — ZOLPIDEM TARTRATE 5 MG/1
5 TABLET ORAL NIGHTLY PRN
COMMUNITY
End: 2019-12-19 | Stop reason: SDUPTHER

## 2019-07-29 RX ADMIN — DENOSUMAB 60 MG: 60 INJECTION SUBCUTANEOUS at 15:03

## 2019-07-29 ASSESSMENT — PAIN SCALES - GENERAL: PAINLEVEL: 8=MODERATE-SEVERE PAIN

## 2019-07-29 NOTE — LETTER
Infusion Services   [address] [place] [id number]  Phone: [phone]  Fax: [phone]              Dear Dr. Torrez,    Your patient, Latonia Clinton (: 1950), was scheduled at [facility].  Latonia's encounter diagnosis is:  No diagnosis found.  She arrived for her appointment, and  the scheduled treatment was given. These medications were administered to the patient: We administered denosumab..  Latonia tolerated treatment. In addition, the following labs were drawn  No results found for this or any previous visit (from the past 24 hour(s)).         Her next appointment is did not reschedule; because she will call for the next appointment.    For more information, you may review the nurse's progress notes in chart review under the notes section.       Sincerely,  Chayo Leach R.N.

## 2019-07-29 NOTE — PROGRESS NOTES
Latonia into Infusion Services for a Prolia injection for osteoporosis.  Pt denied having any new complaints, acute infections, or dental procedures in the last month or scheduled for the next month. Labs previously drawn and reviewed. Confirmed that patient is taking vitamin D. Prolia injection given in the back of left arm per MAR; adhesive bandage applied to site. Patient declined making her next appointment. Educated patient that next Prolia injection is due in 6 months. Discharged to self care; no apparent distress noted.

## 2019-07-29 NOTE — PROGRESS NOTES
Pharmacy Note:    7/18/19  Scr = 1.26    with est crcl ~ 60ml/min  Calcium =   9.9      Ok to proceed with prolia injection  RACHAEL Mcdowell PharmAnibalD.

## 2019-08-01 ENCOUNTER — HOSPITAL ENCOUNTER (OUTPATIENT)
Dept: PULMONOLOGY | Facility: MEDICAL CENTER | Age: 69
End: 2019-08-01
Attending: INTERNAL MEDICINE
Payer: MEDICARE

## 2019-08-01 PROCEDURE — G0424 PULMONARY REHAB W EXER: HCPCS

## 2019-08-06 ENCOUNTER — HOSPITAL ENCOUNTER (OUTPATIENT)
Dept: PULMONOLOGY | Facility: MEDICAL CENTER | Age: 69
End: 2019-08-06
Attending: INTERNAL MEDICINE
Payer: MEDICARE

## 2019-08-06 PROCEDURE — G0424 PULMONARY REHAB W EXER: HCPCS

## 2019-08-08 ENCOUNTER — OFFICE VISIT (OUTPATIENT)
Dept: PULMONOLOGY | Facility: HOSPICE | Age: 69
End: 2019-08-08
Payer: MEDICARE

## 2019-08-08 VITALS
HEART RATE: 81 BPM | HEIGHT: 66 IN | OXYGEN SATURATION: 97 % | SYSTOLIC BLOOD PRESSURE: 120 MMHG | DIASTOLIC BLOOD PRESSURE: 82 MMHG | BODY MASS INDEX: 32.3 KG/M2 | WEIGHT: 201 LBS | RESPIRATION RATE: 16 BRPM

## 2019-08-08 DIAGNOSIS — J44.9 CHRONIC OBSTRUCTIVE PULMONARY DISEASE, UNSPECIFIED COPD TYPE (HCC): ICD-10-CM

## 2019-08-08 DIAGNOSIS — R91.8 PULMONARY NODULES: ICD-10-CM

## 2019-08-08 DIAGNOSIS — Z85.118 HISTORY OF LUNG CANCER: ICD-10-CM

## 2019-08-08 PROCEDURE — 99214 OFFICE O/P EST MOD 30 MIN: CPT | Performed by: INTERNAL MEDICINE

## 2019-08-08 NOTE — PROGRESS NOTES
"Chief Complaint   Patient presents with   • Follow-Up     Chronic obstructive pulmonary disease, unspecified COPD type (HCC)     HPI: This patient is a 68 y.o. Female who returns for COPD.  PFT's showed FEV1 1.43 L or 54% predicted, DLCO: 86% predicted, consistent with moderate COPD. She quit smoking in 2000.  She is compliant with Breo 200ug and Spiriva inhalers.  She was hospitalized January 2019 for AECOPD, although symptoms were not consistent with an acute exacerbation and ultimately felt secondary to thyrotoxicosis. She was then rehospitalized February 2019 for AECOPD and followed by pulmonary.  She is on prophylactic daily azithromycin. She has compression fractures and severe associated back pain which she feels affects her breathing.  She recently started pulmonary rehabilitation.  She has not required supplemental oxygen including with exercise. Denies cough, CP,  wheezing or LE edema.   She remains quite frustrated that she is so short of breath with exertion.  She states she underwent cardiovascular work-up which was unremarkable and her cardiologist told her to come back in a year.  She has a history of lung cancer status post partial left upper lobectomy December 2016 (adenocarcinoma, pT2a).  Follow-up chest CAT scan May 2, 2019 showed postsurgical changes with stable 6 mm groundglass nodule in the right upper lobe, 3 mm nodule in the right upper lobe and 2 mm nodules in the right lower lobe.      Past Medical History:   Diagnosis Date   • Anesthesia     \"Abnormal blood pressure and breathing\"  \"couldn't breathe\"   • Asthma     inhalers daily   • Backpain 7/2017     thor. area   • Blood clot in vein 07/06/2018    \"Currently have a blood clot in my left eye\"   • Bowel habit changes 07/06/2018    Constipation   • Breath shortness     with exertion has O2 but does not use   • Bronchitis    • CAD (coronary artery disease)     palpatations   • Cancer (HCC) 2016    left lung   • Chickenpox    • COPD (chronic " "obstructive pulmonary disease) (HCC)    • Depression 2019   • Dialysis     transient renal failure due to sepsis   • Emphysema of lung (HCC)    • Glaucoma     right eye   • Hemorrhagic disorder (HCC)    • Hyperlipidemia    • Hypertension    • Hyperthyroidism    • Kidney stone     bilateral   • Lung cancer (HCC) 2016   • Multiple thyroid nodules 2015   • Nasal drainage    • Osteoporosis    • Pain 2018    Back pain   • Personal history of venous thrombosis and embolism ,     right leg , left arm dvt  left eye 2017   • Pneumonia 2016    per patient   • Renal disorder     had been on dialysis for 7 months for acute failure    • Renal stones 2013    post lithotripsy   • Rheumatoid arthritis (HCC)     question   • Rheumatoid arthritis (HCC)    • S/P appendectomy     • S/P cholecystectomy    • S/P kyphoplasty , ,    • Sepsis(995.91)    • Shortness of breath 2018    Chronic current problem. \"A couple of years now\".  O2 concentrator at night - pt states she doesn't use it.   • Thyroid nodule, hot 2017    functional \"hot\" right thyroid nodule without thyrotoxicosis       Social History     Socioeconomic History   • Marital status: Single     Spouse name: Not on file   • Number of children: Not on file   • Years of education: Not on file   • Highest education level: Not on file   Occupational History   • Not on file   Social Needs   • Financial resource strain: Not on file   • Food insecurity:     Worry: Not on file     Inability: Not on file   • Transportation needs:     Medical: Not on file     Non-medical: Not on file   Tobacco Use   • Smoking status: Former Smoker     Packs/day: 1.00     Years: 30.00     Pack years: 30.00     Types: Cigarettes     Last attempt to quit: 2000     Years since quittin.6   • Smokeless tobacco: Never Used   • Tobacco comment: 7 years ago   Substance and Sexual Activity   • Alcohol use: Yes     Alcohol/week: 0.0 " oz     Comment: x2 a week   • Drug use: No   • Sexual activity: Not on file   Lifestyle   • Physical activity:     Days per week: Not on file     Minutes per session: Not on file   • Stress: Not on file   Relationships   • Social connections:     Talks on phone: Not on file     Gets together: Not on file     Attends Spiritism service: Not on file     Active member of club or organization: Not on file     Attends meetings of clubs or organizations: Not on file     Relationship status: Not on file   • Intimate partner violence:     Fear of current or ex partner: Not on file     Emotionally abused: Not on file     Physically abused: Not on file     Forced sexual activity: Not on file   Other Topics Concern   • Not on file   Social History Narrative   • Not on file       Family History   Problem Relation Age of Onset   • Cancer Mother    • Heart Failure Father    • Heart Disease Brother        Current Outpatient Medications on File Prior to Visit   Medication Sig Dispense Refill   • zolpidem (AMBIEN) 5 MG Tab Take 5 mg by mouth at bedtime as needed for Sleep.     • HYDROcodone/acetaminophen (NORCO)  MG Tab Take 1-2 Tabs by mouth every 6 hours as needed.     • BREO ELLIPTA 200-25 MCG/INH AEROSOL POWDER, BREATH ACTIVATED INHALE ONE DOSE BY MOUTH DAILY. RINSE MOUTH AFTER USE. 1 Each 5   • simvastatin (ZOCOR) 40 MG Tab Take 1 Tab by mouth every evening. 90 Tab 3   • allopurinol (ZYLOPRIM) 100 MG Tab      • rivaroxaban (XARELTO) 20 MG Tab tablet Take 1 Tab by mouth with dinner. 90 Tab 1   • losartan (COZAAR) 50 MG Tab Take 2 Tabs by mouth every day. (Patient taking differently: Take 75 mg by mouth every day.) 90 Tab 3   • ondansetron (ZOFRAN) 4 MG Tab tablet Take 4 mg by mouth every four hours as needed for Nausea/Vomiting.     • clobetasol (TEMOVATE) 0.05 % Cream      • methimazole (TAPAZOLE) 5 MG Tab Take 1 Tab by mouth every day. (Patient taking differently: Take 2.5 mg by mouth every day.) 30 Tab 0   • albuterol  108 (90 Base) MCG/ACT Aero Soln inhalation aerosol      • ipratropium-albuterol (DUONEB) 0.5-2.5 (3) MG/3ML nebulizer solution 3 mL by Nebulization route every four hours as needed for Shortness of Breath. 120 mL 11   • SPIRIVA RESPIMAT 2.5 MCG/ACT Aero Soln INHALE TWO PUFFS BY MOUTH DAILY (ASSEMBLE AND PRIME) 1 Inhaler 3   • docusate sodium (COLACE) 100 MG Cap Take 300 mg by mouth every evening.     • acetaminophen (TYLENOL) 500 MG Tab Take 1,000 mg by mouth every 6 hours as needed for Moderate Pain.     • multivitamin (THERAGRAN) Tab Take 1 Tab by mouth every day.     • PROAIR  (90 Base) MCG/ACT Aero Soln inhalation aerosol INHALE TWO PUFFS BY MOUTH EVERY 6 HOURS AS NEEDED FOR SHORTNESS OF BREATH 1 Inhaler 11   • COMBIGAN 0.2-0.5 % Solution Place 1 Drop in both eyes 2 Times a Day.     • vitamin D, Ergocalciferol, (DRISDOL) 33757 UNITS Cap capsule Take 50,000 Units by mouth every 14 days.     • colchicine (COLCRYS) 0.6 MG Tab Take 1 Tab by mouth every day. (Patient not taking: Reported on 8/8/2019) 30 Tab 1   • doxycycline (VIBRAMYCIN) 100 MG Cap Take 1 Cap by mouth 2 times a day. Take until gone. (Patient not taking: Reported on 7/29/2019) 20 Cap 1   • azithromycin (ZITHROMAX) 250 MG Tab 1 daily for COPD (Patient not taking: Reported on 8/8/2019) 30 Tab 11     No current facility-administered medications on file prior to visit.        Allergies: Nkda [no known drug allergy]    ROS:   Constitutional: Denies fevers, chills, night sweats, fatigue or weight loss  Eyes: Denies vision loss, pain, drainage, double vision  Ears, Nose, Throat: Denies earache, difficulty hearing, tinnitus, nasal congestion, hoarseness  Cardiovascular: Denies chest pain, tightness, palpitations, orthopnea or edema  Respiratory: As in HPI  Sleep: Denies daytime sleepiness, snoring, apneas, insomnia, morning headaches  GI: Denies heartburn, dysphagia, nausea, abdominal pain, diarrhea or constipation  : Denies frequent urination,  "hematuria, discharge or painful urination  Musculoskeletal: +back pain, painful joints, denies sore muscles  Neurological: Denies weakness or headaches  Skin: No rashes    /82 (BP Location: Left arm, Patient Position: Sitting, BP Cuff Size: Adult)   Pulse 81   Resp 16   Ht 1.676 m (5' 6\")   Wt 91.2 kg (201 lb)   SpO2 97%     Physical Exam:  Appearance: Well-nourished, well-developed, in no acute distress  HEENT: Normocephalic, atraumatic, white sclera, PERRLA, oropharynx clear  Neck: No adenopathy or masses  Respiratory: no intercostal retractions or accessory muscle use  Lungs auscultation: Clear to auscultation bilaterally, diminished breath sounds  Cardiovascular: Regular rate rhythm. No murmurs, rubs or gallops.  No LE edema  Abdomen: soft, nondistended  Gait: Normal  Digits: No clubbing, cyanosis  Motor: No focal deficits  Orientation: Oriented to time, person and place    Diagnosis:  1. Chronic obstructive pulmonary disease, unspecified COPD type (HCC)     2. Pulmonary nodules     3. History of lung cancer  CT-CHEST (THORAX) W/O       Plan:  The patient has COPD, currently in pulmonary rehabilitation.  She is compliant with Spiriva and Breo 200ug inhalers.  She does not require supplemental oxygen including with exercise.  She was encouraged to continue with treatment.  Follow-up chest CAT scan in 3 months for surveillance of lung cancer.  Return in about 15 weeks (around 11/21/2019) for after CT scan.      "

## 2019-08-13 ENCOUNTER — HOSPITAL ENCOUNTER (OUTPATIENT)
Dept: PULMONOLOGY | Facility: MEDICAL CENTER | Age: 69
End: 2019-08-13
Attending: INTERNAL MEDICINE
Payer: MEDICARE

## 2019-08-13 PROCEDURE — G0424 PULMONARY REHAB W EXER: HCPCS

## 2019-08-15 ENCOUNTER — APPOINTMENT (OUTPATIENT)
Dept: PULMONOLOGY | Facility: MEDICAL CENTER | Age: 69
End: 2019-08-15
Attending: INTERNAL MEDICINE
Payer: MEDICARE

## 2019-08-20 ENCOUNTER — HOSPITAL ENCOUNTER (OUTPATIENT)
Dept: PULMONOLOGY | Facility: MEDICAL CENTER | Age: 69
End: 2019-08-20
Attending: INTERNAL MEDICINE
Payer: MEDICARE

## 2019-08-20 PROCEDURE — G0424 PULMONARY REHAB W EXER: HCPCS

## 2019-08-22 ENCOUNTER — HOSPITAL ENCOUNTER (OUTPATIENT)
Dept: PULMONOLOGY | Facility: MEDICAL CENTER | Age: 69
End: 2019-08-22
Attending: INTERNAL MEDICINE
Payer: MEDICARE

## 2019-08-22 PROCEDURE — G0424 PULMONARY REHAB W EXER: HCPCS

## 2019-08-27 ENCOUNTER — HOSPITAL ENCOUNTER (OUTPATIENT)
Dept: PULMONOLOGY | Facility: MEDICAL CENTER | Age: 69
End: 2019-08-27
Attending: INTERNAL MEDICINE
Payer: MEDICARE

## 2019-08-27 ENCOUNTER — TELEPHONE (OUTPATIENT)
Dept: ENDOCRINOLOGY | Facility: MEDICAL CENTER | Age: 69
End: 2019-08-27

## 2019-08-27 ENCOUNTER — HOSPITAL ENCOUNTER (OUTPATIENT)
Dept: LAB | Facility: MEDICAL CENTER | Age: 69
End: 2019-08-27
Attending: INTERNAL MEDICINE
Payer: MEDICARE

## 2019-08-27 DIAGNOSIS — E05.90 HYPERTHYROIDISM: ICD-10-CM

## 2019-08-27 LAB
T3FREE SERPL-MCNC: 3.78 PG/ML (ref 2.4–4.2)
T4 FREE SERPL-MCNC: 1.31 NG/DL (ref 0.53–1.43)
TSH SERPL DL<=0.005 MIU/L-ACNC: 0.01 UIU/ML (ref 0.38–5.33)

## 2019-08-27 PROCEDURE — G0424 PULMONARY REHAB W EXER: HCPCS

## 2019-08-27 PROCEDURE — 36415 COLL VENOUS BLD VENIPUNCTURE: CPT

## 2019-08-27 PROCEDURE — 84481 FREE ASSAY (FT-3): CPT

## 2019-08-27 PROCEDURE — 84445 ASSAY OF TSI GLOBULIN: CPT

## 2019-08-27 PROCEDURE — 84439 ASSAY OF FREE THYROXINE: CPT

## 2019-08-27 PROCEDURE — 84443 ASSAY THYROID STIM HORMONE: CPT

## 2019-08-27 NOTE — TELEPHONE ENCOUNTER
1. Caller Name: Latonia Clinton                                             Call Back Number: [phone] (home)         Patient approves a detailed voicemail message: yes    Patient called letting you know she had her labs drawn today at a [facility].

## 2019-08-29 ENCOUNTER — APPOINTMENT (OUTPATIENT)
Dept: PULMONOLOGY | Facility: MEDICAL CENTER | Age: 69
End: 2019-08-29
Attending: INTERNAL MEDICINE
Payer: MEDICARE

## 2019-08-29 ENCOUNTER — TELEPHONE (OUTPATIENT)
Dept: ENDOCRINOLOGY | Facility: MEDICAL CENTER | Age: 69
End: 2019-08-29

## 2019-08-29 DIAGNOSIS — E05.10 THYROTOXICOSIS WITH TOXIC SINGLE THYROID NODULE AND WITHOUT THYROID STORM: ICD-10-CM

## 2019-08-29 NOTE — TELEPHONE ENCOUNTER
Telephone conversation with patient    Current thyroid test results reviewed.  I had lowered her methimazole dose from 5 mg down to 2.5 mg daily because she thought she was feeling weaker and more tired with lower thyroid levels.  Now her TSH is gone back down from low normal at 0.3 down to 0.01.  Free T4 has come up to upper normal at 1.3 and free T3 also upper normal at 3.7.    She is now engaging in a pulmonary rehab program and feels it may be of some benefit.  She has not felt that yet but she is encouraged that she is doing some exercise.  For example she walked on a treadmill for 25 minutes.  She did some light weights while sitting down.  This is very encouraging to her that she can even do this.    I am going to leave her dose the same even though her TSH is suppressed.  We will recheck again in about 3 weeks and I will see her in the office and reassess.    If she stabilizes we should consider I-131 ablation of the nodule but that would be down the road if at all.    Kishore Torrez M.D.

## 2019-08-31 LAB — TSI ACT/NOR SER: 90 %

## 2019-09-03 ENCOUNTER — HOSPITAL ENCOUNTER (OUTPATIENT)
Dept: PULMONOLOGY | Facility: MEDICAL CENTER | Age: 69
End: 2019-09-03
Attending: INTERNAL MEDICINE
Payer: MEDICARE

## 2019-09-03 PROCEDURE — G0424 PULMONARY REHAB W EXER: HCPCS

## 2019-09-03 NOTE — PROGRESS NOTES
Dr. Montes notified of patients US upper extremity resulted and shows thrombus. Patient requesting to know results. Provider to speak to patient after rounding.    POD#3  Post section for twins  Patient now complains of feeling shaky when she breathes  VSS AF  HCT 30, was 37 antepartum  gen- no distress  abd- soft edematous, staples intact incision clean and dry  IMP  Post section  New respiratory issue  PLAN  Chest xray  cbc

## 2019-09-05 ENCOUNTER — APPOINTMENT (OUTPATIENT)
Dept: PULMONOLOGY | Facility: MEDICAL CENTER | Age: 69
End: 2019-09-05
Attending: INTERNAL MEDICINE
Payer: MEDICARE

## 2019-09-10 ENCOUNTER — HOSPITAL ENCOUNTER (OUTPATIENT)
Dept: PULMONOLOGY | Facility: MEDICAL CENTER | Age: 69
End: 2019-09-10
Attending: INTERNAL MEDICINE
Payer: MEDICARE

## 2019-09-10 PROCEDURE — G0424 PULMONARY REHAB W EXER: HCPCS

## 2019-09-12 ENCOUNTER — HOSPITAL ENCOUNTER (OUTPATIENT)
Dept: PULMONOLOGY | Facility: MEDICAL CENTER | Age: 69
End: 2019-09-12
Attending: INTERNAL MEDICINE
Payer: MEDICARE

## 2019-09-12 PROCEDURE — G0424 PULMONARY REHAB W EXER: HCPCS

## 2019-09-17 ENCOUNTER — APPOINTMENT (OUTPATIENT)
Dept: PULMONOLOGY | Facility: MEDICAL CENTER | Age: 69
End: 2019-09-17
Attending: INTERNAL MEDICINE
Payer: MEDICARE

## 2019-09-18 ENCOUNTER — OFFICE VISIT (OUTPATIENT)
Dept: MEDICAL GROUP | Facility: MEDICAL CENTER | Age: 69
End: 2019-09-18
Payer: MEDICARE

## 2019-09-18 VITALS
HEART RATE: 100 BPM | BODY MASS INDEX: 32.08 KG/M2 | WEIGHT: 199.6 LBS | TEMPERATURE: 98.1 F | SYSTOLIC BLOOD PRESSURE: 128 MMHG | HEIGHT: 66 IN | DIASTOLIC BLOOD PRESSURE: 70 MMHG | OXYGEN SATURATION: 95 % | RESPIRATION RATE: 12 BRPM

## 2019-09-18 DIAGNOSIS — R39.15 URINARY URGENCY: ICD-10-CM

## 2019-09-18 DIAGNOSIS — I10 ESSENTIAL HYPERTENSION: ICD-10-CM

## 2019-09-18 DIAGNOSIS — M54.50 CHRONIC LOW BACK PAIN WITHOUT SCIATICA, UNSPECIFIED BACK PAIN LATERALITY: ICD-10-CM

## 2019-09-18 DIAGNOSIS — E78.49 OTHER HYPERLIPIDEMIA: ICD-10-CM

## 2019-09-18 DIAGNOSIS — G89.29 CHRONIC LOW BACK PAIN WITHOUT SCIATICA, UNSPECIFIED BACK PAIN LATERALITY: ICD-10-CM

## 2019-09-18 DIAGNOSIS — B37.9 CANDIDA INFECTION: ICD-10-CM

## 2019-09-18 PROBLEM — R30.0 DYSURIA: Status: RESOLVED | Noted: 2019-05-13 | Resolved: 2019-09-18

## 2019-09-18 PROCEDURE — 99214 OFFICE O/P EST MOD 30 MIN: CPT | Performed by: INTERNAL MEDICINE

## 2019-09-18 RX ORDER — PENICILLIN V POTASSIUM 500 MG/1
TABLET ORAL
COMMUNITY
Start: 2019-09-12 | End: 2019-09-18

## 2019-09-18 RX ORDER — ZOSTER VACCINE RECOMBINANT, ADJUVANTED 50 MCG/0.5
KIT INTRAMUSCULAR
COMMUNITY
Start: 2019-09-16 | End: 2019-12-19

## 2019-09-18 RX ORDER — PREDNISONE 10 MG/1
TABLET ORAL
COMMUNITY
Start: 2019-07-10 | End: 2019-09-30 | Stop reason: SDUPTHER

## 2019-09-18 RX ORDER — FLUCONAZOLE 150 MG/1
TABLET ORAL
Qty: 3 TAB | Refills: 1 | Status: SHIPPED
Start: 2019-09-18 | End: 2019-12-19

## 2019-09-19 ENCOUNTER — APPOINTMENT (OUTPATIENT)
Dept: PULMONOLOGY | Facility: MEDICAL CENTER | Age: 69
End: 2019-09-19
Attending: INTERNAL MEDICINE
Payer: MEDICARE

## 2019-09-19 NOTE — PROGRESS NOTES
CC:  Diagnoses of Candida infection, Chronic low back pain without sciatica, unspecified back pain laterality, Other hyperlipidemia, Essential hypertension, and Urinary urgency were pertinent to this visit.    HISTORY OF THE PRESENT ILLNESS: Patient is a 69 y.o. female. This pleasant patient is here today to follow-up.    Chronic pain is still a major factor affecting mood and daily activities.  She does have a history of the multiple vertebral fractures.  Also rheumatoid arthritis, she is pending follow-up with her rheumatologist Dr. Larsen.  She has developed some nodules at the tip of her fingertips, she will inquire with her rheumatologist as her dermatologist was unsure and has referred her to a hand surgeon.  Got Prolia 7/29/2019.  Saw pain management, injection did help but she has to be off Xarelto for this.  Physical therapy did not help.  She would be interested in trying auricular acupuncture and referral will be requested.  Her mood in clinic seems down, patient does admit that chronic pain makes her feel depressed.  No SI or HI.  We discussed that Cymbalta could help pain and mood, she plans to think about this and let me know.  She has already tried a lot of topicals and over-the-counter products.    Urinary urgency, she did see the urologist and Tolterodine did not improve her symptoms.  She would be open to try Myrbetriq.  She indicates she still has the kidney stone unchanged for more than a year that her nephrologist is monitoring along with her chronic kidney disease and she sees that specialist 10/9/2019.  Pain sounds like it is more chronic back with her history of vertebral fractures and not flank pain from stone.    Had to delay dental work with Prolia.  Her dentist prescribed her penicillin.  After antibiotic she got vaginal yeast infection.  She is agreeable to fluconazole.  She already tried topicals over-the-counter without any benefit.    States her pulmonologist recently gave her  prednisone to help her lungs.  She is skilled nursing through pulmonary rehab and walking on the treadmill for 35 minutes, though they indicated her she should be up to 45 minutes at this point in the program.  She is worried about reinjuring her back with exercise.    Kaya is still pending she will try to send it in.    Still has questions regarding her nails, she was disappointed that her dermatologist told her to wear nail polish.  She has already tried biotin, prenatal vitamins, all without benefit.  I advised her perhaps she should ask her rheumatologist if this could be a sign of rheumatologic disease.    Allergies: Nkda [no known drug allergy]    Current Outpatient Medications Ordered in Epic   Medication Sig Dispense Refill   • fluconazole (DIFLUCAN) 150 MG tablet Okay to repeat every 72 hours as needed for total of 3 doses 3 Tab 1   • Mirabegron ER (MYRBETRIQ) 25 MG TABLET SR 24 HR Take 1 Tab by mouth every day. 30 Tab 0   • zolpidem (AMBIEN) 5 MG Tab Take 5 mg by mouth at bedtime as needed for Sleep.     • HYDROcodone/acetaminophen (NORCO)  MG Tab Take 1-2 Tabs by mouth every 6 hours as needed.     • BREO ELLIPTA 200-25 MCG/INH AEROSOL POWDER, BREATH ACTIVATED INHALE ONE DOSE BY MOUTH DAILY. RINSE MOUTH AFTER USE. 1 Each 5   • allopurinol (ZYLOPRIM) 100 MG Tab      • losartan (COZAAR) 50 MG Tab Take 2 Tabs by mouth every day. (Patient taking differently: Take  by mouth every day. Indications: one and one half tablets) 90 Tab 3   • ondansetron (ZOFRAN) 4 MG Tab tablet Take 4 mg by mouth every four hours as needed for Nausea/Vomiting.     • clobetasol (TEMOVATE) 0.05 % Cream      • methimazole (TAPAZOLE) 5 MG Tab Take 1 Tab by mouth every day. (Patient taking differently: Take 2.5 mg by mouth every day.) 30 Tab 0   • albuterol 108 (90 Base) MCG/ACT Aero Soln inhalation aerosol      • azithromycin (ZITHROMAX) 250 MG Tab 1 daily for COPD 30 Tab 11   • docusate sodium (COLACE) 100 MG Cap Take 300 mg by mouth  "every evening.     • acetaminophen (TYLENOL) 500 MG Tab Take 1,000 mg by mouth every 6 hours as needed for Moderate Pain.     • multivitamin (THERAGRAN) Tab Take 1 Tab by mouth every day.     • COMBIGAN 0.2-0.5 % Solution Place 1 Drop in both eyes 2 Times a Day.     • predniSONE (DELTASONE) 10 MG Tab      • Diclofenac Sodium 1 % Gel      • SHINGRIX 50 MCG/0.5ML Recon Susp      • Tiotropium Bromide Monohydrate (SPIRIVA RESPIMAT) 2.5 MCG/ACT Aero Soln Inhale 2 Puffs by mouth every day. 1 Inhaler 6   • simvastatin (ZOCOR) 40 MG Tab Take 1 Tab by mouth every evening. 90 Tab 3   • rivaroxaban (XARELTO) 20 MG Tab tablet Take 1 Tab by mouth with dinner. 90 Tab 1   • PROAIR  (90 Base) MCG/ACT Aero Soln inhalation aerosol INHALE TWO PUFFS BY MOUTH EVERY 6 HOURS AS NEEDED FOR SHORTNESS OF BREATH 1 Inhaler 11   • vitamin D, Ergocalciferol, (DRISDOL) 43239 UNITS Cap capsule Take 50,000 Units by mouth every 14 days.       No current [facility]-ordered facility-administered medications on file.        Past Medical History:   Diagnosis Date   • Anesthesia     \"Abnormal blood pressure and breathing\"  \"couldn't breathe\"   • Asthma     inhalers daily   • Backpain 7/2017     thor. area   • Blood clot in vein 07/06/2018    \"Currently have a blood clot in my left eye\"   • Bowel habit changes 07/06/2018    Constipation   • Breath shortness     with exertion has O2 but does not use   • Bronchitis    • CAD (coronary artery disease)     palpatations   • Cancer (HCC) 2016    left lung   • Chickenpox    • COPD (chronic obstructive pulmonary disease) (HCC)    • Depression 2/26/2019   • Dialysis 2005    transient renal failure due to sepsis   • Emphysema of lung (HCC)    • Glaucoma     right eye   • Hemorrhagic disorder (HCC)    • Hyperlipidemia    • Hypertension    • Hyperthyroidism    • Kidney stone     bilateral   • Lung cancer (HCC) 12/12/2016   • Multiple thyroid nodules 7/9/2015   • Nasal drainage    • Osteoporosis    • Pain 07/06/2018 " "   Back pain   • Personal history of venous thrombosis and embolism 2004, 2012    right leg 2012, left arm dvt 2004 left eye 2017   • Pneumonia 7/2016    per patient   • Renal disorder     had been on dialysis for 7 months for acute failure 2005   • Renal stones 2013    post lithotripsy   • Rheumatoid arthritis (HCC)     question   • Rheumatoid arthritis (HCC)    • S/P appendectomy 1998    • S/P cholecystectomy 1998   • S/P kyphoplasty 2006, 2007, 2013   • Sepsis(995.91) 2005   • Shortness of breath 07/06/2018    Chronic current problem. \"A couple of years now\".  O2 concentrator at night - pt states she doesn't use it.   • Thyroid nodule, hot 2017    functional \"hot\" right thyroid nodule without thyrotoxicosis       Past Surgical History:   Procedure Laterality Date   • CYSTOSCOPY STENT PLACEMENT  7/9/2018    Procedure: Cystoscopy,  Left removal of stent ,  Left Stent Placement;  Surgeon: Beck Yang M.D.;  Location: [facility];  Service: Urology   • URETEROSCOPY Left 7/9/2018    Procedure: URETEROSCOPY;  Surgeon: Beck Yang M.D.;  Location: [facility];  Service: Urology   • LASERTRIPSY Left 7/9/2018    Procedure: LASERTRIPSY-LITHO;  Surgeon: Beck Yang M.D.;  Location: [facility];  Service: Urology   • CYSTOSCOPY STENT PLACEMENT Left 6/18/2018    Procedure: CYSTOSCOPY STENT PLACEMENT;  Surgeon: Beck Yang M.D.;  Location: [facility];  Service: Urology   • LITHOTRIPSY Left 6/18/2018    Procedure: LITHOTRIPSY;  Surgeon: Beck Yang M.D.;  Location: [facility];  Service: Urology   • LASERTRIPSY Left 6/18/2018    Procedure: LASERTRIPSY;  Surgeon: Beck Yang M.D.;  Location: [facility];  Service: Urology   • URETEROSCOPY Left 6/18/2018    Procedure: URETEROSCOPY;  Surgeon: Beck Yang M.D.;  Location: [facility];  Service: Urology   • THORACOSCOPY Left 12/12/2016    Procedure: THORACOSCOPY W/WEDGE " "RESECTION UPPER LOBE MASS;  Surgeon: John H Ganser, M.D.;  Location: [facility];  Service:    • OTHER ORTHOPEDIC SURGERY      kyphoplasty X3   • APPENDECTOMY         • CHOLECYSTECTOMY         • LAMINOTOMY     • LUNG BIOPSY OPEN     • OTHER     • OTHER ABDOMINAL SURGERY      gall bladder disease   • PB ENLARGE BREAST WITH IMPLANT     • PB REDUCTION OF LARGE BREAST         Social History     Tobacco Use   • Smoking status: Former Smoker     Packs/day: 1.00     Years: 30.00     Pack years: 30.00     Types: Cigarettes     Last attempt to quit: 2000     Years since quittin.7   • Smokeless tobacco: Never Used   • Tobacco comment: 7 years ago   Substance Use Topics   • Alcohol use: Yes     Alcohol/week: 0.0 oz     Comment: x2 a week   • Drug use: No       Social History     Social History Narrative   • Not on file       Family History   Problem Relation Age of Onset   • Cancer Mother    • Heart Failure Father    • Heart Disease Brother        ROS:   Negative except as per HPI    Exam: /70 (BP Location: Left arm, Patient Position: Sitting, BP Cuff Size: Adult)   Pulse 100   Temp 36.7 °C (98.1 °F) (Temporal)   Resp 12   Ht 1.676 m (5' 6\")   Wt 90.5 kg (199 lb 9.6 oz)   SpO2 95%  Body mass index is 32.22 kg/m².    General: Normal appearing. No distress.  EYES: Conjunctiva clear lids without ptosis, pupils equal  EARS: Normal shape and contour   Pulmonary: Diminished throughout.  Clear to ausculation.  Normal effort. No rales or wheezing.  Cardiovascular: Regular rate and rhythm without significant murmur.   Abdomen: Soft, nontender, nondistended. Normal bowel sounds.  Neurologic: Cranial nerves grossly nonfocal  Skin: Warm and dry.  No obvious lesions.  Small nodules base of fingertips, scattered: Skin is not thick, nontender, no color changes.  Musculoskeletal: Normal gait.  Psych: Depressed mood and affect. Alert and oriented x3. Judgment and insight is " normal.        Assessment/Plan  1. Candida infection  New issue, associated after antibiotic use.  - fluconazole (DIFLUCAN) 150 MG tablet; Okay to repeat every 72 hours as needed for total of 3 doses  Dispense: 3 Tab; Refill: 1    2. Chronic low back pain without sciatica, unspecified back pain laterality  Patient will consider if she wants to try Cymbalta.  This is chronic and stable issue.  - REFERRAL FOR ACUPUNCTURE    3. Other hyperlipidemia  Chronic, stable issue, continue simvastatin 40 mg daily.  - Lipid Profile; Future    4. Essential hypertension  Chronic, stable issue, controlled on losartan  - Comp Metabolic Panel; Future    5. Urinary urgency  We will try Myrbetriq, patient says due to cost she would prefer sample pharmacy prescription and we will try to coordinate this.  - Mirabegron ER (MYRBETRIQ) 25 MG TABLET SR 24 HR; Take 1 Tab by mouth every day.  Dispense: 30 Tab; Refill: 0      Return to clinic 3 months or sooner if needed      Patient will have her specialist forward me their notes, also labs.      Please note that this dictation was created using voice recognition software. I have made every reasonable attempt to correct obvious errors, but I expect that there are errors of grammar and possibly content that I did not discover before finalizing the note.

## 2019-09-20 ENCOUNTER — HOSPITAL ENCOUNTER (OUTPATIENT)
Dept: LAB | Facility: MEDICAL CENTER | Age: 69
End: 2019-09-20
Attending: INTERNAL MEDICINE
Payer: MEDICARE

## 2019-09-20 DIAGNOSIS — E05.10 THYROTOXICOSIS WITH TOXIC SINGLE THYROID NODULE AND WITHOUT THYROID STORM: ICD-10-CM

## 2019-09-20 PROCEDURE — 36415 COLL VENOUS BLD VENIPUNCTURE: CPT

## 2019-09-20 PROCEDURE — 84439 ASSAY OF FREE THYROXINE: CPT

## 2019-09-20 PROCEDURE — 84443 ASSAY THYROID STIM HORMONE: CPT

## 2019-09-20 PROCEDURE — 84481 FREE ASSAY (FT-3): CPT

## 2019-09-24 ENCOUNTER — HOSPITAL ENCOUNTER (OUTPATIENT)
Dept: PULMONOLOGY | Facility: MEDICAL CENTER | Age: 69
End: 2019-09-24
Attending: INTERNAL MEDICINE
Payer: MEDICARE

## 2019-09-24 PROCEDURE — G0424 PULMONARY REHAB W EXER: HCPCS

## 2019-09-25 ENCOUNTER — OFFICE VISIT (OUTPATIENT)
Dept: ENDOCRINOLOGY | Facility: MEDICAL CENTER | Age: 69
End: 2019-09-25
Payer: MEDICARE

## 2019-09-25 VITALS
OXYGEN SATURATION: 98 % | HEIGHT: 66 IN | DIASTOLIC BLOOD PRESSURE: 68 MMHG | SYSTOLIC BLOOD PRESSURE: 124 MMHG | WEIGHT: 202.4 LBS | HEART RATE: 90 BPM | BODY MASS INDEX: 32.53 KG/M2

## 2019-09-25 DIAGNOSIS — E05.10 THYROTOXICOSIS WITH TOXIC SINGLE THYROID NODULE AND WITHOUT THYROID STORM: ICD-10-CM

## 2019-09-25 PROCEDURE — 99213 OFFICE O/P EST LOW 20 MIN: CPT | Performed by: INTERNAL MEDICINE

## 2019-09-26 ENCOUNTER — HOSPITAL ENCOUNTER (OUTPATIENT)
Dept: PULMONOLOGY | Facility: MEDICAL CENTER | Age: 69
End: 2019-09-26
Attending: INTERNAL MEDICINE
Payer: MEDICARE

## 2019-09-26 DIAGNOSIS — R11.0 NAUSEA: ICD-10-CM

## 2019-09-26 PROCEDURE — G0424 PULMONARY REHAB W EXER: HCPCS

## 2019-09-26 RX ORDER — ONDANSETRON 4 MG/1
4 TABLET, FILM COATED ORAL EVERY 4 HOURS PRN
Qty: 20 TAB | Refills: 1 | Status: SHIPPED | OUTPATIENT
Start: 2019-09-26 | End: 2019-10-26

## 2019-09-26 NOTE — PROGRESS NOTES
Chief Complaint   Patient presents with   • Thyrotoxicosis     Functional nodule   • Osteoporosis        HPI:        1. Thyrotoxicosis.    The patient is very discouraged by how poorly she feels.  She has chronic fatigue.  Ever since we decided to start methimazole back in April she has gained about 17 pounds and it is not water.  She doesn’t see any benefit at all nor do I.  At one point in July, her TSH finally got up to be low normal at 0.3 but at that time, her free T4 was low normal at 0.7 and her free T3 about mid range at 3.1.  She felt no improvement in any of her complaints using methimazole.  We reduced the dose to 2.5 mg per day and her TSH has gone back down to .01.  Her free T4 has come up to mid normal range at 1.1 and her free T3 did not change at all and remains at 3.3, still mid normal.      So the only thing that really changed is she has gained more weight which makes her feel worse in several ways.  She would like to stop the methimazole and do a reset. Go back to baseline. I have no objection.  She does have a functional nodule in her right lobe.  It is not hot enough to treat with I-131 which would be ideal but that is not feasible.  August 2017 her uptake showed the hyperfunctioning nodule in the right lobe and the five hour uptake was only 19.7%.  I discussed this with the nuc med physician at the time and he didn’t think that was enough to effectively treat it.  Now that we have treated her for a while with methimazole and gotten her numbers normal without obvious benefit, then I don’t think taking another relook right now is helpful.  So she is going to go off methimazole.  We are going to get monthly thyroid blood levels.  I will see her again in three months but we can converse monthly about her levels and how she feels.      We didn’t discuss her osteoporosis.  She had taken Forteo in the past.  I don’t know what her tolerance for anything else will be but her recent bone density is in  the osteoporosis range and it did decline compared to previous so that is another issue that needs to be discussed.  I think as we go along I will bring that up again but for the moment, she seems overwhelmed by all the different medical problems she has and doctors and office visits and medications.      ROS:    Multiple medical problems and infirmities reviewed    Allergies:   Allergies   Allergen Reactions   • Nkda [No Known Drug Allergy]        Current medicines including changes today:  Current Outpatient Medications   Medication Sig Dispense Refill   • predniSONE (DELTASONE) 10 MG Tab      • Diclofenac Sodium 1 % Gel      • fluconazole (DIFLUCAN) 150 MG tablet Okay to repeat every 72 hours as needed for total of 3 doses 3 Tab 1   • Mirabegron ER (MYRBETRIQ) 25 MG TABLET SR 24 HR Take 1 Tab by mouth every day. 30 Tab 0   • Tiotropium Bromide Monohydrate (SPIRIVA RESPIMAT) 2.5 MCG/ACT Aero Soln Inhale 2 Puffs by mouth every day. 1 Inhaler 6   • zolpidem (AMBIEN) 5 MG Tab Take 5 mg by mouth at bedtime as needed for Sleep.     • HYDROcodone/acetaminophen (NORCO)  MG Tab Take 1-2 Tabs by mouth every 6 hours as needed.     • BREO ELLIPTA 200-25 MCG/INH AEROSOL POWDER, BREATH ACTIVATED INHALE ONE DOSE BY MOUTH DAILY. RINSE MOUTH AFTER USE. 1 Each 5   • simvastatin (ZOCOR) 40 MG Tab Take 1 Tab by mouth every evening. 90 Tab 3   • allopurinol (ZYLOPRIM) 100 MG Tab      • rivaroxaban (XARELTO) 20 MG Tab tablet Take 1 Tab by mouth with dinner. 90 Tab 1   • losartan (COZAAR) 50 MG Tab Take 2 Tabs by mouth every day. (Patient taking differently: Take  by mouth every day. Indications: one and one half tablets) 90 Tab 3   • ondansetron (ZOFRAN) 4 MG Tab tablet Take 4 mg by mouth every four hours as needed for Nausea/Vomiting.     • clobetasol (TEMOVATE) 0.05 % Cream      • albuterol 108 (90 Base) MCG/ACT Aero Soln inhalation aerosol      • azithromycin (ZITHROMAX) 250 MG Tab 1 daily for COPD 30 Tab 11   • docusate  "sodium (COLACE) 100 MG Cap Take 300 mg by mouth every evening.     • acetaminophen (TYLENOL) 500 MG Tab Take 1,000 mg by mouth every 6 hours as needed for Moderate Pain.     • multivitamin (THERAGRAN) Tab Take 1 Tab by mouth every day.     • PROAIR  (90 Base) MCG/ACT Aero Soln inhalation aerosol INHALE TWO PUFFS BY MOUTH EVERY 6 HOURS AS NEEDED FOR SHORTNESS OF BREATH 1 Inhaler 11   • COMBIGAN 0.2-0.5 % Solution Place 1 Drop in both eyes 2 Times a Day.     • vitamin D, Ergocalciferol, (DRISDOL) 29145 UNITS Cap capsule Take 50,000 Units by mouth every 14 days.     • SHINGRIX 50 MCG/0.5ML Recon Susp        No current facility-administered medications for this visit.         Past Medical History:   Diagnosis Date   • Anesthesia     \"Abnormal blood pressure and breathing\"  \"couldn't breathe\"   • Asthma     inhalers daily   • Backpain 7/2017     thor. area   • Blood clot in vein 07/06/2018    \"Currently have a blood clot in my left eye\"   • Bowel habit changes 07/06/2018    Constipation   • Breath shortness     with exertion has O2 but does not use   • Bronchitis    • CAD (coronary artery disease)     palpatations   • Cancer (HCC) 2016    left lung   • Chickenpox    • COPD (chronic obstructive pulmonary disease) ([facility])    • Depression 2/26/2019   • Dialysis 2005    transient renal failure due to sepsis   • Emphysema of lung (HCC)    • Glaucoma     right eye   • Hemorrhagic disorder (HCC)    • Hyperlipidemia    • Hypertension    • Hyperthyroidism    • Kidney stone     bilateral   • Lung cancer (HCC) 12/12/2016   • Multiple thyroid nodules 7/9/2015   • Nasal drainage    • Osteoporosis    • Pain 07/06/2018    Back pain   • Personal history of venous thrombosis and embolism 2004, 2012    right leg 2012, left arm dvt 2004 left eye 2017   • Pneumonia 7/2016    per patient   • Renal disorder     had been on dialysis for 7 months for acute failure 2005   • Renal stones 2013    post lithotripsy   • Rheumatoid arthritis " "(HCC)     question   • Rheumatoid arthritis (HCC)    • S/P appendectomy 1998    • S/P cholecystectomy 1998   • S/P kyphoplasty 2006, 2007, 2013   • Sepsis(995.91) 2005   • Shortness of breath 07/06/2018    Chronic current problem. \"A couple of years now\".  O2 concentrator at night - pt states she doesn't use it.   • Thyroid nodule, hot 2017    functional \"hot\" right thyroid nodule without thyrotoxicosis       PHYSICAL EXAM:    /68 (BP Location: Left arm, Patient Position: Sitting)   Pulse 90   Ht 1.676 m (5' 6\")   Wt 91.8 kg (202 lb 6.4 oz)   LMP  (LMP Unknown)   SpO2 98%   BMI 32.67 kg/m²     Gen. obese without carcinoid features, appears tired    Skin   there is a whole variety of skin changes 2 of which I think are gouty tophi and others are redish splotches here and there    HEENT    left orbit is more prominent than right but it is not exophthalmic of the Graves' type    Neck   right thyroid lobe is prominent and visible.  Spherical.  Moves with swallowing    Heart  regular    Extremities  no edema    Neuro  gait and station normal, no tremor    Psych  Appropriate.  Discouraged but not giving up    ASSESSMENT AND RECOMMENDATIONS    1. Thyrotoxicosis with toxic single thyroid nodule and without thyroid storm                Discontinue methimazole and do a reset.  See HPI  - FREE THYROXINE; Standing  - T3 FREE; Standing  - TSH; Standing      DISPOSITION: Monthly telephone follow-up of thyroid levels.                            Return to clinic in 3 months and discuss osteoporosis      Kishore Torrez M.D.    Copies to: Marybeth Lynch M.D. [phone]  "

## 2019-09-27 ENCOUNTER — APPOINTMENT (OUTPATIENT)
Dept: PULMONOLOGY | Facility: MEDICAL CENTER | Age: 69
End: 2019-09-27
Payer: MEDICARE

## 2019-09-30 NOTE — TELEPHONE ENCOUNTER
Have we ever prescribed this med? Yes.  If yes, what date? 05/07/19(Chavez)    Last OV: 08/08/19 with Dr Bryne     Next OV: 01/16/2020 with Dr Byrne    DX: Chronic obstructive pulmonary disease, unspecified COPD type (HCC)     Medications:   Requested Prescriptions     Pending Prescriptions Disp Refills   • predniSONE (DELTASONE) 10 MG Tab 30 Tab 0       Called and spoke with pt.  Pt said she is having difficulty breathing/catching her breathe.  Her inhalers are not helping.  She is using her Nebulizer a couple of times a day but not really helping.  Her Oxygen levels are okay, they are in 95-96%.  Pt said she took Prednisone a couple of weeks ago but didn't improve per pt. Pt would like to have some emergency on hand.

## 2019-10-01 ENCOUNTER — HOSPITAL ENCOUNTER (OUTPATIENT)
Dept: PULMONOLOGY | Facility: MEDICAL CENTER | Age: 69
End: 2019-10-01
Attending: INTERNAL MEDICINE
Payer: MEDICARE

## 2019-10-01 PROCEDURE — G0424 PULMONARY REHAB W EXER: HCPCS

## 2019-10-01 RX ORDER — PREDNISONE 10 MG/1
TABLET ORAL
Qty: 30 TAB | Refills: 0 | Status: SHIPPED
Start: 2019-10-01 | End: 2020-02-17

## 2019-10-01 NOTE — TELEPHONE ENCOUNTER
Pt called back to ask if Dr. Byrne had any advice as to what she should do. Advised pt that if her breathing is bad she should go to ER or UC; Pt declined. She is currently at pulm rehab and advised she can not do any of the Cardio portion but advised she would stay to try and do the chair exercises

## 2019-10-01 NOTE — TELEPHONE ENCOUNTER
Called and spoke with pt. Notified pt.      Pt would like to know what she needs to do with her breathing.  She is having SOB just taking a few steps.  He is taking her inhalers and she has been using her Nebulizer.  But would like advice. Pt mentioned she is going to pulm. Rehab. Today.

## 2019-10-05 RX ORDER — ERGOCALCIFEROL 1.25 MG/1
50000 CAPSULE ORAL
Qty: 5 CAP | Refills: 1 | Status: SHIPPED | OUTPATIENT
Start: 2019-10-05 | End: 2021-12-08

## 2019-10-08 ENCOUNTER — HOSPITAL ENCOUNTER (OUTPATIENT)
Dept: PULMONOLOGY | Facility: MEDICAL CENTER | Age: 69
End: 2019-10-08
Attending: INTERNAL MEDICINE
Payer: MEDICARE

## 2019-10-08 PROCEDURE — G0424 PULMONARY REHAB W EXER: HCPCS

## 2019-10-15 ENCOUNTER — TELEPHONE (OUTPATIENT)
Dept: ENDOCRINOLOGY | Facility: MEDICAL CENTER | Age: 69
End: 2019-10-15

## 2019-10-15 ENCOUNTER — HOSPITAL ENCOUNTER (OUTPATIENT)
Dept: PULMONOLOGY | Facility: MEDICAL CENTER | Age: 69
End: 2019-10-15
Attending: INTERNAL MEDICINE
Payer: MEDICARE

## 2019-10-15 PROCEDURE — G0424 PULMONARY REHAB W EXER: HCPCS

## 2019-10-15 NOTE — TELEPHONE ENCOUNTER
Was the patient seen in the last year in this department? Yes    Does patient have an active prescription for medications requested? No     Received Request Via: Pharmacy     Patient called saying that she has decided to go back on methimazole 5mg.So you know for her future lab test results.     Needs refill on Methimazole

## 2019-10-15 NOTE — TELEPHONE ENCOUNTER
1. Caller Name: Latonia Clinton                                             Call Back Number: [phone] (home)           Patient approves a detailed voicemail message: N\A      Patient called back because she forgot she had [facility]. She wanted to make sure we got those results for Dr. Torrez. I let her know I will call for the results today.

## 2019-10-16 ENCOUNTER — TELEPHONE (OUTPATIENT)
Dept: ENDOCRINOLOGY | Facility: MEDICAL CENTER | Age: 69
End: 2019-10-16

## 2019-10-16 DIAGNOSIS — E05.90 HYPERTHYROIDISM: ICD-10-CM

## 2019-10-16 DIAGNOSIS — G47.09 OTHER INSOMNIA: ICD-10-CM

## 2019-10-16 DIAGNOSIS — N18.30 CKD (CHRONIC KIDNEY DISEASE) STAGE 3, GFR 30-59 ML/MIN (HCC): ICD-10-CM

## 2019-10-16 DIAGNOSIS — E21.3 HYPERPARATHYROIDISM (HCC): ICD-10-CM

## 2019-10-16 RX ORDER — METHIMAZOLE 5 MG/1
5 TABLET ORAL DAILY
Qty: 30 TAB | Refills: 3 | Status: SHIPPED
Start: 2019-10-16 | End: 2020-02-17

## 2019-10-16 NOTE — TELEPHONE ENCOUNTER
Telephone call from patient    Patient called in a message that she wanted to resume methimazole.  My last lab indicates TSH still suppressed but T4 and T3 are normal.  Very difficult to know but I am going to restart 5 mg/day based on her personal assessment.  We will follow-up in 1 month.    She had lab tests done about 10 days ago at [facility] which I had not seen until now when she brought my attention to it.  No thyroid studies done but a parathyroid hormone quite elevated at 146.  No obvious explanation.  She has had a mild elevation in the past and perhaps it is related to her kidney function but kidney function seems to be good enough at the time of this test.  No hypercalcemia.  Vitamin D was not done so that needs to check.    I will restart methimazole and follow-up in the office in 1 month and look at these other problems at the same time.    Kishore Torrez M.D.

## 2019-10-16 NOTE — TELEPHONE ENCOUNTER
Spoke to patient to relay notes. Made apt for 1 month from now 11/13 and she will do that lab work in a few weeks

## 2019-10-17 ENCOUNTER — HOSPITAL ENCOUNTER (OUTPATIENT)
Dept: PULMONOLOGY | Facility: MEDICAL CENTER | Age: 69
End: 2019-10-17
Attending: INTERNAL MEDICINE
Payer: MEDICARE

## 2019-10-17 PROCEDURE — G0424 PULMONARY REHAB W EXER: HCPCS

## 2019-10-22 ENCOUNTER — HOSPITAL ENCOUNTER (OUTPATIENT)
Dept: PULMONOLOGY | Facility: MEDICAL CENTER | Age: 69
End: 2019-10-22
Attending: INTERNAL MEDICINE
Payer: MEDICARE

## 2019-10-22 PROCEDURE — G0424 PULMONARY REHAB W EXER: HCPCS

## 2019-10-24 ENCOUNTER — APPOINTMENT (OUTPATIENT)
Dept: PULMONOLOGY | Facility: MEDICAL CENTER | Age: 69
End: 2019-10-24
Attending: INTERNAL MEDICINE
Payer: MEDICARE

## 2019-10-29 ENCOUNTER — HOSPITAL ENCOUNTER (OUTPATIENT)
Dept: PULMONOLOGY | Facility: MEDICAL CENTER | Age: 69
End: 2019-10-29
Attending: INTERNAL MEDICINE
Payer: MEDICARE

## 2019-10-29 PROCEDURE — G0424 PULMONARY REHAB W EXER: HCPCS

## 2019-10-31 ENCOUNTER — HOSPITAL ENCOUNTER (OUTPATIENT)
Dept: PULMONOLOGY | Facility: MEDICAL CENTER | Age: 69
End: 2019-10-31
Attending: INTERNAL MEDICINE
Payer: MEDICARE

## 2019-10-31 PROCEDURE — G0424 PULMONARY REHAB W EXER: HCPCS

## 2019-11-05 ENCOUNTER — APPOINTMENT (OUTPATIENT)
Dept: PULMONOLOGY | Facility: MEDICAL CENTER | Age: 69
End: 2019-11-05
Attending: INTERNAL MEDICINE
Payer: MEDICARE

## 2019-11-07 ENCOUNTER — APPOINTMENT (OUTPATIENT)
Dept: PULMONOLOGY | Facility: MEDICAL CENTER | Age: 69
End: 2019-11-07
Attending: INTERNAL MEDICINE
Payer: MEDICARE

## 2019-11-08 ENCOUNTER — HOSPITAL ENCOUNTER (OUTPATIENT)
Dept: RADIOLOGY | Facility: MEDICAL CENTER | Age: 69
End: 2019-11-08
Attending: UROLOGY
Payer: MEDICARE

## 2019-11-08 ENCOUNTER — HOSPITAL ENCOUNTER (OUTPATIENT)
Dept: LAB | Facility: MEDICAL CENTER | Age: 69
End: 2019-11-08
Attending: INTERNAL MEDICINE
Payer: MEDICARE

## 2019-11-08 ENCOUNTER — APPOINTMENT (OUTPATIENT)
Dept: RADIOLOGY | Facility: MEDICAL CENTER | Age: 69
End: 2019-11-08
Attending: INTERNAL MEDICINE
Payer: MEDICARE

## 2019-11-08 DIAGNOSIS — E21.3 HYPERPARATHYROIDISM (HCC): ICD-10-CM

## 2019-11-08 DIAGNOSIS — Z87.442 PERSONAL HISTORY OF URINARY CALCULI: ICD-10-CM

## 2019-11-08 DIAGNOSIS — E05.90 HYPERTHYROIDISM: ICD-10-CM

## 2019-11-08 DIAGNOSIS — N18.30 CKD (CHRONIC KIDNEY DISEASE) STAGE 3, GFR 30-59 ML/MIN (HCC): ICD-10-CM

## 2019-11-08 LAB
25(OH)D3 SERPL-MCNC: 48 NG/ML (ref 30–100)
ALBUMIN SERPL BCP-MCNC: 4.4 G/DL (ref 3.2–4.9)
ALBUMIN/GLOB SERPL: 1.9 G/DL
ALP SERPL-CCNC: 84 U/L (ref 30–99)
ALT SERPL-CCNC: 18 U/L (ref 2–50)
ANION GAP SERPL CALC-SCNC: 9 MMOL/L (ref 0–11.9)
AST SERPL-CCNC: 19 U/L (ref 12–45)
BILIRUB SERPL-MCNC: 0.3 MG/DL (ref 0.1–1.5)
BUN SERPL-MCNC: 28 MG/DL (ref 8–22)
CA-I SERPL-SCNC: 1.2 MMOL/L (ref 1.1–1.3)
CALCIUM SERPL-MCNC: 9.6 MG/DL (ref 8.5–10.5)
CHLORIDE SERPL-SCNC: 112 MMOL/L (ref 96–112)
CO2 SERPL-SCNC: 21 MMOL/L (ref 20–33)
CREAT SERPL-MCNC: 1.28 MG/DL (ref 0.5–1.4)
GLOBULIN SER CALC-MCNC: 2.3 G/DL (ref 1.9–3.5)
GLUCOSE SERPL-MCNC: 91 MG/DL (ref 65–99)
POTASSIUM SERPL-SCNC: 4 MMOL/L (ref 3.6–5.5)
PROT SERPL-MCNC: 6.7 G/DL (ref 6–8.2)
PTH-INTACT SERPL-MCNC: 346.2 PG/ML (ref 14–72)
SODIUM SERPL-SCNC: 142 MMOL/L (ref 135–145)
T3FREE SERPL-MCNC: 3.94 PG/ML (ref 2.4–4.2)
T4 FREE SERPL-MCNC: 1.08 NG/DL (ref 0.53–1.43)
TSH SERPL DL<=0.005 MIU/L-ACNC: 0.03 UIU/ML (ref 0.38–5.33)

## 2019-11-08 PROCEDURE — 36415 COLL VENOUS BLD VENIPUNCTURE: CPT

## 2019-11-08 PROCEDURE — 83970 ASSAY OF PARATHORMONE: CPT

## 2019-11-08 PROCEDURE — 80053 COMPREHEN METABOLIC PANEL: CPT

## 2019-11-08 PROCEDURE — 74018 RADEX ABDOMEN 1 VIEW: CPT

## 2019-11-08 PROCEDURE — 84443 ASSAY THYROID STIM HORMONE: CPT

## 2019-11-08 PROCEDURE — 82330 ASSAY OF CALCIUM: CPT

## 2019-11-08 PROCEDURE — 84481 FREE ASSAY (FT-3): CPT

## 2019-11-08 PROCEDURE — 84439 ASSAY OF FREE THYROXINE: CPT

## 2019-11-08 PROCEDURE — 82306 VITAMIN D 25 HYDROXY: CPT

## 2019-11-13 ENCOUNTER — OFFICE VISIT (OUTPATIENT)
Dept: ENDOCRINOLOGY | Facility: MEDICAL CENTER | Age: 69
End: 2019-11-13
Payer: MEDICARE

## 2019-11-13 VITALS
SYSTOLIC BLOOD PRESSURE: 156 MMHG | HEART RATE: 90 BPM | DIASTOLIC BLOOD PRESSURE: 86 MMHG | HEIGHT: 66 IN | BODY MASS INDEX: 32.53 KG/M2 | WEIGHT: 202.4 LBS

## 2019-11-13 DIAGNOSIS — E21.3 HYPERPARATHYROIDISM (HCC): ICD-10-CM

## 2019-11-13 DIAGNOSIS — M81.0 AGE-RELATED OSTEOPOROSIS WITHOUT CURRENT PATHOLOGICAL FRACTURE: ICD-10-CM

## 2019-11-13 DIAGNOSIS — E05.90 HYPERTHYROIDISM: ICD-10-CM

## 2019-11-13 DIAGNOSIS — M10.072 IDIOPATHIC GOUT OF LEFT FOOT, UNSPECIFIED CHRONICITY: ICD-10-CM

## 2019-11-13 PROCEDURE — 99214 OFFICE O/P EST MOD 30 MIN: CPT | Performed by: INTERNAL MEDICINE

## 2019-11-13 RX ORDER — SODIUM BICARBONATE 650 MG/1
1300 TABLET ORAL 3 TIMES DAILY
COMMUNITY
End: 2023-01-01

## 2019-11-13 NOTE — TELEPHONE ENCOUNTER
Was the patient seen in the last year in this department? Yes    Does patient have an active prescription for medications requested? No     Received Request Via: Patient     Tima done    Narcotic 431  Sedative 331  Stimulant 000      Pt states waiting for RX since September

## 2019-11-13 NOTE — TELEPHONE ENCOUNTER
1. Name: Latonia Clinton      Call Back Number: [phone] (home)      Patient approves a detailed voicemail message: N\A    Patient Latonia said she needs a refill on zolpidem (AMBIEN) 5 MG Tab.  That she has been waiting since September for a renewal refill and it hasn't been done yet. She asked for a CB when done. Thanks

## 2019-11-14 RX ORDER — ZOLPIDEM TARTRATE 5 MG/1
5 TABLET ORAL NIGHTLY PRN
Qty: 90 TAB | Refills: 0 | Status: CANCELLED | OUTPATIENT
Start: 2019-11-14 | End: 2020-02-12

## 2019-11-14 NOTE — TELEPHONE ENCOUNTER
Please remind patient ambien is a controlled substance.  Must be filled at clinic appt.  She is nearly due for follow up appt.  I can see her when I return from vacation. Thank you.

## 2019-11-14 NOTE — PROGRESS NOTES
Chief Complaint   Patient presents with   • Thyrotoxicosis     Toxic nodule   • Osteoporosis   • Hyperparathyroidism        HPI:        ROS:  Patient has gouty tophi that are responding to allopurinol although she decided to lower her dose to 100 mg/day her uric acid is about 6.7.  If we get it below 6 I think her tophi will reabsorb.  She will increase the allopurinol to 200 mg/day    Kidney stones I believe are asymptomatic and nonobstructing    Left eye has a cataract that apparently has to be removed in order for the ophthalmologist to treat her retina.  Vision is very poor and that I      Allergies:   Allergies   Allergen Reactions   • Nkda [No Known Drug Allergy]        Current medicines including changes today:  Current Outpatient Medications   Medication Sig Dispense Refill   • SODIUM BICARBONATE PO Take 10 g by mouth.     • methimazole (TAPAZOLE) 5 MG Tab Take 1 Tab by mouth every day. 30 Tab 3   • vitamin D, Ergocalciferol, (DRISDOL) 89601 units Cap capsule Take 1 Cap by mouth every 14 days. 5 Cap 1   • predniSONE (DELTASONE) 10 MG Tab Take 30mg x 3 days, then take 20mg x 3 days, then take 10mg x 3 days, with food, then discontinue 30 Tab 0   • Diclofenac Sodium 1 % Gel      • fluconazole (DIFLUCAN) 150 MG tablet Okay to repeat every 72 hours as needed for total of 3 doses 3 Tab 1   • Mirabegron ER (MYRBETRIQ) 25 MG TABLET SR 24 HR Take 1 Tab by mouth every day. 30 Tab 0   • Tiotropium Bromide Monohydrate (SPIRIVA RESPIMAT) 2.5 MCG/ACT Aero Soln Inhale 2 Puffs by mouth every day. 1 Inhaler 6   • zolpidem (AMBIEN) 5 MG Tab Take 5 mg by mouth at bedtime as needed for Sleep.     • HYDROcodone/acetaminophen (NORCO)  MG Tab Take 1-2 Tabs by mouth every 6 hours as needed.     • BREO ELLIPTA 200-25 MCG/INH AEROSOL POWDER, BREATH ACTIVATED INHALE ONE DOSE BY MOUTH DAILY. RINSE MOUTH AFTER USE. 1 Each 5   • simvastatin (ZOCOR) 40 MG Tab Take 1 Tab by mouth every evening. 90 Tab 3   • allopurinol (ZYLOPRIM)  "100 MG Tab      • rivaroxaban (XARELTO) 20 MG Tab tablet Take 1 Tab by mouth with dinner. 90 Tab 1   • losartan (COZAAR) 50 MG Tab Take 2 Tabs by mouth every day. (Patient taking differently: Take  by mouth every day. Indications: one and one half tablets) 90 Tab 3   • clobetasol (TEMOVATE) 0.05 % Cream      • albuterol 108 (90 Base) MCG/ACT Aero Soln inhalation aerosol      • azithromycin (ZITHROMAX) 250 MG Tab 1 daily for COPD 30 Tab 11   • docusate sodium (COLACE) 100 MG Cap Take 300 mg by mouth every evening.     • acetaminophen (TYLENOL) 500 MG Tab Take 1,000 mg by mouth every 6 hours as needed for Moderate Pain.     • multivitamin (THERAGRAN) Tab Take 1 Tab by mouth every day.     • PROAIR  (90 Base) MCG/ACT Aero Soln inhalation aerosol INHALE TWO PUFFS BY MOUTH EVERY 6 HOURS AS NEEDED FOR SHORTNESS OF BREATH 1 Inhaler 11   • COMBIGAN 0.2-0.5 % Solution Place 1 Drop in both eyes 2 Times a Day.     • SHINGRIX 50 MCG/0.5ML Recon Susp        No current facility-administered medications for this visit.         Past Medical History:   Diagnosis Date   • Anesthesia     \"Abnormal blood pressure and breathing\"  \"couldn't breathe\"   • Asthma     inhalers daily   • Backpain 7/2017     thor. area   • Blood clot in vein 07/06/2018    \"Currently have a blood clot in my left eye\"   • Bowel habit changes 07/06/2018    Constipation   • Breath shortness     with exertion has O2 but does not use   • Bronchitis    • CAD (coronary artery disease)     palpatations   • Cancer (HCC) 2016    left lung   • Chickenpox    • COPD (chronic obstructive pulmonary disease) (HCC)    • Depression 2/26/2019   • Dialysis 2005    transient renal failure due to sepsis   • Emphysema of lung (HCC)    • Glaucoma     right eye   • Hemorrhagic disorder (HCC)    • Hyperlipidemia    • Hypertension    • Hyperthyroidism    • Kidney stone     bilateral   • Lung cancer (HCC) 12/12/2016   • Multiple thyroid nodules 7/9/2015   • Nasal drainage    • " "Osteoporosis    • Pain 07/06/2018    Back pain   • Personal history of venous thrombosis and embolism 2004, 2012    right leg 2012, left arm dvt 2004 left eye 2017   • Pneumonia 7/2016    per patient   • Renal disorder     had been on dialysis for 7 months for acute failure 2005   • Renal stones 2013    post lithotripsy   • Rheumatoid arthritis (HCC)     question   • Rheumatoid arthritis (HCC)    • S/P appendectomy 1998    • S/P cholecystectomy 1998   • S/P kyphoplasty 2006, 2007, 2013   • Sepsis(995.91) 2005   • Shortness of breath 07/06/2018    Chronic current problem. \"A couple of years now\".  O2 concentrator at night - pt states she doesn't use it.   • Thyroid nodule, hot 2017    functional \"hot\" right thyroid nodule without thyrotoxicosis       PHYSICAL EXAM:    /86 (BP Location: Left arm, Patient Position: Sitting, BP Cuff Size: Adult)   Pulse 90   Ht 1.676 m (5' 5.98\")   Wt 91.8 kg (202 lb 6.4 oz)   LMP  (LMP Unknown)   BMI 32.68 kg/m²     Gen.   appears comfortable under the circumstances    Skin   appropriate for sex and age    HEENT  unremarkable    Neck   no palpable nodules.  I cannot feel her thyroid.  I think it is substernal    Heart  regular    Extremities  no edema    Neuro  gait and station normal    Psych    has difficulty keeping up with various doctors appointments  getting  tests done and trying to decide which medicines she is going to take when and if they are worthwhile    ASSESSMENT AND RECOMMENDATIONS    1. Hyperthyroidism              She has decided not to take methimazole.  She did not feel it was beneficial              Her current TSH is low at 0.03 although her free T4 is mid normal at 1.0 and her free T3 is upper normal at 3.9.  We will continue to monitor her thyroid levels over time because I think levels will up again.  - FREE THYROXINE; Future  - TSH; Future    2. Idiopathic gout of left foot, unspecified chronicity             Allopurinol is helping her tophi.  She " will increase the dose to 200 mg/day  - URIC ACID, SERUM    3. Hyperparathyroidism (HCC)             This is been an issue from time to time in the past.  Is far back as 2007 her level was 104.  In September it is 401 and most recently 346 (14-72).  She is not hypercalcemic                Sestamibi scans in the past have been negative.  It is possible that an adenoma has grown large enough known to show on a sestamibi scan and therefore make a limited surgery approach feasible  - Comp Metabolic Panel; Future  - PTH INTACT (PTH ONLY); Future    4. Age-related osteoporosis without current pathological fracture              Bone density done this year indicates a T score in the lumbar spine of -3.8 which was a 15% decline compared to previous and in the hip with T score -2.8 with an 8% decline.  She has serious osteoporosis which is getting worse.  She is at high risk for significant disability             She did take a Prolia injection in July and her next one is scheduled for January 29, 2020              She tolerated the Prolia injection well.              If we can cure her hyperparathyroidism with surgery that will help more than medication.      DISPOSITION: Follow-up sestamibi be by telephone or my chart                            Return to office in January before the next Prolia injection      Kishore Torrez M.D.    Copies to: Marybeth Lynch M.D. [phone]

## 2019-11-18 ENCOUNTER — TELEPHONE (OUTPATIENT)
Dept: MEDICAL GROUP | Facility: MEDICAL CENTER | Age: 69
End: 2019-11-18

## 2019-11-18 NOTE — TELEPHONE ENCOUNTER
1. Caller Name: Latonia Clinton                                        Call Back Number: [phone]        Patient approves a detailed voicemail message:N\A    Called pt, Pt believes that her coming in and talking to the front should have been good enough for the refill. Explain to pt that Dr. Lynch would need to see her in order to get the refill. Pt states that she will be fine till her next appointment.

## 2019-12-10 DIAGNOSIS — R06.02 SOB (SHORTNESS OF BREATH): ICD-10-CM

## 2019-12-10 DIAGNOSIS — J44.9 CHRONIC OBSTRUCTIVE PULMONARY DISEASE, UNSPECIFIED COPD TYPE (HCC): ICD-10-CM

## 2019-12-10 RX ORDER — ALBUTEROL SULFATE 90 UG/1
AEROSOL, METERED RESPIRATORY (INHALATION)
Qty: 1 INHALER | Refills: 1 | Status: SHIPPED | OUTPATIENT
Start: 2019-12-10 | End: 2020-06-12

## 2019-12-10 NOTE — TELEPHONE ENCOUNTER
Have we ever prescribed this med? No.  If yes, what date?     Last OV: 08/08/2019     Next OV: 01/16/20     DX: Chronic obstructive pulmonary disease, unspecified COPD type (HCC)     Medications: Albuterol HFA

## 2019-12-19 ENCOUNTER — OFFICE VISIT (OUTPATIENT)
Dept: MEDICAL GROUP | Facility: MEDICAL CENTER | Age: 69
End: 2019-12-19
Payer: MEDICARE

## 2019-12-19 VITALS
DIASTOLIC BLOOD PRESSURE: 76 MMHG | HEIGHT: 66 IN | SYSTOLIC BLOOD PRESSURE: 128 MMHG | BODY MASS INDEX: 32.7 KG/M2 | WEIGHT: 203.48 LBS | RESPIRATION RATE: 16 BRPM | HEART RATE: 91 BPM | OXYGEN SATURATION: 96 % | TEMPERATURE: 98.7 F

## 2019-12-19 DIAGNOSIS — E05.90 HYPERTHYROIDISM: ICD-10-CM

## 2019-12-19 DIAGNOSIS — I10 ESSENTIAL HYPERTENSION: ICD-10-CM

## 2019-12-19 DIAGNOSIS — N18.30 CKD (CHRONIC KIDNEY DISEASE) STAGE 3, GFR 30-59 ML/MIN (HCC): ICD-10-CM

## 2019-12-19 DIAGNOSIS — M1A.0790 CHRONIC GOUT OF FOOT, UNSPECIFIED CAUSE, UNSPECIFIED LATERALITY: ICD-10-CM

## 2019-12-19 DIAGNOSIS — G47.09 OTHER INSOMNIA: ICD-10-CM

## 2019-12-19 DIAGNOSIS — H34.8192 STABLE CENTRAL RETINAL VEIN OCCLUSION, UNSPECIFIED LATERALITY: ICD-10-CM

## 2019-12-19 PROCEDURE — 99214 OFFICE O/P EST MOD 30 MIN: CPT | Performed by: INTERNAL MEDICINE

## 2019-12-19 RX ORDER — CANDESARTAN 32 MG/1
32 TABLET ORAL DAILY
Qty: 30 TAB | Refills: 3 | Status: SHIPPED
Start: 2019-12-19 | End: 2020-02-17

## 2019-12-19 RX ORDER — COLCHICINE 0.6 MG/1
TABLET ORAL
Qty: 30 TAB | Refills: 1 | Status: SHIPPED | OUTPATIENT
Start: 2019-12-19 | End: 2020-05-12 | Stop reason: SDUPTHER

## 2019-12-19 RX ORDER — ZOLPIDEM TARTRATE 5 MG/1
5 TABLET ORAL NIGHTLY PRN
Qty: 30 TAB | Refills: 3 | Status: SHIPPED | OUTPATIENT
Start: 2019-12-19 | End: 2020-01-18

## 2019-12-19 NOTE — PROGRESS NOTES
CC:  Diagnoses of Chronic gout of foot, unspecified cause, unspecified laterality, Essential hypertension, Hyperthyroidism, Other insomnia, CKD (chronic kidney disease) stage 3, GFR 30-59 ml/min ([facility]), and Stable central retinal vein occlusion, unspecified laterality were pertinent to this visit.    HISTORY OF THE PRESENT ILLNESS: Patient is a 69 y.o. female. This pleasant patient is here today to f/u.    No new health concerns, but Latonia is struggling with multiple chronic health conditions.    Pending pulmonologist 1/16/2020.  She will inquire if she should remain on the azithromycin.  Stable pulmonary symptoms on inhalers. Nephrology follow up is pending 2/3/2019. She had to cx her last rheumatology appointment from 2/2019 because she was in the hospital at that time.  She is working on rescheduling this.  She does not wish to take a higher dose of allopurinol, plan to suppress uric acid further as she has developed tophi.  She uses colchicine as needed, particularly when she develops diffuse foot pain and this typically resolves her symptoms within 1 day.  The endocrinologist is following her closely, she says that she has pending parathyroid nuclear study.  She indicates her losartan was not on recall but she wishes to change and this is reasonable.  Due for Ambien refill, she will not drink alcohol, drive or operate heavy machinery on it.  She is also followed by pain management for her back pain which she reports is chronic and unchanged, next appointment 12/26/2019.  She will think about reestablishing with physical therapy and/or trying acupuncture.      Allergies: Nkda [no known drug allergy]    Current Outpatient Medications Ordered in Epic   Medication Sig Dispense Refill   • colchicine (COLCRYS) 0.6 MG Tab Day 1: 1.2 mg once, followed in 1 hour with a single dose of 0.6 mg.  Day 2 and thereafter: Oral: 0.6 mg once daily until flare resolves 30 Tab 1   • candesartan (ATACAND) 32 MG tablet Take 1 Tab  "by mouth every day. 30 Tab 3   • zolpidem (AMBIEN) 5 MG Tab Take 1 Tab by mouth at bedtime as needed for Sleep for up to 30 days. 30 Tab 3   • albuterol 108 (90 Base) MCG/ACT Aero Soln inhalation aerosol INHALE TWO PUFFS BY MOUTH EVERY 6 HOURS AS NEEDED FOR SHORTNESS OF BREATH 1 Inhaler 1   • SODIUM BICARBONATE PO Take 10 g by mouth.     • methimazole (TAPAZOLE) 5 MG Tab Take 1 Tab by mouth every day. 30 Tab 3   • vitamin D, Ergocalciferol, (DRISDOL) 71232 units Cap capsule Take 1 Cap by mouth every 14 days. 5 Cap 1   • predniSONE (DELTASONE) 10 MG Tab Take 30mg x 3 days, then take 20mg x 3 days, then take 10mg x 3 days, with food, then discontinue 30 Tab 0   • Diclofenac Sodium 1 % Gel      • Tiotropium Bromide Monohydrate (SPIRIVA RESPIMAT) 2.5 MCG/ACT Aero Soln Inhale 2 Puffs by mouth every day. 1 Inhaler 6   • HYDROcodone/acetaminophen (NORCO)  MG Tab Take 1-2 Tabs by mouth every 6 hours as needed.     • BREO ELLIPTA 200-25 MCG/INH AEROSOL POWDER, BREATH ACTIVATED INHALE ONE DOSE BY MOUTH DAILY. RINSE MOUTH AFTER USE. 1 Each 5   • simvastatin (ZOCOR) 40 MG Tab Take 1 Tab by mouth every evening. 90 Tab 3   • allopurinol (ZYLOPRIM) 100 MG Tab      • rivaroxaban (XARELTO) 20 MG Tab tablet Take 1 Tab by mouth with dinner. 90 Tab 1   • azithromycin (ZITHROMAX) 250 MG Tab 1 daily for COPD 30 Tab 11   • docusate sodium (COLACE) 100 MG Cap Take 300 mg by mouth every evening.     • acetaminophen (TYLENOL) 500 MG Tab Take 1,000 mg by mouth every 6 hours as needed for Moderate Pain.     • multivitamin (THERAGRAN) Tab Take 1 Tab by mouth every day.     • COMBIGAN 0.2-0.5 % Solution Place 1 Drop in both eyes 2 Times a Day.       No current Epic-ordered facility-administered medications on file.        Past Medical History:   Diagnosis Date   • Anesthesia     \"Abnormal blood pressure and breathing\"  \"couldn't breathe\"   • Asthma     inhalers daily   • Backpain 7/2017     thor. area   • Blood clot in vein 07/06/2018    " "\"Currently have a blood clot in my left eye\"   • Bowel habit changes 07/06/2018    Constipation   • Breath shortness     with exertion has O2 but does not use   • Bronchitis    • CAD (coronary artery disease)     palpatations   • Cancer (HCC) 2016    left lung   • Chickenpox    • COPD (chronic obstructive pulmonary disease) (HCC)    • Depression 2/26/2019   • Dialysis 2005    transient renal failure due to sepsis   • Emphysema of lung (HCC)    • Glaucoma     right eye   • Hemorrhagic disorder (HCC)    • Hyperlipidemia    • Hypertension    • Hyperthyroidism    • Kidney stone     bilateral   • Lung cancer (HCC) 12/12/2016   • Multiple thyroid nodules 7/9/2015   • Nasal drainage    • Osteoporosis    • Pain 07/06/2018    Back pain   • Personal history of venous thrombosis and embolism 2004, 2012    right leg 2012, left arm dvt 2004 left eye 2017   • Pneumonia 7/2016    per patient   • Renal disorder     had been on dialysis for 7 months for acute failure 2005   • Renal stones 2013    post lithotripsy   • Rheumatoid arthritis (HCC)     question   • Rheumatoid arthritis (HCC)    • S/P appendectomy 1998    • S/P cholecystectomy 1998   • S/P kyphoplasty 2006, 2007, 2013   • Sepsis(995.91) 2005   • Shortness of breath 07/06/2018    Chronic current problem. \"A couple of years now\".  O2 concentrator at night - pt states she doesn't use it.   • Thyroid nodule, hot 2017    functional \"hot\" right thyroid nodule without thyrotoxicosis       Past Surgical History:   Procedure Laterality Date   • CYSTOSCOPY STENT PLACEMENT  7/9/2018    Procedure: Cystoscopy,  Left removal of stent ,  Left Stent Placement;  Surgeon: Beck Yang M.D.;  Location: [facility];  Service: Urology   • URETEROSCOPY Left 7/9/2018    Procedure: URETEROSCOPY;  Surgeon: Beck Yang M.D.;  Location: [facility];  Service: Urology   • LASERTRIPSY Left 7/9/2018    Procedure: LASERTRIPSY-LITHO;  Surgeon: Beck Yang M.D.;  Location: " [facility];  Service: Urology   • CYSTOSCOPY STENT PLACEMENT Left 2018    Procedure: CYSTOSCOPY STENT PLACEMENT;  Surgeon: Beck Yang M.D.;  Location: [facility];  Service: Urology   • LITHOTRIPSY Left 2018    Procedure: LITHOTRIPSY;  Surgeon: Beck Yang M.D.;  Location: [facility];  Service: Urology   • LASERTRIPSY Left 2018    Procedure: LASERTRIPSY;  Surgeon: Beck Yang M.D.;  Location: [facility];  Service: Urology   • URETEROSCOPY Left 2018    Procedure: URETEROSCOPY;  Surgeon: Beck Yang M.D.;  Location: [facility];  Service: Urology   • THORACOSCOPY Left 2016    Procedure: THORACOSCOPY W/WEDGE RESECTION UPPER LOBE MASS;  Surgeon: John H Ganser, M.D.;  Location: [facility];  Service:    • OTHER ORTHOPEDIC SURGERY      kyphoplasty X3   • APPENDECTOMY         • CHOLECYSTECTOMY         • LAMINOTOMY     • LUNG BIOPSY OPEN     • OTHER     • OTHER ABDOMINAL SURGERY      gall bladder disease   • PB ENLARGE BREAST WITH IMPLANT     • PB REDUCTION OF LARGE BREAST         Social History     Tobacco Use   • Smoking status: Former Smoker     Packs/day: 1.00     Years: 30.00     Pack years: 30.00     Types: Cigarettes     Last attempt to quit: 2000     Years since quittin.9   • Smokeless tobacco: Never Used   • Tobacco comment: 7 years ago   Substance Use Topics   • Alcohol use: Yes     Alcohol/week: 0.0 oz     Comment: x2 a week   • Drug use: No       Social History     Patient does not qualify to have social determinant information on file (likely too young).   Social History Narrative   • Not on file       Family History   Problem Relation Age of Onset   • Cancer Mother    • Heart Failure Father    • Heart Disease Brother        ROS:     - Constitutional: Negative for fever, chills    - Respiratory: Negative for cough    - Cardiovascular: Negative for chest pain    -  "Gastrointestinal: Negative for abdominal pain     - Musculoskeletal: see hpi   - Hem/lymphatic: Negative for swollen glands    -Allergic/immun: Negative for allergic rhinitis    - Psychiatric/Behavioral: Negative for suicidal/homicidal ideation and memory loss.      Exam: /76 (BP Location: Left arm, Patient Position: Sitting, BP Cuff Size: Adult)   Pulse 91   Temp 37.1 °C (98.7 °F) (Temporal)   Resp 16   Ht 1.676 m (5' 6\")   Wt 92.3 kg (203 lb 7.8 oz)   SpO2 96%  Body mass index is 32.84 kg/m².    General: Normal appearing. No distress.  EYES: Conjunctiva clear lids without ptosis, pupils equal  EARS: Normal shape and contour   Pulmonary: Clear to ausculation.  Normal effort. No rales or wheezing.  Cardiovascular: Regular rate and rhythm without significant murmur.   Abdomen: Soft, nontender, nondistended. Normal bowel sounds.  Neurologic: Cranial nerves grossly nonfocal  Skin: Warm and dry.  No obvious lesions.  Musculoskeletal: Normal gait. No extremity cyanosis, clubbing, or edema.  Psych: Normal mood and affect. Alert and oriented x3. Judgment and insight is normal.      Assessment/Plan  1. Chronic gout of foot, unspecified cause, unspecified laterality  Patient indicates gout is stable at this time, though she still has a gouty tophi that we hope will improve.  - colchicine (COLCRYS) 0.6 MG Tab; Day 1: 1.2 mg once, followed in 1 hour with a single dose of 0.6 mg.  Day 2 and thereafter: Oral: 0.6 mg once daily until flare resolves  Dispense: 30 Tab; Refill: 1    2. Essential hypertension  Chronic, stable condition.  Patient is worried about potential recall issues with losartan 100 mg so we will change to equivalent dose of candesartan 32 mg.  Patient monitor blood pressure at home.    3. Hyperthyroidism  Still, chronic issue, continue current management    4. Other insomnia  Stable, chronic issue, continue current management.  She does not mix Ambien with alcohol, driving or other " machinery.  Obtained and reviewed patient utilization report from [facility] pharmacy database on 12/19/2019 6:31 PM  prior to writing prescription for controlled substance II, III or IV per [place] bill . Based on assessment of the report,my physical exam if necessary and the patient's health problem, the prescription is medically necessary.   - zolpidem (AMBIEN) 5 MG Tab; Take 1 Tab by mouth at bedtime as needed for Sleep for up to 30 days.  Dispense: 30 Tab; Refill: 3    5. CKD (chronic kidney disease) stage 3, GFR 30-59 ml/min ([facility])  Still, chronic condition continue follow-up with nephrology.    6. Stable central retinal vein occlusion, unspecified laterality  Patient reports this is stable at this time and she is still undergoing treatments, recently had cataract surgery so that she can receive the injections properly to her eye and she will continue to follow-up with her ophthalmologist.      Return to clinic 3 months        Please note that this dictation was created using voice recognition software. I have made every reasonable attempt to correct obvious errors, but I expect that there are errors of grammar and possibly content that I did not discover before finalizing the note.

## 2019-12-23 ENCOUNTER — APPOINTMENT (OUTPATIENT)
Dept: RADIOLOGY | Facility: MEDICAL CENTER | Age: 69
End: 2019-12-23
Attending: INTERNAL MEDICINE
Payer: MEDICARE

## 2019-12-24 DIAGNOSIS — J44.9 CHRONIC OBSTRUCTIVE PULMONARY DISEASE, UNSPECIFIED COPD TYPE (HCC): ICD-10-CM

## 2019-12-24 NOTE — TELEPHONE ENCOUNTER
Have we ever prescribed this med? Yes.  If yes, what date? 7/2/19    Last OV: 08/08/2019     Next OV:01/16/20     DX: Chronic obstructive pulmonary disease, unspecified COPD type (HCC) (J44.9)    Medications: BREO ELLIPTA 200-25 MCG/INH AEROSOL POWDER, BREATH ACTIVATED

## 2019-12-31 ENCOUNTER — HOSPITAL ENCOUNTER (OUTPATIENT)
Dept: LAB | Facility: MEDICAL CENTER | Age: 69
End: 2019-12-31
Attending: INTERNAL MEDICINE
Payer: MEDICARE

## 2019-12-31 ENCOUNTER — HOSPITAL ENCOUNTER (OUTPATIENT)
Dept: RADIOLOGY | Facility: MEDICAL CENTER | Age: 69
End: 2019-12-31
Attending: INTERNAL MEDICINE
Payer: MEDICARE

## 2019-12-31 DIAGNOSIS — E05.90 HYPERTHYROIDISM: ICD-10-CM

## 2019-12-31 DIAGNOSIS — E21.3 HYPERPARATHYROIDISM (HCC): ICD-10-CM

## 2019-12-31 DIAGNOSIS — Z85.118 HISTORY OF LUNG CANCER: ICD-10-CM

## 2019-12-31 LAB
ALBUMIN SERPL BCP-MCNC: 3.9 G/DL (ref 3.2–4.9)
ALBUMIN/GLOB SERPL: 1.5 G/DL
ALP SERPL-CCNC: 76 U/L (ref 30–99)
ALT SERPL-CCNC: 14 U/L (ref 2–50)
ANION GAP SERPL CALC-SCNC: 11 MMOL/L (ref 0–11.9)
AST SERPL-CCNC: 15 U/L (ref 12–45)
BILIRUB SERPL-MCNC: 0.4 MG/DL (ref 0.1–1.5)
BUN SERPL-MCNC: 51 MG/DL (ref 8–22)
CALCIUM SERPL-MCNC: 9.2 MG/DL (ref 8.5–10.5)
CHLORIDE SERPL-SCNC: 110 MMOL/L (ref 96–112)
CO2 SERPL-SCNC: 21 MMOL/L (ref 20–33)
CREAT SERPL-MCNC: 1.64 MG/DL (ref 0.5–1.4)
GLOBULIN SER CALC-MCNC: 2.6 G/DL (ref 1.9–3.5)
GLUCOSE SERPL-MCNC: 110 MG/DL (ref 65–99)
POTASSIUM SERPL-SCNC: 3.8 MMOL/L (ref 3.6–5.5)
PROT SERPL-MCNC: 6.5 G/DL (ref 6–8.2)
PTH-INTACT SERPL-MCNC: 271 PG/ML (ref 14–72)
SODIUM SERPL-SCNC: 142 MMOL/L (ref 135–145)
T4 FREE SERPL-MCNC: 1.24 NG/DL (ref 0.53–1.43)
TSH SERPL DL<=0.005 MIU/L-ACNC: 0.01 UIU/ML (ref 0.38–5.33)
URATE SERPL-MCNC: 5.6 MG/DL (ref 1.9–8.2)

## 2019-12-31 PROCEDURE — 84443 ASSAY THYROID STIM HORMONE: CPT

## 2019-12-31 PROCEDURE — 84550 ASSAY OF BLOOD/URIC ACID: CPT

## 2019-12-31 PROCEDURE — 71250 CT THORAX DX C-: CPT

## 2019-12-31 PROCEDURE — 83970 ASSAY OF PARATHORMONE: CPT

## 2019-12-31 PROCEDURE — 84439 ASSAY OF FREE THYROXINE: CPT

## 2019-12-31 PROCEDURE — 36415 COLL VENOUS BLD VENIPUNCTURE: CPT

## 2019-12-31 PROCEDURE — 80053 COMPREHEN METABOLIC PANEL: CPT

## 2020-01-02 ENCOUNTER — TELEPHONE (OUTPATIENT)
Dept: ENDOCRINOLOGY | Facility: MEDICAL CENTER | Age: 70
End: 2020-01-02

## 2020-01-03 NOTE — TELEPHONE ENCOUNTER
Telephone conversation with patient      I am going to see the patient in about 2 or 3 weeks but I called early because her renal function had slipped with GFR going down from 41-31 and creatinine going up from 1.2 up to 1.6.  She tells me she is not ill.  No vomiting or diarrhea.  She thinks she is keeping up on hydration but probably did not going into that test.  She is going to repeat it soon so I told her to make sure she stayed hydrated going in.    She also tells me that she is no longer taking methimazole.  Free T4 went up from 1.0 up to 1.2 still normal but TSH still suppressed.  No thyrotoxic symptoms although occasionally has tachycardia.  We will review again at next appointment.    Kishore Torrez M.D.

## 2020-01-16 ENCOUNTER — OFFICE VISIT (OUTPATIENT)
Dept: PULMONOLOGY | Facility: HOSPICE | Age: 70
End: 2020-01-16
Payer: MEDICARE

## 2020-01-16 VITALS
HEIGHT: 66 IN | OXYGEN SATURATION: 97 % | RESPIRATION RATE: 16 BRPM | SYSTOLIC BLOOD PRESSURE: 130 MMHG | TEMPERATURE: 98 F | BODY MASS INDEX: 32.78 KG/M2 | HEART RATE: 98 BPM | DIASTOLIC BLOOD PRESSURE: 80 MMHG | WEIGHT: 204 LBS

## 2020-01-16 DIAGNOSIS — R91.8 PULMONARY NODULES: ICD-10-CM

## 2020-01-16 DIAGNOSIS — R06.02 SOB (SHORTNESS OF BREATH): ICD-10-CM

## 2020-01-16 DIAGNOSIS — Z85.118 HISTORY OF LUNG CANCER: ICD-10-CM

## 2020-01-16 DIAGNOSIS — J44.9 CHRONIC OBSTRUCTIVE PULMONARY DISEASE, UNSPECIFIED COPD TYPE (HCC): ICD-10-CM

## 2020-01-16 PROCEDURE — 99214 OFFICE O/P EST MOD 30 MIN: CPT | Performed by: INTERNAL MEDICINE

## 2020-01-16 RX ORDER — ALBUTEROL SULFATE 2.5 MG/3ML
2.5 SOLUTION RESPIRATORY (INHALATION) EVERY 4 HOURS PRN
Qty: 30 BULLET | Refills: 3 | Status: SHIPPED | OUTPATIENT
Start: 2020-01-16 | End: 2021-12-08 | Stop reason: SDUPTHER

## 2020-01-16 RX ORDER — BROMFENAC 0.76 MG/ML
SOLUTION/ DROPS OPHTHALMIC
Refills: 0 | COMMUNITY
Start: 2019-11-26 | End: 2021-01-19

## 2020-01-16 RX ORDER — AZITHROMYCIN 250 MG/1
TABLET, FILM COATED ORAL
Qty: 30 TAB | Refills: 5 | Status: SHIPPED | OUTPATIENT
Start: 2020-01-16 | End: 2020-07-21 | Stop reason: SDUPTHER

## 2020-01-16 ASSESSMENT — PAIN SCALES - GENERAL: PAINLEVEL: NO PAIN

## 2020-01-16 NOTE — PROGRESS NOTES
"Chief Complaint   Patient presents with   • Follow-Up     CT results       HPI: This patient is a 69 y.o. Female who returns for COPD and pulmonary nodules.  PFT's showed FEV1 1.43 L or 54% predicted, DLCO: 86% predicted, consistent with moderate COPD. She quit smoking in 2000.  She is compliant with Breo 200ug and Spiriva inhalers but does not find them helpful.  She tried Trelegy inhaler, again without benefit.  She was hospitalized January 2019 for AECOPD, although symptoms were not consistent with an acute exacerbation and ultimately attributed to thyrotoxicosis. She was then rehospitalized February 2019 for AECOPD. She is on prophylactic daily azithromycin. She has compression fractures and severe associated back pain which affect her breathing.  She  participated in pulmonary rehabilitation however did not feel it was helpful.  She has not required supplemental oxygen including with exercise. Denies cough, CP,  wheezing or LE edema.   She  is very frustrated that she is so short of breath with exertion.  She states she underwent cardiovascular work-up which was unremarkable.  She has a history of lung cancer status post partial left upper lobectomy December 2016 (adenocarcinoma, pT2a).  Follow-up chest CAT scan May 2, 2019 showed postsurgical changes with stable 6 mm groundglass nodule in the right upper lobe, 3 mm nodule in the right upper lobe and 2 mm nodules in the right lower lobe.  Chest CT December 2019 with stable nodules with a new, 4 mm nodule in the right middle lobe.  She has been suffering with gouty flares.    Past Medical History:   Diagnosis Date   • Anesthesia     \"Abnormal blood pressure and breathing\"  \"couldn't breathe\"   • Asthma     inhalers daily   • Backpain 7/2017     thor. area   • Blood clot in vein 07/06/2018    \"Currently have a blood clot in my left eye\"   • Bowel habit changes 07/06/2018    Constipation   • Breath shortness     with exertion has O2 but does not use   • Bronchitis " "   • CAD (coronary artery disease)     palpatations   • Cancer (HCC) 2016    left lung   • Chickenpox    • COPD (chronic obstructive pulmonary disease) (HCC)    • Depression 2019   • Dialysis     transient renal failure due to sepsis   • Emphysema of lung (HCC)    • Glaucoma     right eye   • Hemorrhagic disorder (HCC)    • Hyperlipidemia    • Hypertension    • Hyperthyroidism    • Kidney stone     bilateral   • Lung cancer (HCC) 2016   • Multiple thyroid nodules 2015   • Nasal drainage    • Osteoporosis    • Pain 2018    Back pain   • Personal history of venous thrombosis and embolism ,     right leg , left arm dvt  left eye    • Pneumonia 2016    per patient   • Renal disorder     had been on dialysis for 7 months for acute failure    • Renal stones     post lithotripsy   • Rheumatoid arthritis (HCC)     question   • Rheumatoid arthritis (HCC)    • S/P appendectomy     • S/P cholecystectomy    • S/P kyphoplasty , ,    • Sepsis(995.91)    • Shortness of breath 2018    Chronic current problem. \"A couple of years now\".  O2 concentrator at night - pt states she doesn't use it.   • Thyroid nodule, hot 2017    functional \"hot\" right thyroid nodule without thyrotoxicosis       Social History     Socioeconomic History   • Marital status: Single     Spouse name: Not on file   • Number of children: Not on file   • Years of education: Not on file   • Highest education level: Not on file   Occupational History   • Not on file   Social Needs   • Financial resource strain: Not on file   • Food insecurity:     Worry: Not on file     Inability: Not on file   • Transportation needs:     Medical: Not on file     Non-medical: Not on file   Tobacco Use   • Smoking status: Former Smoker     Packs/day: 1.00     Years: 30.00     Pack years: 30.00     Types: Cigarettes     Last attempt to quit: 2000     Years since quittin.0   • Smokeless " tobacco: Never Used   • Tobacco comment: 7 years ago   Substance and Sexual Activity   • Alcohol use: Yes     Alcohol/week: 0.0 oz     Comment: x2 a week   • Drug use: No   • Sexual activity: Not on file   Lifestyle   • Physical activity:     Days per week: Not on file     Minutes per session: Not on file   • Stress: Not on file   Relationships   • Social connections:     Talks on phone: Not on file     Gets together: Not on file     Attends Taoism service: Not on file     Active member of club or organization: Not on file     Attends meetings of clubs or organizations: Not on file     Relationship status: Not on file   • Intimate partner violence:     Fear of current or ex partner: Not on file     Emotionally abused: Not on file     Physically abused: Not on file     Forced sexual activity: Not on file   Other Topics Concern   • Not on file   Social History Narrative   • Not on file       Family History   Problem Relation Age of Onset   • Cancer Mother    • Heart Failure Father    • Heart Disease Brother        Current Outpatient Medications on File Prior to Visit   Medication Sig Dispense Refill   • BREO ELLIPTA 200-25 MCG/INH AEROSOL POWDER, BREATH ACTIVATED INHALE ONE DOSE BY MOUTH DAILY, RINSE MOUTH AFTER USE 1 Each 4   • colchicine (COLCRYS) 0.6 MG Tab Day 1: 1.2 mg once, followed in 1 hour with a single dose of 0.6 mg.  Day 2 and thereafter: Oral: 0.6 mg once daily until flare resolves 30 Tab 1   • candesartan (ATACAND) 32 MG tablet Take 1 Tab by mouth every day. 30 Tab 3   • zolpidem (AMBIEN) 5 MG Tab Take 1 Tab by mouth at bedtime as needed for Sleep for up to 30 days. 30 Tab 3   • albuterol 108 (90 Base) MCG/ACT Aero Soln inhalation aerosol INHALE TWO PUFFS BY MOUTH EVERY 6 HOURS AS NEEDED FOR SHORTNESS OF BREATH 1 Inhaler 1   • vitamin D, Ergocalciferol, (DRISDOL) 55257 units Cap capsule Take 1 Cap by mouth every 14 days. 5 Cap 1   • Tiotropium Bromide Monohydrate (SPIRIVA RESPIMAT) 2.5 MCG/ACT Aero  Soln Inhale 2 Puffs by mouth every day. 1 Inhaler 6   • HYDROcodone/acetaminophen (NORCO)  MG Tab Take 1-2 Tabs by mouth every 6 hours as needed.     • simvastatin (ZOCOR) 40 MG Tab Take 1 Tab by mouth every evening. 90 Tab 3   • allopurinol (ZYLOPRIM) 100 MG Tab      • rivaroxaban (XARELTO) 20 MG Tab tablet Take 1 Tab by mouth with dinner. 90 Tab 1   • azithromycin (ZITHROMAX) 250 MG Tab 1 daily for COPD 30 Tab 11   • docusate sodium (COLACE) 100 MG Cap Take 300 mg by mouth every evening.     • acetaminophen (TYLENOL) 500 MG Tab Take 1,000 mg by mouth every 6 hours as needed for Moderate Pain.     • multivitamin (THERAGRAN) Tab Take 1 Tab by mouth every day.     • BROMSITE 0.075 % Solution   0   • SODIUM BICARBONATE PO Take 10 g by mouth.     • methimazole (TAPAZOLE) 5 MG Tab Take 1 Tab by mouth every day. (Patient not taking: Reported on 1/16/2020) 30 Tab 3   • predniSONE (DELTASONE) 10 MG Tab Take 30mg x 3 days, then take 20mg x 3 days, then take 10mg x 3 days, with food, then discontinue (Patient not taking: Reported on 1/16/2020) 30 Tab 0   • Diclofenac Sodium 1 % Gel      • COMBIGAN 0.2-0.5 % Solution Place 1 Drop in both eyes 2 Times a Day.       No current facility-administered medications on file prior to visit.        Allergies: Patient has no active allergies.    ROS:   Constitutional: Denies fevers, chills, night sweats, +fatigue, denies  weight loss  Eyes: +vision loss, denies pain, drainage, double vision  Ears, Nose, Throat: Denies earache, difficulty hearing, tinnitus, nasal congestion, hoarseness  Cardiovascular: Denies chest pain, tightness, palpitations, orthopnea,+LLE edema  Respiratory:As in HPI  Sleep: Denies daytime sleepiness, snoring, apneas, insomnia, morning headaches  GI: Denies heartburn, dysphagia, nausea, abdominal pain, diarrhea or constipation  : Denies frequent urination, hematuria, discharge or painful urination  Musculoskeletal: +back pain, painful joints, denies sore  "muscles  Neurological: Denies weakness or headaches  Skin: +rashes    /80 (BP Location: Left arm, Patient Position: Sitting, BP Cuff Size: Adult)   Pulse 98   Temp 36.7 °C (98 °F) (Oral)   Resp 16   Ht 1.676 m (5' 6\")   Wt 92.5 kg (204 lb)   SpO2 97%     Physical Exam:  Appearance: Well-nourished, well-developed, in no acute distress  HEENT: Normocephalic, atraumatic, white sclera, PERRLA, oropharynx clear  Neck: No adenopathy or masses  Respiratory: no intercostal retractions or accessory muscle use  Lungs auscultation: Clear to auscultation bilaterally, diminished breath sounds  Cardiovascular: Regular rate rhythm. No murmurs, rubs or gallops.  Mild LLE (ankle) edema  Abdomen: soft, nondistended  Gait: Normal  Digits: No clubbing, cyanosis  Motor: No focal deficits  Orientation: Oriented to time, person and place    Diagnosis:  1. Chronic obstructive pulmonary disease, unspecified COPD type (HCC)  azithromycin (ZITHROMAX Z-CURTIS) 250 MG Tab    albuterol (PROVENTIL) 2.5mg/3ml Nebu Soln solution for nebulization   2. History of lung cancer  CT-CHEST (THORAX) W/O   3. SOB (shortness of breath)     4. Pulmonary nodules     5.      Gout    Plan:  The patient's COPD has been stable.  Unfortunately she has not see much improvement with inhaled bronchodilators or pulmonary rehabilitation.    Encourage continuing long-acting inhalers with Breo and Spiriva.  Continue prophylactic azithromycin.  Her chest CAT scan shows no concerning pulmonary nodules.  We will continue with surveillance chest CAT scan without contrast in 6 months.  She has left ankle swelling and has been recently treated for gout-encouraged follow-up with PCP.  Return in about 6 months (around 7/16/2020) for after CT scan.      "

## 2020-01-21 ENCOUNTER — OFFICE VISIT (OUTPATIENT)
Dept: ENDOCRINOLOGY | Facility: MEDICAL CENTER | Age: 70
End: 2020-01-21
Payer: MEDICARE

## 2020-01-21 VITALS
HEIGHT: 66 IN | DIASTOLIC BLOOD PRESSURE: 74 MMHG | WEIGHT: 203 LBS | BODY MASS INDEX: 32.62 KG/M2 | SYSTOLIC BLOOD PRESSURE: 102 MMHG | OXYGEN SATURATION: 93 % | HEART RATE: 98 BPM

## 2020-01-21 DIAGNOSIS — E05.10 THYROTOXICOSIS WITH TOXIC SINGLE THYROID NODULE AND WITHOUT THYROID STORM: ICD-10-CM

## 2020-01-21 DIAGNOSIS — M1A.9XX1 GOUT, TOPHACEOUS: ICD-10-CM

## 2020-01-21 DIAGNOSIS — E21.3 HYPERPARATHYROIDISM (HCC): ICD-10-CM

## 2020-01-21 DIAGNOSIS — M80.00XG AGE-RELATED OSTEOPOROSIS WITH CURRENT PATHOLOGICAL FRACTURE WITH DELAYED HEALING, SUBSEQUENT ENCOUNTER: ICD-10-CM

## 2020-01-21 PROCEDURE — 99214 OFFICE O/P EST MOD 30 MIN: CPT | Performed by: INTERNAL MEDICINE

## 2020-01-22 NOTE — PROGRESS NOTES
Chief complaint           Left foot pain related to insufficiency fracture    HPI:        1. Thyrotoxicosis.    The patient is no longer taking methimazole and she does not miss it.  Her free T4 is upper normal at 1.2 and TSH continues suppressed at 0.1.  When she was being treated with methimazole, it seemed to make no difference at all clinically and in fact as her thyroid levels got lower, she became more fatigued and I was concerned that we were just making her clinical situation worse.  I don’t know what to think now but she does look pretty bright and sharp and not at all thyrotoxic.  She probably has a thyrotoxic nodule as seen on an I-123 scan in 2015 with an uptake of 26%.  Other issues got in the way until I reconsidered it again in 2017.  Dr. Hedrick of nuclear medicine did agree that there was enough uptake of this functional nodule to treat it with I-131.  There may be other nodules not taking up iodine that would be left but we could deal with those later.  However when we were getting around to seriously consider treating again other issues came up that caused her to want to delay.  She had a pulmonary nodule that was considered possibly suspicious.  Also at that time she had a thoracic vertebra with a new insufficiency fracture and that was of course causing significant symptoms such that the I-131 ablation was tabled.  Now her circumstance continues to change but it is not any better simplified.  I can say that she is thyrotoxic and would benefit from doing an ablation of that nodule but it would be fairly easy to do.  I think for now she has enough on her plate that she does not want to entertain that possibility.    2. Hyperparathyroidism.    Now another problem has crept into the picture and that is markedly elevated parathyroid hormone at 346 and 271 but without hypercalcemia.  Vitamin D in November was 48 at a time when her PTH was just 346 so that is unexplained.  So this might be secondary  hyperpara because her GFR has declined from 41 now down to 31 which is definitely in the area where secondary hyper occurs.  She will be seeing her nephrologist soon and see what he makes of it.    3. Osteoporosis.    She has had insufficiency fractures and now her left foot has an insufficiency fracture.  One of the bones on the left lateral side.  There was no trauma involved.  She has taken one dose of Prolia.  She took Forteo in the past for 18 months but she can no longer take that because her parathyroid hormone level is elevated and will have to stick with Prolia.      4. Gout.    The patient thinks she had a gouty flare up involving both ankles and feet and one hand or wrist.  She took colchicine for about eight days and I think it has settled down.  She is taking allopurinol. Her current uric acid level is 5.6 which should be adequately suppressed.  She showed me a gouty tophus on one of her thumbs and wonders if a surgeon should open it up and remove it.  I told her I didn’t think so, that eventually the tophus may disappear and in her condition, surgery even as simple as it seems might not heal or be otherwise complicated.  So she will put that aside for now.     I will take another look in three months at all these problems.  But it is quite a burden for her.     ROS:  All other systems reported as negative or unchanged since last exam      Allergies: No Known Allergies    Current medicines including changes today:  Current Outpatient Medications   Medication Sig Dispense Refill   • BROMSITE 0.075 % Solution   0   • azithromycin (ZITHROMAX Z-CURTIS) 250 MG Tab Take 1 tablet daily 30 Tab 5   • albuterol (PROVENTIL) 2.5mg/3ml Nebu Soln solution for nebulization 3 mL by Nebulization route every four hours as needed for Shortness of Breath. 30 Bullet 3   • BREO ELLIPTA 200-25 MCG/INH AEROSOL POWDER, BREATH ACTIVATED INHALE ONE DOSE BY MOUTH DAILY, RINSE MOUTH AFTER USE 1 Each 4   • colchicine (COLCRYS) 0.6 MG  "Tab Day 1: 1.2 mg once, followed in 1 hour with a single dose of 0.6 mg.  Day 2 and thereafter: Oral: 0.6 mg once daily until flare resolves 30 Tab 1   • candesartan (ATACAND) 32 MG tablet Take 1 Tab by mouth every day. 30 Tab 3   • albuterol 108 (90 Base) MCG/ACT Aero Soln inhalation aerosol INHALE TWO PUFFS BY MOUTH EVERY 6 HOURS AS NEEDED FOR SHORTNESS OF BREATH 1 Inhaler 1   • SODIUM BICARBONATE PO Take 10 g by mouth.     • vitamin D, Ergocalciferol, (DRISDOL) 83576 units Cap capsule Take 1 Cap by mouth every 14 days. 5 Cap 1   • Diclofenac Sodium 1 % Gel      • Tiotropium Bromide Monohydrate (SPIRIVA RESPIMAT) 2.5 MCG/ACT Aero Soln Inhale 2 Puffs by mouth every day. 1 Inhaler 6   • HYDROcodone/acetaminophen (NORCO)  MG Tab Take 1-2 Tabs by mouth every 6 hours as needed.     • simvastatin (ZOCOR) 40 MG Tab Take 1 Tab by mouth every evening. 90 Tab 3   • allopurinol (ZYLOPRIM) 100 MG Tab      • rivaroxaban (XARELTO) 20 MG Tab tablet Take 1 Tab by mouth with dinner. 90 Tab 1   • azithromycin (ZITHROMAX) 250 MG Tab 1 daily for COPD 30 Tab 11   • docusate sodium (COLACE) 100 MG Cap Take 300 mg by mouth every evening.     • acetaminophen (TYLENOL) 500 MG Tab Take 1,000 mg by mouth every 6 hours as needed for Moderate Pain.     • multivitamin (THERAGRAN) Tab Take 1 Tab by mouth every day.     • COMBIGAN 0.2-0.5 % Solution Place 1 Drop in both eyes 2 Times a Day.     • methimazole (TAPAZOLE) 5 MG Tab Take 1 Tab by mouth every day. (Patient not taking: Reported on 1/16/2020) 30 Tab 3   • predniSONE (DELTASONE) 10 MG Tab Take 30mg x 3 days, then take 20mg x 3 days, then take 10mg x 3 days, with food, then discontinue (Patient not taking: Reported on 1/21/2020) 30 Tab 0     No current facility-administered medications for this visit.         Past Medical History:   Diagnosis Date   • Anesthesia     \"Abnormal blood pressure and breathing\"  \"couldn't breathe\"   • Asthma     inhalers daily   • Backpain 7/2017     lyndon. " "area   • Blood clot in vein 07/06/2018    \"Currently have a blood clot in my left eye\"   • Bowel habit changes 07/06/2018    Constipation   • Breath shortness     with exertion has O2 but does not use   • Bronchitis    • CAD (coronary artery disease)     palpatations   • Cancer (HCC) 2016    left lung   • Chickenpox    • COPD (chronic obstructive pulmonary disease) (HCC)    • Depression 2/26/2019   • Dialysis 2005    transient renal failure due to sepsis   • Emphysema of lung (HCC)    • Glaucoma     right eye   • Hemorrhagic disorder (HCC)    • Hyperlipidemia    • Hypertension    • Hyperthyroidism    • Kidney stone     bilateral   • Lung cancer (HCC) 12/12/2016   • Multiple thyroid nodules 7/9/2015   • Nasal drainage    • Osteoporosis    • Pain 07/06/2018    Back pain   • Personal history of venous thrombosis and embolism 2004, 2012    right leg 2012, left arm dvt 2004 left eye 2017   • Pneumonia 7/2016    per patient   • Renal disorder     had been on dialysis for 7 months for acute failure 2005   • Renal stones 2013    post lithotripsy   • Rheumatoid arthritis (HCC)     question   • Rheumatoid arthritis (HCC)    • S/P appendectomy 1998    • S/P cholecystectomy 1998   • S/P kyphoplasty 2006, 2007, 2013   • Sepsis(995.91) 2005   • Shortness of breath 07/06/2018    Chronic current problem. \"A couple of years now\".  O2 concentrator at night - pt states she doesn't use it.   • Thyroid nodule, hot 2017    functional \"hot\" right thyroid nodule without thyrotoxicosis       PHYSICAL EXAM:    /74 (BP Location: Left arm, Patient Position: Sitting, BP Cuff Size: Adult)   Pulse 98   Ht 1.676 m (5' 5.98\")   Wt 92.1 kg (203 lb)   LMP  (LMP Unknown)   SpO2 93%   BMI 32.78 kg/m²     Gen.   appears to be in significant discomfort because of swelling and pain in the left foot    Skin   gouty tophus in her left thumb    HEENT  unremarkable    Neck   I cannot feel her thyroid nodule.  Probably substernal    Heart  " regular    Extremities    bilateral ankle edema and edema of the left foot    Neuro  gait gait impaired by painful foot    Psych   somehow she is enduring all of these health problems and doing her best to work through them    ASSESSMENT AND RECOMMENDATIONS    1. Thyrotoxicosis with toxic single thyroid nodule and without thyroid storm              I think she is asymptomatic and no longer taking methimazole.              The main reason to treat this would be that it is probably aggravating her osteoporosis.               The hot nodule would be amenable to I-131 ablation but we have been interrupted by other medical problems twice while making plans for this in the past              See HPI    2. Hyperparathyroidism (HCC)              Probably secondary to advancing renal insufficiency              See HPI    3. Age-related osteoporosis with current pathological fracture with delayed healing, subsequent encounter              Has had 1 injection of Prolia in July and tolerated it well.  The next one is due now.               Cannot take Forteo or Tymlos because of the elevated PTH              See HPI    4. Gout, tophaceous              Small gouty tophus on her left thumb.  I recommended against surgery             On allopurinol and recent uric acid is 5.6 which should be adequate      DISPOSITION: Follow-up in 3 months      Kishore Torrez M.D.    Copies to: Marybeth Lynch M.D. [phone]

## 2020-01-24 ENCOUNTER — HOSPITAL ENCOUNTER (OUTPATIENT)
Dept: LAB | Facility: MEDICAL CENTER | Age: 70
End: 2020-01-24
Attending: PHYSICIAN ASSISTANT
Payer: MEDICARE

## 2020-01-24 LAB
BASOPHILS # BLD AUTO: 0.2 % (ref 0–1.8)
BASOPHILS # BLD: 0.02 K/UL (ref 0–0.12)
CRP SERPL HS-MCNC: 0.4 MG/DL (ref 0–0.75)
EOSINOPHIL # BLD AUTO: 0.35 K/UL (ref 0–0.51)
EOSINOPHIL NFR BLD: 4.2 % (ref 0–6.9)
ERYTHROCYTE [DISTWIDTH] IN BLOOD BY AUTOMATED COUNT: 49.9 FL (ref 35.9–50)
ERYTHROCYTE [SEDIMENTATION RATE] IN BLOOD BY WESTERGREN METHOD: 26 MM/HOUR (ref 0–30)
HCT VFR BLD AUTO: 40.6 % (ref 37–47)
HGB BLD-MCNC: 13 G/DL (ref 12–16)
IMM GRANULOCYTES # BLD AUTO: 0.04 K/UL (ref 0–0.11)
IMM GRANULOCYTES NFR BLD AUTO: 0.5 % (ref 0–0.9)
LYMPHOCYTES # BLD AUTO: 1.42 K/UL (ref 1–4.8)
LYMPHOCYTES NFR BLD: 17 % (ref 22–41)
MCH RBC QN AUTO: 31.7 PG (ref 27–33)
MCHC RBC AUTO-ENTMCNC: 32 G/DL (ref 33.6–35)
MCV RBC AUTO: 99 FL (ref 81.4–97.8)
MONOCYTES # BLD AUTO: 0.51 K/UL (ref 0–0.85)
MONOCYTES NFR BLD AUTO: 6.1 % (ref 0–13.4)
NEUTROPHILS # BLD AUTO: 5.99 K/UL (ref 2–7.15)
NEUTROPHILS NFR BLD: 72 % (ref 44–72)
NRBC # BLD AUTO: 0 K/UL
NRBC BLD-RTO: 0 /100 WBC
PLATELET # BLD AUTO: 246 K/UL (ref 164–446)
PMV BLD AUTO: 10.3 FL (ref 9–12.9)
RBC # BLD AUTO: 4.1 M/UL (ref 4.2–5.4)
WBC # BLD AUTO: 8.3 K/UL (ref 4.8–10.8)

## 2020-01-24 PROCEDURE — 85652 RBC SED RATE AUTOMATED: CPT

## 2020-01-24 PROCEDURE — 80053 COMPREHEN METABOLIC PANEL: CPT

## 2020-01-24 PROCEDURE — 36415 COLL VENOUS BLD VENIPUNCTURE: CPT

## 2020-01-24 PROCEDURE — 85025 COMPLETE CBC W/AUTO DIFF WBC: CPT | Mod: GA

## 2020-01-24 PROCEDURE — 86140 C-REACTIVE PROTEIN: CPT

## 2020-01-26 LAB
ALBUMIN SERPL BCP-MCNC: NORMAL G/DL (ref 3.2–4.9)
ALBUMIN/GLOB SERPL: NORMAL G/DL
ALP SERPL-CCNC: NORMAL U/L (ref 30–99)
ALT SERPL-CCNC: NORMAL U/L (ref 2–50)
ANION GAP SERPL CALC-SCNC: NORMAL MMOL/L (ref 0–11.9)
AST SERPL-CCNC: NORMAL U/L (ref 12–45)
BILIRUB SERPL-MCNC: NORMAL MG/DL (ref 0.1–1.5)
BUN SERPL-MCNC: NORMAL MG/DL (ref 8–22)
CALCIUM SERPL-MCNC: NORMAL MG/DL (ref 8.4–10.2)
CHLORIDE SERPL-SCNC: NORMAL MMOL/L (ref 96–112)
CO2 SERPL-SCNC: NORMAL MMOL/L (ref 20–33)
CREAT SERPL-MCNC: NORMAL MG/DL (ref 0.5–1.4)
GLOBULIN SER CALC-MCNC: NORMAL G/DL (ref 1.9–3.5)
GLUCOSE SERPL-MCNC: NORMAL MG/DL (ref 65–99)
POTASSIUM SERPL-SCNC: NORMAL MMOL/L (ref 3.6–5.5)
PROT SERPL-MCNC: NORMAL G/DL (ref 6–8.2)
SODIUM SERPL-SCNC: NORMAL MMOL/L (ref 135–145)

## 2020-01-29 ENCOUNTER — TELEPHONE (OUTPATIENT)
Dept: ENDOCRINOLOGY | Facility: MEDICAL CENTER | Age: 70
End: 2020-01-29

## 2020-01-29 NOTE — TELEPHONE ENCOUNTER
1. Caller Name: Meaghan                        Call Back Number: [phone] (home)         How would the patient prefer to be contacted with a response: Phone call OK to leave a detailed message    Patient recently had [facility] last friday [facility].    She has been on prednisone for her foot injury for 5 days 30mg per day. She is wondering how long she should wait before getting the prolia injection?

## 2020-01-30 ENCOUNTER — HOSPITAL ENCOUNTER (OUTPATIENT)
Dept: RADIOLOGY | Facility: MEDICAL CENTER | Age: 70
End: 2020-01-30
Attending: PHYSICIAN ASSISTANT
Payer: MEDICARE

## 2020-01-30 DIAGNOSIS — M65.872 OTHER SYNOVITIS AND TENOSYNOVITIS, LEFT ANKLE AND FOOT: ICD-10-CM

## 2020-01-30 DIAGNOSIS — S92.355D CLOSED NONDISPLACED FRACTURE OF FIFTH METATARSAL BONE OF LEFT FOOT WITH ROUTINE HEALING, SUBSEQUENT ENCOUNTER: ICD-10-CM

## 2020-01-30 PROCEDURE — 73718 MRI LOWER EXTREMITY W/O DYE: CPT | Mod: LT

## 2020-01-30 NOTE — TELEPHONE ENCOUNTER
Phone Number Called: [phone] (home)       Call outcome: Spoke to patient regarding message below.    Message: Spoke to patient that she can do prolia injection when ever. She understood

## 2020-02-05 ENCOUNTER — TELEPHONE (OUTPATIENT)
Dept: PULMONOLOGY | Facility: HOSPICE | Age: 70
End: 2020-02-05

## 2020-02-05 NOTE — TELEPHONE ENCOUNTER
DOCUMENTATION OF PRIOR AUTH STATUS    1. Medication name and dose: Breo Ellipta 200-25 mcg    2. Name and Phone # of Prescription coverage company: [employer] [phone]    3. Date Prior Auth was submitted: 2/4/2020    4. What information was given to obtain insurance decision: Clinical notes    5. Prior Auth letter Approved or Denied: Approved through 2/3/2021    6. Pharmacy notified: Yes    7. Patient notified: Yes

## 2020-02-05 NOTE — TELEPHONE ENCOUNTER
MEDICATION PRIOR AUTHORIZATION NEEDED:    1. Name of Medication: Breo Ellipta 200-25 mcg    2. Requested By (Name of Pharmacy): [facility]     3. Is insurance on file current? yes    4. What is the name & phone number of the 3rd party payor? [facility] [phone]

## 2020-02-13 ENCOUNTER — TELEPHONE (OUTPATIENT)
Dept: VASCULAR LAB | Facility: MEDICAL CENTER | Age: 70
End: 2020-02-13

## 2020-02-13 ENCOUNTER — ANTICOAGULATION MONITORING (OUTPATIENT)
Dept: VASCULAR LAB | Facility: MEDICAL CENTER | Age: 70
End: 2020-02-13

## 2020-02-13 DIAGNOSIS — I82.4Y9 DEEP VEIN THROMBOSIS (DVT) OF PROXIMAL LOWER EXTREMITY, UNSPECIFIED CHRONICITY, UNSPECIFIED LATERALITY (HCC): ICD-10-CM

## 2020-02-13 NOTE — PROGRESS NOTES
Cockcroft & Gault (Actual body weight): 52.4 (ml/min)              cret was 1.45 from renal MD report.   Pt is taking Xarelto 20 mg due to DVT for indef duration . Is still able to continue the med as his cret cl is still> 30.   Next test is due in 3 months but if any change in kidney function she should let us know .appt with card  June 8. No bleeding issues. The patient instructed to go to the ER for falls with a head injury,  blood in urine or stool or any bleeding that last longer than 20 min.

## 2020-02-17 ENCOUNTER — OUTPATIENT INFUSION SERVICES (OUTPATIENT)
Dept: ONCOLOGY | Facility: MEDICAL CENTER | Age: 70
End: 2020-02-17
Attending: INTERNAL MEDICINE
Payer: MEDICARE

## 2020-02-17 VITALS
HEART RATE: 111 BPM | RESPIRATION RATE: 20 BRPM | DIASTOLIC BLOOD PRESSURE: 75 MMHG | WEIGHT: 200.62 LBS | HEIGHT: 66 IN | SYSTOLIC BLOOD PRESSURE: 109 MMHG | OXYGEN SATURATION: 98 % | BODY MASS INDEX: 32.24 KG/M2 | TEMPERATURE: 97.6 F

## 2020-02-17 LAB
CA-I BLD ISE-SCNC: 1.36 MMOL/L (ref 1.1–1.3)
CREAT BLD-MCNC: 1.9 MG/DL (ref 0.5–1.4)

## 2020-02-17 PROCEDURE — 82330 ASSAY OF CALCIUM: CPT

## 2020-02-17 PROCEDURE — 82565 ASSAY OF CREATININE: CPT

## 2020-02-17 PROCEDURE — 36415 COLL VENOUS BLD VENIPUNCTURE: CPT

## 2020-02-17 PROCEDURE — 96372 THER/PROPH/DIAG INJ SC/IM: CPT

## 2020-02-17 PROCEDURE — 700111 HCHG RX REV CODE 636 W/ 250 OVERRIDE (IP): Mod: JG | Performed by: INTERNAL MEDICINE

## 2020-02-17 RX ORDER — LOSARTAN POTASSIUM 50 MG/1
TABLET ORAL
COMMUNITY
Start: 2020-02-09 | End: 2020-04-15

## 2020-02-17 RX ADMIN — DENOSUMAB 60 MG: 60 INJECTION SUBCUTANEOUS at 16:04

## 2020-02-17 NOTE — PROGRESS NOTES
Pharmacy Note:    Scr = 1.9    with est crcl ~ 40ml/min  iCalcium =  1.36    previous treatment = 7/29/19  Ok to proceed with prolia injection  RACHAEL Mcdowell PharmAnibalD.

## 2020-02-18 ENCOUNTER — TELEPHONE (OUTPATIENT)
Dept: VASCULAR LAB | Facility: MEDICAL CENTER | Age: 70
End: 2020-02-18

## 2020-02-18 NOTE — PROGRESS NOTES
Patient to [facility] for Prolia injection. Labs drawn and patient meets parameters for injection. Prolia injected into left back of arm. Patient stated she would call and make 6 month appt. And did not want this RN to schedule it. Patient to home in care of self.

## 2020-02-18 NOTE — TELEPHONE ENCOUNTER
Called pt back as she left a message that she would like me to call her back regarding additional information. Pt could not remember why she left that message. She will call back if she does. VENITA Bland.

## 2020-03-17 RX ORDER — RIVAROXABAN 20 MG/1
TABLET, FILM COATED ORAL
Qty: 90 TAB | Refills: 0 | Status: SHIPPED | OUTPATIENT
Start: 2020-03-17 | End: 2020-06-12

## 2020-03-24 ENCOUNTER — TELEPHONE (OUTPATIENT)
Dept: PULMONOLOGY | Facility: HOSPICE | Age: 70
End: 2020-03-24

## 2020-03-24 RX ORDER — ONDANSETRON 4 MG/1
4 TABLET, FILM COATED ORAL EVERY 4 HOURS PRN
COMMUNITY
End: 2020-03-24 | Stop reason: SDUPTHER

## 2020-03-24 RX ORDER — ZOLPIDEM TARTRATE 5 MG/1
5 TABLET ORAL NIGHTLY PRN
COMMUNITY
End: 2020-05-12 | Stop reason: SDUPTHER

## 2020-03-24 RX ORDER — ZOLPIDEM TARTRATE 5 MG/1
5 TABLET ORAL NIGHTLY PRN
Qty: 30 TAB | Status: CANCELLED | OUTPATIENT
Start: 2020-03-24

## 2020-03-24 NOTE — TELEPHONE ENCOUNTER
MEDICATION PRIOR AUTHORIZATION NEEDED:    1. Name of Medication: Albuterol Sulfate HFA    2. Requested By (Name of Pharmacy): [facility]     3. Is insurance on file current? yes    4. What is the name & phone number of the 3rd party payor? [facility] [phone]

## 2020-03-25 RX ORDER — ONDANSETRON 4 MG/1
4 TABLET, FILM COATED ORAL EVERY 4 HOURS PRN
Qty: 20 TAB | Refills: 0 | Status: SHIPPED | OUTPATIENT
Start: 2020-03-25 | End: 2020-04-24

## 2020-03-25 NOTE — TELEPHONE ENCOUNTER
Received request via: Patient    Was the patient seen in the last year in this department? Yes    Does the patient have an active prescription (recently filled or refills available) for medication(s) requested? No     Pt need refill on Zofran, please verify and correct refill if necessary.    KATHERIN Sempel  Med Ass't

## 2020-04-02 NOTE — TELEPHONE ENCOUNTER
DOCUMENTATION OF PRIOR AUTH STATUS    1. Medication name and dose: Albuterol Sulfate 108 mcg    2. Name and Phone # of Prescription coverage company: [id number] [phone]    3. Date Prior Auth was submitted: 3/23/2020    4. What information was given to obtain insurance decision: Clinical notes    5. Prior Auth letter Approved or Denied: Approved through 12/31/2020    6. Pharmacy notified: Yes    7. Patient notified: Yes

## 2020-04-10 ENCOUNTER — TELEPHONE (OUTPATIENT)
Dept: HEALTH INFORMATION MANAGEMENT | Facility: OTHER | Age: 70
End: 2020-04-10

## 2020-04-10 NOTE — TELEPHONE ENCOUNTER
1. Caller Name: Latonia Clinton                        Call Back Number: [phone]  [facility] PCP or Specialty Provider: Yes         2.  Does patient have any active symptoms of respiratory illness (fever OR cough OR shortness of breath OR sore throat)? Yes, the patient reports the following respiratory symptoms: cough.    3.  Does patient have any comoribidities? COPD    4.  Has the patient traveled in the last 14 days OR had any known contact with someone who is suspected or confirmed to have COVID-19?  No.    5. Disposition: Cleared by RN Triage; OK to keep/schedule appointment    Note routed to [facility] Provider: MINOR only.   Pt wants to see provider for sleeping med refill and would like to try a virtual visit. She has a cough from copd which is unchanged for several years.

## 2020-04-13 ENCOUNTER — APPOINTMENT (OUTPATIENT)
Dept: MEDICAL GROUP | Facility: MEDICAL CENTER | Age: 70
End: 2020-04-13
Payer: MEDICARE

## 2020-04-14 DIAGNOSIS — Z79.01 CHRONIC ANTICOAGULATION: ICD-10-CM

## 2020-04-15 DIAGNOSIS — J44.9 CHRONIC OBSTRUCTIVE PULMONARY DISEASE, UNSPECIFIED COPD TYPE (HCC): ICD-10-CM

## 2020-04-15 RX ORDER — LOSARTAN POTASSIUM 50 MG/1
TABLET ORAL
Qty: 135 TAB | Refills: 2 | Status: SHIPPED
Start: 2020-04-15 | End: 2020-06-08

## 2020-04-15 NOTE — TELEPHONE ENCOUNTER
Caller Name: Latonia Clinton                 Call Back Number: [phone] (home)         Patient approves a detailed voicemail message: N\A    Have we ever prescribed this med? Yes.  If yes, what date? 8/8/19    Last OV: 1/16/2020 LUIS Byrne MD     Next OV: 7/21/2020 Dr. Byrne     DX: COPD     Medications:  Requested Prescriptions     Pending Prescriptions Disp Refills   • Tiotropium Bromide Monohydrate 2.5 MCG/ACT Aero Soln [Pharmacy Med Name: SPIRIVA RESPIMAT 2.5 MCG INH SPRAY] 1 Inhaler 5     Sig: INHALE TWO PUFFS BY MOUTH DAILY

## 2020-05-12 ENCOUNTER — OFFICE VISIT (OUTPATIENT)
Dept: MEDICAL GROUP | Facility: MEDICAL CENTER | Age: 70
End: 2020-05-12
Payer: MEDICARE

## 2020-05-12 VITALS
TEMPERATURE: 98.2 F | HEIGHT: 66 IN | RESPIRATION RATE: 16 BRPM | OXYGEN SATURATION: 96 % | WEIGHT: 196.65 LBS | SYSTOLIC BLOOD PRESSURE: 110 MMHG | BODY MASS INDEX: 31.6 KG/M2 | HEART RATE: 86 BPM | DIASTOLIC BLOOD PRESSURE: 68 MMHG

## 2020-05-12 DIAGNOSIS — R11.0 NAUSEA: ICD-10-CM

## 2020-05-12 DIAGNOSIS — Z12.31 ENCOUNTER FOR SCREENING MAMMOGRAM FOR MALIGNANT NEOPLASM OF BREAST: ICD-10-CM

## 2020-05-12 DIAGNOSIS — M1A.0790 CHRONIC GOUT OF FOOT, UNSPECIFIED CAUSE, UNSPECIFIED LATERALITY: ICD-10-CM

## 2020-05-12 DIAGNOSIS — S39.92XA TAILBONE INJURY, INITIAL ENCOUNTER: ICD-10-CM

## 2020-05-12 DIAGNOSIS — Z12.11 SCREENING FOR COLON CANCER: ICD-10-CM

## 2020-05-12 DIAGNOSIS — G47.09 OTHER INSOMNIA: ICD-10-CM

## 2020-05-12 PROCEDURE — 99214 OFFICE O/P EST MOD 30 MIN: CPT | Performed by: INTERNAL MEDICINE

## 2020-05-12 RX ORDER — ZOLPIDEM TARTRATE 5 MG/1
5 TABLET ORAL NIGHTLY PRN
Qty: 30 TAB | Refills: 3 | Status: SHIPPED | OUTPATIENT
Start: 2020-05-12 | End: 2020-06-11

## 2020-05-12 RX ORDER — COLCHICINE 0.6 MG/1
TABLET ORAL
Qty: 30 TAB | Refills: 1 | Status: SHIPPED
Start: 2020-05-12 | End: 2020-09-14 | Stop reason: SDUPTHER

## 2020-05-12 RX ORDER — ONDANSETRON 4 MG/1
4 TABLET, FILM COATED ORAL EVERY 4 HOURS PRN
Qty: 30 TAB | Refills: 3 | Status: SHIPPED
Start: 2020-05-12 | End: 2020-06-11

## 2020-05-12 NOTE — PROGRESS NOTES
CC:  Diagnoses of Chronic gout of foot, unspecified cause, unspecified laterality, Nausea, Other insomnia, Screening for colon cancer, Tailbone injury, initial encounter, and Encounter for screening mammogram for malignant neoplasm of breast were pertinent to this visit.    HISTORY OF THE PRESENT ILLNESS: Patient is a 69 y.o. female. This pleasant patient is here today for routine follow-up.    Wishes to have colchicine refill to have on hand for her gout.  Last uric acid was suppressed at 5.6 on labs 12/21/2019.  She is followed by her rheumatologist pending appointment Dr. Larsen in June.  She does verbally states she holds her statin when she takes the colchicine.    States she is due for colonoscopy.  Has chronic longstanding nausea that she takes Zofran for approximately twice per month and would like refill.  No new GI symptoms.    Chronic symptoms of COPD, she does not feel much improvement in her breathing with her inhalers.  She says that she has no change in symptoms since she last saw her pulmonologist.  She says that she has repeat CT scan pending to follow-up small nodules in July with pulmonary function testing and pulmonary appointment.  History of lung cancer and has been in remission for 3 years, was treated with resection without radiation.    Needs Ambien refilled, does not mix alcohol, driving, other sedating meds, machinery, etc.    Now back on losartan.  She says candesartan gave her rhinorrhea.    Had Prolia last February.  Did have fifth metatarsal fracture around January without known trauma it was treated at [facility] urgent care and reports has healed.    Cardiology appointment Dr. Maldonado 6/8/2020, endocrinologist with Dr. Torrez 5/26/2020 upcoming appointments.    Allergies: Patient has no known allergies.    Current Outpatient Medications Ordered in Epic   Medication Sig Dispense Refill   • zolpidem (AMBIEN) 5 MG Tab Take 1 Tab by mouth at bedtime as needed for up to 30 days. 30 Tab 3   •  colchicine (COLCRYS) 0.6 MG Tab Day 1: 1.2 mg once, followed in 1 hour with a single dose of 0.6 mg.  Day 2 and thereafter: Oral: 0.6 mg once daily until flare resolves 30 Tab 1   • ondansetron (ZOFRAN) 4 MG Tab tablet Take 1 Tab by mouth every four hours as needed for Nausea/Vomiting for up to 30 days. 30 Tab 3   • losartan (COZAAR) 50 MG Tab TAKE ONE AND ONE-HALF (1 AND 1/2) TABLET BY MOUTH DAILY 135 Tab 2   • Tiotropium Bromide Monohydrate 2.5 MCG/ACT Aero Soln INHALE TWO PUFFS BY MOUTH DAILY 1 Inhaler 5   • XARELTO 20 MG Tab tablet TAKE ONE TABLET BY MOUTH DAILY WITH DINNER 90 Tab 0   • BROMSITE 0.075 % Solution   0   • azithromycin (ZITHROMAX Z-CURTIS) 250 MG Tab Take 1 tablet daily 30 Tab 5   • albuterol (PROVENTIL) 2.5mg/3ml Nebu Soln solution for nebulization 3 mL by Nebulization route every four hours as needed for Shortness of Breath. 30 Bullet 3   • BREO ELLIPTA 200-25 MCG/INH AEROSOL POWDER, BREATH ACTIVATED INHALE ONE DOSE BY MOUTH DAILY, RINSE MOUTH AFTER USE 1 Each 4   • albuterol 108 (90 Base) MCG/ACT Aero Soln inhalation aerosol INHALE TWO PUFFS BY MOUTH EVERY 6 HOURS AS NEEDED FOR SHORTNESS OF BREATH 1 Inhaler 1   • SODIUM BICARBONATE PO Take 10 g by mouth.     • vitamin D, Ergocalciferol, (DRISDOL) 88165 units Cap capsule Take 1 Cap by mouth every 14 days. 5 Cap 1   • HYDROcodone/acetaminophen (NORCO)  MG Tab Take 1-2 Tabs by mouth every 6 hours as needed.     • simvastatin (ZOCOR) 40 MG Tab Take 1 Tab by mouth every evening. 90 Tab 3   • allopurinol (ZYLOPRIM) 100 MG Tab      • azithromycin (ZITHROMAX) 250 MG Tab 1 daily for COPD 30 Tab 11   • docusate sodium (COLACE) 100 MG Cap Take 300 mg by mouth every evening.     • acetaminophen (TYLENOL) 500 MG Tab Take 1,000 mg by mouth every 6 hours as needed for Moderate Pain.     • COMBIGAN 0.2-0.5 % Solution Place 1 Drop in both eyes 2 Times a Day.       No current Epic-ordered facility-administered medications on file.        Past Medical History:  "  Diagnosis Date   • Anesthesia     \"Abnormal blood pressure and breathing\"  \"couldn't breathe\"   • Asthma     inhalers daily   • Backpain 7/2017     thor. area   • Blood clot in vein 07/06/2018    \"Currently have a blood clot in my left eye\"   • Bowel habit changes 07/06/2018    Constipation   • Breath shortness     with exertion has O2 but does not use   • Bronchitis    • CAD (coronary artery disease)     palpatations   • Cancer (HCC) 2016    left lung   • Chickenpox    • COPD (chronic obstructive pulmonary disease) (HCC)    • Depression 2/26/2019   • Dialysis 2005    transient renal failure due to sepsis   • Emphysema of lung (HCC)    • Glaucoma     right eye   • Hemorrhagic disorder (HCC)    • Hyperlipidemia    • Hypertension    • Hyperthyroidism    • Kidney stone     bilateral   • Lung cancer (HCC) 12/12/2016   • Multiple thyroid nodules 7/9/2015   • Nasal drainage    • Osteoporosis    • Pain 07/06/2018    Back pain   • Personal history of venous thrombosis and embolism 2004, 2012    right leg 2012, left arm dvt 2004 left eye 2017   • Pneumonia 7/2016    per patient   • Renal disorder     had been on dialysis for 7 months for acute failure 2005   • Renal stones 2013    post lithotripsy   • Rheumatoid arthritis (HCC)     question   • Rheumatoid arthritis (HCC)    • S/P appendectomy 1998    • S/P cholecystectomy 1998   • S/P kyphoplasty 2006, 2007, 2013   • Sepsis, unspecified (HCC) 2005   • Shortness of breath 07/06/2018    Chronic current problem. \"A couple of years now\".  O2 concentrator at night - pt states she doesn't use it.   • Thyroid nodule, hot 2017    functional \"hot\" right thyroid nodule without thyrotoxicosis       Past Surgical History:   Procedure Laterality Date   • CYSTOSCOPY STENT PLACEMENT  7/9/2018    Procedure: Cystoscopy,  Left removal of stent ,  Left Stent Placement;  Surgeon: Beck Yang M.D.;  Location: SURGERY [facility];  Service: Urology   • URETEROSCOPY Left 7/9/2018    " Procedure: URETEROSCOPY;  Surgeon: Beck Yang M.D.;  Location: [facility];  Service: Urology   • LASERTRIPSY Left 2018    Procedure: LASERTRIPSY-LITHO;  Surgeon: Beck Yang M.D.;  Location: [facility];  Service: Urology   • CYSTOSCOPY STENT PLACEMENT Left 2018    Procedure: CYSTOSCOPY STENT PLACEMENT;  Surgeon: Beck Yang M.D.;  Location: [facility];  Service: Urology   • LITHOTRIPSY Left 2018    Procedure: LITHOTRIPSY;  Surgeon: Beck Yang M.D.;  Location: [facility];  Service: Urology   • LASERTRIPSY Left 2018    Procedure: LASERTRIPSY;  Surgeon: Beck Yang M.D.;  Location: [facility];  Service: Urology   • URETEROSCOPY Left 2018    Procedure: URETEROSCOPY;  Surgeon: Beck Yang M.D.;  Location: [facility];  Service: Urology   • THORACOSCOPY Left 2016    Procedure: THORACOSCOPY W/WEDGE RESECTION UPPER LOBE MASS;  Surgeon: John H Ganser, M.D.;  Location: [facility];  Service:    • OTHER ORTHOPEDIC SURGERY      kyphoplasty X3   • APPENDECTOMY         • CHOLECYSTECTOMY         • LAMINOTOMY     • LUNG BIOPSY OPEN     • OTHER     • OTHER ABDOMINAL SURGERY      gall bladder disease   • PB ENLARGE BREAST WITH IMPLANT     • PB REDUCTION OF LARGE BREAST         Social History     Tobacco Use   • Smoking status: Former Smoker     Packs/day: 1.00     Years: 30.00     Pack years: 30.00     Types: Cigarettes     Last attempt to quit: 2000     Years since quittin.3   • Smokeless tobacco: Never Used   • Tobacco comment: 7 years ago   Substance Use Topics   • Alcohol use: Yes     Alcohol/week: 0.0 oz     Comment: x2 a week   • Drug use: No       Social History     Social History Narrative   • Not on file       Family History   Problem Relation Age of Onset   • Cancer Mother    • Heart Failure Father    • Heart Disease Brother        ROS:     - Constitutional:  "Negative for fever, chills     - Eyes:   Negative for eye pain, discharge    - ENT:  Negative for sore throat     - Respiratory: Negative for cough, sputum production, chest congestion, dyspnea, wheezing, and crackles.      - Cardiovascular: Negative for chest pain, palpitations, orthopnea, and bilateral lower extremity edema.     - Gastrointestinal: Negative for heartburn, nausea, vomiting, abdominal pain, hematochezia, melena, diarrhea, constipation, and greasy/foul-smelling stools.     - Genitourinary: Negative for dysuria    - Skin: Negative for rash, itching, cyanotic skin color change.     - Neurological: Negative for vertigo    - Endo:Negative for polyuria, heat/cold intolerance, excessive thirst    - Hem/lymphatic: Negative for swollen glands    -Allergic/immun: Negative for allergic rhinitis    - Psychiatric/Behavioral: Negative for suicidal/homicidal ideation and memory loss.      Exam: /68 (BP Location: Left arm, Patient Position: Sitting, BP Cuff Size: Adult)   Pulse 86   Temp 36.8 °C (98.2 °F) (Temporal)   Resp 16   Ht 1.67 m (5' 5.75\")   Wt 89.2 kg (196 lb 10.4 oz)   SpO2 96%  Body mass index is 31.98 kg/m².    General: Normal appearing. No distress.  EYES: Conjunctiva clear lids without ptosis, pupils equal  EARS: Normal shape and contour   Pulmonary: Clear to ausculation.  Normal effort. No rales or wheezing.  Cardiovascular: Regular rate and rhythm without significant murmur.   Abdomen: Soft, nontender, nondistended. Normal bowel sounds.  Neurologic: Cranial nerves grossly nonfocal  Skin: Warm and dry.  No obvious lesions.  Musculoskeletal: Normal gait. No extremity cyanosis, clubbing, or edema.  Psych: Normal mood and affect. Alert and oriented x3. Judgment and insight is normal.      Assessment/Plan  1. Chronic gout of foot, unspecified cause, unspecified laterality  Stable, chronic, pending rheumatology f/u  - colchicine (COLCRYS) 0.6 MG Tab; Day 1: 1.2 mg once, followed in 1 hour " with a single dose of 0.6 mg.  Day 2 and thereafter: Oral: 0.6 mg once daily until flare resolves  Dispense: 30 Tab; Refill: 1    2. Nausea  Chronic, stable, reasonable to have zofran for episodes approx 2x/mo.  - ondansetron (ZOFRAN) 4 MG Tab tablet; Take 1 Tab by mouth every four hours as needed for Nausea/Vomiting for up to 30 days.  Dispense: 30 Tab; Refill: 3    3. Other insomnia  Obtained and reviewed patient utilization report from [facility] pharmacy database on 5/12/2020 4:38 PM  prior to writing prescription for controlled substance II, III or IV per [place] bill . Based on assessment of the report,my physical exam if necessary and the patient's health problem, the prescription is medically necessary.   Chronic, stable, c/w medication  - zolpidem (AMBIEN) 5 MG Tab; Take 1 Tab by mouth at bedtime as needed for up to 30 days.  Dispense: 30 Tab; Refill: 3    4. Screening for colon cancer  - REFERRAL TO GI FOR COLONOSCOPY    5. Tailbone injury, initial encounter  Reports some lower sacral discomfort without trauma over the past month, with her history of osteoporosis she would like x-ray to rule out any signs of occult fracture this is reasonable.  No new neurologic change.  - DX-LUMBAR SPINE-2 OR 3 VIEWS; Future    6. Encounter for screening mammogram for malignant neoplasm of breast  Asymptomatic due for screening  - MA-SCREENING MAMMO BILAT W/ IMPLANTS W/CAD; Future        Return to clinic approximately 4 months or sooner if needed      Please note that this dictation was created using voice recognition software. I have made every reasonable attempt to correct obvious errors, but I expect that there are errors of grammar and possibly content that I did not discover before finalizing the note.

## 2020-05-14 ENCOUNTER — TELEPHONE (OUTPATIENT)
Dept: MEDICAL GROUP | Facility: MEDICAL CENTER | Age: 70
End: 2020-05-14

## 2020-05-14 DIAGNOSIS — J44.9 CHRONIC OBSTRUCTIVE PULMONARY DISEASE, UNSPECIFIED COPD TYPE (HCC): ICD-10-CM

## 2020-05-15 NOTE — TELEPHONE ENCOUNTER
1. Caller Name: .[email or web address]                          Call Back Number: [phone]        How would the patient prefer to be contacted with a response: Phone call OK to leave a detailed message    Pt called, States that Dentist office needs letter clearing her to be able to have teeth cleaned?    PJ Sempel  Med Ass't

## 2020-05-26 ENCOUNTER — HOSPITAL ENCOUNTER (OUTPATIENT)
Dept: LAB | Facility: MEDICAL CENTER | Age: 70
End: 2020-05-26
Attending: INTERNAL MEDICINE
Payer: MEDICARE

## 2020-05-26 ENCOUNTER — OFFICE VISIT (OUTPATIENT)
Dept: ENDOCRINOLOGY | Facility: MEDICAL CENTER | Age: 70
End: 2020-05-26
Payer: MEDICARE

## 2020-05-26 VITALS
DIASTOLIC BLOOD PRESSURE: 76 MMHG | SYSTOLIC BLOOD PRESSURE: 116 MMHG | HEIGHT: 66 IN | HEART RATE: 100 BPM | WEIGHT: 196 LBS | BODY MASS INDEX: 31.5 KG/M2

## 2020-05-26 DIAGNOSIS — E21.3 HYPERPARATHYROIDISM (HCC): ICD-10-CM

## 2020-05-26 DIAGNOSIS — E05.10 THYROTOXICOSIS WITH TOXIC SINGLE THYROID NODULE AND WITHOUT THYROID STORM: ICD-10-CM

## 2020-05-26 DIAGNOSIS — M80.00XG AGE-RELATED OSTEOPOROSIS WITH CURRENT PATHOLOGICAL FRACTURE WITH DELAYED HEALING, SUBSEQUENT ENCOUNTER: ICD-10-CM

## 2020-05-26 DIAGNOSIS — M1A.9XX1 GOUT, TOPHACEOUS: ICD-10-CM

## 2020-05-26 DIAGNOSIS — E05.90 HYPERTHYROIDISM: ICD-10-CM

## 2020-05-26 LAB
T3FREE SERPL-MCNC: 4.51 PG/ML (ref 2–4.4)
T4 FREE SERPL-MCNC: 2.12 NG/DL (ref 0.93–1.7)
TSH SERPL DL<=0.005 MIU/L-ACNC: <0.005 UIU/ML (ref 0.38–5.33)

## 2020-05-26 PROCEDURE — 36415 COLL VENOUS BLD VENIPUNCTURE: CPT

## 2020-05-26 PROCEDURE — 84439 ASSAY OF FREE THYROXINE: CPT

## 2020-05-26 PROCEDURE — 84481 FREE ASSAY (FT-3): CPT

## 2020-05-26 PROCEDURE — 84443 ASSAY THYROID STIM HORMONE: CPT

## 2020-05-26 PROCEDURE — 99213 OFFICE O/P EST LOW 20 MIN: CPT | Performed by: INTERNAL MEDICINE

## 2020-05-26 RX ORDER — UBIDECARENONE 75 MG
100 CAPSULE ORAL DAILY
Status: ON HOLD | COMMUNITY
End: 2022-11-15

## 2020-05-26 NOTE — PROGRESS NOTES
Chief Complaint   Patient presents with   • Thyrotoxicosis     Toxic nodule        HPI:    Thyrotoxicosis         Seems brighter and stronger now than when I saw her last.  Does not seem to be as discouraged as before.  I think a lot of it is she has less pain.  Her foot healed.  I think her back is feeling better.           She does not have clear-cut thyrotoxic symptoms.  She has shortness of breath but no tachycardia, palpitations or irregular heart rhythm.  No tremor.           No recent labs so she will do it now.    ROS:       All other systems reported as negative or unchanged since last exam      Allergies: No Known Allergies    Current medicines including changes today:  Current Outpatient Medications   Medication Sig Dispense Refill   • Prenatal MV & Min w/FA-DHA (PRENATAL ADULT GUMMY/DHA/FA PO) Take  by mouth.     • cyanocobalamin (VITAMIN B-12) 100 MCG Tab Take 100 mcg by mouth every day.     • BREO ELLIPTA 200-25 MCG/INH AEROSOL POWDER, BREATH ACTIVATED INHALE ONE DOSE BY MOUTH DAILY *RINSE MOUTH AFTER USE* 1 Each 5   • zolpidem (AMBIEN) 5 MG Tab Take 1 Tab by mouth at bedtime as needed for up to 30 days. 30 Tab 3   • colchicine (COLCRYS) 0.6 MG Tab Day 1: 1.2 mg once, followed in 1 hour with a single dose of 0.6 mg.  Day 2 and thereafter: Oral: 0.6 mg once daily until flare resolves 30 Tab 1   • ondansetron (ZOFRAN) 4 MG Tab tablet Take 1 Tab by mouth every four hours as needed for Nausea/Vomiting for up to 30 days. 30 Tab 3   • losartan (COZAAR) 50 MG Tab TAKE ONE AND ONE-HALF (1 AND 1/2) TABLET BY MOUTH DAILY 135 Tab 2   • Tiotropium Bromide Monohydrate 2.5 MCG/ACT Aero Soln INHALE TWO PUFFS BY MOUTH DAILY 1 Inhaler 5   • XARELTO 20 MG Tab tablet TAKE ONE TABLET BY MOUTH DAILY WITH DINNER 90 Tab 0   • BROMSITE 0.075 % Solution   0   • azithromycin (ZITHROMAX Z-CURTIS) 250 MG Tab Take 1 tablet daily 30 Tab 5   • albuterol (PROVENTIL) 2.5mg/3ml Nebu Soln solution for nebulization 3 mL by Nebulization  "route every four hours as needed for Shortness of Breath. 30 Bullet 3   • albuterol 108 (90 Base) MCG/ACT Aero Soln inhalation aerosol INHALE TWO PUFFS BY MOUTH EVERY 6 HOURS AS NEEDED FOR SHORTNESS OF BREATH 1 Inhaler 1   • SODIUM BICARBONATE PO Take 10 g by mouth.     • vitamin D, Ergocalciferol, (DRISDOL) 70487 units Cap capsule Take 1 Cap by mouth every 14 days. 5 Cap 1   • HYDROcodone/acetaminophen (NORCO)  MG Tab Take 1-2 Tabs by mouth every 6 hours as needed.     • simvastatin (ZOCOR) 40 MG Tab Take 1 Tab by mouth every evening. 90 Tab 3   • allopurinol (ZYLOPRIM) 100 MG Tab      • azithromycin (ZITHROMAX) 250 MG Tab 1 daily for COPD 30 Tab 11   • docusate sodium (COLACE) 100 MG Cap Take 300 mg by mouth every evening.     • acetaminophen (TYLENOL) 500 MG Tab Take 1,000 mg by mouth every 6 hours as needed for Moderate Pain.     • COMBIGAN 0.2-0.5 % Solution Place 1 Drop in both eyes 2 Times a Day.       No current facility-administered medications for this visit.         Past Medical History:   Diagnosis Date   • Anesthesia     \"Abnormal blood pressure and breathing\"  \"couldn't breathe\"   • Asthma     inhalers daily   • Backpain 7/2017     thor. area   • Blood clot in vein 07/06/2018    \"Currently have a blood clot in my left eye\"   • Bowel habit changes 07/06/2018    Constipation   • Breath shortness     with exertion has O2 but does not use   • Bronchitis    • CAD (coronary artery disease)     palpatations   • Cancer (HCC) 2016    left lung   • Chickenpox    • COPD (chronic obstructive pulmonary disease) (HCC)    • Depression 2/26/2019   • Dialysis 2005    transient renal failure due to sepsis   • Emphysema of lung (HCC)    • Glaucoma     right eye   • Hemorrhagic disorder ([facility])    • Hyperlipidemia    • Hypertension    • Hyperthyroidism    • Kidney stone     bilateral   • Lung cancer (HCC) 12/12/2016   • Multiple thyroid nodules 7/9/2015   • Nasal drainage    • Osteoporosis    • Pain 07/06/2018    Back " "pain   • Personal history of venous thrombosis and embolism 2004, 2012    right leg 2012, left arm dvt 2004 left eye 2017   • Pneumonia 7/2016    per patient   • Renal disorder     had been on dialysis for 7 months for acute failure 2005   • Renal stones 2013    post lithotripsy   • Rheumatoid arthritis (HCC)     question   • Rheumatoid arthritis (HCC)    • S/P appendectomy 1998    • S/P cholecystectomy 1998   • S/P kyphoplasty 2006, 2007, 2013   • Sepsis, unspecified (HCC) 2005   • Shortness of breath 07/06/2018    Chronic current problem. \"A couple of years now\".  O2 concentrator at night - pt states she doesn't use it.   • Thyroid nodule, hot 2017    functional \"hot\" right thyroid nodule without thyrotoxicosis       PHYSICAL EXAM:    /76 (BP Location: Left arm, Patient Position: Sitting, BP Cuff Size: Adult)   Pulse 100   Ht 1.67 m (5' 5.75\")   Wt 88.9 kg (196 lb)   LMP  (LMP Unknown)   BMI 31.88 kg/m²     Gen.   appears comfortable as she moves about    Skin   appropriate for sex and age    HEENT no exophthalmos    Neck   right thyroid lobe is prominent compared to the left.  I think about the same    Heart  regular    Extremities  no edema    Neuro  gait and station normal, no tremor    Psych  appropriate, pleasant and in pretty good spirits    ASSESSMENT AND RECOMMENDATIONS    1.  Thyrotoxicosis with toxic single thyroid nodule and without thyroid storm    - FREE THYROXINE; Future  - T3 FREE; Future  - TSH; Future    3. Hyperparathyroidism (HCC)              Lab ordered by nephrology which will be done through [facility] later on    4. Age-related osteoporosis with current pathological fracture with delayed healing, subsequent encounter           Clinically I think her fractures have healed    5. Gout, tophaceous               Her tophi are gone !      DISPOSITION: Update thyroid levels and discussed by telephone.                             Is stable return in 3 months      Kishore Torrez, " M.D.    Copies to: Marybeth Lynch M.D. [phone]

## 2020-06-01 ENCOUNTER — TELEPHONE (OUTPATIENT)
Dept: ENDOCRINOLOGY | Facility: MEDICAL CENTER | Age: 70
End: 2020-06-01

## 2020-06-01 DIAGNOSIS — E05.90 HYPERTHYROIDISM: ICD-10-CM

## 2020-06-01 RX ORDER — METHIMAZOLE 10 MG/1
10 TABLET ORAL DAILY
Qty: 30 TAB | Refills: 1 | Status: SHIPPED
Start: 2020-06-01 | End: 2021-01-19

## 2020-06-02 NOTE — TELEPHONE ENCOUNTER
Telephone conversation with patient    Patient's thyroid tests are reviewed.  Hyperthyroidism is more definite even though she is not symptomatic.  She does feel weak when she does not have tachycardia or sensation of cardiac arrhythmia.  She does not have insomnia, anxiety restlessness.    TSH is now completely suppressed and free T4 is elevated at 2.1 (0.9-1.7) Free T3 is elevated at 4.5.    She will take methimazole 10 mg a day for 2 weeks and 5 mg a day for 2 weeks and do a blood test and report.    Kishore Torrez M.D.

## 2020-06-08 ENCOUNTER — TELEPHONE (OUTPATIENT)
Dept: VASCULAR LAB | Facility: MEDICAL CENTER | Age: 70
End: 2020-06-08

## 2020-06-08 ENCOUNTER — HOSPITAL ENCOUNTER (OUTPATIENT)
Dept: LAB | Facility: MEDICAL CENTER | Age: 70
End: 2020-06-08
Attending: INTERNAL MEDICINE
Payer: MEDICARE

## 2020-06-08 ENCOUNTER — OFFICE VISIT (OUTPATIENT)
Dept: CARDIOLOGY | Facility: MEDICAL CENTER | Age: 70
End: 2020-06-08
Payer: MEDICARE

## 2020-06-08 VITALS
WEIGHT: 192 LBS | OXYGEN SATURATION: 98 % | HEART RATE: 100 BPM | HEIGHT: 66 IN | SYSTOLIC BLOOD PRESSURE: 80 MMHG | DIASTOLIC BLOOD PRESSURE: 58 MMHG | BODY MASS INDEX: 30.86 KG/M2

## 2020-06-08 DIAGNOSIS — Z79.01 CHRONIC ANTICOAGULATION: ICD-10-CM

## 2020-06-08 DIAGNOSIS — E05.90 HYPERTHYROIDISM: ICD-10-CM

## 2020-06-08 DIAGNOSIS — I95.89 OTHER SPECIFIED HYPOTENSION: ICD-10-CM

## 2020-06-08 DIAGNOSIS — E78.2 MIXED HYPERLIPIDEMIA: ICD-10-CM

## 2020-06-08 DIAGNOSIS — I95.0 IDIOPATHIC HYPOTENSION: ICD-10-CM

## 2020-06-08 DIAGNOSIS — N18.30 CKD (CHRONIC KIDNEY DISEASE) STAGE 3, GFR 30-59 ML/MIN: ICD-10-CM

## 2020-06-08 PROBLEM — I95.9 HYPOTENSION (ARTERIAL): Status: ACTIVE | Noted: 2020-06-08

## 2020-06-08 LAB
BASOPHILS # BLD AUTO: 0.3 % (ref 0–1.8)
BASOPHILS # BLD: 0.03 K/UL (ref 0–0.12)
EOSINOPHIL # BLD AUTO: 0.22 K/UL (ref 0–0.51)
EOSINOPHIL NFR BLD: 2.5 % (ref 0–6.9)
ERYTHROCYTE [DISTWIDTH] IN BLOOD BY AUTOMATED COUNT: 49.8 FL (ref 35.9–50)
HCT VFR BLD AUTO: 40.9 % (ref 37–47)
HGB BLD-MCNC: 13.7 G/DL (ref 12–16)
IMM GRANULOCYTES # BLD AUTO: 0.04 K/UL (ref 0–0.11)
IMM GRANULOCYTES NFR BLD AUTO: 0.5 % (ref 0–0.9)
LYMPHOCYTES # BLD AUTO: 1.97 K/UL (ref 1–4.8)
LYMPHOCYTES NFR BLD: 22.7 % (ref 22–41)
MCH RBC QN AUTO: 31.6 PG (ref 27–33)
MCHC RBC AUTO-ENTMCNC: 33.5 G/DL (ref 33.6–35)
MCV RBC AUTO: 94.5 FL (ref 81.4–97.8)
MONOCYTES # BLD AUTO: 0.53 K/UL (ref 0–0.85)
MONOCYTES NFR BLD AUTO: 6.1 % (ref 0–13.4)
NEUTROPHILS # BLD AUTO: 5.88 K/UL (ref 2–7.15)
NEUTROPHILS NFR BLD: 67.9 % (ref 44–72)
NRBC # BLD AUTO: 0 K/UL
NRBC BLD-RTO: 0 /100 WBC
PLATELET # BLD AUTO: 227 K/UL (ref 164–446)
PMV BLD AUTO: 11.5 FL (ref 9–12.9)
RBC # BLD AUTO: 4.33 M/UL (ref 4.2–5.4)
WBC # BLD AUTO: 8.7 K/UL (ref 4.8–10.8)

## 2020-06-08 PROCEDURE — 93000 ELECTROCARDIOGRAM COMPLETE: CPT | Performed by: INTERNAL MEDICINE

## 2020-06-08 PROCEDURE — 85025 COMPLETE CBC W/AUTO DIFF WBC: CPT

## 2020-06-08 PROCEDURE — 99213 OFFICE O/P EST LOW 20 MIN: CPT | Performed by: INTERNAL MEDICINE

## 2020-06-08 PROCEDURE — 36415 COLL VENOUS BLD VENIPUNCTURE: CPT

## 2020-06-08 ASSESSMENT — ENCOUNTER SYMPTOMS
CARDIOVASCULAR NEGATIVE: 1
BACK PAIN: 1
DEPRESSION: 1
GASTROINTESTINAL NEGATIVE: 1
FALLS: 0
BLURRED VISION: 1
PALPITATIONS: 0
WEIGHT LOSS: 0
NEUROLOGICAL NEGATIVE: 1
SHORTNESS OF BREATH: 1
LOSS OF CONSCIOUSNESS: 0

## 2020-06-11 DIAGNOSIS — J44.9 CHRONIC OBSTRUCTIVE PULMONARY DISEASE, UNSPECIFIED COPD TYPE (HCC): ICD-10-CM

## 2020-06-11 DIAGNOSIS — R06.02 SOB (SHORTNESS OF BREATH): ICD-10-CM

## 2020-06-12 LAB — EKG IMPRESSION: NORMAL

## 2020-06-12 RX ORDER — ALBUTEROL SULFATE 90 UG/1
AEROSOL, METERED RESPIRATORY (INHALATION)
Qty: 1 INHALER | Refills: 5 | Status: SHIPPED | OUTPATIENT
Start: 2020-06-12 | End: 2021-08-16

## 2020-06-12 RX ORDER — RIVAROXABAN 20 MG/1
TABLET, FILM COATED ORAL
Qty: 90 TAB | Refills: 0 | Status: SHIPPED | OUTPATIENT
Start: 2020-06-12 | End: 2020-09-08

## 2020-06-12 NOTE — TELEPHONE ENCOUNTER
Have we ever prescribed this med? Yes.  If yes, what date? 12/10/19    Last OV: 01/16/2020 with Dr Byrne    Next OV: 07/21/2020 with Dr Byrne    DX: SOB (shortness of breath) (R06.02); Chronic obstructive pulmonary disease, unspecified COPD type (HCC) (J44.9)     Medications:   Requested Prescriptions     Pending Prescriptions Disp Refills   • albuterol 108 (90 Base) MCG/ACT Aero Soln inhalation aerosol [Pharmacy Med Name: ALBUTEROL HFA 90 MCG INHALER] 18 g 0     Sig: INHALE TWO PUFFS BY MOUTH EVERY 6 HOURS AS NEEDED FOR SHORTNESS OF BREATH

## 2020-06-16 ENCOUNTER — TELEPHONE (OUTPATIENT)
Dept: VASCULAR LAB | Facility: MEDICAL CENTER | Age: 70
End: 2020-06-16

## 2020-06-16 NOTE — TELEPHONE ENCOUNTER
Spoke with the pt regarding the DOAC labs are due. Informed that DOAC's are high risk medication and that being said they require follow up appt in the clinic as well as lab work every 6 months to make sure the pt is safe. Pt got lab at [facility] on 6/12. Requested copy. Will call back if issues with cret cl.  VENITA Bland.

## 2020-06-17 ENCOUNTER — ANTICOAGULATION MONITORING (OUTPATIENT)
Dept: VASCULAR LAB | Facility: MEDICAL CENTER | Age: 70
End: 2020-06-17

## 2020-06-17 ENCOUNTER — TELEPHONE (OUTPATIENT)
Dept: VASCULAR LAB | Facility: MEDICAL CENTER | Age: 70
End: 2020-06-17

## 2020-06-17 DIAGNOSIS — I82.4Y9 DEEP VEIN THROMBOSIS (DVT) OF PROXIMAL LOWER EXTREMITY, UNSPECIFIED CHRONICITY, UNSPECIFIED LATERALITY (HCC): ICD-10-CM

## 2020-06-17 NOTE — PROGRESS NOTES
Cockcroft & Gault (Actual body weight): 50.3 (ml/min)  H&H 13.1 % 37.7  Pt is taking xarelto  due to DVT for indef duration. LM on VM to inform her that she is good to continue on her current dose of Xarelto. We discussed a few days ago that she had a history of anemia and she did not know if her H&H came up or not. Informed that her H&H are stable. Informed that K+ is very low at 2.8. Advised to call her PCP regarding treatment for this asap.   Next test in 6 months dec 2020. VENITA Bland.

## 2020-06-17 NOTE — TELEPHONE ENCOUNTER
Called and requested labs from [facility] from 6/12 faxing to [phone] Per Marycruz

## 2020-06-18 ENCOUNTER — TELEPHONE (OUTPATIENT)
Dept: MEDICAL GROUP | Facility: MEDICAL CENTER | Age: 70
End: 2020-06-18

## 2020-06-18 ENCOUNTER — TELEPHONE (OUTPATIENT)
Dept: VASCULAR LAB | Facility: MEDICAL CENTER | Age: 70
End: 2020-06-18

## 2020-06-18 ENCOUNTER — HOSPITAL ENCOUNTER (EMERGENCY)
Facility: MEDICAL CENTER | Age: 70
End: 2020-06-18
Attending: EMERGENCY MEDICINE
Payer: MEDICARE

## 2020-06-18 VITALS
DIASTOLIC BLOOD PRESSURE: 78 MMHG | SYSTOLIC BLOOD PRESSURE: 121 MMHG | TEMPERATURE: 97.9 F | BODY MASS INDEX: 29.62 KG/M2 | HEART RATE: 88 BPM | OXYGEN SATURATION: 97 % | HEIGHT: 66 IN | RESPIRATION RATE: 20 BRPM | WEIGHT: 184.3 LBS

## 2020-06-18 DIAGNOSIS — E87.6 HYPOKALEMIA: ICD-10-CM

## 2020-06-18 LAB
ANION GAP SERPL CALC-SCNC: 16 MMOL/L (ref 7–16)
BASOPHILS # BLD AUTO: 0.2 % (ref 0–1.8)
BASOPHILS # BLD: 0.01 K/UL (ref 0–0.12)
BUN SERPL-MCNC: 28 MG/DL (ref 8–22)
CALCIUM SERPL-MCNC: 9.9 MG/DL (ref 8.4–10.2)
CHLORIDE SERPL-SCNC: 111 MMOL/L (ref 96–112)
CO2 SERPL-SCNC: 18 MMOL/L (ref 20–33)
CREAT SERPL-MCNC: 1.14 MG/DL (ref 0.5–1.4)
EKG IMPRESSION: NORMAL
EOSINOPHIL # BLD AUTO: 0.18 K/UL (ref 0–0.51)
EOSINOPHIL NFR BLD: 3.7 % (ref 0–6.9)
ERYTHROCYTE [DISTWIDTH] IN BLOOD BY AUTOMATED COUNT: 55.6 FL (ref 35.9–50)
GLUCOSE SERPL-MCNC: 114 MG/DL (ref 65–99)
HCT VFR BLD AUTO: 37.2 % (ref 37–47)
HGB BLD-MCNC: 12.3 G/DL (ref 12–16)
IMM GRANULOCYTES # BLD AUTO: 0.02 K/UL (ref 0–0.11)
IMM GRANULOCYTES NFR BLD AUTO: 0.4 % (ref 0–0.9)
LYMPHOCYTES # BLD AUTO: 1.43 K/UL (ref 1–4.8)
LYMPHOCYTES NFR BLD: 29.5 % (ref 22–41)
MCH RBC QN AUTO: 31.8 PG (ref 27–33)
MCHC RBC AUTO-ENTMCNC: 33.1 G/DL (ref 33.6–35)
MCV RBC AUTO: 96.1 FL (ref 81.4–97.8)
MONOCYTES # BLD AUTO: 0.44 K/UL (ref 0–0.85)
MONOCYTES NFR BLD AUTO: 9.1 % (ref 0–13.4)
NEUTROPHILS # BLD AUTO: 2.76 K/UL (ref 2–7.15)
NEUTROPHILS NFR BLD: 57.1 % (ref 44–72)
NRBC # BLD AUTO: 0 K/UL
NRBC BLD-RTO: 0 /100 WBC
PLATELET # BLD AUTO: 210 K/UL (ref 164–446)
PMV BLD AUTO: 10.8 FL (ref 9–12.9)
POTASSIUM SERPL-SCNC: 2.8 MMOL/L (ref 3.6–5.5)
RBC # BLD AUTO: 3.87 M/UL (ref 4.2–5.4)
SODIUM SERPL-SCNC: 145 MMOL/L (ref 135–145)
WBC # BLD AUTO: 4.8 K/UL (ref 4.8–10.8)

## 2020-06-18 PROCEDURE — A9270 NON-COVERED ITEM OR SERVICE: HCPCS | Performed by: EMERGENCY MEDICINE

## 2020-06-18 PROCEDURE — 700111 HCHG RX REV CODE 636 W/ 250 OVERRIDE (IP): Performed by: EMERGENCY MEDICINE

## 2020-06-18 PROCEDURE — 93005 ELECTROCARDIOGRAM TRACING: CPT | Performed by: EMERGENCY MEDICINE

## 2020-06-18 PROCEDURE — 99284 EMERGENCY DEPT VISIT MOD MDM: CPT

## 2020-06-18 PROCEDURE — 80048 BASIC METABOLIC PNL TOTAL CA: CPT

## 2020-06-18 PROCEDURE — 700102 HCHG RX REV CODE 250 W/ 637 OVERRIDE(OP): Performed by: EMERGENCY MEDICINE

## 2020-06-18 PROCEDURE — 96365 THER/PROPH/DIAG IV INF INIT: CPT

## 2020-06-18 PROCEDURE — 85025 COMPLETE CBC W/AUTO DIFF WBC: CPT

## 2020-06-18 RX ORDER — HYDROCODONE BITARTRATE AND ACETAMINOPHEN 10; 325 MG/1; MG/1
1 TABLET ORAL ONCE
Status: COMPLETED | OUTPATIENT
Start: 2020-06-18 | End: 2020-06-18

## 2020-06-18 RX ORDER — POTASSIUM CHLORIDE 7.45 MG/ML
10 INJECTION INTRAVENOUS ONCE
Status: COMPLETED | OUTPATIENT
Start: 2020-06-18 | End: 2020-06-18

## 2020-06-18 RX ORDER — POTASSIUM CHLORIDE 20 MEQ/1
20 TABLET, EXTENDED RELEASE ORAL ONCE
Status: COMPLETED | OUTPATIENT
Start: 2020-06-18 | End: 2020-06-18

## 2020-06-18 RX ORDER — POTASSIUM CHLORIDE 1.5 G/1.58G
20 POWDER, FOR SOLUTION ORAL 2 TIMES DAILY
Qty: 30 PACKET | Refills: 3 | Status: SHIPPED | OUTPATIENT
Start: 2020-06-18 | End: 2020-06-23

## 2020-06-18 RX ADMIN — POTASSIUM CHLORIDE 20 MEQ: 1500 TABLET, EXTENDED RELEASE ORAL at 19:43

## 2020-06-18 RX ADMIN — POTASSIUM CHLORIDE 10 MEQ: 7.46 INJECTION, SOLUTION INTRAVENOUS at 19:43

## 2020-06-18 RX ADMIN — HYDROCODONE BITARTRATE AND ACETAMINOPHEN 1 TABLET: 10; 325 TABLET ORAL at 20:04

## 2020-06-18 NOTE — TELEPHONE ENCOUNTER
Pt called back regarding my call yesterday with her K+ level of 2.8. Informed that I could not treat her as I am the ac clinic but advised her to go to the ER that she may have cardiac arrhythmias from such a low K+. Her K+ was 4.0 in Nov. Requested that she call her PCP and her nephrologist for the low K+ levels so that she can be treated appropriately. Also sent a staff message to her PCP. Pt stated she say DR. Jung in his office on June 12 and an EKG was done, she did not have any arrhythmias then. Never the less I did inform her that she is likely to need IV replacement. She said she will wait until she see's her Nephrologist on Tuesday. I advised her against waiting, and again encouraged to go to the ER to be seen. VENITA Bland.

## 2020-06-18 NOTE — TELEPHONE ENCOUNTER
Phone Number Called: [phone] (home)     Call outcome: Spoke to patient regarding message below.    Message: called and spoke with pt regarding potassium levels. Explained risks and urged pt to go to ER. Pt expressed understanding and agreed to go.

## 2020-06-19 NOTE — ED NOTES
Pt given discharge instructions. RN answered questions. VSS. Pt ambulated steadily out to [facility].

## 2020-06-19 NOTE — ED PROVIDER NOTES
"ED Provider Note    CHIEF COMPLAINT  Chief Complaint   Patient presents with   • Abnormal Labs     called  this am by [facility] nephrology for low k       HPI  Latonia Clinton is a 69 y.o. female who presents with a low potassium.  Patient had blood work checked 6 days ago which showed a potassium of 2.8.  Patient states she feels weak and tired but she denies headache chest pain dyspnea abdominal pain.  There is no vomiting.  She had diarrhea last week.  All other systems are negative    REVIEW OF SYSTEMS  See HPI for further details.  No fever no chills.  No cough or cold symptoms.  No vomiting or diarrhea.  Positive weakness.  Positive malaise.  No chest pain.  All other systems are negative    PAST MEDICAL HISTORY  Past Medical History:   Diagnosis Date   • Anesthesia     \"Abnormal blood pressure and breathing\"  \"couldn't breathe\"   • Asthma     inhalers daily   • Backpain 7/2017     thor. area   • Blood clot in vein 07/06/2018    \"Currently have a blood clot in my left eye\"   • Bowel habit changes 07/06/2018    Constipation   • Breath shortness     with exertion has O2 but does not use   • Bronchitis    • CAD (coronary artery disease)     palpatations   • Cancer (HCC) 2016    left lung   • Chickenpox    • COPD (chronic obstructive pulmonary disease) (HCC)    • Depression 2/26/2019   • Dialysis 2005    transient renal failure due to sepsis   • Emphysema of lung (HCC)    • Glaucoma     right eye   • Hemorrhagic disorder (HCC)    • Hyperlipidemia    • Hypertension    • Hyperthyroidism    • Kidney stone     bilateral   • Lung cancer (HCC) 12/12/2016   • Multiple thyroid nodules 7/9/2015   • Nasal drainage    • Osteoporosis    • Pain 07/06/2018    Back pain   • Personal history of venous thrombosis and embolism 2004, 2012    right leg 2012, left arm dvt 2004 left eye 2017   • Pneumonia 7/2016    per patient   • Renal disorder     had been on dialysis for 7 months for acute failure 2005   • Renal stones 2013    " "post lithotripsy   • Rheumatoid arthritis (HCC)     question   • Rheumatoid arthritis (HCC)    • S/P appendectomy     • S/P cholecystectomy    • S/P kyphoplasty , ,    • Sepsis, unspecified (HCC)    • Shortness of breath 2018    Chronic current problem. \"A couple of years now\".  O2 concentrator at night - pt states she doesn't use it.   • Thyroid nodule, hot 2017    functional \"hot\" right thyroid nodule without thyrotoxicosis       FAMILY HISTORY  Family History   Problem Relation Age of Onset   • Cancer Mother    • Heart Failure Father    • Heart Disease Brother        SOCIAL HISTORY  Social History     Socioeconomic History   • Marital status: Single     Spouse name: Not on file   • Number of children: Not on file   • Years of education: Not on file   • Highest education level: Not on file   Occupational History   • Not on file   Social Needs   • Financial resource strain: Not on file   • Food insecurity     Worry: Not on file     Inability: Not on file   • Transportation needs     Medical: Not on file     Non-medical: Not on file   Tobacco Use   • Smoking status: Former Smoker     Packs/day: 1.00     Years: 30.00     Pack years: 30.00     Types: Cigarettes     Last attempt to quit: 2000     Years since quittin.4   • Smokeless tobacco: Never Used   • Tobacco comment: 7 years ago   Substance and Sexual Activity   • Alcohol use: Yes     Alcohol/week: 0.0 oz     Comment: x2 a week   • Drug use: No   • Sexual activity: Not on file   Lifestyle   • Physical activity     Days per week: Not on file     Minutes per session: Not on file   • Stress: Not on file   Relationships   • Social connections     Talks on phone: Not on file     Gets together: Not on file     Attends [employer] service: Not on file     Active member of club or organization: Not on file     Attends meetings of clubs or organizations: Not on file     Relationship status: Not on file   • Intimate partner violence     " Fear of current or ex partner: Not on file     Emotionally abused: Not on file     Physically abused: Not on file     Forced sexual activity: Not on file   Other Topics Concern   • Not on file   Social History Narrative   • Not on file       SURGICAL HISTORY  Past Surgical History:   Procedure Laterality Date   • CYSTOSCOPY STENT PLACEMENT  7/9/2018    Procedure: Cystoscopy,  Left removal of stent ,  Left Stent Placement;  Surgeon: Beck Yang M.D.;  Location: [facility];  Service: Urology   • URETEROSCOPY Left 7/9/2018    Procedure: URETEROSCOPY;  Surgeon: Beck Yang M.D.;  Location: [facility];  Service: Urology   • LASERTRIPSY Left 7/9/2018    Procedure: LASERTRIPSY-LITHO;  Surgeon: Beck Yang M.D.;  Location: [facility];  Service: Urology   • CYSTOSCOPY STENT PLACEMENT Left 6/18/2018    Procedure: CYSTOSCOPY STENT PLACEMENT;  Surgeon: Beck Yang M.D.;  Location: [facility];  Service: Urology   • LITHOTRIPSY Left 6/18/2018    Procedure: LITHOTRIPSY;  Surgeon: Beck Yang M.D.;  Location: [facility];  Service: Urology   • LASERTRIPSY Left 6/18/2018    Procedure: LASERTRIPSY;  Surgeon: Beck Yang M.D.;  Location: [facility];  Service: Urology   • URETEROSCOPY Left 6/18/2018    Procedure: URETEROSCOPY;  Surgeon: Beck Yang M.D.;  Location: [facility];  Service: Urology   • THORACOSCOPY Left 12/12/2016    Procedure: THORACOSCOPY W/WEDGE RESECTION UPPER LOBE MASS;  Surgeon: John H Ganser, M.D.;  Location: [facility];  Service:    • OTHER ORTHOPEDIC SURGERY  2013    kyphoplasty X3   • APPENDECTOMY      1990   • CHOLECYSTECTOMY      1990   • LAMINOTOMY     • LUNG BIOPSY OPEN     • OTHER     • OTHER ABDOMINAL SURGERY      gall bladder disease   • PB ENLARGE BREAST WITH IMPLANT     • PB REDUCTION OF LARGE BREAST         CURRENT MEDICATIONS  @ He is Raphael@    ALLERGIES  No Known  "Allergies    PHYSICAL EXAM  VITAL SIGNS: /63   Pulse 93   Temp 36.5 °C (97.7 °F) (Temporal)   Resp 20   Ht 1.676 m (5' 6\")   Wt 83.6 kg (184 lb 4.9 oz)   LMP  (LMP Unknown)   SpO2 95%   BMI 29.75 kg/m²   Constitutional: Elderly female.  Appears younger than stated age  HENT: Normocephalic, Atraumatic, Bilateral external ears normal,   Eyes: RON, EOMI, Conjunctiva normal, No discharge.   Neck: Normal range of motion, No tenderness, Supple, No stridor.  Lymphatic: No lymphadenopathy noted.   Cardiovascular: Normal heart rate, Normal rhythm, .   Thorax & Lungs: Normal breath sounds, No respiratory distress,   Abdomen: Normal bowel sounds.  Soft nontender  Skin: Warm, Dry, No erythema, No rash.   Back: No tenderness, No CVA tenderness.   Extremities: No edema, No tenderness, No cyanosis, No clubbing. Dorsalis pedis pulses 2+ equal bilaterally. Radial pulses 2+ equal bilaterally    RADIOLOGY/PROCEDURES      EKG: Sinus rhythm at a rate of 80.  Normal P waves.  Early R wave progression.  Normal ST segments normal T waves.  Normal EKG    Results for orders placed or performed during the hospital encounter of 06/18/20   CBC WITH DIFFERENTIAL   Result Value Ref Range    WBC 4.8 4.8 - 10.8 K/uL    RBC 3.87 (L) 4.20 - 5.40 M/uL    Hemoglobin 12.3 12.0 - 16.0 g/dL    Hematocrit 37.2 37.0 - 47.0 %    MCV 96.1 81.4 - 97.8 fL    MCH 31.8 27.0 - 33.0 pg    MCHC 33.1 (L) 33.6 - 35.0 g/dL    RDW 55.6 (H) 35.9 - 50.0 fL    Platelet Count 210 164 - 446 K/uL    MPV 10.8 9.0 - 12.9 fL    Neutrophils-Polys 57.10 44.00 - 72.00 %    Lymphocytes 29.50 22.00 - 41.00 %    Monocytes 9.10 0.00 - 13.40 %    Eosinophils 3.70 0.00 - 6.90 %    Basophils 0.20 0.00 - 1.80 %    Immature Granulocytes 0.40 0.00 - 0.90 %    Nucleated RBC 0.00 /100 WBC    Neutrophils (Absolute) 2.76 2.00 - 7.15 K/uL    Lymphs (Absolute) 1.43 1.00 - 4.80 K/uL    Monos (Absolute) 0.44 0.00 - 0.85 K/uL    Eos (Absolute) 0.18 0.00 - 0.51 K/uL    Baso (Absolute) " 0.01 0.00 - 0.12 K/uL    Immature Granulocytes (abs) 0.02 0.00 - 0.11 K/uL    NRBC (Absolute) 0.00 K/uL   BASIC METABOLIC PANEL   Result Value Ref Range    Sodium 145 135 - 145 mmol/L    Potassium 2.8 (L) 3.6 - 5.5 mmol/L    Chloride 111 96 - 112 mmol/L    Co2 18 (L) 20 - 33 mmol/L    Glucose 114 (H) 65 - 99 mg/dL    Bun 28 (H) 8 - 22 mg/dL    Creatinine 1.14 0.50 - 1.40 mg/dL    Calcium 9.9 8.4 - 10.2 mg/dL    Anion Gap 16.0 7.0 - 16.0   ESTIMATED GFR   Result Value Ref Range    GFR If  57 (A) >60 mL/min/1.73 m 2    GFR If Non  47 (A) >60 mL/min/1.73 m 2   EKG   Result Value Ref Range    Report       [facility] Emergency Dept.    Test Date:  2020  Pt Name:    ROSARIO MARTINEZ              Department: [facility]  MRN:        [id number]                      [facility]   Gender:     Female                       Technician: [id number]  :        1950                   Requested By:GRADY RIVERA  Order #:    [id number]                    Reading MD:    Measurements  Intervals                                Axis  Rate:       82                           P:          53  ID:         [id number]                          QRS:        37  QRSD:       92                           T:          23  QT:         392  QTc:        458    Interpretive Statements  SINUS RHYTHM  Compared to ECG 2020 16:07:34  First degree AV block no longer present  T-wave abnormality no longer present           COURSE & MEDICAL DECISION MAKING  Pertinent Labs & Imaging studies reviewed. (See chart for details)  Patient has had low potassium all week.  It was 2.8 again today.  Patient will be started on 5 days of p.o. replacement.  She was given IV K rider as well as p.o. potassium.  Patient will be discharged home in stable condition.    FINAL IMPRESSION  1.  Hypokalemia  2.   3.    Please note that this dictation was created using voice recognition software. I have worked with consultants  from the vendor as well as technical experts from [facility] to optimize the interface. I have made every reasonable attempt to correct obvious errors, but I expect that there are errors of grammar and possibly content that I did not discover before finalizing the note.    Electronically signed by: Miguel Corrales M.D., 6/18/2020 7:57 PM

## 2020-06-19 NOTE — ED TRIAGE NOTES
Pt to er after being called by pcp and nephrology for reported K+=2.8 last Friday. Pt hx kidney failure with dialysis 2005 x 8 months. Is cain, vs as charted, pt states hx of copd, non oxygen dependent. Denies weakness, palpitations, recent travel or exposure to known covid pt. Is on xarelto for hx of blood clots. denies need for wheelchair

## 2020-06-23 ENCOUNTER — TELEPHONE (OUTPATIENT)
Dept: PULMONOLOGY | Facility: HOSPICE | Age: 70
End: 2020-06-23

## 2020-06-23 ENCOUNTER — HOSPITAL ENCOUNTER (OUTPATIENT)
Dept: LAB | Facility: MEDICAL CENTER | Age: 70
End: 2020-06-23
Attending: NURSE PRACTITIONER
Payer: MEDICARE

## 2020-06-23 DIAGNOSIS — J44.1 CHRONIC OBSTRUCTIVE PULMONARY DISEASE WITH ACUTE EXACERBATION (HCC): ICD-10-CM

## 2020-06-23 LAB
25(OH)D3 SERPL-MCNC: 51 NG/ML (ref 30–100)
ALBUMIN SERPL BCP-MCNC: 4 G/DL (ref 3.2–4.9)
ALBUMIN/GLOB SERPL: 1.7 G/DL
ALP SERPL-CCNC: 64 U/L (ref 30–99)
ALT SERPL-CCNC: 24 U/L (ref 2–50)
ANION GAP SERPL CALC-SCNC: 12 MMOL/L (ref 7–16)
AST SERPL-CCNC: 15 U/L (ref 12–45)
BILIRUB SERPL-MCNC: 0.4 MG/DL (ref 0.1–1.5)
BUN SERPL-MCNC: 25 MG/DL (ref 8–22)
CALCIUM SERPL-MCNC: 9.5 MG/DL (ref 8.4–10.2)
CHLORIDE SERPL-SCNC: 112 MMOL/L (ref 96–112)
CO2 SERPL-SCNC: 16 MMOL/L (ref 20–33)
CREAT SERPL-MCNC: 1.24 MG/DL (ref 0.5–1.4)
GLOBULIN SER CALC-MCNC: 2.3 G/DL (ref 1.9–3.5)
GLUCOSE SERPL-MCNC: 147 MG/DL (ref 65–99)
POTASSIUM SERPL-SCNC: 5.2 MMOL/L (ref 3.6–5.5)
PROT SERPL-MCNC: 6.3 G/DL (ref 6–8.2)
PTH-INTACT SERPL-MCNC: 261 PG/ML (ref 14–72)
SODIUM SERPL-SCNC: 140 MMOL/L (ref 135–145)

## 2020-06-23 PROCEDURE — 36415 COLL VENOUS BLD VENIPUNCTURE: CPT

## 2020-06-23 PROCEDURE — 82306 VITAMIN D 25 HYDROXY: CPT

## 2020-06-23 PROCEDURE — 80053 COMPREHEN METABOLIC PANEL: CPT

## 2020-06-23 PROCEDURE — 83970 ASSAY OF PARATHORMONE: CPT

## 2020-06-23 NOTE — TELEPHONE ENCOUNTER
The patient called and stated that she is having an exacerbation.  She is very short of breath and has a lot of tightness in her chest.  She is trying to avoid the hospital because of Covid-19.  She is requesting prednisone 10 mg taper and Doxycycline 100 mg.  She saw Dr. Byrne last on 1/16/2020 and sees her again on 7/21/2020.  Please advise, thank you.

## 2020-06-24 RX ORDER — PREDNISONE 10 MG/1
TABLET ORAL
Qty: 18 TAB | Refills: 0 | Status: SHIPPED
Start: 2020-06-24 | End: 2021-01-19

## 2020-06-24 RX ORDER — DOXYCYCLINE HYCLATE 100 MG/1
100 CAPSULE ORAL 2 TIMES DAILY
Qty: 20 CAP | Refills: 0 | Status: SHIPPED | OUTPATIENT
Start: 2020-06-24 | End: 2020-07-21 | Stop reason: SDUPTHER

## 2020-07-16 ENCOUNTER — HOSPITAL ENCOUNTER (OUTPATIENT)
Dept: RADIOLOGY | Facility: MEDICAL CENTER | Age: 70
End: 2020-07-16
Attending: UROLOGY
Payer: MEDICARE

## 2020-07-16 ENCOUNTER — HOSPITAL ENCOUNTER (OUTPATIENT)
Dept: RADIOLOGY | Facility: MEDICAL CENTER | Age: 70
End: 2020-07-16
Attending: INTERNAL MEDICINE
Payer: MEDICARE

## 2020-07-16 DIAGNOSIS — Z85.118 HISTORY OF LUNG CANCER: ICD-10-CM

## 2020-07-16 DIAGNOSIS — N20.0 CALCULUS OF KIDNEY: ICD-10-CM

## 2020-07-16 PROCEDURE — 74018 RADEX ABDOMEN 1 VIEW: CPT

## 2020-07-16 PROCEDURE — 71250 CT THORAX DX C-: CPT

## 2020-07-21 ENCOUNTER — OFFICE VISIT (OUTPATIENT)
Dept: PULMONOLOGY | Facility: HOSPICE | Age: 70
End: 2020-07-21
Payer: MEDICARE

## 2020-07-21 VITALS
WEIGHT: 196 LBS | OXYGEN SATURATION: 96 % | SYSTOLIC BLOOD PRESSURE: 122 MMHG | HEART RATE: 58 BPM | DIASTOLIC BLOOD PRESSURE: 80 MMHG | HEIGHT: 66 IN | RESPIRATION RATE: 16 BRPM | BODY MASS INDEX: 31.5 KG/M2

## 2020-07-21 DIAGNOSIS — R91.8 PULMONARY NODULES: ICD-10-CM

## 2020-07-21 DIAGNOSIS — J44.9 CHRONIC OBSTRUCTIVE PULMONARY DISEASE, UNSPECIFIED COPD TYPE (HCC): ICD-10-CM

## 2020-07-21 DIAGNOSIS — J44.1 CHRONIC OBSTRUCTIVE PULMONARY DISEASE WITH ACUTE EXACERBATION (HCC): ICD-10-CM

## 2020-07-21 DIAGNOSIS — Z85.118 HISTORY OF LUNG CANCER: ICD-10-CM

## 2020-07-21 PROCEDURE — 99214 OFFICE O/P EST MOD 30 MIN: CPT | Performed by: INTERNAL MEDICINE

## 2020-07-21 RX ORDER — DOXYCYCLINE HYCLATE 100 MG/1
100 CAPSULE ORAL 2 TIMES DAILY
Qty: 20 CAP | Refills: 1 | Status: SHIPPED | OUTPATIENT
Start: 2020-07-21 | End: 2021-01-19

## 2020-07-21 RX ORDER — ZOLPIDEM TARTRATE 5 MG/1
5 TABLET ORAL NIGHTLY PRN
COMMUNITY
End: 2020-09-14 | Stop reason: SDUPTHER

## 2020-07-21 RX ORDER — AZITHROMYCIN 250 MG/1
TABLET, FILM COATED ORAL
Qty: 30 TAB | Refills: 5 | Status: SHIPPED | OUTPATIENT
Start: 2020-07-21 | End: 2020-09-14

## 2020-07-21 RX ORDER — PREDNISONE 10 MG/1
TABLET ORAL
Qty: 18 TAB | Refills: 1 | Status: SHIPPED | OUTPATIENT
Start: 2020-07-21 | End: 2021-01-19

## 2020-07-21 ASSESSMENT — FIBROSIS 4 INDEX: FIB4 SCORE: 1.020620726159657541

## 2020-07-21 NOTE — PROGRESS NOTES
"Chief Complaint   Patient presents with   • COPD     Last Seen 01/16/20   • Results     Chest CT 07/16/20     HPI: This patient is a 69 y.o. Female who returns for COPD and pulmonary nodules.  PFT's showed FEV1 1.43 L or 54% predicted, DLCO: 86% predicted, consistent with moderate COPD. She quit smoking in 2000.  She is compliant with Breo 200ug and Spiriva inhalers although does not find them beneficial.  She tried Trelegy inhaler, again without benefit.  She has tried prednisone with subjective benefit.  She was last hospitalized January 2019 for AECOPD, although symptoms were ultimately attributed to thyrotoxicosis. She was then rehospitalized February 2019 for AECOPD. She is on prophylactic daily azithromycin. She has compression fractures and severe associated back pain which affect her breathing.  She had benefited from epidurals in the past however is concerned about coming off her anticoagulation for the procedure.  She  participated in pulmonary rehabilitation however did not feel it was beneficial  She has not required supplemental oxygen including with exercise. Denies cough, CP,  wheezing or LE edema.  She states she underwent cardiovascular work-up which was unremarkable.  She has a history of lung cancer status post partial left upper lobectomy December 2016 (adenocarcinoma, pT2a).  Follow-up chest CAT scan May 2, 2019 showed postsurgical changes with stable 6 mm groundglass nodule in the right upper lobe, 3 mm nodule in the right upper lobe and 2 mm nodules in the right lower lobe.  Chest CT December 2019 with stable nodules with a new, 4 mm nodule in the right middle lobe. Chest CT 7/16/2020 showed interval increase in size in the right middle and right upper lobe nodules from 4 - 5 mm.      Past Medical History:   Diagnosis Date   • Anesthesia     \"Abnormal blood pressure and breathing\"  \"couldn't breathe\"   • Asthma     inhalers daily   • Backpain 7/2017     thor. area   • Blood clot in vein " "07/06/2018    \"Currently have a blood clot in my left eye\"   • Bowel habit changes 07/06/2018    Constipation   • Breath shortness     with exertion has O2 but does not use   • Bronchitis    • CAD (coronary artery disease)     palpatations   • Cancer (HCC) 2016    left lung   • Chickenpox    • COPD (chronic obstructive pulmonary disease) ([facility])    • Depression 2/26/2019   • Dialysis 2005    transient renal failure due to sepsis   • Emphysema of lung ([facility])    • Glaucoma     right eye   • Hemorrhagic disorder ([facility])    • Hyperlipidemia    • Hypertension    • Hyperthyroidism    • Kidney stone     bilateral   • Lung cancer (HCC) 12/12/2016   • Multiple thyroid nodules 7/9/2015   • Nasal drainage    • Osteoporosis    • Pain 07/06/2018    Back pain   • Personal history of venous thrombosis and embolism 2004, 2012    right leg 2012, left arm dvt 2004 left eye 2017   • Pneumonia 7/2016    per patient   • Renal disorder     had been on dialysis for 7 months for acute failure 2005   • Renal stones 2013    post lithotripsy   • Rheumatoid arthritis ([facility])     question   • Rheumatoid arthritis ([facility])    • S/P appendectomy 1998    • S/P cholecystectomy 1998   • S/P kyphoplasty 2006, 2007, 2013   • Sepsis, unspecified ([facility]) 2005   • Shortness of breath 07/06/2018    Chronic current problem. \"A couple of years now\".  O2 concentrator at night - pt states she doesn't use it.   • Thyroid nodule, hot 2017    functional \"hot\" right thyroid nodule without thyrotoxicosis       Social History     Socioeconomic History   • Marital status: Single     Spouse name: Not on file   • Number of children: Not on file   • Years of education: Not on file   • Highest education level: Not on file   Occupational History   • Not on file   Social Needs   • Financial resource strain: Not on file   • Food insecurity     Worry: Not on file     Inability: Not on file   • Transportation needs     Medical: Not on file     Non-medical: Not on file   Tobacco Use   • " Smoking status: Former Smoker     Packs/day: 1.00     Years: 30.00     Pack years: 30.00     Types: Cigarettes     Last attempt to quit: 2000     Years since quittin.5   • Smokeless tobacco: Never Used   • Tobacco comment: 7 years ago   Substance and Sexual Activity   • Alcohol use: Yes     Alcohol/week: 0.0 oz     Comment: x2 a week   • Drug use: No   • Sexual activity: Not on file   Lifestyle   • Physical activity     Days per week: Not on file     Minutes per session: Not on file   • Stress: Not on file   Relationships   • Social connections     Talks on phone: Not on file     Gets together: Not on file     Attends Islam service: Not on file     Active member of club or organization: Not on file     Attends meetings of clubs or organizations: Not on file     Relationship status: Not on file   • Intimate partner violence     Fear of current or ex partner: Not on file     Emotionally abused: Not on file     Physically abused: Not on file     Forced sexual activity: Not on file   Other Topics Concern   • Not on file   Social History Narrative   • Not on file       Family History   Problem Relation Age of Onset   • Cancer Mother    • Heart Failure Father    • Heart Disease Brother        Current Outpatient Medications on File Prior to Visit   Medication Sig Dispense Refill   • LOSARTAN POTASSIUM PO Take 50 mg by mouth.     • zolpidem (AMBIEN) 5 MG Tab Take 5 mg by mouth at bedtime as needed for Sleep.     • XARELTO 20 MG Tab tablet TAKE ONE TABLET BY MOUTH DAILY WITH DINNER (RIVAROXABAN) 90 Tab 0   • albuterol 108 (90 Base) MCG/ACT Aero Soln inhalation aerosol INHALE TWO PUFFS BY MOUTH EVERY 6 HOURS AS NEEDED FOR SHORTNESS OF BREATH 1 Inhaler 5   • Prenatal MV & Min w/FA-DHA (PRENATAL ADULT GUMMY/DHA/FA PO) Take  by mouth.     • cyanocobalamin (VITAMIN B-12) 100 MCG Tab Take 100 mcg by mouth every day.     • BREO ELLIPTA 200-25 MCG/INH AEROSOL POWDER, BREATH ACTIVATED INHALE ONE DOSE BY MOUTH DAILY  *RINSE MOUTH AFTER USE* 1 Each 5   • colchicine (COLCRYS) 0.6 MG Tab Day 1: 1.2 mg once, followed in 1 hour with a single dose of 0.6 mg.  Day 2 and thereafter: Oral: 0.6 mg once daily until flare resolves 30 Tab 1   • Tiotropium Bromide Monohydrate 2.5 MCG/ACT Aero Soln INHALE TWO PUFFS BY MOUTH DAILY 1 Inhaler 5   • BROMSITE 0.075 % Solution   0   • albuterol (PROVENTIL) 2.5mg/3ml Nebu Soln solution for nebulization 3 mL by Nebulization route every four hours as needed for Shortness of Breath. 30 Bullet 3   • SODIUM BICARBONATE PO Take 10 g by mouth.     • vitamin D, Ergocalciferol, (DRISDOL) 35481 units Cap capsule Take 1 Cap by mouth every 14 days. 5 Cap 1   • HYDROcodone/acetaminophen (NORCO)  MG Tab Take 1-2 Tabs by mouth every 6 hours as needed.     • simvastatin (ZOCOR) 40 MG Tab Take 1 Tab by mouth every evening. 90 Tab 3   • allopurinol (ZYLOPRIM) 100 MG Tab Take 100 mg by mouth 2 times a day.     • docusate sodium (COLACE) 100 MG Cap Take 300 mg by mouth every evening.     • acetaminophen (TYLENOL) 500 MG Tab Take 1,000 mg by mouth every 6 hours as needed for Moderate Pain.     • COMBIGAN 0.2-0.5 % Solution Place 1 Drop in both eyes 2 Times a Day.     • predniSONE (DELTASONE) 10 MG Tab Take 30mg x 3 days, then take 20mg x 3 days, then take 10mg x 3 days, with food, then discontinue. (Patient not taking: Reported on 7/21/2020) 18 Tab 0   • methimazole (TAPAZOLE) 10 MG Tab Take 1 Tab by mouth every day. (Patient not taking: Reported on 7/21/2020) 30 Tab 1   • azithromycin (ZITHROMAX) 250 MG Tab 1 daily for COPD (Patient not taking: Reported on 7/21/2020) 30 Tab 11     No current facility-administered medications on file prior to visit.        Allergies: Patient has no known allergies.    ROS:   Constitutional: Denies fevers, chills, night sweats, fatigue or weight loss  Eyes: +vision loss, denies pain, drainage, double vision  Ears, Nose, Throat: Denies earache, difficulty hearing, tinnitus, nasal  "congestion, hoarseness  Cardiovascular: Denies chest pain, tightness, palpitations, orthopnea or edema  Respiratory: As in HPI  Sleep: Denies daytime sleepiness, snoring, apneas, insomnia, morning headaches  GI: Denies heartburn, dysphagia, nausea, abdominal pain, diarrhea or constipation  : Denies frequent urination, hematuria, discharge or painful urination  Musculoskeletal: +back pain, painful joints, denies sore muscles  Neurological: Denies focal weakness or headaches  Skin: No rashes    /80 (BP Location: Left arm, Patient Position: Sitting, BP Cuff Size: Adult)   Pulse (!) 58   Resp 16   Ht 1.676 m (5' 6\")   Wt 88.9 kg (196 lb)   SpO2 96%     Physical Exam:  Appearance: Well-nourished, well-developed, in no acute distress  HEENT: Normocephalic, atraumatic, white sclera, PERRLA  Neck: No masses  Respiratory: no intercostal retractions or accessory muscle use  Lungs auscultation: Clear to auscultation bilaterally  Cardiovascular: Regular rate rhythm. No murmurs, rubs or gallops.  No LE edema  Abdomen: soft, nondistended  Gait: Normal  Digits: No clubbing, cyanosis  Motor: No focal deficits  Orientation: Oriented to time, person and place    Diagnosis:  1. Chronic obstructive pulmonary disease, unspecified COPD type (HCC)  azithromycin (ZITHROMAX Z-CURTIS) 250 MG Tab    doxycycline (VIBRAMYCIN) 100 MG Cap    predniSONE (DELTASONE) 10 MG Tab   2. Pulmonary nodules  CT-CHEST (THORAX) W/O   3. History of lung cancer     4. Chronic obstructive pulmonary disease with acute exacerbation (HCC)         Plan:  The patient's COPD has been stable.  Unfortunately she has not felt improved with inhaled bronchodilators or pulmonary rehabilitation.    Encourage continuing long-acting inhalers with Breo and Spiriva.  She declines pulmonary function testing.  Continue prophylactic azithromycin.  Prescription provided for prednisone and doxycycline to keep on hand for AECOPD.  Her pulmonary nodules show minimal interval " growth however remain too small for PET imaging.  Recommend ongoing surveillance with chest CAT scan without contrast in 6 months.  Return in about 6 months (around 1/21/2021).

## 2020-08-19 ENCOUNTER — HOSPITAL ENCOUNTER (OUTPATIENT)
Dept: LAB | Facility: MEDICAL CENTER | Age: 70
End: 2020-08-19
Attending: NURSE PRACTITIONER
Payer: MEDICARE

## 2020-08-19 ENCOUNTER — HOSPITAL ENCOUNTER (OUTPATIENT)
Dept: LAB | Facility: MEDICAL CENTER | Age: 70
End: 2020-08-19
Attending: INTERNAL MEDICINE
Payer: MEDICARE

## 2020-08-19 DIAGNOSIS — E05.10 THYROTOXICOSIS WITH TOXIC SINGLE THYROID NODULE AND WITHOUT THYROID STORM: ICD-10-CM

## 2020-08-19 LAB
ALBUMIN SERPL BCP-MCNC: 3.8 G/DL (ref 3.2–4.9)
BUN SERPL-MCNC: 69 MG/DL (ref 8–22)
CALCIUM SERPL-MCNC: 10.4 MG/DL (ref 8.4–10.2)
CHLORIDE SERPL-SCNC: 100 MMOL/L (ref 96–112)
CO2 SERPL-SCNC: 18 MMOL/L (ref 20–33)
CREAT SERPL-MCNC: 2.35 MG/DL (ref 0.5–1.4)
GLUCOSE SERPL-MCNC: 130 MG/DL (ref 65–99)
PHOSPHATE SERPL-MCNC: 5.4 MG/DL (ref 2.5–4.5)
POTASSIUM SERPL-SCNC: 2.7 MMOL/L (ref 3.6–5.5)
SODIUM SERPL-SCNC: 136 MMOL/L (ref 135–145)
T4 FREE SERPL-MCNC: 2.15 NG/DL (ref 0.93–1.7)
TSH SERPL DL<=0.005 MIU/L-ACNC: <0.005 UIU/ML (ref 0.38–5.33)

## 2020-08-19 PROCEDURE — 80069 RENAL FUNCTION PANEL: CPT

## 2020-08-19 PROCEDURE — 84439 ASSAY OF FREE THYROXINE: CPT

## 2020-08-19 PROCEDURE — 84481 FREE ASSAY (FT-3): CPT

## 2020-08-19 PROCEDURE — 36415 COLL VENOUS BLD VENIPUNCTURE: CPT

## 2020-08-19 PROCEDURE — 84443 ASSAY THYROID STIM HORMONE: CPT

## 2020-08-20 LAB — T3FREE SERPL-MCNC: 4.64 PG/ML (ref 2–4.4)

## 2020-08-25 ENCOUNTER — OFFICE VISIT (OUTPATIENT)
Dept: ENDOCRINOLOGY | Facility: MEDICAL CENTER | Age: 70
End: 2020-08-25
Attending: INTERNAL MEDICINE
Payer: MEDICARE

## 2020-08-25 VITALS
OXYGEN SATURATION: 99 % | SYSTOLIC BLOOD PRESSURE: 128 MMHG | WEIGHT: 187 LBS | BODY MASS INDEX: 30.05 KG/M2 | HEIGHT: 66 IN | DIASTOLIC BLOOD PRESSURE: 64 MMHG | HEART RATE: 80 BPM

## 2020-08-25 DIAGNOSIS — E05.90 HYPERTHYROIDISM: ICD-10-CM

## 2020-08-25 DIAGNOSIS — M80.00XG AGE-RELATED OSTEOPOROSIS WITH CURRENT PATHOLOGICAL FRACTURE WITH DELAYED HEALING, SUBSEQUENT ENCOUNTER: ICD-10-CM

## 2020-08-25 PROCEDURE — 99214 OFFICE O/P EST MOD 30 MIN: CPT | Performed by: INTERNAL MEDICINE

## 2020-08-25 PROCEDURE — 99211 OFF/OP EST MAY X REQ PHY/QHP: CPT | Performed by: INTERNAL MEDICINE

## 2020-08-25 ASSESSMENT — FIBROSIS 4 INDEX: FIB4 SCORE: 1.020620726159657541

## 2020-08-26 NOTE — PROGRESS NOTES
Thyrotoxicosis    HPI:        This patient has been having a miserable time.  She is generally weak.  She has had at least 2 significant episodes of dramatic hypokalemia that is not diagnosed as to cause.  At 1 point she went to the ER for potassium supplementation and the second time this was done orally.  She does have significant renal insufficiency and 1 wonders about renal tubular acidosis.  She does have thyrotoxicosis but I do not think this falls into a periodic paralysis category.        Her current TSH continues to be suppressed.  Free T4 elevated at 2.1 and free T3 also elevated at 4.6.  I have attempted to treat her with methimazole in the past but she felt it was doing no good so discontinued.  Nonetheless this could be contributing to her weakness at the very least and probably also to osteoporosis.          She now agrees to reinstitute methimazole and we will do 10 mg daily and I will monitor her thyroid blood levels monthly.  I will see her next month to see that she is following through in this regard          She has a large hot nodule in her right upper lobe which is easily palpable.  Previously the uptake was not felt to be adequate for I-131 ablation.  That might be different now.    Osteoporosis           As far as I can tell she has been tolerating Prolia and is due for an injection now which we will arrange as soon as she can.    ROS:  I think all the bad news circulating these days is getting her down.  She is isolating herself effectively.  She definitely is at high risk for COVID-19.  The news on TV is continuously bad, the weather is very hot and [place] is burning causing continuous smoky air.      Allergies: No Known Allergies    Current medicines including changes today:  Current Outpatient Medications   Medication Sig Dispense Refill   • LOSARTAN POTASSIUM PO Take 50 mg by mouth.     • zolpidem (AMBIEN) 5 MG Tab Take 5 mg by mouth at bedtime as needed for Sleep.     • azithromycin  (ZITHROMAX Z-CURTIS) 250 MG Tab Take 1 tablet daily 30 Tab 5   • doxycycline (VIBRAMYCIN) 100 MG Cap Take 1 Cap by mouth 2 times a day. Take until gone. 20 Cap 1   • predniSONE (DELTASONE) 10 MG Tab Take 30mg x 3 days, then take 20mg x 3 days, then take 10mg x 3 days, with food, then discontinue. 18 Tab 1   • predniSONE (DELTASONE) 10 MG Tab Take 30mg x 3 days, then take 20mg x 3 days, then take 10mg x 3 days, with food, then discontinue. (Patient not taking: Reported on 7/21/2020) 18 Tab 0   • XARELTO 20 MG Tab tablet TAKE ONE TABLET BY MOUTH DAILY WITH DINNER (RIVAROXABAN) 90 Tab 0   • albuterol 108 (90 Base) MCG/ACT Aero Soln inhalation aerosol INHALE TWO PUFFS BY MOUTH EVERY 6 HOURS AS NEEDED FOR SHORTNESS OF BREATH 1 Inhaler 5   • methimazole (TAPAZOLE) 10 MG Tab Take 1 Tab by mouth every day. (Patient not taking: Reported on 7/21/2020) 30 Tab 1   • Prenatal MV & Min w/FA-DHA (PRENATAL ADULT GUMMY/DHA/FA PO) Take  by mouth.     • cyanocobalamin (VITAMIN B-12) 100 MCG Tab Take 100 mcg by mouth every day.     • BREO ELLIPTA 200-25 MCG/INH AEROSOL POWDER, BREATH ACTIVATED INHALE ONE DOSE BY MOUTH DAILY *RINSE MOUTH AFTER USE* 1 Each 5   • colchicine (COLCRYS) 0.6 MG Tab Day 1: 1.2 mg once, followed in 1 hour with a single dose of 0.6 mg.  Day 2 and thereafter: Oral: 0.6 mg once daily until flare resolves 30 Tab 1   • Tiotropium Bromide Monohydrate 2.5 MCG/ACT Aero Soln INHALE TWO PUFFS BY MOUTH DAILY 1 Inhaler 5   • BROMSITE 0.075 % Solution   0   • albuterol (PROVENTIL) 2.5mg/3ml Nebu Soln solution for nebulization 3 mL by Nebulization route every four hours as needed for Shortness of Breath. 30 Bullet 3   • SODIUM BICARBONATE PO Take 10 g by mouth.     • vitamin D, Ergocalciferol, (DRISDOL) 25292 units Cap capsule Take 1 Cap by mouth every 14 days. 5 Cap 1   • HYDROcodone/acetaminophen (NORCO)  MG Tab Take 1-2 Tabs by mouth every 6 hours as needed.     • simvastatin (ZOCOR) 40 MG Tab Take 1 Tab by mouth every  "evening. 90 Tab 3   • allopurinol (ZYLOPRIM) 100 MG Tab Take 100 mg by mouth 2 times a day.     • azithromycin (ZITHROMAX) 250 MG Tab 1 daily for COPD (Patient not taking: Reported on 7/21/2020) 30 Tab 11   • docusate sodium (COLACE) 100 MG Cap Take 300 mg by mouth every evening.     • acetaminophen (TYLENOL) 500 MG Tab Take 1,000 mg by mouth every 6 hours as needed for Moderate Pain.     • COMBIGAN 0.2-0.5 % Solution Place 1 Drop in both eyes 2 Times a Day.       No current facility-administered medications for this visit.         Past Medical History:   Diagnosis Date   • Anesthesia     \"Abnormal blood pressure and breathing\"  \"couldn't breathe\"   • Asthma     inhalers daily   • Backpain 7/2017     thor. area   • Blood clot in vein 07/06/2018    \"Currently have a blood clot in my left eye\"   • Bowel habit changes 07/06/2018    Constipation   • Breath shortness     with exertion has O2 but does not use   • Bronchitis    • CAD (coronary artery disease)     palpatations   • Cancer (HCC) 2016    left lung   • Chickenpox    • COPD (chronic obstructive pulmonary disease) ([facility])    • Depression 2/26/2019   • Dialysis 2005    transient renal failure due to sepsis   • Emphysema of lung ([facility])    • Glaucoma     right eye   • Hemorrhagic disorder ([facility])    • Hyperlipidemia    • Hypertension    • Hyperthyroidism    • Kidney stone     bilateral   • Lung cancer (HCC) 12/12/2016   • Multiple thyroid nodules 7/9/2015   • Nasal drainage    • Osteoporosis    • Pain 07/06/2018    Back pain   • Personal history of venous thrombosis and embolism 2004, 2012    right leg 2012, left arm dvt 2004 left eye 2017   • Pneumonia 7/2016    per patient   • Renal disorder     had been on dialysis for 7 months for acute failure 2005   • Renal stones 2013    post lithotripsy   • Rheumatoid arthritis (HCC)     question   • Rheumatoid arthritis (HCC)    • S/P appendectomy 1998    • S/P cholecystectomy 1998   • S/P kyphoplasty 2006, 2007, 2013   • " "Sepsis, unspecified (HCC) 2005   • Shortness of breath 07/06/2018    Chronic current problem. \"A couple of years now\".  O2 concentrator at night - pt states she doesn't use it.   • Thyroid nodule, hot 2017    functional \"hot\" right thyroid nodule without thyrotoxicosis       PHYSICAL EXAM:    /64 (BP Location: Left arm, Patient Position: Sitting, BP Cuff Size: Adult)   Pulse 80   Ht 1.676 m (5' 6\")   Wt 84.8 kg (187 lb)   LMP  (LMP Unknown)   SpO2 99%   BMI 30.18 kg/m²     Gen.   appears weak and fatigued.  Does not appear to be overtly thyrotoxic    Skin   appropriate for sex and age    HEENT    no eye signs of Graves' disease    Neck   thyroid gland is enlarged particularly the right lobe upper pole is a prominent nodule.    Heart  regular    Extremities  no edema    Neuro  gait and station normal, no tremor              Generally weak but can ambulate independently using a cane for balance    Psych  appropriate    ASSESSMENT AND RECOMMENDATIONS    1. Hyperthyroidism               Probably related to a toxic nodule right lobe    2. Age-related osteoporosis                Continue Prolia       DISPOSITION:   Restart methimazole 10 mg daily.                               Return in 1 month      Kishore Torrez M.D.    Copies to: Marybeth Lynch M.D. [phone]  "

## 2020-08-31 ENCOUNTER — HOSPITAL ENCOUNTER (OUTPATIENT)
Dept: RADIOLOGY | Facility: MEDICAL CENTER | Age: 70
End: 2020-08-31
Attending: INTERNAL MEDICINE
Payer: MEDICARE

## 2020-08-31 ENCOUNTER — HOSPITAL ENCOUNTER (OUTPATIENT)
Dept: LAB | Facility: MEDICAL CENTER | Age: 70
End: 2020-08-31
Attending: NURSE PRACTITIONER
Payer: MEDICARE

## 2020-08-31 DIAGNOSIS — Z12.31 ENCOUNTER FOR SCREENING MAMMOGRAM FOR MALIGNANT NEOPLASM OF BREAST: ICD-10-CM

## 2020-08-31 LAB
ALBUMIN SERPL BCP-MCNC: 4.2 G/DL (ref 3.2–4.9)
BUN SERPL-MCNC: 30 MG/DL (ref 8–22)
CALCIUM SERPL-MCNC: 11 MG/DL (ref 8.4–10.2)
CHLORIDE SERPL-SCNC: 114 MMOL/L (ref 96–112)
CO2 SERPL-SCNC: 17 MMOL/L (ref 20–33)
CREAT SERPL-MCNC: 1.33 MG/DL (ref 0.5–1.4)
GLUCOSE SERPL-MCNC: 108 MG/DL (ref 65–99)
PHOSPHATE SERPL-MCNC: 3.5 MG/DL (ref 2.5–4.5)
POTASSIUM SERPL-SCNC: 4.3 MMOL/L (ref 3.6–5.5)
SODIUM SERPL-SCNC: 143 MMOL/L (ref 135–145)

## 2020-08-31 PROCEDURE — 80069 RENAL FUNCTION PANEL: CPT

## 2020-08-31 PROCEDURE — 77067 SCR MAMMO BI INCL CAD: CPT

## 2020-08-31 PROCEDURE — 36415 COLL VENOUS BLD VENIPUNCTURE: CPT

## 2020-09-08 RX ORDER — RIVAROXABAN 20 MG/1
TABLET, FILM COATED ORAL
Qty: 90 TAB | Refills: 0 | Status: SHIPPED | OUTPATIENT
Start: 2020-09-08 | End: 2020-12-08

## 2020-09-08 NOTE — TELEPHONE ENCOUNTER
Received request via: Patient    Was the patient seen in the last year in this department? Yes    Does the patient have an active prescription (recently filled or refills available) for medication(s) requested? No

## 2020-09-14 ENCOUNTER — OFFICE VISIT (OUTPATIENT)
Dept: MEDICAL GROUP | Facility: MEDICAL CENTER | Age: 70
End: 2020-09-14
Payer: MEDICARE

## 2020-09-14 VITALS
RESPIRATION RATE: 16 BRPM | WEIGHT: 195.11 LBS | OXYGEN SATURATION: 97 % | DIASTOLIC BLOOD PRESSURE: 66 MMHG | BODY MASS INDEX: 31.36 KG/M2 | SYSTOLIC BLOOD PRESSURE: 100 MMHG | HEIGHT: 66 IN | HEART RATE: 82 BPM | TEMPERATURE: 97.5 F

## 2020-09-14 DIAGNOSIS — M1A.0790 CHRONIC GOUT OF FOOT, UNSPECIFIED CAUSE, UNSPECIFIED LATERALITY: ICD-10-CM

## 2020-09-14 DIAGNOSIS — G47.09 OTHER INSOMNIA: ICD-10-CM

## 2020-09-14 DIAGNOSIS — R91.8 LUNG MASS: ICD-10-CM

## 2020-09-14 DIAGNOSIS — C34.12 MALIGNANT NEOPLASM OF UPPER LOBE OF LEFT LUNG (HCC): ICD-10-CM

## 2020-09-14 DIAGNOSIS — E05.90 HYPERTHYROIDISM: ICD-10-CM

## 2020-09-14 PROBLEM — C34.90 MALIGNANT NEOPLASM OF LUNG (HCC): Status: ACTIVE | Noted: 2020-09-14

## 2020-09-14 PROCEDURE — 99214 OFFICE O/P EST MOD 30 MIN: CPT | Performed by: INTERNAL MEDICINE

## 2020-09-14 RX ORDER — COLCHICINE 0.6 MG/1
TABLET ORAL
Qty: 30 TAB | Refills: 1 | Status: SHIPPED
Start: 2020-09-14 | End: 2021-01-19 | Stop reason: SDUPTHER

## 2020-09-14 RX ORDER — ZOLPIDEM TARTRATE 5 MG/1
5 TABLET ORAL NIGHTLY PRN
Qty: 30 TAB | Refills: 1 | Status: SHIPPED | OUTPATIENT
Start: 2020-09-14 | End: 2020-09-14 | Stop reason: SDUPTHER

## 2020-09-14 RX ORDER — ZOLPIDEM TARTRATE 5 MG/1
5 TABLET ORAL NIGHTLY PRN
Qty: 30 TAB | Refills: 4 | Status: SHIPPED | OUTPATIENT
Start: 2020-09-14 | End: 2020-10-14

## 2020-09-14 ASSESSMENT — FIBROSIS 4 INDEX: FIB4 SCORE: 1.04

## 2020-09-15 NOTE — PROGRESS NOTES
CC:  Diagnoses of Chronic gout of foot, unspecified cause, unspecified laterality, Other insomnia, Hyperthyroidism, and Lung mass were pertinent to this visit.    HISTORY OF THE PRESENT ILLNESS: Patient is a 70 y.o. female. This pleasant patient is here today for routine follow-up.    She says she has no new symptoms since our last visit together.    Endocrine note reviewed from 8/25/2020, indicates some symptoms that include fatiguemay be related to over active thyroid and she was restarted methimazole, she says she has pending thyroid labs and follow-up w/endocrine 9/30/2020.  She tells me she does not notice any change in symptoms.  Getting her Prolia treatment approximately 1 week for history of osteoporosis.    Pulmonary note briefly reviewed 7/21/2020 indicating COPD is stable to continue inhalers, prophylactic azithromycin and they were planning to follow-up on her CT for small pulmonary nodules.  She says she does have a history of lung cancer which was treated by excision roughly 3 years ago.  She is had no change in pulmonary symptoms.  Did not have any improvement  post pulmonary rehabilitation.    Cardiology note reviewed 6/8/20 indicates hypotension w/ systolic in the 80s they stopped her ARB.  Patient subsequently did restart after couple days.  Blood pressure has since been reasonable, no symptoms of hypotension.  She is followed closely by her nephrologist, has been started on potassium supplementation K+ in high 2 range she says on supplementation this did improve and resolved and she is pending repeat electrolyte check in 1 week.  GFR did trend down from 43 down to 31.  Additional labs which were scanned show the iron levels were normal, hemoglobin normal, uric acid had risen to 8.3, total cholesterol 48, triglycerides 109, HDL 60 and LDL 58.    We will need refill of Ambien, database reviewed last fill 8/13/2020 for 30 pills.  She says she does not mix with driving, machinery, alcohol or other  nonprescribed medications.    Pending colonoscopy for screening, no new GI symptoms.  She is very much worried about going off her anticoagulation for colonoscopy as she says that if she has any new thrombotic type event she is worried about losing vision in her remaining eye.    Allergies: Patient has no known allergies.    Current Outpatient Medications Ordered in Epic   Medication Sig Dispense Refill   • colchicine (COLCRYS) 0.6 MG Tab Day 1: 1.2 mg once, followed in 1 hour with a single dose of 0.6 mg.  Day 2 and thereafter: Oral: 0.6 mg once daily until flare resolves 30 Tab 1   • zolpidem (AMBIEN) 5 MG Tab Take 1 Tab by mouth at bedtime as needed for Sleep for up to 30 days. 30 Tab 4   • XARELTO 20 MG Tab tablet TAKE ONE TABLET BY MOUTH DAILY WITH DINNER 90 Tab 0   • simvastatin (ZOCOR) 40 MG Tab TAKE ONE TABLET BY MOUTH EVERY EVENING 90 Tab 0   • LOSARTAN POTASSIUM PO Take 50 mg by mouth.     • doxycycline (VIBRAMYCIN) 100 MG Cap Take 1 Cap by mouth 2 times a day. Take until gone. 20 Cap 1   • predniSONE (DELTASONE) 10 MG Tab Take 30mg x 3 days, then take 20mg x 3 days, then take 10mg x 3 days, with food, then discontinue. 18 Tab 1   • predniSONE (DELTASONE) 10 MG Tab Take 30mg x 3 days, then take 20mg x 3 days, then take 10mg x 3 days, with food, then discontinue. 18 Tab 0   • albuterol 108 (90 Base) MCG/ACT Aero Soln inhalation aerosol INHALE TWO PUFFS BY MOUTH EVERY 6 HOURS AS NEEDED FOR SHORTNESS OF BREATH 1 Inhaler 5   • methimazole (TAPAZOLE) 10 MG Tab Take 1 Tab by mouth every day. 30 Tab 1   • Prenatal MV & Min w/FA-DHA (PRENATAL ADULT GUMMY/DHA/FA PO) Take  by mouth.     • cyanocobalamin (VITAMIN B-12) 100 MCG Tab Take 100 mcg by mouth every day.     • BREO ELLIPTA 200-25 MCG/INH AEROSOL POWDER, BREATH ACTIVATED INHALE ONE DOSE BY MOUTH DAILY *RINSE MOUTH AFTER USE* 1 Each 5   • Tiotropium Bromide Monohydrate 2.5 MCG/ACT Aero Soln INHALE TWO PUFFS BY MOUTH DAILY 1 Inhaler 5   • BROMSITE 0.075 %  "Solution   0   • albuterol (PROVENTIL) 2.5mg/3ml Nebu Soln solution for nebulization 3 mL by Nebulization route every four hours as needed for Shortness of Breath. 30 Bullet 3   • SODIUM BICARBONATE PO Take 10 g by mouth.     • vitamin D, Ergocalciferol, (DRISDOL) 22139 units Cap capsule Take 1 Cap by mouth every 14 days. 5 Cap 1   • HYDROcodone/acetaminophen (NORCO)  MG Tab Take 1-2 Tabs by mouth every 6 hours as needed.     • allopurinol (ZYLOPRIM) 100 MG Tab Take 100 mg by mouth 2 times a day.     • azithromycin (ZITHROMAX) 250 MG Tab 1 daily for COPD 30 Tab 11   • docusate sodium (COLACE) 100 MG Cap Take 300 mg by mouth every evening.     • acetaminophen (TYLENOL) 500 MG Tab Take 1,000 mg by mouth every 6 hours as needed for Moderate Pain.     • COMBIGAN 0.2-0.5 % Solution Place 1 Drop in both eyes 2 Times a Day.       No current [facility]-ordered facility-administered medications on file.        Past Medical History:   Diagnosis Date   • Anesthesia     \"Abnormal blood pressure and breathing\"  \"couldn't breathe\"   • Asthma     inhalers daily   • Backpain 7/2017     thor. area   • Blood clot in vein 07/06/2018    \"Currently have a blood clot in my left eye\"   • Bowel habit changes 07/06/2018    Constipation   • Breath shortness     with exertion has O2 but does not use   • Bronchitis    • CAD (coronary artery disease)     palpatations   • Cancer ([facility]) 2016    left lung   • Chickenpox    • COPD (chronic obstructive pulmonary disease) ([facility])    • Depression 2/26/2019   • Dialysis 2005    transient renal failure due to sepsis   • Emphysema of lung ([facility])    • Glaucoma     right eye   • Hemorrhagic disorder ([facility])    • Hyperlipidemia    • Hypertension    • Hyperthyroidism    • Kidney stone     bilateral   • Lung cancer ([facility]) 12/12/2016   • Multiple thyroid nodules 7/9/2015   • Nasal drainage    • Osteoporosis    • Pain 07/06/2018    Back pain   • Personal history of venous thrombosis and embolism 2004, 2012    right " "leg 2012, left arm dvt 2004 left eye 2017   • Pneumonia 7/2016    per patient   • Renal disorder     had been on dialysis for 7 months for acute failure 2005   • Renal stones 2013    post lithotripsy   • Rheumatoid arthritis (HCC)     question   • Rheumatoid arthritis (HCC)    • S/P appendectomy 1998    • S/P cholecystectomy 1998   • S/P kyphoplasty 2006, 2007, 2013   • Sepsis, unspecified (HCC) 2005   • Shortness of breath 07/06/2018    Chronic current problem. \"A couple of years now\".  O2 concentrator at night - pt states she doesn't use it.   • Thyroid nodule, hot 2017    functional \"hot\" right thyroid nodule without thyrotoxicosis       Past Surgical History:   Procedure Laterality Date   • CYSTOSCOPY STENT PLACEMENT  7/9/2018    Procedure: Cystoscopy,  Left removal of stent ,  Left Stent Placement;  Surgeon: Beck Yang M.D.;  Location: [facility];  Service: Urology   • URETEROSCOPY Left 7/9/2018    Procedure: URETEROSCOPY;  Surgeon: Beck Yang M.D.;  Location: [facility];  Service: Urology   • LASERTRIPSY Left 7/9/2018    Procedure: LASERTRIPSY-LITHO;  Surgeon: Beck Yang M.D.;  Location: [facility];  Service: Urology   • CYSTOSCOPY STENT PLACEMENT Left 6/18/2018    Procedure: CYSTOSCOPY STENT PLACEMENT;  Surgeon: Beck Yang M.D.;  Location: [facility];  Service: Urology   • LITHOTRIPSY Left 6/18/2018    Procedure: LITHOTRIPSY;  Surgeon: Beck Yang M.D.;  Location: [facility];  Service: Urology   • LASERTRIPSY Left 6/18/2018    Procedure: LASERTRIPSY;  Surgeon: Beck Yang M.D.;  Location: [facility];  Service: Urology   • URETEROSCOPY Left 6/18/2018    Procedure: URETEROSCOPY;  Surgeon: Beck Yang M.D.;  Location: [facility];  Service: Urology   • THORACOSCOPY Left 12/12/2016    Procedure: THORACOSCOPY W/WEDGE RESECTION UPPER LOBE MASS;  Surgeon: John H Ganser, M.D.;  Location: [facility]" "[facility];  Service:    • OTHER ORTHOPEDIC SURGERY  2013    kyphoplasty X3   • APPENDECTOMY         • CHOLECYSTECTOMY         • LAMINOTOMY     • LUNG BIOPSY OPEN     • OTHER     • OTHER ABDOMINAL SURGERY      gall bladder disease   • PB ENLARGE BREAST WITH IMPLANT     • PB REDUCTION OF LARGE BREAST         Social History     Tobacco Use   • Smoking status: Former Smoker     Packs/day: 1.00     Years: 30.00     Pack years: 30.00     Types: Cigarettes     Quit date: 2000     Years since quittin.7   • Smokeless tobacco: Never Used   • Tobacco comment: 7 years ago   Substance Use Topics   • Alcohol use: Yes     Alcohol/week: 0.0 oz     Comment: x2 a week   • Drug use: No       Social History     Social History Narrative   • Not on file       Family History   Problem Relation Age of Onset   • Cancer Mother    • Heart Failure Father    • Heart Disease Brother        ROS:     - Constitutional: Negative for fever, chills     - Eyes:   Negative for acute change in vision.    - ENT:  Negative for sore throat     - Respiratory: Negative for cough    - Cardiovascular: Negative for chest pain, palpitations    - Gastrointestinal: Negative for  abdominal pain, hematochezia, melena    - Genitourinary: Negative for dysuria    - Musculoskeletal: Chronic joint pains followed by rheumatology    - Skin: Negative for rash, itching, cyanotic skin color change.     - Neurological: Negative for  vertigo    - Endo:Negative for polyuria, heat/cold intolerance, excessive thirst    - Hem/lymphatic: Negative for swollen glands    -Allergic/immun: Negative for allergic rhinitis    - Psychiatric/Behavioral: Negative for suicidal/homicidal ideation and memory loss.      Exam: /66 (BP Location: Right arm, Patient Position: Sitting, BP Cuff Size: Adult)   Pulse 82   Temp 36.4 °C (97.5 °F) (Temporal)   Resp 16   Ht 1.676 m (5' 6\")   Wt 88.5 kg (195 lb 1.7 oz)   SpO2 97%  Body mass index is 31.49 kg/m².    General: Normal " appearing. No distress.  EYES: Conjunctiva clear lids without ptosis, pupils equal  EARS: Normal shape and contour   Pulmonary: Clear to ausculation.  Normal effort. No rales or wheezing.  Cardiovascular: Regular rate and rhythm without significant murmur.   Abdomen: Soft, nontender, nondistended. Normal bowel sounds.  Neurologic: Cranial nerves grossly nonfocal  skin: Warm and dry.  No obvious lesions.  Musculoskeletal: Normal gait. No extremity cyanosis, clubbing, or edema.  Psych: Normal mood and affect. Alert and oriented x3. Judgment and insight is normal.      Assessment/Plan  1. Chronic gout of foot, unspecified cause, unspecified laterality  Chronic, stable, reasonable to refill colchicine for as needed use.  Patient aware of dietary triggers of gout.  Her uric acid had been well controlled, recent labs by her nephrology team did show elevation.  She remains on allopurinol which will have to be monitored for renal dosing given kidney function--CrCl based on Cr 1.68 with typical Cockcroft-Gault  formula results in valuve 43 ml/min, if adjusted for overweight (wt based adjustment) level of 34 ml/min.  She says she has pending labs in 1 week through her nephrologist and she will make sure that the results are forwarded to me.  She also knows to hold statin while taking colchicine.  - colchicine (COLCRYS) 0.6 MG Tab; Day 1: 1.2 mg once, followed in 1 hour with a single dose of 0.6 mg.  Day 2 and thereafter: Oral: 0.6 mg once daily until flare resolves  Dispense: 30 Tab; Refill: 1    2. Other insomnia  Stable, chronic reasonable to continue his current medication.  Do not mix with alcohol, sedating drugs, driving machinery.  Obtained and reviewed patient utilization report from [facility] pharmacy database on 9/14/2020 6:09 PM  prior to writing prescription for controlled substance II, III or IV per [place] bill . Based on assessment of the report,my physical exam if necessary and the patient's health  problem, the prescription is medically necessary.   - zolpidem (AMBIEN) 5 MG Tab; Take 1 Tab by mouth at bedtime as needed for Sleep for up to 30 days.  Dispense: 30 Tab; Refill: 4    3. Hyperthyroidism  Recently restarted methimazole with pending endocrine follow-up and thyroid labs.  Endocrine note also indicates that they will work with nephrology team to see if there are any other underlying occult causes of the hypokalemia.  Consider additional evaluation of hypokalemia based on pending work-up by specialty team.    4. Lung mass  Lung nodules are being followed with interval CT scan through her pulmonologist.  No new pulmonary symptoms.  We will continue to monitor as new imaging results are available.        RTC 4 months or sooner if needed      Please note that this dictation was created using voice recognition software. I have made every reasonable attempt to correct obvious errors, but I expect that there are errors of grammar and possibly content that I did not discover before finalizing the note.

## 2020-09-24 ENCOUNTER — HOSPITAL ENCOUNTER (OUTPATIENT)
Dept: LAB | Facility: MEDICAL CENTER | Age: 70
End: 2020-09-24
Attending: INTERNAL MEDICINE
Payer: MEDICARE

## 2020-09-24 ENCOUNTER — HOSPITAL ENCOUNTER (OUTPATIENT)
Dept: LAB | Facility: MEDICAL CENTER | Age: 70
End: 2020-09-24
Attending: NURSE PRACTITIONER
Payer: MEDICARE

## 2020-09-24 DIAGNOSIS — E05.90 HYPERTHYROIDISM: ICD-10-CM

## 2020-09-24 LAB
ALBUMIN SERPL BCP-MCNC: 4 G/DL (ref 3.2–4.9)
ALBUMIN/GLOB SERPL: 1.7 G/DL
ALP SERPL-CCNC: 91 U/L (ref 30–99)
ALT SERPL-CCNC: 22 U/L (ref 2–50)
ANION GAP SERPL CALC-SCNC: 13 MMOL/L (ref 7–16)
AST SERPL-CCNC: 18 U/L (ref 12–45)
BILIRUB SERPL-MCNC: 0.3 MG/DL (ref 0.1–1.5)
BUN SERPL-MCNC: 33 MG/DL (ref 8–22)
CALCIUM SERPL-MCNC: 10.7 MG/DL (ref 8.4–10.2)
CHLORIDE SERPL-SCNC: 110 MMOL/L (ref 96–112)
CO2 SERPL-SCNC: 19 MMOL/L (ref 20–33)
CREAT SERPL-MCNC: 1.31 MG/DL (ref 0.5–1.4)
GLOBULIN SER CALC-MCNC: 2.3 G/DL (ref 1.9–3.5)
GLUCOSE SERPL-MCNC: 110 MG/DL (ref 65–99)
POTASSIUM SERPL-SCNC: 3.6 MMOL/L (ref 3.6–5.5)
PROT SERPL-MCNC: 6.3 G/DL (ref 6–8.2)
SODIUM SERPL-SCNC: 142 MMOL/L (ref 135–145)
T4 FREE SERPL-MCNC: 1.34 NG/DL (ref 0.93–1.7)
TSH SERPL DL<=0.005 MIU/L-ACNC: 0.01 UIU/ML (ref 0.38–5.33)

## 2020-09-24 PROCEDURE — 84481 FREE ASSAY (FT-3): CPT

## 2020-09-24 PROCEDURE — 80053 COMPREHEN METABOLIC PANEL: CPT

## 2020-09-24 PROCEDURE — 36415 COLL VENOUS BLD VENIPUNCTURE: CPT

## 2020-09-24 PROCEDURE — 84443 ASSAY THYROID STIM HORMONE: CPT

## 2020-09-24 PROCEDURE — 84439 ASSAY OF FREE THYROXINE: CPT

## 2020-09-25 ENCOUNTER — OUTPATIENT INFUSION SERVICES (OUTPATIENT)
Dept: ONCOLOGY | Facility: MEDICAL CENTER | Age: 70
End: 2020-09-25
Attending: INTERNAL MEDICINE
Payer: MEDICARE

## 2020-09-25 VITALS
OXYGEN SATURATION: 96 % | SYSTOLIC BLOOD PRESSURE: 111 MMHG | WEIGHT: 198.85 LBS | TEMPERATURE: 97.1 F | HEIGHT: 66 IN | RESPIRATION RATE: 19 BRPM | HEART RATE: 78 BPM | DIASTOLIC BLOOD PRESSURE: 69 MMHG | BODY MASS INDEX: 31.96 KG/M2

## 2020-09-25 DIAGNOSIS — M81.0 OSTEOPOROSIS, UNSPECIFIED OSTEOPOROSIS TYPE, UNSPECIFIED PATHOLOGICAL FRACTURE PRESENCE: ICD-10-CM

## 2020-09-25 LAB — T3FREE SERPL-MCNC: 2.93 PG/ML (ref 2–4.4)

## 2020-09-25 PROCEDURE — 96372 THER/PROPH/DIAG INJ SC/IM: CPT

## 2020-09-25 PROCEDURE — 700111 HCHG RX REV CODE 636 W/ 250 OVERRIDE (IP): Mod: JG | Performed by: INTERNAL MEDICINE

## 2020-09-25 RX ADMIN — DENOSUMAB 60 MG: 60 INJECTION SUBCUTANEOUS at 14:10

## 2020-09-25 ASSESSMENT — FIBROSIS 4 INDEX: FIB4 SCORE: 1.3

## 2020-09-25 NOTE — PROGRESS NOTES
Patient arrived ambulatory to [facility] for Prolia.  She denies any s/s of infection, fevers, or past/future dental surgery.  She had labs drawn yesterday and results within parameters to treat.  Prolia given to left back arm, band aid placed.  Pt tolerated well.  She ambulated out of clinic in no apparent distress.

## 2020-09-30 ENCOUNTER — OFFICE VISIT (OUTPATIENT)
Dept: ENDOCRINOLOGY | Facility: MEDICAL CENTER | Age: 70
End: 2020-09-30
Attending: INTERNAL MEDICINE
Payer: MEDICARE

## 2020-09-30 VITALS
HEART RATE: 86 BPM | HEIGHT: 66 IN | SYSTOLIC BLOOD PRESSURE: 118 MMHG | WEIGHT: 198 LBS | BODY MASS INDEX: 31.82 KG/M2 | DIASTOLIC BLOOD PRESSURE: 68 MMHG | OXYGEN SATURATION: 98 %

## 2020-09-30 DIAGNOSIS — E05.10 THYROTOXICOSIS WITH TOXIC SINGLE THYROID NODULE AND WITHOUT THYROID STORM: ICD-10-CM

## 2020-09-30 DIAGNOSIS — N18.30 CKD (CHRONIC KIDNEY DISEASE) STAGE 3, GFR 30-59 ML/MIN: ICD-10-CM

## 2020-09-30 DIAGNOSIS — Z78.0 MENOPAUSE: ICD-10-CM

## 2020-09-30 DIAGNOSIS — E21.3 HYPERPARATHYROIDISM (HCC): ICD-10-CM

## 2020-09-30 DIAGNOSIS — M81.0 OSTEOPOROSIS, UNSPECIFIED OSTEOPOROSIS TYPE, UNSPECIFIED PATHOLOGICAL FRACTURE PRESENCE: ICD-10-CM

## 2020-09-30 PROCEDURE — 99214 OFFICE O/P EST MOD 30 MIN: CPT | Performed by: INTERNAL MEDICINE

## 2020-09-30 PROCEDURE — 99211 OFF/OP EST MAY X REQ PHY/QHP: CPT | Performed by: INTERNAL MEDICINE

## 2020-09-30 ASSESSMENT — FIBROSIS 4 INDEX: FIB4 SCORE: 1.3

## 2020-09-30 NOTE — PROGRESS NOTES
Chief Complaint   Patient presents with   • Thyrotoxicosis     Functional nodule   • Hyperparathyroidism     Primary versus secondary ?   • Osteoporosis   • Chronic Kidney Disease        HPI:    Thyrotoxicosis          Overall the patient has felt improvement taking methimazole 10 mg/day.  The thing she notices the most is her resting heart rate has come down to normal and she does not get palpitations as prominent with exertion.  Still has fatigue.  However she seems more optimistic today.  Sh has come up from unmeasurable to 0.007.  Free T4 has come down from 2.1 down to 1.3 (0.9-1.7) and free T3 also has come down from 4.6 down to 2.9 (2.0-4.4).           At times she finds her resting heart rate in the high 50 range.  It does accelerate with ambulation.  This is not symptomatic so I do not think it is anything other than the effect of lowering her thyroid levels.           Reduce methimazole down to 5 mg daily.  Repeat lab in 1 month and connect via my chart.    See assessment and recommendations below    ROS:  All other systems reported as negative or unchanged since last exam      Allergies: No Known Allergies    Current medicines including changes today:  Current Outpatient Medications   Medication Sig Dispense Refill   • colchicine (COLCRYS) 0.6 MG Tab Day 1: 1.2 mg once, followed in 1 hour with a single dose of 0.6 mg.  Day 2 and thereafter: Oral: 0.6 mg once daily until flare resolves 30 Tab 1   • zolpidem (AMBIEN) 5 MG Tab Take 1 Tab by mouth at bedtime as needed for Sleep for up to 30 days. 30 Tab 4   • XARELTO 20 MG Tab tablet TAKE ONE TABLET BY MOUTH DAILY WITH DINNER 90 Tab 0   • simvastatin (ZOCOR) 40 MG Tab TAKE ONE TABLET BY MOUTH EVERY EVENING 90 Tab 0   • LOSARTAN POTASSIUM PO Take 50 mg by mouth.     • doxycycline (VIBRAMYCIN) 100 MG Cap Take 1 Cap by mouth 2 times a day. Take until gone. (Patient not taking: Reported on 9/25/2020) 20 Cap 1   • predniSONE (DELTASONE) 10 MG Tab Take 30mg x 3  "days, then take 20mg x 3 days, then take 10mg x 3 days, with food, then discontinue. (Patient not taking: Reported on 9/25/2020) 18 Tab 1   • predniSONE (DELTASONE) 10 MG Tab Take 30mg x 3 days, then take 20mg x 3 days, then take 10mg x 3 days, with food, then discontinue. (Patient not taking: Reported on 9/25/2020) 18 Tab 0   • albuterol 108 (90 Base) MCG/ACT Aero Soln inhalation aerosol INHALE TWO PUFFS BY MOUTH EVERY 6 HOURS AS NEEDED FOR SHORTNESS OF BREATH 1 Inhaler 5   • methimazole (TAPAZOLE) 10 MG Tab Take 1 Tab by mouth every day. 30 Tab 1   • Prenatal MV & Min w/FA-DHA (PRENATAL ADULT GUMMY/DHA/FA PO) Take  by mouth.     • cyanocobalamin (VITAMIN B-12) 100 MCG Tab Take 100 mcg by mouth every day.     • BREO ELLIPTA 200-25 MCG/INH AEROSOL POWDER, BREATH ACTIVATED INHALE ONE DOSE BY MOUTH DAILY *RINSE MOUTH AFTER USE* 1 Each 5   • Tiotropium Bromide Monohydrate 2.5 MCG/ACT Aero Soln INHALE TWO PUFFS BY MOUTH DAILY 1 Inhaler 5   • BROMSITE 0.075 % Solution   0   • albuterol (PROVENTIL) 2.5mg/3ml Nebu Soln solution for nebulization 3 mL by Nebulization route every four hours as needed for Shortness of Breath. 30 Bullet 3   • SODIUM BICARBONATE PO Take 10 g by mouth.     • vitamin D, Ergocalciferol, (DRISDOL) 70197 units Cap capsule Take 1 Cap by mouth every 14 days. 5 Cap 1   • HYDROcodone/acetaminophen (NORCO)  MG Tab Take 1-2 Tabs by mouth every 6 hours as needed.     • allopurinol (ZYLOPRIM) 100 MG Tab Take 100 mg by mouth 2 times a day.     • azithromycin (ZITHROMAX) 250 MG Tab 1 daily for COPD 30 Tab 11   • docusate sodium (COLACE) 100 MG Cap Take 300 mg by mouth every evening.     • acetaminophen (TYLENOL) 500 MG Tab Take 1,000 mg by mouth every 6 hours as needed for Moderate Pain.     • COMBIGAN 0.2-0.5 % Solution Place 1 Drop in both eyes 2 Times a Day.       No current facility-administered medications for this visit.         Past Medical History:   Diagnosis Date   • Anesthesia     \"Abnormal " "blood pressure and breathing\"  \"couldn't breathe\"   • Asthma     inhalers daily   • Backpain 7/2017     thor. area   • Blood clot in vein 07/06/2018    \"Currently have a blood clot in my left eye\"   • Bowel habit changes 07/06/2018    Constipation   • Breath shortness     with exertion has O2 but does not use   • Bronchitis    • CAD (coronary artery disease)     palpatations   • Cancer (HCC) 2016    left lung   • Chickenpox    • COPD (chronic obstructive pulmonary disease) (HCC)    • Depression 2/26/2019   • Dialysis 2005    transient renal failure due to sepsis   • Emphysema of lung (HCC)    • Glaucoma     right eye   • Hemorrhagic disorder (HCC)    • Hyperlipidemia    • Hypertension    • Hyperthyroidism    • Kidney stone     bilateral   • Lung cancer (HCC) 12/12/2016   • Multiple thyroid nodules 7/9/2015   • Nasal drainage    • Osteoporosis    • Pain 07/06/2018    Back pain   • Personal history of venous thrombosis and embolism 2004, 2012    right leg 2012, left arm dvt 2004 left eye 2017   • Pneumonia 7/2016    per patient   • Renal disorder     had been on dialysis for 7 months for acute failure 2005   • Renal stones 2013    post lithotripsy   • Rheumatoid arthritis (HCC)     question   • Rheumatoid arthritis (HCC)    • S/P appendectomy 1998    • S/P cholecystectomy 1998   • S/P kyphoplasty 2006, 2007, 2013   • Sepsis, unspecified (HCC) 2005   • Shortness of breath 07/06/2018    Chronic current problem. \"A couple of years now\".  O2 concentrator at night - pt states she doesn't use it.   • Thyroid nodule, hot 2017    functional \"hot\" right thyroid nodule without thyrotoxicosis       PHYSICAL EXAM:    /68 (BP Location: Left arm, Patient Position: Sitting, BP Cuff Size: Adult)   Pulse 86   Ht 1.676 m (5' 6\")   Wt 89.8 kg (198 lb)   LMP  (LMP Unknown)   SpO2 98%   BMI 31.96 kg/m²     Gen.   appears comfortable    Skin   appropriate for sex and age    HEENT    no thyroid eye disease    Neck   mild " prominence in the area of the left lobe which presumably is her functional nodule.    Heart  regular    Extremities  no edema    Neuro  gait and station normal, no tremor    Psych  appropriate, calm, pleasant    ASSESSMENT AND RECOMMENDATIONS    1. Thyrotoxicosis with toxic single thyroid nodule and without thyroid storm            Clinical improvement with methimazole.            Reduce dose to 5 mg/day and repeat lab in 1 month.            Follow-up by my chart or telephone    2. Hyperparathyroidism (HCC)             Unclear whether this is primary or secondary.  GFR is 40 so I do not think it secondary              in June with normal vitamin D3 level.    3. Osteoporosis, unspecified osteoporosis type, unspecified pathological fracture presence            Tolerating Prolia well.  I think she has had 4 injections.  Recent Prolia tolerated well             Time to update bone density to document effectiveness of treatment.              Vitamin D was 51 in June  4. CKD (chronic kidney disease) stage 3, GFR 30-59 ml/min ([facility])              GFR stable at 40.  Creatinine 1.3       DISPOSITION: Adjust methimazole per the above                              Update thyroid levels in 1 month and follow-up by my chart                               Return to clinic in 2 months      Kishore Torrez M.D.    Copies to: Marybeth Lynch M.D. [phone]

## 2020-10-16 ENCOUNTER — HOSPITAL ENCOUNTER (OUTPATIENT)
Dept: LAB | Facility: MEDICAL CENTER | Age: 70
End: 2020-10-16
Attending: NURSE PRACTITIONER
Payer: MEDICARE

## 2020-10-16 ENCOUNTER — HOSPITAL ENCOUNTER (OUTPATIENT)
Dept: LAB | Facility: MEDICAL CENTER | Age: 70
End: 2020-10-16
Attending: INTERNAL MEDICINE
Payer: MEDICARE

## 2020-10-16 DIAGNOSIS — E05.90 HYPERTHYROIDISM: ICD-10-CM

## 2020-10-16 LAB
ALBUMIN SERPL BCP-MCNC: 4 G/DL (ref 3.2–4.9)
ALBUMIN/GLOB SERPL: 2 G/DL
ALP SERPL-CCNC: 111 U/L (ref 30–99)
ALT SERPL-CCNC: 30 U/L (ref 2–50)
ANION GAP SERPL CALC-SCNC: 9 MMOL/L (ref 7–16)
APPEARANCE UR: CLEAR
AST SERPL-CCNC: 24 U/L (ref 12–45)
BACTERIA #/AREA URNS HPF: ABNORMAL /HPF
BASOPHILS # BLD AUTO: 0.7 % (ref 0–1.8)
BASOPHILS # BLD: 0.04 K/UL (ref 0–0.12)
BILIRUB SERPL-MCNC: 0.8 MG/DL (ref 0.1–1.5)
BILIRUB UR QL STRIP.AUTO: NEGATIVE
BUN SERPL-MCNC: 33 MG/DL (ref 8–22)
CALCIUM SERPL-MCNC: 8.8 MG/DL (ref 8.4–10.2)
CHLORIDE SERPL-SCNC: 110 MMOL/L (ref 96–112)
CO2 SERPL-SCNC: 18 MMOL/L (ref 20–33)
COLOR UR: YELLOW
CREAT SERPL-MCNC: 1.42 MG/DL (ref 0.5–1.4)
CREAT UR-MCNC: 39.62 MG/DL
EOSINOPHIL # BLD AUTO: 0.2 K/UL (ref 0–0.51)
EOSINOPHIL NFR BLD: 3.6 % (ref 0–6.9)
EPI CELLS #/AREA URNS HPF: ABNORMAL /HPF
ERYTHROCYTE [DISTWIDTH] IN BLOOD BY AUTOMATED COUNT: 56.4 FL (ref 35.9–50)
FASTING STATUS PATIENT QL REPORTED: NORMAL
GLOBULIN SER CALC-MCNC: 2 G/DL (ref 1.9–3.5)
GLUCOSE SERPL-MCNC: 99 MG/DL (ref 65–99)
GLUCOSE UR STRIP.AUTO-MCNC: NEGATIVE MG/DL
HCT VFR BLD AUTO: 43.5 % (ref 37–47)
HGB BLD-MCNC: 14.2 G/DL (ref 12–16)
HYALINE CASTS #/AREA URNS LPF: ABNORMAL /LPF
IMM GRANULOCYTES # BLD AUTO: 0.02 K/UL (ref 0–0.11)
IMM GRANULOCYTES NFR BLD AUTO: 0.4 % (ref 0–0.9)
KETONES UR STRIP.AUTO-MCNC: NEGATIVE MG/DL
LEUKOCYTE ESTERASE UR QL STRIP.AUTO: ABNORMAL
LYMPHOCYTES # BLD AUTO: 1.94 K/UL (ref 1–4.8)
LYMPHOCYTES NFR BLD: 34.6 % (ref 22–41)
MAGNESIUM SERPL-MCNC: 1.7 MG/DL (ref 1.5–2.5)
MCH RBC QN AUTO: 32.4 PG (ref 27–33)
MCHC RBC AUTO-ENTMCNC: 32.6 G/DL (ref 33.6–35)
MCV RBC AUTO: 99.3 FL (ref 81.4–97.8)
MICRO URNS: ABNORMAL
MONOCYTES # BLD AUTO: 0.45 K/UL (ref 0–0.85)
MONOCYTES NFR BLD AUTO: 8 % (ref 0–13.4)
MUCOUS THREADS #/AREA URNS HPF: ABNORMAL /HPF
NEUTROPHILS # BLD AUTO: 2.95 K/UL (ref 2–7.15)
NEUTROPHILS NFR BLD: 52.7 % (ref 44–72)
NITRITE UR QL STRIP.AUTO: NEGATIVE
NRBC # BLD AUTO: 0 K/UL
NRBC BLD-RTO: 0 /100 WBC
PH UR STRIP.AUTO: 5.5 [PH] (ref 5–8)
PHOSPHATE SERPL-MCNC: 2.4 MG/DL (ref 2.5–4.5)
PLATELET # BLD AUTO: 182 K/UL (ref 164–446)
PMV BLD AUTO: 11.8 FL (ref 9–12.9)
POTASSIUM SERPL-SCNC: 4.9 MMOL/L (ref 3.6–5.5)
PROT SERPL-MCNC: 6 G/DL (ref 6–8.2)
PROT UR QL STRIP: NEGATIVE MG/DL
PROT UR-MCNC: 6 MG/DL (ref 0–15)
PROT/CREAT UR: 151 MG/G (ref 10–107)
RBC # BLD AUTO: 4.38 M/UL (ref 4.2–5.4)
RBC # URNS HPF: ABNORMAL /HPF
RBC UR QL AUTO: ABNORMAL
SODIUM SERPL-SCNC: 137 MMOL/L (ref 135–145)
SP GR UR STRIP.AUTO: 1.01
T4 FREE SERPL-MCNC: 1.75 NG/DL (ref 0.93–1.7)
TSH SERPL DL<=0.005 MIU/L-ACNC: 0.01 UIU/ML (ref 0.38–5.33)
URATE SERPL-MCNC: 5.3 MG/DL (ref 1.9–8.2)
WBC # BLD AUTO: 5.6 K/UL (ref 4.8–10.8)
WBC #/AREA URNS HPF: ABNORMAL /HPF

## 2020-10-16 PROCEDURE — 84481 FREE ASSAY (FT-3): CPT

## 2020-10-16 PROCEDURE — 80053 COMPREHEN METABOLIC PANEL: CPT

## 2020-10-16 PROCEDURE — 85025 COMPLETE CBC W/AUTO DIFF WBC: CPT

## 2020-10-16 PROCEDURE — 84100 ASSAY OF PHOSPHORUS: CPT

## 2020-10-16 PROCEDURE — 84550 ASSAY OF BLOOD/URIC ACID: CPT

## 2020-10-16 PROCEDURE — 82570 ASSAY OF URINE CREATININE: CPT

## 2020-10-16 PROCEDURE — 84156 ASSAY OF PROTEIN URINE: CPT

## 2020-10-16 PROCEDURE — 84439 ASSAY OF FREE THYROXINE: CPT

## 2020-10-16 PROCEDURE — 36415 COLL VENOUS BLD VENIPUNCTURE: CPT

## 2020-10-16 PROCEDURE — 83735 ASSAY OF MAGNESIUM: CPT

## 2020-10-16 PROCEDURE — 81001 URINALYSIS AUTO W/SCOPE: CPT

## 2020-10-16 PROCEDURE — 84443 ASSAY THYROID STIM HORMONE: CPT

## 2020-10-17 LAB — T3FREE SERPL-MCNC: 4.24 PG/ML (ref 2–4.4)

## 2020-10-28 ENCOUNTER — TELEPHONE (OUTPATIENT)
Dept: ENDOCRINOLOGY | Facility: MEDICAL CENTER | Age: 70
End: 2020-10-28

## 2020-10-28 NOTE — TELEPHONE ENCOUNTER
Patient left a message requesting a call back from Dr. Torrez. She had upped her dose of Methimazole   As directed by Dr. Torrez - she started having heart palpitations and has stopped this medication. Please advise

## 2020-10-29 ENCOUNTER — TELEPHONE (OUTPATIENT)
Dept: ENDOCRINOLOGY | Facility: MEDICAL CENTER | Age: 70
End: 2020-10-29

## 2020-10-29 NOTE — TELEPHONE ENCOUNTER
Telephone conversation with patient    Strange sequence of events that I cannot understand.  We increased her methimazole dose back up to 10 mg because her thyroid blood levels were going back up.  After a few days on 10 mg she started having worsening palpitations and weakness and feeling awful.  She did not know what to do and was unable to reach me so she stopped taking methimazole.  Day by day she has felt better.  Now has  she is feeling okay again.  No palpitations and no tachycardia.  No fever or any infection symptoms.  Recent CBC and chem panel are very good.  She has been off of her methimazole now for about a week.  She will do another thyroid level tomorrow and I can see it next week and then decide what to do.  I have no idea how to explain that sequence of events.    Kishore Torrez M.D.

## 2020-10-30 ENCOUNTER — HOSPITAL ENCOUNTER (OUTPATIENT)
Dept: LAB | Facility: MEDICAL CENTER | Age: 70
End: 2020-10-30
Attending: NURSE PRACTITIONER
Payer: MEDICARE

## 2020-10-30 ENCOUNTER — HOSPITAL ENCOUNTER (OUTPATIENT)
Dept: LAB | Facility: MEDICAL CENTER | Age: 70
End: 2020-10-30
Attending: INTERNAL MEDICINE
Payer: MEDICARE

## 2020-10-30 DIAGNOSIS — E05.90 HYPERTHYROIDISM: ICD-10-CM

## 2020-10-30 LAB
ALBUMIN SERPL BCP-MCNC: 3.7 G/DL (ref 3.2–4.9)
ALBUMIN/GLOB SERPL: 1.7 G/DL
ALP SERPL-CCNC: 104 U/L (ref 30–99)
ALT SERPL-CCNC: 35 U/L (ref 2–50)
ANION GAP SERPL CALC-SCNC: 11 MMOL/L (ref 7–16)
AST SERPL-CCNC: 30 U/L (ref 12–45)
BILIRUB SERPL-MCNC: 0.2 MG/DL (ref 0.1–1.5)
BUN SERPL-MCNC: 19 MG/DL (ref 8–22)
CALCIUM SERPL-MCNC: 9 MG/DL (ref 8.4–10.2)
CHLORIDE SERPL-SCNC: 113 MMOL/L (ref 96–112)
CO2 SERPL-SCNC: 21 MMOL/L (ref 20–33)
CREAT SERPL-MCNC: 1.28 MG/DL (ref 0.5–1.4)
GLOBULIN SER CALC-MCNC: 2.2 G/DL (ref 1.9–3.5)
GLUCOSE SERPL-MCNC: 104 MG/DL (ref 65–99)
POTASSIUM SERPL-SCNC: 3.7 MMOL/L (ref 3.6–5.5)
PROT SERPL-MCNC: 5.9 G/DL (ref 6–8.2)
SODIUM SERPL-SCNC: 145 MMOL/L (ref 135–145)
T3FREE SERPL-MCNC: 4.14 PG/ML (ref 2–4.4)
T4 FREE SERPL-MCNC: 1.33 NG/DL (ref 0.93–1.7)
TSH SERPL DL<=0.005 MIU/L-ACNC: 0.01 UIU/ML (ref 0.38–5.33)

## 2020-10-30 PROCEDURE — 84443 ASSAY THYROID STIM HORMONE: CPT

## 2020-10-30 PROCEDURE — 84439 ASSAY OF FREE THYROXINE: CPT

## 2020-10-30 PROCEDURE — 84481 FREE ASSAY (FT-3): CPT

## 2020-10-30 PROCEDURE — 36415 COLL VENOUS BLD VENIPUNCTURE: CPT

## 2020-10-30 PROCEDURE — 80053 COMPREHEN METABOLIC PANEL: CPT

## 2020-10-31 ENCOUNTER — TELEPHONE (OUTPATIENT)
Dept: ENDOCRINOLOGY | Facility: MEDICAL CENTER | Age: 70
End: 2020-10-31

## 2020-10-31 NOTE — TELEPHONE ENCOUNTER
Telephone conversation with patient    She is feeling much better now.  She has a pulse ox that she leans on her finger O2 sats are in the 90s and her heart rate is in the 70-80 range and regular.  No methimazole.    General chemistries are okay and renal function has improved.  TSH is still suppressed at 0.006 free T4 is in the mid range at 1.3 and free T3 upper normal at 4.1.    Nothing else for me to treat right now.  I plan to see her the end of November and we will update lab then.  She will get her labs done again sooner if she becomes thyrotoxic again.  However I think I am going to have trouble having her take methimazole again.  We will see...    Kishore Torrez M.D.

## 2020-11-02 DIAGNOSIS — J44.9 CHRONIC OBSTRUCTIVE PULMONARY DISEASE, UNSPECIFIED COPD TYPE (HCC): ICD-10-CM

## 2020-11-02 NOTE — TELEPHONE ENCOUNTER
Have we ever prescribed this med? Yes.  If yes, what date? 05/15/2020    Last OV: 07/21/2020 - Dr. Byrne    Next OV: due back 01/2021    DX: COPD    Medications: Breo

## 2020-11-27 ENCOUNTER — HOSPITAL ENCOUNTER (OUTPATIENT)
Dept: LAB | Facility: MEDICAL CENTER | Age: 70
End: 2020-11-27
Attending: INTERNAL MEDICINE
Payer: MEDICARE

## 2020-11-27 LAB
T4 FREE SERPL-MCNC: 2.12 NG/DL (ref 0.93–1.7)
TSH SERPL DL<=0.005 MIU/L-ACNC: <0.005 UIU/ML (ref 0.38–5.33)

## 2020-11-27 PROCEDURE — 84439 ASSAY OF FREE THYROXINE: CPT

## 2020-11-27 PROCEDURE — 36415 COLL VENOUS BLD VENIPUNCTURE: CPT

## 2020-11-27 PROCEDURE — 84443 ASSAY THYROID STIM HORMONE: CPT

## 2020-11-27 PROCEDURE — 84481 FREE ASSAY (FT-3): CPT

## 2020-11-28 LAB — T3FREE SERPL-MCNC: 5.6 PG/ML (ref 2–4.4)

## 2020-11-30 ENCOUNTER — OFFICE VISIT (OUTPATIENT)
Dept: ENDOCRINOLOGY | Facility: MEDICAL CENTER | Age: 70
End: 2020-11-30
Attending: INTERNAL MEDICINE
Payer: MEDICARE

## 2020-11-30 DIAGNOSIS — M81.0 OSTEOPOROSIS, UNSPECIFIED OSTEOPOROSIS TYPE, UNSPECIFIED PATHOLOGICAL FRACTURE PRESENCE: ICD-10-CM

## 2020-11-30 DIAGNOSIS — E05.90 HYPERTHYROIDISM: ICD-10-CM

## 2020-11-30 DIAGNOSIS — N18.32 STAGE 3B CHRONIC KIDNEY DISEASE: ICD-10-CM

## 2020-11-30 DIAGNOSIS — E21.3 HYPERPARATHYROIDISM (HCC): ICD-10-CM

## 2020-11-30 PROCEDURE — 999999 HB NO CHARGE: Performed by: INTERNAL MEDICINE

## 2020-11-30 PROCEDURE — 99214 OFFICE O/P EST MOD 30 MIN: CPT | Performed by: INTERNAL MEDICINE

## 2020-11-30 NOTE — PROGRESS NOTES
Telemedicine Visit: Established Patient     This encounter was conducted via telephone.   Verbal consent was obtained. Patient's identity was verified.    Subjective:   CC:   Latonia Clinton is a 70 y.o. female presenting for evaluation and management of thyrotoxicosis, hyperparathyroidism, osteoporosis    HPI      The patient is feeling weak and exhausted almost all of the time.  She gives an example of having to kneel down on the floor to pick something up next to her bed and it was all the strength that she could muster to get back up again and lie on the bed exhausted.        She is aware that her heart rate has increased into the 80s but is not aware of palpitations except for with extreme exertion as exampled above.        She feels she reacted adversely to methimazole.  Now her thyroid levels are abnormal again with TSH completely suppressed and free T4 elevated at 2.1 (0.9-1.7) and free T3 elevated at 5.6 (2.0-1.4).  She might go on methimazole short-term but this is not going to be a long-term solution for her.          I think we should look at her I-123 uptake and scan again and see if it is reasonable to consider I-131 ablation for toxic nodules.  She is in agreement.    Osteoporosis          It is significant and likely to get worse as a result of her untreated thyrotoxicosis.  Her bone density done in May 2019 indicated a 15% decrease in the lumbar spine and 8% decrease in the left hip.          Continue Prolia    ROS   Denies any recent fevers or chills. No nausea or vomiting. No chest pains or shortness of breath.  No COVID-19 symptoms    No Known Allergies    Current medicines (including changes today)  Current Outpatient Medications   Medication Sig Dispense Refill   • BREO ELLIPTA 200-25 MCG/INH AEROSOL POWDER, BREATH ACTIVATED INHALE ONE DOSE BY MOUTH DAILY **RINSE MOUTH AFTER USE** 1 Each 5   • colchicine (COLCRYS) 0.6 MG Tab Day 1: 1.2 mg once, followed in 1 hour with a single dose of 0.6  mg.  Day 2 and thereafter: Oral: 0.6 mg once daily until flare resolves 30 Tab 1   • XARELTO 20 MG Tab tablet TAKE ONE TABLET BY MOUTH DAILY WITH DINNER 90 Tab 0   • simvastatin (ZOCOR) 40 MG Tab TAKE ONE TABLET BY MOUTH EVERY EVENING 90 Tab 0   • LOSARTAN POTASSIUM PO Take 50 mg by mouth.     • doxycycline (VIBRAMYCIN) 100 MG Cap Take 1 Cap by mouth 2 times a day. Take until gone. (Patient not taking: Reported on 9/25/2020) 20 Cap 1   • predniSONE (DELTASONE) 10 MG Tab Take 30mg x 3 days, then take 20mg x 3 days, then take 10mg x 3 days, with food, then discontinue. (Patient not taking: Reported on 9/25/2020) 18 Tab 1   • predniSONE (DELTASONE) 10 MG Tab Take 30mg x 3 days, then take 20mg x 3 days, then take 10mg x 3 days, with food, then discontinue. (Patient not taking: Reported on 9/25/2020) 18 Tab 0   • albuterol 108 (90 Base) MCG/ACT Aero Soln inhalation aerosol INHALE TWO PUFFS BY MOUTH EVERY 6 HOURS AS NEEDED FOR SHORTNESS OF BREATH 1 Inhaler 5   • methimazole (TAPAZOLE) 10 MG Tab Take 1 Tab by mouth every day. (Patient not taking: Reported on 11/30/2020) 30 Tab 1   • Prenatal MV & Min w/FA-DHA (PRENATAL ADULT GUMMY/DHA/FA PO) Take  by mouth.     • cyanocobalamin (VITAMIN B-12) 100 MCG Tab Take 100 mcg by mouth every day.     • Tiotropium Bromide Monohydrate 2.5 MCG/ACT Aero Soln INHALE TWO PUFFS BY MOUTH DAILY 1 Inhaler 5   • BROMSITE 0.075 % Solution   0   • albuterol (PROVENTIL) 2.5mg/3ml Nebu Soln solution for nebulization 3 mL by Nebulization route every four hours as needed for Shortness of Breath. 30 Bullet 3   • SODIUM BICARBONATE PO Take 10 g by mouth.     • vitamin D, Ergocalciferol, (DRISDOL) 26422 units Cap capsule Take 1 Cap by mouth every 14 days. 5 Cap 1   • HYDROcodone/acetaminophen (NORCO)  MG Tab Take 1-2 Tabs by mouth every 6 hours as needed.     • allopurinol (ZYLOPRIM) 100 MG Tab Take 100 mg by mouth 2 times a day.     • azithromycin (ZITHROMAX) 250 MG Tab 1 daily for COPD 30 Tab  11   • docusate sodium (COLACE) 100 MG Cap Take 300 mg by mouth every evening.     • acetaminophen (TYLENOL) 500 MG Tab Take 1,000 mg by mouth every 6 hours as needed for Moderate Pain.     • COMBIGAN 0.2-0.5 % Solution Place 1 Drop in both eyes 2 Times a Day.       No current facility-administered medications for this visit.        Patient Active Problem List    Diagnosis Date Noted   • Closed compression fracture of second lumbar vertebra ([facility]) 01/28/2019     Priority: High   • Hyperthyroidism 07/25/2016     Priority: High   • Chronic obstructive pulmonary disease (HCC) 07/24/2016     Priority: High   • Lung mass 07/24/2016     Priority: Medium   • CKD (chronic kidney disease) stage 3, GFR 30-59 ml/min 07/24/2016     Priority: Low   • Hyperlipidemia      Priority: Low   • Malignant neoplasm of lung ([facility]) 09/14/2020   • Hypotension (arterial) 06/08/2020   • Gout, tophaceous 01/21/2020   • Central retinal vein occlusion 12/19/2019   • Hyperparathyroidism ([facility]) 11/13/2019   • Urinary urgency 09/18/2019   • Gout of foot 04/29/2019   • Other specified glaucoma 04/29/2019   • Other insomnia 04/29/2019   • Right ventricular dilation 04/24/2019   • Atelectasis 02/26/2019   • Depression, major, recurrent, moderate ([facility]) 02/26/2019   • Macrocytic anemia 02/12/2019   • Chronic back pain 02/10/2019   • Closed wedge compression fracture of first lumbar vertebra ([facility]) 01/24/2019   • Multinodular goiter 09/13/2018   • Drug-induced constipation 08/30/2018   • Obstruction of left ureteropelvic junction (UPJ) due to stone 06/17/2018   • Closed T11 fracture ([facility]) 06/17/2018   • Lung cancer ([facility]) 12/12/2016   • DVT (deep venous thrombosis) ([facility]) 07/28/2016   • Deep vein thrombosis (DVT) ([facility]) 07/28/2016   • Thyrotoxicosis with toxic single thyroid nodule and without thyroid storm 07/24/2015   • Multiple thyroid nodules 07/09/2015   • Osteoporosis    • Hypertension    • Renal disorder    • Rheumatoid arthritis ([facility])        Family  History   Problem Relation Age of Onset   • Cancer Mother    • Heart Failure Father    • Heart Disease Brother        She  has a past medical history of Anesthesia, Asthma, Backpain (7/2017), Blood clot in vein (07/06/2018), Bowel habit changes (07/06/2018), Breath shortness, Bronchitis, CAD (coronary artery disease), Cancer (HCC) (2016), Chickenpox, COPD (chronic obstructive pulmonary disease) (HCC), Depression (2/26/2019), Dialysis (2005), Emphysema of lung (HCC), Glaucoma, Hemorrhagic disorder (HCC), Hyperlipidemia, Hypertension, Hyperthyroidism, Kidney stone, Lung cancer (HCC) (12/12/2016), Multiple thyroid nodules (7/9/2015), Nasal drainage, Osteoporosis, Pain (07/06/2018), Personal history of venous thrombosis and embolism (2004, 2012), Pneumonia (7/2016), Renal disorder, Renal stones (2013), Rheumatoid arthritis (HCC), Rheumatoid arthritis (HCC), S/P appendectomy (1998 ), S/P cholecystectomy (1998), S/P kyphoplasty (2006, 2007, 2013), Sepsis, unspecified (2005), Shortness of breath (07/06/2018), and Thyroid nodule, hot (2017). She also has no past medical history of Angina, Arrhythmia, Breast cancer (HCC), CATARACT, Congestive heart failure (HCC), Diabetes, Fall, Heart murmur, Heart valve disease, Indigestion, Jaundice, Liver disease, Myocardial infarct (HCC), Other specified symptom associated with female genital organs, Pacemaker, Rheumatic fever, Seizure (HCC), Seizure disorder (HCC), Stroke (HCC), or Unspecified urinary incontinence.  She  has a past surgical history that includes other; other abdominal surgery; pr enlarge breast with implant; pr reduction of large breast; appendectomy; cholecystectomy; laminotomy; lung biopsy open; thoracoscopy (Left, 12/12/2016); other orthopedic surgery (2013); cystoscopy stent placement (Left, 6/18/2018); lithotripsy (Left, 6/18/2018); lasertripsy (Left, 6/18/2018); ureteroscopy (Left, 6/18/2018); cystoscopy stent placement (7/9/2018); ureteroscopy (Left, 7/9/2018);  and lasertripsy (Left, 7/9/2018).       Objective:   Vitals obtained by patient:        blood pressure 120/70 and pulse 86   O2 saturation 98%    Physical Exam:        Phone call encounter only    Assessment and Plan:   The following treatment plan was discussed:     1. Hyperthyroidism              See HPI               Currently untreated.  Consider I-131 ablation of toxic nodules              Order preliminary I-123 uptake and scan    2. Hyperparathyroidism (HCC)             Most recent PTH is 261 with a vitamin D level of 51.             GFR 41 with creatinine 1.2 which is improved from previously              Chemistry panel calcium of 9.0 with albumin 3.7 which corrects to a calcium of 9.3    3. Osteoporosis, unspecified osteoporosis type, unspecified pathological fracture presence              Adverse effect of thyrotoxicosis.              Treatment options are limited by renal insufficiency and hyperparathyroidism    4. Stage 3b chronic kidney disease               Stable to slightly improved versus previous      Follow-up: Follow-up nuclear medicine scan by telephone and consider I-131 therapy

## 2020-12-01 ENCOUNTER — TELEPHONE (OUTPATIENT)
Dept: ENDOCRINOLOGY | Facility: MEDICAL CENTER | Age: 70
End: 2020-12-01

## 2020-12-01 DIAGNOSIS — E04.2 MULTIPLE THYROID NODULES: ICD-10-CM

## 2020-12-01 DIAGNOSIS — E05.90 HYPERTHYROIDISM: ICD-10-CM

## 2020-12-01 NOTE — TELEPHONE ENCOUNTER
Latonia called and has some questions for Dr. Torrez about her radioactive Iodine test.   She would like to have this done before making her follow up with Dr. Torrez. Please advise.

## 2020-12-02 DIAGNOSIS — I82.4Y9 DEEP VEIN THROMBOSIS (DVT) OF PROXIMAL LOWER EXTREMITY, UNSPECIFIED CHRONICITY, UNSPECIFIED LATERALITY (HCC): ICD-10-CM

## 2020-12-03 DIAGNOSIS — J44.9 CHRONIC OBSTRUCTIVE PULMONARY DISEASE, UNSPECIFIED COPD TYPE (HCC): ICD-10-CM

## 2020-12-04 RX ORDER — TIOTROPIUM BROMIDE INHALATION SPRAY 3.12 UG/1
SPRAY, METERED RESPIRATORY (INHALATION)
Qty: 1 EACH | Refills: 1 | Status: SHIPPED | OUTPATIENT
Start: 2020-12-04 | End: 2021-02-16

## 2020-12-04 NOTE — TELEPHONE ENCOUNTER
Have we ever prescribed this med? Yes.  If yes, what date?     Last OV: 07/21/2020 - Dr. Byrne    Next OV: due back 1/2021    DX: COPD    Medications: Spiriva

## 2020-12-07 ENCOUNTER — TELEPHONE (OUTPATIENT)
Dept: MEDICAL GROUP | Facility: MEDICAL CENTER | Age: 70
End: 2020-12-07

## 2020-12-08 RX ORDER — RIVAROXABAN 20 MG/1
TABLET, FILM COATED ORAL
Qty: 90 TAB | Refills: 1 | Status: SHIPPED | OUTPATIENT
Start: 2020-12-08 | End: 2021-05-24 | Stop reason: SDUPTHER

## 2021-01-06 ENCOUNTER — TELEPHONE (OUTPATIENT)
Dept: SLEEP MEDICINE | Facility: MEDICAL CENTER | Age: 71
End: 2021-01-06

## 2021-01-06 NOTE — TELEPHONE ENCOUNTER
DOCUMENTATION OF PRIOR AUTH STATUS    1. Medication name and dose: Breo ellipta 200/25 mcg    2. Name and Phone # of Prescription coverage company: [employer]    3. Date Prior Auth was submitted: 1/6/2021    4. What information was given to obtain insurance decision: Clinical notes    5. Prior Auth letter Approved or Denied: Approved, no P/A is needed for 2021 per [facility]    6. Pharmacy notified: Yes    7. Patient notified: Yes

## 2021-01-06 NOTE — TELEPHONE ENCOUNTER
MEDICATION PRIOR AUTHORIZATION NEEDED:    1. Name of Medication: Breo Ellipta 200/25 mcg    2. Requested By (Name of Pharmacy): [facility]     3. Is insurance on file current? yes    4. What is the name & phone number of the 3rd party payor? [employer]

## 2021-01-12 ENCOUNTER — TELEPHONE (OUTPATIENT)
Dept: MEDICAL GROUP | Facility: MEDICAL CENTER | Age: 71
End: 2021-01-12

## 2021-01-12 NOTE — TELEPHONE ENCOUNTER
ESTABLISHED PATIENT PRE-VISIT PLANNING     Patient was NOT contacted to complete PVP.     Note: Patient will not be contacted if there is no indication to call.     1.  Reviewed notes from the last few office visits within the medical group: Yes    2.  If any orders were placed at last visit or intended to be done for this visit (i.e. 6 mos follow-up), do we have Results/Consult Notes?         •  Labs - Labs were not ordered at last office visit.  Note: If patient appointment is for lab review and patient did not complete labs, check with provider if OK to reschedule patient until labs completed.       •  Imaging - Imaging was not ordered at last office visit.       •  Referrals - No referrals were ordered at last office visit.    3. Is this appointment scheduled as a Hospital Follow-Up? No    4.  Immunizations were updated in Epic using Reconcile Outside Information activity? Yes    5.  Patient is due for the following Health Maintenance Topics:   Health Maintenance Due   Topic Date Due   • Annual Wellness Visit  1950   • IMM DTaP/Tdap/Td Vaccine (1 - Tdap) 09/10/1968   • COLOGUARD STOOL DNA  09/10/1999   • Annual Pulmonary Function Test / Spirometry  10/24/2017     6.  AHA (Pulse8) form printed for Provider? N/A

## 2021-01-14 ENCOUNTER — TELEPHONE (OUTPATIENT)
Dept: MEDICAL GROUP | Facility: MEDICAL CENTER | Age: 71
End: 2021-01-14

## 2021-01-14 ENCOUNTER — APPOINTMENT (OUTPATIENT)
Dept: MEDICAL GROUP | Facility: MEDICAL CENTER | Age: 71
End: 2021-01-14
Payer: MEDICARE

## 2021-01-14 NOTE — TELEPHONE ENCOUNTER
ESTABLISHED PATIENT PRE-VISIT PLANNING     Patient was NOT contacted to complete PVP.     Note: Patient will not be contacted if there is no indication to call.     1.  Reviewed notes from the last few office visits within the medical group: Yes    2.  If any orders were placed at last visit or intended to be done for this visit (i.e. 6 mos follow-up), do we have Results/Consult Notes?         •  Labs - Labs were not ordered at last office visit.  Note: If patient appointment is for lab review and patient did not complete labs, check with provider if OK to reschedule patient until labs completed.       •  Imaging - Imaging was not ordered at last office visit.       •  Referrals - No referrals were ordered at last office visit.    3. Is this appointment scheduled as a Hospital Follow-Up? No    4.  Immunizations were updated in Epic using Reconcile Outside Information activity? Yes    5.  Patient is due for the following Health Maintenance Topics:   Health Maintenance Due   Topic Date Due   • Annual Wellness Visit  1950   • IMM DTaP/Tdap/Td Vaccine (1 - Tdap) 09/10/1968   • COLOGUARD STOOL DNA  09/10/1999   • Annual Pulmonary Function Test / Spirometry  10/24/2017     6.  AHA (Pulse8) form printed for Provider? N/A  \

## 2021-01-15 DIAGNOSIS — Z23 NEED FOR VACCINATION: ICD-10-CM

## 2021-01-19 ENCOUNTER — OFFICE VISIT (OUTPATIENT)
Dept: MEDICAL GROUP | Facility: MEDICAL CENTER | Age: 71
End: 2021-01-19
Payer: MEDICARE

## 2021-01-19 VITALS
WEIGHT: 195.99 LBS | BODY MASS INDEX: 31.5 KG/M2 | DIASTOLIC BLOOD PRESSURE: 64 MMHG | SYSTOLIC BLOOD PRESSURE: 108 MMHG | TEMPERATURE: 96.7 F | HEIGHT: 66 IN | HEART RATE: 79 BPM

## 2021-01-19 DIAGNOSIS — J44.9 CHRONIC OBSTRUCTIVE PULMONARY DISEASE, UNSPECIFIED COPD TYPE (HCC): ICD-10-CM

## 2021-01-19 DIAGNOSIS — M1A.0790 CHRONIC GOUT OF FOOT, UNSPECIFIED CAUSE, UNSPECIFIED LATERALITY: ICD-10-CM

## 2021-01-19 DIAGNOSIS — H92.02 LEFT EAR PAIN: ICD-10-CM

## 2021-01-19 DIAGNOSIS — R11.0 NAUSEA: ICD-10-CM

## 2021-01-19 DIAGNOSIS — G47.09 OTHER INSOMNIA: ICD-10-CM

## 2021-01-19 DIAGNOSIS — E78.49 OTHER HYPERLIPIDEMIA: ICD-10-CM

## 2021-01-19 DIAGNOSIS — I82.409 ACUTE DEEP VEIN THROMBOSIS (DVT) OF LOWER EXTREMITY, UNSPECIFIED LATERALITY, UNSPECIFIED VEIN (HCC): ICD-10-CM

## 2021-01-19 DIAGNOSIS — S01.302A OPEN WOUND OF LEFT EAR, UNSPECIFIED OPEN WOUND TYPE, INITIAL ENCOUNTER: ICD-10-CM

## 2021-01-19 DIAGNOSIS — R21 RASH: ICD-10-CM

## 2021-01-19 PROCEDURE — 99213 OFFICE O/P EST LOW 20 MIN: CPT | Performed by: INTERNAL MEDICINE

## 2021-01-19 RX ORDER — SIMVASTATIN 40 MG
TABLET ORAL
Qty: 90 TAB | Refills: 0 | Status: SHIPPED | OUTPATIENT
Start: 2021-01-19 | End: 2021-06-07

## 2021-01-19 RX ORDER — CEPHALEXIN 500 MG/1
500 CAPSULE ORAL 4 TIMES DAILY
Qty: 28 CAP | Refills: 0 | Status: SHIPPED | OUTPATIENT
Start: 2021-01-19 | End: 2021-01-26

## 2021-01-19 RX ORDER — ONDANSETRON 4 MG/1
4 TABLET, FILM COATED ORAL EVERY 4 HOURS PRN
Qty: 20 TAB | Refills: 1 | Status: SHIPPED | OUTPATIENT
Start: 2021-01-19 | End: 2021-02-18

## 2021-01-19 RX ORDER — COLCHICINE 0.6 MG/1
TABLET ORAL
Qty: 30 TAB | Refills: 1 | Status: ON HOLD | OUTPATIENT
Start: 2021-01-19 | End: 2022-11-15

## 2021-01-19 RX ORDER — ZOLPIDEM TARTRATE 5 MG/1
5 TABLET ORAL NIGHTLY PRN
Qty: 90 TAB | Refills: 0 | Status: SHIPPED | OUTPATIENT
Start: 2021-02-06 | End: 2021-05-07

## 2021-01-19 ASSESSMENT — FIBROSIS 4 INDEX: FIB4 SCORE: 1.98

## 2021-01-19 NOTE — PROGRESS NOTES
CC:  Diagnoses of Left ear pain, Rash, Chronic obstructive pulmonary disease, unspecified COPD type (HCC), Chronic gout of foot, unspecified cause, unspecified laterality, Nausea, Other insomnia, and Other hyperlipidemia were pertinent to this visit.    HISTORY OF THE PRESENT ILLNESS: Patient is a 70 y.o. female. This pleasant patient is here today for follow-up.    Says she is mainly here for medication refills.    Needs refill of Ambien, denies taking with alcohol, driving or machinery.  This really helps her sleep and improve quality of life.  Last refill 1/6/2021.    She needs colchicine for as needed gout she will not mix colchicine with her statin.    She has L ear pain(auricle) for couple months started as a sore, she used hydrogen peroxide and Neosporin.  Did not start as a vesicle or known shingles.  She says she sees Dr. Ramirez her dermatologist 3/24/2021.    Has chronic intermittent nausea she thinks it is from taking so many medications.  She knows she is overdue for colon cancer screening.  We discussed that there are other possibilities of chronic nausea including potential event for GI malignancy and she expressed understanding.  She is one of the barrier to doing her endoscopy studies is going off anticoagulation, she is very concerned, she is willing to discuss anticoag clinic.      Allergies: Patient has no known allergies.    Current Outpatient Medications Ordered in Epic   Medication Sig Dispense Refill   • XARELTO 20 MG Tab tablet TAKE ONE TABLET BY MOUTH DAILY WITH DINNER 90 Tab 1   • SPIRIVA RESPIMAT 2.5 MCG/ACT Aero Soln INHALE TWO PUFFS BY MOUTH DAILY 1 Each 1   • BREO ELLIPTA 200-25 MCG/INH AEROSOL POWDER, BREATH ACTIVATED INHALE ONE DOSE BY MOUTH DAILY **RINSE MOUTH AFTER USE** 1 Each 5   • colchicine (COLCRYS) 0.6 MG Tab Day 1: 1.2 mg once, followed in 1 hour with a single dose of 0.6 mg.  Day 2 and thereafter: Oral: 0.6 mg once daily until flare resolves 30 Tab 1   • simvastatin  "(ZOCOR) 40 MG Tab TAKE ONE TABLET BY MOUTH EVERY EVENING 90 Tab 0   • LOSARTAN POTASSIUM PO Take 50 mg by mouth.     • albuterol 108 (90 Base) MCG/ACT Aero Soln inhalation aerosol INHALE TWO PUFFS BY MOUTH EVERY 6 HOURS AS NEEDED FOR SHORTNESS OF BREATH 1 Inhaler 5   • Prenatal MV & Min w/FA-DHA (PRENATAL ADULT GUMMY/DHA/FA PO) Take  by mouth.     • cyanocobalamin (VITAMIN B-12) 100 MCG Tab Take 100 mcg by mouth every day.     • albuterol (PROVENTIL) 2.5mg/3ml Nebu Soln solution for nebulization 3 mL by Nebulization route every four hours as needed for Shortness of Breath. 30 Bullet 3   • SODIUM BICARBONATE PO Take 10 g by mouth.     • vitamin D, Ergocalciferol, (DRISDOL) 01083 units Cap capsule Take 1 Cap by mouth every 14 days. 5 Cap 1   • HYDROcodone/acetaminophen (NORCO)  MG Tab Take 1-2 Tabs by mouth every 6 hours as needed.     • allopurinol (ZYLOPRIM) 100 MG Tab Take 100 mg by mouth 2 times a day.     • azithromycin (ZITHROMAX) 250 MG Tab 1 daily for COPD 30 Tab 11   • docusate sodium (COLACE) 100 MG Cap Take 300 mg by mouth every evening.     • acetaminophen (TYLENOL) 500 MG Tab Take 1,000 mg by mouth every 6 hours as needed for Moderate Pain.     • COMBIGAN 0.2-0.5 % Solution Place 1 Drop in both eyes 2 Times a Day.       No current [facility]-ordered facility-administered medications on file.        Past Medical History:   Diagnosis Date   • Anesthesia     \"Abnormal blood pressure and breathing\"  \"couldn't breathe\"   • Asthma     inhalers daily   • Backpain 7/2017     thor. area   • Blood clot in vein 07/06/2018    \"Currently have a blood clot in my left eye\"   • Bowel habit changes 07/06/2018    Constipation   • Breath shortness     with exertion has O2 but does not use   • Bronchitis    • CAD (coronary artery disease)     palpatations   • Cancer (HCC) 2016    left lung   • Chickenpox    • COPD (chronic obstructive pulmonary disease) ([facility])    • Depression 2/26/2019   • Dialysis 2005    transient renal " "failure due to sepsis   • Emphysema of lung (HCC)    • Glaucoma     right eye   • Hemorrhagic disorder (HCC)    • Hyperlipidemia    • Hypertension    • Hyperthyroidism    • Kidney stone     bilateral   • Lung cancer (HCC) 12/12/2016   • Multiple thyroid nodules 7/9/2015   • Nasal drainage    • Osteoporosis    • Pain 07/06/2018    Back pain   • Personal history of venous thrombosis and embolism 2004, 2012    right leg 2012, left arm dvt 2004 left eye 2017   • Pneumonia 7/2016    per patient   • Renal disorder     had been on dialysis for 7 months for acute failure 2005   • Renal stones 2013    post lithotripsy   • Rheumatoid arthritis (HCC)     question   • Rheumatoid arthritis (HCC)    • S/P appendectomy 1998    • S/P cholecystectomy 1998   • S/P kyphoplasty 2006, 2007, 2013   • Sepsis, unspecified 2005   • Shortness of breath 07/06/2018    Chronic current problem. \"A couple of years now\".  O2 concentrator at night - pt states she doesn't use it.   • Thyroid nodule, hot 2017    functional \"hot\" right thyroid nodule without thyrotoxicosis       Past Surgical History:   Procedure Laterality Date   • CYSTOSCOPY STENT PLACEMENT  7/9/2018    Procedure: Cystoscopy,  Left removal of stent ,  Left Stent Placement;  Surgeon: Beck Yang M.D.;  Location: [facility];  Service: Urology   • URETEROSCOPY Left 7/9/2018    Procedure: URETEROSCOPY;  Surgeon: Beck Yang M.D.;  Location: [facility];  Service: Urology   • LASERTRIPSY Left 7/9/2018    Procedure: LASERTRIPSY-LITHO;  Surgeon: Beck Yang M.D.;  Location: [facility];  Service: Urology   • CYSTOSCOPY STENT PLACEMENT Left 6/18/2018    Procedure: CYSTOSCOPY STENT PLACEMENT;  Surgeon: Beck Yang M.D.;  Location: [facility];  Service: Urology   • LITHOTRIPSY Left 6/18/2018    Procedure: LITHOTRIPSY;  Surgeon: Beck Yang M.D.;  Location: [facility];  Service: Urology   • LASERTRIPSY Left 6/18/2018 " "   Procedure: LASERTRIPSY;  Surgeon: Beck Yang M.D.;  Location: SUR[facility]ERY [facility];  Service: Urology   • URETEROSCOPY Left 2018    Procedure: URETEROSCOPY;  Surgeon: Beck Yang M.D.;  Location: SUR[facility]ERY [facility];  Service: Urology   • THORACOSCOPY Left 2016    Procedure: THORACOSCOPY W/WEDGE RESECTION UPPER LOBE MASS;  Surgeon: John H Ganser, M.D.;  Location: [facility];  Service:    • OTHER ORTHOPEDIC SURGERY  2013    kyphoplasty X3   • APPENDECTOMY         • CHOLECYSTECTOMY         • LAMINOTOMY     • LUNG BIOPSY OPEN     • OTHER     • OTHER ABDOMINAL SURGERY      gall bladder disease   • KY BREAST AUGMENTATION WITH IMPLANT     • [place] REDUCTION OF BREAST         Social History     Tobacco Use   • Smoking status: Former Smoker     Packs/day: 1.00     Years: 30.00     Pack years: 30.00     Types: Cigarettes     Quit date: 2000     Years since quittin.0   • Smokeless tobacco: Never Used   • Tobacco comment: 7 years ago   Substance Use Topics   • Alcohol use: Yes     Alcohol/week: 0.0 oz     Comment: x2 a week   • Drug use: No       Social History     Social History Narrative   • Not on file       Family History   Problem Relation Age of Onset   • Cancer Mother    • Heart Failure Father    • Heart Disease Brother          Exam: /64 (BP Location: Right arm, Patient Position: Sitting)   Pulse 79   Temp 35.9 °C (96.7 °F) (Temporal)   Ht 1.676 m (5' 6\")   Wt 88.9 kg (195 lb 15.8 oz)   PF 96 L/min  Body mass index is 31.63 kg/m².    General: Normal appearing. No distress.  EYES: Conjunctiva clear lids without ptosis, pupils equal  EARS: Normal shape and contour left ear the canal is clear and tympanic membrane.  Over the auricle of the ear laterally there is a large wound for this region that is fairly deep and there is some surrounding erythema but no fluctuance, no induration, no drainage.  Pulmonary: Clear to ausculation.  Normal effort. No rales or " wheezing.  Cardiovascular: Regular rate and rhythm without significant murmur.   Abdomen: Soft, nontender, nondistended. Normal bowel sounds.  Neurologic: Cranial nerves grossly nonfocal  Musculoskeletal: Normal gait. No extremity cyanosis, clubbing, or edema.  Psych: Normal mood and affect. Alert and oriented x3. Judgment and insight is normal.        Assessment/Plan  1. Left ear pain  Will empirically treat for suspected infection that started this large wound.  Keflex sent to pharmacy.  Wound referral.  I called her dermatology office and left a message to move up her appointment for monitoring of this as I suspect if there is infection but could also consider even malignancy.  Patient will also call her dermatology office.    2. Rash  Chest rash of unknown cause she will follow up with her dermatologist.    3. Chronic obstructive pulmonary disease, unspecified COPD type (HCC)  Chronic, stable, continue inhalers follow-up pulmonary.    4. Chronic gout of foot, unspecified cause, unspecified laterality  She is followed by her specialty team reasonable to continue colchicine, will not mix with statin.  - colchicine (COLCRYS) 0.6 MG Tab; Day 1: 1.2 mg once, followed in 1 hour with a single dose of 0.6 mg.  Day 2 and thereafter: Oral: 0.6 mg once daily until flare resolves  Dispense: 30 Tab; Refill: 1    5. Nausea  Recommend GI evaluation the chronic nausea to rule out other sources which sometimes could you include malignancy and patient expressed understanding.  - ondansetron (ZOFRAN) 4 MG Tab tablet; Take 1 Tab by mouth every four hours as needed for Nausea/Vomiting for up to 30 days.  Dispense: 20 Tab; Refill: 1    6. Other insomnia  Reasonable continue his chronic medications as a chronic condition.  Obtained and reviewed patient utilization report from [facility] pharmacy database on 1/19/2021 3:40 PM  prior to writing prescription for controlled substance II, III or IV per [place] bill . Based on  assessment of the report,my physical exam if necessary and the patient's health problem, the prescription is medically necessary.     - zolpidem (AMBIEN) 5 MG Tab; Take 1 Tab by mouth at bedtime as needed for Sleep for up to 90 days.  Dispense: 90 Tab; Refill: 0    7. Other hyperlipidemia  Stable, chronic, continue current management.  - simvastatin (ZOCOR) 40 MG Tab; TAKE ONE TABLET BY MOUTH EVERY EVENING  Dispense: 90 Tab; Refill: 0    Noted off methimazole, Dr. Jean Baptiste is planning to do thyroid uptake scan again and follow-up with patient.  She also scheduled Prolia in March.    RTC within 3 months      Please note that this dictation was created using voice recognition software. I have made every reasonable attempt to correct obvious errors, but I expect that there are errors of grammar and possibly content that I did not discover before finalizing the note.

## 2021-01-23 ENCOUNTER — HOSPITAL ENCOUNTER (OUTPATIENT)
Dept: RADIOLOGY | Facility: MEDICAL CENTER | Age: 71
End: 2021-01-23
Attending: INTERNAL MEDICINE
Payer: MEDICARE

## 2021-01-23 DIAGNOSIS — R91.8 PULMONARY NODULES: ICD-10-CM

## 2021-01-23 PROCEDURE — 71250 CT THORAX DX C-: CPT

## 2021-01-25 ENCOUNTER — APPOINTMENT (OUTPATIENT)
Dept: RADIOLOGY | Facility: MEDICAL CENTER | Age: 71
End: 2021-01-25
Attending: INTERNAL MEDICINE
Payer: MEDICARE

## 2021-01-26 ENCOUNTER — TELEPHONE (OUTPATIENT)
Dept: VASCULAR LAB | Facility: MEDICAL CENTER | Age: 71
End: 2021-01-26

## 2021-01-26 NOTE — TELEPHONE ENCOUNTER
[facility]    Pt previously established with [facility]. Received anticoag referral from Dr Marybeth Lynch on 1/19/2021 due to possible affordability issues and concerns with upcoming procedures.    Called and spoke to pt regarding Xarelto. Pt states she received a notification that her prescription cost will be increasing - unsure of by much the cost increased at this time. Pt also wanting advise on upcoming procedures of colonoscopy and back injection. She is concerned as she states she developed a venous occlusion while reversed on warfarin - discussed her risks have been low and bridging was not indicated. Also mentioned that bridging is typically involved with warfarin due to long duration/elimination and not used with with xarelto. Pt stated she would not follow through with procedures if she is not bridged - will likely need in person visit in the future for future discussion on this. Pt will be contacting her insurance to find out Xarelto and other OAC costs as well as her providers to try and schedule procedures near the same timeframe.     Will reach back out to pt in 2 weeks to reassess her status.        Insurance: Medicare  PCP: [facility]  Locations to be seen: Any    [facility] at [phone], fax [phone]    Yonis Estes, NunoD

## 2021-02-10 ENCOUNTER — TELEPHONE (OUTPATIENT)
Dept: VASCULAR LAB | Facility: MEDICAL CENTER | Age: 71
End: 2021-02-10

## 2021-02-10 NOTE — TELEPHONE ENCOUNTER
Attempted to contact pt regarding previous conversation on 1/26/2021 regarding antigoag referral for DOAC affordability and possible upcoming procedures. LVM for pt to return call.      Yonis Estes, NunoD

## 2021-02-12 DIAGNOSIS — J44.9 CHRONIC OBSTRUCTIVE PULMONARY DISEASE, UNSPECIFIED COPD TYPE (HCC): ICD-10-CM

## 2021-02-16 RX ORDER — TIOTROPIUM BROMIDE INHALATION SPRAY 3.12 UG/1
SPRAY, METERED RESPIRATORY (INHALATION)
Qty: 4 EACH | Refills: 0 | Status: SHIPPED | OUTPATIENT
Start: 2021-02-16 | End: 2021-04-07

## 2021-02-16 RX ORDER — AZITHROMYCIN 250 MG/1
TABLET, FILM COATED ORAL
Qty: 30 TABLET | Refills: 4 | Status: SHIPPED | OUTPATIENT
Start: 2021-02-16 | End: 2021-07-16

## 2021-02-16 NOTE — TELEPHONE ENCOUNTER
Received request via: Pharmacy    Was the patient seen in the last year in this department? Yes  7/21/20  Does the patient have an active prescription (recently filled or refills available) for medication(s) requested? No     Routed to Dr Byrne for review

## 2021-02-17 ENCOUNTER — TELEPHONE (OUTPATIENT)
Dept: VASCULAR LAB | Facility: MEDICAL CENTER | Age: 71
End: 2021-02-17

## 2021-02-17 NOTE — TELEPHONE ENCOUNTER
Called pt to follow up from previous phone call on 1/26 and referal.     She has not had to refill her Xarelto yet and is not sure if there will be any cost issues.     Pt states she is recovering from a recent fall and has no intention of having procedures done until she starts to feel better. Revisited the topic of holding Xarelto before a procedure and that Lovenox would not be required as instructions to stop 24 hours before procedure would be the same. She believes she was informed of different instructions for her back injection from Dr. Díaz at [facility]. I explained that the physician may change instructions based on their judgement but pt was not sure if he would be willing to do this. She became tearful on the phone as she feels stuck in 'limbo' while she is recovering from her fall in addition to her believing that she will need Lovenox bridging.     Pt wishes to have a call back in one month and informed her that she may contact [facility] for any questions or changes to her status.      Yonis Estes, PharmD

## 2021-02-19 ENCOUNTER — HOSPITAL ENCOUNTER (OUTPATIENT)
Dept: LAB | Facility: MEDICAL CENTER | Age: 71
End: 2021-02-19
Attending: NURSE PRACTITIONER
Payer: MEDICARE

## 2021-02-19 ENCOUNTER — HOSPITAL ENCOUNTER (OUTPATIENT)
Dept: LAB | Facility: MEDICAL CENTER | Age: 71
End: 2021-02-19
Attending: INTERNAL MEDICINE
Payer: MEDICARE

## 2021-02-19 DIAGNOSIS — E05.90 HYPERTHYROIDISM: ICD-10-CM

## 2021-02-19 LAB
ALBUMIN SERPL BCP-MCNC: 4 G/DL (ref 3.2–4.9)
ALBUMIN/GLOB SERPL: 1.7 G/DL
ALP SERPL-CCNC: 102 U/L (ref 30–99)
ALT SERPL-CCNC: 31 U/L (ref 2–50)
ANION GAP SERPL CALC-SCNC: 12 MMOL/L (ref 7–16)
APPEARANCE UR: CLEAR
AST SERPL-CCNC: 27 U/L (ref 12–45)
BACTERIA #/AREA URNS HPF: ABNORMAL /HPF
BASOPHILS # BLD AUTO: 0.4 % (ref 0–1.8)
BASOPHILS # BLD: 0.02 K/UL (ref 0–0.12)
BILIRUB SERPL-MCNC: 0.5 MG/DL (ref 0.1–1.5)
BILIRUB UR QL STRIP.AUTO: NEGATIVE
BUN SERPL-MCNC: 31 MG/DL (ref 8–22)
CHLORIDE SERPL-SCNC: 106 MMOL/L (ref 96–112)
CHOLEST SERPL-MCNC: 172 MG/DL (ref 100–199)
CO2 SERPL-SCNC: 20 MMOL/L (ref 20–33)
COLOR UR: YELLOW
CREAT SERPL-MCNC: 1.3 MG/DL (ref 0.5–1.4)
CREAT UR-MCNC: 41.55 MG/DL
EOSINOPHIL # BLD AUTO: 0.15 K/UL (ref 0–0.51)
EOSINOPHIL NFR BLD: 3 % (ref 0–6.9)
EPI CELLS #/AREA URNS HPF: ABNORMAL /HPF
ERYTHROCYTE [DISTWIDTH] IN BLOOD BY AUTOMATED COUNT: 55 FL (ref 35.9–50)
FASTING STATUS PATIENT QL REPORTED: NORMAL
GLOBULIN SER CALC-MCNC: 2.4 G/DL (ref 1.9–3.5)
GLUCOSE SERPL-MCNC: 104 MG/DL (ref 65–99)
GLUCOSE UR STRIP.AUTO-MCNC: NEGATIVE MG/DL
HCT VFR BLD AUTO: 42.8 % (ref 37–47)
HDLC SERPL-MCNC: 72 MG/DL
HGB BLD-MCNC: 14 G/DL (ref 12–16)
IMM GRANULOCYTES # BLD AUTO: 0.01 K/UL (ref 0–0.11)
IMM GRANULOCYTES NFR BLD AUTO: 0.2 % (ref 0–0.9)
KETONES UR STRIP.AUTO-MCNC: NEGATIVE MG/DL
LDLC SERPL CALC-MCNC: 71 MG/DL
LEUKOCYTE ESTERASE UR QL STRIP.AUTO: ABNORMAL
LYMPHOCYTES # BLD AUTO: 1.46 K/UL (ref 1–4.8)
LYMPHOCYTES NFR BLD: 29.5 % (ref 22–41)
MAGNESIUM SERPL-MCNC: 1.9 MG/DL (ref 1.5–2.5)
MCH RBC QN AUTO: 32.3 PG (ref 27–33)
MCHC RBC AUTO-ENTMCNC: 32.7 G/DL (ref 33.6–35)
MCV RBC AUTO: 98.8 FL (ref 81.4–97.8)
MICRO URNS: ABNORMAL
MONOCYTES # BLD AUTO: 0.49 K/UL (ref 0–0.85)
MONOCYTES NFR BLD AUTO: 9.9 % (ref 0–13.4)
NEUTROPHILS # BLD AUTO: 2.82 K/UL (ref 2–7.15)
NEUTROPHILS NFR BLD: 57 % (ref 44–72)
NITRITE UR QL STRIP.AUTO: NEGATIVE
NRBC # BLD AUTO: 0 K/UL
NRBC BLD-RTO: 0 /100 WBC
PH UR STRIP.AUTO: 6 [PH] (ref 5–8)
PHOSPHATE SERPL-MCNC: 2.8 MG/DL (ref 2.5–4.5)
PLATELET # BLD AUTO: 192 K/UL (ref 164–446)
PMV BLD AUTO: 11.3 FL (ref 9–12.9)
POTASSIUM SERPL-SCNC: 3.4 MMOL/L (ref 3.6–5.5)
PROT SERPL-MCNC: 6.4 G/DL (ref 6–8.2)
PROT UR QL STRIP: NEGATIVE MG/DL
PROT UR-MCNC: 5 MG/DL (ref 0–15)
PROT/CREAT UR: 120 MG/G (ref 10–107)
RBC # BLD AUTO: 4.33 M/UL (ref 4.2–5.4)
RBC UR QL AUTO: ABNORMAL
SODIUM SERPL-SCNC: 138 MMOL/L (ref 135–145)
SP GR UR STRIP.AUTO: <=1.005
T4 FREE SERPL-MCNC: 2.33 NG/DL (ref 0.93–1.7)
TRIGL SERPL-MCNC: 143 MG/DL (ref 0–149)
TSH SERPL DL<=0.005 MIU/L-ACNC: <0.005 UIU/ML (ref 0.38–5.33)
UNIDENT CRYS URNS QL MICRO: ABNORMAL /HPF
URATE SERPL-MCNC: 6 MG/DL (ref 1.9–8.2)
WBC # BLD AUTO: 5 K/UL (ref 4.8–10.8)
WBC #/AREA URNS HPF: ABNORMAL /HPF

## 2021-02-19 PROCEDURE — 84481 FREE ASSAY (FT-3): CPT

## 2021-02-19 PROCEDURE — 81001 URINALYSIS AUTO W/SCOPE: CPT

## 2021-02-19 PROCEDURE — 84443 ASSAY THYROID STIM HORMONE: CPT

## 2021-02-19 PROCEDURE — 84439 ASSAY OF FREE THYROXINE: CPT

## 2021-02-19 PROCEDURE — 85025 COMPLETE CBC W/AUTO DIFF WBC: CPT

## 2021-02-19 PROCEDURE — 82306 VITAMIN D 25 HYDROXY: CPT

## 2021-02-19 PROCEDURE — 36415 COLL VENOUS BLD VENIPUNCTURE: CPT

## 2021-02-19 PROCEDURE — 80061 LIPID PANEL: CPT

## 2021-02-19 PROCEDURE — 84550 ASSAY OF BLOOD/URIC ACID: CPT

## 2021-02-19 PROCEDURE — 83735 ASSAY OF MAGNESIUM: CPT

## 2021-02-19 PROCEDURE — 82570 ASSAY OF URINE CREATININE: CPT

## 2021-02-19 PROCEDURE — 84156 ASSAY OF PROTEIN URINE: CPT

## 2021-02-19 PROCEDURE — 84100 ASSAY OF PHOSPHORUS: CPT

## 2021-02-19 PROCEDURE — 83970 ASSAY OF PARATHORMONE: CPT

## 2021-02-19 PROCEDURE — 80053 COMPREHEN METABOLIC PANEL: CPT

## 2021-02-20 LAB
25(OH)D3 SERPL-MCNC: 68 NG/ML (ref 30–100)
CALCIUM SERPL-MCNC: 9.6 MG/DL (ref 8.4–10.2)
PTH-INTACT SERPL-MCNC: 134 PG/ML (ref 14–72)
T3FREE SERPL-MCNC: 5.61 PG/ML (ref 2–4.4)

## 2021-03-04 ENCOUNTER — HOSPITAL ENCOUNTER (OUTPATIENT)
Dept: RADIOLOGY | Facility: MEDICAL CENTER | Age: 71
End: 2021-03-04
Attending: PHYSICIAN ASSISTANT
Payer: MEDICARE

## 2021-03-04 DIAGNOSIS — S32.010A CLOSED WEDGE COMPRESSION FRACTURE OF L1 VERTEBRA, INITIAL ENCOUNTER (HCC): ICD-10-CM

## 2021-03-04 DIAGNOSIS — M47.896 OTHER SPONDYLOSIS, LUMBAR REGION: ICD-10-CM

## 2021-03-04 DIAGNOSIS — S22.000A CLOSED WEDGE FRACTURE OF THORACIC VERTEBRA, UNSPECIFIED THORACIC VERTEBRAL LEVEL, INITIAL ENCOUNTER (HCC): ICD-10-CM

## 2021-03-04 DIAGNOSIS — S32.020A CLOSED WEDGE COMPRESSION FRACTURE OF L2 VERTEBRA, INITIAL ENCOUNTER (HCC): ICD-10-CM

## 2021-03-04 DIAGNOSIS — M46.96 UNSPECIFIED INFLAMMATORY SPONDYLOPATHY, LUMBAR REGION (HCC): ICD-10-CM

## 2021-03-04 PROCEDURE — 72148 MRI LUMBAR SPINE W/O DYE: CPT | Mod: MH

## 2021-03-12 ENCOUNTER — TELEPHONE (OUTPATIENT)
Dept: ENDOCRINOLOGY | Facility: MEDICAL CENTER | Age: 71
End: 2021-03-12

## 2021-03-12 NOTE — TELEPHONE ENCOUNTER
Pt. Called stating her next Prolia shot is due 3/25/2021. Pt. Requesting new orders be sent to infusion center.

## 2021-03-13 NOTE — TELEPHONE ENCOUNTER
I called pt today to let her know we sent her Prolia order down this afternoon. She wanted me to pass along that she has been putting off her Nuclear Uptake Thyroid Scan because she took a fall in January, and went to  where they did x-rays. No new fractures. Also had an MRI done as well as Ultrasound to make sure she didn't have blood clots in her legs. Everything came back clear so far, but is experiencing a lot of back and knee pain.

## 2021-03-15 DIAGNOSIS — M81.0 POSTMENOPAUSAL OSTEOPOROSIS: ICD-10-CM

## 2021-03-17 ENCOUNTER — TELEPHONE (OUTPATIENT)
Dept: VASCULAR LAB | Facility: MEDICAL CENTER | Age: 71
End: 2021-03-17

## 2021-03-17 NOTE — TELEPHONE ENCOUNTER
Called and spoke with pt to follow up on previous procedure discussion and Xarelto affordability.    Pt states she is still recovering from her fall in January and has not given any thought to the procedures discussed last month - colonoscopy and back injection. She recently had US performed (2/26/21) for concern of DVT however this was negative.    Pt reported that her Xarelto cost did in fact increase to $400. She stated Eliquis was listed as preferred medication. Discussed the option of changing DOAC and she does not want a twice daily medication.     Offered to call pt in ~1 month to review discussed items. Pt agreed.        Yonis Estes, NunoD, MSPH

## 2021-04-01 ENCOUNTER — OUTPATIENT INFUSION SERVICES (OUTPATIENT)
Dept: ONCOLOGY | Facility: MEDICAL CENTER | Age: 71
End: 2021-04-01
Attending: INTERNAL MEDICINE
Payer: MEDICARE

## 2021-04-01 VITALS
BODY MASS INDEX: 32.54 KG/M2 | DIASTOLIC BLOOD PRESSURE: 65 MMHG | RESPIRATION RATE: 20 BRPM | HEIGHT: 65 IN | TEMPERATURE: 98 F | OXYGEN SATURATION: 98 % | WEIGHT: 195.33 LBS | SYSTOLIC BLOOD PRESSURE: 122 MMHG | HEART RATE: 85 BPM

## 2021-04-01 DIAGNOSIS — M81.0 POSTMENOPAUSAL OSTEOPOROSIS: ICD-10-CM

## 2021-04-01 LAB
CA-I BLD ISE-SCNC: 1.33 MMOL/L (ref 1.1–1.3)
CREAT BLD-MCNC: 1.4 MG/DL (ref 0.5–1.4)

## 2021-04-01 PROCEDURE — 82330 ASSAY OF CALCIUM: CPT

## 2021-04-01 PROCEDURE — 700111 HCHG RX REV CODE 636 W/ 250 OVERRIDE (IP): Mod: JG | Performed by: INTERNAL MEDICINE

## 2021-04-01 PROCEDURE — 96372 THER/PROPH/DIAG INJ SC/IM: CPT

## 2021-04-01 PROCEDURE — 36415 COLL VENOUS BLD VENIPUNCTURE: CPT

## 2021-04-01 PROCEDURE — 82565 ASSAY OF CREATININE: CPT

## 2021-04-01 RX ADMIN — DENOSUMAB 60 MG: 60 INJECTION SUBCUTANEOUS at 15:03

## 2021-04-01 ASSESSMENT — FIBROSIS 4 INDEX: FIB4 SCORE: 1.79

## 2021-04-01 NOTE — PROGRESS NOTES
Patient to [facility] for Prolia injection. Istat Ca+ and Creat performed. Patient meets parameters for injection. Prolia given in left back of arm. Bandaid used as a dressing. Patient will call to schedule appointment in 6 months. Patient to home in care of self.

## 2021-04-05 RX ORDER — LOSARTAN POTASSIUM 50 MG/1
TABLET ORAL
Qty: 135 TABLET | Refills: 1 | Status: SHIPPED | OUTPATIENT
Start: 2021-04-05 | End: 2022-01-11 | Stop reason: SDUPTHER

## 2021-04-07 DIAGNOSIS — J44.9 CHRONIC OBSTRUCTIVE PULMONARY DISEASE, UNSPECIFIED COPD TYPE (HCC): ICD-10-CM

## 2021-04-07 RX ORDER — TIOTROPIUM BROMIDE INHALATION SPRAY 3.12 UG/1
SPRAY, METERED RESPIRATORY (INHALATION)
Qty: 1 EACH | Refills: 6 | Status: SHIPPED | OUTPATIENT
Start: 2021-04-07 | End: 2021-12-17

## 2021-04-07 NOTE — TELEPHONE ENCOUNTER
Have we ever prescribed this med? Yes.  If yes, what date? 02/16/2021    Last OV: 07/21/2020 - Dr. Byrne    Next OV: 04/13/2021 - Dr. Byrne    DX: COPD    Medications: Spiriva

## 2021-04-13 ENCOUNTER — OFFICE VISIT (OUTPATIENT)
Dept: SLEEP MEDICINE | Facility: MEDICAL CENTER | Age: 71
End: 2021-04-13
Payer: MEDICARE

## 2021-04-13 VITALS
DIASTOLIC BLOOD PRESSURE: 58 MMHG | SYSTOLIC BLOOD PRESSURE: 116 MMHG | HEIGHT: 65 IN | BODY MASS INDEX: 33.01 KG/M2 | OXYGEN SATURATION: 95 % | RESPIRATION RATE: 20 BRPM | HEART RATE: 88 BPM

## 2021-04-13 DIAGNOSIS — J44.9 CHRONIC OBSTRUCTIVE PULMONARY DISEASE, UNSPECIFIED COPD TYPE (HCC): ICD-10-CM

## 2021-04-13 DIAGNOSIS — R91.8 PULMONARY NODULES: ICD-10-CM

## 2021-04-13 DIAGNOSIS — Z85.118 HISTORY OF LUNG CANCER: ICD-10-CM

## 2021-04-13 PROCEDURE — 99214 OFFICE O/P EST MOD 30 MIN: CPT | Performed by: INTERNAL MEDICINE

## 2021-04-13 RX ORDER — ONDANSETRON 4 MG/1
TABLET, FILM COATED ORAL
COMMUNITY
Start: 2021-04-11 | End: 2021-05-19 | Stop reason: SDUPTHER

## 2021-04-13 RX ORDER — LIFITEGRAST 50 MG/ML
SOLUTION/ DROPS OPHTHALMIC 2 TIMES DAILY
COMMUNITY
Start: 2021-02-18 | End: 2022-01-12

## 2021-04-13 NOTE — PROGRESS NOTES
"Chief Complaint   Patient presents with   • Follow-Up     6m FV, last seen 7/21/20 by Dr Byrne for COPD,Pulmonary nodules,History of lung cancer   • Results     CT chest done 1/23/21     HPI: This patient is a 70 y.o. Female who returns for COPD, pulmonary nodules and h/o lung cancer.  PFT's showed FEV1 1.43 L or 54% predicted, DLCO: 86% predicted, consistent with moderate COPD. She quit smoking in 2000.  She is compliant with Breo 200ug and Spiriva inhalers although does not find them beneficial.  She disliked Trelegy. She was last hospitalized January 2019 for AECOPD, although symptoms were ultimately attributed to thyrotoxicosis. She was then rehospitalized February 2019 for AECOPD. She is on prophylactic daily azithromycin. She  participated in pulmonary rehabilitation however did not feel it was beneficial.  Her shortness of breath has been stable.  Denies cough, CP,  wheezing or LE edema.    She has a history of lung cancer status post partial left upper lobectomy December 2016 (adenocarcinoma, pT2a).  Follow-up chest CT scan May 2, 2019 showed stable 6 mm groundglass nodule in the right upper lobe, 3 mm nodule in the right upper lobe and 2 mm nodules in the right lower lobe.  Chest CT December 2019 with stable nodules with a new, 4 mm nodule in the right middle lobe. Chest CT 7/16/2020 showed interval increase in size in the right middle and right upper lobe nodules from 4 - 5 mm.  Chest CT January 21 showed overall, stable nodules with new, 5 mm RML nodule.      Past Medical History:   Diagnosis Date   • Anesthesia     \"Abnormal blood pressure and breathing\"  \"couldn't breathe\"   • Asthma     inhalers daily   • Backpain 7/2017     thor. area   • Blood clot in vein 07/06/2018    \"Currently have a blood clot in my left eye\"   • Bowel habit changes 07/06/2018    Constipation   • Breath shortness     with exertion has O2 but does not use   • Bronchitis    • CAD (coronary artery disease)     palpatations   • " "Cancer (HCC) 2016    left lung   • Chickenpox    • COPD (chronic obstructive pulmonary disease) (HCC)    • Depression 2019   • Dialysis 2005    transient renal failure due to sepsis   • Emphysema of lung (HCC)    • Glaucoma     right eye   • Hemorrhagic disorder (HCC)    • Hyperlipidemia    • Hypertension    • Hyperthyroidism    • Kidney stone     bilateral   • Lung cancer (HCC) 2016   • Multiple thyroid nodules 2015   • Nasal drainage    • Osteoporosis    • Pain 2018    Back pain   • Personal history of venous thrombosis and embolism ,     right leg , left arm dvt 2004 left eye 2017   • Pneumonia 2016    per patient   • Renal disorder     had been on dialysis for 7 months for acute failure    • Renal stones     post lithotripsy   • Rheumatoid arthritis (HCC)     question   • Rheumatoid arthritis (HCC)    • S/P appendectomy     • S/P cholecystectomy    • S/P kyphoplasty , ,    • Sepsis, unspecified    • Shortness of breath 2018    Chronic current problem. \"A couple of years now\".  O2 concentrator at night - pt states she doesn't use it.   • Thyroid nodule, hot 2017    functional \"hot\" right thyroid nodule without thyrotoxicosis       Social History     Socioeconomic History   • Marital status: Single     Spouse name: Not on file   • Number of children: Not on file   • Years of education: Not on file   • Highest education level: Not on file   Occupational History   • Not on file   Tobacco Use   • Smoking status: Former Smoker     Packs/day: 1.00     Years: 30.00     Pack years: 30.00     Types: Cigarettes     Quit date: 2000     Years since quittin.2   • Smokeless tobacco: Never Used   • Tobacco comment: 7 years ago   Substance and Sexual Activity   • Alcohol use: Yes     Alcohol/week: 0.0 oz     Comment: x2 a week   • Drug use: No   • Sexual activity: Not on file   Other Topics Concern   • Not on file   Social History Narrative   • Not " on file     Social Determinants of Health     Financial Resource Strain:    • Difficulty of Paying Living Expenses:    Food Insecurity:    • Worried About Running Out of Food in the Last Year:    • Ran Out of Food in the Last Year:    Transportation Needs:    • Lack of Transportation (Medical):    • Lack of Transportation (Non-Medical):    Physical Activity:    • Days of Exercise per Week:    • Minutes of Exercise per Session:    Stress:    • Feeling of Stress :    Social Connections:    • Frequency of Communication with Friends and Family:    • Frequency of Social Gatherings with Friends and Family:    • Attends Denominational Services:    • Active Member of Clubs or Organizations:    • Attends Club or Organization Meetings:    • Marital Status:    Intimate Partner Violence:    • Fear of Current or Ex-Partner:    • Emotionally Abused:    • Physically Abused:    • Sexually Abused:        Family History   Problem Relation Age of Onset   • Cancer Mother    • Heart Failure Father    • Heart Disease Brother        Current Outpatient Medications on File Prior to Visit   Medication Sig Dispense Refill   • ondansetron (ZOFRAN) 4 MG Tab tablet      • XIIDRA 5 % Solution      • SPIRIVA RESPIMAT 2.5 MCG/ACT Aero Soln INHALE TWO PUFFS BY MOUTH DAILY 1 Each 6   • losartan (COZAAR) 50 MG Tab TAKE ONE AND ONE-HALF (1 AND 1/2) TABLET BY MOUTH DAILY (COZAAR) 135 tablet 1   • simvastatin (ZOCOR) 40 MG Tab TAKE ONE TABLET BY MOUTH EVERY EVENING 90 Tab 0   • colchicine (COLCRYS) 0.6 MG Tab Day 1: 1.2 mg once, followed in 1 hour with a single dose of 0.6 mg.  Day 2 and thereafter: Oral: 0.6 mg once daily until flare resolves 30 Tab 1   • zolpidem (AMBIEN) 5 MG Tab Take 1 Tab by mouth at bedtime as needed for Sleep for up to 90 days. 90 Tab 0   • XARELTO 20 MG Tab tablet TAKE ONE TABLET BY MOUTH DAILY WITH DINNER 90 Tab 1   • LOSARTAN POTASSIUM PO Take 50 mg by mouth.     • albuterol 108 (90 Base) MCG/ACT Aero Soln inhalation aerosol INHALE  TWO PUFFS BY MOUTH EVERY 6 HOURS AS NEEDED FOR SHORTNESS OF BREATH 1 Inhaler 5   • cyanocobalamin (VITAMIN B-12) 100 MCG Tab Take 100 mcg by mouth every day.     • albuterol (PROVENTIL) 2.5mg/3ml Nebu Soln solution for nebulization 3 mL by Nebulization route every four hours as needed for Shortness of Breath. 30 Bullet 3   • SODIUM BICARBONATE PO Take 10 g by mouth.     • vitamin D, Ergocalciferol, (DRISDOL) 05033 units Cap capsule Take 1 Cap by mouth every 14 days. 5 Cap 1   • HYDROcodone/acetaminophen (NORCO)  MG Tab Take 1-2 Tabs by mouth every 6 hours as needed.     • allopurinol (ZYLOPRIM) 100 MG Tab Take 100 mg by mouth 2 times a day.     • docusate sodium (COLACE) 100 MG Cap Take 300 mg by mouth every evening.     • acetaminophen (TYLENOL) 500 MG Tab Take 1,000 mg by mouth every 6 hours as needed for Moderate Pain.     • COMBIGAN 0.2-0.5 % Solution Place 1 Drop in both eyes 2 Times a Day.     • azithromycin (ZITHROMAX) 250 MG Tab TAKE ONE TABLET BY MOUTH DAILY (Patient not taking: Reported on 4/13/2021) 30 tablet 4     No current facility-administered medications on file prior to visit.       Allergies: Patient has no known allergies.    ROS:   Constitutional: Denies fevers, chills, night sweats, fatigue or weight loss  Eyes: Denies vision loss, pain, drainage, double vision  Ears, Nose, Throat: Denies earache, difficulty hearing, tinnitus, nasal congestion, hoarseness  Cardiovascular: Denies chest pain, tightness, palpitations, orthopnea or edema  Respiratory: Denies shortness of breath, cough, wheezing, hemoptysis  Sleep: Denies daytime sleepiness, snoring, apneas, insomnia, morning headaches  GI: Denies heartburn, dysphagia, nausea, abdominal pain, diarrhea or constipation  : Denies frequent urination, hematuria, discharge or painful urination  Musculoskeletal: Denies back pain, painful joints, sore muscles  Neurological: Denies weakness or headaches  Skin: No rashes    /58   Pulse 88    "Resp 20   Ht 1.638 m (5' 4.5\")   SpO2 95%     Physical Exam:  Appearance: Well-nourished, well-developed, in no acute distress  HEENT: Normocephalic, atraumatic, white sclera, PERRLA, masked  Neck: No adenopathy or masses  Respiratory: no intercostal retractions or accessory muscle use  Lungs auscultation: Clear to auscultation bilaterally  Cardiovascular: Regular rate rhythm. No murmurs, rubs or gallops.  No LE edema  Abdomen: soft, nondistended  Gait: Normal  Digits: No clubbing, cyanosis  Motor: No focal deficits  Orientation: Oriented to time, person and place    Diagnosis:  1. Chronic obstructive pulmonary disease, unspecified COPD type (HCC)  Fluticasone Furoate-Vilanterol (BREO ELLIPTA) 200-25 MCG/INH AEROSOL POWDER, BREATH ACTIVATED   2. Pulmonary nodules  CT-CHEST (THORAX) W/O   3. History of lung cancer  CT-CHEST (THORAX) W/O       Plan:  The patient's COPD has been stable. Encouraged continuing Breo and Spiriva inhalers.  She declines pulmonary function testing.  Continue prophylactic azithromycin.  She received COVID vaccines.  She has prednisone and doxycycline on hand for AECOPD.  Her pulmonary nodules are small and stable. Recommend ongoing surveillance with chest CAT scan without contrast in 6 months.  Return in about 4 months (around 8/13/2021).      "

## 2021-04-21 ENCOUNTER — TELEPHONE (OUTPATIENT)
Dept: VASCULAR LAB | Facility: MEDICAL CENTER | Age: 71
End: 2021-04-21

## 2021-04-21 NOTE — TELEPHONE ENCOUNTER
[facility]    Called and spoke to pt regarding referral placed 1/2021 for possible procedures.     Pt experienced a fall in January and continues to recover. Pt states she is working with physical therapy and has had only a few sessions thus far with little noted benefit.     Pt states she has appt with [facility] on 5/5/21 to discuss next steps in therapy. Pt agreed for follow up phone call after the appointment to discuss her plan and options - encouraged her to continue PT to allow maximum benefits.        Yonis Estes, PharmD, MSPH

## 2021-04-27 ENCOUNTER — HOSPITAL ENCOUNTER (OUTPATIENT)
Dept: RADIOLOGY | Facility: MEDICAL CENTER | Age: 71
End: 2021-04-27
Attending: INTERNAL MEDICINE
Payer: MEDICARE

## 2021-04-27 DIAGNOSIS — E05.90 HYPERTHYROIDISM: ICD-10-CM

## 2021-04-27 DIAGNOSIS — E04.2 MULTIPLE THYROID NODULES: ICD-10-CM

## 2021-04-27 PROCEDURE — 78014 THYROID IMAGING W/BLOOD FLOW: CPT | Mod: ME

## 2021-04-30 ENCOUNTER — TELEPHONE (OUTPATIENT)
Dept: ENDOCRINOLOGY | Facility: MEDICAL CENTER | Age: 71
End: 2021-04-30

## 2021-04-30 NOTE — TELEPHONE ENCOUNTER
Pt called and LVM 4/30/21, she would like to know results for her thyroid, please call her [phone].. js

## 2021-05-01 NOTE — TELEPHONE ENCOUNTER
Telephone conversation with patient    I reviewed the I-123 uptake and scan.  It looks like the previous 1.  Uptake 19% and has a hot nodule in the right upper lobe but also uptake in the rest of the thyroid also.  I do not know that it is feasible to treat this with I-131.  I will speak with Dr. Hedrick next week to get his opinion.  Patient is in agreement.    Kishore Torrez M.D.

## 2021-05-08 ENCOUNTER — TELEPHONE (OUTPATIENT)
Dept: ENDOCRINOLOGY | Facility: MEDICAL CENTER | Age: 71
End: 2021-05-08

## 2021-05-08 NOTE — TELEPHONE ENCOUNTER
Telephone conversation with patient    I have been waiting to consult with Dr. Hedrick about the possibility of treating her with I-131.  Her uptake and scan have not changed appreciably from the previous when he did not think it would be helpful.  I can even be sure that she is symptomatic in terms of her thyrotoxicosis apart from fatigue and exhaustion.  Right now she does not have any other symptoms.  She has been off of methimazole for quite a while.  She is willing to try the methimazole again if I think that is the best.  We will update her thyroid levels and I will try to speak with Dr. Hedrick.    Kishore Torrez M.D.

## 2021-05-13 ENCOUNTER — APPOINTMENT (OUTPATIENT)
Dept: LAB | Facility: MEDICAL CENTER | Age: 71
End: 2021-05-13
Payer: MEDICARE

## 2021-05-13 ENCOUNTER — ANTICOAGULATION MONITORING (OUTPATIENT)
Dept: VASCULAR LAB | Facility: MEDICAL CENTER | Age: 71
End: 2021-05-13

## 2021-05-13 NOTE — PROGRESS NOTES
Spoke with Latonia who is scheduled for SI joint injection. Pt provider reports she does NOT need to hold Xarelto.  Pt may hold for 24 hours prior if she wishes.  Pt to resume Xarelto post procedure  Clarissa Murillo, Clinical Pharmacist, CDE, CACP

## 2021-05-17 ENCOUNTER — TELEPHONE (OUTPATIENT)
Dept: ENDOCRINOLOGY | Facility: MEDICAL CENTER | Age: 71
End: 2021-05-17

## 2021-05-17 DIAGNOSIS — E05.90 HYPERTHYROIDISM: ICD-10-CM

## 2021-05-17 NOTE — TELEPHONE ENCOUNTER
Patient is requesting lab orders from Dr. Torrez. She had been told labs were needed, but orders have not been sent to lab according to patient.   Thank You

## 2021-05-18 NOTE — TELEPHONE ENCOUNTER
Telephone conversation with Dr. Hedrick and subsequently patient.    I reviewed the nuclear medicine scan the last month with Dr. Hedrick with a question about possible I-131 ablation of the toxic nodule.  Because of the uptake of 20% and the size of the nodule around 3 cm he is not certain that 1 dose will effectively ablate the nodule.  On the other hand the dose of about 15 - 20  mCi might make her hypothyroid because there is uptake throughout both lobes.       So nothing definite would come out of I-131 therapy.  Her last thyroid blood levels were in the thyrotoxic range in February with a completely suppressed TSH and elevated T4 and T3.  Symptoms however are not definite as relates to thyrotoxicosis.          Plan is to get fresh thyroid blood levels updated and if they remain in the thyrotoxic range another therapeutic trial with low-dose methimazole would be my recommendation.  She will go to the lab in the next 2 or 3 days and we will further discuss.    Kishore Torrez M.D.

## 2021-05-19 ENCOUNTER — OFFICE VISIT (OUTPATIENT)
Dept: MEDICAL GROUP | Facility: MEDICAL CENTER | Age: 71
End: 2021-05-19
Payer: MEDICARE

## 2021-05-19 ENCOUNTER — HOSPITAL ENCOUNTER (OUTPATIENT)
Dept: LAB | Facility: MEDICAL CENTER | Age: 71
End: 2021-05-19
Attending: INTERNAL MEDICINE
Payer: MEDICARE

## 2021-05-19 ENCOUNTER — DOCUMENTATION (OUTPATIENT)
Dept: MEDICAL GROUP | Facility: MEDICAL CENTER | Age: 71
End: 2021-05-19

## 2021-05-19 VITALS
OXYGEN SATURATION: 96 % | DIASTOLIC BLOOD PRESSURE: 60 MMHG | RESPIRATION RATE: 20 BRPM | HEART RATE: 100 BPM | BODY MASS INDEX: 32.29 KG/M2 | TEMPERATURE: 97 F | WEIGHT: 193.78 LBS | HEIGHT: 65 IN | SYSTOLIC BLOOD PRESSURE: 98 MMHG

## 2021-05-19 DIAGNOSIS — R11.0 CHRONIC NAUSEA: ICD-10-CM

## 2021-05-19 DIAGNOSIS — E87.6 HYPOKALEMIA: ICD-10-CM

## 2021-05-19 DIAGNOSIS — G47.09 OTHER INSOMNIA: ICD-10-CM

## 2021-05-19 DIAGNOSIS — E05.90 HYPERTHYROIDISM: ICD-10-CM

## 2021-05-19 PROBLEM — J98.11 ATELECTASIS: Status: RESOLVED | Noted: 2019-02-26 | Resolved: 2021-05-19

## 2021-05-19 LAB
ANION GAP SERPL CALC-SCNC: 15 MMOL/L (ref 7–16)
BUN SERPL-MCNC: 55 MG/DL (ref 8–22)
CALCIUM SERPL-MCNC: 9.5 MG/DL (ref 8.4–10.2)
CHLORIDE SERPL-SCNC: 105 MMOL/L (ref 96–112)
CO2 SERPL-SCNC: 17 MMOL/L (ref 20–33)
CREAT SERPL-MCNC: 2.04 MG/DL (ref 0.5–1.4)
FASTING STATUS PATIENT QL REPORTED: NORMAL
GLUCOSE SERPL-MCNC: 125 MG/DL (ref 65–99)
POTASSIUM SERPL-SCNC: 3.2 MMOL/L (ref 3.6–5.5)
SODIUM SERPL-SCNC: 137 MMOL/L (ref 135–145)
T4 FREE SERPL-MCNC: 2.64 NG/DL (ref 0.93–1.7)
TSH SERPL DL<=0.005 MIU/L-ACNC: 0.01 UIU/ML (ref 0.38–5.33)

## 2021-05-19 PROCEDURE — 84481 FREE ASSAY (FT-3): CPT

## 2021-05-19 PROCEDURE — 99214 OFFICE O/P EST MOD 30 MIN: CPT | Performed by: INTERNAL MEDICINE

## 2021-05-19 PROCEDURE — 80048 BASIC METABOLIC PNL TOTAL CA: CPT

## 2021-05-19 PROCEDURE — 36415 COLL VENOUS BLD VENIPUNCTURE: CPT

## 2021-05-19 PROCEDURE — 84439 ASSAY OF FREE THYROXINE: CPT

## 2021-05-19 PROCEDURE — 84443 ASSAY THYROID STIM HORMONE: CPT

## 2021-05-19 RX ORDER — ZOLPIDEM TARTRATE 5 MG/1
5 TABLET ORAL NIGHTLY PRN
Qty: 90 TABLET | Refills: 3 | Status: SHIPPED | OUTPATIENT
Start: 2021-06-01 | End: 2021-08-30

## 2021-05-19 RX ORDER — CYCLOSPORINE 0.5 MG/ML
1 EMULSION OPHTHALMIC DAILY
COMMUNITY
Start: 2021-04-22 | End: 2023-01-01

## 2021-05-19 RX ORDER — ONDANSETRON 4 MG/1
4 TABLET, FILM COATED ORAL EVERY 8 HOURS PRN
Qty: 30 TABLET | Refills: 6 | Status: SHIPPED | OUTPATIENT
Start: 2021-05-19 | End: 2021-06-18

## 2021-05-19 ASSESSMENT — PATIENT HEALTH QUESTIONNAIRE - PHQ9
8. MOVING OR SPEAKING SO SLOWLY THAT OTHER PEOPLE COULD HAVE NOTICED. OR THE OPPOSITE, BEING SO FIGETY OR RESTLESS THAT YOU HAVE BEEN MOVING AROUND A LOT MORE THAN USUAL: NOT AT ALL
2. FEELING DOWN, DEPRESSED, IRRITABLE, OR HOPELESS: MORE THAN HALF THE DAYS
4. FEELING TIRED OR HAVING LITTLE ENERGY: NEARLY EVERY DAY
9. THOUGHTS THAT YOU WOULD BE BETTER OFF DEAD, OR OF HURTING YOURSELF: NOT AT ALL
7. TROUBLE CONCENTRATING ON THINGS, SUCH AS READING THE NEWSPAPER OR WATCHING TELEVISION: NOT AT ALL
1. LITTLE INTEREST OR PLEASURE IN DOING THINGS: NEARLY EVERY DAY
5. POOR APPETITE OR OVEREATING: NOT AT ALL
SUM OF ALL RESPONSES TO PHQ9 QUESTIONS 1 AND 2: 5
SUM OF ALL RESPONSES TO PHQ QUESTIONS 1-9: 10
6. FEELING BAD ABOUT YOURSELF - OR THAT YOU ARE A FAILURE OR HAVE LET YOURSELF OR YOUR FAMILY DOWN: NOT AL ALL
3. TROUBLE FALLING OR STAYING ASLEEP OR SLEEPING TOO MUCH: MORE THAN HALF THE DAYS

## 2021-05-19 ASSESSMENT — FIBROSIS 4 INDEX: FIB4 SCORE: 1.79

## 2021-05-19 NOTE — PROGRESS NOTES
CC:  Diagnoses of Hypokalemia, Other insomnia, and Chronic nausea were pertinent to this visit.    HISTORY OF THE PRESENT ILLNESS: Patient is a 70 y.o. female. This pleasant patient is here today for follow-up.      Says she is here for interval medication refills.    She is pending appointment w/ [facility] on Wednesday for SI joint injection and she is very hopeful that this will help her back pain.  She is holding off on seeing GI or having endoscopy at this time because of her back symptoms.  She follows with the anticoagulation clinic and knows that they can help bridge her if needed for future procedures.  Noted her back was not better after at least 6 physical therapy sessions.  She is also getting a knee MRI this Saturday.    Continues to follow with her endocrinologist, says she had radioactive iodine uptake test recently and based on results she was told best to continue oral therapy.  She does feel that she has some general weakness and she is not sure if it is due to her thyroid but she is pending follow-up with her endocrinologist.    Continues to see her eye specialist as well Dr. Pan did procedure for dry eye and she says that this did not resolve the condition, she also sees her glaucoma specialist in roughly 2 weeks.  She cannot drive, has a regular taxicab.    Just had her Prolia 4/1/2021.  Pending some dental work her dentist just prescribed amoxicillin to take preprocedurally.    She continues to feel down with her multiple medical conditions but does not wish to have any counseling or pharmacotherapy.  No SI or HI.      Allergies: Patient has no known allergies.    Current Outpatient Medications Ordered in Epic   Medication Sig Dispense Refill   • ondansetron (ZOFRAN) 4 MG Tab tablet Take 1 tablet by mouth every 8 hours as needed for Nausea/Vomiting for up to 30 days. 30 tablet 6   • [START ON 6/1/2021] zolpidem (AMBIEN) 5 MG Tab Take 1 tablet by mouth at bedtime as needed for Sleep for up  to 90 days. 90 tablet 3   • XIIDRA 5 % Solution      • Fluticasone Furoate-Vilanterol (BREO ELLIPTA) 200-25 MCG/INH AEROSOL POWDER, BREATH ACTIVATED Inhale 1 Puff every day. 1 Each 5   • SPIRIVA RESPIMAT 2.5 MCG/ACT Aero Soln INHALE TWO PUFFS BY MOUTH DAILY 1 Each 6   • losartan (COZAAR) 50 MG Tab TAKE ONE AND ONE-HALF (1 AND 1/2) TABLET BY MOUTH DAILY (COZAAR) 135 tablet 1   • simvastatin (ZOCOR) 40 MG Tab TAKE ONE TABLET BY MOUTH EVERY EVENING 90 Tab 0   • colchicine (COLCRYS) 0.6 MG Tab Day 1: 1.2 mg once, followed in 1 hour with a single dose of 0.6 mg.  Day 2 and thereafter: Oral: 0.6 mg once daily until flare resolves 30 Tab 1   • XARELTO 20 MG Tab tablet TAKE ONE TABLET BY MOUTH DAILY WITH DINNER 90 Tab 1   • LOSARTAN POTASSIUM PO Take 50 mg by mouth.     • albuterol 108 (90 Base) MCG/ACT Aero Soln inhalation aerosol INHALE TWO PUFFS BY MOUTH EVERY 6 HOURS AS NEEDED FOR SHORTNESS OF BREATH 1 Inhaler 5   • cyanocobalamin (VITAMIN B-12) 100 MCG Tab Take 100 mcg by mouth every day.     • albuterol (PROVENTIL) 2.5mg/3ml Nebu Soln solution for nebulization 3 mL by Nebulization route every four hours as needed for Shortness of Breath. 30 Bullet 3   • SODIUM BICARBONATE PO Take 10 g by mouth.     • vitamin D, Ergocalciferol, (DRISDOL) 00446 units Cap capsule Take 1 Cap by mouth every 14 days. 5 Cap 1   • HYDROcodone/acetaminophen (NORCO)  MG Tab Take 1-2 Tabs by mouth every 6 hours as needed.     • allopurinol (ZYLOPRIM) 100 MG Tab Take 100 mg by mouth 2 times a day.     • docusate sodium (COLACE) 100 MG Cap Take 300 mg by mouth every evening.     • acetaminophen (TYLENOL) 500 MG Tab Take 1,000 mg by mouth every 6 hours as needed for Moderate Pain.     • COMBIGAN 0.2-0.5 % Solution Place 1 Drop in both eyes 2 Times a Day.     • RESTASIS 0.05 % ophthalmic emulsion Administer 1 Drop into both eyes every day.     • azithromycin (ZITHROMAX) 250 MG Tab TAKE ONE TABLET BY MOUTH DAILY (Patient not taking: Reported on  "4/13/2021) 30 tablet 4     No current Epic-ordered facility-administered medications on file.       Past Medical History:   Diagnosis Date   • Anesthesia     \"Abnormal blood pressure and breathing\"  \"couldn't breathe\"   • Asthma     inhalers daily   • Backpain 7/2017     thor. area   • Blood clot in vein 07/06/2018    \"Currently have a blood clot in my left eye\"   • Bowel habit changes 07/06/2018    Constipation   • Breath shortness     with exertion has O2 but does not use   • Bronchitis    • CAD (coronary artery disease)     palpatations   • Cancer (HCC) 2016    left lung   • Chickenpox    • COPD (chronic obstructive pulmonary disease) (HCC)    • Depression 2/26/2019   • Dialysis 2005    transient renal failure due to sepsis   • Emphysema of lung (HCC)    • Glaucoma     right eye   • Hemorrhagic disorder (HCC)    • Hyperlipidemia    • Hypertension    • Hyperthyroidism    • Kidney stone     bilateral   • Lung cancer (HCC) 12/12/2016   • Multiple thyroid nodules 7/9/2015   • Nasal drainage    • Osteoporosis    • Pain 07/06/2018    Back pain   • Personal history of venous thrombosis and embolism 2004, 2012    right leg 2012, left arm dvt 2004 left eye 2017   • Pneumonia 7/2016    per patient   • Renal disorder     had been on dialysis for 7 months for acute failure 2005   • Renal stones 2013    post lithotripsy   • Rheumatoid arthritis (HCC)     question   • Rheumatoid arthritis (HCC)    • S/P appendectomy 1998    • S/P cholecystectomy 1998   • S/P kyphoplasty 2006, 2007, 2013   • Sepsis, unspecified 2005   • Shortness of breath 07/06/2018    Chronic current problem. \"A couple of years now\".  O2 concentrator at night - pt states she doesn't use it.   • Thyroid nodule, hot 2017    functional \"hot\" right thyroid nodule without thyrotoxicosis       Past Surgical History:   Procedure Laterality Date   • CYSTOSCOPY STENT PLACEMENT  7/9/2018    Procedure: Cystoscopy,  Left removal of stent ,  Left Stent Placement;  " Surgeon: Beck Yang M.D.;  Location: [facility];  Service: Urology   • URETEROSCOPY Left 2018    Procedure: URETEROSCOPY;  Surgeon: Beck Yang M.D.;  Location: [facility];  Service: Urology   • LASERTRIPSY Left 2018    Procedure: LASERTRIPSY-LITHO;  Surgeon: Beck Yang M.D.;  Location: [facility];  Service: Urology   • CYSTOSCOPY STENT PLACEMENT Left 2018    Procedure: CYSTOSCOPY STENT PLACEMENT;  Surgeon: Beck Yang M.D.;  Location: [facility];  Service: Urology   • LITHOTRIPSY Left 2018    Procedure: LITHOTRIPSY;  Surgeon: Beck Yang M.D.;  Location: [facility];  Service: Urology   • LASERTRIPSY Left 2018    Procedure: LASERTRIPSY;  Surgeon: Beck Yang M.D.;  Location: [facility];  Service: Urology   • URETEROSCOPY Left 2018    Procedure: URETEROSCOPY;  Surgeon: Beck Yang M.D.;  Location: [facility];  Service: Urology   • THORACOSCOPY Left 2016    Procedure: THORACOSCOPY W/WEDGE RESECTION UPPER LOBE MASS;  Surgeon: John H Ganser, M.D.;  Location: [facility];  Service:    • OTHER ORTHOPEDIC SURGERY      kyphoplasty X3   • APPENDECTOMY         • CHOLECYSTECTOMY         • LAMINOTOMY     • LUNG BIOPSY OPEN     • OTHER     • OTHER ABDOMINAL SURGERY      gall bladder disease   • IN BREAST AUGMENTATION WITH IMPLANT     • IN REDUCTION OF BREAST         Social History     Tobacco Use   • Smoking status: Former Smoker     Packs/day: 1.00     Years: 30.00     Pack years: 30.00     Types: Cigarettes     Quit date: 2000     Years since quittin.3   • Smokeless tobacco: Never Used   • Tobacco comment: 7 years ago   Vaping Use   • Vaping Use: Never used   Substance Use Topics   • Alcohol use: Yes     Alcohol/week: 0.0 oz     Comment: x2 a week   • Drug use: No       Social History     Social History Narrative   • Not on file       Family  "History   Problem Relation Age of Onset   • Cancer Mother    • Heart Failure Father    • Heart Disease Brother        Exam: BP (!) 98/60 (BP Location: Left arm, Patient Position: Sitting)   Pulse 100   Temp 36.1 °C (97 °F) (Temporal)   Resp 20   Ht 1.638 m (5' 4.5\")   Wt 87.9 kg (193 lb 12.6 oz)   SpO2 96%  Body mass index is 32.75 kg/m².    General: Normal appearing. No distress.  EYES: Conjunctiva clear lids without ptosis, pupils equal  EARS: Normal shape and contour   Pulmonary: Clear to ausculation.  Normal effort. No rales or wheezing.  Cardiovascular: Regular rate and rhythm   Abdomen: Soft, nontender, nondistended. Normal bowel sounds.  Neurologic: Cranial nerves grossly nonfocal  Skin: Warm and dry.  No obvious lesions.  Musculoskeletal: Normal gait. No extremity cyanosis, clubbing, or edema.  Psych: Normal mood and affect. Alert and oriented    Assessment/Plan  1. Hypokalemia  Plan to reassess  - Basic Metabolic Panel; Future    2. Other insomnia  Stable, chronic condition well-controlled on Ambien.  She does not wish to change treatment.  No adverse events.  Obtained and reviewed patient utilization report from [facility] pharmacy database on 5/19/2021 3:44 PM  prior to writing prescription for controlled substance II, III or IV per [place] bill . Based on assessment of the report,my physical exam if necessary and the patient's health problem, the prescription is medically necessary.   - zolpidem (AMBIEN) 5 MG Tab; Take 1 tablet by mouth at bedtime as needed for Sleep for up to 90 days.  Dispense: 90 tablet; Refill: 3    3. Chronic nausea  We have discussed GI follow-up to consider endoscopy, and that she is due for colon cancer screening, etc.  She has been previously advised regarding risk of malignancy.  At this time she wants to get her back feeling better prior to seeing GI team or considering endoscopy procedure.  - ondansetron (ZOFRAN) 4 MG Tab tablet; Take 1 tablet by mouth every 8 " hours as needed for Nausea/Vomiting for up to 30 days.  Dispense: 30 tablet; Refill: 6    rtc sept 2021          Please note that this dictation was created using voice recognition software. I have made every reasonable attempt to correct obvious errors, but I expect that there are errors of grammar and possibly content that I did not discover before finalizing the note.

## 2021-05-20 ENCOUNTER — TELEPHONE (OUTPATIENT)
Dept: MEDICAL GROUP | Facility: MEDICAL CENTER | Age: 71
End: 2021-05-20

## 2021-05-20 ENCOUNTER — PATIENT MESSAGE (OUTPATIENT)
Dept: ENDOCRINOLOGY | Facility: MEDICAL CENTER | Age: 71
End: 2021-05-20

## 2021-05-20 DIAGNOSIS — E05.90 HYPERTHYROIDISM: ICD-10-CM

## 2021-05-20 LAB — T3FREE SERPL-MCNC: 6.35 PG/ML (ref 2–4.4)

## 2021-05-20 RX ORDER — METHIMAZOLE 10 MG/1
10 TABLET ORAL DAILY
Qty: 30 TABLET | Refills: 3 | Status: SHIPPED | OUTPATIENT
Start: 2021-05-20 | End: 2021-05-21 | Stop reason: SDUPTHER

## 2021-05-20 NOTE — PROGRESS NOTES
Called patient on the phone and let her know that labs that were done this afternoon showed kidney failure w/ GFR low 20s, creatinine 2, BUN in the 50 range.  She says she is actually on bicarbonate and her CO2 level has drifted down to 17.  Remains hypokalemic at 3.2.  Discussed with patient I am concerned she may becoming acidotic with these changes.    In clinic she does seem stable, not volume overloaded.    Because of kidney failure on labs advised go to ER now to have repeat labs and consider imaging to rule out other causes such as hydronephrosis, etc.  Patient expressed understanding that recommendation is ER tonight.    She says she will call her nephrologist Dr. Banks and let her know in the morning that she has concerning lab findings of kidney failure and she will give me an update.    Patient had no further questions

## 2021-05-21 DIAGNOSIS — E05.90 HYPERTHYROIDISM: ICD-10-CM

## 2021-05-21 DIAGNOSIS — I82.4Y9 DEEP VEIN THROMBOSIS (DVT) OF PROXIMAL LOWER EXTREMITY, UNSPECIFIED CHRONICITY, UNSPECIFIED LATERALITY (HCC): ICD-10-CM

## 2021-05-21 RX ORDER — METHIMAZOLE 10 MG/1
10 TABLET ORAL DAILY
Qty: 30 TABLET | Refills: 3 | Status: SHIPPED | OUTPATIENT
Start: 2021-05-21 | End: 2022-04-04 | Stop reason: SDUPTHER

## 2021-05-21 NOTE — TELEPHONE ENCOUNTER
Phone Number Called: [phone] (home)     Call outcome: Spoke to patient regarding message below.    Message: spoke to pt. She stated she did check in with her nephrologist this morning at 8am. Pt was recommended by nephrologist office to start potassium every day. She stats she is feeling better and agreed she should go to urgent care anything changes. Had no further questions at this time.

## 2021-05-21 NOTE — PROGRESS NOTES
Telephone conversation with patient    Thyroid levels are higher than February and she is very weak and a little bit tachycardic.  I think we should go back to methimazole as being the quickest way to control this.  She is probably symptomatic.  She will resume methimazole 10 mg/day and we will do monthly thyroid levels.  Also I will see her in the office next month.    Kidney function has decreased also.  She does not have edema and I think some of this could be a dehydration.  I told her to hydrate and see if that improves.  She has been in touch with her nephrologist who has restarted some potassium supplement as well and will follow up.    Kishore Torrez M.D.

## 2021-05-21 NOTE — TELEPHONE ENCOUNTER
Pt is requesting rx refill for Methimazole 10 mg to [facility] on [facility].. js

## 2021-05-22 NOTE — PROGRESS NOTES
[facility]    Received message from this pt requesting a refill of Xarelto.  Based on most recent renal function I'm not certain this is a safe choice without speaking with the patient.  Requested the pt call us back before we provide additional refills.    Additionally pt has not established care in our clinic and needs to be seen to continue providing care.    Tommy Gracia, PharmD

## 2021-05-24 DIAGNOSIS — I82.4Y9 DEEP VEIN THROMBOSIS (DVT) OF PROXIMAL LOWER EXTREMITY, UNSPECIFIED CHRONICITY, UNSPECIFIED LATERALITY (HCC): ICD-10-CM

## 2021-05-24 NOTE — PROGRESS NOTES
[facility]    Pt's renal function had a significant change.  Current CrCl ~35 mL/min but SCr isn't stable.  Requested pt to obtain another CMP prior to our next visit. Pt reports she spoke with nephrologist and they aren't concerned at this time.      Provided pt with 30 day prescription.     Tommy Gracia, PharmD

## 2021-05-29 ENCOUNTER — HOSPITAL ENCOUNTER (OUTPATIENT)
Dept: RADIOLOGY | Facility: MEDICAL CENTER | Age: 71
End: 2021-05-29
Attending: PHYSICIAN ASSISTANT
Payer: MEDICARE

## 2021-05-29 DIAGNOSIS — M25.562 LEFT KNEE PAIN, UNSPECIFIED CHRONICITY: ICD-10-CM

## 2021-05-29 PROCEDURE — 73721 MRI JNT OF LWR EXTRE W/O DYE: CPT | Mod: LT,MH

## 2021-06-03 DIAGNOSIS — E78.49 OTHER HYPERLIPIDEMIA: ICD-10-CM

## 2021-06-05 NOTE — TELEPHONE ENCOUNTER
Have we ever prescribed this med? Yes.  If yes, what date? 5/21/18    Last OV: 5/7/19    Next OV: 8/8/19    DX: Chronic obstructive pulmonary disease, unspecified COPD type (HCC) (J44.9)    Medications: BREO ELLIPTA 200-25 MCG/INH AEROSOL POWDER, BREATH ACTIVATED   clothing

## 2021-06-07 RX ORDER — SIMVASTATIN 40 MG
TABLET ORAL
Qty: 90 TABLET | Refills: 0 | Status: SHIPPED | OUTPATIENT
Start: 2021-06-07 | End: 2021-09-20

## 2021-06-18 ENCOUNTER — HOSPITAL ENCOUNTER (OUTPATIENT)
Dept: LAB | Facility: MEDICAL CENTER | Age: 71
End: 2021-06-18
Attending: INTERNAL MEDICINE
Payer: MEDICARE

## 2021-06-18 DIAGNOSIS — E05.90 HYPERTHYROIDISM: ICD-10-CM

## 2021-06-18 LAB
T4 FREE SERPL-MCNC: 1.73 NG/DL (ref 0.93–1.7)
TSH SERPL DL<=0.005 MIU/L-ACNC: 0.01 UIU/ML (ref 0.38–5.33)

## 2021-06-18 PROCEDURE — 84481 FREE ASSAY (FT-3): CPT

## 2021-06-18 PROCEDURE — 36415 COLL VENOUS BLD VENIPUNCTURE: CPT

## 2021-06-18 PROCEDURE — 84439 ASSAY OF FREE THYROXINE: CPT

## 2021-06-18 PROCEDURE — 84443 ASSAY THYROID STIM HORMONE: CPT

## 2021-06-19 LAB — T3FREE SERPL-MCNC: 3.05 PG/ML (ref 2–4.4)

## 2021-06-22 ENCOUNTER — OFFICE VISIT (OUTPATIENT)
Dept: MEDICAL GROUP | Facility: MEDICAL CENTER | Age: 71
End: 2021-06-22
Payer: MEDICARE

## 2021-06-22 ENCOUNTER — HOSPITAL ENCOUNTER (OUTPATIENT)
Facility: MEDICAL CENTER | Age: 71
End: 2021-06-22
Attending: INTERNAL MEDICINE
Payer: MEDICARE

## 2021-06-22 ENCOUNTER — OFFICE VISIT (OUTPATIENT)
Dept: ENDOCRINOLOGY | Facility: MEDICAL CENTER | Age: 71
End: 2021-06-22
Attending: INTERNAL MEDICINE
Payer: MEDICARE

## 2021-06-22 VITALS
HEART RATE: 102 BPM | HEIGHT: 65 IN | BODY MASS INDEX: 32.82 KG/M2 | OXYGEN SATURATION: 96 % | WEIGHT: 197 LBS | SYSTOLIC BLOOD PRESSURE: 98 MMHG | DIASTOLIC BLOOD PRESSURE: 64 MMHG

## 2021-06-22 VITALS
DIASTOLIC BLOOD PRESSURE: 90 MMHG | HEART RATE: 93 BPM | HEIGHT: 65 IN | WEIGHT: 197 LBS | OXYGEN SATURATION: 99 % | SYSTOLIC BLOOD PRESSURE: 140 MMHG | RESPIRATION RATE: 20 BRPM | BODY MASS INDEX: 32.82 KG/M2 | TEMPERATURE: 96.9 F

## 2021-06-22 DIAGNOSIS — E04.2 MULTIPLE THYROID NODULES: ICD-10-CM

## 2021-06-22 DIAGNOSIS — R11.0 NAUSEA: ICD-10-CM

## 2021-06-22 DIAGNOSIS — R30.0 DYSURIA: ICD-10-CM

## 2021-06-22 DIAGNOSIS — E05.10 THYROTOXICOSIS WITH TOXIC SINGLE THYROID NODULE AND WITHOUT THYROID STORM: ICD-10-CM

## 2021-06-22 DIAGNOSIS — N18.30 STAGE 3 CHRONIC KIDNEY DISEASE, UNSPECIFIED WHETHER STAGE 3A OR 3B CKD: ICD-10-CM

## 2021-06-22 DIAGNOSIS — Z78.0 MENOPAUSE: ICD-10-CM

## 2021-06-22 DIAGNOSIS — M81.0 OSTEOPOROSIS, UNSPECIFIED OSTEOPOROSIS TYPE, UNSPECIFIED PATHOLOGICAL FRACTURE PRESENCE: ICD-10-CM

## 2021-06-22 DIAGNOSIS — E87.6 HYPOKALEMIA: ICD-10-CM

## 2021-06-22 DIAGNOSIS — R30.0 DYSURIA: Primary | ICD-10-CM

## 2021-06-22 LAB
APPEARANCE UR: NORMAL
BILIRUB UR STRIP-MCNC: NEGATIVE MG/DL
COLOR UR AUTO: YELLOW
GLUCOSE UR STRIP.AUTO-MCNC: NEGATIVE MG/DL
KETONES UR STRIP.AUTO-MCNC: NEGATIVE MG/DL
LEUKOCYTE ESTERASE UR QL STRIP.AUTO: NEGATIVE
NITRITE UR QL STRIP.AUTO: NEGATIVE
PH UR STRIP.AUTO: 5 [PH] (ref 5–8)
PROT UR QL STRIP: NEGATIVE MG/DL
RBC UR QL AUTO: NEGATIVE
SP GR UR STRIP.AUTO: 1.02
UROBILINOGEN UR STRIP-MCNC: 0.02 MG/DL

## 2021-06-22 PROCEDURE — 87660 TRICHOMONAS VAGIN DIR PROBE: CPT

## 2021-06-22 PROCEDURE — 87510 GARDNER VAG DNA DIR PROBE: CPT

## 2021-06-22 PROCEDURE — 99211 OFF/OP EST MAY X REQ PHY/QHP: CPT | Performed by: INTERNAL MEDICINE

## 2021-06-22 PROCEDURE — 99214 OFFICE O/P EST MOD 30 MIN: CPT | Performed by: INTERNAL MEDICINE

## 2021-06-22 PROCEDURE — 87480 CANDIDA DNA DIR PROBE: CPT

## 2021-06-22 PROCEDURE — 87086 URINE CULTURE/COLONY COUNT: CPT

## 2021-06-22 PROCEDURE — 81002 URINALYSIS NONAUTO W/O SCOPE: CPT | Performed by: INTERNAL MEDICINE

## 2021-06-22 RX ORDER — POTASSIUM CHLORIDE 1500 MG/1
20 TABLET, EXTENDED RELEASE ORAL DAILY
COMMUNITY
Start: 2021-05-24 | End: 2023-01-01

## 2021-06-22 RX ORDER — ONDANSETRON 4 MG/1
4 TABLET, FILM COATED ORAL EVERY 4 HOURS PRN
COMMUNITY
End: 2022-05-12 | Stop reason: SDUPTHER

## 2021-06-22 ASSESSMENT — FIBROSIS 4 INDEX
FIB4 SCORE: 1.79
FIB4 SCORE: 1.79

## 2021-06-22 NOTE — PROGRESS NOTES
Chief Complaint   Patient presents with   • Thyrotoxicosis     Functional nodule & non functional nodules   • Osteoporosis        HPI:         Patient has had a quick response to methimazole 10 mg/day.  In about 10 days T4 came down from 2.6 down to 1.7 (0.9-1.7) and free T3 came down from 6.3 down to 3.0 (2.0-4.4).  TSH stays suppressed at 0.007 which is expected this soon.  She does not notice much difference clinically.       However, she has had other problems to distract her including a fall causing neck fractured toe and injured left knee which may be avascular necrosis.  Also we noticed kidney function has declined significantly and I think some of that is probably dehydration with this hot weather.  She purposely did not drink much during the day because she does not want to be inconvenience with getting to a toilet.  She drinks more at night but I think she may not be keeping up so I told her to stop that habit.      She is tolerating Prolia well.  Had her last shot in April and is set again for October.  It has been more than 2 years since we have done a bone density so we should update that.  She is taking her vitamin D supplement and her level in February was 60.  She is not taking calcium supplements because of her renal insufficiency and history of kidney stones.  Also has a elevated PTH          I will update her bone density    ROS:       Some question about whether or not she should be taking Xarelto raised by a pharmacist.  She will sort that out with her cardiologist.      Allergies: No Known Allergies    Current medicines including changes today:  Current Outpatient Medications   Medication Sig Dispense Refill   • KLOR-CON M20 20 MEQ Tab CR Take 20 mEq by mouth every day.     • ondansetron (ZOFRAN) 4 MG Tab tablet Take 4 mg by mouth every four hours as needed for Nausea/Vomiting.     • simvastatin (ZOCOR) 40 MG Tab TAKE ONE TABLET BY MOUTH EVERY EVENING 90 tablet 0   • rivaroxaban (XARELTO) 20 MG  Tab tablet TAKE ONE TABLET BY MOUTH DAILY WITH DINNER 30 tablet 0   • methimazole (TAPAZOLE) 10 MG Tab Take 1 tablet by mouth every day. 30 tablet 3   • RESTASIS 0.05 % ophthalmic emulsion Administer 1 Drop into both eyes every day.     • zolpidem (AMBIEN) 5 MG Tab Take 1 tablet by mouth at bedtime as needed for Sleep for up to 90 days. 90 tablet 3   • XIIDRA 5 % Solution 2 times a day.     • Fluticasone Furoate-Vilanterol (BREO ELLIPTA) 200-25 MCG/INH AEROSOL POWDER, BREATH ACTIVATED Inhale 1 Puff every day. 1 Each 5   • SPIRIVA RESPIMAT 2.5 MCG/ACT Aero Soln INHALE TWO PUFFS BY MOUTH DAILY 1 Each 6   • losartan (COZAAR) 50 MG Tab TAKE ONE AND ONE-HALF (1 AND 1/2) TABLET BY MOUTH DAILY (COZAAR) (Patient taking differently: Take 50 mg by mouth every day.) 135 tablet 1   • azithromycin (ZITHROMAX) 250 MG Tab TAKE ONE TABLET BY MOUTH DAILY 30 tablet 4   • colchicine (COLCRYS) 0.6 MG Tab Day 1: 1.2 mg once, followed in 1 hour with a single dose of 0.6 mg.  Day 2 and thereafter: Oral: 0.6 mg once daily until flare resolves 30 Tab 1   • albuterol 108 (90 Base) MCG/ACT Aero Soln inhalation aerosol INHALE TWO PUFFS BY MOUTH EVERY 6 HOURS AS NEEDED FOR SHORTNESS OF BREATH 1 Inhaler 5   • cyanocobalamin (VITAMIN B-12) 100 MCG Tab Take 100 mcg by mouth every day.     • albuterol (PROVENTIL) 2.5mg/3ml Nebu Soln solution for nebulization 3 mL by Nebulization route every four hours as needed for Shortness of Breath. 30 Bullet 3   • SODIUM BICARBONATE PO Take 10 g by mouth 2 times a day.     • vitamin D, Ergocalciferol, (DRISDOL) 05856 units Cap capsule Take 1 Cap by mouth every 14 days. (Patient taking differently: Take 50,000 Units by mouth every 7 days.) 5 Cap 1   • HYDROcodone/acetaminophen (NORCO)  MG Tab Take 1-2 Tabs by mouth every 6 hours as needed.     • allopurinol (ZYLOPRIM) 100 MG Tab Take 200 mg by mouth every day.     • docusate sodium (COLACE) 100 MG Cap Take 300 mg by mouth every evening.     • acetaminophen  "(TYLENOL) 500 MG Tab Take 1,000 mg by mouth every 6 hours as needed for Moderate Pain.     • COMBIGAN 0.2-0.5 % Solution Place 1 Drop in both eyes 2 Times a Day.     • LOSARTAN POTASSIUM PO Take 50 mg by mouth.       No current facility-administered medications for this visit.        Past Medical History:   Diagnosis Date   • Anesthesia     \"Abnormal blood pressure and breathing\"  \"couldn't breathe\"   • Asthma     inhalers daily   • Backpain 7/2017     thor. area   • Blood clot in vein 07/06/2018    \"Currently have a blood clot in my left eye\"   • Bowel habit changes 07/06/2018    Constipation   • Breath shortness     with exertion has O2 but does not use   • Bronchitis    • CAD (coronary artery disease)     palpatations   • Cancer (HCC) 2016    left lung   • Chickenpox    • COPD (chronic obstructive pulmonary disease) ([facility])    • Depression 2/26/2019   • Dialysis 2005    transient renal failure due to sepsis   • Emphysema of lung ([facility])    • Glaucoma     right eye   • Hemorrhagic disorder ([facility])    • Hyperlipidemia    • Hypertension    • Hyperthyroidism    • Kidney stone     bilateral   • Lung cancer (HCC) 12/12/2016   • Multiple thyroid nodules 7/9/2015   • Nasal drainage    • Osteoporosis    • Pain 07/06/2018    Back pain   • Personal history of venous thrombosis and embolism 2004, 2012    right leg 2012, left arm dvt 2004 left eye 2017   • Pneumonia 7/2016    per patient   • Renal disorder     had been on dialysis for 7 months for acute failure 2005   • Renal stones 2013    post lithotripsy   • Rheumatoid arthritis ([facility])     question   • Rheumatoid arthritis (HCC)    • S/P appendectomy 1998    • S/P cholecystectomy 1998   • S/P kyphoplasty 2006, 2007, 2013   • Sepsis, unspecified 2005   • Shortness of breath 07/06/2018    Chronic current problem. \"A couple of years now\".  O2 concentrator at night - pt states she doesn't use it.   • Thyroid nodule, hot 2017    functional \"hot\" right thyroid nodule without " "thyrotoxicosis       PHYSICAL EXAM:    BP (!) 98/64 (BP Location: Left arm, Patient Position: Sitting, BP Cuff Size: Adult)   Pulse (!) 102   Ht 1.638 m (5' 4.5\")   Wt 89.4 kg (197 lb) Comment: pt declined to weigh- stated she weighed at another appt  LMP  (LMP Unknown)   SpO2 96%   BMI 33.29 kg/m²     Gen.   appears comfortable and moves pretty well in spite of current injuries    Skin   appropriate for sex and age    HEENT  unremarkable    Neck   prominent right lobe of the thyroid although her largest nodule is on the left side.  I cannot feel the left thyroid nodule.  No other nodules elsewhere in the neck or supraclavicular areas.    Heart  regular    Extremities  no edema    Neuro   no tremor of the hands.               Gait very cautious and careful but ambulates independently    Psych  appropriate, calm              Does not appear thyrotoxic    ASSESSMENT AND RECOMMENDATIONS    1. Thyrotoxicosis with toxic single thyroid nodule and without thyroid storm  Responding to methimazole 10 mg p.o. daily for 1 month           .  Decrease methimazole to 5 mg/day and update lab in 1 month    2. Multiple thyroid nodules            Right large functional nodule and other smaller nonfunctional nodules.  Not a candidate for I-131 ablation.    3. Osteoporosis, unspecified osteoporosis type, toe pathological fracture presence            Tolerating Prolia well             Update bone density bone density menopause       DISPOSITION: Thyroid blood levels done monthly and report by MyChart or telephone            Return to clinic in 3 months      Kishore Torrez M.D.    Copies to: Marybeth Lynch M.D. [phone]  "

## 2021-06-22 NOTE — PROGRESS NOTES
CC:  Diagnoses of Dysuria, Stage 3 chronic kidney disease, unspecified whether stage 3a or 3b CKD (HCC), Hypokalemia, and Nausea were pertinent to this visit.    HISTORY OF THE PRESENT ILLNESS: Patient is a 70 y.o. female. This pleasant patient is here today for urinary symptoms.    She has had burning and itching in the vulvar area for couple weeks, frequency of urination.  No blood in the urine, no fever, no new flank pain.  Does have chronic midline back pain, had MARI procedure 5/25/2021 which does take the edge off some through her specialist at [facility].  Her symptoms started shortly after she received amoxicillin from her dentist roughly 3 weeks ago.  She used over-the-counter yeast medication for 2 days without change.  She is staying well-hydrated and during the day her urine is clear, no nocturia at night.    She found that she had left knee meniscus, avascular necrosis.  Sees orthopedic Dr. Sorensen for follow-up 7/7/2021.  Initial knee injury she feels happened after she fell walking her dog in Jan.  She has taken steroids in the past, prednisone.  She tells me orthopedic feels avascular necrosis at this time is more likely due to trauma.    She did follow-up with her kidney specialist regarding the change in creatinine since our last visit and was told that it can be monitored and it was fine.  She sees her nephrologist 7/21/2021.  Also has pending CT thorax and follow-up with pulmonologist in August.    She sees her endocrinologist this afternoon, was recently restarted on methimazole.    Allergies: Patient has no known allergies.    Current Outpatient Medications Ordered in Epic   Medication Sig Dispense Refill   • KLOR-CON M20 20 MEQ Tab CR      • simvastatin (ZOCOR) 40 MG Tab TAKE ONE TABLET BY MOUTH EVERY EVENING 90 tablet 0   • rivaroxaban (XARELTO) 20 MG Tab tablet TAKE ONE TABLET BY MOUTH DAILY WITH DINNER 30 tablet 0   • methimazole (TAPAZOLE) 10 MG Tab Take 1 tablet by mouth every day. 30  tablet 3   • RESTASIS 0.05 % ophthalmic emulsion Administer 1 Drop into both eyes every day.     • zolpidem (AMBIEN) 5 MG Tab Take 1 tablet by mouth at bedtime as needed for Sleep for up to 90 days. 90 tablet 3   • XIIDRA 5 % Solution      • Fluticasone Furoate-Vilanterol (BREO ELLIPTA) 200-25 MCG/INH AEROSOL POWDER, BREATH ACTIVATED Inhale 1 Puff every day. 1 Each 5   • SPIRIVA RESPIMAT 2.5 MCG/ACT Aero Soln INHALE TWO PUFFS BY MOUTH DAILY 1 Each 6   • losartan (COZAAR) 50 MG Tab TAKE ONE AND ONE-HALF (1 AND 1/2) TABLET BY MOUTH DAILY (COZAAR) 135 tablet 1   • colchicine (COLCRYS) 0.6 MG Tab Day 1: 1.2 mg once, followed in 1 hour with a single dose of 0.6 mg.  Day 2 and thereafter: Oral: 0.6 mg once daily until flare resolves 30 Tab 1   • LOSARTAN POTASSIUM PO Take 50 mg by mouth.     • cyanocobalamin (VITAMIN B-12) 100 MCG Tab Take 100 mcg by mouth every day.     • albuterol (PROVENTIL) 2.5mg/3ml Nebu Soln solution for nebulization 3 mL by Nebulization route every four hours as needed for Shortness of Breath. 30 Bullet 3   • SODIUM BICARBONATE PO Take 10 g by mouth.     • vitamin D, Ergocalciferol, (DRISDOL) 37741 units Cap capsule Take 1 Cap by mouth every 14 days. 5 Cap 1   • HYDROcodone/acetaminophen (NORCO)  MG Tab Take 1-2 Tabs by mouth every 6 hours as needed.     • allopurinol (ZYLOPRIM) 100 MG Tab Take 100 mg by mouth 2 times a day.     • docusate sodium (COLACE) 100 MG Cap Take 300 mg by mouth every evening.     • acetaminophen (TYLENOL) 500 MG Tab Take 1,000 mg by mouth every 6 hours as needed for Moderate Pain.     • COMBIGAN 0.2-0.5 % Solution Place 1 Drop in both eyes 2 Times a Day.     • azithromycin (ZITHROMAX) 250 MG Tab TAKE ONE TABLET BY MOUTH DAILY (Patient not taking: Reported on 4/13/2021) 30 tablet 4   • albuterol 108 (90 Base) MCG/ACT Aero Soln inhalation aerosol INHALE TWO PUFFS BY MOUTH EVERY 6 HOURS AS NEEDED FOR SHORTNESS OF BREATH 1 Inhaler 5     No current Epic-ordered  "facility-administered medications on file.       Past Medical History:   Diagnosis Date   • Anesthesia     \"Abnormal blood pressure and breathing\"  \"couldn't breathe\"   • Asthma     inhalers daily   • Backpain 7/2017     thor. area   • Blood clot in vein 07/06/2018    \"Currently have a blood clot in my left eye\"   • Bowel habit changes 07/06/2018    Constipation   • Breath shortness     with exertion has O2 but does not use   • Bronchitis    • CAD (coronary artery disease)     palpatations   • Cancer (HCC) 2016    left lung   • Chickenpox    • COPD (chronic obstructive pulmonary disease) (HCC)    • Depression 2/26/2019   • Dialysis 2005    transient renal failure due to sepsis   • Emphysema of lung (HCC)    • Glaucoma     right eye   • Hemorrhagic disorder (HCC)    • Hyperlipidemia    • Hypertension    • Hyperthyroidism    • Kidney stone     bilateral   • Lung cancer (HCC) 12/12/2016   • Multiple thyroid nodules 7/9/2015   • Nasal drainage    • Osteoporosis    • Pain 07/06/2018    Back pain   • Personal history of venous thrombosis and embolism 2004, 2012    right leg 2012, left arm dvt 2004 left eye 2017   • Pneumonia 7/2016    per patient   • Renal disorder     had been on dialysis for 7 months for acute failure 2005   • Renal stones 2013    post lithotripsy   • Rheumatoid arthritis (HCC)     question   • Rheumatoid arthritis (HCC)    • S/P appendectomy 1998    • S/P cholecystectomy 1998   • S/P kyphoplasty 2006, 2007, 2013   • Sepsis, unspecified 2005   • Shortness of breath 07/06/2018    Chronic current problem. \"A couple of years now\".  O2 concentrator at night - pt states she doesn't use it.   • Thyroid nodule, hot 2017    functional \"hot\" right thyroid nodule without thyrotoxicosis       Past Surgical History:   Procedure Laterality Date   • CYSTOSCOPY STENT PLACEMENT  7/9/2018    Procedure: Cystoscopy,  Left removal of stent ,  Left Stent Placement;  Surgeon: Beck Yang M.D.;  Location: SURGERY [facility]" [facility];  Service: Urology   • URETEROSCOPY Left 2018    Procedure: URETEROSCOPY;  Surgeon: Beck Yang M.D.;  Location: [facility];  Service: Urology   • LASERTRIPSY Left 2018    Procedure: LASERTRIPSY-LITHO;  Surgeon: Beck Yang M.D.;  Location: [facility];  Service: Urology   • CYSTOSCOPY STENT PLACEMENT Left 2018    Procedure: CYSTOSCOPY STENT PLACEMENT;  Surgeon: Beck Yang M.D.;  Location: [facility];  Service: Urology   • LITHOTRIPSY Left 2018    Procedure: LITHOTRIPSY;  Surgeon: Beck Yang M.D.;  Location: [facility];  Service: Urology   • LASERTRIPSY Left 2018    Procedure: LASERTRIPSY;  Surgeon: Beck Yang M.D.;  Location: [facility];  Service: Urology   • URETEROSCOPY Left 2018    Procedure: URETEROSCOPY;  Surgeon: Beck Yang M.D.;  Location: [facility];  Service: Urology   • THORACOSCOPY Left 2016    Procedure: THORACOSCOPY W/WEDGE RESECTION UPPER LOBE MASS;  Surgeon: John H Ganser, M.D.;  Location: [facility];  Service:    • OTHER ORTHOPEDIC SURGERY  2013    kyphoplasty X3   • APPENDECTOMY         • CHOLECYSTECTOMY         • LAMINOTOMY     • LUNG BIOPSY OPEN     • OTHER     • OTHER ABDOMINAL SURGERY      gall bladder disease   • UT BREAST AUGMENTATION WITH IMPLANT     • UT REDUCTION OF BREAST         Social History     Tobacco Use   • Smoking status: Former Smoker     Packs/day: 1.00     Years: 30.00     Pack years: 30.00     Types: Cigarettes     Quit date: 2000     Years since quittin.4   • Smokeless tobacco: Never Used   • Tobacco comment: 7 years ago   Vaping Use   • Vaping Use: Never used   Substance Use Topics   • Alcohol use: Yes     Alcohol/week: 0.0 oz     Comment: x2 a week   • Drug use: No       Social History     Social History Narrative   • Not on file       Family History   Problem Relation Age of Onset   • Cancer Mother   "  • Heart Failure Father    • Heart Disease Brother        Exam: /90 (BP Location: Left arm, Patient Position: Sitting)   Pulse 93   Temp 36.1 °C (96.9 °F) (Temporal)   Resp 20   Ht 1.638 m (5' 4.5\")   Wt 89.4 kg (197 lb)   SpO2 99%  Body mass index is 33.29 kg/m².    General: Normal appearing. No distress.  EYES: Conjunctiva clear lids without ptosis, pupils equal  EARS: Normal shape and contour   Pulmonary: Clear to ausculation.  Normal effort. No rales or wheezing.  Cardiovascular: Regular rate and rhythm   Abdomen: Soft, nontender, nondistended. Normal bowel sounds.  Neurologic: Cranial nerves grossly nonfocal  Skin: Warm and dry.  No obvious lesions.  Musculoskeletal: Normal gait. No extremity cyanosis, clubbing, or edema.  Psych: Normal mood and affect. Alert and oriented x3. Judgment and insight is normal.      Assessment/Plan  1. Dysuria  She is agreeable to run some additional studies to further define her symptoms.  Could consider Candida infection since symptoms started after recent amoxicillin therapy, rule out bacterial UTI, etc. so far urinalysis shows no leukocyte esterase or nitrate, also no blood or protein.  - URINALYSIS; Future  - URINE CULTURE(NEW); Future  - VAGINAL PATHOGENS DNA PANEL; Future  - URINALYSIS; Future  - URINE CULTURE(NEW); Future    2. Stage 3 chronic kidney disease, unspecified whether stage 3a or 3b CKD (HCC)  Plan to reassess renal function, she will follow up with her nephrologist July.  - Basic Metabolic Panel; Future  - MAGNESIUM; Future    3. Hypokalemia  Reassess status, she says she is hypokalemic in the setting of being off her potassium supplement and also sodium bicarbonate.  - Basic Metabolic Panel; Future  - MAGNESIUM; Future    4. Nausea  Intermittent nausea, she is not sure if it is due to stress.  She is followed by endocrine.  History of cholecystectomy. Plan to reassess renal function as above. May benefit from some additional testing.  - US-ABDOMEN " COMPLETE SURVEY; Future      RTC as planned in August        Please note that this dictation was created using voice recognition software. I have made every reasonable attempt to correct obvious errors, but I expect that there are errors of grammar and possibly content that I did not discover before finalizing the note.

## 2021-06-23 ENCOUNTER — TELEPHONE (OUTPATIENT)
Dept: MEDICAL GROUP | Facility: MEDICAL CENTER | Age: 71
End: 2021-06-23

## 2021-06-23 DIAGNOSIS — B37.9 CANDIDA INFECTION: ICD-10-CM

## 2021-06-23 LAB
CANDIDA DNA VAG QL PROBE+SIG AMP: POSITIVE
G VAGINALIS DNA VAG QL PROBE+SIG AMP: NEGATIVE
T VAGINALIS DNA VAG QL PROBE+SIG AMP: NEGATIVE

## 2021-06-23 RX ORDER — FLUCONAZOLE 150 MG/1
150 TABLET ORAL DAILY
Qty: 2 TABLET | Refills: 1 | Status: SHIPPED | OUTPATIENT
Start: 2021-06-23 | End: 2021-06-24

## 2021-06-23 NOTE — TELEPHONE ENCOUNTER
"----- Message from Marybeth Lynch M.D. sent at 6/23/2021 11:14 AM PDT -----  Please ensure patient gets message  \"Hi Latonia the results do show that you have Candida which is yeast, your suspicion was right.  I have sent fluconazole, it is a 1 pill treatment and if symptoms do not resolve after 1 week you can repeat the dose.  If symptoms do not ultimately resolve please let me know.  Please also note do not take the fluconazole with colchicine as this can be a severe drug reaction Dr. ARGENIS Ford\"  "

## 2021-06-24 ENCOUNTER — ANTICOAGULATION VISIT (OUTPATIENT)
Dept: MEDICAL GROUP | Facility: MEDICAL CENTER | Age: 71
End: 2021-06-24
Payer: MEDICARE

## 2021-06-24 VITALS — DIASTOLIC BLOOD PRESSURE: 75 MMHG | SYSTOLIC BLOOD PRESSURE: 90 MMHG | HEART RATE: 101 BPM

## 2021-06-24 DIAGNOSIS — I82.4Y9 DEEP VEIN THROMBOSIS (DVT) OF PROXIMAL LOWER EXTREMITY, UNSPECIFIED CHRONICITY, UNSPECIFIED LATERALITY (HCC): ICD-10-CM

## 2021-06-24 DIAGNOSIS — Z79.01 CHRONIC ANTICOAGULATION: Primary | ICD-10-CM

## 2021-06-24 PROCEDURE — 99211 OFF/OP EST MAY X REQ PHY/QHP: CPT | Performed by: INTERNAL MEDICINE

## 2021-06-24 NOTE — PROGRESS NOTES
Target end date:indefinite     Indication: hx of DVT and venous occulsion of left eye, and left arm DVT d/t port     Drug: Xarelto 20 mg daily     CHADsVASC = n/a    Health Status Since Last Assessment   Patient denies any new relevant medical problems, ED visits or hospitalizations   Patient denies any embolic events (stroke/tia/systemic embolism)    Adherence with DOAC Therapy   Pt has 0 missed any doses in the average week    Bleeding Risk Assessment     Denies Epistaxis   Pt denies any excessive or unusual bleeding/hematomas.  Pt denies any GI bleeds or hematemesis.  Pt denies any concerning daily headache or sub dural hematoma symptoms.     Pt denies any hematuria or abnormal vaginal bleeding.   Latest Hemoglobin 14.0   ETOH overuse rarely     Creatinine Clearance/Renal Function     Latest ClCr 36 ml/min    Hepatic function   Latest LFTs WNL   Pt denies any history of liver dysfunction      Drug Interactions   Platelets: 192   ASA/other antiplatelets none   NSAID none   Other drug interactions     Verified no anticonvulsant or azole therapy, education provided for future use.     Examination   Blood Pressure WNL   Symptomatic hypotension    Significant gait impairment/imbalance/high risk for falls? Yes- fell in January, broke her toe. Walks carefully    Final Assessment and Recommendations:   Patient appears stable from the anticoagulation standpoint.     Benefits of continued DOAC therapy outweigh risks for this patient   Recommend pt continue with current DOAC therapy     Her last SCR was elevated in comparison to recent SCr but it has been this high in the past, she is getting a new BMP prior to July appt with nephrology.    Other Actions: cmp/ cbc hemogram ordered prior to next visit    Follow up:   Will follow up with patient 3  months.     Yessi Grace, PharmD

## 2021-06-25 LAB
BACTERIA UR CULT: NORMAL
SIGNIFICANT IND 70042: NORMAL
SITE SITE: NORMAL
SOURCE SOURCE: NORMAL

## 2021-07-13 ENCOUNTER — HOSPITAL ENCOUNTER (OUTPATIENT)
Dept: LAB | Facility: MEDICAL CENTER | Age: 71
End: 2021-07-13
Attending: INTERNAL MEDICINE
Payer: MEDICARE

## 2021-07-13 ENCOUNTER — HOSPITAL ENCOUNTER (OUTPATIENT)
Dept: LAB | Facility: MEDICAL CENTER | Age: 71
End: 2021-07-13
Attending: STUDENT IN AN ORGANIZED HEALTH CARE EDUCATION/TRAINING PROGRAM
Payer: MEDICARE

## 2021-07-13 DIAGNOSIS — E05.90 HYPERTHYROIDISM: ICD-10-CM

## 2021-07-13 LAB
25(OH)D3 SERPL-MCNC: 41 NG/ML (ref 30–100)
APPEARANCE UR: CLEAR
BACTERIA #/AREA URNS HPF: ABNORMAL /HPF
BASOPHILS # BLD AUTO: 0.2 % (ref 0–1.8)
BASOPHILS # BLD: 0.01 K/UL (ref 0–0.12)
BILIRUB UR QL STRIP.AUTO: NEGATIVE
CALCIUM SERPL-MCNC: 9.3 MG/DL (ref 8.4–10.2)
CHOLEST SERPL-MCNC: 155 MG/DL (ref 100–199)
COLOR UR: YELLOW
CREAT UR-MCNC: 26.34 MG/DL
CREAT UR-MCNC: 27.43 MG/DL
EOSINOPHIL # BLD AUTO: 0.1 K/UL (ref 0–0.51)
EOSINOPHIL NFR BLD: 1.6 % (ref 0–6.9)
EPI CELLS #/AREA URNS HPF: ABNORMAL /HPF
ERYTHROCYTE [DISTWIDTH] IN BLOOD BY AUTOMATED COUNT: 56.2 FL (ref 35.9–50)
FASTING STATUS PATIENT QL REPORTED: NORMAL
FERRITIN SERPL-MCNC: 216 NG/ML (ref 10–291)
GLUCOSE UR STRIP.AUTO-MCNC: NEGATIVE MG/DL
HCT VFR BLD AUTO: 38 % (ref 37–47)
HDLC SERPL-MCNC: 71 MG/DL
HGB BLD-MCNC: 12.4 G/DL (ref 12–16)
HYALINE CASTS #/AREA URNS LPF: ABNORMAL /LPF
IMM GRANULOCYTES # BLD AUTO: 0.11 K/UL (ref 0–0.11)
IMM GRANULOCYTES NFR BLD AUTO: 1.8 % (ref 0–0.9)
KETONES UR STRIP.AUTO-MCNC: NEGATIVE MG/DL
LDLC SERPL CALC-MCNC: 47 MG/DL
LEUKOCYTE ESTERASE UR QL STRIP.AUTO: ABNORMAL
LYMPHOCYTES # BLD AUTO: 1.84 K/UL (ref 1–4.8)
LYMPHOCYTES NFR BLD: 29.4 % (ref 22–41)
MAGNESIUM SERPL-MCNC: 1.9 MG/DL (ref 1.5–2.5)
MCH RBC QN AUTO: 32.9 PG (ref 27–33)
MCHC RBC AUTO-ENTMCNC: 32.6 G/DL (ref 33.6–35)
MCV RBC AUTO: 100.8 FL (ref 81.4–97.8)
MICRO URNS: ABNORMAL
MICROALBUMIN UR-MCNC: <1.2 MG/DL
MICROALBUMIN/CREAT UR: NORMAL MG/G (ref 0–30)
MONOCYTES # BLD AUTO: 0.53 K/UL (ref 0–0.85)
MONOCYTES NFR BLD AUTO: 8.5 % (ref 0–13.4)
NEUTROPHILS # BLD AUTO: 3.66 K/UL (ref 2–7.15)
NEUTROPHILS NFR BLD: 58.5 % (ref 44–72)
NITRITE UR QL STRIP.AUTO: NEGATIVE
NRBC # BLD AUTO: 0 K/UL
NRBC BLD-RTO: 0 /100 WBC
PH UR STRIP.AUTO: 6 [PH] (ref 5–8)
PHOSPHATE SERPL-MCNC: 2.3 MG/DL (ref 2.5–4.5)
PLATELET # BLD AUTO: 182 K/UL (ref 164–446)
PMV BLD AUTO: 11.6 FL (ref 9–12.9)
PROT UR QL STRIP: NEGATIVE MG/DL
PROT UR-MCNC: <4 MG/DL (ref 0–15)
PROT/CREAT UR: NORMAL MG/G (ref 10–107)
PTH-INTACT SERPL-MCNC: 240 PG/ML (ref 14–72)
RBC # BLD AUTO: 3.77 M/UL (ref 4.2–5.4)
RBC # URNS HPF: ABNORMAL /HPF
RBC UR QL AUTO: ABNORMAL
SP GR UR STRIP.AUTO: <=1.005
T4 FREE SERPL-MCNC: 1.92 NG/DL (ref 0.93–1.7)
TRIGL SERPL-MCNC: 184 MG/DL (ref 0–149)
TSH SERPL DL<=0.005 MIU/L-ACNC: 0.01 UIU/ML (ref 0.38–5.33)
URATE SERPL-MCNC: 6 MG/DL (ref 1.9–8.2)
WBC # BLD AUTO: 6.3 K/UL (ref 4.8–10.8)
WBC #/AREA URNS HPF: ABNORMAL /HPF

## 2021-07-13 PROCEDURE — 84439 ASSAY OF FREE THYROXINE: CPT

## 2021-07-13 PROCEDURE — 82043 UR ALBUMIN QUANTITATIVE: CPT

## 2021-07-13 PROCEDURE — 84443 ASSAY THYROID STIM HORMONE: CPT

## 2021-07-13 PROCEDURE — 82306 VITAMIN D 25 HYDROXY: CPT

## 2021-07-13 PROCEDURE — 83540 ASSAY OF IRON: CPT | Mod: GA

## 2021-07-13 PROCEDURE — 83735 ASSAY OF MAGNESIUM: CPT

## 2021-07-13 PROCEDURE — 83550 IRON BINDING TEST: CPT | Mod: GA

## 2021-07-13 PROCEDURE — 85025 COMPLETE CBC W/AUTO DIFF WBC: CPT

## 2021-07-13 PROCEDURE — 84481 FREE ASSAY (FT-3): CPT

## 2021-07-13 PROCEDURE — 82728 ASSAY OF FERRITIN: CPT | Mod: GA

## 2021-07-13 PROCEDURE — 83036 HEMOGLOBIN GLYCOSYLATED A1C: CPT | Mod: GA

## 2021-07-13 PROCEDURE — 36415 COLL VENOUS BLD VENIPUNCTURE: CPT

## 2021-07-13 PROCEDURE — 84550 ASSAY OF BLOOD/URIC ACID: CPT

## 2021-07-13 PROCEDURE — 84156 ASSAY OF PROTEIN URINE: CPT

## 2021-07-13 PROCEDURE — 80053 COMPREHEN METABOLIC PANEL: CPT

## 2021-07-13 PROCEDURE — 84100 ASSAY OF PHOSPHORUS: CPT

## 2021-07-13 PROCEDURE — 80061 LIPID PANEL: CPT | Mod: GA

## 2021-07-13 PROCEDURE — 83970 ASSAY OF PARATHORMONE: CPT

## 2021-07-13 PROCEDURE — 82570 ASSAY OF URINE CREATININE: CPT

## 2021-07-13 PROCEDURE — 81001 URINALYSIS AUTO W/SCOPE: CPT

## 2021-07-14 ENCOUNTER — PATIENT MESSAGE (OUTPATIENT)
Dept: ENDOCRINOLOGY | Facility: MEDICAL CENTER | Age: 71
End: 2021-07-14

## 2021-07-14 LAB
EST. AVERAGE GLUCOSE BLD GHB EST-MCNC: 114 MG/DL
HBA1C MFR BLD: 5.6 % (ref 4–5.6)
IRON SATN MFR SERPL: 52 % (ref 15–55)
IRON SERPL-MCNC: 153 UG/DL (ref 40–170)
T3FREE SERPL-MCNC: 3.55 PG/ML (ref 2–4.4)
TIBC SERPL-MCNC: 297 UG/DL (ref 250–450)
UIBC SERPL-MCNC: 144 UG/DL (ref 110–370)

## 2021-07-14 NOTE — TELEPHONE ENCOUNTER
From: Latonia Clinton  To: Physician Kishore Torrez  Sent: 7/14/2021 12:39 PM PDT  Subject: Test Result Question    [place] Doctor, this is Meaghan Clinton. Just wanted you to know that I had my blood drawn on July 13 for follow-up thyroid. In case you're interested, had a full blood panel done for kidney doctor appt on the 19th, including Vitamin D and Parathyroid I believe. Let me know what you want me to do about dosage of methimazole. I'll wait to hear from you. Thank you for your time and attention.

## 2021-07-15 ENCOUNTER — TELEPHONE (OUTPATIENT)
Dept: ENDOCRINOLOGY | Facility: MEDICAL CENTER | Age: 71
End: 2021-07-15

## 2021-07-15 ENCOUNTER — TELEPHONE (OUTPATIENT)
Dept: VASCULAR LAB | Facility: MEDICAL CENTER | Age: 71
End: 2021-07-15

## 2021-07-15 LAB
ALBUMIN SERPL BCP-MCNC: 3.9 G/DL (ref 3.2–4.9)
ALBUMIN/GLOB SERPL: 2.1 G/DL
ALP SERPL-CCNC: 59 U/L (ref 30–99)
ALT SERPL-CCNC: 26 U/L (ref 2–50)
ANION GAP SERPL CALC-SCNC: 16 MMOL/L (ref 7–16)
AST SERPL-CCNC: 25 U/L (ref 12–45)
BILIRUB SERPL-MCNC: 0.7 MG/DL (ref 0.1–1.5)
BUN SERPL-MCNC: 39 MG/DL (ref 8–22)
CHLORIDE SERPL-SCNC: 108 MMOL/L (ref 96–112)
CO2 SERPL-SCNC: 13 MMOL/L (ref 20–33)
CREAT SERPL-MCNC: 1.26 MG/DL (ref 0.5–1.4)
GLOBULIN SER CALC-MCNC: 1.9 G/DL (ref 1.9–3.5)
GLUCOSE SERPL-MCNC: 96 MG/DL (ref 65–99)
POTASSIUM SERPL-SCNC: 4.5 MMOL/L (ref 3.6–5.5)
PROT SERPL-MCNC: 5.8 G/DL (ref 6–8.2)
SODIUM SERPL-SCNC: 137 MMOL/L (ref 135–145)

## 2021-07-15 NOTE — TELEPHONE ENCOUNTER
Received call from pt that she had labs drawn and her ISAMAR has resolved.  Pt to continue with current dose of Xarelto.  F/u in 2 months.    Anne Walker, PharmD  TYREE Leigh

## 2021-07-15 NOTE — TELEPHONE ENCOUNTER
Telephone conversation with patient.    Laboratory data reviewed.  Thyroid levels have gone up a little bit.  So we should increase her methimazole.  Now she will take 10 mg Monday Wednesday Friday and 5 mg the other 4 days.  Repeat thyroid lab 1 month.    She has made an effort to hydrate herself and has  Made a difference in the renal function.  GFR has come up from 24 up to 42.  Electrolytes are normal. Creatinine has come down from 2.0 down to 1.2.  She is feeling generally better and is paying attention to hydration.    Repeat thyroid levels in 1 month.

## 2021-07-16 RX ORDER — AZITHROMYCIN 250 MG/1
TABLET, FILM COATED ORAL
Qty: 30 TABLET | Refills: 0 | Status: SHIPPED | OUTPATIENT
Start: 2021-07-16 | End: 2021-08-03 | Stop reason: SDUPTHER

## 2021-07-16 NOTE — TELEPHONE ENCOUNTER
Received request via: Pharmacy    Was the patient seen in the last year in this department? Yes  4/13/21  Does the patient have an active prescription (recently filled or refills available) for medication(s) requested? No

## 2021-07-28 ENCOUNTER — HOSPITAL ENCOUNTER (OUTPATIENT)
Dept: RADIOLOGY | Facility: MEDICAL CENTER | Age: 71
End: 2021-07-28
Attending: PHYSICIAN ASSISTANT
Payer: MEDICARE

## 2021-07-28 ENCOUNTER — HOSPITAL ENCOUNTER (OUTPATIENT)
Dept: RADIOLOGY | Facility: MEDICAL CENTER | Age: 71
End: 2021-07-28
Attending: INTERNAL MEDICINE
Payer: MEDICARE

## 2021-07-28 DIAGNOSIS — R91.8 PULMONARY NODULES: ICD-10-CM

## 2021-07-28 DIAGNOSIS — Z85.118 HISTORY OF LUNG CANCER: ICD-10-CM

## 2021-07-28 DIAGNOSIS — N20.0 URIC ACID NEPHROLITHIASIS: ICD-10-CM

## 2021-07-28 PROCEDURE — 71250 CT THORAX DX C-: CPT

## 2021-07-28 PROCEDURE — 74018 RADEX ABDOMEN 1 VIEW: CPT

## 2021-08-03 ENCOUNTER — SLEEP CENTER VISIT (OUTPATIENT)
Dept: SLEEP MEDICINE | Facility: MEDICAL CENTER | Age: 71
End: 2021-08-03
Payer: MEDICARE

## 2021-08-03 DIAGNOSIS — Z85.118 HISTORY OF LUNG CANCER: ICD-10-CM

## 2021-08-03 DIAGNOSIS — Z87.891 FORMER SMOKER: ICD-10-CM

## 2021-08-03 DIAGNOSIS — J44.9 CHRONIC OBSTRUCTIVE PULMONARY DISEASE, UNSPECIFIED COPD TYPE (HCC): ICD-10-CM

## 2021-08-03 DIAGNOSIS — R91.8 PULMONARY NODULES: ICD-10-CM

## 2021-08-03 PROCEDURE — 99214 OFFICE O/P EST MOD 30 MIN: CPT | Performed by: INTERNAL MEDICINE

## 2021-08-03 RX ORDER — PREDNISONE 10 MG/1
TABLET ORAL
Qty: 18 TABLET | Refills: 1 | Status: SHIPPED | OUTPATIENT
Start: 2021-08-03 | End: 2021-10-11

## 2021-08-03 RX ORDER — AZITHROMYCIN 250 MG/1
TABLET, FILM COATED ORAL
Qty: 30 TABLET | Refills: 5 | Status: SHIPPED | OUTPATIENT
Start: 2021-08-03 | End: 2022-02-08

## 2021-08-03 RX ORDER — DOXYCYCLINE HYCLATE 100 MG/1
100 CAPSULE ORAL 2 TIMES DAILY
Qty: 20 CAPSULE | Refills: 1 | Status: SHIPPED | OUTPATIENT
Start: 2021-08-03 | End: 2021-10-11

## 2021-08-03 NOTE — PROGRESS NOTES
"Chief Complaint   Patient presents with   • Follow-Up     yenny LANG seen 4/13/21 by Dr Byrne for COPD,Pulmonary nodules,History of lung cancer   • Results     CT chest done 7/28/21     HPI: This patient is a 70 y.o. Female who returns for COPD, pulmonary nodules and h/o lung cancer.  PFT's showed FEV1 1.43 L or 54% predicted, DLCO: 86% predicted, consistent with moderate COPD. She quit smoking in 2000.  She is compliant with Breo 200ug and Spiriva inhalers although does not find them beneficial.  She disliked Trelegy. She was last hospitalized January 2019 for AECOPD. She is on prophylactic daily azithromycin. She  participated in pulmonary rehabilitation however did not feel it was beneficial.  Her shortness of breath has been stable.  Denies cough, CP,  wheezing or LE edema.  Denies AECOPD. Had Covid vaccine.  She has a history of lung cancer status post partial left upper lobectomy December 2016 (adenocarcinoma, pT2a).  Follow-up chest CT scan May 2, 2019 showed stable 6 mm groundglass nodule in the right upper lobe, 3 mm nodule in the right upper lobe and 2 mm nodules in the right lower lobe.  Chest CT December 2019 with stable nodules with a new, 4 mm nodule in the right middle lobe. Chest CT 7/16/2020 showed interval increase in size in the right middle and right upper lobe nodules from 4 - 5 mm.  Chest CT January 21 showed overall, stable nodules with new, 5 mm RML nodule; stable lingular scarring. Chest CT July 2021 showed new, 5 mm right middle and right lower lobe nodule, stable remaining nodules.    Past Medical History:   Diagnosis Date   • Anesthesia     \"Abnormal blood pressure and breathing\"  \"couldn't breathe\"   • Asthma     inhalers daily   • Backpain 7/2017     thor. area   • Blood clot in vein 07/06/2018    \"Currently have a blood clot in my left eye\"   • Bowel habit changes 07/06/2018    Constipation   • Breath shortness     with exertion has O2 but does not use   • Bronchitis    • CAD (coronary " "artery disease)     palpatations   • Cancer (HCC) 2016    left lung   • Chickenpox    • COPD (chronic obstructive pulmonary disease) (HCC)    • Depression 2019   • Dialysis     transient renal failure due to sepsis   • Emphysema of lung (HCC)    • Glaucoma     right eye   • Hemorrhagic disorder (HCC)    • Hyperlipidemia    • Hypertension    • Hyperthyroidism    • Kidney stone     bilateral   • Lung cancer (HCC) 2016   • Multiple thyroid nodules 2015   • Nasal drainage    • Osteoporosis    • Pain 2018    Back pain   • Personal history of venous thrombosis and embolism ,     right leg , left arm dvt  left eye 2017   • Pneumonia 2016    per patient   • Renal disorder     had been on dialysis for 7 months for acute failure    • Renal stones     post lithotripsy   • Rheumatoid arthritis (HCC)     question   • Rheumatoid arthritis (HCC)    • S/P appendectomy     • S/P cholecystectomy    • S/P kyphoplasty , ,    • Sepsis, unspecified    • Shortness of breath 2018    Chronic current problem. \"A couple of years now\".  O2 concentrator at night - pt states she doesn't use it.   • Thyroid nodule, hot 2017    functional \"hot\" right thyroid nodule without thyrotoxicosis       Social History     Socioeconomic History   • Marital status: Single     Spouse name: Not on file   • Number of children: Not on file   • Years of education: Not on file   • Highest education level: Not on file   Occupational History   • Not on file   Tobacco Use   • Smoking status: Former Smoker     Packs/day: 1.00     Years: 30.00     Pack years: 30.00     Types: Cigarettes     Quit date: 2000     Years since quittin.6   • Smokeless tobacco: Never Used   • Tobacco comment: 7 years ago   Vaping Use   • Vaping Use: Never used   Substance and Sexual Activity   • Alcohol use: Yes     Alcohol/week: 0.0 oz     Comment: x2 a week   • Drug use: No   • Sexual activity: Not on " file   Other Topics Concern   • Not on file   Social History Narrative   • Not on file     Social Determinants of Health     Financial Resource Strain:    • Difficulty of Paying Living Expenses:    Food Insecurity:    • Worried About Running Out of Food in the Last Year:    • Ran Out of Food in the Last Year:    Transportation Needs:    • Lack of Transportation (Medical):    • Lack of Transportation (Non-Medical):    Physical Activity:    • Days of Exercise per Week:    • Minutes of Exercise per Session:    Stress:    • Feeling of Stress :    Social Connections:    • Frequency of Communication with Friends and Family:    • Frequency of Social Gatherings with Friends and Family:    • Attends [employer]ah[employer] Services:    • Active Member of Clubs or Organizations:    • Attends Club or Organization Meetings:    • Marital Status:    Intimate Partner Violence:    • Fear of Current or Ex-Partner:    • Emotionally Abused:    • Physically Abused:    • Sexually Abused:        Family History   Problem Relation Age of Onset   • Cancer Mother    • Heart Failure Father    • Heart Disease Brother        Current Outpatient Medications on File Prior to Visit   Medication Sig Dispense Refill   • rivaroxaban (XARELTO) 20 MG Tab tablet TAKE ONE TABLET BY MOUTH DAILY WITH DINNER 90 tablet 1   • KLOR-CON M20 20 MEQ Tab CR Take 20 mEq by mouth every day.     • ondansetron (ZOFRAN) 4 MG Tab tablet Take 4 mg by mouth every four hours as needed for Nausea/Vomiting.     • simvastatin (ZOCOR) 40 MG Tab TAKE ONE TABLET BY MOUTH EVERY EVENING 90 tablet 0   • methimazole (TAPAZOLE) 10 MG Tab Take 1 tablet by mouth every day. 30 tablet 3   • RESTASIS 0.05 % ophthalmic emulsion Administer 1 Drop into both eyes every day.     • zolpidem (AMBIEN) 5 MG Tab Take 1 tablet by mouth at bedtime as needed for Sleep for up to 90 days. 90 tablet 3   • Fluticasone Furoate-Vilanterol (BREO ELLIPTA) 200-25 MCG/INH AEROSOL POWDER, BREATH ACTIVATED Inhale 1 Puff every  day. 1 Each 5   • SPIRIVA RESPIMAT 2.5 MCG/ACT Aero Soln INHALE TWO PUFFS BY MOUTH DAILY 1 Each 6   • losartan (COZAAR) 50 MG Tab TAKE ONE AND ONE-HALF (1 AND 1/2) TABLET BY MOUTH DAILY (COZAAR) (Patient taking differently: Take 50 mg by mouth every day.) 135 tablet 1   • colchicine (COLCRYS) 0.6 MG Tab Day 1: 1.2 mg once, followed in 1 hour with a single dose of 0.6 mg.  Day 2 and thereafter: Oral: 0.6 mg once daily until flare resolves 30 Tab 1   • albuterol 108 (90 Base) MCG/ACT Aero Soln inhalation aerosol INHALE TWO PUFFS BY MOUTH EVERY 6 HOURS AS NEEDED FOR SHORTNESS OF BREATH 1 Inhaler 5   • cyanocobalamin (VITAMIN B-12) 100 MCG Tab Take 100 mcg by mouth every day.     • albuterol (PROVENTIL) 2.5mg/3ml Nebu Soln solution for nebulization 3 mL by Nebulization route every four hours as needed for Shortness of Breath. 30 Bullet 3   • SODIUM BICARBONATE PO Take 10 g by mouth 2 times a day.     • vitamin D, Ergocalciferol, (DRISDOL) 69799 units Cap capsule Take 1 Cap by mouth every 14 days. (Patient taking differently: Take 50,000 Units by mouth every 7 days.) 5 Cap 1   • allopurinol (ZYLOPRIM) 100 MG Tab Take 200 mg by mouth every day.     • docusate sodium (COLACE) 100 MG Cap Take 300 mg by mouth every evening.     • acetaminophen (TYLENOL) 500 MG Tab Take 1,000 mg by mouth every 6 hours as needed for Moderate Pain.     • COMBIGAN 0.2-0.5 % Solution Place 1 Drop in both eyes 2 Times a Day.     • XIIDRA 5 % Solution 2 times a day. (Patient not taking: Reported on 8/3/2021)     • HYDROcodone/acetaminophen (NORCO)  MG Tab Take 1-2 Tabs by mouth every 6 hours as needed. (Patient not taking: Reported on 8/3/2021)       No current facility-administered medications on file prior to visit.       Allergies: Patient has no known allergies.    ROS:   Constitutional: Denies fevers, chills, night sweats, fatigue or weight loss  Eyes: Denies vision loss, pain, drainage, double vision  Ears, Nose, Throat: Denies earache,  difficulty hearing, tinnitus, nasal congestion, hoarseness  Cardiovascular: Denies chest pain, tightness, palpitations, orthopnea or edema  Respiratory:As in HPI  Sleep: Denies daytime sleepiness, snoring, apneas, insomnia, morning headaches  GI: Denies heartburn, dysphagia, nausea, abdominal pain, diarrhea or constipation  : Denies frequent urination, hematuria, discharge or painful urination  Musculoskeletal: Denies back pain, +painful joints, sore muscles  Neurological: Denies weakness or headaches  Skin: No rashes    There were no vitals taken for this visit.    Physical Exam:  Appearance: Well-nourished, well-developed, in no acute distress  HEENT: Normocephalic, atraumatic, white sclera, PERRLA, masked  Neck: No adenopathy or masses  Respiratory: no intercostal retractions or accessory muscle use  Lungs auscultation: Clear to auscultation bilaterally  Cardiovascular: Regular rate rhythm. No murmurs, rubs or gallops.  No LE edema  Abdomen: soft, nondistended  Gait: Normal  Digits: No clubbing, cyanosis  Motor: No focal deficits  Orientation: Oriented to time, person and place    Diagnosis:  1. Chronic obstructive pulmonary disease, unspecified COPD type (HCC)     2. Pulmonary nodules  CT-CHEST (THORAX) W/O   3. History of lung cancer     4. Former smoker         Plan:  The patient's COPD has been stable. No AECOPD since 2019 since Azithromycin prophylaxis. Encouraged continuing Breo and Spiriva inhalers.   She declines pulmonary function testing.  She received COVID vaccines.  She has prednisone and doxycycline on hand for AECOPD.  Her pulmonary nodules are small (<5mm) and she has shown development of new nodules however they are too small for biopsy or Pet scan imaging. Given h/o lung cancer, recommend ongoing surveillance with chest CAT scan without contrast within 6 months for follow up nodules.  Return in about 6 months (around 2/3/2022).

## 2021-08-13 DIAGNOSIS — R06.02 SOB (SHORTNESS OF BREATH): ICD-10-CM

## 2021-08-13 DIAGNOSIS — J44.9 CHRONIC OBSTRUCTIVE PULMONARY DISEASE, UNSPECIFIED COPD TYPE (HCC): ICD-10-CM

## 2021-08-16 RX ORDER — ALBUTEROL SULFATE 90 UG/1
AEROSOL, METERED RESPIRATORY (INHALATION)
Qty: 18 G | Refills: 10 | Status: SHIPPED | OUTPATIENT
Start: 2021-08-16 | End: 2022-09-09 | Stop reason: SDUPTHER

## 2021-08-16 NOTE — TELEPHONE ENCOUNTER
Have we ever prescribed this med? Yes.  If yes, what date? 06/12/20    Last OV: 08/03/21 with Dr Byrne    Next OV: 01/12/22 with Dr Allan    DX: SOB/COPD    Medications:   Requested Prescriptions     Pending Prescriptions Disp Refills   • albuterol 108 (90 Base) MCG/ACT Aero Soln inhalation aerosol [Pharmacy Med Name: ALBUTEROL HFA 90 MCG INHALER] 18 g      Sig: INHALE TWO PUFFS BY MOUTH EVERY 6 HOURS AS NEEDED FOR SHORTNESS OF BREATH

## 2021-08-20 ENCOUNTER — HOSPITAL ENCOUNTER (OUTPATIENT)
Dept: LAB | Facility: MEDICAL CENTER | Age: 71
End: 2021-08-20
Attending: INTERNAL MEDICINE
Payer: MEDICARE

## 2021-08-20 DIAGNOSIS — E05.90 HYPERTHYROIDISM: ICD-10-CM

## 2021-08-20 LAB
T4 FREE SERPL-MCNC: 1.96 NG/DL (ref 0.93–1.7)
TSH SERPL DL<=0.005 MIU/L-ACNC: 0.01 UIU/ML (ref 0.38–5.33)

## 2021-08-20 PROCEDURE — 36415 COLL VENOUS BLD VENIPUNCTURE: CPT

## 2021-08-20 PROCEDURE — 84481 FREE ASSAY (FT-3): CPT

## 2021-08-20 PROCEDURE — 84443 ASSAY THYROID STIM HORMONE: CPT

## 2021-08-20 PROCEDURE — 84439 ASSAY OF FREE THYROXINE: CPT

## 2021-08-21 LAB — T3FREE SERPL-MCNC: 3.85 PG/ML (ref 2–4.4)

## 2021-08-24 ENCOUNTER — APPOINTMENT (OUTPATIENT)
Dept: MEDICAL GROUP | Facility: MEDICAL CENTER | Age: 71
End: 2021-08-24
Payer: MEDICARE

## 2021-09-20 ENCOUNTER — APPOINTMENT (OUTPATIENT)
Dept: MEDICAL GROUP | Facility: MEDICAL CENTER | Age: 71
End: 2021-09-20
Payer: COMMERCIAL

## 2021-09-20 DIAGNOSIS — E78.49 OTHER HYPERLIPIDEMIA: ICD-10-CM

## 2021-09-20 RX ORDER — SIMVASTATIN 40 MG
TABLET ORAL
Qty: 90 TABLET | Refills: 0 | Status: SHIPPED | OUTPATIENT
Start: 2021-09-20 | End: 2022-01-11 | Stop reason: SDUPTHER

## 2021-09-20 NOTE — TELEPHONE ENCOUNTER
Received request via: Pharmacy    Was the patient seen in the last year in this department? Yes    Does the patient have an active prescription (recently filled or refills available) for medication(s) requested? No    Requested Prescriptions     Pending Prescriptions Disp Refills   • simvastatin (ZOCOR) 40 MG Tab [Pharmacy Med Name: SIMVASTATIN 40 MG TABLET] 90 Tablet 0     Sig: TAKE ONE TABLET BY MOUTH EVERY EVENING

## 2021-09-28 DIAGNOSIS — E78.49 OTHER HYPERLIPIDEMIA: ICD-10-CM

## 2021-09-28 DIAGNOSIS — M81.8 OTHER OSTEOPOROSIS, UNSPECIFIED PATHOLOGICAL FRACTURE PRESENCE: ICD-10-CM

## 2021-09-28 DIAGNOSIS — E21.3 HYPERPARATHYROIDISM (HCC): ICD-10-CM

## 2021-09-28 DIAGNOSIS — E05.90 HYPERTHYROIDISM: ICD-10-CM

## 2021-09-29 ENCOUNTER — HOSPITAL ENCOUNTER (OUTPATIENT)
Dept: LAB | Facility: MEDICAL CENTER | Age: 71
End: 2021-09-29
Attending: INTERNAL MEDICINE
Payer: MEDICARE

## 2021-09-29 ENCOUNTER — HOSPITAL ENCOUNTER (OUTPATIENT)
Dept: LAB | Facility: MEDICAL CENTER | Age: 71
End: 2021-09-29
Attending: NURSE PRACTITIONER
Payer: MEDICARE

## 2021-09-29 DIAGNOSIS — E05.90 HYPERTHYROIDISM: ICD-10-CM

## 2021-09-29 LAB
ALBUMIN SERPL BCP-MCNC: 3.8 G/DL (ref 3.2–4.9)
BUN SERPL-MCNC: 34 MG/DL (ref 8–22)
CALCIUM SERPL-MCNC: 10.3 MG/DL (ref 8.4–10.2)
CHLORIDE SERPL-SCNC: 104 MMOL/L (ref 96–112)
CO2 SERPL-SCNC: 18 MMOL/L (ref 20–33)
CREAT SERPL-MCNC: 1.68 MG/DL (ref 0.5–1.4)
GLUCOSE SERPL-MCNC: 115 MG/DL (ref 65–99)
PHOSPHATE SERPL-MCNC: 3.8 MG/DL (ref 2.5–4.5)
POTASSIUM SERPL-SCNC: 3.8 MMOL/L (ref 3.6–5.5)
SODIUM SERPL-SCNC: 137 MMOL/L (ref 135–145)
T3FREE SERPL-MCNC: 2.55 PG/ML (ref 2–4.4)
T4 FREE SERPL-MCNC: 1.05 NG/DL (ref 0.93–1.7)
TSH SERPL DL<=0.005 MIU/L-ACNC: 0.13 UIU/ML (ref 0.38–5.33)

## 2021-09-29 PROCEDURE — 80069 RENAL FUNCTION PANEL: CPT

## 2021-09-29 PROCEDURE — 36415 COLL VENOUS BLD VENIPUNCTURE: CPT

## 2021-09-29 PROCEDURE — 84439 ASSAY OF FREE THYROXINE: CPT

## 2021-09-29 PROCEDURE — 84443 ASSAY THYROID STIM HORMONE: CPT

## 2021-09-29 PROCEDURE — 84481 FREE ASSAY (FT-3): CPT

## 2021-10-11 ENCOUNTER — OFFICE VISIT (OUTPATIENT)
Dept: ENDOCRINOLOGY | Facility: MEDICAL CENTER | Age: 71
End: 2021-10-11
Attending: INTERNAL MEDICINE
Payer: MEDICARE

## 2021-10-11 VITALS
WEIGHT: 197 LBS | SYSTOLIC BLOOD PRESSURE: 128 MMHG | OXYGEN SATURATION: 90 % | HEIGHT: 65 IN | BODY MASS INDEX: 32.82 KG/M2 | HEART RATE: 81 BPM | DIASTOLIC BLOOD PRESSURE: 74 MMHG

## 2021-10-11 DIAGNOSIS — E05.10 THYROTOXICOSIS WITH TOXIC SINGLE THYROID NODULE AND WITHOUT THYROID STORM: ICD-10-CM

## 2021-10-11 DIAGNOSIS — Z78.0 MENOPAUSE: ICD-10-CM

## 2021-10-11 DIAGNOSIS — M81.8 OTHER OSTEOPOROSIS, UNSPECIFIED PATHOLOGICAL FRACTURE PRESENCE: ICD-10-CM

## 2021-10-11 DIAGNOSIS — E04.2 MULTIPLE THYROID NODULES: ICD-10-CM

## 2021-10-11 PROCEDURE — 99211 OFF/OP EST MAY X REQ PHY/QHP: CPT | Performed by: INTERNAL MEDICINE

## 2021-10-11 PROCEDURE — 99214 OFFICE O/P EST MOD 30 MIN: CPT | Performed by: INTERNAL MEDICINE

## 2021-10-11 ASSESSMENT — FIBROSIS 4 INDEX: FIB4 SCORE: 1.94

## 2021-10-11 NOTE — PROGRESS NOTES
Cc thyrotoxicosis        Osteoporosis    HPI:         Patient continues on methimazole 10 mg/day.  Solitary nodule available cause of her thyrotoxicosis is a functional thyroid nodule but she has other nodules that are nonfunctional.  Nuclear medicine consultant does not feel that I-131 ablation of the solitary nodule is going to be effective.  She is definitely not a surgical candidate.  She is responding to low-dose methimazole daily with  monthly thyroid testing.         Currently her TSH has come up steadily but still suppressed at 0.1.  Free T4 is gone down from elevated to 1.0 (0.9-1.7) and free T3 also low normal at 2.5 (2.0-4.4)..         Her best option is to continue low-dose methimazole with monthly monitoring.                  Osteoporosis has been managed with Prolia which she tolerates well.  However she has had tooth extraction September 1.  Her next Prolia shot is due now but the protocol is to wait 3 months after tooth extraction.  So the next Prolia dose will be around December 1.        ROS:       Concerned about renal function which is holding fairly steady with GFR 42 and creatinine 1.2.        Also concerned about pulmonary nodules which are small i.e. around 5 mm quite possibly could be recurrent carcinoma.         Her main symptomatic problems right now are dyspnea on exertion and pain in her lumbosacral area and knee.  Ambulation is difficult.      Allergies: No Known Allergies    Current medicines including changes today:  Current Outpatient Medications   Medication Sig Dispense Refill   • simvastatin (ZOCOR) 40 MG Tab TAKE ONE TABLET BY MOUTH EVERY EVENING 90 Tablet 0   • albuterol 108 (90 Base) MCG/ACT Aero Soln inhalation aerosol INHALE TWO PUFFS BY MOUTH EVERY 6 HOURS AS NEEDED FOR SHORTNESS OF BREATH 18 g 10   • doxycycline (VIBRAMYCIN) 100 MG Cap Take 1 capsule by mouth 2 times a day. Take until gone. 20 capsule 1   • predniSONE (DELTASONE) 10 MG Tab Take 30mg x 3 days, then take 20mg  x 3 days, then take 10mg x 3 days, with food, then discontinue. 18 tablet 1   • azithromycin (ZITHROMAX) 250 MG Tab TAKE ONE TABLET BY MOUTH DAILY 30 tablet 5   • rivaroxaban (XARELTO) 20 MG Tab tablet TAKE ONE TABLET BY MOUTH DAILY WITH DINNER 90 tablet 1   • KLOR-CON M20 20 MEQ Tab CR Take 20 mEq by mouth every day.     • ondansetron (ZOFRAN) 4 MG Tab tablet Take 4 mg by mouth every four hours as needed for Nausea/Vomiting.     • methimazole (TAPAZOLE) 10 MG Tab Take 1 tablet by mouth every day. 30 tablet 3   • RESTASIS 0.05 % ophthalmic emulsion Administer 1 Drop into both eyes every day.     • XIIDRA 5 % Solution 2 times a day. (Patient not taking: Reported on 8/3/2021)     • Fluticasone Furoate-Vilanterol (BREO ELLIPTA) 200-25 MCG/INH AEROSOL POWDER, BREATH ACTIVATED Inhale 1 Puff every day. 1 Each 5   • SPIRIVA RESPIMAT 2.5 MCG/ACT Aero Soln INHALE TWO PUFFS BY MOUTH DAILY 1 Each 6   • losartan (COZAAR) 50 MG Tab TAKE ONE AND ONE-HALF (1 AND 1/2) TABLET BY MOUTH DAILY (COZAAR) (Patient taking differently: Take 50 mg by mouth every day.) 135 tablet 1   • colchicine (COLCRYS) 0.6 MG Tab Day 1: 1.2 mg once, followed in 1 hour with a single dose of 0.6 mg.  Day 2 and thereafter: Oral: 0.6 mg once daily until flare resolves 30 Tab 1   • cyanocobalamin (VITAMIN B-12) 100 MCG Tab Take 100 mcg by mouth every day.     • albuterol (PROVENTIL) 2.5mg/3ml Nebu Soln solution for nebulization 3 mL by Nebulization route every four hours as needed for Shortness of Breath. 30 Bullet 3   • SODIUM BICARBONATE PO Take 10 g by mouth 2 times a day.     • vitamin D, Ergocalciferol, (DRISDOL) 00673 units Cap capsule Take 1 Cap by mouth every 14 days. (Patient taking differently: Take 50,000 Units by mouth every 7 days.) 5 Cap 1   • HYDROcodone/acetaminophen (NORCO)  MG Tab Take 1-2 Tabs by mouth every 6 hours as needed. (Patient not taking: Reported on 8/3/2021)     • allopurinol (ZYLOPRIM) 100 MG Tab Take 200 mg by mouth every  "day.     • docusate sodium (COLACE) 100 MG Cap Take 300 mg by mouth every evening.     • acetaminophen (TYLENOL) 500 MG Tab Take 1,000 mg by mouth every 6 hours as needed for Moderate Pain.     • COMBIGAN 0.2-0.5 % Solution Place 1 Drop in both eyes 2 Times a Day.       No current facility-administered medications for this visit.        Past Medical History:   Diagnosis Date   • Anesthesia     \"Abnormal blood pressure and breathing\"  \"couldn't breathe\"   • Asthma     inhalers daily   • Backpain 7/2017     thor. area   • Blood clot in vein 07/06/2018    \"Currently have a blood clot in my left eye\"   • Bowel habit changes 07/06/2018    Constipation   • Breath shortness     with exertion has O2 but does not use   • Bronchitis    • CAD (coronary artery disease)     palpatations   • Cancer (HCC) 2016    left lung   • Chickenpox    • COPD (chronic obstructive pulmonary disease) ([facility])    • Depression 2/26/2019   • Dialysis 2005    transient renal failure due to sepsis   • Emphysema of lung ([facility])    • Glaucoma     right eye   • Hemorrhagic disorder ([facility])    • Hyperlipidemia    • Hypertension    • Hyperthyroidism    • Kidney stone     bilateral   • Lung cancer (HCC) 12/12/2016   • Multiple thyroid nodules 7/9/2015   • Nasal drainage    • Osteoporosis    • Pain 07/06/2018    Back pain   • Personal history of venous thrombosis and embolism 2004, 2012    right leg 2012, left arm dvt 2004 left eye 2017   • Pneumonia 7/2016    per patient   • Renal disorder     had been on dialysis for 7 months for acute failure 2005   • Renal stones 2013    post lithotripsy   • Rheumatoid arthritis ([facility])     question   • Rheumatoid arthritis ([facility])    • S/P appendectomy 1998    • S/P cholecystectomy 1998   • S/P kyphoplasty 2006, 2007, 2013   • Sepsis, unspecified 2005   • Shortness of breath 07/06/2018    Chronic current problem. \"A couple of years now\".  O2 concentrator at night - pt states she doesn't use it.   • Thyroid nodule, hot 2017    " "functional \"hot\" right thyroid nodule without thyrotoxicosis       PHYSICAL EXAM:    Gen.   appears tired but not overtly thyrotoxic    Skin   appropriate for sex and age    HEENT   no eye signs of Graves' disease    Neck   right thyroid lobe was palpable in the past.  It might be substernal now feel    Heart  regular    Extremities  no edema    Neuro    ambulates independently noticeable discomfort    Psych    Appropriate for multiple medical problems.               Seems to be determined to carry on        ASSESSMENT AND RECOMMENDATIONS    1.   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              Clinically euthyroid on methimazole 10 mg/day               Continue monthly monitoring thyroid blood level.    2.  Osteoporosis             Prolia is due but will be delayed by 2 months following dental extraction.          Update bone density    DISPOSITION: Return to office in 2 months                             Order Prolia for December 1    Kishore Torrez M.D.    Copies to: Marybeth Lynch M.D. [phone]  "

## 2021-11-10 DIAGNOSIS — J44.9 CHRONIC OBSTRUCTIVE PULMONARY DISEASE, UNSPECIFIED COPD TYPE (HCC): ICD-10-CM

## 2021-11-10 NOTE — TELEPHONE ENCOUNTER
Have we ever prescribed this med? Yes.  If yes, what date? 04/13/2021    Last OV: 08/03/2021 - Dr. Byrne    Next OV: 01/12/2022 - Dr. Allan    DX: COPD    Medications: Breo

## 2021-11-23 NOTE — PROGRESS NOTES
ALLERGY AND IMMUNOLOGY NEW PATIENT      HISTORY OF PRESENT ILLNESS   Jyothi Donahue is a 60 year old female   Referring Provider:  LENORA Cadena  Reason for Consultation:  Eye swelling     Chief complaint of   Chief Complaint   Patient presents with   • Allergies     New patient- Patient states she has had runny nose and itchy eyes X 5 months   • Office Visit     Patient states she has a hx of COPD/Ephysema   • Imm/Inj     already had flu shot    .     09/27/2021- redness of right eye while on amoxicillin for sinus infection     RESPIRATORY  -Patient has COPD and emphysema  -Previously tested positive to COVID October 2020 (no hospitalization)  -Age of diagnosis: 11 years ago (approximately 50 y/o)  -Flares do not increase with seasonal changes.  -Need for prednisone: Yes, for lungs  -Albuterol use: No  -Other inhalers/controllers:  Yes: Advair Diskus 250 mcg 1 puff BID and Incruse 1 puff daily with improved symptoms.  -Patient also uses a nebulizer.  -Patient is following Dr. Herb WATSON  -Has history of itchy eyes, itchy ears and sore throat for at least 2 years. Worsened this year.   -Currently has a sore throat (onset 1 week ago).  -Symptoms are consistent all day.  -Nasal symptoms positive for: postnasal drip and sneezing and epistaxis  -Ocular symptoms positive for: itching, tearing, swelling  -Patient experiences intermittent epistaxis.  -Prior testing: No  -Prior immunotherapy: No  -Seasonality: worse in the spring and fall  -Triggers: Nothing specific  -Nasal steroid use: was using Flonase 1-2 SEN for a few years with improvement in symptoms. Patient discontinued due to running out of prescription.  -Oral antihistamine/eye drop use: Zyrtec and Claritin with mild benefit.  -Nasal saline use: None reported  -Sinus infections: Patient believes she may have one now.  -Ear infections: None reported  -Patient reports a slight fever, but did not formally check temperature.  -Denies  Patient seen and examined. Chart and xrays reviewed. Discussed with the patient the rationale for the stent as she is on anticoagulant therapy. She is aware the stent is temporary and may cause flank pain, urgency, frequency and dysuria but is needed to allow clearance of the UTI and then she can have definitive treatment.  She is aware of the risk of urosepsis, risk of ureteral perforation, failure to place the stent needing transfer to [facility] for a percutaneous procedure as well as the perioperative risk of DVT,PE, aspiration pneumonia, MI, CVA and death.  Informed consent was given to me by Latonia Clinton to proceed with the cystoscopy and left stent placement.   chills  -Patient currently has a UTI.  -Patient tolerates different detergents.    FOOD REACTIONS  -No known history of food allergy including symptoms consistent with pollen-food allergy syndrome.     INTEGUMENT  -Patient broke out in hives 5/16/2021. Hives were all over her body but went away after one day.  -Shows picture from 5/16/21 of hive breakout.  -Hives appeared 5 days after hospitalization due to stomach virus.    DRUG REACTIONS  -Ibuprofen causes swelling in her eyes.  -Tylenol causes nausea and vomiting.  -Patient can tolerate naproxen and aleve.    GASTROINTESTINAL  -Reflux/water brash: No  -Choking when eating or dysphagia:  No    Relevant social history:  Pets in home: None  Smoking history: Previous smoker (1-2 packs per day), quit in 1999  Relevant employment history: Works at the [employer] ().    Current Outpatient Medications   Medication Sig Dispense Refill   • olopatadine (PATANOL) 0.1 % ophthalmic solution Place 1 drop into both eyes daily. 5 mL 0   • umeclidinium bromide (Incruse Ellipta) 62.5 MCG/INH inhaler INHALE THE CONTENTS OF ONE DOSE INTO THE LUNGS ONCE DAILY     • ondansetron (ZOFRAN) 8 MG tablet Take 1 tablet by mouth every 8 hours as needed for Nausea. 12 tablet 0   • ondansetron (ZOFRAN ODT) 4 MG disintegrating tablet Place 1 tablet onto the tongue every 8 hours as needed for Nausea. 20 tablet 0   • albuterol (ACCUNEB) 0.63 MG/3ML nebulizer solution Take 0.63 mg by nebulization every 6 hours as needed for Wheezing.     • Probiotic Tablet Enteric Coated Take daily while on antibiotic.     • albuterol 108 (90 BASE) MCG/ACT inhaler Inhale 2 puffs into the lungs every 4 hours as needed.     • ESTROGENS CONJ SYNTHETIC B PO Take  by mouth.     • amLODIPine (NORVASC) 5 MG tablet Take 5 mg by mouth daily.     • furosemide (LASIX) 40 MG tablet Take 40 mg by mouth daily as needed.     • tiotropium (SPIRIVA) 18 MCG inhalation capsule Place 18 mcg into inhaler and  inhale daily.     • citalopram (CELEXA) 20 MG tablet Take 20 mg by mouth daily.     • fluticasone-salmeterol (Advair Diskus) 250-50 MCG/DOSE inhaler Inhale 1 puff into the lungs two times daily.     • fluticasone (FLONASE) 50 MCG/ACT nasal spray Spray 2 sprays in each nostril daily. 16 g 11   • cetirizine (All Day Allergy) 10 MG tablet Take 1 tablet by mouth every morning. 30 tablet 12   • amoxicillin (AMOXIL) 875 MG tablet      • predniSONE (DELTASONE) 10 MG tablet Take 1 tablet by mouth 2 times daily. 10 tablet 0   • predniSONE (DELTASONE) 20 MG tablet 20 mg po bid x 3 days, then 20mg po daily x 4 days then stop. 10 tablet 0     No current facility-administered medications for this visit.       There is no problem list on file for this patient.       I have reviewed the patient's medications and allergies, past medical, surgical, social and family history, updating these as appropriate.  See Histories section of the electronic medical record for a display of this information.     REVIEW OF SYSTEMS:  Pertinent for what is mentioned in History of Present Illness and   General:  See History of Present Illness.  Eyes:  See History of Present Illness.  ENT:  See History of Present Illness.  Respiratory:  See History of Present Illness.  Heart:  No chest pain or palpitations.  Gastrointestinal:  See History of Present Illness.  Musculoskeletal:  No new joint swelling/joint erythema (unless listed in HPI).  Skin:  See History of Present Illness.  Hematologic/Lymphatic:  No current swollen lymph nodes or current anemia.  Endocrine:  Thyroid disease:  no; diabetes no.  Psychiatric:  Anxiety: yes; depression yes.  Neurologic:  no history of seizure disorder.  No new headaches unless listed in HPI      PHYSICAL EXAM:  General:  flat affect ,Alert, well-groomed and well-nourished, in no acute distress.  Visit Vitals  BP (!) (P) 120/96   Pulse 88   Temp 97.6 °F (36.4 °C)   Resp 18   Ht 5' 5\" (1.651 m)   Wt 68.5 kg (151 lb)    BMI 25.13 kg/m²     Head and Eye:  Normocephalic, atraumatic.  Eyes  with mild conjunctival injection, without discharge.  No infraorbital darkening and with mild infraorbital edema.    ENT:  Tympanic membranes: with mild injection, effusions, scarring. Nasal mucosa is with mild edema, with scant clear drainage, without hyperemia, WITH blue/gray discoloration, without transverse nasal crease.  Oropharynx is without erythema, w/o exudates, w/o halitosis, w/o posterior oropharyngeal lymphoid hyperplasia.  Single, midline uvula.   Neck:  Supple without lymphadenopathy.  Thyroid is not palpable.  No tenderness or masses on palpation.    Lymphatic:  No anterior or posterior cervical, supraclavicular, submandibular or auricular lymphadenopathy.  Lungs:  Clear to auscultation bilaterally.  No retractions or accessory muscle use.  Cardiac:  +S1/S2, normal rate, no murmur.   Skin:  without visible rashes. With mild dryness.     DATA:  Allergy Skin Test    Prick Applied by: Brittani Erickson [facility] 11/23/2021  3:32 PM   Prick Read by: Halie Gerber, DO          : Brice    Is the patiet on beta-blockers?: No Is the patient pregnant?: N/A   Is the patient on antihistamines?: No Is Asthma stable?: Yes      Allergy SkinTesting - Prick     ALLERGEN ROUTE REACTION WHEAL FLARE COMMENT    Mouse Prick Negative       Dust Mite Mix Prick Negative       Cockroach Mix Prick Negative       Cat Prick Negative       AP Dog Prick Negative       Dog epithelium Prick Negative       Aspergillus Fumigatus Prick Negative       Penicillium Prick Negative       ALLERGEN ROUTE REACTION WHEAL FLARE COMMENT    Alternaria Alternata Prick Positive 7 15     Cladosporium Prick Positive 7 20     Tree Mix I Prick Positive 9 20     Tree Mix II Prick Positive 7 15     KORT-Sweet Vernal Prick Positive 11 30     Ragweed, Short Prick Positive 11 25     Weed Mix I Prick Positive 7 15     Weed Mix II Prick Positive 5 7     saline Prick Negative        histamine Prick Positive 11 25              ASSESSMENT:  1. Nasal congestion with rhinorrhea    2. Acute urticaria    3. Asthma with COPD (CMS/[facility])          RECOMMENDATIONS:  Orders Placed This Encounter   • ALLERGY SKIN TESTS,ALLERGENS   • fluticasone-salmeterol (Advair Diskus) 250-50 MCG/DOSE inhaler   • fluticasone (FLONASE) 50 MCG/ACT nasal spray   • cetirizine (All Day Allergy) 10 MG tablet   • olopatadine (PATANOL) 0.1 % ophthalmic solution      1. Allergic Rhinitis  -Restart Flonase 2 SEN. Recommended to blow her nose before she sprays.  -SPT 11/23/2021 positive to alternada, cladosporium, tree mix I & II, KORT-Sweet vernal, ragweed, weed mix I & II.  -Patient w/mtpl of plants in her home.  -environmental control measures reviewed and ho provided  -Recommended to start over the counter 'Simply Saline' to use daily.  -Continue cetirizine as needed daily.  -Start olopatadine 0.2% 1 drop in each eye daily.    2. Acute urticaria  -Broke out in hives 5 days after hospitalization due to stomach virus. Hives resolved completely after 1 day after viral gastroenteritis.    3. Asthma with COPD (CMS/[facility])  -Continue Advair Diskus 250 mcg 1 puff BID and Incruse 1 puff daily.      Return in 6 months (on 5/23/2022) for asthma, allergies, ALLERGIC RHINITIS.   See patient instructions as listed below for remainder of recommendations:    Patient Instructions     NASAL CONGESTION  -Restart Flonase 2 sprays each nostril.   -Blow your nose prior to using Flonase.  -Recommended starting over the counter 'Simply Saline' to use daily.   -If your symptoms do not resolve in the winter, your plants in your home could be a trigger for your symptoms.   -Start cetirizine 10 mg daily.   -Start olopatadine 1 drop in each eye daily.  -Recommended to take cetirizine and use the Flonase regularly March-November.    ASTHMA  -Continue Advair Diskus 250 mcg 1 puff twice daily and Incruse 1 puff daily.      Allergy Skin Test    Prick Applied  by: Brittani Erickson, CMA 11/23/2021  3:32 PM   Prick Read by: Halie Gerber DO          : Babs    Is the patiet on beta-blockers?: No Is the patient pregnant?: N/A   Is the patient on antihistamines?: No Is Asthma stable?: Yes      Allergy SkinTesting - Prick     ALLERGEN ROUTE REACTION WHEAL FLARE COMMENT    Mouse Prick Negative       Dust Mite Mix Prick Negative       Cockroach Mix Prick Negative       Cat Prick Negative       AP Dog Prick Negative       Dog epithelium Prick Negative       Aspergillus Fumigatus Prick Negative       Penicillium Prick Negative       ALLERGEN ROUTE REACTION WHEAL FLARE COMMENT    Alternaria Alternata Prick Positive 7 15     Cladosporium Prick Positive 7 20     Tree Mix I Prick Positive 9 20     Tree Mix II Prick Positive 7 15     KORT-Sweet Vernal Prick Positive 11 30     Ragweed, Short Prick Positive 11 25     Weed Mix I Prick Positive 7 15     Weed Mix II Prick Positive 5 7     saline Prick Negative       histamine Prick Positive 11 25               For Questions:  Halie Gerber DO, CHIDI Lomeli PA-C  Allergy and Immunology Clinic  [facility] [place] [facility] [place]  Phone [phone] (allergy clinic), [phone] ([facility])  Fax [phone]  - Or use [employer] to contact our clinic - messages sent through [employer] go directly to our nursing staff inbox              It was a pleasure participating in the care of this patient.  If there are any questions or concerns regarding this visit, please feel free to contact the allergy clinic [phone] .      On 11/23/2021, Nellie HERNANDEZ scribed the services personally performed by Dr. Halie Gerber DO.       The documentation recorded by the scribe accurately and completely reflects the service(s) I personally performed and the decisions made by me.

## 2021-11-24 ENCOUNTER — OFFICE VISIT (OUTPATIENT)
Dept: MEDICAL GROUP | Facility: MEDICAL CENTER | Age: 71
End: 2021-11-24
Payer: MEDICARE

## 2021-11-24 VITALS
BODY MASS INDEX: 34.36 KG/M2 | HEART RATE: 92 BPM | DIASTOLIC BLOOD PRESSURE: 80 MMHG | TEMPERATURE: 98.2 F | WEIGHT: 206.24 LBS | SYSTOLIC BLOOD PRESSURE: 122 MMHG | HEIGHT: 65 IN | OXYGEN SATURATION: 96 %

## 2021-11-24 DIAGNOSIS — G47.00 INSOMNIA, UNSPECIFIED TYPE: ICD-10-CM

## 2021-11-24 DIAGNOSIS — M1A.0790 CHRONIC GOUT OF FOOT, UNSPECIFIED CAUSE, UNSPECIFIED LATERALITY: ICD-10-CM

## 2021-11-24 DIAGNOSIS — E05.90 HYPERTHYROIDISM: ICD-10-CM

## 2021-11-24 DIAGNOSIS — I10 PRIMARY HYPERTENSION: ICD-10-CM

## 2021-11-24 DIAGNOSIS — G47.09 OTHER INSOMNIA: ICD-10-CM

## 2021-11-24 DIAGNOSIS — C34.12 MALIGNANT NEOPLASM OF UPPER LOBE OF LEFT LUNG (HCC): ICD-10-CM

## 2021-11-24 DIAGNOSIS — E78.49 OTHER HYPERLIPIDEMIA: ICD-10-CM

## 2021-11-24 DIAGNOSIS — E21.3 HYPERPARATHYROIDISM (HCC): ICD-10-CM

## 2021-11-24 DIAGNOSIS — N18.30 STAGE 3 CHRONIC KIDNEY DISEASE, UNSPECIFIED WHETHER STAGE 3A OR 3B CKD: ICD-10-CM

## 2021-11-24 DIAGNOSIS — Z23 NEED FOR VACCINATION: ICD-10-CM

## 2021-11-24 DIAGNOSIS — F33.1 DEPRESSION, MAJOR, RECURRENT, MODERATE (HCC): ICD-10-CM

## 2021-11-24 DIAGNOSIS — S32.020S CLOSED COMPRESSION FRACTURE OF L2 VERTEBRA, SEQUELA: ICD-10-CM

## 2021-11-24 DIAGNOSIS — J44.9 CHRONIC OBSTRUCTIVE PULMONARY DISEASE, UNSPECIFIED COPD TYPE (HCC): ICD-10-CM

## 2021-11-24 DIAGNOSIS — E05.10 THYROTOXICOSIS WITH TOXIC SINGLE THYROID NODULE AND WITHOUT THYROID STORM: ICD-10-CM

## 2021-11-24 DIAGNOSIS — I82.409 ACUTE DEEP VEIN THROMBOSIS (DVT) OF LOWER EXTREMITY, UNSPECIFIED LATERALITY, UNSPECIFIED VEIN (HCC): ICD-10-CM

## 2021-11-24 DIAGNOSIS — Z12.31 ENCOUNTER FOR SCREENING MAMMOGRAM FOR BREAST CANCER: ICD-10-CM

## 2021-11-24 PROBLEM — N20.1 OBSTRUCTION OF LEFT URETEROPELVIC JUNCTION (UPJ) DUE TO STONE: Status: RESOLVED | Noted: 2018-06-17 | Resolved: 2021-11-24

## 2021-11-24 PROCEDURE — 99215 OFFICE O/P EST HI 40 MIN: CPT | Mod: 25 | Performed by: INTERNAL MEDICINE

## 2021-11-24 PROCEDURE — 90471 IMMUNIZATION ADMIN: CPT | Performed by: INTERNAL MEDICINE

## 2021-11-24 PROCEDURE — 90715 TDAP VACCINE 7 YRS/> IM: CPT | Performed by: INTERNAL MEDICINE

## 2021-11-24 RX ORDER — ZOLPIDEM TARTRATE 5 MG/1
5 TABLET ORAL NIGHTLY PRN
Qty: 90 TABLET | Refills: 0 | Status: SHIPPED | OUTPATIENT
Start: 2021-11-24 | End: 2022-02-22

## 2021-11-24 ASSESSMENT — PATIENT HEALTH QUESTIONNAIRE - PHQ9: CLINICAL INTERPRETATION OF PHQ2 SCORE: 0

## 2021-11-24 ASSESSMENT — FIBROSIS 4 INDEX: FIB4 SCORE: 1.94

## 2021-11-24 NOTE — ASSESSMENT & PLAN NOTE
This condition is chronic and fairly well controlled.  To continue albuterol, Breo Ellipta, Spiriva and Azithromycin.

## 2021-11-24 NOTE — PROGRESS NOTES
Subjective:     Chief Complaint   Patient presents with   • Establish Care    and for Ambien refill    HISTORY OF THE PRESENT ILLNESS: Patient is a 71 y.o. female. This pleasant patient is here today to establish care. Her prior PCP was Dr. Lynch.    Problem   Hyperparathyroidism (Hcc)    This condition is managed by endocrinology, not on any medications as of now.     Gout of Foot    On allopurinol daily.     Other Insomnia    This is a chronic condition, patient has been taking Ambien 5mg tab nightly for many months now.  She is counseled on side effects of Ambien 5 mg tab and reviewed controlled substance agreement.  The medication is helping her to improve her symptoms  She has no adverse effects.  She denies alcohol or illegal drug use.  She has no history of controlled substance used in the way other than prescribed   No early refills on controlled substances.  No history of lost or stolen substance prescriptions  Compliant with treatment recommendations and plan: Yes  Any major health change to the patient: No  Concerns for misuse, abuse or addiction: No    /NarxCheck report reviewed: Yes  History of abnormal drug screening: No           Depression, Major, Recurrent, Moderate (Hcc)    Patient denies depression symptoms.  PHQ-9 Screening 11/24/2021   Little interest or pleasure in doing things 0 - not at all   Feeling down, depressed, or hopeless 0 - not at all   PHQ-2 Total Score 0       Interpretation of PHQ-9 Total Score   Score Severity   1-4 No Depression   5-9 Mild Depression   10-14 Moderate Depression   15-19 Moderately Severe Depression   20-27 Severe Depression  no Dx, denies syntpoms      No suicidal thoughts or ideations.         Closed Compression Fracture of Second Lumbar Vertebra (Hcc)    Per patient she has history of multiple compression fractures and history of kyphoplasty.  Patient follows with [facility].  She is also on chronic opioids for her pain, prescribed by her specialist.      Lung Cancer (Hcc)    S/p excision in 2017 per patient.  Patient follow-up closely every 4 to 5 months with pulmonology.  Next CT scan in January.  Next appointment with Mami Allan on 11/12/2021.      Deep Vein Thrombosis (Dvt) (Hcc)    Per patient she was on dialysis due to kidney failure back in 2004 and has developed DVT in her upper extremity, she also had DVT in her right lower extremity.  In 2017 she had left IV nose thrombosis.  Initially she was on anticoagulation with warfarin, then was switched to Xarelto and currently she is being followed at anticoagulation clinic.     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  Currently patient is on methimazole 10 mg tablet daily.  To continue close follow-up with endocrinology.  Lab Results   Component Value Date/Time    TSHULTRASEN 0.134 (L) 09/29/2021 1554     Lab Results   Component Value Date/Time    FREET4 1.05 09/29/2021 1554      Lab Results   Component Value Date/Time    FREET3 2.55 09/29/2021 1554          Chronic Obstructive Pulmonary Disease (Hcc)    This is a chronic condition, fairly well controlled, managed by pulmonology DrAnibal Allan.  Her regimen includes albuterol nebulizer as needed, Breo Ellipta, Spiriva and daily Azithormycin, however patient does not think that those medications are helping her.  She is experiencing shortness of breath while walking.         Ckd (Chronic Kidney Disease) Stage 3, Gfr 30-59 Ml/Min ([facility])    This is a chronic condition, relatively stable.  Patient follows with nephrologist.  She has her next appointment on 12/07 and pending blood work.     Hyperlipidemia    Established diagnosis. Currently taking Simvastatin 40 mg daily, reports compliance and no side effects.   Lab Results   Component Value Date/Time    CHOLSTRLTOT 155 07/13/2021 03:37 PM    TRIGLYCERIDE 184 (H) 07/13/2021 03:37 PM    HDL 71 07/13/2021 03:37 PM    LDL  "47 07/13/2021 03:37 PM             Hypertension    This is a chronic condition, reasonably controlled losartan 50 mg tablet daily.     Obstruction of Left Ureteropelvic Junction (Upj) Due to Stone (Resolved)     Past Medical History:   Diagnosis Date   • Anesthesia     \"Abnormal blood pressure and breathing\"  \"couldn't breathe\"   • Asthma     inhalers daily   • Backpain 7/2017     thor. area   • Blood clot in vein 07/06/2018    \"Currently have a blood clot in my left eye\"   • Bowel habit changes 07/06/2018    Constipation   • Breath shortness     with exertion has O2 but does not use   • Bronchitis    • CAD (coronary artery disease)     palpatations   • Cancer (HCC) 2016    left lung   • Chickenpox    • COPD (chronic obstructive pulmonary disease) (HCC)    • Depression 2/26/2019   • Dialysis 2005    transient renal failure due to sepsis   • Emphysema of lung (HCC)    • Glaucoma     right eye   • Hemorrhagic disorder (HCC)    • Hyperlipidemia    • Hypertension    • Hyperthyroidism    • Kidney stone     bilateral   • Lung cancer (HCC) 12/12/2016   • Multiple thyroid nodules 7/9/2015   • Nasal drainage    • Obstruction of left ureteropelvic junction (UPJ) due to stone 6/17/2018   • Osteoporosis    • Pain 07/06/2018    Back pain   • Personal history of venous thrombosis and embolism 2004, 2012    right leg 2012, left arm dvt 2004 left eye 2017   • Pneumonia 7/2016    per patient   • Renal disorder     had been on dialysis for 7 months for acute failure 2005   • Renal stones 2013    post lithotripsy   • Rheumatoid arthritis (HCC)     question   • Rheumatoid arthritis (HCC)    • S/P appendectomy 1998    • S/P cholecystectomy 1998   • S/P kyphoplasty 2006, 2007, 2013   • Sepsis, unspecified 2005   • Shortness of breath 07/06/2018    Chronic current problem. \"A couple of years now\".  O2 concentrator at night - pt states she doesn't use it.   • Thyroid nodule, hot 2017    functional \"hot\" right thyroid nodule without " thyrotoxicosis     Past Surgical History:   Procedure Laterality Date   • CYSTOSCOPY STENT PLACEMENT  7/9/2018    Procedure: Cystoscopy,  Left removal of stent ,  Left Stent Placement;  Surgeon: Beck Yang M.D.;  Location: [facility];  Service: Urology   • URETEROSCOPY Left 7/9/2018    Procedure: URETEROSCOPY;  Surgeon: Beck Yang M.D.;  Location: [facility];  Service: Urology   • LASERTRIPSY Left 7/9/2018    Procedure: LASERTRIPSY-LITHO;  Surgeon: Beck Yang M.D.;  Location: [facility];  Service: Urology   • CYSTOSCOPY STENT PLACEMENT Left 6/18/2018    Procedure: CYSTOSCOPY STENT PLACEMENT;  Surgeon: Beck Yang M.D.;  Location: [facility];  Service: Urology   • LITHOTRIPSY Left 6/18/2018    Procedure: LITHOTRIPSY;  Surgeon: Beck Yang M.D.;  Location: [facility];  Service: Urology   • LASERTRIPSY Left 6/18/2018    Procedure: LASERTRIPSY;  Surgeon: Beck Yang M.D.;  Location: [facility];  Service: Urology   • URETEROSCOPY Left 6/18/2018    Procedure: URETEROSCOPY;  Surgeon: Beck Yang M.D.;  Location: [facility];  Service: Urology   • THORACOSCOPY Left 12/12/2016    Procedure: THORACOSCOPY W/WEDGE RESECTION UPPER LOBE MASS;  Surgeon: John H Ganser, M.D.;  Location: [facility];  Service:    • OTHER ORTHOPEDIC SURGERY  2013    kyphoplasty X3   • APPENDECTOMY      1990   • CHOLECYSTECTOMY      1990   • LAMINOTOMY     • LUNG BIOPSY OPEN     • OTHER     • OTHER ABDOMINAL SURGERY      gall bladder disease   • CO BREAST AUGMENTATION WITH IMPLANT     • CO REDUCTION OF BREAST       Family History   Problem Relation Age of Onset   • Cancer Mother    • Heart Failure Father    • Heart Disease Brother      Social History     Tobacco Use   • Smoking status: Former Smoker     Packs/day: 1.00     Years: 30.00     Pack years: 30.00     Types: Cigarettes     Quit date: 1/1/2000     Years since quitting:  21.9   • Smokeless tobacco: Never Used   • Tobacco comment: 7 years ago   Vaping Use   • Vaping Use: Never used   Substance Use Topics   • Alcohol use: Yes     Alcohol/week: 0.0 oz     Comment: x2 a week   • Drug use: No     Current Outpatient Medications Ordered in Epic   Medication Sig Dispense Refill   • zolpidem (AMBIEN) 5 MG Tab Take 1 Tablet by mouth at bedtime as needed for Sleep for up to 90 days. 90 Tablet 0   • BREO ELLIPTA 200-25 MCG/INH AEROSOL POWDER, BREATH ACTIVATED INHALE ONE DOSE BY MOUTH DAILY 1 Each 11   • simvastatin (ZOCOR) 40 MG Tab TAKE ONE TABLET BY MOUTH EVERY EVENING 90 Tablet 0   • albuterol 108 (90 Base) MCG/ACT Aero Soln inhalation aerosol INHALE TWO PUFFS BY MOUTH EVERY 6 HOURS AS NEEDED FOR SHORTNESS OF BREATH 18 g 10   • azithromycin (ZITHROMAX) 250 MG Tab TAKE ONE TABLET BY MOUTH DAILY 30 tablet 5   • rivaroxaban (XARELTO) 20 MG Tab tablet TAKE ONE TABLET BY MOUTH DAILY WITH DINNER 90 tablet 1   • KLOR-CON M20 20 MEQ Tab CR Take 20 mEq by mouth every day.     • ondansetron (ZOFRAN) 4 MG Tab tablet Take 4 mg by mouth every four hours as needed for Nausea/Vomiting.     • methimazole (TAPAZOLE) 10 MG Tab Take 1 tablet by mouth every day. 30 tablet 3   • RESTASIS 0.05 % ophthalmic emulsion Administer 1 Drop into both eyes every day.     • SPIRIVA RESPIMAT 2.5 MCG/ACT Aero Soln INHALE TWO PUFFS BY MOUTH DAILY 1 Each 6   • losartan (COZAAR) 50 MG Tab TAKE ONE AND ONE-HALF (1 AND 1/2) TABLET BY MOUTH DAILY (COZAAR) (Patient taking differently: Take 50 mg by mouth every day.) 135 tablet 1   • colchicine (COLCRYS) 0.6 MG Tab Day 1: 1.2 mg once, followed in 1 hour with a single dose of 0.6 mg.  Day 2 and thereafter: Oral: 0.6 mg once daily until flare resolves 30 Tab 1   • cyanocobalamin (VITAMIN B-12) 100 MCG Tab Take 100 mcg by mouth every day.     • albuterol (PROVENTIL) 2.5mg/3ml Nebu Soln solution for nebulization 3 mL by Nebulization route every four hours as needed for Shortness of  "Breath. 30 Bullet 3   • SODIUM BICARBONATE PO Take 10 g by mouth 2 times a day.     • vitamin D, Ergocalciferol, (DRISDOL) 46527 units Cap capsule Take 1 Cap by mouth every 14 days. (Patient taking differently: Take 50,000 Units by mouth every 7 days.) 5 Cap 1   • HYDROcodone/acetaminophen (NORCO)  MG Tab Take 1-2 Tablets by mouth every 6 hours as needed.     • allopurinol (ZYLOPRIM) 100 MG Tab Take 200 mg by mouth every day.     • docusate sodium (COLACE) 100 MG Cap Take 300 mg by mouth every evening.     • acetaminophen (TYLENOL) 500 MG Tab Take 1,000 mg by mouth every 6 hours as needed for Moderate Pain.     • COMBIGAN 0.2-0.5 % Solution Place 1 Drop in both eyes 2 Times a Day.     • XIIDRA 5 % Solution 2 times a day. (Patient not taking: Reported on 8/3/2021)       No current [facility]-ordered facility-administered medications on file.     Health Maintenance: Patient is due for mammogram and colonoscopy letter she is hesitant to do colonoscopy as she would have to be taken off anticoagulation.  She was offered Cologuard test, however declined.    ROS:   Gen: no fevers/chills  ENT: no sore throat  Pulm: Positive for shortness of breath on exertion  CV: no chest pain  GI: Positive for occasional nausea      Objective:     Exam: /80 (BP Location: Left arm, Patient Position: Sitting, BP Cuff Size: Adult)   Pulse 92   Temp 36.8 °C (98.2 °F) (Temporal)   Ht 1.638 m (5' 4.5\")   Wt 93.6 kg (206 lb 3.9 oz)   SpO2 96%  Body mass index is 34.85 kg/m².    General: Normal appearing. No distress.  HEENT: Normocephalic. Eyes conjunctiva clear lids without ptosis, bilateral exophthalmos, pupils equal and reactive to light accommodation, oropharynx is without erythema, edema or exudates.   Pulmonary: Decreased breathing sounds bilaterally. No rales, ronchi, or wheezing.  Cardiovascular: Regular rate and rhythm without murmur.   Lymph: No cervical or supraclavicular lymph nodes are palpable  Skin: Warm and " dry.  Musculoskeletal: Normal gait.  Trace lower extremity edema.  Psych:  Alert and oriented x3. Judgment and insight is normal.    Labs: Briefly reviewed lipid panel, thyroid hormone levels    Assessment & Plan:   71 y.o. female with the following -    Problem List Items Addressed This Visit     Chronic obstructive pulmonary disease (HCC) (Chronic)     This condition is chronic and fairly well controlled.  To continue albuterol, Breo Ellipta, Spiriva and Azithromycin.           Relevant Orders    Referral for Acupuncture    CKD (chronic kidney disease) stage 3, GFR 30-59 ml/min (HCC) (Chronic)     Chronic and stable, pending blood work         Closed compression fracture of second lumbar vertebra (HCC)     Continue pain management close follow-up with spine specialist.         Relevant Orders    Referral for Acupuncture    Deep vein thrombosis (DVT) (HCC)     No current symptoms of DVT.  Continue Xarelto 20 mg tablet daily.         Depression, major, recurrent, moderate (HCC)     Patient is not on any medications.  PHQ-9 0.  Denies suicidal thoughts or ideations.         Gout of foot    Hyperlipidemia     Is a chronic condition, fairly controlled with simvastatin 40 mg tablet daily.         Hyperparathyroidism (HCC)    Hypertension     Chronic and well controlled.  No anginal or strokelike symptoms.  Continue losartan 50 mg tablet daily.         Lung cancer (HCC)     No hemoptysis.  Close follow-up with pulmonology with repeat CT scan in January.         Other insomnia     Continue Ambien 5 mg nightly.         Thyrotoxicosis with toxic single thyroid nodule and without thyroid storm     Endocrinology note reviewed.  It appears that nuclear medicine consultant did not feel like ablation of the solitary nodule would be effective and patient is not a surgical candidate.  They plan to continue methimazole and do monthly thyroid testing.           Other Visit Diagnoses     Encounter for screening mammogram for breast  cancer        Relevant Orders    MA-SCREENING MAMMO BILAT W/TOMOSYNTHESIS W/CAD    Need for vaccination        Relevant Orders    Tdap =>6yo IM (Completed)    Insomnia, unspecified type        Relevant Medications    zolpidem (AMBIEN) 5 MG Tab    Other Relevant Orders    Controlled Substance Treatment Agreement        I spent a total of  minutes with record review, exam, communication with the patient, communication with other providers, and documentation of this encounter.    Return in about 3 months (around 2/24/2022) for 3 mo follow up.    Please note that this dictation was created using voice recognition software. I have made every reasonable attempt to correct obvious errors, but I expect that there are errors of grammar and possibly content that I did not discover before finalizing the note.

## 2021-11-25 NOTE — ASSESSMENT & PLAN NOTE
Chronic and well controlled.  No anginal or strokelike symptoms.  Continue losartan 50 mg tablet daily.

## 2021-11-25 NOTE — ASSESSMENT & PLAN NOTE
Endocrinology note reviewed.  It appears that nuclear medicine consultant did not feel like ablation of the solitary nodule would be effective and patient is not a surgical candidate.  They plan to continue methimazole and do monthly thyroid testing.

## 2021-11-29 ENCOUNTER — HOSPITAL ENCOUNTER (OUTPATIENT)
Dept: LAB | Facility: MEDICAL CENTER | Age: 71
End: 2021-11-29
Attending: INTERNAL MEDICINE
Payer: MEDICARE

## 2021-11-29 ENCOUNTER — HOSPITAL ENCOUNTER (OUTPATIENT)
Dept: LAB | Facility: MEDICAL CENTER | Age: 71
End: 2021-11-29
Attending: NURSE PRACTITIONER
Payer: MEDICARE

## 2021-11-29 DIAGNOSIS — E05.90 HYPERTHYROIDISM: ICD-10-CM

## 2021-11-29 LAB
ALBUMIN SERPL BCP-MCNC: 4 G/DL (ref 3.2–4.9)
APPEARANCE UR: CLEAR
BACTERIA #/AREA URNS HPF: ABNORMAL /HPF
BASOPHILS # BLD AUTO: 0.5 % (ref 0–1.8)
BASOPHILS # BLD: 0.03 K/UL (ref 0–0.12)
BILIRUB UR QL STRIP.AUTO: NEGATIVE
BUN SERPL-MCNC: 28 MG/DL (ref 8–22)
CALCIUM SERPL-MCNC: 10.5 MG/DL (ref 8.4–10.2)
CHLORIDE SERPL-SCNC: 100 MMOL/L (ref 96–112)
CHOLEST SERPL-MCNC: 169 MG/DL (ref 100–199)
CO2 SERPL-SCNC: 23 MMOL/L (ref 20–33)
COLOR UR: YELLOW
CREAT SERPL-MCNC: 1.64 MG/DL (ref 0.5–1.4)
CREAT UR-MCNC: 66.46 MG/DL
CREAT UR-MCNC: 66.55 MG/DL
EOSINOPHIL # BLD AUTO: 0.21 K/UL (ref 0–0.51)
EOSINOPHIL NFR BLD: 3.5 % (ref 0–6.9)
EPI CELLS #/AREA URNS HPF: ABNORMAL /HPF
ERYTHROCYTE [DISTWIDTH] IN BLOOD BY AUTOMATED COUNT: 54.4 FL (ref 35.9–50)
FASTING STATUS PATIENT QL REPORTED: NORMAL
GLUCOSE SERPL-MCNC: 106 MG/DL (ref 65–99)
GLUCOSE UR STRIP.AUTO-MCNC: NEGATIVE MG/DL
HCT VFR BLD AUTO: 39.9 % (ref 37–47)
HDLC SERPL-MCNC: 87 MG/DL
HGB BLD-MCNC: 13 G/DL (ref 12–16)
HYALINE CASTS #/AREA URNS LPF: ABNORMAL /LPF
IMM GRANULOCYTES # BLD AUTO: 0.02 K/UL (ref 0–0.11)
IMM GRANULOCYTES NFR BLD AUTO: 0.3 % (ref 0–0.9)
KETONES UR STRIP.AUTO-MCNC: NEGATIVE MG/DL
LDLC SERPL CALC-MCNC: 50 MG/DL
LEUKOCYTE ESTERASE UR QL STRIP.AUTO: ABNORMAL
LYMPHOCYTES # BLD AUTO: 1.64 K/UL (ref 1–4.8)
LYMPHOCYTES NFR BLD: 27.4 % (ref 22–41)
MAGNESIUM SERPL-MCNC: 1.6 MG/DL (ref 1.5–2.5)
MCH RBC QN AUTO: 33.3 PG (ref 27–33)
MCHC RBC AUTO-ENTMCNC: 32.6 G/DL (ref 33.6–35)
MCV RBC AUTO: 102.3 FL (ref 81.4–97.8)
MICRO URNS: ABNORMAL
MICROALBUMIN UR-MCNC: 1.8 MG/DL
MICROALBUMIN/CREAT UR: 27 MG/G (ref 0–30)
MONOCYTES # BLD AUTO: 0.44 K/UL (ref 0–0.85)
MONOCYTES NFR BLD AUTO: 7.4 % (ref 0–13.4)
NEUTROPHILS # BLD AUTO: 3.64 K/UL (ref 2–7.15)
NEUTROPHILS NFR BLD: 60.9 % (ref 44–72)
NITRITE UR QL STRIP.AUTO: NEGATIVE
NRBC # BLD AUTO: 0 K/UL
NRBC BLD-RTO: 0 /100 WBC
PH UR STRIP.AUTO: 5.5 [PH] (ref 5–8)
PHOSPHATE SERPL-MCNC: 3 MG/DL (ref 2.5–4.5)
PLATELET # BLD AUTO: 187 K/UL (ref 164–446)
PMV BLD AUTO: 10.7 FL (ref 9–12.9)
POTASSIUM SERPL-SCNC: 3.5 MMOL/L (ref 3.6–5.5)
PROT UR QL STRIP: NEGATIVE MG/DL
PROT UR-MCNC: 7 MG/DL (ref 0–15)
PROT/CREAT UR: 105 MG/G (ref 10–107)
PTH-INTACT SERPL-MCNC: 104 PG/ML (ref 14–72)
RBC # BLD AUTO: 3.9 M/UL (ref 4.2–5.4)
RBC # URNS HPF: ABNORMAL /HPF
RBC UR QL AUTO: ABNORMAL
SODIUM SERPL-SCNC: 137 MMOL/L (ref 135–145)
SP GR UR STRIP.AUTO: <=1.005
T3FREE SERPL-MCNC: 3.3 PG/ML (ref 2–4.4)
T4 FREE SERPL-MCNC: 1.15 NG/DL (ref 0.93–1.7)
TRIGL SERPL-MCNC: 161 MG/DL (ref 0–149)
TSH SERPL DL<=0.005 MIU/L-ACNC: 0.57 UIU/ML (ref 0.38–5.33)
URATE SERPL-MCNC: 6.4 MG/DL (ref 1.9–8.2)
WBC # BLD AUTO: 6 K/UL (ref 4.8–10.8)
WBC #/AREA URNS HPF: ABNORMAL /HPF

## 2021-11-29 PROCEDURE — 84443 ASSAY THYROID STIM HORMONE: CPT

## 2021-11-29 PROCEDURE — 80069 RENAL FUNCTION PANEL: CPT

## 2021-11-29 PROCEDURE — 84156 ASSAY OF PROTEIN URINE: CPT

## 2021-11-29 PROCEDURE — 85025 COMPLETE CBC W/AUTO DIFF WBC: CPT

## 2021-11-29 PROCEDURE — 84481 FREE ASSAY (FT-3): CPT

## 2021-11-29 PROCEDURE — 36415 COLL VENOUS BLD VENIPUNCTURE: CPT

## 2021-11-29 PROCEDURE — 83970 ASSAY OF PARATHORMONE: CPT

## 2021-11-29 PROCEDURE — 80061 LIPID PANEL: CPT

## 2021-11-29 PROCEDURE — 81001 URINALYSIS AUTO W/SCOPE: CPT

## 2021-11-29 PROCEDURE — 84550 ASSAY OF BLOOD/URIC ACID: CPT

## 2021-11-29 PROCEDURE — 82570 ASSAY OF URINE CREATININE: CPT | Mod: 91

## 2021-11-29 PROCEDURE — 82043 UR ALBUMIN QUANTITATIVE: CPT

## 2021-11-29 PROCEDURE — 83735 ASSAY OF MAGNESIUM: CPT

## 2021-11-29 PROCEDURE — 82306 VITAMIN D 25 HYDROXY: CPT

## 2021-11-29 PROCEDURE — 84439 ASSAY OF FREE THYROXINE: CPT

## 2021-12-01 DIAGNOSIS — I82.4Y9 DEEP VEIN THROMBOSIS (DVT) OF PROXIMAL LOWER EXTREMITY, UNSPECIFIED CHRONICITY, UNSPECIFIED LATERALITY (HCC): ICD-10-CM

## 2021-12-01 LAB — 25(OH)D3 SERPL-MCNC: 64 NG/ML (ref 30–80)

## 2021-12-01 NOTE — TELEPHONE ENCOUNTER
Pt needed refill for Xarelto for VTE  CBC wnl  CrCl = 46 - pt to continue with Xarelto 20 mg daily    F/u in 6 months - pt to call to reschedule f/u    Nuno ClarkD

## 2021-12-08 ENCOUNTER — OFFICE VISIT (OUTPATIENT)
Dept: ENDOCRINOLOGY | Facility: MEDICAL CENTER | Age: 71
End: 2021-12-08
Attending: INTERNAL MEDICINE
Payer: MEDICARE

## 2021-12-08 ENCOUNTER — PATIENT MESSAGE (OUTPATIENT)
Dept: SLEEP MEDICINE | Facility: MEDICAL CENTER | Age: 71
End: 2021-12-08

## 2021-12-08 DIAGNOSIS — M81.0 POSTMENOPAUSAL OSTEOPOROSIS: ICD-10-CM

## 2021-12-08 DIAGNOSIS — E83.52 HYPERCALCEMIA: ICD-10-CM

## 2021-12-08 DIAGNOSIS — E05.10 THYROTOXICOSIS WITH TOXIC SINGLE THYROID NODULE AND WITHOUT THYROID STORM: ICD-10-CM

## 2021-12-08 DIAGNOSIS — J44.9 CHRONIC OBSTRUCTIVE PULMONARY DISEASE, UNSPECIFIED COPD TYPE (HCC): ICD-10-CM

## 2021-12-08 DIAGNOSIS — M81.8 OTHER OSTEOPOROSIS, UNSPECIFIED PATHOLOGICAL FRACTURE PRESENCE: ICD-10-CM

## 2021-12-08 PROCEDURE — 99214 OFFICE O/P EST MOD 30 MIN: CPT | Performed by: INTERNAL MEDICINE

## 2021-12-08 RX ORDER — ALBUTEROL SULFATE 2.5 MG/3ML
2.5 SOLUTION RESPIRATORY (INHALATION) EVERY 4 HOURS PRN
Qty: 180 EACH | Refills: 1 | Status: SHIPPED | OUTPATIENT
Start: 2021-12-08 | End: 2023-01-01

## 2021-12-09 ENCOUNTER — OUTPATIENT INFUSION SERVICES (OUTPATIENT)
Dept: ONCOLOGY | Facility: MEDICAL CENTER | Age: 71
End: 2021-12-09
Attending: INTERNAL MEDICINE
Payer: MEDICARE

## 2021-12-09 VITALS
TEMPERATURE: 97 F | HEIGHT: 65 IN | SYSTOLIC BLOOD PRESSURE: 121 MMHG | RESPIRATION RATE: 18 BRPM | HEART RATE: 98 BPM | DIASTOLIC BLOOD PRESSURE: 72 MMHG | WEIGHT: 212.52 LBS | OXYGEN SATURATION: 92 % | BODY MASS INDEX: 35.41 KG/M2

## 2021-12-09 DIAGNOSIS — M81.0 POSTMENOPAUSAL OSTEOPOROSIS: ICD-10-CM

## 2021-12-09 PROCEDURE — 96372 THER/PROPH/DIAG INJ SC/IM: CPT

## 2021-12-09 PROCEDURE — 700111 HCHG RX REV CODE 636 W/ 250 OVERRIDE (IP): Mod: JG | Performed by: INTERNAL MEDICINE

## 2021-12-09 RX ADMIN — DENOSUMAB 60 MG: 60 INJECTION SUBCUTANEOUS at 13:46

## 2021-12-09 ASSESSMENT — FIBROSIS 4 INDEX: FIB4 SCORE: 1.89

## 2021-12-09 NOTE — PROGRESS NOTES
[facility]onia into [facility] for a Prolia injection for osteoporosis.  Pt denied having any new complaints or dental procedures in the last month or scheduled for the next month. Labs drawn prior, pharmacist notified that Pt within parameters to treat.  Latonia aware to continue taking vitamin D and calcium supplements. Prolia injection given in upper back of left arm SQ. Pt tolerated well, band-aid applied to injection site. Patient is changing PCP and will have new order be sent to Infusion team once she establishes an appointment with the new PCP. Provided patient with appointment reminder card with schedulers' phone number to establish future appointments, Latonia discharged home to self care.

## 2021-12-09 NOTE — PATIENT COMMUNICATION
Have we ever prescribed this med? Yes.  If yes, what date?     Last OV: 08/003/2021 - Dr. Byrne     Next OV: 1/12/2022 - Dr. Allan    DX: COPD    Medications: Albuterol

## 2021-12-09 NOTE — TELEPHONE ENCOUNTER
From: Latonia Clinton  To: Physician Jessenia Byrne  Sent: 12/8/2021 4:01 PM PST  Subject: Non-Urgent Medical Question    I visited my nephrologist on Dec 8 and in listening to my lungs she indicated that I might have an infection in my left lung. She suggested a chest X-ray and to start the doxycycline that I have on hand. I told her that I've had difficulty breathing for the past few weeks. She said if not better in three says to go to urgent care. I would rather go to imaging at [facility]. I told her that I would get in touch with my pulmonary doctor for further advice. Please give me some direction and if an X-ray is needed I would need you to send over an order to [facility] since the weekend is coming. I also have a prescription for prednisone and am not sure whether to start it as well. I'm anxiously waiting for a reply. Thank you. I left a message with the medical asst yesterday but have not heard back.

## 2021-12-09 NOTE — PROGRESS NOTES
Chief Complaint   Patient presents with   • Thyrotoxicosis     Toxic nodular   • Osteoporosis   The patient could not come to the office today because of disability and difficulty travel.  This appointment was conducted by telephone per her request.                Start time: 2:53 PM    HPI:         Patient continues on methimazole 10 mg/day.  Solitary nodule possible cause of her thyrotoxicosis is a functional thyroid nodule.  The uptake in the remainder of her thyroid gland is not suppressed by the activity in this nodule.  So it may not be solely responsible for the thyrotoxicosis.   And she has other nodules that are nonfunctional.  Nuclear medicine consultant does not feel that I-131 ablation of the solitary nodule is going to be effective treatment.                 She is definitely not a surgical candidate.  She is responding to low-dose methimazole daily with  monthly thyroid testing.  She is currently taking 10 mg methimazole daily.  Her current TSH = 0.5, free T4= 1.1 (0.9-1.7) Free T3= 3.3 (2.0-4.4).  It appears she is adequately treated with methimazole.                    Osteoporosis has been managed with Prolia which she tolerates well.  However she has had a tooth extraction September 1.  Her next Prolia shot was due in October but the protocol is to wait 3 months after tooth extraction.  So the next Prolia dose will be given tomorrow.        Laboratory data reviewed from Nov 29.  Calcium is 10.5 (8.4-10.2) with albumin 4.0.  Phosphorus is normal at 3.0  Magnesium low normal at 1.6.  Uric acid 6.4         She is taking vitamin D 50,000 units once a week.  She will discontinue that and substitute vitamin D3 1000 units/day         Renal function is stable with creatinine 1.6 and GFR 31  Electrolytes are normal      ROS:  This office visit was done virtually via telephone because she has such difficulty getting around with very painful feet and shortness of breath.      Allergies: No Known  Allergies    Current medicines including changes today:  Current Outpatient Medications   Medication Sig Dispense Refill   • albuterol (PROVENTIL) 2.5mg/3ml Nebu Soln solution for nebulization Take 3 mL by nebulization every four hours as needed for Shortness of Breath. 180 Each 1   • rivaroxaban (XARELTO) 20 MG Tab tablet TAKE ONE TABLET BY MOUTH DAILY WITH DINNER 90 Tablet 1   • zolpidem (AMBIEN) 5 MG Tab Take 1 Tablet by mouth at bedtime as needed for Sleep for up to 90 days. 90 Tablet 0   • BREO ELLIPTA 200-25 MCG/INH AEROSOL POWDER, BREATH ACTIVATED INHALE ONE DOSE BY MOUTH DAILY 1 Each 11   • simvastatin (ZOCOR) 40 MG Tab TAKE ONE TABLET BY MOUTH EVERY EVENING 90 Tablet 0   • albuterol 108 (90 Base) MCG/ACT Aero Soln inhalation aerosol INHALE TWO PUFFS BY MOUTH EVERY 6 HOURS AS NEEDED FOR SHORTNESS OF BREATH 18 g 10   • azithromycin (ZITHROMAX) 250 MG Tab TAKE ONE TABLET BY MOUTH DAILY 30 tablet 5   • KLOR-CON M20 20 MEQ Tab CR Take 20 mEq by mouth every day.     • ondansetron (ZOFRAN) 4 MG Tab tablet Take 4 mg by mouth every four hours as needed for Nausea/Vomiting.     • methimazole (TAPAZOLE) 10 MG Tab Take 1 tablet by mouth every day. 30 tablet 3   • RESTASIS 0.05 % ophthalmic emulsion Administer 1 Drop into both eyes every day.     • XIIDRA 5 % Solution 2 times a day. (Patient not taking: Reported on 8/3/2021)     • SPIRIVA RESPIMAT 2.5 MCG/ACT Aero Soln INHALE TWO PUFFS BY MOUTH DAILY 1 Each 6   • losartan (COZAAR) 50 MG Tab TAKE ONE AND ONE-HALF (1 AND 1/2) TABLET BY MOUTH DAILY (COZAAR) (Patient taking differently: Take 50 mg by mouth every day.) 135 tablet 1   • colchicine (COLCRYS) 0.6 MG Tab Day 1: 1.2 mg once, followed in 1 hour with a single dose of 0.6 mg.  Day 2 and thereafter: Oral: 0.6 mg once daily until flare resolves 30 Tab 1   • cyanocobalamin (VITAMIN B-12) 100 MCG Tab Take 100 mcg by mouth every day.     • SODIUM BICARBONATE PO Take 10 g by mouth 2 times a day.     • vitamin D,  "Ergocalciferol, (DRISDOL) 66302 units Cap capsule Take 1 Cap by mouth every 14 days. (Patient taking differently: Take 50,000 Units by mouth every 7 days.) 5 Cap 1   • HYDROcodone/acetaminophen (NORCO)  MG Tab Take 1-2 Tablets by mouth every 6 hours as needed.     • allopurinol (ZYLOPRIM) 100 MG Tab Take 200 mg by mouth every day.     • docusate sodium (COLACE) 100 MG Cap Take 300 mg by mouth every evening.     • acetaminophen (TYLENOL) 500 MG Tab Take 1,000 mg by mouth every 6 hours as needed for Moderate Pain.     • COMBIGAN 0.2-0.5 % Solution Place 1 Drop in both eyes 2 Times a Day.       No current facility-administered medications for this visit.        Past Medical History:   Diagnosis Date   • Anesthesia     \"Abnormal blood pressure and breathing\"  \"couldn't breathe\"   • Asthma     inhalers daily   • Backpain 7/2017     thor. area   • Blood clot in vein 07/06/2018    \"Currently have a blood clot in my left eye\"   • Bowel habit changes 07/06/2018    Constipation   • Breath shortness     with exertion has O2 but does not use   • Bronchitis    • CAD (coronary artery disease)     palpatations   • Cancer (HCC) 2016    left lung   • Chickenpox    • COPD (chronic obstructive pulmonary disease) ([facility])    • Depression 2/26/2019   • Dialysis 2005    transient renal failure due to sepsis   • Emphysema of lung ([facility])    • Glaucoma     right eye   • Hemorrhagic disorder ([facility])    • Hyperlipidemia    • Hypertension    • Hyperthyroidism    • Kidney stone     bilateral   • Lung cancer (HCC) 12/12/2016   • Multiple thyroid nodules 7/9/2015   • Nasal drainage    • Obstruction of left ureteropelvic junction (UPJ) due to stone 6/17/2018   • Osteoporosis    • Pain 07/06/2018    Back pain   • Personal history of venous thrombosis and embolism 2004, 2012    right leg 2012, left arm dvt 2004 left eye 2017   • Pneumonia 7/2016    per patient   • Renal disorder     had been on dialysis for 7 months for acute failure 2005   • Renal " "stones 2013    post lithotripsy   • Rheumatoid arthritis (HCC)     question   • Rheumatoid arthritis (HCC)    • S/P appendectomy 1998    • S/P cholecystectomy 1998   • S/P kyphoplasty 2006, 2007, 2013   • Sepsis, unspecified 2005   • Shortness of breath 07/06/2018    Chronic current problem. \"A couple of years now\".  O2 concentrator at night - pt states she doesn't use it.   • Thyroid nodule, hot 2017    functional \"hot\" right thyroid nodule without thyrotoxicosis       PHYSICAL EXAM:     None done   this was a telephone encounter    ASSESSMENT AND RECOMMENDATIONS    1. Thyrotoxicosis with toxic single thyroid nodule and without thyroid storm               See HPI    2. Postmenopausal osteoporosis             See HPI                                  Telephone end time 3:15 PM                                Total time for visit : 20 minutes    DISPOSITION: I discussed my upcoming nursing home the end of this year.  The clinic providers will assume her care.  She agrees with this follow-up arrangement.      Kishore Torrez M.D.    Copies to: Liat Emerson M.D. [phone]  "

## 2021-12-10 ENCOUNTER — TELEPHONE (OUTPATIENT)
Dept: ENDOCRINOLOGY | Facility: MEDICAL CENTER | Age: 71
End: 2021-12-10

## 2021-12-15 NOTE — PATIENT COMMUNICATION
Left vm to the patient informed medication was sent to the pharmacy. Informed she contact us if any issues getting medication. I encouraged she contact us via [employer] is still experiencing symptoms or if changed.

## 2021-12-15 NOTE — PATIENT COMMUNICATION
Received vm from the patient 12/7/21 4:12pm. States she was seen by nephrologist and heard her lungs and indicated she had in an infection. Patient states she had left over doxycycline. Patient is requesting a XRAY be taken. Pt also mentioned pharmacy had been sending us multiple request for nebulizer medication.       mychart encounters 12/8/21 nebulizer medication was sent to the pharmacy. As for her symptoms not been reviewed.

## 2021-12-16 DIAGNOSIS — J44.9 CHRONIC OBSTRUCTIVE PULMONARY DISEASE, UNSPECIFIED COPD TYPE (HCC): ICD-10-CM

## 2021-12-17 RX ORDER — TIOTROPIUM BROMIDE INHALATION SPRAY 3.12 UG/1
SPRAY, METERED RESPIRATORY (INHALATION)
Qty: 4 G | Refills: 6 | Status: SHIPPED | OUTPATIENT
Start: 2021-12-17 | End: 2022-09-09 | Stop reason: SDUPTHER

## 2021-12-17 NOTE — TELEPHONE ENCOUNTER
Have we ever prescribed this med? Yes.  If yes, what date? 04/07/21    Last OV: 08/03/21 with Dr Byrne    Next OV: 01/12/22 with Dr Allan    DX: COPD    Medications:   Requested Prescriptions     Pending Prescriptions Disp Refills   • SPIRIVA RESPIMAT 2.5 MCG/ACT Aero Soln [Pharmacy Med Name: SPIRIVA RESPIMAT 2.5 MCG INH SPRAY]       Sig: INHALE TWO PUFFS BY MOUTH DAILY

## 2022-01-06 ENCOUNTER — HOSPITAL ENCOUNTER (OUTPATIENT)
Dept: LAB | Facility: MEDICAL CENTER | Age: 72
End: 2022-01-06
Attending: INTERNAL MEDICINE
Payer: MEDICARE

## 2022-01-06 ENCOUNTER — HOSPITAL ENCOUNTER (OUTPATIENT)
Dept: RADIOLOGY | Facility: MEDICAL CENTER | Age: 72
End: 2022-01-06
Attending: INTERNAL MEDICINE
Payer: MEDICARE

## 2022-01-06 DIAGNOSIS — M81.0 POSTMENOPAUSAL OSTEOPOROSIS: ICD-10-CM

## 2022-01-06 DIAGNOSIS — E05.90 HYPERTHYROIDISM: ICD-10-CM

## 2022-01-06 DIAGNOSIS — E83.52 HYPERCALCEMIA: ICD-10-CM

## 2022-01-06 DIAGNOSIS — R91.8 PULMONARY NODULES: ICD-10-CM

## 2022-01-06 LAB
25(OH)D3 SERPL-MCNC: 68 NG/ML (ref 30–100)
CA-I SERPL-SCNC: 1.23 MMOL/L (ref 1.1–1.3)
T3FREE SERPL-MCNC: 2.96 PG/ML (ref 2–4.4)
T4 FREE SERPL-MCNC: 0.7 NG/DL (ref 0.93–1.7)
TSH SERPL DL<=0.005 MIU/L-ACNC: 1.26 UIU/ML (ref 0.38–5.33)

## 2022-01-06 PROCEDURE — 84481 FREE ASSAY (FT-3): CPT

## 2022-01-06 PROCEDURE — 82330 ASSAY OF CALCIUM: CPT

## 2022-01-06 PROCEDURE — 84439 ASSAY OF FREE THYROXINE: CPT

## 2022-01-06 PROCEDURE — 71250 CT THORAX DX C-: CPT | Mod: ME

## 2022-01-06 PROCEDURE — 36415 COLL VENOUS BLD VENIPUNCTURE: CPT

## 2022-01-06 PROCEDURE — 84443 ASSAY THYROID STIM HORMONE: CPT

## 2022-01-06 PROCEDURE — 82306 VITAMIN D 25 HYDROXY: CPT

## 2022-01-10 ENCOUNTER — TELEPHONE (OUTPATIENT)
Dept: ENDOCRINOLOGY | Facility: MEDICAL CENTER | Age: 72
End: 2022-01-10

## 2022-01-10 DIAGNOSIS — E21.3 HYPERPARATHYROIDISM (HCC): ICD-10-CM

## 2022-01-10 NOTE — TELEPHONE ENCOUNTER
Patient called in.     States she had her blood drawn and would like to know if she needs to change her methimazole medication.     Please advise.

## 2022-01-11 RX ORDER — CHOLECALCIFEROL (VITAMIN D3) 125 MCG
1 CAPSULE ORAL
Qty: 20 CAPSULE | Refills: 1 | Status: SHIPPED | OUTPATIENT
Start: 2022-01-11 | End: 2022-01-19

## 2022-01-11 NOTE — TELEPHONE ENCOUNTER
Message from Santos ALVARADO noticed her T4 is low.   Her chart says she is taking 10 mg.   If she is taking 10 mg, she needs to to start taking 5 mg daily and do blood work 1 week before she comes and sees me in April.       Called patient and informed.

## 2022-01-12 ENCOUNTER — OFFICE VISIT (OUTPATIENT)
Dept: SLEEP MEDICINE | Facility: MEDICAL CENTER | Age: 72
End: 2022-01-12
Payer: MEDICARE

## 2022-01-12 VITALS
HEIGHT: 65 IN | HEART RATE: 91 BPM | SYSTOLIC BLOOD PRESSURE: 120 MMHG | BODY MASS INDEX: 34.99 KG/M2 | DIASTOLIC BLOOD PRESSURE: 66 MMHG | WEIGHT: 210 LBS | TEMPERATURE: 97.7 F | OXYGEN SATURATION: 92 % | RESPIRATION RATE: 16 BRPM

## 2022-01-12 DIAGNOSIS — R91.8 PULMONARY NODULES: ICD-10-CM

## 2022-01-12 DIAGNOSIS — M19.90 OSTEOARTHRITIS, UNSPECIFIED OSTEOARTHRITIS TYPE, UNSPECIFIED SITE: ICD-10-CM

## 2022-01-12 DIAGNOSIS — J44.9 CHRONIC OBSTRUCTIVE PULMONARY DISEASE, UNSPECIFIED COPD TYPE (HCC): ICD-10-CM

## 2022-01-12 DIAGNOSIS — Z85.118 HISTORY OF LUNG CANCER: ICD-10-CM

## 2022-01-12 DIAGNOSIS — Z87.891 FORMER SMOKER: ICD-10-CM

## 2022-01-12 PROCEDURE — 99214 OFFICE O/P EST MOD 30 MIN: CPT | Performed by: INTERNAL MEDICINE

## 2022-01-12 RX ORDER — PREDNISONE 10 MG/1
TABLET ORAL
Qty: 18 TABLET | Refills: 2 | Status: SHIPPED | OUTPATIENT
Start: 2022-01-12 | End: 2022-04-22 | Stop reason: SDUPTHER

## 2022-01-12 RX ORDER — DOXYCYCLINE HYCLATE 100 MG/1
100 CAPSULE ORAL 2 TIMES DAILY
Qty: 14 EACH | Refills: 0 | Status: SHIPPED | OUTPATIENT
Start: 2022-01-12 | End: 2023-01-03

## 2022-01-12 ASSESSMENT — ENCOUNTER SYMPTOMS
HEARTBURN: 0
PHOTOPHOBIA: 0
HEMOPTYSIS: 0
FOCAL WEAKNESS: 0
ABDOMINAL PAIN: 0
SINUS PAIN: 0
SPEECH CHANGE: 0
WEAKNESS: 0
ORTHOPNEA: 0
NECK PAIN: 0
CHILLS: 0
WHEEZING: 0
STRIDOR: 0
CLAUDICATION: 0
DIAPHORESIS: 0
NAUSEA: 0
BLURRED VISION: 0
SORE THROAT: 0
FEVER: 0
EYE DISCHARGE: 0
COUGH: 0
PALPITATIONS: 0
SHORTNESS OF BREATH: 1
TREMORS: 0
EYE PAIN: 0
DIARRHEA: 0
MYALGIAS: 0
PND: 0
HEADACHES: 0
DOUBLE VISION: 0
VOMITING: 0
WEIGHT LOSS: 0
CONSTIPATION: 0
SPUTUM PRODUCTION: 0
BACK PAIN: 1
FALLS: 0
DIZZINESS: 0
DEPRESSION: 0
EYE REDNESS: 0

## 2022-01-12 ASSESSMENT — FIBROSIS 4 INDEX: FIB4 SCORE: 1.89

## 2022-01-12 NOTE — PROGRESS NOTES
"Chief Complaint   Patient presents with   • COPD     LAST SEEN 8/3/21 Dr. Byrne    • Results     Ct Chest thorax 1/6/22          HPI: This patient is a 71 y.o. female whom is followed in our clinic for COPD, hx of lung cancer and lung nodules last seen by Dr. Byrne on 8/3/21. She has moderate air flow obstruction based on PFTs from 2016 with FEV1 of 1.4L or 54% pred with air trapping, normal TLC and preserved DLCO. She is a former smoker with >30 pk year  Hx and quit in 2000. She uses Breo and spiriva as well as daily low-dose azithromycin. She last used emergency prednisone and doxycycline in early December. She has participated in pulmonary rehab and declines f/u PFTs. She has serial CT chest for lung Ca surveillance after surgical removal of lung Ca RUL in 2016. CT from 7/2021 showed new GGO in RLL but stable .9cm nodule in lingula. Repeat CT 1/6/22 showed increase in a RLL nodule from 6 to 7 mm and increase in irreg lingula nodule to 1.5 cm in max diameter. No new LAD. Pt has stable SOB and is limited due to chronic pain related to DJD.     Past Medical History:   Diagnosis Date   • Anesthesia     \"Abnormal blood pressure and breathing\"  \"couldn't breathe\"   • Asthma     inhalers daily   • Backpain 7/2017     thor. area   • Blood clot in vein 07/06/2018    \"Currently have a blood clot in my left eye\"   • Bowel habit changes 07/06/2018    Constipation   • Breath shortness     with exertion has O2 but does not use   • Bronchitis    • CAD (coronary artery disease)     palpatations   • Cancer (HCC) 2016    left lung   • Chickenpox    • COPD (chronic obstructive pulmonary disease) (HCC)    • Depression 2/26/2019   • Dialysis 2005    transient renal failure due to sepsis   • Emphysema of lung (HCC)    • Glaucoma     right eye   • Hemorrhagic disorder (HCC)    • Hyperlipidemia    • Hypertension    • Hyperthyroidism    • Kidney stone     bilateral   • Lung cancer (HCC) 12/12/2016   • Multiple thyroid nodules 7/9/2015   • " "Nasal drainage    • Obstruction of left ureteropelvic junction (UPJ) due to stone 2018   • Osteoporosis    • Pain 2018    Back pain   • Personal history of venous thrombosis and embolism ,     right leg , left arm dvt  left eye    • Pneumonia 2016    per patient   • Renal disorder     had been on dialysis for 7 months for acute failure    • Renal stones     post lithotripsy   • Rheumatoid arthritis (HCC)     question   • Rheumatoid arthritis (HCC)    • S/P appendectomy     • S/P cholecystectomy    • S/P kyphoplasty , ,    • Sepsis, unspecified    • Shortness of breath 2018    Chronic current problem. \"A couple of years now\".  O2 concentrator at night - pt states she doesn't use it.   • Thyroid nodule, hot 2017    functional \"hot\" right thyroid nodule without thyrotoxicosis       Social History     Socioeconomic History   • Marital status: Single     Spouse name: Not on file   • Number of children: Not on file   • Years of education: Not on file   • Highest education level: Not on file   Occupational History   • Not on file   Tobacco Use   • Smoking status: Former Smoker     Packs/day: 1.00     Years: 30.00     Pack years: 30.00     Types: Cigarettes     Quit date: 2000     Years since quittin.0   • Smokeless tobacco: Never Used   • Tobacco comment: 7 years ago   Vaping Use   • Vaping Use: Never used   Substance and Sexual Activity   • Alcohol use: Yes     Alcohol/week: 0.0 oz     Comment: x2 a week   • Drug use: No   • Sexual activity: Not on file   Other Topics Concern   • Not on file   Social History Narrative   • Not on file     Social Determinants of Health     Financial Resource Strain:    • Difficulty of Paying Living Expenses: Not on file   Food Insecurity:    • Worried About Running Out of Food in the Last Year: Not on file   • Ran Out of Food in the Last Year: Not on file   Transportation Needs:    • Lack of Transportation " (Medical): Not on file   • Lack of Transportation (Non-Medical): Not on file   Physical Activity:    • Days of Exercise per Week: Not on file   • Minutes of Exercise per Session: Not on file   Stress:    • Feeling of Stress : Not on file   Social Connections:    • Frequency of Communication with Friends and Family: Not on file   • Frequency of Social Gatherings with Friends and Family: Not on file   • Attends Sabianism Services: Not on file   • Active Member of Clubs or Organizations: Not on file   • Attends Club or Organization Meetings: Not on file   • Marital Status: Not on file   Intimate Partner Violence:    • Fear of Current or Ex-Partner: Not on file   • Emotionally Abused: Not on file   • Physically Abused: Not on file   • Sexually Abused: Not on file   Housing Stability:    • Unable to Pay for Housing in the Last Year: Not on file   • Number of Places Lived in the Last Year: Not on file   • Unstable Housing in the Last Year: Not on file       Family History   Problem Relation Age of Onset   • Cancer Mother    • Heart Failure Father    • Heart Disease Brother        Current Outpatient Medications on File Prior to Visit   Medication Sig Dispense Refill   • simvastatin (ZOCOR) 40 MG Tab Take 1 Tablet by mouth every evening. 90 Tablet 1   • losartan (COZAAR) 50 MG Tab TAKE ONE AND ONE-HALF (1 AND 1/2) TABLET BY MOUTH DAILY (COZAAR) (Patient taking differently: Take 25 mg by mouth every day. TAKE ONE AND ONE-HALF (1 AND 1/2) TABLET BY MOUTH DAILY (COZAAR)) 135 Tablet 1   • Cholecalciferol (VITAMIN D) 125 MCG (5000 UT) Cap Take 1 Capsule by mouth every 7 days. 20 Capsule 1   • SPIRIVA RESPIMAT 2.5 MCG/ACT Aero Soln INHALE TWO PUFFS BY MOUTH DAILY 4 g 6   • albuterol (PROVENTIL) 2.5mg/3ml Nebu Soln solution for nebulization Take 3 mL by nebulization every four hours as needed for Shortness of Breath. 180 Each 1   • rivaroxaban (XARELTO) 20 MG Tab tablet TAKE ONE TABLET BY MOUTH DAILY WITH DINNER 90 Tablet 1   •  zolpidem (AMBIEN) 5 MG Tab Take 1 Tablet by mouth at bedtime as needed for Sleep for up to 90 days. 90 Tablet 0   • BREO ELLIPTA 200-25 MCG/INH AEROSOL POWDER, BREATH ACTIVATED INHALE ONE DOSE BY MOUTH DAILY 1 Each 11   • albuterol 108 (90 Base) MCG/ACT Aero Soln inhalation aerosol INHALE TWO PUFFS BY MOUTH EVERY 6 HOURS AS NEEDED FOR SHORTNESS OF BREATH 18 g 10   • azithromycin (ZITHROMAX) 250 MG Tab TAKE ONE TABLET BY MOUTH DAILY 30 tablet 5   • KLOR-CON M20 20 MEQ Tab CR Take 20 mEq by mouth every day.     • ondansetron (ZOFRAN) 4 MG Tab tablet Take 4 mg by mouth every four hours as needed for Nausea/Vomiting.     • methimazole (TAPAZOLE) 10 MG Tab Take 1 tablet by mouth every day. 30 tablet 3   • RESTASIS 0.05 % ophthalmic emulsion Administer 1 Drop into both eyes every day.     • colchicine (COLCRYS) 0.6 MG Tab Day 1: 1.2 mg once, followed in 1 hour with a single dose of 0.6 mg.  Day 2 and thereafter: Oral: 0.6 mg once daily until flare resolves 30 Tab 1   • cyanocobalamin (VITAMIN B-12) 100 MCG Tab Take 100 mcg by mouth every day.     • SODIUM BICARBONATE PO Take 10 g by mouth 2 times a day.     • allopurinol (ZYLOPRIM) 100 MG Tab Take 200 mg by mouth every day.     • docusate sodium (COLACE) 100 MG Cap Take 300 mg by mouth every evening.     • acetaminophen (TYLENOL) 500 MG Tab Take 1,000 mg by mouth every 6 hours as needed for Moderate Pain.     • COMBIGAN 0.2-0.5 % Solution Place 1 Drop in both eyes 2 Times a Day.     • XIIDRA 5 % Solution 2 times a day. (Patient not taking: Reported on 8/3/2021)     • HYDROcodone/acetaminophen (NORCO)  MG Tab Take 1-2 Tablets by mouth every 6 hours as needed. (Patient not taking: Reported on 12/9/2021)       No current facility-administered medications on file prior to visit.       Patient has no known allergies.      ROS:   Review of Systems   Constitutional: Negative for chills, diaphoresis, fever, malaise/fatigue and weight loss.   HENT: Negative for congestion,  "ear discharge, ear pain, hearing loss, nosebleeds, sinus pain, sore throat and tinnitus.    Eyes: Negative for blurred vision, double vision, photophobia, pain, discharge and redness.   Respiratory: Positive for shortness of breath. Negative for cough, hemoptysis, sputum production, wheezing and stridor.    Cardiovascular: Negative for chest pain, palpitations, orthopnea, claudication, leg swelling and PND.   Gastrointestinal: Negative for abdominal pain, constipation, diarrhea, heartburn, nausea and vomiting.   Genitourinary: Negative for dysuria and urgency.   Musculoskeletal: Positive for back pain and joint pain. Negative for falls, myalgias and neck pain.   Skin: Negative for itching and rash.   Neurological: Negative for dizziness, tremors, speech change, focal weakness, weakness and headaches.   Endo/Heme/Allergies: Negative for environmental allergies.   Psychiatric/Behavioral: Negative for depression.       /66 (BP Location: Left arm, Patient Position: Sitting, BP Cuff Size: Large adult)   Pulse 91   Temp 36.5 °C (97.7 °F) (Temporal)   Resp 16   Ht 1.655 m (5' 5.16\")   Wt 95.3 kg (210 lb)   SpO2 92%   Physical Exam  Vitals reviewed.   Constitutional:       General: She is not in acute distress.     Appearance: Normal appearance. She is well-developed. She is obese.   HENT:      Head: Normocephalic and atraumatic.      Right Ear: External ear normal.      Left Ear: External ear normal.      Nose: Nose normal. No congestion.      Mouth/Throat:      Mouth: Mucous membranes are moist.      Pharynx: Oropharynx is clear. No oropharyngeal exudate.   Eyes:      General: No scleral icterus.     Extraocular Movements: Extraocular movements intact.      Conjunctiva/sclera: Conjunctivae normal.      Pupils: Pupils are equal, round, and reactive to light.   Neck:      Vascular: No JVD.      Trachea: No tracheal deviation.   Cardiovascular:      Rate and Rhythm: Normal rate and regular rhythm.      Heart " sounds: Normal heart sounds. No murmur heard.  No friction rub. No gallop.    Pulmonary:      Effort: Pulmonary effort is normal. No accessory muscle usage or respiratory distress.      Breath sounds: Normal breath sounds. No wheezing or rales.   Abdominal:      General: There is no distension.      Palpations: Abdomen is soft.      Tenderness: There is no abdominal tenderness.   Musculoskeletal:         General: No tenderness or deformity. Normal range of motion.      Cervical back: Normal range of motion and neck supple.      Right lower leg: No edema.      Left lower leg: No edema.   Lymphadenopathy:      Cervical: No cervical adenopathy.   Skin:     General: Skin is warm and dry.      Findings: No rash.      Nails: There is no clubbing.   Neurological:      Mental Status: She is alert and oriented to person, place, and time.      Cranial Nerves: No cranial nerve deficit.      Gait: Gait normal.   Psychiatric:         Mood and Affect: Mood normal.         Behavior: Behavior normal.         PFTs as reviewed by me personally:as per hPI    Imaging as reviewed by me personally:  As per hPI    Assessment:  1. Pulmonary nodules  [id number] BASE TO MID-THIGH   2. History of lung cancer     3. Chronic obstructive pulmonary disease, unspecified COPD type (HCC)     4. Former smoker     5. Osteoarthritis, unspecified osteoarthritis type, unspecified site         Plan:  1. Concerning growth in high risk pt. PET now.   2. New changed on PET; if FDG avid, will likely need referral to oncology; if only one nodule is FDG avid then possible surgical resection but pt knows she will likely need repeat PFTs prior to this  3. Chronic, moderate, MMRC 0-1. Overall stable on daily low-dose prophylactic azithromycin which she was counseled to stop if she takes doxycycline. Continue Breo and spiriva. Tobacco free  4. Tobacco free x 20 years. Encouraged ongoing abstinence  5. Pt is limited due to chronic pain.   Return in about 3  months (around 4/12/2022) for PET.

## 2022-01-19 ENCOUNTER — TELEPHONE (OUTPATIENT)
Dept: MEDICAL GROUP | Facility: MEDICAL CENTER | Age: 72
End: 2022-01-19

## 2022-01-19 RX ORDER — ERGOCALCIFEROL 1.25 MG/1
50000 CAPSULE ORAL
Qty: 14 CAPSULE | Refills: 1 | Status: SHIPPED | OUTPATIENT
Start: 2022-01-19 | End: 2022-05-12 | Stop reason: SDUPTHER

## 2022-01-19 NOTE — TELEPHONE ENCOUNTER
Pt called stating she needs Vitamin D2 prescribed instead of Vitamin D. Can you resend rx for Vitamin D2 please?    New prescription sent to the pharmacy for ergocalciferol 1.25 mg 77502 units for 90-day supply with 1 refill.

## 2022-01-27 RX ORDER — CHOLECALCIFEROL (VITAMIN D3) 125 MCG
1 CAPSULE ORAL
Qty: 20 CAPSULE | Refills: 1 | Status: CANCELLED | OUTPATIENT
Start: 2022-01-27

## 2022-01-28 ENCOUNTER — HOSPITAL ENCOUNTER (OUTPATIENT)
Dept: RADIOLOGY | Facility: MEDICAL CENTER | Age: 72
End: 2022-01-28
Attending: INTERNAL MEDICINE
Payer: MEDICARE

## 2022-01-28 DIAGNOSIS — R91.8 PULMONARY NODULES: ICD-10-CM

## 2022-01-28 PROCEDURE — A9552 F18 FDG: HCPCS

## 2022-02-01 DIAGNOSIS — R91.8 PULMONARY NODULES: ICD-10-CM

## 2022-02-02 NOTE — PROGRESS NOTES
Reviewed PET; very low FDG uptake with SUV of 3 in lingular nodule but no FDG uptake in any lymph nodes or other pulmonary nodules. Blood pool SUV was 4.8. I spoke with the patient and we discussed options of continued monitoring with short interval follow-up CT in 3 months versus excisional biopsy. Given low level FDG uptake with no uptake in any other pulmonary nodules, we opted for short interval follow-up. CT chest ordered for 3 months. Patient agrees with the plan.

## 2022-02-08 DIAGNOSIS — J44.9 CHRONIC OBSTRUCTIVE PULMONARY DISEASE, UNSPECIFIED COPD TYPE (HCC): ICD-10-CM

## 2022-02-08 RX ORDER — AZITHROMYCIN 250 MG/1
TABLET, FILM COATED ORAL
Qty: 30 TABLET | Refills: 5 | Status: SHIPPED | OUTPATIENT
Start: 2022-02-08 | End: 2022-09-09 | Stop reason: SDUPTHER

## 2022-02-08 NOTE — TELEPHONE ENCOUNTER
Caller Name: Latonia Clinton                 Call Back Number: [phone] (home)         Patient approves a detailed voicemail message: N\A    Have we ever prescribed this med? Yes.  If yes, what date? 8/3/21     Last OV: 1/12/22 Dr. Allan      Next OV: 6/2/22 Dr. Allan     DX: COPD     Medications:  Current Outpatient Medications   Medication Sig Dispense Refill   • ergocalciferol (DRISDOL) 99209 UNIT capsule Take 1 Capsule by mouth every 7 days. 14 Capsule 1   • predniSONE (DELTASONE) 10 MG Tab Take 30mg x 3 days, then take 20mg x 3 days, then take 10mg x 3 days, with food, then discontinue. 18 Tablet 2   • simvastatin (ZOCOR) 40 MG Tab Take 1 Tablet by mouth every evening. 90 Tablet 1   • losartan (COZAAR) 50 MG Tab TAKE ONE AND ONE-HALF (1 AND 1/2) TABLET BY MOUTH DAILY (COZAAR) (Patient taking differently: Take 25 mg by mouth every day. TAKE ONE AND ONE-HALF (1 AND 1/2) TABLET BY MOUTH DAILY (COZAAR)) 135 Tablet 1   • SPIRIVA RESPIMAT 2.5 MCG/ACT Aero Soln INHALE TWO PUFFS BY MOUTH DAILY 4 g 6   • albuterol (PROVENTIL) 2.5mg/3ml Nebu Soln solution for nebulization Take 3 mL by nebulization every four hours as needed for Shortness of Breath. 180 Each 1   • rivaroxaban (XARELTO) 20 MG Tab tablet TAKE ONE TABLET BY MOUTH DAILY WITH DINNER 90 Tablet 1   • zolpidem (AMBIEN) 5 MG Tab Take 1 Tablet by mouth at bedtime as needed for Sleep for up to 90 days. 90 Tablet 0   • BREO ELLIPTA 200-25 MCG/INH AEROSOL POWDER, BREATH ACTIVATED INHALE ONE DOSE BY MOUTH DAILY 1 Each 11   • albuterol 108 (90 Base) MCG/ACT Aero Soln inhalation aerosol INHALE TWO PUFFS BY MOUTH EVERY 6 HOURS AS NEEDED FOR SHORTNESS OF BREATH 18 g 10   • azithromycin (ZITHROMAX) 250 MG Tab TAKE ONE TABLET BY MOUTH DAILY 30 tablet 5   • KLOR-CON M20 20 MEQ Tab CR Take 20 mEq by mouth every day.     • ondansetron (ZOFRAN) 4 MG Tab tablet Take 4 mg by mouth every four hours as needed for Nausea/Vomiting.     • methimazole (TAPAZOLE) 10 MG Tab  Take 1 tablet by mouth every day. 30 tablet 3   • RESTASIS 0.05 % ophthalmic emulsion Administer 1 Drop into both eyes every day.     • colchicine (COLCRYS) 0.6 MG Tab Day 1: 1.2 mg once, followed in 1 hour with a single dose of 0.6 mg.  Day 2 and thereafter: Oral: 0.6 mg once daily until flare resolves 30 Tab 1   • cyanocobalamin (VITAMIN B-12) 100 MCG Tab Take 100 mcg by mouth every day.     • SODIUM BICARBONATE PO Take 10 g by mouth 2 times a day.     • HYDROcodone/acetaminophen (NORCO)  MG Tab Take 1-2 Tablets by mouth every 6 hours as needed. (Patient not taking: Reported on 12/9/2021)     • allopurinol (ZYLOPRIM) 100 MG Tab Take 200 mg by mouth every day.     • docusate sodium (COLACE) 100 MG Cap Take 300 mg by mouth every evening.     • acetaminophen (TYLENOL) 500 MG Tab Take 1,000 mg by mouth every 6 hours as needed for Moderate Pain.     • COMBIGAN 0.2-0.5 % Solution Place 1 Drop in both eyes 2 Times a Day.       No current facility-administered medications for this visit.

## 2022-02-16 ENCOUNTER — OFFICE VISIT (OUTPATIENT)
Dept: MEDICAL GROUP | Facility: MEDICAL CENTER | Age: 72
End: 2022-02-16
Payer: MEDICARE

## 2022-02-16 VITALS
RESPIRATION RATE: 20 BRPM | HEIGHT: 65 IN | WEIGHT: 213.19 LBS | SYSTOLIC BLOOD PRESSURE: 120 MMHG | BODY MASS INDEX: 35.52 KG/M2 | HEART RATE: 83 BPM | OXYGEN SATURATION: 94 % | TEMPERATURE: 98.2 F | DIASTOLIC BLOOD PRESSURE: 78 MMHG

## 2022-02-16 DIAGNOSIS — G47.09 OTHER INSOMNIA: ICD-10-CM

## 2022-02-16 DIAGNOSIS — M54.50 CHRONIC BILATERAL LOW BACK PAIN WITHOUT SCIATICA: ICD-10-CM

## 2022-02-16 DIAGNOSIS — G89.29 CHRONIC BILATERAL LOW BACK PAIN WITHOUT SCIATICA: ICD-10-CM

## 2022-02-16 PROCEDURE — 99214 OFFICE O/P EST MOD 30 MIN: CPT | Performed by: INTERNAL MEDICINE

## 2022-02-16 RX ORDER — POTASSIUM CHLORIDE 1500 MG/1
TABLET, EXTENDED RELEASE ORAL
COMMUNITY
Start: 2021-12-07 | End: 2022-10-05

## 2022-02-16 RX ORDER — ZOLPIDEM TARTRATE 5 MG/1
5 TABLET ORAL NIGHTLY PRN
Qty: 90 TABLET | Refills: 0 | Status: SHIPPED
Start: 2022-02-23 | End: 2022-05-12

## 2022-02-16 RX ORDER — DOXYCYCLINE HYCLATE 100 MG
TABLET ORAL
COMMUNITY
Start: 2021-12-07 | End: 2022-04-21 | Stop reason: SDUPTHER

## 2022-02-16 RX ORDER — KETOCONAZOLE 20 MG/ML
SHAMPOO TOPICAL
Status: ON HOLD | COMMUNITY
Start: 2022-02-08 | End: 2022-11-15

## 2022-02-16 ASSESSMENT — ENCOUNTER SYMPTOMS
SHORTNESS OF BREATH: 1
CHILLS: 0
BACK PAIN: 1
ABDOMINAL PAIN: 0
PALPITATIONS: 0
SORE THROAT: 0
DEPRESSION: 0
FEVER: 0
WHEEZING: 0
COUGH: 1

## 2022-02-16 ASSESSMENT — FIBROSIS 4 INDEX: FIB4 SCORE: 1.89

## 2022-02-16 NOTE — PROGRESS NOTES
Subjective:     Chief Complaint   Patient presents with   • Medication Refill     Zolpidem renewal        Diagnoses of Other insomnia and Chronic bilateral low back pain without sciatica were pertinent to this visit.      HPI: Latonia is a pleasant 71 y.o. female who presents today for her zolpidem refill.     Patient was having worsening shortness of breath and thought that she was developing COPD exacerbation, so she contacted her pulmonologist, who told her to start a course of doxycycline and prednisone for her COPD exacerbation.  She has also stopped taking her azithromycin, that she is taking chronically for now until she finishes doxycycline.    Patient recently had a PET scan, that showed increased uptake in lingular nodule of the lung, but no lymph nodes or other pulmonary nodules.  They decided to repeat CT of the chest in 3 months.    Patient also reports worsening back pain: She has tried multiple physical therapy places in the past and she did not like them, she reports that the pain is 8-10/10 and she is using Tylenol, Aspercreme and Salonpas with no significant improvement.  Patient has history of compression fractures and kyphoplasty.  She was opioids prescribed by her specialist at [facility], however she reports that she did not like going there every months for refills so she stopped taking opioid medications.  Patient reports no recent falls or traumas however she has a history of osteoporosis.  I am concerned about another fracture of the spine and I have ordered lumbar spine x-ray for the patient.  She does not feel like she needs it, however is agreeable.    Patient also would like Ambien refill today, she is aware of side effects and addictive nature of Ambien. She denies adverse effects, alcohol or illegal drug use.  She has no history of substance abuse, lost her stolen prescriptions.  PDMP reviewed today, last refilled on November 21, 2021 90 tablets for 90-day supply.     Current  Outpatient Medications Ordered in Epic   Medication Sig Dispense Refill   • [START ON 2/23/2022] zolpidem (AMBIEN) 5 MG Tab Take 1 Tablet by mouth at bedtime as needed for Sleep for up to 90 days. From 2/23/2022 to 05/24/2022 90 Tablet 0   • potassium Chloride ER (K-TAB) 20 MEQ Tab CR tablet      • doxycycline (VIBRAMYCIN) 100 MG Tab      • ketoconazole (NIZORAL) 2 % shampoo      • azithromycin (ZITHROMAX) 250 MG Tab TAKE ONE TABLET BY MOUTH DAILY 30 Tablet 5   • ergocalciferol (DRISDOL) 11886 UNIT capsule Take 1 Capsule by mouth every 7 days. 14 Capsule 1   • predniSONE (DELTASONE) 10 MG Tab Take 30mg x 3 days, then take 20mg x 3 days, then take 10mg x 3 days, with food, then discontinue. 18 Tablet 2   • simvastatin (ZOCOR) 40 MG Tab Take 1 Tablet by mouth every evening. 90 Tablet 1   • losartan (COZAAR) 50 MG Tab TAKE ONE AND ONE-HALF (1 AND 1/2) TABLET BY MOUTH DAILY (COZAAR) (Patient taking differently: Take 25 mg by mouth every day. TAKE ONE AND ONE-HALF (1 AND 1/2) TABLET BY MOUTH DAILY (COZAAR)) 135 Tablet 1   • SPIRIVA RESPIMAT 2.5 MCG/ACT Aero Soln INHALE TWO PUFFS BY MOUTH DAILY 4 g 6   • albuterol (PROVENTIL) 2.5mg/3ml Nebu Soln solution for nebulization Take 3 mL by nebulization every four hours as needed for Shortness of Breath. 180 Each 1   • rivaroxaban (XARELTO) 20 MG Tab tablet TAKE ONE TABLET BY MOUTH DAILY WITH DINNER 90 Tablet 1   • zolpidem (AMBIEN) 5 MG Tab Take 1 Tablet by mouth at bedtime as needed for Sleep for up to 90 days. 90 Tablet 0   • BREO ELLIPTA 200-25 MCG/INH AEROSOL POWDER, BREATH ACTIVATED INHALE ONE DOSE BY MOUTH DAILY 1 Each 11   • albuterol 108 (90 Base) MCG/ACT Aero Soln inhalation aerosol INHALE TWO PUFFS BY MOUTH EVERY 6 HOURS AS NEEDED FOR SHORTNESS OF BREATH 18 g 10   • KLOR-CON M20 20 MEQ Tab CR Take 20 mEq by mouth every day.     • ondansetron (ZOFRAN) 4 MG Tab tablet Take 4 mg by mouth every four hours as needed for Nausea/Vomiting.     • methimazole (TAPAZOLE) 10 MG  "Tab Take 1 tablet by mouth every day. (Patient taking differently: Take 5 mg by mouth every day.) 30 tablet 3   • RESTASIS 0.05 % ophthalmic emulsion Administer 1 Drop into both eyes every day.     • colchicine (COLCRYS) 0.6 MG Tab Day 1: 1.2 mg once, followed in 1 hour with a single dose of 0.6 mg.  Day 2 and thereafter: Oral: 0.6 mg once daily until flare resolves 30 Tab 1   • cyanocobalamin (VITAMIN B-12) 100 MCG Tab Take 100 mcg by mouth every day.     • SODIUM BICARBONATE PO Take 10 g by mouth 2 times a day.     • HYDROcodone/acetaminophen (NORCO)  MG Tab Take 1-2 Tablets by mouth every 6 hours as needed. (Patient not taking: Reported on 12/9/2021)     • allopurinol (ZYLOPRIM) 100 MG Tab Take 200 mg by mouth every day.     • docusate sodium (COLACE) 100 MG Cap Take 300 mg by mouth every evening.     • acetaminophen (TYLENOL) 500 MG Tab Take 1,000 mg by mouth every 6 hours as needed for Moderate Pain.     • COMBIGAN 0.2-0.5 % Solution Place 1 Drop in both eyes 2 Times a Day.       No current [facility]-ordered facility-administered medications on file.       Review of Systems   Constitutional: Negative for chills and fever.   HENT: Negative for sore throat.    Respiratory: Positive for cough and shortness of breath. Negative for wheezing.    Cardiovascular: Negative for chest pain and palpitations.   Gastrointestinal: Negative for abdominal pain.   Genitourinary: Negative for dysuria.   Musculoskeletal: Positive for back pain.   Psychiatric/Behavioral: Negative for depression.     Objective:     Exam:  /78 (BP Location: Right arm, Patient Position: Sitting, BP Cuff Size: Adult)   Pulse 83   Temp 36.8 °C (98.2 °F) (Temporal)   Resp 20   Ht 1.651 m (5' 5\")   Wt 96.7 kg (213 lb 3 oz)   LMP  (LMP Unknown)   SpO2 94%   BMI 35.48 kg/m²  Body mass index is 35.48 kg/m².    Physical Exam  Constitutional:       General: She is not in acute distress.     Appearance: She is obese. She is not toxic-appearing. "   HENT:      Head: Normocephalic and atraumatic.   Cardiovascular:      Rate and Rhythm: Normal rate and regular rhythm.      Pulses: Normal pulses.      Heart sounds: Normal heart sounds.   Pulmonary:      Effort: Pulmonary effort is normal. No respiratory distress.      Breath sounds: Normal breath sounds. No stridor. No wheezing, rhonchi or rales.   Musculoskeletal:      Lumbar back: Tenderness and bony tenderness present. No swelling, edema, deformity or signs of trauma.      Right lower leg: No edema.      Left lower leg: No edema.        Legs:    Neurological:      Mental Status: She is alert and oriented to person, place, and time.      Cranial Nerves: No cranial nerve deficit.   Psychiatric:         Mood and Affect: Mood normal.         Behavior: Behavior normal.         Thought Content: Thought content normal.         Judgment: Judgment normal.       Imaging: PET CT scan from January 28, 2022    Assessment & Plan:   Latonia  is a pleasant 71 y.o. female with the following -     Problem List Items Addressed This Visit     Chronic bilateral low back pain without sciatica     Advised patient, that she might have another compression fracture of her spine and we need  to obtain an x-ray of the lumbar spine.  Patient is agreeable, but says that he cannot do it within the next week, I advised patient that it is important and should be done at her earliest convenience.  Patient has no fever, chills, saddle anesthesia, loss of urine or bowel control.  In ragards to the pain management, patient will continue current regimen and she will follow-up with her spine physician for potential pain injection.         Relevant Orders    DX-LUMBAR SPINE-4+ VIEWS    Other insomnia     Ambien refilled, patient has no history of aberrant behavior or dictation refills.  PDMP reviewed today, patient denies side effects from the treatment, no concern for abuse or addiction.  90-day refill ordered and sent to the pharmacy.          Relevant Medications    zolpidem (AMBIEN) 5 MG Tab (Start on 2/23/2022)        Return in about 3 months (around 5/16/2022), or if symptoms worsen or fail to improve.    Please note that this dictation was created using voice recognition software. I have made every reasonable attempt to correct obvious errors, but I expect that there are errors of grammar and possibly content that I did not discover before finalizing the note.

## 2022-02-17 NOTE — ASSESSMENT & PLAN NOTE
Advised patient, that she might have another compression fracture of her spine and we need  to obtain an x-ray of the lumbar spine.  Patient is agreeable, but says that he cannot do it within the next week, I advised patient that it is important and should be done at her earliest convenience.  Patient has no fever, chills, saddle anesthesia, loss of urine or bowel control.  In ragards to the pain management, patient will continue current regimen and she will follow-up with her spine physician for potential pain injection.

## 2022-02-17 NOTE — ASSESSMENT & PLAN NOTE
Ambien refilled, patient has no history of aberrant behavior or dictation refills.  PDMP reviewed today, patient denies side effects from the treatment, no concern for abuse or addiction.  90-day refill ordered and sent to the pharmacy.

## 2022-03-30 ENCOUNTER — HOSPITAL ENCOUNTER (OUTPATIENT)
Dept: RADIOLOGY | Facility: MEDICAL CENTER | Age: 72
End: 2022-03-30
Attending: PHYSICIAN ASSISTANT
Payer: MEDICARE

## 2022-03-30 ENCOUNTER — HOSPITAL ENCOUNTER (OUTPATIENT)
Dept: LAB | Facility: MEDICAL CENTER | Age: 72
End: 2022-03-30
Payer: MEDICARE

## 2022-03-30 DIAGNOSIS — M54.59 OTHER LOW BACK PAIN: ICD-10-CM

## 2022-03-30 DIAGNOSIS — M54.6 PAIN IN THORACIC SPINE: ICD-10-CM

## 2022-03-30 DIAGNOSIS — S22.059A: ICD-10-CM

## 2022-03-30 DIAGNOSIS — S22.060A WEDGE COMPRESSION FRACTURE OF T7-T8 VERTEBRA, INITIAL ENCOUNTER FOR CLOSED FRACTURE (HCC): ICD-10-CM

## 2022-03-30 DIAGNOSIS — S32.020A CLOSED WEDGE COMPRESSION FRACTURE OF L2 VERTEBRA, INITIAL ENCOUNTER (HCC): ICD-10-CM

## 2022-03-30 DIAGNOSIS — S22.080A WEDGE COMPRESSION FRACTURE OF T11-T12 VERTEBRA, INITIAL ENCOUNTER FOR CLOSED FRACTURE (HCC): ICD-10-CM

## 2022-03-30 DIAGNOSIS — E21.3 HYPERPARATHYROIDISM (HCC): ICD-10-CM

## 2022-03-30 LAB
T4 FREE SERPL-MCNC: 1.16 NG/DL (ref 0.93–1.7)
TSH SERPL DL<=0.005 MIU/L-ACNC: 0.46 UIU/ML (ref 0.38–5.33)

## 2022-03-30 PROCEDURE — 36415 COLL VENOUS BLD VENIPUNCTURE: CPT

## 2022-03-30 PROCEDURE — 72146 MRI CHEST SPINE W/O DYE: CPT | Mod: MH

## 2022-03-30 PROCEDURE — 72148 MRI LUMBAR SPINE W/O DYE: CPT | Mod: MH

## 2022-03-30 PROCEDURE — 84481 FREE ASSAY (FT-3): CPT

## 2022-03-30 PROCEDURE — 84443 ASSAY THYROID STIM HORMONE: CPT | Mod: GA

## 2022-03-30 PROCEDURE — 84439 ASSAY OF FREE THYROXINE: CPT | Mod: GA

## 2022-03-31 LAB — T3FREE SERPL-MCNC: 3.63 PG/ML (ref 2–4.4)

## 2022-04-04 ENCOUNTER — OFFICE VISIT (OUTPATIENT)
Dept: ENDOCRINOLOGY | Facility: MEDICAL CENTER | Age: 72
End: 2022-04-04
Payer: MEDICARE

## 2022-04-04 VITALS
BODY MASS INDEX: 34.23 KG/M2 | OXYGEN SATURATION: 93 % | HEIGHT: 66 IN | HEART RATE: 99 BPM | SYSTOLIC BLOOD PRESSURE: 104 MMHG | WEIGHT: 213 LBS | DIASTOLIC BLOOD PRESSURE: 72 MMHG

## 2022-04-04 DIAGNOSIS — E05.90 HYPERTHYROIDISM: ICD-10-CM

## 2022-04-04 DIAGNOSIS — E55.9 VITAMIN D DEFICIENCY: ICD-10-CM

## 2022-04-04 DIAGNOSIS — E05.10 THYROTOXICOSIS WITH TOXIC SINGLE THYROID NODULE AND WITHOUT THYROID STORM: ICD-10-CM

## 2022-04-04 DIAGNOSIS — M81.0 POSTMENOPAUSAL OSTEOPOROSIS: ICD-10-CM

## 2022-04-04 PROCEDURE — 99214 OFFICE O/P EST MOD 30 MIN: CPT

## 2022-04-04 PROCEDURE — 99211 OFF/OP EST MAY X REQ PHY/QHP: CPT

## 2022-04-04 RX ORDER — EPINEPHRINE 1 MG/ML(1)
0.5 AMPUL (ML) INJECTION PRN
Status: CANCELLED | OUTPATIENT
Start: 2022-04-04

## 2022-04-04 RX ORDER — DIPHENHYDRAMINE HYDROCHLORIDE 50 MG/ML
50 INJECTION INTRAMUSCULAR; INTRAVENOUS PRN
Status: CANCELLED | OUTPATIENT
Start: 2022-04-04

## 2022-04-04 RX ORDER — METHYLPREDNISOLONE SODIUM SUCCINATE 125 MG/2ML
125 INJECTION, POWDER, LYOPHILIZED, FOR SOLUTION INTRAMUSCULAR; INTRAVENOUS PRN
Status: CANCELLED | OUTPATIENT
Start: 2022-04-04

## 2022-04-04 RX ORDER — METHIMAZOLE 10 MG/1
10 TABLET ORAL DAILY
Qty: 90 TABLET | Refills: 3 | Status: SHIPPED
Start: 2022-04-04 | End: 2022-09-09

## 2022-04-04 ASSESSMENT — FIBROSIS 4 INDEX: FIB4 SCORE: 1.89

## 2022-04-04 NOTE — PROGRESS NOTES
Chief Complaint: Consult requested by Liat Emerson M.D. for evaluation of Hyperthyroidism    HPI:   1.  Hyperthyroidism:  Latonia Clinton is a 71 y.o. female with history of hyperthyroidism  and is here for initial evaluation.      She was a patient of Dr. Torrez, who began her diagnosed back in 2015     Currently taking 5 mg daily.     She reports the following symptoms:fatigue, weight gain, losing hair and palpitations which has been present for few months       She denies feeling cold and cold intolerance, constipation, swelling, feeling slow, losing hair, anxiousness, feeling excessive energy, tremulousness, sweating and weight loss.    She denies lumps or enlargement in the neck.    She reports no family history of  Thyroid disease  She denies any recent IV contrast exposure.     She denies any recent URI or having neck pain.     Ref. Range 3/30/2022 15:46   TSH Latest Ref Range: 0.380 - 5.330 uIU/mL 0.460   Free T-4 Latest Ref Range: 0.93 - 1.70 ng/dL 1.16   T3,Free Latest Ref Range: 2.00 - 4.40 pg/mL 3.63     She denies any pain, Gritty eye feeling, eye swelling.   She sees ophthalmology for glaucoma and retinopathy. She has appt next week.     2.  Postmenopausal osteoporosis:  Currently taking Prolia every 6 months  Last Prolia injection was November 9th, she will be calling to schedule at next appointment  Currently taking Vitamin D 96973 weekly.  She is currently not taking any calcium  Reports no eating good amounts of vegetables    DEXA scan on 5/31/2019 showed a L femur T score at -3.8 and a L femur T score at -2.8  DEXA scan on 5/26/2017 showed a lumbar T score of -2.6, on the left femur T score of -2.4    She is currently on dialysis   Ref. Range 11/29/2021 14:38   Pth, Intact Latest Ref Range: 14.0 - 72.0 pg/mL 104.0 (H)     Denies history of heart attacks  Reports history of DVT    3.  Vitamin D deficiency:  Currently taking vitamin D   Ref. Range 1/6/2022 14:02   25-Hydroxy    Vitamin D 25 Latest Ref Range: 30 - 100 ng/mL 68     Patient's medications, allergies, and social histories were reviewed and updated as appropriate.      ROS:     CONS:     No fever, no chills, no weight loss, no fatigue   EYES:      No diplopia, no blurry vision, no redness of eyes, no swelling of eyelids   ENT:    No hearing loss, No ear pain, No sore throat, no dysphagia, no neck swelling   CV:     No chest pain, no palpitations, no claudication, no orthopnea, no PND   PULM:    No SOB, no cough, no hemoptysis, no wheezing    GI:   No nausea, no vomiting, no diarrhea, no constipation, no bloody stools   :  Passing urine well, no dysuria, no hematuria   ENDO:   No polyuria, no polydipsia, no heat intolerance, no cold intolerance   NEURO: No headaches, no dizziness, no convulsions, no tremors   MUSC:  No joint swellings, no arthralgias, no myalgias, no weakness   SKIN:   No rash, no ulcers, no dry skin   PSYCH:   No depression, no anxiety, no difficulty sleeping       Past Medical History:  Patient Active Problem List    Diagnosis Date Noted   • Chronic bilateral low back pain without sciatica 02/16/2022   • Postmenopausal osteoporosis 03/15/2021   • Central retinal vein occlusion 12/19/2019   • Hyperparathyroidism ([facility]) 11/13/2019   • Gout of foot 04/29/2019   • Other specified glaucoma 04/29/2019   • Other insomnia 04/29/2019   • Right ventricular dilation 04/24/2019   • Depression, major, recurrent, moderate ([facility]) 02/26/2019   • Macrocytic anemia 02/12/2019   • Closed compression fracture of second lumbar vertebra ([facility]) 01/28/2019   • Closed wedge compression fracture of first lumbar vertebra ([facility]) 01/24/2019   • Closed T11 fracture ([facility]) 06/17/2018   • Lung cancer ([facility]) 12/12/2016   • Deep vein thrombosis (DVT) ([facility]) 07/28/2016   • Thyrotoxicosis with toxic single thyroid nodule and without thyroid storm 07/25/2016   • Chronic obstructive pulmonary disease ([facility]) 07/24/2016   • CKD (chronic kidney disease)  stage 3, GFR 30-59 ml/min ([facility]) 07/24/2016   • Hyperlipidemia    • Hypertension        Past Surgical History:  Past Surgical History:   Procedure Laterality Date   • CYSTOSCOPY STENT PLACEMENT  7/9/2018    Procedure: Cystoscopy,  Left removal of stent ,  Left Stent Placement;  Surgeon: Beck Yang M.D.;  Location: [facility];  Service: Urology   • URETEROSCOPY Left 7/9/2018    Procedure: URETEROSCOPY;  Surgeon: Beck Yang M.D.;  Location: [facility];  Service: Urology   • LASERTRIPSY Left 7/9/2018    Procedure: LASERTRIPSY-LITHO;  Surgeon: Beck Yang M.D.;  Location: [facility];  Service: Urology   • CYSTOSCOPY STENT PLACEMENT Left 6/18/2018    Procedure: CYSTOSCOPY STENT PLACEMENT;  Surgeon: Beck Yang M.D.;  Location: [facility];  Service: Urology   • LITHOTRIPSY Left 6/18/2018    Procedure: LITHOTRIPSY;  Surgeon: Beck Yang M.D.;  Location: [facility];  Service: Urology   • LASERTRIPSY Left 6/18/2018    Procedure: LASERTRIPSY;  Surgeon: Beck Yang M.D.;  Location: [facility];  Service: Urology   • URETEROSCOPY Left 6/18/2018    Procedure: URETEROSCOPY;  Surgeon: Beck Yang M.D.;  Location: [facility];  Service: Urology   • THORACOSCOPY Left 12/12/2016    Procedure: THORACOSCOPY W/WEDGE RESECTION UPPER LOBE MASS;  Surgeon: John H Ganser, M.D.;  Location: [facility];  Service:    • OTHER ORTHOPEDIC SURGERY  2013    kyphoplasty X3   • APPENDECTOMY      1990   • CHOLECYSTECTOMY      1990   • LAMINOTOMY     • LUNG BIOPSY OPEN     • OTHER     • OTHER ABDOMINAL SURGERY      gall bladder disease   • CT BREAST AUGMENTATION WITH IMPLANT     • CT BREAST REDUCTION          Allergies:  Patient has no known allergies.     Current Medications:    Current Outpatient Medications:   •  potassium Chloride ER (K-TAB) 20 MEQ Tab CR tablet, , Disp: , Rfl:   •  doxycycline (VIBRAMYCIN) 100 MG Tab, , Disp:  , Rfl:   •  ketoconazole (NIZORAL) 2 % shampoo, , Disp: , Rfl:   •  zolpidem (AMBIEN) 5 MG Tab, Take 1 Tablet by mouth at bedtime as needed for Sleep for up to 90 days. From 2/23/2022 to 05/24/2022, Disp: 90 Tablet, Rfl: 0  •  azithromycin (ZITHROMAX) 250 MG Tab, TAKE ONE TABLET BY MOUTH DAILY, Disp: 30 Tablet, Rfl: 5  •  ergocalciferol (DRISDOL) 64267 UNIT capsule, Take 1 Capsule by mouth every 7 days., Disp: 14 Capsule, Rfl: 1  •  predniSONE (DELTASONE) 10 MG Tab, Take 30mg x 3 days, then take 20mg x 3 days, then take 10mg x 3 days, with food, then discontinue., Disp: 18 Tablet, Rfl: 2  •  simvastatin (ZOCOR) 40 MG Tab, Take 1 Tablet by mouth every evening., Disp: 90 Tablet, Rfl: 1  •  losartan (COZAAR) 50 MG Tab, TAKE ONE AND ONE-HALF (1 AND 1/2) TABLET BY MOUTH DAILY (COZAAR) (Patient taking differently: Take 25 mg by mouth every day. TAKE ONE AND ONE-HALF (1 AND 1/2) TABLET BY MOUTH DAILY (COZAAR)), Disp: 135 Tablet, Rfl: 1  •  SPIRIVA RESPIMAT 2.5 MCG/ACT Aero Soln, INHALE TWO PUFFS BY MOUTH DAILY, Disp: 4 g, Rfl: 6  •  albuterol (PROVENTIL) 2.5mg/3ml Nebu Soln solution for nebulization, Take 3 mL by nebulization every four hours as needed for Shortness of Breath., Disp: 180 Each, Rfl: 1  •  rivaroxaban (XARELTO) 20 MG Tab tablet, TAKE ONE TABLET BY MOUTH DAILY WITH DINNER, Disp: 90 Tablet, Rfl: 1  •  BREO ELLIPTA 200-25 MCG/INH AEROSOL POWDER, BREATH ACTIVATED, INHALE ONE DOSE BY MOUTH DAILY, Disp: 1 Each, Rfl: 11  •  albuterol 108 (90 Base) MCG/ACT Aero Soln inhalation aerosol, INHALE TWO PUFFS BY MOUTH EVERY 6 HOURS AS NEEDED FOR SHORTNESS OF BREATH, Disp: 18 g, Rfl: 10  •  KLOR-CON M20 20 MEQ Tab CR, Take 20 mEq by mouth every day., Disp: , Rfl:   •  ondansetron (ZOFRAN) 4 MG Tab tablet, Take 4 mg by mouth every four hours as needed for Nausea/Vomiting., Disp: , Rfl:   •  methimazole (TAPAZOLE) 10 MG Tab, Take 1 tablet by mouth every day. (Patient taking differently: Take 5 mg by mouth every day.), Disp:  30 tablet, Rfl: 3  •  RESTASIS 0.05 % ophthalmic emulsion, Administer 1 Drop into both eyes every day., Disp: , Rfl:   •  colchicine (COLCRYS) 0.6 MG Tab, Day 1: 1.2 mg once, followed in 1 hour with a single dose of 0.6 mg.  Day 2 and thereafter: Oral: 0.6 mg once daily until flare resolves, Disp: 30 Tab, Rfl: 1  •  cyanocobalamin (VITAMIN B-12) 100 MCG Tab, Take 100 mcg by mouth every day., Disp: , Rfl:   •  SODIUM BICARBONATE PO, Take 10 g by mouth 2 times a day., Disp: , Rfl:   •  HYDROcodone/acetaminophen (NORCO)  MG Tab, Take 1-2 Tablets by mouth every 6 hours as needed. (Patient not taking: Reported on 2021), Disp: , Rfl:   •  allopurinol (ZYLOPRIM) 100 MG Tab, Take 200 mg by mouth every day., Disp: , Rfl:   •  docusate sodium (COLACE) 100 MG Cap, Take 300 mg by mouth every evening., Disp: , Rfl:   •  acetaminophen (TYLENOL) 500 MG Tab, Take 1,000 mg by mouth every 6 hours as needed for Moderate Pain., Disp: , Rfl:   •  COMBIGAN 0.2-0.5 % Solution, Place 1 Drop in both eyes 2 Times a Day., Disp: , Rfl:     Social History:  Social History     Socioeconomic History   • Marital status: Single     Spouse name: Not on file   • Number of children: Not on file   • Years of education: Not on file   • Highest education level: Not on file   Occupational History   • Not on file   Tobacco Use   • Smoking status: Former Smoker     Packs/day: 1.00     Years: 30.00     Pack years: 30.00     Types: Cigarettes     Quit date: 2000     Years since quittin.2   • Smokeless tobacco: Never Used   • Tobacco comment: 7 years ago   Vaping Use   • Vaping Use: Never used   Substance and Sexual Activity   • Alcohol use: Yes     Alcohol/week: 0.0 oz     Comment: x2 a week   • Drug use: No   • Sexual activity: Not on file   Other Topics Concern   • Not on file   Social History Narrative   • Not on file     Social Determinants of Health     Financial Resource Strain: Not on file   Food Insecurity: Not on file  "  Transportation Needs: Not on file   Physical Activity: Not on file   Stress: Not on file   Social Connections: Not on file   Intimate Partner Violence: Not on file   Housing Stability: Not on file        Family History:   Family History   Problem Relation Age of Onset   • Cancer Mother    • Heart Failure Father    • Heart Disease Brother          PHYSICAL EXAM:   Vital signs: /72 (BP Location: Left arm, Patient Position: Sitting, BP Cuff Size: Adult)   Pulse 99   Ht 1.676 m (5' 6\")   Wt 96.6 kg (213 lb) Comment: refused to check weight today  LMP  (LMP Unknown)   SpO2 93%   BMI 34.38 kg/m²   GENERAL: Well-developed, well-nourished  in no apparent distress.   EYE: No ocular and eyelid asymmetry, Anicteric sclerae,  PERRL, No exophthalmos or lidlag  HENT: Hearing grossly intact, Normocephalic, atraumatic.   NECK: Supple. Trachea midline. thyroid is normal in size without nodules or tenderness  CARDIOVASCULAR: Regular rate and rhythm. No murmurs, rubs, or gallops.   LUNGS: Clear to auscultation bilaterally   EXTREMITIES: No clubbing, cyanosis, or edema.   NEUROLOGICAL: No visible tremor with both outstretched hands  LYMPH: No cervical, supraclavicular,  adenopathy palpated.   SKIN: No rashes, lesions. Turgor is normal.    Labs:  Lab Results   Component Value Date/Time    WBC 6.0 11/29/2021 02:38 PM    RBC 3.90 (L) 11/29/2021 02:38 PM    HEMOGLOBIN 13.0 11/29/2021 02:38 PM    .3 (H) 11/29/2021 02:38 PM    MCH 33.3 (H) 11/29/2021 02:38 PM    MCHC 32.6 (L) 11/29/2021 02:38 PM    RDW 54.4 (H) 11/29/2021 02:38 PM    MPV 10.7 11/29/2021 02:38 PM       Lab Results   Component Value Date/Time    SODIUM 137 11/29/2021 02:38 PM    POTASSIUM 3.5 (L) 11/29/2021 02:38 PM    CHLORIDE 100 11/29/2021 02:38 PM    CO2 23 11/29/2021 02:38 PM    ANION 16.0 07/13/2021 03:37 PM    GLUCOSE 106 (H) 11/29/2021 02:38 PM    BUN 28 (H) 11/29/2021 02:38 PM    CREATININE 1.64 (H) 11/29/2021 02:38 PM    CREATININE 1.7 (H) " 09/19/2008 08:40 PM    CALCIUM 10.5 (H) 11/29/2021 02:38 PM    ASTSGOT 25 07/13/2021 03:37 PM    ALTSGPT 26 07/13/2021 03:37 PM    TBILIRUBIN 0.7 07/13/2021 03:37 PM    ALBUMIN 4.0 11/29/2021 02:38 PM    TOTPROTEIN 5.8 (L) 07/13/2021 03:37 PM    GLOBULIN 1.9 07/13/2021 03:37 PM    AGRATIO 2.1 07/13/2021 03:37 PM       Lab Results   Component Value Date/Time    TSHULTRASEN 0.460 03/30/2022 1546     Lab Results   Component Value Date/Time    FREET4 1.16 03/30/2022 1546     Lab Results   Component Value Date/Time    FREET3 3.63 03/30/2022 1546     Lab Results   Component Value Date/Time    THYSTIMIG 90 08/27/2019 1521     Imaging:  ASSESSMENT/PLAN:   1. Hyperthyroidism  Clinically unstable  Biochemically her TSH went from 1.260 in January to 0.460 in March  I will increase methimazole from 5 mg daily to 10 mg daily    She will begin the results tomorrow  Prescription sent to pharmacy  - methimazole (TAPAZOLE) 10 MG Tab; Take 1 Tablet by mouth every day.  Dispense: 90 Tablet; Refill: 3    We will evaluate new dose, please do the following blood work in 3 months  Please send a message to my chart if changes need to be made  - TSH; Future  - FREE THYROXINE; Future  - T3 FREE; Future    Please do the following blood work before your appointment in 6 months  - T3 FREE; Future  - TSH; Future  - FREE THYROXINE; Future      3. Postmenopausal osteoporosis  Unstable  Currently treatment with Prolia  She could be a candidate for an anabolic agent however due to kidney failure and dyalisis her PTH is elevated  She is also not a candidate for Evenity as she has a history of DVTs    We will continue treatment with Prolia  Therapy plan sent  - Comp Metabolic Panel; Future  - VITAMIN D,25 HYDROXY; Future  - PTH INTACT (PTH ONLY); Future    3.  Vitamin D deficiency:  Stable  Continue regimen    Disposition: Follow up in 3 months    Thank you kindly for allowing me to participate in the thyroid care plan for this patient.    Anthony HAJI  QUE Maynard  04/04/22    CC:   Liat Emerson M.D.

## 2022-04-08 ENCOUNTER — TELEPHONE (OUTPATIENT)
Dept: ONCOLOGY | Facility: MEDICAL CENTER | Age: 72
End: 2022-04-08
Payer: MEDICARE

## 2022-04-08 DIAGNOSIS — J44.9 CHRONIC OBSTRUCTIVE PULMONARY DISEASE, UNSPECIFIED COPD TYPE (HCC): ICD-10-CM

## 2022-04-08 NOTE — TELEPHONE ENCOUNTER
Spoke with patient to try to schedule her appt for her upcoming prolia injection in June and she stated that she wasn't ready to schedule at this time and she would call us back when she is ready to schedule.

## 2022-04-11 NOTE — TELEPHONE ENCOUNTER
Caller Name: Latonia Clinton                 Call Back Number: [phone] (home)         Patient approves a detailed voicemail message: N\A    Have we ever prescribed this med? Yes.  If yes, what date? 1/12/22     Last OV: 1/12/22 Dr. Allan     Next OV: 6/2/22 Dr. Allan     DX: Chronic obstructive pulmonary disease with acute exacerbation (HCC)    Medications:  Current Outpatient Medications   Medication Sig Dispense Refill   • methimazole (TAPAZOLE) 10 MG Tab Take 1 Tablet by mouth every day. 90 Tablet 3   • potassium Chloride ER (K-TAB) 20 MEQ Tab CR tablet      • doxycycline (VIBRAMYCIN) 100 MG Tab  (Patient not taking: Reported on 4/4/2022)     • ketoconazole (NIZORAL) 2 % shampoo      • zolpidem (AMBIEN) 5 MG Tab Take 1 Tablet by mouth at bedtime as needed for Sleep for up to 90 days. From 2/23/2022 to 05/24/2022 90 Tablet 0   • azithromycin (ZITHROMAX) 250 MG Tab TAKE ONE TABLET BY MOUTH DAILY 30 Tablet 5   • ergocalciferol (DRISDOL) 07840 UNIT capsule Take 1 Capsule by mouth every 7 days. 14 Capsule 1   • predniSONE (DELTASONE) 10 MG Tab Take 30mg x 3 days, then take 20mg x 3 days, then take 10mg x 3 days, with food, then discontinue. (Patient not taking: Reported on 4/4/2022) 18 Tablet 2   • simvastatin (ZOCOR) 40 MG Tab Take 1 Tablet by mouth every evening. 90 Tablet 1   • losartan (COZAAR) 50 MG Tab TAKE ONE AND ONE-HALF (1 AND 1/2) TABLET BY MOUTH DAILY (COZAAR) (Patient taking differently: Take 25 mg by mouth every day. TAKE ONE AND ONE-HALF (1 AND 1/2) TABLET BY MOUTH DAILY (COZAAR)) 135 Tablet 1   • SPIRIVA RESPIMAT 2.5 MCG/ACT Aero Soln INHALE TWO PUFFS BY MOUTH DAILY 4 g 6   • albuterol (PROVENTIL) 2.5mg/3ml Nebu Soln solution for nebulization Take 3 mL by nebulization every four hours as needed for Shortness of Breath. 180 Each 1   • rivaroxaban (XARELTO) 20 MG Tab tablet TAKE ONE TABLET BY MOUTH DAILY WITH DINNER 90 Tablet 1   • BREO ELLIPTA 200-25 MCG/INH AEROSOL POWDER, BREATH ACTIVATED  INHALE ONE DOSE BY MOUTH DAILY 1 Each 11   • albuterol 108 (90 Base) MCG/ACT Aero Soln inhalation aerosol INHALE TWO PUFFS BY MOUTH EVERY 6 HOURS AS NEEDED FOR SHORTNESS OF BREATH 18 g 10   • KLOR-CON M20 20 MEQ Tab CR Take 20 mEq by mouth every day.     • ondansetron (ZOFRAN) 4 MG Tab tablet Take 4 mg by mouth every four hours as needed for Nausea/Vomiting.     • RESTASIS 0.05 % ophthalmic emulsion Administer 1 Drop into both eyes every day.     • colchicine (COLCRYS) 0.6 MG Tab Day 1: 1.2 mg once, followed in 1 hour with a single dose of 0.6 mg.  Day 2 and thereafter: Oral: 0.6 mg once daily until flare resolves 30 Tab 1   • cyanocobalamin (VITAMIN B-12) 100 MCG Tab Take 100 mcg by mouth every day.     • SODIUM BICARBONATE PO Take 10 g by mouth 2 times a day.     • HYDROcodone/acetaminophen (NORCO)  MG Tab Take 1-2 Tablets by mouth every 6 hours as needed. (Patient not taking: No sig reported)     • allopurinol (ZYLOPRIM) 100 MG Tab Take 200 mg by mouth every day.     • docusate sodium (COLACE) 100 MG Cap Take 300 mg by mouth every evening.     • acetaminophen (TYLENOL) 500 MG Tab Take 1,000 mg by mouth every 6 hours as needed for Moderate Pain.     • COMBIGAN 0.2-0.5 % Solution Place 1 Drop in both eyes 2 Times a Day.       No current facility-administered medications for this visit.

## 2022-04-20 ENCOUNTER — PATIENT MESSAGE (OUTPATIENT)
Dept: SLEEP MEDICINE | Facility: MEDICAL CENTER | Age: 72
End: 2022-04-20
Payer: MEDICARE

## 2022-04-21 ENCOUNTER — PATIENT MESSAGE (OUTPATIENT)
Dept: SLEEP MEDICINE | Facility: MEDICAL CENTER | Age: 72
End: 2022-04-21
Payer: MEDICARE

## 2022-04-21 RX ORDER — DOXYCYCLINE HYCLATE 100 MG
100 TABLET ORAL 2 TIMES DAILY
Qty: 14 TABLET | Refills: 0 | Status: SHIPPED | OUTPATIENT
Start: 2022-04-21 | End: 2022-04-22 | Stop reason: SDUPTHER

## 2022-04-22 RX ORDER — DOXYCYCLINE HYCLATE 100 MG
100 TABLET ORAL 2 TIMES DAILY
Qty: 14 TABLET | Refills: 0 | Status: SHIPPED | OUTPATIENT
Start: 2022-04-22 | End: 2022-06-02 | Stop reason: SDUPTHER

## 2022-04-22 RX ORDER — PREDNISONE 10 MG/1
TABLET ORAL
Qty: 18 TABLET | Refills: 0 | Status: SHIPPED | OUTPATIENT
Start: 2022-04-22 | End: 2022-06-02 | Stop reason: SDUPTHER

## 2022-04-25 RX ORDER — DOXYCYCLINE HYCLATE 100 MG/1
CAPSULE ORAL
Qty: 20 CAPSULE | Refills: 0 | Status: CANCELLED | OUTPATIENT
Start: 2022-04-25

## 2022-04-25 NOTE — TELEPHONE ENCOUNTER
Pt left vm 4/19/22 at 2:45pm states RX for emergency and requesting prednisone too.     Both sent 4/22/22

## 2022-04-28 ENCOUNTER — HOSPITAL ENCOUNTER (OUTPATIENT)
Dept: LAB | Facility: MEDICAL CENTER | Age: 72
End: 2022-04-28
Attending: STUDENT IN AN ORGANIZED HEALTH CARE EDUCATION/TRAINING PROGRAM
Payer: MEDICARE

## 2022-04-28 DIAGNOSIS — E05.90 HYPERTHYROIDISM: ICD-10-CM

## 2022-04-28 DIAGNOSIS — M81.0 POSTMENOPAUSAL OSTEOPOROSIS: ICD-10-CM

## 2022-04-28 LAB
25(OH)D3 SERPL-MCNC: 74 NG/ML (ref 30–100)
ALBUMIN SERPL BCP-MCNC: 3.7 G/DL (ref 3.2–4.9)
APPEARANCE UR: CLEAR
BACTERIA #/AREA URNS HPF: ABNORMAL /HPF
BASOPHILS # BLD AUTO: 0.2 % (ref 0–1.8)
BASOPHILS # BLD: 0.01 K/UL (ref 0–0.12)
BILIRUB UR QL STRIP.AUTO: NEGATIVE
BUN SERPL-MCNC: 25 MG/DL (ref 8–22)
CALCIUM SERPL-MCNC: 9.2 MG/DL (ref 8.4–10.2)
CHLORIDE SERPL-SCNC: 100 MMOL/L (ref 96–112)
CO2 SERPL-SCNC: 19 MMOL/L (ref 20–33)
COLOR UR: YELLOW
CREAT SERPL-MCNC: 1.62 MG/DL (ref 0.5–1.4)
CREAT UR-MCNC: 115.16 MG/DL
CREAT UR-MCNC: 116.61 MG/DL
EOSINOPHIL # BLD AUTO: 0.16 K/UL (ref 0–0.51)
EOSINOPHIL NFR BLD: 2.6 % (ref 0–6.9)
EPI CELLS #/AREA URNS HPF: ABNORMAL /HPF
ERYTHROCYTE [DISTWIDTH] IN BLOOD BY AUTOMATED COUNT: 54.6 FL (ref 35.9–50)
GFR SERPLBLD CREATININE-BSD FMLA CKD-EPI: 33 ML/MIN/1.73 M 2
GLUCOSE SERPL-MCNC: 88 MG/DL (ref 65–99)
GLUCOSE UR STRIP.AUTO-MCNC: NEGATIVE MG/DL
HCT VFR BLD AUTO: 43.3 % (ref 37–47)
HGB BLD-MCNC: 14.1 G/DL (ref 12–16)
HYALINE CASTS #/AREA URNS LPF: ABNORMAL /LPF
IMM GRANULOCYTES # BLD AUTO: 0.03 K/UL (ref 0–0.11)
IMM GRANULOCYTES NFR BLD AUTO: 0.5 % (ref 0–0.9)
KETONES UR STRIP.AUTO-MCNC: ABNORMAL MG/DL
LEUKOCYTE ESTERASE UR QL STRIP.AUTO: ABNORMAL
LYMPHOCYTES # BLD AUTO: 1.48 K/UL (ref 1–4.8)
LYMPHOCYTES NFR BLD: 24.3 % (ref 22–41)
MAGNESIUM SERPL-MCNC: 1.8 MG/DL (ref 1.5–2.5)
MCH RBC QN AUTO: 33.7 PG (ref 27–33)
MCHC RBC AUTO-ENTMCNC: 32.6 G/DL (ref 33.6–35)
MCV RBC AUTO: 103.3 FL (ref 81.4–97.8)
MICRO URNS: ABNORMAL
MICROALBUMIN UR-MCNC: 4 MG/DL
MICROALBUMIN/CREAT UR: 35 MG/G (ref 0–30)
MONOCYTES # BLD AUTO: 0.48 K/UL (ref 0–0.85)
MONOCYTES NFR BLD AUTO: 7.9 % (ref 0–13.4)
MUCOUS THREADS #/AREA URNS HPF: ABNORMAL /HPF
NEUTROPHILS # BLD AUTO: 3.92 K/UL (ref 2–7.15)
NEUTROPHILS NFR BLD: 64.5 % (ref 44–72)
NITRITE UR QL STRIP.AUTO: NEGATIVE
NRBC # BLD AUTO: 0 K/UL
NRBC BLD-RTO: 0 /100 WBC
PH UR STRIP.AUTO: 5 [PH] (ref 5–8)
PHOSPHATE SERPL-MCNC: 2.8 MG/DL (ref 2.5–4.5)
PLATELET # BLD AUTO: 173 K/UL (ref 164–446)
PMV BLD AUTO: 12.3 FL (ref 9–12.9)
POTASSIUM SERPL-SCNC: 4.3 MMOL/L (ref 3.6–5.5)
PROT UR QL STRIP: NEGATIVE MG/DL
PROT UR-MCNC: 13 MG/DL (ref 0–15)
PROT/CREAT UR: 111 MG/G (ref 10–107)
PTH-INTACT SERPL-MCNC: 252 PG/ML (ref 14–72)
RBC # BLD AUTO: 4.19 M/UL (ref 4.2–5.4)
RBC # URNS HPF: ABNORMAL /HPF
RBC UR QL AUTO: NEGATIVE
SODIUM SERPL-SCNC: 135 MMOL/L (ref 135–145)
SP GR UR STRIP.AUTO: 1.01
UNIDENT CRYS URNS QL MICRO: ABNORMAL /HPF
URATE SERPL-MCNC: 5.6 MG/DL (ref 1.9–8.2)
WBC # BLD AUTO: 6.1 K/UL (ref 4.8–10.8)
WBC #/AREA URNS HPF: ABNORMAL /HPF

## 2022-04-28 PROCEDURE — 80069 RENAL FUNCTION PANEL: CPT

## 2022-04-28 PROCEDURE — 82570 ASSAY OF URINE CREATININE: CPT

## 2022-04-28 PROCEDURE — 36415 COLL VENOUS BLD VENIPUNCTURE: CPT

## 2022-04-28 PROCEDURE — 85025 COMPLETE CBC W/AUTO DIFF WBC: CPT

## 2022-04-28 PROCEDURE — 84550 ASSAY OF BLOOD/URIC ACID: CPT

## 2022-04-28 PROCEDURE — 83970 ASSAY OF PARATHORMONE: CPT

## 2022-04-28 PROCEDURE — 84156 ASSAY OF PROTEIN URINE: CPT

## 2022-04-28 PROCEDURE — 82043 UR ALBUMIN QUANTITATIVE: CPT

## 2022-04-28 PROCEDURE — 83735 ASSAY OF MAGNESIUM: CPT

## 2022-04-28 PROCEDURE — 82306 VITAMIN D 25 HYDROXY: CPT

## 2022-04-28 PROCEDURE — 81001 URINALYSIS AUTO W/SCOPE: CPT

## 2022-05-09 ENCOUNTER — HOSPITAL ENCOUNTER (OUTPATIENT)
Dept: RADIOLOGY | Facility: MEDICAL CENTER | Age: 72
End: 2022-05-09
Attending: INTERNAL MEDICINE
Payer: MEDICARE

## 2022-05-09 DIAGNOSIS — R91.8 PULMONARY NODULES: ICD-10-CM

## 2022-05-09 PROCEDURE — 71250 CT THORAX DX C-: CPT | Mod: ME

## 2022-05-11 PROBLEM — Z00.00 PREVENTATIVE HEALTH CARE: Status: ACTIVE | Noted: 2022-05-11

## 2022-05-11 NOTE — PROGRESS NOTES
Subjective:     Chief Complaint   Patient presents with   • Follow-Up   • Medication Refill     Diagnoses of Other hyperlipidemia, Other fatigue, Other insomnia, and Chronic anticoagulation were pertinent to this visit.    HPI: Latonia is a pleasant 71 y.o. female who presents today for 3 mo follow up    Problem   Other Fatigue    Patient is presenting today for 3-month follow-up, complaining of fatigue.  Since Monday she has been feeling more short of breath and she has been experiencing generalized weakness.  She felt like she had COPD exacerbation and she has started taking prednisone and doxycycline.  She has not felt significantly better.  She has been using her rescue albuterol inhaler more frequently, she has not used her nebulizer yet.  She denies chest pain, worsening dizziness, dysuria, diarrhea or constipation.  She had facet joint injection with spine surgeon last week and did not have any significant improvement.  She had MRI of her lumbar and thoracic spine in May 2022 -that showed multiple chronic compression fractures of her thoracic spine.  No new fractures.  Spinal stenosis.  Patient is still experiencing bilateral lower extremity weakness, she denies saddle anesthesia, urinary or bowel incontinence, fever, chills or night sweats.       Chronic Anticoagulation    Patient is chronically on Xarelto due to chronic DVT.  She started noticing easy bruising recently.  She is wondering if her dose is adequate.            Other Insomnia    This is a chronic condition, patient has been taking Ambien 5mg tab nightly for many months now.  She is counseled on side effects of Ambien 5 mg tab and reviewed controlled substance agreement.  The medication is helping her to improve her symptoms  She has no adverse effects.  She denies alcohol or illegal drug use.  She has no history of controlled substance used in the way other than prescribed   No early refills on controlled substances.  No history of lost or  "stolen substance prescriptions  Compliant with treatment recommendations and plan: Yes  Any major health change to the patient: No  Concerns for misuse, abuse or addiction: No  /NarxCheck report reviewed: Yes  History of abnormal drug screening: No  Last refill on February 23, 2022 90 tablets for 90-day supply.         Past Medical History:   Diagnosis Date   • Anesthesia     \"Abnormal blood pressure and breathing\"  \"couldn't breathe\"   • Asthma     inhalers daily   • Backpain 7/2017     thor. area   • Blood clot in vein 07/06/2018    \"Currently have a blood clot in my left eye\"   • Bowel habit changes 07/06/2018    Constipation   • Breath shortness     with exertion has O2 but does not use   • Bronchitis    • CAD (coronary artery disease)     palpatations   • Cancer (HCC) 2016    left lung   • Chickenpox    • COPD (chronic obstructive pulmonary disease) ([facility])    • Depression 2/26/2019   • Dialysis 2005    transient renal failure due to sepsis   • Emphysema of lung ([facility])    • Glaucoma     right eye   • Hemorrhagic disorder ([facility])    • Hyperlipidemia    • Hypertension    • Hyperthyroidism    • Kidney stone     bilateral   • Lung cancer (HCC) 12/12/2016   • Multiple thyroid nodules 7/9/2015   • Nasal drainage    • Obstruction of left ureteropelvic junction (UPJ) due to stone 6/17/2018   • Osteoporosis    • Pain 07/06/2018    Back pain   • Personal history of venous thrombosis and embolism 2004, 2012    right leg 2012, left arm dvt 2004 left eye 2017   • Pneumonia 7/2016    per patient   • Renal disorder     had been on dialysis for 7 months for acute failure 2005   • Renal stones 2013    post lithotripsy   • Rheumatoid arthritis ([facility])     question   • Rheumatoid arthritis ([facility])    • S/P appendectomy 1998    • S/P cholecystectomy 1998   • S/P kyphoplasty 2006, 2007, 2013   • Sepsis, unspecified 2005   • Shortness of breath 07/06/2018    Chronic current problem. \"A couple of years now\".  O2 concentrator at night - pt " "states she doesn't use it.   • Thyroid nodule, hot 2017    functional \"hot\" right thyroid nodule without thyrotoxicosis       Current Outpatient Medications Ordered in Epic   Medication Sig Dispense Refill   • ergocalciferol (DRISDOL) 13940 UNIT capsule Take 1 Capsule by mouth every 7 days. 14 Capsule 1   • ondansetron (ZOFRAN) 4 MG Tab tablet Take 1 Tablet by mouth as needed in the morning and 1 Tablet as needed at noon and 1 Tablet as needed in the evening for Nausea/Vomiting. Do all this for up to 10 days. 30 Tablet 1   • simvastatin (ZOCOR) 40 MG Tab Take 1 Tablet by mouth every evening. 90 Tablet 1   • [START ON 5/20/2022] zolpidem (AMBIEN) 5 MG Tab Take 1 Tablet by mouth at bedtime as needed for Sleep for up to 90 days. 90 Tablet 0   • FARXIGA 5 MG Tab  (Patient not taking: Reported on 5/12/2022)     • HYDROcodone-acetaminophen (NORCO) 5-325 MG Tab per tablet      • doxycycline (VIBRAMYCIN) 100 MG Tab Take 1 Tablet by mouth 2 times a day. 14 Tablet 0   • predniSONE (DELTASONE) 10 MG Tab Take 30mg x 3 days, then take 20mg x 3 days, then take 10mg x 3 days, with food, then discontinue. 18 Tablet 0   • methimazole (TAPAZOLE) 10 MG Tab Take 1 Tablet by mouth every day. 90 Tablet 3   • potassium Chloride ER (K-TAB) 20 MEQ Tab CR tablet      • ketoconazole (NIZORAL) 2 % shampoo      • azithromycin (ZITHROMAX) 250 MG Tab TAKE ONE TABLET BY MOUTH DAILY 30 Tablet 5   • losartan (COZAAR) 50 MG Tab TAKE ONE AND ONE-HALF (1 AND 1/2) TABLET BY MOUTH DAILY (COZAAR) (Patient taking differently: Take 25 mg by mouth every day. TAKE ONE AND ONE-HALF (1 AND 1/2) TABLET BY MOUTH DAILY (COZAAR)) 135 Tablet 1   • SPIRIVA RESPIMAT 2.5 MCG/ACT Aero Soln INHALE TWO PUFFS BY MOUTH DAILY 4 g 6   • albuterol (PROVENTIL) 2.5mg/3ml Nebu Soln solution for nebulization Take 3 mL by nebulization every four hours as needed for Shortness of Breath. 180 Each 1   • rivaroxaban (XARELTO) 20 MG Tab tablet TAKE ONE TABLET BY MOUTH DAILY WITH DINNER " "90 Tablet 1   • BREO ELLIPTA 200-25 MCG/INH AEROSOL POWDER, BREATH ACTIVATED INHALE ONE DOSE BY MOUTH DAILY 1 Each 11   • albuterol 108 (90 Base) MCG/ACT Aero Soln inhalation aerosol INHALE TWO PUFFS BY MOUTH EVERY 6 HOURS AS NEEDED FOR SHORTNESS OF BREATH 18 g 10   • KLOR-CON M20 20 MEQ Tab CR Take 20 mEq by mouth every day.     • RESTASIS 0.05 % ophthalmic emulsion Administer 1 Drop into both eyes every day.     • colchicine (COLCRYS) 0.6 MG Tab Day 1: 1.2 mg once, followed in 1 hour with a single dose of 0.6 mg.  Day 2 and thereafter: Oral: 0.6 mg once daily until flare resolves 30 Tab 1   • cyanocobalamin (VITAMIN B-12) 100 MCG Tab Take 100 mcg by mouth every day.     • SODIUM BICARBONATE PO Take 10 g by mouth 2 times a day.     • allopurinol (ZYLOPRIM) 100 MG Tab Take 200 mg by mouth every day.     • docusate sodium (COLACE) 100 MG Cap Take 300 mg by mouth every evening.     • acetaminophen (TYLENOL) 500 MG Tab Take 1,000 mg by mouth every 6 hours as needed for Moderate Pain.     • COMBIGAN 0.2-0.5 % Solution Place 1 Drop in both eyes 2 Times a Day.       No current [facility]-ordered facility-administered medications on file.     Health Maintenance: deferred to next visit    Review of Systems   Constitutional: Positive for malaise/fatigue. Negative for chills, fever and weight loss.   HENT: Negative for sore throat.    Respiratory: Positive for shortness of breath. Negative for cough.    Cardiovascular: Negative for chest pain and palpitations.   Gastrointestinal: Negative for abdominal pain, diarrhea, nausea and vomiting.   Genitourinary: Negative for dysuria and urgency.   Musculoskeletal: Positive for back pain.   Neurological: Positive for focal weakness (lower extremities chornic). Negative for dizziness.     Objective:     Exam:  /74 (BP Location: Left arm, Patient Position: Sitting, BP Cuff Size: Adult)   Pulse 74   Temp 36.5 °C (97.7 °F) (Temporal)   Ht 1.676 m (5' 6\")   LMP  (LMP Unknown)   SpO2 " 91%   BMI 34.38 kg/m²  Body mass index is 34.38 kg/m².    Physical Exam  Constitutional:       General: She is not in acute distress.     Appearance: She is ill-appearing. She is not toxic-appearing or diaphoretic.   HENT:      Head: Normocephalic and atraumatic.      Mouth/Throat:      Mouth: Mucous membranes are moist.      Pharynx: Oropharynx is clear. No oropharyngeal exudate or posterior oropharyngeal erythema.   Eyes:      General: No scleral icterus.  Cardiovascular:      Rate and Rhythm: Normal rate and regular rhythm.      Pulses: Normal pulses.      Heart sounds: Normal heart sounds.   Pulmonary:      Effort: Pulmonary effort is normal. No respiratory distress.      Breath sounds: Normal breath sounds.   Musculoskeletal:      Thoracic back: Decreased range of motion.      Lumbar back: Decreased range of motion.   Skin:     General: Skin is warm and dry.   Neurological:      Mental Status: She is alert.      Motor: Weakness (BL LE 3/5) present.      Gait: Gait abnormal (walker).   Psychiatric:         Mood and Affect: Mood normal.         Behavior: Behavior normal.         Thought Content: Thought content normal.         Judgment: Judgment normal.       Labs: Reviewed results of uric acid, magnesium, CBC, PTH, vitamin D, microalbumin creatinine ratio from 4/28/2022.    Assessment & Plan:   Latonia  is a pleasant 71 y.o. female with the following -     Problem List Items Addressed This Visit     Chronic anticoagulation    Hyperlipidemia    Relevant Medications    simvastatin (ZOCOR) 40 MG Tab    Other fatigue     She says, she will likely go to emergency room, because she has this is an ongoing issue for the last 4 days: Patient felt like she had COPD exacerbation and has started taking her oral prednisone and doxycycline.  She has not used her nebulizer, however she has been using her rescue inhaler frequently.  Her symptoms have not improved, she still experiencing generalized weakness.  I have but  extensive chart review: Reviewed all the blood work completed by nephrology and endocrinology, endocrinology visit notes.  Patient also had CT of the chest completed on Monday and she has not heard from her pulmonologist yet about the results.  Patient will reach out to her spine specialist to let them know about persistent lower back pain after the facet joint injection to inquire if they would like to do a second injection.  Most likely, her fatigue secondary to COPD exacerbation and ongoing chronic lower back pain.  Also, her methimazole dose was recently adjusted by endocrinology.  I have advised patient, that if her symptoms worsen or fail to improve she should return to clinic for evaluation or present to urgent care/emergency room.  Patient says, she would likely go to emergency room as she has a a dog, who is blind and deaf and she cannot leave him alone at home.           Other insomnia     Patient is due for her zolpidem refill.  PDMP reviewed today, no history of abnormal drug screens.  No aberrant behavior.  Patient was prescribed Norco by her spine surgeon, however she is not taking it as it did not help her with back pain.           Relevant Medications    zolpidem (AMBIEN) 5 MG Tab (Start on 5/20/2022)        Return in about 3 months (around 8/12/2022), or if symptoms worsen or fail to improve.    Please note that this dictation was created using voice recognition software. I have made every reasonable attempt to correct obvious errors, but I expect that there are errors of grammar and possibly content that I did not discover before finalizing the note.

## 2022-05-12 ENCOUNTER — OFFICE VISIT (OUTPATIENT)
Dept: MEDICAL GROUP | Facility: MEDICAL CENTER | Age: 72
End: 2022-05-12
Payer: MEDICARE

## 2022-05-12 VITALS
BODY MASS INDEX: 34.38 KG/M2 | DIASTOLIC BLOOD PRESSURE: 74 MMHG | HEIGHT: 66 IN | OXYGEN SATURATION: 91 % | TEMPERATURE: 97.7 F | HEART RATE: 74 BPM | SYSTOLIC BLOOD PRESSURE: 112 MMHG

## 2022-05-12 DIAGNOSIS — E78.49 OTHER HYPERLIPIDEMIA: ICD-10-CM

## 2022-05-12 DIAGNOSIS — G47.09 OTHER INSOMNIA: ICD-10-CM

## 2022-05-12 DIAGNOSIS — Z79.01 CHRONIC ANTICOAGULATION: ICD-10-CM

## 2022-05-12 DIAGNOSIS — R53.83 OTHER FATIGUE: ICD-10-CM

## 2022-05-12 PROCEDURE — 99214 OFFICE O/P EST MOD 30 MIN: CPT | Performed by: INTERNAL MEDICINE

## 2022-05-12 RX ORDER — ONDANSETRON 4 MG/1
4 TABLET, FILM COATED ORAL EVERY 8 HOURS PRN
Qty: 30 TABLET | Refills: 1 | Status: SHIPPED | OUTPATIENT
Start: 2022-05-12 | End: 2022-05-22

## 2022-05-12 RX ORDER — SIMVASTATIN 40 MG
40 TABLET ORAL EVERY EVENING
Qty: 90 TABLET | Refills: 1 | Status: SHIPPED | OUTPATIENT
Start: 2022-05-12 | End: 2022-11-08 | Stop reason: SDUPTHER

## 2022-05-12 RX ORDER — ZOLPIDEM TARTRATE 5 MG/1
5 TABLET ORAL NIGHTLY PRN
Qty: 90 TABLET | Refills: 0 | Status: SHIPPED
Start: 2022-05-20 | End: 2022-08-10

## 2022-05-12 RX ORDER — ERGOCALCIFEROL 1.25 MG/1
50000 CAPSULE ORAL
Qty: 14 CAPSULE | Refills: 1 | Status: SHIPPED | OUTPATIENT
Start: 2022-05-12 | End: 2022-08-10 | Stop reason: SDUPTHER

## 2022-05-12 RX ORDER — DAPAGLIFLOZIN 5 MG/1
TABLET, FILM COATED ORAL
COMMUNITY
Start: 2022-05-10 | End: 2022-08-10

## 2022-05-12 RX ORDER — METHOCARBAMOL 500 MG/1
TABLET, FILM COATED ORAL
COMMUNITY
Start: 2022-03-18 | End: 2022-05-12

## 2022-05-12 RX ORDER — HYDROCODONE BITARTRATE AND ACETAMINOPHEN 5; 325 MG/1; MG/1
TABLET ORAL
COMMUNITY
Start: 2022-03-18 | End: 2022-10-05

## 2022-05-12 ASSESSMENT — ENCOUNTER SYMPTOMS
PALPITATIONS: 0
FEVER: 0
NAUSEA: 0
WEIGHT LOSS: 0
SORE THROAT: 0
VOMITING: 0
COUGH: 0
FOCAL WEAKNESS: 1
DIARRHEA: 0
CHILLS: 0
ABDOMINAL PAIN: 0
BACK PAIN: 1
DIZZINESS: 0
SHORTNESS OF BREATH: 1

## 2022-05-12 NOTE — ASSESSMENT & PLAN NOTE
Patient is due for her zolpidem refill.  PDMP reviewed today, no history of abnormal drug screens.  No aberrant behavior.  Patient was prescribed Norco by her spine surgeon, however she is not taking it as it did not help her with back pain.

## 2022-05-13 DIAGNOSIS — R91.8 PULMONARY NODULES: ICD-10-CM

## 2022-05-13 DIAGNOSIS — J44.9 CHRONIC OBSTRUCTIVE PULMONARY DISEASE, UNSPECIFIED COPD TYPE (HCC): ICD-10-CM

## 2022-05-13 DIAGNOSIS — R06.09 DOE (DYSPNEA ON EXERTION): ICD-10-CM

## 2022-05-13 NOTE — PROGRESS NOTES
Ct chest; reviewed results of CT with pt; largest nodule 1 cm unchanged; new 7 mm nodule; f/u CT in 3 mos; she is c/o progressive PERDOMO, started prednisone on Monday, then abx, no improvement; also with cough, she is hesitant to undergo repeat PFTs which I recommended in addition to echo; she has an appointment on 6/2. I strongly recommended repeat pulmonary function testing as patient asked about alternative inhalers for her symptoms.  She has seen her primary care provider who did not find any objective findings to explain her symptoms.  I told the patient I would need updated testing and we can discuss if changing her inhalers would be beneficial at follow-up before acute symptoms if worsening I recommended she present to the emergency department.

## 2022-05-13 NOTE — ASSESSMENT & PLAN NOTE
She says, she will likely go to emergency room, because she has this is an ongoing issue for the last 4 days: Patient felt like she had COPD exacerbation and has started taking her oral prednisone and doxycycline.  She has not used her nebulizer, however she has been using her rescue inhaler frequently.  Her symptoms have not improved, she still experiencing generalized weakness.  I have but extensive chart review: Reviewed all the blood work completed by nephrology and endocrinology, endocrinology visit notes.  Patient also had CT of the chest completed on Monday and she has not heard from her pulmonologist yet about the results.  Patient will reach out to her spine specialist to let them know about persistent lower back pain after the facet joint injection to inquire if they would like to do a second injection.  Most likely, her fatigue secondary to COPD exacerbation and ongoing chronic lower back pain.  Also, her methimazole dose was recently adjusted by endocrinology.  I have advised patient, that if her symptoms worsen or fail to improve she should return to clinic for evaluation or present to urgent care/emergency room.  Patient says, she would likely go to emergency room as she has a a dog, who is blind and deaf and she cannot leave him alone at home.

## 2022-05-19 ENCOUNTER — ANTICOAGULATION MONITORING (OUTPATIENT)
Dept: VASCULAR LAB | Facility: MEDICAL CENTER | Age: 72
End: 2022-05-19
Payer: MEDICARE

## 2022-05-19 DIAGNOSIS — I82.4Y9 DEEP VEIN THROMBOSIS (DVT) OF PROXIMAL LOWER EXTREMITY, UNSPECIFIED CHRONICITY, UNSPECIFIED LATERALITY (HCC): ICD-10-CM

## 2022-05-19 NOTE — PROGRESS NOTES
Pt reports easy bruising on her forearms. No other s/s of bleeding. DIscussed this is normal    CrCl 47 ml/min, other labs WNL.     Refilled Nuno DillardD

## 2022-05-23 ENCOUNTER — TELEPHONE (OUTPATIENT)
Dept: SLEEP MEDICINE | Facility: MEDICAL CENTER | Age: 72
End: 2022-05-23
Payer: MEDICARE

## 2022-05-23 NOTE — TELEPHONE ENCOUNTER
Pt called and left vm stating that she was not able to get an Echo scheduled any sooner then 8/24/2022. She wants to know if this is ok and also if she should keep her upcoming appt. Please advise.

## 2022-05-24 NOTE — TELEPHONE ENCOUNTER
Mami Allan M.D.  You 18 hours ago (1:39 PM)         I would recommend she keep her appointment and depending on her symptoms and her exam at the time of our visit, we can determine if we need to get the echo more urgently.      Pt notified; pt aware she should do a 6mw but opted not to schedule with me as she said Dr. Allan can decide at her upcoming appt if she still wants her to do that as well.

## 2022-05-25 NOTE — PROGRESS NOTES
"No chief complaint on file.      Subjective:   Latonia Clinton is a 69 y.o. female who returns today at her request for evaluation of her cardiac status.  The patient has rather nonspecific complaints of feeling fatigued and not feeling well.  Denies chest pain, palpitations or worsening edema.    She was seen just over a year ago.  The patient was hospitalized in February, 2019 and had extensive testing done including an echo that revealed normal LV systolic function, mild LVH and mildly elevated pulmonary pressures at 30.  Myocardial perfusion scan was normal.    The patient is concerned about her fast heart rate.  She has hyperthyroidism and was started on methimazole by endocrine ,but decided not to take it.  She complains of weakness and not feeling well.  Denies any melena or rectal bleeding.  She is on chronic anticoagulation.    Past Medical History:   Diagnosis Date   • Anesthesia     \"Abnormal blood pressure and breathing\"  \"couldn't breathe\"   • Asthma     inhalers daily   • Backpain 7/2017     thor. area   • Blood clot in vein 07/06/2018    \"Currently have a blood clot in my left eye\"   • Bowel habit changes 07/06/2018    Constipation   • Breath shortness     with exertion has O2 but does not use   • Bronchitis    • CAD (coronary artery disease)     palpatations   • Cancer (HCC) 2016    left lung   • Chickenpox    • COPD (chronic obstructive pulmonary disease) (HCC)    • Depression 2/26/2019   • Dialysis 2005    transient renal failure due to sepsis   • Emphysema of lung (HCC)    • Glaucoma     right eye   • Hemorrhagic disorder (HCC)    • Hyperlipidemia    • Hypertension    • Hyperthyroidism    • Kidney stone     bilateral   • Lung cancer (HCC) 12/12/2016   • Multiple thyroid nodules 7/9/2015   • Nasal drainage    • Osteoporosis    • Pain 07/06/2018    Back pain   • Personal history of venous thrombosis and embolism 2004, 2012    right leg 2012, left arm dvt 2004 left eye 2017   • Pneumonia 7/2016 " Patient desires glasses independence after surgery. Advised that vision is limited in OS. Recommending CV only for OS. Recommend CL trial in OS after OD cataract surgery for near vs distance vision with CV. Would not qualify for an  enhancement after surgery. "   per patient   • Renal disorder     had been on dialysis for 7 months for acute failure 2005   • Renal stones 2013    post lithotripsy   • Rheumatoid arthritis (HCC)     question   • Rheumatoid arthritis (HCC)    • S/P appendectomy 1998    • S/P cholecystectomy 1998   • S/P kyphoplasty 2006, 2007, 2013   • Sepsis, unspecified (HCC) 2005   • Shortness of breath 07/06/2018    Chronic current problem. \"A couple of years now\".  O2 concentrator at night - pt states she doesn't use it.   • Thyroid nodule, hot 2017    functional \"hot\" right thyroid nodule without thyrotoxicosis     Past Surgical History:   Procedure Laterality Date   • CYSTOSCOPY STENT PLACEMENT  7/9/2018    Procedure: Cystoscopy,  Left removal of stent ,  Left Stent Placement;  Surgeon: Beck Yang M.D.;  Location: [facility];  Service: Urology   • URETEROSCOPY Left 7/9/2018    Procedure: URETEROSCOPY;  Surgeon: Beck Yang M.D.;  Location: [facility];  Service: Urology   • LASERTRIPSY Left 7/9/2018    Procedure: LASERTRIPSY-LITHO;  Surgeon: Beck Yang M.D.;  Location: [facility];  Service: Urology   • CYSTOSCOPY STENT PLACEMENT Left 6/18/2018    Procedure: CYSTOSCOPY STENT PLACEMENT;  Surgeon: Beck Yang M.D.;  Location: [facility];  Service: Urology   • LITHOTRIPSY Left 6/18/2018    Procedure: LITHOTRIPSY;  Surgeon: Beck Yang M.D.;  Location: [facility];  Service: Urology   • LASERTRIPSY Left 6/18/2018    Procedure: LASERTRIPSY;  Surgeon: Beck Yang M.D.;  Location: [facility];  Service: Urology   • URETEROSCOPY Left 6/18/2018    Procedure: URETEROSCOPY;  Surgeon: Beck Yang M.D.;  Location: [facility];  Service: Urology   • THORACOSCOPY Left 12/12/2016    Procedure: THORACOSCOPY W/WEDGE RESECTION UPPER LOBE MASS;  Surgeon: John H Ganser, M.D.;  Location: [facility];  Service:    • OTHER ORTHOPEDIC SURGERY  2013    " kyphoplasty X3   • APPENDECTOMY         • CHOLECYSTECTOMY         • LAMINOTOMY     • LUNG BIOPSY OPEN     • OTHER     • OTHER ABDOMINAL SURGERY      gall bladder disease   • PB ENLARGE BREAST WITH IMPLANT     • PB REDUCTION OF LARGE BREAST       Family History   Problem Relation Age of Onset   • Cancer Mother    • Heart Failure Father    • Heart Disease Brother      Social History     Socioeconomic History   • Marital status: Single     Spouse name: Not on file   • Number of children: Not on file   • Years of education: Not on file   • Highest education level: Not on file   Occupational History   • Not on file   Social Needs   • Financial resource strain: Not on file   • Food insecurity     Worry: Not on file     Inability: Not on file   • Transportation needs     Medical: Not on file     Non-medical: Not on file   Tobacco Use   • Smoking status: Former Smoker     Packs/day: 1.00     Years: 30.00     Pack years: 30.00     Types: Cigarettes     Last attempt to quit: 2000     Years since quittin.4   • Smokeless tobacco: Never Used   • Tobacco comment: 7 years ago   Substance and Sexual Activity   • Alcohol use: Yes     Alcohol/week: 0.0 oz     Comment: x2 a week   • Drug use: No   • Sexual activity: Not on file   Lifestyle   • Physical activity     Days per week: Not on file     Minutes per session: Not on file   • Stress: Not on file   Relationships   • Social connections     Talks on phone: Not on file     Gets together: Not on file     Attends Oriental orthodox service: Not on file     Active member of club or organization: Not on file     Attends meetings of clubs or organizations: Not on file     Relationship status: Not on file   • Intimate partner violence     Fear of current or ex partner: Not on file     Emotionally abused: Not on file     Physically abused: Not on file     Forced sexual activity: Not on file   Other Topics Concern   • Not on file   Social History Narrative   • Not on file     No  Known Allergies  Outpatient Encounter Medications as of 6/8/2020   Medication Sig Dispense Refill   • Prenatal MV & Min w/FA-DHA (PRENATAL ADULT GUMMY/DHA/FA PO) Take  by mouth.     • cyanocobalamin (VITAMIN B-12) 100 MCG Tab Take 100 mcg by mouth every day.     • BREO ELLIPTA 200-25 MCG/INH AEROSOL POWDER, BREATH ACTIVATED INHALE ONE DOSE BY MOUTH DAILY *RINSE MOUTH AFTER USE* 1 Each 5   • zolpidem (AMBIEN) 5 MG Tab Take 1 Tab by mouth at bedtime as needed for up to 30 days. 30 Tab 3   • colchicine (COLCRYS) 0.6 MG Tab Day 1: 1.2 mg once, followed in 1 hour with a single dose of 0.6 mg.  Day 2 and thereafter: Oral: 0.6 mg once daily until flare resolves 30 Tab 1   • ondansetron (ZOFRAN) 4 MG Tab tablet Take 1 Tab by mouth every four hours as needed for Nausea/Vomiting for up to 30 days. 30 Tab 3   • Tiotropium Bromide Monohydrate 2.5 MCG/ACT Aero Soln INHALE TWO PUFFS BY MOUTH DAILY 1 Inhaler 5   • XARELTO 20 MG Tab tablet TAKE ONE TABLET BY MOUTH DAILY WITH DINNER 90 Tab 0   • BROMSITE 0.075 % Solution   0   • albuterol (PROVENTIL) 2.5mg/3ml Nebu Soln solution for nebulization 3 mL by Nebulization route every four hours as needed for Shortness of Breath. 30 Bullet 3   • albuterol 108 (90 Base) MCG/ACT Aero Soln inhalation aerosol INHALE TWO PUFFS BY MOUTH EVERY 6 HOURS AS NEEDED FOR SHORTNESS OF BREATH 1 Inhaler 1   • SODIUM BICARBONATE PO Take 10 g by mouth.     • vitamin D, Ergocalciferol, (DRISDOL) 63761 units Cap capsule Take 1 Cap by mouth every 14 days. 5 Cap 1   • HYDROcodone/acetaminophen (NORCO)  MG Tab Take 1-2 Tabs by mouth every 6 hours as needed.     • simvastatin (ZOCOR) 40 MG Tab Take 1 Tab by mouth every evening. 90 Tab 3   • allopurinol (ZYLOPRIM) 100 MG Tab Take 100 mg by mouth 2 times a day.     • docusate sodium (COLACE) 100 MG Cap Take 300 mg by mouth every evening.     • acetaminophen (TYLENOL) 500 MG Tab Take 1,000 mg by mouth every 6 hours as needed for Moderate Pain.     • COMBIGAN  "0.2-0.5 % Solution Place 1 Drop in both eyes 2 Times a Day.     • methimazole (TAPAZOLE) 10 MG Tab Take 1 Tab by mouth every day. (Patient not taking: Reported on 6/8/2020) 30 Tab 1   • [DISCONTINUED] losartan (COZAAR) 50 MG Tab TAKE ONE AND ONE-HALF (1 AND 1/2) TABLET BY MOUTH DAILY 135 Tab 2   • azithromycin (ZITHROMAX Z-CURTIS) 250 MG Tab Take 1 tablet daily 30 Tab 5   • azithromycin (ZITHROMAX) 250 MG Tab 1 daily for COPD 30 Tab 11     No facility-administered encounter medications on file as of 6/8/2020.      Review of Systems   Constitutional: Positive for malaise/fatigue. Negative for weight loss.   HENT: Negative.    Eyes: Positive for blurred vision.        History of decreased vision   Respiratory: Positive for shortness of breath.    Cardiovascular: Negative.  Negative for chest pain, palpitations and leg swelling.   Gastrointestinal: Negative.    Musculoskeletal: Positive for back pain. Negative for falls and joint pain.   Skin: Negative.         Skin pallor is present   Neurological: Negative.  Negative for loss of consciousness.   Endo/Heme/Allergies: Negative.    Psychiatric/Behavioral: Positive for depression.        Objective:   BP (!) 80/58 (BP Location: Left arm, Patient Position: Sitting)   Pulse 100   Ht 1.676 m (5' 6\")   Wt 87.1 kg (192 lb)   LMP  (LMP Unknown)   SpO2 98%   BMI 30.99 kg/m²     Physical Exam   Constitutional: She is oriented to person, place, and time. No distress.   HENT:   Head: Normocephalic and atraumatic.   Eyes: Pupils are equal, round, and reactive to light. No scleral icterus.   Neck: No JVD present.   Cardiovascular: Normal rate and regular rhythm.   No murmur heard.  Pulmonary/Chest: No respiratory distress. She has no wheezes.   Decreased breath sounds.  No wheezes   Abdominal: Soft. She exhibits no distension. There is no abdominal tenderness.   Musculoskeletal:         General: No edema.   Neurological: She is alert and oriented to person, place, and time. "   Skin: Skin is warm.   Psychiatric: Her affect is blunt. She is slowed and withdrawn. She exhibits a depressed mood.   Depressed affect       Assessment:     1. Other specified hypotension  EKG   2. Idiopathic hypotension     3. Hyperthyroidism     4. CKD (chronic kidney disease) stage 3, GFR 30-59 ml/min ([facility])     5. Mixed hyperlipidemia         Medical Decision Making:  Today's Assessment / Status / Plan:   Hypotension: Patient is hypotensive today with a blood pressure 85 systolic.  She appears very depressed and withdrawn.  The patient is taking the ARB for kidney preservation.  Recommend she stop it because of her hypotension.  Also CBC will be obtained to ensure that she is not anemic secondary to her long-term anticoagulation.    Mixed hyperlipidemia:    Chronic kidney disease stage III.    Chronic anticoagulation: As noted above she has skin pallor and is hypotensive.  A CBC will be checked.    Mild pulmonary hypertension: Secondary to COPD and possibly obesity.  Blood pressure is mildly elevated by echo a year ago would not recommend repeating.    Noncompliance with medications: Patient stopped her methimazole on her own.  I have urged her to restart it.    Depression: Patient appears depressed today.  This may account for her rather nonspecific complaints of fatigue and lethargy.    She was reassured that recent cardiac testing was all in good order.  I am not certain of the cause of her nonspecific fatigue but it may be secondary to hyperthyroidism and depression.  She be notified of the results of the CBC.  Return as needed only

## 2022-06-02 ENCOUNTER — OFFICE VISIT (OUTPATIENT)
Dept: SLEEP MEDICINE | Facility: MEDICAL CENTER | Age: 72
End: 2022-06-02
Payer: MEDICARE

## 2022-06-02 VITALS
WEIGHT: 210 LBS | HEIGHT: 66 IN | OXYGEN SATURATION: 96 % | SYSTOLIC BLOOD PRESSURE: 120 MMHG | DIASTOLIC BLOOD PRESSURE: 82 MMHG | HEART RATE: 95 BPM | RESPIRATION RATE: 18 BRPM | BODY MASS INDEX: 33.75 KG/M2

## 2022-06-02 DIAGNOSIS — Z85.118 HISTORY OF LUNG CANCER: ICD-10-CM

## 2022-06-02 DIAGNOSIS — J44.9 CHRONIC OBSTRUCTIVE PULMONARY DISEASE, UNSPECIFIED COPD TYPE (HCC): ICD-10-CM

## 2022-06-02 DIAGNOSIS — Z87.891 FORMER SMOKER: ICD-10-CM

## 2022-06-02 DIAGNOSIS — R06.09 DOE (DYSPNEA ON EXERTION): ICD-10-CM

## 2022-06-02 DIAGNOSIS — M19.90 OSTEOARTHRITIS, UNSPECIFIED OSTEOARTHRITIS TYPE, UNSPECIFIED SITE: ICD-10-CM

## 2022-06-02 DIAGNOSIS — R91.8 PULMONARY NODULES: ICD-10-CM

## 2022-06-02 PROCEDURE — 99214 OFFICE O/P EST MOD 30 MIN: CPT | Performed by: INTERNAL MEDICINE

## 2022-06-02 RX ORDER — PREDNISONE 10 MG/1
TABLET ORAL
Qty: 18 TABLET | Refills: 0 | Status: SHIPPED | OUTPATIENT
Start: 2022-06-02 | End: 2022-08-10

## 2022-06-02 RX ORDER — DOXYCYCLINE HYCLATE 100 MG
100 TABLET ORAL 2 TIMES DAILY
Qty: 14 TABLET | Refills: 0 | Status: SHIPPED | OUTPATIENT
Start: 2022-06-02 | End: 2022-08-10

## 2022-06-02 ASSESSMENT — ENCOUNTER SYMPTOMS
SHORTNESS OF BREATH: 1
BLURRED VISION: 0
COUGH: 0
SORE THROAT: 0
CHILLS: 0
MYALGIAS: 0
STRIDOR: 0
PALPITATIONS: 0
HEADACHES: 0
FEVER: 0
NAUSEA: 0
DIARRHEA: 0
SPUTUM PRODUCTION: 0
DEPRESSION: 0
FOCAL WEAKNESS: 0
DIAPHORESIS: 0
EYE REDNESS: 0
DIZZINESS: 0
TREMORS: 0
EYE PAIN: 0
PND: 0
WEIGHT LOSS: 0
EYE DISCHARGE: 0
BACK PAIN: 1
VOMITING: 0
CLAUDICATION: 0
HEMOPTYSIS: 0
SINUS PAIN: 0
WEAKNESS: 0
ORTHOPNEA: 0
ABDOMINAL PAIN: 0
CONSTIPATION: 0
HEARTBURN: 0
DOUBLE VISION: 0
PHOTOPHOBIA: 0
FALLS: 0
SPEECH CHANGE: 0
WHEEZING: 0
NECK PAIN: 0

## 2022-06-02 ASSESSMENT — FIBROSIS 4 INDEX: FIB4 SCORE: 2.04

## 2022-06-02 NOTE — PROGRESS NOTES
Chief Complaint   Patient presents with   • COPD     Last seen 1/12/22    • Results     CT Chest 5/9/22, NO PFT/6mw          HPI: This patient is a 71 y.o. female whom is followed in our clinic for COPD last seen by me on 1/12/22. She has moderate air flow obstruction based on PFTs from 2016 with FEV1 of 1.4L or 54% pred with air trapping, normal TLC and preserved DLCO. She is a former smoker with >30 pk year  Hx and quit in 2000. She uses Breo and spiriva as well as daily low-dose azithromycin. She last used emergency prednisone and doxycycline in April.  She has participated in pulmonary rehab and declines f/u PFTs. She has serial CT chest for lung Ca surveillance after surgical removal of lung Ca RUL in 2016. CT from 7/2021 showed new GGO in RLL but stable .9cm nodule in lingula. Repeat CT 1/6/22 showed increase in a RLL nodule from 6 to 7 mm and increase in irreg lingula nodule to 1.5 cm in max diameter.  PET showed low level FDG update of 3 in lingular nodule with blood pool of 4.8.  We opted to continue monitoring.  Repeat CT chest 5/9/22 showed stable lingular nodule but new 7 mm nodule for which surveillance imaging has been ordered for August.  She has been experiencing progressive shortness of breath over the past 9 months.  She used prednisone and azithromycin as stated above in April which did not help her symptoms.  She has been checking home pulse oximetry and typically does not have saturations below 94%.  Despite this she feels a tightness across her chest and shortness of breath both at rest but particular with activity.  Echo from 2019 showed normal LV and RV function.  Stress test also from 2019 showed no evidence of reversible ischemia.  We do not have pulmonary function testing since 2016.  She denies cough, fevers, chills, night sweats, weight loss.  She has fairly severe degenerative joint disease of the back which limits her mobility and does cause pressure on her chest making it difficult to  "breathe.    Past Medical History:   Diagnosis Date   • Anesthesia     \"Abnormal blood pressure and breathing\"  \"couldn't breathe\"   • Asthma     inhalers daily   • Backpain 7/2017     thor. area   • Blood clot in vein 07/06/2018    \"Currently have a blood clot in my left eye\"   • Bowel habit changes 07/06/2018    Constipation   • Breath shortness     with exertion has O2 but does not use   • Bronchitis    • CAD (coronary artery disease)     palpatations   • Cancer ([facility]) 2016    left lung   • Chickenpox    • COPD (chronic obstructive pulmonary disease) ([facility])    • Depression 2/26/2019   • Dialysis 2005    transient renal failure due to sepsis   • Emphysema of lung ([facility])    • Glaucoma     right eye   • Hemorrhagic disorder ([facility])    • Hyperlipidemia    • Hypertension    • Hyperthyroidism    • Kidney stone     bilateral   • Lung cancer ([facility]) 12/12/2016   • Multiple thyroid nodules 7/9/2015   • Nasal drainage    • Obstruction of left ureteropelvic junction (UPJ) due to stone 6/17/2018   • Osteoporosis    • Pain 07/06/2018    Back pain   • Personal history of venous thrombosis and embolism 2004, 2012    right leg 2012, left arm dvt 2004 left eye 2017   • Pneumonia 7/2016    per patient   • Renal disorder     had been on dialysis for 7 months for acute failure 2005   • Renal stones 2013    post lithotripsy   • Rheumatoid arthritis ([facility])     question   • Rheumatoid arthritis ([facility])    • S/P appendectomy 1998    • S/P cholecystectomy 1998   • S/P kyphoplasty 2006, 2007, 2013   • Sepsis, unspecified 2005   • Shortness of breath 07/06/2018    Chronic current problem. \"A couple of years now\".  O2 concentrator at night - pt states she doesn't use it.   • Thyroid nodule, hot 2017    functional \"hot\" right thyroid nodule without thyrotoxicosis       Social History     Socioeconomic History   • Marital status: Single     Spouse name: Not on file   • Number of children: Not on file   • Years of education: Not on file   • Highest " education level: Not on file   Occupational History   • Not on file   Tobacco Use   • Smoking status: Former Smoker     Packs/day: 1.00     Years: 30.00     Pack years: 30.00     Types: Cigarettes     Quit date: 2000     Years since quittin.4   • Smokeless tobacco: Never Used   • Tobacco comment: 7 years ago   Vaping Use   • Vaping Use: Never used   Substance and Sexual Activity   • Alcohol use: Yes     Alcohol/week: 0.0 oz     Comment: x2 a week   • Drug use: No   • Sexual activity: Not on file   Other Topics Concern   • Not on file   Social History Narrative   • Not on file     Social Determinants of Health     Financial Resource Strain: Not on file   Food Insecurity: Not on file   Transportation Needs: Not on file   Physical Activity: Not on file   Stress: Not on file   Social Connections: Not on file   Intimate Partner Violence: Not on file   Housing Stability: Not on file       Family History   Problem Relation Age of Onset   • Cancer Mother    • Heart Failure Father    • Heart Disease Brother        Current Outpatient Medications on File Prior to Visit   Medication Sig Dispense Refill   • rivaroxaban (XARELTO) 20 MG Tab tablet TAKE ONE TABLET BY MOUTH DAILY WITH DINNER 90 Tablet 1   • FARXIGA 5 MG Tab  (Patient not taking: Reported on 2022)     • ergocalciferol (DRISDOL) 49761 UNIT capsule Take 1 Capsule by mouth every 7 days. 14 Capsule 1   • simvastatin (ZOCOR) 40 MG Tab Take 1 Tablet by mouth every evening. 90 Tablet 1   • zolpidem (AMBIEN) 5 MG Tab Take 1 Tablet by mouth at bedtime as needed for Sleep for up to 90 days. 90 Tablet 0   • HYDROcodone-acetaminophen (NORCO) 5-325 MG Tab per tablet      • methimazole (TAPAZOLE) 10 MG Tab Take 1 Tablet by mouth every day. 90 Tablet 3   • potassium Chloride ER (K-TAB) 20 MEQ Tab CR tablet      • ketoconazole (NIZORAL) 2 % shampoo      • azithromycin (ZITHROMAX) 250 MG Tab TAKE ONE TABLET BY MOUTH DAILY 30 Tablet 5   • losartan (COZAAR) 50 MG Tab TAKE  ONE AND ONE-HALF (1 AND 1/2) TABLET BY MOUTH DAILY (COZAAR) (Patient taking differently: Take 25 mg by mouth every day. TAKE ONE AND ONE-HALF (1 AND 1/2) TABLET BY MOUTH DAILY (COZAAR)) 135 Tablet 1   • SPIRIVA RESPIMAT 2.5 MCG/ACT Aero Soln INHALE TWO PUFFS BY MOUTH DAILY 4 g 6   • albuterol (PROVENTIL) 2.5mg/3ml Nebu Soln solution for nebulization Take 3 mL by nebulization every four hours as needed for Shortness of Breath. 180 Each 1   • BREO ELLIPTA 200-25 MCG/INH AEROSOL POWDER, BREATH ACTIVATED INHALE ONE DOSE BY MOUTH DAILY 1 Each 11   • albuterol 108 (90 Base) MCG/ACT Aero Soln inhalation aerosol INHALE TWO PUFFS BY MOUTH EVERY 6 HOURS AS NEEDED FOR SHORTNESS OF BREATH 18 g 10   • KLOR-CON M20 20 MEQ Tab CR Take 20 mEq by mouth every day.     • RESTASIS 0.05 % ophthalmic emulsion Administer 1 Drop into both eyes every day.     • colchicine (COLCRYS) 0.6 MG Tab Day 1: 1.2 mg once, followed in 1 hour with a single dose of 0.6 mg.  Day 2 and thereafter: Oral: 0.6 mg once daily until flare resolves 30 Tab 1   • cyanocobalamin (VITAMIN B-12) 100 MCG Tab Take 100 mcg by mouth every day.     • SODIUM BICARBONATE PO Take 10 g by mouth 2 times a day.     • allopurinol (ZYLOPRIM) 100 MG Tab Take 200 mg by mouth every day.     • docusate sodium (COLACE) 100 MG Cap Take 300 mg by mouth every evening.     • acetaminophen (TYLENOL) 500 MG Tab Take 1,000 mg by mouth every 6 hours as needed for Moderate Pain.     • COMBIGAN 0.2-0.5 % Solution Place 1 Drop in both eyes 2 Times a Day.       No current facility-administered medications on file prior to visit.       Patient has no known allergies.      ROS:   Review of Systems   Constitutional: Negative for chills, diaphoresis, fever, malaise/fatigue and weight loss.   HENT: Negative for congestion, ear discharge, ear pain, hearing loss, nosebleeds, sinus pain, sore throat and tinnitus.    Eyes: Negative for blurred vision, double vision, photophobia, pain, discharge and  "redness.   Respiratory: Positive for shortness of breath. Negative for cough, hemoptysis, sputum production, wheezing and stridor.    Cardiovascular: Negative for chest pain, palpitations, orthopnea, claudication, leg swelling and PND.   Gastrointestinal: Negative for abdominal pain, constipation, diarrhea, heartburn, nausea and vomiting.   Genitourinary: Negative for dysuria and urgency.   Musculoskeletal: Positive for back pain. Negative for falls, joint pain, myalgias and neck pain.   Skin: Negative for itching and rash.   Neurological: Negative for dizziness, tremors, speech change, focal weakness, weakness and headaches.   Endo/Heme/Allergies: Negative for environmental allergies.   Psychiatric/Behavioral: Negative for depression.       /82 (BP Location: Left arm, Patient Position: Sitting, BP Cuff Size: Adult)   Pulse 95   Resp 18   Ht 1.676 m (5' 6\")   Wt 95.3 kg (210 lb)   SpO2 96%   Physical Exam  Vitals reviewed.   Constitutional:       General: She is not in acute distress.     Appearance: Normal appearance. She is well-developed. She is obese.   HENT:      Head: Normocephalic and atraumatic.      Right Ear: External ear normal.      Left Ear: External ear normal.      Nose: Nose normal. No congestion.      Mouth/Throat:      Mouth: Mucous membranes are moist.      Pharynx: Oropharynx is clear. No oropharyngeal exudate.   Eyes:      General: No scleral icterus.     Extraocular Movements: Extraocular movements intact.      Conjunctiva/sclera: Conjunctivae normal.      Pupils: Pupils are equal, round, and reactive to light.   Neck:      Vascular: No JVD.      Trachea: No tracheal deviation.   Cardiovascular:      Rate and Rhythm: Normal rate and regular rhythm.      Heart sounds: Normal heart sounds. No murmur heard.    No friction rub. No gallop.   Pulmonary:      Effort: Pulmonary effort is normal. No accessory muscle usage or respiratory distress.      Breath sounds: Normal breath sounds. No " wheezing or rales.   Abdominal:      General: There is no distension.      Palpations: Abdomen is soft.      Tenderness: There is no abdominal tenderness.   Musculoskeletal:         General: No tenderness or deformity. Normal range of motion.      Cervical back: Normal range of motion and neck supple.      Right lower leg: No edema.      Left lower leg: No edema.   Lymphadenopathy:      Cervical: No cervical adenopathy.   Skin:     General: Skin is warm and dry.      Findings: No rash.      Nails: There is no clubbing.   Neurological:      Mental Status: She is alert and oriented to person, place, and time.      Cranial Nerves: No cranial nerve deficit.      Gait: Gait normal.   Psychiatric:         Mood and Affect: Mood normal.         Behavior: Behavior normal.         PFTs as reviewed by me personally:as per hPI    Imaging as reviewed by me personally:  As per HPI    Assessment:  1. Pulmonary nodules     2. PERDOMO (dyspnea on exertion)  Overnight Oximetry    EC-ECHOCARDIOGRAM COMPLETE W/O CONT   3. Chronic obstructive pulmonary disease, unspecified COPD type (HCC)  Overnight Oximetry    predniSONE (DELTASONE) 10 MG Tab    doxycycline (VIBRAMYCIN) 100 MG Tab   4. History of lung cancer     5. Former smoker     6. Osteoarthritis, unspecified osteoarthritis type, unspecified site         Plan:  1.  Mildly FDG avid on last PET scan.  Repeat CT showed stability of larger nodule but new nodule is 7 mm.  Surveillance CT has been ordered for August.  2.  Chronic and progressive.  A repeat echocardiogram has been ordered and patient would like to wait on results before we schedule pulmonary function testing.  I also recommend we screen for sleep disordered breathing with overnight oximetry which she is agreeable to.  3.  This is chronic but was actually only mild to moderate on PFTs from 2016.  Strongly encourage the patient to repeat pulmonary function testing to evaluate for progression given symptoms are somewhat  proportion to her pulmonary function testing as well as any parenchymal findings on CT.  For now, continue Breo, Spiriva, azithromycin and short acting bronchodilators.  4.  Continue surveillance imaging as per above.  5.  Tobacco free.  Encouraged ongoing abstinence.  6.  This may be a significant factor in limiting her respiratory function.  She has completed pulmonary rehab and may benefit from physical therapy.  Return in about 4 months (around 10/2/2022) for overnight oximetry, echo.

## 2022-06-10 ENCOUNTER — OUTPATIENT INFUSION SERVICES (OUTPATIENT)
Dept: ONCOLOGY | Facility: MEDICAL CENTER | Age: 72
End: 2022-06-10
Attending: INTERNAL MEDICINE
Payer: MEDICARE

## 2022-06-10 VITALS
DIASTOLIC BLOOD PRESSURE: 66 MMHG | TEMPERATURE: 97.5 F | RESPIRATION RATE: 18 BRPM | HEART RATE: 91 BPM | HEIGHT: 65 IN | WEIGHT: 212.52 LBS | OXYGEN SATURATION: 96 % | SYSTOLIC BLOOD PRESSURE: 119 MMHG | BODY MASS INDEX: 35.41 KG/M2

## 2022-06-10 DIAGNOSIS — M81.0 POSTMENOPAUSAL OSTEOPOROSIS: ICD-10-CM

## 2022-06-10 LAB
CA-I BLD ISE-SCNC: 1.25 MMOL/L (ref 1.1–1.3)
CREAT BLD-MCNC: 1.9 MG/DL (ref 0.5–1.4)

## 2022-06-10 PROCEDURE — 82330 ASSAY OF CALCIUM: CPT

## 2022-06-10 PROCEDURE — 700111 HCHG RX REV CODE 636 W/ 250 OVERRIDE (IP): Mod: JG | Performed by: INTERNAL MEDICINE

## 2022-06-10 PROCEDURE — 36415 COLL VENOUS BLD VENIPUNCTURE: CPT

## 2022-06-10 PROCEDURE — 82565 ASSAY OF CREATININE: CPT

## 2022-06-10 PROCEDURE — 96372 THER/PROPH/DIAG INJ SC/IM: CPT

## 2022-06-10 RX ADMIN — DENOSUMAB 60 MG: 60 INJECTION SUBCUTANEOUS at 16:08

## 2022-06-10 ASSESSMENT — FIBROSIS 4 INDEX: FIB4 SCORE: 2.04

## 2022-06-11 NOTE — PROGRESS NOTES
Patient arrived ambulatory to IS for Prolia.  Patient denies any active infections or recent oral surgery.  Labs drawn from left a/c, gauze/coban placed.  Prolia given to left back arm, patient tolerated well.  Patient ambulated out of clinic in no apparent distress.

## 2022-06-13 ENCOUNTER — TELEPHONE (OUTPATIENT)
Dept: SLEEP MEDICINE | Facility: MEDICAL CENTER | Age: 72
End: 2022-06-13
Payer: MEDICARE

## 2022-06-14 NOTE — TELEPHONE ENCOUNTER
Left pt VM to discuss mild desaturation but with clustered pattern suggestive of FLASH. I am recommending referral to sleep medicine and sleep study but did not place referral pending patient reply.

## 2022-06-14 NOTE — TELEPHONE ENCOUNTER
Received faxed results from [facility] OPO. Scanned in media. Last seen 6/2/22, next OV 10/20/22 Dr. Allan.       Scanned in media.

## 2022-06-17 ENCOUNTER — HOSPITAL ENCOUNTER (OUTPATIENT)
Dept: CARDIOLOGY | Facility: MEDICAL CENTER | Age: 72
End: 2022-06-17
Attending: INTERNAL MEDICINE
Payer: MEDICARE

## 2022-06-17 DIAGNOSIS — R06.09 DOE (DYSPNEA ON EXERTION): ICD-10-CM

## 2022-06-17 LAB
LV EJECT FRACT  99904: 75
LV EJECT FRACT MOD 2C 99903: 78.18
LV EJECT FRACT MOD 4C 99902: 76.71
LV EJECT FRACT MOD BP 99901: 77.4

## 2022-06-17 PROCEDURE — 93306 TTE W/DOPPLER COMPLETE: CPT | Mod: 26 | Performed by: INTERNAL MEDICINE

## 2022-06-17 PROCEDURE — 93306 TTE W/DOPPLER COMPLETE: CPT

## 2022-06-21 ENCOUNTER — HOSPITAL ENCOUNTER (OUTPATIENT)
Dept: LAB | Facility: MEDICAL CENTER | Age: 72
End: 2022-06-21
Payer: MEDICARE

## 2022-06-21 DIAGNOSIS — E05.90 HYPERTHYROIDISM: ICD-10-CM

## 2022-06-21 LAB
T3FREE SERPL-MCNC: 3.66 PG/ML (ref 2–4.4)
T4 FREE SERPL-MCNC: 1.02 NG/DL (ref 0.93–1.7)
TSH SERPL DL<=0.005 MIU/L-ACNC: 0.98 UIU/ML (ref 0.38–5.33)

## 2022-06-21 PROCEDURE — 84443 ASSAY THYROID STIM HORMONE: CPT

## 2022-06-21 PROCEDURE — 36415 COLL VENOUS BLD VENIPUNCTURE: CPT

## 2022-06-21 PROCEDURE — 84439 ASSAY OF FREE THYROXINE: CPT

## 2022-06-21 PROCEDURE — 84481 FREE ASSAY (FT-3): CPT

## 2022-06-29 NOTE — TELEPHONE ENCOUNTER
Have we ever prescribed this med? Yes.  If yes, what date? 12/24/19    Last OV: 01/16/2020 with Dr Byrne    Next OV: 07/21/2020 with Dr Byrne    DX: Chronic obstructive pulmonary disease, unspecified COPD type (HCC) (J44.9    Medications:   Requested Prescriptions     Pending Prescriptions Disp Refills   • BREO ELLIPTA 200-25 MCG/INH AEROSOL POWDER, BREATH ACTIVATED [Pharmacy Med Name: BREO ELLIPTA 200-25 MCG INH]  3     Sig: INHALE ONE DOSE BY MOUTH DAILY *RINSE MOUTH AFTER USE*       
29-Jun-2022 14:48

## 2022-07-05 ENCOUNTER — TELEPHONE (OUTPATIENT)
Dept: ENDOCRINOLOGY | Facility: MEDICAL CENTER | Age: 72
End: 2022-07-05
Payer: MEDICARE

## 2022-07-05 NOTE — TELEPHONE ENCOUNTER
Patient wants to know if she needs to make adjustments on methimazole. Patient also had labs done.

## 2022-07-11 NOTE — CARE PLAN
Problem: Bronchoconstriction:  Goal: Improve in air movement and diminished wheezing    Intervention: Evaluate and manage medication effects  Patient has been on room air all day with oximetry 94-98%.  Patient states she is still mildly SOB at times.  Breath sounds are clear and diminished bases.  Duoneb changed from Q 4 to QID per respiratory protocol.  Patient aware she may request treatment at night.  Patient is using her IS with good technique.        
Problem: Communication  Goal: The ability to communicate needs accurately and effectively will improve  Outcome: PROGRESSING AS EXPECTED  Pt updated on plan of care, white board updated    Problem: Safety  Goal: Will remain free from injury  Outcome: PROGRESSING AS EXPECTED  Pt instructed to use call light, fall education provided      
Problem: Discharge Barriers/Planning  Goal: Patient's continuum of care needs will be met    Intervention: Assess potential discharge barriers on admission and throughout hospital stay  Discussed POC with pt and team          
Problem: Discharge Barriers/Planning  Goal: Patient's continuum of care needs will be met    Intervention: Assess potential discharge barriers on admission and throughout hospital stay  Discussed POC with pt and team.         
Problem: Infection  Goal: Will remain free from infection  Outcome: PROGRESSING AS EXPECTED  Pt taught signs and symptoms of infection. Instructed on proper hand washing techniques and oral care.    Problem: Mobility  Goal: Risk for activity intolerance will decrease  Outcome: PROGRESSING AS EXPECTED  Pt up to bathroom frequently, walking encouraged.    Problem: Psychosocial Needs:  Goal: Level of anxiety will decrease  Outcome: PROGRESSING AS EXPECTED  Allowed pt to express concerns. Lab tests explained, POC discussed.      
Problem: Oxygenation:  Goal: Maintain adequate oxygenation dependent on patient condition  Outcome: MET Date Met: 01/25/19      Problem: Bronchoconstriction:  Goal: Improve in air movement and diminished wheezing  Outcome: PROGRESSING AS EXPECTED  Patient has increased in aeration after TX's.  MD started her on solumedrol today.  Intervention: Implement inhaled treatments  Treatments Q4 per MD request.    Intervention: Evaluate and manage medication effects  Tolerating TX's well.        
Problem: Oxygenation:  Goal: Maintain adequate oxygenation dependent on patient condition  Outcome: PROGRESSING AS EXPECTED      Problem: Bronchoconstriction:  Goal: Improve in air movement and diminished wheezing  Outcome: PROGRESSING AS EXPECTED        
Problem: Pain Management  Goal: Pain level will decrease to patient's comfort goal  Outcome: PROGRESSING SLOWER THAN EXPECTED  Pt c/o pain in back 6-7/10. Pt taking tylenol Q6h with little to no relief. Offered pt hot packs, repositioning, asking the MD for other pain medications such as tramadol or lidocaine patches. Pt declines all interventions. Will f/u with MD to discuss pain management further with the pt. Goal is to have pain down to comfortable level (3/10) by end of the day today.    Problem: Respiratory:  Goal: Respiratory status will improve  Outcome: PROGRESSING AS EXPECTED  Pt weaned down to 1 L O2. RT involved, pt getting treatments PRN. Pt using IS effectively, coughing and deep breathing as well. By end of the day, goal is to have pt weaned off O2 and have pt ambulate halls while measuring spO2 to evaluate for home O2 needs. Pt verbalizes understanding of respiratory POC.      
Problem: Safety  Goal: Will remain free from falls  Outcome: PROGRESSING AS EXPECTED  Room uncluttered, IV pump on side of bed closest to bathroom. Pt educated to call for assistance and to sit at edge of bed before standing. Slipper socks on feet.    Problem: Knowledge Deficit  Goal: Knowledge of disease process/condition, treatment plan, diagnostic tests, and medications will improve  Outcome: PROGRESSING AS EXPECTED  Pt educated on use of supplies, keeping skin clean and dry, medications and IV fluids. Lab tests explained.      
Problem: Safety  Goal: Will remain free from injury  Outcome: PROGRESSING AS EXPECTED  Remind patient to use call light and provide assistance. Bed in low position, bed locked, and appropriate alarms set. Patient wearing non-slip socks. Call light and personal belongings are within reach.     Problem: Knowledge Deficit  Goal: Knowledge of disease process/condition, treatment plan, diagnostic tests, and medications will improve  Outcome: PROGRESSING AS EXPECTED  Encourage patient to ask questions and be involved in plan of care. Provide education on treatment plan, diagnostic testing, and medications; have patient verbalize understanding.       
negative...

## 2022-07-13 DIAGNOSIS — E05.90 HYPERTHYROIDISM: ICD-10-CM

## 2022-07-15 NOTE — TELEPHONE ENCOUNTER
Phone Number Called: [phone] (home)      Call outcome: Left detailed message for patient. Informed to call back with any additional questions.    Message: Left message for patient about the following message from Dr. Pickett.

## 2022-07-25 ENCOUNTER — TELEPHONE (OUTPATIENT)
Dept: SLEEP MEDICINE | Facility: MEDICAL CENTER | Age: 72
End: 2022-07-25
Payer: MEDICARE

## 2022-07-25 NOTE — TELEPHONE ENCOUNTER
No significant change in ECHO from 2/12/19 study. Heart is pumping well but there is elevated pressure within the heart that can contribute to shortness of breath call pulmonary hypertension. Patient's abnormal OPO results contribute to this. That is why Dr. Allan wanted patient to be evaluated for sleep apnea so we can address the problem and fix this to further improve her breathing.

## 2022-07-25 NOTE — TELEPHONE ENCOUNTER
"VOICEMAIL  1. Caller Name: patient                       Call Back Number: [phone] (home)       2. Message: pt requesting results of Echo. Has not heard from clinic regarding results. Echo test completed over a month ago. Pt states she received vm from Dr. Allan on OPO results. Was unable to \"interpate\" the message. Patient will like to hear back regarding results. Please advise.     3. Patient approves office to leave a detailed voicemail/MyChart message: N\A  "

## 2022-07-25 NOTE — TELEPHONE ENCOUNTER
Phone Number Called: [phone] (home)       Call outcome: Spoke to patient regarding message below.    Message: informed the patient Dr. Allan has not been in clinic. Will be back next week. Can send results to providers in clinic for review. Informed per Dr. Allan's message. OPO mild desaturations but mild clustered suggesting sleep apnea. Per Dr. Allan suggesting sleep referral and sleep study. Referral and testing not yet ordered. Patient states while completing OPO. She wakes up several times a night. Was informed by [facility] she remove device when she wakes. She also does not sleep long periods possibly up to 4-5 hours of sleep. Per pt declined sleep referral and in lab.  Please advise.

## 2022-07-26 NOTE — TELEPHONE ENCOUNTER
Phone Number Called: [phone] (home)     Call outcome: Spoke to patient regarding message below.    Message: I have reviewed echo results as provided by nelda Francis. Pt states she has seen a cardiologist before and reviewed the echo results 2019. Was informed she didn't need to see a cardiologist. Patient would like to know if she should establish with cardio. Pt using her oxygen, and inhalers Breo, Spiriva. She does not feel a difference. She is having to use her albuterol inhaler everyday 4-5 times a day. Patient does not feel improvement with nebulizer treatment. Will wait for Dr. Allan's recommendation when she returns.

## 2022-07-27 NOTE — TELEPHONE ENCOUNTER
ZAFAR Jurado.  You 10 minutes ago (8:17 AM)         Patient does not need to see cardio but she needs to address her low oxygen levels at night that are contributing to her symptoms.    Message text           I have sent message to Dr. Allan for review when she returns.

## 2022-08-03 NOTE — TELEPHONE ENCOUNTER
I spoke to the patient, informed per Dr. Allan as below. Patient would like to hold off on sleep. She is currently unable to walk. Left knee has collapsed. She see's Dr. Sorensen at [facility] orthopedics. Had a knee injection a month ago which had not helped. Unable to have surgery per Dr. Sorensen after 5 months from know due to injection, and immuno compromise. They will be requesting a surgical clearance once they are ready to proceed to schedule surgery.

## 2022-08-03 NOTE — TELEPHONE ENCOUNTER
Mami Allan M.D.  You 3 hours ago (11:19 AM)         I would formally recommend she see sleep medicine before anesthesia    Message text

## 2022-08-03 NOTE — TELEPHONE ENCOUNTER
Mami Allan M.D.  You 2 days ago         Her echo and her OPO are both suggestive of sleep apnea. I am recommending a formal sleep study and referral to sleep medicine. That is the message I left her that she did not understand but I did not have her echo results yet. No need to see cardiology as the echo may be due to untreated sleep apnea    Message text

## 2022-08-10 ENCOUNTER — OFFICE VISIT (OUTPATIENT)
Dept: MEDICAL GROUP | Facility: MEDICAL CENTER | Age: 72
End: 2022-08-10
Payer: MEDICARE

## 2022-08-10 VITALS
DIASTOLIC BLOOD PRESSURE: 64 MMHG | SYSTOLIC BLOOD PRESSURE: 106 MMHG | HEART RATE: 104 BPM | OXYGEN SATURATION: 94 % | HEIGHT: 66 IN | TEMPERATURE: 98.7 F | BODY MASS INDEX: 34.3 KG/M2

## 2022-08-10 DIAGNOSIS — G89.29 CHRONIC BILATERAL LOW BACK PAIN WITHOUT SCIATICA: ICD-10-CM

## 2022-08-10 DIAGNOSIS — M25.562 CHRONIC PAIN OF LEFT KNEE: ICD-10-CM

## 2022-08-10 DIAGNOSIS — M54.50 CHRONIC BILATERAL LOW BACK PAIN WITHOUT SCIATICA: ICD-10-CM

## 2022-08-10 DIAGNOSIS — G89.29 CHRONIC PAIN OF LEFT KNEE: ICD-10-CM

## 2022-08-10 DIAGNOSIS — G47.09 OTHER INSOMNIA: ICD-10-CM

## 2022-08-10 DIAGNOSIS — F33.1 DEPRESSION, MAJOR, RECURRENT, MODERATE (HCC): ICD-10-CM

## 2022-08-10 PROCEDURE — 99214 OFFICE O/P EST MOD 30 MIN: CPT | Performed by: INTERNAL MEDICINE

## 2022-08-10 RX ORDER — HYDROCODONE BITARTRATE AND ACETAMINOPHEN 10; 325 MG/1; MG/1
TABLET ORAL EVERY 6 HOURS PRN
Status: ON HOLD | COMMUNITY
Start: 2022-06-01 | End: 2022-11-15

## 2022-08-10 RX ORDER — OXYCODONE HYDROCHLORIDE 5 MG/1
TABLET ORAL
COMMUNITY
Start: 2022-05-17 | End: 2022-08-10

## 2022-08-10 RX ORDER — ZOLPIDEM TARTRATE 5 MG/1
5 TABLET ORAL NIGHTLY PRN
Qty: 90 TABLET | Refills: 0 | Status: SHIPPED
Start: 2022-08-16 | End: 2022-11-08

## 2022-08-10 RX ORDER — ERGOCALCIFEROL 1.25 MG/1
50000 CAPSULE ORAL
Qty: 14 CAPSULE | Refills: 3 | Status: SHIPPED | OUTPATIENT
Start: 2022-08-10 | End: 2022-11-08 | Stop reason: SDUPTHER

## 2022-08-10 ASSESSMENT — PATIENT HEALTH QUESTIONNAIRE - PHQ9
8. MOVING OR SPEAKING SO SLOWLY THAT OTHER PEOPLE COULD HAVE NOTICED. OR THE OPPOSITE, BEING SO FIGETY OR RESTLESS THAT YOU HAVE BEEN MOVING AROUND A LOT MORE THAN USUAL: MORE THAN HALF THE DAYS
6. FEELING BAD ABOUT YOURSELF - OR THAT YOU ARE A FAILURE OR HAVE LET YOURSELF OR YOUR FAMILY DOWN: MORE THAN HALF THE DAYS
CLINICAL INTERPRETATION OF PHQ2 SCORE: 5
SUM OF ALL RESPONSES TO PHQ9 QUESTIONS 1 AND 2: 5
7. TROUBLE CONCENTRATING ON THINGS, SUCH AS READING THE NEWSPAPER OR WATCHING TELEVISION: NOT AT ALL
5. POOR APPETITE OR OVEREATING: 2 - MORE THAN HALF THE DAYS
2. FEELING DOWN, DEPRESSED, IRRITABLE, OR HOPELESS: MORE THAN HALF THE DAYS
3. TROUBLE FALLING OR STAYING ASLEEP OR SLEEPING TOO MUCH: NEARLY EVERY DAY
SUM OF ALL RESPONSES TO PHQ QUESTIONS 1-9: 16
1. LITTLE INTEREST OR PLEASURE IN DOING THINGS: NEARLY EVERY DAY
5. POOR APPETITE OR OVEREATING: MORE THAN HALF THE DAYS
9. THOUGHTS THAT YOU WOULD BE BETTER OFF DEAD, OR OF HURTING YOURSELF: NOT AT ALL
SUM OF ALL RESPONSES TO PHQ QUESTIONS 1-9: 16
4. FEELING TIRED OR HAVING LITTLE ENERGY: MORE THAN HALF THE DAYS

## 2022-08-10 ASSESSMENT — ENCOUNTER SYMPTOMS
PALPITATIONS: 0
COUGH: 0
SHORTNESS OF BREATH: 1
CHILLS: 0
WEIGHT LOSS: 0
SORE THROAT: 0
ABDOMINAL PAIN: 0
VOMITING: 0
BACK PAIN: 1
DIZZINESS: 0
FEVER: 0
DIARRHEA: 0
NAUSEA: 0

## 2022-08-10 NOTE — PROGRESS NOTES
"Subjective:     Chief Complaint   Patient presents with    Medication Refill     Diagnoses of Chronic pain of left knee, Chronic bilateral low back pain without sciatica, Other insomnia, and Depression, major, recurrent, moderate (HCC) were pertinent to this visit.      HPI: Latonia is a pleasant 71 y.o. female who presents today for 3-month follow-up and zolpidem refill.    Problem   Chronic Pain of Left Knee    This is a chronic problem, patient is working with orthopedic surgeon from [facility] orthopedics.  She was told she may not be a good candidate for total knee replacement.  She has had steroid injection, PRP therapy, she had 40 cc of fluid aspirated from her left knee joint at the last appointment.  She has been taking Norco for her pain.  I offered her referral to pain management and she declines at this time.  She is open to physical therapy and I have referred her to home health.     Other Insomnia    This is a chronic condition, patient has been taking Ambien 5mg tab nightly for many months now.   The medication is helping her to improve her symptoms  She has no adverse effects. She denies alcohol or illegal drug use.  No early refills on controlled substances.  No history of lost or stolen substance prescriptions  Compliant with treatment recommendations and plan: Yes  Any major health change to the patient: No  Concerns for misuse, abuse or addiction: No  /NarxCheck report reviewed: Yes  History of abnormal drug screening: No  Last refill on May 25, 2022 90 tablets for 90-day supply.    Patient expressed frustration, that I am giving her only 90-day supply and she is unable to fill it early because \" I write in the comments up to 90 days\" I have explained to the patient, that this is a controlled substance and will require to use 3 separate prescription of 30-day supply each.  And I am doing her favor giving her 90-day supply at once, for her to avoid additional trips to the pharmacy due to her " "significant back and knee pain.  She was still frustrated and she kept repeating that \"because I write up to 90 days\" on the the pharmacy does not fill it early for her.  I have advised her to clarify this issue with the pharmacy as I have no control of it.           Depression, Major, Recurrent, Moderate (Hcc)    PHQ-9 Screening 8/10/2022 11/24/2021   Little interest or pleasure in doing things 3 - nearly every day 0 - not at all   Feeling down, depressed, or hopeless 2 - more than half the days 0 - not at all   Trouble falling or staying asleep, or sleeping too much 3 - nearly every day -   Feeling tired or having little energy 2 - more than half the days -   Poor appetite or overeating 2 - more than half the days -   Feeling bad about yourself - or that you are a failure or have let yourself or your family down 2 - more than half the days -   Trouble concentrating on things, such as reading the newspaper or watching television 0 - not at all -   Moving or speaking so slowly that other people could have noticed. Or the opposite - being so fidgety or restless that you have been moving around a lot more than usual 2 - more than half the days -   Thoughts that you would be better off dead, or of hurting yourself in some way 0 - not at all -   PHQ-2 Total Score 5 0   PHQ-9 Total Score 16 -       Interpretation of PHQ-9 Total Score   Score Severity   1-4 No Depression   5-9 Mild Depression   10-14 Moderate Depression   15-19 Moderately Severe Depression   20-27 Severe Depression    Patient has no suicidal thoughts or ideations, she is not interested in taking medication for her symptoms at this time.  She will let me know if she decides to proceed with pharmacotherapy or psychotherapy.             Past Medical History:   Diagnosis Date    Anesthesia     \"Abnormal blood pressure and breathing\"  \"couldn't breathe\"    Asthma     inhalers daily    Backpain 7/2017     thor. area    Blood clot in vein 07/06/2018    " "\"Currently have a blood clot in my left eye\"    Bowel habit changes 07/06/2018    Constipation    Breath shortness     with exertion has O2 but does not use    Bronchitis     CAD (coronary artery disease)     palpatations    Cancer (HCC) 2016    left lung    Chickenpox     COPD (chronic obstructive pulmonary disease) (HCC)     Depression 2/26/2019    Dialysis 2005    transient renal failure due to sepsis    Emphysema of lung (HCC)     Glaucoma     right eye    Hemorrhagic disorder (HCC)     Hyperlipidemia     Hypertension     Hyperthyroidism     Kidney stone     bilateral    Lung cancer (HCC) 12/12/2016    Multiple thyroid nodules 7/9/2015    Nasal drainage     Obstruction of left ureteropelvic junction (UPJ) due to stone 6/17/2018    Osteoporosis     Pain 07/06/2018    Back pain    Personal history of venous thrombosis and embolism 2004, 2012    right leg 2012, left arm dvt 2004 left eye 2017    Pneumonia 7/2016    per patient    Renal disorder     had been on dialysis for 7 months for acute failure 2005    Renal stones 2013    post lithotripsy    Rheumatoid arthritis (HCC)     question    Rheumatoid arthritis (HCC)     S/P appendectomy 1998     S/P cholecystectomy 1998    S/P kyphoplasty 2006, 2007, 2013    Sepsis, unspecified 2005    Shortness of breath 07/06/2018    Chronic current problem. \"A couple of years now\".  O2 concentrator at night - pt states she doesn't use it.    Thyroid nodule, hot 2017    functional \"hot\" right thyroid nodule without thyrotoxicosis         Current Outpatient Medications Ordered in Epic   Medication Sig Dispense Refill    ergocalciferol (DRISDOL) 24038 UNIT capsule Take 1 Capsule by mouth every 7 days. 14 Capsule 3    [START ON 8/16/2022] zolpidem (AMBIEN) 5 MG Tab Take 1 Tablet by mouth at bedtime as needed for Sleep for up to 90 days. 90 Tablet 0    HYDROcodone/acetaminophen (NORCO)  MG Tab       rivaroxaban (XARELTO) 20 MG Tab tablet TAKE ONE TABLET BY MOUTH DAILY WITH " DINNER 90 Tablet 1    simvastatin (ZOCOR) 40 MG Tab Take 1 Tablet by mouth every evening. 90 Tablet 1    HYDROcodone-acetaminophen (NORCO) 5-325 MG Tab per tablet       methimazole (TAPAZOLE) 10 MG Tab Take 1 Tablet by mouth every day. 90 Tablet 3    potassium Chloride ER (K-TAB) 20 MEQ Tab CR tablet       ketoconazole (NIZORAL) 2 % shampoo       azithromycin (ZITHROMAX) 250 MG Tab TAKE ONE TABLET BY MOUTH DAILY 30 Tablet 5    losartan (COZAAR) 50 MG Tab TAKE ONE AND ONE-HALF (1 AND 1/2) TABLET BY MOUTH DAILY (COZAAR) (Patient taking differently: Take 25 mg by mouth every day. TAKE ONE AND ONE-HALF (1 AND 1/2) TABLET BY MOUTH DAILY (COZAAR)) 135 Tablet 1    SPIRIVA RESPIMAT 2.5 MCG/ACT Aero Soln INHALE TWO PUFFS BY MOUTH DAILY 4 g 6    albuterol (PROVENTIL) 2.5mg/3ml Nebu Soln solution for nebulization Take 3 mL by nebulization every four hours as needed for Shortness of Breath. 180 Each 1    BREO ELLIPTA 200-25 MCG/INH AEROSOL POWDER, BREATH ACTIVATED INHALE ONE DOSE BY MOUTH DAILY 1 Each 11    albuterol 108 (90 Base) MCG/ACT Aero Soln inhalation aerosol INHALE TWO PUFFS BY MOUTH EVERY 6 HOURS AS NEEDED FOR SHORTNESS OF BREATH 18 g 10    KLOR-CON M20 20 MEQ Tab CR Take 20 mEq by mouth every day.      RESTASIS 0.05 % ophthalmic emulsion Administer 1 Drop into both eyes every day.      colchicine (COLCRYS) 0.6 MG Tab Day 1: 1.2 mg once, followed in 1 hour with a single dose of 0.6 mg.  Day 2 and thereafter: Oral: 0.6 mg once daily until flare resolves 30 Tab 1    cyanocobalamin (VITAMIN B-12) 100 MCG Tab Take 100 mcg by mouth every day.      SODIUM BICARBONATE PO Take 10 g by mouth 2 times a day.      allopurinol (ZYLOPRIM) 100 MG Tab Take 200 mg by mouth every day.      docusate sodium (COLACE) 100 MG Cap Take 300 mg by mouth every evening.      acetaminophen (TYLENOL) 500 MG Tab Take 1,000 mg by mouth every 6 hours as needed for Moderate Pain.      COMBIGAN 0.2-0.5 % Solution Place 1 Drop in both eyes 2 Times a  "Day.       No current Epic-ordered facility-administered medications on file.       Review of Systems   Constitutional:  Positive for malaise/fatigue. Negative for chills, fever and weight loss.   HENT:  Negative for sore throat.    Respiratory:  Positive for shortness of breath. Negative for cough.    Cardiovascular:  Negative for chest pain and palpitations.   Gastrointestinal:  Negative for abdominal pain, diarrhea, nausea and vomiting.   Genitourinary:  Negative for dysuria and urgency.   Musculoskeletal:  Positive for back pain and joint pain.   Neurological:  Negative for dizziness.     Objective:     Exam:  /64 (BP Location: Left arm, Patient Position: Sitting, BP Cuff Size: Adult)   Pulse (!) 104   Temp 37.1 °C (98.7 °F) (Temporal)   Ht 1.676 m (5' 6\")   LMP  (LMP Unknown)   SpO2 94%   BMI 34.30 kg/m²  Body mass index is 34.3 kg/m².    Physical Exam  Constitutional:       General: She is not in acute distress.     Appearance: She is not ill-appearing, toxic-appearing or diaphoretic.   HENT:      Head: Normocephalic and atraumatic.      Mouth/Throat:      Mouth: Mucous membranes are moist.      Pharynx: Oropharynx is clear. No oropharyngeal exudate or posterior oropharyngeal erythema.   Eyes:      General: No scleral icterus.  Cardiovascular:      Rate and Rhythm: Normal rate and regular rhythm.      Pulses: Normal pulses.      Heart sounds: Normal heart sounds.   Pulmonary:      Effort: Pulmonary effort is normal. No respiratory distress.      Breath sounds: Normal breath sounds.   Musculoskeletal:      Thoracic back: Decreased range of motion.      Lumbar back: Decreased range of motion.      Right knee: Normal.      Left knee: Swelling and effusion present. No erythema or ecchymosis.   Skin:     General: Skin is warm and dry.   Neurological:      Mental Status: She is alert.      Motor: Weakness (BL LE 3/5) present.      Gait: Gait abnormal (walker).   Psychiatric:         Mood and Affect: Mood " normal.         Behavior: Behavior normal.     Assessment & Plan:   Latonia  is a pleasant 71 y.o. female with the following -     Problem List Items Addressed This Visit       Chronic pain of left knee (Chronic)     This is a chronic problem, chronic progressive pain and swelling of the left knee.  Working closely with orthopedic surgeon from [facility] orthopedic group.  She was told she may not be a good candidate for total knee replacement.  She has received steroid and PRP injections with no symptomatic provement of her pain.  She has been prescribed Norco for her pain.   - Follow-up with Ortho   - Refer to home health for physical therapy and acupuncture         Relevant Medications    HYDROcodone/acetaminophen (NORCO)  MG Tab    Other Relevant Orders    Referral to Home Health    Referral for Acupuncture    Chronic bilateral low back pain without sciatica    Relevant Medications    HYDROcodone/acetaminophen (NORCO)  MG Tab    Other Relevant Orders    Referral to Home Health    Referral for Acupuncture    Depression, major, recurrent, moderate (HCC)     This is a recurrent problem, patient is currently not on any treatment.  She is not interested in pharmacotherapy at this time.  She will let me know if she changes her mind.         Other insomnia     This is a chronic problem, controlled with zolpidem 5 mg tablet as needed.  See HPI for details.  90-day supply was sent to patient's pharmacy.         Relevant Medications    zolpidem (AMBIEN) 5 MG Tab (Start on 8/16/2022)       Return in about 3 months (around 11/10/2022), or if symptoms worsen or fail to improve.    Please note that this dictation was created using voice recognition software. I have made every reasonable attempt to correct obvious errors, but I expect that there are errors of grammar and possibly content that I did not discover before finalizing the note.

## 2022-08-11 NOTE — ASSESSMENT & PLAN NOTE
This is a chronic problem, controlled with zolpidem 5 mg tablet as needed.  See HPI for details.  90-day supply was sent to patient's pharmacy.

## 2022-08-11 NOTE — ASSESSMENT & PLAN NOTE
This is a chronic problem, chronic progressive pain and swelling of the left knee.  Working closely with orthopedic surgeon from [facility] orthopedic group.  She was told she may not be a good candidate for total knee replacement.  She has received steroid and PRP injections with no symptomatic provement of her pain.  She has been prescribed Norco for her pain.   - Follow-up with Ortho   - Refer to home health for physical therapy and acupuncture

## 2022-08-11 NOTE — ASSESSMENT & PLAN NOTE
This is a recurrent problem, patient is currently not on any treatment.  She is not interested in pharmacotherapy at this time.  She will let me know if she changes her mind.

## 2022-08-31 NOTE — PROGRESS NOTES
Charting reviewed with Natalia and corrections made as needed.   This RN agreeable with noted documentation.    no

## 2022-09-02 ENCOUNTER — HOSPITAL ENCOUNTER (OUTPATIENT)
Dept: LAB | Facility: MEDICAL CENTER | Age: 72
End: 2022-09-02
Attending: INTERNAL MEDICINE
Payer: MEDICARE

## 2022-09-02 DIAGNOSIS — E05.90 HYPERTHYROIDISM: ICD-10-CM

## 2022-09-02 LAB
T3FREE SERPL-MCNC: 2.88 PG/ML (ref 2–4.4)
T4 FREE SERPL-MCNC: 0.91 NG/DL (ref 0.93–1.7)
TSH SERPL DL<=0.005 MIU/L-ACNC: 1.39 UIU/ML (ref 0.38–5.33)

## 2022-09-02 PROCEDURE — 36415 COLL VENOUS BLD VENIPUNCTURE: CPT

## 2022-09-02 PROCEDURE — 84439 ASSAY OF FREE THYROXINE: CPT

## 2022-09-02 PROCEDURE — 84481 FREE ASSAY (FT-3): CPT

## 2022-09-02 PROCEDURE — 84443 ASSAY THYROID STIM HORMONE: CPT

## 2022-09-07 ENCOUNTER — TELEPHONE (OUTPATIENT)
Dept: MEDICAL GROUP | Facility: MEDICAL CENTER | Age: 72
End: 2022-09-07
Payer: MEDICARE

## 2022-09-08 NOTE — TELEPHONE ENCOUNTER
[facility] [phone]    Contacted office for order 3 in one commode chair DME

## 2022-09-09 ENCOUNTER — TELEPHONE (OUTPATIENT)
Dept: ENDOCRINOLOGY | Facility: MEDICAL CENTER | Age: 72
End: 2022-09-09
Payer: MEDICARE

## 2022-09-09 DIAGNOSIS — E05.90 HYPERTHYROIDISM: ICD-10-CM

## 2022-09-09 DIAGNOSIS — R06.02 SOB (SHORTNESS OF BREATH): ICD-10-CM

## 2022-09-09 DIAGNOSIS — J44.9 CHRONIC OBSTRUCTIVE PULMONARY DISEASE, UNSPECIFIED COPD TYPE (HCC): ICD-10-CM

## 2022-09-09 RX ORDER — TIOTROPIUM BROMIDE INHALATION SPRAY 3.12 UG/1
SPRAY, METERED RESPIRATORY (INHALATION)
Qty: 1 EACH | Refills: 5 | Status: SHIPPED
Start: 2022-09-09 | End: 2023-01-01

## 2022-09-09 RX ORDER — AZITHROMYCIN 250 MG/1
250 TABLET, FILM COATED ORAL DAILY
Qty: 30 TABLET | Refills: 5 | Status: ON HOLD | OUTPATIENT
Start: 2022-09-09 | End: 2023-01-01

## 2022-09-09 RX ORDER — ALBUTEROL SULFATE 90 UG/1
2 AEROSOL, METERED RESPIRATORY (INHALATION) EVERY 6 HOURS PRN
Qty: 18 G | Refills: 11 | Status: SHIPPED
Start: 2022-09-09 | End: 2023-01-01

## 2022-09-09 RX ORDER — METHIMAZOLE 5 MG/1
5 TABLET ORAL DAILY
Qty: 90 TABLET | Refills: 1 | Status: SHIPPED | OUTPATIENT
Start: 2022-09-09 | End: 2023-01-01

## 2022-09-09 NOTE — TELEPHONE ENCOUNTER
Phone Number Called: [phone] (home)       Call outcome: Spoke to patient regarding message below.    Message: informed all three RX were sent. Patient aware.

## 2022-09-09 NOTE — TELEPHONE ENCOUNTER
VOICEMAIL  1. Caller Name: patient                       Call Back Number: [phone] (home)       2. Message: patient requesting RX for azithromycin.     3. Patient approves office to leave a detailed voicemail/MyChart message: N\A      Phone Number Called: [phone] (home)       Call outcome: Spoke to patient regarding message below.    Message: patient states she spoke to pharmacy and had been informed request was sent. I informed patient I had not received any request electronically. Will send request to Dr. Allan to send right away. Patient has been out of medications for weeks. Also RX for albuterol and spiriva.       Have we ever prescribed this med? Yes.  If yes, what date? 2/8/22, spiriva 12/17/21, albuterol 12/8/21    Last OV: 6/2/22 Dr. Allan     Next OV: 10/20/22 Dr. Allan     DX: COPD     Medications:   Requested Prescriptions     Pending Prescriptions Disp Refills    azithromycin (ZITHROMAX) 250 MG Tab 30 Tablet 5     Sig: Take 1 Tablet by mouth every day.    tiotropium (SPIRIVA RESPIMAT) 2.5 mcg/Act Aero Soln 1 Each 5     Sig: INHALE TWO PUFFS BY MOUTH DAILY    albuterol 108 (90 Base) MCG/ACT Aero Soln inhalation aerosol 18 g 11     Sig: Inhale 2 Puffs every 6 hours as needed for Shortness of Breath.

## 2022-09-09 NOTE — TELEPHONE ENCOUNTER
Completed labs on 9/2/22, was advised there may be adjustments on the current medication depending on the labs. Please advise.     Thank you,

## 2022-09-16 ENCOUNTER — APPOINTMENT (OUTPATIENT)
Dept: RADIOLOGY | Facility: MEDICAL CENTER | Age: 72
End: 2022-09-16
Attending: INTERNAL MEDICINE
Payer: MEDICARE

## 2022-09-22 ENCOUNTER — HOSPITAL ENCOUNTER (OUTPATIENT)
Dept: RADIOLOGY | Facility: MEDICAL CENTER | Age: 72
End: 2022-09-22
Attending: INTERNAL MEDICINE
Payer: MEDICARE

## 2022-09-22 DIAGNOSIS — R91.8 PULMONARY NODULES: ICD-10-CM

## 2022-09-22 PROCEDURE — 71250 CT THORAX DX C-: CPT

## 2022-09-26 DIAGNOSIS — R91.8 PULMONARY NODULES: ICD-10-CM

## 2022-09-26 NOTE — PROGRESS NOTES
Spoke with pt regarding increase in size of both lingular nodule now 14X15 mm and RLL nodule 8mm x10 mm. I am recommending bronchoscopy with biopsy using ion of lingular nodule and possibly the RLL nodule. Pt is agreeable to this. She is on xarelto for hx of VTE which can be stopped temporarily.

## 2022-10-05 ENCOUNTER — HOSPITAL ENCOUNTER (OUTPATIENT)
Dept: LAB | Facility: MEDICAL CENTER | Age: 72
End: 2022-10-05
Attending: INTERNAL MEDICINE
Payer: MEDICARE

## 2022-10-05 ENCOUNTER — PRE-ADMISSION TESTING (OUTPATIENT)
Dept: ADMISSIONS | Facility: MEDICAL CENTER | Age: 72
End: 2022-10-05
Attending: INTERNAL MEDICINE
Payer: MEDICARE

## 2022-10-05 DIAGNOSIS — Z01.812 PRE-OPERATIVE LABORATORY EXAMINATION: ICD-10-CM

## 2022-10-05 DIAGNOSIS — E05.90 HYPERTHYROIDISM: ICD-10-CM

## 2022-10-05 LAB
ALBUMIN SERPL BCP-MCNC: 3.6 G/DL (ref 3.2–4.9)
ALBUMIN/GLOB SERPL: 1.6 G/DL
ALP SERPL-CCNC: 97 U/L (ref 30–99)
ALT SERPL-CCNC: 21 U/L (ref 2–50)
ANION GAP SERPL CALC-SCNC: 12 MMOL/L (ref 7–16)
AST SERPL-CCNC: 33 U/L (ref 12–45)
BILIRUB SERPL-MCNC: 0.5 MG/DL (ref 0.1–1.5)
BUN SERPL-MCNC: 17 MG/DL (ref 8–22)
CALCIUM SERPL-MCNC: 8.7 MG/DL (ref 8.4–10.2)
CHLORIDE SERPL-SCNC: 106 MMOL/L (ref 96–112)
CO2 SERPL-SCNC: 21 MMOL/L (ref 20–33)
CREAT SERPL-MCNC: 1.51 MG/DL (ref 0.5–1.4)
ERYTHROCYTE [DISTWIDTH] IN BLOOD BY AUTOMATED COUNT: 56.7 FL (ref 35.9–50)
GFR SERPLBLD CREATININE-BSD FMLA CKD-EPI: 36 ML/MIN/1.73 M 2
GLOBULIN SER CALC-MCNC: 2.2 G/DL (ref 1.9–3.5)
GLUCOSE SERPL-MCNC: 104 MG/DL (ref 65–99)
HCT VFR BLD AUTO: 42.4 % (ref 37–47)
HGB BLD-MCNC: 13.9 G/DL (ref 12–16)
MCH RBC QN AUTO: 34.5 PG (ref 27–33)
MCHC RBC AUTO-ENTMCNC: 32.8 G/DL (ref 33.6–35)
MCV RBC AUTO: 105.2 FL (ref 81.4–97.8)
PLATELET # BLD AUTO: 219 K/UL (ref 164–446)
PMV BLD AUTO: 12.2 FL (ref 9–12.9)
POTASSIUM SERPL-SCNC: 4.9 MMOL/L (ref 3.6–5.5)
PROT SERPL-MCNC: 5.8 G/DL (ref 6–8.2)
RBC # BLD AUTO: 4.03 M/UL (ref 4.2–5.4)
SODIUM SERPL-SCNC: 139 MMOL/L (ref 135–145)
WBC # BLD AUTO: 6.1 K/UL (ref 4.8–10.8)

## 2022-10-05 PROCEDURE — 36415 COLL VENOUS BLD VENIPUNCTURE: CPT

## 2022-10-05 PROCEDURE — 84481 FREE ASSAY (FT-3): CPT

## 2022-10-05 PROCEDURE — 84443 ASSAY THYROID STIM HORMONE: CPT

## 2022-10-05 PROCEDURE — 85027 COMPLETE CBC AUTOMATED: CPT

## 2022-10-05 PROCEDURE — 80053 COMPREHEN METABOLIC PANEL: CPT

## 2022-10-05 PROCEDURE — 93005 ELECTROCARDIOGRAM TRACING: CPT

## 2022-10-05 PROCEDURE — 84439 ASSAY OF FREE THYROXINE: CPT

## 2022-10-05 RX ORDER — ONDANSETRON 4 MG/1
4 TABLET, ORALLY DISINTEGRATING ORAL EVERY 6 HOURS PRN
COMMUNITY
End: 2022-11-08 | Stop reason: SDUPTHER

## 2022-10-05 RX ORDER — ONDANSETRON 4 MG/1
TABLET, FILM COATED ORAL
COMMUNITY
Start: 2022-07-16 | End: 2022-10-10

## 2022-10-05 ASSESSMENT — FIBROSIS 4 INDEX: FIB4 SCORE: 2.07

## 2022-10-05 NOTE — FLOWSHEET NOTE
02/23/19 1848   Events/Summary/Plan   Events/Summary/Plan Tx given   Non-Invasive Resp Device Site Inspection Completed Intact   Interdisciplinary Plan of Care-Goals (Indications)   Bronchodilator Indications History / Diagnosis   Interdisciplinary Plan of Care-Outcomes    Bronchodilator Outcome Patient at Stable Baseline   Education   Education Yes - Pt. / Family has been Instructed in use of Respiratory Medications and Adverse Reactions;Yes - Pt. / Family has been Instructed in use of Respiratory Equipment   RT Assessment of Delivered Medications   Evaluation of Medication Delivery Daily Yes-- Pt /Family has been Instructed in use of Respiratory Medications and Adverse Reactions   SVN Group   #SVN Performed Yes   Given By: Mouthpiece   MDI/DPI Group   #MDI/DPI Given MDI/DPI x 1   Chest Exam   Respiration 18   Pulse (!) 114   Breath Sounds   RUL Breath Sounds Clear;Diminished   RML Breath Sounds Diminished   RLL Breath Sounds Diminished   AUSTIN Breath Sounds Clear;Diminished   LLL Breath Sounds Diminished   Oximetry   #Pulse Oximetry (Single Determination) Yes   Oxygen   Pulse Oximetry 94 %   O2 (LPM) 2   O2 Daily Delivery Respiratory  Silicone Nasal Cannula      No

## 2022-10-05 NOTE — PREPROCEDURE INSTRUCTIONS
Pre-admit appointment completed. Pt states all instructions given are understood. Medications the patient will take the morning of surgery per anesthesia protocol: Zithromax, Breo Ellipta, Combigan, Sodium Bicarbonate, Spiriva, and if needed, Tylenol, albuterol, and zofran.         Pt advised to seek instruction of when to stop xarelto from Dr. Apple's office.

## 2022-10-06 LAB
EKG IMPRESSION: NORMAL
T3FREE SERPL-MCNC: 3.73 PG/ML (ref 2–4.4)
T4 FREE SERPL-MCNC: 1.28 NG/DL (ref 0.93–1.7)
TSH SERPL DL<=0.005 MIU/L-ACNC: 0.48 UIU/ML (ref 0.38–5.33)

## 2022-10-06 PROCEDURE — 93010 ELECTROCARDIOGRAM REPORT: CPT | Performed by: INTERNAL MEDICINE

## 2022-10-07 ENCOUNTER — TELEPHONE (OUTPATIENT)
Dept: VASCULAR LAB | Facility: MEDICAL CENTER | Age: 72
End: 2022-10-07
Payer: MEDICARE

## 2022-10-07 NOTE — TELEPHONE ENCOUNTER
Contacted by patient. States she is having bronchoscopy 10/11/22. Reviewed pulmonology notes and most recent labs. Instructed patient to hold Xarelto 48 hours prior to procedure. Starting 10/9. May resume night of procedure or as directed by provider.     Last anticoagulation visit 5/2022. Patient due for DOAC f/u in ~3 months (not scheduled). Most recent labs 10/5/22.     Nuno KeyesD

## 2022-10-10 ENCOUNTER — OFFICE VISIT (OUTPATIENT)
Dept: ENDOCRINOLOGY | Facility: MEDICAL CENTER | Age: 72
End: 2022-10-10
Payer: MEDICARE

## 2022-10-10 VITALS
BODY MASS INDEX: 35 KG/M2 | DIASTOLIC BLOOD PRESSURE: 90 MMHG | OXYGEN SATURATION: 90 % | SYSTOLIC BLOOD PRESSURE: 114 MMHG | HEIGHT: 64 IN | WEIGHT: 205 LBS | HEART RATE: 96 BPM

## 2022-10-10 DIAGNOSIS — M81.0 POSTMENOPAUSAL OSTEOPOROSIS: ICD-10-CM

## 2022-10-10 DIAGNOSIS — E05.90 HYPERTHYROIDISM: ICD-10-CM

## 2022-10-10 DIAGNOSIS — E66.9 OBESITY (BMI 35.0-39.9 WITHOUT COMORBIDITY): ICD-10-CM

## 2022-10-10 DIAGNOSIS — E55.9 VITAMIN D DEFICIENCY: ICD-10-CM

## 2022-10-10 DIAGNOSIS — N18.32 STAGE 3B CHRONIC KIDNEY DISEASE: ICD-10-CM

## 2022-10-10 DIAGNOSIS — E21.3 HYPERPARATHYROIDISM (HCC): ICD-10-CM

## 2022-10-10 PROCEDURE — 99214 OFFICE O/P EST MOD 30 MIN: CPT

## 2022-10-10 PROCEDURE — 99212 OFFICE O/P EST SF 10 MIN: CPT

## 2022-10-10 ASSESSMENT — FIBROSIS 4 INDEX: FIB4 SCORE: 2.4

## 2022-10-10 NOTE — PROGRESS NOTES
Chief Complaint: Follow-up on the following conditions  HPI:   Latonia Clinton is a 71 y.o. female    1.  Hyperthyroidism:   history of hyperthyroidism diagnosed in 2015.        Currently taking methimazole 5 mg daily.     She reports fatigue, weight gain, losing hair -currently being evaluated for possible lung cancer   She denies feeling cold and cold intolerance, constipation, palpitations, swelling, feeling slow, losing hair, anxiousness, feeling excessive energy, tremulousness, sweating and weight loss.       Latest Reference Range & Units 10/5/22 16:36   TSH 0.380 - 5.330 uIU/mL 0.480   Free T-4 0.93 - 1.70 ng/dL 1.28   T3,Free 2.00 - 4.40 pg/mL 3.73      Ref. Range 3/30/2022 15:46   TSH Latest Ref Range: 0.380 - 5.330 uIU/mL 0.460   Free T-4 Latest Ref Range: 0.93 - 1.70 ng/dL 1.16   T3,Free Latest Ref Range: 2.00 - 4.40 pg/mL 3.63     She denies any eye pain, Gritty eye feeling, eye swelling.   She sees ophthalmology for glaucoma and retinopathy. She has appt next week.     2.  Postmenopausal osteoporosis:  Currently taking Prolia every 6 months  Last Prolia injection was Ambar 10, 2022, she will be having dental extractions -evaluation with dentist soon  Currently taking Vitamin D3 82660 weekly.  She is currently not taking any calcium  Reports no eating good amounts of vegetables    DEXA scan on 5/31/2019 showed a L femur T score at -3.8 and a L femur T score at -2.8  DEXA scan on 5/26/2017 showed a lumbar T score of -2.6, on the left femur T score of -2.4      Denies history of heart attacks  Reports history of DVT    3.  Chronic kidney disease stage IIIb:  This is followed by nephrologist-Dr. Banks    Latest Reference Range & Units 10/5/22 16:02   GFR (CKD-EPI) >60 mL/min/1.73 m 2 36 !     4.  Hyperparathyroidism:  FV by nephrology  She is currently on dialysis for chronic kidney failure stage IIIb   Latest Reference Range & Units 4/28/22 16:27   Pth, Intact 14.0 - 72.0 pg/mL 252.0 (H)       5.  Vitamin D deficiency:  Currently taking vitamin D3 75743KOq daily    Ref. Range 1/6/2022 14:02   25-Hydroxy   Vitamin D 25 Latest Ref Range: 30 - 100 ng/mL 68     6.  Obesity (BMI 35.0-39.9 without comorbidity):  Lystyle modifications  Limited mobility-currently walking using walker, being evaluated for possible lung cancer        Patient's medications, allergies, and social histories were reviewed and updated as appropriate.      ROS:     CONS:     No fever, no chills, no weight loss, no fatigue   EYES:      No diplopia, no blurry vision, no redness of eyes, no swelling of eyelids   ENT:    No hearing loss, No ear pain, No sore throat, no dysphagia, no neck swelling   CV:     No chest pain, no palpitations, no claudication, no orthopnea, no PND   PULM:    No SOB, no cough, no hemoptysis, no wheezing    GI:   No nausea, no vomiting, no diarrhea, no constipation, no bloody stools   :  Passing urine well, no dysuria, no hematuria   ENDO:   No polyuria, no polydipsia, no heat intolerance, no cold intolerance   NEURO: No headaches, no dizziness, no convulsions, no tremors   MUSC:  No joint swellings, no arthralgias, no myalgias, no weakness   SKIN:   No rash, no ulcers, no dry skin   PSYCH:   No depression, no anxiety, no difficulty sleeping       Past Medical History:  Patient Active Problem List    Diagnosis Date Noted    Chronic pain of left knee 08/10/2022    Other fatigue 05/12/2022    Chronic anticoagulation 05/12/2022    Preventative health care 05/11/2022    Chronic bilateral low back pain without sciatica 02/16/2022    Postmenopausal osteoporosis 03/15/2021    Central retinal vein occlusion 12/19/2019    Hyperparathyroidism (HCC) 11/13/2019    Gout of foot 04/29/2019    Other specified glaucoma 04/29/2019    Other insomnia 04/29/2019    Right ventricular dilation 04/24/2019    Depression, major, recurrent, moderate (HCC) 02/26/2019    Macrocytic anemia 02/12/2019    Closed compression fracture of second  lumbar vertebra (HCC) 01/28/2019    Closed wedge compression fracture of first lumbar vertebra (HCC) 01/24/2019    Closed T11 fracture ([facility]) 06/17/2018    Lung cancer (HCC) 12/12/2016    Deep vein thrombosis (DVT) ([facility]) 07/28/2016    Thyrotoxicosis with toxic single thyroid nodule and without thyroid storm 07/25/2016    Chronic obstructive pulmonary disease (HCC) 07/24/2016    CKD (chronic kidney disease) stage 3, GFR 30-59 ml/min ([facility]) 07/24/2016    Hyperlipidemia     Hypertension        Past Surgical History:  Past Surgical History:   Procedure Laterality Date    CYSTOSCOPY STENT PLACEMENT  7/9/2018    Procedure: Cystoscopy,  Left removal of stent ,  Left Stent Placement;  Surgeon: Beck Yang M.D.;  Location: [facility];  Service: Urology    URETEROSCOPY Left 7/9/2018    Procedure: URETEROSCOPY;  Surgeon: Beck Yang M.D.;  Location: [facility];  Service: Urology    LASERTRIPSY Left 7/9/2018    Procedure: LASERTRIPSY-LITHO;  Surgeon: Beck Yang M.D.;  Location: [facility];  Service: Urology    CYSTOSCOPY STENT PLACEMENT Left 6/18/2018    Procedure: CYSTOSCOPY STENT PLACEMENT;  Surgeon: Beck Yang M.D.;  Location: [facility];  Service: Urology    LITHOTRIPSY Left 6/18/2018    Procedure: LITHOTRIPSY;  Surgeon: Beck Yang M.D.;  Location: [facility];  Service: Urology    LASERTRIPSY Left 6/18/2018    Procedure: LASERTRIPSY;  Surgeon: Beck Yang M.D.;  Location: [facility];  Service: Urology    URETEROSCOPY Left 6/18/2018    Procedure: URETEROSCOPY;  Surgeon: Beck Yang M.D.;  Location: [facility];  Service: Urology    THORACOSCOPY Left 12/12/2016    Procedure: THORACOSCOPY W/WEDGE RESECTION UPPER LOBE MASS;  Surgeon: John H Ganser, M.D.;  Location: [facility];  Service:     OTHER ORTHOPEDIC SURGERY  2013    kyphoplasty X3    APPENDECTOMY      1990    CHOLECYSTECTOMY      1990     LAMINOTOMY      LUNG BIOPSY OPEN      OTHER      OTHER ABDOMINAL SURGERY      gall bladder disease    MO BREAST AUGMENTATION WITH IMPLANT      MO BREAST REDUCTION          Allergies:  Patient has no known allergies.     Current Medications:    Current Outpatient Medications:     ondansetron (ZOFRAN ODT) 4 MG TABLET DISPERSIBLE, Take 4 mg by mouth every 6 hours as needed for Nausea., Disp: , Rfl:     BIOTIN PO, Take  by mouth every day., Disp: , Rfl:     B Complex Vitamins (VITAMIN B COMPLEX PO), Take  by mouth every day., Disp: , Rfl:     ondansetron (ZOFRAN) 4 MG Tab tablet, , Disp: , Rfl:     azithromycin (ZITHROMAX) 250 MG Tab, Take 1 Tablet by mouth every day., Disp: 30 Tablet, Rfl: 5    tiotropium (SPIRIVA RESPIMAT) 2.5 mcg/Act Aero Soln, INHALE TWO PUFFS BY MOUTH DAILY, Disp: 1 Each, Rfl: 5    albuterol 108 (90 Base) MCG/ACT Aero Soln inhalation aerosol, Inhale 2 Puffs every 6 hours as needed for Shortness of Breath., Disp: 18 g, Rfl: 11    methimazole (TAPAZOLE) 5 MG Tab, Take 1 Tablet by mouth every day., Disp: 90 Tablet, Rfl: 1    ergocalciferol (DRISDOL) 18243 UNIT capsule, Take 1 Capsule by mouth every 7 days., Disp: 14 Capsule, Rfl: 3    zolpidem (AMBIEN) 5 MG Tab, Take 1 Tablet by mouth at bedtime as needed for Sleep for up to 90 days., Disp: 90 Tablet, Rfl: 0    HYDROcodone/acetaminophen (NORCO)  MG Tab, every 6 hours as needed. Currently out, Disp: , Rfl:     rivaroxaban (XARELTO) 20 MG Tab tablet, TAKE ONE TABLET BY MOUTH DAILY WITH DINNER, Disp: 90 Tablet, Rfl: 1    simvastatin (ZOCOR) 40 MG Tab, Take 1 Tablet by mouth every evening., Disp: 90 Tablet, Rfl: 1    ketoconazole (NIZORAL) 2 % shampoo, , Disp: , Rfl:     losartan (COZAAR) 50 MG Tab, TAKE ONE AND ONE-HALF (1 AND 1/2) TABLET BY MOUTH DAILY (COZAAR) (Patient taking differently: Take 25 mg by mouth every day. TAKE ONE AND ONE-HALF (1 AND 1/2) TABLET BY MOUTH DAILY (COZAAR)), Disp: 135 Tablet, Rfl: 1    albuterol (PROVENTIL) 2.5mg/3ml  Nebu Soln solution for nebulization, Take 3 mL by nebulization every four hours as needed for Shortness of Breath., Disp: 180 Each, Rfl: 1    BREO ELLIPTA 200-25 MCG/INH AEROSOL POWDER, BREATH ACTIVATED, INHALE ONE DOSE BY MOUTH DAILY, Disp: 1 Each, Rfl: 11    KLOR-CON M20 20 MEQ Tab CR, Take 20 mEq by mouth every day., Disp: , Rfl:     RESTASIS 0.05 % ophthalmic emulsion, Administer 1 Drop into both eyes every day., Disp: , Rfl:     colchicine (COLCRYS) 0.6 MG Tab, Day 1: 1.2 mg once, followed in 1 hour with a single dose of 0.6 mg.  Day 2 and thereafter: Oral: 0.6 mg once daily until flare resolves (Patient not taking: Reported on 10/5/2022), Disp: 30 Tab, Rfl: 1    cyanocobalamin (VITAMIN B-12) 100 MCG Tab, Take 100 mcg by mouth every day., Disp: , Rfl:     SODIUM BICARBONATE PO, Take 10 g by mouth 2 times a day., Disp: , Rfl:     allopurinol (ZYLOPRIM) 100 MG Tab, Take 200 mg by mouth every day., Disp: , Rfl:     docusate sodium (COLACE) 100 MG Cap, Take 300 mg by mouth every evening., Disp: , Rfl:     acetaminophen (TYLENOL) 500 MG Tab, Take 1,000 mg by mouth every 6 hours as needed for Moderate Pain., Disp: , Rfl:     COMBIGAN 0.2-0.5 % Solution, Place 1 Drop in both eyes 2 Times a Day., Disp: , Rfl:     Social History:  Social History     Socioeconomic History    Marital status: Single     Spouse name: Not on file    Number of children: Not on file    Years of education: Not on file    Highest education level: Not on file   Occupational History    Not on file   Tobacco Use    Smoking status: Former     Packs/day: 1.00     Years: 30.00     Pack years: 30.00     Types: Cigarettes     Quit date: 2000     Years since quittin.7    Smokeless tobacco: Never    Tobacco comments:     7 years ago   Vaping Use    Vaping Use: Never used   Substance and Sexual Activity    Alcohol use: Yes     Alcohol/week: 0.0 oz     Comment: x2 a week    Drug use: No    Sexual activity: Not on file   Other Topics Concern    Not  "on file   Social History Narrative    Not on file     Social Determinants of Health     Financial Resource Strain: Not on file   Food Insecurity: Not on file   Transportation Needs: Not on file   Physical Activity: Not on file   Stress: Not on file   Social Connections: Not on file   Intimate Partner Violence: Not on file   Housing Stability: Not on file        Family History:   Family History   Problem Relation Age of Onset    Cancer Mother     Heart Failure Father     Heart Disease Brother          PHYSICAL EXAM:   Vital signs: BP (!) 114/90   Pulse 96   Ht 1.626 m (5' 4\")   Wt 93 kg (205 lb)   LMP  (LMP Unknown)   SpO2 90%   BMI 35.19 kg/m²   GENERAL: Well-developed, well-nourished  in no apparent distress.   EYE: No ocular and eyelid asymmetry, Anicteric sclerae,  PERRL, No exophthalmos or lidlag  HENT: Hearing grossly intact, Normocephalic, atraumatic.   NECK: Supple. Trachea midline. thyroid is normal in size without nodules or tenderness  CARDIOVASCULAR: Regular rate and rhythm. No murmurs, rubs, or gallops.   LUNGS: Clear to auscultation bilaterally   EXTREMITIES: No clubbing, cyanosis, or edema.   NEUROLOGICAL: No visible tremor with both outstretched hands  LYMPH: No cervical, supraclavicular,  adenopathy palpated.   SKIN: No rashes, lesions. Turgor is normal.    Labs:  Lab Results   Component Value Date/Time    TSHULTRASEN 0.460 03/30/2022 1546     Lab Results   Component Value Date/Time    FREET4 1.16 03/30/2022 1546     Lab Results   Component Value Date/Time    FREET3 3.63 03/30/2022 1546     Lab Results   Component Value Date/Time    THYSTIMIG 90 08/27/2019 1521     Imaging:  ASSESSMENT/PLAN:   1. Hyperthyroidism  Clinically unstable-possibly related to underlying evaluation for possible lung cancer  Biochemically stable  We will continue same dose of methimazole 5 mg daily  - TSH; Future  - FREE THYROXINE; Future  - T3 FREE; Future  - Comp Metabolic Panel; Future  - TSH; Standing  - FREE " THYROXINE; Standing  - T3 FREE; Standing      Patient is concerned that she might not be able to come to the office for appointments in the future due to health related issues, she is also not able to do virtual visits-we discussed to keep in contact with the office and I may help her set up home health for continued care of her hyperthyroidism if needed    2. Postmenopausal osteoporosis  Unstable-new diagnosis #3  She is agreeable to continue treatment with Prolia-patient will be having dental extractions soon, she does not have an appointment yet  Extensive discussion about osteonecrosis and Prolia, educated not to take her Prolia 1 month and 3 months after her dental extraction appointment-patient verbalized understanding, stating dentist is aware that she is taking Prolia    - Comp Metabolic Panel; Future  - VITAMIN D,25 HYDROXY (DEFICIENCY); Future    3. Stage 3b chronic kidney disease (HCC)  Unstable  Followed by nephrologist    4. Hyperparathyroidism (HCC)  Unstable-related to diagnoses #3  Following nephrologist      5. Vitamin D deficiency  Stable  Continue regimen-see HPI    6. Obesity (BMI 35.0-39.9 without comorbidity)  Unstable  Lifestyle modifications discussed-however patient has limited mobility, she uses walker for mobilization    Disposition: Follow up in 6 months   Do thyroid blood work in 3 months      Thank you kindly for allowing me to participate in the thyroid care plan for this patient.    Anthony Maynard, JOSE [email or web address].  10/10/2022    CC:   Liat Emerson M.D.

## 2022-10-11 ENCOUNTER — APPOINTMENT (OUTPATIENT)
Dept: RADIOLOGY | Facility: MEDICAL CENTER | Age: 72
End: 2022-10-11
Attending: INTERNAL MEDICINE
Payer: MEDICARE

## 2022-10-11 ENCOUNTER — HOSPITAL ENCOUNTER (OUTPATIENT)
Facility: MEDICAL CENTER | Age: 72
End: 2022-10-11
Attending: INTERNAL MEDICINE | Admitting: INTERNAL MEDICINE
Payer: MEDICARE

## 2022-10-11 ENCOUNTER — ANESTHESIA EVENT (OUTPATIENT)
Dept: SURGERY | Facility: MEDICAL CENTER | Age: 72
End: 2022-10-11
Payer: MEDICARE

## 2022-10-11 ENCOUNTER — ANESTHESIA (OUTPATIENT)
Dept: SURGERY | Facility: MEDICAL CENTER | Age: 72
End: 2022-10-11
Payer: MEDICARE

## 2022-10-11 VITALS
HEIGHT: 64 IN | HEART RATE: 99 BPM | TEMPERATURE: 97 F | BODY MASS INDEX: 35.53 KG/M2 | WEIGHT: 208.11 LBS | OXYGEN SATURATION: 94 % | RESPIRATION RATE: 16 BRPM | SYSTOLIC BLOOD PRESSURE: 145 MMHG | DIASTOLIC BLOOD PRESSURE: 92 MMHG

## 2022-10-11 DIAGNOSIS — R91.8 PULMONARY NODULES: ICD-10-CM

## 2022-10-11 PROCEDURE — 700102 HCHG RX REV CODE 250 W/ 637 OVERRIDE(OP): Performed by: ANESTHESIOLOGY

## 2022-10-11 PROCEDURE — 700105 HCHG RX REV CODE 258: Performed by: INTERNAL MEDICINE

## 2022-10-11 PROCEDURE — 160002 HCHG RECOVERY MINUTES (STAT): Performed by: INTERNAL MEDICINE

## 2022-10-11 PROCEDURE — 160046 HCHG PACU - 1ST 60 MINS PHASE II: Performed by: INTERNAL MEDICINE

## 2022-10-11 PROCEDURE — 88112 CYTOPATH CELL ENHANCE TECH: CPT

## 2022-10-11 PROCEDURE — 31628 BRONCHOSCOPY/LUNG BX EACH: CPT | Performed by: INTERNAL MEDICINE

## 2022-10-11 PROCEDURE — 88172 CYTP DX EVAL FNA 1ST EA SITE: CPT

## 2022-10-11 PROCEDURE — 160035 HCHG PACU - 1ST 60 MINS PHASE I: Performed by: INTERNAL MEDICINE

## 2022-10-11 PROCEDURE — 31627 NAVIGATIONAL BRONCHOSCOPY: CPT | Performed by: INTERNAL MEDICINE

## 2022-10-11 PROCEDURE — 88333 PATH CONSLTJ SURG CYTO XM 1: CPT

## 2022-10-11 PROCEDURE — 160042 HCHG SURGERY MINUTES - EA ADDL 1 MIN LEVEL 5: Performed by: INTERNAL MEDICINE

## 2022-10-11 PROCEDURE — 71250 CT THORAX DX C-: CPT

## 2022-10-11 PROCEDURE — 31624 DX BRONCHOSCOPE/LAVAGE: CPT | Performed by: INTERNAL MEDICINE

## 2022-10-11 PROCEDURE — 88305 TISSUE EXAM BY PATHOLOGIST: CPT | Mod: 59

## 2022-10-11 PROCEDURE — 99100 ANES PT EXTEME AGE<1 YR&>70: CPT | Performed by: ANESTHESIOLOGY

## 2022-10-11 PROCEDURE — 160048 HCHG OR STATISTICAL LEVEL 1-5: Performed by: INTERNAL MEDICINE

## 2022-10-11 PROCEDURE — 502714 HCHG ROBOTIC SURGERY SERVICES: Performed by: INTERNAL MEDICINE

## 2022-10-11 PROCEDURE — 71045 X-RAY EXAM CHEST 1 VIEW: CPT

## 2022-10-11 PROCEDURE — A9270 NON-COVERED ITEM OR SERVICE: HCPCS | Performed by: ANESTHESIOLOGY

## 2022-10-11 PROCEDURE — 160036 HCHG PACU - EA ADDL 30 MINS PHASE I: Performed by: INTERNAL MEDICINE

## 2022-10-11 PROCEDURE — 88341 IMHCHEM/IMCYTCHM EA ADD ANTB: CPT

## 2022-10-11 PROCEDURE — 700111 HCHG RX REV CODE 636 W/ 250 OVERRIDE (IP): Performed by: ANESTHESIOLOGY

## 2022-10-11 PROCEDURE — 00520 ANES CLOSED CHEST PX NOS: CPT | Performed by: ANESTHESIOLOGY

## 2022-10-11 PROCEDURE — 160009 HCHG ANES TIME/MIN: Performed by: INTERNAL MEDICINE

## 2022-10-11 PROCEDURE — 88342 IMHCHEM/IMCYTCHM 1ST ANTB: CPT

## 2022-10-11 PROCEDURE — 88173 CYTOPATH EVAL FNA REPORT: CPT

## 2022-10-11 PROCEDURE — 31629 BRONCHOSCOPY/NEEDLE BX EACH: CPT | Performed by: INTERNAL MEDICINE

## 2022-10-11 PROCEDURE — 88177 CYTP FNA EVAL EA ADDL: CPT | Mod: 91

## 2022-10-11 PROCEDURE — 160031 HCHG SURGERY MINUTES - 1ST 30 MINS LEVEL 5: Performed by: INTERNAL MEDICINE

## 2022-10-11 PROCEDURE — 160025 RECOVERY II MINUTES (STATS): Performed by: INTERNAL MEDICINE

## 2022-10-11 PROCEDURE — 88312 SPECIAL STAINS GROUP 1: CPT

## 2022-10-11 PROCEDURE — 700101 HCHG RX REV CODE 250: Performed by: ANESTHESIOLOGY

## 2022-10-11 RX ORDER — HYDROMORPHONE HYDROCHLORIDE 1 MG/ML
0.4 INJECTION, SOLUTION INTRAMUSCULAR; INTRAVENOUS; SUBCUTANEOUS
Status: DISCONTINUED | OUTPATIENT
Start: 2022-10-11 | End: 2022-10-11 | Stop reason: HOSPADM

## 2022-10-11 RX ORDER — HYDROMORPHONE HYDROCHLORIDE 1 MG/ML
0.1 INJECTION, SOLUTION INTRAMUSCULAR; INTRAVENOUS; SUBCUTANEOUS
Status: DISCONTINUED | OUTPATIENT
Start: 2022-10-11 | End: 2022-10-11 | Stop reason: HOSPADM

## 2022-10-11 RX ORDER — ALBUTEROL SULFATE 90 UG/1
AEROSOL, METERED RESPIRATORY (INHALATION) PRN
Status: DISCONTINUED | OUTPATIENT
Start: 2022-10-11 | End: 2022-10-11 | Stop reason: SURG

## 2022-10-11 RX ORDER — LABETALOL HYDROCHLORIDE 5 MG/ML
5 INJECTION, SOLUTION INTRAVENOUS
Status: DISCONTINUED | OUTPATIENT
Start: 2022-10-11 | End: 2022-10-11 | Stop reason: HOSPADM

## 2022-10-11 RX ORDER — ONDANSETRON 2 MG/ML
INJECTION INTRAMUSCULAR; INTRAVENOUS PRN
Status: DISCONTINUED | OUTPATIENT
Start: 2022-10-11 | End: 2022-10-11 | Stop reason: SURG

## 2022-10-11 RX ORDER — HYDROMORPHONE HYDROCHLORIDE 1 MG/ML
0.2 INJECTION, SOLUTION INTRAMUSCULAR; INTRAVENOUS; SUBCUTANEOUS
Status: DISCONTINUED | OUTPATIENT
Start: 2022-10-11 | End: 2022-10-11 | Stop reason: HOSPADM

## 2022-10-11 RX ORDER — DEXAMETHASONE SODIUM PHOSPHATE 4 MG/ML
INJECTION, SOLUTION INTRA-ARTICULAR; INTRALESIONAL; INTRAMUSCULAR; INTRAVENOUS; SOFT TISSUE PRN
Status: DISCONTINUED | OUTPATIENT
Start: 2022-10-11 | End: 2022-10-11 | Stop reason: SURG

## 2022-10-11 RX ORDER — SODIUM CHLORIDE, SODIUM LACTATE, POTASSIUM CHLORIDE, CALCIUM CHLORIDE 600; 310; 30; 20 MG/100ML; MG/100ML; MG/100ML; MG/100ML
INJECTION, SOLUTION INTRAVENOUS CONTINUOUS
Status: DISCONTINUED | OUTPATIENT
Start: 2022-10-11 | End: 2022-10-11 | Stop reason: HOSPADM

## 2022-10-11 RX ORDER — ALBUTEROL SULFATE 2.5 MG/3ML
2.5 SOLUTION RESPIRATORY (INHALATION)
Status: DISCONTINUED | OUTPATIENT
Start: 2022-10-11 | End: 2022-10-11 | Stop reason: HOSPADM

## 2022-10-11 RX ORDER — METOPROLOL TARTRATE 1 MG/ML
1 INJECTION, SOLUTION INTRAVENOUS
Status: DISCONTINUED | OUTPATIENT
Start: 2022-10-11 | End: 2022-10-11 | Stop reason: HOSPADM

## 2022-10-11 RX ORDER — DIPHENHYDRAMINE HYDROCHLORIDE 50 MG/ML
12.5 INJECTION INTRAMUSCULAR; INTRAVENOUS
Status: DISCONTINUED | OUTPATIENT
Start: 2022-10-11 | End: 2022-10-11 | Stop reason: HOSPADM

## 2022-10-11 RX ORDER — HALOPERIDOL 5 MG/ML
1 INJECTION INTRAMUSCULAR
Status: DISCONTINUED | OUTPATIENT
Start: 2022-10-11 | End: 2022-10-11 | Stop reason: HOSPADM

## 2022-10-11 RX ORDER — SODIUM CHLORIDE 9 MG/ML
INJECTION, SOLUTION INTRAVENOUS ONCE
Status: COMPLETED | OUTPATIENT
Start: 2022-10-11 | End: 2022-10-11

## 2022-10-11 RX ORDER — HYDRALAZINE HYDROCHLORIDE 20 MG/ML
5 INJECTION INTRAMUSCULAR; INTRAVENOUS
Status: DISCONTINUED | OUTPATIENT
Start: 2022-10-11 | End: 2022-10-11 | Stop reason: HOSPADM

## 2022-10-11 RX ADMIN — MIDAZOLAM 2 MG: 1 INJECTION INTRAMUSCULAR; INTRAVENOUS at 15:41

## 2022-10-11 RX ADMIN — PROPOFOL 150 MG: 10 INJECTION, EMULSION INTRAVENOUS at 15:48

## 2022-10-11 RX ADMIN — ROCURONIUM BROMIDE 10 MG: 10 INJECTION, SOLUTION INTRAVENOUS at 16:26

## 2022-10-11 RX ADMIN — DEXAMETHASONE SODIUM PHOSPHATE 8 MG: 4 INJECTION, SOLUTION INTRA-ARTICULAR; INTRALESIONAL; INTRAMUSCULAR; INTRAVENOUS; SOFT TISSUE at 15:44

## 2022-10-11 RX ADMIN — HYDROCODONE BITARTRATE AND ACETAMINOPHEN 7.5 MG: 7.5; 325 SOLUTION ORAL at 17:51

## 2022-10-11 RX ADMIN — ONDANSETRON 4 MG: 2 INJECTION INTRAMUSCULAR; INTRAVENOUS at 17:11

## 2022-10-11 RX ADMIN — FENTANYL CITRATE 50 MCG: 50 INJECTION, SOLUTION INTRAMUSCULAR; INTRAVENOUS at 15:48

## 2022-10-11 RX ADMIN — SODIUM CHLORIDE: 9 INJECTION, SOLUTION INTRAVENOUS at 15:41

## 2022-10-11 RX ADMIN — ROCURONIUM BROMIDE 10 MG: 10 INJECTION, SOLUTION INTRAVENOUS at 16:56

## 2022-10-11 RX ADMIN — DIPHENHYDRAMINE HYDROCHLORIDE 12.5 MG: 50 INJECTION, SOLUTION INTRAMUSCULAR; INTRAVENOUS at 18:13

## 2022-10-11 RX ADMIN — ROCURONIUM BROMIDE 60 MG: 10 INJECTION, SOLUTION INTRAVENOUS at 15:48

## 2022-10-11 RX ADMIN — FENTANYL CITRATE 25 MCG: 50 INJECTION, SOLUTION INTRAMUSCULAR; INTRAVENOUS at 16:27

## 2022-10-11 RX ADMIN — ALBUTEROL SULFATE 2 PUFF: 90 AEROSOL, METERED RESPIRATORY (INHALATION) at 15:41

## 2022-10-11 RX ADMIN — SUGAMMADEX 200 MG: 100 INJECTION, SOLUTION INTRAVENOUS at 17:11

## 2022-10-11 ASSESSMENT — ENCOUNTER SYMPTOMS
BACK PAIN: 1
PSYCHIATRIC NEGATIVE: 1
NEUROLOGICAL NEGATIVE: 1
GASTROINTESTINAL NEGATIVE: 1
CARDIOVASCULAR NEGATIVE: 1
SHORTNESS OF BREATH: 1
EYES NEGATIVE: 1

## 2022-10-11 ASSESSMENT — PAIN SCALES - GENERAL: PAIN_LEVEL: 0

## 2022-10-11 ASSESSMENT — PAIN DESCRIPTION - PAIN TYPE
TYPE: ACUTE PAIN
TYPE: OTHER (COMMENT)
TYPE: ACUTE PAIN

## 2022-10-11 ASSESSMENT — FIBROSIS 4 INDEX: FIB4 SCORE: 2.4

## 2022-10-11 NOTE — ANESTHESIA PROCEDURE NOTES
Airway    Date/Time: 10/11/2022 3:53 PM  Performed by: Pablo Daniels D.O.  Authorized by: Pablo Daniels D.O.     Location:  OR  Urgency:  Elective  Indications for Airway Management:  Anesthesia      Spontaneous Ventilation: absent    Sedation Level:  Deep  Preoxygenated: Yes    Patient Position:  Sniffing  Mask Difficulty Assessment:  2 - vent by mask + OA or adjuvant +/- NMBA  Final Airway Type:  Endotracheal airway  Final Endotracheal Airway:  ETT  Cuffed: Yes    Technique Used for Successful ETT Placement:  Direct laryngoscopy    Insertion Site:  Oral  Blade Type:  Daniela  Laryngoscope Blade/Videolaryngoscope Blade Size:  3  ETT Size (mm):  8.0  Measured from:  Lips  ETT to Lips (cm):  21  Placement Verified by: auscultation and capnometry    Cormack-Lehane Classification:  Grade IIa - partial view of glottis  Number of Attempts at Approach:  1

## 2022-10-11 NOTE — ANESTHESIA PREPROCEDURE EVALUATION
Case: [id number] Date/Time: 10/11/22 1445    Procedure: FIBER OPTIC BRONCHOSCOPY WITH POSSIBLE WASH, BRUSH, BRONCHOALVEOLAR LAVAGE, BIOSPY, FINE NEEDLE ASPIRATION & NAVIGATION, ROBOTICS    Pre-op diagnosis: PULMONARY NODULES    Location:  PROCEDURE ROOM / SURGERY [facility]    Surgeons: Donita Haque M.D.          Relevant Problems   PULMONARY   (positive) Chronic obstructive pulmonary disease (HCC)      CARDIAC   (positive) Central retinal vein occlusion   (positive) Deep vein thrombosis (DVT) (HCC)   (positive) Hypertension         (positive) CKD (chronic kidney disease) stage 3, GFR 30-59 ml/min (HCC)      Other   (positive) Gout of foot       Physical Exam    Airway   Mallampati: II  TM distance: >3 FB  Neck ROM: full       Cardiovascular - normal exam  Rhythm: regular  Rate: normal  (-) murmur     Dental - normal exam           Pulmonary - normal exam  Breath sounds clear to auscultation     Abdominal    Neurological - normal exam                 Anesthesia Plan    ASA 3       Plan - general       Airway plan will be ETT          Induction: intravenous    Postoperative Plan: Postoperative administration of opioids is intended.    Pertinent diagnostic labs and testing reviewed    Informed Consent:    Anesthetic plan and risks discussed with patient.    Use of blood products discussed with: patient whom consented to blood products.

## 2022-10-11 NOTE — CONSULTS
Pulmonary Consultation    Date of consult: 10/11/2022    Referring Physician  HADLEY Pelletier*    Reason for Consultation  Growing pulm nodules    History of Presenting Illness  72 y.o. female who presented 10/11/2022 with hx of smoking 30 pk year quit in 2000, moderately severe COPD with FEV1 54%  She also had hx of lung cancer in 2016 adenoca s/p resection   She had lung cancer screening CT and found to have pulmonary nodules and the Pancho was very concerningand PET SUV 4.8 and was being monitored but now growing.  She had DJD and limited mobility and uses a walker  SOB worse with any exertion thought to be due to mobility    Code Status  Prior    Review of Systems  Review of Systems   Constitutional:  Positive for malaise/fatigue.   HENT: Negative.     Eyes: Negative.    Respiratory:  Positive for shortness of breath.    Cardiovascular: Negative.    Gastrointestinal: Negative.    Genitourinary: Negative.    Musculoskeletal:  Positive for back pain.        Uses a walker   Skin: Negative.    Neurological: Negative.    Endo/Heme/Allergies: Negative.    Psychiatric/Behavioral: Negative.       Past Medical History   has a past medical history of Anesthesia, Asthma, Backpain (7/2017), Blood clot in vein (07/06/2018), Bowel habit changes (07/06/2018), Breath shortness, Bronchitis, CAD (coronary artery disease), Cancer ([facility]) (2016), Chickenpox, COPD (chronic obstructive pulmonary disease) ([facility]), Depression (2/26/2019), Dialysis (2005), Emphysema of lung ([facility]), Glaucoma, Hemorrhagic disorder ([facility]), Hyperlipidemia, Hypertension, Hyperthyroidism, Kidney stone, Lung cancer ([facility]) (12/12/2016), Multiple thyroid nodules (7/9/2015), Nasal drainage, Obstruction of left ureteropelvic junction (UPJ) due to stone (6/17/2018), Osteoporosis, Pain (07/06/2018), Personal history of venous thrombosis and embolism (2004, 2012), Pneumonia (7/2016), Renal disorder, Renal stones (2013), Rheumatoid arthritis ([facility]), Rheumatoid arthritis  (HCC), S/P appendectomy (1998 ), S/P cholecystectomy (1998), S/P kyphoplasty (2006, 2007, 2013), Sepsis, unspecified (2005), Shortness of breath (07/06/2018), and Thyroid nodule, hot (2017).    She has no past medical history of Angina, Arrhythmia, Breast cancer (HCC), CATARACT, Congestive heart failure (HCC), Diabetes, Fall, Heart murmur, Heart valve disease, Indigestion, Jaundice, Liver disease, Myocardial infarct (HCC), Other specified symptom associated with female genital organs, Pacemaker, Rheumatic fever, Seizure (HCC), Seizure disorder (HCC), Stroke (HCC), or Unspecified urinary incontinence.    Surgical History   has a past surgical history that includes other; other abdominal surgery; pr breast augmentation with implant; pr breast reduction; appendectomy; cholecystectomy; laminotomy; lung biopsy open; thoracoscopy (Left, 12/12/2016); other orthopedic surgery (2013); cystoscopy stent placement (Left, 6/18/2018); lithotripsy (Left, 6/18/2018); lasertripsy (Left, 6/18/2018); ureteroscopy (Left, 6/18/2018); cystoscopy stent placement (7/9/2018); ureteroscopy (Left, 7/9/2018); and lasertripsy (Left, 7/9/2018).    Family History  family history includes Cancer in her mother; Heart Disease in her brother; Heart Failure in her father.    Social History   reports that she quit smoking about 22 years ago. Her smoking use included cigarettes. She has a 30.00 pack-year smoking history. She has never used smokeless tobacco. She reports current alcohol use. She reports that she does not use drugs.    Medications  Home Medications       Reviewed by Pablo Daniels D.O. (Physician) on 10/11/22 at 1517  Med List Status: Complete     Medication Last Dose Status   acetaminophen (TYLENOL) 500 MG Tab 10/11/2022 Active   albuterol (PROVENTIL) 2.5mg/3ml Nebu Soln solution for nebulization  Active   albuterol 108 (90 Base) MCG/ACT Aero Soln inhalation aerosol 10/11/2022 Active   allopurinol (ZYLOPRIM) 100 MG Tab 10/10/2022  Active   azithromycin (ZITHROMAX) 250 MG Tab 10/11/2022 Active   B Complex Vitamins (VITAMIN B COMPLEX PO)  Active   BIOTIN PO 10/10/2022 Active   BREO ELLIPTA 200-25 MCG/INH AEROSOL POWDER, BREATH ACTIVATED 10/11/2022 Active   colchicine (COLCRYS) 0.6 MG Tab  Active   COMBIGAN 0.2-0.5 % Solution 10/11/2022 Active   cyanocobalamin (VITAMIN B-12) 100 MCG Tab  Active   docusate sodium (COLACE) 100 MG Cap 10/9/2022 Active   ergocalciferol (DRISDOL) 93289 UNIT capsule 10/10/2022 Active   HYDROcodone/acetaminophen (NORCO)  MG Tab 10/10/2022 Active   ketoconazole (NIZORAL) 2 % shampoo 10/8/2022 Active   KLOR-CON M20 20 MEQ Tab CR 10/10/2022 Active   losartan (COZAAR) 50 MG Tab 10/10/2022 Active   methimazole (TAPAZOLE) 5 MG Tab 10/10/2022 Active   ondansetron (ZOFRAN ODT) 4 MG TABLET DISPERSIBLE 10/11/2022 Active   RESTASIS 0.05 % ophthalmic emulsion 10/10/2022 Active   rivaroxaban (XARELTO) 20 MG Tab tablet 10/8/2022 Active   simvastatin (ZOCOR) 40 MG Tab 10/10/2022 Active   SODIUM BICARBONATE PO 10/10/2022 Active   tiotropium (SPIRIVA RESPIMAT) 2.5 mcg/Act Aero Soln 10/11/2022 Active   zolpidem (AMBIEN) 5 MG Tab 10/10/2022 Active                  Current Facility-Administered Medications   Medication Dose Route Frequency Provider Last Rate Last Admin    NS infusion   Intravenous Once Donita Haque M.D.           Allergies  No Known Allergies    Vital Signs last 24 hours  Temp:  [36.4 °C (97.5 °F)] 36.4 °C (97.5 °F)  Pulse:  [91] 91  Resp:  [18] 18  BP: (121)/(79) 121/79  SpO2:  [98 %] 98 %    Physical Exam  Physical Exam  Constitutional:       Appearance: She is obese.   HENT:      Head: Normocephalic and atraumatic.      Mouth/Throat:      Mouth: Mucous membranes are dry.   Eyes:      Extraocular Movements: Extraocular movements intact.      Pupils: Pupils are equal, round, and reactive to light.   Cardiovascular:      Rate and Rhythm: Normal rate.   Pulmonary:      Effort: Pulmonary effort is normal.       Breath sounds: Normal breath sounds.   Musculoskeletal:         General: Normal range of motion.      Cervical back: Normal range of motion.   Skin:     General: Skin is warm.   Neurological:      Mental Status: She is oriented to person, place, and time.   Psychiatric:         Mood and Affect: Mood normal.         Behavior: Behavior normal.         Thought Content: Thought content normal.         Judgment: Judgment normal.          Imaging   Ct personally viewed from 9/22 and there is 15 * 14 spiculated nodule in the ilsa  Spiculated nodule in the rul 10 * 8 mm and also RLL 13 mm GG nodule  No adenopathy appreciated    Assessment/Plan  Pulmonary nodules  Assessment & Plan  ILSA very concerning for malignancy  Hx of adenoca in 2016 s/p resection of ILSA lesion wedge resection  Ex smoker quit 2000  Risks of benefits of bronchoscopy explained to the patient and pt has agreed to proceed with robotic bronchoscopy for bx  No adenopathy noted

## 2022-10-11 NOTE — ASSESSMENT & PLAN NOTE
AUSTIN very concerning for malignancy  Hx of adenoca in 2016 s/p resection of AUSTIN lesion wedge resection  Ex smoker quit 2000  Risks of benefits of bronchoscopy explained to the patient and pt has agreed to proceed with robotic bronchoscopy for bx  No adenopathy noted

## 2022-10-12 NOTE — OR NURSING
1813 patient resting.  Dr. Apple came in to see patient.  CXR is good. Patient  complained of head pain.  Medication given.  Patient doing better. Will continue to monitor.  1819 pain under control.  Taking sips of water.  Brother updated.  Will prepare for discharge. Patient transferred to Stage 2 on [facility] with belongings.  VS stable.

## 2022-10-12 NOTE — OR NURSING
Pt to PACU from bronchoscopy s/p Bronchoscopy. Report received. Respirations even and unlabored. VSS. strong pulses equal bilaterally and CMS intact. Surgical dressing CDI.

## 2022-10-12 NOTE — PROCEDURES
Fiberoptic Bronchoscopy/Robotic bronchoscopy     Date/Time of Procedure:.10/11/2022     Indication(s): hx of june ca and now enlarging ilsa lesion spiculated    Consent: Informed, written consent was obtained prior to the procedure; risks, benefits, & alternatives were discussed.    Universal Protocol/Time Out: A Time Out with checklist was performed prior to the procedure to ensure correct identification of the patient and procedure.    Pre-Procedure Diagnosis: r/o ca    Allergies:Patient has no known allergies.     Sedation/Analgesia/Anesthesia: Sedation as per anesthesia.    Additional Modalities:    [x] Fluoroscopy  [x] Radial Ultrasound  [_] Linear Endobronchial Ultrasound  [x] Rapid On-Site Evaluation  [_] Other: _    Route of Entry:  [_] Nasal [_] Oral [X] ET tube [_] Trach [_] LMA      Findings: After the patient is adequately anesthetized (see procedure for anesthesia), the flexible bronchoscope was passed through the endotracheal tube visualizing the distal trachea:  Trachea: normal appearing  Elsa: sharp  Right lung: RUL, RML, and RLL all level one subsegments visualized.No endobronchial lesions  Left lung: ILSA, Lingula and LLL , All level one subsegments visualized. No endobronchial lesions    Robotic Navigational Bronchoscopy    Robotic navigation bronchoscopy was performed with Ion platform.  Partial and full registration was used.    Ion robotic catheter was used to engage the lingula segment of left lung.  Target lesion is about 1.5 cm in diameter.   Under navigational guidance the ion robotic catheter was advanced to 1.0 cm away from the planned target.     Radial EBUS was performed to confirm that the location of the nodule is concentric     Transbronchial needle aspiration was performed with 23 and 25  gauge needle through the extended working channel catheter.  Total 10 samples were collected.  Samples sent for pathology.    Transbronchial biopsy was performed with forceps through the extended  working channel catheter.  Total 8 samples were collected.  Samples sent for pathology       Bronchial alveolar lavage was performed the extended working channel catheter.  Instilled 20 cc of NS, suction returned with 11 cc of NS.  Samples sent for cytology.    RIGOBERTO findings: + atypical cells      Specimens: _  [x] Bronchoalveolar Lavage (BAL): ILSA lingula sup segment sent to path   [x] Transbronchial Needle Aspiration (TBNA): ILSA  [x] Transbronchial Biopsy (TBBx): ILSA    EBL: < 5 cc    No acute complications awaiting cxr    Impression and plan  Previous hx of lung ca adenoca s/p wedge resection on 12/2016  Now ilsa enlarging lesion 1.5 cm  RIGOBERTO suspicious for atypical cells  Await final path

## 2022-10-12 NOTE — ANESTHESIA POSTPROCEDURE EVALUATION
Patient: Latonia Clinton    Procedure Summary     Date: 10/11/22 Room / Location:  PROCEDURE ROOM / SURGERY [facility]    Anesthesia Start: 1541 Anesthesia Stop: 1720    Procedure: FIBER OPTIC BRONCHOSCOPY WITH  WASH, BRUSH, BRONCHOALVEOLAR LAVAGE, BIOSPY, FINE NEEDLE ASPIRATION & NAVIGATION, ROBOTICS Diagnosis:       Pulmonary nodules      (PULMONARY NODULES)    Surgeons: Donita Haque M.D. Responsible Provider: Pablo Daniels D.O.    Anesthesia Type: general ASA Status: 3          Final Anesthesia Type: general  Last vitals  BP   Blood Pressure : 121/79    Temp   36.4 °C (97.5 °F)    Pulse   91   Resp   18    SpO2   98 %      Anesthesia Post Evaluation    Patient location during evaluation: PACU  Patient participation: complete - patient participated  Level of consciousness: awake and alert  Pain score: 0    Airway patency: patent  Anesthetic complications: no  Cardiovascular status: hemodynamically stable  Respiratory status: acceptable  Hydration status: euvolemic    PONV: none          No notable events documented.     Nurse Pain Score: 10 (NPRS)

## 2022-10-12 NOTE — DISCHARGE INSTRUCTIONS
If any questions arise, call your provider, Dr. Apple [phone].  If your provider is not available, please feel free to call the Surgical Center at [phone].    MEDICATIONS: Resume taking daily medication.  Take prescribed pain medication with food.  If no medication is prescribed, you may take non-aspirin pain medication if needed.  PAIN MEDICATION CAN BE VERY CONSTIPATING.  Take a stool softener or laxative such as senokot, pericolace, or milk of magnesia if needed.    Last pain medication given at 05:51 PM 7.5 mg Hycet    What to Expect Post Anesthesia    Rest and take it easy for the first 24 hours.  A responsible adult is recommended to remain with you during that time.  It is normal to feel sleepy.  We encourage you to not do anything that requires balance, judgment or coordination.    FOR 24 HOURS DO NOT:  Drive, operate machinery or run household appliances.  Drink beer or alcoholic beverages.  Make important decisions or sign legal documents.    To avoid nausea, slowly advance diet as tolerated, avoiding spicy or greasy foods for the first day.  Add more substantial food to your diet according to your provider's instructions.  Babies can be fed formula or breast milk as soon as they are hungry.  INCREASE FLUIDS AND FIBER TO AVOID CONSTIPATION.    MILD FLU-LIKE SYMPTOMS ARE NORMAL.  YOU MAY EXPERIENCE GENERALIZED MUSCLE ACHES, THROAT IRRITATION, HEADACHE AND/OR SOME NAUSEA.    Diet  Resume pre-operative diet upon discharge from the hospital. Depending on how you are feeling and whether you have nausea or not, you may wish to stay with a bland diet for the first few days. However, you can advance your diet as quickly as you feel ready.    Activity  You may resume your normal activity as tolerated.  Rest as needed. Begin with a meal that is soft and easy to chew. Nothing too crunchy/crispy.  Call your doctor and seek emergency help if you have any shortness of breath, trouble breathing or coughing  up an increasing amount of blood (2 tablespoons)          Flexible Bronchoscopy, Care After  This sheet gives you information about how to care for yourself after your test. Your doctor may also give you more specific instructions. If you have problems or questions, contact your doctor.  Follow these instructions at home:  Eating and drinking  Do not eat or drink anything (not even water) for 2 hours after your test, or until your numbing medicine (local anesthetic) wears off.  When your numbness is gone and your cough and gag reflexes have come back, you may:  Eat only soft foods.  Slowly drink liquids.  The day after the test, go back to your normal diet.  Driving  Do not drive for 24 hours if you were given a medicine to help you relax (sedative).  Do not drive or use heavy machinery while taking prescription pain medicine.  General instructions    Take over-the-counter and prescription medicines only as told by your doctor.  Return to your normal activities as told. Ask what activities are safe for you.  Do not use any products that have nicotine or tobacco in them. This includes cigarettes and e-cigarettes. If you need help quitting, ask your doctor.  Keep all follow-up visits as told by your doctor. This is important. It is very important if you had a tissue sample (biopsy) taken.  Get help right away if:  You have shortness of breath that gets worse.  You get light-headed.  You feel like you are going to pass out (faint).  You have chest pain.  You cough up:  More than a little blood.  More blood than before.  Summary  Do not eat or drink anything (not even water) for 2 hours after your test, or until your numbing medicine wears off.  Do not use cigarettes. Do not use e-cigarettes.  Get help right away if you have chest pain.  This information is not intended to replace advice given to you by your health care provider. Make sure you discuss any questions you have with your health care provider.  Document  Released: 10/15/2010 Document Revised: 11/30/2018 Document Reviewed: 01/05/2018  Elsevier Patient Education © 2020 [employer]

## 2022-10-12 NOTE — OR NURSING
1849: Patient arrived to phase II from PACU 1 via gurney. Report received from RN. Respirations are spontaneous and unlabored. VSS on 2L.     1903: Family at bedside.     1915: Patient education completed, family denies further questions. DC'd to care of family post uneventful stay in PACU 2.

## 2022-10-14 ENCOUNTER — TELEPHONE (OUTPATIENT)
Dept: SLEEP MEDICINE | Facility: MEDICAL CENTER | Age: 72
End: 2022-10-14
Payer: MEDICARE

## 2022-10-14 NOTE — TELEPHONE ENCOUNTER
Called pt with results that showed macrophages and inflmmation  Reviewed with pt that I would like to discuss with DR. Allan as pt is high risk of Ca due to her prev hx  The option would be CT guid biopsy versus repeat PET scan

## 2022-10-20 ENCOUNTER — APPOINTMENT (OUTPATIENT)
Dept: SLEEP MEDICINE | Facility: MEDICAL CENTER | Age: 72
End: 2022-10-20
Payer: MEDICARE

## 2022-10-25 DIAGNOSIS — Z85.118 HISTORY OF LUNG CANCER: ICD-10-CM

## 2022-10-25 DIAGNOSIS — R91.8 PULMONARY NODULES: ICD-10-CM

## 2022-10-25 NOTE — PROGRESS NOTES
Discussed CT and non-diagnostic bronchoscopy results. We discussed surgical biopsy versus attempt at CT guided biopsy. She does not have recent PFTs but they are ordered and would likely be required for excisional biopsy. I would like to see if IR thinks they can safely reach her nodule. If not, we can refer for excisional/surgical biopsy.

## 2022-10-27 ENCOUNTER — TELEPHONE (OUTPATIENT)
Dept: RADIOLOGY | Facility: MEDICAL CENTER | Age: 72
End: 2022-10-27

## 2022-10-27 NOTE — TELEPHONE ENCOUNTER
----- Message from Esha Andres A.P.NAnibal sent at 10/27/2022 11:40 AM PDT -----  Regarding: RE: Clearance to hold Xarelto x 48 hrs  Ok to hold Xarelto 48hrs prior, resume night of procedure or per surgeon recommendations.    CHLOE Oconnell  [facility]      ----- Message -----  From: Loraine Resendiz  Sent: 10/27/2022  11:20 AM PDT  To: MEGAN ChairezP.NAnibal, #  Subject: Clearance to hold Xarelto x 48 hrs               Esha,    This Patient is scheduled for a CT Guided biopsy left lingular nodule on 11/15/22, this will require that she hold her Xarelto x 48 hrs prior to her procedure. Please confirm she is able to hold this medication.    Loraine

## 2022-10-31 LAB
CYTOLOGY REG CYTOL: NORMAL
PATHOLOGY CONSULT NOTE: NORMAL

## 2022-11-07 ENCOUNTER — PRE-ADMISSION TESTING (OUTPATIENT)
Dept: ADMISSIONS | Facility: MEDICAL CENTER | Age: 72
End: 2022-11-07
Attending: INTERNAL MEDICINE
Payer: MEDICARE

## 2022-11-07 RX ORDER — HYDROCODONE BITARTRATE AND ACETAMINOPHEN 5; 325 MG/1; MG/1
1 TABLET ORAL 2 TIMES DAILY
Status: ON HOLD | COMMUNITY
End: 2022-11-15

## 2022-11-08 ENCOUNTER — OFFICE VISIT (OUTPATIENT)
Dept: MEDICAL GROUP | Facility: MEDICAL CENTER | Age: 72
End: 2022-11-08
Payer: MEDICARE

## 2022-11-08 VITALS
HEIGHT: 64 IN | SYSTOLIC BLOOD PRESSURE: 112 MMHG | TEMPERATURE: 98.3 F | DIASTOLIC BLOOD PRESSURE: 76 MMHG | BODY MASS INDEX: 35.72 KG/M2 | OXYGEN SATURATION: 91 % | HEART RATE: 97 BPM

## 2022-11-08 DIAGNOSIS — J44.9 CHRONIC OBSTRUCTIVE PULMONARY DISEASE, UNSPECIFIED COPD TYPE (HCC): Chronic | ICD-10-CM

## 2022-11-08 DIAGNOSIS — E55.9 VITAMIN D DEFICIENCY: ICD-10-CM

## 2022-11-08 DIAGNOSIS — R11.0 NAUSEA: ICD-10-CM

## 2022-11-08 DIAGNOSIS — G47.09 OTHER INSOMNIA: ICD-10-CM

## 2022-11-08 DIAGNOSIS — I10 PRIMARY HYPERTENSION: ICD-10-CM

## 2022-11-08 DIAGNOSIS — Z23 NEED FOR VACCINATION: ICD-10-CM

## 2022-11-08 DIAGNOSIS — Z51.81 ENCOUNTER FOR THERAPEUTIC DRUG LEVEL MONITORING: ICD-10-CM

## 2022-11-08 DIAGNOSIS — E78.49 OTHER HYPERLIPIDEMIA: ICD-10-CM

## 2022-11-08 PROBLEM — R53.83 OTHER FATIGUE: Chronic | Status: ACTIVE | Noted: 2022-05-12

## 2022-11-08 PROCEDURE — 90662 IIV NO PRSV INCREASED AG IM: CPT | Performed by: INTERNAL MEDICINE

## 2022-11-08 PROCEDURE — 99214 OFFICE O/P EST MOD 30 MIN: CPT | Mod: 25 | Performed by: INTERNAL MEDICINE

## 2022-11-08 PROCEDURE — G0008 ADMIN INFLUENZA VIRUS VAC: HCPCS | Performed by: INTERNAL MEDICINE

## 2022-11-08 RX ORDER — ZOLPIDEM TARTRATE 5 MG/1
5 TABLET ORAL NIGHTLY PRN
Qty: 30 TABLET | Refills: 0 | Status: SHIPPED | OUTPATIENT
Start: 2022-11-19 | End: 2022-12-19

## 2022-11-08 RX ORDER — ZOLPIDEM TARTRATE 5 MG/1
5 TABLET ORAL NIGHTLY PRN
Qty: 30 TABLET | Refills: 0 | Status: SHIPPED | OUTPATIENT
Start: 2022-12-20 | End: 2023-01-01

## 2022-11-08 RX ORDER — ERGOCALCIFEROL 1.25 MG/1
50000 CAPSULE ORAL
Qty: 14 CAPSULE | Refills: 3 | Status: SHIPPED
Start: 2022-11-08 | End: 2023-01-01

## 2022-11-08 RX ORDER — LOSARTAN POTASSIUM 50 MG/1
50 TABLET ORAL DAILY
Qty: 90 TABLET | Refills: 1 | Status: ON HOLD
Start: 2022-11-08 | End: 2022-11-15

## 2022-11-08 RX ORDER — ONDANSETRON 4 MG/1
4 TABLET, ORALLY DISINTEGRATING ORAL EVERY 6 HOURS PRN
Qty: 20 TABLET | Refills: 1 | Status: SHIPPED
Start: 2022-11-08 | End: 2023-01-01

## 2022-11-08 RX ORDER — ZOLPIDEM TARTRATE 5 MG/1
5 TABLET ORAL NIGHTLY PRN
Qty: 30 TABLET | Refills: 0 | Status: SHIPPED
Start: 2023-01-01 | End: 2023-01-01

## 2022-11-08 RX ORDER — SIMVASTATIN 40 MG
40 TABLET ORAL EVERY EVENING
Qty: 90 TABLET | Refills: 1 | Status: SHIPPED
Start: 2022-11-08 | End: 2023-01-01

## 2022-11-08 ASSESSMENT — ENCOUNTER SYMPTOMS
WEIGHT LOSS: 0
NAUSEA: 0
VOMITING: 0
BACK PAIN: 1
COUGH: 0
FEVER: 0
ABDOMINAL PAIN: 0
PALPITATIONS: 0
SHORTNESS OF BREATH: 1
DIARRHEA: 0
SORE THROAT: 0
CHILLS: 0
DIZZINESS: 0

## 2022-11-08 NOTE — ASSESSMENT & PLAN NOTE
This is a chronic problem, controlled with Ambien 5 mg nightly.  Patient has no side effects.  She was also prescribed [place] by orthopedic specialist recently.   -PDMP reviewed today   - Prescribed 3 separate prescriptions 30 days each was sent to patient's preferred pharmacy.   -Controlled substance agreement renewed

## 2022-11-08 NOTE — ASSESSMENT & PLAN NOTE
This is a chronic problem, suboptimally controlled.  This condition is managed by Dr. Mami Allan.  Patient does not think that medications are helping her.  She is using supplemental oxygen at home.  Pending work-up for left lung nodule.

## 2022-11-08 NOTE — PROGRESS NOTES
"Subjective:     Diagnoses of Need for vaccination, Encounter for therapeutic drug level monitoring, Vitamin D deficiency, Primary hypertension, Other hyperlipidemia, Nausea, Other insomnia, and Chronic obstructive pulmonary disease, unspecified COPD type (HCC) were pertinent to this visit.    HPI: Latonia is a pleasant 72 y.o. female who presents today for zolpidem refill.    Problem          Other Insomnia    This is a chronic condition, patient has been taking Ambien 5mg tab nightly for many months now.   The medication is helping her to improve her symptoms  She has no adverse effects. She denies alcohol or illegal drug use.  No early refills on controlled substances.  No history of lost or stolen substance prescriptions  Compliant with treatment recommendations and plan: Yes  Any major health change to the patient: No  Concerns for misuse, abuse or addiction: No  /NarxCheck report reviewed: Yes  History of abnormal drug screening: No  PDMP reviewed: Last filled on 8/21/2022, 90 tablets 90days supply.     Chronic Obstructive Pulmonary Disease (Hcc)    This is a chronic condition, fairly well controlled, managed by pulmonology Dr. Mami Allan.  Her regimen includes albuterol nebulizer as needed, Breo Ellipta, Spiriva and daily Azithormycin, however patient does not think that those medications are helping her.  She is experiencing shortness of breath while walking.    She is undergoing extensive work-up for left pulmonary nodules, attempting IR guided lung biopsy next week.    Pending PFTs.         Past Medical History:   Diagnosis Date    Anesthesia     \"Abnormal blood pressure and breathing\"  \"couldn't breathe\"    Asthma     inhalers daily    Backpain 7/2017     thor. area    Blood clot in vein 07/06/2018    \"Currently have a blood clot in my left eye\"    Bowel habit changes 07/06/2018    Constipation    Breath shortness     with exertion has O2 but does not use    Bronchitis     CAD (coronary artery " "disease)     palpatations    Cancer (HCC) 2016    left lung    Chickenpox     COPD (chronic obstructive pulmonary disease) (HCC)     Depression 2/26/2019    Dialysis 2005    transient renal failure due to sepsis    Emphysema of lung (HCC)     Glaucoma     right eye    Hemorrhagic disorder (HCC)     Hyperlipidemia     Hypertension     Hyperthyroidism     Kidney stone     bilateral    Lung cancer (HCC) 12/12/2016    Multiple thyroid nodules 7/9/2015    Nasal drainage     Obstruction of left ureteropelvic junction (UPJ) due to stone 6/17/2018    Osteoporosis     Pain 07/06/2018    Back pain    Personal history of venous thrombosis and embolism 2004, 2012    right leg 2012, left arm dvt 2004 left eye 2017    Pneumonia 7/2016    per patient    Renal disorder     had been on dialysis for 7 months for acute failure 2005    Renal stones 2013    post lithotripsy    Rheumatoid arthritis (HCC)     question    Rheumatoid arthritis (HCC)     S/P appendectomy 1998     S/P cholecystectomy 1998    S/P kyphoplasty 2006, 2007, 2013    Sepsis, unspecified 2005    Shortness of breath 07/06/2018    Chronic current problem. \"A couple of years now\".  O2 concentrator at night - pt states she doesn't use it.    Thyroid nodule, hot 2017    functional \"hot\" right thyroid nodule without thyrotoxicosis     Current Outpatient Medications Ordered in Epic   Medication Sig Dispense Refill    ergocalciferol (DRISDOL) 29350 UNIT capsule Take 1 Capsule by mouth every 7 days. 14 Capsule 3    losartan (COZAAR) 50 MG Tab Take 1 Tablet by mouth every day. TAKE ONE AND ONE-HALF (1 AND 1/2) TABLET BY MOUTH DAILY (COZAAR) 90 Tablet 1    simvastatin (ZOCOR) 40 MG Tab Take 1 Tablet by mouth every evening. 90 Tablet 1    ondansetron (ZOFRAN ODT) 4 MG TABLET DISPERSIBLE Take 1 Tablet by mouth every 6 hours as needed for Nausea. 20 Tablet 1    [START ON 11/19/2022] zolpidem (AMBIEN) 5 MG Tab Take 1 Tablet by mouth at bedtime as needed for Sleep for up to 30 days. " 30 Tablet 0    [START ON 12/20/2022] zolpidem (AMBIEN) 5 MG Tab Take 1 Tablet by mouth at bedtime as needed for Sleep for up to 30 days. 30 Tablet 0    [START ON 1/20/2023] zolpidem (AMBIEN) 5 MG Tab Take 1 Tablet by mouth at bedtime as needed for Sleep for up to 30 days. 30 Tablet 0    HYDROcodone-acetaminophen (NORCO) 5-325 MG Tab per tablet Take 1 Tablet by mouth 2 times a day.      BIOTIN PO Take  by mouth every day.      B Complex Vitamins (VITAMIN B COMPLEX PO) Take  by mouth every day.      azithromycin (ZITHROMAX) 250 MG Tab Take 1 Tablet by mouth every day. 30 Tablet 5    tiotropium (SPIRIVA RESPIMAT) 2.5 mcg/Act Aero Soln INHALE TWO PUFFS BY MOUTH DAILY 1 Each 5    albuterol 108 (90 Base) MCG/ACT Aero Soln inhalation aerosol Inhale 2 Puffs every 6 hours as needed for Shortness of Breath. 18 g 11    methimazole (TAPAZOLE) 5 MG Tab Take 1 Tablet by mouth every day. 90 Tablet 1    HYDROcodone/acetaminophen (NORCO)  MG Tab every 6 hours as needed. Currently out (Patient not taking: Reported on 11/7/2022)      rivaroxaban (XARELTO) 20 MG Tab tablet TAKE ONE TABLET BY MOUTH DAILY WITH DINNER (Patient taking differently: TAKE ONE TABLET BY MOUTH DAILY WITH DINNER  (last dose 11/12/22 prior to procedure)) 90 Tablet 1    ketoconazole (NIZORAL) 2 % shampoo  (Patient not taking: Reported on 11/7/2022)      albuterol (PROVENTIL) 2.5mg/3ml Nebu Soln solution for nebulization Take 3 mL by nebulization every four hours as needed for Shortness of Breath. 180 Each 1    BREO ELLIPTA 200-25 MCG/INH AEROSOL POWDER, BREATH ACTIVATED INHALE ONE DOSE BY MOUTH DAILY 1 Each 11    KLOR-CON M20 20 MEQ Tab CR Take 1 Tablet by mouth every day.      RESTASIS 0.05 % ophthalmic emulsion Administer 1 Drop into both eyes every day.      colchicine (COLCRYS) 0.6 MG Tab Day 1: 1.2 mg once, followed in 1 hour with a single dose of 0.6 mg.  Day 2 and thereafter: Oral: 0.6 mg once daily until flare resolves (Patient not taking: Reported  "on 11/7/2022) 30 Tab 1    cyanocobalamin (VITAMIN B-12) 100 MCG Tab Take 100 mcg by mouth every day. (Patient not taking: Reported on 10/10/2022)      SODIUM BICARBONATE PO Take 10 g by mouth 2 times a day.      allopurinol (ZYLOPRIM) 100 MG Tab Take 2 Tablets by mouth every day.      docusate sodium (COLACE) 100 MG Cap Take 300 mg by mouth every evening.      acetaminophen (TYLENOL) 500 MG Tab Take 1,000 mg by mouth every 6 hours as needed for Moderate Pain.      COMBIGAN 0.2-0.5 % Solution Place 1 Drop in both eyes 2 Times a Day.       No current [facility]-ordered facility-administered medications on file.       Health Maintenance: Pending mammogram, colonoscopy is overdue.    Review of Systems   Constitutional:  Positive for malaise/fatigue. Negative for chills, fever and weight loss.   HENT:  Negative for sore throat.    Respiratory:  Positive for shortness of breath. Negative for cough.    Cardiovascular:  Negative for chest pain and palpitations.   Gastrointestinal:  Negative for abdominal pain, diarrhea, nausea and vomiting.   Genitourinary:  Negative for dysuria and urgency.   Musculoskeletal:  Positive for back pain and joint pain.   Neurological:  Negative for dizziness.     Objective:     Exam:  /76 (BP Location: Left arm, Patient Position: Sitting, BP Cuff Size: Adult)   Pulse 97   Temp 36.8 °C (98.3 °F) (Temporal)   Ht 1.626 m (5' 4\")   LMP  (LMP Unknown)   SpO2 91%   BMI 35.72 kg/m²  Body mass index is 35.72 kg/m².    Physical Exam  Constitutional:       General: She is not in acute distress.     Appearance: She is not ill-appearing, toxic-appearing or diaphoretic.   HENT:      Head: Normocephalic and atraumatic.      Mouth/Throat:      Mouth: Mucous membranes are moist.      Pharynx: Oropharynx is clear. No oropharyngeal exudate or posterior oropharyngeal erythema.   Eyes:      General: No scleral icterus.  Cardiovascular:      Rate and Rhythm: Normal rate and regular rhythm.      Pulses: " Normal pulses.      Heart sounds: Normal heart sounds.   Pulmonary:      Effort: Pulmonary effort is normal. No respiratory distress.      Breath sounds: Normal breath sounds.   Musculoskeletal:      Thoracic back: Decreased range of motion.      Lumbar back: Decreased range of motion.      Right knee: Normal.      Left knee: Swelling and effusion present. No erythema or ecchymosis.   Skin:     General: Skin is warm and dry.   Neurological:      Mental Status: She is alert.      Motor: Weakness (BL LE 3/5) present.      Gait: Gait abnormal (walker).   Psychiatric:         Mood and Affect: Mood normal.         Behavior: Behavior normal.     Assessment & Plan:   Latonia  is a pleasant 72 y.o. female with the following -     Problem List Items Addressed This Visit       Chronic obstructive pulmonary disease (HCC) (Chronic)     This is a chronic problem, suboptimally controlled.  This condition is managed by Dr. Mami Allan.  Patient does not think that medications are helping her.  She is using supplemental oxygen at home.  Pending work-up for left lung nodule.         Other insomnia (Chronic)     This is a chronic problem, controlled with Ambien 5 mg nightly.  Patient has no side effects.  She was also prescribed [place] by orthopedic specialist recently.   -PDMP reviewed today   - Prescribed 3 separate prescriptions 30 days each was sent to patient's preferred pharmacy.   -Controlled substance agreement renewed         Relevant Medications    zolpidem (AMBIEN) 5 MG Tab (Start on 11/19/2022)    zolpidem (AMBIEN) 5 MG Tab (Start on 12/20/2022)    zolpidem (AMBIEN) 5 MG Tab (Start on 1/20/2023)    Hyperlipidemia    Relevant Medications    losartan (COZAAR) 50 MG Tab    simvastatin (ZOCOR) 40 MG Tab    Hypertension    Relevant Medications    losartan (COZAAR) 50 MG Tab    simvastatin (ZOCOR) 40 MG Tab     Other Visit Diagnoses       Need for vaccination        Encounter for therapeutic drug level monitoring         Relevant Orders    Controlled Substance Treatment Agreement    Pain Management Screen    Vitamin D deficiency        Relevant Medications    ergocalciferol (DRISDOL) 58209 UNIT capsule    Nausea        Relevant Medications    ondansetron (ZOFRAN ODT) 4 MG TABLET DISPERSIBLE          Return in about 3 months (around 2/8/2023), or if symptoms worsen or fail to improve.    Please note that this dictation was created using voice recognition software. I have made every reasonable attempt to correct obvious errors, but I expect that there are errors of grammar and possibly content that I did not discover before finalizing the note.

## 2022-11-09 ENCOUNTER — NON-PROVIDER VISIT (OUTPATIENT)
Dept: MEDICAL GROUP | Facility: MEDICAL CENTER | Age: 72
End: 2022-11-09
Payer: MEDICARE

## 2022-11-09 DIAGNOSIS — G89.29 CHRONIC BILATERAL LOW BACK PAIN WITHOUT SCIATICA: ICD-10-CM

## 2022-11-09 DIAGNOSIS — M54.50 CHRONIC BILATERAL LOW BACK PAIN WITHOUT SCIATICA: ICD-10-CM

## 2022-11-09 NOTE — PROGRESS NOTES
Latonia Clinton is a 72 y.o. female here for a non-provider visit for UDS    If abnormal was an in office provider notified today (if so, indicate provider)? Yes    Routed to PCP? No

## 2022-11-11 ENCOUNTER — PATIENT MESSAGE (OUTPATIENT)
Dept: HEALTH INFORMATION MANAGEMENT | Facility: OTHER | Age: 72
End: 2022-11-11

## 2022-11-15 ENCOUNTER — APPOINTMENT (OUTPATIENT)
Dept: RADIOLOGY | Facility: MEDICAL CENTER | Age: 72
End: 2022-11-15
Attending: RADIOLOGY
Payer: MEDICARE

## 2022-11-15 ENCOUNTER — HOSPITAL ENCOUNTER (OUTPATIENT)
Facility: MEDICAL CENTER | Age: 72
End: 2022-11-15
Attending: INTERNAL MEDICINE | Admitting: INTERNAL MEDICINE
Payer: MEDICARE

## 2022-11-15 ENCOUNTER — APPOINTMENT (OUTPATIENT)
Dept: RADIOLOGY | Facility: MEDICAL CENTER | Age: 72
End: 2022-11-15
Attending: INTERNAL MEDICINE
Payer: MEDICARE

## 2022-11-15 VITALS
HEIGHT: 65 IN | TEMPERATURE: 98.2 F | WEIGHT: 205.69 LBS | OXYGEN SATURATION: 91 % | SYSTOLIC BLOOD PRESSURE: 140 MMHG | HEART RATE: 98 BPM | RESPIRATION RATE: 17 BRPM | BODY MASS INDEX: 34.27 KG/M2 | DIASTOLIC BLOOD PRESSURE: 82 MMHG

## 2022-11-15 DIAGNOSIS — R91.8 PULMONARY NODULES: ICD-10-CM

## 2022-11-15 DIAGNOSIS — Z85.118 HISTORY OF LUNG CANCER: ICD-10-CM

## 2022-11-15 LAB
ERYTHROCYTE [DISTWIDTH] IN BLOOD BY AUTOMATED COUNT: 54.4 FL (ref 35.9–50)
HCT VFR BLD AUTO: 38.9 % (ref 37–47)
HGB BLD-MCNC: 12.7 G/DL (ref 12–16)
INR PPP: 0.87 (ref 0.87–1.13)
MCH RBC QN AUTO: 33.7 PG (ref 27–33)
MCHC RBC AUTO-ENTMCNC: 32.6 G/DL (ref 33.6–35)
MCV RBC AUTO: 103.2 FL (ref 81.4–97.8)
PATHOLOGY CONSULT NOTE: NORMAL
PLATELET # BLD AUTO: 233 K/UL (ref 164–446)
PMV BLD AUTO: 11 FL (ref 9–12.9)
PROTHROMBIN TIME: 11.8 SEC (ref 12–14.6)
RBC # BLD AUTO: 3.77 M/UL (ref 4.2–5.4)
WBC # BLD AUTO: 5.3 K/UL (ref 4.8–10.8)

## 2022-11-15 PROCEDURE — 85027 COMPLETE CBC AUTOMATED: CPT

## 2022-11-15 PROCEDURE — 160035 HCHG PACU - 1ST 60 MINS PHASE I

## 2022-11-15 PROCEDURE — 700102 HCHG RX REV CODE 250 W/ 637 OVERRIDE(OP): Performed by: RADIOLOGY

## 2022-11-15 PROCEDURE — 99153 MOD SED SAME PHYS/QHP EA: CPT

## 2022-11-15 PROCEDURE — 700111 HCHG RX REV CODE 636 W/ 250 OVERRIDE (IP): Mod: JW

## 2022-11-15 PROCEDURE — 88305 TISSUE EXAM BY PATHOLOGIST: CPT

## 2022-11-15 PROCEDURE — 160002 HCHG RECOVERY MINUTES (STAT)

## 2022-11-15 PROCEDURE — A9270 NON-COVERED ITEM OR SERVICE: HCPCS | Performed by: RADIOLOGY

## 2022-11-15 PROCEDURE — 85610 PROTHROMBIN TIME: CPT

## 2022-11-15 PROCEDURE — 160036 HCHG PACU - EA ADDL 30 MINS PHASE I

## 2022-11-15 PROCEDURE — 71045 X-RAY EXAM CHEST 1 VIEW: CPT

## 2022-11-15 PROCEDURE — 700111 HCHG RX REV CODE 636 W/ 250 OVERRIDE (IP): Performed by: RADIOLOGY

## 2022-11-15 RX ORDER — SODIUM CHLORIDE 9 MG/ML
500 INJECTION, SOLUTION INTRAVENOUS
Status: DISCONTINUED | OUTPATIENT
Start: 2022-11-15 | End: 2022-11-15 | Stop reason: HOSPADM

## 2022-11-15 RX ORDER — MIDAZOLAM HYDROCHLORIDE 1 MG/ML
INJECTION INTRAMUSCULAR; INTRAVENOUS
Status: COMPLETED
Start: 2022-11-15 | End: 2022-11-15

## 2022-11-15 RX ORDER — HYDROMORPHONE HYDROCHLORIDE 1 MG/ML
0.5 INJECTION, SOLUTION INTRAMUSCULAR; INTRAVENOUS; SUBCUTANEOUS
Status: DISCONTINUED | OUTPATIENT
Start: 2022-11-15 | End: 2022-11-15 | Stop reason: HOSPADM

## 2022-11-15 RX ORDER — MIDAZOLAM HYDROCHLORIDE 1 MG/ML
.5-2 INJECTION INTRAMUSCULAR; INTRAVENOUS PRN
Status: DISCONTINUED | OUTPATIENT
Start: 2022-11-15 | End: 2022-11-15 | Stop reason: HOSPADM

## 2022-11-15 RX ORDER — SODIUM CHLORIDE 9 MG/ML
INJECTION, SOLUTION INTRAVENOUS CONTINUOUS
Status: DISCONTINUED | OUTPATIENT
Start: 2022-11-15 | End: 2022-11-15 | Stop reason: HOSPADM

## 2022-11-15 RX ORDER — OXYCODONE HYDROCHLORIDE 5 MG/1
5 TABLET ORAL
Status: DISCONTINUED | OUTPATIENT
Start: 2022-11-15 | End: 2022-11-15 | Stop reason: HOSPADM

## 2022-11-15 RX ORDER — ONDANSETRON 2 MG/ML
4 INJECTION INTRAMUSCULAR; INTRAVENOUS EVERY 6 HOURS PRN
Status: DISCONTINUED | OUTPATIENT
Start: 2022-11-15 | End: 2022-11-15 | Stop reason: HOSPADM

## 2022-11-15 RX ORDER — LOSARTAN POTASSIUM 50 MG/1
50 TABLET ORAL EVERY MORNING
COMMUNITY
End: 2023-01-01

## 2022-11-15 RX ADMIN — MIDAZOLAM HYDROCHLORIDE 1 MG: 1 INJECTION INTRAMUSCULAR; INTRAVENOUS at 11:35

## 2022-11-15 RX ADMIN — HYDROMORPHONE HYDROCHLORIDE 0.5 MG: 1 INJECTION, SOLUTION INTRAMUSCULAR; INTRAVENOUS; SUBCUTANEOUS at 12:26

## 2022-11-15 RX ADMIN — HYDROMORPHONE HYDROCHLORIDE 0.5 MG: 1 INJECTION, SOLUTION INTRAMUSCULAR; INTRAVENOUS; SUBCUTANEOUS at 13:04

## 2022-11-15 RX ADMIN — FENTANYL CITRATE 50 MCG: 50 INJECTION, SOLUTION INTRAMUSCULAR; INTRAVENOUS at 11:35

## 2022-11-15 RX ADMIN — OXYCODONE 5 MG: 5 TABLET ORAL at 13:56

## 2022-11-15 RX ADMIN — MIDAZOLAM HYDROCHLORIDE 1 MG: 1 INJECTION, SOLUTION INTRAMUSCULAR; INTRAVENOUS at 11:35

## 2022-11-15 ASSESSMENT — PAIN DESCRIPTION - PAIN TYPE: TYPE: ACUTE PAIN

## 2022-11-15 ASSESSMENT — FIBROSIS 4 INDEX
FIB4 SCORE: 2.4
FIB4 SCORE: 2.4

## 2022-11-15 NOTE — OR NURSING
1213 Pt over from IR post left Lung biopsy. Site CDI and soft, no signs of bleeding. Pt awake, denies nausea but complains of sharp pain in left side. Pt on 6L nasal cannula. VSS.  1300 Dr. Ferguson at bedside, okay for second does of 0.5mg of dilaudid.   1320 Xray at bedside  1340 Called Dr. Ferguson for Xray results, no sings of pneumothorax, pt okay to eat and drink.  1515 Xray at bedside  1528 Pt up to restroom   1550 Xray results back, no sign of pneumothorax  1600 Pt states she feels ready for discharge.  1625 Report to Esha LEMUS.   1635 Esha LEMUS and Erum LEMUS discharged pt

## 2022-11-15 NOTE — DISCHARGE INSTRUCTIONS
HOME CARE INSTRUCTIONS    ACTIVITY: Rest and take it easy for the first 24 hours.  A responsible adult is recommended to remain with you during that time.  It is normal to feel sleepy.  We encourage you to not do anything that requires balance, judgment or coordination.    FOR 24 HOURS DO NOT:  Drive, operate machinery or run household appliances.  Drink beer or alcoholic beverages.  Make important decisions or sign legal documents.    SPECIAL INSTRUCTIONS: Lung Biopsy, Care After  This sheet gives you information about how to care for yourself after your procedure. Your health care provider may also give you more specific instructions depending on the type of biopsy you had. If you have problems or questions, contact your health care provider.  What can I expect after the procedure?  After the procedure, it is common to have:  A cough.  A sore throat.  Pain where a needle, bronchoscope, or incision was used to collect a biopsy sample (biopsy site).  Follow these instructions at home:  Medicines  Take over-the-counter and prescription medicines only as told by your health care provider.  Do not drink alcohol if your health care provider tells you not to drink.  Ask your health care provider if the medicine prescribed to you:  Requires you to avoid driving or using heavy machinery.  Can cause constipation. You may need to take these actions to prevent or treat constipation:  Drink enough fluid to keep your urine pale yellow.  Take over-the-counter or prescription medicines.  Eat foods that are high in fiber, such as beans, whole grains, and fresh fruits and vegetables.  Limit foods that are high in fat and processed sugars, such as fried or sweet foods.  Do not drive for 24 hours if you were given a sedative.  Biopsy site care  Follow instructions from your health care provider about how to take care of your biopsy site. Make sure you:  Wash your hands with soap and water before and after you change your bandage  (dressing). If soap and water are not available, use hand .  Change your dressing as told by your health care provider.  Leave stitches (sutures), skin glue, or adhesive strips in place. These skin closures may need to stay in place for 2 weeks or longer. If adhesive strip edges start to loosen and curl up, you may trim the loose edges. Do not remove adhesive strips completely unless your health care provider tells you to do that.  Do not take baths, swim, or use a hot tub until your health care provider approves. Ask your health care provider if you may take showers. You may only be allowed to take sponge baths.  Check your biopsy site every day for signs of infection. Check for:  Redness, swelling, or more pain.  Fluid or blood.  Warmth.  Pus or a bad smell.  General instructions  Return to your normal activities as told by your health care provider. Ask your health care provider what activities are safe for you.  It is up to you to get the results of your procedure. Ask your health care provider, or the department that is doing the procedure, when your results will be ready.  Keep all follow-up visits as told by your health care provider. This is important.  Contact a health care provider if:  You have a fever.  You have redness, swelling, or more pain around your biopsy site.  You have fluid or blood coming from your biopsy site.  Your biopsy site feels warm to the touch.  You have pus or a bad smell coming from your biopsy site.  You have pain that does not get better with medicine.  Get help right away if:  You cough up blood.  You have trouble breathing.  You have chest pain.  You lose consciousness.  Summary  After the procedure, it is common to have a sore throat and a cough.  Return to your normal activities as told by your health care provider. Ask your health care provider what activities are safe for you.  Take over-the-counter and prescription medicines only as told by your health care  provider.  Report any unusual symptoms to your health care provider.  This information is not intended to replace advice given to you by your health care provider. Make sure you discuss any questions you have with your health care provider.  Document Released: 01/16/2018 Document Revised: 01/22/2020 Document Reviewed: 01/16/2018  Elsevier Patient Education © 2020 [employer]      DIET: To avoid nausea, slowly advance diet as tolerated, avoiding spicy or greasy foods for the first day.  Add more substantial food to your diet according to your physician's instructions.  Babies can be fed formula or breast milk as soon as they are hungry.  INCREASE FLUIDS AND FIBER TO AVOID CONSTIPATION.    SURGICAL DRESSING/BATHING: may remove dressing in 24 hours and then shower. Do not submerge in water such as bath tubs, hot tubs, or swimming pools until cleared by your provider.       MEDICATIONS: Resume taking daily medication.  Take prescribed pain medication with food.  If no medication is prescribed, you may take non-aspirin pain medication if needed.  PAIN MEDICATION CAN BE VERY CONSTIPATING.  Take a stool softener or laxative such as senokot, pericolace, or milk of magnesia if needed.    Prescription given for N/A  Last pain medication given at 2:00pm oxycodone    A follow-up appointment should be arranged with your doctor; call to schedule.    You should CALL YOUR PHYSICIAN if you develop:  Fever greater than 101 degrees F.  Pain not relieved by medication, or persistent nausea or vomiting.  Excessive bleeding (blood soaking through dressing) or unexpected drainage from the wound.  Extreme redness or swelling around the incision site, drainage of pus or foul smelling drainage.  Inability to urinate or empty your bladder within 8 hours.  Problems with breathing or chest pain.    You should call 911 if you develop problems with breathing or chest pain.  If you are unable to contact your doctor or surgical center, you should  go to the nearest emergency room or urgent care center.  Physician's telephone #: [phone]    MILD FLU-LIKE SYMPTOMS ARE NORMAL.  YOU MAY EXPERIENCE GENERALIZED MUSCLE ACHES, THROAT IRRITATION, HEADACHE AND/OR SOME NAUSEA.    If any questions arise, call your doctor.  If your doctor is not available, please feel free to call the Surgical Center at [phone].  The Center is open Monday through Friday from 7AM to 7PM.      A registered nurse may call you a few days after your surgery to see how you are doing after your procedure.    You may also receive a survey in the mail within the next two weeks and we ask that you take a few moments to complete the survey and return it to us.  Our goal is to provide you with very good care and we value your comments.     Depression / Suicide Risk    As you are discharged from this [facility] facility, it is important to learn how to keep safe from harming yourself.    Recognize the warning signs:  Abrupt changes in personality, positive or negative- including increase in energy   Giving away possessions  Change in eating patterns- significant weight changes-  positive or negative  Change in sleeping patterns- unable to sleep or sleeping all the time   Unwillingness or inability to communicate  Depression  Unusual sadness, discouragement and loneliness  Talk of wanting to die  Neglect of personal appearance   Rebelliousness- reckless behavior  Withdrawal from people/activities they love  Confusion- inability to concentrate     If you or a loved one observes any of these behaviors or has concerns about self-harm, here's what you can do:  Talk about it- your feelings and reasons for harming yourself  Remove any means that you might use to hurt yourself (examples: pills, rope, extension cords, firearm)  Get professional help from the community (Mental Health, Substance Abuse, psychological counseling)  Do not be alone:Call your Safe Contact- someone whom you trust who  will be there for you.  Call your local CRISIS HOTLINE 081-0239 or 086-320-8614  Call your local Children's Mobile Crisis Response Team [place] (921) 488-4767 or www.Welocalize  Call the toll free National Suicide Prevention Hotlines   National Suicide Prevention Lifeline 494-782-VBNK (1846)  [facility] Line Network 800-UAYXOCT (664-0031)    I acknowledge receipt and understanding of these Home Care instructions.

## 2022-11-15 NOTE — OR SURGEON
Immediate Post- Operative Note        Findings: lingular nodule      Procedure(s): ct biopsy of same        Estimated Blood Loss: Less than 5 ml        Complications: small amt alveolar hemorrhage            11/15/2022     11:50 AM     Subhash Ferguson M.D.

## 2022-11-15 NOTE — PROGRESS NOTES
[facility] Procedure Note:    Dr. PATRICIA Procedure Note:    Dr. Ferguson consented patient prior to procedure; all questions answered.    Site confirmed with imaging guidance by patient, physician, RT, and RN.     Dr. Ferguson completed a left lingular nodule biopsy with 3 cores in formalin.  The patient tolerated the procedure well; ETCo2 with consistent waveform during the procedure. Immediately post pprocedure, pt began coughing blood with less than 30 cc blood suctioned.  Repeat CT imaging of patient without pneumothorax per Dr. Ferguson.    Tegaderm and gauze applied to left midclavicular chest, CDI and soft; pressure held x 3 minutes.  Patient alert, oriented and verbally appropriate post procedure, c/o of sharp left chest pain, worse with deep inspiration. Patient remained hemodynamically stable during procedure and transport, see flow sheet for vital signs.  Report given to ALLAN Hickman.  RN transported patient to [facility] 7A with SaO2 monitor @ 90 % on oxygen via NC on  6 lpm.     Procedure End Time:1205

## 2022-11-18 ENCOUNTER — PATIENT MESSAGE (OUTPATIENT)
Dept: SLEEP MEDICINE | Facility: MEDICAL CENTER | Age: 72
End: 2022-11-18
Payer: MEDICARE

## 2022-11-18 DIAGNOSIS — J18.9 PNEUMONIA OF LEFT UPPER LOBE DUE TO INFECTIOUS ORGANISM: ICD-10-CM

## 2022-11-18 DIAGNOSIS — C34.12 MALIGNANT NEOPLASM OF UPPER LOBE OF LEFT LUNG (HCC): ICD-10-CM

## 2022-11-18 RX ORDER — PREDNISONE 10 MG/1
TABLET ORAL
Qty: 18 TABLET | Refills: 0 | Status: SHIPPED
Start: 2022-11-18 | End: 2023-01-01

## 2022-11-18 RX ORDER — FLUTICASONE FUROATE AND VILANTEROL 200; 25 UG/1; UG/1
1 POWDER RESPIRATORY (INHALATION) DAILY
Qty: 3 EACH | Refills: 3 | Status: SHIPPED
Start: 2022-11-18 | End: 2023-01-01

## 2022-11-18 RX ORDER — LEVOFLOXACIN 500 MG/1
500 TABLET, FILM COATED ORAL DAILY
Qty: 7 TABLET | Refills: 0 | Status: SHIPPED
Start: 2022-11-18 | End: 2023-01-01

## 2022-11-18 NOTE — PROGRESS NOTES
Reviewed biopsy results with patient via telephone.  Findings are consistent with adenocarcinoma.  Patient has had resection for lung cancer in the past.  If she is a surgical candidate she would like to consider this.  We will order PET and MRI to be done as soon as possible and referral to oncology.  Pending results we can proceed with potential surgical consult if eligible. Pt notes increased SOB since her procedure and blood tinged sputum. She does have area of infiltrate on CT done at time of bx. She was given strict ED precautions and I prescribed prednisone and levofloxacin.

## 2022-12-06 ENCOUNTER — HOSPITAL ENCOUNTER (OUTPATIENT)
Facility: MEDICAL CENTER | Age: 72
End: 2022-12-06
Attending: STUDENT IN AN ORGANIZED HEALTH CARE EDUCATION/TRAINING PROGRAM
Payer: MEDICARE

## 2022-12-06 PROCEDURE — 80061 LIPID PANEL: CPT

## 2022-12-06 PROCEDURE — 80069 RENAL FUNCTION PANEL: CPT

## 2022-12-06 PROCEDURE — 83970 ASSAY OF PARATHORMONE: CPT

## 2022-12-06 PROCEDURE — 83735 ASSAY OF MAGNESIUM: CPT

## 2022-12-07 LAB
ALBUMIN SERPL BCP-MCNC: 3.9 G/DL (ref 3.2–4.9)
BUN SERPL-MCNC: 27 MG/DL (ref 8–22)
CALCIUM SERPL-MCNC: 9.6 MG/DL (ref 8.5–10.5)
CHLORIDE SERPL-SCNC: 110 MMOL/L (ref 96–112)
CHOLEST SERPL-MCNC: 179 MG/DL (ref 100–199)
CO2 SERPL-SCNC: 22 MMOL/L (ref 20–33)
CREAT SERPL-MCNC: 1.22 MG/DL (ref 0.5–1.4)
GFR SERPLBLD CREATININE-BSD FMLA CKD-EPI: 47 ML/MIN/1.73 M 2
GLUCOSE SERPL-MCNC: 104 MG/DL (ref 65–99)
HDLC SERPL-MCNC: 86 MG/DL
LDLC SERPL CALC-MCNC: 52 MG/DL
MAGNESIUM SERPL-MCNC: 1.8 MG/DL (ref 1.5–2.5)
PHOSPHATE SERPL-MCNC: 2.4 MG/DL (ref 2.5–4.5)
POTASSIUM SERPL-SCNC: 4.1 MMOL/L (ref 3.6–5.5)
PTH-INTACT SERPL-MCNC: 346 PG/ML (ref 14–72)
SODIUM SERPL-SCNC: 144 MMOL/L (ref 135–145)
TRIGL SERPL-MCNC: 207 MG/DL (ref 0–149)

## 2022-12-09 ENCOUNTER — APPOINTMENT (OUTPATIENT)
Dept: RADIOLOGY | Facility: MEDICAL CENTER | Age: 72
End: 2022-12-09
Attending: PHYSICIAN ASSISTANT
Payer: MEDICARE

## 2022-12-09 ENCOUNTER — APPOINTMENT (OUTPATIENT)
Dept: RADIOLOGY | Facility: MEDICAL CENTER | Age: 72
End: 2022-12-09
Attending: INTERNAL MEDICINE
Payer: MEDICARE

## 2022-12-09 DIAGNOSIS — N20.0 CALCULUS OF KIDNEY: ICD-10-CM

## 2022-12-09 DIAGNOSIS — C34.12 MALIGNANT NEOPLASM OF UPPER LOBE OF LEFT LUNG (HCC): ICD-10-CM

## 2022-12-09 PROCEDURE — 70551 MRI BRAIN STEM W/O DYE: CPT

## 2022-12-09 PROCEDURE — 74018 RADEX ABDOMEN 1 VIEW: CPT

## 2022-12-20 ENCOUNTER — HOSPITAL ENCOUNTER (OUTPATIENT)
Dept: RADIOLOGY | Facility: MEDICAL CENTER | Age: 72
End: 2022-12-20
Attending: INTERNAL MEDICINE
Payer: MEDICARE

## 2022-12-20 DIAGNOSIS — C34.12 MALIGNANT NEOPLASM OF UPPER LOBE OF LEFT LUNG (HCC): ICD-10-CM

## 2022-12-20 PROCEDURE — A9552 F18 FDG: HCPCS

## 2023-01-01 ENCOUNTER — HOME CARE VISIT (OUTPATIENT)
Dept: HOSPICE | Facility: HOSPICE | Age: 73
End: 2023-01-01
Payer: MEDICARE

## 2023-01-01 ENCOUNTER — APPOINTMENT (OUTPATIENT)
Dept: RADIOLOGY | Facility: MEDICAL CENTER | Age: 73
DRG: 391 | End: 2023-01-01
Attending: HOSPITALIST
Payer: MEDICARE

## 2023-01-01 ENCOUNTER — PHARMACY VISIT (OUTPATIENT)
Dept: PHARMACY | Facility: MEDICAL CENTER | Age: 73
End: 2023-01-01
Payer: COMMERCIAL

## 2023-01-01 ENCOUNTER — HOSPITAL ENCOUNTER (OUTPATIENT)
Facility: MEDICAL CENTER | Age: 73
End: 2023-03-16
Payer: MEDICARE

## 2023-01-01 ENCOUNTER — HOSPITAL ENCOUNTER (OUTPATIENT)
Dept: RADIOLOGY | Facility: MEDICAL CENTER | Age: 73
End: 2023-06-21
Attending: INTERNAL MEDICINE
Payer: MEDICARE

## 2023-01-01 ENCOUNTER — APPOINTMENT (OUTPATIENT)
Dept: MEDICAL GROUP | Facility: MEDICAL CENTER | Age: 73
End: 2023-01-01
Payer: MEDICARE

## 2023-01-01 ENCOUNTER — HOSPITAL ENCOUNTER (OUTPATIENT)
Dept: RADIATION ONCOLOGY | Facility: MEDICAL CENTER | Age: 73
End: 2023-03-31
Attending: RADIOLOGY
Payer: MEDICARE

## 2023-01-01 ENCOUNTER — APPOINTMENT (OUTPATIENT)
Dept: RADIOLOGY | Facility: MEDICAL CENTER | Age: 73
DRG: 641 | End: 2023-01-01
Attending: INTERNAL MEDICINE
Payer: MEDICARE

## 2023-01-01 ENCOUNTER — APPOINTMENT (OUTPATIENT)
Dept: RADIOLOGY | Facility: MEDICAL CENTER | Age: 73
DRG: 641 | End: 2023-01-01
Attending: HOSPITALIST
Payer: MEDICARE

## 2023-01-01 ENCOUNTER — APPOINTMENT (OUTPATIENT)
Dept: RADIOLOGY | Facility: MEDICAL CENTER | Age: 73
DRG: 391 | End: 2023-01-01
Attending: INTERNAL MEDICINE
Payer: MEDICARE

## 2023-01-01 ENCOUNTER — OFFICE VISIT (OUTPATIENT)
Dept: MEDICAL GROUP | Facility: MEDICAL CENTER | Age: 73
End: 2023-01-01
Payer: MEDICARE

## 2023-01-01 ENCOUNTER — APPOINTMENT (OUTPATIENT)
Dept: RADIOLOGY | Facility: MEDICAL CENTER | Age: 73
DRG: 640 | End: 2023-01-01
Attending: INTERNAL MEDICINE
Payer: MEDICARE

## 2023-01-01 ENCOUNTER — APPOINTMENT (OUTPATIENT)
Dept: RADIOLOGY | Facility: MEDICAL CENTER | Age: 73
DRG: 391 | End: 2023-01-01
Attending: EMERGENCY MEDICINE
Payer: MEDICARE

## 2023-01-01 ENCOUNTER — HOSPITAL ENCOUNTER (OUTPATIENT)
Dept: LAB | Facility: MEDICAL CENTER | Age: 73
End: 2023-02-09
Payer: MEDICARE

## 2023-01-01 ENCOUNTER — PATIENT MESSAGE (OUTPATIENT)
Dept: HEALTH INFORMATION MANAGEMENT | Facility: OTHER | Age: 73
End: 2023-01-01

## 2023-01-01 ENCOUNTER — APPOINTMENT (OUTPATIENT)
Dept: RADIOLOGY | Facility: MEDICAL CENTER | Age: 73
DRG: 640 | End: 2023-01-01
Attending: EMERGENCY MEDICINE
Payer: MEDICARE

## 2023-01-01 ENCOUNTER — TELEPHONE (OUTPATIENT)
Dept: ENDOCRINOLOGY | Facility: MEDICAL CENTER | Age: 73
End: 2023-01-01

## 2023-01-01 ENCOUNTER — NON-PROVIDER VISIT (OUTPATIENT)
Dept: MEDICAL GROUP | Facility: MEDICAL CENTER | Age: 73
End: 2023-01-01
Payer: MEDICARE

## 2023-01-01 ENCOUNTER — PHARMACY VISIT (OUTPATIENT)
Dept: PHARMACY | Facility: MEDICAL CENTER | Age: 73
End: 2023-01-01

## 2023-01-01 ENCOUNTER — HOSPITAL ENCOUNTER (OUTPATIENT)
Dept: LAB | Facility: MEDICAL CENTER | Age: 73
DRG: 641 | End: 2023-03-23
Attending: NURSE PRACTITIONER
Payer: MEDICARE

## 2023-01-01 ENCOUNTER — HOSPITAL ENCOUNTER (INPATIENT)
Facility: MEDICAL CENTER | Age: 73
LOS: 8 days | DRG: 391 | End: 2023-04-11
Attending: EMERGENCY MEDICINE | Admitting: INTERNAL MEDICINE
Payer: MEDICARE

## 2023-01-01 ENCOUNTER — PATIENT OUTREACH (OUTPATIENT)
Dept: SCHEDULING | Facility: IMAGING CENTER | Age: 73
End: 2023-01-01
Payer: MEDICARE

## 2023-01-01 ENCOUNTER — PATIENT OUTREACH (OUTPATIENT)
Dept: ONCOLOGY | Facility: MEDICAL CENTER | Age: 73
End: 2023-01-01
Payer: MEDICARE

## 2023-01-01 ENCOUNTER — PATIENT OUTREACH (OUTPATIENT)
Dept: MEDICAL GROUP | Facility: MEDICAL CENTER | Age: 73
End: 2023-01-01
Payer: MEDICARE

## 2023-01-01 ENCOUNTER — HOSPITAL ENCOUNTER (OUTPATIENT)
Dept: LAB | Facility: MEDICAL CENTER | Age: 73
End: 2023-06-14
Attending: INTERNAL MEDICINE
Payer: MEDICARE

## 2023-01-01 ENCOUNTER — HOSPITAL ENCOUNTER (OUTPATIENT)
Dept: RADIATION ONCOLOGY | Facility: MEDICAL CENTER | Age: 73
End: 2023-01-24

## 2023-01-01 ENCOUNTER — HOSPITAL ENCOUNTER (OUTPATIENT)
Dept: RADIATION ONCOLOGY | Facility: MEDICAL CENTER | Age: 73
End: 2023-01-12

## 2023-01-01 ENCOUNTER — HOSPITAL ENCOUNTER (OUTPATIENT)
Facility: MEDICAL CENTER | Age: 73
End: 2023-07-26
Attending: INTERNAL MEDICINE
Payer: MEDICARE

## 2023-01-01 ENCOUNTER — HOSPITAL ENCOUNTER (INPATIENT)
Facility: MEDICAL CENTER | Age: 73
LOS: 6 days | DRG: 641 | End: 2023-03-30
Attending: EMERGENCY MEDICINE | Admitting: INTERNAL MEDICINE
Payer: MEDICARE

## 2023-01-01 ENCOUNTER — HOSPITAL ENCOUNTER (OUTPATIENT)
Dept: RADIOLOGY | Facility: MEDICAL CENTER | Age: 73
End: 2023-07-21
Attending: INTERNAL MEDICINE
Payer: MEDICARE

## 2023-01-01 ENCOUNTER — HOSPITAL ENCOUNTER (OUTPATIENT)
Dept: RADIATION ONCOLOGY | Facility: MEDICAL CENTER | Age: 73
End: 2023-01-25

## 2023-01-01 ENCOUNTER — APPOINTMENT (OUTPATIENT)
Dept: RADIOLOGY | Facility: MEDICAL CENTER | Age: 73
DRG: 641 | End: 2023-01-01
Attending: EMERGENCY MEDICINE
Payer: MEDICARE

## 2023-01-01 ENCOUNTER — TELEPHONE (OUTPATIENT)
Dept: SLEEP MEDICINE | Facility: MEDICAL CENTER | Age: 73
End: 2023-01-01
Payer: MEDICARE

## 2023-01-01 ENCOUNTER — HOSPITAL ENCOUNTER (OUTPATIENT)
Facility: MEDICAL CENTER | Age: 73
End: 2023-03-16
Attending: NURSE PRACTITIONER
Payer: MEDICARE

## 2023-01-01 ENCOUNTER — HOSPITAL ENCOUNTER (INPATIENT)
Facility: MEDICAL CENTER | Age: 73
LOS: 7 days | DRG: 640 | End: 2023-07-02
Attending: EMERGENCY MEDICINE | Admitting: INTERNAL MEDICINE
Payer: MEDICARE

## 2023-01-01 ENCOUNTER — TELEPHONE (OUTPATIENT)
Dept: ENDOCRINOLOGY | Facility: MEDICAL CENTER | Age: 73
End: 2023-01-01
Payer: MEDICARE

## 2023-01-01 ENCOUNTER — PATIENT OUTREACH (OUTPATIENT)
Dept: ONCOLOGY | Facility: MEDICAL CENTER | Age: 73
End: 2023-01-01

## 2023-01-01 ENCOUNTER — APPOINTMENT (OUTPATIENT)
Dept: RADIOLOGY | Facility: MEDICAL CENTER | Age: 73
DRG: 391 | End: 2023-01-01
Attending: NURSE PRACTITIONER
Payer: MEDICARE

## 2023-01-01 ENCOUNTER — HOSPITAL ENCOUNTER (OUTPATIENT)
Dept: RADIATION ONCOLOGY | Facility: MEDICAL CENTER | Age: 73
End: 2023-01-20

## 2023-01-01 ENCOUNTER — TELEPHONE (OUTPATIENT)
Dept: MEDICAL GROUP | Facility: MEDICAL CENTER | Age: 73
End: 2023-01-01
Payer: MEDICARE

## 2023-01-01 ENCOUNTER — TELEPHONE (OUTPATIENT)
Dept: HEALTH INFORMATION MANAGEMENT | Facility: OTHER | Age: 73
End: 2023-01-01

## 2023-01-01 ENCOUNTER — HOSPITAL ENCOUNTER (INPATIENT)
Facility: MEDICAL CENTER | Age: 73
LOS: 3 days | DRG: 641 | End: 2023-07-30
Attending: EMERGENCY MEDICINE | Admitting: HOSPITALIST
Payer: MEDICARE

## 2023-01-01 ENCOUNTER — HOSPITAL ENCOUNTER (OUTPATIENT)
Dept: RADIATION ONCOLOGY | Facility: MEDICAL CENTER | Age: 73
End: 2023-01-19

## 2023-01-01 ENCOUNTER — APPOINTMENT (OUTPATIENT)
Dept: ONCOLOGY | Facility: MEDICAL CENTER | Age: 73
End: 2023-01-01
Attending: INTERNAL MEDICINE
Payer: MEDICARE

## 2023-01-01 ENCOUNTER — HOSPICE ADMISSION (OUTPATIENT)
Dept: HOSPICE | Facility: HOSPICE | Age: 73
End: 2023-01-01
Payer: MEDICARE

## 2023-01-01 ENCOUNTER — HOSPITAL ENCOUNTER (OUTPATIENT)
Dept: LAB | Facility: MEDICAL CENTER | Age: 73
DRG: 641 | End: 2023-03-23
Attending: STUDENT IN AN ORGANIZED HEALTH CARE EDUCATION/TRAINING PROGRAM
Payer: MEDICARE

## 2023-01-01 ENCOUNTER — APPOINTMENT (OUTPATIENT)
Dept: RADIOLOGY | Facility: MEDICAL CENTER | Age: 73
End: 2023-01-01
Attending: EMERGENCY MEDICINE
Payer: MEDICARE

## 2023-01-01 ENCOUNTER — HOSPITAL ENCOUNTER (OUTPATIENT)
Dept: RADIATION ONCOLOGY | Facility: MEDICAL CENTER | Age: 73
End: 2023-01-23

## 2023-01-01 ENCOUNTER — HOSPITAL ENCOUNTER (EMERGENCY)
Facility: MEDICAL CENTER | Age: 73
End: 2023-03-31
Attending: EMERGENCY MEDICINE
Payer: MEDICARE

## 2023-01-01 ENCOUNTER — OFFICE VISIT (OUTPATIENT)
Dept: SLEEP MEDICINE | Facility: MEDICAL CENTER | Age: 73
End: 2023-01-01
Payer: MEDICARE

## 2023-01-01 ENCOUNTER — HOSPITAL ENCOUNTER (INPATIENT)
Facility: MEDICAL CENTER | Age: 73
LOS: 3 days | DRG: 640 | End: 2023-10-04
Attending: EMERGENCY MEDICINE | Admitting: HOSPITALIST
Payer: MEDICARE

## 2023-01-01 ENCOUNTER — HOSPITAL ENCOUNTER (OUTPATIENT)
Dept: RADIATION ONCOLOGY | Facility: MEDICAL CENTER | Age: 73
End: 2023-01-31
Attending: RADIOLOGY
Payer: MEDICARE

## 2023-01-01 VITALS — SYSTOLIC BLOOD PRESSURE: 128 MMHG | DIASTOLIC BLOOD PRESSURE: 68 MMHG | HEART RATE: 72 BPM | RESPIRATION RATE: 16 BRPM

## 2023-01-01 VITALS — HEART RATE: 68 BPM | RESPIRATION RATE: 20 BRPM | DIASTOLIC BLOOD PRESSURE: 58 MMHG | SYSTOLIC BLOOD PRESSURE: 82 MMHG

## 2023-01-01 VITALS — DIASTOLIC BLOOD PRESSURE: 70 MMHG | RESPIRATION RATE: 12 BRPM | SYSTOLIC BLOOD PRESSURE: 138 MMHG | HEART RATE: 84 BPM

## 2023-01-01 VITALS
BODY MASS INDEX: 34.32 KG/M2 | SYSTOLIC BLOOD PRESSURE: 104 MMHG | OXYGEN SATURATION: 94 % | WEIGHT: 206 LBS | DIASTOLIC BLOOD PRESSURE: 64 MMHG | TEMPERATURE: 97.2 F | HEART RATE: 111 BPM | HEIGHT: 65 IN

## 2023-01-01 VITALS — DIASTOLIC BLOOD PRESSURE: 62 MMHG | HEART RATE: 80 BPM | RESPIRATION RATE: 24 BRPM | SYSTOLIC BLOOD PRESSURE: 110 MMHG

## 2023-01-01 VITALS
SYSTOLIC BLOOD PRESSURE: 128 MMHG | TEMPERATURE: 97.4 F | HEART RATE: 110 BPM | HEIGHT: 66 IN | RESPIRATION RATE: 20 BRPM | DIASTOLIC BLOOD PRESSURE: 68 MMHG | WEIGHT: 218.92 LBS | OXYGEN SATURATION: 98 % | BODY MASS INDEX: 35.18 KG/M2

## 2023-01-01 VITALS
SYSTOLIC BLOOD PRESSURE: 122 MMHG | RESPIRATION RATE: 16 BRPM | TEMPERATURE: 98.2 F | DIASTOLIC BLOOD PRESSURE: 64 MMHG | HEART RATE: 76 BPM

## 2023-01-01 VITALS — HEART RATE: 68 BPM | DIASTOLIC BLOOD PRESSURE: 60 MMHG | RESPIRATION RATE: 12 BRPM | SYSTOLIC BLOOD PRESSURE: 118 MMHG

## 2023-01-01 VITALS — SYSTOLIC BLOOD PRESSURE: 120 MMHG | DIASTOLIC BLOOD PRESSURE: 58 MMHG | RESPIRATION RATE: 12 BRPM | HEART RATE: 64 BPM

## 2023-01-01 VITALS — HEART RATE: 80 BPM | RESPIRATION RATE: 16 BRPM

## 2023-01-01 VITALS — SYSTOLIC BLOOD PRESSURE: 102 MMHG | RESPIRATION RATE: 20 BRPM | HEART RATE: 72 BPM | DIASTOLIC BLOOD PRESSURE: 62 MMHG

## 2023-01-01 VITALS — RESPIRATION RATE: 16 BRPM | DIASTOLIC BLOOD PRESSURE: 60 MMHG | SYSTOLIC BLOOD PRESSURE: 100 MMHG | HEART RATE: 72 BPM

## 2023-01-01 VITALS
SYSTOLIC BLOOD PRESSURE: 107 MMHG | RESPIRATION RATE: 24 BRPM | HEIGHT: 65 IN | OXYGEN SATURATION: 94 % | DIASTOLIC BLOOD PRESSURE: 66 MMHG | TEMPERATURE: 97.2 F | WEIGHT: 218.26 LBS | HEART RATE: 107 BPM | BODY MASS INDEX: 36.36 KG/M2

## 2023-01-01 VITALS
RESPIRATION RATE: 18 BRPM | OXYGEN SATURATION: 95 % | HEIGHT: 64 IN | HEART RATE: 76 BPM | DIASTOLIC BLOOD PRESSURE: 67 MMHG | BODY MASS INDEX: 37.34 KG/M2 | WEIGHT: 218.7 LBS | SYSTOLIC BLOOD PRESSURE: 134 MMHG | TEMPERATURE: 98 F

## 2023-01-01 VITALS — DIASTOLIC BLOOD PRESSURE: 45 MMHG | RESPIRATION RATE: 16 BRPM | HEART RATE: 64 BPM | SYSTOLIC BLOOD PRESSURE: 70 MMHG

## 2023-01-01 VITALS — RESPIRATION RATE: 20 BRPM | SYSTOLIC BLOOD PRESSURE: 104 MMHG | HEART RATE: 80 BPM | DIASTOLIC BLOOD PRESSURE: 56 MMHG

## 2023-01-01 VITALS
WEIGHT: 215.83 LBS | RESPIRATION RATE: 18 BRPM | SYSTOLIC BLOOD PRESSURE: 141 MMHG | HEIGHT: 65 IN | DIASTOLIC BLOOD PRESSURE: 83 MMHG | HEART RATE: 67 BPM | OXYGEN SATURATION: 90 % | BODY MASS INDEX: 35.96 KG/M2 | TEMPERATURE: 98.6 F

## 2023-01-01 VITALS — HEART RATE: 68 BPM | DIASTOLIC BLOOD PRESSURE: 50 MMHG | RESPIRATION RATE: 12 BRPM | SYSTOLIC BLOOD PRESSURE: 114 MMHG

## 2023-01-01 VITALS — DIASTOLIC BLOOD PRESSURE: 84 MMHG | SYSTOLIC BLOOD PRESSURE: 158 MMHG | RESPIRATION RATE: 24 BRPM | HEART RATE: 92 BPM

## 2023-01-01 VITALS
HEART RATE: 100 BPM | RESPIRATION RATE: 18 BRPM | HEIGHT: 64 IN | TEMPERATURE: 97.7 F | OXYGEN SATURATION: 99 % | DIASTOLIC BLOOD PRESSURE: 83 MMHG | SYSTOLIC BLOOD PRESSURE: 123 MMHG | WEIGHT: 219.14 LBS | BODY MASS INDEX: 37.41 KG/M2

## 2023-01-01 VITALS — HEART RATE: 72 BPM | DIASTOLIC BLOOD PRESSURE: 68 MMHG | RESPIRATION RATE: 20 BRPM | SYSTOLIC BLOOD PRESSURE: 114 MMHG

## 2023-01-01 VITALS
OXYGEN SATURATION: 97 % | HEIGHT: 65 IN | WEIGHT: 206.79 LBS | TEMPERATURE: 98.1 F | BODY MASS INDEX: 34.45 KG/M2 | HEART RATE: 60 BPM | RESPIRATION RATE: 16 BRPM | SYSTOLIC BLOOD PRESSURE: 100 MMHG | DIASTOLIC BLOOD PRESSURE: 77 MMHG

## 2023-01-01 VITALS
BODY MASS INDEX: 34.28 KG/M2 | OXYGEN SATURATION: 95 % | HEART RATE: 94 BPM | DIASTOLIC BLOOD PRESSURE: 74 MMHG | RESPIRATION RATE: 18 BRPM | SYSTOLIC BLOOD PRESSURE: 124 MMHG | HEIGHT: 65 IN

## 2023-01-01 VITALS — HEART RATE: 80 BPM | RESPIRATION RATE: 12 BRPM | DIASTOLIC BLOOD PRESSURE: 56 MMHG | SYSTOLIC BLOOD PRESSURE: 102 MMHG

## 2023-01-01 VITALS — SYSTOLIC BLOOD PRESSURE: 86 MMHG | DIASTOLIC BLOOD PRESSURE: 54 MMHG | HEART RATE: 68 BPM | RESPIRATION RATE: 12 BRPM

## 2023-01-01 VITALS — RESPIRATION RATE: 18 BRPM | HEART RATE: 45 BPM

## 2023-01-01 VITALS — RESPIRATION RATE: 12 BRPM | DIASTOLIC BLOOD PRESSURE: 60 MMHG | SYSTOLIC BLOOD PRESSURE: 120 MMHG | HEART RATE: 84 BPM

## 2023-01-01 VITALS — DIASTOLIC BLOOD PRESSURE: 50 MMHG | SYSTOLIC BLOOD PRESSURE: 102 MMHG | HEART RATE: 64 BPM | RESPIRATION RATE: 20 BRPM

## 2023-01-01 DIAGNOSIS — E86.0 DEHYDRATION: ICD-10-CM

## 2023-01-01 DIAGNOSIS — R59.0 LYMPHADENOPATHY, CERVICAL: ICD-10-CM

## 2023-01-01 DIAGNOSIS — E05.90 HYPERTHYROIDISM: ICD-10-CM

## 2023-01-01 DIAGNOSIS — R06.09 DOE (DYSPNEA ON EXERTION): ICD-10-CM

## 2023-01-01 DIAGNOSIS — R11.2 NAUSEA AND VOMITING, UNSPECIFIED VOMITING TYPE: ICD-10-CM

## 2023-01-01 DIAGNOSIS — G89.3 CANCER ASSOCIATED PAIN: ICD-10-CM

## 2023-01-01 DIAGNOSIS — J44.9 CHRONIC OBSTRUCTIVE PULMONARY DISEASE, UNSPECIFIED COPD TYPE (HCC): Primary | ICD-10-CM

## 2023-01-01 DIAGNOSIS — M54.42 ACUTE BILATERAL LOW BACK PAIN WITH LEFT-SIDED SCIATICA: ICD-10-CM

## 2023-01-01 DIAGNOSIS — I10 PRIMARY HYPERTENSION: ICD-10-CM

## 2023-01-01 DIAGNOSIS — T40.2X5A THERAPEUTIC OPIOID INDUCED CONSTIPATION: ICD-10-CM

## 2023-01-01 DIAGNOSIS — R53.83 OTHER FATIGUE: Chronic | ICD-10-CM

## 2023-01-01 DIAGNOSIS — C34.90 RECURRENT NON-SMALL CELL LUNG CANCER (HCC): ICD-10-CM

## 2023-01-01 DIAGNOSIS — I82.4Y9 DEEP VEIN THROMBOSIS (DVT) OF PROXIMAL LOWER EXTREMITY, UNSPECIFIED CHRONICITY, UNSPECIFIED LATERALITY (HCC): ICD-10-CM

## 2023-01-01 DIAGNOSIS — E83.52 HYPERCALCEMIA: ICD-10-CM

## 2023-01-01 DIAGNOSIS — M81.0 POSTMENOPAUSAL OSTEOPOROSIS: ICD-10-CM

## 2023-01-01 DIAGNOSIS — E21.3 HYPERPARATHYROIDISM (HCC): ICD-10-CM

## 2023-01-01 DIAGNOSIS — R11.0 NAUSEA: ICD-10-CM

## 2023-01-01 DIAGNOSIS — E87.5 HYPERKALEMIA: ICD-10-CM

## 2023-01-01 DIAGNOSIS — G89.29 CHRONIC BILATERAL LOW BACK PAIN WITHOUT SCIATICA: ICD-10-CM

## 2023-01-01 DIAGNOSIS — J44.9 CHRONIC OBSTRUCTIVE PULMONARY DISEASE, UNSPECIFIED COPD TYPE (HCC): ICD-10-CM

## 2023-01-01 DIAGNOSIS — Z00.00 PREVENTATIVE HEALTH CARE: ICD-10-CM

## 2023-01-01 DIAGNOSIS — N17.9 ACUTE RENAL FAILURE, UNSPECIFIED ACUTE RENAL FAILURE TYPE (HCC): ICD-10-CM

## 2023-01-01 DIAGNOSIS — E21.3 HYPERPARATHYROIDISM (HCC): Chronic | ICD-10-CM

## 2023-01-01 DIAGNOSIS — G89.29 CHRONIC PAIN OF LEFT KNEE: Chronic | ICD-10-CM

## 2023-01-01 DIAGNOSIS — H34.8192 STABLE CENTRAL RETINAL VEIN OCCLUSION, UNSPECIFIED LATERALITY: ICD-10-CM

## 2023-01-01 DIAGNOSIS — Z51.5 HOSPICE CARE: ICD-10-CM

## 2023-01-01 DIAGNOSIS — K59.03 THERAPEUTIC OPIOID INDUCED CONSTIPATION: ICD-10-CM

## 2023-01-01 DIAGNOSIS — Z79.01 CHRONIC ANTICOAGULATION: ICD-10-CM

## 2023-01-01 DIAGNOSIS — C34.90 RECURRENT NON-SMALL CELL LUNG CANCER (HCC): Primary | ICD-10-CM

## 2023-01-01 DIAGNOSIS — Z51.81 ENCOUNTER FOR THERAPEUTIC DRUG LEVEL MONITORING: ICD-10-CM

## 2023-01-01 DIAGNOSIS — M25.562 CHRONIC PAIN OF LEFT KNEE: Chronic | ICD-10-CM

## 2023-01-01 DIAGNOSIS — C34.12 MALIGNANT NEOPLASM OF UPPER LOBE BRONCHUS, LEFT (HCC): ICD-10-CM

## 2023-01-01 DIAGNOSIS — C34.12 SQUAMOUS CELL CARCINOMA OF BRONCHUS IN LEFT UPPER LOBE (HCC): ICD-10-CM

## 2023-01-01 DIAGNOSIS — G47.09 OTHER INSOMNIA: Chronic | ICD-10-CM

## 2023-01-01 DIAGNOSIS — R53.1 WEAKNESS: ICD-10-CM

## 2023-01-01 DIAGNOSIS — K52.9 ACUTE COLITIS: ICD-10-CM

## 2023-01-01 DIAGNOSIS — J96.11 CHRONIC RESPIRATORY FAILURE WITH HYPOXIA (HCC): ICD-10-CM

## 2023-01-01 DIAGNOSIS — Z12.31 ENCOUNTER FOR SCREENING MAMMOGRAM FOR BREAST CANCER: ICD-10-CM

## 2023-01-01 DIAGNOSIS — M54.50 CHRONIC BILATERAL LOW BACK PAIN WITHOUT SCIATICA: ICD-10-CM

## 2023-01-01 DIAGNOSIS — M27.2 ODONTOGENIC INFECTION OF JAW: ICD-10-CM

## 2023-01-01 DIAGNOSIS — C34.12 MALIGNANT NEOPLASM OF UPPER LOBE OF LEFT LUNG (HCC): ICD-10-CM

## 2023-01-01 DIAGNOSIS — Z87.891 FORMER SMOKER: ICD-10-CM

## 2023-01-01 DIAGNOSIS — K59.03 DRUG-INDUCED CONSTIPATION: ICD-10-CM

## 2023-01-01 LAB
1,25(OH)2D3 SERPL-MCNC: 61.4 PG/ML (ref 19.9–79.3)
1,25(OH)2D3 SERPL-MCNC: 64.3 PG/ML (ref 19.9–79.3)
25(OH)D3 SERPL-MCNC: 96 NG/ML (ref 30–100)
ACTH PLAS-MCNC: 7.7 PG/ML (ref 7.2–63.3)
ALBUMIN SERPL BCP-MCNC: 1.9 G/DL (ref 3.2–4.9)
ALBUMIN SERPL BCP-MCNC: 2.2 G/DL (ref 3.2–4.9)
ALBUMIN SERPL BCP-MCNC: 2.2 G/DL (ref 3.2–4.9)
ALBUMIN SERPL BCP-MCNC: 2.4 G/DL (ref 3.2–4.9)
ALBUMIN SERPL BCP-MCNC: 2.5 G/DL (ref 3.2–4.9)
ALBUMIN SERPL BCP-MCNC: 2.6 G/DL (ref 3.2–4.9)
ALBUMIN SERPL BCP-MCNC: 2.7 G/DL (ref 3.2–4.9)
ALBUMIN SERPL BCP-MCNC: 2.8 G/DL (ref 3.2–4.9)
ALBUMIN SERPL BCP-MCNC: 2.8 G/DL (ref 3.2–4.9)
ALBUMIN SERPL BCP-MCNC: 2.9 G/DL (ref 3.2–4.9)
ALBUMIN SERPL BCP-MCNC: 3 G/DL (ref 3.2–4.9)
ALBUMIN SERPL BCP-MCNC: 3 G/DL (ref 3.2–4.9)
ALBUMIN SERPL BCP-MCNC: 3.1 G/DL (ref 3.2–4.9)
ALBUMIN SERPL BCP-MCNC: 3.2 G/DL (ref 3.2–4.9)
ALBUMIN SERPL BCP-MCNC: 3.2 G/DL (ref 3.2–4.9)
ALBUMIN SERPL BCP-MCNC: 3.3 G/DL (ref 3.2–4.9)
ALBUMIN SERPL BCP-MCNC: 3.3 G/DL (ref 3.2–4.9)
ALBUMIN SERPL BCP-MCNC: 3.4 G/DL (ref 3.2–4.9)
ALBUMIN SERPL BCP-MCNC: 3.4 G/DL (ref 3.2–4.9)
ALBUMIN SERPL BCP-MCNC: 3.5 G/DL (ref 3.2–4.9)
ALBUMIN SERPL BCP-MCNC: 3.6 G/DL (ref 3.2–4.9)
ALBUMIN SERPL ELPH-MCNC: 2.16 G/DL (ref 3.75–5.01)
ALBUMIN/GLOB SERPL: 0.8 G/DL
ALBUMIN/GLOB SERPL: 0.8 G/DL
ALBUMIN/GLOB SERPL: 1 G/DL
ALBUMIN/GLOB SERPL: 1.1 G/DL
ALBUMIN/GLOB SERPL: 1.2 G/DL
ALBUMIN/GLOB SERPL: 1.3 G/DL
ALBUMIN/GLOB SERPL: 1.3 G/DL
ALBUMIN/GLOB SERPL: 1.4 G/DL
ALBUMIN/GLOB SERPL: 1.5 G/DL
ALBUMIN/GLOB SERPL: 1.6 G/DL
ALBUMIN/GLOB SERPL: 1.6 G/DL
ALBUMIN/GLOB SERPL: 1.7 G/DL
ALP SERPL-CCNC: 108 U/L (ref 30–99)
ALP SERPL-CCNC: 115 U/L (ref 30–99)
ALP SERPL-CCNC: 117 U/L (ref 30–99)
ALP SERPL-CCNC: 117 U/L (ref 30–99)
ALP SERPL-CCNC: 118 U/L (ref 30–99)
ALP SERPL-CCNC: 119 U/L (ref 30–99)
ALP SERPL-CCNC: 124 U/L (ref 30–99)
ALP SERPL-CCNC: 125 U/L (ref 30–99)
ALP SERPL-CCNC: 129 U/L (ref 30–99)
ALP SERPL-CCNC: 130 U/L (ref 30–99)
ALP SERPL-CCNC: 130 U/L (ref 30–99)
ALP SERPL-CCNC: 131 U/L (ref 30–99)
ALP SERPL-CCNC: 133 U/L (ref 30–99)
ALP SERPL-CCNC: 139 U/L (ref 30–99)
ALP SERPL-CCNC: 140 U/L (ref 30–99)
ALP SERPL-CCNC: 145 U/L (ref 30–99)
ALP SERPL-CCNC: 146 U/L (ref 30–99)
ALP SERPL-CCNC: 148 U/L (ref 30–99)
ALP SERPL-CCNC: 149 U/L (ref 30–99)
ALP SERPL-CCNC: 155 U/L (ref 30–99)
ALP SERPL-CCNC: 159 U/L (ref 30–99)
ALP SERPL-CCNC: 159 U/L (ref 30–99)
ALP SERPL-CCNC: 172 U/L (ref 30–99)
ALP SERPL-CCNC: 181 U/L (ref 30–99)
ALP SERPL-CCNC: 87 U/L (ref 30–99)
ALP SERPL-CCNC: 95 U/L (ref 30–99)
ALPHA1 GLOB SERPL ELPH-MCNC: 0.54 G/DL (ref 0.19–0.46)
ALPHA2 GLOB SERPL ELPH-MCNC: 1.09 G/DL (ref 0.48–1.05)
ALT SERPL-CCNC: 11 U/L (ref 2–50)
ALT SERPL-CCNC: 11 U/L (ref 2–50)
ALT SERPL-CCNC: 12 U/L (ref 2–50)
ALT SERPL-CCNC: 13 U/L (ref 2–50)
ALT SERPL-CCNC: 13 U/L (ref 2–50)
ALT SERPL-CCNC: 14 U/L (ref 2–50)
ALT SERPL-CCNC: 15 U/L (ref 2–50)
ALT SERPL-CCNC: 15 U/L (ref 2–50)
ALT SERPL-CCNC: 19 U/L (ref 2–50)
ALT SERPL-CCNC: 19 U/L (ref 2–50)
ALT SERPL-CCNC: 20 U/L (ref 2–50)
ALT SERPL-CCNC: 24 U/L (ref 2–50)
ALT SERPL-CCNC: 26 U/L (ref 2–50)
ALT SERPL-CCNC: 26 U/L (ref 2–50)
ALT SERPL-CCNC: 27 U/L (ref 2–50)
ALT SERPL-CCNC: 28 U/L (ref 2–50)
ALT SERPL-CCNC: 29 U/L (ref 2–50)
ALT SERPL-CCNC: 31 U/L (ref 2–50)
ALT SERPL-CCNC: 34 U/L (ref 2–50)
ALT SERPL-CCNC: 39 U/L (ref 2–50)
ALT SERPL-CCNC: 48 U/L (ref 2–50)
ALT SERPL-CCNC: 5 U/L (ref 2–50)
ALT SERPL-CCNC: 5 U/L (ref 2–50)
ALT SERPL-CCNC: 56 U/L (ref 2–50)
ALT SERPL-CCNC: 6 U/L (ref 2–50)
ALT SERPL-CCNC: 7 U/L (ref 2–50)
ANION GAP SERPL CALC-SCNC: 10 MMOL/L (ref 7–16)
ANION GAP SERPL CALC-SCNC: 11 MMOL/L (ref 7–16)
ANION GAP SERPL CALC-SCNC: 12 MMOL/L (ref 7–16)
ANION GAP SERPL CALC-SCNC: 13 MMOL/L (ref 7–16)
ANION GAP SERPL CALC-SCNC: 14 MMOL/L (ref 7–16)
ANION GAP SERPL CALC-SCNC: 15 MMOL/L (ref 7–16)
ANION GAP SERPL CALC-SCNC: 7 MMOL/L (ref 7–16)
ANION GAP SERPL CALC-SCNC: 8 MMOL/L (ref 7–16)
ANION GAP SERPL CALC-SCNC: 9 MMOL/L (ref 7–16)
ANISOCYTOSIS BLD QL SMEAR: ABNORMAL
APPEARANCE UR: ABNORMAL
APPEARANCE UR: CLEAR
APPEARANCE UR: CLEAR
AST SERPL-CCNC: 11 U/L (ref 12–45)
AST SERPL-CCNC: 12 U/L (ref 12–45)
AST SERPL-CCNC: 14 U/L (ref 12–45)
AST SERPL-CCNC: 15 U/L (ref 12–45)
AST SERPL-CCNC: 16 U/L (ref 12–45)
AST SERPL-CCNC: 16 U/L (ref 12–45)
AST SERPL-CCNC: 17 U/L (ref 12–45)
AST SERPL-CCNC: 19 U/L (ref 12–45)
AST SERPL-CCNC: 22 U/L (ref 12–45)
AST SERPL-CCNC: 23 U/L (ref 12–45)
AST SERPL-CCNC: 24 U/L (ref 12–45)
AST SERPL-CCNC: 26 U/L (ref 12–45)
AST SERPL-CCNC: 27 U/L (ref 12–45)
AST SERPL-CCNC: 31 U/L (ref 12–45)
AST SERPL-CCNC: 33 U/L (ref 12–45)
AST SERPL-CCNC: 33 U/L (ref 12–45)
AST SERPL-CCNC: 35 U/L (ref 12–45)
AST SERPL-CCNC: 71 U/L (ref 12–45)
AST SERPL-CCNC: 86 U/L (ref 12–45)
B-GLOBULIN SERPL ELPH-MCNC: 0.6 G/DL (ref 0.48–1.1)
BACTERIA #/AREA URNS HPF: ABNORMAL /HPF
BACTERIA BLD CULT: NORMAL
BACTERIA BLD CULT: NORMAL
BACTERIA UR CULT: NORMAL
BASOPHILS # BLD AUTO: 0 % (ref 0–1.8)
BASOPHILS # BLD AUTO: 0 % (ref 0–1.8)
BASOPHILS # BLD AUTO: 0.2 % (ref 0–1.8)
BASOPHILS # BLD AUTO: 0.2 % (ref 0–1.8)
BASOPHILS # BLD AUTO: 0.3 % (ref 0–1.8)
BASOPHILS # BLD AUTO: 0.3 % (ref 0–1.8)
BASOPHILS # BLD AUTO: 0.4 % (ref 0–1.8)
BASOPHILS # BLD AUTO: 0.5 % (ref 0–1.8)
BASOPHILS # BLD AUTO: 0.6 % (ref 0–1.8)
BASOPHILS # BLD: 0 K/UL (ref 0–0.12)
BASOPHILS # BLD: 0 K/UL (ref 0–0.12)
BASOPHILS # BLD: 0.01 K/UL (ref 0–0.12)
BASOPHILS # BLD: 0.02 K/UL (ref 0–0.12)
BASOPHILS # BLD: 0.03 K/UL (ref 0–0.12)
BASOPHILS # BLD: 0.04 K/UL (ref 0–0.12)
BILIRUB SERPL-MCNC: 0.2 MG/DL (ref 0.1–1.5)
BILIRUB SERPL-MCNC: 0.3 MG/DL (ref 0.1–1.5)
BILIRUB SERPL-MCNC: 0.4 MG/DL (ref 0.1–1.5)
BILIRUB SERPL-MCNC: 0.7 MG/DL (ref 0.1–1.5)
BILIRUB SERPL-MCNC: <0.2 MG/DL (ref 0.1–1.5)
BILIRUB UR QL STRIP.AUTO: NEGATIVE
BLOOD CULTURE HOLD CXBCH: NORMAL
BUN SERPL-MCNC: 20 MG/DL (ref 8–22)
BUN SERPL-MCNC: 22 MG/DL (ref 8–22)
BUN SERPL-MCNC: 23 MG/DL (ref 8–22)
BUN SERPL-MCNC: 27 MG/DL (ref 8–22)
BUN SERPL-MCNC: 27 MG/DL (ref 8–22)
BUN SERPL-MCNC: 32 MG/DL (ref 8–22)
BUN SERPL-MCNC: 33 MG/DL (ref 8–22)
BUN SERPL-MCNC: 34 MG/DL (ref 8–22)
BUN SERPL-MCNC: 36 MG/DL (ref 8–22)
BUN SERPL-MCNC: 36 MG/DL (ref 8–22)
BUN SERPL-MCNC: 37 MG/DL (ref 8–22)
BUN SERPL-MCNC: 38 MG/DL (ref 8–22)
BUN SERPL-MCNC: 40 MG/DL (ref 8–22)
BUN SERPL-MCNC: 41 MG/DL (ref 8–22)
BUN SERPL-MCNC: 41 MG/DL (ref 8–22)
BUN SERPL-MCNC: 42 MG/DL (ref 8–22)
BUN SERPL-MCNC: 42 MG/DL (ref 8–22)
BUN SERPL-MCNC: 43 MG/DL (ref 8–22)
BUN SERPL-MCNC: 46 MG/DL (ref 8–22)
BUN SERPL-MCNC: 48 MG/DL (ref 8–22)
BUN SERPL-MCNC: 49 MG/DL (ref 8–22)
BUN SERPL-MCNC: 50 MG/DL (ref 8–22)
BUN SERPL-MCNC: 51 MG/DL (ref 8–22)
BUN SERPL-MCNC: 53 MG/DL (ref 8–22)
BUN SERPL-MCNC: 54 MG/DL (ref 8–22)
BUN SERPL-MCNC: 54 MG/DL (ref 8–22)
BUN SERPL-MCNC: 56 MG/DL (ref 8–22)
BUN SERPL-MCNC: 57 MG/DL (ref 8–22)
BUN SERPL-MCNC: 61 MG/DL (ref 8–22)
BUN SERPL-MCNC: 70 MG/DL (ref 8–22)
C DIFF DNA SPEC QL NAA+PROBE: NEGATIVE
C DIFF TOX GENS STL QL NAA+PROBE: NEGATIVE
C PEPTIDE SERPL-MCNC: 5.9 NG/ML (ref 0.5–3.3)
CA-I SERPL-SCNC: 1.25 MMOL/L (ref 1.1–1.3)
CA-I SERPL-SCNC: 1.36 MMOL/L (ref 1.1–1.3)
CA-I SERPL-SCNC: 1.38 MMOL/L (ref 1.1–1.3)
CA-I SERPL-SCNC: 1.45 MMOL/L (ref 1.1–1.3)
CA-I SERPL-SCNC: 1.46 MMOL/L (ref 1.1–1.3)
CA-I SERPL-SCNC: 1.49 MMOL/L (ref 1.1–1.3)
CALCIUM ALBUM COR SERPL-MCNC: 10 MG/DL (ref 8.5–10.5)
CALCIUM ALBUM COR SERPL-MCNC: 10.1 MG/DL (ref 8.5–10.5)
CALCIUM ALBUM COR SERPL-MCNC: 10.2 MG/DL (ref 8.5–10.5)
CALCIUM ALBUM COR SERPL-MCNC: 10.6 MG/DL (ref 8.5–10.5)
CALCIUM ALBUM COR SERPL-MCNC: 10.7 MG/DL (ref 8.5–10.5)
CALCIUM ALBUM COR SERPL-MCNC: 11.2 MG/DL (ref 8.5–10.5)
CALCIUM ALBUM COR SERPL-MCNC: 11.3 MG/DL (ref 8.5–10.5)
CALCIUM ALBUM COR SERPL-MCNC: 11.5 MG/DL (ref 8.5–10.5)
CALCIUM ALBUM COR SERPL-MCNC: 11.6 MG/DL (ref 8.5–10.5)
CALCIUM ALBUM COR SERPL-MCNC: 12.1 MG/DL (ref 8.5–10.5)
CALCIUM ALBUM COR SERPL-MCNC: 12.2 MG/DL (ref 8.5–10.5)
CALCIUM ALBUM COR SERPL-MCNC: 12.3 MG/DL (ref 8.5–10.5)
CALCIUM ALBUM COR SERPL-MCNC: 12.4 MG/DL (ref 8.5–10.5)
CALCIUM ALBUM COR SERPL-MCNC: 12.4 MG/DL (ref 8.5–10.5)
CALCIUM ALBUM COR SERPL-MCNC: 12.6 MG/DL (ref 8.5–10.5)
CALCIUM ALBUM COR SERPL-MCNC: 12.7 MG/DL (ref 8.5–10.5)
CALCIUM ALBUM COR SERPL-MCNC: 13 MG/DL (ref 8.5–10.5)
CALCIUM ALBUM COR SERPL-MCNC: 13.1 MG/DL (ref 8.5–10.5)
CALCIUM ALBUM COR SERPL-MCNC: 13.2 MG/DL (ref 8.5–10.5)
CALCIUM ALBUM COR SERPL-MCNC: 13.5 MG/DL (ref 8.5–10.5)
CALCIUM ALBUM COR SERPL-MCNC: 13.6 MG/DL (ref 8.5–10.5)
CALCIUM ALBUM COR SERPL-MCNC: 13.6 MG/DL (ref 8.5–10.5)
CALCIUM ALBUM COR SERPL-MCNC: 14.4 MG/DL (ref 8.5–10.5)
CALCIUM ALBUM COR SERPL-MCNC: 14.8 MG/DL (ref 8.5–10.5)
CALCIUM ALBUM COR SERPL-MCNC: 9.1 MG/DL (ref 8.5–10.5)
CALCIUM ALBUM COR SERPL-MCNC: 9.3 MG/DL (ref 8.5–10.5)
CALCIUM ALBUM COR SERPL-MCNC: 9.8 MG/DL (ref 8.5–10.5)
CALCIUM ALBUM COR SERPL-MCNC: 9.9 MG/DL (ref 8.5–10.5)
CALCIUM SERPL-MCNC: 10.1 MG/DL (ref 8.4–10.2)
CALCIUM SERPL-MCNC: 10.3 MG/DL (ref 8.4–10.2)
CALCIUM SERPL-MCNC: 10.7 MG/DL (ref 8.4–10.2)
CALCIUM SERPL-MCNC: 10.7 MG/DL (ref 8.4–10.2)
CALCIUM SERPL-MCNC: 10.9 MG/DL (ref 8.4–10.2)
CALCIUM SERPL-MCNC: 10.9 MG/DL (ref 8.4–10.2)
CALCIUM SERPL-MCNC: 11.2 MG/DL (ref 8.4–10.2)
CALCIUM SERPL-MCNC: 11.3 MG/DL (ref 8.4–10.2)
CALCIUM SERPL-MCNC: 11.3 MG/DL (ref 8.4–10.2)
CALCIUM SERPL-MCNC: 11.4 MG/DL (ref 8.4–10.2)
CALCIUM SERPL-MCNC: 11.4 MG/DL (ref 8.5–10.5)
CALCIUM SERPL-MCNC: 11.5 MG/DL (ref 8.4–10.2)
CALCIUM SERPL-MCNC: 11.7 MG/DL (ref 8.4–10.2)
CALCIUM SERPL-MCNC: 11.8 MG/DL (ref 8.4–10.2)
CALCIUM SERPL-MCNC: 11.8 MG/DL (ref 8.4–10.2)
CALCIUM SERPL-MCNC: 11.9 MG/DL (ref 8.4–10.2)
CALCIUM SERPL-MCNC: 12 MG/DL (ref 8.4–10.2)
CALCIUM SERPL-MCNC: 12.1 MG/DL (ref 8.4–10.2)
CALCIUM SERPL-MCNC: 12.3 MG/DL (ref 8.4–10.2)
CALCIUM SERPL-MCNC: 12.3 MG/DL (ref 8.4–10.2)
CALCIUM SERPL-MCNC: 12.4 MG/DL (ref 8.4–10.2)
CALCIUM SERPL-MCNC: 12.5 MG/DL (ref 8.4–10.2)
CALCIUM SERPL-MCNC: 12.5 MG/DL (ref 8.5–10.5)
CALCIUM SERPL-MCNC: 12.6 MG/DL (ref 8.4–10.2)
CALCIUM SERPL-MCNC: 12.7 MG/DL (ref 8.4–10.2)
CALCIUM SERPL-MCNC: 12.9 MG/DL (ref 8.4–10.2)
CALCIUM SERPL-MCNC: 13.8 MG/DL (ref 8.4–10.2)
CALCIUM SERPL-MCNC: 14.1 MG/DL (ref 8.4–10.2)
CALCIUM SERPL-MCNC: 7.7 MG/DL (ref 8.4–10.2)
CALCIUM SERPL-MCNC: 8.3 MG/DL (ref 8.4–10.2)
CALCIUM SERPL-MCNC: 8.5 MG/DL (ref 8.4–10.2)
CALCIUM SERPL-MCNC: 8.6 MG/DL (ref 8.4–10.2)
CALCIUM SERPL-MCNC: 8.8 MG/DL (ref 8.4–10.2)
CALCIUM SERPL-MCNC: 9 MG/DL (ref 8.4–10.2)
CALCIUM SERPL-MCNC: 9.1 MG/DL (ref 8.4–10.2)
CALCIUM SERPL-MCNC: 9.3 MG/DL (ref 8.4–10.2)
CALCIUM SERPL-MCNC: 9.5 MG/DL (ref 8.4–10.2)
CALCIUM SERPL-MCNC: 9.9 MG/DL (ref 8.4–10.2)
CALPROTECTIN STL-MCNT: 160 UG/G
CAOX CRY #/AREA URNS HPF: ABNORMAL /HPF
CHEMOTHERAPY INFUSION START DATE: NORMAL
CHEMOTHERAPY INFUSION STOP DATE: NORMAL
CHEMOTHERAPY RECORDS: 10
CHEMOTHERAPY RECORDS: 11
CHEMOTHERAPY RECORDS: 5000
CHEMOTHERAPY RECORDS: 5500
CHEMOTHERAPY RECORDS: NORMAL
CHEMOTHERAPY RX CANCER: NORMAL
CHLORIDE SERPL-SCNC: 100 MMOL/L (ref 96–112)
CHLORIDE SERPL-SCNC: 101 MMOL/L (ref 96–112)
CHLORIDE SERPL-SCNC: 102 MMOL/L (ref 96–112)
CHLORIDE SERPL-SCNC: 106 MMOL/L (ref 96–112)
CHLORIDE SERPL-SCNC: 107 MMOL/L (ref 96–112)
CHLORIDE SERPL-SCNC: 108 MMOL/L (ref 96–112)
CHLORIDE SERPL-SCNC: 109 MMOL/L (ref 96–112)
CHLORIDE SERPL-SCNC: 110 MMOL/L (ref 96–112)
CHLORIDE SERPL-SCNC: 111 MMOL/L (ref 96–112)
CHLORIDE SERPL-SCNC: 112 MMOL/L (ref 96–112)
CHLORIDE SERPL-SCNC: 112 MMOL/L (ref 96–112)
CHLORIDE SERPL-SCNC: 113 MMOL/L (ref 96–112)
CHLORIDE SERPL-SCNC: 113 MMOL/L (ref 96–112)
CHLORIDE SERPL-SCNC: 116 MMOL/L (ref 96–112)
CHLORIDE SERPL-SCNC: 119 MMOL/L (ref 96–112)
CHLORIDE SERPL-SCNC: 91 MMOL/L (ref 96–112)
CHLORIDE SERPL-SCNC: 93 MMOL/L (ref 96–112)
CHLORIDE SERPL-SCNC: 93 MMOL/L (ref 96–112)
CHLORIDE SERPL-SCNC: 97 MMOL/L (ref 96–112)
CHLORIDE SERPL-SCNC: 97 MMOL/L (ref 96–112)
CHLORIDE SERPL-SCNC: 98 MMOL/L (ref 96–112)
CHLORIDE SERPL-SCNC: 98 MMOL/L (ref 96–112)
CHOLEST SERPL-MCNC: 119 MG/DL (ref 100–199)
CO2 SERPL-SCNC: 14 MMOL/L (ref 20–33)
CO2 SERPL-SCNC: 15 MMOL/L (ref 20–33)
CO2 SERPL-SCNC: 16 MMOL/L (ref 20–33)
CO2 SERPL-SCNC: 17 MMOL/L (ref 20–33)
CO2 SERPL-SCNC: 18 MMOL/L (ref 20–33)
CO2 SERPL-SCNC: 19 MMOL/L (ref 20–33)
CO2 SERPL-SCNC: 20 MMOL/L (ref 20–33)
CO2 SERPL-SCNC: 20 MMOL/L (ref 20–33)
CO2 SERPL-SCNC: 21 MMOL/L (ref 20–33)
CO2 SERPL-SCNC: 22 MMOL/L (ref 20–33)
CO2 SERPL-SCNC: 23 MMOL/L (ref 20–33)
CO2 SERPL-SCNC: 24 MMOL/L (ref 20–33)
CO2 SERPL-SCNC: 24 MMOL/L (ref 20–33)
CO2 SERPL-SCNC: 25 MMOL/L (ref 20–33)
CO2 SERPL-SCNC: 26 MMOL/L (ref 20–33)
CO2 SERPL-SCNC: 27 MMOL/L (ref 20–33)
CO2 SERPL-SCNC: 27 MMOL/L (ref 20–33)
CO2 SERPL-SCNC: 30 MMOL/L (ref 20–33)
COHGB MFR BLD: 2.6 % (ref 0–4.9)
COLOR UR: ABNORMAL
COLOR UR: YELLOW
CREAT SERPL-MCNC: 1.18 MG/DL (ref 0.5–1.4)
CREAT SERPL-MCNC: 1.2 MG/DL (ref 0.5–1.4)
CREAT SERPL-MCNC: 1.22 MG/DL (ref 0.5–1.4)
CREAT SERPL-MCNC: 1.31 MG/DL (ref 0.5–1.4)
CREAT SERPL-MCNC: 1.35 MG/DL (ref 0.5–1.4)
CREAT SERPL-MCNC: 1.39 MG/DL (ref 0.5–1.4)
CREAT SERPL-MCNC: 1.4 MG/DL (ref 0.5–1.4)
CREAT SERPL-MCNC: 1.54 MG/DL (ref 0.5–1.4)
CREAT SERPL-MCNC: 1.55 MG/DL (ref 0.5–1.4)
CREAT SERPL-MCNC: 1.6 MG/DL (ref 0.5–1.4)
CREAT SERPL-MCNC: 1.7 MG/DL (ref 0.5–1.4)
CREAT SERPL-MCNC: 1.75 MG/DL (ref 0.5–1.4)
CREAT SERPL-MCNC: 1.76 MG/DL (ref 0.5–1.4)
CREAT SERPL-MCNC: 1.8 MG/DL (ref 0.5–1.4)
CREAT SERPL-MCNC: 1.82 MG/DL (ref 0.5–1.4)
CREAT SERPL-MCNC: 1.86 MG/DL (ref 0.5–1.4)
CREAT SERPL-MCNC: 1.89 MG/DL (ref 0.5–1.4)
CREAT SERPL-MCNC: 1.9 MG/DL (ref 0.5–1.4)
CREAT SERPL-MCNC: 2 MG/DL (ref 0.5–1.4)
CREAT SERPL-MCNC: 2.04 MG/DL (ref 0.5–1.4)
CREAT SERPL-MCNC: 2.13 MG/DL (ref 0.5–1.4)
CREAT SERPL-MCNC: 2.4 MG/DL (ref 0.5–1.4)
CREAT SERPL-MCNC: 2.74 MG/DL (ref 0.5–1.4)
CREAT SERPL-MCNC: 2.79 MG/DL (ref 0.5–1.4)
CREAT SERPL-MCNC: 2.91 MG/DL (ref 0.5–1.4)
CREAT SERPL-MCNC: 3.21 MG/DL (ref 0.5–1.4)
CREAT SERPL-MCNC: 3.25 MG/DL (ref 0.5–1.4)
CREAT SERPL-MCNC: 3.34 MG/DL (ref 0.5–1.4)
CREAT SERPL-MCNC: 3.39 MG/DL (ref 0.5–1.4)
CREAT SERPL-MCNC: 3.39 MG/DL (ref 0.5–1.4)
CREAT SERPL-MCNC: 3.46 MG/DL (ref 0.5–1.4)
CREAT SERPL-MCNC: 3.53 MG/DL (ref 0.5–1.4)
CREAT SERPL-MCNC: 3.57 MG/DL (ref 0.5–1.4)
CREAT SERPL-MCNC: 3.58 MG/DL (ref 0.5–1.4)
CREAT SERPL-MCNC: 3.62 MG/DL (ref 0.5–1.4)
CREAT SERPL-MCNC: 3.63 MG/DL (ref 0.5–1.4)
CREAT SERPL-MCNC: 3.65 MG/DL (ref 0.5–1.4)
CRP SERPL HS-MCNC: 4.91 MG/DL (ref 0–0.75)
DATE 1ST CHEMO CANCER: NORMAL
DOHLE BOD BLD QL SMEAR: NORMAL
EKG IMPRESSION: NORMAL
EOSINOPHIL # BLD AUTO: 0.01 K/UL (ref 0–0.51)
EOSINOPHIL # BLD AUTO: 0.05 K/UL (ref 0–0.51)
EOSINOPHIL # BLD AUTO: 0.13 K/UL (ref 0–0.51)
EOSINOPHIL # BLD AUTO: 0.14 K/UL (ref 0–0.51)
EOSINOPHIL # BLD AUTO: 0.18 K/UL (ref 0–0.51)
EOSINOPHIL # BLD AUTO: 0.24 K/UL (ref 0–0.51)
EOSINOPHIL # BLD AUTO: 0.28 K/UL (ref 0–0.51)
EOSINOPHIL # BLD AUTO: 0.33 K/UL (ref 0–0.51)
EOSINOPHIL # BLD AUTO: 0.34 K/UL (ref 0–0.51)
EOSINOPHIL # BLD AUTO: 0.38 K/UL (ref 0–0.51)
EOSINOPHIL # BLD AUTO: 0.47 K/UL (ref 0–0.51)
EOSINOPHIL NFR BLD: 0.2 % (ref 0–6.9)
EOSINOPHIL NFR BLD: 0.9 % (ref 0–6.9)
EOSINOPHIL NFR BLD: 1.8 % (ref 0–6.9)
EOSINOPHIL NFR BLD: 2 % (ref 0–6.9)
EOSINOPHIL NFR BLD: 2.6 % (ref 0–6.9)
EOSINOPHIL NFR BLD: 3.8 % (ref 0–6.9)
EOSINOPHIL NFR BLD: 4 % (ref 0–6.9)
EOSINOPHIL NFR BLD: 4.6 % (ref 0–6.9)
EOSINOPHIL NFR BLD: 5.1 % (ref 0–6.9)
EOSINOPHIL NFR BLD: 5.6 % (ref 0–6.9)
EOSINOPHIL NFR BLD: 6.5 % (ref 0–6.9)
EPI CELLS #/AREA URNS HPF: ABNORMAL /HPF
ERYTHROCYTE [DISTWIDTH] IN BLOOD BY AUTOMATED COUNT: 53.1 FL (ref 35.9–50)
ERYTHROCYTE [DISTWIDTH] IN BLOOD BY AUTOMATED COUNT: 53.1 FL (ref 35.9–50)
ERYTHROCYTE [DISTWIDTH] IN BLOOD BY AUTOMATED COUNT: 53.3 FL (ref 35.9–50)
ERYTHROCYTE [DISTWIDTH] IN BLOOD BY AUTOMATED COUNT: 54.1 FL (ref 35.9–50)
ERYTHROCYTE [DISTWIDTH] IN BLOOD BY AUTOMATED COUNT: 54.1 FL (ref 35.9–50)
ERYTHROCYTE [DISTWIDTH] IN BLOOD BY AUTOMATED COUNT: 54.5 FL (ref 35.9–50)
ERYTHROCYTE [DISTWIDTH] IN BLOOD BY AUTOMATED COUNT: 54.5 FL (ref 35.9–50)
ERYTHROCYTE [DISTWIDTH] IN BLOOD BY AUTOMATED COUNT: 54.9 FL (ref 35.9–50)
ERYTHROCYTE [DISTWIDTH] IN BLOOD BY AUTOMATED COUNT: 55.1 FL (ref 35.9–50)
ERYTHROCYTE [DISTWIDTH] IN BLOOD BY AUTOMATED COUNT: 55.1 FL (ref 35.9–50)
ERYTHROCYTE [DISTWIDTH] IN BLOOD BY AUTOMATED COUNT: 55.2 FL (ref 35.9–50)
ERYTHROCYTE [DISTWIDTH] IN BLOOD BY AUTOMATED COUNT: 55.3 FL (ref 35.9–50)
ERYTHROCYTE [DISTWIDTH] IN BLOOD BY AUTOMATED COUNT: 55.6 FL (ref 35.9–50)
ERYTHROCYTE [DISTWIDTH] IN BLOOD BY AUTOMATED COUNT: 55.9 FL (ref 35.9–50)
ERYTHROCYTE [DISTWIDTH] IN BLOOD BY AUTOMATED COUNT: 55.9 FL (ref 35.9–50)
ERYTHROCYTE [DISTWIDTH] IN BLOOD BY AUTOMATED COUNT: 56.8 FL (ref 35.9–50)
ERYTHROCYTE [DISTWIDTH] IN BLOOD BY AUTOMATED COUNT: 56.8 FL (ref 35.9–50)
ERYTHROCYTE [DISTWIDTH] IN BLOOD BY AUTOMATED COUNT: 57.5 FL (ref 35.9–50)
ERYTHROCYTE [DISTWIDTH] IN BLOOD BY AUTOMATED COUNT: 57.5 FL (ref 35.9–50)
ERYTHROCYTE [DISTWIDTH] IN BLOOD BY AUTOMATED COUNT: 57.6 FL (ref 35.9–50)
ERYTHROCYTE [DISTWIDTH] IN BLOOD BY AUTOMATED COUNT: 58.2 FL (ref 35.9–50)
ERYTHROCYTE [DISTWIDTH] IN BLOOD BY AUTOMATED COUNT: 58.2 FL (ref 35.9–50)
ERYTHROCYTE [DISTWIDTH] IN BLOOD BY AUTOMATED COUNT: 58.3 FL (ref 35.9–50)
ERYTHROCYTE [DISTWIDTH] IN BLOOD BY AUTOMATED COUNT: 59.1 FL (ref 35.9–50)
ERYTHROCYTE [DISTWIDTH] IN BLOOD BY AUTOMATED COUNT: 59.6 FL (ref 35.9–50)
ERYTHROCYTE [DISTWIDTH] IN BLOOD BY AUTOMATED COUNT: 59.8 FL (ref 35.9–50)
ERYTHROCYTE [DISTWIDTH] IN BLOOD BY AUTOMATED COUNT: 60.9 FL (ref 35.9–50)
ERYTHROCYTE [DISTWIDTH] IN BLOOD BY AUTOMATED COUNT: 63 FL (ref 35.9–50)
EST. AVERAGE GLUCOSE BLD GHB EST-MCNC: 94 MG/DL
FASTING STATUS PATIENT QL REPORTED: NORMAL
FERRITIN SERPL-MCNC: 279 NG/ML (ref 10–291)
FERRITIN SERPL-MCNC: 40.9 NG/ML (ref 10–291)
FLUAV RNA SPEC QL NAA+PROBE: NEGATIVE
FLUBV RNA SPEC QL NAA+PROBE: NEGATIVE
FOLATE SERPL-MCNC: 6.1 NG/ML
GAMMA GLOB SERPL ELPH-MCNC: 0.31 G/DL (ref 0.62–1.51)
GFR SERPLBLD CREATININE-BSD FMLA CKD-EPI: 12 ML/MIN/1.73 M 2
GFR SERPLBLD CREATININE-BSD FMLA CKD-EPI: 13 ML/MIN/1.73 M 2
GFR SERPLBLD CREATININE-BSD FMLA CKD-EPI: 14 ML/MIN/1.73 M 2
GFR SERPLBLD CREATININE-BSD FMLA CKD-EPI: 15 ML/MIN/1.73 M 2
GFR SERPLBLD CREATININE-BSD FMLA CKD-EPI: 16 ML/MIN/1.73 M 2
GFR SERPLBLD CREATININE-BSD FMLA CKD-EPI: 17 ML/MIN/1.73 M 2
GFR SERPLBLD CREATININE-BSD FMLA CKD-EPI: 18 ML/MIN/1.73 M 2
GFR SERPLBLD CREATININE-BSD FMLA CKD-EPI: 21 ML/MIN/1.73 M 2
GFR SERPLBLD CREATININE-BSD FMLA CKD-EPI: 24 ML/MIN/1.73 M 2
GFR SERPLBLD CREATININE-BSD FMLA CKD-EPI: 25 ML/MIN/1.73 M 2
GFR SERPLBLD CREATININE-BSD FMLA CKD-EPI: 26 ML/MIN/1.73 M 2
GFR SERPLBLD CREATININE-BSD FMLA CKD-EPI: 27 ML/MIN/1.73 M 2
GFR SERPLBLD CREATININE-BSD FMLA CKD-EPI: 28 ML/MIN/1.73 M 2
GFR SERPLBLD CREATININE-BSD FMLA CKD-EPI: 28 ML/MIN/1.73 M 2
GFR SERPLBLD CREATININE-BSD FMLA CKD-EPI: 29 ML/MIN/1.73 M 2
GFR SERPLBLD CREATININE-BSD FMLA CKD-EPI: 29 ML/MIN/1.73 M 2
GFR SERPLBLD CREATININE-BSD FMLA CKD-EPI: 30 ML/MIN/1.73 M 2
GFR SERPLBLD CREATININE-BSD FMLA CKD-EPI: 30 ML/MIN/1.73 M 2
GFR SERPLBLD CREATININE-BSD FMLA CKD-EPI: 31 ML/MIN/1.73 M 2
GFR SERPLBLD CREATININE-BSD FMLA CKD-EPI: 34 ML/MIN/1.73 M 2
GFR SERPLBLD CREATININE-BSD FMLA CKD-EPI: 35 ML/MIN/1.73 M 2
GFR SERPLBLD CREATININE-BSD FMLA CKD-EPI: 35 ML/MIN/1.73 M 2
GFR SERPLBLD CREATININE-BSD FMLA CKD-EPI: 40 ML/MIN/1.73 M 2
GFR SERPLBLD CREATININE-BSD FMLA CKD-EPI: 40 ML/MIN/1.73 M 2
GFR SERPLBLD CREATININE-BSD FMLA CKD-EPI: 41 ML/MIN/1.73 M 2
GFR SERPLBLD CREATININE-BSD FMLA CKD-EPI: 43 ML/MIN/1.73 M 2
GFR SERPLBLD CREATININE-BSD FMLA CKD-EPI: 47 ML/MIN/1.73 M 2
GFR SERPLBLD CREATININE-BSD FMLA CKD-EPI: 48 ML/MIN/1.73 M 2
GFR SERPLBLD CREATININE-BSD FMLA CKD-EPI: 49 ML/MIN/1.73 M 2
GLOBULIN SER CALC-MCNC: 1.8 G/DL (ref 1.9–3.5)
GLOBULIN SER CALC-MCNC: 1.8 G/DL (ref 1.9–3.5)
GLOBULIN SER CALC-MCNC: 2 G/DL (ref 1.9–3.5)
GLOBULIN SER CALC-MCNC: 2.1 G/DL (ref 1.9–3.5)
GLOBULIN SER CALC-MCNC: 2.1 G/DL (ref 1.9–3.5)
GLOBULIN SER CALC-MCNC: 2.2 G/DL (ref 1.9–3.5)
GLOBULIN SER CALC-MCNC: 2.4 G/DL (ref 1.9–3.5)
GLOBULIN SER CALC-MCNC: 2.5 G/DL (ref 1.9–3.5)
GLOBULIN SER CALC-MCNC: 2.5 G/DL (ref 1.9–3.5)
GLOBULIN SER CALC-MCNC: 2.6 G/DL (ref 1.9–3.5)
GLOBULIN SER CALC-MCNC: 2.6 G/DL (ref 1.9–3.5)
GLOBULIN SER CALC-MCNC: 2.7 G/DL (ref 1.9–3.5)
GLOBULIN SER CALC-MCNC: 2.8 G/DL (ref 1.9–3.5)
GLOBULIN SER CALC-MCNC: 3 G/DL (ref 1.9–3.5)
GLUCOSE BLD STRIP.AUTO-MCNC: 103 MG/DL (ref 65–99)
GLUCOSE BLD STRIP.AUTO-MCNC: 104 MG/DL (ref 65–99)
GLUCOSE BLD STRIP.AUTO-MCNC: 105 MG/DL (ref 65–99)
GLUCOSE SERPL-MCNC: 100 MG/DL (ref 65–99)
GLUCOSE SERPL-MCNC: 101 MG/DL (ref 65–99)
GLUCOSE SERPL-MCNC: 102 MG/DL (ref 65–99)
GLUCOSE SERPL-MCNC: 103 MG/DL (ref 65–99)
GLUCOSE SERPL-MCNC: 104 MG/DL (ref 65–99)
GLUCOSE SERPL-MCNC: 106 MG/DL (ref 65–99)
GLUCOSE SERPL-MCNC: 106 MG/DL (ref 65–99)
GLUCOSE SERPL-MCNC: 107 MG/DL (ref 65–99)
GLUCOSE SERPL-MCNC: 107 MG/DL (ref 65–99)
GLUCOSE SERPL-MCNC: 109 MG/DL (ref 65–99)
GLUCOSE SERPL-MCNC: 115 MG/DL (ref 65–99)
GLUCOSE SERPL-MCNC: 115 MG/DL (ref 65–99)
GLUCOSE SERPL-MCNC: 118 MG/DL (ref 65–99)
GLUCOSE SERPL-MCNC: 119 MG/DL (ref 65–99)
GLUCOSE SERPL-MCNC: 120 MG/DL (ref 65–99)
GLUCOSE SERPL-MCNC: 120 MG/DL (ref 65–99)
GLUCOSE SERPL-MCNC: 127 MG/DL (ref 65–99)
GLUCOSE SERPL-MCNC: 129 MG/DL (ref 65–99)
GLUCOSE SERPL-MCNC: 140 MG/DL (ref 65–99)
GLUCOSE SERPL-MCNC: 143 MG/DL (ref 65–99)
GLUCOSE SERPL-MCNC: 145 MG/DL (ref 65–99)
GLUCOSE SERPL-MCNC: 149 MG/DL (ref 65–99)
GLUCOSE SERPL-MCNC: 153 MG/DL (ref 65–99)
GLUCOSE SERPL-MCNC: 160 MG/DL (ref 65–99)
GLUCOSE SERPL-MCNC: 161 MG/DL (ref 65–99)
GLUCOSE SERPL-MCNC: 166 MG/DL (ref 65–99)
GLUCOSE SERPL-MCNC: 172 MG/DL (ref 65–99)
GLUCOSE SERPL-MCNC: 89 MG/DL (ref 65–99)
GLUCOSE SERPL-MCNC: 91 MG/DL (ref 65–99)
GLUCOSE SERPL-MCNC: 92 MG/DL (ref 65–99)
GLUCOSE SERPL-MCNC: 92 MG/DL (ref 65–99)
GLUCOSE SERPL-MCNC: 98 MG/DL (ref 65–99)
GLUCOSE SERPL-MCNC: 99 MG/DL (ref 65–99)
GLUCOSE UR STRIP.AUTO-MCNC: NEGATIVE MG/DL
HBA1C MFR BLD: 4.9 % (ref 4–5.6)
HCT VFR BLD AUTO: 24.2 % (ref 37–47)
HCT VFR BLD AUTO: 24.2 % (ref 37–47)
HCT VFR BLD AUTO: 25.2 % (ref 37–47)
HCT VFR BLD AUTO: 25.7 % (ref 37–47)
HCT VFR BLD AUTO: 26.2 % (ref 37–47)
HCT VFR BLD AUTO: 26.2 % (ref 37–47)
HCT VFR BLD AUTO: 26.7 % (ref 37–47)
HCT VFR BLD AUTO: 26.9 % (ref 37–47)
HCT VFR BLD AUTO: 27.5 % (ref 37–47)
HCT VFR BLD AUTO: 28.5 % (ref 37–47)
HCT VFR BLD AUTO: 28.8 % (ref 37–47)
HCT VFR BLD AUTO: 29.6 % (ref 37–47)
HCT VFR BLD AUTO: 30.4 % (ref 37–47)
HCT VFR BLD AUTO: 30.7 % (ref 37–47)
HCT VFR BLD AUTO: 30.9 % (ref 37–47)
HCT VFR BLD AUTO: 31.1 % (ref 37–47)
HCT VFR BLD AUTO: 31.4 % (ref 37–47)
HCT VFR BLD AUTO: 31.7 % (ref 37–47)
HCT VFR BLD AUTO: 32.3 % (ref 37–47)
HCT VFR BLD AUTO: 33.2 % (ref 37–47)
HCT VFR BLD AUTO: 34.1 % (ref 37–47)
HCT VFR BLD AUTO: 34.6 % (ref 37–47)
HCT VFR BLD AUTO: 34.7 % (ref 37–47)
HCT VFR BLD AUTO: 35.2 % (ref 37–47)
HCT VFR BLD AUTO: 35.4 % (ref 37–47)
HCT VFR BLD AUTO: 35.9 % (ref 37–47)
HCT VFR BLD AUTO: 36.8 % (ref 37–47)
HCT VFR BLD AUTO: 38.8 % (ref 37–47)
HCT VFR BLD AUTO: 58.1 % (ref 37–47)
HDLC SERPL-MCNC: 56 MG/DL
HGB BLD-MCNC: 10.3 G/DL (ref 12–16)
HGB BLD-MCNC: 10.5 G/DL (ref 12–16)
HGB BLD-MCNC: 11.1 G/DL (ref 12–16)
HGB BLD-MCNC: 11.3 G/DL (ref 12–16)
HGB BLD-MCNC: 11.4 G/DL (ref 12–16)
HGB BLD-MCNC: 11.5 G/DL (ref 12–16)
HGB BLD-MCNC: 11.7 G/DL (ref 12–16)
HGB BLD-MCNC: 12 G/DL (ref 12–16)
HGB BLD-MCNC: 12.8 G/DL (ref 12–16)
HGB BLD-MCNC: 17.2 G/DL (ref 12–16)
HGB BLD-MCNC: 7.3 G/DL (ref 12–16)
HGB BLD-MCNC: 7.4 G/DL (ref 12–16)
HGB BLD-MCNC: 7.7 G/DL (ref 12–16)
HGB BLD-MCNC: 7.8 G/DL (ref 12–16)
HGB BLD-MCNC: 7.8 G/DL (ref 12–16)
HGB BLD-MCNC: 8 G/DL (ref 12–16)
HGB BLD-MCNC: 8.1 G/DL (ref 12–16)
HGB BLD-MCNC: 8.3 G/DL (ref 12–16)
HGB BLD-MCNC: 8.3 G/DL (ref 12–16)
HGB BLD-MCNC: 8.5 G/DL (ref 12–16)
HGB BLD-MCNC: 8.9 G/DL (ref 12–16)
HGB BLD-MCNC: 9 G/DL (ref 12–16)
HGB BLD-MCNC: 9 G/DL (ref 12–16)
HGB BLD-MCNC: 9.2 G/DL (ref 12–16)
HGB BLD-MCNC: 9.5 G/DL (ref 12–16)
HGB BLD-MCNC: 9.6 G/DL (ref 12–16)
HGB RETIC QN AUTO: 26.5 PG/CELL (ref 29–35)
HYALINE CASTS #/AREA URNS LPF: ABNORMAL /LPF
IGA SERPL-MCNC: 76 MG/DL (ref 68–408)
IGG SERPL-MCNC: 232 MG/DL (ref 768–1632)
IGM SERPL-MCNC: 187 MG/DL (ref 35–263)
IMM GRANULOCYTES # BLD AUTO: 0.02 K/UL (ref 0–0.11)
IMM GRANULOCYTES # BLD AUTO: 0.03 K/UL (ref 0–0.11)
IMM GRANULOCYTES # BLD AUTO: 0.03 K/UL (ref 0–0.11)
IMM GRANULOCYTES # BLD AUTO: 0.04 K/UL (ref 0–0.11)
IMM GRANULOCYTES # BLD AUTO: 0.05 K/UL (ref 0–0.11)
IMM GRANULOCYTES # BLD AUTO: 0.18 K/UL (ref 0–0.11)
IMM GRANULOCYTES NFR BLD AUTO: 0.4 % (ref 0–0.9)
IMM GRANULOCYTES NFR BLD AUTO: 0.5 % (ref 0–0.9)
IMM GRANULOCYTES NFR BLD AUTO: 0.6 % (ref 0–0.9)
IMM GRANULOCYTES NFR BLD AUTO: 0.6 % (ref 0–0.9)
IMM GRANULOCYTES NFR BLD AUTO: 0.7 % (ref 0–0.9)
IMM GRANULOCYTES NFR BLD AUTO: 0.7 % (ref 0–0.9)
IMM GRANULOCYTES NFR BLD AUTO: 0.8 % (ref 0–0.9)
IMM GRANULOCYTES NFR BLD AUTO: 2 % (ref 0–0.9)
IMM RETICS NFR: 17.3 % (ref 2.6–16.1)
INR PPP: 0.99 (ref 0.87–1.13)
INR PPP: 1.94 (ref 0.87–1.13)
INR PPP: 2.21 (ref 0.87–1.13)
INTERPRETATION SERPL IFE-IMP: ABNORMAL
IRON SATN MFR SERPL: 10 % (ref 15–55)
IRON SATN MFR SERPL: 42 % (ref 15–55)
IRON SERPL-MCNC: 104 UG/DL (ref 40–170)
IRON SERPL-MCNC: 34 UG/DL (ref 40–170)
KAPPA LC FREE SER-MCNC: 38.78 MG/L (ref 3.3–19.4)
KAPPA LC FREE/LAMBDA FREE SER NEPH: 1.07 {RATIO} (ref 0.26–1.65)
KETONES UR STRIP.AUTO-MCNC: ABNORMAL MG/DL
KETONES UR STRIP.AUTO-MCNC: NEGATIVE MG/DL
LACTATE SERPL-SCNC: 0.5 MMOL/L (ref 0.5–2)
LACTATE SERPL-SCNC: 0.7 MMOL/L (ref 0.5–2)
LACTATE SERPL-SCNC: 1.3 MMOL/L (ref 0.5–2)
LACTATE SERPL-SCNC: 1.6 MMOL/L (ref 0.5–2)
LACTOFERRIN STL QL IA: NEGATIVE
LAMBDA LC FREE SERPL-MCNC: 36.35 MG/L (ref 5.71–26.3)
LDLC SERPL CALC-MCNC: 29 MG/DL
LEUKOCYTE ESTERASE UR QL STRIP.AUTO: ABNORMAL
LEUKOCYTE ESTERASE UR QL STRIP.AUTO: NEGATIVE
LIPASE SERPL-CCNC: 147 U/L (ref 7–58)
LIPASE SERPL-CCNC: 200 U/L (ref 7–58)
LIPASE SERPL-CCNC: 74 U/L (ref 7–58)
LIPASE SERPL-CCNC: 9 U/L (ref 11–82)
LYMPHOCYTES # BLD AUTO: 0.57 K/UL (ref 1–4.8)
LYMPHOCYTES # BLD AUTO: 0.66 K/UL (ref 1–4.8)
LYMPHOCYTES # BLD AUTO: 0.89 K/UL (ref 1–4.8)
LYMPHOCYTES # BLD AUTO: 0.89 K/UL (ref 1–4.8)
LYMPHOCYTES # BLD AUTO: 0.91 K/UL (ref 1–4.8)
LYMPHOCYTES # BLD AUTO: 1.13 K/UL (ref 1–4.8)
LYMPHOCYTES # BLD AUTO: 1.14 K/UL (ref 1–4.8)
LYMPHOCYTES # BLD AUTO: 1.19 K/UL (ref 1–4.8)
LYMPHOCYTES # BLD AUTO: 1.44 K/UL (ref 1–4.8)
LYMPHOCYTES # BLD AUTO: 1.55 K/UL (ref 1–4.8)
LYMPHOCYTES # BLD AUTO: 1.6 K/UL (ref 1–4.8)
LYMPHOCYTES NFR BLD: 10.5 % (ref 22–41)
LYMPHOCYTES NFR BLD: 10.7 % (ref 22–41)
LYMPHOCYTES NFR BLD: 11.8 % (ref 22–41)
LYMPHOCYTES NFR BLD: 12.8 % (ref 22–41)
LYMPHOCYTES NFR BLD: 13 % (ref 22–41)
LYMPHOCYTES NFR BLD: 14.6 % (ref 22–41)
LYMPHOCYTES NFR BLD: 15.7 % (ref 22–41)
LYMPHOCYTES NFR BLD: 16.9 % (ref 22–41)
LYMPHOCYTES NFR BLD: 20.7 % (ref 22–41)
LYMPHOCYTES NFR BLD: 27.2 % (ref 22–41)
LYMPHOCYTES NFR BLD: 29.6 % (ref 22–41)
MAGNESIUM SERPL-MCNC: 1.5 MG/DL (ref 1.5–2.5)
MAGNESIUM SERPL-MCNC: 1.5 MG/DL (ref 1.5–2.5)
MAGNESIUM SERPL-MCNC: 1.6 MG/DL (ref 1.5–2.5)
MAGNESIUM SERPL-MCNC: 1.7 MG/DL (ref 1.5–2.5)
MAGNESIUM SERPL-MCNC: 2.1 MG/DL (ref 1.5–2.5)
MAGNESIUM SERPL-MCNC: 2.1 MG/DL (ref 1.5–2.5)
MAGNESIUM SERPL-MCNC: 2.4 MG/DL (ref 1.5–2.5)
MAGNESIUM SERPL-MCNC: 2.7 MG/DL (ref 1.5–2.5)
MAGNESIUM SERPL-MCNC: 3.6 MG/DL (ref 1.5–2.5)
MAGNESIUM SERPL-MCNC: 3.8 MG/DL (ref 1.5–2.5)
MANUAL DIFF BLD: NORMAL
MCH RBC QN AUTO: 26.6 PG (ref 27–33)
MCH RBC QN AUTO: 26.7 PG (ref 27–33)
MCH RBC QN AUTO: 26.8 PG (ref 27–33)
MCH RBC QN AUTO: 27.2 PG (ref 27–33)
MCH RBC QN AUTO: 28.9 PG (ref 27–33)
MCH RBC QN AUTO: 29 PG (ref 27–33)
MCH RBC QN AUTO: 29.2 PG (ref 27–33)
MCH RBC QN AUTO: 29.7 PG (ref 27–33)
MCH RBC QN AUTO: 30.5 PG (ref 27–33)
MCH RBC QN AUTO: 30.5 PG (ref 27–33)
MCH RBC QN AUTO: 30.7 PG (ref 27–33)
MCH RBC QN AUTO: 30.9 PG (ref 27–33)
MCH RBC QN AUTO: 31.1 PG (ref 27–33)
MCH RBC QN AUTO: 31.2 PG (ref 27–33)
MCH RBC QN AUTO: 31.3 PG (ref 27–33)
MCH RBC QN AUTO: 31.7 PG (ref 27–33)
MCH RBC QN AUTO: 32.5 PG (ref 27–33)
MCH RBC QN AUTO: 34.8 PG (ref 27–33)
MCH RBC QN AUTO: 34.9 PG (ref 27–33)
MCH RBC QN AUTO: 34.9 PG (ref 27–33)
MCH RBC QN AUTO: 35 PG (ref 27–33)
MCH RBC QN AUTO: 35.1 PG (ref 27–33)
MCH RBC QN AUTO: 35.2 PG (ref 27–33)
MCH RBC QN AUTO: 35.3 PG (ref 27–33)
MCH RBC QN AUTO: 35.5 PG (ref 27–33)
MCH RBC QN AUTO: 35.6 PG (ref 27–33)
MCHC RBC AUTO-ENTMCNC: 29.3 G/DL (ref 32.2–35.5)
MCHC RBC AUTO-ENTMCNC: 29.3 G/DL (ref 32.2–35.5)
MCHC RBC AUTO-ENTMCNC: 29.6 G/DL (ref 32.2–35.5)
MCHC RBC AUTO-ENTMCNC: 29.6 G/DL (ref 32.2–35.5)
MCHC RBC AUTO-ENTMCNC: 29.7 G/DL (ref 33.6–35)
MCHC RBC AUTO-ENTMCNC: 29.8 G/DL (ref 32.2–35.5)
MCHC RBC AUTO-ENTMCNC: 30.1 G/DL (ref 32.2–35.5)
MCHC RBC AUTO-ENTMCNC: 30.2 G/DL (ref 32.2–35.5)
MCHC RBC AUTO-ENTMCNC: 30.2 G/DL (ref 32.2–35.5)
MCHC RBC AUTO-ENTMCNC: 30.3 G/DL (ref 32.2–35.5)
MCHC RBC AUTO-ENTMCNC: 30.4 G/DL (ref 32.2–35.5)
MCHC RBC AUTO-ENTMCNC: 30.6 G/DL (ref 32.2–35.5)
MCHC RBC AUTO-ENTMCNC: 30.6 G/DL (ref 32.2–35.5)
MCHC RBC AUTO-ENTMCNC: 30.9 G/DL (ref 32.2–35.5)
MCHC RBC AUTO-ENTMCNC: 30.9 G/DL (ref 32.2–35.5)
MCHC RBC AUTO-ENTMCNC: 31 G/DL (ref 33.6–35)
MCHC RBC AUTO-ENTMCNC: 31.1 G/DL (ref 32.2–35.5)
MCHC RBC AUTO-ENTMCNC: 31.1 G/DL (ref 33.6–35)
MCHC RBC AUTO-ENTMCNC: 31.8 G/DL (ref 33.6–35)
MCHC RBC AUTO-ENTMCNC: 32 G/DL (ref 33.6–35)
MCHC RBC AUTO-ENTMCNC: 32.6 G/DL (ref 33.6–35)
MCHC RBC AUTO-ENTMCNC: 32.6 G/DL (ref 33.6–35)
MCHC RBC AUTO-ENTMCNC: 32.9 G/DL (ref 33.6–35)
MCHC RBC AUTO-ENTMCNC: 33 G/DL (ref 33.6–35)
MCHC RBC AUTO-ENTMCNC: 33 G/DL (ref 33.6–35)
MCHC RBC AUTO-ENTMCNC: 33.9 G/DL (ref 33.6–35)
MCV RBC AUTO: 100.2 FL (ref 81.4–97.8)
MCV RBC AUTO: 100.4 FL (ref 81.4–97.8)
MCV RBC AUTO: 100.8 FL (ref 81.4–97.8)
MCV RBC AUTO: 101.1 FL (ref 81.4–97.8)
MCV RBC AUTO: 102.6 FL (ref 81.4–97.8)
MCV RBC AUTO: 103.2 FL (ref 81.4–97.8)
MCV RBC AUTO: 105.4 FL (ref 81.4–97.8)
MCV RBC AUTO: 105.6 FL (ref 81.4–97.8)
MCV RBC AUTO: 106.5 FL (ref 81.4–97.8)
MCV RBC AUTO: 106.5 FL (ref 81.4–97.8)
MCV RBC AUTO: 106.6 FL (ref 81.4–97.8)
MCV RBC AUTO: 107.1 FL (ref 81.4–97.8)
MCV RBC AUTO: 107.2 FL (ref 81.4–97.8)
MCV RBC AUTO: 107.8 FL (ref 81.4–97.8)
MCV RBC AUTO: 109.5 FL (ref 81.4–97.8)
MCV RBC AUTO: 111.5 FL (ref 81.4–97.8)
MCV RBC AUTO: 112.2 FL (ref 81.4–97.8)
MCV RBC AUTO: 114.4 FL (ref 81.4–97.8)
MCV RBC AUTO: 117.5 FL (ref 81.4–97.8)
MCV RBC AUTO: 88.3 FL (ref 81.4–97.8)
MCV RBC AUTO: 88.9 FL (ref 81.4–97.8)
MCV RBC AUTO: 89.5 FL (ref 81.4–97.8)
MCV RBC AUTO: 89.9 FL (ref 81.4–97.8)
MCV RBC AUTO: 97.8 FL (ref 81.4–97.8)
MCV RBC AUTO: 98.7 FL (ref 81.4–97.8)
MCV RBC AUTO: 99 FL (ref 81.4–97.8)
MCV RBC AUTO: 99.3 FL (ref 81.4–97.8)
MCV RBC AUTO: 99.6 FL (ref 81.4–97.8)
MICRO URNS: ABNORMAL
MONOCLON BAND OBS SERPL: ABNORMAL
MONOCLONAL PROTEIN NL11656: ABNORMAL G/DL
MONOCYTES # BLD AUTO: 0.34 K/UL (ref 0–0.85)
MONOCYTES # BLD AUTO: 0.46 K/UL (ref 0–0.85)
MONOCYTES # BLD AUTO: 0.49 K/UL (ref 0–0.85)
MONOCYTES # BLD AUTO: 0.53 K/UL (ref 0–0.85)
MONOCYTES # BLD AUTO: 0.62 K/UL (ref 0–0.85)
MONOCYTES # BLD AUTO: 0.64 K/UL (ref 0–0.85)
MONOCYTES # BLD AUTO: 0.71 K/UL (ref 0–0.85)
MONOCYTES # BLD AUTO: 0.76 K/UL (ref 0–0.85)
MONOCYTES # BLD AUTO: 0.81 K/UL (ref 0–0.85)
MONOCYTES # BLD AUTO: 0.82 K/UL (ref 0–0.85)
MONOCYTES # BLD AUTO: 0.88 K/UL (ref 0–0.85)
MONOCYTES NFR BLD AUTO: 11.6 % (ref 0–13.4)
MONOCYTES NFR BLD AUTO: 12.9 % (ref 0–13.4)
MONOCYTES NFR BLD AUTO: 13.1 % (ref 0–13.4)
MONOCYTES NFR BLD AUTO: 14.1 % (ref 0–13.4)
MONOCYTES NFR BLD AUTO: 6 % (ref 0–13.4)
MONOCYTES NFR BLD AUTO: 6.1 % (ref 0–13.4)
MONOCYTES NFR BLD AUTO: 6.8 % (ref 0–13.4)
MONOCYTES NFR BLD AUTO: 8.4 % (ref 0–13.4)
MONOCYTES NFR BLD AUTO: 9.1 % (ref 0–13.4)
MONOCYTES NFR BLD AUTO: 9.4 % (ref 0–13.4)
MONOCYTES NFR BLD AUTO: 9.9 % (ref 0–13.4)
MUCOUS THREADS #/AREA URNS HPF: ABNORMAL /HPF
NEUTROPHILS # BLD AUTO: 2.81 K/UL (ref 1.82–7.42)
NEUTROPHILS # BLD AUTO: 3.12 K/UL (ref 1.82–7.42)
NEUTROPHILS # BLD AUTO: 3.69 K/UL (ref 2–7.15)
NEUTROPHILS # BLD AUTO: 4.05 K/UL (ref 2–7.15)
NEUTROPHILS # BLD AUTO: 4.14 K/UL (ref 1.82–7.42)
NEUTROPHILS # BLD AUTO: 4.53 K/UL (ref 1.82–7.42)
NEUTROPHILS # BLD AUTO: 4.93 K/UL (ref 1.82–7.42)
NEUTROPHILS # BLD AUTO: 5.04 K/UL (ref 2–7.15)
NEUTROPHILS # BLD AUTO: 6.17 K/UL (ref 1.82–7.42)
NEUTROPHILS # BLD AUTO: 6.43 K/UL (ref 1.82–7.42)
NEUTROPHILS # BLD AUTO: 6.95 K/UL (ref 2–7.15)
NEUTROPHILS NFR BLD: 53.1 % (ref 44–72)
NEUTROPHILS NFR BLD: 53.5 % (ref 44–72)
NEUTROPHILS NFR BLD: 66.4 % (ref 44–72)
NEUTROPHILS NFR BLD: 67.4 % (ref 44–72)
NEUTROPHILS NFR BLD: 70 % (ref 44–72)
NEUTROPHILS NFR BLD: 70.6 % (ref 44–72)
NEUTROPHILS NFR BLD: 71 % (ref 44–72)
NEUTROPHILS NFR BLD: 71.3 % (ref 44–72)
NEUTROPHILS NFR BLD: 74.1 % (ref 44–72)
NEUTROPHILS NFR BLD: 74.6 % (ref 44–72)
NEUTROPHILS NFR BLD: 81.4 % (ref 44–72)
NEUTS BAND NFR BLD MANUAL: 8 % (ref 0–10)
NITRITE UR QL STRIP.AUTO: NEGATIVE
NITRITE UR QL STRIP.AUTO: POSITIVE
NITRITE UR QL STRIP.AUTO: POSITIVE
NORWALK VIRUS PCR NORWK1: ABNORMAL
NRBC # BLD AUTO: 0 K/UL
NRBC # BLD AUTO: 0.02 K/UL
NRBC BLD-RTO: 0 /100 WBC
NRBC BLD-RTO: 0 /100 WBC (ref 0–0.2)
NRBC BLD-RTO: 0.2 /100 WBC
NT-PROBNP SERPL IA-MCNC: 3458 PG/ML (ref 0–125)
NT-PROBNP SERPL IA-MCNC: 660 PG/ML (ref 0–125)
OVA AND PARASITE, FECAL INTERPRETATION Q0595: NEGATIVE
PATHOLOGY STUDY: ABNORMAL
PH UR STRIP.AUTO: 5.5 [PH] (ref 5–8)
PH UR STRIP.AUTO: 6 [PH] (ref 5–8)
PH UR STRIP.AUTO: 7 [PH] (ref 5–8)
PH UR STRIP.AUTO: 7.5 [PH] (ref 5–8)
PHOSPHATE SERPL-MCNC: 2 MG/DL (ref 2.5–4.5)
PHOSPHATE SERPL-MCNC: 3.1 MG/DL (ref 2.5–4.5)
PHOSPHATE SERPL-MCNC: 3.5 MG/DL (ref 2.5–4.5)
PHOSPHATE SERPL-MCNC: 4 MG/DL (ref 2.5–4.5)
PHOSPHATE SERPL-MCNC: 4.4 MG/DL (ref 2.5–4.5)
PHOSPHATE SERPL-MCNC: 5.2 MG/DL (ref 2.5–4.5)
PLATELET # BLD AUTO: 149 K/UL (ref 164–446)
PLATELET # BLD AUTO: 175 K/UL (ref 164–446)
PLATELET # BLD AUTO: 179 K/UL (ref 164–446)
PLATELET # BLD AUTO: 205 K/UL (ref 164–446)
PLATELET # BLD AUTO: 212 K/UL (ref 164–446)
PLATELET # BLD AUTO: 219 K/UL (ref 164–446)
PLATELET # BLD AUTO: 227 K/UL (ref 164–446)
PLATELET # BLD AUTO: 243 K/UL (ref 164–446)
PLATELET # BLD AUTO: 247 K/UL (ref 164–446)
PLATELET # BLD AUTO: 247 K/UL (ref 164–446)
PLATELET # BLD AUTO: 251 K/UL (ref 164–446)
PLATELET # BLD AUTO: 252 K/UL (ref 164–446)
PLATELET # BLD AUTO: 252 K/UL (ref 164–446)
PLATELET # BLD AUTO: 254 K/UL (ref 164–446)
PLATELET # BLD AUTO: 255 K/UL (ref 164–446)
PLATELET # BLD AUTO: 262 K/UL (ref 164–446)
PLATELET # BLD AUTO: 271 K/UL (ref 164–446)
PLATELET # BLD AUTO: 277 K/UL (ref 164–446)
PLATELET # BLD AUTO: 278 K/UL (ref 164–446)
PLATELET # BLD AUTO: 279 K/UL (ref 164–446)
PLATELET # BLD AUTO: 285 K/UL (ref 164–446)
PLATELET # BLD AUTO: 287 K/UL (ref 164–446)
PLATELET # BLD AUTO: 298 K/UL (ref 164–446)
PLATELET # BLD AUTO: 300 K/UL (ref 164–446)
PLATELET # BLD AUTO: 320 K/UL (ref 164–446)
PLATELET # BLD AUTO: 369 K/UL (ref 164–446)
PLATELET # BLD AUTO: 370 K/UL (ref 164–446)
PLATELET # BLD AUTO: 464 K/UL (ref 164–446)
PLATELET BLD QL SMEAR: NORMAL
PLATELET BLD QL SMEAR: NORMAL
PMV BLD AUTO: 10.6 FL (ref 9–12.9)
PMV BLD AUTO: 10.6 FL (ref 9–12.9)
PMV BLD AUTO: 10.7 FL (ref 9–12.9)
PMV BLD AUTO: 10.8 FL (ref 9–12.9)
PMV BLD AUTO: 10.8 FL (ref 9–12.9)
PMV BLD AUTO: 10.9 FL (ref 9–12.9)
PMV BLD AUTO: 11 FL (ref 9–12.9)
PMV BLD AUTO: 11.1 FL (ref 9–12.9)
PMV BLD AUTO: 11.1 FL (ref 9–12.9)
PMV BLD AUTO: 11.2 FL (ref 9–12.9)
PMV BLD AUTO: 11.2 FL (ref 9–12.9)
PMV BLD AUTO: 11.3 FL (ref 9–12.9)
PMV BLD AUTO: 11.3 FL (ref 9–12.9)
PMV BLD AUTO: 11.4 FL (ref 9–12.9)
PMV BLD AUTO: 11.5 FL (ref 9–12.9)
PMV BLD AUTO: 11.5 FL (ref 9–12.9)
PMV BLD AUTO: 11.6 FL (ref 9–12.9)
PMV BLD AUTO: 11.7 FL (ref 9–12.9)
PMV BLD AUTO: 11.8 FL (ref 9–12.9)
PMV BLD AUTO: 11.8 FL (ref 9–12.9)
PMV BLD AUTO: 12 FL (ref 9–12.9)
PMV BLD AUTO: 12 FL (ref 9–12.9)
PMV BLD AUTO: 12.1 FL (ref 9–12.9)
PMV BLD AUTO: 12.3 FL (ref 9–12.9)
POTASSIUM SERPL-SCNC: 3.4 MMOL/L (ref 3.6–5.5)
POTASSIUM SERPL-SCNC: 3.4 MMOL/L (ref 3.6–5.5)
POTASSIUM SERPL-SCNC: 3.5 MMOL/L (ref 3.6–5.5)
POTASSIUM SERPL-SCNC: 3.5 MMOL/L (ref 3.6–5.5)
POTASSIUM SERPL-SCNC: 3.8 MMOL/L (ref 3.6–5.5)
POTASSIUM SERPL-SCNC: 4 MMOL/L (ref 3.6–5.5)
POTASSIUM SERPL-SCNC: 4.1 MMOL/L (ref 3.6–5.5)
POTASSIUM SERPL-SCNC: 4.2 MMOL/L (ref 3.6–5.5)
POTASSIUM SERPL-SCNC: 4.2 MMOL/L (ref 3.6–5.5)
POTASSIUM SERPL-SCNC: 4.3 MMOL/L (ref 3.6–5.5)
POTASSIUM SERPL-SCNC: 4.4 MMOL/L (ref 3.6–5.5)
POTASSIUM SERPL-SCNC: 4.5 MMOL/L (ref 3.6–5.5)
POTASSIUM SERPL-SCNC: 4.8 MMOL/L (ref 3.6–5.5)
POTASSIUM SERPL-SCNC: 4.9 MMOL/L (ref 3.6–5.5)
POTASSIUM SERPL-SCNC: 5 MMOL/L (ref 3.6–5.5)
POTASSIUM SERPL-SCNC: 5.1 MMOL/L (ref 3.6–5.5)
POTASSIUM SERPL-SCNC: 5.2 MMOL/L (ref 3.6–5.5)
POTASSIUM SERPL-SCNC: 5.2 MMOL/L (ref 3.6–5.5)
POTASSIUM SERPL-SCNC: 5.4 MMOL/L (ref 3.6–5.5)
POTASSIUM SERPL-SCNC: 5.5 MMOL/L (ref 3.6–5.5)
POTASSIUM SERPL-SCNC: 5.5 MMOL/L (ref 3.6–5.5)
POTASSIUM SERPL-SCNC: 5.6 MMOL/L (ref 3.6–5.5)
POTASSIUM SERPL-SCNC: 6 MMOL/L (ref 3.6–5.5)
PROCALCITONIN SERPL-MCNC: 22.89 NG/ML
PROCALCITONIN SERPL-MCNC: 38.46 NG/ML
PROCALCITONIN SERPL-MCNC: 4.11 NG/ML
PROCALCITONIN SERPL-MCNC: 6.84 NG/ML
PROT SERPL-MCNC: 3.7 G/DL (ref 6–8.2)
PROT SERPL-MCNC: 4.7 G/DL (ref 6.3–8.2)
PROT SERPL-MCNC: 4.8 G/DL (ref 6–8.2)
PROT SERPL-MCNC: 4.9 G/DL (ref 6–8.2)
PROT SERPL-MCNC: 5 G/DL (ref 6–8.2)
PROT SERPL-MCNC: 5.1 G/DL (ref 6–8.2)
PROT SERPL-MCNC: 5.2 G/DL (ref 6–8.2)
PROT SERPL-MCNC: 5.4 G/DL (ref 6–8.2)
PROT SERPL-MCNC: 5.4 G/DL (ref 6–8.2)
PROT SERPL-MCNC: 5.5 G/DL (ref 6–8.2)
PROT SERPL-MCNC: 5.5 G/DL (ref 6–8.2)
PROT SERPL-MCNC: 5.6 G/DL (ref 6–8.2)
PROT SERPL-MCNC: 5.7 G/DL (ref 6–8.2)
PROT SERPL-MCNC: 5.8 G/DL (ref 6–8.2)
PROT SERPL-MCNC: 5.9 G/DL (ref 6–8.2)
PROT SERPL-MCNC: 6 G/DL (ref 6–8.2)
PROT SERPL-MCNC: 6.3 G/DL (ref 6–8.2)
PROT UR QL STRIP: 30 MG/DL
PROT UR QL STRIP: NEGATIVE MG/DL
PROTHROMBIN TIME: 13 SEC (ref 12–14.6)
PROTHROMBIN TIME: 21.6 SEC (ref 12–14.6)
PROTHROMBIN TIME: 23.9 SEC (ref 12–14.6)
PTH RELATED PROT SERPL-SCNC: 2.9 PMOL/L (ref 0–3.4)
PTH RELATED PROT SERPL-SCNC: 4 PMOL/L (ref 0–3.4)
PTH-INTACT SERPL-MCNC: 145 PG/ML (ref 14–72)
PTH-INTACT SERPL-MCNC: 172 PG/ML (ref 14–72)
PTH-INTACT SERPL-MCNC: 178 PG/ML (ref 14–72)
PTH-INTACT SERPL-MCNC: 271 PG/ML (ref 14–72)
PTH-INTACT SERPL-MCNC: 93.6 PG/ML (ref 14–72)
RAD ONC ARIA COURSE LAST TREATMENT DATE: NORMAL
RAD ONC ARIA COURSE TREATMENT ELAPSED DAYS: NORMAL
RAD ONC ARIA REFERENCE POINT DOSAGE GIVEN TO DATE: 10
RAD ONC ARIA REFERENCE POINT DOSAGE GIVEN TO DATE: 10.79
RAD ONC ARIA REFERENCE POINT DOSAGE GIVEN TO DATE: 11
RAD ONC ARIA REFERENCE POINT DOSAGE GIVEN TO DATE: 13
RAD ONC ARIA REFERENCE POINT DOSAGE GIVEN TO DATE: 20
RAD ONC ARIA REFERENCE POINT DOSAGE GIVEN TO DATE: 21.57
RAD ONC ARIA REFERENCE POINT DOSAGE GIVEN TO DATE: 22
RAD ONC ARIA REFERENCE POINT DOSAGE GIVEN TO DATE: 26.01
RAD ONC ARIA REFERENCE POINT DOSAGE GIVEN TO DATE: 30
RAD ONC ARIA REFERENCE POINT DOSAGE GIVEN TO DATE: 32.36
RAD ONC ARIA REFERENCE POINT DOSAGE GIVEN TO DATE: 33
RAD ONC ARIA REFERENCE POINT DOSAGE GIVEN TO DATE: 39.01
RAD ONC ARIA REFERENCE POINT DOSAGE GIVEN TO DATE: 40
RAD ONC ARIA REFERENCE POINT DOSAGE GIVEN TO DATE: 43.14
RAD ONC ARIA REFERENCE POINT DOSAGE GIVEN TO DATE: 44
RAD ONC ARIA REFERENCE POINT DOSAGE GIVEN TO DATE: 50
RAD ONC ARIA REFERENCE POINT DOSAGE GIVEN TO DATE: 50
RAD ONC ARIA REFERENCE POINT DOSAGE GIVEN TO DATE: 52.01
RAD ONC ARIA REFERENCE POINT DOSAGE GIVEN TO DATE: 53.93
RAD ONC ARIA REFERENCE POINT DOSAGE GIVEN TO DATE: 53.93
RAD ONC ARIA REFERENCE POINT DOSAGE GIVEN TO DATE: 55
RAD ONC ARIA REFERENCE POINT DOSAGE GIVEN TO DATE: 55
RAD ONC ARIA REFERENCE POINT DOSAGE GIVEN TO DATE: 65.02
RAD ONC ARIA REFERENCE POINT DOSAGE GIVEN TO DATE: 65.02
RAD ONC ARIA REFERENCE POINT ID: NORMAL
RAD ONC ARIA REFERENCE POINT SESSION DOSAGE GIVEN: 10
RAD ONC ARIA REFERENCE POINT SESSION DOSAGE GIVEN: 10.79
RAD ONC ARIA REFERENCE POINT SESSION DOSAGE GIVEN: 11
RAD ONC ARIA REFERENCE POINT SESSION DOSAGE GIVEN: 13
RBC # BLD AUTO: 2.43 M/UL (ref 4.2–5.4)
RBC # BLD AUTO: 2.46 M/UL (ref 4.2–5.4)
RBC # BLD AUTO: 2.51 M/UL (ref 4.2–5.4)
RBC # BLD AUTO: 2.6 M/UL (ref 4.2–5.4)
RBC # BLD AUTO: 2.69 M/UL (ref 4.2–5.4)
RBC # BLD AUTO: 2.69 M/UL (ref 4.2–5.4)
RBC # BLD AUTO: 2.7 M/UL (ref 4.2–5.4)
RBC # BLD AUTO: 2.72 M/UL (ref 4.2–5.4)
RBC # BLD AUTO: 2.74 M/UL (ref 4.2–5.4)
RBC # BLD AUTO: 2.75 M/UL (ref 4.2–5.4)
RBC # BLD AUTO: 2.87 M/UL (ref 4.2–5.4)
RBC # BLD AUTO: 2.88 M/UL (ref 4.2–5.4)
RBC # BLD AUTO: 2.95 M/UL (ref 4.2–5.4)
RBC # BLD AUTO: 2.96 M/UL (ref 4.2–5.4)
RBC # BLD AUTO: 3.09 M/UL (ref 4.2–5.4)
RBC # BLD AUTO: 3.1 M/UL (ref 4.2–5.4)
RBC # BLD AUTO: 3.17 M/UL (ref 4.2–5.4)
RBC # BLD AUTO: 3.18 M/UL (ref 4.2–5.4)
RBC # BLD AUTO: 3.18 M/UL (ref 4.2–5.4)
RBC # BLD AUTO: 3.22 M/UL (ref 4.2–5.4)
RBC # BLD AUTO: 3.25 M/UL (ref 4.2–5.4)
RBC # BLD AUTO: 3.28 M/UL (ref 4.2–5.4)
RBC # BLD AUTO: 3.3 M/UL (ref 4.2–5.4)
RBC # BLD AUTO: 3.33 M/UL (ref 4.2–5.4)
RBC # BLD AUTO: 3.43 M/UL (ref 4.2–5.4)
RBC # BLD AUTO: 3.6 M/UL (ref 4.2–5.4)
RBC # BLD AUTO: 3.64 M/UL (ref 4.2–5.4)
RBC # BLD AUTO: 5.8 M/UL (ref 4.2–5.4)
RBC # URNS HPF: ABNORMAL /HPF
RBC BLD AUTO: NORMAL
RBC BLD AUTO: PRESENT
RBC UR QL AUTO: ABNORMAL
RBC UR QL AUTO: NEGATIVE
RETICS # AUTO: 0.07 M/UL (ref 0.04–0.12)
RETICS/RBC NFR: 2.5 % (ref 0.8–2.6)
RSV RNA SPEC QL NAA+PROBE: NEGATIVE
SARS-COV-2 RNA RESP QL NAA+PROBE: NOTDETECTED
SIGNIFICANT IND 70042: NORMAL
SITE SITE: NORMAL
SMUDGE CELLS BLD QL SMEAR: NORMAL
SODIUM SERPL-SCNC: 131 MMOL/L (ref 135–145)
SODIUM SERPL-SCNC: 132 MMOL/L (ref 135–145)
SODIUM SERPL-SCNC: 132 MMOL/L (ref 135–145)
SODIUM SERPL-SCNC: 133 MMOL/L (ref 135–145)
SODIUM SERPL-SCNC: 134 MMOL/L (ref 135–145)
SODIUM SERPL-SCNC: 134 MMOL/L (ref 135–145)
SODIUM SERPL-SCNC: 137 MMOL/L (ref 135–145)
SODIUM SERPL-SCNC: 138 MMOL/L (ref 135–145)
SODIUM SERPL-SCNC: 139 MMOL/L (ref 135–145)
SODIUM SERPL-SCNC: 140 MMOL/L (ref 135–145)
SODIUM SERPL-SCNC: 141 MMOL/L (ref 135–145)
SODIUM SERPL-SCNC: 142 MMOL/L (ref 135–145)
SODIUM SERPL-SCNC: 143 MMOL/L (ref 135–145)
SODIUM SERPL-SCNC: 144 MMOL/L (ref 135–145)
SODIUM SERPL-SCNC: 144 MMOL/L (ref 135–145)
SODIUM SERPL-SCNC: 145 MMOL/L (ref 135–145)
SOURCE SOURCE: NORMAL
SP GR UR STRIP.AUTO: 1.01
SP GR UR STRIP.AUTO: 1.02
SPECIMEN SOURCE: NORMAL
T3FREE SERPL-MCNC: 3.21 PG/ML (ref 2–4.4)
T4 FREE SERPL-MCNC: 1.14 NG/DL (ref 0.93–1.7)
T4 FREE SERPL-MCNC: 1.25 NG/DL (ref 0.93–1.7)
TIBC SERPL-MCNC: 249 UG/DL (ref 250–450)
TIBC SERPL-MCNC: 336 UG/DL (ref 250–450)
TOXIC GRANULES BLD QL SMEAR: NORMAL
TRIGL SERPL-MCNC: 170 MG/DL (ref 0–149)
TROPONIN T SERPL-MCNC: 23 NG/L (ref 6–19)
TROPONIN T SERPL-MCNC: 31 NG/L (ref 6–19)
TSH SERPL DL<=0.005 MIU/L-ACNC: 0.13 UIU/ML (ref 0.38–5.33)
TSH SERPL DL<=0.005 MIU/L-ACNC: 0.86 UIU/ML (ref 0.38–5.33)
TSH SERPL DL<=0.005 MIU/L-ACNC: 1.11 UIU/ML (ref 0.38–5.33)
TSH SERPL DL<=0.005 MIU/L-ACNC: 1.18 UIU/ML (ref 0.38–5.33)
UIBC SERPL-MCNC: 145 UG/DL (ref 110–370)
UIBC SERPL-MCNC: 302 UG/DL (ref 110–370)
UNIDENT CRYS URNS QL MICRO: ABNORMAL /HPF
URATE SERPL-MCNC: 5.3 MG/DL (ref 1.9–8.2)
URATE SERPL-MCNC: 6 MG/DL (ref 1.9–8.2)
URATE SERPL-MCNC: 6.2 MG/DL (ref 1.9–8.2)
URATE SERPL-MCNC: 6.4 MG/DL (ref 1.9–8.2)
VIT B12 SERPL-MCNC: 1378 PG/ML (ref 211–911)
VIT B12 SERPL-MCNC: 1766 PG/ML (ref 211–911)
VIT B12 SERPL-MCNC: 276 PG/ML (ref 211–911)
VIT B12 SERPL-MCNC: 469 PG/ML (ref 211–911)
VIT B12 SERPL-MCNC: 918 PG/ML (ref 211–911)
WBC # BLD AUTO: 10 K/UL (ref 4.8–10.8)
WBC # BLD AUTO: 11.9 K/UL (ref 4.8–10.8)
WBC # BLD AUTO: 12.1 K/UL (ref 4.8–10.8)
WBC # BLD AUTO: 4 K/UL (ref 4.8–10.8)
WBC # BLD AUTO: 4.7 K/UL (ref 4.8–10.8)
WBC # BLD AUTO: 5.2 K/UL (ref 4.8–10.8)
WBC # BLD AUTO: 5.2 K/UL (ref 4.8–10.8)
WBC # BLD AUTO: 5.4 K/UL (ref 4.8–10.8)
WBC # BLD AUTO: 5.5 K/UL (ref 4.8–10.8)
WBC # BLD AUTO: 5.6 K/UL (ref 4.8–10.8)
WBC # BLD AUTO: 5.7 K/UL (ref 4.8–10.8)
WBC # BLD AUTO: 5.9 K/UL (ref 4.8–10.8)
WBC # BLD AUTO: 6.1 K/UL (ref 4.8–10.8)
WBC # BLD AUTO: 6.2 K/UL (ref 4.8–10.8)
WBC # BLD AUTO: 6.2 K/UL (ref 4.8–10.8)
WBC # BLD AUTO: 6.3 K/UL (ref 4.8–10.8)
WBC # BLD AUTO: 6.6 K/UL (ref 4.8–10.8)
WBC # BLD AUTO: 7 K/UL (ref 4.8–10.8)
WBC # BLD AUTO: 7.1 K/UL (ref 4.8–10.8)
WBC # BLD AUTO: 7.2 K/UL (ref 4.8–10.8)
WBC # BLD AUTO: 8.1 K/UL (ref 4.8–10.8)
WBC # BLD AUTO: 8.3 K/UL (ref 4.8–10.8)
WBC # BLD AUTO: 8.8 K/UL (ref 4.8–10.8)
WBC # BLD AUTO: 9.2 K/UL (ref 4.8–10.8)
WBC # BLD AUTO: 9.7 K/UL (ref 4.8–10.8)
WBC #/AREA URNS HPF: ABNORMAL /HPF

## 2023-01-01 PROCEDURE — 77280 THER RAD SIMULAJ FIELD SMPL: CPT | Mod: 26 | Performed by: RADIOLOGY

## 2023-01-01 PROCEDURE — 85027 COMPLETE CBC AUTOMATED: CPT

## 2023-01-01 PROCEDURE — 700111 HCHG RX REV CODE 636 W/ 250 OVERRIDE (IP): Performed by: EMERGENCY MEDICINE

## 2023-01-01 PROCEDURE — 77435 SBRT MANAGEMENT: CPT | Performed by: RADIOLOGY

## 2023-01-01 PROCEDURE — 770020 HCHG ROOM/CARE - TELE (206)

## 2023-01-01 PROCEDURE — 83735 ASSAY OF MAGNESIUM: CPT

## 2023-01-01 PROCEDURE — RXMED WILLOW AMBULATORY MEDICATION CHARGE: Performed by: REHABILITATION PRACTITIONER

## 2023-01-01 PROCEDURE — 700111 HCHG RX REV CODE 636 W/ 250 OVERRIDE (IP): Mod: JZ | Performed by: STUDENT IN AN ORGANIZED HEALTH CARE EDUCATION/TRAINING PROGRAM

## 2023-01-01 PROCEDURE — 700101 HCHG RX REV CODE 250: Performed by: INTERNAL MEDICINE

## 2023-01-01 PROCEDURE — 85025 COMPLETE CBC W/AUTO DIFF WBC: CPT

## 2023-01-01 PROCEDURE — 80053 COMPREHEN METABOLIC PANEL: CPT

## 2023-01-01 PROCEDURE — 94640 AIRWAY INHALATION TREATMENT: CPT

## 2023-01-01 PROCEDURE — 700117 HCHG RX CONTRAST REV CODE 255: Performed by: EMERGENCY MEDICINE

## 2023-01-01 PROCEDURE — 700105 HCHG RX REV CODE 258: Performed by: EMERGENCY MEDICINE

## 2023-01-01 PROCEDURE — 80053 COMPREHEN METABOLIC PANEL: CPT | Mod: 91

## 2023-01-01 PROCEDURE — S9126 HOSPICE CARE, IN THE HOME, P: HCPCS

## 2023-01-01 PROCEDURE — A9270 NON-COVERED ITEM OR SERVICE: HCPCS | Performed by: INTERNAL MEDICINE

## 2023-01-01 PROCEDURE — 99239 HOSP IP/OBS DSCHRG MGMT >30: CPT | Performed by: INTERNAL MEDICINE

## 2023-01-01 PROCEDURE — 700102 HCHG RX REV CODE 250 W/ 637 OVERRIDE(OP): Performed by: INTERNAL MEDICINE

## 2023-01-01 PROCEDURE — 700105 HCHG RX REV CODE 258: Performed by: HOSPITALIST

## 2023-01-01 PROCEDURE — 700111 HCHG RX REV CODE 636 W/ 250 OVERRIDE (IP): Performed by: INTERNAL MEDICINE

## 2023-01-01 PROCEDURE — 36415 COLL VENOUS BLD VENIPUNCTURE: CPT

## 2023-01-01 PROCEDURE — A9270 NON-COVERED ITEM OR SERVICE: HCPCS | Mod: JZ | Performed by: INTERNAL MEDICINE

## 2023-01-01 PROCEDURE — A9270 NON-COVERED ITEM OR SERVICE: HCPCS | Performed by: STUDENT IN AN ORGANIZED HEALTH CARE EDUCATION/TRAINING PROGRAM

## 2023-01-01 PROCEDURE — A9270 NON-COVERED ITEM OR SERVICE: HCPCS | Performed by: EMERGENCY MEDICINE

## 2023-01-01 PROCEDURE — 97602 WOUND(S) CARE NON-SELECTIVE: CPT

## 2023-01-01 PROCEDURE — 700111 HCHG RX REV CODE 636 W/ 250 OVERRIDE (IP): Performed by: HOSPITALIST

## 2023-01-01 PROCEDURE — 700111 HCHG RX REV CODE 636 W/ 250 OVERRIDE (IP): Performed by: NURSE PRACTITIONER

## 2023-01-01 PROCEDURE — A9270 NON-COVERED ITEM OR SERVICE: HCPCS | Performed by: HOSPITALIST

## 2023-01-01 PROCEDURE — G0156 HHCP-SVS OF AIDE,EA 15 MIN: HCPCS

## 2023-01-01 PROCEDURE — 82728 ASSAY OF FERRITIN: CPT

## 2023-01-01 PROCEDURE — 83690 ASSAY OF LIPASE: CPT

## 2023-01-01 PROCEDURE — 94760 N-INVAS EAR/PLS OXIMETRY 1: CPT

## 2023-01-01 PROCEDURE — 700111 HCHG RX REV CODE 636 W/ 250 OVERRIDE (IP): Mod: JZ | Performed by: INTERNAL MEDICINE

## 2023-01-01 PROCEDURE — RXMED WILLOW AMBULATORY MEDICATION CHARGE: Performed by: STUDENT IN AN ORGANIZED HEALTH CARE EDUCATION/TRAINING PROGRAM

## 2023-01-01 PROCEDURE — 93005 ELECTROCARDIOGRAM TRACING: CPT | Performed by: EMERGENCY MEDICINE

## 2023-01-01 PROCEDURE — 700102 HCHG RX REV CODE 250 W/ 637 OVERRIDE(OP): Performed by: EMERGENCY MEDICINE

## 2023-01-01 PROCEDURE — 99232 SBSQ HOSP IP/OBS MODERATE 35: CPT | Performed by: INTERNAL MEDICINE

## 2023-01-01 PROCEDURE — G0299 HHS/HOSPICE OF RN EA 15 MIN: HCPCS

## 2023-01-01 PROCEDURE — 83550 IRON BINDING TEST: CPT

## 2023-01-01 PROCEDURE — 700102 HCHG RX REV CODE 250 W/ 637 OVERRIDE(OP): Performed by: STUDENT IN AN ORGANIZED HEALTH CARE EDUCATION/TRAINING PROGRAM

## 2023-01-01 PROCEDURE — 76705 ECHO EXAM OF ABDOMEN: CPT

## 2023-01-01 PROCEDURE — 99232 SBSQ HOSP IP/OBS MODERATE 35: CPT | Performed by: HOSPITALIST

## 2023-01-01 PROCEDURE — 80048 BASIC METABOLIC PNL TOTAL CA: CPT

## 2023-01-01 PROCEDURE — 99223 1ST HOSP IP/OBS HIGH 75: CPT | Mod: AI | Performed by: INTERNAL MEDICINE

## 2023-01-01 PROCEDURE — G0155 HHCP-SVS OF CSW,EA 15 MIN: HCPCS

## 2023-01-01 PROCEDURE — 77280 THER RAD SIMULAJ FIELD SMPL: CPT | Performed by: RADIOLOGY

## 2023-01-01 PROCEDURE — 83630 LACTOFERRIN FECAL (QUAL): CPT

## 2023-01-01 PROCEDURE — 97162 PT EVAL MOD COMPLEX 30 MIN: CPT

## 2023-01-01 PROCEDURE — 82306 VITAMIN D 25 HYDROXY: CPT

## 2023-01-01 PROCEDURE — 303105 HCHG CATHETER EXTRA

## 2023-01-01 PROCEDURE — 77334 RADIATION TREATMENT AID(S): CPT | Performed by: RADIOLOGY

## 2023-01-01 PROCEDURE — 84100 ASSAY OF PHOSPHORUS: CPT

## 2023-01-01 PROCEDURE — 82607 VITAMIN B-12: CPT

## 2023-01-01 PROCEDURE — 83880 ASSAY OF NATRIURETIC PEPTIDE: CPT

## 2023-01-01 PROCEDURE — 700102 HCHG RX REV CODE 250 W/ 637 OVERRIDE(OP): Performed by: HOSPITALIST

## 2023-01-01 PROCEDURE — 770001 HCHG ROOM/CARE - MED/SURG/GYN PRIV*

## 2023-01-01 PROCEDURE — 77373 STRTCTC BDY RAD THER TX DLVR: CPT | Performed by: RADIOLOGY

## 2023-01-01 PROCEDURE — 700105 HCHG RX REV CODE 258: Performed by: INTERNAL MEDICINE

## 2023-01-01 PROCEDURE — 85610 PROTHROMBIN TIME: CPT

## 2023-01-01 PROCEDURE — 83605 ASSAY OF LACTIC ACID: CPT

## 2023-01-01 PROCEDURE — 77470 SPECIAL RADIATION TREATMENT: CPT | Performed by: RADIOLOGY

## 2023-01-01 PROCEDURE — 84145 PROCALCITONIN (PCT): CPT

## 2023-01-01 PROCEDURE — 99233 SBSQ HOSP IP/OBS HIGH 50: CPT | Performed by: HOSPITALIST

## 2023-01-01 PROCEDURE — 94664 DEMO&/EVAL PT USE INHALER: CPT

## 2023-01-01 PROCEDURE — 82962 GLUCOSE BLOOD TEST: CPT | Mod: 91

## 2023-01-01 PROCEDURE — 83540 ASSAY OF IRON: CPT

## 2023-01-01 PROCEDURE — 97112 NEUROMUSCULAR REEDUCATION: CPT

## 2023-01-01 PROCEDURE — 83970 ASSAY OF PARATHORMONE: CPT

## 2023-01-01 PROCEDURE — 94669 MECHANICAL CHEST WALL OSCILL: CPT

## 2023-01-01 PROCEDURE — 84481 FREE ASSAY (FT-3): CPT

## 2023-01-01 PROCEDURE — 84443 ASSAY THYROID STIM HORMONE: CPT

## 2023-01-01 PROCEDURE — 80069 RENAL FUNCTION PANEL: CPT

## 2023-01-01 PROCEDURE — 92526 ORAL FUNCTION THERAPY: CPT

## 2023-01-01 PROCEDURE — 85018 HEMOGLOBIN: CPT

## 2023-01-01 PROCEDURE — 77334 RADIATION TREATMENT AID(S): CPT | Mod: 26 | Performed by: RADIOLOGY

## 2023-01-01 PROCEDURE — 82330 ASSAY OF CALCIUM: CPT

## 2023-01-01 PROCEDURE — 700111 HCHG RX REV CODE 636 W/ 250 OVERRIDE (IP): Performed by: FAMILY MEDICINE

## 2023-01-01 PROCEDURE — 700102 HCHG RX REV CODE 250 W/ 637 OVERRIDE(OP): Mod: JZ | Performed by: INTERNAL MEDICINE

## 2023-01-01 PROCEDURE — 82784 ASSAY IGA/IGD/IGG/IGM EACH: CPT

## 2023-01-01 PROCEDURE — 82652 VIT D 1 25-DIHYDROXY: CPT

## 2023-01-01 PROCEDURE — 84550 ASSAY OF BLOOD/URIC ACID: CPT

## 2023-01-01 PROCEDURE — 99285 EMERGENCY DEPT VISIT HI MDM: CPT

## 2023-01-01 PROCEDURE — 77263 THER RADIOLOGY TX PLNG CPLX: CPT | Performed by: RADIOLOGY

## 2023-01-01 PROCEDURE — A9270 NON-COVERED ITEM OR SERVICE: HCPCS | Performed by: NURSE PRACTITIONER

## 2023-01-01 PROCEDURE — A9503 TC99M MEDRONATE: HCPCS

## 2023-01-01 PROCEDURE — 83521 IG LIGHT CHAINS FREE EACH: CPT

## 2023-01-01 PROCEDURE — 82310 ASSAY OF CALCIUM: CPT

## 2023-01-01 PROCEDURE — 85014 HEMATOCRIT: CPT

## 2023-01-01 PROCEDURE — 77293 RESPIRATOR MOTION MGMT SIMUL: CPT | Performed by: RADIOLOGY

## 2023-01-01 PROCEDURE — 77290 THER RAD SIMULAJ FIELD CPLX: CPT | Performed by: RADIOLOGY

## 2023-01-01 PROCEDURE — 72146 MRI CHEST SPINE W/O DYE: CPT

## 2023-01-01 PROCEDURE — 87798 DETECT AGENT NOS DNA AMP: CPT

## 2023-01-01 PROCEDURE — 82375 ASSAY CARBOXYHB QUANT: CPT

## 2023-01-01 PROCEDURE — 97530 THERAPEUTIC ACTIVITIES: CPT

## 2023-01-01 PROCEDURE — 97116 GAIT TRAINING THERAPY: CPT

## 2023-01-01 PROCEDURE — 96374 THER/PROPH/DIAG INJ IV PUSH: CPT

## 2023-01-01 PROCEDURE — 700117 HCHG RX CONTRAST REV CODE 255: Performed by: NURSE PRACTITIONER

## 2023-01-01 PROCEDURE — 77300 RADIATION THERAPY DOSE PLAN: CPT | Mod: 26 | Performed by: RADIOLOGY

## 2023-01-01 PROCEDURE — 96372 THER/PROPH/DIAG INJ SC/IM: CPT

## 2023-01-01 PROCEDURE — 97166 OT EVAL MOD COMPLEX 45 MIN: CPT

## 2023-01-01 PROCEDURE — 99223 1ST HOSP IP/OBS HIGH 75: CPT | Mod: 25 | Performed by: STUDENT IN AN ORGANIZED HEALTH CARE EDUCATION/TRAINING PROGRAM

## 2023-01-01 PROCEDURE — 700111 HCHG RX REV CODE 636 W/ 250 OVERRIDE (IP): Mod: JZ | Performed by: HOSPITALIST

## 2023-01-01 PROCEDURE — 99214 OFFICE O/P EST MOD 30 MIN: CPT | Performed by: INTERNAL MEDICINE

## 2023-01-01 PROCEDURE — 92610 EVALUATE SWALLOWING FUNCTION: CPT

## 2023-01-01 PROCEDURE — 84155 ASSAY OF PROTEIN SERUM: CPT

## 2023-01-01 PROCEDURE — 87040 BLOOD CULTURE FOR BACTERIA: CPT | Mod: 91

## 2023-01-01 PROCEDURE — 81001 URINALYSIS AUTO W/SCOPE: CPT

## 2023-01-01 PROCEDURE — 665036 HSPC NOTICE OF ELECTION NOE

## 2023-01-01 PROCEDURE — 74250 X-RAY XM SM INT 1CNTRST STD: CPT

## 2023-01-01 PROCEDURE — 77336 RADIATION PHYSICS CONSULT: CPT | Mod: XU | Performed by: RADIOLOGY

## 2023-01-01 PROCEDURE — C9113 INJ PANTOPRAZOLE SODIUM, VIA: HCPCS | Performed by: INTERNAL MEDICINE

## 2023-01-01 PROCEDURE — 96375 TX/PRO/DX INJ NEW DRUG ADDON: CPT

## 2023-01-01 PROCEDURE — 85046 RETICYTE/HGB CONCENTRATE: CPT

## 2023-01-01 PROCEDURE — 700102 HCHG RX REV CODE 250 W/ 637 OVERRIDE(OP): Performed by: NURSE PRACTITIONER

## 2023-01-01 PROCEDURE — 77290 THER RAD SIMULAJ FIELD CPLX: CPT | Mod: 26 | Performed by: RADIOLOGY

## 2023-01-01 PROCEDURE — 83993 ASSAY FOR CALPROTECTIN FECAL: CPT

## 2023-01-01 PROCEDURE — 71260 CT THORAX DX C+: CPT

## 2023-01-01 PROCEDURE — 70450 CT HEAD/BRAIN W/O DYE: CPT

## 2023-01-01 PROCEDURE — 96376 TX/PRO/DX INJ SAME DRUG ADON: CPT

## 2023-01-01 PROCEDURE — 77293 RESPIRATOR MOTION MGMT SIMUL: CPT | Mod: 26 | Performed by: RADIOLOGY

## 2023-01-01 PROCEDURE — 82024 ASSAY OF ACTH: CPT

## 2023-01-01 PROCEDURE — 700101 HCHG RX REV CODE 250: Performed by: HOSPITALIST

## 2023-01-01 PROCEDURE — 72148 MRI LUMBAR SPINE W/O DYE: CPT

## 2023-01-01 PROCEDURE — 77295 3-D RADIOTHERAPY PLAN: CPT | Performed by: RADIOLOGY

## 2023-01-01 PROCEDURE — 99497 ADVNCD CARE PLAN 30 MIN: CPT | Performed by: NURSE PRACTITIONER

## 2023-01-01 PROCEDURE — 74018 RADEX ABDOMEN 1 VIEW: CPT

## 2023-01-01 PROCEDURE — 77370 RADIATION PHYSICS CONSULT: CPT | Performed by: RADIOLOGY

## 2023-01-01 PROCEDURE — 77295 3-D RADIOTHERAPY PLAN: CPT | Mod: 26 | Performed by: RADIOLOGY

## 2023-01-01 PROCEDURE — 74176 CT ABD & PELVIS W/O CONTRAST: CPT

## 2023-01-01 PROCEDURE — 97167 OT EVAL HIGH COMPLEX 60 MIN: CPT

## 2023-01-01 PROCEDURE — 93005 ELECTROCARDIOGRAM TRACING: CPT | Performed by: HOSPITALIST

## 2023-01-01 PROCEDURE — 85007 BL SMEAR W/DIFF WBC COUNT: CPT

## 2023-01-01 PROCEDURE — 87177 OVA AND PARASITES SMEARS: CPT

## 2023-01-01 PROCEDURE — 87493 C DIFF AMPLIFIED PROBE: CPT

## 2023-01-01 PROCEDURE — 99497 ADVNCD CARE PLAN 30 MIN: CPT | Performed by: STUDENT IN AN ORGANIZED HEALTH CARE EDUCATION/TRAINING PROGRAM

## 2023-01-01 PROCEDURE — 82310 ASSAY OF CALCIUM: CPT | Mod: 91

## 2023-01-01 PROCEDURE — 700105 HCHG RX REV CODE 258: Performed by: STUDENT IN AN ORGANIZED HEALTH CARE EDUCATION/TRAINING PROGRAM

## 2023-01-01 PROCEDURE — 700111 HCHG RX REV CODE 636 W/ 250 OVERRIDE (IP): Performed by: STUDENT IN AN ORGANIZED HEALTH CARE EDUCATION/TRAINING PROGRAM

## 2023-01-01 PROCEDURE — 80061 LIPID PANEL: CPT

## 2023-01-01 PROCEDURE — 99223 1ST HOSP IP/OBS HIGH 75: CPT | Mod: AI | Performed by: HOSPITALIST

## 2023-01-01 PROCEDURE — 97535 SELF CARE MNGMENT TRAINING: CPT

## 2023-01-01 PROCEDURE — 86334 IMMUNOFIX E-PHORESIS SERUM: CPT

## 2023-01-01 PROCEDURE — 87086 URINE CULTURE/COLONY COUNT: CPT

## 2023-01-01 PROCEDURE — 700117 HCHG RX CONTRAST REV CODE 255: Performed by: INTERNAL MEDICINE

## 2023-01-01 PROCEDURE — 99497 ADVNCD CARE PLAN 30 MIN: CPT | Performed by: HOSPITALIST

## 2023-01-01 PROCEDURE — 82542 COL CHROMOTOGRAPHY QUAL/QUAN: CPT

## 2023-01-01 PROCEDURE — 86140 C-REACTIVE PROTEIN: CPT

## 2023-01-01 PROCEDURE — 99223 1ST HOSP IP/OBS HIGH 75: CPT | Mod: 25 | Performed by: NURSE PRACTITIONER

## 2023-01-01 PROCEDURE — 99233 SBSQ HOSP IP/OBS HIGH 50: CPT | Performed by: INTERNAL MEDICINE

## 2023-01-01 PROCEDURE — 99232 SBSQ HOSP IP/OBS MODERATE 35: CPT | Performed by: STUDENT IN AN ORGANIZED HEALTH CARE EDUCATION/TRAINING PROGRAM

## 2023-01-01 PROCEDURE — 93010 ELECTROCARDIOGRAM REPORT: CPT | Performed by: STUDENT IN AN ORGANIZED HEALTH CARE EDUCATION/TRAINING PROGRAM

## 2023-01-01 PROCEDURE — 77470 SPECIAL RADIATION TREATMENT: CPT | Mod: 26 | Performed by: RADIOLOGY

## 2023-01-01 PROCEDURE — C1751 CATH, INF, PER/CENT/MIDLINE: HCPCS

## 2023-01-01 PROCEDURE — 84165 PROTEIN E-PHORESIS SERUM: CPT

## 2023-01-01 PROCEDURE — C9803 HOPD COVID-19 SPEC COLLECT: HCPCS | Performed by: EMERGENCY MEDICINE

## 2023-01-01 PROCEDURE — 84484 ASSAY OF TROPONIN QUANT: CPT

## 2023-01-01 PROCEDURE — 71045 X-RAY EXAM CHEST 1 VIEW: CPT

## 2023-01-01 PROCEDURE — 84681 ASSAY OF C-PEPTIDE: CPT

## 2023-01-01 PROCEDURE — 76775 US EXAM ABDO BACK WALL LIM: CPT

## 2023-01-01 PROCEDURE — 51702 INSERT TEMP BLADDER CATH: CPT

## 2023-01-01 PROCEDURE — 77300 RADIATION THERAPY DOSE PLAN: CPT | Performed by: RADIOLOGY

## 2023-01-01 PROCEDURE — 700105 HCHG RX REV CODE 258: Performed by: FAMILY MEDICINE

## 2023-01-01 PROCEDURE — 76937 US GUIDE VASCULAR ACCESS: CPT

## 2023-01-01 PROCEDURE — 93005 ELECTROCARDIOGRAM TRACING: CPT | Performed by: INTERNAL MEDICINE

## 2023-01-01 PROCEDURE — 96372 THER/PROPH/DIAG INJ SC/IM: CPT | Mod: XU

## 2023-01-01 PROCEDURE — 84439 ASSAY OF FREE THYROXINE: CPT

## 2023-01-01 PROCEDURE — 83036 HEMOGLOBIN GLYCOSYLATED A1C: CPT | Mod: GA

## 2023-01-01 PROCEDURE — 99233 SBSQ HOSP IP/OBS HIGH 50: CPT | Performed by: FAMILY MEDICINE

## 2023-01-01 PROCEDURE — 87209 SMEAR COMPLEX STAIN: CPT

## 2023-01-01 PROCEDURE — 82746 ASSAY OF FOLIC ACID SERUM: CPT

## 2023-01-01 PROCEDURE — 0241U HCHG SARS-COV-2 COVID-19 NFCT DS RESP RNA 4 TRGT MIC: CPT

## 2023-01-01 PROCEDURE — 81003 URINALYSIS AUTO W/O SCOPE: CPT

## 2023-01-01 PROCEDURE — 99231 SBSQ HOSP IP/OBS SF/LOW 25: CPT | Performed by: INTERNAL MEDICINE

## 2023-01-01 PROCEDURE — 97163 PT EVAL HIGH COMPLEX 45 MIN: CPT

## 2023-01-01 PROCEDURE — A9500 TC99M SESTAMIBI: HCPCS

## 2023-01-01 PROCEDURE — 51798 US URINE CAPACITY MEASURE: CPT

## 2023-01-01 PROCEDURE — 99223 1ST HOSP IP/OBS HIGH 75: CPT | Mod: 25,AI | Performed by: HOSPITALIST

## 2023-01-01 PROCEDURE — 83735 ASSAY OF MAGNESIUM: CPT | Mod: GA

## 2023-01-01 RX ORDER — SODIUM POLYSTYRENE SULFONATE 15 G/60ML
15 SUSPENSION ORAL; RECTAL ONCE
Status: DISCONTINUED | OUTPATIENT
Start: 2023-01-01 | End: 2023-01-01

## 2023-01-01 RX ORDER — TIMOLOL MALEATE 5 MG/ML
1 SOLUTION/ DROPS OPHTHALMIC 2 TIMES DAILY
Status: DISCONTINUED | OUTPATIENT
Start: 2023-01-01 | End: 2023-01-01 | Stop reason: HOSPADM

## 2023-01-01 RX ORDER — DOCUSATE SODIUM 100 MG/1
100 CAPSULE, LIQUID FILLED ORAL DAILY
Status: DISCONTINUED | OUTPATIENT
Start: 2023-01-01 | End: 2023-01-01

## 2023-01-01 RX ORDER — NYSTATIN 100000 [USP'U]/G
POWDER TOPICAL 3 TIMES DAILY
Status: DISCONTINUED | OUTPATIENT
Start: 2023-01-01 | End: 2023-01-01 | Stop reason: HOSPADM

## 2023-01-01 RX ORDER — TIOTROPIUM BROMIDE INHALATION SPRAY 3.12 UG/1
5 SPRAY, METERED RESPIRATORY (INHALATION) DAILY
Qty: 4 G | Refills: 10 | Status: SHIPPED | OUTPATIENT
Start: 2023-01-01 | End: 2023-01-01 | Stop reason: SDUPTHER

## 2023-01-01 RX ORDER — CALCIUM CARBONATE 500 MG/1
500 TABLET, CHEWABLE ORAL 2 TIMES DAILY PRN
Status: DISCONTINUED | OUTPATIENT
Start: 2023-01-01 | End: 2023-01-01 | Stop reason: HOSPADM

## 2023-01-01 RX ORDER — FUROSEMIDE 10 MG/ML
40 INJECTION INTRAMUSCULAR; INTRAVENOUS ONCE
Status: DISCONTINUED | OUTPATIENT
Start: 2023-01-01 | End: 2023-01-01

## 2023-01-01 RX ORDER — ATORVASTATIN CALCIUM 20 MG/1
20 TABLET, FILM COATED ORAL NIGHTLY
Status: DISCONTINUED | OUTPATIENT
Start: 2023-01-01 | End: 2023-01-01 | Stop reason: HOSPADM

## 2023-01-01 RX ORDER — AMOXICILLIN 250 MG
2 CAPSULE ORAL 2 TIMES DAILY
Status: DISCONTINUED | OUTPATIENT
Start: 2023-01-01 | End: 2023-01-01 | Stop reason: HOSPADM

## 2023-01-01 RX ORDER — CINACALCET 30 MG/1
30 TABLET, FILM COATED ORAL DAILY
Qty: 30 TABLET | Refills: 1 | Status: ON HOLD | OUTPATIENT
Start: 2023-01-01 | End: 2023-01-01 | Stop reason: SDUPTHER

## 2023-01-01 RX ORDER — FENTANYL 50 UG/1
1 PATCH TRANSDERMAL
Qty: 20 PATCH | Refills: 0 | Status: SHIPPED | OUTPATIENT
Start: 2023-01-01 | End: 2024-01-01

## 2023-01-01 RX ORDER — BISACODYL 10 MG
10 SUPPOSITORY, RECTAL RECTAL
Status: DISCONTINUED | OUTPATIENT
Start: 2023-01-01 | End: 2023-01-01

## 2023-01-01 RX ORDER — METHIMAZOLE 5 MG/1
5 TABLET ORAL DAILY
Status: DISCONTINUED | OUTPATIENT
Start: 2023-01-01 | End: 2023-01-01 | Stop reason: HOSPADM

## 2023-01-01 RX ORDER — AMOXICILLIN AND CLAVULANATE POTASSIUM 875; 125 MG/1; MG/1
1 TABLET, FILM COATED ORAL ONCE
Status: COMPLETED | OUTPATIENT
Start: 2023-01-01 | End: 2023-01-01

## 2023-01-01 RX ORDER — ENEMA 19; 7 G/133ML; G/133ML
1 ENEMA RECTAL ONCE
Status: ON HOLD | COMMUNITY
End: 2023-01-01

## 2023-01-01 RX ORDER — SODIUM PHOSPHATE, DIBASIC AND SODIUM PHOSPHATE, MONOBASIC 7; 19 G/230ML; G/230ML
1 ENEMA RECTAL
Qty: 266 ML | Refills: 10 | Status: SHIPPED | OUTPATIENT
Start: 2023-01-01 | End: 2024-10-03

## 2023-01-01 RX ORDER — BENZONATATE 100 MG/1
100 CAPSULE ORAL 3 TIMES DAILY PRN
COMMUNITY
End: 2023-01-01

## 2023-01-01 RX ORDER — SODIUM CHLORIDE 9 MG/ML
INJECTION, SOLUTION INTRAVENOUS CONTINUOUS
Status: DISCONTINUED | OUTPATIENT
Start: 2023-01-01 | End: 2023-01-01 | Stop reason: HOSPADM

## 2023-01-01 RX ORDER — HYDROCODONE BITARTRATE AND HOMATROPINE METHYLBROMIDE ORAL SOLUTION 5; 1.5 MG/5ML; MG/5ML
5 LIQUID ORAL EVERY 4 HOURS PRN
Status: DISCONTINUED | OUTPATIENT
Start: 2023-01-01 | End: 2023-01-01 | Stop reason: HOSPADM

## 2023-01-01 RX ORDER — PROCHLORPERAZINE MALEATE 10 MG
10 TABLET ORAL EVERY 8 HOURS PRN
Status: ON HOLD | COMMUNITY
End: 2023-01-01 | Stop reason: SDUPTHER

## 2023-01-01 RX ORDER — ALBUTEROL SULFATE 90 UG/1
2 AEROSOL, METERED RESPIRATORY (INHALATION) EVERY 6 HOURS PRN
Status: DISCONTINUED | OUTPATIENT
Start: 2023-01-01 | End: 2023-01-01

## 2023-01-01 RX ORDER — MAGNESIUM SULFATE HEPTAHYDRATE 40 MG/ML
2 INJECTION, SOLUTION INTRAVENOUS ONCE
Status: DISCONTINUED | OUTPATIENT
Start: 2023-01-01 | End: 2023-01-01

## 2023-01-01 RX ORDER — IPRATROPIUM BROMIDE AND ALBUTEROL SULFATE 2.5; .5 MG/3ML; MG/3ML
3 SOLUTION RESPIRATORY (INHALATION)
Status: DISCONTINUED | OUTPATIENT
Start: 2023-01-01 | End: 2023-01-01 | Stop reason: HOSPADM

## 2023-01-01 RX ORDER — BISACODYL 5 MG/1
5 TABLET, DELAYED RELEASE ORAL 2 TIMES DAILY
Qty: 28 TABLET | Refills: 23 | Status: SHIPPED | OUTPATIENT
Start: 2023-01-01 | End: 2023-01-01 | Stop reason: SDUPTHER

## 2023-01-01 RX ORDER — FUROSEMIDE 20 MG/1
20 TABLET ORAL DAILY
Qty: 30 TABLET | Refills: 3 | Status: SHIPPED | OUTPATIENT
Start: 2023-01-01 | End: 2023-01-01

## 2023-01-01 RX ORDER — POLYETHYLENE GLYCOL 3350 17 G/17G
1 POWDER, FOR SOLUTION ORAL
Status: DISCONTINUED | OUTPATIENT
Start: 2023-01-01 | End: 2023-01-01

## 2023-01-01 RX ORDER — SODIUM CHLORIDE, SODIUM LACTATE, POTASSIUM CHLORIDE, CALCIUM CHLORIDE 600; 310; 30; 20 MG/100ML; MG/100ML; MG/100ML; MG/100ML
INJECTION, SOLUTION INTRAVENOUS CONTINUOUS
Status: DISCONTINUED | OUTPATIENT
Start: 2023-01-01 | End: 2023-01-01

## 2023-01-01 RX ORDER — GUAIFENESIN AND DEXTROMETHORPHAN HYDROBROMIDE 100; 10 MG/5ML; MG/5ML
10 SOLUTION ORAL EVERY 4 HOURS PRN
Qty: 237 ML | Refills: 10 | Status: SHIPPED | OUTPATIENT
Start: 2023-01-01 | End: 2023-01-01

## 2023-01-01 RX ORDER — HYDRALAZINE HYDROCHLORIDE 20 MG/ML
10 INJECTION INTRAMUSCULAR; INTRAVENOUS EVERY 4 HOURS PRN
Status: DISCONTINUED | OUTPATIENT
Start: 2023-01-01 | End: 2023-01-01 | Stop reason: HOSPADM

## 2023-01-01 RX ORDER — ONDANSETRON 4 MG/1
4 TABLET, FILM COATED ORAL SEE ADMIN INSTRUCTIONS
COMMUNITY
End: 2023-01-01

## 2023-01-01 RX ORDER — FUROSEMIDE 40 MG/1
40 TABLET ORAL DAILY
Status: ON HOLD | COMMUNITY
End: 2023-01-01

## 2023-01-01 RX ORDER — SENNA AND DOCUSATE SODIUM 50; 8.6 MG/1; MG/1
2 TABLET, FILM COATED ORAL 2 TIMES DAILY
Qty: 56 TABLET | Refills: 10 | Status: SHIPPED | OUTPATIENT
Start: 2023-01-01 | End: 2023-01-01 | Stop reason: SDUPTHER

## 2023-01-01 RX ORDER — TIOTROPIUM BROMIDE INHALATION SPRAY 3.12 UG/1
5 SPRAY, METERED RESPIRATORY (INHALATION) DAILY
Status: ON HOLD | COMMUNITY
End: 2023-01-01 | Stop reason: SDUPTHER

## 2023-01-01 RX ORDER — FLUTICASONE PROPIONATE AND SALMETEROL 500; 50 UG/1; UG/1
1 POWDER RESPIRATORY (INHALATION) EVERY 12 HOURS
Status: DISCONTINUED | OUTPATIENT
Start: 2023-01-01 | End: 2023-01-01

## 2023-01-01 RX ORDER — ZOLPIDEM TARTRATE 5 MG/1
5 TABLET ORAL NIGHTLY PRN
Status: DISCONTINUED | OUTPATIENT
Start: 2023-01-01 | End: 2023-01-01 | Stop reason: HOSPADM

## 2023-01-01 RX ORDER — ACETAMINOPHEN 325 MG/1
650 TABLET ORAL EVERY 6 HOURS PRN
Status: DISCONTINUED | OUTPATIENT
Start: 2023-01-01 | End: 2023-01-01 | Stop reason: HOSPADM

## 2023-01-01 RX ORDER — ALBUTEROL SULFATE 2.5 MG/3ML
2.5 SOLUTION RESPIRATORY (INHALATION)
Status: DISCONTINUED | OUTPATIENT
Start: 2023-01-01 | End: 2023-01-01

## 2023-01-01 RX ORDER — CHOLECALCIFEROL (VITAMIN D3) 125 MCG
5 CAPSULE ORAL NIGHTLY
Status: DISCONTINUED | OUTPATIENT
Start: 2023-01-01 | End: 2023-01-01 | Stop reason: HOSPADM

## 2023-01-01 RX ORDER — BISACODYL 10 MG
10 SUPPOSITORY, RECTAL RECTAL PRN
Qty: 5 SUPPOSITORY | Refills: 10 | Status: SHIPPED | OUTPATIENT
Start: 2023-01-01 | End: 2024-10-03

## 2023-01-01 RX ORDER — POTASSIUM CHLORIDE 20 MEQ/1
40 TABLET, EXTENDED RELEASE ORAL ONCE
Status: COMPLETED | OUTPATIENT
Start: 2023-01-01 | End: 2023-01-01

## 2023-01-01 RX ORDER — GABAPENTIN 300 MG/1
300 CAPSULE ORAL 2 TIMES DAILY
Status: DISCONTINUED | OUTPATIENT
Start: 2023-01-01 | End: 2023-01-01 | Stop reason: HOSPADM

## 2023-01-01 RX ORDER — EPINEPHRINE 1 MG/ML(1)
0.5 AMPUL (ML) INJECTION PRN
Status: CANCELLED | OUTPATIENT
Start: 2023-01-01

## 2023-01-01 RX ORDER — BRIMONIDINE TARTRATE AND TIMOLOL MALEATE 2; 5 MG/ML; MG/ML
1 SOLUTION OPHTHALMIC 2 TIMES DAILY
Status: ON HOLD | COMMUNITY
End: 2023-01-01 | Stop reason: SDUPTHER

## 2023-01-01 RX ORDER — ALLOPURINOL 100 MG/1
200 TABLET ORAL EVERY MORNING
Status: DISCONTINUED | OUTPATIENT
Start: 2023-01-01 | End: 2023-01-01 | Stop reason: HOSPADM

## 2023-01-01 RX ORDER — SODIUM CHLORIDE 9 MG/ML
1000 INJECTION, SOLUTION INTRAVENOUS ONCE
Status: COMPLETED | OUTPATIENT
Start: 2023-01-01 | End: 2023-01-01

## 2023-01-01 RX ORDER — SODIUM CHLORIDE 9 MG/ML
INJECTION, SOLUTION INTRAVENOUS CONTINUOUS
Status: ACTIVE | OUTPATIENT
Start: 2023-01-01 | End: 2023-01-01

## 2023-01-01 RX ORDER — ALBUTEROL SULFATE 2.5 MG/3ML
SOLUTION RESPIRATORY (INHALATION)
Qty: 180 ML | Refills: 11 | Status: SHIPPED
Start: 2023-01-01 | End: 2023-01-01

## 2023-01-01 RX ORDER — OXYCODONE HYDROCHLORIDE 5 MG/1
5 TABLET ORAL
Status: DISCONTINUED | OUTPATIENT
Start: 2023-01-01 | End: 2023-01-01 | Stop reason: HOSPADM

## 2023-01-01 RX ORDER — ONDANSETRON 4 MG/1
4 TABLET, ORALLY DISINTEGRATING ORAL EVERY 4 HOURS PRN
Status: DISCONTINUED | OUTPATIENT
Start: 2023-01-01 | End: 2023-01-01

## 2023-01-01 RX ORDER — SODIUM CHLORIDE 9 MG/ML
INJECTION, SOLUTION INTRAVENOUS CONTINUOUS
Status: DISCONTINUED | OUTPATIENT
Start: 2023-01-01 | End: 2023-01-01

## 2023-01-01 RX ORDER — IPRATROPIUM BROMIDE AND ALBUTEROL SULFATE 2.5; .5 MG/3ML; MG/3ML
3 SOLUTION RESPIRATORY (INHALATION)
Status: DISCONTINUED | OUTPATIENT
Start: 2023-01-01 | End: 2023-01-01

## 2023-01-01 RX ORDER — ACETAMINOPHEN 325 MG/1
650 TABLET ORAL EVERY 6 HOURS PRN
Status: DISCONTINUED | OUTPATIENT
Start: 2023-01-01 | End: 2023-01-01

## 2023-01-01 RX ORDER — BRIMONIDINE TARTRATE 2 MG/ML
SOLUTION/ DROPS OPHTHALMIC
Status: ACTIVE
Start: 2023-01-01 | End: 2023-01-01

## 2023-01-01 RX ORDER — HYDROCODONE BITARTRATE AND ACETAMINOPHEN 5; 325 MG/1; MG/1
1 TABLET ORAL EVERY 6 HOURS PRN
Status: DISCONTINUED | OUTPATIENT
Start: 2023-01-01 | End: 2023-01-01 | Stop reason: HOSPADM

## 2023-01-01 RX ORDER — ONDANSETRON 2 MG/ML
4 INJECTION INTRAMUSCULAR; INTRAVENOUS ONCE
Status: COMPLETED | OUTPATIENT
Start: 2023-01-01 | End: 2023-01-01

## 2023-01-01 RX ORDER — HEPARIN SODIUM 5000 [USP'U]/ML
5000 INJECTION, SOLUTION INTRAVENOUS; SUBCUTANEOUS EVERY 8 HOURS
Status: DISCONTINUED | OUTPATIENT
Start: 2023-01-01 | End: 2023-01-01

## 2023-01-01 RX ORDER — HEPARIN SODIUM 5000 [USP'U]/ML
5000 INJECTION, SOLUTION INTRAVENOUS; SUBCUTANEOUS EVERY 8 HOURS
Status: DISCONTINUED | OUTPATIENT
Start: 2023-01-01 | End: 2023-01-01 | Stop reason: HOSPADM

## 2023-01-01 RX ORDER — AZITHROMYCIN 250 MG/1
TABLET, FILM COATED ORAL
Qty: 30 TABLET | Refills: 5 | Status: ON HOLD
Start: 2023-01-01 | End: 2023-01-01

## 2023-01-01 RX ORDER — NYSTATIN 100000 [USP'U]/G
1 POWDER TOPICAL 3 TIMES DAILY
Qty: 30 G | Refills: 10 | Status: SHIPPED | OUTPATIENT
Start: 2023-01-01 | End: 2023-01-01

## 2023-01-01 RX ORDER — ZOLPIDEM TARTRATE 5 MG/1
5 TABLET ORAL NIGHTLY PRN
Status: DISCONTINUED | OUTPATIENT
Start: 2023-01-01 | End: 2023-01-01

## 2023-01-01 RX ORDER — SODIUM CHLORIDE, SODIUM LACTATE, POTASSIUM CHLORIDE, AND CALCIUM CHLORIDE .6; .31; .03; .02 G/100ML; G/100ML; G/100ML; G/100ML
1000 INJECTION, SOLUTION INTRAVENOUS ONCE
Status: DISCONTINUED | OUTPATIENT
Start: 2023-01-01 | End: 2023-01-01

## 2023-01-01 RX ORDER — SUCRALFATE ORAL 1 G/10ML
1 SUSPENSION ORAL EVERY 6 HOURS
Status: DISCONTINUED | OUTPATIENT
Start: 2023-01-01 | End: 2023-01-01 | Stop reason: HOSPADM

## 2023-01-01 RX ORDER — BISACODYL 10 MG
10 SUPPOSITORY, RECTAL RECTAL PRN
Status: ON HOLD | COMMUNITY
End: 2023-01-01

## 2023-01-01 RX ORDER — ONDANSETRON 2 MG/ML
4 INJECTION INTRAMUSCULAR; INTRAVENOUS EVERY 4 HOURS PRN
Status: DISCONTINUED | OUTPATIENT
Start: 2023-01-01 | End: 2023-01-01 | Stop reason: HOSPADM

## 2023-01-01 RX ORDER — ALBUTEROL SULFATE 90 UG/1
AEROSOL, METERED RESPIRATORY (INHALATION)
Qty: 18 G | Refills: 4 | OUTPATIENT
Start: 2023-01-01

## 2023-01-01 RX ORDER — BENZOCAINE/MENTHOL 6 MG-10 MG
1 LOZENGE MUCOUS MEMBRANE EVERY 4 HOURS PRN
Status: ON HOLD | COMMUNITY
End: 2023-01-01 | Stop reason: SDUPTHER

## 2023-01-01 RX ORDER — AMOXICILLIN AND CLAVULANATE POTASSIUM 875; 125 MG/1; MG/1
1 TABLET, FILM COATED ORAL 2 TIMES DAILY
Qty: 10 TABLET | Refills: 0 | Status: ON HOLD | OUTPATIENT
Start: 2023-01-01 | End: 2023-01-01

## 2023-01-01 RX ORDER — MIDODRINE HYDROCHLORIDE 5 MG/1
5 TABLET ORAL 2 TIMES DAILY
Status: DISCONTINUED | OUTPATIENT
Start: 2023-01-01 | End: 2023-01-01 | Stop reason: HOSPADM

## 2023-01-01 RX ORDER — METAXALONE 800 MG/1
400 TABLET ORAL 3 TIMES DAILY
Status: DISCONTINUED | OUTPATIENT
Start: 2023-01-01 | End: 2023-01-01 | Stop reason: HOSPADM

## 2023-01-01 RX ORDER — ALBUTEROL SULFATE 2.5 MG/3ML
5 SOLUTION RESPIRATORY (INHALATION)
Status: DISCONTINUED | OUTPATIENT
Start: 2023-01-01 | End: 2023-01-01

## 2023-01-01 RX ORDER — GABAPENTIN 100 MG/1
200 CAPSULE ORAL EVERY EVENING
Status: DISCONTINUED | OUTPATIENT
Start: 2023-01-01 | End: 2023-01-01 | Stop reason: HOSPADM

## 2023-01-01 RX ORDER — DIPHENHYDRAMINE HCL 25 MG
25 TABLET ORAL EVERY 6 HOURS PRN
Qty: 30 TABLET | Refills: 10 | Status: SHIPPED | OUTPATIENT
Start: 2023-01-01 | End: 2024-10-03

## 2023-01-01 RX ORDER — PROCHLORPERAZINE EDISYLATE 5 MG/ML
10 INJECTION INTRAMUSCULAR; INTRAVENOUS EVERY 6 HOURS PRN
Status: DISCONTINUED | OUTPATIENT
Start: 2023-01-01 | End: 2023-01-01 | Stop reason: HOSPADM

## 2023-01-01 RX ORDER — ALBUTEROL SULFATE 2.5 MG/3ML
2.5 SOLUTION RESPIRATORY (INHALATION)
Qty: 90 EACH | Refills: 10 | Status: SHIPPED | OUTPATIENT
Start: 2023-01-01 | End: 2024-10-30

## 2023-01-01 RX ORDER — ZOLPIDEM TARTRATE 5 MG/1
5 TABLET ORAL
Qty: 360 TABLET | Refills: 0 | Status: SHIPPED | OUTPATIENT
Start: 2023-01-01 | End: 2024-10-03

## 2023-01-01 RX ORDER — FUROSEMIDE 10 MG/ML
20 INJECTION INTRAMUSCULAR; INTRAVENOUS
Status: DISCONTINUED | OUTPATIENT
Start: 2023-01-01 | End: 2023-01-01

## 2023-01-01 RX ORDER — SENNOSIDES A AND B 8.6 MG/1
8.6 TABLET, FILM COATED ORAL 2 TIMES DAILY
Status: ON HOLD | COMMUNITY
End: 2023-01-01

## 2023-01-01 RX ORDER — ALBUTEROL SULFATE 90 UG/1
2 AEROSOL, METERED RESPIRATORY (INHALATION) EVERY 6 HOURS PRN
Status: DISCONTINUED | OUTPATIENT
Start: 2023-01-01 | End: 2023-01-01 | Stop reason: HOSPADM

## 2023-01-01 RX ORDER — BISACODYL 10 MG
10 SUPPOSITORY, RECTAL RECTAL
Status: DISCONTINUED | OUTPATIENT
Start: 2023-01-01 | End: 2023-01-01 | Stop reason: HOSPADM

## 2023-01-01 RX ORDER — ZOLPIDEM TARTRATE 5 MG/1
5 TABLET ORAL NIGHTLY PRN
Status: ON HOLD | COMMUNITY
End: 2023-01-01 | Stop reason: SDUPTHER

## 2023-01-01 RX ORDER — SIMVASTATIN 20 MG
40 TABLET ORAL EVERY EVENING
Status: DISCONTINUED | OUTPATIENT
Start: 2023-01-01 | End: 2023-01-01 | Stop reason: HOSPADM

## 2023-01-01 RX ORDER — LORAZEPAM 2 MG/ML
1 CONCENTRATE ORAL
Qty: 360 ML | Refills: 0 | Status: SHIPPED | OUTPATIENT
Start: 2023-01-01 | End: 2024-01-01

## 2023-01-01 RX ORDER — POLYETHYLENE GLYCOL 3350 17 G/17G
1 POWDER, FOR SOLUTION ORAL
Status: DISCONTINUED | OUTPATIENT
Start: 2023-01-01 | End: 2023-01-01 | Stop reason: HOSPADM

## 2023-01-01 RX ORDER — FLUTICASONE PROPIONATE AND SALMETEROL 500; 50 UG/1; UG/1
1 POWDER RESPIRATORY (INHALATION) 2 TIMES DAILY
Status: DISCONTINUED | OUTPATIENT
Start: 2023-01-01 | End: 2023-01-01

## 2023-01-01 RX ORDER — METHYLPREDNISOLONE SODIUM SUCCINATE 125 MG/2ML
125 INJECTION, POWDER, LYOPHILIZED, FOR SOLUTION INTRAMUSCULAR; INTRAVENOUS PRN
Status: CANCELLED | OUTPATIENT
Start: 2023-01-01

## 2023-01-01 RX ORDER — BRIMONIDINE TARTRATE AND TIMOLOL MALEATE 2; 5 MG/ML; MG/ML
1 SOLUTION OPHTHALMIC 2 TIMES DAILY
Status: DISCONTINUED | OUTPATIENT
Start: 2023-01-01 | End: 2023-01-01

## 2023-01-01 RX ORDER — PROCHLORPERAZINE MALEATE 10 MG
10 TABLET ORAL EVERY 8 HOURS PRN
Status: DISCONTINUED | OUTPATIENT
Start: 2023-01-01 | End: 2023-01-01 | Stop reason: HOSPADM

## 2023-01-01 RX ORDER — MIDODRINE HYDROCHLORIDE 5 MG/1
5 TABLET ORAL 2 TIMES DAILY
Qty: 28 TABLET | Refills: 10 | Status: SHIPPED | OUTPATIENT
Start: 2023-01-01 | End: 2023-01-01

## 2023-01-01 RX ORDER — OXYCODONE HYDROCHLORIDE 10 MG/1
10 TABLET ORAL
Status: DISCONTINUED | OUTPATIENT
Start: 2023-01-01 | End: 2023-01-01 | Stop reason: HOSPADM

## 2023-01-01 RX ORDER — PREDNISONE 20 MG/1
40 TABLET ORAL DAILY
Status: DISCONTINUED | OUTPATIENT
Start: 2023-01-01 | End: 2023-01-01

## 2023-01-01 RX ORDER — CINACALCET 30 MG/1
30 TABLET, FILM COATED ORAL
Status: DISCONTINUED | OUTPATIENT
Start: 2023-01-01 | End: 2023-01-01 | Stop reason: HOSPADM

## 2023-01-01 RX ORDER — TIMOLOL MALEATE 5 MG/ML
1 SOLUTION/ DROPS OPHTHALMIC 2 TIMES DAILY
COMMUNITY
End: 2023-01-01

## 2023-01-01 RX ORDER — MORPHINE SULFATE 30 MG/1
30 TABLET, FILM COATED, EXTENDED RELEASE ORAL EVERY 12 HOURS
Qty: 120 TABLET | Refills: 0 | Status: SHIPPED | OUTPATIENT
Start: 2023-01-01 | End: 2023-01-01 | Stop reason: SDUPTHER

## 2023-01-01 RX ORDER — POLYETHYLENE GLYCOL 3350 17 G/17G
17 POWDER, FOR SOLUTION ORAL
Refills: 3 | Status: SHIPPED
Start: 2023-01-01 | End: 2023-01-01

## 2023-01-01 RX ORDER — AMOXICILLIN 250 MG
2 CAPSULE ORAL 2 TIMES DAILY
Qty: 30 TABLET | Refills: 0 | Status: SHIPPED
Start: 2023-01-01 | End: 2023-01-01

## 2023-01-01 RX ORDER — ONDANSETRON 2 MG/ML
4 INJECTION INTRAMUSCULAR; INTRAVENOUS EVERY 4 HOURS PRN
Status: DISCONTINUED | OUTPATIENT
Start: 2023-01-01 | End: 2023-01-01

## 2023-01-01 RX ORDER — DEXTROMETHORPHAN HBR. AND GUAIFENESIN 10; 100 MG/5ML; MG/5ML
10 SOLUTION ORAL EVERY 4 HOURS PRN
COMMUNITY
End: 2023-01-01

## 2023-01-01 RX ORDER — MORPHINE SULFATE 4 MG/ML
4 INJECTION INTRAVENOUS
Status: DISCONTINUED | OUTPATIENT
Start: 2023-01-01 | End: 2023-01-01 | Stop reason: HOSPADM

## 2023-01-01 RX ORDER — ZOLPIDEM TARTRATE 5 MG/1
5 TABLET ORAL NIGHTLY PRN
Qty: 30 TABLET | Refills: 0 | Status: SHIPPED | OUTPATIENT
Start: 2023-01-01 | End: 2023-01-01

## 2023-01-01 RX ORDER — SODIUM CHLORIDE 9 MG/ML
2000 INJECTION, SOLUTION INTRAVENOUS CONTINUOUS
Status: DISCONTINUED | OUTPATIENT
Start: 2023-01-01 | End: 2023-01-01

## 2023-01-01 RX ORDER — ONDANSETRON 4 MG/1
4 TABLET, ORALLY DISINTEGRATING ORAL EVERY 4 HOURS PRN
Status: DISCONTINUED | OUTPATIENT
Start: 2023-01-01 | End: 2023-01-01 | Stop reason: HOSPADM

## 2023-01-01 RX ORDER — MORPHINE SULFATE 20 MG/ML
5 SOLUTION ORAL
Status: ON HOLD | COMMUNITY
End: 2023-01-01

## 2023-01-01 RX ORDER — ALLOPURINOL 100 MG/1
100 TABLET ORAL DAILY
Status: DISCONTINUED | OUTPATIENT
Start: 2023-01-01 | End: 2023-01-01 | Stop reason: HOSPADM

## 2023-01-01 RX ORDER — BENZONATATE 100 MG/1
100 CAPSULE ORAL 3 TIMES DAILY
Status: DISCONTINUED | OUTPATIENT
Start: 2023-01-01 | End: 2023-01-01 | Stop reason: HOSPADM

## 2023-01-01 RX ORDER — LORAZEPAM 2 MG/ML
1 CONCENTRATE ORAL
Qty: 360 ML | Refills: 0 | Status: SHIPPED | OUTPATIENT
Start: 2023-01-01 | End: 2023-01-01 | Stop reason: SDUPTHER

## 2023-01-01 RX ORDER — FENTANYL 37.5 UG/H
37.5 PATCH, EXTENDED RELEASE TRANSDERMAL
Qty: 20 PATCH | Refills: 0 | Status: SHIPPED | OUTPATIENT
Start: 2023-01-01 | End: 2023-01-01

## 2023-01-01 RX ORDER — LIDOCAINE HCL 4% 4 G/100G
1 CREAM TOPICAL 2 TIMES DAILY
Status: ON HOLD | COMMUNITY
End: 2023-01-01 | Stop reason: SDUPTHER

## 2023-01-01 RX ORDER — CINACALCET 30 MG/1
30 TABLET, FILM COATED ORAL DAILY
Status: DISCONTINUED | OUTPATIENT
Start: 2023-01-01 | End: 2023-01-01 | Stop reason: HOSPADM

## 2023-01-01 RX ORDER — FUROSEMIDE 40 MG/1
40 TABLET ORAL DAILY
Status: DISCONTINUED | OUTPATIENT
Start: 2023-01-01 | End: 2023-01-01

## 2023-01-01 RX ORDER — GABAPENTIN 300 MG/1
300 CAPSULE ORAL EVERY EVENING
Status: DISCONTINUED | OUTPATIENT
Start: 2023-01-01 | End: 2023-01-01

## 2023-01-01 RX ORDER — MORPHINE SULFATE 4 MG/ML
4 INJECTION INTRAVENOUS ONCE
Status: COMPLETED | OUTPATIENT
Start: 2023-01-01 | End: 2023-01-01

## 2023-01-01 RX ORDER — SODIUM BICARBONATE 650 MG/1
650 TABLET ORAL 3 TIMES DAILY
Status: DISCONTINUED | OUTPATIENT
Start: 2023-01-01 | End: 2023-01-01

## 2023-01-01 RX ORDER — HYDROMORPHONE HYDROCHLORIDE 1 MG/ML
0.5 INJECTION, SOLUTION INTRAMUSCULAR; INTRAVENOUS; SUBCUTANEOUS
Status: DISCONTINUED | OUTPATIENT
Start: 2023-01-01 | End: 2023-01-01 | Stop reason: HOSPADM

## 2023-01-01 RX ORDER — CLOTRIMAZOLE 1 %
CREAM (GRAM) TOPICAL
Qty: 60 G | Refills: 4 | OUTPATIENT
Start: 2023-01-01

## 2023-01-01 RX ORDER — ACETAMINOPHEN 500 MG
1000 TABLET ORAL 3 TIMES DAILY
Status: DISCONTINUED | OUTPATIENT
Start: 2023-01-01 | End: 2023-01-01 | Stop reason: HOSPADM

## 2023-01-01 RX ORDER — GABAPENTIN 300 MG/1
300 CAPSULE ORAL 2 TIMES DAILY
Status: ON HOLD | COMMUNITY
End: 2023-01-01 | Stop reason: SDUPTHER

## 2023-01-01 RX ORDER — MAGNESIUM OXIDE 400 MG/1
400 TABLET ORAL EVERY EVENING
Status: ON HOLD | COMMUNITY
End: 2023-01-01 | Stop reason: SDUPTHER

## 2023-01-01 RX ORDER — HYDROCODONE BITARTRATE AND ACETAMINOPHEN 5; 325 MG/1; MG/1
1 TABLET ORAL EVERY 4 HOURS PRN
COMMUNITY
End: 2023-01-01

## 2023-01-01 RX ORDER — ALBUTEROL SULFATE 2.5 MG/3ML
2.5 SOLUTION RESPIRATORY (INHALATION) EVERY 4 HOURS PRN
Qty: 90 EACH | Refills: 10 | Status: SHIPPED | OUTPATIENT
Start: 2023-01-01 | End: 2023-01-01 | Stop reason: SDUPTHER

## 2023-01-01 RX ORDER — GUAIFENESIN 200 MG/10ML
5 LIQUID ORAL EVERY 4 HOURS PRN
Status: DISCONTINUED | OUTPATIENT
Start: 2023-01-01 | End: 2023-01-01 | Stop reason: HOSPADM

## 2023-01-01 RX ORDER — PROMETHAZINE HYDROCHLORIDE 25 MG/ML
25 INJECTION, SOLUTION INTRAMUSCULAR; INTRAVENOUS EVERY 6 HOURS PRN
COMMUNITY
End: 2023-01-01

## 2023-01-01 RX ORDER — DIPHENHYDRAMINE HYDROCHLORIDE 50 MG/ML
50 INJECTION INTRAMUSCULAR; INTRAVENOUS PRN
Status: CANCELLED | OUTPATIENT
Start: 2023-01-01

## 2023-01-01 RX ORDER — PROCHLORPERAZINE MALEATE 10 MG
10 TABLET ORAL EVERY 8 HOURS PRN
Qty: 30 TABLET | Refills: 10 | Status: SHIPPED | OUTPATIENT
Start: 2023-01-01 | End: 2024-10-03

## 2023-01-01 RX ORDER — ACETAMINOPHEN 500 MG
1000 TABLET ORAL EVERY 8 HOURS
Qty: 84 TABLET | Refills: 10 | Status: SHIPPED | OUTPATIENT
Start: 2023-01-01 | End: 2024-10-03

## 2023-01-01 RX ORDER — POTASSIUM CHLORIDE 20 MEQ/1
20 TABLET, EXTENDED RELEASE ORAL 3 TIMES DAILY
Status: DISCONTINUED | OUTPATIENT
Start: 2023-01-01 | End: 2023-01-01

## 2023-01-01 RX ORDER — BRIMONIDINE TARTRATE 2 MG/ML
1 SOLUTION/ DROPS OPHTHALMIC 2 TIMES DAILY
Status: DISCONTINUED | OUTPATIENT
Start: 2023-01-01 | End: 2023-01-01 | Stop reason: HOSPADM

## 2023-01-01 RX ORDER — FLUTICASONE FUROATE AND VILANTEROL 200; 25 UG/1; UG/1
1 POWDER RESPIRATORY (INHALATION) DAILY
Status: DISCONTINUED | OUTPATIENT
Start: 2023-01-01 | End: 2023-01-01

## 2023-01-01 RX ORDER — GUAIFENESIN 200 MG/10ML
5 LIQUID ORAL EVERY 4 HOURS PRN
COMMUNITY
End: 2023-01-01

## 2023-01-01 RX ORDER — BISACODYL 5 MG/1
5 TABLET, DELAYED RELEASE ORAL PRN
Qty: 28 TABLET | Refills: 23 | Status: SHIPPED | OUTPATIENT
Start: 2023-01-01 | End: 2024-12-28

## 2023-01-01 RX ORDER — BENZOCAINE/MENTHOL 6 MG-10 MG
1 LOZENGE MUCOUS MEMBRANE EVERY 4 HOURS PRN
Qty: 28 G | Refills: 10 | Status: SHIPPED | OUTPATIENT
Start: 2023-01-01 | End: 2024-10-03

## 2023-01-01 RX ORDER — ACETAMINOPHEN 650 MG/1
650 SUPPOSITORY RECTAL EVERY 6 HOURS PRN
Qty: 5 SUPPOSITORY | Refills: 10 | Status: SHIPPED | OUTPATIENT
Start: 2023-01-01 | End: 2024-10-03

## 2023-01-01 RX ORDER — SODIUM BICARBONATE 650 MG/1
1300 TABLET ORAL 2 TIMES DAILY
Status: DISCONTINUED | OUTPATIENT
Start: 2023-01-01 | End: 2023-01-01 | Stop reason: HOSPADM

## 2023-01-01 RX ORDER — ATORVASTATIN CALCIUM 10 MG/1
20 TABLET, FILM COATED ORAL NIGHTLY
Status: DISCONTINUED | OUTPATIENT
Start: 2023-01-01 | End: 2023-01-01 | Stop reason: HOSPADM

## 2023-01-01 RX ORDER — FLUCONAZOLE 100 MG/1
100-200 TABLET ORAL DAILY
Status: ON HOLD | COMMUNITY
End: 2023-01-01

## 2023-01-01 RX ORDER — LINACLOTIDE 145 UG/1
145 CAPSULE, GELATIN COATED ORAL 2 TIMES DAILY
Status: ON HOLD | COMMUNITY
End: 2023-01-01

## 2023-01-01 RX ORDER — LIDOCAINE HCL 4% 4 G/100G
1 CREAM TOPICAL 2 TIMES DAILY
Qty: 15 G | Refills: 10 | Status: SHIPPED | OUTPATIENT
Start: 2023-01-01 | End: 2024-01-01

## 2023-01-01 RX ORDER — CALCIUM CARBONATE 300MG(750)
400 TABLET,CHEWABLE ORAL EVERY EVENING
COMMUNITY
End: 2023-01-01

## 2023-01-01 RX ORDER — LACTULOSE 10 G/15ML
10 SOLUTION ORAL 2 TIMES DAILY PRN
Qty: 473 ML | Refills: 23 | Status: SHIPPED | OUTPATIENT
Start: 2023-01-01 | End: 2024-01-01

## 2023-01-01 RX ORDER — IBUPROFEN 400 MG/1
400 TABLET ORAL EVERY 4 HOURS PRN
Qty: 24 TABLET | Refills: 3 | Status: SHIPPED | OUTPATIENT
Start: 2023-01-01

## 2023-01-01 RX ORDER — COLCHICINE 0.6 MG/1
0.6 TABLET ORAL
COMMUNITY
End: 2023-01-01

## 2023-01-01 RX ORDER — AMOXICILLIN 250 MG
1 CAPSULE ORAL DAILY
COMMUNITY
End: 2023-01-01

## 2023-01-01 RX ORDER — AZITHROMYCIN 250 MG/1
250 TABLET, FILM COATED ORAL DAILY
Status: DISCONTINUED | OUTPATIENT
Start: 2023-01-01 | End: 2023-01-01 | Stop reason: HOSPADM

## 2023-01-01 RX ORDER — PANTOPRAZOLE SODIUM 40 MG/10ML
40 INJECTION, POWDER, LYOPHILIZED, FOR SOLUTION INTRAVENOUS DAILY
Status: DISCONTINUED | OUTPATIENT
Start: 2023-01-01 | End: 2023-01-01 | Stop reason: HOSPADM

## 2023-01-01 RX ORDER — METOCLOPRAMIDE HYDROCHLORIDE 5 MG/ML
10 INJECTION INTRAMUSCULAR; INTRAVENOUS EVERY 6 HOURS
Status: DISCONTINUED | OUTPATIENT
Start: 2023-01-01 | End: 2023-01-01 | Stop reason: HOSPADM

## 2023-01-01 RX ORDER — MORPHINE SULFATE 100 MG/5ML
10 SOLUTION ORAL
Qty: 240 ML | Refills: 0 | Status: SHIPPED | OUTPATIENT
Start: 2023-01-01 | End: 2023-01-01

## 2023-01-01 RX ORDER — HYDROCODONE BITARTRATE AND ACETAMINOPHEN 5; 325 MG/1; MG/1
1 TABLET ORAL EVERY 6 HOURS PRN
COMMUNITY
End: 2023-01-01

## 2023-01-01 RX ORDER — UMECLIDINIUM 62.5 UG/1
1 AEROSOL, POWDER ORAL DAILY
COMMUNITY
End: 2023-01-01

## 2023-01-01 RX ORDER — AMOXICILLIN 250 MG
2 CAPSULE ORAL 2 TIMES DAILY
Status: DISCONTINUED | OUTPATIENT
Start: 2023-01-01 | End: 2023-01-01

## 2023-01-01 RX ORDER — GABAPENTIN 300 MG/1
300 CAPSULE ORAL 2 TIMES DAILY
Qty: 28 CAPSULE | Refills: 10 | Status: SHIPPED | OUTPATIENT
Start: 2023-01-01 | End: 2024-10-03

## 2023-01-01 RX ORDER — METHYLPREDNISOLONE SODIUM SUCCINATE 40 MG/ML
40 INJECTION, POWDER, LYOPHILIZED, FOR SOLUTION INTRAMUSCULAR; INTRAVENOUS EVERY 8 HOURS
Status: COMPLETED | OUTPATIENT
Start: 2023-01-01 | End: 2023-01-01

## 2023-01-01 RX ORDER — MAGNESIUM OXIDE 400 MG/1
400 TABLET ORAL EVERY EVENING
Qty: 14 TABLET | Refills: 10 | Status: SHIPPED | OUTPATIENT
Start: 2023-01-01 | End: 2024-10-03

## 2023-01-01 RX ORDER — IPRATROPIUM BROMIDE AND ALBUTEROL SULFATE 2.5; .5 MG/3ML; MG/3ML
3 SOLUTION RESPIRATORY (INHALATION) 4 TIMES DAILY
Qty: 56 EACH | Refills: 23 | Status: SHIPPED | OUTPATIENT
Start: 2023-01-01

## 2023-01-01 RX ORDER — OXYCODONE HYDROCHLORIDE 15 MG/1
15 TABLET, FILM COATED, EXTENDED RELEASE ORAL EVERY 12 HOURS
Qty: 120 TABLET | Refills: 0 | Status: SHIPPED | OUTPATIENT
Start: 2023-01-01 | End: 2023-01-01

## 2023-01-01 RX ORDER — MORPHINE SULFATE 15 MG/1
15 TABLET, FILM COATED, EXTENDED RELEASE ORAL EVERY 12 HOURS
Qty: 120 TABLET | Refills: 0 | Status: SHIPPED | OUTPATIENT
Start: 2023-01-01 | End: 2023-01-01

## 2023-01-01 RX ORDER — ALBUTEROL SULFATE 2.5 MG/3ML
2.5 SOLUTION RESPIRATORY (INHALATION) EVERY 4 HOURS PRN
Status: ON HOLD | COMMUNITY
End: 2023-01-01 | Stop reason: SDUPTHER

## 2023-01-01 RX ORDER — MIDODRINE HYDROCHLORIDE 5 MG/1
5 TABLET ORAL 2 TIMES DAILY
Status: DISCONTINUED | OUTPATIENT
Start: 2023-01-01 | End: 2023-01-01

## 2023-01-01 RX ORDER — FLUTICASONE PROPIONATE AND SALMETEROL 500; 50 UG/1; UG/1
1 POWDER RESPIRATORY (INHALATION) 2 TIMES DAILY
Status: ON HOLD | COMMUNITY
End: 2023-01-01 | Stop reason: SDUPTHER

## 2023-01-01 RX ORDER — ATORVASTATIN CALCIUM 10 MG/1
20 TABLET, FILM COATED ORAL NIGHTLY
Status: DISCONTINUED | OUTPATIENT
Start: 2023-01-01 | End: 2023-01-01

## 2023-01-01 RX ORDER — BRIMONIDINE TARTRATE AND TIMOLOL MALEATE 2; 5 MG/ML; MG/ML
1 SOLUTION OPHTHALMIC 2 TIMES DAILY
Qty: 5 ML | Refills: 11 | Status: SHIPPED | OUTPATIENT
Start: 2023-01-01 | End: 2024-01-01 | Stop reason: SDUPTHER

## 2023-01-01 RX ORDER — BISACODYL 10 MG
10 SUPPOSITORY, RECTAL RECTAL ONCE
Status: COMPLETED | OUTPATIENT
Start: 2023-01-01 | End: 2023-01-01

## 2023-01-01 RX ORDER — OXYCODONE HYDROCHLORIDE 5 MG/1
5 TABLET ORAL ONCE
Status: COMPLETED | OUTPATIENT
Start: 2023-01-01 | End: 2023-01-01

## 2023-01-01 RX ORDER — NYSTATIN 100000 [USP'U]/G
1 POWDER TOPICAL 3 TIMES DAILY
Qty: 30 G | Refills: 23 | Status: SHIPPED | OUTPATIENT
Start: 2023-01-01 | End: 2024-01-01

## 2023-01-01 RX ORDER — DIPHENHYDRAMINE HCL 25 MG
25 TABLET ORAL EVERY 6 HOURS PRN
Status: DISCONTINUED | OUTPATIENT
Start: 2023-01-01 | End: 2023-01-01 | Stop reason: HOSPADM

## 2023-01-01 RX ORDER — OXYCODONE HYDROCHLORIDE 5 MG/1
5-10 TABLET ORAL 4 TIMES DAILY
Status: ON HOLD | COMMUNITY
End: 2023-01-01

## 2023-01-01 RX ORDER — BRIMONIDINE TARTRATE 2 MG/ML
1 SOLUTION/ DROPS OPHTHALMIC 2 TIMES DAILY
COMMUNITY
End: 2023-01-01

## 2023-01-01 RX ORDER — FUROSEMIDE 10 MG/ML
40 INJECTION INTRAMUSCULAR; INTRAVENOUS
Status: DISCONTINUED | OUTPATIENT
Start: 2023-01-01 | End: 2023-01-01

## 2023-01-01 RX ORDER — FLUCONAZOLE 150 MG/1
150 TABLET ORAL DAILY
Qty: 14 TABLET | Refills: 0 | Status: SHIPPED | OUTPATIENT
Start: 2023-01-01 | End: 2024-01-01

## 2023-01-01 RX ORDER — HYDROCODONE BITARTRATE AND ACETAMINOPHEN 5; 325 MG/1; MG/1
1 TABLET ORAL EVERY 4 HOURS PRN
Status: DISCONTINUED | OUTPATIENT
Start: 2023-01-01 | End: 2023-01-01 | Stop reason: HOSPADM

## 2023-01-01 RX ORDER — FLUTICASONE PROPIONATE AND SALMETEROL 500; 50 UG/1; UG/1
1 POWDER RESPIRATORY (INHALATION) 2 TIMES DAILY
Qty: 60 EACH | Refills: 10 | Status: SHIPPED | OUTPATIENT
Start: 2023-01-01 | End: 2023-01-01

## 2023-01-01 RX ORDER — DIPHENHYDRAMINE HCL 25 MG
25 TABLET ORAL EVERY 6 HOURS PRN
Status: ON HOLD | COMMUNITY
End: 2023-01-01 | Stop reason: SDUPTHER

## 2023-01-01 RX ORDER — ALLOPURINOL 100 MG/1
100 TABLET ORAL DAILY
Qty: 14 TABLET | Refills: 10 | Status: SHIPPED | OUTPATIENT
Start: 2023-01-01 | End: 2024-10-03

## 2023-01-01 RX ORDER — OXYCODONE HYDROCHLORIDE 100 MG/5ML
10 SOLUTION ORAL
Qty: 240 ML | Refills: 0 | Status: SHIPPED | OUTPATIENT
Start: 2023-01-01 | End: 2024-10-03

## 2023-01-01 RX ORDER — GABAPENTIN 300 MG/1
300 CAPSULE ORAL 2 TIMES DAILY
Status: DISCONTINUED | OUTPATIENT
Start: 2023-01-01 | End: 2023-01-01

## 2023-01-01 RX ORDER — LIDOCAINE HCL 4% 4 G/100G
1 CREAM TOPICAL 2 TIMES DAILY
COMMUNITY
End: 2023-01-01

## 2023-01-01 RX ORDER — AMOXICILLIN AND CLAVULANATE POTASSIUM 875; 125 MG/1; MG/1
1 TABLET, FILM COATED ORAL 2 TIMES DAILY
Qty: 14 TABLET | Refills: 0 | Status: SHIPPED | OUTPATIENT
Start: 2023-01-01 | End: 2023-01-01

## 2023-01-01 RX ORDER — MIDODRINE HYDROCHLORIDE 5 MG/1
5 TABLET ORAL 2 TIMES DAILY
Status: ON HOLD | COMMUNITY
End: 2023-01-01

## 2023-01-01 RX ORDER — METHIMAZOLE 5 MG/1
5 TABLET ORAL DAILY
Qty: 14 TABLET | Refills: 10 | Status: SHIPPED | OUTPATIENT
Start: 2023-01-01 | End: 2024-10-03

## 2023-01-01 RX ORDER — TIOTROPIUM BROMIDE 18 UG/1
18 CAPSULE ORAL; RESPIRATORY (INHALATION) DAILY
COMMUNITY
End: 2023-01-01

## 2023-01-01 RX ORDER — DEXAMETHASONE 2 MG/1
2 TABLET ORAL 2 TIMES DAILY
Qty: 28 TABLET | Refills: 23 | Status: SHIPPED | OUTPATIENT
Start: 2023-01-01 | End: 2024-12-17

## 2023-01-01 RX ORDER — POLYETHYLENE GLYCOL 3350 17 G/17G
1 POWDER, FOR SOLUTION ORAL DAILY
Status: DISCONTINUED | OUTPATIENT
Start: 2023-01-01 | End: 2023-01-01 | Stop reason: HOSPADM

## 2023-01-01 RX ORDER — LABETALOL HYDROCHLORIDE 5 MG/ML
10 INJECTION, SOLUTION INTRAVENOUS EVERY 4 HOURS PRN
Status: DISCONTINUED | OUTPATIENT
Start: 2023-01-01 | End: 2023-01-01 | Stop reason: HOSPADM

## 2023-01-01 RX ORDER — BUDESONIDE AND FORMOTEROL FUMARATE DIHYDRATE 160; 4.5 UG/1; UG/1
2 AEROSOL RESPIRATORY (INHALATION)
Status: DISCONTINUED | OUTPATIENT
Start: 2023-01-01 | End: 2023-01-01 | Stop reason: HOSPADM

## 2023-01-01 RX ORDER — DOCUSATE SODIUM 100 MG/1
100 CAPSULE, LIQUID FILLED ORAL DAILY
Status: ON HOLD | COMMUNITY
End: 2023-01-01

## 2023-01-01 RX ORDER — ONDANSETRON 4 MG/1
4 TABLET, FILM COATED ORAL EVERY 4 HOURS PRN
Qty: 30 TABLET | Refills: 1 | Status: SHIPPED | OUTPATIENT
Start: 2023-01-01 | End: 2023-01-01

## 2023-01-01 RX ORDER — BUDESONIDE 0.5 MG/2ML
500 INHALANT ORAL 2 TIMES DAILY
Qty: 60 ML | Refills: 23 | Status: SHIPPED | OUTPATIENT
Start: 2023-01-01 | End: 2024-12-17

## 2023-01-01 RX ORDER — PROCHLORPERAZINE EDISYLATE 5 MG/ML
INJECTION INTRAMUSCULAR; INTRAVENOUS
Status: ACTIVE
Start: 2023-01-01 | End: 2023-01-01

## 2023-01-01 RX ORDER — DEXAMETHASONE 4 MG/1
4 TABLET ORAL
Status: DISCONTINUED | OUTPATIENT
Start: 2023-01-01 | End: 2023-01-01 | Stop reason: HOSPADM

## 2023-01-01 RX ORDER — ZOLPIDEM TARTRATE 5 MG/1
5 TABLET ORAL NIGHTLY PRN
Qty: 30 TABLET | Refills: 0 | Status: SHIPPED
Start: 2023-01-01 | End: 2023-01-01

## 2023-01-01 RX ORDER — METHIMAZOLE 5 MG/1
TABLET ORAL
Qty: 90 TABLET | Refills: 1 | Status: ON HOLD | OUTPATIENT
Start: 2023-01-01 | End: 2023-01-01 | Stop reason: SDUPTHER

## 2023-01-01 RX ORDER — TIOTROPIUM BROMIDE INHALATION SPRAY 3.12 UG/1
5 SPRAY, METERED RESPIRATORY (INHALATION) DAILY
Qty: 4 G | Refills: 3 | Status: SHIPPED | OUTPATIENT
Start: 2023-01-01 | End: 2023-01-01

## 2023-01-01 RX ORDER — LANOLIN ALCOHOL/MO/W.PET/CERES
400 CREAM (GRAM) TOPICAL ONCE
Status: COMPLETED | OUTPATIENT
Start: 2023-01-01 | End: 2023-01-01

## 2023-01-01 RX ORDER — ZOLPIDEM TARTRATE 5 MG/1
10 TABLET ORAL NIGHTLY PRN
Status: DISCONTINUED | OUTPATIENT
Start: 2023-01-01 | End: 2023-01-01 | Stop reason: HOSPADM

## 2023-01-01 RX ORDER — METRONIDAZOLE 500 MG/100ML
500 INJECTION, SOLUTION INTRAVENOUS EVERY 8 HOURS
Status: DISCONTINUED | OUTPATIENT
Start: 2023-01-01 | End: 2023-01-01

## 2023-01-01 RX ORDER — FUROSEMIDE 10 MG/ML
40 INJECTION INTRAMUSCULAR; INTRAVENOUS ONCE
Status: COMPLETED | OUTPATIENT
Start: 2023-01-01 | End: 2023-01-01

## 2023-01-01 RX ORDER — DOCUSATE SODIUM 100 MG/1
100 CAPSULE, LIQUID FILLED ORAL DAILY
COMMUNITY
End: 2023-01-01

## 2023-01-01 RX ORDER — SENNA AND DOCUSATE SODIUM 50; 8.6 MG/1; MG/1
4 TABLET, FILM COATED ORAL 2 TIMES DAILY
Qty: 240 TABLET | Refills: 6 | Status: SHIPPED | OUTPATIENT
Start: 2023-01-01 | End: 2024-01-01 | Stop reason: SDUPTHER

## 2023-01-01 RX ORDER — MORPHINE SULFATE 30 MG/1
30 TABLET, FILM COATED, EXTENDED RELEASE ORAL 3 TIMES DAILY
Qty: 180 TABLET | Refills: 0 | Status: SHIPPED | OUTPATIENT
Start: 2023-01-01 | End: 2023-01-01

## 2023-01-01 RX ORDER — BRIMONIDINE TARTRATE AND TIMOLOL MALEATE 2; 5 MG/ML; MG/ML
1 SOLUTION OPHTHALMIC 2 TIMES DAILY
Status: DISCONTINUED | OUTPATIENT
Start: 2023-01-01 | End: 2023-01-01 | Stop reason: HOSPADM

## 2023-01-01 RX ORDER — DOCUSATE SODIUM 100 MG/1
100 CAPSULE, LIQUID FILLED ORAL 2 TIMES DAILY
Status: DISCONTINUED | OUTPATIENT
Start: 2023-01-01 | End: 2023-01-01 | Stop reason: HOSPADM

## 2023-01-01 RX ORDER — ATORVASTATIN CALCIUM 20 MG/1
20 TABLET, FILM COATED ORAL NIGHTLY
Status: ON HOLD | COMMUNITY
End: 2023-01-01

## 2023-01-01 RX ORDER — CINACALCET 30 MG/1
30 TABLET, FILM COATED ORAL DAILY
Qty: 30 TABLET | Refills: 1 | Status: SHIPPED | OUTPATIENT
Start: 2023-01-01 | End: 2024-01-01

## 2023-01-01 RX ORDER — LACTULOSE 10 G/15ML
SOLUTION ORAL
Qty: 473 ML | Refills: 4 | Status: SHIPPED | OUTPATIENT
Start: 2023-01-01 | End: 2023-01-01 | Stop reason: SDUPTHER

## 2023-01-01 RX ORDER — SODIUM PHOSPHATE, DIBASIC AND SODIUM PHOSPHATE, MONOBASIC 7; 19 G/230ML; G/230ML
1 ENEMA RECTAL
Qty: 2 ENEMA | Refills: 10 | Status: SHIPPED | OUTPATIENT
Start: 2023-01-01 | End: 2023-01-01 | Stop reason: SDUPTHER

## 2023-01-01 RX ORDER — ERGOCALCIFEROL 1.25 MG/1
50000 CAPSULE ORAL
Status: DISCONTINUED | OUTPATIENT
Start: 2023-01-01 | End: 2023-01-01 | Stop reason: HOSPADM

## 2023-01-01 RX ORDER — ACETAMINOPHEN 325 MG/1
650 TABLET ORAL EVERY 6 HOURS PRN
Status: ON HOLD | COMMUNITY
End: 2023-01-01

## 2023-01-01 RX ORDER — BISACODYL 10 MG
10 SUPPOSITORY, RECTAL RECTAL
Refills: 0 | Status: SHIPPED
Start: 2023-01-01 | End: 2023-01-01

## 2023-01-01 RX ORDER — CALCITONIN SALMON 200 [USP'U]/ML
4 INJECTION, SOLUTION INTRAMUSCULAR; SUBCUTANEOUS ONCE
Status: COMPLETED | OUTPATIENT
Start: 2023-01-01 | End: 2023-01-01

## 2023-01-01 RX ORDER — ALBUTEROL SULFATE 2.5 MG/3ML
2.5 SOLUTION RESPIRATORY (INHALATION)
Status: DISCONTINUED | OUTPATIENT
Start: 2023-01-01 | End: 2023-01-01 | Stop reason: HOSPADM

## 2023-01-01 RX ADMIN — OXYCODONE HYDROCHLORIDE 10 MG: 10 TABLET ORAL at 22:50

## 2023-01-01 RX ADMIN — PREDNISONE 40 MG: 20 TABLET ORAL at 05:14

## 2023-01-01 RX ADMIN — BRIMONIDINE TARTRATE 1 DROP: 2 SOLUTION OPHTHALMIC at 04:54

## 2023-01-01 RX ADMIN — ZOLPIDEM TARTRATE 5 MG: 5 TABLET ORAL at 21:00

## 2023-01-01 RX ADMIN — PAMIDRONATE DISODIUM 30 MG: 3 INJECTION INTRAVENOUS at 17:52

## 2023-01-01 RX ADMIN — TIMOLOL MALEATE 1 DROP: 5 SOLUTION OPHTHALMIC at 22:09

## 2023-01-01 RX ADMIN — ALBUTEROL SULFATE 2.5 MG: 2.5 SOLUTION RESPIRATORY (INHALATION) at 19:01

## 2023-01-01 RX ADMIN — SUCRALFATE 1 G: 1 SUSPENSION ORAL at 16:55

## 2023-01-01 RX ADMIN — TIMOLOL MALEATE 1 DROP: 5 SOLUTION OPHTHALMIC at 21:49

## 2023-01-01 RX ADMIN — HYDROCODONE BITARTRATE AND ACETAMINOPHEN 1 TABLET: 5; 325 TABLET ORAL at 12:11

## 2023-01-01 RX ADMIN — TIMOLOL MALEATE 1 DROP: 5 SOLUTION OPHTHALMIC at 17:13

## 2023-01-01 RX ADMIN — SENNOSIDES AND DOCUSATE SODIUM 2 TABLET: 50; 8.6 TABLET ORAL at 05:37

## 2023-01-01 RX ADMIN — BRIMONIDINE TARTRATE 1 DROP: 2 SOLUTION/ DROPS OPHTHALMIC at 05:35

## 2023-01-01 RX ADMIN — SODIUM CHLORIDE: 9 INJECTION, SOLUTION INTRAVENOUS at 15:41

## 2023-01-01 RX ADMIN — TIMOLOL MALEATE 1 DROP: 5 SOLUTION/ DROPS OPHTHALMIC at 05:50

## 2023-01-01 RX ADMIN — ZOLPIDEM TARTRATE 5 MG: 5 TABLET ORAL at 01:11

## 2023-01-01 RX ADMIN — ATORVASTATIN CALCIUM 20 MG: 10 TABLET, FILM COATED ORAL at 20:30

## 2023-01-01 RX ADMIN — POTASSIUM BICARBONATE 25 MEQ: 978 TABLET, EFFERVESCENT ORAL at 17:43

## 2023-01-01 RX ADMIN — ATORVASTATIN CALCIUM 20 MG: 10 TABLET, FILM COATED ORAL at 21:21

## 2023-01-01 RX ADMIN — METHIMAZOLE 5 MG: 5 TABLET ORAL at 05:15

## 2023-01-01 RX ADMIN — TIMOLOL MALEATE 1 DROP: 5 SOLUTION OPHTHALMIC at 08:06

## 2023-01-01 RX ADMIN — PANTOPRAZOLE SODIUM 40 MG: 40 INJECTION, POWDER, FOR SOLUTION INTRAVENOUS at 05:26

## 2023-01-01 RX ADMIN — HEPARIN SODIUM 5000 UNITS: 5000 INJECTION, SOLUTION INTRAVENOUS; SUBCUTANEOUS at 14:39

## 2023-01-01 RX ADMIN — HEPARIN SODIUM 5000 UNITS: 5000 INJECTION, SOLUTION INTRAVENOUS; SUBCUTANEOUS at 21:49

## 2023-01-01 RX ADMIN — APIXABAN 5 MG: 5 TABLET, FILM COATED ORAL at 17:58

## 2023-01-01 RX ADMIN — PIPERACILLIN AND TAZOBACTAM 3.38 G: 3; .375 INJECTION, POWDER, LYOPHILIZED, FOR SOLUTION INTRAVENOUS; PARENTERAL at 15:04

## 2023-01-01 RX ADMIN — RIVAROXABAN 20 MG: 20 TABLET, FILM COATED ORAL at 17:43

## 2023-01-01 RX ADMIN — PREDNISONE 40 MG: 20 TABLET ORAL at 05:24

## 2023-01-01 RX ADMIN — POTASSIUM CHLORIDE 20 MEQ: 1500 TABLET, EXTENDED RELEASE ORAL at 09:12

## 2023-01-01 RX ADMIN — TIMOLOL MALEATE 1 DROP: 5 SOLUTION OPHTHALMIC at 21:31

## 2023-01-01 RX ADMIN — POTASSIUM CHLORIDE 20 MEQ: 1500 TABLET, EXTENDED RELEASE ORAL at 17:43

## 2023-01-01 RX ADMIN — PROCHLORPERAZINE EDISYLATE 10 MG: 5 INJECTION INTRAMUSCULAR; INTRAVENOUS at 12:56

## 2023-01-01 RX ADMIN — TIMOLOL MALEATE 1 DROP: 5 SOLUTION OPHTHALMIC at 17:35

## 2023-01-01 RX ADMIN — HEPARIN SODIUM 5000 UNITS: 5000 INJECTION, SOLUTION INTRAVENOUS; SUBCUTANEOUS at 04:54

## 2023-01-01 RX ADMIN — CEFTRIAXONE SODIUM 2000 MG: 2 INJECTION, POWDER, FOR SOLUTION INTRAMUSCULAR; INTRAVENOUS at 19:29

## 2023-01-01 RX ADMIN — OXYCODONE HYDROCHLORIDE 10 MG: 10 TABLET ORAL at 16:20

## 2023-01-01 RX ADMIN — FUROSEMIDE 40 MG: 10 INJECTION, SOLUTION INTRAVENOUS at 17:11

## 2023-01-01 RX ADMIN — BRIMONIDINE TARTRATE AND TIMOLOL MALEATE 1 DROP: 2; 5 SOLUTION OPHTHALMIC at 18:00

## 2023-01-01 RX ADMIN — OXYCODONE HYDROCHLORIDE 5 MG: 5 TABLET ORAL at 09:53

## 2023-01-01 RX ADMIN — SODIUM CHLORIDE: 9 INJECTION, SOLUTION INTRAVENOUS at 05:04

## 2023-01-01 RX ADMIN — ALLOPURINOL 100 MG: 100 TABLET ORAL at 05:34

## 2023-01-01 RX ADMIN — SUCRALFATE 1 G: 1 SUSPENSION ORAL at 14:44

## 2023-01-01 RX ADMIN — TIMOLOL MALEATE 1 DROP: 5 SOLUTION OPHTHALMIC at 09:55

## 2023-01-01 RX ADMIN — SENNOSIDES AND DOCUSATE SODIUM 2 TABLET: 50; 8.6 TABLET ORAL at 05:40

## 2023-01-01 RX ADMIN — IPRATROPIUM BROMIDE AND ALBUTEROL SULFATE 3 ML: .5; 3 SOLUTION RESPIRATORY (INHALATION) at 17:09

## 2023-01-01 RX ADMIN — CINACALCET HYDROCHLORIDE 30 MG: 30 TABLET, FILM COATED ORAL at 05:37

## 2023-01-01 RX ADMIN — BUDESONIDE AND FORMOTEROL FUMARATE DIHYDRATE 2 PUFF: 160; 4.5 AEROSOL RESPIRATORY (INHALATION) at 19:18

## 2023-01-01 RX ADMIN — MOMETASONE FUROATE AND FORMOTEROL FUMARATE DIHYDRATE 2 PUFF: 200; 5 AEROSOL RESPIRATORY (INHALATION) at 19:26

## 2023-01-01 RX ADMIN — HEPARIN SODIUM 5000 UNITS: 5000 INJECTION, SOLUTION INTRAVENOUS; SUBCUTANEOUS at 15:15

## 2023-01-01 RX ADMIN — SENNOSIDES AND DOCUSATE SODIUM 2 TABLET: 50; 8.6 TABLET ORAL at 17:38

## 2023-01-01 RX ADMIN — ATORVASTATIN CALCIUM 20 MG: 10 TABLET, FILM COATED ORAL at 20:12

## 2023-01-01 RX ADMIN — APIXABAN 2.5 MG: 2.5 TABLET, FILM COATED ORAL at 05:14

## 2023-01-01 RX ADMIN — GABAPENTIN 200 MG: 100 CAPSULE ORAL at 18:26

## 2023-01-01 RX ADMIN — SODIUM CHLORIDE 1000 ML: 9 INJECTION, SOLUTION INTRAVENOUS at 15:26

## 2023-01-01 RX ADMIN — ALBUTEROL SULFATE 2.5 MG: 2.5 SOLUTION RESPIRATORY (INHALATION) at 15:36

## 2023-01-01 RX ADMIN — AZITHROMYCIN 250 MG: 250 TABLET, FILM COATED ORAL at 18:33

## 2023-01-01 RX ADMIN — BRIMONIDINE TARTRATE AND TIMOLOL MALEATE 1 DROP: 2; 5 SOLUTION OPHTHALMIC at 05:21

## 2023-01-01 RX ADMIN — SIMVASTATIN 40 MG: 20 TABLET, FILM COATED ORAL at 17:39

## 2023-01-01 RX ADMIN — SIMVASTATIN 40 MG: 20 TABLET, FILM COATED ORAL at 17:52

## 2023-01-01 RX ADMIN — SODIUM CHLORIDE: 9 INJECTION, SOLUTION INTRAVENOUS at 19:30

## 2023-01-01 RX ADMIN — RIVAROXABAN 20 MG: 20 TABLET, FILM COATED ORAL at 18:06

## 2023-01-01 RX ADMIN — ATORVASTATIN CALCIUM 20 MG: 20 TABLET, FILM COATED ORAL at 21:04

## 2023-01-01 RX ADMIN — TIOTROPIUM BROMIDE INHALATION SPRAY 5 MCG: 3.12 SPRAY, METERED RESPIRATORY (INHALATION) at 08:16

## 2023-01-01 RX ADMIN — HEPARIN SODIUM 5000 UNITS: 5000 INJECTION, SOLUTION INTRAVENOUS; SUBCUTANEOUS at 21:27

## 2023-01-01 RX ADMIN — ZOLPIDEM TARTRATE 5 MG: 5 TABLET ORAL at 21:28

## 2023-01-01 RX ADMIN — PIPERACILLIN AND TAZOBACTAM 3.38 G: 3; .375 INJECTION, POWDER, LYOPHILIZED, FOR SOLUTION INTRAVENOUS; PARENTERAL at 10:33

## 2023-01-01 RX ADMIN — ONDANSETRON 4 MG: 2 INJECTION INTRAMUSCULAR; INTRAVENOUS at 08:55

## 2023-01-01 RX ADMIN — RIVAROXABAN 20 MG: 20 TABLET, FILM COATED ORAL at 17:18

## 2023-01-01 RX ADMIN — OXYCODONE HYDROCHLORIDE 10 MG: 10 TABLET ORAL at 21:20

## 2023-01-01 RX ADMIN — TIMOLOL MALEATE 1 DROP: 5 SOLUTION OPHTHALMIC at 10:25

## 2023-01-01 RX ADMIN — ONDANSETRON 4 MG: 4 TABLET, ORALLY DISINTEGRATING ORAL at 09:46

## 2023-01-01 RX ADMIN — ATORVASTATIN CALCIUM 20 MG: 20 TABLET, FILM COATED ORAL at 20:04

## 2023-01-01 RX ADMIN — MOMETASONE FUROATE AND FORMOTEROL FUMARATE DIHYDRATE 2 PUFF: 200; 5 AEROSOL RESPIRATORY (INHALATION) at 20:50

## 2023-01-01 RX ADMIN — SIMVASTATIN 40 MG: 20 TABLET, FILM COATED ORAL at 17:17

## 2023-01-01 RX ADMIN — OXYCODONE HYDROCHLORIDE 10 MG: 10 TABLET ORAL at 13:22

## 2023-01-01 RX ADMIN — MAGNESIUM HYDROXIDE 30 ML: 400 SUSPENSION ORAL at 21:56

## 2023-01-01 RX ADMIN — CINACALCET HYDROCHLORIDE 30 MG: 30 TABLET, FILM COATED ORAL at 05:38

## 2023-01-01 RX ADMIN — BUDESONIDE AND FORMOTEROL FUMARATE DIHYDRATE 2 PUFF: 160; 4.5 AEROSOL RESPIRATORY (INHALATION) at 07:35

## 2023-01-01 RX ADMIN — BRIMONIDINE TARTRATE 1 DROP: 2 SOLUTION OPHTHALMIC at 18:34

## 2023-01-01 RX ADMIN — SENNOSIDES AND DOCUSATE SODIUM 2 TABLET: 50; 8.6 TABLET ORAL at 17:22

## 2023-01-01 RX ADMIN — ERGOCALCIFEROL 50000 UNITS: 1.25 CAPSULE ORAL at 05:50

## 2023-01-01 RX ADMIN — DEXAMETHASONE 4 MG: 4 TABLET ORAL at 12:38

## 2023-01-01 RX ADMIN — HEPARIN SODIUM 5000 UNITS: 5000 INJECTION, SOLUTION INTRAVENOUS; SUBCUTANEOUS at 21:03

## 2023-01-01 RX ADMIN — DEXAMETHASONE 4 MG: 4 TABLET ORAL at 08:47

## 2023-01-01 RX ADMIN — TIOTROPIUM BROMIDE INHALATION SPRAY 5 MCG: 3.12 SPRAY, METERED RESPIRATORY (INHALATION) at 07:42

## 2023-01-01 RX ADMIN — PROCHLORPERAZINE EDISYLATE 10 MG: 5 INJECTION INTRAMUSCULAR; INTRAVENOUS at 13:28

## 2023-01-01 RX ADMIN — SODIUM CHLORIDE: 9 INJECTION, SOLUTION INTRAVENOUS at 19:33

## 2023-01-01 RX ADMIN — SUCRALFATE 1 G: 1 SUSPENSION ORAL at 17:43

## 2023-01-01 RX ADMIN — POTASSIUM CHLORIDE 20 MEQ: 1500 TABLET, EXTENDED RELEASE ORAL at 13:40

## 2023-01-01 RX ADMIN — METRONIDAZOLE 500 MG: 500 INJECTION, SOLUTION INTRAVENOUS at 18:00

## 2023-01-01 RX ADMIN — PIPERACILLIN AND TAZOBACTAM 3.38 G: 3; .375 INJECTION, POWDER, LYOPHILIZED, FOR SOLUTION INTRAVENOUS; PARENTERAL at 12:22

## 2023-01-01 RX ADMIN — BRIMONIDINE TARTRATE 1 DROP: 2 SOLUTION OPHTHALMIC at 17:13

## 2023-01-01 RX ADMIN — METHIMAZOLE 5 MG: 5 TABLET ORAL at 05:18

## 2023-01-01 RX ADMIN — PROCHLORPERAZINE EDISYLATE 10 MG: 5 INJECTION INTRAMUSCULAR; INTRAVENOUS at 23:04

## 2023-01-01 RX ADMIN — SODIUM CHLORIDE: 9 INJECTION, SOLUTION INTRAVENOUS at 12:53

## 2023-01-01 RX ADMIN — TIOTROPIUM BROMIDE INHALATION SPRAY 2.5 MCG: 3.12 SPRAY, METERED RESPIRATORY (INHALATION) at 05:51

## 2023-01-01 RX ADMIN — OXYCODONE HYDROCHLORIDE 10 MG: 10 TABLET ORAL at 16:54

## 2023-01-01 RX ADMIN — BRIMONIDINE TARTRATE 1 DROP: 2 SOLUTION OPHTHALMIC at 05:29

## 2023-01-01 RX ADMIN — METRONIDAZOLE 500 MG: 500 INJECTION, SOLUTION INTRAVENOUS at 21:26

## 2023-01-01 RX ADMIN — TIOTROPIUM BROMIDE INHALATION SPRAY 5 MCG: 3.12 SPRAY, METERED RESPIRATORY (INHALATION) at 07:54

## 2023-01-01 RX ADMIN — CINACALCET HYDROCHLORIDE 30 MG: 30 TABLET, FILM COATED ORAL at 07:51

## 2023-01-01 RX ADMIN — HYDROMORPHONE HYDROCHLORIDE 0.5 MG: 1 INJECTION, SOLUTION INTRAMUSCULAR; INTRAVENOUS; SUBCUTANEOUS at 22:13

## 2023-01-01 RX ADMIN — HYDROCODONE BITARTRATE AND ACETAMINOPHEN 1 TABLET: 5; 325 TABLET ORAL at 13:26

## 2023-01-01 RX ADMIN — BRIMONIDINE TARTRATE 1 DROP: 2 SOLUTION OPHTHALMIC at 17:21

## 2023-01-01 RX ADMIN — ALLOPURINOL 200 MG: 100 TABLET ORAL at 05:38

## 2023-01-01 RX ADMIN — BUDESONIDE AND FORMOTEROL FUMARATE DIHYDRATE 2 PUFF: 160; 4.5 AEROSOL RESPIRATORY (INHALATION) at 19:51

## 2023-01-01 RX ADMIN — BRIMONIDINE TARTRATE 1 DROP: 2 SOLUTION OPHTHALMIC at 05:49

## 2023-01-01 RX ADMIN — METAXALONE 400 MG: 800 TABLET ORAL at 17:52

## 2023-01-01 RX ADMIN — DOCUSATE SODIUM 100 MG: 100 CAPSULE, LIQUID FILLED ORAL at 17:12

## 2023-01-01 RX ADMIN — SUCRALFATE 1 G: 1 SUSPENSION ORAL at 05:16

## 2023-01-01 RX ADMIN — GUAIFENESIN 100 MG: 100 SOLUTION ORAL at 17:37

## 2023-01-01 RX ADMIN — BRIMONIDINE TARTRATE 1 DROP: 2 SOLUTION/ DROPS OPHTHALMIC at 05:50

## 2023-01-01 RX ADMIN — PROCHLORPERAZINE EDISYLATE 10 MG: 5 INJECTION INTRAMUSCULAR; INTRAVENOUS at 05:23

## 2023-01-01 RX ADMIN — SODIUM CHLORIDE: 9 INJECTION, SOLUTION INTRAVENOUS at 03:29

## 2023-01-01 RX ADMIN — METAXALONE 400 MG: 800 TABLET ORAL at 12:05

## 2023-01-01 RX ADMIN — BRIMONIDINE TARTRATE 1 DROP: 2 SOLUTION OPHTHALMIC at 17:37

## 2023-01-01 RX ADMIN — OXYCODONE HYDROCHLORIDE 5 MG: 5 TABLET ORAL at 16:15

## 2023-01-01 RX ADMIN — BUDESONIDE AND FORMOTEROL FUMARATE DIHYDRATE 2 PUFF: 160; 4.5 AEROSOL RESPIRATORY (INHALATION) at 07:06

## 2023-01-01 RX ADMIN — BRIMONIDINE TARTRATE 1 DROP: 2 SOLUTION OPHTHALMIC at 04:40

## 2023-01-01 RX ADMIN — BRIMONIDINE TARTRATE 1 DROP: 2 SOLUTION OPHTHALMIC at 05:17

## 2023-01-01 RX ADMIN — ACETAMINOPHEN 650 MG: 325 TABLET, FILM COATED ORAL at 13:41

## 2023-01-01 RX ADMIN — BENZOCAINE AND MENTHOL 1 LOZENGE: 15; 3.6 LOZENGE ORAL at 03:02

## 2023-01-01 RX ADMIN — MIDODRINE HYDROCHLORIDE 5 MG: 5 TABLET ORAL at 05:14

## 2023-01-01 RX ADMIN — HYDROCODONE BITARTRATE AND ACETAMINOPHEN 1 TABLET: 5; 325 TABLET ORAL at 05:15

## 2023-01-01 RX ADMIN — OXYCODONE HYDROCHLORIDE 5 MG: 5 TABLET ORAL at 20:41

## 2023-01-01 RX ADMIN — SODIUM CHLORIDE: 9 INJECTION, SOLUTION INTRAVENOUS at 08:05

## 2023-01-01 RX ADMIN — ALBUTEROL SULFATE 2.5 MG: 2.5 SOLUTION RESPIRATORY (INHALATION) at 14:00

## 2023-01-01 RX ADMIN — APIXABAN 5 MG: 5 TABLET, FILM COATED ORAL at 17:35

## 2023-01-01 RX ADMIN — SUCRALFATE 1 G: 1 SUSPENSION ORAL at 00:00

## 2023-01-01 RX ADMIN — OXYCODONE HYDROCHLORIDE 10 MG: 10 TABLET ORAL at 18:05

## 2023-01-01 RX ADMIN — BRIMONIDINE TARTRATE 1 DROP: 2 SOLUTION OPHTHALMIC at 05:32

## 2023-01-01 RX ADMIN — BRIMONIDINE TARTRATE AND TIMOLOL MALEATE 1 DROP: 2; 5 SOLUTION OPHTHALMIC at 17:51

## 2023-01-01 RX ADMIN — GABAPENTIN 300 MG: 300 CAPSULE ORAL at 17:32

## 2023-01-01 RX ADMIN — PANTOPRAZOLE SODIUM 40 MG: 40 INJECTION, POWDER, FOR SOLUTION INTRAVENOUS at 18:00

## 2023-01-01 RX ADMIN — BRIMONIDINE TARTRATE 1 DROP: 2 SOLUTION OPHTHALMIC at 18:07

## 2023-01-01 RX ADMIN — APIXABAN 5 MG: 5 TABLET, FILM COATED ORAL at 18:26

## 2023-01-01 RX ADMIN — METHIMAZOLE 5 MG: 5 TABLET ORAL at 05:50

## 2023-01-01 RX ADMIN — METHIMAZOLE 5 MG: 5 TABLET ORAL at 05:14

## 2023-01-01 RX ADMIN — TIOTROPIUM BROMIDE INHALATION SPRAY 5 MCG: 3.12 SPRAY, METERED RESPIRATORY (INHALATION) at 05:15

## 2023-01-01 RX ADMIN — ALBUTEROL SULFATE 2 PUFF: 90 AEROSOL, METERED RESPIRATORY (INHALATION) at 07:05

## 2023-01-01 RX ADMIN — TIMOLOL MALEATE 1 DROP: 5 SOLUTION OPHTHALMIC at 17:15

## 2023-01-01 RX ADMIN — SODIUM CHLORIDE: 9 INJECTION, SOLUTION INTRAVENOUS at 01:07

## 2023-01-01 RX ADMIN — ATORVASTATIN CALCIUM 20 MG: 20 TABLET, FILM COATED ORAL at 20:13

## 2023-01-01 RX ADMIN — BRIMONIDINE TARTRATE 1 DROP: 2 SOLUTION OPHTHALMIC at 17:15

## 2023-01-01 RX ADMIN — HEPARIN SODIUM 5000 UNITS: 5000 INJECTION, SOLUTION INTRAVENOUS; SUBCUTANEOUS at 05:51

## 2023-01-01 RX ADMIN — MOMETASONE FUROATE AND FORMOTEROL FUMARATE DIHYDRATE 2 PUFF: 200; 5 AEROSOL RESPIRATORY (INHALATION) at 07:24

## 2023-01-01 RX ADMIN — SODIUM CHLORIDE 1000 ML: 9 INJECTION, SOLUTION INTRAVENOUS at 21:57

## 2023-01-01 RX ADMIN — SODIUM BICARBONATE 650 MG TABLET 650 MG: at 15:43

## 2023-01-01 RX ADMIN — ZOLPIDEM TARTRATE 5 MG: 5 TABLET ORAL at 03:03

## 2023-01-01 RX ADMIN — SUCRALFATE 1 G: 1 SUSPENSION ORAL at 23:42

## 2023-01-01 RX ADMIN — SENNOSIDES AND DOCUSATE SODIUM 2 TABLET: 50; 8.6 TABLET ORAL at 05:24

## 2023-01-01 RX ADMIN — METAXALONE 400 MG: 800 TABLET ORAL at 17:38

## 2023-01-01 RX ADMIN — FUROSEMIDE 20 MG: 10 INJECTION INTRAMUSCULAR; INTRAVENOUS at 15:40

## 2023-01-01 RX ADMIN — SODIUM CHLORIDE: 9 INJECTION, SOLUTION INTRAVENOUS at 23:10

## 2023-01-01 RX ADMIN — RIVAROXABAN 20 MG: 20 TABLET, FILM COATED ORAL at 17:51

## 2023-01-01 RX ADMIN — TIOTROPIUM BROMIDE INHALATION SPRAY 5 MCG: 3.12 SPRAY, METERED RESPIRATORY (INHALATION) at 06:53

## 2023-01-01 RX ADMIN — METHYLNALTREXONE BROMIDE 6 MG: 12 INJECTION, SOLUTION SUBCUTANEOUS at 22:05

## 2023-01-01 RX ADMIN — HEPARIN SODIUM 5000 UNITS: 5000 INJECTION, SOLUTION INTRAVENOUS; SUBCUTANEOUS at 06:02

## 2023-01-01 RX ADMIN — BRIMONIDINE TARTRATE 1 DROP: 2 SOLUTION OPHTHALMIC at 18:27

## 2023-01-01 RX ADMIN — MOMETASONE FUROATE AND FORMOTEROL FUMARATE DIHYDRATE 2 PUFF: 200; 5 AEROSOL RESPIRATORY (INHALATION) at 07:18

## 2023-01-01 RX ADMIN — MOMETASONE FUROATE AND FORMOTEROL FUMARATE DIHYDRATE 2 PUFF: 200; 5 AEROSOL RESPIRATORY (INHALATION) at 21:57

## 2023-01-01 RX ADMIN — MIDODRINE HYDROCHLORIDE 5 MG: 5 TABLET ORAL at 17:21

## 2023-01-01 RX ADMIN — TIMOLOL MALEATE 1 DROP: 5 SOLUTION OPHTHALMIC at 21:04

## 2023-01-01 RX ADMIN — CINACALCET HYDROCHLORIDE 30 MG: 30 TABLET, FILM COATED ORAL at 04:42

## 2023-01-01 RX ADMIN — BUDESONIDE AND FORMOTEROL FUMARATE DIHYDRATE 2 PUFF: 160; 4.5 AEROSOL RESPIRATORY (INHALATION) at 19:01

## 2023-01-01 RX ADMIN — PAMIDRONATE DISODIUM 30 MG: 3 INJECTION INTRAVENOUS at 15:50

## 2023-01-01 RX ADMIN — DEXAMETHASONE 4 MG: 4 TABLET ORAL at 07:58

## 2023-01-01 RX ADMIN — BISACODYL 10 MG: 10 SUPPOSITORY RECTAL at 09:20

## 2023-01-01 RX ADMIN — PANTOPRAZOLE SODIUM 40 MG: 40 INJECTION, POWDER, FOR SOLUTION INTRAVENOUS at 05:12

## 2023-01-01 RX ADMIN — HYDROMORPHONE HYDROCHLORIDE 0.5 MG: 1 INJECTION, SOLUTION INTRAMUSCULAR; INTRAVENOUS; SUBCUTANEOUS at 20:16

## 2023-01-01 RX ADMIN — ACETAMINOPHEN 1000 MG: 500 TABLET ORAL at 11:31

## 2023-01-01 RX ADMIN — HYDROMORPHONE HYDROCHLORIDE 0.5 MG: 1 INJECTION, SOLUTION INTRAMUSCULAR; INTRAVENOUS; SUBCUTANEOUS at 05:10

## 2023-01-01 RX ADMIN — ALBUTEROL SULFATE 2.5 MG: 2.5 SOLUTION RESPIRATORY (INHALATION) at 22:03

## 2023-01-01 RX ADMIN — TIMOLOL MALEATE 1 DROP: 5 SOLUTION OPHTHALMIC at 05:38

## 2023-01-01 RX ADMIN — TIOTROPIUM BROMIDE INHALATION SPRAY 5 MCG: 3.12 SPRAY, METERED RESPIRATORY (INHALATION) at 07:47

## 2023-01-01 RX ADMIN — BRIMONIDINE TARTRATE 1 DROP: 2 SOLUTION OPHTHALMIC at 17:35

## 2023-01-01 RX ADMIN — TIOTROPIUM BROMIDE INHALATION SPRAY 5 MCG: 3.12 SPRAY, METERED RESPIRATORY (INHALATION) at 07:17

## 2023-01-01 RX ADMIN — GABAPENTIN 300 MG: 300 CAPSULE ORAL at 17:58

## 2023-01-01 RX ADMIN — IOHEXOL 20 ML: 240 INJECTION, SOLUTION INTRATHECAL; INTRAVASCULAR; INTRAVENOUS; ORAL at 22:01

## 2023-01-01 RX ADMIN — ACETAMINOPHEN 1000 MG: 500 TABLET ORAL at 17:15

## 2023-01-01 RX ADMIN — BENZONATATE 100 MG: 100 CAPSULE ORAL at 17:35

## 2023-01-01 RX ADMIN — HEPARIN SODIUM 5000 UNITS: 5000 INJECTION, SOLUTION INTRAVENOUS; SUBCUTANEOUS at 05:48

## 2023-01-01 RX ADMIN — SODIUM BICARBONATE 50 MEQ: 84 INJECTION, SOLUTION INTRAVENOUS at 16:24

## 2023-01-01 RX ADMIN — TIMOLOL MALEATE 1 DROP: 5 SOLUTION OPHTHALMIC at 18:34

## 2023-01-01 RX ADMIN — METHIMAZOLE 5 MG: 5 TABLET ORAL at 04:42

## 2023-01-01 RX ADMIN — TIMOLOL MALEATE 1 DROP: 5 SOLUTION/ DROPS OPHTHALMIC at 16:55

## 2023-01-01 RX ADMIN — CINACALCET HYDROCHLORIDE 30 MG: 30 TABLET, FILM COATED ORAL at 04:34

## 2023-01-01 RX ADMIN — MIDODRINE HYDROCHLORIDE 5 MG: 5 TABLET ORAL at 17:57

## 2023-01-01 RX ADMIN — TIOTROPIUM BROMIDE INHALATION SPRAY 5 MCG: 3.12 SPRAY, METERED RESPIRATORY (INHALATION) at 05:28

## 2023-01-01 RX ADMIN — MICONAZOLE NITRATE: 20 CREAM TOPICAL at 05:23

## 2023-01-01 RX ADMIN — METAXALONE 400 MG: 800 TABLET ORAL at 04:42

## 2023-01-01 RX ADMIN — SENNOSIDES AND DOCUSATE SODIUM 2 TABLET: 50; 8.6 TABLET ORAL at 17:32

## 2023-01-01 RX ADMIN — PROCHLORPERAZINE EDISYLATE 10 MG: 5 INJECTION INTRAMUSCULAR; INTRAVENOUS at 05:12

## 2023-01-01 RX ADMIN — RIVAROXABAN 20 MG: 20 TABLET, FILM COATED ORAL at 17:56

## 2023-01-01 RX ADMIN — DOCUSATE SODIUM 100 MG: 100 CAPSULE, LIQUID FILLED ORAL at 05:29

## 2023-01-01 RX ADMIN — SENNOSIDES AND DOCUSATE SODIUM 2 TABLET: 50; 8.6 TABLET ORAL at 17:12

## 2023-01-01 RX ADMIN — SODIUM CHLORIDE: 9 INJECTION, SOLUTION INTRAVENOUS at 03:52

## 2023-01-01 RX ADMIN — DEXTROSE MONOHYDRATE 25 G: 100 INJECTION, SOLUTION INTRAVENOUS at 16:20

## 2023-01-01 RX ADMIN — ATORVASTATIN CALCIUM 20 MG: 10 TABLET, FILM COATED ORAL at 20:56

## 2023-01-01 RX ADMIN — PROCHLORPERAZINE EDISYLATE 10 MG: 5 INJECTION INTRAMUSCULAR; INTRAVENOUS at 11:41

## 2023-01-01 RX ADMIN — MIDODRINE HYDROCHLORIDE 5 MG: 5 TABLET ORAL at 17:35

## 2023-01-01 RX ADMIN — METOCLOPRAMIDE 10 MG: 5 INJECTION, SOLUTION INTRAMUSCULAR; INTRAVENOUS at 15:01

## 2023-01-01 RX ADMIN — SENNOSIDES AND DOCUSATE SODIUM 2 TABLET: 50; 8.6 TABLET ORAL at 05:29

## 2023-01-01 RX ADMIN — ALBUTEROL SULFATE 2.5 MG: 2.5 SOLUTION RESPIRATORY (INHALATION) at 19:00

## 2023-01-01 RX ADMIN — TIMOLOL MALEATE 1 DROP: 5 SOLUTION OPHTHALMIC at 05:01

## 2023-01-01 RX ADMIN — METOCLOPRAMIDE 10 MG: 5 INJECTION, SOLUTION INTRAMUSCULAR; INTRAVENOUS at 05:16

## 2023-01-01 RX ADMIN — MICONAZOLE NITRATE: 20 CREAM TOPICAL at 18:08

## 2023-01-01 RX ADMIN — BUDESONIDE AND FORMOTEROL FUMARATE DIHYDRATE 2 PUFF: 160; 4.5 AEROSOL RESPIRATORY (INHALATION) at 18:29

## 2023-01-01 RX ADMIN — RIVAROXABAN 20 MG: 20 TABLET, FILM COATED ORAL at 18:33

## 2023-01-01 RX ADMIN — BUDESONIDE AND FORMOTEROL FUMARATE DIHYDRATE 2 PUFF: 160; 4.5 AEROSOL RESPIRATORY (INHALATION) at 06:39

## 2023-01-01 RX ADMIN — ATORVASTATIN CALCIUM 20 MG: 20 TABLET, FILM COATED ORAL at 21:24

## 2023-01-01 RX ADMIN — SENNOSIDES AND DOCUSATE SODIUM 2 TABLET: 50; 8.6 TABLET ORAL at 22:10

## 2023-01-01 RX ADMIN — MOMETASONE FUROATE AND FORMOTEROL FUMARATE DIHYDRATE 2 PUFF: 200; 5 AEROSOL RESPIRATORY (INHALATION) at 18:49

## 2023-01-01 RX ADMIN — TIMOLOL MALEATE 1 DROP: 5 SOLUTION OPHTHALMIC at 18:06

## 2023-01-01 RX ADMIN — HYDROCODONE BITARTRATE AND ACETAMINOPHEN 1 TABLET: 5; 325 TABLET ORAL at 19:56

## 2023-01-01 RX ADMIN — TIOTROPIUM BROMIDE INHALATION SPRAY 5 MCG: 3.12 SPRAY, METERED RESPIRATORY (INHALATION) at 07:05

## 2023-01-01 RX ADMIN — OXYCODONE HYDROCHLORIDE 10 MG: 10 TABLET ORAL at 08:01

## 2023-01-01 RX ADMIN — METAXALONE 400 MG: 800 TABLET ORAL at 05:36

## 2023-01-01 RX ADMIN — BISACODYL 10 MG: 10 SUPPOSITORY RECTAL at 21:24

## 2023-01-01 RX ADMIN — TIMOLOL MALEATE 1 DROP: 5 SOLUTION OPHTHALMIC at 05:40

## 2023-01-01 RX ADMIN — BRIMONIDINE TARTRATE 1 DROP: 2 SOLUTION OPHTHALMIC at 05:38

## 2023-01-01 RX ADMIN — SENNOSIDES AND DOCUSATE SODIUM 2 TABLET: 50; 8.6 TABLET ORAL at 05:15

## 2023-01-01 RX ADMIN — OXYCODONE HYDROCHLORIDE 10 MG: 10 TABLET ORAL at 03:50

## 2023-01-01 RX ADMIN — BRIMONIDINE TARTRATE 1 DROP: 2 SOLUTION OPHTHALMIC at 05:51

## 2023-01-01 RX ADMIN — BENZONATATE 100 MG: 100 CAPSULE ORAL at 04:40

## 2023-01-01 RX ADMIN — HYDROCODONE BITARTRATE AND ACETAMINOPHEN 1 TABLET: 5; 325 TABLET ORAL at 02:32

## 2023-01-01 RX ADMIN — ALLOPURINOL 200 MG: 100 TABLET ORAL at 05:54

## 2023-01-01 RX ADMIN — OXYCODONE HYDROCHLORIDE 10 MG: 10 TABLET ORAL at 08:33

## 2023-01-01 RX ADMIN — BRIMONIDINE TARTRATE AND TIMOLOL MALEATE 1 DROP: 2; 5 SOLUTION OPHTHALMIC at 17:49

## 2023-01-01 RX ADMIN — ONDANSETRON 4 MG: 2 INJECTION INTRAMUSCULAR; INTRAVENOUS at 13:55

## 2023-01-01 RX ADMIN — SENNOSIDES AND DOCUSATE SODIUM 2 TABLET: 50; 8.6 TABLET ORAL at 05:34

## 2023-01-01 RX ADMIN — CINACALCET HYDROCHLORIDE 30 MG: 30 TABLET, FILM COATED ORAL at 18:33

## 2023-01-01 RX ADMIN — METHIMAZOLE 5 MG: 5 TABLET ORAL at 05:52

## 2023-01-01 RX ADMIN — SODIUM BICARBONATE 650 MG TABLET 650 MG: at 20:05

## 2023-01-01 RX ADMIN — SENNOSIDES AND DOCUSATE SODIUM 2 TABLET: 50; 8.6 TABLET ORAL at 05:51

## 2023-01-01 RX ADMIN — SODIUM CHLORIDE: 9 INJECTION, SOLUTION INTRAVENOUS at 23:42

## 2023-01-01 RX ADMIN — SENNOSIDES AND DOCUSATE SODIUM 2 TABLET: 50; 8.6 TABLET ORAL at 18:32

## 2023-01-01 RX ADMIN — BUDESONIDE AND FORMOTEROL FUMARATE DIHYDRATE 2 PUFF: 160; 4.5 AEROSOL RESPIRATORY (INHALATION) at 07:23

## 2023-01-01 RX ADMIN — BUDESONIDE AND FORMOTEROL FUMARATE DIHYDRATE 2 PUFF: 160; 4.5 AEROSOL RESPIRATORY (INHALATION) at 07:53

## 2023-01-01 RX ADMIN — BUDESONIDE AND FORMOTEROL FUMARATE DIHYDRATE 2 PUFF: 160; 4.5 AEROSOL RESPIRATORY (INHALATION) at 20:11

## 2023-01-01 RX ADMIN — TIMOLOL MALEATE 1 DROP: 5 SOLUTION OPHTHALMIC at 04:40

## 2023-01-01 RX ADMIN — MOMETASONE FUROATE AND FORMOTEROL FUMARATE DIHYDRATE 2 PUFF: 200; 5 AEROSOL RESPIRATORY (INHALATION) at 20:10

## 2023-01-01 RX ADMIN — MOMETASONE FUROATE AND FORMOTEROL FUMARATE DIHYDRATE 2 PUFF: 200; 5 AEROSOL RESPIRATORY (INHALATION) at 07:42

## 2023-01-01 RX ADMIN — METHYLPREDNISOLONE SODIUM SUCCINATE 40 MG: 40 INJECTION, POWDER, FOR SOLUTION INTRAMUSCULAR; INTRAVENOUS at 23:40

## 2023-01-01 RX ADMIN — TIMOLOL MALEATE 1 DROP: 5 SOLUTION OPHTHALMIC at 08:00

## 2023-01-01 RX ADMIN — SODIUM BICARBONATE 650 MG TABLET 650 MG: at 08:52

## 2023-01-01 RX ADMIN — MOMETASONE FUROATE AND FORMOTEROL FUMARATE DIHYDRATE 2 PUFF: 200; 5 AEROSOL RESPIRATORY (INHALATION) at 19:49

## 2023-01-01 RX ADMIN — BUDESONIDE AND FORMOTEROL FUMARATE DIHYDRATE 2 PUFF: 160; 4.5 AEROSOL RESPIRATORY (INHALATION) at 18:33

## 2023-01-01 RX ADMIN — SENNOSIDES AND DOCUSATE SODIUM 2 TABLET: 50; 8.6 TABLET ORAL at 18:26

## 2023-01-01 RX ADMIN — GABAPENTIN 200 MG: 100 CAPSULE ORAL at 17:35

## 2023-01-01 RX ADMIN — TIMOLOL MALEATE 1 DROP: 5 SOLUTION OPHTHALMIC at 04:30

## 2023-01-01 RX ADMIN — BRIMONIDINE TARTRATE 1 DROP: 2 SOLUTION OPHTHALMIC at 05:01

## 2023-01-01 RX ADMIN — CINACALCET HYDROCHLORIDE 30 MG: 30 TABLET, FILM COATED ORAL at 05:34

## 2023-01-01 RX ADMIN — OXYCODONE HYDROCHLORIDE 10 MG: 10 TABLET ORAL at 15:39

## 2023-01-01 RX ADMIN — BRIMONIDINE TARTRATE 1 DROP: 2 SOLUTION OPHTHALMIC at 19:51

## 2023-01-01 RX ADMIN — TIOTROPIUM BROMIDE INHALATION SPRAY 5 MCG: 3.12 SPRAY, METERED RESPIRATORY (INHALATION) at 07:28

## 2023-01-01 RX ADMIN — PANTOPRAZOLE SODIUM 40 MG: 40 INJECTION, POWDER, FOR SOLUTION INTRAVENOUS at 05:16

## 2023-01-01 RX ADMIN — BUDESONIDE AND FORMOTEROL FUMARATE DIHYDRATE 2 PUFF: 160; 4.5 AEROSOL RESPIRATORY (INHALATION) at 09:28

## 2023-01-01 RX ADMIN — FUROSEMIDE 40 MG: 10 INJECTION INTRAMUSCULAR; INTRAVENOUS at 04:41

## 2023-01-01 RX ADMIN — MOMETASONE FUROATE AND FORMOTEROL FUMARATE DIHYDRATE 2 PUFF: 200; 5 AEROSOL RESPIRATORY (INHALATION) at 19:14

## 2023-01-01 RX ADMIN — PIPERACILLIN AND TAZOBACTAM 3.38 G: 3; .375 INJECTION, POWDER, LYOPHILIZED, FOR SOLUTION INTRAVENOUS; PARENTERAL at 12:51

## 2023-01-01 RX ADMIN — POTASSIUM BICARBONATE 25 MEQ: 978 TABLET, EFFERVESCENT ORAL at 16:53

## 2023-01-01 RX ADMIN — BUDESONIDE AND FORMOTEROL FUMARATE DIHYDRATE 2 PUFF: 160; 4.5 AEROSOL RESPIRATORY (INHALATION) at 20:06

## 2023-01-01 RX ADMIN — AZITHROMYCIN 250 MG: 250 TABLET, FILM COATED ORAL at 04:45

## 2023-01-01 RX ADMIN — METHIMAZOLE 5 MG: 5 TABLET ORAL at 05:54

## 2023-01-01 RX ADMIN — PATIROMER 8.4 G: 8.4 POWDER, FOR SUSPENSION ORAL at 12:45

## 2023-01-01 RX ADMIN — METOCLOPRAMIDE 10 MG: 5 INJECTION, SOLUTION INTRAMUSCULAR; INTRAVENOUS at 12:06

## 2023-01-01 RX ADMIN — ONDANSETRON 4 MG: 2 INJECTION INTRAMUSCULAR; INTRAVENOUS at 11:30

## 2023-01-01 RX ADMIN — TIOTROPIUM BROMIDE INHALATION SPRAY 5 MCG: 3.12 SPRAY, METERED RESPIRATORY (INHALATION) at 07:45

## 2023-01-01 RX ADMIN — ALBUTEROL SULFATE 2.5 MG: 2.5 SOLUTION RESPIRATORY (INHALATION) at 02:26

## 2023-01-01 RX ADMIN — METAXALONE 400 MG: 800 TABLET ORAL at 04:47

## 2023-01-01 RX ADMIN — ACETAMINOPHEN 1000 MG: 500 TABLET ORAL at 05:37

## 2023-01-01 RX ADMIN — ACETAMINOPHEN 1000 MG: 500 TABLET ORAL at 05:39

## 2023-01-01 RX ADMIN — PIPERACILLIN AND TAZOBACTAM 3.38 G: 3; .375 INJECTION, POWDER, LYOPHILIZED, FOR SOLUTION INTRAVENOUS; PARENTERAL at 02:58

## 2023-01-01 RX ADMIN — METOCLOPRAMIDE 10 MG: 5 INJECTION, SOLUTION INTRAMUSCULAR; INTRAVENOUS at 00:19

## 2023-01-01 RX ADMIN — TIOTROPIUM BROMIDE INHALATION SPRAY 5 MCG: 3.12 SPRAY, METERED RESPIRATORY (INHALATION) at 05:50

## 2023-01-01 RX ADMIN — SODIUM BICARBONATE 650 MG TABLET 650 MG: at 09:45

## 2023-01-01 RX ADMIN — ACETAMINOPHEN 1000 MG: 500 TABLET ORAL at 18:35

## 2023-01-01 RX ADMIN — POLYETHYLENE GLYCOL 3350 1 PACKET: 17 POWDER, FOR SOLUTION ORAL at 05:29

## 2023-01-01 RX ADMIN — ONDANSETRON 4 MG: 2 INJECTION INTRAMUSCULAR; INTRAVENOUS at 22:32

## 2023-01-01 RX ADMIN — SIMVASTATIN 40 MG: 20 TABLET, FILM COATED ORAL at 18:32

## 2023-01-01 RX ADMIN — MORPHINE SULFATE 4 MG: 4 INJECTION INTRAVENOUS at 13:55

## 2023-01-01 RX ADMIN — TIMOLOL MALEATE 1 DROP: 5 SOLUTION OPHTHALMIC at 17:53

## 2023-01-01 RX ADMIN — BRIMONIDINE TARTRATE 1 DROP: 2 SOLUTION OPHTHALMIC at 17:53

## 2023-01-01 RX ADMIN — METHIMAZOLE 5 MG: 5 TABLET ORAL at 04:40

## 2023-01-01 RX ADMIN — ALLOPURINOL 200 MG: 100 TABLET ORAL at 05:50

## 2023-01-01 RX ADMIN — METHIMAZOLE 5 MG: 5 TABLET ORAL at 04:46

## 2023-01-01 RX ADMIN — MOMETASONE FUROATE AND FORMOTEROL FUMARATE DIHYDRATE 2 PUFF: 200; 5 AEROSOL RESPIRATORY (INHALATION) at 07:13

## 2023-01-01 RX ADMIN — AZITHROMYCIN 250 MG: 250 TABLET, FILM COATED ORAL at 05:36

## 2023-01-01 RX ADMIN — TIOTROPIUM BROMIDE INHALATION SPRAY 5 MCG: 3.12 SPRAY, METERED RESPIRATORY (INHALATION) at 05:34

## 2023-01-01 RX ADMIN — SUCRALFATE 1 G: 1 SUSPENSION ORAL at 12:11

## 2023-01-01 RX ADMIN — METHYLPREDNISOLONE SODIUM SUCCINATE 40 MG: 40 INJECTION, POWDER, FOR SOLUTION INTRAMUSCULAR; INTRAVENOUS at 16:41

## 2023-01-01 RX ADMIN — MOMETASONE FUROATE AND FORMOTEROL FUMARATE DIHYDRATE 2 PUFF: 200; 5 AEROSOL RESPIRATORY (INHALATION) at 06:46

## 2023-01-01 RX ADMIN — SODIUM CHLORIDE: 9 INJECTION, SOLUTION INTRAVENOUS at 16:48

## 2023-01-01 RX ADMIN — TIMOLOL MALEATE 1 DROP: 5 SOLUTION/ DROPS OPHTHALMIC at 04:42

## 2023-01-01 RX ADMIN — DEXAMETHASONE 4 MG: 4 TABLET ORAL at 07:56

## 2023-01-01 RX ADMIN — PANTOPRAZOLE SODIUM 40 MG: 40 INJECTION, POWDER, FOR SOLUTION INTRAVENOUS at 05:51

## 2023-01-01 RX ADMIN — ATORVASTATIN CALCIUM 20 MG: 20 TABLET, FILM COATED ORAL at 21:49

## 2023-01-01 RX ADMIN — METHIMAZOLE 5 MG: 5 TABLET ORAL at 05:24

## 2023-01-01 RX ADMIN — ACETAMINOPHEN 1000 MG: 500 TABLET ORAL at 11:30

## 2023-01-01 RX ADMIN — DOCUSATE SODIUM 100 MG: 100 CAPSULE, LIQUID FILLED ORAL at 18:08

## 2023-01-01 RX ADMIN — BRIMONIDINE TARTRATE 1 DROP: 2 SOLUTION OPHTHALMIC at 18:06

## 2023-01-01 RX ADMIN — BRIMONIDINE TARTRATE AND TIMOLOL MALEATE 1 DROP: 2; 5 SOLUTION OPHTHALMIC at 17:24

## 2023-01-01 RX ADMIN — HYDROCODONE BITARTRATE AND ACETAMINOPHEN 1 TABLET: 5; 325 TABLET ORAL at 10:19

## 2023-01-01 RX ADMIN — TIMOLOL MALEATE 1 DROP: 5 SOLUTION/ DROPS OPHTHALMIC at 17:21

## 2023-01-01 RX ADMIN — DEXAMETHASONE 4 MG: 4 TABLET ORAL at 08:07

## 2023-01-01 RX ADMIN — SODIUM CHLORIDE: 9 INJECTION, SOLUTION INTRAVENOUS at 09:15

## 2023-01-01 RX ADMIN — ONDANSETRON 4 MG: 2 INJECTION INTRAMUSCULAR; INTRAVENOUS at 04:29

## 2023-01-01 RX ADMIN — SODIUM BICARBONATE 650 MG TABLET 650 MG: at 21:15

## 2023-01-01 RX ADMIN — TIOTROPIUM BROMIDE INHALATION SPRAY 5 MCG: 3.12 SPRAY, METERED RESPIRATORY (INHALATION) at 05:13

## 2023-01-01 RX ADMIN — HEPARIN SODIUM 5000 UNITS: 5000 INJECTION, SOLUTION INTRAVENOUS; SUBCUTANEOUS at 14:29

## 2023-01-01 RX ADMIN — BRIMONIDINE TARTRATE AND TIMOLOL MALEATE 1 DROP: 2; 5 SOLUTION OPHTHALMIC at 21:45

## 2023-01-01 RX ADMIN — BRIMONIDINE TARTRATE 1 DROP: 2 SOLUTION OPHTHALMIC at 05:08

## 2023-01-01 RX ADMIN — BUDESONIDE AND FORMOTEROL FUMARATE DIHYDRATE 2 PUFF: 160; 4.5 AEROSOL RESPIRATORY (INHALATION) at 07:38

## 2023-01-01 RX ADMIN — OXYCODONE HYDROCHLORIDE 5 MG: 5 TABLET ORAL at 10:54

## 2023-01-01 RX ADMIN — AZITHROMYCIN 250 MG: 250 TABLET, FILM COATED ORAL at 04:37

## 2023-01-01 RX ADMIN — HYDROCODONE BITARTRATE AND ACETAMINOPHEN 1 TABLET: 5; 325 TABLET ORAL at 17:43

## 2023-01-01 RX ADMIN — SENNOSIDES AND DOCUSATE SODIUM 2 TABLET: 50; 8.6 TABLET ORAL at 17:35

## 2023-01-01 RX ADMIN — PIPERACILLIN AND TAZOBACTAM 3.38 G: 3; .375 INJECTION, POWDER, LYOPHILIZED, FOR SOLUTION INTRAVENOUS; PARENTERAL at 05:20

## 2023-01-01 RX ADMIN — TIMOLOL MALEATE 1 DROP: 5 SOLUTION OPHTHALMIC at 05:24

## 2023-01-01 RX ADMIN — SENNOSIDES AND DOCUSATE SODIUM 2 TABLET: 50; 8.6 TABLET ORAL at 17:52

## 2023-01-01 RX ADMIN — MOMETASONE FUROATE AND FORMOTEROL FUMARATE DIHYDRATE 2 PUFF: 200; 5 AEROSOL RESPIRATORY (INHALATION) at 19:32

## 2023-01-01 RX ADMIN — ALLOPURINOL 100 MG: 100 TABLET ORAL at 04:42

## 2023-01-01 RX ADMIN — ACETAMINOPHEN 1000 MG: 500 TABLET ORAL at 05:17

## 2023-01-01 RX ADMIN — MOMETASONE FUROATE AND FORMOTEROL FUMARATE DIHYDRATE 2 PUFF: 200; 5 AEROSOL RESPIRATORY (INHALATION) at 08:30

## 2023-01-01 RX ADMIN — PROCHLORPERAZINE EDISYLATE 10 MG: 5 INJECTION INTRAMUSCULAR; INTRAVENOUS at 20:03

## 2023-01-01 RX ADMIN — SUCRALFATE 1 G: 1 SUSPENSION ORAL at 11:47

## 2023-01-01 RX ADMIN — SODIUM BICARBONATE 650 MG TABLET 650 MG: at 09:42

## 2023-01-01 RX ADMIN — SIMVASTATIN 40 MG: 20 TABLET, FILM COATED ORAL at 18:05

## 2023-01-01 RX ADMIN — HYDROCODONE BITARTRATE AND ACETAMINOPHEN 1 TABLET: 5; 325 TABLET ORAL at 18:35

## 2023-01-01 RX ADMIN — ALBUTEROL SULFATE 2.5 MG: 2.5 SOLUTION RESPIRATORY (INHALATION) at 10:11

## 2023-01-01 RX ADMIN — FUROSEMIDE 40 MG: 10 INJECTION, SOLUTION INTRAMUSCULAR; INTRAVENOUS at 09:56

## 2023-01-01 RX ADMIN — NYSTATIN: 100000 POWDER TOPICAL at 12:50

## 2023-01-01 RX ADMIN — RIVAROXABAN 20 MG: 20 TABLET, FILM COATED ORAL at 20:20

## 2023-01-01 RX ADMIN — TIMOLOL MALEATE 1 DROP: 5 SOLUTION OPHTHALMIC at 08:41

## 2023-01-01 RX ADMIN — SENNOSIDES AND DOCUSATE SODIUM 2 TABLET: 50; 8.6 TABLET ORAL at 04:40

## 2023-01-01 RX ADMIN — SODIUM CHLORIDE: 9 INJECTION, SOLUTION INTRAVENOUS at 15:00

## 2023-01-01 RX ADMIN — HEPARIN SODIUM 5000 UNITS: 5000 INJECTION, SOLUTION INTRAVENOUS; SUBCUTANEOUS at 04:47

## 2023-01-01 RX ADMIN — SODIUM CHLORIDE: 9 INJECTION, SOLUTION INTRAVENOUS at 07:16

## 2023-01-01 RX ADMIN — ZOLPIDEM TARTRATE 5 MG: 5 TABLET ORAL at 00:29

## 2023-01-01 RX ADMIN — BRIMONIDINE TARTRATE 1 DROP: 2 SOLUTION OPHTHALMIC at 06:42

## 2023-01-01 RX ADMIN — BRIMONIDINE TARTRATE AND TIMOLOL MALEATE 1 DROP: 2; 5 SOLUTION OPHTHALMIC at 17:04

## 2023-01-01 RX ADMIN — HEPARIN SODIUM 5000 UNITS: 5000 INJECTION, SOLUTION INTRAVENOUS; SUBCUTANEOUS at 13:25

## 2023-01-01 RX ADMIN — ONDANSETRON 4 MG: 2 INJECTION INTRAMUSCULAR; INTRAVENOUS at 21:56

## 2023-01-01 RX ADMIN — ZOLPIDEM TARTRATE 5 MG: 5 TABLET ORAL at 00:24

## 2023-01-01 RX ADMIN — PIPERACILLIN AND TAZOBACTAM 3.38 G: 3; .375 INJECTION, POWDER, LYOPHILIZED, FOR SOLUTION INTRAVENOUS; PARENTERAL at 11:49

## 2023-01-01 RX ADMIN — ALBUTEROL SULFATE 2.5 MG: 2.5 SOLUTION RESPIRATORY (INHALATION) at 18:33

## 2023-01-01 RX ADMIN — HYDROCODONE BITARTRATE AND ACETAMINOPHEN 1 TABLET: 5; 325 TABLET ORAL at 22:43

## 2023-01-01 RX ADMIN — SODIUM BICARBONATE 650 MG TABLET 650 MG: at 10:42

## 2023-01-01 RX ADMIN — METAXALONE 400 MG: 800 TABLET ORAL at 17:17

## 2023-01-01 RX ADMIN — CALCIUM CARBONATE (ANTACID) CHEW TAB 500 MG 500 MG: 500 CHEW TAB at 02:21

## 2023-01-01 RX ADMIN — SODIUM CHLORIDE: 9 INJECTION, SOLUTION INTRAVENOUS at 19:23

## 2023-01-01 RX ADMIN — HYDROMORPHONE HYDROCHLORIDE 0.5 MG: 1 INJECTION, SOLUTION INTRAMUSCULAR; INTRAVENOUS; SUBCUTANEOUS at 14:49

## 2023-01-01 RX ADMIN — SENNOSIDES AND DOCUSATE SODIUM 2 TABLET: 50; 8.6 TABLET ORAL at 21:24

## 2023-01-01 RX ADMIN — OXYCODONE HYDROCHLORIDE 10 MG: 10 TABLET ORAL at 17:51

## 2023-01-01 RX ADMIN — METHIMAZOLE 5 MG: 5 TABLET ORAL at 05:29

## 2023-01-01 RX ADMIN — BENZOCAINE AND MENTHOL 1 LOZENGE: 15; 3.6 LOZENGE ORAL at 17:57

## 2023-01-01 RX ADMIN — OXYCODONE HYDROCHLORIDE 10 MG: 10 TABLET ORAL at 20:53

## 2023-01-01 RX ADMIN — ALLOPURINOL 200 MG: 100 TABLET ORAL at 17:56

## 2023-01-01 RX ADMIN — CINACALCET HYDROCHLORIDE 30 MG: 30 TABLET, FILM COATED ORAL at 05:19

## 2023-01-01 RX ADMIN — Medication 400 MG: at 15:43

## 2023-01-01 RX ADMIN — PROCHLORPERAZINE EDISYLATE 10 MG: 5 INJECTION INTRAMUSCULAR; INTRAVENOUS at 10:20

## 2023-01-01 RX ADMIN — NYSTATIN: 100000 POWDER TOPICAL at 12:44

## 2023-01-01 RX ADMIN — SODIUM CHLORIDE: 9 INJECTION, SOLUTION INTRAVENOUS at 23:00

## 2023-01-01 RX ADMIN — OXYCODONE HYDROCHLORIDE 10 MG: 10 TABLET ORAL at 10:51

## 2023-01-01 RX ADMIN — ALLOPURINOL 200 MG: 100 TABLET ORAL at 05:15

## 2023-01-01 RX ADMIN — ATORVASTATIN CALCIUM 20 MG: 20 TABLET, FILM COATED ORAL at 21:28

## 2023-01-01 RX ADMIN — MOMETASONE FUROATE AND FORMOTEROL FUMARATE DIHYDRATE 2 PUFF: 200; 5 AEROSOL RESPIRATORY (INHALATION) at 08:16

## 2023-01-01 RX ADMIN — SENNOSIDES AND DOCUSATE SODIUM 2 TABLET: 50; 8.6 TABLET ORAL at 04:39

## 2023-01-01 RX ADMIN — TIOTROPIUM BROMIDE INHALATION SPRAY 5 MCG: 3.12 SPRAY, METERED RESPIRATORY (INHALATION) at 06:46

## 2023-01-01 RX ADMIN — ALBUTEROL SULFATE 2.5 MG: 2.5 SOLUTION RESPIRATORY (INHALATION) at 23:03

## 2023-01-01 RX ADMIN — ZOLPIDEM TARTRATE 5 MG: 5 TABLET ORAL at 00:42

## 2023-01-01 RX ADMIN — ACETAMINOPHEN 1000 MG: 500 TABLET ORAL at 04:46

## 2023-01-01 RX ADMIN — ALLOPURINOL 200 MG: 100 TABLET ORAL at 05:21

## 2023-01-01 RX ADMIN — MOMETASONE FUROATE AND FORMOTEROL FUMARATE DIHYDRATE 2 PUFF: 200; 5 AEROSOL RESPIRATORY (INHALATION) at 19:45

## 2023-01-01 RX ADMIN — TIMOLOL MALEATE 1 DROP: 5 SOLUTION OPHTHALMIC at 20:13

## 2023-01-01 RX ADMIN — ALBUTEROL SULFATE 2.5 MG: 2.5 SOLUTION RESPIRATORY (INHALATION) at 06:39

## 2023-01-01 RX ADMIN — SODIUM BICARBONATE 650 MG TABLET 650 MG: at 14:51

## 2023-01-01 RX ADMIN — ALBUTEROL SULFATE 2.5 MG: 2.5 SOLUTION RESPIRATORY (INHALATION) at 08:16

## 2023-01-01 RX ADMIN — INSULIN HUMAN 5 UNITS: 100 INJECTION, SOLUTION PARENTERAL at 16:17

## 2023-01-01 RX ADMIN — PIPERACILLIN AND TAZOBACTAM 3.38 G: 3; .375 INJECTION, POWDER, LYOPHILIZED, FOR SOLUTION INTRAVENOUS; PARENTERAL at 20:13

## 2023-01-01 RX ADMIN — ONDANSETRON 4 MG: 2 INJECTION INTRAMUSCULAR; INTRAVENOUS at 14:44

## 2023-01-01 RX ADMIN — ALBUTEROL SULFATE 2.5 MG: 2.5 SOLUTION RESPIRATORY (INHALATION) at 06:08

## 2023-01-01 RX ADMIN — BRIMONIDINE TARTRATE 1 DROP: 2 SOLUTION/ DROPS OPHTHALMIC at 04:42

## 2023-01-01 RX ADMIN — FUROSEMIDE 40 MG: 10 INJECTION INTRAMUSCULAR; INTRAVENOUS at 05:37

## 2023-01-01 RX ADMIN — TIMOLOL MALEATE 1 DROP: 5 SOLUTION OPHTHALMIC at 20:54

## 2023-01-01 RX ADMIN — CINACALCET HYDROCHLORIDE 30 MG: 30 TABLET, FILM COATED ORAL at 08:22

## 2023-01-01 RX ADMIN — MOMETASONE FUROATE AND FORMOTEROL FUMARATE DIHYDRATE 2 PUFF: 200; 5 AEROSOL RESPIRATORY (INHALATION) at 07:28

## 2023-01-01 RX ADMIN — METAXALONE 400 MG: 800 TABLET ORAL at 11:31

## 2023-01-01 RX ADMIN — DOCUSATE SODIUM 100 MG: 100 CAPSULE, LIQUID FILLED ORAL at 04:47

## 2023-01-01 RX ADMIN — TIMOLOL MALEATE 1 DROP: 5 SOLUTION OPHTHALMIC at 08:07

## 2023-01-01 RX ADMIN — METAXALONE 400 MG: 800 TABLET ORAL at 17:13

## 2023-01-01 RX ADMIN — SODIUM CHLORIDE: 9 INJECTION, SOLUTION INTRAVENOUS at 14:42

## 2023-01-01 RX ADMIN — ONDANSETRON 4 MG: 2 INJECTION INTRAMUSCULAR; INTRAVENOUS at 16:26

## 2023-01-01 RX ADMIN — ACETAMINOPHEN 650 MG: 325 TABLET, FILM COATED ORAL at 18:34

## 2023-01-01 RX ADMIN — MOMETASONE FUROATE AND FORMOTEROL FUMARATE DIHYDRATE 2 PUFF: 200; 5 AEROSOL RESPIRATORY (INHALATION) at 07:45

## 2023-01-01 RX ADMIN — HEPARIN SODIUM 5000 UNITS: 5000 INJECTION, SOLUTION INTRAVENOUS; SUBCUTANEOUS at 05:08

## 2023-01-01 RX ADMIN — HEPARIN SODIUM 5000 UNITS: 5000 INJECTION, SOLUTION INTRAVENOUS; SUBCUTANEOUS at 05:29

## 2023-01-01 RX ADMIN — SODIUM CHLORIDE: 9 INJECTION, SOLUTION INTRAVENOUS at 03:45

## 2023-01-01 RX ADMIN — BRIMONIDINE TARTRATE AND TIMOLOL MALEATE 1 DROP: 2; 5 SOLUTION OPHTHALMIC at 16:52

## 2023-01-01 RX ADMIN — SODIUM CHLORIDE: 9 INJECTION, SOLUTION INTRAVENOUS at 08:17

## 2023-01-01 RX ADMIN — METAXALONE 400 MG: 800 TABLET ORAL at 18:06

## 2023-01-01 RX ADMIN — TIOTROPIUM BROMIDE INHALATION SPRAY 5 MCG: 3.12 SPRAY, METERED RESPIRATORY (INHALATION) at 05:54

## 2023-01-01 RX ADMIN — GABAPENTIN 300 MG: 300 CAPSULE ORAL at 04:41

## 2023-01-01 RX ADMIN — ACETAMINOPHEN 650 MG: 325 TABLET ORAL at 21:03

## 2023-01-01 RX ADMIN — MOMETASONE FUROATE AND FORMOTEROL FUMARATE DIHYDRATE 2 PUFF: 200; 5 AEROSOL RESPIRATORY (INHALATION) at 22:07

## 2023-01-01 RX ADMIN — PROCHLORPERAZINE EDISYLATE 10 MG: 5 INJECTION INTRAMUSCULAR; INTRAVENOUS at 12:14

## 2023-01-01 RX ADMIN — BUDESONIDE AND FORMOTEROL FUMARATE DIHYDRATE 2 PUFF: 160; 4.5 AEROSOL RESPIRATORY (INHALATION) at 19:00

## 2023-01-01 RX ADMIN — BRIMONIDINE TARTRATE 1 DROP: 2 SOLUTION/ DROPS OPHTHALMIC at 17:58

## 2023-01-01 RX ADMIN — RIVAROXABAN 20 MG: 20 TABLET, FILM COATED ORAL at 17:04

## 2023-01-01 RX ADMIN — BUDESONIDE AND FORMOTEROL FUMARATE DIHYDRATE 2 PUFF: 160; 4.5 AEROSOL RESPIRATORY (INHALATION) at 06:51

## 2023-01-01 RX ADMIN — ALBUTEROL SULFATE 2.5 MG: 2.5 SOLUTION RESPIRATORY (INHALATION) at 22:32

## 2023-01-01 RX ADMIN — METAXALONE 400 MG: 800 TABLET ORAL at 04:37

## 2023-01-01 RX ADMIN — OXYCODONE HYDROCHLORIDE 10 MG: 10 TABLET ORAL at 11:04

## 2023-01-01 RX ADMIN — METHIMAZOLE 5 MG: 5 TABLET ORAL at 04:47

## 2023-01-01 RX ADMIN — PANTOPRAZOLE SODIUM 40 MG: 40 INJECTION, POWDER, FOR SOLUTION INTRAVENOUS at 05:38

## 2023-01-01 RX ADMIN — PANTOPRAZOLE SODIUM 40 MG: 40 INJECTION, POWDER, FOR SOLUTION INTRAVENOUS at 05:54

## 2023-01-01 RX ADMIN — SODIUM BICARBONATE 650 MG TABLET 650 MG: at 14:53

## 2023-01-01 RX ADMIN — ONDANSETRON 4 MG: 2 INJECTION INTRAMUSCULAR; INTRAVENOUS at 05:43

## 2023-01-01 RX ADMIN — ALLOPURINOL 200 MG: 100 TABLET ORAL at 05:27

## 2023-01-01 RX ADMIN — TIMOLOL MALEATE 1 DROP: 5 SOLUTION OPHTHALMIC at 05:16

## 2023-01-01 RX ADMIN — ACETAMINOPHEN 1000 MG: 500 TABLET ORAL at 12:06

## 2023-01-01 RX ADMIN — RIVAROXABAN 20 MG: 20 TABLET, FILM COATED ORAL at 20:04

## 2023-01-01 RX ADMIN — BRIMONIDINE TARTRATE AND TIMOLOL MALEATE 1 DROP: 2; 5 SOLUTION OPHTHALMIC at 05:26

## 2023-01-01 RX ADMIN — DEXAMETHASONE 4 MG: 4 TABLET ORAL at 08:41

## 2023-01-01 RX ADMIN — PATIROMER 8.4 G: 8.4 POWDER, FOR SUSPENSION ORAL at 17:11

## 2023-01-01 RX ADMIN — ALLOPURINOL 200 MG: 100 TABLET ORAL at 05:25

## 2023-01-01 RX ADMIN — OXYCODONE HYDROCHLORIDE 10 MG: 10 TABLET ORAL at 14:33

## 2023-01-01 RX ADMIN — ONDANSETRON 4 MG: 2 INJECTION INTRAMUSCULAR; INTRAVENOUS at 12:38

## 2023-01-01 RX ADMIN — ALBUTEROL SULFATE 2.5 MG: 2.5 SOLUTION RESPIRATORY (INHALATION) at 21:07

## 2023-01-01 RX ADMIN — MOMETASONE FUROATE AND FORMOTEROL FUMARATE DIHYDRATE 2 PUFF: 200; 5 AEROSOL RESPIRATORY (INHALATION) at 20:34

## 2023-01-01 RX ADMIN — TIOTROPIUM BROMIDE INHALATION SPRAY 5 MCG: 3.12 SPRAY, METERED RESPIRATORY (INHALATION) at 09:28

## 2023-01-01 RX ADMIN — TIOTROPIUM BROMIDE INHALATION SPRAY 5 MCG: 3.12 SPRAY, METERED RESPIRATORY (INHALATION) at 07:24

## 2023-01-01 RX ADMIN — METHIMAZOLE 5 MG: 5 TABLET ORAL at 05:08

## 2023-01-01 RX ADMIN — RIVAROXABAN 20 MG: 20 TABLET, FILM COATED ORAL at 17:30

## 2023-01-01 RX ADMIN — GUAIFENESIN 100 MG: 100 SOLUTION ORAL at 17:21

## 2023-01-01 RX ADMIN — OXYCODONE HYDROCHLORIDE 10 MG: 10 TABLET ORAL at 14:43

## 2023-01-01 RX ADMIN — SUCRALFATE 1 G: 1 SUSPENSION ORAL at 05:38

## 2023-01-01 RX ADMIN — SODIUM BICARBONATE 650 MG TABLET 650 MG: at 16:20

## 2023-01-01 RX ADMIN — METHIMAZOLE 5 MG: 5 TABLET ORAL at 05:39

## 2023-01-01 RX ADMIN — PAMIDRONATE DISODIUM 60 MG: 3 INJECTION INTRAVENOUS at 20:54

## 2023-01-01 RX ADMIN — PAMIDRONATE DISODIUM 60 MG: 3 INJECTION INTRAVENOUS at 15:22

## 2023-01-01 RX ADMIN — BUDESONIDE AND FORMOTEROL FUMARATE DIHYDRATE 2 PUFF: 160; 4.5 AEROSOL RESPIRATORY (INHALATION) at 07:47

## 2023-01-01 RX ADMIN — BRIMONIDINE TARTRATE 1 DROP: 2 SOLUTION/ DROPS OPHTHALMIC at 17:34

## 2023-01-01 RX ADMIN — TIOTROPIUM BROMIDE INHALATION SPRAY 5 MCG: 3.12 SPRAY, METERED RESPIRATORY (INHALATION) at 08:30

## 2023-01-01 RX ADMIN — BRIMONIDINE TARTRATE 1 DROP: 2 SOLUTION OPHTHALMIC at 04:29

## 2023-01-01 RX ADMIN — SODIUM CHLORIDE: 9 INJECTION, SOLUTION INTRAVENOUS at 21:28

## 2023-01-01 RX ADMIN — BRIMONIDINE TARTRATE AND TIMOLOL MALEATE 1 DROP: 2; 5 SOLUTION OPHTHALMIC at 05:40

## 2023-01-01 RX ADMIN — BUDESONIDE AND FORMOTEROL FUMARATE DIHYDRATE 2 PUFF: 160; 4.5 AEROSOL RESPIRATORY (INHALATION) at 08:25

## 2023-01-01 RX ADMIN — GUAIFENESIN 100 MG: 100 SOLUTION ORAL at 12:40

## 2023-01-01 RX ADMIN — SENNOSIDES AND DOCUSATE SODIUM 2 TABLET: 50; 8.6 TABLET ORAL at 04:46

## 2023-01-01 RX ADMIN — ONDANSETRON 4 MG: 4 TABLET, ORALLY DISINTEGRATING ORAL at 19:50

## 2023-01-01 RX ADMIN — TIMOLOL MALEATE 1 DROP: 5 SOLUTION OPHTHALMIC at 18:26

## 2023-01-01 RX ADMIN — RIVAROXABAN 20 MG: 20 TABLET, FILM COATED ORAL at 17:13

## 2023-01-01 RX ADMIN — IPRATROPIUM BROMIDE AND ALBUTEROL SULFATE 3 ML: .5; 3 SOLUTION RESPIRATORY (INHALATION) at 21:56

## 2023-01-01 RX ADMIN — OXYCODONE HYDROCHLORIDE 10 MG: 10 TABLET ORAL at 21:15

## 2023-01-01 RX ADMIN — MICONAZOLE NITRATE: 20 CREAM TOPICAL at 04:40

## 2023-01-01 RX ADMIN — AZITHROMYCIN 250 MG: 250 TABLET, FILM COATED ORAL at 05:15

## 2023-01-01 RX ADMIN — SODIUM CHLORIDE 1000 ML: 9 INJECTION, SOLUTION INTRAVENOUS at 14:15

## 2023-01-01 RX ADMIN — AZITHROMYCIN 250 MG: 250 TABLET, FILM COATED ORAL at 05:37

## 2023-01-01 RX ADMIN — HEPARIN SODIUM 5000 UNITS: 5000 INJECTION, SOLUTION INTRAVENOUS; SUBCUTANEOUS at 21:29

## 2023-01-01 RX ADMIN — SUCRALFATE 1 G: 1 SUSPENSION ORAL at 05:50

## 2023-01-01 RX ADMIN — CEFTRIAXONE SODIUM 1000 MG: 1 INJECTION, POWDER, FOR SOLUTION INTRAMUSCULAR; INTRAVENOUS at 04:46

## 2023-01-01 RX ADMIN — HEPARIN SODIUM 5000 UNITS: 5000 INJECTION, SOLUTION INTRAVENOUS; SUBCUTANEOUS at 13:31

## 2023-01-01 RX ADMIN — SODIUM CHLORIDE: 9 INJECTION, SOLUTION INTRAVENOUS at 18:30

## 2023-01-01 RX ADMIN — SODIUM BICARBONATE 650 MG TABLET 650 MG: at 08:20

## 2023-01-01 RX ADMIN — OXYCODONE HYDROCHLORIDE 10 MG: 10 TABLET ORAL at 05:38

## 2023-01-01 RX ADMIN — SODIUM BICARBONATE 650 MG TABLET 650 MG: at 14:43

## 2023-01-01 RX ADMIN — SODIUM BICARBONATE 650 MG TABLET 650 MG: at 21:20

## 2023-01-01 RX ADMIN — GABAPENTIN 300 MG: 300 CAPSULE ORAL at 05:34

## 2023-01-01 RX ADMIN — METAXALONE 400 MG: 800 TABLET ORAL at 13:20

## 2023-01-01 RX ADMIN — ZOLPIDEM TARTRATE 10 MG: 5 TABLET ORAL at 20:20

## 2023-01-01 RX ADMIN — ATORVASTATIN CALCIUM 20 MG: 20 TABLET, FILM COATED ORAL at 21:29

## 2023-01-01 RX ADMIN — HYDROCODONE BITARTRATE AND ACETAMINOPHEN 1 TABLET: 5; 325 TABLET ORAL at 10:08

## 2023-01-01 RX ADMIN — METHIMAZOLE 5 MG: 5 TABLET ORAL at 05:38

## 2023-01-01 RX ADMIN — TIMOLOL MALEATE 1 DROP: 5 SOLUTION OPHTHALMIC at 05:15

## 2023-01-01 RX ADMIN — BRIMONIDINE TARTRATE 1 DROP: 2 SOLUTION/ DROPS OPHTHALMIC at 17:22

## 2023-01-01 RX ADMIN — SODIUM BICARBONATE 650 MG TABLET 650 MG: at 22:04

## 2023-01-01 RX ADMIN — ATORVASTATIN CALCIUM 20 MG: 10 TABLET, FILM COATED ORAL at 21:45

## 2023-01-01 RX ADMIN — HEPARIN SODIUM 5000 UNITS: 5000 INJECTION, SOLUTION INTRAVENOUS; SUBCUTANEOUS at 20:54

## 2023-01-01 RX ADMIN — PROCHLORPERAZINE EDISYLATE 10 MG: 5 INJECTION INTRAMUSCULAR; INTRAVENOUS at 06:03

## 2023-01-01 RX ADMIN — APIXABAN 5 MG: 5 TABLET, FILM COATED ORAL at 05:24

## 2023-01-01 RX ADMIN — BRIMONIDINE TARTRATE 1 DROP: 2 SOLUTION OPHTHALMIC at 17:17

## 2023-01-01 RX ADMIN — DOCUSATE SODIUM 100 MG: 100 CAPSULE, LIQUID FILLED ORAL at 05:48

## 2023-01-01 RX ADMIN — GABAPENTIN 300 MG: 300 CAPSULE ORAL at 17:22

## 2023-01-01 RX ADMIN — METAXALONE 400 MG: 800 TABLET ORAL at 18:32

## 2023-01-01 RX ADMIN — BRIMONIDINE TARTRATE 1 DROP: 2 SOLUTION OPHTHALMIC at 06:02

## 2023-01-01 RX ADMIN — ONDANSETRON 4 MG: 4 TABLET, ORALLY DISINTEGRATING ORAL at 12:05

## 2023-01-01 RX ADMIN — TIOTROPIUM BROMIDE INHALATION SPRAY 5 MCG: 3.12 SPRAY, METERED RESPIRATORY (INHALATION) at 06:21

## 2023-01-01 RX ADMIN — TIMOLOL MALEATE 1 DROP: 5 SOLUTION/ DROPS OPHTHALMIC at 05:34

## 2023-01-01 RX ADMIN — ONDANSETRON 4 MG: 2 INJECTION INTRAMUSCULAR; INTRAVENOUS at 21:21

## 2023-01-01 RX ADMIN — ATORVASTATIN CALCIUM 20 MG: 10 TABLET, FILM COATED ORAL at 22:03

## 2023-01-01 RX ADMIN — HEPARIN SODIUM 5000 UNITS: 5000 INJECTION, SOLUTION INTRAVENOUS; SUBCUTANEOUS at 21:31

## 2023-01-01 RX ADMIN — PIPERACILLIN AND TAZOBACTAM 3.38 G: 3; .375 INJECTION, POWDER, LYOPHILIZED, FOR SOLUTION INTRAVENOUS; PARENTERAL at 19:20

## 2023-01-01 RX ADMIN — ATORVASTATIN CALCIUM 20 MG: 20 TABLET, FILM COATED ORAL at 21:38

## 2023-01-01 RX ADMIN — FUROSEMIDE 20 MG: 10 INJECTION INTRAMUSCULAR; INTRAVENOUS at 16:16

## 2023-01-01 RX ADMIN — OXYCODONE HYDROCHLORIDE 10 MG: 10 TABLET ORAL at 21:38

## 2023-01-01 RX ADMIN — SENNOSIDES AND DOCUSATE SODIUM 2 TABLET: 50; 8.6 TABLET ORAL at 18:08

## 2023-01-01 RX ADMIN — ALBUTEROL SULFATE 2 PUFF: 90 AEROSOL, METERED RESPIRATORY (INHALATION) at 21:33

## 2023-01-01 RX ADMIN — CEFTRIAXONE SODIUM 1000 MG: 1 INJECTION, POWDER, FOR SOLUTION INTRAMUSCULAR; INTRAVENOUS at 09:54

## 2023-01-01 RX ADMIN — MOMETASONE FUROATE AND FORMOTEROL FUMARATE DIHYDRATE 2 PUFF: 200; 5 AEROSOL RESPIRATORY (INHALATION) at 08:10

## 2023-01-01 RX ADMIN — MIDODRINE HYDROCHLORIDE 5 MG: 5 TABLET ORAL at 04:40

## 2023-01-01 RX ADMIN — METHIMAZOLE 5 MG: 5 TABLET ORAL at 18:33

## 2023-01-01 RX ADMIN — OXYCODONE HYDROCHLORIDE 10 MG: 10 TABLET ORAL at 19:27

## 2023-01-01 RX ADMIN — ALBUTEROL SULFATE 2 PUFF: 90 AEROSOL, METERED RESPIRATORY (INHALATION) at 03:45

## 2023-01-01 RX ADMIN — ATORVASTATIN CALCIUM 20 MG: 10 TABLET, FILM COATED ORAL at 20:02

## 2023-01-01 RX ADMIN — ONDANSETRON 4 MG: 2 INJECTION INTRAMUSCULAR; INTRAVENOUS at 23:42

## 2023-01-01 RX ADMIN — TIOTROPIUM BROMIDE INHALATION SPRAY 5 MCG: 3.12 SPRAY, METERED RESPIRATORY (INHALATION) at 08:25

## 2023-01-01 RX ADMIN — METHIMAZOLE 5 MG: 5 TABLET ORAL at 05:26

## 2023-01-01 RX ADMIN — ALBUTEROL SULFATE 2.5 MG: 2.5 SOLUTION RESPIRATORY (INHALATION) at 11:26

## 2023-01-01 RX ADMIN — TIMOLOL MALEATE 1 DROP: 5 SOLUTION OPHTHALMIC at 21:29

## 2023-01-01 RX ADMIN — PIPERACILLIN AND TAZOBACTAM 3.38 G: 3; .375 INJECTION, POWDER, LYOPHILIZED, FOR SOLUTION INTRAVENOUS; PARENTERAL at 11:41

## 2023-01-01 RX ADMIN — NYSTATIN: 100000 POWDER TOPICAL at 18:08

## 2023-01-01 RX ADMIN — ATORVASTATIN CALCIUM 20 MG: 20 TABLET, FILM COATED ORAL at 20:20

## 2023-01-01 RX ADMIN — ACETAMINOPHEN 650 MG: 325 TABLET, FILM COATED ORAL at 13:21

## 2023-01-01 RX ADMIN — OXYCODONE HYDROCHLORIDE 10 MG: 10 TABLET ORAL at 14:53

## 2023-01-01 RX ADMIN — ATORVASTATIN CALCIUM 20 MG: 20 TABLET, FILM COATED ORAL at 20:54

## 2023-01-01 RX ADMIN — IOHEXOL 20 ML: 240 INJECTION, SOLUTION INTRATHECAL; INTRAVASCULAR; INTRAVENOUS; ORAL at 15:00

## 2023-01-01 RX ADMIN — POTASSIUM BICARBONATE 25 MEQ: 978 TABLET, EFFERVESCENT ORAL at 05:50

## 2023-01-01 RX ADMIN — BUDESONIDE AND FORMOTEROL FUMARATE DIHYDRATE 2 PUFF: 160; 4.5 AEROSOL RESPIRATORY (INHALATION) at 06:21

## 2023-01-01 RX ADMIN — ZOLPIDEM TARTRATE 10 MG: 5 TABLET ORAL at 22:09

## 2023-01-01 RX ADMIN — SENNOSIDES AND DOCUSATE SODIUM 2 TABLET: 50; 8.6 TABLET ORAL at 04:47

## 2023-01-01 RX ADMIN — OXYCODONE HYDROCHLORIDE 10 MG: 10 TABLET ORAL at 09:46

## 2023-01-01 RX ADMIN — ONDANSETRON 4 MG: 2 INJECTION INTRAMUSCULAR; INTRAVENOUS at 05:06

## 2023-01-01 RX ADMIN — HYDROCODONE BITARTRATE AND ACETAMINOPHEN 1 TABLET: 5; 325 TABLET ORAL at 04:11

## 2023-01-01 RX ADMIN — BUDESONIDE AND FORMOTEROL FUMARATE DIHYDRATE 2 PUFF: 160; 4.5 AEROSOL RESPIRATORY (INHALATION) at 06:09

## 2023-01-01 RX ADMIN — GABAPENTIN 300 MG: 300 CAPSULE ORAL at 05:51

## 2023-01-01 RX ADMIN — MORPHINE SULFATE 4 MG: 4 INJECTION INTRAVENOUS at 16:31

## 2023-01-01 RX ADMIN — ONDANSETRON 4 MG: 2 INJECTION INTRAMUSCULAR; INTRAVENOUS at 08:52

## 2023-01-01 RX ADMIN — ALBUTEROL SULFATE 2.5 MG: 2.5 SOLUTION RESPIRATORY (INHALATION) at 14:47

## 2023-01-01 RX ADMIN — DOCUSATE SODIUM 100 MG: 100 CAPSULE, LIQUID FILLED ORAL at 05:52

## 2023-01-01 RX ADMIN — OXYCODONE HYDROCHLORIDE 10 MG: 10 TABLET ORAL at 15:36

## 2023-01-01 RX ADMIN — SODIUM BICARBONATE 650 MG TABLET 650 MG: at 19:51

## 2023-01-01 RX ADMIN — Medication 5 MG: at 20:56

## 2023-01-01 RX ADMIN — RIVAROXABAN 20 MG: 20 TABLET, FILM COATED ORAL at 21:38

## 2023-01-01 RX ADMIN — RIVAROXABAN 20 MG: 20 TABLET, FILM COATED ORAL at 17:24

## 2023-01-01 RX ADMIN — TIOTROPIUM BROMIDE INHALATION SPRAY 5 MCG: 3.12 SPRAY, METERED RESPIRATORY (INHALATION) at 07:13

## 2023-01-01 RX ADMIN — METRONIDAZOLE 500 MG: 500 INJECTION, SOLUTION INTRAVENOUS at 05:37

## 2023-01-01 RX ADMIN — OXYCODONE HYDROCHLORIDE 5 MG: 5 TABLET ORAL at 15:18

## 2023-01-01 RX ADMIN — BRIMONIDINE TARTRATE 1 DROP: 2 SOLUTION OPHTHALMIC at 05:24

## 2023-01-01 RX ADMIN — IOHEXOL 50 ML: 350 INJECTION, SOLUTION INTRAVENOUS at 09:55

## 2023-01-01 RX ADMIN — SODIUM CHLORIDE: 9 INJECTION, SOLUTION INTRAVENOUS at 15:59

## 2023-01-01 RX ADMIN — POLYETHYLENE GLYCOL 3350 1 PACKET: 17 POWDER, FOR SOLUTION ORAL at 11:16

## 2023-01-01 RX ADMIN — PROCHLORPERAZINE EDISYLATE 10 MG: 5 INJECTION INTRAMUSCULAR; INTRAVENOUS at 17:32

## 2023-01-01 RX ADMIN — IOHEXOL 65 ML: 350 INJECTION, SOLUTION INTRAVENOUS at 14:23

## 2023-01-01 RX ADMIN — ACETAMINOPHEN 1000 MG: 500 TABLET ORAL at 17:12

## 2023-01-01 RX ADMIN — TIMOLOL MALEATE 1 DROP: 5 SOLUTION OPHTHALMIC at 21:25

## 2023-01-01 RX ADMIN — MOMETASONE FUROATE AND FORMOTEROL FUMARATE DIHYDRATE 2 PUFF: 200; 5 AEROSOL RESPIRATORY (INHALATION) at 07:17

## 2023-01-01 RX ADMIN — ZOLPIDEM TARTRATE 5 MG: 5 TABLET ORAL at 21:31

## 2023-01-01 RX ADMIN — ONDANSETRON 4 MG: 2 INJECTION INTRAMUSCULAR; INTRAVENOUS at 05:04

## 2023-01-01 RX ADMIN — METAXALONE 400 MG: 800 TABLET ORAL at 05:23

## 2023-01-01 RX ADMIN — HYDROMORPHONE HYDROCHLORIDE 0.5 MG: 1 INJECTION, SOLUTION INTRAMUSCULAR; INTRAVENOUS; SUBCUTANEOUS at 05:04

## 2023-01-01 RX ADMIN — ONDANSETRON 4 MG: 4 TABLET, ORALLY DISINTEGRATING ORAL at 04:11

## 2023-01-01 RX ADMIN — HYDROCODONE BITARTRATE AND ACETAMINOPHEN 1 TABLET: 5; 325 TABLET ORAL at 00:40

## 2023-01-01 RX ADMIN — ALLOPURINOL 100 MG: 100 TABLET ORAL at 05:51

## 2023-01-01 RX ADMIN — GUAIFENESIN 100 MG: 100 SOLUTION ORAL at 17:48

## 2023-01-01 RX ADMIN — METOCLOPRAMIDE 10 MG: 5 INJECTION, SOLUTION INTRAMUSCULAR; INTRAVENOUS at 17:43

## 2023-01-01 RX ADMIN — POTASSIUM CHLORIDE 40 MEQ: 1500 TABLET, EXTENDED RELEASE ORAL at 15:15

## 2023-01-01 RX ADMIN — ONDANSETRON 4 MG: 2 INJECTION INTRAMUSCULAR; INTRAVENOUS at 13:37

## 2023-01-01 RX ADMIN — OXYCODONE HYDROCHLORIDE 10 MG: 10 TABLET ORAL at 18:47

## 2023-01-01 RX ADMIN — FUROSEMIDE 40 MG: 10 INJECTION INTRAMUSCULAR; INTRAVENOUS at 15:18

## 2023-01-01 RX ADMIN — ALBUTEROL SULFATE 2.5 MG: 2.5 SOLUTION RESPIRATORY (INHALATION) at 06:51

## 2023-01-01 RX ADMIN — METHIMAZOLE 5 MG: 5 TABLET ORAL at 05:48

## 2023-01-01 RX ADMIN — METHIMAZOLE 5 MG: 5 TABLET ORAL at 05:37

## 2023-01-01 RX ADMIN — METAXALONE 400 MG: 800 TABLET ORAL at 05:41

## 2023-01-01 RX ADMIN — PIPERACILLIN AND TAZOBACTAM 3.38 G: 3; .375 INJECTION, POWDER, LYOPHILIZED, FOR SOLUTION INTRAVENOUS; PARENTERAL at 05:31

## 2023-01-01 RX ADMIN — SENNOSIDES AND DOCUSATE SODIUM 2 TABLET: 50; 8.6 TABLET ORAL at 17:13

## 2023-01-01 RX ADMIN — PATIROMER 8.4 G: 8.4 POWDER, FOR SUSPENSION ORAL at 12:16

## 2023-01-01 RX ADMIN — TIMOLOL MALEATE 1 DROP: 5 SOLUTION OPHTHALMIC at 08:47

## 2023-01-01 RX ADMIN — SENNOSIDES AND DOCUSATE SODIUM 2 TABLET: 50; 8.6 TABLET ORAL at 18:00

## 2023-01-01 RX ADMIN — METRONIDAZOLE 500 MG: 500 INJECTION, SOLUTION INTRAVENOUS at 13:30

## 2023-01-01 RX ADMIN — MIDODRINE HYDROCHLORIDE 5 MG: 5 TABLET ORAL at 05:24

## 2023-01-01 RX ADMIN — SENNOSIDES AND DOCUSATE SODIUM 2 TABLET: 50; 8.6 TABLET ORAL at 05:14

## 2023-01-01 RX ADMIN — GUAIFENESIN 100 MG: 100 SOLUTION ORAL at 22:33

## 2023-01-01 RX ADMIN — METHIMAZOLE 5 MG: 5 TABLET ORAL at 05:51

## 2023-01-01 RX ADMIN — PAMIDRONATE DISODIUM 90 MG: 3 INJECTION INTRAVENOUS at 18:07

## 2023-01-01 RX ADMIN — ZOLPIDEM TARTRATE 5 MG: 5 TABLET ORAL at 21:10

## 2023-01-01 RX ADMIN — HYDROCODONE BITARTRATE AND ACETAMINOPHEN 1 TABLET: 5; 325 TABLET ORAL at 08:12

## 2023-01-01 RX ADMIN — BRIMONIDINE TARTRATE 1 DROP: 2 SOLUTION OPHTHALMIC at 05:15

## 2023-01-01 RX ADMIN — BRIMONIDINE TARTRATE 1 DROP: 2 SOLUTION OPHTHALMIC at 21:25

## 2023-01-01 RX ADMIN — POLYETHYLENE GLYCOL 3350 1 PACKET: 17 POWDER, FOR SOLUTION ORAL at 04:47

## 2023-01-01 RX ADMIN — SODIUM CHLORIDE: 9 INJECTION, SOLUTION INTRAVENOUS at 21:25

## 2023-01-01 RX ADMIN — ONDANSETRON 4 MG: 2 INJECTION INTRAMUSCULAR; INTRAVENOUS at 14:54

## 2023-01-01 RX ADMIN — CINACALCET HYDROCHLORIDE 30 MG: 30 TABLET, FILM COATED ORAL at 04:46

## 2023-01-01 RX ADMIN — METAXALONE 400 MG: 800 TABLET ORAL at 11:30

## 2023-01-01 RX ADMIN — HYDROCODONE BITARTRATE AND ACETAMINOPHEN 1 TABLET: 5; 325 TABLET ORAL at 14:40

## 2023-01-01 RX ADMIN — SODIUM CHLORIDE: 9 INJECTION, SOLUTION INTRAVENOUS at 21:00

## 2023-01-01 RX ADMIN — ACETAMINOPHEN 1000 MG: 500 TABLET ORAL at 17:39

## 2023-01-01 RX ADMIN — BRIMONIDINE TARTRATE AND TIMOLOL MALEATE 1 DROP: 2; 5 SOLUTION OPHTHALMIC at 17:30

## 2023-01-01 RX ADMIN — ONDANSETRON 4 MG: 2 INJECTION INTRAMUSCULAR; INTRAVENOUS at 01:38

## 2023-01-01 RX ADMIN — PIPERACILLIN AND TAZOBACTAM 3.38 G: 3; .375 INJECTION, POWDER, LYOPHILIZED, FOR SOLUTION INTRAVENOUS; PARENTERAL at 02:02

## 2023-01-01 RX ADMIN — CALCITONIN SALMON 200 UNITS: 200 INJECTION, SOLUTION INTRAMUSCULAR; SUBCUTANEOUS at 17:50

## 2023-01-01 RX ADMIN — CINACALCET HYDROCHLORIDE 30 MG: 30 TABLET, FILM COATED ORAL at 04:41

## 2023-01-01 RX ADMIN — METHIMAZOLE 5 MG: 5 TABLET ORAL at 05:12

## 2023-01-01 RX ADMIN — ACETAMINOPHEN 1000 MG: 500 TABLET ORAL at 04:41

## 2023-01-01 RX ADMIN — HYDROCODONE BITARTRATE AND ACETAMINOPHEN 1 TABLET: 5; 325 TABLET ORAL at 11:41

## 2023-01-01 RX ADMIN — APIXABAN 5 MG: 5 TABLET, FILM COATED ORAL at 04:40

## 2023-01-01 RX ADMIN — BRIMONIDINE TARTRATE AND TIMOLOL MALEATE 1 DROP: 2; 5 SOLUTION OPHTHALMIC at 05:12

## 2023-01-01 RX ADMIN — HYDROCODONE BITARTRATE AND ACETAMINOPHEN 1 TABLET: 5; 325 TABLET ORAL at 18:45

## 2023-01-01 RX ADMIN — AZITHROMYCIN 250 MG: 250 TABLET, FILM COATED ORAL at 04:40

## 2023-01-01 RX ADMIN — METHIMAZOLE 5 MG: 5 TABLET ORAL at 05:21

## 2023-01-01 RX ADMIN — TIMOLOL MALEATE 1 DROP: 5 SOLUTION/ DROPS OPHTHALMIC at 17:34

## 2023-01-01 RX ADMIN — OXYCODONE HYDROCHLORIDE 10 MG: 10 TABLET ORAL at 08:55

## 2023-01-01 RX ADMIN — ONDANSETRON 4 MG: 4 TABLET, ORALLY DISINTEGRATING ORAL at 09:49

## 2023-01-01 RX ADMIN — ALBUTEROL SULFATE 2.5 MG: 2.5 SOLUTION RESPIRATORY (INHALATION) at 13:53

## 2023-01-01 RX ADMIN — AMOXICILLIN AND CLAVULANATE POTASSIUM 1 TABLET: 875; 125 TABLET, FILM COATED ORAL at 00:13

## 2023-01-01 RX ADMIN — TIOTROPIUM BROMIDE INHALATION SPRAY 5 MCG: 3.12 SPRAY, METERED RESPIRATORY (INHALATION) at 07:07

## 2023-01-01 RX ADMIN — METHIMAZOLE 5 MG: 5 TABLET ORAL at 11:11

## 2023-01-01 RX ADMIN — NYSTATIN: 100000 POWDER TOPICAL at 04:40

## 2023-01-01 RX ADMIN — METHIMAZOLE 5 MG: 5 TABLET ORAL at 04:54

## 2023-01-01 RX ADMIN — OXYCODONE HYDROCHLORIDE 10 MG: 10 TABLET ORAL at 01:28

## 2023-01-01 RX ADMIN — BRIMONIDINE TARTRATE 1 DROP: 2 SOLUTION OPHTHALMIC at 22:10

## 2023-01-01 RX ADMIN — ZOLPIDEM TARTRATE 5 MG: 5 TABLET ORAL at 22:39

## 2023-01-01 RX ADMIN — ONDANSETRON 4 MG: 2 INJECTION INTRAMUSCULAR; INTRAVENOUS at 09:44

## 2023-01-01 RX ADMIN — CINACALCET HYDROCHLORIDE 30 MG: 30 TABLET, FILM COATED ORAL at 08:45

## 2023-01-01 RX ADMIN — OXYCODONE 5 MG: 5 TABLET ORAL at 09:44

## 2023-01-01 RX ADMIN — TIOTROPIUM BROMIDE INHALATION SPRAY 5 MCG: 3.12 SPRAY, METERED RESPIRATORY (INHALATION) at 05:38

## 2023-01-01 RX ADMIN — SODIUM CHLORIDE 1000 ML: 9 INJECTION, SOLUTION INTRAVENOUS at 16:21

## 2023-01-01 RX ADMIN — SODIUM BICARBONATE 650 MG TABLET 650 MG: at 09:46

## 2023-01-01 RX ADMIN — Medication 5 MG: at 21:45

## 2023-01-01 RX ADMIN — MOMETASONE FUROATE AND FORMOTEROL FUMARATE DIHYDRATE 2 PUFF: 200; 5 AEROSOL RESPIRATORY (INHALATION) at 08:22

## 2023-01-01 RX ADMIN — ATORVASTATIN CALCIUM 20 MG: 10 TABLET, FILM COATED ORAL at 20:50

## 2023-01-01 RX ADMIN — NYSTATIN: 100000 POWDER TOPICAL at 05:24

## 2023-01-01 RX ADMIN — METHIMAZOLE 5 MG: 5 TABLET ORAL at 05:34

## 2023-01-01 RX ADMIN — BRIMONIDINE TARTRATE AND TIMOLOL MALEATE 1 DROP: 2; 5 SOLUTION OPHTHALMIC at 05:54

## 2023-01-01 RX ADMIN — METHIMAZOLE 5 MG: 5 TABLET ORAL at 06:02

## 2023-01-01 RX ADMIN — ALLOPURINOL 200 MG: 100 TABLET ORAL at 05:12

## 2023-01-01 RX ADMIN — POTASSIUM BICARBONATE 25 MEQ: 978 TABLET, EFFERVESCENT ORAL at 05:15

## 2023-01-01 RX ADMIN — MOMETASONE FUROATE AND FORMOTEROL FUMARATE DIHYDRATE 2 PUFF: 200; 5 AEROSOL RESPIRATORY (INHALATION) at 19:46

## 2023-01-01 RX ADMIN — TIMOLOL MALEATE 1 DROP: 5 SOLUTION OPHTHALMIC at 19:51

## 2023-01-01 RX ADMIN — CINACALCET HYDROCHLORIDE 30 MG: 30 TABLET, FILM COATED ORAL at 05:51

## 2023-01-01 RX ADMIN — ONDANSETRON 4 MG: 2 INJECTION INTRAMUSCULAR; INTRAVENOUS at 15:38

## 2023-01-01 RX ADMIN — SIMVASTATIN 40 MG: 20 TABLET, FILM COATED ORAL at 17:12

## 2023-01-01 RX ADMIN — ZOLPIDEM TARTRATE 5 MG: 5 TABLET ORAL at 23:34

## 2023-01-01 RX ADMIN — PIPERACILLIN AND TAZOBACTAM 3.38 G: 3; .375 INJECTION, POWDER, LYOPHILIZED, FOR SOLUTION INTRAVENOUS; PARENTERAL at 17:38

## 2023-01-01 RX ADMIN — ACETAMINOPHEN 1000 MG: 500 TABLET ORAL at 17:52

## 2023-01-01 RX ADMIN — METAXALONE 400 MG: 800 TABLET ORAL at 13:56

## 2023-01-01 RX ADMIN — SODIUM CHLORIDE: 9 INJECTION, SOLUTION INTRAVENOUS at 16:16

## 2023-01-01 RX ADMIN — PROCHLORPERAZINE EDISYLATE 10 MG: 5 INJECTION INTRAMUSCULAR; INTRAVENOUS at 19:37

## 2023-01-01 RX ADMIN — IOHEXOL 100 ML: 350 INJECTION, SOLUTION INTRAVENOUS at 09:20

## 2023-01-01 RX ADMIN — TIOTROPIUM BROMIDE INHALATION SPRAY 5 MCG: 3.12 SPRAY, METERED RESPIRATORY (INHALATION) at 08:09

## 2023-01-01 RX ADMIN — PIPERACILLIN AND TAZOBACTAM 3.38 G: 3; .375 INJECTION, POWDER, LYOPHILIZED, FOR SOLUTION INTRAVENOUS; PARENTERAL at 17:47

## 2023-01-01 RX ADMIN — CINACALCET HYDROCHLORIDE 30 MG: 30 TABLET, FILM COATED ORAL at 16:55

## 2023-01-01 RX ADMIN — PANTOPRAZOLE SODIUM 40 MG: 40 INJECTION, POWDER, FOR SOLUTION INTRAVENOUS at 05:27

## 2023-01-01 RX ADMIN — MOMETASONE FUROATE AND FORMOTEROL FUMARATE DIHYDRATE 2 PUFF: 200; 5 AEROSOL RESPIRATORY (INHALATION) at 07:06

## 2023-01-01 RX ADMIN — BUDESONIDE AND FORMOTEROL FUMARATE DIHYDRATE 2 PUFF: 160; 4.5 AEROSOL RESPIRATORY (INHALATION) at 19:50

## 2023-01-01 RX ADMIN — ONDANSETRON 4 MG: 2 INJECTION INTRAMUSCULAR; INTRAVENOUS at 06:39

## 2023-01-01 RX ADMIN — BRIMONIDINE TARTRATE AND TIMOLOL MALEATE 1 DROP: 2; 5 SOLUTION OPHTHALMIC at 05:53

## 2023-01-01 RX ADMIN — PIPERACILLIN AND TAZOBACTAM 3.38 G: 3; .375 INJECTION, POWDER, LYOPHILIZED, FOR SOLUTION INTRAVENOUS; PARENTERAL at 22:07

## 2023-01-01 RX ADMIN — HYDROCODONE BITARTRATE AND HOMATROPINE METHYLBROMIDE 5 ML: 5; 1.5 SOLUTION ORAL at 13:21

## 2023-01-01 RX ADMIN — BUDESONIDE AND FORMOTEROL FUMARATE DIHYDRATE 2 PUFF: 160; 4.5 AEROSOL RESPIRATORY (INHALATION) at 06:53

## 2023-01-01 RX ADMIN — IPRATROPIUM BROMIDE AND ALBUTEROL SULFATE 3 ML: .5; 3 SOLUTION RESPIRATORY (INHALATION) at 07:27

## 2023-01-01 RX ADMIN — ACETAMINOPHEN 1000 MG: 500 TABLET ORAL at 13:57

## 2023-01-01 RX ADMIN — BUDESONIDE AND FORMOTEROL FUMARATE DIHYDRATE 2 PUFF: 160; 4.5 AEROSOL RESPIRATORY (INHALATION) at 19:48

## 2023-01-01 RX ADMIN — RIVAROXABAN 20 MG: 20 TABLET, FILM COATED ORAL at 17:39

## 2023-01-01 RX ADMIN — HYDROCODONE BITARTRATE AND ACETAMINOPHEN 1 TABLET: 5; 325 TABLET ORAL at 21:27

## 2023-01-01 RX ADMIN — BUDESONIDE AND FORMOTEROL FUMARATE DIHYDRATE 2 PUFF: 160; 4.5 AEROSOL RESPIRATORY (INHALATION) at 08:16

## 2023-01-01 RX ADMIN — ONDANSETRON 4 MG: 2 INJECTION INTRAMUSCULAR; INTRAVENOUS at 20:15

## 2023-01-01 RX ADMIN — ONDANSETRON 4 MG: 4 TABLET, ORALLY DISINTEGRATING ORAL at 05:34

## 2023-01-01 SDOH — ECONOMIC STABILITY: HOUSING INSECURITY: EVIDENCE OF SMOKING MATERIAL: 0

## 2023-01-01 ASSESSMENT — ENCOUNTER SYMPTOMS
DESCRIPTION OF MEMORY LOSS: SHORT TERM
COUGH: 1
SHORTNESS OF BREATH: 1
NERVOUS/ANXIOUS: 0
COUGH: 0
MUSCLE WEAKNESS: 1
BOWEL PATTERN NORMAL: 1
FEVER: 0
DEPRESSION: 1
CLAUDICATION: 0
ABDOMINAL PAIN: 1
FOCAL WEAKNESS: 0
LOWEST PAIN SEVERITY IN PAST 24 HOURS: 0/10
HYPOTENSION: 1
LAST BOWEL MOVEMENT: 66827
CONSTIPATION: 0
BACK PAIN: 1
COUGH CHARACTERISTICS: MOIST
COUGH CHARACTERISTICS: NON-PRODUCTIVE
COUGH: 0
DIAPHORESIS: 0
NECK PAIN: 0
BLOOD IN STOOL: 0
COUGH: 0
SPUTUM PRODUCTION: 0
LOSS OF CONSCIOUSNESS: 0
MUSCLE WEAKNESS: 1
SENSORY CHANGE: 0
DIARRHEA: 0
FLANK PAIN: 0
VOMITING: 0
ABDOMINAL PAIN: 0
LOSS OF CONSCIOUSNESS: 0
HEARTBURN: 0
FORGETFULNESS: 1
NAUSEA: 0
CONSTITUTIONAL NEGATIVE: 1
PAIN SEVERITY GOAL: 0/10
FOCAL WEAKNESS: 0
PERSON REPORTING PAIN: DIRECT OBSERVATION
FORGETFULNESS: 1
FALLS: 0
EYE DISCHARGE: 0
FOCAL WEAKNESS: 0
DEPRESSION: 0
DIAPHORESIS: 0
LOWEST PAIN SEVERITY IN PAST 24 HOURS: 6/10
NAUSEA: 1
SORE THROAT: 0
DYSPNEA ACTIVITY LEVEL: AT REST
MYALGIAS: 1
SHORTNESS OF BREATH: 1
NERVOUS/ANXIOUS: 0
PERSON REPORTING PAIN: PATIENT
BOWEL PATTERN NORMAL: 1
SHORTNESS OF BREATH: 1
HEARTBURN: 0
EYE REDNESS: 0
PALPITATIONS: 0
CONSTIPATION: 0
ABDOMINAL PAIN: 0
COUGH CHARACTERISTICS: NON-PRODUCTIVE
DEPRESSION: 1
FATIGUE: 1
CONSTIPATION: 0
DYSPNEA ACTIVITY LEVEL: AT REST
HEADACHES: 0
PAIN: 1
MYALGIAS: 0
FATIGUE: 1
DIARRHEA: 1
ABDOMINAL PAIN: 1
DIARRHEA: 0
PERSON REPORTING PAIN: PATIENT
LOSS OF CONSCIOUSNESS: 0
SHORTNESS OF BREATH: 1
FATIGUES EASILY: 1
WHEEZING: 0
CONSTIPATION: 0
HYPOTENSION: 1
FORGETFULNESS: 1
CONSTIPATION: 0
COUGH CHARACTERISTICS: NON-PRODUCTIVE
BLURRED VISION: 0
LIMITED RANGE OF MOTION: 1
ABDOMINAL PAIN: 0
HALLUCINATIONS: 0
LAST BOWEL MOVEMENT: 66748
GASTROINTESTINAL NEGATIVE: 1
VOMITING: 0
PHOTOPHOBIA: 0
PAIN LOCATION - PAIN SEVERITY: 10/10
GASTROINTESTINAL NEGATIVE: 1
MEMORY LOSS: 0
SHORTNESS OF BREATH: 1
LIMITED RANGE OF MOTION: 1
PHOTOPHOBIA: 0
DIARRHEA: 1
SLEEP QUALITY: ADEQUATE
DIARRHEA: 1
SHORTNESS OF BREATH: 1
SHORTNESS OF BREATH: 0
MUSCLE WEAKNESS: 1
LAST BOWEL MOVEMENT: 66767
STOOL FREQUENCY: LESS THAN DAILY
ABDOMINAL PAIN: 1
HEADACHES: 0
DIZZINESS: 0
BLOOD IN STOOL: 0
SEIZURES: 0
FATIGUE: 1
VOMITING: 1
NERVOUS/ANXIOUS: 1
INSOMNIA: 1
CHILLS: 0
RESPIRATORY PAIN: 1
BOWEL PATTERN NORMAL: 1
SORE THROAT: 0
LAST BOWEL MOVEMENT: 66837
EYES NEGATIVE: 1
COUGH: 1
DYSPNEA ON EXERTION: 1
SORE THROAT: 0
BRUISES/BLEEDS EASILY: 0
PAIN LOCATION - PAIN FREQUENCY: CONSTANT
NERVOUS/ANXIOUS: 1
SHORTNESS OF BREATH: 1
COUGH: 1
FORGETFULNESS: 1
STOOL FREQUENCY: LESS THAN DAILY
FEVER: 0
BLURRED VISION: 0
PHOTOPHOBIA: 0
SPUTUM PRODUCTION: 0
HEARTBURN: 0
COUGH: 0
BOWEL PATTERN NORMAL: 1
HEADACHES: 0
NERVOUS/ANXIOUS: 0
FEVER: 0
MYALGIAS: 0
FATIGUE: 1
MYALGIAS: 0
SENSORY CHANGE: 0
ABDOMINAL PAIN: 0
SHORTNESS OF BREATH: 1
LOSS OF CONSCIOUSNESS: 0
INSOMNIA: 0
STOOL FREQUENCY: LESS THAN DAILY
COUGH: 0
SHORTNESS OF BREATH: 1
SHORTNESS OF BREATH: 1
CHILLS: 0
DESCRIPTION OF MEMORY LOSS: SHORT TERM
BRUISES/BLEEDS EASILY: 0
DESCRIPTION OF MEMORY LOSS: SHORT TERM
NAUSEA: 0
MUSCLE WEAKNESS: 1
WHEEZING: 0
PERSON REPORTING PAIN: PATIENT
COUGH: 0
CONSTIPATION: 0
FORGETFULNESS: 1
DYSPNEA ACTIVITY LEVEL: AT REST
ORTHOPNEA: 0
BOWEL PATTERN NORMAL: 1
BLURRED VISION: 0
BOWEL INCONTINENCE: 1
LAST BOWEL MOVEMENT: 66762
FEVER: 0
DIZZINESS: 0
BLURRED VISION: 0
NAUSEA: 0
DESCRIPTION OF MEMORY LOSS: SHORT TERM
DIARRHEA: 0
SLEEP QUALITY: ADEQUATE
HEARTBURN: 0
DRY SKIN: 1
WEIGHT LOSS: 0
PERSON REPORTING PAIN: PATIENT
DYSPNEA ACTIVITY LEVEL: AT REST
NAUSEA: 0
PAIN: 1
TREMORS: 0
CLAUDICATION: 0
CHILLS: 0
SHORTNESS OF BREATH: 0
DESCRIPTION OF MEMORY LOSS: SHORT TERM
FORGETFULNESS: 1
FEVER: 0
STOOL FREQUENCY: LESS THAN DAILY
WHEEZING: 0
DRY SKIN: 1
DEPRESSION: 1
SLEEP QUALITY: ADEQUATE
VOMITING: 0
PAIN: 1
SLEEP QUALITY: ADEQUATE
PERSON REPORTING PAIN: DIRECT OBSERVATION
LAST BOWEL MOVEMENT: 66775
SPEECH CHANGE: 0
MUSCLE WEAKNESS: 1
STOOL FREQUENCY: LESS THAN DAILY
DIAPHORESIS: 0
STRIDOR: 0
LAST BOWEL MOVEMENT: 66835
FEVER: 0
BACK PAIN: 1
SEIZURES: 0
EYES NEGATIVE: 1
MYALGIAS: 1
TREMORS: 0
PERSON REPORTING PAIN: PATIENT
TINGLING: 0
SPUTUM PRODUCTION: 0
HEADACHES: 0
MUSCULOSKELETAL NEGATIVE: 1
INSOMNIA: 0
COUGH: 1
NERVOUS/ANXIOUS: 1
DIAPHORESIS: 0
SHORTNESS OF BREATH: 1
DIZZINESS: 0
DIZZINESS: 0
DIARRHEA: 1
DESCRIPTION OF MEMORY LOSS: SHORT TERM
PERSON REPORTING PAIN: PATIENT
WHEEZING: 0
CARDIOVASCULAR NEGATIVE: 1
SENSORY CHANGE: 0
CLAUDICATION: 0
RESPIRATORY PAIN: 1
COUGH: 1
CLAUDICATION: 0
SORE THROAT: 0
DEPRESSION: 1
NERVOUS/ANXIOUS: 1
CHILLS: 0
COUGH CHARACTERISTICS: MOIST
SHORTNESS OF BREATH: 1
INSOMNIA: 1
TINGLING: 0
LAST BOWEL MOVEMENT: 66804
ABDOMINAL PAIN: 0
SHORTNESS OF BREATH: 1
SPUTUM PRODUCTION: 0
EYE PAIN: 0
HEADACHES: 1
ROS GI COMMENTS: ANOREXIA
EYES NEGATIVE: 1
FEVER: 0
INSOMNIA: 0
FEVER: 0
NERVOUS/ANXIOUS: 0
SENSORY CHANGE: 0
PALPITATIONS: 0
BRUISES/BLEEDS EASILY: 0
HEADACHES: 0
TREMORS: 0
STOOL FREQUENCY: LESS THAN DAILY
NAUSEA: 0
SHORTNESS OF BREATH: 1
CHILLS: 0
BLOOD IN STOOL: 0
LIMITED RANGE OF MOTION: 1
DEPRESSION: 0
DYSPNEA ACTIVITY LEVEL: AT REST
FORGETFULNESS: 1
DYSPNEA ACTIVITY LEVEL: AT REST
MYALGIAS: 1
SPEECH CHANGE: 0
INSOMNIA: 1
DESCRIPTION OF MEMORY LOSS: SHORT TERM
SHORTNESS OF BREATH: 1
FATIGUE: 1
CHILLS: 0
COUGH: 0
COUGH CHARACTERISTICS: MOIST
WEAKNESS: 1
PAIN SEVERITY GOAL: 2/10
STOOL FREQUENCY: LESS THAN DAILY
CLAUDICATION: 0
ORTHOPNEA: 0
DIZZINESS: 1
CLAUDICATION: 0
PHOTOPHOBIA: 0
NAUSEA: 0
EYES NEGATIVE: 1
ORTHOPNEA: 0
BLOOD IN STOOL: 0
CHILLS: 1
FATIGUES EASILY: 1
FATIGUES EASILY: 1
INSOMNIA: 1
MUSCLE WEAKNESS: 1
COUGH: 1
SPUTUM PRODUCTION: 0
FEVER: 0
HEARTBURN: 0
HEADACHES: 1
DYSPNEA ACTIVITY LEVEL: AT REST
DEPRESSION: 1
DENIES PAIN: 1
DEPRESSED MOOD: 1
POLYDIPSIA: 0
BLOOD IN STOOL: 0
LOWER EXTREMITY EDEMA: 1
DEPRESSION: 1
NECK PAIN: 1
FATIGUES EASILY: 1
SPEECH CHANGE: 0
PALPITATIONS: 0
BACK PAIN: 1
HEADACHES: 0
DYSPNEA ACTIVITY LEVEL: AT REST
CLAUDICATION: 0
CHILLS: 0
PHOTOPHOBIA: 0
LAST BOWEL MOVEMENT: 66818
SINUS PAIN: 0
EYES NEGATIVE: 1
NAUSEA: 1
BLURRED VISION: 0
DOUBLE VISION: 0
DIZZINESS: 0
LAST BOWEL MOVEMENT: 66748
COUGH: 1
DIARRHEA: 1
HEARTBURN: 0
SHORTNESS OF BREATH: 1
COUGH CHARACTERISTICS: DRY
CONSTIPATION: 1
DIZZINESS: 1
FATIGUE: 1
SPEECH CHANGE: 0
FATIGUE: 1
FALLS: 0
SEIZURES: 0
PHOTOPHOBIA: 0
INSOMNIA: 1
EYES NEGATIVE: 1
SHORTNESS OF BREATH: 1
SEIZURES: 0
DIARRHEA: 0
DIARRHEA: 0
PHOTOPHOBIA: 0
VOMITING: 0
DYSPNEA ACTIVITY LEVEL: AT REST
NAUSEA: 1
HEADACHES: 1
NERVOUS/ANXIOUS: 0
DIZZINESS: 1
MUSCLE WEAKNESS: 1
COUGH CHARACTERISTICS: DRY
LOWER EXTREMITY EDEMA: 1
WEIGHT LOSS: 0
BLOOD IN STOOL: 0
CARDIOVASCULAR NEGATIVE: 1
WEAKNESS: 0
FEVER: 0
COUGH CHARACTERISTICS: NON-PRODUCTIVE
PALPITATIONS: 0
PAIN LOCATION - PAIN QUALITY: BURNING
COUGH CHARACTERISTICS: NON-PRODUCTIVE
NAUSEA: 1
WHEEZING: 0
SENSORY CHANGE: 0
TREMORS: 0
CHILLS: 0
DEPRESSION: 0
MYALGIAS: 0
BACK PAIN: 0
NAUSEA: 0
DRY SKIN: 1
SHORTNESS OF BREATH: 0
FOCAL WEAKNESS: 0
MYALGIAS: 0
STOOL FREQUENCY: LESS THAN DAILY
GASTROINTESTINAL NEGATIVE: 1
FALLS: 0
FEVER: 0
DRY SKIN: 1
DYSPNEA ACTIVITY LEVEL: AT REST
FATIGUES EASILY: 1
VOMITING: 0
DIZZINESS: 0
DOUBLE VISION: 0
DEPRESSION: 1
BOWEL PATTERN NORMAL: 1
FATIGUE: 1
CONSTIPATION: 0
BLOOD IN STOOL: 0
DIARRHEA: 0
DIAPHORESIS: 0
STOOL FREQUENCY: LESS THAN DAILY
COUGH CHARACTERISTICS: NON-PRODUCTIVE
MUSCLE WEAKNESS: 1
HEMOPTYSIS: 0
HEARTBURN: 0
DEPRESSED MOOD: 1
SEIZURES: 0
PHOTOPHOBIA: 0
INSOMNIA: 0
ABDOMINAL PAIN: 1
ABDOMINAL PAIN: 0
PALPITATIONS: 0
LIMITED RANGE OF MOTION: 1
PHOTOPHOBIA: 0
WEIGHT LOSS: 0
CARDIOVASCULAR NEGATIVE: 1
HEMOPTYSIS: 0
GASTROINTESTINAL NEGATIVE: 1
HIGHEST PAIN SEVERITY IN PAST 24 HOURS: 0/10
HIGHEST PAIN SEVERITY IN PAST 24 HOURS: 8/10
STRIDOR: 0
BLURRED VISION: 0
TREMORS: 0
CHILLS: 0
BACK PAIN: 1
SHORTNESS OF BREATH: 0
MYALGIAS: 0
INSOMNIA: 0
COUGH: 1
DOUBLE VISION: 0
DEPRESSION: 0
PSYCHIATRIC NEGATIVE: 1
PHOTOPHOBIA: 0
CONSTIPATION: 0
WHEEZING: 0
SPEECH CHANGE: 0
DRY SKIN: 1
ARTHRALGIAS: 1
BACK PAIN: 1
COUGH CHARACTERISTICS: MOIST
DYSPNEA ON EXERTION: 1
SHORTNESS OF BREATH: 1
VOMITING: 0
NERVOUS/ANXIOUS: 0
NERVOUS/ANXIOUS: 0
FATIGUE: 1
SPUTUM PRODUCTION: 0
HEMOPTYSIS: 0
LIMITED RANGE OF MOTION: 1
CARDIOVASCULAR NEGATIVE: 1
HALLUCINATIONS: 0
NERVOUS/ANXIOUS: 0
DIARRHEA: 0
DYSPNEA ACTIVITY LEVEL: AT REST
ABDOMINAL PAIN: 0
NERVOUS/ANXIOUS: 0
FATIGUES EASILY: 1
FATIGUES EASILY: 1
SPEECH CHANGE: 0
MUSCLE WEAKNESS: 1
HEADACHES: 1
ORTHOPNEA: 0
SENSORY CHANGE: 0
ABDOMINAL PAIN: 0
FEVER: 0
BLURRED VISION: 0
FATIGUE: 1
VOMITING: 0
FATIGUES EASILY: 1
WEIGHT LOSS: 1
EYES NEGATIVE: 1
WEAKNESS: 0
COUGH: 0
LIMITED RANGE OF MOTION: 1
MYALGIAS: 1
CARDIOVASCULAR NEGATIVE: 1
DESCRIPTION OF MEMORY LOSS: SHORT TERM
VOMITING: 0
SORE THROAT: 0
INSOMNIA: 0
CLAUDICATION: 0
PSYCHIATRIC NEGATIVE: 1
SINUS PAIN: 0
DRY SKIN: 1
VOMITING: 0
DIARRHEA: 0
CLAUDICATION: 0
CONSTIPATION: 0
NERVOUS/ANXIOUS: 0
WEAKNESS: 0
CLAUDICATION: 0
LOSS OF CONSCIOUSNESS: 0
FEVER: 0
LOWEST PAIN SEVERITY IN PAST 24 HOURS: 2/10
LIMITED RANGE OF MOTION: 1
SHORTNESS OF BREATH: 0
LAST BOWEL MOVEMENT: 66776
ABDOMINAL PAIN: 1
EYE PAIN: 0
PAIN: 1
DIZZINESS: 1
CONSTIPATION: 1
VOMITING: 0
CARDIOVASCULAR NEGATIVE: 1
MUSCLE WEAKNESS: 1
HEARTBURN: 0
SPUTUM PRODUCTION: 0
CLAUDICATION: 0
WEAKNESS: 0
HEARTBURN: 0
EYES NEGATIVE: 1
DIARRHEA: 1
PAIN: 1
PND: 0
STOOL FREQUENCY: LESS THAN DAILY
BACK PAIN: 1
DIZZINESS: 1
CARDIOVASCULAR NEGATIVE: 1
COUGH CHARACTERISTICS: NON-PRODUCTIVE
DIARRHEA: 0
DIZZINESS: 0
SLEEP QUALITY: ADEQUATE
MYALGIAS: 0
CHILLS: 0
COUGH CHARACTERISTICS: MOIST
HEADACHES: 0
BLOOD IN STOOL: 0
TREMORS: 0
STOOL FREQUENCY: LESS THAN DAILY
COUGH: 0
SENSORY CHANGE: 0
ORTHOPNEA: 0
PERSON REPORTING PAIN: PATIENT
COUGH CHARACTERISTICS: NON-PRODUCTIVE
TREMORS: 0
COUGH CHARACTERISTICS: MOIST
NERVOUS/ANXIOUS: 0
STRIDOR: 0
STOOL FREQUENCY: DAILY
VOMITING: 0
WHEEZING: 0
DEPRESSION: 0
DEPRESSION: 0
SEIZURES: 0
BOWEL PATTERN NORMAL: 1
WEAKNESS: 0
DEPRESSION: 1
DIARRHEA: 1
COUGH CHARACTERISTICS: NON-PRODUCTIVE
DYSPNEA ON EXERTION: 1
INSOMNIA: 0
FATIGUE: 1
VOMITING: 0
EYES NEGATIVE: 1
BRUISES/BLEEDS EASILY: 0
PERSON REPORTING PAIN: PATIENT
SHORTNESS OF BREATH: 1
MUSCLE WEAKNESS: 1
DIAPHORESIS: 0
DYSPNEA ON EXERTION: 1
HIGHEST PAIN SEVERITY IN PAST 24 HOURS: 9/10
SHORTNESS OF BREATH: 1
TREMORS: 0
VOMITING: 0
HIGHEST PAIN SEVERITY IN PAST 24 HOURS: 10/10
ABDOMINAL PAIN: 1
BRUISES/BLEEDS EASILY: 0
ABDOMINAL PAIN: 0
FATIGUES EASILY: 1
FATIGUES EASILY: 1
BACK PAIN: 1
SENSORY CHANGE: 0
BLURRED VISION: 0
COUGH CHARACTERISTICS: NON-PRODUCTIVE
DOUBLE VISION: 0
CHILLS: 0
BRUISES/BLEEDS EASILY: 0
SHORTNESS OF BREATH: 1
DEPRESSION: 1
WEAKNESS: 0
BOWEL INCONTINENCE: 1
CHILLS: 0
EYES NEGATIVE: 1
PAIN LOCATION - PAIN DURATION: WEEK
COUGH: 0
BOWEL PATTERN NORMAL: 1
PAIN: 1
EYE DISCHARGE: 0
SENSORY CHANGE: 0
STOOL FREQUENCY: LESS THAN DAILY
DOUBLE VISION: 0
CHILLS: 0
MYALGIAS: 0
NAUSEA: 0
SLEEP QUALITY: ADEQUATE
DIZZINESS: 0
VOMITING: 0
HYPOTENSION: 1
COUGH CHARACTERISTICS: NON-PRODUCTIVE
SLEEP QUALITY: ADEQUATE
SLEEP QUALITY: ADEQUATE
DESCRIPTION OF MEMORY LOSS: SHORT TERM
LIMITED RANGE OF MOTION: 1
FEVER: 0
VOMITING: 0
DESCRIPTION OF MEMORY LOSS: SHORT TERM
DESCRIPTION OF MEMORY LOSS: SHORT TERM
COUGH: 1
LAST BOWEL MOVEMENT: 66752
BACK PAIN: 1
CLAUDICATION: 0
DIARRHEA: 1
BACK PAIN: 1
PHOTOPHOBIA: 0
BACK PAIN: 1
ABDOMINAL PAIN: 0
COUGH: 1
LIMITED RANGE OF MOTION: 1
SHORTNESS OF BREATH: 1
WHEEZING: 0
GASTROINTESTINAL NEGATIVE: 1
WHEEZING: 0
DIARRHEA: 0
SHORTNESS OF BREATH: 0
PERSON REPORTING PAIN: PATIENT
FATIGUES EASILY: 1
SPEECH CHANGE: 0
DYSPNEA ACTIVITY LEVEL: AT REST
ABDOMINAL PAIN: 0
NAUSEA: 0
BOWEL INCONTINENCE: 1
BRUISES/BLEEDS EASILY: 0
DIZZINESS: 0
INSOMNIA: 1
DOUBLE VISION: 0
BLURRED VISION: 0
DEPRESSION: 0
BLURRED VISION: 0
FEVER: 0
NECK PAIN: 0
BACK PAIN: 1
MUSCLE WEAKNESS: 1
LOSS OF CONSCIOUSNESS: 0
BACK PAIN: 1
LIMITED RANGE OF MOTION: 1
PAIN: 1
SORE THROAT: 0
DEPRESSION: 1
INSOMNIA: 1
DYSPNEA ON EXERTION: 1
PERSON REPORTING PAIN: PATIENT
NECK PAIN: 1
HEARTBURN: 0
FALLS: 0
SPEECH CHANGE: 0
STRIDOR: 0
HEARTBURN: 0
COUGH: 1
VOMITING: 0
WEIGHT LOSS: 0
CHILLS: 0
COUGH CHARACTERISTICS: NON-PRODUCTIVE
NERVOUS/ANXIOUS: 0
DIARRHEA: 1
CONSTIPATION: 0
NAUSEA: 1
CHILLS: 0
COUGH: 1
FATIGUES EASILY: 1
NAUSEA: 1
FORGETFULNESS: 1
INSOMNIA: 0
VOMITING: 0
RESPIRATORY NEGATIVE: 1
SLEEP QUALITY: ADEQUATE
SPEECH CHANGE: 0
LIMITED RANGE OF MOTION: 1
WEIGHT LOSS: 0
ABDOMINAL PAIN: 0
DIZZINESS: 1
DYSPNEA ON EXERTION: 1
DIARRHEA: 0
DYSPNEA ON EXERTION: 1
LOSS OF CONSCIOUSNESS: 0
INSOMNIA: 1
SHORTNESS OF BREATH: 1
MYALGIAS: 1
SORE THROAT: 0
FOCAL WEAKNESS: 1
DRY SKIN: 1
SLEEP QUALITY: ADEQUATE
CONSTIPATION: 0
DIAPHORESIS: 0
FATIGUES EASILY: 1
FATIGUE: 1
COUGH CHARACTERISTICS: DRY
MUSCLE WEAKNESS: 1
LIMITED RANGE OF MOTION: 1
ABDOMINAL PAIN: 0
SLEEP QUALITY: ADEQUATE
FORGETFULNESS: 1
MYALGIAS: 1
PND: 0
ORTHOPNEA: 0
DENIES PAIN: 1
COUGH: 0
FATIGUE: 1
FORGETFULNESS: 1
COUGH CHARACTERISTICS: MOIST
HEADACHES: 1
INSOMNIA: 1
DIZZINESS: 1
STRIDOR: 0
FATIGUES EASILY: 1
TREMORS: 0
STOOL FREQUENCY: LESS THAN DAILY
EYES NEGATIVE: 1
NAUSEA: 1
FOCAL WEAKNESS: 0
EYES NEGATIVE: 1
STRIDOR: 0
LAST BOWEL MOVEMENT: 66798
HEMOPTYSIS: 0
HEARTBURN: 0
SPUTUM PRODUCTION: 1
PAIN SEVERITY GOAL: 4/10
BLOOD IN STOOL: 0
CONSTIPATION: 0
DYSPNEA ACTIVITY LEVEL: AT REST
SENSORY CHANGE: 0
SPEECH CHANGE: 0
DESCRIPTION OF MEMORY LOSS: SHORT TERM
ABDOMINAL PAIN: 0
HEMOPTYSIS: 0
NECK PAIN: 0
PERSON REPORTING PAIN: PATIENT
AGITATION: 1
DYSPNEA ACTIVITY LEVEL: AT REST
COUGH CHARACTERISTICS: MOIST
VOMITING: 0
DIZZINESS: 0
SENSORY CHANGE: 0
NERVOUS/ANXIOUS: 1
VOMITING: 1
HEARTBURN: 0
SLEEP QUALITY: ADEQUATE
FEVER: 0
WHEEZING: 0
SPEECH CHANGE: 0
SHORTNESS OF BREATH: 1
PERSON REPORTING PAIN: DIRECT OBSERVATION
CONSTIPATION: 0
COUGH: 0
COUGH: 0
SENSORY CHANGE: 0
SENSORY CHANGE: 0
CONSTIPATION: 0
BLOOD IN STOOL: 0
LIMITED RANGE OF MOTION: 1
COUGH: 0
SHORTNESS OF BREATH: 1
CONSTIPATION: 0
BACK PAIN: 1
FATIGUES EASILY: 1
FORGETFULNESS: 1
EYE PAIN: 0
EYE REDNESS: 0
PAIN: 1
SHORTNESS OF BREATH: 0
EYE DISCHARGE: 0
CONSTIPATION: 0
LOWER EXTREMITY EDEMA: 1
BRUISES/BLEEDS EASILY: 0
SINUS PAIN: 0
SPUTUM PRODUCTION: 0
BACK PAIN: 1
HEADACHES: 0
COUGH CHARACTERISTICS: MOIST
CARDIOVASCULAR NEGATIVE: 1
CONSTIPATION: 0
WEAKNESS: 0
SLEEP QUALITY: ADEQUATE
LIMITED RANGE OF MOTION: 1
BRUISES/BLEEDS EASILY: 0
BRUISES/BLEEDS EASILY: 0
FLANK PAIN: 0
SLEEP QUALITY: FAIR
HEADACHES: 1
ABDOMINAL PAIN: 0
SPEECH CHANGE: 0
HEADACHES: 1
SHORTNESS OF BREATH: 1
DYSPNEA ON EXERTION: 1
BLURRED VISION: 0
CHILLS: 0
FATIGUES EASILY: 1
LAST BOWEL MOVEMENT: 66825
COUGH: 1
HEARTBURN: 0
FOCAL WEAKNESS: 0
COUGH CHARACTERISTICS: NON-PRODUCTIVE
NAUSEA: 0
DEPRESSION: 0
ABDOMINAL PAIN: 0
BLURRED VISION: 0
PERSON REPORTING PAIN: PATIENT
DEPRESSION: 1
PERSON REPORTING PAIN: PATIENT
LAST BOWEL MOVEMENT: 66834
CONSTIPATION: 0
VOMITING: 1
HEADACHES: 1
SHORTNESS OF BREATH: 0
PALPITATIONS: 0
DYSPNEA ACTIVITY LEVEL: AT REST
STOOL FREQUENCY: LESS THAN DAILY
COUGH: 1
SENSORY CHANGE: 0
COUGH: 0
DYSPNEA ON EXERTION: 1
SINUS PAIN: 0
ABDOMINAL PAIN: 0
PHOTOPHOBIA: 0
COUGH: 1
CHILLS: 0
SHORTNESS OF BREATH: 0
BLOOD IN STOOL: 0
MYALGIAS: 0
COUGH: 0
MYALGIAS: 0
BRUISES/BLEEDS EASILY: 0
SUBJECTIVE PAIN PROGRESSION: UNCHANGED
BLURRED VISION: 0
LIMITED RANGE OF MOTION: 1
DOUBLE VISION: 0
WEIGHT LOSS: 1
ABDOMINAL PAIN: 0
DIZZINESS: 0
FEVER: 0
PAIN: 1
WEIGHT LOSS: 0
FEVER: 0
FEVER: 0
COUGH: 0
DYSPNEA ACTIVITY LEVEL: AT REST
COUGH: 0
DIARRHEA: 0
POLYDIPSIA: 0
HEADACHES: 1
PHOTOPHOBIA: 0
DIZZINESS: 0
SHORTNESS OF BREATH: 1
WEAKNESS: 1
LOSS OF CONSCIOUSNESS: 0
LOSS OF CONSCIOUSNESS: 0
DESCRIPTION OF MEMORY LOSS: SHORT TERM
BLOOD IN STOOL: 0
HEADACHES: 0
FOCAL WEAKNESS: 0
CONTUSION: 1
STOOL FREQUENCY: LESS THAN DAILY
WEAKNESS: 0
CHILLS: 0
FATIGUES EASILY: 1
PAIN: 1
HEADACHES: 1
MYALGIAS: 1
DIAPHORESIS: 0
PHOTOPHOBIA: 0
CLAUDICATION: 0
DOUBLE VISION: 0
STOOL FREQUENCY: LESS THAN DAILY
LOWEST PAIN SEVERITY IN PAST 24 HOURS: 4/10
SHORTNESS OF BREATH: 1
COUGH: 1
FOCAL WEAKNESS: 0
PAIN: 1
POLYDIPSIA: 0
NAUSEA: 1
SHORTNESS OF BREATH: 0
PERSON REPORTING PAIN: PATIENT
FORGETFULNESS: 1
VOMITING: 0
ORTHOPNEA: 0
WEAKNESS: 0
TINGLING: 0
PERSON REPORTING PAIN: PATIENT
COUGH: 0
SEIZURES: 0
GASTROINTESTINAL NEGATIVE: 1
FOCAL WEAKNESS: 0
PERSON REPORTING PAIN: PATIENT
PALPITATIONS: 0
MUSCLE WEAKNESS: 1
SEIZURES: 0
PAIN: 1
HEARTBURN: 0
DIZZINESS: 0
STRIDOR: 0
DIARRHEA: 0
BLURRED VISION: 0
HEARTBURN: 0
MYALGIAS: 1
BLURRED VISION: 0
HEARTBURN: 0
DOUBLE VISION: 0
SHORTNESS OF BREATH: 1
SENSORY CHANGE: 0
CHILLS: 0
NECK PAIN: 0
DIARRHEA: 1
SLEEP QUALITY: FAIR
DEPRESSION: 0
COUGH CHARACTERISTICS: MOIST
FEVER: 0
HEADACHES: 0
FEVER: 0
DESCRIPTION OF MEMORY LOSS: SHORT TERM
COUGH: 0
FEVER: 0
MUSCLE WEAKNESS: 1
FORGETFULNESS: 1
DYSPNEA ACTIVITY LEVEL: AT REST
BLURRED VISION: 0
PALPITATIONS: 0
DIARRHEA: 1
DIZZINESS: 0
DIARRHEA: 0
DYSPNEA ACTIVITY LEVEL: AT REST
DYSPNEA ON EXERTION: 1
COUGH: 1
FATIGUE: 1
COUGH: 1
PHOTOPHOBIA: 0
HEADACHES: 0
LOWER EXTREMITY EDEMA: 1
LOWER EXTREMITY EDEMA: 1
SHORTNESS OF BREATH: 1
HEADACHES: 1
FALLS: 0
SHORTNESS OF BREATH: 1
LOSS OF CONSCIOUSNESS: 0
NECK PAIN: 1
DIZZINESS: 0
LOWER EXTREMITY EDEMA: 1
COUGH: 0
FALLS: 0
INSOMNIA: 0
STRIDOR: 0
DIAPHORESIS: 0
FOCAL WEAKNESS: 0
FATIGUES EASILY: 1
FEVER: 0
DIZZINESS: 1
SENSORY CHANGE: 0
DENIES PAIN: 1
LAST BOWEL MOVEMENT: 66791
CHILLS: 0
CHILLS: 0
CONSTIPATION: 1
LOWER EXTREMITY EDEMA: 1
SPEECH CHANGE: 0
EYE DISCHARGE: 0
RESPIRATORY PAIN: 1
ABDOMINAL PAIN: 0
VOMITING: 1
LAST BOWEL MOVEMENT: 66794
WEAKNESS: 0
BLURRED VISION: 0
DEPRESSION: 0
BLURRED VISION: 0
FLANK PAIN: 0
SENSORY CHANGE: 0
PAIN LOCATION: SHOULDERS
CLAUDICATION: 0
PAIN LOCATION - PAIN SEVERITY: 7/10
SLEEP QUALITY: ADEQUATE
HEARTBURN: 0
DEPRESSION: 1
PAIN: 1
VOMITING: 0
VOMITING: 1
BRUISES/BLEEDS EASILY: 0
DIZZINESS: 0
SHORTNESS OF BREATH: 1
FATIGUE: 1
STRIDOR: 0
DENIES PAIN: 1
SUBJECTIVE PAIN PROGRESSION: WAXING AND WANING
BACK PAIN: 1
DIARRHEA: 0
NERVOUS/ANXIOUS: 0
CHILLS: 0
HEARTBURN: 0
STOOL FREQUENCY: LESS THAN DAILY
SEIZURES: 0
FATIGUE: 1
VOMITING: 0
PHOTOPHOBIA: 0
VOMITING: 1
HEADACHES: 0
BLURRED VISION: 0
SLEEP QUALITY: ADEQUATE
WEAKNESS: 0
CONSTIPATION: 0
FATIGUE: 1
COUGH: 1
PAIN: 1
VOMITING: 0
EYES NEGATIVE: 1
FLANK PAIN: 0
ORTHOPNEA: 0
COUGH CHARACTERISTICS: MOIST
SHORTNESS OF BREATH: 0
ABDOMINAL PAIN: 1
LIMITED RANGE OF MOTION: 1
MUSCLE WEAKNESS: 1
PAIN: 1
SHORTNESS OF BREATH: 1
EYE REDNESS: 0
FEVER: 0
COUGH CHARACTERISTICS: MOIST
EYE DISCHARGE: 0
DIARRHEA: 0
PERSON REPORTING PAIN: PATIENT
BLOOD IN STOOL: 0
HEADACHES: 1
NECK PAIN: 1
PALPITATIONS: 0
DIAPHORESIS: 0
SPEECH CHANGE: 0
WEAKNESS: 0
MYALGIAS: 0
SHORTNESS OF BREATH: 1
WEAKNESS: 0
CONSTIPATION: 1
PAIN SEVERITY GOAL: 2/10
CLAUDICATION: 0
MYALGIAS: 0
COUGH: 0
PALPITATIONS: 0
DESCRIPTION OF MEMORY LOSS: SHORT TERM
COUGH CHARACTERISTICS: NON-PRODUCTIVE
STOOL FREQUENCY: LESS THAN DAILY
SPUTUM PRODUCTION: 0
HEARTBURN: 0
CONSTIPATION: 1
MUSCLE WEAKNESS: 1
DESCRIPTION OF MEMORY LOSS: SHORT TERM
SORE THROAT: 0
CHILLS: 0
VOMITING: 0
PHOTOPHOBIA: 0
COUGH: 0
CLAUDICATION: 0
WHEEZING: 0
DIARRHEA: 0
POLYDIPSIA: 0
BOWEL PATTERN NORMAL: 1
LAST BOWEL MOVEMENT: 66827
COUGH CHARACTERISTICS: NON-PRODUCTIVE
ABDOMINAL PAIN: 1
LAST BOWEL MOVEMENT: 66776
PSYCHIATRIC NEGATIVE: 1
DENIES PAIN: 1
PALPITATIONS: 0
MUSCLE WEAKNESS: 1
INSOMNIA: 0
MUSCULOSKELETAL NEGATIVE: 1
CARDIOVASCULAR NEGATIVE: 1
LOSS OF CONSCIOUSNESS: 0
CONSTIPATION: 0
BLURRED VISION: 0
FEVER: 0
COUGH: 0
NERVOUS/ANXIOUS: 1
COUGH: 0
NERVOUS/ANXIOUS: 0
FORGETFULNESS: 1
MYALGIAS: 1
HEARTBURN: 0
PERSON REPORTING PAIN: PATIENT
HEARTBURN: 0
COUGH CHARACTERISTICS: NON-PRODUCTIVE
HEARTBURN: 0
HEMOPTYSIS: 0
INSOMNIA: 0
MYALGIAS: 1
MYALGIAS: 0
STOOL FREQUENCY: LESS THAN DAILY
SUBJECTIVE PAIN PROGRESSION: WAXING AND WANING

## 2023-01-01 ASSESSMENT — PATIENT HEALTH QUESTIONNAIRE - PHQ9
SUM OF ALL RESPONSES TO PHQ9 QUESTIONS 1 AND 2: 0
SUM OF ALL RESPONSES TO PHQ9 QUESTIONS 1 AND 2: 0
3. TROUBLE FALLING OR STAYING ASLEEP OR SLEEPING TOO MUCH: NOT AT ALL
4. FEELING TIRED OR HAVING LITTLE ENERGY: NOT AT ALL
1. LITTLE INTEREST OR PLEASURE IN DOING THINGS: NOT AT ALL
8. MOVING OR SPEAKING SO SLOWLY THAT OTHER PEOPLE COULD HAVE NOTICED. OR THE OPPOSITE, BEING SO FIGETY OR RESTLESS THAT YOU HAVE BEEN MOVING AROUND A LOT MORE THAN USUAL: NOT AT ALL
SUM OF ALL RESPONSES TO PHQ9 QUESTIONS 1 AND 2: 0
1. LITTLE INTEREST OR PLEASURE IN DOING THINGS: SEVERAL DAYS
1. LITTLE INTEREST OR PLEASURE IN DOING THINGS: NEARLY EVERY DAY
1. LITTLE INTEREST OR PLEASURE IN DOING THINGS: NOT AT ALL
2. FEELING DOWN, DEPRESSED, IRRITABLE, OR HOPELESS: NOT AT ALL
1. LITTLE INTEREST OR PLEASURE IN DOING THINGS: NOT AT ALL
3. TROUBLE FALLING OR STAYING ASLEEP OR SLEEPING TOO MUCH: NOT AT ALL
9. THOUGHTS THAT YOU WOULD BE BETTER OFF DEAD, OR OF HURTING YOURSELF: NOT AT ALL
1. LITTLE INTEREST OR PLEASURE IN DOING THINGS: NOT AT ALL
6. FEELING BAD ABOUT YOURSELF - OR THAT YOU ARE A FAILURE OR HAVE LET YOURSELF OR YOUR FAMILY DOWN: NOT AL ALL
3. TROUBLE FALLING OR STAYING ASLEEP OR SLEEPING TOO MUCH: NOT AT ALL
8. MOVING OR SPEAKING SO SLOWLY THAT OTHER PEOPLE COULD HAVE NOTICED. OR THE OPPOSITE, BEING SO FIGETY OR RESTLESS THAT YOU HAVE BEEN MOVING AROUND A LOT MORE THAN USUAL: NOT AT ALL
SUM OF ALL RESPONSES TO PHQ9 QUESTIONS 1 AND 2: 3
SUM OF ALL RESPONSES TO PHQ9 QUESTIONS 1 AND 2: 0
1. LITTLE INTEREST OR PLEASURE IN DOING THINGS: NOT AT ALL
SUM OF ALL RESPONSES TO PHQ QUESTIONS 1-9: 0
SUM OF ALL RESPONSES TO PHQ9 QUESTIONS 1 AND 2: 2
7. TROUBLE CONCENTRATING ON THINGS, SUCH AS READING THE NEWSPAPER OR WATCHING TELEVISION: NOT AT ALL
SUM OF ALL RESPONSES TO PHQ9 QUESTIONS 1 AND 2: 2
5. POOR APPETITE OR OVEREATING: NOT AT ALL
1. LITTLE INTEREST OR PLEASURE IN DOING THINGS: NOT AT ALL
2. FEELING DOWN, DEPRESSED, IRRITABLE, OR HOPELESS: NOT AT ALL
1. LITTLE INTEREST OR PLEASURE IN DOING THINGS: NOT AT ALL
2. FEELING DOWN, DEPRESSED, IRRITABLE, OR HOPELESS: NOT AT ALL
2. FEELING DOWN, DEPRESSED, IRRITABLE, OR HOPELESS: NOT AT ALL
6. FEELING BAD ABOUT YOURSELF - OR THAT YOU ARE A FAILURE OR HAVE LET YOURSELF OR YOUR FAMILY DOWN: NOT AL ALL
2. FEELING DOWN, DEPRESSED, IRRITABLE, OR HOPELESS: NOT AT ALL
9. THOUGHTS THAT YOU WOULD BE BETTER OFF DEAD, OR OF HURTING YOURSELF: NOT AT ALL
6. FEELING BAD ABOUT YOURSELF - OR THAT YOU ARE A FAILURE OR HAVE LET YOURSELF OR YOUR FAMILY DOWN: NOT AL ALL
SUM OF ALL RESPONSES TO PHQ9 QUESTIONS 1 AND 2: 0
4. FEELING TIRED OR HAVING LITTLE ENERGY: NOT AT ALL
SUM OF ALL RESPONSES TO PHQ9 QUESTIONS 1 AND 2: 0
2. FEELING DOWN, DEPRESSED, IRRITABLE, OR HOPELESS: NOT AT ALL
9. THOUGHTS THAT YOU WOULD BE BETTER OFF DEAD, OR OF HURTING YOURSELF: NOT AT ALL
2. FEELING DOWN, DEPRESSED, IRRITABLE, OR HOPELESS: NOT AT ALL
2. FEELING DOWN, DEPRESSED, IRRITABLE, OR HOPELESS: SEVERAL DAYS
2. FEELING DOWN, DEPRESSED, IRRITABLE, OR HOPELESS: SEVERAL DAYS
2. FEELING DOWN, DEPRESSED, IRRITABLE, OR HOPELESS: NOT AT ALL
CLINICAL INTERPRETATION OF PHQ2 SCORE: 0
8. MOVING OR SPEAKING SO SLOWLY THAT OTHER PEOPLE COULD HAVE NOTICED. OR THE OPPOSITE, BEING SO FIGETY OR RESTLESS THAT YOU HAVE BEEN MOVING AROUND A LOT MORE THAN USUAL: NOT AT ALL
5. POOR APPETITE OR OVEREATING: NOT AT ALL
2. FEELING DOWN, DEPRESSED, IRRITABLE, OR HOPELESS: NOT AT ALL
2. FEELING DOWN, DEPRESSED, IRRITABLE, OR HOPELESS: NOT AT ALL
4. FEELING TIRED OR HAVING LITTLE ENERGY: MORE THAN HALF THE DAYS
1. LITTLE INTEREST OR PLEASURE IN DOING THINGS: NOT AT ALL
2. FEELING DOWN, DEPRESSED, IRRITABLE, OR HOPELESS: NOT AT ALL
1. LITTLE INTEREST OR PLEASURE IN DOING THINGS: NOT AT ALL
2. FEELING DOWN, DEPRESSED, IRRITABLE, OR HOPELESS: NOT AT ALL
CLINICAL INTERPRETATION OF PHQ2 SCORE: 0
SUM OF ALL RESPONSES TO PHQ9 QUESTIONS 1 AND 2: 0
SUM OF ALL RESPONSES TO PHQ9 QUESTIONS 1 AND 2: 0
6. FEELING BAD ABOUT YOURSELF - OR THAT YOU ARE A FAILURE OR HAVE LET YOURSELF OR YOUR FAMILY DOWN: NOT AL ALL
3. TROUBLE FALLING OR STAYING ASLEEP OR SLEEPING TOO MUCH: NOT AT ALL
5. POOR APPETITE OR OVEREATING: SEVERAL DAYS
SUM OF ALL RESPONSES TO PHQ QUESTIONS 1-9: 5
SUM OF ALL RESPONSES TO PHQ9 QUESTIONS 1 AND 2: 0
1. LITTLE INTEREST OR PLEASURE IN DOING THINGS: NOT AT ALL
1. LITTLE INTEREST OR PLEASURE IN DOING THINGS: SEVERAL DAYS
7. TROUBLE CONCENTRATING ON THINGS, SUCH AS READING THE NEWSPAPER OR WATCHING TELEVISION: NOT AT ALL
9. THOUGHTS THAT YOU WOULD BE BETTER OFF DEAD, OR OF HURTING YOURSELF: NOT AT ALL
1. LITTLE INTEREST OR PLEASURE IN DOING THINGS: NOT AT ALL
SUM OF ALL RESPONSES TO PHQ9 QUESTIONS 1 AND 2: 0
7. TROUBLE CONCENTRATING ON THINGS, SUCH AS READING THE NEWSPAPER OR WATCHING TELEVISION: NOT AT ALL
SUM OF ALL RESPONSES TO PHQ QUESTIONS 1-9: 0
SUM OF ALL RESPONSES TO PHQ9 QUESTIONS 1 AND 2: 1
2. FEELING DOWN, DEPRESSED, IRRITABLE, OR HOPELESS: SEVERAL DAYS
SUM OF ALL RESPONSES TO PHQ QUESTIONS 1-9: 2
CLINICAL INTERPRETATION OF PHQ2 SCORE: 0
SUM OF ALL RESPONSES TO PHQ9 QUESTIONS 1 AND 2: 0
1. LITTLE INTEREST OR PLEASURE IN DOING THINGS: NOT AT ALL
1. LITTLE INTEREST OR PLEASURE IN DOING THINGS: NOT AT ALL
SUM OF ALL RESPONSES TO PHQ9 QUESTIONS 1 AND 2: 0
2. FEELING DOWN, DEPRESSED, IRRITABLE, OR HOPELESS: NOT AT ALL
3. TROUBLE FALLING OR STAYING ASLEEP OR SLEEPING TOO MUCH: NOT AT ALL
2. FEELING DOWN, DEPRESSED, IRRITABLE, OR HOPELESS: NOT AT ALL
5. POOR APPETITE OR OVEREATING: MORE THAN HALF THE DAYS
SUM OF ALL RESPONSES TO PHQ QUESTIONS 1-9: 6
7. TROUBLE CONCENTRATING ON THINGS, SUCH AS READING THE NEWSPAPER OR WATCHING TELEVISION: SEVERAL DAYS
4. FEELING TIRED OR HAVING LITTLE ENERGY: NOT AT ALL
9. THOUGHTS THAT YOU WOULD BE BETTER OFF DEAD, OR OF HURTING YOURSELF: NOT AT ALL
5. POOR APPETITE OR OVEREATING: NOT AT ALL
6. FEELING BAD ABOUT YOURSELF - OR THAT YOU ARE A FAILURE OR HAVE LET YOURSELF OR YOUR FAMILY DOWN: NOT AL ALL
SUM OF ALL RESPONSES TO PHQ9 QUESTIONS 1 AND 2: 0
8. MOVING OR SPEAKING SO SLOWLY THAT OTHER PEOPLE COULD HAVE NOTICED. OR THE OPPOSITE, BEING SO FIGETY OR RESTLESS THAT YOU HAVE BEEN MOVING AROUND A LOT MORE THAN USUAL: NOT AT ALL
1. LITTLE INTEREST OR PLEASURE IN DOING THINGS: NOT AT ALL
4. FEELING TIRED OR HAVING LITTLE ENERGY: MORE THAN HALF THE DAYS
SUM OF ALL RESPONSES TO PHQ9 QUESTIONS 1 AND 2: 0
1. LITTLE INTEREST OR PLEASURE IN DOING THINGS: NOT AT ALL
7. TROUBLE CONCENTRATING ON THINGS, SUCH AS READING THE NEWSPAPER OR WATCHING TELEVISION: NOT AT ALL
8. MOVING OR SPEAKING SO SLOWLY THAT OTHER PEOPLE COULD HAVE NOTICED. OR THE OPPOSITE, BEING SO FIGETY OR RESTLESS THAT YOU HAVE BEEN MOVING AROUND A LOT MORE THAN USUAL: NOT AT ALL

## 2023-01-01 ASSESSMENT — SOCIAL DETERMINANTS OF HEALTH (SDOH)
ACTIVE STRESSOR - HEALTH CHANGES: 1
ACTIVE STRESSOR - NO STRESS FACTORS: 1
ACTIVE STRESSOR - HEALTH CHANGES: 1

## 2023-01-01 ASSESSMENT — FIBROSIS 4 INDEX
FIB4 SCORE: 1.36
FIB4 SCORE: 2.27
FIB4 SCORE: 1.1
FIB4 SCORE: 2.11
FIB4 SCORE: 1.4
FIB4 SCORE: 2.26
FIB4 SCORE: 0.67
FIB4 SCORE: 0.87
FIB4 SCORE: 2.92
FIB4 SCORE: 1.43
FIB4 SCORE: 2.62
FIB4 SCORE: 1.62
FIB4 SCORE: 2.92
FIB4 SCORE: 1.28
FIB4 SCORE: 2.27
FIB4 SCORE: 1.28
FIB4 SCORE: 1.1
FIB4 SCORE: 1.14
FIB4 SCORE: 1.3
FIB4 SCORE: 1.1
FIB4 SCORE: 2.11
FIB4 SCORE: 1.1
FIB4 SCORE: 1.28
FIB4 SCORE: 1.86
FIB4 SCORE: 1.02
FIB4 SCORE: 1.43

## 2023-01-01 ASSESSMENT — LIFESTYLE VARIABLES
DO YOU DRINK ALCOHOL: NO
TOTAL SCORE: 0
ALCOHOL_USE: NO
EVER HAD A DRINK FIRST THING IN THE MORNING TO STEADY YOUR NERVES TO GET RID OF A HANGOVER: NO
HAVE PEOPLE ANNOYED YOU BY CRITICIZING YOUR DRINKING: NO
HOW MANY TIMES IN THE PAST YEAR HAVE YOU HAD 5 OR MORE DRINKS IN A DAY: 0
HAVE YOU EVER FELT YOU SHOULD CUT DOWN ON YOUR DRINKING: NO
TOTAL SCORE: 0
CONSUMPTION TOTAL: NEGATIVE
EVER FELT BAD OR GUILTY ABOUT YOUR DRINKING: NO
TOTAL SCORE: 0
ON A TYPICAL DAY WHEN YOU DRINK ALCOHOL HOW MANY DRINKS DO YOU HAVE: 0
TOTAL SCORE: 0
SUBSTANCE_ABUSE: 0
ALCOHOL_USE: YES
HAVE YOU EVER FELT YOU SHOULD CUT DOWN ON YOUR DRINKING: NO
SUBSTANCE_ABUSE: 0
SUBSTANCE_ABUSE: 0
CONSUMPTION TOTAL: NEGATIVE
SUBSTANCE_ABUSE: 0
TOTAL SCORE: 0
HAVE PEOPLE ANNOYED YOU BY CRITICIZING YOUR DRINKING: NO
EVER HAD A DRINK FIRST THING IN THE MORNING TO STEADY YOUR NERVES TO GET RID OF A HANGOVER: NO
ON A TYPICAL DAY WHEN YOU DRINK ALCOHOL HOW MANY DRINKS DO YOU HAVE: 0
CONSUMPTION TOTAL: NEGATIVE
HOW MANY TIMES IN THE PAST YEAR HAVE YOU HAD 5 OR MORE DRINKS IN A DAY: 0
EVER FELT BAD OR GUILTY ABOUT YOUR DRINKING: NO
TOTAL SCORE: 0
CONSUMPTION TOTAL: NEGATIVE
EVER HAD A DRINK FIRST THING IN THE MORNING TO STEADY YOUR NERVES TO GET RID OF A HANGOVER: NO
HOW MANY TIMES IN THE PAST YEAR HAVE YOU HAD 5 OR MORE DRINKS IN A DAY: 0
TOTAL SCORE: 0
EVER HAD A DRINK FIRST THING IN THE MORNING TO STEADY YOUR NERVES TO GET RID OF A HANGOVER: NO
HAVE PEOPLE ANNOYED YOU BY CRITICIZING YOUR DRINKING: NO
HOW MANY TIMES IN THE PAST YEAR HAVE YOU HAD 5 OR MORE DRINKS IN A DAY: 0
SUBSTANCE_ABUSE: 0
TOTAL SCORE: 0
AVERAGE NUMBER OF DAYS PER WEEK YOU HAVE A DRINK CONTAINING ALCOHOL: 0
TOTAL SCORE: 0
ON A TYPICAL DAY WHEN YOU DRINK ALCOHOL HOW MANY DRINKS DO YOU HAVE: 0
TOTAL SCORE: 0
SUBSTANCE_ABUSE: 0
HAVE YOU EVER FELT YOU SHOULD CUT DOWN ON YOUR DRINKING: NO
ALCOHOL_USE: NO
HAVE PEOPLE ANNOYED YOU BY CRITICIZING YOUR DRINKING: NO
EVER HAD A DRINK FIRST THING IN THE MORNING TO STEADY YOUR NERVES TO GET RID OF A HANGOVER: NO
SUBSTANCE_ABUSE: 0
ALCOHOL_USE: NO
EVER HAD A DRINK FIRST THING IN THE MORNING TO STEADY YOUR NERVES TO GET RID OF A HANGOVER: NO
TOTAL SCORE: 0
HAVE PEOPLE ANNOYED YOU BY CRITICIZING YOUR DRINKING: NO
EVER FELT BAD OR GUILTY ABOUT YOUR DRINKING: NO
EVER FELT BAD OR GUILTY ABOUT YOUR DRINKING: NO
HOW MANY TIMES IN THE PAST YEAR HAVE YOU HAD 5 OR MORE DRINKS IN A DAY: 0
ON A TYPICAL DAY WHEN YOU DRINK ALCOHOL HOW MANY DRINKS DO YOU HAVE: 0
AVERAGE NUMBER OF DAYS PER WEEK YOU HAVE A DRINK CONTAINING ALCOHOL: 0
EVER FELT BAD OR GUILTY ABOUT YOUR DRINKING: NO
HAVE YOU EVER FELT YOU SHOULD CUT DOWN ON YOUR DRINKING: NO
TOTAL SCORE: 0
ALCOHOL_USE: NO
SUBSTANCE_ABUSE: 0
ON A TYPICAL DAY WHEN YOU DRINK ALCOHOL HOW MANY DRINKS DO YOU HAVE: 0
CONSUMPTION TOTAL: NEGATIVE
SUBSTANCE_ABUSE: 0
AVERAGE NUMBER OF DAYS PER WEEK YOU HAVE A DRINK CONTAINING ALCOHOL: 0
TOTAL SCORE: 0
AVERAGE NUMBER OF DAYS PER WEEK YOU HAVE A DRINK CONTAINING ALCOHOL: 0
TOTAL SCORE: 0
AVERAGE NUMBER OF DAYS PER WEEK YOU HAVE A DRINK CONTAINING ALCOHOL: 0
AVERAGE NUMBER OF DAYS PER WEEK YOU HAVE A DRINK CONTAINING ALCOHOL: 0
HAVE YOU EVER FELT YOU SHOULD CUT DOWN ON YOUR DRINKING: NO
TOTAL SCORE: 0
ON A TYPICAL DAY WHEN YOU DRINK ALCOHOL HOW MANY DRINKS DO YOU HAVE: 0
TOTAL SCORE: 0
CONSUMPTION TOTAL: NEGATIVE
HAVE YOU EVER FELT YOU SHOULD CUT DOWN ON YOUR DRINKING: NO
HAVE PEOPLE ANNOYED YOU BY CRITICIZING YOUR DRINKING: NO
EVER FELT BAD OR GUILTY ABOUT YOUR DRINKING: NO
HOW MANY TIMES IN THE PAST YEAR HAVE YOU HAD 5 OR MORE DRINKS IN A DAY: 0
DO YOU DRINK ALCOHOL: NO

## 2023-01-01 ASSESSMENT — COGNITIVE AND FUNCTIONAL STATUS - GENERAL
TOILETING: TOTAL
STANDING UP FROM CHAIR USING ARMS: A LOT
MOBILITY SCORE: 12
WALKING IN HOSPITAL ROOM: TOTAL
SUGGESTED CMS G CODE MODIFIER MOBILITY: CN
HELP NEEDED FOR BATHING: TOTAL
MOVING TO AND FROM BED TO CHAIR: A LITTLE
DRESSING REGULAR LOWER BODY CLOTHING: A LITTLE
TURNING FROM BACK TO SIDE WHILE IN FLAT BAD: A LOT
CLIMB 3 TO 5 STEPS WITH RAILING: A LOT
SUGGESTED CMS G CODE MODIFIER DAILY ACTIVITY: CJ
DRESSING REGULAR UPPER BODY CLOTHING: A LITTLE
WALKING IN HOSPITAL ROOM: A LITTLE
SUGGESTED CMS G CODE MODIFIER MOBILITY: CL
MOVING FROM LYING ON BACK TO SITTING ON SIDE OF FLAT BED: A LOT
MOBILITY SCORE: 18
WALKING IN HOSPITAL ROOM: A LOT
DAILY ACTIVITIY SCORE: 6
SUGGESTED CMS G CODE MODIFIER DAILY ACTIVITY: CN
SUGGESTED CMS G CODE MODIFIER MOBILITY: CK
SUGGESTED CMS G CODE MODIFIER DAILY ACTIVITY: CL
DAILY ACTIVITIY SCORE: 10
PERSONAL GROOMING: A LOT
TOILETING: A LITTLE
STANDING UP FROM CHAIR USING ARMS: TOTAL
WALKING IN HOSPITAL ROOM: A LITTLE
MOVING TO AND FROM BED TO CHAIR: UNABLE
MOBILITY SCORE: 10
WALKING IN HOSPITAL ROOM: TOTAL
SUGGESTED CMS G CODE MODIFIER DAILY ACTIVITY: CK
DRESSING REGULAR LOWER BODY CLOTHING: A LOT
SUGGESTED CMS G CODE MODIFIER MOBILITY: CK
MOVING TO AND FROM BED TO CHAIR: A LOT
TURNING FROM BACK TO SIDE WHILE IN FLAT BAD: UNABLE
SUGGESTED CMS G CODE MODIFIER MOBILITY: CK
SUGGESTED CMS G CODE MODIFIER DAILY ACTIVITY: CK
STANDING UP FROM CHAIR USING ARMS: A LOT
STANDING UP FROM CHAIR USING ARMS: A LITTLE
MOBILITY SCORE: 6
TURNING FROM BACK TO SIDE WHILE IN FLAT BAD: A LOT
WALKING IN HOSPITAL ROOM: A LITTLE
CLIMB 3 TO 5 STEPS WITH RAILING: A LOT
TOILETING: A LOT
SUGGESTED CMS G CODE MODIFIER DAILY ACTIVITY: CL
MOVING FROM LYING ON BACK TO SITTING ON SIDE OF FLAT BED: A LOT
MOVING TO AND FROM BED TO CHAIR: A LOT
SUGGESTED CMS G CODE MODIFIER MOBILITY: CL
STANDING UP FROM CHAIR USING ARMS: A LOT
MOVING FROM LYING ON BACK TO SITTING ON SIDE OF FLAT BED: UNABLE
MOBILITY SCORE: 10
WALKING IN HOSPITAL ROOM: TOTAL
CLIMB 3 TO 5 STEPS WITH RAILING: TOTAL
HELP NEEDED FOR BATHING: TOTAL
TURNING FROM BACK TO SIDE WHILE IN FLAT BAD: A LITTLE
TOILETING: A LOT
HELP NEEDED FOR BATHING: A LOT
DRESSING REGULAR LOWER BODY CLOTHING: A LOT
MOVING FROM LYING ON BACK TO SITTING ON SIDE OF FLAT BED: A LITTLE
DAILY ACTIVITIY SCORE: 20
STANDING UP FROM CHAIR USING ARMS: A LITTLE
TOILETING: A LOT
HELP NEEDED FOR BATHING: A LOT
EATING MEALS: A LITTLE
DRESSING REGULAR UPPER BODY CLOTHING: A LOT
MOVING FROM LYING ON BACK TO SITTING ON SIDE OF FLAT BED: A LOT
TURNING FROM BACK TO SIDE WHILE IN FLAT BAD: A LOT
EATING MEALS: A LITTLE
SUGGESTED CMS G CODE MODIFIER MOBILITY: CN
TURNING FROM BACK TO SIDE WHILE IN FLAT BAD: UNABLE
SUGGESTED CMS G CODE MODIFIER MOBILITY: CL
EATING MEALS: TOTAL
CLIMB 3 TO 5 STEPS WITH RAILING: TOTAL
PERSONAL GROOMING: A LITTLE
STANDING UP FROM CHAIR USING ARMS: TOTAL
PERSONAL GROOMING: A LITTLE
MOBILITY SCORE: 6
TOILETING: A LITTLE
STANDING UP FROM CHAIR USING ARMS: TOTAL
DAILY ACTIVITIY SCORE: 13
DRESSING REGULAR UPPER BODY CLOTHING: A LITTLE
CLIMB 3 TO 5 STEPS WITH RAILING: TOTAL
DRESSING REGULAR LOWER BODY CLOTHING: TOTAL
MOVING FROM LYING ON BACK TO SITTING ON SIDE OF FLAT BED: A LITTLE
SUGGESTED CMS G CODE MODIFIER DAILY ACTIVITY: CJ
PERSONAL GROOMING: A LOT
MOVING TO AND FROM BED TO CHAIR: A LOT
MOVING TO AND FROM BED TO CHAIR: UNABLE
TOILETING: A LOT
DRESSING REGULAR LOWER BODY CLOTHING: A LOT
DRESSING REGULAR UPPER BODY CLOTHING: A LOT
HELP NEEDED FOR BATHING: A LOT
MOVING FROM LYING ON BACK TO SITTING ON SIDE OF FLAT BED: UNABLE
DAILY ACTIVITIY SCORE: 18
MOBILITY SCORE: 16
MOBILITY SCORE: 6
DAILY ACTIVITIY SCORE: 14
DRESSING REGULAR UPPER BODY CLOTHING: TOTAL
SUGGESTED CMS G CODE MODIFIER DAILY ACTIVITY: CK
MOVING FROM LYING ON BACK TO SITTING ON SIDE OF FLAT BED: UNABLE
EATING MEALS: A LITTLE
DRESSING REGULAR LOWER BODY CLOTHING: A LOT
STANDING UP FROM CHAIR USING ARMS: A LITTLE
PERSONAL GROOMING: TOTAL
DRESSING REGULAR LOWER BODY CLOTHING: A LOT
TOILETING: TOTAL
CLIMB 3 TO 5 STEPS WITH RAILING: TOTAL
DRESSING REGULAR UPPER BODY CLOTHING: A LOT
SUGGESTED CMS G CODE MODIFIER MOBILITY: CN
MOVING TO AND FROM BED TO CHAIR: UNABLE
MOVING FROM LYING ON BACK TO SITTING ON SIDE OF FLAT BED: A LOT
MOBILITY SCORE: 17
DAILY ACTIVITIY SCORE: 14
DRESSING REGULAR UPPER BODY CLOTHING: A LOT
WALKING IN HOSPITAL ROOM: TOTAL
CLIMB 3 TO 5 STEPS WITH RAILING: A LOT
TURNING FROM BACK TO SIDE WHILE IN FLAT BAD: A LITTLE
TOILETING: A LOT
HELP NEEDED FOR BATHING: A LITTLE
HELP NEEDED FOR BATHING: A LOT
EATING MEALS: A LITTLE
HELP NEEDED FOR BATHING: A LOT
WALKING IN HOSPITAL ROOM: TOTAL
TURNING FROM BACK TO SIDE WHILE IN FLAT BAD: UNABLE
MOVING TO AND FROM BED TO CHAIR: A LITTLE
MOVING TO AND FROM BED TO CHAIR: A LITTLE
DRESSING REGULAR LOWER BODY CLOTHING: TOTAL
DAILY ACTIVITIY SCORE: 15
HELP NEEDED FOR BATHING: A LITTLE
CLIMB 3 TO 5 STEPS WITH RAILING: TOTAL
SUGGESTED CMS G CODE MODIFIER DAILY ACTIVITY: CK
DAILY ACTIVITIY SCORE: 22
CLIMB 3 TO 5 STEPS WITH RAILING: A LITTLE
TURNING FROM BACK TO SIDE WHILE IN FLAT BAD: A LITTLE
PERSONAL GROOMING: A LOT

## 2023-01-01 ASSESSMENT — PAIN DESCRIPTION - PAIN TYPE
TYPE: ACUTE PAIN
TYPE: CHRONIC PAIN
TYPE: ACUTE PAIN;CHRONIC PAIN
TYPE: ACUTE PAIN
TYPE: CHRONIC PAIN
TYPE: ACUTE PAIN
TYPE: CHRONIC PAIN
TYPE: ACUTE PAIN
TYPE: CHRONIC PAIN
TYPE: ACUTE PAIN
TYPE: CHRONIC PAIN
TYPE: ACUTE PAIN
TYPE: CHRONIC PAIN
TYPE: ACUTE PAIN
TYPE: CHRONIC PAIN
TYPE: CHRONIC PAIN
TYPE: ACUTE PAIN
TYPE: ACUTE PAIN
TYPE: CHRONIC PAIN
TYPE: ACUTE PAIN
TYPE: ACUTE PAIN
TYPE: CHRONIC PAIN
TYPE: ACUTE PAIN
TYPE: CHRONIC PAIN
TYPE: ACUTE PAIN
TYPE: ACUTE PAIN;CHRONIC PAIN
TYPE: ACUTE PAIN
TYPE: CHRONIC PAIN
TYPE: ACUTE PAIN
TYPE: CHRONIC PAIN
TYPE: ACUTE PAIN
TYPE: ACUTE PAIN
TYPE: CHRONIC PAIN
TYPE: ACUTE PAIN
TYPE: ACUTE PAIN
TYPE: CHRONIC PAIN
TYPE: ACUTE PAIN
TYPE: CHRONIC PAIN
TYPE: ACUTE PAIN
TYPE: ACUTE PAIN

## 2023-01-01 ASSESSMENT — PAIN SCALES - PAIN ASSESSMENT IN ADVANCED DEMENTIA (PAINAD)
BODYLANGUAGE: 1 - TENSE. DISTRESSED PACING. FIDGETING.
NEGVOCALIZATION: 1 - OCCASIONAL MOAN OR GROAN. LOW-LEVEL SPEECH WITH A NEGATIVE OR DISAPPROVING QUALITY.
FACIALEXPRESSION: 2 - FACIAL GRIMACING.
TOTALSCORE: 5
CONSOLABILITY: NO NEED TO CONSOLE
BODYLANGUAGE: RELAXED
CONSOLABILITY: 0 - NO NEED TO CONSOLE.

## 2023-01-01 ASSESSMENT — ACTIVITIES OF DAILY LIVING (ADL)
AMBULATION ASSISTANCE: NON-AMBULATORY
BATHING_REQUIRES_ASSISTANCE: 1
CONTINENCE_REQUIRES_ASSISTANCE: 1
AMBULATION ASSISTANCE: NON-AMBULATORY
AMBULATION ASSISTANCE: NON-AMBULATORY
DRESSING_REQUIRES_ASSISTANCE: 1
AMBULATION_REQUIRES_ASSISTANCE: 1
TOILETING: INDEPENDENT
SHOPPING_REQUIRES_ASSISTANCE: 1
AMBULATION ASSISTANCE: NON-AMBULATORY
CONTINENCE_REQUIRES_ASSISTANCE: 1
MONEY MANAGEMENT (EXPENSES/BILLS): INDEPENDENT
AMBULATION ASSISTANCE: NON-AMBULATORY
AMBULATION ASSISTANCE: NON-AMBULATORY
PHYSICAL_TRANSFER_REQUIRES_ASSISTANCE: 1
CURRENT_FUNCTION: ONE PERSON
PHYSICAL_TRANSFER_REQUIRES_ASSISTANCE: 1
AMBULATION ASSISTANCE: NON-AMBULATORY
CURRENT_FUNCTION: ONE PERSON
CURRENT_FUNCTION: STAND BY ASSIST
AMBULATION ASSISTANCE: NON-AMBULATORY
AMBULATION_REQUIRES_ASSISTANCE: 1
AMBULATION ASSISTANCE: NON-AMBULATORY
AMBULATION ASSISTANCE: NON-AMBULATORY
TOILETING: DEPENDENT
AMBULATION ASSISTANCE: NON-AMBULATORY
AMBULATION_REQUIRES_ASSISTANCE: 1
AMBULATION ASSISTANCE: NON-AMBULATORY
DRESSING_REQUIRES_ASSISTANCE: 1
TOILETING_REQUIRES_ASSISTANCE: 1
CURRENT_FUNCTION: ONE PERSON
GROOMING_REQUIRES_ASSISTANCE: 1
AMBULATION ASSISTANCE: NON-AMBULATORY
AMBULATION ASSISTANCE: NON-AMBULATORY
AMBULATION ASSISTANCE: ONE PERSON
BATHING_REQUIRES_ASSISTANCE: 1
CURRENT_FUNCTION: ONE PERSON
TOILETING: INDEPENDENT
PHYSICAL_TRANSFER_REQUIRES_ASSISTANCE: 1
AMBULATION ASSISTANCE: NON-AMBULATORY
CURRENT_FUNCTION: ONE PERSON
AMBULATION ASSISTANCE: NON-AMBULATORY
CURRENT_FUNCTION: TWO PERSON
DRESSING_REQUIRES_ASSISTANCE: 1
BATHING_REQUIRES_ASSISTANCE: 1
LAUNDRY_REQUIRES_ASSISTANCE: 1

## 2023-01-01 ASSESSMENT — GAIT ASSESSMENTS
DISTANCE (FEET): 60
GAIT LEVEL OF ASSIST: CONTACT GUARD ASSIST
ASSISTIVE DEVICE: FRONT WHEEL WALKER
GAIT LEVEL OF ASSIST: UNABLE TO PARTICIPATE
DISTANCE (FEET): 25
DISTANCE (FEET): 0
DEVIATION: STEP TO
GAIT LEVEL OF ASSIST: MINIMAL ASSIST
GAIT LEVEL OF ASSIST: UNABLE TO PARTICIPATE
GAIT LEVEL OF ASSIST: UNABLE TO PARTICIPATE
ASSISTIVE DEVICE: FRONT WHEEL WALKER
DEVIATION: SHUFFLED GAIT;BRADYKINETIC
ASSISTIVE DEVICE: FRONT WHEEL WALKER

## 2023-01-01 ASSESSMENT — PAIN SCALES - WONG BAKER
WONGBAKER_NUMERICALRESPONSE: HURTS JUST A LITTLE BIT
WONGBAKER_NUMERICALRESPONSE: HURTS JUST A LITTLE BIT
WONGBAKER_NUMERICALRESPONSE: DOESN'T HURT AT ALL
WONGBAKER_NUMERICALRESPONSE: HURTS EVEN MORE

## 2023-01-01 ASSESSMENT — VISUAL ACUITY: OU: 1

## 2023-01-01 ASSESSMENT — COPD QUESTIONNAIRES: COPD: 1

## 2023-01-01 ASSESSMENT — PAIN DESCRIPTION - DESCRIPTORS: DESCRIPTORS: PRESSURE

## 2023-01-03 RX ORDER — DOXYCYCLINE HYCLATE 100 MG/1
CAPSULE ORAL
Qty: 14 CAPSULE | Refills: 3 | Status: SHIPPED
Start: 2023-01-03 | End: 2023-01-01

## 2023-01-03 NOTE — TELEPHONE ENCOUNTER
Caller Name: Latonia Clinton                 Call Back Number: [phone] (home)         Patient approves a detailed voicemail message: N\A    Have we ever prescribed this med? Yes.  If yes, what date? 4/22/22    Last OV: 6/2/22 with Dr. Allan     Next OV: 1/16/23 with Dr. Allan     DX:     Medications:: DOXYCYCLINE HYCLATE 100 MG CAP

## 2023-01-06 VITALS
HEART RATE: 94 BPM | OXYGEN SATURATION: 94 % | RESPIRATION RATE: 24 BRPM | TEMPERATURE: 97.7 F | WEIGHT: 206 LBS | SYSTOLIC BLOOD PRESSURE: 130 MMHG | BODY MASS INDEX: 34.32 KG/M2 | DIASTOLIC BLOOD PRESSURE: 80 MMHG | HEIGHT: 65 IN

## 2023-01-06 DIAGNOSIS — C34.90 RECURRENT NON-SMALL CELL LUNG CANCER (HCC): ICD-10-CM

## 2023-01-06 PROCEDURE — 99212 OFFICE O/P EST SF 10 MIN: CPT | Performed by: RADIOLOGY

## 2023-01-06 PROCEDURE — 99205 OFFICE O/P NEW HI 60 MIN: CPT | Performed by: RADIOLOGY

## 2023-01-06 RX ORDER — HYDROCODONE BITARTRATE AND ACETAMINOPHEN 5; 325 MG/1; MG/1
1 TABLET ORAL EVERY 4 HOURS PRN
COMMUNITY
End: 2023-01-01

## 2023-01-06 ASSESSMENT — FIBROSIS 4 INDEX: FIB4 SCORE: 2.26

## 2023-01-06 ASSESSMENT — PAIN SCALES - GENERAL: PAINLEVEL: 5=MODERATE PAIN

## 2023-01-06 NOTE — PROGRESS NOTES
"Patient was seen today in clinic with Dr. Banks  for consult.  Vitals signs and weight were obtained and pain assessment was completed.  Allergies and medications were reviewed with the patient.       Vitals/Pain:  Vitals:    01/06/23 1315   BP: 130/80   Pulse: 94   Resp: (!) 24   Temp: 36.5 °C (97.7 °F)   SpO2: 94%   Weight: 93.4 kg (206 lb)   Height: 1.651 m (5' 5\")   Pain Score: 5=Moderate Pain        Allergies:   Patient has no known allergies.    Current Medications:  Current Outpatient Medications   Medication Sig Dispense Refill    HYDROcodone-acetaminophen (NORCO) 5-325 MG Tab per tablet Take 1 Tablet by mouth every four hours as needed.      fluticasone furoate-vilanterol (BREO ELLIPTA) 200-25 MCG/ACT AEROSOL POWDER, BREATH ACTIVATED Inhale 1 Puff every day. Rinse mouth after use. 3 Each 3    losartan (COZAAR) 50 MG Tab Take 1 Tablet by mouth every day.      ergocalciferol (DRISDOL) 21289 UNIT capsule Take 1 Capsule by mouth every 7 days. 14 Capsule 3    simvastatin (ZOCOR) 40 MG Tab Take 1 Tablet by mouth every evening. 90 Tablet 1    ondansetron (ZOFRAN ODT) 4 MG TABLET DISPERSIBLE Take 1 Tablet by mouth every 6 hours as needed for Nausea. 20 Tablet 1    [START ON 1/20/2023] zolpidem (AMBIEN) 5 MG Tab Take 1 Tablet by mouth at bedtime as needed for Sleep for up to 30 days. 30 Tablet 0    azithromycin (ZITHROMAX) 250 MG Tab Take 1 Tablet by mouth every day. 30 Tablet 5    tiotropium (SPIRIVA RESPIMAT) 2.5 mcg/Act Aero Soln INHALE TWO PUFFS BY MOUTH DAILY 1 Each 5    albuterol 108 (90 Base) MCG/ACT Aero Soln inhalation aerosol Inhale 2 Puffs every 6 hours as needed for Shortness of Breath. 18 g 11    methimazole (TAPAZOLE) 5 MG Tab Take 1 Tablet by mouth every day. 90 Tablet 1    rivaroxaban (XARELTO) 20 MG Tab tablet TAKE ONE TABLET BY MOUTH DAILY WITH DINNER (Patient taking differently: TAKE ONE TABLET BY MOUTH DAILY WITH DINNER  (last dose 11/12/22 prior to procedure)) 90 Tablet 1    albuterol " (PROVENTIL) 2.5mg/3ml Nebu Soln solution for nebulization Take 3 mL by nebulization every four hours as needed for Shortness of Breath. 180 Each 1    KLOR-CON M20 20 MEQ Tab CR Take 1 Tablet by mouth 2 times a day.      sodium bicarbonate (SODIUM BICARBONATE) 650 MG Tab Take 2 Tablets by mouth in the morning, at noon, and at bedtime.      allopurinol (ZYLOPRIM) 100 MG Tab Take 2 Tablets by mouth every day. 200 mg (2 tablets)      docusate sodium (COLACE) 100 MG Cap Take 3 Capsules by mouth every evening.      acetaminophen (TYLENOL) 500 MG Tab Take 2 Tablets by mouth every 6 hours as needed for Moderate Pain.      COMBIGAN 0.2-0.5 % Solution Administer 1 Drop into both eyes 2 times a day.      doxycycline (VIBRAMYCIN) 100 MG Cap TAKE ONE CAPSULE BY MOUTH TWICE A DAY FOR 7 DAYS UNTIL GONE. STOP AZITHROMYCIN WHILE TAKING DOXYCYCLINE. 14 Capsule 3    levoFLOXacin (LEVAQUIN) 500 MG tablet Take 1 Tablet by mouth every day. 7 Tablet 0    predniSONE (DELTASONE) 10 MG Tab Take 30mg x 3 days, then take 20mg x 3 days, then take 10mg x 3 days, with food, then discontinue. 18 Tablet 0    zolpidem (AMBIEN) 5 MG Tab Take 1 Tablet by mouth at bedtime as needed for Sleep for up to 30 days. 30 Tablet 0    RESTASIS 0.05 % ophthalmic emulsion Administer 1 Drop into both eyes every day. (Patient not taking: Reported on 1/6/2023)       No current facility-administered medications for this encounter.         PCP:  Idania Hair R.N.

## 2023-01-06 NOTE — CT SIMULATION
PATIENT NAME Latonia Clinton   PRIMARY PHYSICIAN Lita Emerson [id number]   REFERRING PHYSICIAN Quincy Rizvi III, M.* 1950     Lung cancer (HCC)  Staging form: LUNG AJCC V7  - Clinical stage from 12/12/2016: Stage IB (T2a, N0, M0) - Signed by Ange Banks M.D. on 1/6/2023  Specimen type: Biopsy/ Limited Resection  Histopathologic type: Adenocarcinoma, NOS  Stage prefix: Initial diagnosis  Laterality: Left  Tumor size (mm): 15  Biopsy of metastatic site performed: No  Histologic grade (G): G2         Treatment Planning CT Simulation      Order Questions     Question Answer    Is this for a new course of treatment? Yes    Is this an Addendum? No    Simulation Status Initial    Planned Start Date 1/19/2023    Treatment Site Lung - Upper lobe    Laterality Bilateral    Treatment Technique SBRT    Treatment Pattern/Frequency Daily    Concurrent Chemotherapy No    CT Technique 4D    Slice Thickness 2mm    Scan Extent Chest    Treatment Device(s) Vac [place]    Patient Position Supine    Patient Orientation Head First    Arm Position Up    Treatment Machine No preference    Treatment Image Guidance CBCT    Frequency (CBCT) Daily    Image Guidance Match Bone     PTV - Soft Tissue    Treatment Planning Image Fusion CT/PET    Special Physics Consult Stereotactic    Other Orders Weekly Physics Check     Special Tx Procedure    Release to patient Immediate

## 2023-01-06 NOTE — CONSULTS
RADIATION ONCOLOGY CONSULT    Patient name:  Latonia Clinton    Primary Physician:  Liat Emerson M.D. MRN: [id number]  [facility]: [id number]   Referring physician:  Quincy Rizvi III, M.*  : 1950, 72 y.o.     DATE OF SERVICE: 2023    IDENTIFICATION: A 72 y.o. female with   Lung cancer (HCC)  Staging form: LUNG AJCC V7  - Clinical stage from 2016: Stage IB (T2a, N0, M0) - Signed by Ange Banks M.D. on 2023  Specimen type: Biopsy/ Limited Resection  Histopathologic type: Adenocarcinoma, NOS  Stage prefix: Initial diagnosis  Laterality: Left  Tumor size (mm): 15  Biopsy of metastatic site performed: No  Histologic grade (G): G2        She is here at the kind request of Quincy Arguello III, M.*        HISTORY OF PRESENT ILLNESS:  Subjective     Ms. Clinton is a 72-year-old lady who presents today for consideration of SBRT for recurrent/new diagnosis of left upper lobe adenocarcinoma.  In brief, her history dates back to  when she was diagnosed with a left upper lobe adenocarcinoma, and underwent a wedge resection.  Pathology was notable for adenocarcinoma, margins negative, pathology T2A Nx, and she did well for many years.  She had been following closely with [facility], as she is known to have scattered pulmonary nodules.      In 2022, she had a PET CT scan that noted a small lingular nodule with very mild FDG avid uptake concerning for possible malignancy, with additional subcentimeter pulmonary nodules in the right lower lobe with no FDG uptake.  Plan was made for follow-up CT scans and she had intermittent follow-up scans through the year, and more recently a CT of the chest in September showed an irregular 15 mm lingular nodule, slightly larger than before, and again suspicious for malignancy.  The right lung lesion had also become a bit more prominent measuring 10 x 8 mm.  Therefore, in October she underwent fiberoptic bronchoscopy and robotic  bronchoscopy with Dr. Apple with the left upper lobe nodule biopsy and bronchoalveolar lavage negative for malignant cells.    Given negative results, she then underwent a CT-guided biopsy of the left lingular lesion with pathology consistent with adenocarcinoma, lipidic spread, focal acinar invasion.  PD-L1 is 3%, and there are no targetable mutations identified on [employer].  Following this, she had an updated PET/CT and MRI brain for restaging.  Again were noted the hypermetabolic nodule in the lingula, max SUV 7.3, as well as the hypermetabolic nodule in the right upper lobe, SUV 10.8.  MRI brain was negative.    She was referred to medical oncology and now comes to discuss stereotactic radiation.  She has significant osteoporosis, chronic back pain, history of kyphoplasty and rib fractures, hypothyroidism, and has been intermittently on home oxygen, as well as multiple inhalers.  She follows closely with pulmonology.  She reports that overall, she was doing well somewhat okay, but in the last 2 weeks following the biopsy, she feels like her breathing is significantly deteriorated, where she is now considerably dyspneic even at rest at times.  She is not sure why this is.  No fevers or chills, very mild cough now, though she did have a more deep rattling cough immediately after the biopsy.        PROBLEM LIST:  Patient Active Problem List   Diagnosis    Hyperlipidemia    Hypertension    Chronic obstructive pulmonary disease (HCC)    CKD (chronic kidney disease) stage 3, GFR 30-59 ml/min (HCC)    Thyrotoxicosis with toxic single thyroid nodule and without thyroid storm    Deep vein thrombosis (DVT) (HCC)    Lung cancer (HCC)    Closed T11 fracture (HCC)    Closed wedge compression fracture of first lumbar vertebra (HCC)    Closed compression fracture of second lumbar vertebra (HCC)    Macrocytic anemia    Depression, major, recurrent, moderate (HCC)    Right ventricular dilation    Gout of foot    Other  specified glaucoma    Other insomnia    Hyperparathyroidism (HCC)    Central retinal vein occlusion    Postmenopausal osteoporosis    Chronic bilateral low back pain without sciatica    Preventative health care    Other fatigue    Chronic anticoagulation    Chronic pain of left knee    Recurrent non-small cell lung cancer (HCC)        PAST SURGICAL HISTORY:  Past Surgical History:   Procedure Laterality Date    BRONCHOSCOPY, ROBOT-ASSISTED  10/11/2022    Procedure: FIBER OPTIC BRONCHOSCOPY WITH  WASH, BRUSH, BRONCHOALVEOLAR LAVAGE, BIOSPY, FINE NEEDLE ASPIRATION & NAVIGATION, ROBOTICS;  Surgeon: Donita Haque M.D.;  Location: [facility];  Service: Pulmonary Robotic    CYSTOSCOPY STENT PLACEMENT  7/9/2018    Procedure: Cystoscopy,  Left removal of stent ,  Left Stent Placement;  Surgeon: Beck Yang M.D.;  Location: [facility];  Service: Urology    URETEROSCOPY Left 7/9/2018    Procedure: URETEROSCOPY;  Surgeon: Beck Yang M.D.;  Location: [facility];  Service: Urology    LASERTRIPSY Left 7/9/2018    Procedure: LASERTRIPSY-LITHO;  Surgeon: Beck Yang M.D.;  Location: [facility];  Service: Urology    CYSTOSCOPY STENT PLACEMENT Left 6/18/2018    Procedure: CYSTOSCOPY STENT PLACEMENT;  Surgeon: Beck Yang M.D.;  Location: [facility];  Service: Urology    LITHOTRIPSY Left 6/18/2018    Procedure: LITHOTRIPSY;  Surgeon: Beck Yang M.D.;  Location: [facility];  Service: Urology    LASERTRIPSY Left 6/18/2018    Procedure: LASERTRIPSY;  Surgeon: Beck Yang M.D.;  Location: [facility];  Service: Urology    URETEROSCOPY Left 6/18/2018    Procedure: URETEROSCOPY;  Surgeon: Beck Yang M.D.;  Location: [facility];  Service: Urology    THORACOSCOPY Left 12/12/2016    Procedure: THORACOSCOPY W/WEDGE RESECTION UPPER LOBE MASS;  Surgeon: John H Ganser, M.D.;  Location: [facility];   Service:     OTHER ORTHOPEDIC SURGERY  2013    kyphoplasty X3    APPENDECTOMY      1990    CHOLECYSTECTOMY      1990    LAMINOTOMY      LUNG BIOPSY OPEN      OTHER      OTHER ABDOMINAL SURGERY      gall bladder disease    UT BREAST AUGMENTATION WITH IMPLANT      UT BREAST REDUCTION         CURRENT MEDICATIONS:  Current Outpatient Medications   Medication Sig Dispense Refill    HYDROcodone-acetaminophen (NORCO) 5-325 MG Tab per tablet Take 1 Tablet by mouth every four hours as needed.      fluticasone furoate-vilanterol (BREO ELLIPTA) 200-25 MCG/ACT AEROSOL POWDER, BREATH ACTIVATED Inhale 1 Puff every day. Rinse mouth after use. 3 Each 3    losartan (COZAAR) 50 MG Tab Take 1 Tablet by mouth every day.      ergocalciferol (DRISDOL) 95188 UNIT capsule Take 1 Capsule by mouth every 7 days. 14 Capsule 3    simvastatin (ZOCOR) 40 MG Tab Take 1 Tablet by mouth every evening. 90 Tablet 1    ondansetron (ZOFRAN ODT) 4 MG TABLET DISPERSIBLE Take 1 Tablet by mouth every 6 hours as needed for Nausea. 20 Tablet 1    [START ON 1/20/2023] zolpidem (AMBIEN) 5 MG Tab Take 1 Tablet by mouth at bedtime as needed for Sleep for up to 30 days. 30 Tablet 0    azithromycin (ZITHROMAX) 250 MG Tab Take 1 Tablet by mouth every day. 30 Tablet 5    tiotropium (SPIRIVA RESPIMAT) 2.5 mcg/Act Aero Soln INHALE TWO PUFFS BY MOUTH DAILY 1 Each 5    albuterol 108 (90 Base) MCG/ACT Aero Soln inhalation aerosol Inhale 2 Puffs every 6 hours as needed for Shortness of Breath. 18 g 11    methimazole (TAPAZOLE) 5 MG Tab Take 1 Tablet by mouth every day. 90 Tablet 1    rivaroxaban (XARELTO) 20 MG Tab tablet TAKE ONE TABLET BY MOUTH DAILY WITH DINNER (Patient taking differently: TAKE ONE TABLET BY MOUTH DAILY WITH DINNER  (last dose 11/12/22 prior to procedure)) 90 Tablet 1    albuterol (PROVENTIL) 2.5mg/3ml Nebu Soln solution for nebulization Take 3 mL by nebulization every four hours as needed for Shortness of Breath. 180 Each 1    KLOR-CON M20 20 MEQ Tab  CR Take 1 Tablet by mouth 2 times a day.      sodium bicarbonate (SODIUM BICARBONATE) 650 MG Tab Take 2 Tablets by mouth in the morning, at noon, and at bedtime.      allopurinol (ZYLOPRIM) 100 MG Tab Take 2 Tablets by mouth every day. 200 mg (2 tablets)      docusate sodium (COLACE) 100 MG Cap Take 3 Capsules by mouth every evening.      acetaminophen (TYLENOL) 500 MG Tab Take 2 Tablets by mouth every 6 hours as needed for Moderate Pain.      COMBIGAN 0.2-0.5 % Solution Administer 1 Drop into both eyes 2 times a day.      doxycycline (VIBRAMYCIN) 100 MG Cap TAKE ONE CAPSULE BY MOUTH TWICE A DAY FOR 7 DAYS UNTIL GONE. STOP AZITHROMYCIN WHILE TAKING DOXYCYCLINE. 14 Capsule 3    levoFLOXacin (LEVAQUIN) 500 MG tablet Take 1 Tablet by mouth every day. 7 Tablet 0    predniSONE (DELTASONE) 10 MG Tab Take 30mg x 3 days, then take 20mg x 3 days, then take 10mg x 3 days, with food, then discontinue. 18 Tablet 0    zolpidem (AMBIEN) 5 MG Tab Take 1 Tablet by mouth at bedtime as needed for Sleep for up to 30 days. 30 Tablet 0    RESTASIS 0.05 % ophthalmic emulsion Administer 1 Drop into both eyes every day. (Patient not taking: Reported on 1/6/2023)       No current facility-administered medications for this encounter.       ALLERGIES:    Patient has no known allergies.    FAMILY HISTORY:    family history includes Cancer in her maternal uncle, mother, paternal aunt, and paternal uncle; Heart Disease in her brother; Heart Failure in her father.    SOCIAL HISTORY:     reports that she quit smoking about 23 years ago. Her smoking use included cigarettes. She has a 30.00 pack-year smoking history. She has never used smokeless tobacco. She reports current alcohol use. She reports that she does not use drugs.  Patient currently resides alone in [place]. She states she has good social support and a brother in the area. She is retired from the [employer].      REVIEW OF SYSTEMS:    A complete review of systems taken. Pertinent  "items in HPI. All others negative.    PHYSICAL EXAM:    PERFORMANCE STATUS:      1/6/2023   ECOG Performance Review   ECOG Performance Status Ambulatory and capable of all selfcare but unable to carry out any work activities.  Up and about more than 50% of waking hours       Multiple values from one day are sorted in reverse-chronological order         1/6/2023   Karnofsky Score   Karnofsky Score 60       Multiple values from one day are sorted in reverse-chronological order     /80   Pulse 94   Temp 36.5 °C (97.7 °F)   Resp (!) 24   Ht 1.651 m (5' 5\")   Wt 93.4 kg (206 lb)   LMP  (LMP Unknown)   SpO2 94%   BMI 34.28 kg/m²   Physical Exam  Constitutional:       Appearance: Normal appearance.   HENT:      Head: Normocephalic and atraumatic.      Mouth/Throat:      Mouth: Mucous membranes are moist.      Pharynx: Oropharynx is clear.   Cardiovascular:      Rate and Rhythm: Normal rate and regular rhythm.   Pulmonary:      Effort: Pulmonary effort is normal.      Breath sounds: Normal breath sounds. No stridor. No wheezing.   Abdominal:      General: Abdomen is flat.      Palpations: Abdomen is soft.   Neurological:      General: No focal deficit present.      Mental Status: She is alert and oriented to person, place, and time.      Sensory: No sensory deficit.      Motor: No weakness.        LABORATORY DATA:   Lab Results   Component Value Date/Time    WBC 5.3 11/15/2022 09:45 AM    RBC 3.77 (L) 11/15/2022 09:45 AM    HEMOGLOBIN 12.7 11/15/2022 09:45 AM    HEMATOCRIT 38.9 11/15/2022 09:45 AM    .2 (H) 11/15/2022 09:45 AM    MCH 33.7 (H) 11/15/2022 09:45 AM    MCHC 32.6 (L) 11/15/2022 09:45 AM    RDW 54.4 (H) 11/15/2022 09:45 AM    PLATELETCT 233 11/15/2022 09:45 AM    MPV 11.0 11/15/2022 09:45 AM    NEUTSPOLYS 64.50 04/28/2022 04:27 PM    LYMPHOCYTES 24.30 04/28/2022 04:27 PM    MONOCYTES 7.90 04/28/2022 04:27 PM    EOSINOPHILS 2.60 04/28/2022 04:27 PM    BASOPHILS 0.20 04/28/2022 04:27 PM    "   Lab Results   Component Value Date/Time    SODIUM 144 12/06/2022 03:02 PM    POTASSIUM 4.1 12/06/2022 03:02 PM    CHLORIDE 110 12/06/2022 03:02 PM    CO2 22 12/06/2022 03:02 PM    GLUCOSE 104 (H) 12/06/2022 03:02 PM    BUN 27 (H) 12/06/2022 03:02 PM    CREATININE 1.22 12/06/2022 03:02 PM    CREATININE 1.7 (H) 09/19/2008 08:40 PM           RADIOLOGY DATA:  CT-BIOPSY-LUNG/MEDIASTINUM W/GUIDE LEFT    Result Date: 11/15/2022  1.  CT-guided LEFT lingular pulmonary nodule biopsy 2.  Deployment of [employer] pleural plug 3.  With regards to potential nondiagnostic results, be advised that, absent a specific benign diagnosis, the risk of malignancy remains high or very high depending on the pathology evaluation. In the large series by Ernie et al (Radiology 2019; 290:814-823), this risk ranged from 21% for 'nonspecific benign' findings, to 46% for 'insufficient tissue' and 90% for 'atypical cells'.  Thus, close follow-up of these lesions and consideration of repeat biopsy or alternative tissue sampling techniques  is warranted in these situations.     [id number] VIEW    Result Date: 12/9/2022  Again seen multiple bilateral renal stones measuring up to 14 mm in size.    DX-CHEST-PORTABLE (1 VIEW)    Result Date: 11/15/2022  No evidence of pneumothorax status post left lung biopsy.    DX-CHEST-PORTABLE (1 VIEW)    Result Date: 11/15/2022  1.  Lingular hemorrhage. 2.  No evidence of pneumothorax or pleural fluid following LEFT lung biopsy    MR-BRAIN-W/O    Result Date: 12/10/2022  1.  There is no evidence of large metastasis in this noncontrast MRI. Please note tiny metastasis cannot be evaluated in this noncontrast MRI. 2.  A few punctate nonspecific T2 hyperintensities likely representing nonspecific foci of gliosis/chronic ischemia.    NB-[id number] BASE TO MID-THIGH    Result Date: 12/20/2022  1.  RIGHT upper lobe and lingular nodules both concerning for malignancy.  RIGHT upper lung nodule is new from prior.  Lingular  nodule has increased in size and intensity since prior. 2.  No evidence for distant metastatic disease.      IMPRESSION:    Ms. Clinton is a 72-year-old lady with a history of initially stage Ib non-small cell lung cancer of the left upper lobe status post wedge resection dating back to 2016 now with either recurrent or new primary adenocarcinoma of the left and right upper lobes who presents for consideration of SBRT.    Lung cancer (HCC)  Staging form: LUNG AJCC V7  - Clinical stage from 12/12/2016: Stage IB (T2a, N0, M0) - Signed by Ange Banks M.D. on 1/6/2023  Specimen type: Biopsy/ Limited Resection  Histopathologic type: Adenocarcinoma, NOS  Stage prefix: Initial diagnosis  Laterality: Left  Tumor size (mm): 15  Biopsy of metastatic site performed: No  Histologic grade (G): G2        RECOMMENDATIONS:   I reviewed with Ms. Clinton her history of lung cancer dating back to 2016, and we reviewed the scans and work-up over the past year that show now biopsy confirmed recurrent disease.  We discussed that in the setting of recurrent adenocarcinoma, it is difficult to say if this is truly recurrent versus new primary, but the appearance with bilateral nodules that are now hypermetabolic to me is slightly more concerning for recurrent disease.  At any rate, if it is recurrent she really has very limited extent of disease.  He thinks that it is new primary, then she certainly has very early stage disease.  Given her functional status, I think it be reasonable to try to avoid or delay chemotherapy as long as possible, and therefore I favor proceeding with SBRT to both the left and right lung PET avid lesions.  We reviewed the rationale of this approach, the acute and long-term expected toxicities, the need for continued CT chest and surveillance monitoring, the possibility of recurrence elsewhere in the lungs or in the body especially if this ends up declaring itself as recurrent disease, and subsequent possible  eventual need for chemotherapy thereafter.  Still, I think SBRT at this point makes sense to try to avoid systemic treatment for the time being.     With regards to her more recent deterioration in pulmonary function, she certainly does not have any acute exam findings currently.  She has a follow-up scheduled with pulmonology next week to better address this.  We will also see from the CT simulation scan if there are any acute findings.  Assuming this turns out to be relatively benign, then we will proceed with SBRT in the coming days.    Thank you for the opportunity to participate in her care.  If any questions or comments, please do not hesitate in calling.  Approximately 60 minutes were spent on this encounter, including face-to-face visit, extensive records review, imaging review, and postvisit documentation and care coordination.    Orders Placed This Encounter    Rad Onc Treatment Planning CT Simulation    HYDROcodone-acetaminophen (NORCO) 5-325 MG Tab per tablet

## 2023-01-12 NOTE — RADIATION PLANNING NOTES
DATE OF SERVICE: 1/12/2023    DIAGNOSIS:  Lung cancer (HCC)  Staging form: LUNG AJCC V7  - Clinical stage from 12/12/2016: Stage IB (T2a, N0, M0) - Signed by Ange Banks M.D. on 1/6/2023  Specimen type: Biopsy/ Limited Resection  Histopathologic type: Adenocarcinoma, NOS  Stage prefix: Initial diagnosis  Laterality: Left  Tumor size (mm): 15  Biopsy of metastatic site performed: No  Histologic grade (G): G2       DATE OF SERVICE: 1/12/2023    TYPE OF SIMULATION: SBRT    GOAL OF TREATMENT:   [x] Curative  [] Palliative  [] Oligometastatic    CONTRAST:    [] IV Contrast*  [] Oral Contrast               POSITION:    [x]  Supine  [] Prone     IMMOBILIZATION DEVICE: [x]  Body-Fix  [] Omniboard  []  Bellyboard    PROCEDURE: Patient placed in immobilization device. 4D gated and non-gated CT obtained to assess organ motion.  Images viewed and approved.  Images reconstructed as need.  Image data sets transferred to [employer] for planning.    I have personally reviewed the relevant data, performed the target localization, and determined all relevant factors for this patient’s simulation.    *Omnipaque 80 -100cc IVP in conjunction with 500cc NS

## 2023-01-12 NOTE — RADIATION PLANNING NOTES
PATIENT NAME Latonia Clinton   PRIMARY PHYSICIAN RejisimranLiat lopez MARQUEZ [id number]   REFERRING PHYSICIAN No ref. provider found 1950     DATE OF SERVICE: 1/12/2023    DIAGNOSIS:  Lung cancer (HCC)  Staging form: LUNG AJCC V7  - Clinical stage from 12/12/2016: Stage IB (T2a, N0, M0) - Signed by Ange Banks M.D. on 1/6/2023  Specimen type: Biopsy/ Limited Resection  Histopathologic type: Adenocarcinoma, NOS  Stage prefix: Initial diagnosis  Laterality: Left  Tumor size (mm): 15  Biopsy of metastatic site performed: No  Histologic grade (G): G2         SPECIAL TREATMENT PROCEDURE NOTE:  Considerable additional effort required in the management of this case because of administration of Stereotactic Radiotherapy, which may result in increased normal tissue toxicity and require greater effort in contouring and treatment because of greater precision.

## 2023-01-12 NOTE — RADIATION PLANNING NOTES
Clinical Treatment Planning Note    DATE OF SERVICE: 1/12/2023    DIAGNOSIS:  Lung cancer (HCC)  Staging form: LUNG AJCC V7  - Clinical stage from 12/12/2016: Stage IB (T2a, N0, M0) - Signed by Ange Banks M.D. on 1/6/2023  Specimen type: Biopsy/ Limited Resection  Histopathologic type: Adenocarcinoma, NOS  Stage prefix: Initial diagnosis  Laterality: Left  Tumor size (mm): 15  Biopsy of metastatic site performed: No  Histologic grade (G): G2         IMAGING REVIEWED:  [x] CT     [] MRI     [x] PET/CT     [] BONE SCAN     [] MAMMO     [] OTHER      TREATMENT INTENT:   [x] CURATIVE     [] MAINTENANCE     []  PALLIATIVE      []  SUPPORTIVE     []  PROPHYLACTIC     [] BENIGN     []  CONSOLIDATIVE      [] DEFINITIVE   []  OLOGIMETASTATIC      LINE OF TREATMENT:  [] ADJUVANT   [x] DEFINITIVE   [] NEOADJUVANT   [] RE-TREATMENT      TECHNIQUE PLANNED:  [] IMRT   [] 3D   [x] SBRT   [] SRS/SRT   [] HDR   [] ELECTRON       IMRT JUSTIFICATION:  []   An immediately adjacent area has been previously irradiated and abutting portals must be established with high precision.    []  Dose escalation is planned to deliver radiation doses in excess of those commonly utilized for similar tumors with conventional treatment.    []  The target volume is concave or convex, and the critical normal tissues are within or around that convexity or concavity.    []  The target volume is in close proximity to critical structures that must be protected.    []  The volume of interest must be covered with narrow margins to adequately protect  immediately adjacent structures.      FIELDS & BLOCKING:  [x] COMPLEX BLOCKS     []  = 3 TX AREAS     []  ARCS     []  CUSTOM SHEILD        []  SIMPLE BLOCK      CHEMOTHERAPY:  []  CONCURRENT     []  INDUCTION     [] SEQUENTIAL     []  <30 DAYS FROM XRT      NOTES:  OAR CONSTRAINTS: (GUIDELINES ONLY NOT ABSOLUTE)   QUANTEC OR AS STATED  Critical Structure Dose/fx Volume Dose Max Dose Protocol   Brachial  Plexus 10-12 Gy x5 3 cc 6 Gy/fx  813   Brachial Plexus 10-12 Gy x5   6.4 Gy/fx 813   Brachial Plexus 20 Gy x3   8 Gy/fx 618   Bronchus/Trachea 10-12 Gy x5 4cc 3.6 Gy/fx 105%    Bronchus/Trachea 10-12 Gy x5   105%    Bronchus/Trachea 20 Gy x3   8 Gy/fx 618   Esophagus, nonadjacent wall 10-12 Gy x5 5 cc 5.5 Gy/fx  813   Esophagus, nonadjacent wall 10-12 Gy x5   105%    Esophagus 20 Gy x3   9 Gy/fx 618   Great Vessels 10-12 Gy x5 10 cc 9.4 Gy/fx  813   Great Vessels 10-12 Gy x5   105%    Heart 10-12 Gy x5 15 cc 5.5 Gy/fx  813   Heart 10-12 Gy x5   105%    Heart 20 Gy x3   8 Gy/fx 618   Lungs, total 10-12 Gy x5 1500 cc 2.5 Gy/fx  813   Lungs, total 10-12 Gy x5 1000 cc 2.7 Gy/fx  813   Lungs, total 20 Gy x3 V20 10% 8 Gy/fx 618   Skin 10-12 Gy x5 10 cc 6 Gy/fx  813   Skin 10-12 Gy x5   6.4 Gy/fx 813   Skin 20 Gy x3   8 Gy/fx 618   Spinal Cord 10-12 Gy x5 0.25 cc 4.5 Gy/fx  813   Spinal Cord 10-12 Gy x5 0.5 cc 2.7 Gy/fx  813   Spinal Cord 10-12 Gy x5   6 Gy/fx 813   Spinal Cord 12 Gy x4 0.35 cc 5.2 Gy/fx  915   Spinal Cord 12 Gy x4 1.2 cc 3.4 Gy/fx  915   Spinal Cord 20 Gy x3   6 Gy/fx 618

## 2023-01-17 NOTE — PROGRESS NOTES
"Chief Complaint   Patient presents with    Follow-Up     Last seen 6/2/22    Results     Ct Pet 12/20/2023          HPI: This patient is a 72 y.o. female whom is followed in our clinic for COPD c/b chronic hypoxic respiratory failure on O2 at night only and hx of lung Ca s/p L upper lobectomy in 2016 with recent recurrence vs new adenoCa of the [place] and FDG avid nodule of RUL starting XRT last seen by me on 6/2/22.  Pt last had PFTs in 2016 which showed  FEV1 of 1.4L or 54% pred with air trapping, normal TLC and preserved DLCO. She is a former smoker with >30 pk year  Hx and quit in 2000. She uses Breo and spiriva as well as daily low-dose azithromycin.  She has participated in pulmonary rehab and declines f/u PFTs. She has serial CT chest for lung Ca surveillance. CT from 7/2021 showed new GGO in RLL but stable .9cm nodule in lingula. Repeat CT 1/6/22 showed increase in a RLL nodule from 6 to 7 mm and increase in irreg lingula nodule to 1.5 cm in max diameter.  PET showed low level FDG update of 3 in lingular nodule with blood pool of 4.8.  We opted to continue monitoring.  Repeat CT chest 5/9/22 showed stable lingular nodule but new 7 mm nodule and repeat CT in 9/2022 showed increase in size of both lingular nodule 14x15 mm and RLL nodule 8mmx10 mm. She underwent bronchoscopy with ion biopsy of the lingular nodule which returned as adenoCa. She was seen by [facility] and referred to radiation oncology after PET showed FDG uptake in lingular nodule and new, FDG avid RUL nodule. No FDG uptake in RLL. MRI negative for metastasis. She is scheduled to start SBRT. In the meantime we tx her for acute cough after her lung bx with levofloxacin and prednisone. Cough resolved but she has been more SOB than baseline. She is severely limited in activity due to back pain. No f/c, no chest pain.    Past Medical History:   Diagnosis Date    Anesthesia     \"Abnormal blood pressure and breathing\"  \"couldn't breathe\"    Asthma     inhalers " "daily    Backpain 7/2017     thor. area    Blood clot in vein 07/06/2018    \"Currently have a blood clot in my left eye\"    Bowel habit changes 07/06/2018    Constipation    Breath shortness     with exertion has O2 but does not use    Bronchitis     CAD (coronary artery disease)     palpatations    Cancer (HCC) 2016    left lung    Chickenpox     COPD (chronic obstructive pulmonary disease) (HCC)     Depression 2/26/2019    Dialysis 2005    transient renal failure due to sepsis    Emphysema of lung (HCC)     Glaucoma     right eye    Hemorrhagic disorder (HCC)     Hyperlipidemia     Hypertension     Hyperthyroidism     Kidney stone     bilateral    Lung cancer (HCC) 12/12/2016    Multiple thyroid nodules 7/9/2015    Nasal drainage     Obstruction of left ureteropelvic junction (UPJ) due to stone 6/17/2018    Osteoporosis     Pain 07/06/2018    Back pain    Personal history of venous thrombosis and embolism 2004, 2012    right leg 2012, left arm dvt 2004 left eye 2017    Pneumonia 7/2016    per patient    Renal disorder     had been on dialysis for 7 months for acute failure 2005    Renal stones 2013    post lithotripsy    Rheumatoid arthritis (HCC)     question    Rheumatoid arthritis (HCC)     S/P appendectomy 1998     S/P cholecystectomy 1998    S/P kyphoplasty 2006, 2007, 2013    Sepsis, unspecified 2005    Shortness of breath 07/06/2018    Chronic current problem. \"A couple of years now\".  O2 concentrator at night - pt states she doesn't use it.    Thyroid nodule, hot 2017    functional \"hot\" right thyroid nodule without thyrotoxicosis       Social History     Socioeconomic History    Marital status: Single     Spouse name: Not on file    Number of children: Not on file    Years of education: Not on file    Highest education level: Not on file   Occupational History    Not on file   Tobacco Use    Smoking status: Former     Packs/day: 1.00     Years: 30.00     Pack years: 30.00     Types: Cigarettes     Quit " date: 2000     Years since quittin.0    Smokeless tobacco: Never    Tobacco comments:     7 years ago   Vaping Use    Vaping Use: Never used   Substance and Sexual Activity    Alcohol use: Yes     Alcohol/week: 0.0 oz     Comment: x2 a week    Drug use: No    Sexual activity: Not on file   Other Topics Concern    Not on file   Social History Narrative    Not on file     Social Determinants of Health     Financial Resource Strain: Not on file   Food Insecurity: Not on file   Transportation Needs: Not on file   Physical Activity: Not on file   Stress: Not on file   Social Connections: Not on file   Intimate Partner Violence: Not on file   Housing Stability: Not on file       Family History   Problem Relation Age of Onset    Cancer Mother         kidney    Heart Failure Father     Heart Disease Brother     Cancer Maternal Uncle         liver    Cancer Paternal Aunt         ovarian    Cancer Paternal Uncle         lung       Current Outpatient Medications on File Prior to Visit   Medication Sig Dispense Refill    HYDROcodone-acetaminophen (NORCO) 5-325 MG Tab per tablet Take 1 Tablet by mouth every four hours as needed.      fluticasone furoate-vilanterol (BREO ELLIPTA) 200-25 MCG/ACT AEROSOL POWDER, BREATH ACTIVATED Inhale 1 Puff every day. Rinse mouth after use. 3 Each 3    losartan (COZAAR) 50 MG Tab Take 1 Tablet by mouth every day.      ergocalciferol (DRISDOL) 80027 UNIT capsule Take 1 Capsule by mouth every 7 days. 14 Capsule 3    ondansetron (ZOFRAN ODT) 4 MG TABLET DISPERSIBLE Take 1 Tablet by mouth every 6 hours as needed for Nausea. 20 Tablet 1    albuterol 108 (90 Base) MCG/ACT Aero Soln inhalation aerosol Inhale 2 Puffs every 6 hours as needed for Shortness of Breath. 18 g 11    methimazole (TAPAZOLE) 5 MG Tab Take 1 Tablet by mouth every day. 90 Tablet 1    albuterol (PROVENTIL) 2.5mg/3ml Nebu Soln solution for nebulization Take 3 mL by nebulization every four hours as needed for Shortness of  Breath. 180 Each 1    KLOR-CON M20 20 MEQ Tab CR Take 1 Tablet by mouth 2 times a day.      allopurinol (ZYLOPRIM) 100 MG Tab Take 2 Tablets by mouth every day. 200 mg (2 tablets)      docusate sodium (COLACE) 100 MG Cap Take 3 Capsules by mouth every evening.      acetaminophen (TYLENOL) 500 MG Tab Take 2 Tablets by mouth every 6 hours as needed for Moderate Pain.      COMBIGAN 0.2-0.5 % Solution Administer 1 Drop into both eyes 2 times a day.      simvastatin (ZOCOR) 40 MG Tab Take 1 Tablet by mouth every evening. 90 Tablet 1    zolpidem (AMBIEN) 5 MG Tab Take 1 Tablet by mouth at bedtime as needed for Sleep for up to 30 days. 30 Tablet 0    [START ON 1/20/2023] zolpidem (AMBIEN) 5 MG Tab Take 1 Tablet by mouth at bedtime as needed for Sleep for up to 30 days. 30 Tablet 0    azithromycin (ZITHROMAX) 250 MG Tab Take 1 Tablet by mouth every day. 30 Tablet 5    tiotropium (SPIRIVA RESPIMAT) 2.5 mcg/Act Aero Soln INHALE TWO PUFFS BY MOUTH DAILY 1 Each 5    rivaroxaban (XARELTO) 20 MG Tab tablet TAKE ONE TABLET BY MOUTH DAILY WITH DINNER (Patient taking differently: TAKE ONE TABLET BY MOUTH DAILY WITH DINNER  (last dose 11/12/22 prior to procedure)) 90 Tablet 1    RESTASIS 0.05 % ophthalmic emulsion Administer 1 Drop into both eyes every day. (Patient not taking: Reported on 1/6/2023)      sodium bicarbonate (SODIUM BICARBONATE) 650 MG Tab Take 2 Tablets by mouth in the morning, at noon, and at bedtime.       No current facility-administered medications on file prior to visit.       Patient has no known allergies.      ROS:   Review of Systems   Constitutional:  Negative for chills, diaphoresis, fever, malaise/fatigue and weight loss.   HENT:  Negative for congestion, ear discharge, ear pain, hearing loss, nosebleeds, sinus pain, sore throat and tinnitus.    Eyes:  Negative for blurred vision, double vision, photophobia, pain, discharge and redness.   Respiratory:  Negative for cough, hemoptysis, sputum production,  "shortness of breath, wheezing and stridor.    Cardiovascular:  Negative for chest pain, palpitations, orthopnea, claudication, leg swelling and PND.   Gastrointestinal:  Negative for abdominal pain, constipation, diarrhea, heartburn, nausea and vomiting.   Genitourinary:  Negative for dysuria and urgency.   Musculoskeletal:  Negative for back pain, falls, joint pain, myalgias and neck pain.   Skin:  Negative for itching and rash.   Neurological:  Negative for dizziness, tremors, speech change, focal weakness, weakness and headaches.   Endo/Heme/Allergies:  Negative for environmental allergies.   Psychiatric/Behavioral:  Negative for depression.      /74 (BP Location: Left arm, Patient Position: Sitting, BP Cuff Size: Large adult)   Pulse 94   Resp 18   Ht 1.651 m (5' 5\")   SpO2 95%   Physical Exam  Constitutional:       General: She is not in acute distress.     Appearance: Normal appearance. She is well-developed. She is obese.   HENT:      Head: Normocephalic and atraumatic.      Right Ear: External ear normal.      Left Ear: External ear normal.      Nose: Nose normal. No congestion.      Mouth/Throat:      Mouth: Mucous membranes are moist.      Pharynx: Oropharynx is clear. No oropharyngeal exudate.   Eyes:      General: No scleral icterus.     Extraocular Movements: Extraocular movements intact.      Conjunctiva/sclera: Conjunctivae normal.      Pupils: Pupils are equal, round, and reactive to light.   Neck:      Vascular: No JVD.      Trachea: No tracheal deviation.   Cardiovascular:      Rate and Rhythm: Normal rate and regular rhythm.      Heart sounds: Normal heart sounds. No murmur heard.    No friction rub. No gallop.   Pulmonary:      Effort: No accessory muscle usage or respiratory distress.      Breath sounds: No wheezing or rales.      Comments: Decreased air movement b/l bases  Abdominal:      General: There is no distension.      Palpations: Abdomen is soft.      Tenderness: There is no " abdominal tenderness.   Musculoskeletal:         General: No tenderness or deformity. Normal range of motion.      Cervical back: Neck supple.      Right lower leg: No edema.      Left lower leg: No edema.   Lymphadenopathy:      Cervical: No cervical adenopathy.   Skin:     General: Skin is warm and dry.      Findings: No rash.      Nails: There is no clubbing.   Neurological:      Mental Status: She is alert and oriented to person, place, and time.      Cranial Nerves: No cranial nerve deficit.      Gait: Gait normal.   Psychiatric:         Behavior: Behavior normal.       PFTs as reviewed by me personally:as per hPI    Imaging as reviewed by me personally:  as per HPI    Assessment:  1. PERDOMO (dyspnea on exertion)        2. Malignant neoplasm of upper lobe of left lung (HCC)        3. Chronic obstructive pulmonary disease, unspecified COPD type (HCC)        4. Chronic respiratory failure with hypoxia (HCC)        5. Former smoker            Plan:  This is chronic, progressive. She has never desaturated and has declined repeat PFTs. She is on anticoagulation so low suspicion for PE. Suspect some of this is related to back pain and deconditioning. No concerning infiltrate on PET-CT and CT for radiation planning is scheduled. She has O2 use for at night. Trial of breztri to see if this helps with her sxs.   Appreciate oncology and radiation oncology assistance; planning on SBRT  Chronic but severity currently unknown. Not sure that this is exacerbation so we held off on prednisone as pt does not benefit from it typically; trial of breztri, continue azithromycin  Uses O2 at night only; declined sleep apnea w/u  Tobacco free; encouraged ongoing abstinence.  Return in about 4 months (around 5/16/2023).

## 2023-01-18 NOTE — PROGRESS NOTES
"[facility] Intake    Diagnosis Type:Lung Cancer  Preferred Language: English  Insurance: Medicare & [employer]  Dental Insurance:No ;Last Appointment Date: \" a few weeks ago\"  Primary Care Provider Reviewed: yes  Last Appointment Date:\" about three months ago Dr. Yen\"  Introduced Latonia Clinton to Oncology Support Services and provided contact information on 1/18/2023 .  Patient Care Team: Dr. Rizvi at [facility]   Current Barriers Identified Include: None at this time.  MyChart Status: Activated  Communication Preferences: Phone calls; Best Contact Number:[phone]  Patient Contact's Reviewed: yes     Outbound call to pt. For [facility] intake. Pt. Confirmed that she received [place] resource folder ( JERRELL Oviedo and ROSETTA Solares's card included for contact information) at [facility] appointment 1/12/23. Pt. States she has friends as her support system. Educated pt. On [email or web address] for Cancer Support Groups and reviewed the Resource Center.      Outcome:  No further needs at this time.          "

## 2023-01-19 NOTE — PROCEDURES
DATE OF SERVICE: 1/19/2023    DIAGNOSIS:  Lung cancer (HCC)  Staging form: LUNG AJCC V7  - Clinical stage from 12/12/2016: Stage IB (T2a, N0, M0) - Signed by Ange Banks M.D. on 1/6/2023  Specimen type: Biopsy/ Limited Resection  Histopathologic type: Adenocarcinoma, NOS  Stage prefix: Initial diagnosis  Laterality: Left  Tumor size (mm): 15  Biopsy of metastatic site performed: No  Histologic grade (G): G2       TREATMENT:  Radiation Therapy Episodes       Active Episodes       Radiation Therapy: SBRT (1/19/2023)                   Radiation Treatments         Plan Last Treated On Elapsed Days Fractions Treated Prescribed Fraction Dose (cGy) Prescribed Total Dose (cGy)    L_Lung_SBRT 1/19/2023 0 @ [id number] 1 of 5 1,000 5,000    R_Lung_SBRT 1/19/2023 0 @ [id number] 1 of 5 1,100 5,500                  Reference Point Last Treated On Elapsed Days Most Recent Session Dose (cGy) Total Dose (cGy)    L_Lung_SBRT 1/19/2023 0 @ [id number] 1,000 1,000    L_Lung_SBRT CP 1/19/2023 0 @ [id number] 1,079 1,079    R_Lung_SBRT 1/19/2023 0 @ [id number] 1,100 1,100    R_Lung_SBRT CP 1/19/2023 0 @ [id number] 1,300 1,300                            STEREOTACTIC PROCEDURE NOTE:    Called by Truebeam machine to verify treatment parameters including:  treatment site, treatment dose, and treatment setup prior to stereotactic treatment..    Patient was placed in the treatment position with use of immobilization device and  laser guidance. CBCT images were acquired for target localization.  Images were reviewed in the axial, coronal, and saggital views and shifts were made as necessary to ensure that patient position matched simulation position.      Treatment delivered per  prescription.  The medical physicist was present throughout the set-up, verification and treatment delivery to oversee the procedure and ensure all parameters agreed with the computerized plan.    I have personally reviewed the relevant data, performed  the target localization, and determined all relevant factors for this patient’s simulation.

## 2023-01-20 NOTE — PROGRESS NOTES
Call placed to patient.  Reviewed role of navigator.  Pt reporting brother takes her to appointments.  Denies transportation issues and states that treatment went fine yesterday.  She has no questions for navigator.  Reviewed support groups and resources.  Provided contact information and encouraged her to call if needs or barriers come up.

## 2023-01-20 NOTE — PROCEDURES
DATE OF SERVICE: 1/20/2023    DIAGNOSIS:  Lung cancer (HCC)  Staging form: LUNG AJCC V7  - Clinical stage from 12/12/2016: Stage IB (T2a, N0, M0) - Signed by Ange Banks M.D. on 1/6/2023  Specimen type: Biopsy/ Limited Resection  Histopathologic type: Adenocarcinoma, NOS  Stage prefix: Initial diagnosis  Laterality: Left  Tumor size (mm): 15  Biopsy of metastatic site performed: No  Histologic grade (G): G2       TREATMENT:  Radiation Therapy Episodes       Active Episodes       Radiation Therapy: SBRT (1/19/2023)                   Radiation Treatments         Plan Last Treated On Elapsed Days Fractions Treated Prescribed Fraction Dose (cGy) Prescribed Total Dose (cGy)    L_Lung_SBRT 1/20/2023 1 @ [id number] 2 of 5 1,000 5,000    R_Lung_SBRT 1/20/2023 1 @ [id number] 2 of 5 1,100 5,500                  Reference Point Last Treated On Elapsed Days Most Recent Session Dose (cGy) Total Dose (cGy)    L_Lung_SBRT 1/20/2023 1 @ [id number] 1,000 2,000    L_Lung_SBRT CP 1/20/2023 1 @ [id number] 1,079 2,157    R_Lung_SBRT 1/20/2023 1 @ [id number] 1,100 2,200    R_Lung_SBRT CP 1/20/2023 1 @ [id number] 1,300 2,601                            STEREOTACTIC PROCEDURE NOTE:    Called by Truebeam machine to verify treatment parameters including:  treatment site, treatment dose, and treatment setup prior to stereotactic treatment..    Patient was placed in the treatment position with use of immobilization device and  laser guidance. CBCT images were acquired for target localization.  Images were reviewed in the axial, coronal, and saggital views and shifts were made as necessary to ensure that patient position matched simulation position.      Treatment delivered per  prescription.  The medical physicist was present throughout the set-up, verification and treatment delivery to oversee the procedure and ensure all parameters agreed with the computerized plan.    I have personally reviewed the relevant data, performed  the target localization, and determined all relevant factors for this patient’s simulation.

## 2023-01-23 NOTE — PROCEDURES
DATE OF SERVICE: 1/23/2023    DIAGNOSIS:  Lung cancer (HCC)  Staging form: LUNG AJCC V7  - Clinical stage from 12/12/2016: Stage IB (T2a, N0, M0) - Signed by Ange Banks M.D. on 1/6/2023  Specimen type: Biopsy/ Limited Resection  Histopathologic type: Adenocarcinoma, NOS  Stage prefix: Initial diagnosis  Laterality: Left  Tumor size (mm): 15  Biopsy of metastatic site performed: No  Histologic grade (G): G2       TREATMENT:  Radiation Therapy Episodes       Active Episodes       Radiation Therapy: SBRT (1/19/2023)                   Radiation Treatments         Plan Last Treated On Elapsed Days Fractions Treated Prescribed Fraction Dose (cGy) Prescribed Total Dose (cGy)    L_Lung_SBRT 1/23/2023 4 @ [id number] 3 of 5 1,000 5,000    R_Lung_SBRT 1/23/2023 4 @ [id number] 3 of 5 1,100 5,500                  Reference Point Last Treated On Elapsed Days Most Recent Session Dose (cGy) Total Dose (cGy)    L_Lung_SBRT 1/23/2023 4 @ [id number] 1,000 3,000    L_Lung_SBRT CP 1/23/2023 4 @ [id number] 1,079 3,236    R_Lung_SBRT 1/23/2023 4 @ [id number] 1,100 3,300    R_Lung_SBRT CP 1/23/2023 4 @ [id number] 1,300 3,901                            STEREOTACTIC PROCEDURE NOTE:    Called by Truebeam machine to verify treatment parameters including:  treatment site, treatment dose, and treatment setup prior to stereotactic treatment..    Patient was placed in the treatment position with use of immobilization device and  laser guidance. CBCT images were acquired for target localization.  Images were reviewed in the axial, coronal, and saggital views and shifts were made as necessary to ensure that patient position matched simulation position.      Treatment delivered per  prescription.  The medical physicist was present throughout the set-up, verification and treatment delivery to oversee the procedure and ensure all parameters agreed with the computerized plan.    I have personally reviewed the relevant data, performed  the target localization, and determined all relevant factors for this patient’s simulation.

## 2023-01-24 NOTE — PROCEDURES
DATE OF SERVICE: 1/24/2023    DIAGNOSIS:  Lung cancer (HCC)  Staging form: LUNG AJCC V7  - Clinical stage from 12/12/2016: Stage IB (T2a, N0, M0) - Signed by Ange Banks M.D. on 1/6/2023  Specimen type: Biopsy/ Limited Resection  Histopathologic type: Adenocarcinoma, NOS  Stage prefix: Initial diagnosis  Laterality: Left  Tumor size (mm): 15  Biopsy of metastatic site performed: No  Histologic grade (G): G2       TREATMENT:  Radiation Therapy Episodes       Active Episodes       Radiation Therapy: SBRT (1/19/2023)                   Radiation Treatments         Plan Last Treated On Elapsed Days Fractions Treated Prescribed Fraction Dose (cGy) Prescribed Total Dose (cGy)    L_Lung_SBRT 1/24/2023 5 @ [id number] 4 of 5 1,000 5,000    R_Lung_SBRT 1/24/2023 5 @ [id number] 4 of 5 1,100 5,500                  Reference Point Last Treated On Elapsed Days Most Recent Session Dose (cGy) Total Dose (cGy)    L_Lung_SBRT 1/24/2023 5 @ [id number] 1,000 4,000    L_Lung_SBRT CP 1/24/2023 5 @ [id number] 1,079 4,314    R_Lung_SBRT 1/24/2023 5 @ [id number] 1,100 4,400    R_Lung_SBRT CP 1/24/2023 5 @ [id number] 1,300 5,201                            STEREOTACTIC PROCEDURE NOTE:    Called by Truebeam machine to verify treatment parameters including:  treatment site, treatment dose, and treatment setup prior to stereotactic treatment..    Patient was placed in the treatment position with use of immobilization device and  laser guidance. CBCT images were acquired for target localization.  Images were reviewed in the axial, coronal, and saggital views and shifts were made as necessary to ensure that patient position matched simulation position.      Treatment delivered per  prescription.  The medical physicist was present throughout the set-up, verification and treatment delivery to oversee the procedure and ensure all parameters agreed with the computerized plan.    I have personally reviewed the relevant data, performed  the target localization, and determined all relevant factors for this patient’s simulation.

## 2023-01-25 NOTE — PROCEDURES
DATE OF SERVICE: 1/25/2023    DIAGNOSIS:  Lung cancer (HCC)  Staging form: LUNG AJCC V7  - Clinical stage from 12/12/2016: Stage IB (T2a, N0, M0) - Signed by Ange Banks M.D. on 1/6/2023  Specimen type: Biopsy/ Limited Resection  Histopathologic type: Adenocarcinoma, NOS  Stage prefix: Initial diagnosis  Laterality: Left  Tumor size (mm): 15  Biopsy of metastatic site performed: No  Histologic grade (G): G2       TREATMENT:  Radiation Therapy Episodes       Active Episodes       Radiation Therapy: SBRT (1/19/2023)                   Radiation Treatments         Plan Last Treated On Elapsed Days Fractions Treated Prescribed Fraction Dose (cGy) Prescribed Total Dose (cGy)    L_Lung_SBRT 1/25/2023 6 @ [id number] 5 of 5 1,000 5,000    R_Lung_SBRT 1/25/2023 6 @ [id number] 5 of 5 1,100 5,500                  Reference Point Last Treated On Elapsed Days Most Recent Session Dose (cGy) Total Dose (cGy)    L_Lung_SBRT 1/25/2023 6 @ [id number] 1,000 5,000    L_Lung_SBRT CP 1/25/2023 6 @ [id number] 1,079 5,393    R_Lung_SBRT 1/25/2023 6 @ [id number] 1,100 5,500    R_Lung_SBRT CP 1/25/2023 6 @ [id number] 1,300 6,502                            STEREOTACTIC PROCEDURE NOTE:    Called by Truebeam machine to verify treatment parameters including:  treatment site, treatment dose, and treatment setup prior to stereotactic treatment..    Patient was placed in the treatment position with use of immobilization device and  laser guidance. CBCT images were acquired for target localization.  Images were reviewed in the axial, coronal, and saggital views and shifts were made as necessary to ensure that patient position matched simulation position.      Treatment delivered per  prescription.  The medical physicist was present throughout the set-up, verification and treatment delivery to oversee the procedure and ensure all parameters agreed with the computerized plan.    I have personally reviewed the relevant data, performed  the target localization, and determined all relevant factors for this patient’s simulation.

## 2023-01-25 NOTE — ADDENDUM NOTE
Encounter addended by: Ange Banks M.D. on: 1/25/2023 3:05 PM   Actions taken: Clinical Note Signed, Delete clinical note

## 2023-01-25 NOTE — ON TREATMENT VISIT
ON TREATMENT  NOTE  RADIATION ONCOLOGY DEPARTMENT    Patient name:  Latonia Clinton    Primary Physician:  Liat Emerson M.D. MRN: [id number]  [facility]: [id number]   Referring physician:  Quincy Rizvi III, M.*   : 1950, 72 y.o.     ENCOUNTER DATE:  2023      DIAGNOSIS:  Lung cancer (HCC)  Staging form: LUNG AJCC V7  - Clinical stage from 2016: Stage IB (T2a, N0, M0) - Signed by Ange Banks M.D. on 2023  Specimen type: Biopsy/ Limited Resection  Histopathologic type: Adenocarcinoma, NOS  Stage prefix: Initial diagnosis  Laterality: Left  Tumor size (mm): 15  Biopsy of metastatic site performed: No  Histologic grade (G): G2      TREATMENT SUMMARY:  Course First Treatment Date 2023  Course Last Treatment Date 2023  Aria Treatment Information          2023   Aria Course Treatment Dates   Course First Treatment Date 2023     Course Last Treatment Date 2023     Aria Treatment Summary   L_Lung_SBRT  Plan from Course C1_bilat_lung_SB   Fraction 5 of 5   Elapsed Course Days 6 @    Prescribed Fraction Dose 1,000 cGy   Prescribed Total Dose 5,000 cGy   L_Lung_SBRT  Reference Point from Course C1_bilat_lung_SB   Elapsed Course Days  @    Session Dose 1,000 cGy   Total Dose 5,000 cGy   L_Lung_SBRT CP  Reference Point from Course C1_bilat_lung_SB   Elapsed Course Days  @    Session Dose 1,079 cGy   Total Dose 5,393 cGy          SUBJECTIVE:  Doing relatively well.  Saw pulmonology last week, adjusted her inhalers.  She reports her breathing has not really changed.  No major acute changes in her breathing, chest tightness or energy level.    VITAL SIGNS:      2023 2023 11/15/2022 2022 10/11/2022 10/10/2022 10/5/2022   Vitals   SYSTOLIC 124 130 140    149    139    156    149    143    135    126    123    132    119    128    127    136    135    132    132    140    125    138    124    119    115    129    145  "   140    106 112 145    145    160    133    128    121 114    DIASTOLIC 74 80 82    93    66    83    72    72    65    59    56    64    56    60    62    62    60    58    58    66    61    72    74    84    72    63    65    64    66 76 92    65    74    84    75    79 90    Pulse 94 94 98    94    89    93    90    83    88    89    87    83    80    69    72    82    74    79    77    78    75    74    83    71    69    64    73    70    67    79    77 97 99    86    84    88    92    91 96    Temperature  36.5 °C (97.7 °F) 36.8 °C (98.2 °F)    36.6 °C (97.9 °F)    36.3 °C (97.4 °F) 36.8 °C (98.3 °F) 36.1 °C (97 °F)    36.6 °C (97.9 °F)    36.6 °C (97.9 °F)    36.4 °C (97.5 °F)     Respiration 18 24 17    16    18    17    20    19    23    17    24    26    24    16    19    25    22    27    25    29    28    18    15    16    21    12    13    9    13    15    16  16    12    12    12    12    18     Weight  206 205.69    208.11 --        pt refused weight 208.11 205 205.03   Height 1.651 m (5' 5\") 1.651 m (5' 5\") 1.651 m (5' 5\") 1.626 m (5' 4\")  1.626 m (5' 4\") 1.626 m (5' 4\")   BMI 34.28 kg/m2 34.28 kg/m2 34.23 kg/m2    34.63 kg/m2    34.63 kg/m2 35.72 kg/m2 35.72 kg/m2 35.19 kg/m2 35.19 kg/m2   Pulse Oximetry 95 %       room air at rest 94 % 91 %    91 %    92 %    92 %    91 %    90 %    91 %    91 %    94 %    92 %    93 %    94 %    93 %    93 %    95 %    94 %    94 %    93 %    95 %    94 %    89 %    90 %    89 %    89 %    90 %    95 %    98 %    98 %    96 % 91 % 94 %    95 %    93 %    100 %    100 %    98 % 90 %        Multiple values from one day are sorted in reverse-chronological order     KPS: 80, Normal activity with effort; some signs or symptoms of disease (ECOG equivalent 1)         1/6/2023   Pain Assessment   Pain Score 5=MODERATE P   Pain Loc GENERALIZED       chronic       Multiple values from one day are sorted in reverse-chronological order          PHYSICAL EXAM:  Physical " Exam  Constitutional:       Appearance: Normal appearance.   Cardiovascular:      Rate and Rhythm: Normal rate and regular rhythm.   Pulmonary:      Effort: Pulmonary effort is normal.      Comments: Breath sounds largely normal bilaterally with trace wheezes bilaterally  Abdominal:      General: Abdomen is flat.      Palpations: Abdomen is soft.   Neurological:      General: No focal deficit present.      Mental Status: She is alert.             View : No data to display.                CURRENT MEDICATIONS:    Current Outpatient Medications:     Budeson-Glycopyrrol-Formoterol (BREZTRI AEROSPHERE) 160-9-4.8 MCG/ACT Aerosol, Inhale 2 Puffs 2 times a day., Disp: 10.7 g, Rfl: 0    HYDROcodone-acetaminophen (NORCO) 5-325 MG Tab per tablet, Take 1 Tablet by mouth every four hours as needed., Disp: , Rfl:     fluticasone furoate-vilanterol (BREO ELLIPTA) 200-25 MCG/ACT AEROSOL POWDER, BREATH ACTIVATED, Inhale 1 Puff every day. Rinse mouth after use., Disp: 3 Each, Rfl: 3    losartan (COZAAR) 50 MG Tab, Take 1 Tablet by mouth every day., Disp: , Rfl:     ergocalciferol (DRISDOL) 98275 UNIT capsule, Take 1 Capsule by mouth every 7 days., Disp: 14 Capsule, Rfl: 3    simvastatin (ZOCOR) 40 MG Tab, Take 1 Tablet by mouth every evening., Disp: 90 Tablet, Rfl: 1    ondansetron (ZOFRAN ODT) 4 MG TABLET DISPERSIBLE, Take 1 Tablet by mouth every 6 hours as needed for Nausea., Disp: 20 Tablet, Rfl: 1    zolpidem (AMBIEN) 5 MG Tab, Take 1 Tablet by mouth at bedtime as needed for Sleep for up to 30 days., Disp: 30 Tablet, Rfl: 0    azithromycin (ZITHROMAX) 250 MG Tab, Take 1 Tablet by mouth every day., Disp: 30 Tablet, Rfl: 5    tiotropium (SPIRIVA RESPIMAT) 2.5 mcg/Act Aero Soln, INHALE TWO PUFFS BY MOUTH DAILY, Disp: 1 Each, Rfl: 5    albuterol 108 (90 Base) MCG/ACT Aero Soln inhalation aerosol, Inhale 2 Puffs every 6 hours as needed for Shortness of Breath., Disp: 18 g, Rfl: 11    methimazole (TAPAZOLE) 5 MG Tab, Take 1 Tablet by  mouth every day., Disp: 90 Tablet, Rfl: 1    rivaroxaban (XARELTO) 20 MG Tab tablet, TAKE ONE TABLET BY MOUTH DAILY WITH DINNER (Patient taking differently: TAKE ONE TABLET BY MOUTH DAILY WITH DINNER  (last dose 11/12/22 prior to procedure)), Disp: 90 Tablet, Rfl: 1    albuterol (PROVENTIL) 2.5mg/3ml Nebu Soln solution for nebulization, Take 3 mL by nebulization every four hours as needed for Shortness of Breath., Disp: 180 Each, Rfl: 1    KLOR-CON M20 20 MEQ Tab CR, Take 1 Tablet by mouth 2 times a day., Disp: , Rfl:     RESTASIS 0.05 % ophthalmic emulsion, Administer 1 Drop into both eyes every day. (Patient not taking: Reported on 1/6/2023), Disp: , Rfl:     sodium bicarbonate (SODIUM BICARBONATE) 650 MG Tab, Take 2 Tablets by mouth in the morning, at noon, and at bedtime., Disp: , Rfl:     allopurinol (ZYLOPRIM) 100 MG Tab, Take 2 Tablets by mouth every day. 200 mg (2 tablets), Disp: , Rfl:     docusate sodium (COLACE) 100 MG Cap, Take 3 Capsules by mouth every evening., Disp: , Rfl:     acetaminophen (TYLENOL) 500 MG Tab, Take 2 Tablets by mouth every 6 hours as needed for Moderate Pain., Disp: , Rfl:     COMBIGAN 0.2-0.5 % Solution, Administer 1 Drop into both eyes 2 times a day., Disp: , Rfl:     LABORATORY DATA:   Lab Results   Component Value Date/Time    SODIUM 144 12/06/2022 03:02 PM    POTASSIUM 4.1 12/06/2022 03:02 PM    CHLORIDE 110 12/06/2022 03:02 PM    CO2 22 12/06/2022 03:02 PM    GLUCOSE 104 (H) 12/06/2022 03:02 PM    BUN 27 (H) 12/06/2022 03:02 PM    CREATININE 1.22 12/06/2022 03:02 PM    CREATININE 1.7 (H) 09/19/2008 08:40 PM       Lab Results   Component Value Date/Time    WBC 5.3 11/15/2022 09:45 AM    RBC 3.77 (L) 11/15/2022 09:45 AM    HEMOGLOBIN 12.7 11/15/2022 09:45 AM    HEMATOCRIT 38.9 11/15/2022 09:45 AM    .2 (H) 11/15/2022 09:45 AM    MCH 33.7 (H) 11/15/2022 09:45 AM    MCHC 32.6 (L) 11/15/2022 09:45 AM    PLATELETCT 233 11/15/2022 09:45 AM         RADIOLOGY DATA:  [id number]  VIEW    Result Date: 12/9/2022  Again seen multiple bilateral renal stones measuring up to 14 mm in size.    MR-BRAIN-W/O    Result Date: 12/10/2022  1.  There is no evidence of large metastasis in this noncontrast MRI. Please note tiny metastasis cannot be evaluated in this noncontrast MRI. 2.  A few punctate nonspecific T2 hyperintensities likely representing nonspecific foci of gliosis/chronic ischemia.    QD-[id number]-DVDFT BASE TO MID-THIGH    Result Date: 12/20/2022  1.  RIGHT upper lobe and lingular nodules both concerning for malignancy.  RIGHT upper lung nodule is new from prior.  Lingular nodule has increased in size and intensity since prior. 2.  No evidence for distant metastatic disease.      IMPRESSION:  Cancer Staging   Lung cancer (HCC)  Staging form: LUNG AJCC V7  - Clinical stage from 12/12/2016: Stage IB (T2a, N0, M0) - Signed by Ange Banks M.D. on 1/6/2023      PLAN:  No change in treatment plan    Disposition:  Treatment plan and imaging reviewed. Questions answered. Continue therapy outlined.     Ange Banks M.D.    No orders of the defined types were placed in this encounter.

## 2023-01-25 NOTE — PROCEDURES
DATE OF SERVICE: 1/25/2023    DIAGNOSIS:  Lung cancer (HCC)  Staging form: LUNG AJCC V7  - Clinical stage from 12/12/2016: Stage IB (T2a, N0, M0) - Signed by Ange Banks M.D. on 1/6/2023  Specimen type: Biopsy/ Limited Resection  Histopathologic type: Adenocarcinoma, NOS  Stage prefix: Initial diagnosis  Laterality: Left  Tumor size (mm): 15  Biopsy of metastatic site performed: No  Histologic grade (G): G2       TREATMENT:  Radiation Therapy Episodes       Active Episodes       Radiation Therapy: SBRT (1/19/2023)                   Radiation Treatments         Plan Last Treated On Elapsed Days Fractions Treated Prescribed Fraction Dose (cGy) Prescribed Total Dose (cGy)    L_Lung_SBRT 1/25/2023 6 @ [id number] 5 of 5 1,000 5,000    R_Lung_SBRT 1/25/2023 6 @ [id number] 5 of 5 1,100 5,500                  Reference Point Last Treated On Elapsed Days Most Recent Session Dose (cGy) Total Dose (cGy)    L_Lung_SBRT 1/25/2023 6 @ [id number] 1,000 5,000    L_Lung_SBRT CP 1/25/2023 6 @ [id number] 1,079 5,393    R_Lung_SBRT 1/24/2023 5 @ [id number] 1,100 4,400    R_Lung_SBRT CP 1/24/2023 5 @ [id number] 1,300 5,201                            STEREOTACTIC PROCEDURE NOTE:    Called by Truebeam machine to verify treatment parameters including:  treatment site, treatment dose, and treatment setup prior to stereotactic treatment..    Patient was placed in the treatment position with use of immobilization device and  laser guidance. CBCT images were acquired for target localization.  Images were reviewed in the axial, coronal, and saggital views and shifts were made as necessary to ensure that patient position matched simulation position.      Treatment delivered per  prescription.  The medical physicist was present throughout the set-up, verification and treatment delivery to oversee the procedure and ensure all parameters agreed with the computerized plan.    I have personally reviewed the relevant data, performed  the target localization, and determined all relevant factors for this patient’s simulation.    NOTE: Machine went down after completion of delivery of left lung SBRT, before right lung was delivered.  Left lung completed as planned.  Additional note to follow for final fraction of right lung SBRT once completed.

## 2023-02-01 NOTE — ADDENDUM NOTE
Encounter addended by: Marybeth Lagunas, Med Ass't on: 2/1/2023 11:41 AM   Actions taken: Pend clinical note

## 2023-02-01 NOTE — RADIATION COMPLETION NOTES
END OF TREATMENT SUMMARY    Patient name:  Latonia Clinton    Primary Physician:  Liat Emerson M.D. MRN: [id number]  CSN: [id number]   Referring physician:  Quincy Rizvi III, M.*  : 1950, 72 y.o.       TREATMENT SUMMARY:        Course First Treatment Date 2023    Course Last Treatment Date 2023   Course Elapsed Days 6 @ [id number]   Course Intent Curative     Radiation Therapy Episodes       Active Episodes       Radiation Therapy: SBRT (2023)                   Radiation Treatments         Plan Last Treated On Elapsed Days Fractions Treated Prescribed Fraction Dose (cGy) Prescribed Total Dose (cGy)    L_Lung_SBRT 2023 6 @ [id number] 5 of 5 1,000 5,000    R_Lung_SBRT 2023 6 @ [id number] 5 of 5 1,100 5,500                  Reference Point Last Treated On Elapsed Days Most Recent Session Dose (cGy) Total Dose (cGy)    L_Lung_SBRT 2023 6 @ [id number] -- 5,000    L_Lung_SBRT CP 2023 6 @ [id number] -- 5,393    R_Lung_SBRT 2023 6 @ [id number] -- 5,500    R_Lung_SBRT CP 2023 6 @ [id number] -- 6,502                                     STAGE:   Lung cancer (HCC)  Staging form: LUNG AJCC V7  - Clinical stage from 2016: Stage IB (T2a, N0, M0) - Signed by Ange Banks M.D. on 2023  Specimen type: Biopsy/ Limited Resection  Histopathologic type: Adenocarcinoma, NOS  Stage prefix: Initial diagnosis  Laterality: Left  Tumor size (mm): 15  Biopsy of metastatic site performed: No  Histologic grade (G): G2       TREATMENT INDICATION:   Definitive treatment for limited oligo progressive/recurrent disease     CONCURRENT SYSTEMIC TREATMENT:   None     RT COURSE DISCONTINUED EARLY:   No     PATIENT EXPERIENCE:        View : No data to display.                 FOLLOW-UP PLAN:   8 Week virtual visit     COMMENT:          ANATOMIC TARGET SUMMARY    ANATOMIC TARGET MODALITY TECHNIQUE   Right lung, left lung lesion   External beam, photons  SBRT            COMMENT:         DIAGRAMS:      DOSE VOLUME HISTOGRAMS:

## 2023-02-02 PROBLEM — E21.3 HYPERPARATHYROIDISM (HCC): Chronic | Status: ACTIVE | Noted: 2019-11-13

## 2023-02-02 NOTE — PROGRESS NOTES
Subjective:     Chief Complaint   Patient presents with    Follow-Up     3 mo screen      Diagnoses of Recurrent non-small cell lung cancer (HCC), Encounter for screening mammogram for breast cancer, Nausea, Hyperparathyroidism (HCC), and Other insomnia were pertinent to this visit.    HPI: Latonia is a pleasant 72 y.o. female who presents today  for follow-up on problems listed below.  Problem   Recurrent Non-Small Cell Lung Cancer (Hcc)    Repeat CT chest 5/9/22 showed stable lingular nodule but new 7 mm nodule and repeat CT in 9/2022 showed increase in size of both lingular nodule 14x15 mm and RLL nodule 8mmx10 mm. She underwent bronchoscopy with ion biopsy of the lingular nodule which returned as adenoCa. She was seen by [facility] and referred to radiation oncology after PET showed FDG uptake in lingular nodule and new, FDG avid RUL nodule. No FDG uptake in RLL. MRI negative for metastasis.     Cancer Care [facility] note reviewed.  Briefly: History of adenocarcinoma of the lung in 2016 status post resection of left upper lobe mass [id number].  30-pack-year smoking history quit in 2000, moderate to severe COPD.  Most recent PET scan on 1/28/2022 was found to have a small lingular nodule with mild FDG avid concerning for malignancy.  On 11/15/2022 she underwent CT-guided biopsy of the left lingular pulmonary node, consistent with adenocarcinoma.     Brain MRI showed no evidence of metastatic disease.  Follow-up PET CT scan showed a lesion of concern in the left lingula and another 1 in the right lung.    She has no signs of overt metastatic disease.     She has had a total of 5 sessions of radiation, she tolerated them well.  Following up with Dr. Rizvi on February 22, 2023, plan for repeat CT scan in 6-8 weeks.       Hyperparathyroidism (Hcc)    This condition is managed by endocrinology, not on any medications as of now.    Component      Latest Ref Rng 12/6/2022   Calcium      8.5 - 10.5 mg/dL 9.6    Pth, Intact       "14.0 - 72.0 pg/mL 346.0 (H)       (H) High       Other Insomnia    This is a chronic condition, patient has been taking Ambien 5mg tab nightly for many months now.   The medication is helping her to improve her symptoms.   No early refills on controlled substances.  No history of lost or stolen substance prescriptions  Compliant with treatment recommendations and plan: Yes  Any major health change to the patient: No  Concerns for misuse, abuse or addiction: No  /NarxCheck report reviewed: Yes  History of abnormal drug screening: Yes 11/08/2022 - pos for alcohol, will return for recollect   PDMP reviewed: Last filled on 1/14/2023, 30 tab, 30 days supply.        Past Medical History:   Diagnosis Date    Anesthesia     \"Abnormal blood pressure and breathing\"  \"couldn't breathe\"    Asthma     inhalers daily    Backpain 7/2017     thor. area    Blood clot in vein 07/06/2018    \"Currently have a blood clot in my left eye\"    Bowel habit changes 07/06/2018    Constipation    Breath shortness     with exertion has O2 but does not use    Bronchitis     CAD (coronary artery disease)     palpatations    Cancer (HCC) 2016    left lung    Chickenpox     COPD (chronic obstructive pulmonary disease) (HCC)     Depression 2/26/2019    Dialysis 2005    transient renal failure due to sepsis    Emphysema of lung (HCC)     Glaucoma     right eye    Hemorrhagic disorder (HCC)     Hyperlipidemia     Hypertension     Hyperthyroidism     Kidney stone     bilateral    Lung cancer (HCC) 12/12/2016    Multiple thyroid nodules 7/9/2015    Nasal drainage     Obstruction of left ureteropelvic junction (UPJ) due to stone 6/17/2018    Osteoporosis     Pain 07/06/2018    Back pain    Personal history of venous thrombosis and embolism 2004, 2012    right leg 2012, left arm dvt 2004 left eye 2017    Pneumonia 7/2016    per patient    Renal disorder     had been on dialysis for 7 months for acute failure 2005    Renal stones 2013    post lithotripsy " "   Rheumatoid arthritis (HCC)     question    Rheumatoid arthritis (HCC)     S/P appendectomy 1998     S/P cholecystectomy 1998    S/P kyphoplasty 2006, 2007, 2013    Sepsis, unspecified 2005    Shortness of breath 07/06/2018    Chronic current problem. \"A couple of years now\".  O2 concentrator at night - pt states she doesn't use it.    Thyroid nodule, hot 2017    functional \"hot\" right thyroid nodule without thyrotoxicosis     Current Outpatient Medications Ordered in Epic   Medication Sig Dispense Refill    ondansetron (ZOFRAN) 4 MG Tab tablet Take 1 Tablet by mouth every four hours as needed for Nausea/Vomiting for up to 10 days. 30 Tablet 1    Budeson-Glycopyrrol-Formoterol (BREZTRI AEROSPHERE) 160-9-4.8 MCG/ACT Aerosol Inhale 2 Puffs 2 times a day. 10.7 g 0    HYDROcodone-acetaminophen (NORCO) 5-325 MG Tab per tablet Take 1 Tablet by mouth every four hours as needed.      fluticasone furoate-vilanterol (BREO ELLIPTA) 200-25 MCG/ACT AEROSOL POWDER, BREATH ACTIVATED Inhale 1 Puff every day. Rinse mouth after use. 3 Each 3    losartan (COZAAR) 50 MG Tab Take 1 Tablet by mouth every day.      ergocalciferol (DRISDOL) 09601 UNIT capsule Take 1 Capsule by mouth every 7 days. 14 Capsule 3    simvastatin (ZOCOR) 40 MG Tab Take 1 Tablet by mouth every evening. 90 Tablet 1    ondansetron (ZOFRAN ODT) 4 MG TABLET DISPERSIBLE Take 1 Tablet by mouth every 6 hours as needed for Nausea. 20 Tablet 1    zolpidem (AMBIEN) 5 MG Tab Take 1 Tablet by mouth at bedtime as needed for Sleep for up to 30 days. 30 Tablet 0    azithromycin (ZITHROMAX) 250 MG Tab Take 1 Tablet by mouth every day. 30 Tablet 5    tiotropium (SPIRIVA RESPIMAT) 2.5 mcg/Act Aero Soln INHALE TWO PUFFS BY MOUTH DAILY 1 Each 5    albuterol 108 (90 Base) MCG/ACT Aero Soln inhalation aerosol Inhale 2 Puffs every 6 hours as needed for Shortness of Breath. 18 g 11    methimazole (TAPAZOLE) 5 MG Tab Take 1 Tablet by mouth every day. 90 Tablet 1    rivaroxaban " "(XARELTO) 20 MG Tab tablet TAKE ONE TABLET BY MOUTH DAILY WITH DINNER (Patient taking differently: TAKE ONE TABLET BY MOUTH DAILY WITH DINNER  (last dose 11/12/22 prior to procedure)) 90 Tablet 1    albuterol (PROVENTIL) 2.5mg/3ml Nebu Soln solution for nebulization Take 3 mL by nebulization every four hours as needed for Shortness of Breath. 180 Each 1    KLOR-CON M20 20 MEQ Tab CR Take 1 Tablet by mouth 2 times a day.      RESTASIS 0.05 % ophthalmic emulsion Administer 1 Drop into both eyes every day. (Patient not taking: Reported on 1/6/2023)      sodium bicarbonate (SODIUM BICARBONATE) 650 MG Tab Take 2 Tablets by mouth in the morning, at noon, and at bedtime.      allopurinol (ZYLOPRIM) 100 MG Tab Take 2 Tablets by mouth every day. 200 mg (2 tablets)      docusate sodium (COLACE) 100 MG Cap Take 3 Capsules by mouth every evening.      acetaminophen (TYLENOL) 500 MG Tab Take 2 Tablets by mouth every 6 hours as needed for Moderate Pain.      COMBIGAN 0.2-0.5 % Solution Administer 1 Drop into both eyes 2 times a day.       No current [facility]-ordered facility-administered medications on file.     Health Maintenance: Due for colonoscopy, she will let me know  She would like me to refer her to GI.  Due for mammogram: Order provided.    Review of Systems   Constitutional:  Positive for malaise/fatigue. Negative for chills, fever and weight loss.   HENT:  Negative for sore throat.    Respiratory:  Positive for shortness of breath. Negative for cough.    Cardiovascular:  Negative for chest pain and palpitations.   Gastrointestinal:  Negative for abdominal pain, diarrhea, nausea and vomiting.   Genitourinary:  Negative for dysuria and urgency.   Musculoskeletal:  Positive for back pain and joint pain.   Neurological:  Negative for dizziness.     Objective:     Exam:  /64 (BP Location: Left arm, Patient Position: Sitting, BP Cuff Size: Adult)   Pulse (!) 111   Temp 36.2 °C (97.2 °F) (Temporal)   Ht 1.651 m (5' 5\")  "  Wt 93.4 kg (206 lb)   LMP  (LMP Unknown)   SpO2 94%   BMI 34.28 kg/m²  Body mass index is 34.28 kg/m².    Physical Exam  Constitutional:       General: She is not in acute distress.     Appearance: She is not ill-appearing, toxic-appearing or diaphoretic.   HENT:      Head: Normocephalic and atraumatic.      Mouth/Throat:      Mouth: Mucous membranes are moist.      Pharynx: Oropharynx is clear. No oropharyngeal exudate or posterior oropharyngeal erythema.   Eyes:      General: No scleral icterus.  Cardiovascular:      Rate and Rhythm: Normal rate and regular rhythm.      Pulses: Normal pulses.      Heart sounds: Normal heart sounds.   Pulmonary:      Effort: Pulmonary effort is normal. No respiratory distress.      Breath sounds: Normal breath sounds.   Musculoskeletal:      Right knee: Normal.   Skin:     General: Skin is warm and dry.   Neurological:      Mental Status: She is alert.      Motor: Weakness (BL LE 3/5) present.      Gait: Gait abnormal (walker).   Psychiatric:         Mood and Affect: Mood normal.         Behavior: Behavior normal.     Assessment & Plan:   Latonia  is a pleasant 72 y.o. female with the following -     Problem List Items Addressed This Visit       Hyperparathyroidism (HCC) (Chronic)     Patient significantly elevated, patient has a follow-up appointment with endocrinology on 4/10/2023.           Other insomnia (Chronic)     Chronic, controlled on Ambien 5 mg nightly as needed.  Patient's last urine drug screen test came back to live for alcohol.  Will return for recollect.  No further refills until UDS.         Recurrent non-small cell lung cancer (HCC)     See [facility] notes for details.  Status post 5 sessions of radiation therapy, tolerating well.  Patient is feeling optimistic.  Follow-up with oncology.            Other Visit Diagnoses       Encounter for screening mammogram for breast cancer        Relevant Orders    MA-SCREENING MAMMO BILAT W/TOMOSYNTHESIS W/CAD    Nausea         Relevant Medications    ondansetron (ZOFRAN) 4 MG Tab tablet          Return in about 3 months (around 5/2/2023), or if symptoms worsen or fail to improve, for return for urine recollect for non-provider visit .    Please note that this dictation was created using voice recognition software. I have made every reasonable attempt to correct obvious errors, but I expect that there are errors of grammar and possibly content that I did not discover before finalizing the note.

## 2023-02-02 NOTE — ASSESSMENT & PLAN NOTE
See [facility] notes for details.  Status post 5 sessions of radiation therapy, tolerating well.  Patient is feeling optimistic.  Follow-up with oncology.

## 2023-02-02 NOTE — ASSESSMENT & PLAN NOTE
Chronic, controlled on Ambien 5 mg nightly as needed.  Patient's last urine drug screen test came back to live for alcohol.  Will return for recollect.  No further refills until UDS.

## 2023-02-02 NOTE — ASSESSMENT & PLAN NOTE
Patient significantly elevated, patient has a follow-up appointment with endocrinology on 4/10/2023.

## 2023-02-08 NOTE — TELEPHONE ENCOUNTER
Caller Name: Latonia Clinton                 Call Back Number: [phone] (home)         Patient approves a detailed voicemail message: N\A    Have we ever prescribed this med? Yes.  If yes, what date? 12/8/21     Last OV: 1/16/23 Dr. Allan     Next OV: 6/21/23 Dr. Allan     DX: COPD     Medications:  Current Outpatient Medications   Medication Sig Dispense Refill    ondansetron (ZOFRAN) 4 MG Tab tablet Take 1 Tablet by mouth every four hours as needed for Nausea/Vomiting for up to 10 days. 30 Tablet 1    Budeson-Glycopyrrol-Formoterol (BREZTRI AEROSPHERE) 160-9-4.8 MCG/ACT Aerosol Inhale 2 Puffs 2 times a day. 10.7 g 0    HYDROcodone-acetaminophen (NORCO) 5-325 MG Tab per tablet Take 1 Tablet by mouth every four hours as needed.      fluticasone furoate-vilanterol (BREO ELLIPTA) 200-25 MCG/ACT AEROSOL POWDER, BREATH ACTIVATED Inhale 1 Puff every day. Rinse mouth after use. 3 Each 3    losartan (COZAAR) 50 MG Tab Take 1 Tablet by mouth every day.      ergocalciferol (DRISDOL) 95767 UNIT capsule Take 1 Capsule by mouth every 7 days. 14 Capsule 3    simvastatin (ZOCOR) 40 MG Tab Take 1 Tablet by mouth every evening. 90 Tablet 1    ondansetron (ZOFRAN ODT) 4 MG TABLET DISPERSIBLE Take 1 Tablet by mouth every 6 hours as needed for Nausea. 20 Tablet 1    zolpidem (AMBIEN) 5 MG Tab Take 1 Tablet by mouth at bedtime as needed for Sleep for up to 30 days. 30 Tablet 0    azithromycin (ZITHROMAX) 250 MG Tab Take 1 Tablet by mouth every day. 30 Tablet 5    tiotropium (SPIRIVA RESPIMAT) 2.5 mcg/Act Aero Soln INHALE TWO PUFFS BY MOUTH DAILY 1 Each 5    albuterol 108 (90 Base) MCG/ACT Aero Soln inhalation aerosol Inhale 2 Puffs every 6 hours as needed for Shortness of Breath. 18 g 11    methimazole (TAPAZOLE) 5 MG Tab Take 1 Tablet by mouth every day. 90 Tablet 1    rivaroxaban (XARELTO) 20 MG Tab tablet TAKE ONE TABLET BY MOUTH DAILY WITH DINNER (Patient taking differently: TAKE ONE TABLET BY MOUTH DAILY WITH DINNER   (last dose 11/12/22 prior to procedure)) 90 Tablet 1    albuterol (PROVENTIL) 2.5mg/3ml Nebu Soln solution for nebulization Take 3 mL by nebulization every four hours as needed for Shortness of Breath. 180 Each 1    KLOR-CON M20 20 MEQ Tab CR Take 1 Tablet by mouth 2 times a day.      RESTASIS 0.05 % ophthalmic emulsion Administer 1 Drop into both eyes every day. (Patient not taking: Reported on 1/6/2023)      sodium bicarbonate (SODIUM BICARBONATE) 650 MG Tab Take 2 Tablets by mouth in the morning, at noon, and at bedtime.      allopurinol (ZYLOPRIM) 100 MG Tab Take 2 Tablets by mouth every day. 200 mg (2 tablets)      docusate sodium (COLACE) 100 MG Cap Take 3 Capsules by mouth every evening.      acetaminophen (TYLENOL) 500 MG Tab Take 2 Tablets by mouth every 6 hours as needed for Moderate Pain.      COMBIGAN 0.2-0.5 % Solution Administer 1 Drop into both eyes 2 times a day.       No current facility-administered medications for this visit.

## 2023-02-09 NOTE — PROGRESS NOTES
Latonia Joshua Mary Beth is a 72 y.o. female here for a non-provider visit for UDS collection

## 2023-02-16 NOTE — TELEPHONE ENCOUNTER
Contacted pt regarding millennium report results informed medication will be sent to pharmacy   [facility] [id number] - [place], [place] - [address]

## 2023-02-16 NOTE — TELEPHONE ENCOUNTER
Phone Number Called: [phone] (home)      Call outcome: Spoke to patient regarding message below.    Message: called pt informed medication sent to pharmacy. Pt verbalized understanding had no questions

## 2023-03-10 NOTE — PROGRESS NOTES
As a means of avoiding spread of COVID-19, this visit is being conducted by telephone. This telephone visit was initiated by the patient and they verbally consented.    Time at start of call: 1:00 PM  Location: [facility]    Reason for Call:  Post Treatment Follow Up    HPI:    Ms. Clinton is a 72-year-old lady who presents today for consideration of SBRT for recurrent/new diagnosis of left upper lobe adenocarcinoma.  In brief, her history dates back to 2016 when she was diagnosed with a left upper lobe adenocarcinoma, and underwent a wedge resection.  Pathology was notable for adenocarcinoma, margins negative, pathology T2A Nx, and she did well for many years.  She had been following closely with [facility] pulmonology, as she is known to have scattered pulmonary nodules.       In January 2022, she had a PET CT scan that noted a small lingular nodule with very mild FDG avid uptake concerning for possible malignancy, with additional subcentimeter pulmonary nodules in the right lower lobe with no FDG uptake.  Plan was made for follow-up CT scans and she had intermittent follow-up scans through the year, and more recently a CT of the chest in September showed an irregular 15 mm lingular nodule, slightly larger than before, and again suspicious for malignancy.  The right lung lesion had also become a bit more prominent measuring 10 x 8 mm.  Therefore, in October she underwent fiberoptic bronchoscopy and robotic bronchoscopy with Dr. Apple with the left upper lobe nodule biopsy and bronchoalveolar lavage negative for malignant cells.     Given negative results, she then underwent a CT-guided biopsy of the left lingular lesion with pathology consistent with adenocarcinoma, lipidic spread, focal acinar invasion.  PD-L1 is 3%, and there are no targetable mutations identified on [employer].  Following this, she had an updated PET/CT and MRI brain for restaging.  Again were noted the hypermetabolic nodule in the lingula, max  SUV 7.3, as well as the hypermetabolic nodule in the right upper lobe, SUV 10.8.  MRI brain was negative.     She was referred to medical oncology and then subsequently completed stereotactic radiation as below.    Radiation Oncology          1/24/2023 1/25/2023   [facility] Course Treatment Dates   Course First Treatment Date 01/19/2023 01/19/2023     Course Last Treatment Date 01/24/2023 01/25/2023     [facility] Treatment Summary   L_Lung_SBRT  Plan from Course C1_bilat_lung_SB   Fraction 4 of 5 5 of 5    5 of 5   Elapsed Course Days 5 @ [id number] 6 @ [id number]    6 @ [id number]   Prescribed Fraction Dose 1,000 cGy 1,000 cGy    1,000 cGy   Prescribed Total Dose 5,000 cGy 5,000 cGy    5,000 cGy   R_Lung_SBRT  Plan from Course C1_bilat_lung_SB   Fraction 4 of 5 5 of 5    5 of 5   Elapsed Course Days 5 @ [id number] 6 @ [id number]    6 @ [id number]   Prescribed Fraction Dose 1,100 cGy 1,100 cGy    1,100 cGy   Prescribed Total Dose 5,500 cGy 5,500 cGy    5,500 cGy   L_Lung_SBRT  Reference Point from Course C1_bilat_lung_SB   Elapsed Course Days 5 @ [id number] 6 @ [id number]    6 @ [id number]   Session Dose 1,000 cGy 1,000 cGy    --   Total Dose 4,000 cGy 5,000 cGy    5,000 cGy   L_Lung_SBRT CP  Reference Point from Course C1_bilat_lung_SB   Elapsed Course Days 5 @ [id number] 6 @ [id number]    6 @ [id number]   Session Dose 1,079 cGy 1,079 cGy    --   Total Dose 4,314 cGy 5,393 cGy    5,393 cGy   R_Lung_SBRT  Reference Point from Course C1_bilat_lung_SB   Elapsed Course Days 5 @ [id number] 6 @ [id number]    6 @ [id number]   Session Dose 1,100 cGy 1,100 cGy    --   Total Dose 4,400 cGy 5,500 cGy    5,500 cGy   R_Lung_SBRT CP  Reference Point from Course C1_bilat_lung_SB   Elapsed Course Days 5 @ [id number] 6 @ [id number]    6 @ [id number]   Session Dose 1,300 cGy 1,300 cGy    --   Total Dose 5,201 cGy 6,502 cGy    6,502 cGy       More values are hidden. Newest values shown. Go to activity  for more data.        INTERVAL HISTORY:  She is doing relatively okay, but has had some setbacks.  Her back pain seems to have worsened, and she is planning on undergoing a RFA versus stimulator for this.  She still has issues with the nausea.  With regards to her lungs, no worsening dyspnea, chest wall tenderness, or fevers or chills.  Overall, she reports her breathing essentially at baseline.    Labs / Images Reviewed:   No results found.    Assessment and Plan:   Lung cancer (HCC)  Staging form: LUNG AJCC V7  - Clinical stage from 12/12/2016: Stage IB (T2a, N0, M0) - Signed by Ange Banks M.D. on 1/6/2023  Specimen type: Biopsy/ Limited Resection  Histopathologic type: Adenocarcinoma, NOS  Stage prefix: Initial diagnosis  Laterality: Left  Tumor size (mm): 15  Biopsy of metastatic site performed: No  Histologic grade (G): G2        Follow-up: Doing okay, follow-up in 6 weeks with CT chest    Time at end of call: 1:05 PM    Total Time Spent:5 minutes    Ange Banks M.D.

## 2023-03-17 NOTE — TELEPHONE ENCOUNTER
Patient is one of Anthony's, but she called because she concerned about her medication whether she should adjust it. Nephrology called her because her calcium increased, and they told her she needed she needed to get in conact w/ her endocrinologist

## 2023-03-17 NOTE — TELEPHONE ENCOUNTER
Patient's calcium levels increase to 2.4, and she is calling to see if she needs to adjust her medication. Her nephrologist got in contact with her, but because it's endo related she was advised to speak to you

## 2023-03-24 PROBLEM — N18.9 ACUTE ON CHRONIC KIDNEY FAILURE (HCC): Status: ACTIVE | Noted: 2019-02-11

## 2023-03-24 PROBLEM — E83.52 HYPERCALCEMIA: Status: ACTIVE | Noted: 2023-01-01

## 2023-03-24 NOTE — ASSESSMENT & PLAN NOTE
Creatinine down to 2, improving but still at risk for worsening of dysfunction  Avoid nephrotoxic agents  Nephrology continues to follow  Continue to monitor with routine labs

## 2023-03-24 NOTE — ASSESSMENT & PLAN NOTE
Parathyroid study negative  Continue at this point with monitoring parathyroid levels as well as TSH levels.    Parathyroid peptide levels as well, per nephrology  Continue with Sensipar

## 2023-03-24 NOTE — ED PROVIDER NOTES
ED Provider Note    CHIEF COMPLAINT  Chief Complaint   Patient presents with    Abnormal Labs       EXTERNAL RECORDS REVIEWED  Outpatient Notes 2/2/2023, outpatient PCP, hyperparathyroidism with endocrinology follow-up on 4/10/2023    REGINALD/MALATHI    Latonia Clinton is a 72 y.o. female who presents for evaluation of hypercalcemia in the setting of hyperparathyroidism.  Sent in by nephrology with ISAMAR as well.  The patient is reporting progressively severe right-sided flank pain as well as left leg pain that is preventing her from being able to safely walk.  She has no recent injuries but describe many falls in the past year or so.  She has no focal weakness or numbness.  No headache.  No vomiting, fever, diarrhea or other acute complaints offered at this time    PAST MEDICAL HISTORY   has a past medical history of Anesthesia, Asthma, Backpain (7/2017), Blood clot in vein (07/06/2018), Bowel habit changes (07/06/2018), Breath shortness, Bronchitis, CAD (coronary artery disease), Cancer ([facility]) (2016), Chickenpox, COPD (chronic obstructive pulmonary disease) (HCC), Depression (2/26/2019), Dialysis (2005), Emphysema of lung ([facility]), Glaucoma, Hemorrhagic disorder ([facility]), Hyperlipidemia, Hypertension, Hyperthyroidism, Kidney stone, Lung cancer ([facility]) (12/12/2016), Multiple thyroid nodules (7/9/2015), Nasal drainage, Obstruction of left ureteropelvic junction (UPJ) due to stone (6/17/2018), Osteoporosis, Pain (07/06/2018), Personal history of venous thrombosis and embolism (2004, 2012), Pneumonia (7/2016), Renal disorder, Renal stones (2013), Rheumatoid arthritis ([facility]), Rheumatoid arthritis ([facility]), S/P appendectomy (1998 ), S/P cholecystectomy (1998), S/P kyphoplasty (2006, 2007, 2013), Sepsis, unspecified (2005), Shortness of breath (07/06/2018), and Thyroid nodule, hot (2017).    SURGICAL HISTORY   has a past surgical history that includes other; other abdominal surgery; breast augmentation with implant; breast reduction;  "appendectomy; cholecystectomy; laminotomy; lung biopsy open; thoracoscopy (Left, 2016); other orthopedic surgery (); cystoscopy stent placement (Left, 2018); lithotripsy (Left, 2018); lasertripsy (Left, 2018); ureteroscopy (Left, 2018); cystoscopy stent placement (2018); ureteroscopy (Left, 2018); lasertripsy (Left, 2018); and bronchoscopy, robot-assisted (10/11/2022).    FAMILY HISTORY  Family History   Problem Relation Age of Onset    Cancer Mother         kidney    Heart Failure Father     Heart Disease Brother     Cancer Maternal Uncle         liver    Cancer Paternal Aunt         ovarian    Cancer Paternal Uncle         lung       SOCIAL HISTORY  Social History     Tobacco Use    Smoking status: Former     Packs/day: 1.00     Years: 30.00     Pack years: 30.00     Types: Cigarettes     Quit date: 2000     Years since quittin.2    Smokeless tobacco: Never    Tobacco comments:     7 years ago   Vaping Use    Vaping Use: Never used   Substance and Sexual Activity    Alcohol use: Yes     Alcohol/week: 0.0 oz     Comment: x2 a week    Drug use: No    Sexual activity: Not on file       CURRENT MEDICATIONS  Home Medications    **Home medications have not yet been reviewed for this encounter**         ALLERGIES  No Known Allergies    PHYSICAL EXAM  VITAL SIGNS: BP 93/62   Pulse 92   Temp (!) 35.7 °C (96.3 °F) (Temporal)   Resp 17   Ht 1.676 m (5' 6\")   Wt 91.3 kg (201 lb 4.5 oz)   LMP  (LMP Unknown)   SpO2 97%   BMI 32.49 kg/m²    Constitutional: Awake and alert, seems to be very uncomfortable  HENT: Normocephalic, no obvious evidence of acute trauma.  Eyes: No scleral icterus. Normal conjunctiva bilateral exophthalmos  Thorax & Lungs: Normal nonlabored respirations. I appreciate no wheezing, rhonchi or rales. There is normal air movement.  Upon cardiac ascultation I appreciate a regular heart rhythm and a normal rate.   Abdomen: The abdomen is not visibly " distended.  Upon palpation has mild generalized tenderness with no focal rebound or guarding  Skin: The exposed portions of skin reveal no obvious rash or other abnormalities.  Extremities/Musculoskeletal: Limited range of motion of the left hip and left knee secondary to pain but neurovascular intact distally  Neurologic: Alert & oriented. No focal deficits observed.      DIAGNOSTIC STUDIES / PROCEDURES    LABS  Results for orders placed or performed during the hospital encounter of 03/24/23   CBC WITH DIFFERENTIAL   Result Value Ref Range    WBC 7.1 4.8 - 10.8 K/uL    RBC 3.25 (L) 4.20 - 5.40 M/uL    Hemoglobin 11.4 (L) 12.0 - 16.0 g/dL    Hematocrit 34.6 (L) 37.0 - 47.0 %    .5 (H) 81.4 - 97.8 fL    MCH 35.1 (H) 27.0 - 33.0 pg    MCHC 32.9 (L) 33.6 - 35.0 g/dL    RDW 56.8 (H) 35.9 - 50.0 fL    Platelet Count 212 164 - 446 K/uL    MPV 11.1 9.0 - 12.9 fL    Neutrophils-Polys 71.30 44.00 - 72.00 %    Lymphocytes 16.90 (L) 22.00 - 41.00 %    Monocytes 9.10 0.00 - 13.40 %    Eosinophils 1.80 0.00 - 6.90 %    Basophils 0.30 0.00 - 1.80 %    Immature Granulocytes 0.60 0.00 - 0.90 %    Nucleated RBC 0.00 /100 WBC    Neutrophils (Absolute) 5.04 2.00 - 7.15 K/uL    Lymphs (Absolute) 1.19 1.00 - 4.80 K/uL    Monos (Absolute) 0.64 0.00 - 0.85 K/uL    Eos (Absolute) 0.13 0.00 - 0.51 K/uL    Baso (Absolute) 0.02 0.00 - 0.12 K/uL    Immature Granulocytes (abs) 0.04 0.00 - 0.11 K/uL    NRBC (Absolute) 0.00 K/uL   Comp Metabolic Panel   Result Value Ref Range    Sodium 138 135 - 145 mmol/L    Potassium 4.9 3.6 - 5.5 mmol/L    Chloride 106 96 - 112 mmol/L    Co2 21 20 - 33 mmol/L    Anion Gap 11.0 7.0 - 16.0    Glucose 98 65 - 99 mg/dL    Bun 23 (H) 8 - 22 mg/dL    Creatinine 2.04 (H) 0.50 - 1.40 mg/dL    Calcium 11.4 (H) 8.4 - 10.2 mg/dL    AST(SGOT) 33 12 - 45 U/L    ALT(SGPT) 26 2 - 50 U/L    Alkaline Phosphatase 146 (H) 30 - 99 U/L    Total Bilirubin 0.4 0.1 - 1.5 mg/dL    Albumin 3.0 (L) 3.2 - 4.9 g/dL    Total Protein  5.2 (L) 6.0 - 8.2 g/dL    Globulin 2.2 1.9 - 3.5 g/dL    A-G Ratio 1.4 g/dL   CORRECTED CALCIUM   Result Value Ref Range    Correct Calcium 12.2 (HH) 8.5 - 10.5 mg/dL   ESTIMATED GFR   Result Value Ref Range    GFR (CKD-EPI) 25 (A) >60 mL/min/1.73 m 2        RADIOLOGY  I have independently interpreted the diagnostic imaging associated with this visit and am waiting the final reading from the radiologist.   My preliminary interpretation is as follows: Significant renal atrophy, no obvious obstruction  Radiologist interpretation:   CT-RENAL COLIC EVALUATION(A/P W/O)   Final Result      1.  There is a nonobstructive 2 mm left proximal ureter calcification.   2.  Bilateral intrarenal calcifications and calyceal stones are similar to the previous examination.   3.  Bilateral benign Bosniak 1 and Bosniak 2 simple cysts are again seen which require no imaging follow-up per ACR guidelines.   4.  Stable bilateral adrenal gland benign adenomas which required no imaging follow-up per ACR guidelines.   5.  Prominent submucosal fat throughout the colon may simply be related to normal variant due to patient body habitus.   6.  Subpleural lower lobe lung opacities are probably postinflammatory.            COURSE & MEDICAL DECISION MAKING    ED Observation Status? No; Patient does not meet criteria for ED Observation.     INITIAL ASSESSMENT, COURSE AND PLAN  Care Narrative: 72-year-old female with progressively severe right-sided back pain and left leg pain, sent in by nephrology with hypercalcemia and acute kidney injury.  Known hyperparathyroidism with endocrinology follow-up.  No acute injuries.  States that the pain is becoming so severe she is having trouble ambulating.  Blood work will be obtained, CT will be obtained, she will be reevaluated  Differential includes: Electrolyte abnormality, dehydration, anemia, spinal fractures, obstructive uropathy, retroperitoneal hematoma    CT scan reveals a nonobstructive small stone on  the left which does not correlate to her right-sided pain.  Her blood work reveals worsening acute kidney injury with slow progressive downward trend of her GFR.  Her calcium remains critically high greater than 12.  At this point, she has been evaluated by Dr. Nunez and nephrologist.  She will be admitted to the hospital for further evaluation and care.  DISPOSITION AND DISCUSSIONS  I have discussed management of the patient with the following physicians and NINI's: Dr. Nunez, nephrology Dr. Jordan, hospitalist.    FINAL DIAGNOSIS  1. Acute renal failure, unspecified acute renal failure type (HCC)    2. Hypercalcemia    3. Acute bilateral low back pain with left-sided sciatica           Electronically signed by: Giles Steward M.D., 3/24/2023 2:39 PM

## 2023-03-24 NOTE — CONSULTS
"[facility] -  CONSULTATION NOTE               Author: Royal Nunez M.D. Date & Time: 3/24/2023  2:34 PM       REASON FOR CONSULTATION:   Hypercalcemia     CHIEF COMPLAINT:   Weakness    HISTORY OF PRESENT ILLNESS:    73 Yo female with history notable for CKD, HTN, HLD, lung cancer presented with hypercalcemia on outpatient labs and weakness from outpatient nephrology clinic. Has presumed primary hypercalcemia with elevated PTH  (most recent level 170, from 346) but has failed to follow-up with outpatient confirmatory imaging.  One week ago her corrected calcium was noted to be 12.1 with high normal vit D. Vitamin D stopped and she was told to increase oral hydration with repeat labs in 1 weeks. Labs yesterday resulted with a corrected calcium of 12.3 and she noted ongoing weakness and inability to ambulate/back/flank/leg pain prompting referral to ED. She notes being more thirstt than normal. No gross hematuria. Her most recent cr also appears above baseline (~1.5).  Initial ER labs with cr of 2 and corrected calcium of 12.2. Soft Bps in ER, 90/60,  took her losartan this AM. Abd  CT performed, results pending. No chest pain, sob, leg swelling.    REVIEW OF SYSTEMS:    10 Point ROS Performed, negative other than stated above    PAST MEDICAL HISTORY:   Past Medical History:   Diagnosis Date    Anesthesia     \"Abnormal blood pressure and breathing\"  \"couldn't breathe\"    Asthma     inhalers daily    Backpain 7/2017     thor. area    Blood clot in vein 07/06/2018    \"Currently have a blood clot in my left eye\"    Bowel habit changes 07/06/2018    Constipation    Breath shortness     with exertion has O2 but does not use    Bronchitis     CAD (coronary artery disease)     palpatations    Cancer (HCC) 2016    left lung    Chickenpox     COPD (chronic obstructive pulmonary disease) ([facility])     Depression 2/26/2019    Dialysis 2005    transient renal failure due to sepsis    Emphysema of lung " "(HCC)     Glaucoma     right eye    Hemorrhagic disorder (HCC)     Hyperlipidemia     Hypertension     Hyperthyroidism     Kidney stone     bilateral    Lung cancer (HCC) 12/12/2016    Multiple thyroid nodules 7/9/2015    Nasal drainage     Obstruction of left ureteropelvic junction (UPJ) due to stone 6/17/2018    Osteoporosis     Pain 07/06/2018    Back pain    Personal history of venous thrombosis and embolism 2004, 2012    right leg 2012, left arm dvt 2004 left eye 2017    Pneumonia 7/2016    per patient    Renal disorder     had been on dialysis for 7 months for acute failure 2005    Renal stones 2013    post lithotripsy    Rheumatoid arthritis (HCC)     question    Rheumatoid arthritis (HCC)     S/P appendectomy 1998     S/P cholecystectomy 1998    S/P kyphoplasty 2006, 2007, 2013    Sepsis, unspecified 2005    Shortness of breath 07/06/2018    Chronic current problem. \"A couple of years now\".  O2 concentrator at night - pt states she doesn't use it.    Thyroid nodule, hot 2017    functional \"hot\" right thyroid nodule without thyrotoxicosis         PAST SURGICAL HISTORY:   Past Surgical History:   Procedure Laterality Date    BRONCHOSCOPY, ROBOT-ASSISTED  10/11/2022    Procedure: FIBER OPTIC BRONCHOSCOPY WITH  WASH, BRUSH, BRONCHOALVEOLAR LAVAGE, BIOSPY, FINE NEEDLE ASPIRATION & NAVIGATION, ROBOTICS;  Surgeon: Donita Haque M.D.;  Location: [facility];  Service: Pulmonary Robotic    CYSTOSCOPY STENT PLACEMENT  7/9/2018    Procedure: Cystoscopy,  Left removal of stent ,  Left Stent Placement;  Surgeon: Beck Yang M.D.;  Location: [facility];  Service: Urology    URETEROSCOPY Left 7/9/2018    Procedure: URETEROSCOPY;  Surgeon: Beck Yang M.D.;  Location: [facility];  Service: Urology    LASERTRIPSY Left 7/9/2018    Procedure: LASERTRIPSY-LITHO;  Surgeon: Beck Yang M.D.;  Location: [facility];  Service: Urology    CYSTOSCOPY STENT PLACEMENT Left " "6/18/2018    Procedure: CYSTOSCOPY STENT PLACEMENT;  Surgeon: Beck Yang M.D.;  Location: [facility];  Service: Urology    LITHOTRIPSY Left 6/18/2018    Procedure: LITHOTRIPSY;  Surgeon: Beck Yang M.D.;  Location: [facility];  Service: Urology    LASERTRIPSY Left 6/18/2018    Procedure: LASERTRIPSY;  Surgeon: Beck Yang M.D.;  Location: [facility];  Service: Urology    URETEROSCOPY Left 6/18/2018    Procedure: URETEROSCOPY;  Surgeon: Beck Yang M.D.;  Location: [facility];  Service: Urology    THORACOSCOPY Left 12/12/2016    Procedure: THORACOSCOPY W/WEDGE RESECTION UPPER LOBE MASS;  Surgeon: John H Ganser, M.D.;  Location: [facility];  Service:     OTHER ORTHOPEDIC SURGERY  2013    kyphoplasty X3    APPENDECTOMY      1990    CHOLECYSTECTOMY      1990    LAMINOTOMY      LUNG BIOPSY OPEN      OTHER      OTHER ABDOMINAL SURGERY      gall bladder disease    NV BREAST AUGMENTATION WITH IMPLANT      NV BREAST REDUCTION         FAMILY HISTORY:   No family history of kidney disease    SOCIAL HISTORY:   No smoking, no etoh, no illicit drugs.    HOME MEDICATIONS:   Reviewed and documented in chart    ALLERGIES:  Patient has no known allergies.    PHYSICAL EXAM:  VS:  BP 93/62   Pulse 92   Temp (!) 35.7 °C (96.3 °F) (Temporal)   Resp 17   Ht 1.676 m (5' 6\")   Wt 91.3 kg (201 lb 4.5 oz)   LMP  (LMP Unknown)   SpO2 97%   BMI 32.49 kg/m²   GENERAL: Frail  CV: RRR, No pedal edema  RESP: Non-labored  GI: Soft  MSK: decreased strength BL LE extremities   SKIN: No concerning rashes  NEURO: AOx3  PSYCH: Cooperative    LABS:  Recent Results (from the past 24 hour(s))   CBC WITH DIFFERENTIAL    Collection Time: 03/24/23  3:17 PM   Result Value Ref Range    WBC 7.1 4.8 - 10.8 K/uL    RBC 3.25 (L) 4.20 - 5.40 M/uL    Hemoglobin 11.4 (L) 12.0 - 16.0 g/dL    Hematocrit 34.6 (L) 37.0 - 47.0 %    .5 (H) 81.4 - 97.8 fL    MCH 35.1 (H) 27.0 - " 33.0 pg    MCHC 32.9 (L) 33.6 - 35.0 g/dL    RDW 56.8 (H) 35.9 - 50.0 fL    Platelet Count 212 164 - 446 K/uL    MPV 11.1 9.0 - 12.9 fL    Neutrophils-Polys 71.30 44.00 - 72.00 %    Lymphocytes 16.90 (L) 22.00 - 41.00 %    Monocytes 9.10 0.00 - 13.40 %    Eosinophils 1.80 0.00 - 6.90 %    Basophils 0.30 0.00 - 1.80 %    Immature Granulocytes 0.60 0.00 - 0.90 %    Nucleated RBC 0.00 /100 WBC    Neutrophils (Absolute) 5.04 2.00 - 7.15 K/uL    Lymphs (Absolute) 1.19 1.00 - 4.80 K/uL    Monos (Absolute) 0.64 0.00 - 0.85 K/uL    Eos (Absolute) 0.13 0.00 - 0.51 K/uL    Baso (Absolute) 0.02 0.00 - 0.12 K/uL    Immature Granulocytes (abs) 0.04 0.00 - 0.11 K/uL    NRBC (Absolute) 0.00 K/uL   Comp Metabolic Panel    Collection Time: 03/24/23  3:17 PM   Result Value Ref Range    Sodium 138 135 - 145 mmol/L    Potassium 4.9 3.6 - 5.5 mmol/L    Chloride 106 96 - 112 mmol/L    Co2 21 20 - 33 mmol/L    Anion Gap 11.0 7.0 - 16.0    Glucose 98 65 - 99 mg/dL    Bun 23 (H) 8 - 22 mg/dL    Creatinine 2.04 (H) 0.50 - 1.40 mg/dL    Calcium 11.4 (H) 8.4 - 10.2 mg/dL    AST(SGOT) 33 12 - 45 U/L    ALT(SGPT) 26 2 - 50 U/L    Alkaline Phosphatase 146 (H) 30 - 99 U/L    Total Bilirubin 0.4 0.1 - 1.5 mg/dL    Albumin 3.0 (L) 3.2 - 4.9 g/dL    Total Protein 5.2 (L) 6.0 - 8.2 g/dL    Globulin 2.2 1.9 - 3.5 g/dL    A-G Ratio 1.4 g/dL   CORRECTED CALCIUM    Collection Time: 03/24/23  3:17 PM   Result Value Ref Range    Correct Calcium 12.2 (HH) 8.5 - 10.5 mg/dL   ESTIMATED GFR    Collection Time: 03/24/23  3:17 PM   Result Value Ref Range    GFR (CKD-EPI) 25 (A) >60 mL/min/1.73 m 2       (click the triangle to expand results)    IMAGING:  CT-RENAL COLIC EVALUATION(A/P W/O)    (Results Pending)       ASSESSMENT:  # ISAMAR: In setting of hypotension, hypercalcemia  # CKD: frequent fluctuations in cr, baseline ~1.5  # Hypercalcemia: PTH medicated. presumed primary hyperparathyroid  # HTN: on losartan at home  # Lung cancer  # COPD  # Nephrolithiasis      PLAN:  -Hold Losartan  -check iCal  -Start NS at 75cc/hr  -Start sensipar 30mg daily  -No role for calcitonin yet  -UA  -FU CT abd to rule out obstruction  -Inpatient parathyroid scan if possible  -Strict IOs    Thank you for this consult, we will continue to follow.    Royal Nunez MD

## 2023-03-24 NOTE — ASSESSMENT & PLAN NOTE
Patient has completed 5 courses of radiation therapy with Dr. Banks  Patient follows for chemotherapy with Dr. Rizvi  Patient to continue outpatient follow-ups with oncology

## 2023-03-24 NOTE — TELEPHONE ENCOUNTER
Have we ever prescribed this med? Yes.  If yes, what date? 09/09/22    Last OV: 01/16/23 with Dr Allan    Next OV: 06/21/23 with Dr Allan    DX: [facility]    Medications:   Requested Prescriptions     Pending Prescriptions Disp Refills    azithromycin (ZITHROMAX) 250 MG Tab [Pharmacy Med Name: AZITHROMYCIN 250 MG TABLET] 30 Tablet 5     Sig: TAKE ONE TABLET BY MOUTH DAILY

## 2023-03-24 NOTE — ASSESSMENT & PLAN NOTE
MRI of the lumbar and cervical spine performed:  Cervical spine findings chronic  Lumbar findings show T1 and T2 compression finding old and chronic  T4 compression fracture is relatively new but chronic this was nobs observed on previous imaging studies.  No acute pathology that would indicate surgical intervention at this point time this was discussed with the patient.  PT/OT recommending skilled nursing.  Reevaluated on 3/27 patient was unable to ambulate without assistance so is now more accepting of skilled nursing.  Plan is for discharge tomorrow to skilled nursing facility

## 2023-03-24 NOTE — ED NOTES
Pharmacy Medication Reconciliation      ~Medication reconciliation updated and complete per patient at bedside with medication list. Reviewed list with patient and returned at bedside   ~Allergies have been verified   ~Patient home pharmacy :  [facility]    ~Patient reports that she currently takes Zithromax and has for years.

## 2023-03-24 NOTE — ASSESSMENT & PLAN NOTE
Blood pressure controlled at 111/71 now.  Continue as needed hydralazine 10 mg every 4 hours if systolic blood pressure greater than 160  Holding losartan with abnormal renal functions

## 2023-03-24 NOTE — ASSESSMENT & PLAN NOTE
Secondary to hyperparathyroidism  Repeat calcium down to 9.9  Aredia given on 3/27, 3/28, per nephrology's recommendations

## 2023-03-24 NOTE — H&P
Hospital Medicine History & Physical Note    Date of Service  3/24/2023    Primary Care Physician  Liat Emerson M.D.    Consultants  nephrology    Specialist Names: Enrique    Code Status  Prior    Chief Complaint  Chief Complaint   Patient presents with    Abnormal Labs       History of Presenting Illness  Latonia Clinton is a 72 y.o. female who presented 3/24/2023 with elevated calcium as well as worsening renal function.  Patient has had a recent history of hyperparathyroidism and hyperthyroidism and has been on suppressive therapy with methimazole and encouraged to increase her hydration to see if this would improve her calcium.  She had repeat labs which showed worsening calcium and she has had increase in her pain as well as falls and weakness.  She has chronic low back pain related to multiple compression fractures some of which have had kyphoplasty.  She has had 2 falls since her spine has been imaged and she is concerned that she may have more compression fractures.  MRI thoracic and lumbar spine have been ordered.  I have also started her on low-dose Skelaxin to see if this helps with her muscle spasms, pain and does not induce somnolence.  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  She is on 2 L oxygen at night secondary to COPD.  She is also on azithromycin since 2016 having been started by her pulmonologist due to prophylaxis.  She has diagnosis of lung cancer and has been followed with Dr. Banks and Dr. Barger and has had radiation therapy only.  She is to follow-up with medical oncology next month.  She has had multiple injections into the left knee without significant relief and knows that she has a meniscal tear and the last orthopedic surgeon she followed up was with Dr. Sorensen but given her active lung cancer recommended that she had this treated before any type of orthopedic intervention.  Patient has been sent in by nephrology, Dr. Nunez and recommended that she  receive gentle IV hydration of normal saline at 75 cc an hour, hold losartan secondary to renal function as well as hypotension and acute kidney injury. .    I discussed the plan of care with patient.    Review of Systems  Review of Systems   Constitutional:  Positive for chills. Negative for fever.   HENT:  Negative for congestion and sore throat.    Eyes:  Negative for blurred vision and photophobia.   Respiratory:  Negative for cough and shortness of breath.    Cardiovascular:  Negative for chest pain, claudication and leg swelling.   Gastrointestinal:  Negative for abdominal pain, constipation, diarrhea, heartburn and vomiting.   Genitourinary:  Negative for dysuria and hematuria.   Musculoskeletal:  Positive for back pain, joint pain (Left knee pain) and myalgias.   Skin:  Negative for itching and rash.   Neurological:  Negative for dizziness, sensory change, speech change, weakness and headaches.   Psychiatric/Behavioral:  Negative for depression. The patient is not nervous/anxious and does not have insomnia.      Past Medical History   has a past medical history of Anesthesia, Asthma, Backpain (7/2017), Blood clot in vein (07/06/2018), Bowel habit changes (07/06/2018), Breath shortness, Bronchitis, CAD (coronary artery disease), Cancer ([facility]) (2016), Chickenpox, COPD (chronic obstructive pulmonary disease) ([facility]), Depression (2/26/2019), Dialysis (2005), Emphysema of lung ([facility]), Glaucoma, Hemorrhagic disorder ([facility]), Hyperlipidemia, Hypertension, Hyperthyroidism, Kidney stone, Lung cancer ([facility]) (12/12/2016), Multiple thyroid nodules (7/9/2015), Nasal drainage, Obstruction of left ureteropelvic junction (UPJ) due to stone (6/17/2018), Osteoporosis, Pain (07/06/2018), Personal history of venous thrombosis and embolism (2004, 2012), Pneumonia (7/2016), Renal disorder, Renal stones (2013), Rheumatoid arthritis ([facility]), Rheumatoid arthritis ([facility]), S/P appendectomy (1998 ), S/P cholecystectomy (1998), S/P kyphoplasty  (2006, 2007, 2013), Sepsis, unspecified (2005), Shortness of breath (07/06/2018), and Thyroid nodule, hot (2017).    Surgical History   has a past surgical history that includes other; other abdominal surgery; pr breast augmentation with implant; pr breast reduction; appendectomy; cholecystectomy; laminotomy; lung biopsy open; thoracoscopy (Left, 12/12/2016); other orthopedic surgery (2013); cystoscopy stent placement (Left, 6/18/2018); lithotripsy (Left, 6/18/2018); lasertripsy (Left, 6/18/2018); ureteroscopy (Left, 6/18/2018); cystoscopy stent placement (7/9/2018); ureteroscopy (Left, 7/9/2018); lasertripsy (Left, 7/9/2018); and bronchoscopy, robot-assisted (10/11/2022).     Family History  family history includes Cancer in her maternal uncle, mother, paternal aunt, and paternal uncle; Heart Disease in her brother; Heart Failure in her father.   Family history reviewed with patient. There is no family history that is pertinent to the chief complaint.     Social History   reports that she quit smoking about 23 years ago. Her smoking use included cigarettes. She has a 30.00 pack-year smoking history. She has never used smokeless tobacco. She reports current alcohol use. She reports that she does not use drugs.    Allergies  No Known Allergies    Medications  Prior to Admission Medications   Prescriptions Last Dose Informant Patient Reported? Taking?   COMBIGAN 0.2-0.5 % Solution 3/23/2023 at PM Patient Yes No   Sig: Administer 1 Drop into both eyes 2 times a day.   HYDROcodone-acetaminophen (NORCO) 5-325 MG Tab per tablet 3/24/2023 at AM Patient Yes No   Sig: Take 1 Tablet by mouth every four hours as needed.   KLOR-CON M20 20 MEQ Tab CR 3/23/2023 at PM Patient Yes No   Sig: Take 40 mEq by mouth every evening.   Magnesium 400 MG Tab 3/23/2023 at PM Patient Yes Yes   Sig: Take 400 mg by mouth every evening.   Multiple Vitamins-Minerals (HAIR SKIN AND NAILS FORMULA) Tab 3/23/2023 at PM Patient Yes Yes   Sig: Take 3  Tablets by mouth every evening.   acetaminophen (TYLENOL) 500 MG Tab 3/23/2023 at PM Patient Yes No   Sig: Take 1,000 mg by mouth 3 times a day.   albuterol (PROVENTIL) 2.5mg/3ml Nebu Soln solution for nebulization FEW WEEKS AGO at PRN Patient No No   Sig: USE THREE MILLILITERS (1 VIAL) VIA NEBULIZATION BY MOUTH EVERY 4 HOURS AS NEEDED FOR SHORTNESS OF BREATH   albuterol 108 (90 Base) MCG/ACT Aero Soln inhalation aerosol 3/24/2023 at AM Patient No No   Sig: Inhale 2 Puffs every 6 hours as needed for Shortness of Breath.   allopurinol (ZYLOPRIM) 100 MG Tab 3/24/2023 at AM Patient Yes No   Sig: Take 200 mg by mouth every morning.   azithromycin (ZITHROMAX) 250 MG Tab 3/23/2023 at AM Patient No No   Sig: Take 1 Tablet by mouth every day.   colchicine (COLCRYS) 0.6 MG Tab PRN at PRN Patient Yes Yes   Sig: Take 0.6 mg by mouth 1 time a day as needed. Indications: Gout   ergocalciferol (DRISDOL) 10560 UNIT capsule 2 WEEKS at K Patient No No   Sig: Take 1 Capsule by mouth every 7 days.   fluticasone furoate-vilanterol (BREO ELLIPTA) 200-25 MCG/ACT AEROSOL POWDER, BREATH ACTIVATED 3/23/2023 at AM Patient No No   Sig: Inhale 1 Puff every day. Rinse mouth after use.   losartan (COZAAR) 50 MG Tab 3/24/2023 at AM Patient Yes No   Sig: Take 50 mg by mouth every morning.   methimazole (TAPAZOLE) 5 MG Tab 3/23/2023 at PM Patient No No   Sig: TAKE ONE TABLET BY MOUTH DAILY   ondansetron (ZOFRAN ODT) 4 MG TABLET DISPERSIBLE 3/23/2023 at PRN Patient No No   Sig: Take 1 Tablet by mouth every 6 hours as needed for Nausea.   rivaroxaban (XARELTO) 20 MG Tab tablet 3/23/2023 at PM Patient No No   Sig: TAKE ONE TABLET BY MOUTH DAILY WITH DINNER   simvastatin (ZOCOR) 40 MG Tab 3/23/2023 at PM Patient No No   Sig: Take 1 Tablet by mouth every evening.   tiotropium (SPIRIVA RESPIMAT) 2.5 mcg/Act Aero Soln 3/23/2023 at AM Patient No No   Sig: INHALE TWO PUFFS BY MOUTH DAILY   zolpidem (AMBIEN) 5 MG Tab 3/23/2023 at PM Patient No No   Sig:  Take 1 Tablet by mouth at bedtime as needed for Sleep for up to 30 days.      Facility-Administered Medications: None       Physical Exam  Temp:  [35.7 °C (96.3 °F)] 35.7 °C (96.3 °F)  Pulse:  [76-94] 94  Resp:  [17] 17  BP: ()/(61-68) 102/68  SpO2:  [97 %-99 %] 98 %  Blood Pressure : 102/68   Temperature: (!) 35.7 °C (96.3 °F)   Pulse: 94   Respiration: 17   Pulse Oximetry: 98 %       Physical Exam  Vitals and nursing note reviewed.   Constitutional:       General: She is not in acute distress.     Appearance: Normal appearance. She is not ill-appearing.   HENT:      Head: Normocephalic and atraumatic.      Nose: Nose normal.   Eyes:      Comments: Bilateral exophthalmosis   Cardiovascular:      Rate and Rhythm: Normal rate and regular rhythm.      Heart sounds: Normal heart sounds. No murmur heard.  Pulmonary:      Effort: Pulmonary effort is normal.      Breath sounds: Normal breath sounds.   Abdominal:      General: Bowel sounds are normal. There is no distension.      Palpations: Abdomen is soft.   Musculoskeletal:         General: No swelling or tenderness.      Cervical back: Neck supple.   Skin:     General: Skin is warm and dry.   Neurological:      General: No focal deficit present.      Mental Status: She is alert and oriented to person, place, and time.   Psychiatric:         Mood and Affect: Mood normal.       Laboratory:  Recent Labs     03/24/23  1517   WBC 7.1   RBC 3.25*   HEMOGLOBIN 11.4*   HEMATOCRIT 34.6*   .5*   MCH 35.1*   MCHC 32.9*   RDW 56.8*   PLATELETCT 212   MPV 11.1     Recent Labs     03/23/23  1447 03/24/23  1517   SODIUM 138 138   POTASSIUM 4.8 4.9   CHLORIDE 102 106   CO2 24 21   GLUCOSE 102* 98   BUN 23* 23*   CREATININE 1.90* 2.04*   CALCIUM 11.7* 11.4*     Recent Labs     03/23/23  1447 03/24/23  1517   ALTSGPT 31 26   ASTSGOT 33 33   ALKPHOSPHAT 181* 146*   TBILIRUBIN 0.4 0.4   GLUCOSE 102* 98         No results for input(s): NTPROBNP in the last 72 hours.      No  results for input(s): TROPONINT in the last 72 hours.    Imaging:  CT-RENAL COLIC EVALUATION(A/P W/O)   Final Result      1.  There is a nonobstructive 2 mm left proximal ureter calcification.   2.  Bilateral intrarenal calcifications and calyceal stones are similar to the previous examination.   3.  Bilateral benign Bosniak 1 and Bosniak 2 simple cysts are again seen which require no imaging follow-up per ACR guidelines.   4.  Stable bilateral adrenal gland benign adenomas which required no imaging follow-up per ACR guidelines.   5.  Prominent submucosal fat throughout the colon may simply be related to normal variant due to patient body habitus.   6.  Subpleural lower lobe lung opacities are probably postinflammatory.      NM-PARATHYROID (SESTAMIBI) SCAN    (Results Pending)   MR-LUMBAR SPINE-W/O    (Results Pending)   MR-THORACIC SPINE-W/O    (Results Pending)       CT renal colic, images reviewed    Assessment/Plan:  Justification for Admission Status  I anticipate this patient will require at least two midnights for appropriate medical management, necessitating inpatient admission because cardiac monitoring for hypercalcemia, IV fluids, further monitoring related to her acute kidney injury    Patient will need a Telemetry bed on MEDICAL service .  The need is secondary to monitoring while hypercalcemic.    * Hypercalcemia- (present on admission)  Assessment & Plan  Suspicion of elevation related to hyperparathyroidism  Parathyroid hormone pending  Calcinet    Chronic pain of left knee- (present on admission)  Assessment & Plan  Patient has had multiple interventions without relief  Last orthopedic surgeon she saw was Dr. Sorensen who recommended she get her cancer treated before he would operate on her left knee      Chronic anticoagulation- (present on admission)  Assessment & Plan  History of DVT years ago   used to be on warfarin transition to Xarelto.    No evidence of bleeding, continue with  Xarelto    Chronic bilateral low back pain without sciatica- (present on admission)  Assessment & Plan  Patient with multiple compression fractures and multiple falls  MRI lumbar spine  MRI thoracic spine  Pain management  Muscle relaxer    Hyperparathyroidism (HCC)- (present on admission)  Assessment & Plan  Repeat parathyroid hormone  Hypercalcemia with a total calcium of 11.4 and corrected calcium of 12.2  IV hydration  Calcinet recommended per nephrology    Other insomnia- (present on admission)  Assessment & Plan  Resume home dose Ambien    Macrocytic anemia- (present on admission)  Assessment & Plan  , check B12 level      Acute on chronic kidney failure (HCC)  Assessment & Plan  Creatinine up to 2.04  Nephrology consulting  IV hydration per nephrology recommendations at normal saline 75 cc/h  concerned that this is related to her hypercalcemia      Lung cancer (HCC)- (present on admission)  Assessment & Plan  Follows with oncology Dr. Barger  Radiation oncology Dr. Banks  Has had radiation therapy only  Supposed to follow-up with oncology in 1 month for repeat CT scan    Deep vein thrombosis (DVT) (HCC)- (present on admission)  Assessment & Plan  Continue home dose Xarelto    Thyrotoxicosis with toxic single thyroid nodule and without thyroid storm- (present on admission)  Assessment & Plan  Continue home dose methimazole    Chronic obstructive pulmonary disease (HCC)- (present on admission)  Assessment & Plan  Wears 2 L at night, no current exacerbation  Continue home breathing treatments  Patient chronically on azithromycin per pulmonology    Hypertension- (present on admission)  Assessment & Plan  Hypotensive upon arrival to emergency room  Hold losartan        VTE prophylaxis: therapeutic anticoagulation with Xarelto

## 2023-03-24 NOTE — ED TRIAGE NOTES
Pt to triage via w/c; c/o abnormal labs per nephrologist Dr Banks incl BUN/creat. Pt reports pain to low back/ R flank region.

## 2023-03-25 NOTE — PROGRESS NOTES
Hospital Medicine Daily Progress Note    Date of Service  3/25/2023    Chief Complaint  Latonia Clinton is a 72 y.o. female admitted 3/24/2023 with hypercalcemia from outside facility    Hospital Course  Latonia is a 72-year-old female comes into the hospital with elevated calcium levels.  The patient apparently has known hyperparathyroidism as well as hypothyroidism.  The patient also has known lung cancer that is been under treatment with oncology and radiation oncology.  The patient now was evaluated for the hypercalcemia and has been given fluid resuscitation as well as pain management.  The patient was also started on Sensipar because of the hyperparathyroidism and parathyroid levels were low repeated.  Patient at this point also is complaining of back pain as well as knee pain.  At this point lumbar MRI is ordered which is pending.  The knee pain is from chronic ACL tear which orthopedic surgery has seen and recommended no surgical intervention due to her ongoing cancer treatments.  At this point patient will need continued pain management, monitoring of her renal functions.  Monitoring of her calcium levels.  Potential placement after PT OT evaluation however patient adamantly refuses to go into a skilled facility.  I have discussed with her the potential for going home with home health and she prefers status she says she is too many things to do at home although she seems very sedentary at home and not able to perform her activities that she is thinking.    Interval Problem Update  PT OT evaluation  Monitor renal functions  Monitor fluid status  Continue with Sensipar  MRI of the lumbar spine  Pain management of her right knee which she does have an immobilizer for that she does not wear and she is noncompliant with  Continue with follow-ups as an outpatient with oncology for her colon cancer treatments  Possibly needs to go to skilled which so far she is refusing.    I have discussed this patient's  plan of care and discharge plan at IDT rounds today with Case Management, Nursing, Nursing leadership, and other members of the IDT team.    Consultants/Specialty  nephrology    Code Status  Full Code    Disposition  Patient is not medically cleared for discharge.   Anticipate discharge to to home with close outpatient follow-up.  I have placed the appropriate orders for post-discharge needs.    Review of Systems  Review of Systems   Constitutional:  Positive for malaise/fatigue. Negative for chills, diaphoresis and fever.   HENT: Negative.  Negative for hearing loss, nosebleeds and sore throat.    Eyes: Negative.  Negative for double vision.   Respiratory:  Positive for shortness of breath. Negative for cough, hemoptysis and wheezing.    Cardiovascular: Negative.  Negative for chest pain, palpitations and leg swelling.   Gastrointestinal: Negative.  Negative for abdominal pain, blood in stool, constipation, diarrhea, heartburn, nausea and vomiting.   Genitourinary: Negative.  Negative for frequency, hematuria and urgency.   Musculoskeletal:  Positive for back pain, joint pain and myalgias.   Skin: Negative.  Negative for itching and rash.   Neurological:  Positive for headaches. Negative for dizziness, focal weakness, seizures and loss of consciousness.   Endo/Heme/Allergies: Negative.  Does not bruise/bleed easily.   Psychiatric/Behavioral:  Positive for depression. Negative for suicidal ideas. The patient is nervous/anxious and has insomnia.    All other systems reviewed and are negative.     Physical Exam  Temp:  [35.7 °C (96.3 °F)-37.3 °C (99.1 °F)] 37.3 °C (99.1 °F)  Pulse:  [] 96  Resp:  [16-24] 18  BP: ()/(61-81) 132/68  SpO2:  [92 %-100 %] 98 %    Physical Exam  Vitals and nursing note reviewed. Exam conducted with a chaperone present.   Constitutional:       Appearance: Normal appearance. She is well-developed. She is obese. She is ill-appearing.   HENT:      Head: Normocephalic and atraumatic.       Right Ear: External ear normal.      Left Ear: External ear normal.      Nose: Nose normal. No congestion or rhinorrhea.      Mouth/Throat:      Mouth: Mucous membranes are moist.      Pharynx: Oropharynx is clear.   Eyes:      Extraocular Movements: Extraocular movements intact.      Conjunctiva/sclera: Conjunctivae normal.      Pupils: Pupils are equal, round, and reactive to light.   Neck:      Thyroid: No thyroid mass.      Vascular: No carotid bruit or JVD.   Cardiovascular:      Rate and Rhythm: Normal rate and regular rhythm.      Pulses: Normal pulses.      Heart sounds: Normal heart sounds, S1 normal and S2 normal.   Pulmonary:      Effort: Pulmonary effort is normal.      Breath sounds: Normal air entry. Examination of the right-lower field reveals decreased breath sounds and rhonchi. Examination of the left-lower field reveals decreased breath sounds and rhonchi. Decreased breath sounds and rhonchi present.   Abdominal:      General: Abdomen is flat. Bowel sounds are normal.      Palpations: Abdomen is soft.   Musculoskeletal:         General: Tenderness (left knee) present. Normal range of motion.      Cervical back: Normal range of motion and neck supple. No edema or rigidity.      Right lower leg: Edema present.      Left lower leg: Edema present.      Right foot: Normal range of motion. No deformity or foot drop.      Left foot: Normal range of motion. No deformity or foot drop.   Feet:      Right foot:      Skin integrity: Skin integrity normal. No ulcer.      Left foot:      Skin integrity: Skin integrity normal. No ulcer.   Lymphadenopathy:      Head:      Right side of head: No submental adenopathy.      Left side of head: No submental adenopathy.      Cervical: No cervical adenopathy.      Right cervical: No superficial cervical adenopathy.     Left cervical: No superficial cervical adenopathy.      Upper Body:      Right upper body: No supraclavicular adenopathy.      Left upper body: No  supraclavicular adenopathy.      Lower Body: No right inguinal adenopathy. No left inguinal adenopathy.   Skin:     General: Skin is warm and dry.      Findings: No abrasion or wound.   Neurological:      General: No focal deficit present.      Mental Status: She is alert and oriented to person, place, and time. Mental status is at baseline.      GCS: GCS eye subscore is 4. GCS verbal subscore is 5. GCS motor subscore is 6.      Sensory: Sensation is intact.      Motor: Motor function is intact. No weakness.      Coordination: Coordination abnormal.      Gait: Gait abnormal.   Psychiatric:         Attention and Perception: Attention and perception normal.         Mood and Affect: Mood normal. Affect is flat.         Speech: Speech normal.         Behavior: Behavior is slowed. Behavior is cooperative.         Thought Content: Thought content normal.         Cognition and Memory: Cognition and memory normal.         Judgment: Judgment normal.       Fluids    Intake/Output Summary (Last 24 hours) at 3/25/2023 1354  Last data filed at 3/25/2023 0813  Gross per 24 hour   Intake 140 ml   Output --   Net 140 ml       Laboratory  Recent Labs     03/24/23  1517 03/25/23  0037   WBC 7.1 5.4   RBC 3.25* 2.70*   HEMOGLOBIN 11.4* 9.6*   HEMATOCRIT 34.6* 30.9*   .5* 114.4*   MCH 35.1* 35.6*   MCHC 32.9* 31.1*   RDW 56.8* 59.8*   PLATELETCT 212 149*   MPV 11.1 11.3     Recent Labs     03/23/23  1447 03/24/23  1517 03/25/23  0037   SODIUM 138 138 145   POTASSIUM 4.8 4.9 4.1   CHLORIDE 102 106 119*   CO2 24 21 14*   GLUCOSE 102* 98 107*   BUN 23* 23* 22   CREATININE 1.90* 2.04* 1.54*   CALCIUM 11.7* 11.4* 8.5                   Imaging  NM-PARATHYROID (SESTAMIBI) SCAN   Final Result      No evidence of parathyroid adenoma.      CT-RENAL COLIC EVALUATION(A/P W/O)   Final Result      1.  There is a nonobstructive 2 mm left proximal ureter calcification.   2.  Bilateral intrarenal calcifications and calyceal stones are similar to  the previous examination.   3.  Bilateral benign Bosniak 1 and Bosniak 2 simple cysts are again seen which require no imaging follow-up per ACR guidelines.   4.  Stable bilateral adrenal gland benign adenomas which required no imaging follow-up per ACR guidelines.   5.  Prominent submucosal fat throughout the colon may simply be related to normal variant due to patient body habitus.   6.  Subpleural lower lobe lung opacities are probably postinflammatory.      MR-LUMBAR SPINE-W/O    (Results Pending)   MR-THORACIC SPINE-W/O    (Results Pending)        Assessment/Plan  * Hypercalcemia- (present on admission)  Assessment & Plan  Secondary to hyperparathyroidism  Repeat PTH is down to 145 from 172.  Repeat calcium is down to 10.2.    Hyperparathyroidism (HCC)- (present on admission)  Assessment & Plan  Repeat calcium level at this point has stabilized  Repeat renal functions have stabilized  Continue at this point with Sensipar 30 mg daily to continue to controlled issue    Acute on chronic kidney failure (HCC)  Assessment & Plan  Renal functions at this point have improved and patient's creatinine is down to 1.5  Nephrology consult appreciated  Monitor medications to avoid nephrotoxicity.      Lung cancer (HCC)- (present on admission)  Assessment & Plan  Patient has completed 5 courses of radiation therapy with Dr. Banks  Patient follows for chemotherapy with Dr. Rizvi    Recommendation is to continue follow-up with them as needed and scheduled.    Thyrotoxicosis with toxic single thyroid nodule and without thyroid storm- (present on admission)  Assessment & Plan  On methimazole 5 mg daily  TSH level 0.130    Chronic bilateral low back pain without sciatica- (present on admission)  Assessment & Plan  MRI of the lumbar spine pending  Continue with pain management    Deep vein thrombosis (DVT) (HCC)- (present on admission)  Assessment & Plan  Patient on chronic Xarelto    Chronic obstructive pulmonary disease (HCC)-  (present on admission)  Assessment & Plan  Continue oxygen support  2 L is her baseline  Nebulizer treatments    Hypertension- (present on admission)  Assessment & Plan  Optimize blood pressure management keep systolic blood pressure less than 140 diastolic under 90  Continue at this point with Hydralazine as needed  Patient at home was on losartan which has been discontinued by nephrology    Chronic pain of left knee- (present on admission)  Assessment & Plan  Patient has an ACL tear on the left knee.  Patient has a hard time ambulating  She did see Dr. Sorensen of orthopedics who does not recommend surgery with her ongoing lung cancer.      Chronic anticoagulation- (present on admission)  Assessment & Plan  Continue with Xarelto given DVT    Other insomnia- (present on admission)  Assessment & Plan  Continue with Ambien at nighttime    Macrocytic anemia- (present on admission)  Assessment & Plan  Vitamin B12 level at 984 and within normal limits.  At this point no supplementation recommended         VTE prophylaxis: SCDs/TEDs    I have performed a physical exam and reviewed and updated ROS and Plan today (3/25/2023). In review of yesterday's note (3/24/2023), there are no changes except as documented above.

## 2023-03-25 NOTE — PROGRESS NOTES
"[facility] -  PROGRESS NOTE               Author: Warren Garland M.D. Date & Time: 3/25/2023  9:28 AM     HPI:  73 Yo female with history notable for CKD, HTN, HLD, lung cancer presented with hypercalcemia on outpatient labs and weakness from outpatient nephrology clinic. Has presumed primary hypercalcemia with elevated PTH  (most recent level 170, from 346) but has failed to follow-up with outpatient confirmatory imaging.  One week ago her corrected calcium was noted to be 12.1 with high normal vit D. Vitamin D stopped and she was told to increase oral hydration with repeat labs in 1 weeks. Labs yesterday resulted with a corrected calcium of 12.3 and she noted ongoing weakness and inability to ambulate/back/flank/leg pain prompting referral to ED. She notes being more thirstt than normal. No gross hematuria. Her most recent cr also appears above baseline (~1.5).  Initial ER labs with cr of 2 and corrected calcium of 12.2. Soft Bps in ER, 90/60,  took her losartan this AM. Abd  CT performed, results pending. No chest pain, sob, leg swelling.    DAILY NEPHROLOGY SUMMARY:  3/25: Calcium and creatinine trending down.  Having back and leg pain, chronic.  Parathyroid scan pending.      REVIEW OF SYSTEMS:    10 point ROS reviewed and is as per HPI/daily summary or otherwise negative    PMH/PSH/SH/FH:   Reviewed and unchanged since admission note    CURRENT MEDICATIONS:   Reviewed from admission to present day    VS:  /61   Pulse 85   Temp 37 °C (98.6 °F) (Temporal)   Resp 18   Ht 1.676 m (5' 6\")   Wt 99.7 kg (219 lb 12.8 oz)   LMP  (LMP Unknown)   SpO2 95%   BMI 35.48 kg/m²     Physical Exam  HENT:      Head: Normocephalic.      Right Ear: External ear normal.      Left Ear: External ear normal.      Nose: Nose normal.      Mouth/Throat:      Mouth: Mucous membranes are moist.   Eyes:      General: No scleral icterus.  Cardiovascular:      Pulses: Normal pulses.   Pulmonary:      " Effort: Pulmonary effort is normal.   Abdominal:      General: There is no distension.   Musculoskeletal:      Cervical back: Normal range of motion.      Comments: Left leg weakness   Skin:     General: Skin is warm.   Neurological:      Mental Status: She is alert and oriented to person, place, and time.   Psychiatric:         Mood and Affect: Mood normal.       Fluids:  No intake/output data recorded.    LABS:  Recent Labs     03/23/23  1447 03/24/23  1517 03/25/23  0037   SODIUM 138 138 145   POTASSIUM 4.8 4.9 4.1   CHLORIDE 102 106 119*   CO2 24 21 14*   GLUCOSE 102* 98 107*   BUN 23* 23* 22   CREATININE 1.90* 2.04* 1.54*   CALCIUM 11.7* 11.4* 8.5       IMAGING:   All imaging reviewed from admission to present day    IMPRESSION:  # ISAMAR: In setting of hypotension, hypercalcemia, resolved  # CKD: frequent fluctuations in cr, baseline ~1.5  # Hypercalcemia: PTH medicated. presumed primary hyperparathyroid  # HTN: on losartan at home  # Lung cancer  # COPD  # Nephrolithiasis      PLAN:  -Hold Losartan  -Okay to hold IVF for now encourage PO fluids  -Continue sensipar 30mg daily  - Start sodium bicarb 650mg TID  -No role for calcitonin  -Inpatient parathyroid scan if possible prior to dc  -Strict IOs    Thank you for this consult, we will continue to follow.

## 2023-03-25 NOTE — PROGRESS NOTES
Received bedside report from RN Michelle, pt care assumed. VSS, pt assessment complete. Pt A&Ox4, no c/o pain at this time. POC discussed with pt and verbalizes no questions. Pt denies any additional needs at this time. Bed locked and in lowest position, bed alarm active. Pt educated on fall risk and verbalized understanding, call light within reach, hourly rounding initiated.

## 2023-03-25 NOTE — THERAPY
Physical Therapy   Initial Evaluation     Patient Name: Latonia Clinton  Age:  72 y.o., Sex:  female  Medical Record #: [id number]  Today's Date: 3/25/2023     Precautions  Precautions: (P) Fall Risk  Comments: (P) mild    Assessment  Patient is 72 y.o. female with a diagnosis of Hypercalcemia L knee pain with swelling.Pt lives at home alone and is not active.Acute PT needed to improve bed mob,transfers and ambulation.Pt C/O L knee pain with ambulation has been drained with cortisone shot in the past    Plan    Physical Therapy Initial Treatment Plan   Treatment Plan : (P) Bed Mobility, Gait Training, Neuro Re-Education / Balance, Therapeutic Activities, Therapeutic Exercise  Treatment Frequency: (P) 4 Times per Week  Duration: (P) Until Therapy Goals Met    DC Equipment Recommendations: (P) None  Discharge Recommendations: (P) Recommend home health for continued physical therapy services     Objective       03/25/23 1100   Charge Group   PT Evaluation PT Evaluation Mod   Total Time Spent   PT Evaluation Time Spent (Mins) 30   Initial Contact Note    Initial Contact Note Order Received and Verified, Physical Therapy Evaluation in Progress with Full Report to Follow.   Precautions   Precautions Fall Risk   Comments mild   Prior Living Situation   Prior Services Home-Independent   Housing / Facility 1 Story House   Steps Into Home 0   Steps In Home 0   Equipment Owned Front-Wheel Walker   Lives with - Patient's Self Care Capacity Alone and Able to Care For Self   Prior Level of Functional Mobility   Bed Mobility Independent   Transfer Status Independent   Ambulation Independent   Ambulation Distance household   Assistive Devices Used Front-Wheel Walker   Cognition    Cognition / Consciousness WDL   Passive ROM Lower Body   Passive ROM Lower Body WDL   Active ROM Lower Body    Active ROM Lower Body  WDL   Strength Lower Body   Lower Body Strength  X   Comments general weakness L>R   Coordination Lower Body     Coordination Lower Body  WDL   Balance Assessment   Sitting Balance (Static) Good   Sitting Balance (Dynamic) Good   Standing Balance (Static) Fair   Standing Balance (Dynamic) Fair   Weight Shift Sitting Fair   Weight Shift Standing Fair   Bed Mobility    Supine to Sit Minimal Assist   Sit to Supine Minimal Assist   Scooting Modified Independent   Gait Analysis   Gait Level Of Assist Contact Guard Assist   Assistive Device Front Wheel Walker   Distance (Feet) 60   # of Times Distance was Traveled 1   Deviation Step To   Weight Bearing Status full   Functional Mobility   Sit to Stand Contact Guard Assist   Bed, Chair, Wheelchair Transfer Contact Guard Assist   Toilet Transfers Contact Guard Assist   Transfer Method Stand Step   How much difficulty does the patient currently have...   Turning over in bed (including adjusting bedclothes, sheets and blankets)? 3   Sitting down on and standing up from a chair with arms (e.g., wheelchair, bedside commode, etc.) 3   Moving from lying on back to sitting on the side of the bed? 3   How much help from another person does the patient currently need...   Moving to and from a bed to a chair (including a wheelchair)? 3   Need to walk in a hospital room? 3   Climbing 3-5 steps with a railing? 2   6 clicks Mobility Score 17   Activity Tolerance   Sitting Edge of Bed 10   Standing 7   Patient / Family Goals    Patient / Family Goal #1 Home   Short Term Goals    Short Term Goal # 1 S with bed mob in 4 V   Short Term Goal # 2 S with transfers in 4 V   Short Term Goal # 3 S with amb x 100 feet in 4 V   Physical Therapy Initial Treatment Plan    Treatment Plan  Bed Mobility;Gait Training;Neuro Re-Education / Balance;Therapeutic Activities;Therapeutic Exercise   Treatment Frequency 4 Times per Week   Duration Until Therapy Goals Met   Problem List    Problems Impaired Bed Mobility;Impaired Transfers;Impaired Ambulation;Functional Strength Deficit;Decreased Activity Tolerance    Anticipated Discharge Equipment and Recommendations   DC Equipment Recommendations None   Discharge Recommendations Recommend home health for continued physical therapy services   Interdisciplinary Plan of Care Collaboration   IDT Collaboration with  Nursing   Session Information   Date / Session Number  3/25-1 1/4 3/31

## 2023-03-25 NOTE — CARE PLAN
The patient is Stable - Low risk of patient condition declining or worsening    Shift Goals  Clinical Goals: MRI, control pain,  Patient Goals: Ask about knee, control pain    Progress made toward(s) clinical / shift goals:      Problem: Knowledge Deficit - Standard  Goal: Patient and family/care givers will demonstrate understanding of plan of care, disease process/condition, diagnostic tests and medications  Description: Target End Date:  1-3 days or as soon as patient condition allows    Document in Patient Education    1.  Patient and family/caregiver oriented to unit, equipment, visitation policy and means for communicating concern  2.  Complete/review Learning Assessment  3.  Assess knowledge level of disease process/condition, treatment plan, diagnostic tests and medications  4.  Explain disease process/condition, treatment plan, diagnostic tests and medications  Outcome: Progressing  Note: Pt updated on POC to include pain management/ scheduling, MRI schedule, IVF therapy, and options for follow up needs/ care pre MD. PT/OT eval ordered.      Problem: Pain - Standard  Goal: Alleviation of pain or a reduction in pain to the patient’s comfort goal  Description: Target End Date:  Prior to discharge or change in level of care    Document on Vitals flowsheet    1.  Document pain using the appropriate pain scale per order or unit policy  2.  Educate and implement non-pharmacologic comfort measures (i.e. relaxation, distraction, massage, cold/heat therapy, etc.)  3.  Pain management medications as ordered  4.  Reassess pain after pain med administration per policy  5.  If opiods administered assess patient's response to pain medication is appropriate per POSS sedation scale  6.  Follow pain management plan developed in collaboration with patient and interdisciplinary team (including palliative care or pain specialists if applicable)  Outcome: Progressing     Problem: Fall Risk  Goal: Patient will remain free from  falls  Description: Target End Date:  Prior to discharge or change in level of care    Document interventions on the Yanira Aden Fall Risk Assessment    1.  Assess for fall risk factors  2.  Implement fall precautions  Outcome: Progressing  Note: Pt calls appropriately, ambulates with max assist.        Patient is not progressing towards the following goals:

## 2023-03-25 NOTE — PROGRESS NOTES
4 Eyes Skin Assessment Completed by ALLAN Martinez and ALLAN Ariza.    Head WDL  Ears WDL  Nose WDL  Mouth WDL  Neck WDL  Breast/Chest WDL  Shoulder Blades WDL  Spine WDL  (R) Arm/Elbow/Hand Scab  (L) Arm/Elbow/Hand Bruising and Abrasion  Abdomen WDL  Groin WDL  Scrotum/Coccyx/Buttocks WDL  (R) Leg Bruising,edema  (L) Leg WDL, edema  (R) Heel/Foot/Toe Blanching  (L) Heel/Foot/Toe Blanching          Devices In Places Tele Box      Interventions In Place N/A    Possible Skin Injury No    Pictures Uploaded Into Epic N/A  Wound Consult Placed N/A  RN Wound Prevention Protocol Ordered No

## 2023-03-25 NOTE — CARE PLAN
The patient is Stable - Low risk of patient condition declining or worsening    Shift Goals  Clinical Goals: monitor labs, safety, pain control  Patient Goals: pain control, some sleep tonight    Progress made toward(s) clinical / shift goals:    Problem: Pain - Standard  Goal: Alleviation of pain or a reduction in pain to the patient’s comfort goal  Outcome: Progressing     Problem: Fall Risk  Goal: Patient will remain free from falls  Outcome: Progressing       Patient is not progressing towards the following goals:

## 2023-03-25 NOTE — PROGRESS NOTES
Telemetry Shift Summary     Rhythm SR  HR Range 67-88  Ectopy rPVC, rPAC  Measurements  0.20/0.08/0.32           Normal Values  Rhythm SR  HR Range    Measurements 0.12-0.20 / 0.06-0.10  / 0.30-0.52

## 2023-03-25 NOTE — DISCHARGE PLANNING
Met with pt who reported she is having some difficulty taking care of herself but she refuses to go to a SNF. Possibly will consider HH services. She lives alone in a one level home. Able to perform ADLs as best as she can. Uses a walker or hold on to the walls when walking at home. Her brother and a regular  who she calls drives her for appts. Her brother helps her with IADLs.  She stopped hemodialysis in 2005.     PCP is Dr. Liat Emerson    Care Transition Team Assessment    Information Source  Orientation Level: Oriented X4  Information Given By: Patient  Who is responsible for making decisions for patient? : Patient    Readmission Evaluation  Is this a readmission?: No    Elopement Risk  Legal Hold: No  Ambulatory or Self Mobile in Wheelchair: No-Not an Elopement Risk    Interdisciplinary Discharge Planning  Does Admitting Nurse Feel This Could be a Complex Discharge?: No  Primary Care Physician: Dr. Liat ZAYAS  Lives with - Patient's Self Care Capacity: Alone and Able to Care For Self  Support Systems: Family Member(s), Friends / Neighbors  Housing / Facility: [address]  Do You Take your Prescribed Medications Regularly: Yes  Able to Return to Previous ADL's: Future Time w/Therapy  Mobility Issues: Yes  Prior Services: None, Home-Independent, Intermittent Physical Support for ADL Per Family  Patient Prefers to be Discharged to:: Home with HH  Assistance Needed: Yes  Durable Medical Equipment: Walker    Discharge Preparedness  What is your plan after discharge?: Home health care  What are your discharge supports?: Sibling  Prior Functional Level: Ambulatory, Independent with Activities of Daily Living, Independent with Medication Management, Uses Walker  Difficulity with ADLs: None  Difficulity with IADLs: Driving    Functional Assesment  Prior Functional Level: Ambulatory, Independent with Activities of Daily Living, Independent with Medication Management, Uses  Walker    Finances  Financial Barriers to Discharge: No  Prescription Coverage: Yes    Vision / Hearing Impairment  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

## 2023-03-26 NOTE — DISCHARGE PLANNING
Case Management Discharge Planning    Admission Date: 3/24/2023  GMLOS: 2.6  ALOS: 2    6-Clicks ADL Score: 20  6-Clicks Mobility Score: 17    Anticipated Discharge Dispo: Discharge Disposition: D/T to home under A care in anticipation of covered skilled care (06)      Action(s) Taken: Referral(s) sent    Escalations Completed: None    Medically Clear: No    Next Steps: RNCM faxed HH choice for [facility] to [facility].    Barriers to Discharge: Medical clearance and Outpatient referrals pending    Is the patient up for discharge tomorrow: Yes

## 2023-03-26 NOTE — FACE TO FACE
Face to Face Supporting Documentation - Home Health    The encounter with this patient was in whole or in part the primary reason for home health admission.    Date of encounter:   Patient:                    MRN:                       YOB: 2024  Latonia Clinton  [id number]  1950     Home health to see patient for:  Skilled Nursing care for assessment, interventions & education, Registered dietitian consult, Home health aide, Physical Therapy evaluation and treatment, and Occupational therapy evaluation and treatment    Skilled need for:  New Onset Medical Diagnosis hyperparathyroidism with hypercalcemia and acute renal failure    Skilled nursing interventions to include:  Comment: Will need home physical therapy occupational therapy nutritional support as well as home health nurse    Homebound status evidenced by:  Need the aid of supportive devices such as crutches, canes, wheelchairs or walkers. Leaving home requires a considerable and taxing effort. There is a normal inability to leave the home.    Community Physician to provide follow up care: Liat Emerson M.D.     Optional Interventions? No      I certify the face to face encounter for this home health care referral meets the CMS requirements and the encounter/clinical assessment with the patient was, in whole, or in part, for the medical condition(s) listed above, which is the primary reason for home health care. Based on my clinical findings: the service(s) are medically necessary, support the need for home health care, and the homebound criteria are met.  I certify that this patient has had a face to face encounter by myself.  Bo Montes M.D. - NPI: [id number]

## 2023-03-26 NOTE — CARE PLAN
Pt resting in bed, no signs of acute distress. Pt using bedside commode with ast x1 using FWW, steps unsteady. Pt c/o of mid back pain, will give prn medication per pain scale. Pt voiding well. Pt tolerating diet. Call light is within reach, bed alarm activated.   Problem: Knowledge Deficit - Standard  Goal: Patient and family/care givers will demonstrate understanding of plan of care, disease process/condition, diagnostic tests and medications  Outcome: Progressing     Problem: Pain - Standard  Goal: Alleviation of pain or a reduction in pain to the patient’s comfort goal  Outcome: Progressing     Problem: Fall Risk  Goal: Patient will remain free from falls  Outcome: Progressing     Problem: Hemodynamics  Goal: Patient's hemodynamics, fluid balance and neurologic status will be stable or improve  Outcome: Progressing     Problem: Respiratory  Goal: Patient will achieve/maintain optimum respiratory ventilation and gas exchange  Outcome: Progressing     Problem: Mobility  Goal: Patient's capacity to carry out activities will improve  Outcome: Progressing   The patient is Stable - Low risk of patient condition declining or worsening    Shift Goals  Clinical Goals: MRI, pain control  Patient Goals: rest, sleep

## 2023-03-26 NOTE — DISCHARGE PLANNING
Received Choice form at [id number]  Agency/Facility Name: [facility]  Referral sent per Choice form @ [id number]

## 2023-03-26 NOTE — PROGRESS NOTES
Telemetry Shift Summary     Rhythm SR/ST  HR Range   Ectopy rPAC/PVC  Measurements .20/.08/.30           Normal Values  Rhythm SR  HR Range    Measurements 0.12-0.20 / 0.06-0.10  / 0.30-0.52

## 2023-03-26 NOTE — PROGRESS NOTES
Telemetry Shift Summary     Rhythm SR  HR Range 72- 90  Ectopy r PAC/ PVC  Measurements  0.20/ 0.08/ 0.32  Per strip printed 0400     Normal Values  Rhythm SR  HR Range    Measurements 0.12-0.20 / 0.06-0.10  / 0.30-0.52

## 2023-03-26 NOTE — THERAPY
Occupational Therapy   Initial Evaluation     Patient Name: Latonia Clinton  Age:  72 y.o., Sex:  female  Medical Record #: [id number]  Today's Date: 3/25/2023     Precautions: (P) Fall Risk    Assessment  Patient is 72 y.o. female with a diagnosis of hypercalcemia. Admitted following GLF. Additional factors influencing patient status / progress: chronic back and L knee pain, hx of lung cancer. A&Ox3, poor insight to deficits. Performs STS with Jessica, FWW. ADL transfers with CGA, FWW. Tolerates grooming, feeding, gown change at EOB. Shows decreased endurance, overall strength, impaired balance. Lives alone, reports quite sedentary PTA. Brother assists with IADL's. Pt will benefit from post-acute inpt rehab although is declining. OT will follow while in house.        Plan  Occupational Therapy Initial Treatment Plan   Treatment Interventions: Self Care / Activities of Daily Living, Neuro Re-Education / Balance, Therapeutic Activity  Treatment Frequency: 3 Times per Week  Duration: Until Therapy Goals Met    DC Equipment Recommendations: Unable to determine at this time  Discharge Recommendations: Recommend post-acute placement for additional occupational therapy services prior to discharge home     Subjective  Agreeable     Objective     03/25/23 1413   Prior Living Situation   Prior Services None;Intermittent Physical Support for ADL Per Family   Housing / Facility 1 Story House   Steps Into Home 0   Steps In Home 0   Bathroom Set up Walk In Shower   Equipment Owned Front-Wheel Walker;Oxygen;Grab Bar(s) In Tub / Shower;Grab Bar(s) By Toilet;Raised Toilet Seat With Arms;Tub / Shower Seat;Hand Held Shower   Lives with - Patient's Self Care Capacity Alone and Unable to Care For Self   Comments Brother assists with IADL's   Prior Level of ADL Function   Self Feeding Independent   Grooming / Hygiene Independent   Bathing Independent   Dressing Independent   Toileting Independent   Comments with difficulty per pt.    Prior Level of IADL Function   Medication Management Independent   Laundry Unable To Determine At This Time   Kitchen Mobility Independent   Finances Unable To Determine At This Time   Home Management Requires Assist   Shopping Requires Assist   Prior Level Of Mobility Independent With Device in Home   Driving / Transportation Relatives / Others Provide Transportation   Occupation (Pre-Hospital Vocational) Retired Due To Age   Leisure Interests Unable To Determine At This Time   Precautions   Precautions Fall Risk   Vitals   O2 (LPM) 2   O2 Delivery Device Silicone Nasal Cannula   Pain 0 - 10 Group   Location Back;Knee   Location Orientation Lower;Left   Therapist Pain Assessment Post Activity Pain Same as Prior to Activity;Nurse Notified   Cognition    Cognition / Consciousness   (Ox3)   Level of Consciousness Alert   Comments poor insight to deficits. self-limiting at times.   Passive ROM Upper Body   Passive ROM Upper Body WDL   Active ROM Upper Body   Active ROM Upper Body  WDL   Strength Upper Body   Upper Body Strength  X   Sensation Upper Body   Upper Extremity Sensation  Not Tested   Upper Body Muscle Tone   Upper Body Muscle Tone  WDL   Coordination Upper Body   Coordination WDL   Balance Assessment   Sitting Balance (Static) Good   Sitting Balance (Dynamic) Fair +   Standing Balance (Static) Fair   Standing Balance (Dynamic) Fair   Weight Shift Sitting Fair   Weight Shift Standing Fair   ADL Assessment   Eating Independent   Grooming Standby Assist;Seated   Upper Body Dressing Supervision   Lower Body Dressing Moderate Assist   How much help from another person does the patient currently need...   Putting on and taking off regular lower body clothing? 3   Bathing (including washing, rinsing, and drying)? 3   Toileting, which includes using a toilet, bedpan, or urinal? 2   Putting on and taking off regular upper body clothing? 4   Taking care of personal grooming such as brushing teeth? 4   Eating meals?  4   6 Clicks Daily Activity Score 20   Functional Mobility   Sit to Stand Minimal Assist   Bed, Chair, Wheelchair Transfer Contact Guard Assist   Transfer Method Stand Step   Activity Tolerance   Sitting in Chair 8   Sitting Edge of Bed >15   Standing 3   Patient / Family Goals   Patient / Family Goal #1 home   Short Term Goals   Short Term Goal # 1 Pt will perform ADL transfer with SBA within 5 days   Short Term Goal # 2 Pt will perform FB dressing with SBA within 5 days   Short Term Goal # 3 Pt will perform toileting in br with SBA within 5 days   Education Group   Role of Occupational Therapist Patient Response Explanation;Verbal Demonstration   Occupational Therapy Initial Treatment Plan    Treatment Interventions Self Care / Activities of Daily Living;Neuro Re-Education / Balance;Therapeutic Activity   Treatment Frequency 3 Times per Week   Duration Until Therapy Goals Met   Problem List   Problem List Decreased Upper Extremity Strength Right;Decreased Upper Extremity Strength Left;Decreased Active Daily Living Skills;Decreased Homemaking Skills;Impaired Postural Control / Balance;Decreased Activity Tolerance;Decreased Functional Mobility   Anticipated Discharge Equipment and Recommendations   DC Equipment Recommendations Unable to determine at this time   Discharge Recommendations Recommend post-acute placement for additional occupational therapy services prior to discharge home

## 2023-03-26 NOTE — PROGRESS NOTES
Hospital Medicine Daily Progress Note    Date of Service  3/26/2023    Chief Complaint  Latonia Clinton is a 72 y.o. female admitted 3/24/2023 with hypercalcemia from outside facility    Hospital Course  3/25/2023-Latonia is a 72-year-old female comes into the hospital with elevated calcium levels.  The patient apparently has known hyperparathyroidism as well as hypothyroidism.  The patient also has known lung cancer that is been under treatment with oncology and radiation oncology.  The patient now was evaluated for the hypercalcemia and has been given fluid resuscitation as well as pain management.  The patient was also started on Sensipar because of the hyperparathyroidism and parathyroid levels were low repeated.  Patient at this point also is complaining of back pain as well as knee pain.  At this point lumbar MRI is ordered which is pending.  The knee pain is from chronic ACL tear which orthopedic surgery has seen and recommended no surgical intervention due to her ongoing cancer treatments.  At this point patient will need continued pain management, monitoring of her renal functions.  Monitoring of her calcium levels.  Potential placement after PT OT evaluation however patient adamantly refuses to go into a skilled facility.  I have discussed with her the potential for going home with home health and she prefers status she says she is too many things to do at home although she seems very sedentary at home and not able to perform her activities that she is thinking.  3/26/2023-patient's nuclear imaging study for parathyroid disease is negative.  Patient's MRI of the lumbar and cervical spine show chronic disease process with no acute findings.  Physical therapy evaluation and Occupational Therapy evaluation once again discussed with the patient.  She tells me she does not want to go to any skilled facility.  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  The patient at this point  would benefit from skilled however at this point home health may be able to be set up for her as after discussion she had a bad experience 2 times with home health and is not sure if she wants home health.    Interval Problem Update  Refused skilled  Excepting at this point of home health  Pain management  Continue medical management of parathyroid disease  Negative findings on the imaging studies  Patient should be able to discharge home tomorrow with medications and outpatient home health.    I have discussed this patient's plan of care and discharge plan at IDT rounds today with Case Management, Nursing, Nursing leadership, and other members of the IDT team.    Consultants/Specialty  nephrology    Code Status  Full Code    Disposition  Patient is not medically cleared for discharge.   Anticipate discharge to to home with close outpatient follow-up.  I have placed the appropriate orders for post-discharge needs.    Review of Systems  Review of Systems   Constitutional:  Positive for malaise/fatigue. Negative for chills, diaphoresis and fever.   HENT: Negative.  Negative for congestion and hearing loss.    Eyes: Negative.  Negative for blurred vision and photophobia.   Respiratory:  Positive for shortness of breath. Negative for cough, sputum production, wheezing and stridor.    Cardiovascular: Negative.  Negative for chest pain, orthopnea and claudication.   Gastrointestinal: Negative.  Negative for abdominal pain, blood in stool, constipation, diarrhea, heartburn, nausea and vomiting.   Genitourinary: Negative.  Negative for frequency, hematuria and urgency.   Musculoskeletal:  Positive for back pain, joint pain, myalgias and neck pain. Negative for falls.   Skin: Negative.  Negative for itching and rash.   Neurological:  Positive for dizziness and headaches. Negative for tremors, sensory change, focal weakness, seizures and loss of consciousness.   Endo/Heme/Allergies: Negative.  Does not bruise/bleed easily.    Psychiatric/Behavioral:  Positive for depression. Negative for substance abuse and suicidal ideas. The patient is nervous/anxious and has insomnia.    All other systems reviewed and are negative.     Physical Exam  Temp:  [36.3 °C (97.3 °F)-36.6 °C (97.8 °F)] 36.3 °C (97.3 °F)  Pulse:  [] 88  Resp:  [16-24] 16  BP: (107-155)/(62-97) 111/71  SpO2:  [97 %-99 %] 97 %    Physical Exam  Vitals and nursing note reviewed. Exam conducted with a chaperone present.   Constitutional:       General: She is not in acute distress.     Appearance: Normal appearance. She is well-developed. She is obese. She is ill-appearing. She is not toxic-appearing.   HENT:      Head: Normocephalic and atraumatic.      Right Ear: External ear normal.      Left Ear: External ear normal.      Nose: Nose normal. No congestion or rhinorrhea.      Mouth/Throat:      Mouth: Mucous membranes are moist.      Pharynx: Oropharynx is clear. No oropharyngeal exudate or posterior oropharyngeal erythema.   Eyes:      General:         Right eye: No discharge.         Left eye: No discharge.      Extraocular Movements: Extraocular movements intact.      Conjunctiva/sclera: Conjunctivae normal.      Pupils: Pupils are equal, round, and reactive to light.   Neck:      Thyroid: No thyroid mass.      Vascular: No carotid bruit or JVD.   Cardiovascular:      Rate and Rhythm: Normal rate and regular rhythm.      Pulses: Normal pulses.      Heart sounds: Normal heart sounds, S1 normal and S2 normal. No murmur heard.    No friction rub.   Pulmonary:      Effort: Pulmonary effort is normal.      Breath sounds: Normal air entry. Examination of the right-lower field reveals decreased breath sounds and rhonchi. Examination of the left-lower field reveals decreased breath sounds and rhonchi. Decreased breath sounds and rhonchi present.   Abdominal:      General: Abdomen is flat. Bowel sounds are normal. There is no distension.      Palpations: Abdomen is soft. There  is no mass.   Musculoskeletal:         General: Tenderness (left knee) present. Normal range of motion.      Cervical back: Normal range of motion and neck supple. No edema or rigidity.      Right lower leg: Edema present.      Left lower leg: Edema present.      Right foot: Normal range of motion. No deformity or foot drop.      Left foot: Normal range of motion. No deformity or foot drop.   Feet:      Right foot:      Skin integrity: Skin integrity normal. No ulcer.      Left foot:      Skin integrity: Skin integrity normal. No ulcer.   Lymphadenopathy:      Head:      Right side of head: No submental adenopathy.      Left side of head: No submental adenopathy.      Cervical: No cervical adenopathy.      Right cervical: No superficial cervical adenopathy.     Left cervical: No superficial cervical adenopathy.      Upper Body:      Right upper body: No supraclavicular adenopathy.      Left upper body: No supraclavicular adenopathy.      Lower Body: No right inguinal adenopathy. No left inguinal adenopathy.   Skin:     General: Skin is warm and dry.      Coloration: Skin is not jaundiced or pale.      Findings: No abrasion or wound.   Neurological:      General: No focal deficit present.      Mental Status: She is alert and oriented to person, place, and time. Mental status is at baseline.      GCS: GCS eye subscore is 4. GCS verbal subscore is 5. GCS motor subscore is 6.      Cranial Nerves: No cranial nerve deficit.      Sensory: Sensation is intact.      Motor: Motor function is intact. No weakness.      Coordination: Coordination abnormal.      Gait: Gait abnormal.   Psychiatric:         Attention and Perception: Attention and perception normal.         Mood and Affect: Mood normal. Affect is flat.         Speech: Speech normal.         Behavior: Behavior is slowed. Behavior is cooperative.         Thought Content: Thought content normal.         Cognition and Memory: Cognition and memory normal.          Judgment: Judgment normal.       Fluids    Intake/Output Summary (Last 24 hours) at 3/26/2023 1153  Last data filed at 3/26/2023 0900  Gross per 24 hour   Intake 260 ml   Output --   Net 260 ml         Laboratory  Recent Labs     03/24/23  1517 03/25/23  0037   WBC 7.1 5.4   RBC 3.25* 2.70*   HEMOGLOBIN 11.4* 9.6*   HEMATOCRIT 34.6* 30.9*   .5* 114.4*   MCH 35.1* 35.6*   MCHC 32.9* 31.1*   RDW 56.8* 59.8*   PLATELETCT 212 149*   MPV 11.1 11.3       Recent Labs     03/23/23  1447 03/24/23  1517 03/25/23  0037   SODIUM 138 138 145   POTASSIUM 4.8 4.9 4.1   CHLORIDE 102 106 119*   CO2 24 21 14*   GLUCOSE 102* 98 107*   BUN 23* 23* 22   CREATININE 1.90* 2.04* 1.54*   CALCIUM 11.7* 11.4* 8.5                     Imaging  MR-THORACIC SPINE-W/O   Final Result      1. Mild kyphoscoliosis of the thoracic spine.      2. Previous vertebral augmentation's of the T7 and T8 vertebral bodies for treatment of compression fractures. Moderate chronic wedge type compression fractures of the T5 and T6 vertebral bodies. Mild chronic butterfly type compression fracture of the    T11 vertebral body.      3. No evidence of acute compression fracture in the thoracic spine.      4. No evidence of significant disc extrusion and/or cord compression.      5. Epidural lipomatosis in the mid thoracic lead region causing mild central canal stenosis at the T6 level.      MR-LUMBAR SPINE-W/O   Final Result      1.  Mild levoscoliosis.      2.  Previous vertebral augmentation of the L2 vertebral body.      3.  Elliptical area of decreased T1 surrounding increased T2 signal intensity involving the superior endplate of the L4 vertebral body consistent with acute endplate degenerative change.      4.  Minimal lumbar spondylotic changes.      5.  Minimal chronic inferior endplate compression fracture the L1 vertebral body with minimal retropulsion of the posterior inferior aspect of the L1 vertebral body.      6.  Minimal residual retropulsion of  the posterior inferior aspect of the L2 vertebral body and there are secondary to chronic mild compression fracture of the L2 vertebral body..      NM-PARATHYROID (SESTAMIBI) SCAN   Final Result      No evidence of parathyroid adenoma.      CT-RENAL COLIC EVALUATION(A/P W/O)   Final Result      1.  There is a nonobstructive 2 mm left proximal ureter calcification.   2.  Bilateral intrarenal calcifications and calyceal stones are similar to the previous examination.   3.  Bilateral benign Bosniak 1 and Bosniak 2 simple cysts are again seen which require no imaging follow-up per ACR guidelines.   4.  Stable bilateral adrenal gland benign adenomas which required no imaging follow-up per ACR guidelines.   5.  Prominent submucosal fat throughout the colon may simply be related to normal variant due to patient body habitus.   6.  Subpleural lower lobe lung opacities are probably postinflammatory.             Assessment/Plan  * Hypercalcemia- (present on admission)  Assessment & Plan  Secondary to hyperparathyroidism  Repeat PTH is down to 145 from 172.  Repeat calcium down to 8.5    Hyperparathyroidism (HCC)- (present on admission)  Assessment & Plan  Parathyroid study negative  Continue at this point with monitoring parathyroid levels as well as TSH levels  Continue with Sensipar    Acute on chronic kidney failure (HCC)  Assessment & Plan  Creatinine down to 1.5  Nephrology continues to follow  Fluid status stabilize        Lung cancer (HCC)- (present on admission)  Assessment & Plan  Patient has completed 5 courses of radiation therapy with Dr. Banks  Patient follows for chemotherapy with Dr. Rizvi  Patient to continue outpatient follow-ups with oncology    Thyrotoxicosis with toxic single thyroid nodule and without thyroid storm- (present on admission)  Assessment & Plan  Continue methimazole, 5 mg daily  Most recent TSH level 0.130    Chronic bilateral low back pain without sciatica- (present on  admission)  Assessment & Plan  MRI of the lumbar and cervical spine performed.  Cervical spine findings chronic  Lumbar findings show T1 and T2 compression finding old and chronic  T4 compression fracture is relatively new but chronic this was nobs observed on previous imaging studies.  No acute pathology that would indicate surgical intervention at this point time this was discussed with the patient.  Recommend home health with physical therapy or going into a skilled facility with physical therapy patient at this point is still deciding on allowing us to do either 1    Deep vein thrombosis (DVT) (HCC)- (present on admission)  Assessment & Plan  Continue with Xarelto chronically    Chronic obstructive pulmonary disease (HCC)- (present on admission)  Assessment & Plan  Continue oxygen support  2 L is her baseline  Continue RT protocol    Hypertension- (present on admission)  Assessment & Plan  Blood pressure controlled at 111/71 now.  Continue as needed hydralazine 10 mg every 4 hours if systolic blood pressure greater than 160  Holding losartan with abnormal renal functions    Chronic pain of left knee- (present on admission)  Assessment & Plan  ACL tear identified with MRI  Nonsurgical per orthopedics due to the ongoing cancer treatment      Chronic anticoagulation- (present on admission)  Assessment & Plan  Continue with Xarelto given DVT    Other insomnia- (present on admission)  Assessment & Plan  Nocturnal Ambien as needed for sleep disorder    Macrocytic anemia- (present on admission)  Assessment & Plan  Vitamin B12 level at 984 and within normal limits.  At this point no supplementation recommended         VTE prophylaxis: SCDs/TEDs    I have performed a physical exam and reviewed and updated ROS and Plan today (3/26/2023). In review of yesterday's note (3/25/2023), there are no changes except as documented above.

## 2023-03-26 NOTE — PROGRESS NOTES
"[facility] -  PROGRESS NOTE               Author: Warren Garland M.D. Date & Time: 3/26/2023  9:49 AM     HPI:  71 Yo female with history notable for CKD, HTN, HLD, lung cancer presented with hypercalcemia on outpatient labs and weakness from outpatient nephrology clinic. Has presumed primary hypercalcemia with elevated PTH  (most recent level 170, from 346) but has failed to follow-up with outpatient confirmatory imaging.  One week ago her corrected calcium was noted to be 12.1 with high normal vit D. Vitamin D stopped and she was told to increase oral hydration with repeat labs in 1 weeks. Labs yesterday resulted with a corrected calcium of 12.3 and she noted ongoing weakness and inability to ambulate/back/flank/leg pain prompting referral to ED. She notes being more thirstt than normal. No gross hematuria. Her most recent cr also appears above baseline (~1.5).  Initial ER labs with cr of 2 and corrected calcium of 12.2. Soft Bps in ER, 90/60,  took her losartan this AM. Abd  CT performed, results pending. No chest pain, sob, leg swelling.    DAILY NEPHROLOGY SUMMARY:  3/25: Calcium and creatinine trending down.  Having back and leg pain, chronic.  Parathyroid scan pending.    3/26: No new labs.  Having back pain and SOB.  Parathyroid scan negative    REVIEW OF SYSTEMS:    10 point ROS reviewed and is as per HPI/daily summary or otherwise negative    PMH/PSH/SH/FH:   Reviewed and unchanged since admission note    CURRENT MEDICATIONS:   Reviewed from admission to present day    VS:  /71   Pulse 88   Temp 36.3 °C (97.3 °F) (Oral)   Resp 16   Ht 1.676 m (5' 6\")   Wt 99.8 kg (220 lb 0.3 oz)   LMP  (LMP Unknown)   SpO2 97%   BMI 35.51 kg/m²     Physical Exam  HENT:      Head: Normocephalic.      Right Ear: External ear normal.      Left Ear: External ear normal.      Nose: Nose normal.      Mouth/Throat:      Mouth: Mucous membranes are moist.   Eyes:      General: No scleral " icterus.  Cardiovascular:      Pulses: Normal pulses.   Pulmonary:      Effort: Pulmonary effort is normal.   Abdominal:      General: There is no distension.   Musculoskeletal:      Cervical back: Normal range of motion.      Comments: Left leg weakness   Skin:     General: Skin is warm.   Neurological:      Mental Status: She is alert and oriented to person, place, and time.   Psychiatric:         Mood and Affect: Mood normal.       Fluids:  In: 280 [P.O.:280]  Out: -     LABS:  Recent Labs     03/23/23  1447 03/24/23  1517 03/25/23  0037   SODIUM 138 138 145   POTASSIUM 4.8 4.9 4.1   CHLORIDE 102 106 119*   CO2 24 21 14*   GLUCOSE 102* 98 107*   BUN 23* 23* 22   CREATININE 1.90* 2.04* 1.54*   CALCIUM 11.7* 11.4* 8.5         IMAGING:   All imaging reviewed from admission to present day    IMPRESSION:  # ISAMAR: In setting of hypotension, hypercalcemia, resolved  # CKD: frequent fluctuations in cr, baseline ~1.5  # Hypercalcemia: PTH medicated. presumed primary hyperparathyroid  # HTN: on losartan at home  # Lung cancer  # COPD  # Nephrolithiasis      PLAN:  -Hold Losartan  -Okay to hold IVF for now encourage PO fluids  -Continue sensipar 30mg daily  -Sodium bicarb 650mg TID  -No role for calcitonin  - Repeat labs tomorrow    Thank you for this consult, we will continue to follow.

## 2023-03-26 NOTE — PROGRESS NOTES
"This RN was notified by 2 CNAs that the pt refused to move from bedside commode to the bed with them. Pt stated that \"she needed a man\" and would not move with CNAs. Pt provided no assistance in moving from seated position.   "

## 2023-03-26 NOTE — CARE PLAN
The patient is Stable - Low risk of patient condition declining or worsening    Shift Goals  Clinical Goals: MRI, pain control  Patient Goals: rest, sleep    Progress made toward(s) clinical / shift goals:    Problem: Pain - Standard  Goal: Alleviation of pain or a reduction in pain to the patient’s comfort goal  Outcome: Progressing   Notes : Pt is getting Oxy and Dilaudid for pain control .   Problem: Fall Risk  Goal: Patient will remain free from falls  Outcome: Progressing   Note : Pt has been using call light for assistance and has not fallen since admit.  Problem: Respiratory  Goal: Patient will achieve/maintain optimum respiratory ventilation and gas exchange  Outcome: Progressing   Note ; Pt is using 2L oxygen as her baseline. Sating mid 90's and gets slightly SOB when OOB.   Problem: Mobility  Goal: Patient's capacity to carry out activities will improve  Outcome: Progressing       Patient is not progressing towards the following goals:

## 2023-03-27 NOTE — CARE PLAN
The patient is Stable - Low risk of patient condition declining or worsening    Shift Goals  Clinical Goals: pure wick, O2 support, monitor labs  Patient Goals: pain control,home    Progress made toward(s) clinical / shift goals:  Pt has been getting up to commodex 1 asssit. Pain better controlled. Tolerating O2 at 2l during night time.         Problem: Knowledge Deficit - Standard  Goal: Patient and family/care givers will demonstrate understanding of plan of care, disease process/condition, diagnostic tests and medications  Outcome: Progressing     Problem: Pain - Standard  Goal: Alleviation of pain or a reduction in pain to the patient’s comfort goal  Outcome: Progressing     Problem: Fall Risk  Goal: Patient will remain free from falls  Outcome: Progressing   Note : Pt has been instructed and reminded to use call light for assitance. Fall precaution continued. Pt verbalized understanding. No falls during this admit.   Problem: Hemodynamics  Goal: Patient's hemodynamics, fluid balance and neurologic status will be stable or improve  Outcome: Progressing       Patient is not progressing towards the following goals:

## 2023-03-27 NOTE — THERAPY
Occupational Therapy  Daily Treatment     Patient Name: Latonia Clinton  Age:  72 y.o., Sex:  female  Medical Record #: [id number]  Today's Date: 3/27/2023     Precautions  Precautions: Fall Risk  Comments: mild    Assessment  Pt has been slow to progress towards goals. A&Ox3, poor insight to deficits. Mild confusion noted. Limited by LBP, impaired overall strength, decreased endurance. OT will continue to follow with recommendation of SNF upon dc. Pt lives alone and has been unable to care for self.          Plan  Treatment Plan Status: (P) Continue Current Treatment Plan  Type of Treatment: (P) Self Care / Activities of Daily Living, Neuro Re-Education / Balance, Therapeutic Activity  Treatment Frequency: (P) 3 Times per Week  Treatment Duration: (P) Until Therapy Goals Met    DC Equipment Recommendations: (P) Unable to determine at this time  Discharge Recommendations: (P) Recommend post-acute placement for additional occupational therapy services prior to discharge home    Subjective  Agreeable for OOB activity with encouragement     Objective     03/27/23 1233   Pain 0 - 10 Group   Location Back   Therapist Pain Assessment Post Activity Pain Same as Prior to Activity;Nurse Notified   Cognition    Level of Consciousness Alert   Balance   Sitting Balance (Static) Fair +   Sitting Balance (Dynamic) Fair   Standing Balance (Static) Fair -   Standing Balance (Dynamic) Fair -   Weight Shift Sitting Fair   Weight Shift Standing Fair   Skilled Intervention Verbal Cuing;Postural Facilitation   Bed Mobility    Sit to Supine Moderate Assist   Skilled Intervention Verbal Cuing   Activities of Daily Living   Eating Independent   Grooming Standby Assist;Seated   Lower Body Dressing Moderate Assist   Toileting Contact Guard Assist   Skilled Intervention Verbal Cuing;Compensatory Strategies   How much help from another person does the patient currently need...   Putting on and taking off regular lower body clothing? 2    Bathing (including washing, rinsing, and drying)? 2   Toileting, which includes using a toilet, bedpan, or urinal? 3   Putting on and taking off regular upper body clothing? 3   Taking care of personal grooming such as brushing teeth? 4   Eating meals? 4   6 Clicks Daily Activity Score 18   Functional Mobility   Sit to Stand Moderate Assist   Bed, Chair, Wheelchair Transfer Minimal Assist   Toilet Transfers Minimal Assist  (RTS with arms)   Transfer Method Stand Step   Activity Tolerance   Sitting in Chair >1hr   Sitting Edge of Bed 12   Standing 2x3   Patient / Family Goals   Goal #1 Outcome Goal not met   Short Term Goals   Goal Outcome # 1 Progressing slower than expected   Goal Outcome # 2 Progressing slower than expected   Goal Outcome # 3 Progressing slower than expected   Education Group   Education Provided Activities of Daily Living   ADL Patient Response Action Demonstration;Explanation;Acceptance;Patient   Occupational Therapy Treatment Plan    O.T. Treatment Plan Continue Current Treatment Plan   Treatment Interventions Self Care / Activities of Daily Living;Neuro Re-Education / Balance;Therapeutic Activity   Treatment Frequency 3 Times per Week   Duration Until Therapy Goals Met   Anticipated Discharge Equipment and Recommendations   DC Equipment Recommendations Unable to determine at this time   Discharge Recommendations Recommend post-acute placement for additional occupational therapy services prior to discharge home

## 2023-03-27 NOTE — PROGRESS NOTES
Charge Nurse Rounding Note    Bedside rounding completed to address quality of care and overall patient experience.    Patient Satisfaction addressed including staff responsiveness. Patient/family are aware of the POC and any questions answered. Thorough safety education completed including use of call light prior to all mobility throughout the entirety of the hospital stay.     Patient/family aware of time of next Hourly Round.    No further questions/concerns currently.

## 2023-03-27 NOTE — PROGRESS NOTES
Hospital Medicine Daily Progress Note    Date of Service  3/27/2023    Chief Complaint  Latonia Clinton is a 72 y.o. female admitted 3/24/2023 with hypercalcemia from outside facility    Hospital Course  3/25/2023-Latonia is a 72-year-old female comes into the hospital with elevated calcium levels.  The patient apparently has known hyperparathyroidism as well as hypothyroidism.  The patient also has known lung cancer that is been under treatment with oncology and radiation oncology.  The patient now was evaluated for the hypercalcemia and has been given fluid resuscitation as well as pain management.  The patient was also started on Sensipar because of the hyperparathyroidism and parathyroid levels were low repeated.  Patient at this point also is complaining of back pain as well as knee pain.  At this point lumbar MRI is ordered which is pending.  The knee pain is from chronic ACL tear which orthopedic surgery has seen and recommended no surgical intervention due to her ongoing cancer treatments.  At this point patient will need continued pain management, monitoring of her renal functions.  Monitoring of her calcium levels.  Potential placement after PT OT evaluation however patient adamantly refuses to go into a skilled facility.  I have discussed with her the potential for going home with home health and she prefers status she says she is too many things to do at home although she seems very sedentary at home and not able to perform her activities that she is thinking.  3/26/2023-patient's nuclear imaging study for parathyroid disease is negative.  Patient's MRI of the lumbar and cervical spine show chronic disease process with no acute findings.  Physical therapy evaluation and Occupational Therapy evaluation once again discussed with the patient.  She tells me she does not want to go to any skilled facility.  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  The patient at this point  would benefit from skilled however at this point home health may be able to be set up for her as after discussion she had a bad experience 2 times with home health and is not sure if she wants home health.    Interval Problem Update  Patient initially declined skilled, however she was unable to ambulate with PT when they work with her again today.  Now excepting a skilled nursing facility, has been accepted and will transfer tomorrow.  No complaints on exam today.    I have discussed this patient's plan of care and discharge plan at IDT rounds today with Case Management, Nursing, Nursing leadership, and other members of the IDT team.    Consultants/Specialty  nephrology    Code Status  Full Code    Disposition  Patient is not medically cleared for discharge.   Anticipate discharge to to home with close outpatient follow-up.  I have placed the appropriate orders for post-discharge needs.    Review of Systems  Review of Systems   Constitutional:  Positive for malaise/fatigue. Negative for chills, diaphoresis and fever.   HENT: Negative.  Negative for congestion and hearing loss.    Eyes: Negative.  Negative for blurred vision and photophobia.   Respiratory:  Positive for shortness of breath. Negative for cough, sputum production, wheezing and stridor.    Cardiovascular: Negative.  Negative for chest pain, orthopnea and claudication.   Gastrointestinal: Negative.  Negative for abdominal pain, blood in stool, constipation, diarrhea, heartburn, nausea and vomiting.   Genitourinary: Negative.  Negative for frequency, hematuria and urgency.   Musculoskeletal:  Positive for back pain, joint pain, myalgias and neck pain. Negative for falls.   Skin: Negative.  Negative for itching and rash.   Neurological:  Positive for dizziness and headaches. Negative for tremors, sensory change, focal weakness, seizures and loss of consciousness.   Endo/Heme/Allergies: Negative.  Does not bruise/bleed easily.   Psychiatric/Behavioral:   Positive for depression. Negative for substance abuse and suicidal ideas. The patient is nervous/anxious and has insomnia.    All other systems reviewed and are negative.     Physical Exam  Temp:  [36.3 °C (97.4 °F)-36.6 °C (97.8 °F)] 36.4 °C (97.6 °F)  Pulse:  [] 107  Resp:  [16-18] 18  BP: ()/() 93/71  SpO2:  [95 %-98 %] 95 %    Physical Exam  Vitals and nursing note reviewed.   Constitutional:       General: She is not in acute distress.     Appearance: Normal appearance. She is well-developed. She is obese. She is not ill-appearing or toxic-appearing.   Neck:      Thyroid: No thyroid mass.      Vascular: No carotid bruit or JVD.   Cardiovascular:      Rate and Rhythm: Normal rate and regular rhythm.      Pulses: Normal pulses.      Heart sounds: Normal heart sounds, S1 normal and S2 normal. No murmur heard.    No friction rub.   Pulmonary:      Effort: Pulmonary effort is normal.      Breath sounds: Normal air entry. Examination of the right-lower field reveals decreased breath sounds and rhonchi. Examination of the left-lower field reveals decreased breath sounds and rhonchi. Decreased breath sounds and rhonchi present.   Abdominal:      General: Abdomen is flat. Bowel sounds are normal. There is no distension.      Palpations: Abdomen is soft. There is no mass.   Musculoskeletal:         General: No tenderness (left knee). Normal range of motion.      Cervical back: Normal range of motion and neck supple. No edema or rigidity.      Right lower leg: Edema present.      Left lower leg: Edema present.      Right foot: Normal range of motion. No deformity or foot drop.      Left foot: Normal range of motion. No deformity or foot drop.   Feet:      Right foot:      Skin integrity: Skin integrity normal. No ulcer.      Left foot:      Skin integrity: Skin integrity normal. No ulcer.   Lymphadenopathy:      Head:      Right side of head: No submental adenopathy.      Left side of head: No submental  adenopathy.      Cervical: No cervical adenopathy.      Right cervical: No superficial cervical adenopathy.     Left cervical: No superficial cervical adenopathy.      Upper Body:      Right upper body: No supraclavicular adenopathy.      Left upper body: No supraclavicular adenopathy.      Lower Body: No right inguinal adenopathy. No left inguinal adenopathy.   Skin:     General: Skin is warm and dry.      Coloration: Skin is not jaundiced or pale.      Findings: No abrasion or wound.   Neurological:      General: No focal deficit present.      Mental Status: She is alert and oriented to person, place, and time. Mental status is at baseline.      GCS: GCS eye subscore is 4. GCS verbal subscore is 5. GCS motor subscore is 6.      Cranial Nerves: No cranial nerve deficit.      Sensory: Sensation is intact.      Motor: Motor function is intact. No weakness.      Coordination: Coordination abnormal.      Gait: Gait abnormal.   Psychiatric:         Attention and Perception: Attention and perception normal.         Mood and Affect: Mood normal. Affect is flat.         Speech: Speech normal.         Behavior: Behavior is slowed. Behavior is cooperative.         Thought Content: Thought content normal.         Cognition and Memory: Cognition and memory normal.         Judgment: Judgment normal.       Fluids    Intake/Output Summary (Last 24 hours) at 3/27/2023 1504  Last data filed at 3/27/2023 0900  Gross per 24 hour   Intake 240 ml   Output --   Net 240 ml       Laboratory  Recent Labs     03/24/23  1517 03/25/23 0037   WBC 7.1 5.4   RBC 3.25* 2.70*   HEMOGLOBIN 11.4* 9.6*   HEMATOCRIT 34.6* 30.9*   .5* 114.4*   MCH 35.1* 35.6*   MCHC 32.9* 31.1*   RDW 56.8* 59.8*   PLATELETCT 212 149*   MPV 11.1 11.3     Recent Labs     03/24/23  1517 03/25/23  0037 03/27/23  0229   SODIUM 138 145 142   POTASSIUM 4.9 4.1 4.3   CHLORIDE 106 119* 111   CO2 21 14* 20   GLUCOSE 98 107* 119*   BUN 23* 22 27*   CREATININE 2.04* 1.54*  2.13*   CALCIUM 11.4* 8.5 11.5*                   Imaging  MR-THORACIC SPINE-W/O   Final Result      1. Mild kyphoscoliosis of the thoracic spine.      2. Previous vertebral augmentation's of the T7 and T8 vertebral bodies for treatment of compression fractures. Moderate chronic wedge type compression fractures of the T5 and T6 vertebral bodies. Mild chronic butterfly type compression fracture of the    T11 vertebral body.      3. No evidence of acute compression fracture in the thoracic spine.      4. No evidence of significant disc extrusion and/or cord compression.      5. Epidural lipomatosis in the mid thoracic lead region causing mild central canal stenosis at the T6 level.      MR-LUMBAR SPINE-W/O   Final Result      1.  Mild levoscoliosis.      2.  Previous vertebral augmentation of the L2 vertebral body.      3.  Elliptical area of decreased T1 surrounding increased T2 signal intensity involving the superior endplate of the L4 vertebral body consistent with acute endplate degenerative change.      4.  Minimal lumbar spondylotic changes.      5.  Minimal chronic inferior endplate compression fracture the L1 vertebral body with minimal retropulsion of the posterior inferior aspect of the L1 vertebral body.      6.  Minimal residual retropulsion of the posterior inferior aspect of the L2 vertebral body and there are secondary to chronic mild compression fracture of the L2 vertebral body..      NM-PARATHYROID (SESTAMIBI) SCAN   Final Result      No evidence of parathyroid adenoma.      CT-RENAL COLIC EVALUATION(A/P W/O)   Final Result      1.  There is a nonobstructive 2 mm left proximal ureter calcification.   2.  Bilateral intrarenal calcifications and calyceal stones are similar to the previous examination.   3.  Bilateral benign Bosniak 1 and Bosniak 2 simple cysts are again seen which require no imaging follow-up per ACR guidelines.   4.  Stable bilateral adrenal gland benign adenomas which required no  imaging follow-up per ACR guidelines.   5.  Prominent submucosal fat throughout the colon may simply be related to normal variant due to patient body habitus.   6.  Subpleural lower lobe lung opacities are probably postinflammatory.             Assessment/Plan  * Hypercalcemia- (present on admission)  Assessment & Plan  Secondary to hyperparathyroidism  Repeat PTH is down to 145 from 172.  Repeat calcium down to 8.5    Chronic pain of left knee- (present on admission)  Assessment & Plan  ACL tear identified with MRI  Nonsurgical per orthopedics due to the ongoing cancer treatment      Chronic anticoagulation- (present on admission)  Assessment & Plan  Continue with Xarelto given DVT    Chronic bilateral low back pain without sciatica- (present on admission)  Assessment & Plan  MRI of the lumbar and cervical spine performed.  Cervical spine findings chronic  Lumbar findings show T1 and T2 compression finding old and chronic  T4 compression fracture is relatively new but chronic this was nobs observed on previous imaging studies.  No acute pathology that would indicate surgical intervention at this point time this was discussed with the patient.  PT/OT recommending skilled nursing.  Reevaluated on 3/27 patient was unable to ambulate without assistance so is now more accepting of skilled nursing.  Has been accepted and will be discharged tomorrow.    Hyperparathyroidism (HCC)- (present on admission)  Assessment & Plan  Parathyroid study negative  Continue at this point with monitoring parathyroid levels as well as TSH levels  Continue with Sensipar    Other insomnia- (present on admission)  Assessment & Plan  Nocturnal Ambien as needed for sleep disorder    Macrocytic anemia- (present on admission)  Assessment & Plan  Vitamin B12 level at 984 and within normal limits.  At this point no supplementation recommended    Acute on chronic kidney failure (HCC)  Assessment & Plan  Creatinine down to 1.5  Nephrology continues  to follow  Continue to monitor with routine labs    Lung cancer (HCC)- (present on admission)  Assessment & Plan  Patient has completed 5 courses of radiation therapy with Dr. Banks  Patient follows for chemotherapy with Dr. Rizvi  Patient to continue outpatient follow-ups with oncology    Deep vein thrombosis (DVT) (HCC)- (present on admission)  Assessment & Plan  Continue with Xarelto chronically    Thyrotoxicosis with toxic single thyroid nodule and without thyroid storm- (present on admission)  Assessment & Plan  Continue methimazole, 5 mg daily  Most recent TSH level 0.130    Chronic obstructive pulmonary disease (HCC)- (present on admission)  Assessment & Plan  Continue oxygen support  2 L is her baseline-currently at baseline  Continue RT protocol    Hypertension- (present on admission)  Assessment & Plan  Blood pressure controlled at 111/71 now.  Continue as needed hydralazine 10 mg every 4 hours if systolic blood pressure greater than 160  Holding losartan with abnormal renal functions         VTE prophylaxis: SCDs/TEDs    I have performed a physical exam and reviewed and updated ROS and Plan today (3/27/2023). In review of yesterday's note (3/26/2023), there are no changes except as documented above.

## 2023-03-27 NOTE — PROGRESS NOTES
Telemetry Shift Summary     Rhythm SR/ST  HR Range   Ectopy rPVC/PAC  Measurements .20/.08/.30           Normal Values  Rhythm SR  HR Range    Measurements 0.12-0.20 / 0.06-0.10  / 0.30-0.52

## 2023-03-27 NOTE — RESPIRATORY CARE
COPD EDUCATION by   COPD CLINICAL EDUCATOR  3/27/2023 at 11:32 AM   by Linn Velez RRT     Patient reviewed by COPD education team. Patient does have a history or diagnosis of COPD and is a former smoker.  Patient not interested in COPD program. Did have a short discussion with patient covering: What is COPD (how the lungs work), daily medications rescue and maintenance, breathing techniques, infection prevention and oxygen safety were covered in detail.        COPD Screen  COPD Risk Screening  Do you have a history of COPD?: Yes  Do you have a Pulmonologist?: Yes    COPD Assessment  COPD Clinical Specialists ONLY  COPD Education Initiated: Yes--Short Intervention (Pt lung cancer has been under treatment with oncology and radiation oncology, has back pain which pt doesnt know if thats the cause of her shortness of breath or the lungs and feels her medication just doesnt work, pt states shes not doing well at all)  Physician Follow Up Appointment: 03/30/23  Appt Time: 1500  Physician Name: KARIE Frank Nurse Practitioner Anthony Maynard A.P.R.NAnibal  Pulmonary Follow Up Appointment: 06/21/23  Appt Time: 1410  Pulmonologist Name: Mami Allan  Referrals Initiated: Yes  Pulmonary Rehab: Declined  Smoking Cessation: N/A  Hospice: N/A  Home Health Care: Yes  [facility] Outreach: N/A  [facility]: N/A  [facility]: N/A  Private In-Home Care Agency: N/A  Is this a COPD exacerbation patient?: No  (OP) Pulmonary Function Testing: Yes    PFT Results    No results found for: PFT    Meds to Beds  Would the patient like to opt in for Bedside Medication Delivery at Discharge?: Yes, interested     MY COPD ACTION PLAN     It is recommended that patients and physicians /healthcare providers complete this action plan together. This plan should be discussed at each physician visit and updated as needed.    The green, yellow and red zones show groups of symptoms of COPD. This list of  "symptoms is not comprehensive, and you may experience other symptoms. In the \"Actions\" column, your healthcare provider has recommended actions for you to take based on your symptoms.    Patient Name: Latonia Clinton   YOB: 1950   Last Updated on: 3/27/2023 11:31 AM   Green Zone:  I am doing well today Actions     Usual activitiy and exercise level   Take daily medications     Usual amounts of cough and phlegm/mucus   Use oxygen as prescribed     Sleep well at night   Continue regular exercise/diet plan     Appetite is good   At all times avoid cigarette smoke, inhaled irritants     Daily Medications (these medications are taken every day):   Fluticasone Furoate and Vilanterol trifenatate (Breo)  Tiotropium Bromide Monohydrate (Spiriva) 1 Puff  2 Puffs Once daily  Once daily        Yellow Zone:  I am having a bad day or a COPD flare Actions     More breathless than usual   Continue daily medications     I have less energy for my daily activities   Use quick relief inhaler as ordered     Increased or thicker phlegm/mucus   Use oxygen as prescribed     Using quick relief inhaler/nebulizer more often   Get plenty of rest     Swelling of ankles more than usual   Use pursed lip breathing     More coughing than usual   At all times avoid cigarette smoke, inhaled irritants     I feel like I have a \"chest cold\"     Poor sleep and my symptoms woke me up     My appetite is not good     My medicine is not helping      Call provider immediately if symptoms don’t improve     Continue daily medications, add rescue medications:               Medications to be used during a flare up, (as Discussed with Provider):              Red Zone:  I need urgent medical care Actions     Severe shortness of breath even at rest   Call 911 or seek medical care immediately     Not able to do any activity because of breathing      Fever or shaking chills      Feeling confused or very drowsy       Chest pains      Coughing up " blood

## 2023-03-27 NOTE — THERAPY
Physical Therapy   Daily Treatment     Patient Name: Latonia Clinton  Age:  72 y.o., Sex:  female  Medical Record #: [id number]  Today's Date: 3/27/2023     Precautions  Precautions: Fall Risk    Assessment    Pt appears weaker than initial eval a few days ago, required min to modA for mobility, difficulty with sit <> stand from toilet and lower bed- required modA.  Pt does not demo the ability to care for herself, feel DC home would be unsafe at this time. Recommend SNF to pt who now will consider it, extra time spent educating pt on role of SNF to assist with transition home.      Plan    Treatment Plan Status: Continue Current Treatment Plan  Type of Treatment: Bed Mobility, Gait Training, Neuro Re-Education / Balance, Therapeutic Activities, Therapeutic Exercise  Treatment Frequency: 4 Times per Week  Treatment Duration: Until Therapy Goals Met    DC Equipment Recommendations: Unable to determine at this time  Discharge Recommendations: Recommend post-acute placement for additional physical therapy services prior to discharge home       03/27/23 1110   Cognition    Level of Consciousness Alert   Comments Pt with poor insight into current deficits and ability to care for herself   Other Treatments   Other Treatments Provided Extra time spent educating pt on SNF and why she would benefit, reviewed why current deficits in strength and balance do not paint a good picture of being ableto care for herself at home   Balance   Sitting Balance (Static) Fair   Sitting Balance (Dynamic) Fair   Standing Balance (Static) Fair -   Standing Balance (Dynamic) Poor +   Weight Shift Sitting Fair   Weight Shift Standing Fair   Skilled Intervention Postural Facilitation;Sequencing;Verbal Cuing;Tactile Cuing   Comments stdg with FWW   Bed Mobility    Supine to Sit Minimal Assist   Gait Analysis   Gait Level Of Assist Minimal Assist   Assistive Device Front Wheel Walker   Distance (Feet) 25   # of Times Distance was Traveled 2    Deviation Shuffled Gait;Bradykinetic   Functional Mobility   Sit to Stand Moderate Assist   Bed, Chair, Wheelchair Transfer Minimal Assist   Toilet Transfers Minimal Assist   Comments mod A sit > stand from bed and low surfaces   Activity Tolerance   Standing 4-5 min, mod SOB with activity, sats 94% o nRA however HR up to 128 bpm   Short Term Goals    Short Term Goal # 1 S with bed mob in 4 V   Goal Outcome # 1 Progressing slower than expected   Short Term Goal # 2 S with transfers in 4 V   Goal Outcome # 2 Progressing slower than expected   Short Term Goal # 3 S with amb x 100 feet in 4 V   Goal Outcome # 3 Progressing slower than expected

## 2023-03-27 NOTE — DISCHARGE PLANNING
Case Management Discharge Planning    Admission Date: 3/24/2023  GMLOS: 2.6  ALOS: 3    6-Clicks ADL Score: 20  6-Clicks Mobility Score: 17  PT and/or OT Eval ordered: Yes  Post-acute Referrals Ordered: No  Post-acute Choice Obtained: No  Has referral(s) been sent to post-acute provider:  No      Anticipated Discharge Dispo: Discharge Disposition: D/T to home under HHA care in anticipation of covered skilled care (06)    DME Needed: No    Action(s) Taken: Updated Provider/Nurse on Discharge Plan and OTHER    Per Dr. Pelaez's request this LSW called [facility] and discussed the possibility of [facility] services with Ani. Ani stated the pt does not qualify because she lives alone. [facility] is in the home with the pt for eight hours/day. Pt would need someone in the home with her for the remaining 16 hours/day.     Teams message sent to SHANNAN Domingo regarding follow up for [facility] acceptance.     Escalations Completed: None    Medically Clear: No    Next Steps: LSW will follow up regarding HH acceptance.     Barriers to Discharge: Medical clearance and Outpatient referrals pending    Is the patient up for discharge tomorrow: No

## 2023-03-27 NOTE — DISCHARGE PLANNING
Case Management Discharge Planning    Admission Date: 3/24/2023  GMLOS: 2.6  ALOS: 3    6-Clicks ADL Score: 20  6-Clicks Mobility Score: 17  PT and/or OT Eval ordered: Yes  Post-acute Referrals Ordered: Yes  Post-acute Choice Obtained: Yes  Has referral(s) been sent to post-acute provider:  Yes      Anticipated Discharge Dispo: Discharge Disposition: D/T to home under HHA care in anticipation of covered skilled care (06)    DME Needed: No    Action(s) Taken: Choice obtained    This LSW met with the pt at bedside and obtained choice for (1) [facility] and (2) [facility]. Choice form faxed to SHANNAN Domingo. Teams message also sent to SHANNAN oDmingo.     Escalations Completed: None    Medically Clear: No    Next Steps: LSW will follow up     Barriers to Discharge: Medical clearance and Pending Placement    Is the patient up for discharge tomorrow: No

## 2023-03-27 NOTE — DISCHARGE PLANNING
Received Choice form at 0872  Agency/Facility Name: [facility]  Referral sent per Choice form @ 1424

## 2023-03-27 NOTE — DISCHARGE PLANNING
Case Management Discharge Planning    Admission Date: 3/24/2023  GMLOS: 2.6  ALOS: 3    6-Clicks ADL Score: 20  6-Clicks Mobility Score: 16  PT and/or OT Eval ordered: Yes  Post-acute Referrals Ordered: Yes  Post-acute Choice Obtained: Yes  Has referral(s) been sent to post-acute provider:  Yes      Anticipated Discharge Dispo: Discharge Disposition: D/T to home under HHA care in anticipation of covered skilled care (06)    DME Needed: No    Action(s) Taken: Updated Provider/Nurse on Discharge Plan and Acceptance Received    LSW called [facility] regarding bed availability. Sang stated they are able to pickup the pt tomorrow. Sang would like a call back tomorrow to arrange transportation. Per Charge ALLAN Zamudio pt can be transported via wheelchair.     Escalations Completed: None    Medically Clear: Yes    Next Steps: LSW will follow up with [facility] tomorrow regarding transportation arrangement.     Barriers to Discharge: Pending Placement and Transportation    Is the patient up for discharge tomorrow: Yes    Is transport arranged for discharge disposition: No

## 2023-03-27 NOTE — PROGRESS NOTES
"[facility] -  PROGRESS NOTE               Author: Kishore Chavarria D.O. Date & Time: 3/27/2023  2:58 PM     HPI:  71 Yo female with history notable for CKD, HTN, HLD, lung cancer presented with hypercalcemia on outpatient labs and weakness from outpatient nephrology clinic. Has presumed primary hypercalcemia with elevated PTH  (most recent level 170, from 346) but has failed to follow-up with outpatient confirmatory imaging.  One week ago her corrected calcium was noted to be 12.1 with high normal vit D. Vitamin D stopped and she was told to increase oral hydration with repeat labs in 1 weeks. Labs yesterday resulted with a corrected calcium of 12.3 and she noted ongoing weakness and inability to ambulate/back/flank/leg pain prompting referral to ED. She notes being more thirstt than normal. No gross hematuria. Her most recent cr also appears above baseline (~1.5).  Initial ER labs with cr of 2 and corrected calcium of 12.2. Soft Bps in ER, 90/60,  took her losartan this AM. Abd  CT performed, results pending. No chest pain, sob, leg swelling.    DAILY NEPHROLOGY SUMMARY:  3/25: Calcium and creatinine trending down.  Having back and leg pain,   chronic.  Parathyroid scan pending.    3/26: No new labs.  Having back pain and SOB.  Parathyroid scan negative for adenoma.  3/27: No overnight changes.  Not eating or drinking much.  No edema.  Chronic pain   Continues.  Chart reviewed.  PET in 12/22 = no mets seen.     REVIEW OF SYSTEMS:    10 point ROS reviewed and is as per HPI/daily summary or otherwise negative    PMH/PSH/SH/FH:   Reviewed and unchanged since admission note    CURRENT MEDICATIONS:   Reviewed from admission to present day    VS:  BP 93/71   Pulse (!) 107 Comment: Rn Notified  Temp 36.4 °C (97.6 °F) (Oral)   Resp 18   Ht 1.676 m (5' 6\")   Wt 100 kg (220 lb 14.4 oz)   LMP  (LMP Unknown)   SpO2 95%   BMI 35.65 kg/m²     Physical Exam  HENT:      Head: Normocephalic.      " Right Ear: External ear normal.      Left Ear: External ear normal.      Nose: Nose normal.      Mouth/Throat:      Mouth: Mucous membranes are moist.   Eyes:      General: No scleral icterus.  Cardiovascular:      Pulses: Normal pulses.   Pulmonary:      Effort: Pulmonary effort is normal.   Abdominal:      General: There is no distension.   Musculoskeletal:      Cervical back: Normal range of motion.      Comments: Left leg weakness   Skin:     General: Skin is warm.   Neurological:      Mental Status: She is alert and oriented to person, place, and time.   Psychiatric:         Mood and Affect: Mood normal.       Fluids:  In: 360 [P.O.:360]  Out: -     LABS:  Recent Labs     03/24/23  1517 03/25/23  0037 03/27/23  0229   SODIUM 138 145 142   POTASSIUM 4.9 4.1 4.3   CHLORIDE 106 119* 111   CO2 21 14* 20   GLUCOSE 98 107* 119*   BUN 23* 22 27*   CREATININE 2.04* 1.54* 2.13*   CALCIUM 11.4* 8.5 11.5*         IMAGING:   All imaging reviewed from admission to present day    IMPRESSION:  # ISAMAR, non-oliguric  In the setting of hypotension, hypercalcemia  Creat up again today with elevated calcium    Due to profusion abnormalities with hypercalcemia  # CKD:   Frequent fluctuations in cr,   Baseline ~1.5  # Hypercalcemia:   Etiology not entirely clear  May be PTH mediated.   PTH scan negative for adenoma (but won't show hyperplasia)  On sensipar  Calcium up a bit today  No mets to bone seen on PET on 12/22  Recommend pamidronate 30mg IV x 1 dose  # HTN: on losartan at home  # Lung cancer   No mets seen on PET, 12/22  # COPD  # Nephrolithiasis      SUGGESTIONS:  Pamidronate 30mg IV X 1  Restart IVF X 24 hours  Continue to hold Losartan for now  Continue sensipar 30mg daily  Continue Sodium bicarb 650mg TID  No role for calcitonin   No dietary protein restrictions   Follow labs, further recommendations to follow    Would like to see Ca<10.5 and creat closer to baseline before transition to SNF    Thank you

## 2023-03-28 NOTE — DISCHARGE PLANNING
Case Management Discharge Planning    Admission Date: 3/24/2023  GMLOS: 2.6  ALOS: 4    6-Clicks ADL Score: 18  6-Clicks Mobility Score: 16  PT and/or OT Eval ordered: Yes  Post-acute Referrals Ordered: Yes  Post-acute Choice Obtained: Yes  Has referral(s) been sent to post-acute provider:  Yes      Anticipated Discharge Dispo: Discharge Disposition: D/T to home under [facility] care in anticipation of covered skilled care (06)    DME Needed: No    Action(s) Taken: Updated Provider/Nurse on Discharge Plan, Transport Arranged , and Completed PASSR/LOC    LSW left a voicemail with [facility] .    SHANNAN Domingo confirmed 1630 transport time    VOALTE message sent to the team.     PASRR# [id number]    Per Dr. Pelaez pt is not medically cleared, nephrology is still treating hypercalcemia.     @1014 LSW called Sang at [facility] and canceled transport.    Escalations Completed: None    Medically Clear: Yes    Next Steps: LSW will arrange transport once pt is mc.      Barriers to Discharge: None    Is the patient up for discharge tomorrow: No

## 2023-03-28 NOTE — DISCHARGE PLANNING
@[id number]  Agency/Facility Name: [facility]  Outcome: DPA received a voice message from Madalyn stating the referral has been accepted.

## 2023-03-28 NOTE — CARE PLAN
The patient is Watcher - Medium risk of patient condition declining or worsening    Shift Goals  Clinical Goals: IVF, monitor labs,O2 support  Patient Goals: pain control ,sleep  Family Goals: SUMAN    Progress made toward(s) clinical / shift goals:    Problem: Knowledge Deficit - Standard  Goal: Patient and family/care givers will demonstrate understanding of plan of care, disease process/condition, diagnostic tests and medications  Outcome: Progressing   Note : Pt verbalized understanding regarding plan of care, pain control and IVF.   Problem: Pain - Standard  Goal: Alleviation of pain or a reduction in pain to the patient’s comfort goal  Outcome: Progressing     Problem: Fall Risk  Goal: Patient will remain free from falls  Outcome: Progressing   Note : Pt has been getting up to the bathroom with walker. Pt able to tolerate walking but gets SOB slightly.   Problem: Hemodynamics  Goal: Patient's hemodynamics, fluid balance and neurologic status will be stable or improve  Outcome: Not Progressing   Note: Pt tachycardic with activity. Denies chest pain but tires quickly.   Problem: Respiratory  Goal: Patient will achieve/maintain optimum respiratory ventilation and gas exchange  Outcome: Progressing   Note: Pt slightly SOB with activity.     Patient is not progressing towards the following goals:      Problem: Hemodynamics  Goal: Patient's hemodynamics, fluid balance and neurologic status will be stable or improve  Outcome: Not Progressing

## 2023-03-28 NOTE — FLOWSHEET NOTE
03/28/23 0748   Events/Summary/Plan   Events/Summary/Plan mdi GIVEN o2 CHECK pt nausea   Vital Signs   Pulse (!) 111   Respiration 17   Pulse Oximetry 98 %   $ Pulse Oximetry (Spot Check) Yes   Respiratory Assessment   Respiratory Pattern Within Normal Limits   Level of Consciousness Alert   Chest Exam   Work Of Breathing / Effort Within Normal Limits   Breath Sounds   RUL Breath Sounds Clear   RML Breath Sounds Clear   RLL Breath Sounds Diminished   AUSTIN Breath Sounds Clear   LLL Breath Sounds Diminished   Secretions   Cough Non Productive   Oxygen   O2 (LPM) 2   O2 Delivery Device Silicone Nasal Cannula

## 2023-03-28 NOTE — CARE PLAN
The patient is Stable - Low risk of patient condition declining or worsening    Shift Goals  Clinical Goals: Calcium Control, O2 support  Patient Goals: pain management and rest  Family Goals: SUMAN    Progress made toward(s) clinical / shift goals:        Problem: Knowledge Deficit - Standard  Goal: Patient and family/care givers will demonstrate understanding of plan of care, disease process/condition, diagnostic tests and medications  Outcome: Progressing  Note: Patient is able to successfully verbalize understanding of plan of care, disease process, diagnostics, interventions and discharge plans.      Problem: Fall Risk  Goal: Patient will remain free from falls  Outcome: Progressing  Note: Patient has remained free from falls     Problem: Respiratory  Goal: Patient will achieve/maintain optimum respiratory ventilation and gas exchange  Outcome: Progressing  Note: Patient is able to maintain O2 sat on 2L nasal cannula, which is baseline.

## 2023-03-28 NOTE — CARE PLAN
The patient is Stable - Low risk of patient condition declining or worsening    Shift Goals  Clinical Goals: PT and OT, labs  Patient Goals: pain control, rest  Family Goals: SUMAN    Progress made toward(s) clinical / shift goals:        Problem: Knowledge Deficit - Standard  Goal: Patient and family/care givers will demonstrate understanding of plan of care, disease process/condition, diagnostic tests and medications  Outcome: Progressing  Note: Patient is able to verbalize disease process, treatments and diagnostics.      Problem: Pain - Standard  Goal: Alleviation of pain or a reduction in pain to the patient’s comfort goal  Outcome: Progressing  Note: Patient is able to verbalize pain score utilizing numeric pain scale     Problem: Fall Risk  Goal: Patient will remain free from falls  Outcome: Progressing  Note: Patient remains free from falls

## 2023-03-28 NOTE — PROGRESS NOTES
"[facility] -  PROGRESS NOTE               Author: Kishore Chavarria D.O. Date & Time: 3/28/2023  12:41 PM     HPI:  73 Yo female with history notable for CKD, HTN, HLD, lung cancer presented with hypercalcemia on outpatient labs and weakness from outpatient nephrology clinic. Has presumed primary hypercalcemia with elevated PTH  (most recent level 170, from 346) but has failed to follow-up with outpatient confirmatory imaging.  One week ago her corrected calcium was noted to be 12.1 with high normal vit D. Vitamin D stopped and she was told to increase oral hydration with repeat labs in 1 weeks. Labs yesterday resulted with a corrected calcium of 12.3 and she noted ongoing weakness and inability to ambulate/back/flank/leg pain prompting referral to ED. She notes being more thirstt than normal. No gross hematuria. Her most recent cr also appears above baseline (~1.5).  Initial ER labs with cr of 2 and corrected calcium of 12.2. Soft Bps in ER, 90/60,  took her losartan this AM. Abd  CT performed, results pending. No chest pain, sob, leg swelling.    DAILY NEPHROLOGY SUMMARY:  3/25: Calcium and creatinine trending down.  Having back and leg pain,   chronic.  Parathyroid scan pending.    3/26: No new labs.  Having back pain and SOB.  Parathyroid scan negative for adenoma.  3/27: No overnight changes.  Not eating or drinking much.  No edema.  Chronic pain   Continues.  Chart reviewed.  PET in 12/22 = no mets seen.   3/28: Still sleepy and weak.  Eating \"some\" but not hungry.  Got pamidronate X 1.   On sensipar.  Calcium still up.  No edema.  No dyspnea.    REVIEW OF SYSTEMS:    10 point ROS reviewed and is as per HPI/daily summary or otherwise negative    PMH/PSH/SH/FH:   Reviewed and unchanged since admission note    CURRENT MEDICATIONS:   Reviewed from admission to present day    VS:  /67   Pulse (!) 114   Temp 36.8 °C (98.3 °F) (Axillary)   Resp 18   Ht 1.676 m (5' 6\")   Wt 100 kg " (220 lb 14.4 oz)   LMP  (LMP Unknown)   SpO2 95%   BMI 35.65 kg/m²     Physical Exam  HENT:      Head: Normocephalic.      Right Ear: External ear normal.      Left Ear: External ear normal.      Nose: Nose normal.      Mouth/Throat:      Mouth: Mucous membranes are moist.   Eyes:      General: No scleral icterus.  Cardiovascular:      Pulses: Normal pulses.   Pulmonary:      Effort: Pulmonary effort is normal.   Abdominal:      General: There is no distension.   Musculoskeletal:      Cervical back: Normal range of motion.      Comments: Left leg weakness   Skin:     General: Skin is warm.   Neurological:      Mental Status: She is alert and oriented to person, place, and time.   Psychiatric:         Mood and Affect: Mood normal.       Fluids:  In: 240 [P.O.:240]  Out: -     LABS:  Recent Labs     03/27/23  0229 03/28/23  0136   SODIUM 142 141   POTASSIUM 4.3 4.9   CHLORIDE 111 111   CO2 20 18*   GLUCOSE 119* 127*   BUN 27* 32*   CREATININE 2.13* 2.00*   CALCIUM 11.5* 11.2*         IMAGING:   All imaging reviewed from admission to present day    IMPRESSION:  # ISAMAR, non-oliguric  In the setting of hypotension, hypercalcemia  Creat up with elevated calcium    Due to profusion abnormalities with hypercalcemia  A bit better today (TUES) with IVF  # CKD:   Frequent fluctuations in cr,   Baseline ~1.5  # Hypercalcemia:   Etiology not entirely clear  May be PTH mediated.   PTH scan negative for adenoma (but won't show hyperplasia)  On sensipar  Pamidronate 30mg IV x 1 (MON)  Calcium up a bit today (TUES)  No mets to bone seen on PET on 12/22  Recommend additional pamidronate 30mg IV x 1 dose  # HTN: on losartan at home  # Lung cancer   No mets seen on PET, 12/22  # COPD  # Nephrolithiasis      SUGGESTIONS:  Pamidronate 30mg IV X 1 again today (TUES)  Continue IVF X 24 hours more (Poor PO Intake)  Continue to hold Losartan for now  Continue sensipar 30mg daily  Continue Sodium bicarb 650mg TID  No role for  calcitonin   No dietary protein restrictions   Follow labs, further recommendations to follow    Would like to see Ca<10.5 and creat closer to baseline before transition to SNF  Daily evaluation    Thank you

## 2023-03-28 NOTE — PROGRESS NOTES
Telemetry Shift Summary     Rhythm ST  HR Range 105- 116, touch of 169  Ectopy rPAC, rPVC  Measurements  0.18/0.08/0.32           Normal Values  Rhythm SR  HR Range    Measurements 0.12-0.20 / 0.06-0.10  / 0.30-0.52

## 2023-03-28 NOTE — PROGRESS NOTES
Hospital Medicine Daily Progress Note    Date of Service  3/28/2023    Chief Complaint  Latonia Clinton is a 72 y.o. female admitted 3/24/2023 with hypercalcemia from outside facility    Hospital Course  3/25/2023-Latonia is a 72-year-old female comes into the hospital with elevated calcium levels.  The patient apparently has known hyperparathyroidism as well as hypothyroidism.  The patient also has known lung cancer that is been under treatment with oncology and radiation oncology.  The patient now was evaluated for the hypercalcemia and has been given fluid resuscitation as well as pain management.  The patient was also started on Sensipar because of the hyperparathyroidism and parathyroid levels were low repeated.  Patient at this point also is complaining of back pain as well as knee pain.  At this point lumbar MRI is ordered which is pending.  The knee pain is from chronic ACL tear which orthopedic surgery has seen and recommended no surgical intervention due to her ongoing cancer treatments.  At this point patient will need continued pain management, monitoring of her renal functions.  Monitoring of her calcium levels.  Potential placement after PT OT evaluation however patient adamantly refuses to go into a skilled facility.  I have discussed with her the potential for going home with home health and she prefers status she says she is too many things to do at home although she seems very sedentary at home and not able to perform her activities that she is thinking.  3/26/2023-patient's nuclear imaging study for parathyroid disease is negative.  Patient's MRI of the lumbar and cervical spine show chronic disease process with no acute findings.  Physical therapy evaluation and Occupational Therapy evaluation once again discussed with the patient.  She tells me she does not want to go to any skilled facility.  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  The patient at this point  would benefit from skilled however at this point home health may be able to be set up for her as after discussion she had a bad experience 2 times with home health and is not sure if she wants home health.    Interval Problem Update  Discussed with nephrology this morning, creatinine improving, however calcium is still high patient having some nausea and increased weakness with which likely resulted from the hypercalcemia.  Nephrology would like to continue to treat and monitor very closely given ISAMAR in the setting of hypercalcemia and the risk for further organ damage.  Patient has been accepted to SNF-we will transfer there once medically cleared  Patient reported nausea on exam this morning, complains of weakness    I have discussed this patient's plan of care and discharge plan at IDT rounds today with Case Management, Nursing, Nursing leadership, and other members of the IDT team.    Consultants/Specialty  nephrology    Code Status  Full Code    Disposition  Patient is not medically cleared for discharge.   Anticipate discharge to to home with close outpatient follow-up.  I have placed the appropriate orders for post-discharge needs.    Review of Systems  Review of Systems   Constitutional:  Positive for malaise/fatigue. Negative for chills, diaphoresis and fever.   HENT: Negative.  Negative for congestion and hearing loss.    Eyes: Negative.  Negative for blurred vision and photophobia.   Respiratory:  Positive for shortness of breath. Negative for cough, sputum production, wheezing and stridor.    Cardiovascular: Negative.  Negative for chest pain, orthopnea and claudication.   Gastrointestinal: Negative.  Negative for abdominal pain, blood in stool, constipation, diarrhea, heartburn, nausea and vomiting.   Genitourinary: Negative.  Negative for frequency, hematuria and urgency.   Musculoskeletal:  Positive for back pain, joint pain, myalgias and neck pain. Negative for falls.   Skin: Negative.  Negative for  itching and rash.   Neurological:  Positive for dizziness and headaches. Negative for tremors, sensory change, focal weakness, seizures and loss of consciousness.   Endo/Heme/Allergies: Negative.  Does not bruise/bleed easily.   Psychiatric/Behavioral:  Positive for depression. Negative for substance abuse and suicidal ideas. The patient is nervous/anxious and has insomnia.    All other systems reviewed and are negative.     Physical Exam  Temp:  [36.4 °C (97.5 °F)-36.9 °C (98.5 °F)] 36.8 °C (98.3 °F)  Pulse:  [103-154] 114  Resp:  [17-20] 18  BP: (110-143)/() 110/67  SpO2:  [93 %-98 %] 95 %    Physical Exam  Vitals and nursing note reviewed.   Constitutional:       General: She is not in acute distress.     Appearance: Normal appearance. She is well-developed. She is obese. She is not ill-appearing or toxic-appearing.   Neck:      Thyroid: No thyroid mass.      Vascular: No carotid bruit or JVD.   Cardiovascular:      Rate and Rhythm: Normal rate and regular rhythm.      Pulses: Normal pulses.      Heart sounds: Normal heart sounds, S1 normal and S2 normal. No murmur heard.    No friction rub.   Pulmonary:      Effort: Pulmonary effort is normal.      Breath sounds: Normal air entry. Examination of the right-lower field reveals decreased breath sounds and rhonchi. Examination of the left-lower field reveals decreased breath sounds and rhonchi. Decreased breath sounds and rhonchi present.   Abdominal:      General: Abdomen is flat. Bowel sounds are normal. There is no distension.      Palpations: Abdomen is soft. There is no mass.   Musculoskeletal:         General: No tenderness (left knee). Normal range of motion.      Cervical back: Normal range of motion and neck supple. No edema or rigidity.      Right lower leg: Edema present.      Left lower leg: Edema present.      Right foot: Normal range of motion. No deformity or foot drop.      Left foot: Normal range of motion. No deformity or foot drop.   Feet:       Right foot:      Skin integrity: Skin integrity normal. No ulcer.      Left foot:      Skin integrity: Skin integrity normal. No ulcer.   Lymphadenopathy:      Head:      Right side of head: No submental adenopathy.      Left side of head: No submental adenopathy.      Cervical: No cervical adenopathy.      Right cervical: No superficial cervical adenopathy.     Left cervical: No superficial cervical adenopathy.      Upper Body:      Right upper body: No supraclavicular adenopathy.      Left upper body: No supraclavicular adenopathy.      Lower Body: No right inguinal adenopathy. No left inguinal adenopathy.   Skin:     General: Skin is warm and dry.      Coloration: Skin is not jaundiced or pale.      Findings: No abrasion or wound.   Neurological:      General: No focal deficit present.      Mental Status: She is alert and oriented to person, place, and time. Mental status is at baseline.      GCS: GCS eye subscore is 4. GCS verbal subscore is 5. GCS motor subscore is 6.      Cranial Nerves: No cranial nerve deficit.      Sensory: Sensation is intact.      Motor: Motor function is intact. No weakness.      Coordination: Coordination abnormal.      Gait: Gait abnormal.   Psychiatric:         Attention and Perception: Attention and perception normal.         Mood and Affect: Mood normal. Affect is flat.         Speech: Speech normal.         Behavior: Behavior is slowed. Behavior is cooperative.         Thought Content: Thought content normal.         Cognition and Memory: Cognition and memory normal.         Judgment: Judgment normal.       Fluids    Intake/Output Summary (Last 24 hours) at 3/28/2023 1325  Last data filed at 3/27/2023 1400  Gross per 24 hour   Intake 120 ml   Output --   Net 120 ml       Laboratory  Recent Labs     03/28/23  0136   WBC 7.2   RBC 3.30*   HEMOGLOBIN 11.7*   HEMATOCRIT 36.8*   .5*   MCH 35.5*   MCHC 31.8*   RDW 59.1*   PLATELETCT 219   MPV 11.2     Recent Labs      03/27/23  0229 03/28/23  0136   SODIUM 142 141   POTASSIUM 4.3 4.9   CHLORIDE 111 111   CO2 20 18*   GLUCOSE 119* 127*   BUN 27* 32*   CREATININE 2.13* 2.00*   CALCIUM 11.5* 11.2*                   Imaging  MR-THORACIC SPINE-W/O   Final Result      1. Mild kyphoscoliosis of the thoracic spine.      2. Previous vertebral augmentation's of the T7 and T8 vertebral bodies for treatment of compression fractures. Moderate chronic wedge type compression fractures of the T5 and T6 vertebral bodies. Mild chronic butterfly type compression fracture of the    T11 vertebral body.      3. No evidence of acute compression fracture in the thoracic spine.      4. No evidence of significant disc extrusion and/or cord compression.      5. Epidural lipomatosis in the mid thoracic lead region causing mild central canal stenosis at the T6 level.      MR-LUMBAR SPINE-W/O   Final Result      1.  Mild levoscoliosis.      2.  Previous vertebral augmentation of the L2 vertebral body.      3.  Elliptical area of decreased T1 surrounding increased T2 signal intensity involving the superior endplate of the L4 vertebral body consistent with acute endplate degenerative change.      4.  Minimal lumbar spondylotic changes.      5.  Minimal chronic inferior endplate compression fracture the L1 vertebral body with minimal retropulsion of the posterior inferior aspect of the L1 vertebral body.      6.  Minimal residual retropulsion of the posterior inferior aspect of the L2 vertebral body and there are secondary to chronic mild compression fracture of the L2 vertebral body..      NM-PARATHYROID (SESTAMIBI) SCAN   Final Result      No evidence of parathyroid adenoma.      CT-RENAL COLIC EVALUATION(A/P W/O)   Final Result      1.  There is a nonobstructive 2 mm left proximal ureter calcification.   2.  Bilateral intrarenal calcifications and calyceal stones are similar to the previous examination.   3.  Bilateral benign Bosniak 1 and Bosniak 2 simple  cysts are again seen which require no imaging follow-up per ACR guidelines.   4.  Stable bilateral adrenal gland benign adenomas which required no imaging follow-up per ACR guidelines.   5.  Prominent submucosal fat throughout the colon may simply be related to normal variant due to patient body habitus.   6.  Subpleural lower lobe lung opacities are probably postinflammatory.             Assessment/Plan  * Hypercalcemia- (present on admission)  Assessment & Plan  Secondary to hyperparathyroidism  Repeat PTH is down to 145 from 172.  Repeat calcium down to 8.5  Aredia given on 3/27, 3/28, per nephrology's recommendations     Chronic pain of left knee- (present on admission)  Assessment & Plan  ACL tear identified with MRI  Nonsurgical per orthopedics due to the ongoing cancer treatment      Chronic anticoagulation- (present on admission)  Assessment & Plan  Continue with Xarelto given DVT    Chronic bilateral low back pain without sciatica- (present on admission)  Assessment & Plan  MRI of the lumbar and cervical spine performed:  Cervical spine findings chronic  Lumbar findings show T1 and T2 compression finding old and chronic  T4 compression fracture is relatively new but chronic this was nobs observed on previous imaging studies.  No acute pathology that would indicate surgical intervention at this point time this was discussed with the patient.  PT/OT recommending skilled nursing.  Reevaluated on 3/27 patient was unable to ambulate without assistance so is now more accepting of skilled nursing.      Hyperparathyroidism (HCC)- (present on admission)  Assessment & Plan  Parathyroid study negative  Continue at this point with monitoring parathyroid levels as well as TSH levels.    Parathyroid peptide levels as well, per nephrology  Continue with Sensipar    Other insomnia- (present on admission)  Assessment & Plan  Nocturnal Ambien as needed for sleep disorder    Macrocytic anemia- (present on  admission)  Assessment & Plan  Vitamin B12 level at 984 and within normal limits.  At this point no supplementation recommended    Acute on chronic kidney failure (HCC)  Assessment & Plan  Creatinine down to 2, improving but still at risk for worsening of dysfunction  Avoid nephrotoxic agents  Nephrology continues to follow  Continue to monitor with routine labs    Lung cancer (HCC)- (present on admission)  Assessment & Plan  Patient has completed 5 courses of radiation therapy with Dr. Banks  Patient follows for chemotherapy with Dr. Rizvi  Patient to continue outpatient follow-ups with oncology    Deep vein thrombosis (DVT) (HCC)- (present on admission)  Assessment & Plan  Continue with Xarelto chronically    Thyrotoxicosis with toxic single thyroid nodule and without thyroid storm- (present on admission)  Assessment & Plan  Continue methimazole, 5 mg daily  Most recent TSH level 0.130    Chronic obstructive pulmonary disease (HCC)- (present on admission)  Assessment & Plan  Continue oxygen support  2 L is her baseline-currently at baseline  Continue RT protocol    Hypertension- (present on admission)  Assessment & Plan  Blood pressure controlled at 111/71 now.  Continue as needed hydralazine 10 mg every 4 hours if systolic blood pressure greater than 160  Holding losartan with abnormal renal functions         VTE prophylaxis: SCDs/TEDs    I have performed a physical exam and reviewed and updated ROS and Plan today (3/28/2023). In review of yesterday's note (3/27/2023), there are no changes except as documented above.    Total time spent with patient over 50 minutes.  This included my review with nursing and ancillary staff, review of records, face to face interview, physical examination; my review of lab results (CBC, chemistry panel), imaging review (CXR) and ECG.   In addition, counseling patient and speaking with consultants.

## 2023-03-28 NOTE — DISCHARGE PLANNING
Agency/Facility Name: [facility]  Spoke To: Madalyn  Outcome: Has a bed available today.    @3701  Agency/Facility Name: [facility]  Spoke To: Madalyn  Outcome: The [facility] van will transport the pt today at 1630    @1316  Agency/Facility Name: [facility]  Spoke To: Sang  Outcome: DPA requested transport be cancelled.  Pt is not medically clear.

## 2023-03-29 NOTE — CARE PLAN
The patient is Stable - Low risk of patient condition declining or worsening    Shift Goals  Clinical Goals: Calcium control  Patient Goals: rest and comfort  Family Goals: SUMAN    Progress made toward(s) clinical / shift goals:  Pt educated on POC including monitoring Calcium lab values, pt verbalizes understanding. Bed locked and in lowest position.     Problem: Knowledge Deficit - Standard  Goal: Patient and family/care givers will demonstrate understanding of plan of care, disease process/condition, diagnostic tests and medications  Outcome: Progressing     Problem: Pain - Standard  Goal: Alleviation of pain or a reduction in pain to the patient’s comfort goal  Outcome: Progressing     Problem: Fall Risk  Goal: Patient will remain free from falls  Outcome: Progressing       Patient is not progressing towards the following goals:

## 2023-03-29 NOTE — PROGRESS NOTES
"[facility] -  PROGRESS NOTE               Author: Darrick Simms M.D. Date & Time: 3/29/2023  12:10 PM     HPI:  73 Yo female with history notable for CKD, HTN, HLD, lung cancer presented with hypercalcemia on outpatient labs and weakness from outpatient nephrology clinic. Has presumed primary hypercalcemia with elevated PTH  (most recent level 170, from 346) but has failed to follow-up with outpatient confirmatory imaging.  One week ago her corrected calcium was noted to be 12.1 with high normal vit D. Vitamin D stopped and she was told to increase oral hydration with repeat labs in 1 weeks. Labs yesterday resulted with a corrected calcium of 12.3 and she noted ongoing weakness and inability to ambulate/back/flank/leg pain prompting referral to ED. She notes being more thirstt than normal. No gross hematuria. Her most recent cr also appears above baseline (~1.5).  Initial ER labs with cr of 2 and corrected calcium of 12.2. Soft Bps in ER, 90/60,  took her losartan this AM. Abd  CT performed, results pending. No chest pain, sob, leg swelling.    DAILY NEPHROLOGY SUMMARY:  3/25: Calcium and creatinine trending down.  Having back and leg pain,   chronic.  Parathyroid scan pending.    3/26: No new labs.  Having back pain and SOB.  Parathyroid scan negative for adenoma.  3/27: No overnight changes.  Not eating or drinking much.  No edema.  Chronic pain   Continues.  Chart reviewed.  PET in 12/22 = no mets seen.   3/28: Still sleepy and weak.  Eating \"some\" but not hungry.  Got pamidronate X 1.   On sensipar.  Calcium still up.  No edema.  No dyspnea.  3/29: Pamidronate #2 yesterday, Se Ca much improved and in normal range     REVIEW OF SYSTEMS:    10 point ROS reviewed and is as per HPI/daily summary or otherwise negative    PMH/PSH/SH/FH:   Reviewed and unchanged since admission note    CURRENT MEDICATIONS:   Reviewed from admission to present day    VS:  /69   Pulse (!) 101   Temp " "36.3 °C (97.4 °F) (Oral)   Resp 16   Ht 1.676 m (5' 6\")   Wt 100 kg (220 lb 14.4 oz)   LMP  (LMP Unknown)   SpO2 96%   BMI 35.65 kg/m²     Physical Exam  HENT:      Head: Normocephalic.      Right Ear: External ear normal.      Left Ear: External ear normal.      Nose: Nose normal.      Mouth/Throat:      Mouth: Mucous membranes are moist.   Eyes:      General: No scleral icterus.  Cardiovascular:      Pulses: Normal pulses.   Pulmonary:      Effort: Pulmonary effort is normal.   Abdominal:      General: There is no distension.   Musculoskeletal:      Cervical back: Normal range of motion.      Comments: Left leg weakness   Skin:     General: Skin is warm.   Neurological:      Mental Status: She is alert and oriented to person, place, and time.   Psychiatric:         Mood and Affect: Mood normal.       Fluids:  No intake/output data recorded.    LABS:  Recent Labs     03/27/23  0229 03/28/23  0136 03/29/23  0120   SODIUM 142 141 141   POTASSIUM 4.3 4.9 4.3   CHLORIDE 111 111 112   CO2 20 18* 17*   GLUCOSE 119* 127* 109*   BUN 27* 32* 32*   CREATININE 2.13* 2.00* 1.80*   CALCIUM 11.5* 11.2* 9.9         IMAGING:   All imaging reviewed from admission to present day    IMPRESSION:  # ISAMAR, non-oliguric  In the setting of hypotension, hypercalcemia  Creat up with elevated calcium    Due to profusion abnormalities with hypercalcemia  A bit better today (TUES) with IVF  # CKD:   Frequent fluctuations in cr,   Baseline ~1.5  # Hypercalcemia:   Etiology not entirely clear  May be PTH mediated.   PTH scan negative for adenoma (but won't show hyperplasia)  On sensipar  Pamidronate 30mg IV x 1 (MON)  Calcium up a bit today (TUES)  No mets to bone seen on PET on 12/22  S/P pamidronate 30mg IV x 2 doses (3/28 and 3/29)   # HTN: on losartan at home  # Lung cancer   No mets seen on PET, 12/22  # COPD  # Nephrolithiasis      SUGGESTIONS:  Se Ca back in normal range, no further need for Pamidronate IV   Se Crt improved and " trending in right direction as well   Decrease maintenance IVF to 40 cc/hr   Continue to hold Losartan for now  Continue sensipar 30mg daily  Continue Sodium bicarb 650mg TID  No role for calcitonin   No dietary protein restrictions   Follow labs, further recommendations to follow    OK to transition to SNF from Renal standpoint     Thank you

## 2023-03-29 NOTE — PROGRESS NOTES
Monitor summary:     Rhythm : ST  Rate : 108-125  Ectopy : Rare PAC/PVC    MT=.18  QRS=.08  QT=.28        Per telemetry strip summary from monitor room.

## 2023-03-29 NOTE — PROCEDURES
Vascular Access Team    POWER MIDLINE  Date of Insertion: 3/29/23  Arm Circumference: 39 cm (LUE multiple infiltrations)  Internal length: 9 cm  External Length: 0 cm  Vein Occupancy %: 35  Reason for Midline: access  Labs: WBC 8.1, , BUN 32, Cr 1.80, GFR 29 (okay to place per Dr. Chavarria), INR N/A    Orders confirmed, vessel patency confirmed with ultrasound. Risks and benefits of procedure explained to patient and education regarding line associated bloodstream infections provided. Questions answered.     Power Midline placed in LUE per licensed provider order with ultrasound guidance. 4 Fr, single lumen Power Midline placed in brachial vein after 1 attempt(s). 2 mL of 1% lidocaine injected intradermally, 21 gauge microintroducer needle was visualized entering the vein and modified Seldinger technique used. 9 cm catheter inserted with good blood return. Secured at 0 cm marker. Internal positioning stylet removed and verified to be intact. Each lumen flushed without resistance with 10 mL 0.9% normal saline. Midline secured with Biopatch and Tegaderm.     Midline placement is confirmed by nurse using ultrasound and ability to flush and draw blood. Midline is appropriate for use at this time.  Patient tolerated procedure well, without complications.  No X-ray is needed for placement confirmation.  Patient condition relayed to unit RN or ordering physician via this post procedure note in the EMR.     Ultrasound images uploaded to PACS and viewable in the EMR - yes  Ultrasound imaged printed and placed in paper chart - no     BARD Power Midline ref # [id number], Lot # [id number], Expiration Date 03/31/2024

## 2023-03-29 NOTE — CARE PLAN
The patient is Stable - Low risk of patient condition declining or worsening    Shift Goals  Clinical Goals: Calcium levels management, bowel movement  Patient Goals: rest  Family Goals: SUMAN    Progress made toward(s) clinical / shift goals:        Problem: Knowledge Deficit - Standard  Goal: Patient and family/care givers will demonstrate understanding of plan of care, disease process/condition, diagnostic tests and medications  Outcome: Progressing  Note: Pt is able to verbalize disease process, diagnostics, and plan of care     Problem: Pain - Standard  Goal: Alleviation of pain or a reduction in pain to the patient’s comfort goal  Outcome: Progressing  Note: Pt is able to successfully verbalize pain rating using numeric pain scale     Problem: Fall Risk  Goal: Patient will remain free from falls  Outcome: Progressing  Note: Pt remains free from falls

## 2023-03-29 NOTE — PROGRESS NOTES
Telemetry Shift Summary    Rhythm SR/ST  HR Range   (PSVT up 165 per monitor tech)  Ectopy roPAC, rPVC  Measurements 0.16/0.08/0.30        Normal Values  Rhythm SR  HR Range    Measurements 0.12-0.20 / 0.06-0.10  / 0.30-0.52     no lesions,  no deformities,  no traumatic injuries,  no significant scars are present,  chest wall non-tender,  no masses present, breathing is unlabored without accessory muscle use, normal breath sounds

## 2023-03-29 NOTE — PROGRESS NOTES
Hospital Medicine Daily Progress Note    Date of Service  3/29/2023    Chief Complaint  Latonia Clinton is a 72 y.o. female admitted 3/24/2023 with hypercalcemia from outside facility    Hospital Course  3/25/2023-Latonia is a 72-year-old female comes into the hospital with elevated calcium levels.  The patient apparently has known hyperparathyroidism as well as hypothyroidism.  The patient also has known lung cancer that is been under treatment with oncology and radiation oncology.  The patient now was evaluated for the hypercalcemia and has been given fluid resuscitation as well as pain management.  The patient was also started on Sensipar because of the hyperparathyroidism and parathyroid levels were low repeated.  Patient at this point also is complaining of back pain as well as knee pain.  At this point lumbar MRI is ordered which is pending.  The knee pain is from chronic ACL tear which orthopedic surgery has seen and recommended no surgical intervention due to her ongoing cancer treatments.  At this point patient will need continued pain management, monitoring of her renal functions.  Monitoring of her calcium levels.  Potential placement after PT OT evaluation however patient adamantly refuses to go into a skilled facility.  I have discussed with her the potential for going home with home health and she prefers status she says she is too many things to do at home although she seems very sedentary at home and not able to perform her activities that she is thinking.  3/26/2023-patient's nuclear imaging study for parathyroid disease is negative.  Patient's MRI of the lumbar and cervical spine show chronic disease process with no acute findings.  Physical therapy evaluation and Occupational Therapy evaluation once again discussed with the patient.  She tells me she does not want to go to any skilled facility.  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  The patient at this point  would benefit from skilled however at this point home health may be able to be set up for her as after discussion she had a bad experience 2 times with home health and is not sure if she wants home health.    Interval Problem Update  Patient's creatinine is improving  Discussed with nephrology and primary starting point patient can cleared once medically cleared  Patient has not had a bowel movement since 3/26, aggressive bowel regimen given today    I have discussed this patient's plan of care and discharge plan at IDT rounds today with Case Management, Nursing, Nursing leadership, and other members of the IDT team.    Consultants/Specialty  nephrology    Code Status  Full Code    Disposition  Patient is not medically cleared for discharge.   Anticipate discharge to to home with close outpatient follow-up.  I have placed the appropriate orders for post-discharge needs.    Review of Systems  Review of Systems   Constitutional:  Positive for malaise/fatigue. Negative for chills, diaphoresis and fever.   HENT: Negative.  Negative for congestion and hearing loss.    Eyes: Negative.  Negative for blurred vision and photophobia.   Respiratory:  Positive for shortness of breath. Negative for cough, sputum production, wheezing and stridor.    Cardiovascular: Negative.  Negative for chest pain, orthopnea and claudication.   Gastrointestinal: Negative.  Negative for abdominal pain, blood in stool, constipation, diarrhea, heartburn, nausea and vomiting.   Genitourinary: Negative.  Negative for frequency, hematuria and urgency.   Musculoskeletal:  Positive for back pain, joint pain, myalgias and neck pain. Negative for falls.   Skin: Negative.  Negative for itching and rash.   Neurological:  Positive for dizziness and headaches. Negative for tremors, sensory change, focal weakness, seizures and loss of consciousness.   Endo/Heme/Allergies: Negative.  Does not bruise/bleed easily.   Psychiatric/Behavioral:  Positive for  depression. Negative for substance abuse and suicidal ideas. The patient is nervous/anxious and has insomnia.    All other systems reviewed and are negative.     Physical Exam  Temp:  [36.3 °C (97.4 °F)-36.9 °C (98.5 °F)] 36.4 °C (97.6 °F)  Pulse:  [] 95  Resp:  [16-18] 18  BP: (112-130)/(58-93) 130/93  SpO2:  [93 %-99 %] 99 %    Physical Exam  Vitals and nursing note reviewed.   Constitutional:       General: She is not in acute distress.     Appearance: Normal appearance. She is well-developed. She is obese. She is not ill-appearing or toxic-appearing.   Neck:      Thyroid: No thyroid mass.      Vascular: No carotid bruit or JVD.   Cardiovascular:      Rate and Rhythm: Normal rate and regular rhythm.      Pulses: Normal pulses.      Heart sounds: Normal heart sounds, S1 normal and S2 normal. No murmur heard.    No friction rub.   Pulmonary:      Effort: Pulmonary effort is normal.      Breath sounds: Normal air entry. Examination of the right-lower field reveals decreased breath sounds and rhonchi. Examination of the left-lower field reveals decreased breath sounds and rhonchi. Decreased breath sounds and rhonchi present.   Abdominal:      General: Abdomen is flat. Bowel sounds are normal. There is no distension.      Palpations: Abdomen is soft. There is no mass.   Musculoskeletal:         General: No tenderness (left knee). Normal range of motion.      Cervical back: Normal range of motion and neck supple. No edema or rigidity.      Right lower leg: Edema present.      Left lower leg: Edema present.      Right foot: Normal range of motion. No deformity or foot drop.      Left foot: Normal range of motion. No deformity or foot drop.   Feet:      Right foot:      Skin integrity: Skin integrity normal. No ulcer.      Left foot:      Skin integrity: Skin integrity normal. No ulcer.   Lymphadenopathy:      Head:      Right side of head: No submental adenopathy.      Left side of head: No submental adenopathy.       Cervical: No cervical adenopathy.      Right cervical: No superficial cervical adenopathy.     Left cervical: No superficial cervical adenopathy.      Upper Body:      Right upper body: No supraclavicular adenopathy.      Left upper body: No supraclavicular adenopathy.      Lower Body: No right inguinal adenopathy. No left inguinal adenopathy.   Skin:     General: Skin is warm and dry.      Coloration: Skin is not jaundiced or pale.      Findings: No abrasion or wound.   Neurological:      General: No focal deficit present.      Mental Status: She is alert and oriented to person, place, and time. Mental status is at baseline.      GCS: GCS eye subscore is 4. GCS verbal subscore is 5. GCS motor subscore is 6.      Cranial Nerves: No cranial nerve deficit.      Sensory: Sensation is intact.      Motor: Motor function is intact. No weakness.      Coordination: Coordination abnormal.      Gait: Gait abnormal.   Psychiatric:         Attention and Perception: Attention and perception normal.         Mood and Affect: Mood normal. Affect is flat.         Speech: Speech normal.         Behavior: Behavior is slowed. Behavior is cooperative.         Thought Content: Thought content normal.         Cognition and Memory: Cognition and memory normal.         Judgment: Judgment normal.       Fluids    Intake/Output Summary (Last 24 hours) at 3/29/2023 1518  Last data filed at 3/29/2023 0944  Gross per 24 hour   Intake --   Output 150 ml   Net -150 ml       Laboratory  Recent Labs     03/28/23  0136 03/29/23  0120   WBC 7.2 8.1   RBC 3.30* 3.28*   HEMOGLOBIN 11.7* 11.5*   HEMATOCRIT 36.8* 35.9*   .5* 109.5*   MCH 35.5* 35.1*   MCHC 31.8* 32.0*   RDW 59.1* 57.6*   PLATELETCT 219 247   MPV 11.2 11.4     Recent Labs     03/27/23  0229 03/28/23  0136 03/29/23  0120   SODIUM 142 141 141   POTASSIUM 4.3 4.9 4.3   CHLORIDE 111 111 112   CO2 20 18* 17*   GLUCOSE 119* 127* 109*   BUN 27* 32* 32*   CREATININE 2.13* 2.00* 1.80*    CALCIUM 11.5* 11.2* 9.9                   Imaging  IR-MIDLINE CATHETER INSERTION WO GUIDANCE > AGE 3   Final Result                  Ultrasound-guided midline placement performed by qualified nursing staff    as above.          IR-US GUIDED PIV   Final Result    Ultrasound-guided PERIPHERAL IV INSERTION performed by    qualified nursing staff as above.      MR-THORACIC SPINE-W/O   Final Result      1. Mild kyphoscoliosis of the thoracic spine.      2. Previous vertebral augmentation's of the T7 and T8 vertebral bodies for treatment of compression fractures. Moderate chronic wedge type compression fractures of the T5 and T6 vertebral bodies. Mild chronic butterfly type compression fracture of the    T11 vertebral body.      3. No evidence of acute compression fracture in the thoracic spine.      4. No evidence of significant disc extrusion and/or cord compression.      5. Epidural lipomatosis in the mid thoracic lead region causing mild central canal stenosis at the T6 level.      MR-LUMBAR SPINE-W/O   Final Result      1.  Mild levoscoliosis.      2.  Previous vertebral augmentation of the L2 vertebral body.      3.  Elliptical area of decreased T1 surrounding increased T2 signal intensity involving the superior endplate of the L4 vertebral body consistent with acute endplate degenerative change.      4.  Minimal lumbar spondylotic changes.      5.  Minimal chronic inferior endplate compression fracture the L1 vertebral body with minimal retropulsion of the posterior inferior aspect of the L1 vertebral body.      6.  Minimal residual retropulsion of the posterior inferior aspect of the L2 vertebral body and there are secondary to chronic mild compression fracture of the L2 vertebral body..      NM-PARATHYROID (SESTAMIBI) SCAN   Final Result      No evidence of parathyroid adenoma.      CT-RENAL COLIC EVALUATION(A/P W/O)   Final Result      1.  There is a nonobstructive 2 mm left proximal ureter calcification.   2.   Bilateral intrarenal calcifications and calyceal stones are similar to the previous examination.   3.  Bilateral benign Bosniak 1 and Bosniak 2 simple cysts are again seen which require no imaging follow-up per ACR guidelines.   4.  Stable bilateral adrenal gland benign adenomas which required no imaging follow-up per ACR guidelines.   5.  Prominent submucosal fat throughout the colon may simply be related to normal variant due to patient body habitus.   6.  Subpleural lower lobe lung opacities are probably postinflammatory.             Assessment/Plan  * Hypercalcemia- (present on admission)  Assessment & Plan  Secondary to hyperparathyroidism  Repeat calcium down to 9.9  Aredia given on 3/27, 3/28, per nephrology's recommendations     Chronic pain of left knee- (present on admission)  Assessment & Plan  ACL tear identified with MRI  Nonsurgical per orthopedics due to the ongoing cancer treatment      Chronic anticoagulation- (present on admission)  Assessment & Plan  Continue with Xarelto given DVT    Chronic bilateral low back pain without sciatica- (present on admission)  Assessment & Plan  MRI of the lumbar and cervical spine performed:  Cervical spine findings chronic  Lumbar findings show T1 and T2 compression finding old and chronic  T4 compression fracture is relatively new but chronic this was nobs observed on previous imaging studies.  No acute pathology that would indicate surgical intervention at this point time this was discussed with the patient.  PT/OT recommending skilled nursing.  Reevaluated on 3/27 patient was unable to ambulate without assistance so is now more accepting of skilled nursing.  Plan is for discharge tomorrow to skilled nursing facility    Hyperparathyroidism (HCC)- (present on admission)  Assessment & Plan  Parathyroid study negative  Continue at this point with monitoring parathyroid levels as well as TSH levels.    Parathyroid peptide levels as well, per nephrology  Continue  with Sensipar    Other insomnia- (present on admission)  Assessment & Plan  Nocturnal Ambien as needed for sleep disorder    Macrocytic anemia- (present on admission)  Assessment & Plan  Vitamin B12 level at 984 and within normal limits.  At this point no supplementation recommended    Acute on chronic kidney failure (HCC)  Assessment & Plan  Creatinine down to 2, improving but still at risk for worsening of dysfunction  Avoid nephrotoxic agents  Nephrology continues to follow  Continue to monitor with routine labs    Lung cancer (HCC)- (present on admission)  Assessment & Plan  Patient has completed 5 courses of radiation therapy with Dr. Banks  Patient follows for chemotherapy with Dr. Rizvi  Patient to continue outpatient follow-ups with oncology    Deep vein thrombosis (DVT) (HCC)- (present on admission)  Assessment & Plan  Continue with Xarelto chronically    Thyrotoxicosis with toxic single thyroid nodule and without thyroid storm- (present on admission)  Assessment & Plan  Continue methimazole, 5 mg daily  Most recent TSH level 0.130    Chronic obstructive pulmonary disease (HCC)- (present on admission)  Assessment & Plan  Continue oxygen support  2 L is her baseline-currently at baseline  Continue RT protocol    Hypertension- (present on admission)  Assessment & Plan  Blood pressure controlled at 111/71 now.  Continue as needed hydralazine 10 mg every 4 hours if systolic blood pressure greater than 160  Holding losartan with abnormal renal functions         VTE prophylaxis: SCDs/TEDs    I have performed a physical exam and reviewed and updated ROS and Plan today (3/29/2023). In review of yesterday's note (3/28/2023), there are no changes except as documented above.

## 2023-03-29 NOTE — PROGRESS NOTES
Received midline order for this patient. Since GFR 29 and under nephrology care, I reached out to Dr. Chavarria who approved midline insertion. Will attempt to place in dominant arm.    12:18 addendum: pt states she has old fistula in right forearm. No thrill upon palpation. Pt states hasn't been used since 2005. I reached out to Dr. Chavarria again to confirm best vein selection. Per Dr. Chavarria, ok to place midline in RUE proximal to non-working fistula or in LUE.

## 2023-03-30 NOTE — PROGRESS NOTES
Midline removed per MD per order. Patient tolerated removal. Pressure placed on site with gauze. Covered with tegaderm.     Enema administered per MD.

## 2023-03-30 NOTE — DISCHARGE PLANNING
Case Management Discharge Planning    Admission Date: 3/24/2023  GMLOS: 2.6  ALOS: 6    6-Clicks ADL Score: 18  6-Clicks Mobility Score: 16  PT and/or OT Eval ordered: Yes  Post-acute Referrals Ordered: Yes  Post-acute Choice Obtained: Yes  Has referral(s) been sent to post-acute provider:  Yes      Anticipated Discharge Dispo: Discharge Disposition: D/T to SNF with Medicare cert in anticipation of skilled care (03)    DME Needed: No    Action(s) Taken: Spoke to Madalyn from [facility]. Per Madalyn, they can accept pt today at 1200 via their WC van transport.    [facility]# [id number]    Escalations Completed: None    Medically Clear: Yes    Next Steps: DC to [facility] today at 1200    Barriers to Discharge: None

## 2023-03-30 NOTE — DISCHARGE INSTRUCTIONS
Discharge Instructions    Discharged to other by medical transportation with escort. Discharged via wheelchair, hospital escort: Yes.  Special equipment needed: Not Applicable    Be sure to schedule a follow-up appointment with your primary care doctor or any specialists as instructed.     Discharge Plan:   Diet Plan: Discussed  Activity Level: Discussed  Confirmed Follow up Appointment: Patient to Call and Schedule Appointment  Confirmed Symptoms Management: Discussed  Medication Reconciliation Updated: Yes  Influenza Vaccine Indication: Not indicated: Previously immunized this influenza season and > 8 years of age    I understand that a diet low in cholesterol, fat, and sodium is recommended for good health. Unless I have been given specific instructions below for another diet, I accept this instruction as my diet prescription.   Other diet: as tolerated    Special Instructions: None    -Is this patient being discharged with medication to prevent blood clots?  No    Is patient discharged on Warfarin / Coumadin?   No

## 2023-03-30 NOTE — THERAPY
Occupational Therapy  Daily Treatment     Patient Name: Latonia Clinton  Age:  72 y.o., Sex:  female  Medical Record #: [id number]  Today's Date: 3/30/2023     Precautions  Precautions: Fall Risk  Comments: mild    Assessment  Pt seen for OT treatment. Pt agreeable, v/c's for encouragement. Slow to progress in strength, endurance, balance,safety; rec post-acute inpt rehab upon dc.     Plan  Treatment Plan Status: (P) Modify Current Treatment Plan  Pt dc'ing today to snf.    DC Equipment Recommendations: (P) Unable to determine at this time  Discharge Recommendations: (P) Recommend post-acute placement for additional occupational therapy services prior to discharge home    Objective     03/30/23 1219   Cognition    Level of Consciousness Alert   Comments encouragement needed. self-limiting, poor insight   Balance   Sitting Balance (Static) Fair +   Sitting Balance (Dynamic) Fair   Standing Balance (Static) Fair   Standing Balance (Dynamic) Fair -   Weight Shift Sitting Fair   Weight Shift Standing Fair   Bed Mobility    Supine to Sit Moderate Assist   Sit to Supine Moderate Assist   Skilled Intervention Verbal Cuing;Postural Facilitation   Activities of Daily Living   Eating Independent   Grooming Standby Assist;Seated   Lower Body Dressing Moderate Assist   Toileting Contact Guard Assist   Skilled Intervention Verbal Cuing   Functional Mobility   Sit to Stand Contact Guard Assist   Bed, Chair, Wheelchair Transfer Minimal Assist   Toilet Transfers Minimal Assist  (bsc)   Transfer Method Stand Step   Activity Tolerance   Sitting in Chair 8   Sitting Edge of Bed 7   Standing 2x2   Short Term Goals   Goal Outcome # 1 Progressing slower than expected   Goal Outcome # 2 Progressing slower than expected   Occupational Therapy Treatment Plan    O.T. Treatment Plan Modify Current Treatment Plan   Reason For Discharge Patient Discharged from Facility   Anticipated Discharge Equipment and Recommendations   DC Equipment  Recommendations Unable to determine at this time   Discharge Recommendations Recommend post-acute placement for additional occupational therapy services prior to discharge home

## 2023-03-30 NOTE — THERAPY
Physical Therapy Contact Note    Patient Name: Latonia Clinton  Age:  72 y.o., Sex:  female  Medical Record #: [id number]  Today's Date: 3/30/2023         03/30/23 0940   Treatment Variance   Reason For Missed Therapy Medical - Patient has Bowel Issues  (Suppository placed x30 mins ago and pt does not want to casue premature loss of it by moving to upright has not havd BM x 4 days)   Interdisciplinary Plan of Care Collaboration   Patient Position at End of Therapy   (She is in bed, side lying declines further needs right now)   Collaboration Comments Pt declined to get up at this time, suppository recently placed and she does not have the urge to have BM yet, I will be in the vicinity of patient room and can help for transfers to commode if needed, pt staes she may DC to [facility] this afternoon.  PT can follow up.   Session Information   Date / Session Number  3/27-2 (2/4, 3/31) 3/30 ATTEMPT

## 2023-03-30 NOTE — PROGRESS NOTES
[facility] transport at bedside. Patient has yet to have a bowel movement. Suppository and enema administered as ordered. Patient has normoactive bowel sounds x4. CM and MD both aware. Per CM, okay to discharge patient to [facility] if patient has active bowel sounds. Report called to [facility].

## 2023-03-30 NOTE — DISCHARGE SUMMARY
"Discharge Summary    CHIEF COMPLAINT ON ADMISSION  Chief Complaint   Patient presents with    Abnormal Labs       Reason for Admission  Sent by MD, Abnormal Labs     Admission Date  3/24/2023    CODE STATUS  Full Code    HPI & HOSPITAL COURSE  Per notes, \"72 y.o. female who presented 3/24/2023 with elevated calcium as well as worsening renal function.  Patient has had a recent history of hyperparathyroidism and hyperthyroidism and has been on suppressive therapy with methimazole and encouraged to increase her hydration to see if this would improve her calcium.  She had repeat labs which showed worsening calcium and she has had increase in her pain as well as falls and weakness.  She has chronic low back pain related to multiple compression fractures some of which have had kyphoplasty.  She has had 2 falls since her spine has been imaged and she is concerned that she may have more compression fractures.  MRI thoracic and lumbar spine have been ordered.  I have also started her on low-dose Skelaxin to see if this helps with her muscle spasms, pain and does not induce somnolence.  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  She is on 2 L oxygen at night secondary to COPD.  She is also on azithromycin since 2016 having been started by her pulmonologist due to prophylaxis.  She has diagnosis of lung cancer and has been followed with Dr. Banks and Dr. Barger and has had radiation therapy only.  She is to follow-up with medical oncology next month.  She has had multiple injections into the left knee without significant relief and knows that she has a meniscal tear and the last orthopedic surgeon she followed up was with Dr. Sorensen but given her active lung cancer recommended that she had this treated before any type of orthopedic intervention.  Patient has been sent in by nephrology, Dr. Nunez and recommended that she receive gentle IV hydration of normal saline at 75 cc an hour, hold losartan secondary to " "renal function as well as hypotension and acute kidney injury.\"     Patient was admitted initially for hypercalcemia. The patient apparently has known hyperparathyroidism as well as hypothyroidism.  The patient also has known lung cancer that is been under treatment with oncology and radiation oncology.  The patient now was evaluated for the hypercalcemia and has been given fluid resuscitation as well as pain management.  The patient was also started on Sensipar because of the hyperparathyroidism and parathyroid levels were low repeated.  Patient was evaluated by nephrology. Aredia given on 3/27, 3/28, per nephrology's recommendations.  Patient's calcium returned to normal limits.  Additionally when she was in the hospital patient underwent an MRI of the lumbar and cervical spine for continued back pain, which showed no new acute findings.  Physical therapy evaluated patient and recommended skilled nursing placement as she had several days with increased weakness, likely due to hypercalcemia and was not and physical baseline.  Patient was requiring chronic pain medications, and did have some opioid-induced constipation.  She was treated with aggressive bowel regimen.  Initially patient declined skilled nursing facility, however she is now excepting a skilled nursing facility and will be transferred there for further recovery and physical therapy.  She will need to follow-up with nephrology and with endocrinology and PCP in the outpatient setting.  She has rescheduled her endocrinology appointment, which was scheduled for next week, to a later date.  At time of discharge patient's weakness had overall improved, her nausea had resolved.    Therefore, she is discharged in good and stable condition to skilled nursing facility.    The patient met 2-midnight criteria for an inpatient stay at the time of discharge.    Discharge Date  3/30/2023    FOLLOW UP ITEMS POST DISCHARGE  Nephrology  Endocrinology  Follow-up with " PCP    DISCHARGE DIAGNOSES  Principal Problem:    Hypercalcemia POA: Yes  Active Problems:    Hypertension POA: Yes      Overview: This is a chronic condition, reasonably controlled losartan 50 mg tablet       daily.    Chronic obstructive pulmonary disease (HCC) (Chronic) POA: Yes      Overview: This is a chronic condition, fairly well controlled, managed by       pulmonology Dr. Mami Allan.  Her regimen includes albuterol nebulizer       as needed, Breo Ellipta, Spiriva and daily Azithormycin, however patient       does not think that those medications are helping her.  She is       experiencing shortness of breath while walking.            She is undergoing extensive work-up for left pulmonary nodules, attempting       IR guided lung biopsy next week.            Pending PFTs.                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      Currently patient is on methimazole 10 mg tablet daily.      To continue close follow-up with endocrinology.      Lab Results       Component Value Date/Time        TSHULTRASEN 0.134 (L) 09/29/2021 1554             Lab Results       Component Value Date/Time        FREET4 1.05 09/29/2021 1554              Lab Results       Component Value Date/Time        FREET3 2.55 09/29/2021 1554                 Deep vein thrombosis (DVT) (HCC) POA: Yes      Overview: Per patient she was on dialysis due to kidney failure back in 2004 and has       developed DVT in her upper extremity, she also had DVT in her right lower       extremity.  In 2017 she had left IV nose thrombosis.  Initially she was on       anticoagulation with warfarin, then was switched to Xarelto and currently       she is being followed at anticoagulation clinic.    Lung cancer (HCC) POA: Yes      Overview: S/p excision in 2017 per patient.  Patient follow-up closely every 4 to 5       months with pulmonology.   Next CT scan in January.  Next appointment with       Mami Allan on 11/12/2021.     Acute on chronic kidney failure (HCC) POA: Unknown    Macrocytic anemia POA: Yes    Other insomnia (Chronic) POA: Yes      Overview: This is a chronic condition, patient has been taking Ambien 5mg tab       nightly for many months now.       The medication is helping her to improve her symptoms.       No early refills on controlled substances.      No history of lost or stolen substance prescriptions      Compliant with treatment recommendations and plan: Yes      Any major health change to the patient: No      Concerns for misuse, abuse or addiction: No      /NarxCheck report reviewed: Yes      History of abnormal drug screening: Yes 11/08/2022 - pos for alcohol, will       return for recollect       PDMP reviewed: Last filled on 1/14/2023, 30 tab, 30 days supply.                                   Hyperparathyroidism (HCC) (Chronic) POA: Yes      Overview: This condition is managed by endocrinology, not on any medications as of       now.            Component          Latest Ref Rng 12/6/2022       Calcium          8.5 - 10.5 mg/dL 9.6        Pth, Intact          14.0 - 72.0 pg/mL 346.0 (H)               (H) High          Chronic bilateral low back pain without sciatica POA: Yes    Chronic anticoagulation POA: Yes      Overview: Patient is chronically on Xarelto due to chronic DVT.  She started       noticing easy bruising recently.  She is wondering if her dose is       adequate.    Chronic pain of left knee (Chronic) POA: Yes      Overview: This is a chronic problem, patient is working with orthopedic surgeon from       [facility].  She was told she may not be a good candidate for       total knee replacement.  She has had steroid injection, PRP therapy, she       had 40 cc of fluid aspirated from her left knee joint at the last       appointment.  She has been taking Norco for her pain.  I offered her        referral to pain management and she declines at this time.  She is open to       physical therapy and I have referred her to home health.  Resolved Problems:    * No resolved hospital problems. *      FOLLOW UP  Future Appointments   Date Time Provider Department Center   4/10/2023  3:00 PM [facility]   5/9/2023  2:20 PM Liat Emerson M.D. [facility]   6/21/2023  2:10 PM Mami Allan M.D. [facility] None     No follow-up provider specified.    MEDICATIONS ON DISCHARGE     Medication List        START taking these medications        Instructions   bisacodyl 10 MG Supp  Commonly known as: DULCOLAX   Insert 1 Suppository into the rectum 1 time a day as needed (if magnesium hydroxide ineffective after 24 hours).  Dose: 10 mg     polyethylene glycol/lytes 17 g Pack  Commonly known as: MIRALAX   Take 1 Packet by mouth 1 time a day as needed (if sennosides and docusate ineffective after 24 hours).  Dose: 17 g     senna-docusate 8.6-50 MG Tabs  Commonly known as: PERICOLACE or SENOKOT S   Take 2 Tablets by mouth 2 times a day.  Dose: 2 Tablet            CONTINUE taking these medications        Instructions   acetaminophen 500 MG Tabs  Commonly known as: TYLENOL   Take 1,000 mg by mouth 3 times a day.  Dose: 1,000 mg     * albuterol 108 (90 Base) MCG/ACT Aers inhalation aerosol   Inhale 2 Puffs every 6 hours as needed for Shortness of Breath.  Dose: 2 Puff     * albuterol 2.5mg/3ml Nebu solution for nebulization  Commonly known as: PROVENTIL   USE THREE MILLILITERS (1 VIAL) VIA NEBULIZATION BY MOUTH EVERY 4 HOURS AS NEEDED FOR SHORTNESS OF BREATH     allopurinol 100 MG Tabs  Commonly known as: ZYLOPRIM   Take 200 mg by mouth every morning.  Dose: 200 mg     azithromycin 250 MG Tabs  Commonly known as: ZITHROMAX   TAKE ONE TABLET BY MOUTH DAILY     colchicine 0.6 MG Tabs  Commonly known as: COLCRYS   Take 0.6 mg by mouth 1 time a day as needed. Indications: Gout  Dose: 0.6 mg      Combigan 0.2-0.5 % Soln  Generic drug: Brimonidine Tartrate-Timolol   Administer 1 Drop into both eyes 2 times a day.  Dose: 1 Drop     ergocalciferol 13852 UNIT capsule  Commonly known as: DRISDOL   Take 1 Capsule by mouth every 7 days.  Dose: 50,000 Units     fluticasone furoate-vilanterol 200-25 MCG/ACT Aepb  Commonly known as: Breo Ellipta   Inhale 1 Puff every day. Rinse mouth after use.  Dose: 1 Puff     Hair Skin and Nails Formula Tabs   Take 3 Tablets by mouth every evening.  Dose: 3 Tablet     HYDROcodone-acetaminophen 5-325 MG Tabs per tablet  Commonly known as: NORCO   Take 1 Tablet by mouth every four hours as needed.  Dose: 1 Tablet     Klor-Con M20 20 MEQ Tbcr  Generic drug: potassium chloride SA   Take 40 mEq by mouth every evening.  Dose: 40 mEq     losartan 50 MG Tabs  Commonly known as: COZAAR   Take 50 mg by mouth every morning.  Dose: 50 mg     Magnesium 400 MG Tabs   Take 400 mg by mouth every evening.  Dose: 400 mg     methimazole 5 MG Tabs  Commonly known as: TAPAZOLE   TAKE ONE TABLET BY MOUTH DAILY     ondansetron 4 MG Tbdp  Commonly known as: ZOFRAN ODT   Take 1 Tablet by mouth every 6 hours as needed for Nausea.  Dose: 4 mg     rivaroxaban 20 MG Tabs tablet  Commonly known as: Xarelto   Doctor's comments: This Rx has been ordered by a pharmacist working under a collaborative practice agreement.  TAKE ONE TABLET BY MOUTH DAILY WITH DINNER     simvastatin 40 MG Tabs  Commonly known as: ZOCOR   Take 1 Tablet by mouth every evening.  Dose: 40 mg     Spiriva Respimat 2.5 mcg/Act Aers  Generic drug: tiotropium   INHALE TWO PUFFS BY MOUTH DAILY     zolpidem 5 MG Tabs  Commonly known as: AMBIEN   Doctor's comments: NOT A DUPLICATE!  Take from 3/21/2023 to 4/20/2023. Controlled substance 3-month supply, month #2/3, please fill according to the dates above. Do not send refill requests.  Take 1 Tablet by mouth at bedtime as needed for Sleep for up to 30 days.  Dose: 5 mg           * This list has 2  medication(s) that are the same as other medications prescribed for you. Read the directions carefully, and ask your doctor or other care provider to review them with you.                  Allergies  No Known Allergies    DIET  Orders Placed This Encounter   Procedures    Diet Order Diet: Renal     Standing Status:   Standing     Number of Occurrences:   1     Order Specific Question:   Diet:     Answer:   Renal [8]       ACTIVITY  As tolerated and directed by skilled nursing.  Weight bearing as tolerated    CONSULTATIONS  Nephrology    PROCEDURES  Midline placement    LABORATORY  Lab Results   Component Value Date    SODIUM 142 03/30/2023    POTASSIUM 4.2 03/30/2023    CHLORIDE 113 (H) 03/30/2023    CO2 19 (L) 03/30/2023    GLUCOSE 104 (H) 03/30/2023    BUN 38 (H) 03/30/2023    CREATININE 1.60 (H) 03/30/2023    CREATININE 1.7 (H) 09/19/2008        Lab Results   Component Value Date    WBC 8.1 03/29/2023    HEMOGLOBIN 11.5 (L) 03/29/2023    HEMATOCRIT 35.9 (L) 03/29/2023    PLATELETCT 247 03/29/2023        Total time of the discharge process exceeds 45 minutes.

## 2023-03-30 NOTE — PROGRESS NOTES
Monitor summary:     Rhythm : SR/ST  Rate :   Ectopy : R PVC / R PAC    MI=.16  QRS=.08  QT=.28        Per telemetry strip summary from monitor room.

## 2023-03-30 NOTE — CARE PLAN
The patient is Stable - Low risk of patient condition declining or worsening    Shift Goals  Clinical Goals: watch for BM, plan for suppository, monitor midline  Patient Goals: rest  Family Goals: SUMAN    Progress made toward(s) clinical / shift goals:  suppository administered, midline removed for discharge.       Problem: Knowledge Deficit - Standard  Goal: Patient and family/care givers will demonstrate understanding of plan of care, disease process/condition, diagnostic tests and medications  Outcome: Met     Problem: Fall Risk  Goal: Patient will remain free from falls  Outcome: Met     Problem: Skin Integrity  Goal: Skin integrity is maintained or improved  Outcome: Met       Patient is not progressing towards the following goals:

## 2023-03-30 NOTE — PROGRESS NOTES
"[facility] -  PROGRESS NOTE               Author: Kishore Chavarria D.O. Date & Time: 3/30/2023  12:18 PM     HPI:  73 Yo female with history notable for CKD, HTN, HLD, lung cancer presented with hypercalcemia on outpatient labs and weakness from outpatient nephrology clinic. Has presumed primary hypercalcemia with elevated PTH  (most recent level 170, from 346) but has failed to follow-up with outpatient confirmatory imaging.  One week ago her corrected calcium was noted to be 12.1 with high normal vit D. Vitamin D stopped and she was told to increase oral hydration with repeat labs in 1 weeks. Labs yesterday resulted with a corrected calcium of 12.3 and she noted ongoing weakness and inability to ambulate/back/flank/leg pain prompting referral to ED. She notes being more thirstt than normal. No gross hematuria. Her most recent cr also appears above baseline (~1.5).  Initial ER labs with cr of 2 and corrected calcium of 12.2. Soft Bps in ER, 90/60,  took her losartan this AM. Abd  CT performed, results pending. No chest pain, sob, leg swelling.    DAILY NEPHROLOGY SUMMARY:  3/25: Calcium and creatinine trending down.  Having back and leg pain,   chronic.  Parathyroid scan pending.    3/26: No new labs.  Having back pain and SOB.  Parathyroid scan negative for adenoma.  3/27: No overnight changes.  Not eating or drinking much.  No edema.  Chronic pain   Continues.  Chart reviewed.  PET in 12/22 = no mets seen.   3/28: Still sleepy and weak.  Eating \"some\" but not hungry.  Got pamidronate X 1.   On sensipar.  Calcium still up.  No edema.  No dyspnea.  3/29: Pamidronate #2 yesterday, Se Ca much improved and in normal range   3/30: Feeling sluggish.  No BM.  Got miralax.  Had an enema. Still no BM.  ABD   A bit distended and tender.  No dyspnea.  Still weak.     REVIEW OF SYSTEMS:    10 point ROS reviewed and is as per HPI/daily summary or otherwise negative    PMH/PSH/SH/FH:   Reviewed and " "unchanged since admission note    CURRENT MEDICATIONS:   Reviewed from admission to present day    VS:  /68   Pulse (!) 110   Temp 36.3 °C (97.4 °F) (Temporal)   Resp 20   Ht 1.676 m (5' 6\")   Wt 99.3 kg (218 lb 14.7 oz)   LMP  (LMP Unknown)   SpO2 98%   BMI 35.33 kg/m²     Physical Exam  HENT:      Head: Normocephalic.      Right Ear: External ear normal.      Left Ear: External ear normal.      Nose: Nose normal.      Mouth/Throat:      Mouth: Mucous membranes are moist.   Eyes:      General: No scleral icterus.  Cardiovascular:      Pulses: Normal pulses.   Pulmonary:      Effort: Pulmonary effort is normal.   Abdominal:      General: Abdomen is protuberant. Bowel sounds are decreased.      Tenderness: There is abdominal tenderness.   Musculoskeletal:      Cervical back: Normal range of motion.      Comments: Left leg weakness   Skin:     General: Skin is warm.   Neurological:      Mental Status: She is alert and oriented to person, place, and time.   Psychiatric:         Mood and Affect: Mood normal.       Fluids:  In: -   Out: 150     LABS:  Recent Labs     03/28/23  0136 03/29/23  0120 03/30/23  0312   SODIUM 141 141 142   POTASSIUM 4.9 4.3 4.2   CHLORIDE 111 112 113*   CO2 18* 17* 19*   GLUCOSE 127* 109* 104*   BUN 32* 32* 38*   CREATININE 2.00* 1.80* 1.60*   CALCIUM 11.2* 9.9 9.1         IMAGING:   All imaging reviewed from admission to present day    IMPRESSION:  # ISAMAR, non-oliguric  In the setting of hypotension, hypercalcemia  Creat up with elevated calcium    Due to profusion abnormalities with hypercalcemia  A bit better today (THURS) with IVF and control of calcium  # CKD:   Frequent fluctuations in creat  Baseline ~1.5  Close to baseline  # Hypercalcemia:   Etiology not entirely clear  May be PTH mediated.   PTH scan negative for adenoma (but won't show hyperplasia)  On sensipar  Pamidronate 30mg IV x 2  No mets to bone seen on PET on 12/22   Calcium now at goal, continue to trend  # " HTN: on losartan at home  # Lung cancer   No mets seen on PET, 12/22  # COPD  # Nephrolithiasis   # HTN, at goal     SUGGESTIONS:  Ca back in normal range, no further need for Pamidronate IV   Creat improving, close to baseline   Can stop IVF  Aggressive bowel hygeine  Continue to hold Losartan for now  Continue sensipar 30mg daily  Continue Sodium bicarb to goal CO2>20  No role for calcitonin   No dietary protein restrictions   Follow labs, further recommendations to follow    OK to transition to SNF from Renal standpoint   Will follow    Thank you

## 2023-03-31 NOTE — ED NOTES
Pt had BM - runny, cleaned up by Tech's Isaiah and Char, Pt denied having any further needs at this time, no distress noted;

## 2023-03-31 NOTE — ED PROVIDER NOTES
ED Provider Note    CHIEF COMPLAINT  Chief Complaint   Patient presents with    Constipation     Was DC [facility] today at noon no BM prior to DC  Pt was sent to [facility] where they attempted 2 additional enemas w/no success        EXTERNAL RECORDS REVIEWED  Inpatient Notes reviewed discharge summary dated today written by Dr. Pelaez; patient had been admitted for acute renal failure, treated with Aredia for hypercalcemia as well, had opioid-induced constipation treated with aggressive bowel regimen, no BM while in hospital    HPI/ROS  LIMITATION TO HISTORY   Select: : None  OUTSIDE HISTORIAN(S):  [facility]S EMS relate patient being transferred for acute constipation, had enemas at [facility] with no improvement    Latonia Clinton is a 72 y.o. female who presents for evaluation of constipation.  She has history of emphysema, hyperparathyroidism, hypothyroidism, and multiple compression fracture status post kyphoplasty.  She was recently admitted for acute renal failure and hypercalcemia.  Was discharged today, renal function improving.  Unfortunately patient notes she has had no bowel movement for about a month.  Diagnosis opioid-induced constipation and started on a bowel regimen including laxatives, suppositories, and enemas.  No production of stool from the enemas, she had 2 today.  Patient notes generalized abdominal distention without sharp pain, nausea, no vomiting.  No fever, no dysuria, no hematuria.  Very poor oral intake today, she tells me she had 1 glass of lemonade and that was her only oral fluids.  Given failure of her bowel regimen and no bowel movement for over a week she came to be assessed.    PAST MEDICAL HISTORY   has a past medical history of Anesthesia, Asthma, Backpain (7/2017), Blood clot in vein (07/06/2018), Bowel habit changes (07/06/2018), Breath shortness, Bronchitis, CAD (coronary artery disease), Cancer ([facility]) (2016), Chickenpox, COPD (chronic obstructive pulmonary disease) (HCC), Depression  (2019), Dialysis (), Emphysema of lung (HCC), Glaucoma, Hemorrhagic disorder (HCC), Hyperlipidemia, Hypertension, Hyperthyroidism, Kidney stone, Lung cancer (HCC) (2016), Multiple thyroid nodules (2015), Nasal drainage, Obstruction of left ureteropelvic junction (UPJ) due to stone (2018), Osteoporosis, Pain (2018), Personal history of venous thrombosis and embolism (, ), Pneumonia (2016), Renal disorder, Renal stones (), Rheumatoid arthritis (HCC), Rheumatoid arthritis (HCC), S/P appendectomy ( ), S/P cholecystectomy (), S/P kyphoplasty (, , ), Sepsis, unspecified (), Shortness of breath (2018), and Thyroid nodule, hot ().    SURGICAL HISTORY   has a past surgical history that includes other; other abdominal surgery; breast augmentation with implant; breast reduction; appendectomy; cholecystectomy; laminotomy; lung biopsy open; thoracoscopy (Left, 2016); other orthopedic surgery (); cystoscopy stent placement (Left, 2018); lithotripsy (Left, 2018); lasertripsy (Left, 2018); ureteroscopy (Left, 2018); cystoscopy stent placement (2018); ureteroscopy (Left, 2018); lasertripsy (Left, 2018); and bronchoscopy, robot-assisted (10/11/2022).    FAMILY HISTORY  Family History   Problem Relation Age of Onset    Cancer Mother         kidney    Heart Failure Father     Heart Disease Brother     Cancer Maternal Uncle         liver    Cancer Paternal Aunt         ovarian    Cancer Paternal Uncle         lung       SOCIAL HISTORY  Social History     Tobacco Use    Smoking status: Former     Packs/day: 1.00     Years: 30.00     Pack years: 30.00     Types: Cigarettes     Quit date: 2000     Years since quittin.2    Smokeless tobacco: Never    Tobacco comments:     7 years ago   Vaping Use    Vaping Use: Never used   Substance and Sexual Activity    Alcohol use: Yes     Alcohol/week: 0.0 oz     Comment: x2  "a week    Drug use: No    Sexual activity: Not on file       CURRENT MEDICATIONS  Home Medications    **Home medications have not yet been reviewed for this encounter**         ALLERGIES  No Known Allergies    PHYSICAL EXAM  VITAL SIGNS: /66   Pulse (!) 107   Temp 36.2 °C (97.2 °F) (Temporal)   Resp (!) 24   Ht 1.646 m (5' 4.8\")   Wt 99 kg (218 lb 4.1 oz)   LMP  (LMP Unknown)   SpO2 94%   BMI 36.54 kg/m²    General: Alert, mild acute distress, somewhat ill in appearance  Skin: Warm, dry, pale  Head: Normocephalic, atraumatic  Neck: Trachea midline, no tenderness  Eye: PERRL, normal conjunctiva  ENMT: Oral mucosa pink and extremely dry, no pharyngeal erythema or exudate  Cardiovascular: S1, S2, mildly tachycardic, otherwise regular rate and rhythm, No murmur, Normal peripheral perfusion  Respiratory: Lungs CTA, respirations are non-labored, breath sounds are equal  Gastrointestinal: Soft, generally tender in all 4 quadrants.  Abdomen is distended without fluid wave.  Absent bowel sounds throughout, no guarding, no rebound, no rigidity.  Musculoskeletal: No swelling, no deformity  Neurological: Alert and oriented to person, place, time, and situation  Lymphatics: No lymphadenopathy  Psychiatric: Cooperative, appropriate mood & affect     DIAGNOSTIC STUDIES / PROCEDURES  EKG  I have independently interpreted this EKG  EKG Interpretation    Interpreted by emergency department physician    Rhythm: sinus tachycardia  Rate: 121  Axis: normal  Ectopy: none  Conduction: left anterior fasciclar block  ST Segments: nonspecific changes  T Waves: no acute change  Q Waves: none    Clinical Impression: no acute changes other than rate compared to EKG dated October 5, 2022.    LABS  Results for orders placed or performed during the hospital encounter of 03/30/23   CBC WITH DIFFERENTIAL   Result Value Ref Range    WBC 5.4 4.8 - 10.8 K/uL    RBC 3.18 (L) 4.20 - 5.40 M/uL    Hemoglobin 11.1 (L) 12.0 - 16.0 g/dL    " Hematocrit 34.1 (L) 37.0 - 47.0 %    .2 (H) 81.4 - 97.8 fL    MCH 34.9 (H) 27.0 - 33.0 pg    MCHC 32.6 (L) 33.6 - 35.0 g/dL    RDW 55.9 (H) 35.9 - 50.0 fL    Platelet Count 227 164 - 446 K/uL    MPV 10.9 9.0 - 12.9 fL    Neutrophils-Polys 74.60 (H) 44.00 - 72.00 %    Lymphocytes 10.50 (L) 22.00 - 41.00 %    Monocytes 13.10 0.00 - 13.40 %    Eosinophils 0.90 0.00 - 6.90 %    Basophils 0.20 0.00 - 1.80 %    Immature Granulocytes 0.70 0.00 - 0.90 %    Nucleated RBC 0.00 /100 WBC    Neutrophils (Absolute) 4.05 2.00 - 7.15 K/uL    Lymphs (Absolute) 0.57 (L) 1.00 - 4.80 K/uL    Monos (Absolute) 0.71 0.00 - 0.85 K/uL    Eos (Absolute) 0.05 0.00 - 0.51 K/uL    Baso (Absolute) 0.01 0.00 - 0.12 K/uL    Immature Granulocytes (abs) 0.04 0.00 - 0.11 K/uL    NRBC (Absolute) 0.00 K/uL   COMP METABOLIC PANEL   Result Value Ref Range    Sodium 141 135 - 145 mmol/L    Potassium 3.4 (L) 3.6 - 5.5 mmol/L    Chloride 110 96 - 112 mmol/L    Co2 19 (L) 20 - 33 mmol/L    Anion Gap 12.0 7.0 - 16.0    Glucose 115 (H) 65 - 99 mg/dL    Bun 40 (H) 8 - 22 mg/dL    Creatinine 1.70 (H) 0.50 - 1.40 mg/dL    Calcium 9.0 8.4 - 10.2 mg/dL    AST(SGOT) 23 12 - 45 U/L    ALT(SGPT) 28 2 - 50 U/L    Alkaline Phosphatase 130 (H) 30 - 99 U/L    Total Bilirubin 0.2 0.1 - 1.5 mg/dL    Albumin 2.6 (L) 3.2 - 4.9 g/dL    Total Protein 5.0 (L) 6.0 - 8.2 g/dL    Globulin 2.4 1.9 - 3.5 g/dL    A-G Ratio 1.1 g/dL   LIPASE   Result Value Ref Range    Lipase 74 (H) 7 - 58 U/L   TROPONIN   Result Value Ref Range    Troponin T 23 (H) 6 - 19 ng/L   LACTIC ACID   Result Value Ref Range    Lactic Acid 0.7 0.5 - 2.0 mmol/L   CORRECTED CALCIUM   Result Value Ref Range    Correct Calcium 10.1 8.5 - 10.5 mg/dL   ESTIMATED GFR   Result Value Ref Range    GFR (CKD-EPI) 31 (A) >60 mL/min/1.73 m 2   EKG (NOW)   Result Value Ref Range    Report       [facility] Emergency Dept.    Test Date:  2023-03-30  Pt Name:    ROSARIO MARTINEZ               Department: [facility]  MRN:        [id number]                      [facility]  Gender:     Female                       Technician: [id number]  :        [id number]                   Requested By:NORMA ARREOLA  Order #:    [id number]                    Reading MD: NORMA ARREOLA MD    Measurements  Intervals                                Axis  Rate:       121                          P:          76  VA:         175                          QRS:        -56  QRSD:       83                           T:          53  QT:         330  QTc:        469    Interpretive Statements  Sinus tachycardia  LAD, consider left anterior fascicular block  Low voltage, precordial leads  Abnormal R-wave progression, late transition  Minimal ST depression, inferior leads, unchanged from previous  Compared to ECG 10/05/2022 16:19:53  Sinus rhythm no longer present  T-wave ab normality no longer present  Electronically Signed On 3- 22:27:37 PDT by NORMA ARREOLA MD     Subtle worsening in renal function compared to previous, resolution of hypercalcemia.    RADIOLOGY  I have independently interpreted the diagnostic imaging associated with this visit and am waiting the final reading from the radiologist.   My preliminary interpretation is as follows: Significant stool load in the colon but no apparent perforation or obstruction  Radiologist interpretation:   CT-ABDOMEN-PELVIS W/O   Final Result         1.  Bilateral nephrolithiasis with 2.2 mm proximal left ureteral pelvic junction stone that up to obstructive changes.   2.  Variable sized bilateral renal cysts, hyperdense cyst or lesion in the interpolar right kidney is seen which is indeterminate. Follow-up 3 phase CT of the kidneys for further characterization as clinically appropriate.   3.  Fluid-filled proximal colon with mild wall thickening and slight adjacent hazy fat stranding, appearance suggests changes of ileus and/or mild colitis.   4.   "Diverticulosis   5.  Atherosclerosis           COURSE & MEDICAL DECISION MAKING    ED Observation Status? Yes; I am placing the patient in to an observation status due to a diagnostic uncertainty as well as therapeutic intensity. Patient placed in observation status at 9:27 PM, 3/30/2023.     Observation plan is as follows: I have ordered Relistor 0.6 mg subcu, dose reduced given her poor creatinine clearance.  1 L of normal saline, Zofran 4 mg IV.  Metabolic work-up and CT imaging of the abdomen pelvis will be obtained.  Differential diagnosis includes but is not limited to small bowel obstruction, constipation, obstipation, diverticular disease, perforation    2253: Patient doing well with [employer], she is now had 2 bowel movements.  Awaiting imaging results at this time.    0003: Patient reassessed, feeling much better, her tachycardia is improving with IV fluids.  I have updated her with reassuring findings.  Given concern for colitis on CT I have ordered Augmentin p.o.    Patient Vitals for the past 24 hrs:   BP Temp Temp src Pulse Resp SpO2 Height Weight   03/30/23 2357 -- 36.2 °C (97.2 °F) Temporal (!) 107 -- 94 % -- --   03/30/23 2342 -- -- -- (!) 106 -- 95 % -- --   03/30/23 2340 107/66 -- -- -- -- -- -- --   03/30/23 2331 (!) 133/91 -- -- -- -- 97 % -- --   03/30/23 2330 -- -- -- (!) 114 -- -- -- --   03/30/23 2237 117/63 -- -- (!) 125 (!) 24 97 % -- --   03/30/23 2115 (!) 141/43 36.1 °C (96.9 °F) Temporal (!) 117 (!) 24 97 % -- --   03/30/23 2113 -- -- -- -- -- -- 1.646 m (5' 4.8\") 99 kg (218 lb 4.1 oz)        Upon Reevaluation, the patient's condition has: Improved; and will be discharged.    Patient discharged from ED Observation status at 0005 (Time) 3/31/23 (Date).     INITIAL ASSESSMENT, COURSE AND PLAN  Care Narrative: Very pleasant but chronically ill 72-year-old presents for evaluation of abdominal distention with no bowel movement for a week with associated nausea.  Patient recent discharge " from this hospital, reviewed discharge summary dated yesterday.  She reassuringly has not been vomiting but given her lack of bowel movement despite aggressive bowel regimen including multiple enemas and given she is significant distended on the exam with essentially absent bowel sounds certainly an obstructive process must be considered.  I have ordered CT of the abdomen and pelvis with oral contrast as such.  Given I suspect opiate induced constipation as she has been treated with both IV and oral opiate analgesics for her multiple compression fractures and other orthopedic issues I have also treated her with Relistor here in the ED. CT is concerning for colitis, otherwise thankfully no obstruction or perforation.  Appropriate antibiotics initiated.  HYDRATION: Based on the patient's presentation of Dehydration and Tachycardia the patient was given IV fluids. IV Hydration was used because oral hydration was not adequate alone. Upon recheck following hydration, the patient was doing better, tachycardia improved, current heart rate is 107.      ADDITIONAL PROBLEM LIST  Constipation, abdominal distention, dehydration  DISPOSITION AND DISCUSSIONS  I have discussed management of the patient with the following physicians and NINI's:  NA    Discussion of management with other QHP or appropriate source(s): Pharmacy spoke with the emergency department pharmacist who notes based off the patient's weight there is no indication to reduce the dose of Relistor despite the patient's creatinine clearance.  1.2 mg is appropriate.  As such the the administered dose will be changed.      Escalation of care considered, and ultimately not performed:NA    Barriers to care at this time, including but not limited to:  Patient does not have readily available transportation .     Decision tools and prescription drugs considered including, but not limited to:  SIRS criteria for sepsis are not met .    The patient will return for new or  worsening symptoms and is stable at the time of discharge.    Patient has had high blood pressure while in the emergency department, felt likely secondary to medical condition. Counseled patient to monitor blood pressure at home and follow up with primary care physician.      DISPOSITION:  Patient will be discharged home in stable condition.    FOLLOW UP:  Liat Emerson M.D.  [address]  [address]  [facility] [address]  [phone]    Schedule an appointment as soon as possible for a visit         OUTPATIENT MEDICATIONS:  New Prescriptions    AMOXICILLIN-CLAVULANATE (AUGMENTIN) 875-125 MG TAB    Take 1 Tablet by mouth 2 times a day for 5 days.    MAGNESIUM HYDROXIDE (MILK OF MAGNESIA) 400 MG/5ML SUSPENSION    Take 30 mL by mouth 1 time a day as needed (Constipation) for up to 5 days.          FINAL DIAGNOSIS  1. Acute colitis    2. Therapeutic opioid induced constipation    3. Dehydration           Electronically signed by: Ochoa Pineda M.D., 3/30/2023 9:36 PM

## 2023-03-31 NOTE — ED NOTES
Patient is stable for discharge at this time, anticipatory guidance provided, close follow-up is encouraged, and ED return instructions have been detailed. Patient is both agreeable to the disposition and plan and discharged home n good condition - [facility] transport back to [facility];    Rx education provided, Pt verbalized understanding;

## 2023-03-31 NOTE — ED TRIAGE NOTES
Was DC [facility] today at noon no BM prior to DC   [facility] 2 additional enemas w/no success

## 2023-03-31 NOTE — ED NOTES
Pt had another BM - loose, cleaned up by Perla Martinez, Pt denied having any further needs at this time, no distress noted;

## 2023-03-31 NOTE — ED NOTES
Pt repositioned in [facility], denied having any other needs at this time, no distress noted; still waiting for [facility] transport back to [facility];

## 2023-04-03 PROBLEM — R11.2 NAUSEA & VOMITING: Status: ACTIVE | Noted: 2023-01-01

## 2023-04-03 PROBLEM — R53.81 DEBILITY: Status: ACTIVE | Noted: 2022-05-12

## 2023-04-03 PROBLEM — K52.9 CHRONIC INFLAMMATION OF COLON: Status: ACTIVE | Noted: 2023-01-01

## 2023-04-03 PROBLEM — R10.11 RIGHT UPPER QUADRANT ABDOMINAL PAIN: Status: ACTIVE | Noted: 2023-01-01

## 2023-04-03 PROBLEM — J18.9 PNEUMONIA IN INFECTIOUS DISEASE: Status: ACTIVE | Noted: 2023-01-01

## 2023-04-03 NOTE — H&P
"Hospital Medicine History & Physical Note    Date of Service  4/3/2023    Primary Care Physician  Liat Emerson M.D.    Consultants  None     Code Status  Full Code    Chief Complaint  Chief Complaint   Patient presents with    N/V     States she has been Nauseated for 2 weeks, last night she vomited \"bile\" and [facility] staff thought it was blood.  Pt believes it was \"the coating to her xarelto\"         History of Presenting Illness:      72-year-old female with complicated medical history of COPD, DVT, lung cancer, hypertension, dyslipidemia, nephrolithiasis, hypothyroidism and rheumatoid arthritis who presented 4/3 with nausea and vomiting.  Patient has had nausea for more than 2 weeks, getting worse and recently started having abdominal pain around the right upper quadrant area with chills however no fever.  Denied any significant shortness of breath or chest however patient has been weaker.  Denies significant also patient has had diarrhea, no melena and no significant coffee-ground with vomitus.  On admission blood pressure was stable, patient is on 4 L nasal cannula, her baseline around 2, labs did not show leukocytosis.  Creatinine around baseline 1.5 however liver enzymes were elevated 86 and 56 with normal bilirubin and elevated lipase 200.  Chest x-ray showed possible infiltrate on the right lung.  CT renal showed nephrolithiasis with no obstruction also showed increased fat within the colon wall indicated chronic inflammatory process.  Procalcitonin was elevated, IV antibiotics with gently IV fluid were started.      I discussed the plan of care with patient, family, and ED physician .    Review of Systems  Review of Systems   Constitutional:  Positive for malaise/fatigue. Negative for chills, fever and weight loss.   HENT:  Negative for ear pain, hearing loss and tinnitus.    Eyes:  Negative for blurred vision, double vision and photophobia.   Respiratory:  Negative for cough and hemoptysis.  "   Cardiovascular:  Negative for chest pain, palpitations, orthopnea and claudication.   Gastrointestinal:  Positive for abdominal pain, diarrhea and vomiting. Negative for blood in stool, constipation and nausea.   Genitourinary:  Negative for dysuria, frequency and urgency.   Musculoskeletal:  Negative for myalgias and neck pain.   Skin:  Negative for rash.   Neurological:  Negative for dizziness, speech change and weakness.     Past Medical History   has a past medical history of Anesthesia, Asthma, Backpain (7/2017), Blood clot in vein (07/06/2018), Bowel habit changes (07/06/2018), Breath shortness, Bronchitis, CAD (coronary artery disease), Cancer ([facility]) (2016), Chickenpox, COPD (chronic obstructive pulmonary disease) (HCC), Depression (2/26/2019), Dialysis (2005), Emphysema of lung (HCC), Glaucoma, Hemorrhagic disorder ([facility]), Hyperlipidemia, Hypertension, Hyperthyroidism, Kidney stone, Lung cancer ([facility]) (12/12/2016), Multiple thyroid nodules (7/9/2015), Nasal drainage, Obstruction of left ureteropelvic junction (UPJ) due to stone (6/17/2018), Osteoporosis, Pain (07/06/2018), Personal history of venous thrombosis and embolism (2004, 2012), Pneumonia (7/2016), Renal disorder, Renal stones (2013), Rheumatoid arthritis (HCC), Rheumatoid arthritis ([facility]), S/P appendectomy (1998 ), S/P cholecystectomy (1998), S/P kyphoplasty (2006, 2007, 2013), Sepsis, unspecified (2005), Shortness of breath (07/06/2018), and Thyroid nodule, hot (2017).    Surgical History   has a past surgical history that includes other; other abdominal surgery; pr breast augmentation with implant; pr breast reduction; appendectomy; cholecystectomy; laminotomy; lung biopsy open; thoracoscopy (Left, 12/12/2016); other orthopedic surgery (2013); cystoscopy stent placement (Left, 6/18/2018); lithotripsy (Left, 6/18/2018); lasertripsy (Left, 6/18/2018); ureteroscopy (Left, 6/18/2018); cystoscopy stent placement (7/9/2018); ureteroscopy (Left, 7/9/2018);  lasertripsy (Left, 7/9/2018); and bronchoscopy, robot-assisted (10/11/2022).     Family History  family history includes Cancer in her maternal uncle, mother, paternal aunt, and paternal uncle; Heart Disease in her brother; Heart Failure in her father.   Family history reviewed with patient. There is no family history that is pertinent to the chief complaint.     Social History   reports that she quit smoking about 23 years ago. Her smoking use included cigarettes. She has a 30.00 pack-year smoking history. She has never used smokeless tobacco. She reports current alcohol use. She reports that she does not use drugs.    Allergies  No Known Allergies    Medications  Prior to Admission Medications   Prescriptions Last Dose Informant Patient Reported? Taking?   COMBIGAN 0.2-0.5 % Solution 4/2/2023 at 2000 MAR from Other Facility Yes No   Sig: Administer 1 Drop into both eyes 2 times a day.   HYDROcodone-acetaminophen (NORCO) 5-325 MG Tab per tablet 4/2/2023 at 1943 MAR from Other Facility Yes Yes   Sig: Take 1 Tablet by mouth every four hours as needed.   KLOR-CON M20 20 MEQ Tab CR 4/2/2023 at 1700 MAR from Other Facility Yes No   Sig: Take 40 mEq by mouth every evening.   Lactobacillus Rhamnosus, GG, (CULTURELLE PO) 4/3/2023 at 0800 MAR from Other Facility Yes Yes   Sig: Take 1 Capsule by mouth every day.   acetaminophen (TYLENOL) 500 MG Tab 4/3/2023 at 0800 MAR from Other Facility Yes No   Sig: Take 1,000 mg by mouth 3 times a day.   albuterol (PROVENTIL) 2.5mg/3ml Nebu Soln solution for nebulization PRN MAR from Other Facility No No   Sig: USE THREE MILLILITERS (1 VIAL) VIA NEBULIZATION BY MOUTH EVERY 4 HOURS AS NEEDED FOR SHORTNESS OF BREATH   albuterol 108 (90 Base) MCG/ACT Aero Soln inhalation aerosol PRN MAR from Other Facility No No   Sig: Inhale 2 Puffs every 6 hours as needed for Shortness of Breath.   allopurinol (ZYLOPRIM) 100 MG Tab 4/2/2023 at 0800 MAR from Other Facility Yes No   Sig: Take 200 mg by  mouth every morning.   amoxicillin-clavulanate (AUGMENTIN) 875-125 MG Tab 4/2/2023 at 2000 MAR from Other Facility No No   Sig: Take 1 Tablet by mouth 2 times a day for 5 days.   Patient taking differently: Take 1 Tablet by mouth 2 times a day. Per MAR pt started on 3/31/2023 for 5 day course   atorvastatin (LIPITOR) 20 MG Tab 4/2/2023 at 2000 MAR from Other Facility Yes Yes   Sig: Take 20 mg by mouth every evening.   azithromycin (ZITHROMAX) 250 MG Tab 4/2/2023 at 0800 MAR from Other Facility No No   Sig: TAKE ONE TABLET BY MOUTH DAILY   Patient taking differently: Take 250 mg by mouth every day. Per MAR pt takes prophylaxis   bisacodyl (DULCOLAX) 10 MG Suppos PRN MAR from Other Facility No No   Sig: Insert 1 Suppository into the rectum 1 time a day as needed (if magnesium hydroxide ineffective after 24 hours).   ergocalciferol (DRISDOL) 28726 UNIT capsule 4/3/2023 at 0800 MAR from Other Facility No No   Sig: Take 1 Capsule by mouth every 7 days.   Patient taking differently: Take 50,000 Units by mouth every 7 days. On Monday   fluticasone furoate-vilanterol (BREO ELLIPTA) 200-25 MCG/ACT AEROSOL POWDER, BREATH ACTIVATED 4/2/2023 at 0800 MAR from Other Facility No No   Sig: Inhale 1 Puff every day. Rinse mouth after use.   losartan (COZAAR) 50 MG Tab 4/2/2023 at 0800 MAR from Other Facility Yes No   Sig: Take 50 mg by mouth every morning.   magnesium hydroxide (MILK OF MAGNESIA) 400 MG/5ML Suspension PRN MAR from Other Facility No No   Sig: Take 30 mL by mouth 1 time a day as needed (Constipation) for up to 5 days.   magnesium oxide (MAG-OX) 400 MG Tab tablet 4/2/2023 at 1700 MAR from Other Facility Yes Yes   Sig: Take 400 mg by mouth every evening.   methimazole (TAPAZOLE) 5 MG Tab 4/3/2023 at 0800 MAR from Other Facility No No   Sig: TAKE ONE TABLET BY MOUTH DAILY   Patient taking differently: Take 5 mg by mouth every day.   ondansetron (ZOFRAN ODT) 4 MG TABLET DISPERSIBLE 4/3/2023 at 0800 MAR from Other Facility  No No   Sig: Take 1 Tablet by mouth every 6 hours as needed for Nausea.   ondansetron (ZOFRAN) 4 MG Tab tablet 4/3/2023 at 0800 MAR from Other Facility Yes Yes   Sig: Take 4 mg by mouth see administration instructions. Per MAR shows that pt takes every 6 hours as needed, on 4/3/2023 per MAR shows that pt is to take this medications 6 times for 2 days.   polyethylene glycol/lytes (MIRALAX) 17 g Pack PRN MAR from Other Facility No No   Sig: Take 1 Packet by mouth 1 time a day as needed (if sennosides and docusate ineffective after 24 hours).   promethazine (PHENERGAN) 25 MG/ML Solution 4/3/2023 at 0822 MAR from Other Facility Yes Yes   Sig: Inject 25 mg into the shoulder, thigh, or buttocks every 6 hours as needed for Nausea/Vomiting.   rivaroxaban (XARELTO) 20 MG Tab tablet 4/2/2023 at 1700 MAR from Other Facility No No   Sig: TAKE ONE TABLET BY MOUTH DAILY WITH DINNER   Patient taking differently: Take 20 mg by mouth with dinner. TAKE ONE TABLET BY MOUTH DAILY WITH DINNER   senna-docusate (PERICOLACE OR SENOKOT S) 8.6-50 MG Tab 4/2/2023 at 0800 MAR from Other Facility No No   Sig: Take 2 Tablets by mouth 2 times a day.   tiotropium (SPIRIVA RESPIMAT) 2.5 mcg/Act Aero Soln 4/3/2023 at 0800 MAR from Other Facility No No   Sig: INHALE TWO PUFFS BY MOUTH DAILY   Patient taking differently: Inhale 5 mcg every day. INHALE TWO PUFFS BY MOUTH DAILY   zolpidem (AMBIEN) 5 MG Tab 4/2/2023 at 2348 MAR from Other Facility No No   Sig: Take 1 Tablet by mouth at bedtime as needed for Sleep for up to 30 days.   Patient taking differently: Take 5 mg by mouth at bedtime as needed for Sleep. Per MAR shows pt taking for 14 day course as needed, pt started on 3/30/2023      Facility-Administered Medications: None       Physical Exam  Temp:  [36.3 °C (97.4 °F)] 36.3 °C (97.4 °F)  Pulse:  [119-122] 122  Resp:  [21-23] 23  BP: (108-121)/(67-82) 113/79  SpO2:  [96 %-99 %] 96 %  Blood Pressure : 121/67   Temperature: 36.3 °C (97.4 °F)    Pulse: (!) 121   Respiration: (!) 23   Pulse Oximetry: 97 %       Physical Exam  Constitutional:       General: She is not in acute distress.     Appearance: She is obese. She is ill-appearing.   HENT:      Head: Normocephalic and atraumatic.   Cardiovascular:      Rate and Rhythm: Normal rate.      Heart sounds: No murmur heard.  Pulmonary:      Effort: No respiratory distress.      Breath sounds: No wheezing or rales.   Abdominal:      General: There is no distension.      Palpations: Abdomen is soft. There is no mass.      Tenderness: There is abdominal tenderness. There is no right CVA tenderness or guarding.   Musculoskeletal:      Right lower leg: No edema.      Left lower leg: No edema.   Skin:     General: Skin is dry.      Coloration: Skin is not jaundiced.      Findings: No bruising or lesion.   Neurological:      General: No focal deficit present.      Mental Status: She is alert and oriented to person, place, and time. Mental status is at baseline.      Cranial Nerves: No cranial nerve deficit.      Motor: No weakness.       Laboratory:  Recent Labs     04/03/23  1228   WBC 8.8   RBC 3.43*   HEMOGLOBIN 12.0   HEMATOCRIT 35.4*   .2*   MCH 35.0*   MCHC 33.9   RDW 54.1*   PLATELETCT 370   MPV 10.6     Recent Labs     04/03/23  1228   SODIUM 144   POTASSIUM 4.0   CHLORIDE 108   CO2 23   GLUCOSE 127*   BUN 51*   CREATININE 1.55*   CALCIUM 8.3*     Recent Labs     04/03/23  1228   ALTSGPT 56*   ASTSGOT 86*   ALKPHOSPHAT 159*   TBILIRUBIN 0.3   LIPASE 200*   GLUCOSE 127*     Recent Labs     04/03/23  1228   INR 1.94*     Recent Labs     04/03/23  1228   NTPROBNP 660*         Recent Labs     04/03/23  1228   TROPONINT 31*       Imaging:  DX-CHEST-PORTABLE (1 VIEW)   Final Result      Enlargement the cardiac silhouette and right basilar atelectasis      CT-RENAL COLIC EVALUATION(A/P W/O)   Final Result      1.  4 mm stone in the LEFT mid to proximal ureter without significant obstructive changes.   2.   Bilateral nonobstructing kidney stones present.   3.  Multiple bilateral kidney cysts of varying density.   4.  Increased fat within the colon wall may indicate chronic inflammatory process.   5.  No evidence for acute colitis or diverticulitis.   6.  Bilateral flank/abdominal wall edema, worse on the LEFT.         US-RUQ    (Results Pending)       X-Ray:  I have personally reviewed the images and compared with prior images.  EKG:  I have personally reviewed the images and compared with prior images.    Assessment/Plan:  Justification for Admission Status  I anticipate this patient will require at least two midnights for appropriate medical management, necessitating inpatient admission because generalized weakness, elevated procalcitonin and needs IV antibiotics for possible colitis and pneumonia    Patient will need a Med/Surg bed on MEDICAL service .  The need is secondary to IV antibiotics, IV fluid close monitoring and patient might need placement.    * Nausea & vomiting- (present on admission)  Assessment & Plan  Possible gastritis and chronic inflammatory process also pancreatitis  Ultrasound for liver  N.p.o. with advance diet  IV Flagyl and ceftriaxone  Rule out C. Difficile   pantoprazole    Pneumonia in infectious disease- (present on admission)  Assessment & Plan  Increased oxygen requirement to 4 L  Elevated procalcitonin  Chest x-ray showed mild infiltration of the right lung  IV ceftriaxone and SLP    Chronic inflammation of colon  Assessment & Plan  Patient is to follow-up with GI as outpatient for colonoscopy  Could be inflammatory bowel disease versus infection versus ischemia  Close monitoring and consider GI consult at the hospital if there is any new symptoms  Continue Xarelto and atorvastatin  Course of antibiotic    Right upper quadrant abdominal pain  Assessment & Plan  CT scan did not show any severe abnormality however showed possible chronic inflammatory disease  History of  cholecystectomy  Mildly elevated liver enzymes with normal bilirubin and elevated lipase  Check ultrasound  Patient has elevated procalcitonin, start with IV antibiotics Flagyl and ceftriaxone  Patient might need colonoscopy as outpatient    Elevated procalcitonin  Assessment & Plan  Chest x-ray showed possible infiltration of the right lung,  No leukocytosis however patient has chronic weakness  Possible pneumonia  SLP, ceftriaxone and Flagy possible l     Recurrent non-small cell lung cancer (HCC)- (present on admission)  Assessment & Plan  Treated with radiation  Follow-up with Dr. Reece oncologist    Debility- (present on admission)  Assessment & Plan  With recurrent falls and generalized weakness  patient lives by herself  PT and OT with the speech  Check vitamin D    Macrocytic anemia- (present on admission)  Assessment & Plan  Check B12  Patient might need follow-up with hematologist as outpatient    Deep vein thrombosis (DVT) (HCC)- (present on admission)  Assessment & Plan  Continue Xarelto  Patient was on warfarin and switched to Xarelto  Patient was encouraged to stay on anticoagulation indefinitely    CKD (chronic kidney disease) stage 3, GFR 30-59 ml/min ([facility])- (present on admission)  Assessment & Plan  Chest x-ray around baseline 1.5  Patient needed dialysis long time ago  Close monitoring with labs daily  Avoid nephrotoxic medication    Chronic obstructive pulmonary disease (HCC)- (present on admission)  Assessment & Plan  Around baseline 2-4 liters nasal cannula   no significant exacerbation or wheezing  Continue home medication and inhalers    Hypertension- (present on admission)  Assessment & Plan  Continue home medication losartan 50 mg daily    Hyperlipidemia- (present on admission)  Assessment & Plan  Continue atorvastatin  Last triglyceride 184 and LDL 47        VTE prophylaxis: XareltoSCDs/TEDs and therapeutic anticoagulation with

## 2023-04-03 NOTE — ASSESSMENT & PLAN NOTE
Patient still complains of generalized abdominal pain with the maximum in the right upper quadrant to mid epigastric region.  Patient still complaining of nausea and diarrhea and so at this point she is limiting how much she is eating.  Discussed with gastroenterology again and I will at this point order a upper GI with small bowel follow-through as well as initiate Reglan 10 mg every 6 hours  Stool studies so far have been negative but not all the stool studies are completed  Patient has completed Zosyn yesterday

## 2023-04-03 NOTE — ED NOTES
Med rec updated and complete, per MAR from [facility] ([phone])  Allergies reviewed, per MAR from [facility]  Per MAR did not have SIMVASTATIN 40MG.

## 2023-04-03 NOTE — ASSESSMENT & PLAN NOTE
Still complains of nausea continue with antiemetic management  QT interval at this point monitored

## 2023-04-03 NOTE — ED NOTES
Tech from Lab called with critical result of Procalcitonin 38.46 at 1617. Critical lab result read back to tech.   Dr. Gaitan notified of critical lab result at 1617.  Critical lab result read back by Dr. Gaitan.

## 2023-04-03 NOTE — ASSESSMENT & PLAN NOTE
Avoid nephrotoxic medications  Monitor renal functions  Continue with fluid resuscitation and optimize intake and output

## 2023-04-03 NOTE — ED PROVIDER NOTES
"ED Provider Note    CHIEF COMPLAINT  Chief Complaint   Patient presents with    N/V     States she has been Nauseated for 2 weeks, last night she vomited \"bile\" and [facility] staff thought it was blood.  Pt believes it was \"the coating to her xarelto\"         EXTERNAL RECORDS REVIEWED  None     HPI/ROS  LIMITATION TO HISTORY   None  OUTSIDE HISTORIAN(S):  None    Latonia Clinton is a 72 y.o. female who presents at this time patient presents with nausea vomiting, and some abdominal pain.  Patient states has been ongoing over the last couple of weeks, but she has had some increased vomiting over the last couple of days.  She states she is currently resides at [facility], and states that she is having trouble taking her medications or because of the nausea.  She has no chest pain, and no shortness of breath that is new for her.  Patient states that she is always on some form of oxygen.    PAST MEDICAL HISTORY   has a past medical history of Anesthesia, Asthma, Backpain (7/2017), Blood clot in vein (07/06/2018), Bowel habit changes (07/06/2018), Breath shortness, Bronchitis, CAD (coronary artery disease), Cancer ([facility]) (2016), Chickenpox, COPD (chronic obstructive pulmonary disease) ([facility]), Depression (2/26/2019), Dialysis (2005), Emphysema of lung ([facility]), Glaucoma, Hemorrhagic disorder ([facility]), Hyperlipidemia, Hypertension, Hyperthyroidism, Kidney stone, Lung cancer ([facility]) (12/12/2016), Multiple thyroid nodules (7/9/2015), Nasal drainage, Obstruction of left ureteropelvic junction (UPJ) due to stone (6/17/2018), Osteoporosis, Pain (07/06/2018), Personal history of venous thrombosis and embolism (2004, 2012), Pneumonia (7/2016), Renal disorder, Renal stones (2013), Rheumatoid arthritis (H[facility]), Rheumatoid arthritis ([facility]), S/P appendectomy (1998 ), S/P cholecystectomy (1998), S/P kyphoplasty (2006, 2007, 2013), Sepsis, unspecified (2005), Shortness of breath (07/06/2018), and Thyroid nodule, hot (2017).    SURGICAL HISTORY   " has a past surgical history that includes other; other abdominal surgery; breast augmentation with implant; breast reduction; appendectomy; cholecystectomy; laminotomy; lung biopsy open; thoracoscopy (Left, 2016); other orthopedic surgery (); cystoscopy stent placement (Left, 2018); lithotripsy (Left, 2018); lasertripsy (Left, 2018); ureteroscopy (Left, 2018); cystoscopy stent placement (2018); ureteroscopy (Left, 2018); lasertripsy (Left, 2018); and bronchoscopy, robot-assisted (10/11/2022).    FAMILY HISTORY  Family History   Problem Relation Age of Onset    Cancer Mother         kidney    Heart Failure Father     Heart Disease Brother     Cancer Maternal Uncle         liver    Cancer Paternal Aunt         ovarian    Cancer Paternal Uncle         lung       SOCIAL HISTORY  Social History     Tobacco Use    Smoking status: Former     Packs/day: 1.00     Years: 30.00     Pack years: 30.00     Types: Cigarettes     Quit date: 2000     Years since quittin.2    Smokeless tobacco: Never    Tobacco comments:     7 years ago   Vaping Use    Vaping Use: Never used   Substance and Sexual Activity    Alcohol use: Yes     Alcohol/week: 0.0 oz     Comment: x2 a week    Drug use: No    Sexual activity: Not on file       CURRENT MEDICATIONS  Home Medications       Reviewed by Rosalina Herrmann (Pharmacy Tech) on 23 at 1147  Med List Status: Complete     Medication Last Dose Status   acetaminophen (TYLENOL) 500 MG Tab 4/3/2023 Active   albuterol (PROVENTIL) 2.5mg/3ml Nebu Soln solution for nebulization PRN Active   albuterol 108 (90 Base) MCG/ACT Aero Soln inhalation aerosol PRN Active   allopurinol (ZYLOPRIM) 100 MG Tab 2023 Active   amoxicillin-clavulanate (AUGMENTIN) 875-125 MG Tab 2023 Active   atorvastatin (LIPITOR) 20 MG Tab 2023 Active   azithromycin (ZITHROMAX) 250 MG Tab 2023 Active   bisacodyl (DULCOLAX) 10 MG Suppos PRN Active   COMBIGAN  "0.2-0.5 % Solution 4/2/2023 Active   ergocalciferol (DRISDOL) 31155 UNIT capsule 4/3/2023 Active   fluticasone furoate-vilanterol (BREO ELLIPTA) 200-25 MCG/ACT AEROSOL POWDER, BREATH ACTIVATED 4/2/2023 Active   HYDROcodone-acetaminophen (NORCO) 5-325 MG Tab per tablet 4/2/2023 Active   KLOR-CON M20 20 MEQ Tab CR 4/2/2023 Active   Lactobacillus Rhamnosus, GG, (CULTURELLE PO) 4/3/2023 Active   losartan (COZAAR) 50 MG Tab 4/2/2023 Active   magnesium hydroxide (MILK OF MAGNESIA) 400 MG/5ML Suspension PRN Active   magnesium oxide (MAG-OX) 400 MG Tab tablet 4/2/2023 Active   methimazole (TAPAZOLE) 5 MG Tab 4/3/2023 Active   ondansetron (ZOFRAN ODT) 4 MG TABLET DISPERSIBLE 4/3/2023 Active   ondansetron (ZOFRAN) 4 MG Tab tablet 4/3/2023 Active   polyethylene glycol/lytes (MIRALAX) 17 g Pack PRN Active   promethazine (PHENERGAN) 25 MG/ML Solution 4/3/2023 Active   rivaroxaban (XARELTO) 20 MG Tab tablet 4/2/2023 Active   senna-docusate (PERICOLACE OR SENOKOT S) 8.6-50 MG Tab 4/2/2023 Active   tiotropium (SPIRIVA RESPIMAT) 2.5 mcg/Act Aero Soln 4/3/2023 Active   zolpidem (AMBIEN) 5 MG Tab 4/2/2023 Active                    ALLERGIES  No Known Allergies    PHYSICAL EXAM  VITAL SIGNS: /67   Pulse (!) 121   Temp 36.3 °C (97.4 °F) (Temporal)   Resp (!) 23   Ht 1.626 m (5' 4\")   Wt 98.9 kg (218 lb)   LMP  (LMP Unknown)   SpO2 97%   BMI 37.42 kg/m²    Constitutional: Well developed, well nourished. mild acute distress.  HEENT: Normocephalic, atraumatic. Posterior pharynx clear and moist.  Eyes:  EOMI. Normal sclera.  Neck: Supple, Full range of motion, nontender.  Chest/Pulmonary:  diminished breath sounds, equal expansion  Cardio: Regular rate and rhythm with no murmur.   Abdomen: Soft, mild diffuse tenderness, no peritoneal signs. No guarding.   Musculoskeletal: No deformity, no edema, neurovascular intact.   Neuro: Clear speech, appropriate, cooperative, cranial nerves II-XII grossly intact.  Psych: Normal mood and " affect      DIAGNOSTIC STUDIES / PROCEDURES  Results for orders placed or performed during the hospital encounter of 04/03/23   CBC WITH DIFFERENTIAL   Result Value Ref Range    WBC 8.8 4.8 - 10.8 K/uL    RBC 3.43 (L) 4.20 - 5.40 M/uL    Hemoglobin 12.0 12.0 - 16.0 g/dL    Hematocrit 35.4 (L) 37.0 - 47.0 %    .2 (H) 81.4 - 97.8 fL    MCH 35.0 (H) 27.0 - 33.0 pg    MCHC 33.9 33.6 - 35.0 g/dL    RDW 54.1 (H) 35.9 - 50.0 fL    Platelet Count 370 164 - 446 K/uL    MPV 10.6 9.0 - 12.9 fL    Neutrophils-Polys 71.00 44.00 - 72.00 %    Lymphocytes 13.00 (L) 22.00 - 41.00 %    Monocytes 6.00 0.00 - 13.40 %    Eosinophils 2.00 0.00 - 6.90 %    Basophils 0.00 0.00 - 1.80 %    Nucleated RBC 0.20 /100 WBC    Neutrophils (Absolute) 6.95 2.00 - 7.15 K/uL    Lymphs (Absolute) 1.14 1.00 - 4.80 K/uL    Monos (Absolute) 0.53 0.00 - 0.85 K/uL    Eos (Absolute) 0.18 0.00 - 0.51 K/uL    Baso (Absolute) 0.00 0.00 - 0.12 K/uL    NRBC (Absolute) 0.02 K/uL    Anisocytosis 1+    COMP METABOLIC PANEL   Result Value Ref Range    Sodium 144 135 - 145 mmol/L    Potassium 4.0 3.6 - 5.5 mmol/L    Chloride 108 96 - 112 mmol/L    Co2 23 20 - 33 mmol/L    Anion Gap 13.0 7.0 - 16.0    Glucose 127 (H) 65 - 99 mg/dL    Bun 51 (H) 8 - 22 mg/dL    Creatinine 1.55 (H) 0.50 - 1.40 mg/dL    Calcium 8.3 (L) 8.4 - 10.2 mg/dL    AST(SGOT) 86 (H) 12 - 45 U/L    ALT(SGPT) 56 (H) 2 - 50 U/L    Alkaline Phosphatase 159 (H) 30 - 99 U/L    Total Bilirubin 0.3 0.1 - 1.5 mg/dL    Albumin 2.7 (L) 3.2 - 4.9 g/dL    Total Protein 5.4 (L) 6.0 - 8.2 g/dL    Globulin 2.7 1.9 - 3.5 g/dL    A-G Ratio 1.0 g/dL   LIPASE   Result Value Ref Range    Lipase 200 (H) 7 - 58 U/L   TROPONIN   Result Value Ref Range    Troponin T 31 (H) 6 - 19 ng/L   PT/INR   Result Value Ref Range    PT 21.6 (H) 12.0 - 14.6 sec    INR 1.94 (H) 0.87 - 1.13   CORRECTED CALCIUM   Result Value Ref Range    Correct Calcium 9.3 8.5 - 10.5 mg/dL   ESTIMATED GFR   Result Value Ref Range    GFR (CKD-EPI)  35 (A) >60 mL/min/1.73 m 2   DIFFERENTIAL MANUAL   Result Value Ref Range    Bands-Stabs 8.00 0.00 - 10.00 %    Manual Diff Status PERFORMED    PLATELET ESTIMATE   Result Value Ref Range    Plt Estimation Normal    MORPHOLOGY   Result Value Ref Range    RBC Morphology Present     Smudge Cells Few     Toxic Gran Moderate     Dohle Bodies Few    URINALYSIS,CULTURE IF INDICATED    Specimen: Urine, Cath   Result Value Ref Range    Color Yellow     Character Clear     Specific Gravity 1.020 <1.035    Ph 5.5 5.0 - 8.0    Glucose Negative Negative mg/dL    Ketones Trace (A) Negative mg/dL    Protein Negative Negative mg/dL    Bilirubin Negative Negative    Nitrite Negative Negative    Leukocyte Esterase Negative Negative    Occult Blood Negative Negative    Micro Urine Req see below          RADIOLOGY  I have independently interpreted the diagnostic imaging associated with this visit and am waiting the final reading from the radiologist.   My preliminary interpretation is as follows: see below  Radiologist interpretation:   DX-CHEST-PORTABLE (1 VIEW)   Final Result      Enlargement the cardiac silhouette and right basilar atelectasis      CT-RENAL COLIC EVALUATION(A/P W/O)   Final Result      1.  4 mm stone in the LEFT mid to proximal ureter without significant obstructive changes.   2.  Bilateral nonobstructing kidney stones present.   3.  Multiple bilateral kidney cysts of varying density.   4.  Increased fat within the colon wall may indicate chronic inflammatory process.   5.  No evidence for acute colitis or diverticulitis.   6.  Bilateral flank/abdominal wall edema, worse on the LEFT.               COURSE & MEDICAL DECISION MAKING  See below      INITIAL ASSESSMENT, COURSE AND PLAN  Care Narrative: This is a 72-year-old female here for evaluation of abdominal pain and nausea vomiting.  [facility], but will be brought in for further hydration, and antiemetics.        DISPOSITION AND  DISCUSSIONS  Admit    FINAL DIAGNOSIS  1. Nausea and vomiting, unspecified vomiting type           Electronically signed by: Cristopher Wells D.O., 4/3/2023 11:52 AM

## 2023-04-03 NOTE — ASSESSMENT & PLAN NOTE
Low-fat low-cholesterol diet  Statin in the form of Lipitor continue 20 mg nightly  Lab Results   Component Value Date/Time    CHOLSTRLTOT 179 12/06/2022 03:02 PM    LDL 52 12/06/2022 03:02 PM    HDL 86 12/06/2022 03:02 PM    TRIGLYCERIDE 207 (H) 12/06/2022 03:02 PM

## 2023-04-03 NOTE — ED TRIAGE NOTES
"Chief Complaint   Patient presents with    N/V     States she has been Nauseated for 2 weeks, last night she vomited \"bile\" and [facility] staff thought it was blood.  Pt believes it was \"the coating to her xarelto\"       Ht 1.626 m (5' 4\")   Wt 98.9 kg (218 lb)   LMP  (LMP Unknown)   BMI 37.42 kg/m²       [facility]    "

## 2023-04-03 NOTE — ASSESSMENT & PLAN NOTE
Will need to follow-up as an outpatient.  Status post radiation treatment  Patient at this point will need continued outpatient follow-ups with oncology  Patient did have surgery in 2016  Repeat evaluation found a nodule in 2022 which they are keeping an eye on as an outpatient.

## 2023-04-04 NOTE — CARE PLAN
The patient is Stable - Low risk of patient condition declining or worsening    Shift Goals  Clinical Goals: decrease nausea/vomiting throughout the shift  Patient Goals: Rest comfortably  Family Goals: n/a    Progress made toward(s) clinical / shift goals:  Medicated for MAR for nausea/vomiting. Pain has been controlled throughout the shift.    Patient is not progressing towards the following goals:      Problem: Pain - Standard  Goal: Alleviation of pain or a reduction in pain to the patient’s comfort goal  Outcome: Progressing     Problem: Fall Risk  Goal: Patient will remain free from falls  Outcome: Progressing     Problem: Gastrointestinal Irritability  Goal: Nausea and vomiting will be absent or improve  Outcome: Progressing  Goal: Diarrhea will be absent or improved  Outcome: Progressing

## 2023-04-04 NOTE — THERAPY
Speech Language Pathology   Clinical Swallow Evaluation     Patient Name: Latonia Clinton  AGE:  72 y.o., SEX:  female  Medical Record #: [id number]  Date of Service: 4/4/2023      History of Present Illness  Admitted 4/3/23 for N/V.     PMHx: COPD, DVT, lung cancer, hypertension, dyslipidemia, nephrolithiasis, hypothyroidism and rheumatoid arthritis     CXR 4/3/23: Enlargement the cardiac silhouette and right basilar atelectasis      General Information:  O2 (LPM): 1  O2 Delivery Device: Silicone Nasal Cannula  Level of Consciousness: Alert, Awake  Orientation: Oriented x 4  Follows Directives: Yes      Prior Living Situation & Level of Function:  Housing / Facility: [address] [place] [address]  Communication: WFL  Swallowing: WFL, pt consumes regular diet at baseline       Oral Mechanism Evaluation:  Dentition: Missing posterior dentition, Fair   Facial Symmetry: Equal  Facial Sensation: Equal     Labial Observations: WFL   Lingual Observations: Midline  Motor Speech: WFL          Laryngeal Function:  Secretion Management: Adequate  Voice Quality: WFL  Cough: Perceptually WNL       Subjective  Patient received awake and AAOx4. Speech intact. On 1L via NC and breathing comfortably. Pt reported difficulty taking pills when laying down; otherwise, denied hx of dysphagia. Pt educated on sitting up when taking meds and w/ eating and drinking. Pt reported last pna was years ago when she was dx w/ lung ca.       Assessment  Current Method of Nutrition: NPO until cleared by speech pathology  Positioning: Dinero's (60-90 degrees)  Bolus Administration: Patient  O2 (LPM): 1 O2 Delivery Device: Silicone Nasal Cannula  Factor(s) Affecting Performance: None  Tracheostomy : No        Swallowing Trials:  Thin Liquid (TN0): WFL  Liquidised (LQ3): WFL  Regular (RG7): WFL    Comments: Adequate bolus acceptance and containment. Onset of swallow trigger was timely. No overt s/sx of aspiration appreciated with all trials  "tested including sips of thins from cup and straw. Voice remained clear. Mastication mildly prolonged but functional. Pt denied globus or difficulty swallowing.      Clinical Impressions  No clinical signs of oropharyngeal dysphagia seen during bedside assessment. However, SLP will follow 1-2x likely to ensure diet tolerance given following dysphagia risk factors: hx of COPD + lung ca and concerns for \"mild infiltration of the right lung\" on CXR per MD. Will consider diagnostic swallow study with any ongoing concerns for aspiration.       Recommendations  Diet Consistency: Regular and thin liquid diet  Instrumentation: Instrumental swallow study pending clinical progress  Medication: Whole with liquid  Supervision: Distant supervision - check on patient 2-3 times per meal  Positioning: Fully upright and midline during oral intake  Risk Management : None  Oral Care: BID         SLP Treatment Plan  Treatment Plan: Dysphagia Treatment, Patient/Family/Caregiver Training  SLP Frequency: 3x Per Week  Estimated Duration: Until Therapy Goals Met      Anticipated Discharge Needs  Discharge Recommendations: Anticipate that the patient will have no further speech therapy needs after discharge from the hospital   Therapy Recommendations Upon DC: Dysphagia Training, Patient / Family / Caregiver Education        Patient / Family Goals  Patient / Family Goal #1: \"Can I get some broth\"  Short Term Goals  Short Term Goal # 1: Patient will consume a RG7/TN0 diet with no overt s/sx of aspiration.      Sadia Tna, SLP   "

## 2023-04-04 NOTE — PROGRESS NOTES
Received bedside report.  Assumed pt care.   Assessment and chart check complete.  Pt is AA0X4, no signs of distress, denies nausea/vomiting.  Updated for the POC of the day.  IVF infusing.  Maintain oxygen at 3 LPM.  1000- Procal of 22.89, MD aware.  Fall precautions in place, treaded socks on pt, bed in low position.   Call light within reach.   Educated pt to call if needing anything.   Hourly rounding.

## 2023-04-04 NOTE — PROGRESS NOTES
4 Eyes Skin Assessment Completed by Jimmy CANO RN and ALLAN Weaver.    Head WDL  Ears WDL  Nose WDL  Mouth WDL  Neck WDL  Breast/Chest WDL  Shoulder Blades WDL  Spine WDL  (R) Arm/Elbow/Hand Bruising, skin tear  (L) Arm/Elbow/Hand Bruising, skin tear   Abdomen WDL  Groin Redness  Scrotum/Coccyx/Buttocks Redness, scab, moisture, blanching  (R) Leg Bruising  (L) Leg Bruising  (R) Heel/Foot/Toe Edema  (L) Heel/Foot/Toe Edema          Devices In Places Pulse Ox, blood pressure cuff, silicon oxygen tubing      Interventions In Place Waffle Overlay    Possible Skin Injury Yes    Pictures Uploaded Into Epic Yes  Wound Consult Placed Yes  RN Wound Prevention Protocol Ordered Yes

## 2023-04-04 NOTE — CARE PLAN
The patient is Watcher - Medium risk of patient condition declining or worsening    Shift Goals  Clinical Goals: pt will remain at stated pain goal of 5/10 pain this shift.  Patient Goals: sleep comfortably  Family Goals: n/a    Progress made toward(s) clinical / shift goals:      Pt remained at stated pain goal of 5/10 pain this shift.    Problem: Pain - Standard  Goal: Alleviation of pain or a reduction in pain to the patient’s comfort goal  Outcome: Progressing     Problem: Knowledge Deficit - Standard  Goal: Patient and family/care givers will demonstrate understanding of plan of care, disease process/condition, diagnostic tests and medications  Outcome: Progressing     Problem: Skin Integrity  Goal: Skin integrity is maintained or improved  Outcome: Progressing     Problem: Fall Risk  Goal: Patient will remain free from falls  Outcome: Progressing       Patient is not progressing towards the following goals:

## 2023-04-04 NOTE — ASSESSMENT & PLAN NOTE
Upper GI with small bowel follow-through.  Imaging studies reviewed in detail  Pain management  Start Reglan

## 2023-04-04 NOTE — PROGRESS NOTES
Educated that Special Contact Precautions involves staff and visitors wearing gowns and gloves when in the patient room, and using soap and water for hand hygiene when exiting patient’s room.     Educated that patient may leave the room if they or continent of stool, but prior to exiting the patient room each time, the patient needs to have on a fresh patient gown, ensure the potentially infectious area is covered, and perform hand hygiene with soap and water immediately prior to exiting the room.    In addition, educated that alcohol based hand rub is NOT sufficient for hand hygiene for Special Contact Precautions. A bonnet is placed over the hand  dispenser inside the patients’ room as a reminder to wash hands with soap and water.

## 2023-04-04 NOTE — CARE PLAN
Problem: Pain - Standard  Goal: Alleviation of pain or a reduction in pain to the patient’s comfort goal  4/3/2023 1747 by Jimmy Pugh R.N.  Outcome: Progressing  4/3/2023 1743 by JACKSON Flores.N.  Outcome: Progressing     Problem: Knowledge Deficit - Standard  Goal: Patient and family/care givers will demonstrate understanding of plan of care, disease process/condition, diagnostic tests and medications  4/3/2023 1747 by Jimmy Pugh R.N.  Outcome: Progressing  4/3/2023 1743 by JACKSON Flores.N.  Outcome: Progressing     Problem: Skin Integrity  Goal: Skin integrity is maintained or improved  4/3/2023 1747 by Jimmy Pugh R.N.  Outcome: Progressing  4/3/2023 1743 by Jimmy Pugh R.N.  Outcome: Progressing     Problem: Fall Risk  Goal: Patient will remain free from falls  4/3/2023 1747 by Jimmy Pugh R.N.  Outcome: Progressing  4/3/2023 1743 by Jimmy Pugh R.N.  Outcome: Progressing   The patient is Watcher - Medium risk of patient condition declining or worsening    Shift Goals  Clinical Goals: maintin pain below 5  Patient Goals: sleep comfortably  Family Goals: n/a    Progress made toward(s) clinical / shift goals:  patient's remained at a 5, patient fell asleep quickly     Patient is not progressing towards the following goals:

## 2023-04-04 NOTE — PROGRESS NOTES
Patient to the floor via ER halle. Patient is A&OX4, very lethargic. Patient brief changed and 2 RN skin assessment completed. Pt with no additional needs, call light within reach. Hourly rounding in place.

## 2023-04-04 NOTE — CARE PLAN
Problem: Bronchoconstriction  Goal: Improve in air movement and diminished wheezing  Description: Target End Date:  2 to 3 days    1.  Implement inhaled treatments  2.  Evaluate and manage medication effects  Outcome: Progressing

## 2023-04-04 NOTE — ASSESSMENT & PLAN NOTE
Most likely secondary to aspiration  Speech therapy has cleared her  Zosyn discontinued after a 7-day course yesterday

## 2023-04-04 NOTE — WOUND TEAM
[facility]  Inpatient Services  Initial Wound and Skin Care Evaluation    Admission Date: 4/3/2023     Last order of IP CONSULT TO WOUND CARE was found on 4/3/2023 from Hospital Encounter on 4/3/2023     HPI, PMH, SH: Reviewed    Past Surgical History:   Procedure Laterality Date    BRONCHOSCOPY, ROBOT-ASSISTED  10/11/2022    Procedure: FIBER OPTIC BRONCHOSCOPY WITH  WASH, BRUSH, BRONCHOALVEOLAR LAVAGE, BIOSPY, FINE NEEDLE ASPIRATION & NAVIGATION, ROBOTICS;  Surgeon: Donita Haque M.D.;  Location: [facility];  Service: Pulmonary Robotic    CYSTOSCOPY STENT PLACEMENT  7/9/2018    Procedure: Cystoscopy,  Left removal of stent ,  Left Stent Placement;  Surgeon: Beck Yang M.D.;  Location: [facility];  Service: Urology    URETEROSCOPY Left 7/9/2018    Procedure: URETEROSCOPY;  Surgeon: Beck Yang M.D.;  Location: [facility];  Service: Urology    LASERTRIPSY Left 7/9/2018    Procedure: LASERTRIPSY-LITHO;  Surgeon: Beck Yang M.D.;  Location: [facility];  Service: Urology    CYSTOSCOPY STENT PLACEMENT Left 6/18/2018    Procedure: CYSTOSCOPY STENT PLACEMENT;  Surgeon: Beck Yang M.D.;  Location: [facility];  Service: Urology    LITHOTRIPSY Left 6/18/2018    Procedure: LITHOTRIPSY;  Surgeon: Beck Yang M.D.;  Location: [facility];  Service: Urology    LASERTRIPSY Left 6/18/2018    Procedure: LASERTRIPSY;  Surgeon: Beck Yang M.D.;  Location: [facility];  Service: Urology    URETEROSCOPY Left 6/18/2018    Procedure: URETEROSCOPY;  Surgeon: Beck Ynag M.D.;  Location: [facility];  Service: Urology    THORACOSCOPY Left 12/12/2016    Procedure: THORACOSCOPY W/WEDGE RESECTION UPPER LOBE MASS;  Surgeon: John H Ganser, M.D.;  Location: [facility];  Service:     OTHER ORTHOPEDIC SURGERY  2013    kyphoplasty X3    APPENDECTOMY      1990    CHOLECYSTECTOMY      1990     "LAMINOTOMY      LUNG BIOPSY OPEN      OTHER      OTHER ABDOMINAL SURGERY      gall bladder disease    AL BREAST AUGMENTATION WITH IMPLANT      AL BREAST REDUCTION       Social History     Tobacco Use    Smoking status: Former     Packs/day: 1.00     Years: 30.00     Pack years: 30.00     Types: Cigarettes     Quit date: 2000     Years since quittin.2    Smokeless tobacco: Never    Tobacco comments:     7 years ago   Substance Use Topics    Alcohol use: Yes     Alcohol/week: 0.0 oz     Comment: x2 a week     Chief Complaint   Patient presents with    N/V     States she has been Nauseated for 2 weeks, last night she vomited \"bile\" and [facility] staff thought it was blood.  Pt believes it was \"the coating to her xarelto\"       Diagnosis: Nausea & vomiting [R11.2]  Pneumonia in infectious disease [J18.9]    Unit where seen by Wound Team:      WOUND CONSULT/FOLLOW UP RELATED TO:  R buttock     WOUND HISTORY:  Pt admitted with red partial thickness wounds to R side of sacrococcygeal area. Skin tears present to BUE. Nsg has placed appropriate drsgs of adhesive foam.    WOUND ASSESSMENT/LDA  Wound 23 Partial Thickness Wound Buttocks Left (Active)      23 1500   Site Assessment Red    Periwound Assessment Pink;Painful    Margins Defined edges    Closure Open to air    Drainage Amount Scant    Drainage Description Serosanguineous    Treatments Cleansed    Wound Cleansing Foam Cleanser/Washcloth    Periwound Protectant Barrier Paste    Dressing Cleansing/Solutions Not Applicable    Dressing Options Open to Air    Dressing Change/Treatment Frequency Every Shift, and As Needed    NEXT Dressing Change/Treatment Date 23    NEXT Weekly Photo (Inpatient Only) 04/10/23    Non-staged Wound Description Partial thickness 23 1500   Wound Length (cm) 2.5 cm 23 1500   Wound Width (cm) 2 cm 23 1500   Wound Depth (cm) 0.1 cm 23 1500   Wound Surface Area (cm^2) 5 cm^2 23 1500 "   Wound Volume (cm^3) 0.5 cm^3 04/04/23 1500   Shape 4 circles in area 04/04/23 1500   Wound Odor None    Exposed Structures None    Number of days: 1        Vascular:    CARROL:   No results found.    Lab Values:    Lab Results   Component Value Date/Time    WBC 10.0 04/04/2023 06:40 AM    RBC 2.96 (L) 04/04/2023 06:40 AM    HEMOGLOBIN 10.3 (L) 04/04/2023 06:40 AM    HEMATOCRIT 33.2 (L) 04/04/2023 06:40 AM    CREACTPROT 4.91 (H) 04/03/2023 12:28 PM    SEDRATEWES 26 01/24/2020 04:46 PM    HBA1C 5.6 07/13/2021 03:37 PM        Culture Results show:  No results found for this or any previous visit (from the past 720 hour(s)).    Pain Level/Medicated:  None, Tolerated without pain medication       INTERVENTIONS BY WOUND TEAM:  Chart and images reviewed. Discussed with bedside RN. All areas of concern (based on picture review, LDA review and discussion with bedside RN) have been thoroughly assessed. Documentation of areas based on significant findings. This RN in to assess patient. Performed standard wound care which includes appropriate positioning, dressing removal and non-selective debridement. Pictures and measurements obtained weekly if/when required.  Preparation for Dressing removal: Dressing soaked with Open to air  Non-selectively Debrided with:  Moist warm washcloth and No rinse foam soap   Sharp debridement: NA  Shirley wound: Cleansed with Moist warm washcloth and No rinse foam soap, Prepped with Barrier paste  Primary Dressing: Open to Air  Secondary (Outer) Dressing: Open to Air    Advanced Wound Care Discharge Planning  Number of Clinicians necessary to complete wound care: 1  Is patient requiring IV pain medications for dressing changes: no  Length of time for dressing change 30 min. (This does not include chart review, pre-medication time, set up, clean up or time spent charting.)    Interdisciplinary consultation: Patient, Bedside RN     EVALUATION / RATIONALE FOR TREATMENT:  Most Recent Date:  4/4: Pt  admitted with partial thickness wounds to R side of sacrococcygeal area. She has been at [facility] and she stated that she is drug when moved in bed.      Goals: Steady decrease in wound area and depth weekly.    WOUND TEAM PLAN OF CARE ([X] for frequency of wound follow up,):   Nursing to follow dressing orders written for wound care. Contact wound team if area fails to progress, deteriorates or with any questions/concerns if something comes up before next scheduled follow up (See below as to whether wound is following and frequency of wound follow up)  Dressing changes by wound team:                   Follow up 3 times weekly:                NPWT change 3 times weekly:     Follow up 1-2 times weekly:      Follow up Bi-Monthly:           Follow up Monthly (High Risk):                        Follow up as needed:     Other (explain):     NURSING PLAN OF CARE ORDERS (X):  Dressing changes: See Dressing Care orders:   Skin care: See Skin Care orders:   RN Prevention Protocol: x  Rectal tube care: See Rectal Tube Care orders:   Other orders:    RSKIN:   CURRENTLY IN PLACE (X), APPLIED THIS VISIT (A), ORDERED (O):   Q shift Cholo:  X  Q shift pressure point assessments:  X    Surface/Positioning   Standard Mattress/Trauma Bed     x      Low Airloss          ICU Low Airloss   Bariatric VIOLA     Waffle cushion        Waffle Overlay          Reposition q 2 hours    x  TAPs Turning system   o  Z Juanjo Pillow     Offloading/Redistribution   Sacral Offloading Dressing (Silicone dressing)     Heel Offloading Dressing (Silicone dressing)         Heel float boots (Prevalon boot)             Float Heels off Bed with Pillows           Respiratory   Silicone O2 tubing   x      Gray Foam Ear protectors  x   Cannula fixation Device (Tender )          High flow offloading Clip    Elastic head band offloading device      Anchorfast                                                         Trach with Optifoam split foam              Containment/Moisture Prevention uses bedpan    Rectal tube or BMS    Purwick/Condom Cath        Gonzalez Catheter    Barrier wipes           Barrier paste   x    Antifungal tx      Interdry        Mobilization not assessed      Up to chair        Ambulate      PT/OT      Nutrition       Dietician        Diabetes Education      PO  x   TF     TPN     NPO   # days     Other        Anticipated discharge plans: back to SNF  LTACH:        SNF/Rehab:                  Home Health Care:           Outpatient Wound Center:            Self/Family Care:        Other:                  Vac Discharge Needs:   Vac Discharge plan is purely a recommendation from wound team and not a requirement for discharge unless otherwise stated by physician.  Not Applicable Pt not on a wound vac:     x  Regular Vac while inpatient, alternative dressing at DC:        Regular Vac in use and continued at DC:            Reg. Vac w/ Skin Sub/Biologic in use. Will need to be changed 2x wkly:      Veraflo Vac while inpatient, ok to transition to Regular Vac on Discharge (Bedside RN to Clamp small instillation tubing at time of DC):           Veraflo Vac while inpatient, would benefit from remaining on Veraflo Vac upon discharge:

## 2023-04-04 NOTE — PROGRESS NOTES
Pt is resting in bed at this time. Pt awakes to voice. Pt c/o 5/10 pain in abdomen, pt declines pain interventions at this time. Pt has not had a BM since arriving on unit. Bowel sounds are normoactive. RN discussed POC with pt. All questions answered. Fall precautions in place.    2215: pt c/o heartburn. Notified MD, received orders for tums

## 2023-04-04 NOTE — THERAPY
Physical Therapy   Initial Evaluation     Patient Name: Latonia Clinton  Age:  72 y.o., Sex:  female  Medical Record #: [id number]  Today's Date: 4/4/2023     Precautions  Precautions: Fall Risk    Assessment  72-year-old female with complicated medical history of COPD, DVT, lung cancer, HTN dyslipidemia, nephrolithiasis, hypothyroidism and RA who presented 4/3 with nausea and vomiting. Pt resently discharged from  to [facility]. Pt presenting with generalized weakness, edema, decreased activity tolerance which hinders her independence with functional mobility. Pt will benefit from continued inpatient as well as post acute placement prior to home    Plan    Physical Therapy Initial Treatment Plan   Treatment Plan : Bed Mobility, Equipment, Therapeutic Activities, Therapeutic Exercise, Gait Training  Treatment Frequency: 3 Times per Week  Duration: Until Therapy Goals Met    DC Equipment Recommendations: Unable to determine at this time  Discharge Recommendations: Recommend post-acute placement for additional physical therapy services prior to discharge home            Objective       04/04/23 1329   Pain 0 - 10 Group   Location Abdomen;Generalized   Description Aching;Tender   Therapist Pain Assessment Post Activity Pain Same as Prior to Activity;4   Prior Living Situation   Prior Services Continuous (24 Hour) Care Giving Per Service   Housing / Facility Skilled Nursing Facility   Comments transfer from [facility]   Prior Level of Functional Mobility   Bed Mobility Required Assist   Transfer Status Required Assist   Ambulation Required Assist   Wheelchair Required Assist   Cognition    Level of Consciousness Alert   Passive ROM Lower Body   Passive ROM Lower Body WDL   Active ROM Lower Body    Active ROM Lower Body  WDL   Strength Lower Body   Lower Body Strength  X   Gross Strength Generalized Weakness, Equal Bilaterally   Comments grossly 3/5 throughout, limited by edema.   Balance Assessment   Sitting Balance (Static)  Fair +   Sitting Balance (Dynamic) Fair   Standing Balance (Static) Poor -   Standing Balance (Dynamic) Trace +   Weight Shift Sitting Fair   Weight Shift Standing Absent   Comments w/FWW   Bed Mobility    Supine to Sit Minimal Assist   Sit to Supine   (EOB post session, having lunch.)   Rolling Moderate Assist to Lt.   Comments cues for sequencing   Gait Analysis   Gait Level Of Assist Unable to Participate   Functional Mobility   Sit to Stand Maximal Assist  (x 3 trials 3/4 stand only with FWW.)   Mobility supine>side>EOB>   Comments staff to utilize katherin steady for safe transfers   Activity Tolerance   Sitting Edge of Bed > 30 min   Edema / Skin Assessment   Comments edema throughout   Patient / Family Goals    Patient / Family Goal #1 home   Short Term Goals    Short Term Goal # 1 Pt will perform bed mobility at Min A level by tx 6   Short Term Goal # 2 Pt will transfer with LRAD at Min A level by tx 6   Short Term Goal # 3 Pt will perform sit to stand with Min A by tx 6   Short Term Goal # 4 Pt will ambulate with FWW for 25 ft with CGA by tx 6

## 2023-04-04 NOTE — DIETARY
"Nutrition services: Day 1 of admit.  Latonia Clinton is a 72 y.o. female with admitting DX of Nausea and vomiting, Pneumonia in infectious disease.  Consult received for Malnutrition Screening Tool score of 3 for unsure of unplanned weight loss and poor appetite.    Assessment:  Height: 162.6 cm (5' 4.02\")  Weight: 98.9 kg (218 lb 0.6 oz)  Body mass index is 37.41 kg/m²., BMI classification: Class 2 Obesity  Diet/Intake: Regular    Evaluation:   Pt unsure of weight loss per nutrition screen. Per chart review, weight has been stable over the past year.  Pt admitted due to nausea x 2 weeks. She states this has improved, but she is still eating light foods. She requested more broth at time of visit. Pt states she is able to tolerate yogurt well. Will offer yogurt with all meals.    Malnutrition Risk: ASPEN criteria not met.    Recommendations/Plan:  Yogurt with all meals.   Encourage intake of meals >50%.  Document intake of all meals as % taken in ADL's to provide interdisciplinary communication across all shifts.   Monitor weight.  Nutrition rep will continue to see patient for ongoing meal and snack preferences.     RD following      "

## 2023-04-04 NOTE — DISCHARGE PLANNING
Case Management Discharge Planning    Admission Date: 4/3/2023  GMLOS: 4  ALOS: 1    6-Clicks ADL Score: 13  6-Clicks Mobility Score: 10  PT and/or OT Eval ordered: Yes  Post-acute Referrals Ordered: No  Post-acute Choice Obtained: NA  Has referral(s) been sent to post-acute provider:  [facility]      Anticipated Discharge Dispo: Discharge Disposition: D/T to SNF with Medicare cert in anticipation of skilled care (03)    DME Needed: No    Action(s) Taken: Per chart review, pt from [facility]. RNCM called [facility] admissions to confirm and ask if pt accepted back when medically cleared. No answer, left VM requesting call back.    Per Sang from [facility], pt okay to return when medically cleared.    Spoke to pt at bedside regarding PT recommendation for SNF. Per pt, she lives alone in 1-story home. Pt's brother lives locally but she would not be able to live with him as he has a family of his own. Pt owns her home and also had a home in [place], [place] which she rents out. Pt stated she receives about $2100/month in SSI.    Pt stated she would not like to return to [facility]. RNCM asked pt if she is agreeable to different SNF, pt unsure at this time.   Pt would like to review SNF list. RNCM provided pt SNF choice list and Medicare SNF list for reference. Pt stated that if she decides not to DC to SNF, she would like to DC home with HH. RNCM reiterated safety concerns if pt were to DC home, pt verbalized understanding. RNCM provided additional resources (caregiver list, ALLAN list).  Pt agreed to review SNF list and update RNCM with decision tomorrow.    Escalations Completed: None    Medically Clear: No    Next Steps: f/u with [facility] regarding re-acceptance    Barriers to Discharge: Medical clearance

## 2023-04-04 NOTE — PROGRESS NOTES
Hospital Medicine Daily Progress Note    Date of Service  4/4/2023    Chief Complaint  Latonia Clinton is a 72 y.o. female admitted 4/3/2023 with abdominal pain and diarrhea    Hospital Course  Patient admitted from skilled nursing facility because of ongoing diarrhea.  Patient's C. difficile testing is negative.  Possible colitis with aspiration pneumonia present.  Patient did have nausea and vomiting.  The patient now on antibiotics at this point procalcitonin level extremely elevated  We are awaiting culture results.  Continue at this point with antibiotic management and fluid resuscitation.    Interval Problem Update  PT OT evaluation  Antibiotic management  Culture results  Nutritional support  Monitor abdominal functions  Monitor cardiac functions  Optimize renal parameters and fluid status  Discharge planning    I have discussed this patient's plan of care and discharge plan at IDT rounds today with Case Management, Nursing, Nursing leadership, and other members of the IDT team.    Consultants/Specialty  None    Code Status  Full Code    Disposition  Patient is not medically cleared for discharge.   Anticipate discharge to to skilled nursing facility.  I have placed the appropriate orders for post-discharge needs.    Review of Systems  Review of Systems   Constitutional:  Positive for malaise/fatigue. Negative for chills, diaphoresis and fever.   HENT: Negative.  Negative for hearing loss, nosebleeds and sore throat.    Eyes: Negative.  Negative for double vision.   Respiratory:  Positive for shortness of breath. Negative for cough, hemoptysis and wheezing.    Cardiovascular:  Positive for leg swelling. Negative for chest pain and palpitations.   Gastrointestinal:  Positive for abdominal pain, diarrhea, nausea and vomiting. Negative for blood in stool, constipation and heartburn.   Genitourinary: Negative.  Negative for dysuria, frequency, hematuria and urgency.   Musculoskeletal:  Positive for back pain  and myalgias. Negative for joint pain.   Skin: Negative.  Negative for itching and rash.   Neurological:  Positive for headaches. Negative for dizziness, focal weakness, seizures and loss of consciousness.   Endo/Heme/Allergies: Negative.  Does not bruise/bleed easily.   Psychiatric/Behavioral:  Positive for depression. Negative for suicidal ideas. The patient has insomnia. The patient is not nervous/anxious.    All other systems reviewed and are negative.     Physical Exam  Temp:  [36 °C (96.8 °F)-36.6 °C (97.9 °F)] 36.3 °C (97.3 °F)  Pulse:  [] 103  Resp:  [14-20] 20  BP: (113-147)/() 147/104  SpO2:  [92 %-100 %] 97 %    Physical Exam  Vitals and nursing note reviewed. Exam conducted with a chaperone present.   Constitutional:       Appearance: Normal appearance. She is well-developed. She is obese. She is ill-appearing.   HENT:      Head: Normocephalic and atraumatic.      Right Ear: Tympanic membrane, ear canal and external ear normal.      Left Ear: Tympanic membrane, ear canal and external ear normal.      Nose: Nose normal. No congestion or rhinorrhea.      Mouth/Throat:      Mouth: Mucous membranes are moist.      Pharynx: Oropharynx is clear. No oropharyngeal exudate or posterior oropharyngeal erythema.   Eyes:      General:         Right eye: No discharge.         Left eye: No discharge.      Extraocular Movements: Extraocular movements intact.      Conjunctiva/sclera: Conjunctivae normal.      Pupils: Pupils are equal, round, and reactive to light.   Neck:      Thyroid: No thyroid mass.      Vascular: No carotid bruit or JVD.   Cardiovascular:      Rate and Rhythm: Normal rate and regular rhythm.      Pulses: Normal pulses.      Heart sounds: Normal heart sounds, S1 normal and S2 normal.   Pulmonary:      Effort: Accessory muscle usage present.      Breath sounds: Decreased air movement present. Examination of the right-middle field reveals decreased breath sounds. Examination of the  right-lower field reveals decreased breath sounds. Examination of the left-lower field reveals decreased breath sounds. Decreased breath sounds present. No rhonchi.   Abdominal:      General: Abdomen is flat. Bowel sounds are normal.      Palpations: Abdomen is soft.      Tenderness: There is abdominal tenderness.   Musculoskeletal:         General: Normal range of motion.      Cervical back: Normal range of motion and neck supple. No edema or rigidity.      Right lower leg: No edema.      Left lower leg: No edema.      Right foot: Normal range of motion. No deformity or foot drop.      Left foot: Normal range of motion. No deformity or foot drop.   Feet:      Right foot:      Skin integrity: Skin integrity normal. No ulcer.      Left foot:      Skin integrity: Skin integrity normal. No ulcer.   Lymphadenopathy:      Head:      Right side of head: No submental adenopathy.      Left side of head: No submental adenopathy.      Cervical: No cervical adenopathy.      Right cervical: No superficial cervical adenopathy.     Left cervical: No superficial cervical adenopathy.      Upper Body:      Right upper body: No supraclavicular adenopathy.      Left upper body: No supraclavicular adenopathy.      Lower Body: No right inguinal adenopathy. No left inguinal adenopathy.   Skin:     General: Skin is warm and dry.      Capillary Refill: Capillary refill takes less than 2 seconds.      Findings: No abrasion or wound.   Neurological:      General: No focal deficit present.      Mental Status: She is alert and oriented to person, place, and time. Mental status is at baseline.      GCS: GCS eye subscore is 4. GCS verbal subscore is 5. GCS motor subscore is 6.      Sensory: Sensation is intact.      Motor: Motor function is intact. No weakness.      Coordination: Coordination is intact.   Psychiatric:         Mood and Affect: Mood and affect normal.         Speech: Speech normal.         Behavior: Behavior normal. Behavior is  cooperative.         Thought Content: Thought content normal.         Judgment: Judgment normal.       Fluids    Intake/Output Summary (Last 24 hours) at 4/4/2023 1451  Last data filed at 4/4/2023 0745  Gross per 24 hour   Intake 371.49 ml   Output 0 ml   Net 371.49 ml       Laboratory  Recent Labs     04/03/23  1228 04/04/23  0640   WBC 8.8 10.0   RBC 3.43* 2.96*   HEMOGLOBIN 12.0 10.3*   HEMATOCRIT 35.4* 33.2*   .2* 112.2*   MCH 35.0* 34.8*   MCHC 33.9 31.0*   RDW 54.1* 60.9*   PLATELETCT 370 285   MPV 10.6 10.8     Recent Labs     04/03/23  1228 04/04/23  0640   SODIUM 144 138   POTASSIUM 4.0 4.4   CHLORIDE 108 110   CO2 23 15*   GLUCOSE 127* 115*   BUN 51* 57*   CREATININE 1.55* 1.75*   CALCIUM 8.3* 7.7*     Recent Labs     04/03/23  1228   INR 1.94*               Imaging  US-RUQ   Final Result      Echogenic mildly enlarged liver, a nonspecific finding that often is found to represent steatosis.  Other infiltrative processes could have an identical appearance      Cholecystectomy without biliary dilatation      Simple right renal cyst which does not require follow-up      DX-CHEST-PORTABLE (1 VIEW)   Final Result      Enlargement the cardiac silhouette and right basilar atelectasis      CT-RENAL COLIC EVALUATION(A/P W/O)   Final Result      1.  4 mm stone in the LEFT mid to proximal ureter without significant obstructive changes.   2.  Bilateral nonobstructing kidney stones present.   3.  Multiple bilateral kidney cysts of varying density.   4.  Increased fat within the colon wall may indicate chronic inflammatory process.   5.  No evidence for acute colitis or diverticulitis.   6.  Bilateral flank/abdominal wall edema, worse on the LEFT.              Assessment/Plan  * Nausea & vomiting- (present on admission)  Assessment & Plan  Possibly from chronic gastritis patient developed nausea vomiting  Patient does have an elevated lipase level  The nausea vomiting may have led to some aspiration and  pneumonia  Continue at this point with antibiotic management fluid resuscitation pain management  Continue with antiemetic management    Pneumonia in infectious disease- (present on admission)  Assessment & Plan  Continue with Rocephin  Oxygen support  Blood cultures and sputum cultures pending      Right upper quadrant abdominal pain  Assessment & Plan  Patient with remote cholecystectomy  Abdominal pain at this point unknown cause  Possible chronic inflammatory disease  Continue with pain management  Continue with fluid resuscitation  Antibiotics initiated  Monitor for infectious source    Elevated procalcitonin  Assessment & Plan  Procalcitonin significantly elevated  Monitor white blood cell count  Monitor cultures  Patient does have possible aspiration event.  Antibiotic started, changed to Zosyn given aspiration potential    Chronic inflammation of colon  Assessment & Plan  Abdominal pain potentially related to chronic inflammatory disease  Monitor bowel functions including CPK level lactic acid levels    Recurrent non-small cell lung cancer (HCC)- (present on admission)  Assessment & Plan  Status post radiation therapy  Will need to follow on a regular basis with oncology    Deep vein thrombosis (DVT) (HCC)- (present on admission)  Assessment & Plan  Remains on Xarelto for anticoagulation 20 mg nightly    Chronic obstructive pulmonary disease (HCC)- (present on admission)  Assessment & Plan  Continue oxygen support  Nebulizer treatment  Monitoring and keep oxygen saturations above 90%    Hypertension- (present on admission)  Assessment & Plan  Optimize blood pressure management keep systolic blood pressure less than 140 diastolic under 90  Continue losartan 50 mg daily    Debility- (present on admission)  Assessment & Plan  Chronic debility  Patient refuses to go back to skilled  She is unable to go home due to debilitation  Patient will need at this point longstanding placement plan    Macrocytic anemia-  (present on admission)  Assessment & Plan  Monitor H&H address was 7 or 21 transfuse  B12 level is 1378    CKD (chronic kidney disease) stage 3, GFR 30-59 ml/min ([facility])- (present on admission)  Assessment & Plan  Monitor renal functions  Avoid nephrotoxic medications  History of dialysis      Hyperlipidemia- (present on admission)  Assessment & Plan  Low-fat low-cholesterol diet  Statin  Fasting lipid panel         VTE prophylaxis: SCDs/TEDs    I have performed a physical exam and reviewed and updated ROS and Plan today (4/4/2023). In review of yesterday's note (4/3/2023), there are no changes except as documented above.

## 2023-04-05 NOTE — DISCHARGE PLANNING
Case Management Discharge Planning    Admission Date: 4/3/2023  GMLOS: 3.8  ALOS: 2    6-Clicks ADL Score: 13  6-Clicks Mobility Score: 6  PT and/or OT Eval ordered: Yes  Post-acute Referrals Ordered: Yes  Post-acute Choice Obtained: No  Has referral(s) been sent to post-acute provider:  No      Anticipated Discharge Dispo: Discharge Disposition: D/T to SNF with Medicare cert in anticipation of skilled care (03)    DME Needed: No    Action(s) Taken: Spoke to pt and pt's brother Minh at bedside regarding SNF choice. Bill would like to go visit SNFs today to make SNF choice.    Escalations Completed: None    Medically Clear: No    Next Steps: RNCM to follow-up with pt and Bill tomorrow regarding choice    Barriers to Discharge: Medical clearance and Pending Placement

## 2023-04-05 NOTE — CARE PLAN
The patient is Stable - Low risk of patient condition declining or worsening    Shift Goals  Clinical Goals: Patient nausea and vomiting will be controlled overnight  Patient Goals: 5+ hours of sleep/rest overnight  Family Goals: n/a    Progress made toward(s) clinical / shift goals:  Pt verbalized improvement of nausea and vomiting after given PRN IV compazine; offered repositioning during rounding. Had 1 large watery BM during the shift. Progressing in other goals.      Problem: Pain - Standard  Goal: Alleviation of pain or a reduction in pain to the patient’s comfort goal  Outcome: Progressing     Problem: Knowledge Deficit - Standard  Goal: Patient and family/care givers will demonstrate understanding of plan of care, disease process/condition, diagnostic tests and medications  Outcome: Progressing     Problem: Skin Integrity  Goal: Skin integrity is maintained or improved  Outcome: Progressing     Problem: Fall Risk  Goal: Patient will remain free from falls  Outcome: Progressing     Problem: Gastrointestinal Irritability  Goal: Nausea and vomiting will be absent or improve  Outcome: Progressing     Patient is not progressing towards the following goals: NA

## 2023-04-05 NOTE — PROGRESS NOTES
BSSR received from  Anum LEMUS. Patient laying in bed in no apparent distress with no complaints. Plan of care discussed with patient. Bed in low position with bed brakes applied and call light in reach. Patient states no needs at this time.

## 2023-04-05 NOTE — PROGRESS NOTES
Received report from night nurse, patient is awake, alert and oriented. Pain level of 0/10.  Patient denies any nausea, vomiting episodes. No numbness or tingling sensation. All questions answered. No other needs at this time. Call light and belongings within reach, bed locked and in lowest position. Pt educated to call for assistance. Hourly rounding in place.  1600 TAP system in place

## 2023-04-05 NOTE — PROGRESS NOTES
Hospital Medicine Daily Progress Note    Date of Service  4/5/2023    Chief Complaint  Latonia Clinton is a 72 y.o. female admitted 4/3/2023 with abdominal pain and diarrhea    Hospital Course  4/4/2023-patient admitted from skilled nursing facility because of ongoing diarrhea.  Patient's C. difficile testing is negative.  Possible colitis with aspiration pneumonia present.  Patient did have nausea and vomiting.  The patient now on antibiotics at this point procalcitonin level extremely elevated  We are awaiting culture results.  Continue at this point with antibiotic management and fluid resuscitation.  4/5/2023-patient today complaining of more diarrhea.  Patient's abdominal distention seems to be slightly worse.  Flatplate of the abdomen has been ordered which at this point is pending.  I will at this point request stool for culture, lactoferrin, norovirus, or ova and parasite.  Patient is technically immunocompromised given her history of radiation treatments and cancer.  Scans ordered today    Interval Problem Update  Evaluate abdominal situation which may be secondary to radiation  Monitor diarrhea  Optimize fluid status  Monitor calcium levels  Monitor electrolytes  Optimize pain management  PT OT  Discharge planning possibly going to a different skilled facility    I have discussed this patient's plan of care and discharge plan at IDT rounds today with Case Management, Nursing, Nursing leadership, and other members of the IDT team.    Consultants/Specialty  None    Code Status  Full Code    Disposition  Patient is not medically cleared for discharge.   Anticipate discharge to to skilled nursing facility.  I have placed the appropriate orders for post-discharge needs.    Review of Systems  Review of Systems   Constitutional:  Positive for malaise/fatigue. Negative for diaphoresis, fever and weight loss.   HENT: Negative.  Negative for ear discharge, hearing loss, sinus pain and sore throat.    Eyes:  Negative.  Negative for blurred vision and photophobia.   Respiratory:  Positive for cough and shortness of breath. Negative for sputum production and wheezing.    Cardiovascular:  Positive for leg swelling. Negative for chest pain and palpitations.   Gastrointestinal:  Positive for abdominal pain, diarrhea, nausea and vomiting. Negative for blood in stool, constipation and heartburn.   Genitourinary: Negative.  Negative for dysuria, frequency, hematuria and urgency.   Musculoskeletal:  Positive for back pain and myalgias.   Skin: Negative.  Negative for itching and rash.   Neurological:  Positive for dizziness and headaches. Negative for sensory change, speech change, focal weakness and weakness.   Endo/Heme/Allergies: Negative.  Does not bruise/bleed easily.   Psychiatric/Behavioral:  Positive for depression. Negative for suicidal ideas. The patient has insomnia. The patient is not nervous/anxious.    All other systems reviewed and are negative.     Physical Exam  Temp:  [36.2 °C (97.2 °F)-36.9 °C (98.4 °F)] 36.9 °C (98.4 °F)  Pulse:  [] 87  Resp:  [16-20] 20  BP: ()/(56-83) 124/75  SpO2:  [91 %-100 %] 91 %    Physical Exam  Vitals and nursing note reviewed. Exam conducted with a chaperone present.   Constitutional:       Appearance: Normal appearance. She is well-developed. She is obese. She is ill-appearing.   HENT:      Head: Normocephalic and atraumatic.      Right Ear: Tympanic membrane, ear canal and external ear normal.      Left Ear: Tympanic membrane, ear canal and external ear normal.      Nose: Nose normal. No congestion or rhinorrhea.      Mouth/Throat:      Mouth: Mucous membranes are moist.      Pharynx: Oropharynx is clear. No oropharyngeal exudate or posterior oropharyngeal erythema.   Eyes:      General:         Right eye: No discharge.         Left eye: No discharge.      Extraocular Movements: Extraocular movements intact.      Conjunctiva/sclera: Conjunctivae normal.      Pupils:  Pupils are equal, round, and reactive to light.   Neck:      Thyroid: No thyroid mass.      Vascular: No carotid bruit or JVD.   Cardiovascular:      Rate and Rhythm: Normal rate and regular rhythm.      Pulses: Normal pulses.      Heart sounds: Normal heart sounds, S1 normal and S2 normal.   Pulmonary:      Effort: Accessory muscle usage present.      Breath sounds: Decreased air movement present. Examination of the right-middle field reveals wheezing. Examination of the left-middle field reveals wheezing. Examination of the right-lower field reveals decreased breath sounds and wheezing. Examination of the left-lower field reveals decreased breath sounds and wheezing. Decreased breath sounds and wheezing present. No rhonchi.   Abdominal:      General: Abdomen is flat. Bowel sounds are normal.      Palpations: Abdomen is soft.      Tenderness: There is abdominal tenderness.   Musculoskeletal:         General: Normal range of motion.      Cervical back: Normal range of motion and neck supple. No edema or rigidity.      Right lower leg: No edema.      Left lower leg: No edema.      Right foot: Normal range of motion. No deformity or foot drop.      Left foot: Normal range of motion. No deformity or foot drop.   Feet:      Right foot:      Skin integrity: Skin integrity normal. No ulcer.      Left foot:      Skin integrity: Skin integrity normal. No ulcer.   Lymphadenopathy:      Head:      Right side of head: No submental adenopathy.      Left side of head: No submental adenopathy.      Cervical: No cervical adenopathy.      Right cervical: No superficial cervical adenopathy.     Left cervical: No superficial cervical adenopathy.      Upper Body:      Right upper body: No supraclavicular adenopathy.      Left upper body: No supraclavicular adenopathy.      Lower Body: No right inguinal adenopathy. No left inguinal adenopathy.   Skin:     General: Skin is warm and dry.      Capillary Refill: Capillary refill takes less  than 2 seconds.      Findings: No abrasion, bruising, erythema or wound.   Neurological:      General: No focal deficit present.      Mental Status: She is alert and oriented to person, place, and time. Mental status is at baseline.      GCS: GCS eye subscore is 4. GCS verbal subscore is 5. GCS motor subscore is 6.      Cranial Nerves: No cranial nerve deficit.      Sensory: Sensation is intact. No sensory deficit.      Motor: Motor function is intact. No weakness.      Coordination: Coordination is intact.   Psychiatric:         Mood and Affect: Mood and affect normal.         Speech: Speech normal.         Behavior: Behavior normal. Behavior is cooperative.         Thought Content: Thought content normal.         Judgment: Judgment normal.       Fluids    Intake/Output Summary (Last 24 hours) at 4/5/2023 1403  Last data filed at 4/4/2023 1729  Gross per 24 hour   Intake 200.01 ml   Output --   Net 200.01 ml         Laboratory  Recent Labs     04/03/23  1228 04/04/23  0640   WBC 8.8 10.0   RBC 3.43* 2.96*   HEMOGLOBIN 12.0 10.3*   HEMATOCRIT 35.4* 33.2*   .2* 112.2*   MCH 35.0* 34.8*   MCHC 33.9 31.0*   RDW 54.1* 60.9*   PLATELETCT 370 285   MPV 10.6 10.8       Recent Labs     04/03/23  1228 04/04/23  0640   SODIUM 144 138   POTASSIUM 4.0 4.4   CHLORIDE 108 110   CO2 23 15*   GLUCOSE 127* 115*   BUN 51* 57*   CREATININE 1.55* 1.75*   CALCIUM 8.3* 7.7*       Recent Labs     04/03/23  1228   INR 1.94*                 Imaging  US-RUQ   Final Result      Echogenic mildly enlarged liver, a nonspecific finding that often is found to represent steatosis.  Other infiltrative processes could have an identical appearance      Cholecystectomy without biliary dilatation      Simple right renal cyst which does not require follow-up      DX-CHEST-PORTABLE (1 VIEW)   Final Result      Enlargement the cardiac silhouette and right basilar atelectasis      CT-RENAL COLIC EVALUATION(A/P W/O)   Final Result      1.  4 mm stone  in the LEFT mid to proximal ureter without significant obstructive changes.   2.  Bilateral nonobstructing kidney stones present.   3.  Multiple bilateral kidney cysts of varying density.   4.  Increased fat within the colon wall may indicate chronic inflammatory process.   5.  No evidence for acute colitis or diverticulitis.   6.  Bilateral flank/abdominal wall edema, worse on the LEFT.         [id number] VIEW    (Results Pending)          Assessment/Plan  * Nausea & vomiting- (present on admission)  Assessment & Plan  Continue antiemetic management  Fluid resuscitation  Unknown cause but certainly abdominal pain may be related to it  May need gastroenterology consultation    Pneumonia in infectious disease- (present on admission)  Assessment & Plan  Most likely aspiration  Switched over to Zosyn  Continue Zosyn for now discussed with pharmacy      Right upper quadrant abdominal pain  Assessment & Plan  Abdominal pain at this point nonspecific  No acute findings on imaging  Ongoing distention and abdominal pain  Repeat imaging study  Desdemona virus testing  Stool studies  Negative for C. difficile    Elevated procalcitonin  Assessment & Plan  Unknown cause  May be related to aspiration pneumonia  May be related to gastroenteritis  Continue antibiotics with Zosyn  Follow cultures    Chronic inflammation of colon  Assessment & Plan  Patient does have chronic inflammatory condition of the bowels however this may have been exacerbated by radiation  Patient recently finished radiation  Continue at this point with pain management and monitor diarrhea    Recurrent non-small cell lung cancer (HCC)- (present on admission)  Assessment & Plan  Status post radiation therapy  Will need to follow on a regular basis with oncology  Due to location of radiation the patient may be experiencing radiation-induced colitis or intra-abdominal irritation.    Deep vein thrombosis (DVT) (HCC)- (present on admission)  Assessment &  Plan  Continue Xarelto 20 mg every day    Chronic obstructive pulmonary disease (HCC)- (present on admission)  Assessment & Plan  Oxygen support to keep oxygen saturations above 90%  RT protocol  Nebulizer treatments  Currently not in exacerbation    Hypertension- (present on admission)  Assessment & Plan  Optimize blood pressure management keep systolic blood pressure less than 140 diastolic under 90  Continue losartan 50 mg daily    Debility- (present on admission)  Assessment & Plan  PT OT evaluation  Patient does not want to go back to skilled facility as she had a bad experience today   did work with her today to see if she would be interested in going to a different skilled facility and she at this point is considering    Macrocytic anemia- (present on admission)  Assessment & Plan  Monitor H&H address was 7 or 21 transfuse  B12 level is 1378    CKD (chronic kidney disease) stage 3, GFR 30-59 ml/min ([facility])- (present on admission)  Assessment & Plan  Monitor renal functions  Avoid nephrotoxic medications  History of dialysis      Hyperlipidemia- (present on admission)  Assessment & Plan  Low-fat low-cholesterol diet  Statin  Fasting lipid panel         VTE prophylaxis: SCDs/TEDs    I have performed a physical exam and reviewed and updated ROS and Plan today (4/5/2023). In review of yesterday's note (4/4/2023), there are no changes except as documented above.

## 2023-04-05 NOTE — CARE PLAN
Problem: Pain - Standard  Goal: Alleviation of pain or a reduction in pain to the patient’s comfort goal  Outcome: Progressing     Problem: Skin Integrity  Goal: Skin integrity is maintained or improved  Outcome: Progressing     Problem: Fall Risk  Goal: Patient will remain free from falls  Outcome: Progressing   The patient is Stable - Low risk of patient condition declining or worsening    Shift Goals  Clinical Goals: patient pain level will be lesser than 3/10, able to reduce nausea and vomiting episodes  Patient Goals: Rest comfortably,  Family Goals: n/a    Progress made toward(s) clinical / shift goals:  patient denies pain, complains nausea once, given as needed medication as ordered, no new episode of nausea     Patient is not progressing towards the following goals: progressing on other goals

## 2023-04-05 NOTE — PROGRESS NOTES
Received report from ALLAN Louis. Assumed care of pt. Pt A&O X 4, on 3L NC. Pt states she's still very nauseous at this time. No signs of respiratory distress, VSS. Pt updated with POC, fall precautions in place, call light within reach. Hourly rounding in progress.

## 2023-04-05 NOTE — RESPIRATORY CARE
"  COPD EDUCATION by COPD CLINICAL EDUCATOR  4/5/2023 at 11:33 AM   by Linn Velez, RRT        Patient reviewed by COPD education team. Patient does have a history or diagnosis of COPD complicated by lung cancer. Patient education discussion not performed as patient declined education at last admission, is being treated with radiation followed by Dr. Reece oncologist.      COPD Screen  COPD Risk Screening  Do you have a history of COPD?: Yes    COPD Assessment  COPD Clinical Specialists ONLY  COPD Education Initiated: Yes--30 Day Readmission, Yes--Short Intervention (pt was just here 3/27/23 discharged to skilled nursing facility admitted back due to ongoing diarrhea.Very complicated medical history of COPD, DVT, lung cancer, hypertension, dyslipidemia, nephrolithiasis, hypothyroidism, RA, receiving radiation/chemo)  "[email or web address] Company: [employer]  Physician Follow Up Appointment: 05/09/23  Appt Time: 1420  Physician Name: Liat Emerson  Pulmonary Follow Up Appointment: 06/21/23  Appt Time: 1410  Pulmonologist Name: Mami Allan  Referrals Initiated: Yes  Pulmonary Rehab: N/A  Smoking Cessation: N/A  Hospice: N/A  Home Health Care: Yes  [facility] Outreach: N/A  [facility]: N/A  [facility]: N/A  Private In-Home Care Agency: N/A  Is this a COPD exacerbation patient?: No    PFT Results    No results found for: PFT    Meds to Beds  Would the patient like to opt in for Bedside Medication Delivery at Discharge?: No     MY COPD ACTION PLAN     It is recommended that patients and physicians /healthcare providers complete this action plan together. This plan should be discussed at each physician visit and updated as needed.    The green, yellow and red zones show groups of symptoms of COPD. This list of symptoms is not comprehensive, and you may experience other symptoms. In the \"Actions\" column, your healthcare provider has recommended actions for you to take based on your symptoms.    Patient " "Name: Latonia Clinton   YOB: 1950   Last Updated on: 4/5/2023 11:33 AM   Green Zone:  I am doing well today Actions     Usual activitiy and exercise level   Take daily medications     Usual amounts of cough and phlegm/mucus   Use oxygen as prescribed     Sleep well at night   Continue regular exercise/diet plan     Appetite is good   At all times avoid cigarette smoke, inhaled irritants     Daily Medications (these medications are taken every day):   Fluticasone Furoate and Vilanterol trifenatate (Breo)  Tiotropium Bromide Monohydrate (Spiriva) 1 Puff  2 Puffs Once daily  Once daily        Yellow Zone:  I am having a bad day or a COPD flare Actions     More breathless than usual   Continue daily medications     I have less energy for my daily activities   Use quick relief inhaler as ordered     Increased or thicker phlegm/mucus   Use oxygen as prescribed     Using quick relief inhaler/nebulizer more often   Get plenty of rest     Swelling of ankles more than usual   Use pursed lip breathing     More coughing than usual   At all times avoid cigarette smoke, inhaled irritants     I feel like I have a \"chest cold\"     Poor sleep and my symptoms woke me up     My appetite is not good     My medicine is not helping      Call provider immediately if symptoms don’t improve     Continue daily medications, add rescue medications:               Medications to be used during a flare up, (as Discussed with Provider):              Red Zone:  I need urgent medical care Actions     Severe shortness of breath even at rest   Call 911 or seek medical care immediately     Not able to do any activity because of breathing      Fever or shaking chills      Feeling confused or very drowsy       Chest pains      Coughing up blood                  "

## 2023-04-06 NOTE — FLOWSHEET NOTE
04/06/23 1126   Events/Summary/Plan   Events/Summary/Plan SVN with albuterol given   Skin Inspection Respiratory Device Intact   Vital Signs   Pulse 89   Respiration 20   Pulse Oximetry 98 %   $ Pulse Oximetry (Spot Check) Yes   Respiratory Assessment   Level of Consciousness Alert   Chest Exam   Work Of Breathing / Effort Within Normal Limits   Breath Sounds   RUL Breath Sounds Clear;Diminished   RML Breath Sounds Diminished   RLL Breath Sounds Diminished   AUSTIN Breath Sounds Clear;Diminished   LLL Breath Sounds Diminished   Secretions   Cough Non Productive   Oxygen   O2 (LPM) 3   O2 Delivery Device Nasal Cannula

## 2023-04-06 NOTE — DISCHARGE PLANNING
Case Management Discharge Planning    Admission Date: 4/3/2023  GMLOS: 3.8  ALOS: 3    6-Clicks ADL Score: 13  6-Clicks Mobility Score: 6  PT and/or OT Eval ordered: Yes  Post-acute Referrals Ordered: Yes  Post-acute Choice Obtained: No  Has referral(s) been sent to post-acute provider:  NA      Anticipated Discharge Dispo: Discharge Disposition: D/T to SNF with Medicare cert in anticipation of skilled care (03)    DME Needed: No    Action(s) Taken: Updated Provider/Nurse on Discharge Plan    RN CM to follow up with patient regarding SNF.     1139: RN DEAN attempted to call Bill, no answer, VM left requesting call back.     Escalations Completed: Pending Discharge Destination    Medically Clear: No    Next Steps: SNF acceptance & bed availability and Medical clearance    Barriers to Discharge: Medical clearance and Pending Placement

## 2023-04-06 NOTE — PROGRESS NOTES
Received bedside report.  Assumed pt care.   Assessment and chart check complete.  Pt is AA0X4, resting in bed, no signs of distress, denies nausea/vomiting  Updated for POC of the day.  Fall precautions in place, treaded socks on pt, bed in low position.   Call light within reach.   Educated pt to call if needing anything.   Hourly rounding.

## 2023-04-06 NOTE — THERAPY
Occupational Therapy   Initial Evaluation     Patient Name: Latonia Clinton  Age:  72 y.o., Sex:  female  Medical Record #: [id number]  Today's Date: 4/6/2023     Precautions: (P) Fall Risk    Assessment  Patient is 72 y.o. 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 Pt reported she was independent with ADLs/mobility in the home 2 weeks ago, prior to admit to [facility]. Her brother assists with IADLs. Pt required mod/max A with self cares today. She is limited by weakness, poor endurance, poor balance impacting ADLs and mobility. Recommend placement at this time. Will follow for skilled OT services.    Plan    Occupational Therapy Initial Treatment Plan   Treatment Interventions: (P) Self Care / Activities of Daily Living, Adaptive Equipment, Cognitive Skill Development, Neuro Re-Education / Balance, Therapeutic Exercises, Therapeutic Activity  Treatment Frequency: (P) 3 Times per Week  Duration: (P) Until Therapy Goals Met    DC Equipment Recommendations: (P) Unable to determine at this time  Discharge Recommendations: (P) Recommend post-acute placement for additional occupational therapy services prior to discharge home        04/06/23 1108   Prior Living Situation   Prior Services Home-Independent   Bathroom Set up Walk In Shower;Tub Transfer Bench   Equipment Owned Front-Wheel Walker;Tub / Shower Seat   Lives with - Patient's Self Care Capacity Alone and Unable to Care For Self   Comments Pt at [facility] for 2 weeks, prior to this pt living at home independently. She has assist from her brother for IADLs such as groceries and driving, but was independent with self cares.   Prior Level of ADL Function   Self Feeding Independent   Grooming / Hygiene Independent   Bathing Independent   Dressing Independent   Toileting Independent   Prior Level of IADL Function    Kitchen Mobility Independent   Finances Independent   Home Management Independent   Shopping Requires Assist   Driving / Transportation Relatives / Others Provide Transportation   Precautions   Precautions Fall Risk   Cognition    Cognition / Consciousness WDL   Comments agreeable and pleasant, required mod cues to participate   Active ROM Upper Body   Active ROM Upper Body  X   Comments able to range shoulders to 90 degrees   Strength Upper Body   Upper Body Strength  X   Gross Strength Generalized Weakness, Equal Bilaterally.    Balance Assessment   Sitting Balance (Static) Fair   Sitting Balance (Dynamic) Fair   Standing Balance (Static) Poor -   Standing Balance (Dynamic) Poor -   Weight Shift Sitting Poor   Weight Shift Standing Absent   Comments with FWW   Bed Mobility    Supine to Sit Minimal Assist   Sit to Supine Moderate Assist   Scooting Maximal Assist   ADL Assessment   Grooming Minimal Assist;Seated   Lower Body Dressing Maximal Assist   Toileting Total Assist   How much help from another person does the patient currently need...   6 Clicks Daily Activity Score 15   Functional Mobility   Sit to Stand Maximal Assist   Mobility attempted 3x STS, able to complete full stand on 3rd attempt   Comments with FWW and 2pa   Patient / Family Goals   Patient / Family Goal #1 to be independent again   Short Term Goals   Short Term Goal # 1 Pt will demo ADL txf mod A   Short Term Goal # 2 Pt will tolerate 10 min unsupported seated ADLs SPV   Short Term Goal # 3 Pt will complete UB dressing SPV   Education Group   Education Provided Pathology of bedrest   Role of Occupational Therapist Patient Response Patient;Acceptance;Explanation   Pathology of Bedrest Patient Response Patient;Acceptance;Explanation   Occupational Therapy Initial Treatment Plan    Treatment Interventions Self Care / Activities of Daily Living;Adaptive Equipment;Cognitive Skill Development;Neuro Re-Education / Balance;Therapeutic  Exercises;Therapeutic Activity   Treatment Frequency 3 Times per Week   Duration Until Therapy Goals Met   Problem List   Problem List Decreased Active Daily Living Skills;Decreased Homemaking Skills;Decreased Upper Extremity Strength Right;Decreased Upper Extremity Strength Left;Decreased Functional Mobility;Decreased Activity Tolerance;Impaired Postural Control / Balance   Anticipated Discharge Equipment and Recommendations   DC Equipment Recommendations Unable to determine at this time   Discharge Recommendations Recommend post-acute placement for additional occupational therapy services prior to discharge home

## 2023-04-06 NOTE — CARE PLAN
The patient is Stable - Low risk of patient condition declining or worsening    Shift Goals  Clinical Goals: Able to collect sample for stool exam, q2 turns  Patient Goals: sleep comfortably tonight  Family Goals: n/a    Progress made toward(s) clinical / shift goals:  Pt had 1 episode of watery stool + urine incontinence; stool sample unable to be collected. Pt turned using TAPS q2 hrs during the shift. Progressing in other goals.       Problem: Pain - Standard  Goal: Alleviation of pain or a reduction in pain to the patient’s comfort goal  Outcome: Progressing     Problem: Knowledge Deficit - Standard  Goal: Patient and family/care givers will demonstrate understanding of plan of care, disease process/condition, diagnostic tests and medications  Outcome: Progressing     Problem: Skin Integrity  Goal: Skin integrity is maintained or improved  Outcome: Progressing     Problem: Fall Risk  Goal: Patient will remain free from falls  Outcome: Progressing     Problem: Gastrointestinal Irritability  Goal: Nausea and vomiting will be absent or improve  Outcome: Progressing  Goal: Diarrhea will be absent or improved  Outcome: Progressing     Patient is not progressing towards the following goals: NA

## 2023-04-06 NOTE — THERAPY
Physical Therapy   Daily Treatment     Patient Name: Latonia Clinton  Age:  72 y.o., Sex:  female  Medical Record #: [id number]  Today's Date: 4/6/2023          Assessment    Pt able to stand x  1 min with some encouragement  Plan    Treatment Plan Status:    Type of Treatment: Bed Mobility, Equipment, Therapeutic Activities, Therapeutic Exercise, Gait Training  Treatment Frequency: 3 Times per Week  Treatment Duration: Until Therapy Goals Met    DC Equipment Recommendations: Unable to determine at this time  Discharge Recommendations: Recommend post-acute placement for additional physical therapy services prior to discharge home     Objective       04/06/23 1000   Charge Group   PT Therapeutic Activities (Units) 2   Supine Lower Body Exercise   Supine Lower Body Exercises Yes   Balance   Sitting Balance (Static) Fair +   Sitting Balance (Dynamic) Fair +   Standing Balance (Static) Poor -   Standing Balance (Dynamic) Poor -   Weight Shift Sitting Poor   Weight Shift Standing Absent   Bed Mobility    Supine to Sit Minimal Assist   Sit to Supine Moderate Assist   Scooting Maximal Assist   Gait Analysis   Gait Level Of Assist Unable to Participate   Assistive Device Front Wheel Walker   Distance (Feet) 0   Weight Bearing Status full   Functional Mobility   Sit to Stand Maximal Assist   Bed, Chair, Wheelchair Transfer Unable to Participate   Activity Tolerance   Sitting Edge of Bed 15   Standing 1min   Short Term Goals    Short Term Goal # 1 Pt will perform bed mobility at Min A level by tx 6   Goal Outcome # 1 Progressing slower than expected   Short Term Goal # 2 Pt will transfer with LRAD at Min A level by tx 6   Goal Outcome # 2 Progressing slower than expected   Short Term Goal # 3 Pt will perform sit to stand with Min A by tx 6   Goal Outcome # 3 Progressing slower than expected   Short Term Goal # 4 Pt will ambulate with FWW for 25 ft with CGA by tx 6   Goal Outcome # 4 Progressing slower than expected    Interdisciplinary Plan of Care Collaboration   IDT Collaboration with  Nursing   Session Information   Date / Session Number  4/6-2 2/3 4/10

## 2023-04-06 NOTE — PROGRESS NOTES
Received report from ALLAN Turpin. Assumed care of pt. Pt A&O X 4, on 3L NC.  Pt requesting not to be woken up for 3am breathing tx if asleep, info relayed to RT.  Pt updated with POC, fall precautions in place, call light within reach. Hourly rounding in progress.

## 2023-04-06 NOTE — PROGRESS NOTES
Hospital Medicine Daily Progress Note    Date of Service  4/6/2023    Chief Complaint  Latonia Clinton is a 72 y.o. female admitted 4/3/2023 with abdominal pain and diarrhea    Hospital Course  4/4/2023-patient admitted from skilled nursing facility because of ongoing diarrhea.  Patient's C. difficile testing is negative.  Possible colitis with aspiration pneumonia present.  Patient did have nausea and vomiting.  The patient now on antibiotics at this point procalcitonin level extremely elevated  We are awaiting culture results.  Continue at this point with antibiotic management and fluid resuscitation.  4/5/2023-patient today complaining of more diarrhea.  Patient's abdominal distention seems to be slightly worse.  Flatplate of the abdomen has been ordered which at this point is pending.  I will at this point request stool for culture, lactoferrin, norovirus, or ova and parasite.  Patient is technically immunocompromised given her history of radiation treatments and cancer.  Scans ordered today  4/6/2023-patient complaining today of decreased pain but increased distention in the abdomen.  She has less nausea and no vomiting.  She also has had decreased diarrhea.  Patient's abdominal pain distention most likely secondary to radiation-induced inflammatory disease.  Patient has however component of aspiration versus possible gastroenteritis.  The patient at this point is being ruled out for possible bacterial causes for infection.  Procalcitonin levels have decreased considerably.  Continue with fluid resuscitation and antibiotics.    Interval Problem Update  Continue Zosyn day #4 of antibiotics at this point  Follow cultures  Procalcitonin declining  Pain management  Nutritional support  Antidiarrheal management  PT OT  Discharge planning    I have discussed this patient's plan of care and discharge plan at IDT rounds today with Case Management, Nursing, Nursing leadership, and other members of the IDT  team.    Consultants/Specialty  None    Code Status  Full Code    Disposition  Patient is not medically cleared for discharge.   Anticipate discharge to to skilled nursing facility.  I have placed the appropriate orders for post-discharge needs.    Review of Systems  Review of Systems   Constitutional:  Positive for malaise/fatigue. Negative for chills.        Denies any weight loss or weight gain   HENT: Negative.  Negative for hearing loss, nosebleeds, sinus pain and sore throat.    Eyes: Negative.  Negative for blurred vision, double vision and discharge.   Respiratory:  Positive for cough and shortness of breath. Negative for sputum production.    Cardiovascular:  Positive for leg swelling. Negative for chest pain and palpitations.   Gastrointestinal:  Positive for abdominal pain, diarrhea, nausea and vomiting. Negative for blood in stool, constipation, heartburn and melena.   Genitourinary: Negative.  Negative for dysuria, flank pain and urgency.   Musculoskeletal:  Positive for back pain and myalgias.   Skin: Negative.  Negative for itching and rash.   Neurological:  Positive for dizziness and headaches. Negative for speech change and loss of consciousness.   Endo/Heme/Allergies: Negative.  Negative for environmental allergies and polydipsia. Does not bruise/bleed easily.   Psychiatric/Behavioral:  Positive for depression. Negative for hallucinations, substance abuse and suicidal ideas. The patient has insomnia. The patient is not nervous/anxious.    All other systems reviewed and are negative.     Physical Exam  Temp:  [36.7 °C (98 °F)-36.8 °C (98.3 °F)] 36.7 °C (98 °F)  Pulse:  [72-98] 89  Resp:  [18-20] 20  BP: ()/(58-79) 98/58  SpO2:  [95 %-100 %] 98 %    Physical Exam  Vitals and nursing note reviewed. Exam conducted with a chaperone present.   Constitutional:       Appearance: Normal appearance. She is well-developed. She is obese. She is ill-appearing. She is not toxic-appearing or diaphoretic.    HENT:      Head: Normocephalic and atraumatic.      Right Ear: External ear normal.      Left Ear: External ear normal.      Nose: Nose normal.      Mouth/Throat:      Mouth: Mucous membranes are moist.      Pharynx: Oropharynx is clear.   Eyes:      General: No scleral icterus.        Right eye: No discharge.         Left eye: No discharge.      Extraocular Movements: Extraocular movements intact.      Conjunctiva/sclera: Conjunctivae normal.      Pupils: Pupils are equal, round, and reactive to light.   Neck:      Thyroid: No thyroid mass.      Vascular: No carotid bruit or JVD.   Cardiovascular:      Rate and Rhythm: Normal rate and regular rhythm.      Pulses: Normal pulses.      Heart sounds: Normal heart sounds, S1 normal and S2 normal. No murmur heard.    No gallop.   Pulmonary:      Effort: Accessory muscle usage present.      Breath sounds: Decreased air movement present. Examination of the right-middle field reveals decreased breath sounds. Examination of the left-middle field reveals decreased breath sounds. Examination of the right-lower field reveals decreased breath sounds. Examination of the left-lower field reveals decreased breath sounds. Decreased breath sounds present. No rhonchi.   Abdominal:      General: Abdomen is flat. Bowel sounds are normal. There is distension.      Palpations: Abdomen is soft. There is no mass.      Tenderness: There is abdominal tenderness. There is no rebound.      Hernia: No hernia is present.   Musculoskeletal:         General: No swelling or tenderness. Normal range of motion.      Cervical back: Normal range of motion and neck supple. No edema or tenderness.      Right lower leg: No edema.      Left lower leg: No edema.      Right foot: Normal range of motion. No deformity or foot drop.      Left foot: Normal range of motion. No deformity or foot drop.   Feet:      Right foot:      Skin integrity: Skin integrity normal. No ulcer.      Left foot:      Skin  integrity: Skin integrity normal. No ulcer.   Lymphadenopathy:      Head:      Right side of head: No submental adenopathy.      Left side of head: No submental adenopathy.      Cervical:      Right cervical: No superficial cervical adenopathy.     Left cervical: No superficial cervical adenopathy.      Upper Body:      Right upper body: No supraclavicular adenopathy.      Left upper body: No supraclavicular adenopathy.      Lower Body: No right inguinal adenopathy. No left inguinal adenopathy.   Skin:     General: Skin is warm and dry.      Capillary Refill: Capillary refill takes less than 2 seconds.      Findings: No abrasion, bruising, erythema or wound.   Neurological:      General: No focal deficit present.      Mental Status: She is alert and oriented to person, place, and time. Mental status is at baseline.      GCS: GCS eye subscore is 4. GCS verbal subscore is 5. GCS motor subscore is 6.      Cranial Nerves: No cranial nerve deficit.      Sensory: Sensation is intact. No sensory deficit.      Motor: Motor function is intact. No weakness.      Coordination: Coordination abnormal.      Gait: Gait abnormal.   Psychiatric:         Mood and Affect: Mood and affect normal.         Speech: Speech normal.         Behavior: Behavior normal. Behavior is cooperative.         Thought Content: Thought content normal.         Judgment: Judgment normal.       Fluids    Intake/Output Summary (Last 24 hours) at 4/6/2023 1433  Last data filed at 4/6/2023 1005  Gross per 24 hour   Intake 340 ml   Output 100 ml   Net 240 ml       Laboratory  Recent Labs     04/04/23  0640 04/06/23  0905   WBC 10.0 12.1*   RBC 2.96* 3.22*   HEMOGLOBIN 10.3* 11.3*   HEMATOCRIT 33.2* 34.7*   .2* 107.8*   MCH 34.8* 35.1*   MCHC 31.0* 32.6*   RDW 60.9* 57.5*   PLATELETCT 285 252   MPV 10.8 10.9     Recent Labs     04/04/23  0640 04/06/23  0905   SODIUM 138 137   POTASSIUM 4.4 3.4*   CHLORIDE 110 109   CO2 15* 16*   GLUCOSE 115* 140*   BUN  57* 46*   CREATININE 1.75* 1.76*   CALCIUM 7.7* 8.6                     Imaging  [id number] VIEW   Final Result      1.  Gastric dilation      2.  Multiple prior vertebroplasty      3.  Atelectasis at the right lung base      US-RUQ   Final Result      Echogenic mildly enlarged liver, a nonspecific finding that often is found to represent steatosis.  Other infiltrative processes could have an identical appearance      Cholecystectomy without biliary dilatation      Simple right renal cyst which does not require follow-up      DX-CHEST-PORTABLE (1 VIEW)   Final Result      Enlargement the cardiac silhouette and right basilar atelectasis      CT-RENAL COLIC EVALUATION(A/P W/O)   Final Result      1.  4 mm stone in the LEFT mid to proximal ureter without significant obstructive changes.   2.  Bilateral nonobstructing kidney stones present.   3.  Multiple bilateral kidney cysts of varying density.   4.  Increased fat within the colon wall may indicate chronic inflammatory process.   5.  No evidence for acute colitis or diverticulitis.   6.  Bilateral flank/abdominal wall edema, worse on the LEFT.                Assessment/Plan  * Nausea & vomiting- (present on admission)  Assessment & Plan  Nausea at this point has been better controlled now  Continue with fluid resuscitation  Continue with antiemetic management  Monitor renal functions      Pneumonia in infectious disease- (present on admission)  Assessment & Plan  Patient at this point seems to be aspirating  Patient's pneumonia seems to be related to aspiration and thus at this point we are optimizing treatment with Zosyn        Right upper quadrant abdominal pain  Assessment & Plan  The abdominal pain may be related to radiation  Infectious cause at this point is being ruled out  Patient continues on Zosyn given elevated procalcitonin level and ongoing symptomatology  Monitor blood cultures as well.  C. difficile was ruled out    Elevated procalcitonin  Assessment  & Plan  Most likely secondary to aspiration pneumonia  Procalcitonin level at this point is coming down down from 38.6 to 6.4    Chronic inflammation of colon  Assessment & Plan  May be secondary to chronic inflammatory condition of the bowel that is exacerbated by radiation.  At this point diarrhea is a lot going but improving  Nausea vomiting has also improved  Monitor for possible infectious cause    Recurrent non-small cell lung cancer (HCC)- (present on admission)  Assessment & Plan  Radiation therapy completed  Repeat CT scan in 6 to 8 weeks and then follow-up with oncology as an outpatient.  Patient is status post resection of the left upper lobe of the lung patient did have an adenocarcinoma diagnosed in 2016.  In 2022 the patient did undergo a CT-guided biopsy of the left lingula pulmonary nodule which was still adenocarcinoma.    Deep vein thrombosis (DVT) (HCC)- (present on admission)  Assessment & Plan  Xarelto 20 mg daily    Chronic obstructive pulmonary disease (HCC)- (present on admission)  Assessment & Plan  RT protocol  Oxygen support to keep oxygen saturations above 90%  Continue with routine nebulizer treatments  Currently no acute exacerbation chronic condition    Hypertension- (present on admission)  Assessment & Plan  Continue with blood pressure management  Losartan 50 mg daily    Debility- (present on admission)  Assessment & Plan  Patient recognizes that she will need skilled placement and her brother is looking at different skilled facilities they will discuss with each other the possibilities of which facility would like to go to    Macrocytic anemia- (present on admission)  Assessment & Plan  Monitor H&H  If levels drop below 7 and 21 patient will need transfusion  Continue with current monitoring    CKD (chronic kidney disease) stage 3, GFR 30-59 ml/min (HCC)- (present on admission)  Assessment & Plan  Optimize hydration  Avoid nephrotoxic medications  Outpatient follow-up with  nephrology we will continue      Hyperlipidemia- (present on admission)  Assessment & Plan  Low-fat low-cholesterol diet  Statin  Lab Results   Component Value Date/Time    CHOLSTRLTOT 179 12/06/2022 03:02 PM    LDL 52 12/06/2022 03:02 PM    HDL 86 12/06/2022 03:02 PM    TRIGLYCERIDE 207 (H) 12/06/2022 03:02 PM       Will need repeat fasting lipid panel as most recently was December 2022           VTE prophylaxis: SCDs/TEDs    I have performed a physical exam and reviewed and updated ROS and Plan today (4/6/2023). In review of yesterday's note (4/5/2023), there are no changes except as documented above.

## 2023-04-06 NOTE — CARE PLAN
Problem: Bronchoconstriction  Goal: Improve in air movement and diminished wheezing  Description: Target End Date:  2 to 3 days    1.  Implement inhaled treatments  2.  Evaluate and manage medication effects  Outcome: Progressing     Patient does feel benefit of improved air movement following therapy.

## 2023-04-06 NOTE — CARE PLAN
The patient is Stable - Low risk of patient condition declining or worsening    Shift Goals  Clinical Goals: Pt pain will manage at tolerable level this shift  Patient Goals: decrease nausea and pain control  Family Goals: n/a    Progress made toward(s) clinical / shift goals:  Give 1 dose pain medication for pain controlled. Repositioning every 2 hours. Medicated per MAR for nausea. Able to rest comfortably.    Patient is not progressing towards the following goals:      Problem: Pain - Standard  Goal: Alleviation of pain or a reduction in pain to the patient’s comfort goal  Outcome: Progressing     Problem: Skin Integrity  Goal: Skin integrity is maintained or improved  Outcome: Progressing     Problem: Fall Risk  Goal: Patient will remain free from falls  Outcome: Progressing     Problem: Gastrointestinal Irritability  Goal: Nausea and vomiting will be absent or improve  Outcome: Progressing

## 2023-04-06 NOTE — FLOWSHEET NOTE
04/06/23 0650   Events/Summary/Plan   Events/Summary/Plan neb given fw MDI   Skin Inspection Respiratory Device Intact   Vital Signs   Pulse 76   Respiration 20   Pulse Oximetry 95 %   $ Pulse Oximetry (Spot Check) Yes   Respiratory Assessment   Respiratory Pattern Within Normal Limits   Level of Consciousness Alert   Chest Exam   Work Of Breathing / Effort Within Normal Limits   Breath Sounds   RUL Breath Sounds Diminished   RML Breath Sounds Diminished   RLL Breath Sounds Diminished   AUSTIN Breath Sounds Diminished   LLL Breath Sounds Diminished   Secretions   Cough Non Productive   How Sputum Obtained Spontaneous   Oxygen   O2 (LPM) 3   O2 Delivery Device Silicone Nasal Cannula

## 2023-04-06 NOTE — FLOWSHEET NOTE
04/06/23 1536   Events/Summary/Plan   Events/Summary/Plan SVN w/albuterol via mask, given   Skin Inspection Respiratory Device Intact;Skin Barrier Used   Location ears   Protective Device Foam Pads   Vital Signs   Pulse 88   Respiration 20   Pulse Oximetry 96 %   $ Pulse Oximetry (Spot Check) Yes   Respiratory Assessment   Respiratory Pattern Within Normal Limits   Level of Consciousness Alert   Chest Exam   Work Of Breathing / Effort Within Normal Limits   Breath Sounds   RUL Breath Sounds Clear   RML Breath Sounds Diminished   RLL Breath Sounds Diminished   AUSTIN Breath Sounds Clear   LLL Breath Sounds Diminished   Secretions   Cough Non Productive   Oxygen   O2 (LPM) 3   O2 Delivery Device Silicone Nasal Cannula

## 2023-04-07 NOTE — PROGRESS NOTES
Pt up to chair for meal time. Pt did not tolerate standing up more than 20 seconds with use of FWW. Berna lift used to get pt up to recliner.   Plan of care to remain in chair for meal time agreed upon. Pt declined her hair being washed today. Encouraged to possibly try to shower, pt declined a shower at this time as well.   VSS. Pt does not appear to be in any distress after transfer.   Call light within reach, over bed table with belongings within reach. No other requests at this time prior to leaving

## 2023-04-07 NOTE — PROGRESS NOTES
Hospital Medicine Daily Progress Note    Date of Service  4/7/2023    Chief Complaint  Latonia Clinton is a 72 y.o. female admitted 4/3/2023 with abdominal pain and diarrhea    Hospital Course  4/4/2023-patient admitted from skilled nursing facility because of ongoing diarrhea.  Patient's C. difficile testing is negative.  Possible colitis with aspiration pneumonia present.  Patient did have nausea and vomiting.  The patient now on antibiotics at this point procalcitonin level extremely elevated  We are awaiting culture results.  Continue at this point with antibiotic management and fluid resuscitation.  4/5/2023-patient today complaining of more diarrhea.  Patient's abdominal distention seems to be slightly worse.  Flatplate of the abdomen has been ordered which at this point is pending.  I will at this point request stool for culture, lactoferrin, norovirus, or ova and parasite.  Patient is technically immunocompromised given her history of radiation treatments and cancer.  Scans ordered today  4/6/2023-patient complaining today of decreased pain but increased distention in the abdomen.  She has less nausea and no vomiting.  She also has had decreased diarrhea.  Patient's abdominal pain distention most likely secondary to radiation-induced inflammatory disease.  Patient has however component of aspiration versus possible gastroenteritis.  The patient at this point is being ruled out for possible bacterial causes for infection.  Procalcitonin levels have decreased considerably.  Continue with fluid resuscitation and antibiotics.  4/7/2023 patient lost IV access and because of this the patient was not able to be given antiemetic management.  The patient now has had the IV replaced and at this point nausea is under better control.  Diarrhea still persists.  She has not been able to give us however liquidy diarrhea-like in the beginning.  Is more formed and they are not able to do a lot of the test that we  ordered in the beginning.  Aspiration pneumonia still high suspicion as the patient's lung status definitely worsened with initial evaluation and procalcitonin levels were also very high.  Speech therapy has not had a chance to evaluate the patient and recommend regular diet.  Physical therapy occupational therapy have had a chance to evaluate the patient they recommend placement.  Brother to decide with the patient which facility she is allowing us to refer her to.    Interval Problem Update  IV Zosyn day #5  So far cultures are negative  Procalcitonin levels are trending down  Speech therapy recommends regular diet with thin liquids  PT OT recommends placement  Nausea vomiting better  Diarrhea better  Abdominal pain better    I have discussed this patient's plan of care and discharge plan at IDT rounds today with Case Management, Nursing, Nursing leadership, and other members of the IDT team.    Consultants/Specialty  None    Code Status  Full Code    Disposition  Patient is not medically cleared for discharge.   Anticipate discharge to to skilled nursing facility.  I have placed the appropriate orders for post-discharge needs.    Review of Systems  Review of Systems   Constitutional:  Positive for malaise/fatigue. Negative for diaphoresis, fever and weight loss.        Denies any weight loss or weight gain   HENT: Negative.  Negative for ear discharge, ear pain, hearing loss and sore throat.    Eyes: Negative.  Negative for photophobia, pain and discharge.   Respiratory:  Positive for shortness of breath. Negative for cough, sputum production and wheezing.    Cardiovascular:  Positive for leg swelling. Negative for chest pain and orthopnea.   Gastrointestinal:  Positive for abdominal pain, diarrhea and nausea. Negative for heartburn and vomiting.   Genitourinary: Negative.  Negative for dysuria, frequency, hematuria and urgency.   Musculoskeletal:  Positive for back pain and myalgias. Negative for joint pain and  neck pain.   Skin: Negative.  Negative for itching and rash.   Neurological:  Positive for dizziness and headaches. Negative for tremors, speech change, loss of consciousness and weakness.   Endo/Heme/Allergies: Negative.  Negative for environmental allergies and polydipsia. Does not bruise/bleed easily.   Psychiatric/Behavioral:  Positive for depression. Negative for hallucinations, substance abuse and suicidal ideas. The patient has insomnia. The patient is not nervous/anxious.    All other systems reviewed and are negative.     Physical Exam  Temp:  [36.3 °C (97.4 °F)-36.7 °C (98 °F)] 36.6 °C (97.9 °F)  Pulse:  [85-96] 87  Resp:  [16-20] 18  BP: ()/(56-65) 95/56  SpO2:  [95 %-98 %] 97 %    Physical Exam  Vitals and nursing note reviewed. Exam conducted with a chaperone present.   Constitutional:       Appearance: Normal appearance. She is well-developed. She is obese. She is ill-appearing.   HENT:      Head: Normocephalic and atraumatic.      Right Ear: External ear normal.      Left Ear: External ear normal.      Nose: Nose normal. No congestion or rhinorrhea.      Mouth/Throat:      Mouth: Mucous membranes are dry.      Pharynx: Oropharynx is clear. No oropharyngeal exudate.   Eyes:      General:         Right eye: No discharge.         Left eye: No discharge.      Extraocular Movements: Extraocular movements intact.      Conjunctiva/sclera: Conjunctivae normal.      Pupils: Pupils are equal, round, and reactive to light.   Neck:      Thyroid: No thyroid mass.      Vascular: No carotid bruit or JVD.   Cardiovascular:      Rate and Rhythm: Normal rate and regular rhythm.      Pulses: Normal pulses.      Heart sounds: Normal heart sounds, S1 normal and S2 normal. No murmur heard.    No gallop.   Pulmonary:      Effort: Pulmonary effort is normal.      Breath sounds: Decreased air movement present. Examination of the right-middle field reveals decreased breath sounds. Examination of the left-middle field  reveals decreased breath sounds. Examination of the right-lower field reveals decreased breath sounds. Examination of the left-lower field reveals decreased breath sounds. Decreased breath sounds present.   Abdominal:      General: Abdomen is flat. Bowel sounds are increased. There is distension.      Palpations: Abdomen is soft.      Tenderness: There is generalized abdominal tenderness. There is no rebound.      Hernia: No hernia is present.   Musculoskeletal:         General: No swelling or tenderness. Normal range of motion.      Cervical back: Normal range of motion and neck supple. No edema or tenderness.      Right lower leg: Edema present.      Left lower leg: Edema present.      Right foot: Normal range of motion. No deformity or foot drop.      Left foot: Normal range of motion. No deformity or foot drop.   Feet:      Right foot:      Skin integrity: Skin integrity normal. No ulcer.      Left foot:      Skin integrity: Skin integrity normal. No ulcer.   Lymphadenopathy:      Head:      Right side of head: No submental adenopathy.      Left side of head: No submental adenopathy.      Cervical:      Right cervical: No superficial cervical adenopathy.     Left cervical: No superficial cervical adenopathy.      Upper Body:      Right upper body: No supraclavicular adenopathy.      Left upper body: No supraclavicular adenopathy.      Lower Body: No right inguinal adenopathy. No left inguinal adenopathy.   Skin:     General: Skin is warm and dry.      Capillary Refill: Capillary refill takes less than 2 seconds.      Findings: Erythema present. No abrasion, bruising or wound.   Neurological:      General: No focal deficit present.      Mental Status: She is alert and oriented to person, place, and time. Mental status is at baseline.      GCS: GCS eye subscore is 4. GCS verbal subscore is 5. GCS motor subscore is 6.      Cranial Nerves: No cranial nerve deficit.      Sensory: Sensation is intact. No sensory  deficit.      Motor: Motor function is intact. No weakness.      Coordination: Coordination abnormal.      Gait: Gait abnormal.      Deep Tendon Reflexes: Reflexes normal.   Psychiatric:         Mood and Affect: Mood and affect normal.         Speech: Speech normal.         Behavior: Behavior normal. Behavior is cooperative.         Thought Content: Thought content normal.         Judgment: Judgment normal.   4/7/2023-patient lost IV access    Fluids    Intake/Output Summary (Last 24 hours) at 4/7/2023 1357  Last data filed at 4/7/2023 0900  Gross per 24 hour   Intake 460 ml   Output 300 ml   Net 160 ml         Laboratory  Recent Labs     04/06/23  0905 04/07/23  0747   WBC 12.1* 11.9*   RBC 3.22* 2.69*   HEMOGLOBIN 11.3* 9.5*   HEMATOCRIT 34.7* 28.8*   .8* 107.1*   MCH 35.1* 35.3*   MCHC 32.6* 33.0*   RDW 57.5* 56.8*   PLATELETCT 252 279   MPV 10.9 11.0       Recent Labs     04/06/23  0905 04/07/23  0747   SODIUM 137 137   POTASSIUM 3.4* 3.5*   CHLORIDE 109 109   CO2 16* 19*   GLUCOSE 140* 161*   BUN 46* 42*   CREATININE 1.76* 1.86*   CALCIUM 8.6 8.8       Recent Labs     04/07/23  0912   INR 2.21*                 Imaging  IR-MIDLINE CATHETER INSERTION WO GUIDANCE > AGE 3   Final Result                  Ultrasound-guided midline placement performed by qualified nursing staff    as above.          [id number] VIEW   Final Result      1.  Gastric dilation      2.  Multiple prior vertebroplasty      3.  Atelectasis at the right lung base      US-RUQ   Final Result      Echogenic mildly enlarged liver, a nonspecific finding that often is found to represent steatosis.  Other infiltrative processes could have an identical appearance      Cholecystectomy without biliary dilatation      Simple right renal cyst which does not require follow-up      DX-CHEST-PORTABLE (1 VIEW)   Final Result      Enlargement the cardiac silhouette and right basilar atelectasis      CT-RENAL COLIC EVALUATION(A/P W/O)   Final Result       1.  4 mm stone in the LEFT mid to proximal ureter without significant obstructive changes.   2.  Bilateral nonobstructing kidney stones present.   3.  Multiple bilateral kidney cysts of varying density.   4.  Increased fat within the colon wall may indicate chronic inflammatory process.   5.  No evidence for acute colitis or diverticulitis.   6.  Bilateral flank/abdominal wall edema, worse on the LEFT.                Assessment/Plan  * Nausea & vomiting- (present on admission)  Assessment & Plan  Patient was having profound nausea most of the night as she lost IV access  Per the patient oral antiemetic management does not work for her.  Not an IV has been reestablished continue with IV Zosyn versus Phenergan      Pneumonia in infectious disease- (present on admission)  Assessment & Plan  High suspicion of aspiration  Speech therapy was able to see the patient and recommends a regular diet with thin liquids  Continue with antibiotic management        Right upper quadrant abdominal pain  Assessment & Plan  So far negative for intra-abdominal infection  Stool studies so far negative  Patient not able to provide further stool samples for additional stool studies  Continue with pain management  Possibly related to radiation  Continue with nausea control  Nursing was asking about putting patient on bowel protocol however patient has had extensive diarrhea at this point no direct bowel protocol is necessary    Elevated procalcitonin  Assessment & Plan  Most likely secondary to aspiration pneumonia  Procalcitonin level at this point is coming down down from 38.6 to 6.4    Chronic inflammation of colon  Assessment & Plan  Most likely related to radiation  Secondary causes at this point being evaluated and ruled out    Recurrent non-small cell lung cancer (HCC)- (present on admission)  Assessment & Plan  Patient has completed radiation therapy  Oncology would like to repeat a CT scan of her chest in 6 to 8 weeks  In 2016  patient did have left upper lobe resection  Diagnosis was an adenocarcinoma  Recent CT-guided biopsy reveals a pulmonary nodule that still has adenocarcinoma in the left lingula  For this patient received radiation treatment and follow-up CT scan will show if this actually worked    Deep vein thrombosis (DVT) (HCC)- (present on admission)  Assessment & Plan  Continued on Xarelto currently 20 mg daily    Chronic obstructive pulmonary disease (HCC)- (present on admission)  Assessment & Plan  Optimize therapy management, remains on RT protocol  Continue oxygen support  Nebulizer treatments  Currently not in acute exacerbation    Hypertension- (present on admission)  Assessment & Plan  Monitor blood pressure  Optimize medication management currently on losartan 50 mg daily    Debility- (present on admission)  Assessment & Plan  PT OT recommends placement  Brother and patient to decide which place she is going to go to    Macrocytic anemia- (present on admission)  Assessment & Plan  Continue to monitor hemoglobin and hematocrit  Currently not in a range that the patient would need a transfusion    CKD (chronic kidney disease) stage 3, GFR 30-59 ml/min (HCC)- (present on admission)  Assessment & Plan  Monitor renal functions  Avoid nephrotoxic medications  Optimize fluid status      Hyperlipidemia- (present on admission)  Assessment & Plan  Low-fat low-cholesterol diet  Statin  Lab Results   Component Value Date/Time    CHOLSTRLTOT 179 12/06/2022 03:02 PM    LDL 52 12/06/2022 03:02 PM    HDL 86 12/06/2022 03:02 PM    TRIGLYCERIDE 207 (H) 12/06/2022 03:02 PM       Will need repeat fasting lipid panel as most recently was December 2022           VTE prophylaxis: SCDs/TEDs    I have performed a physical exam and reviewed and updated ROS and Plan today (4/7/2023). In review of yesterday's note (4/6/2023), there are no changes except as documented above.

## 2023-04-07 NOTE — PROGRESS NOTES
Vascular Access Team    Date of Insertion: 04/07/2023  Arm Circumference: 32 cm  Internal length: 11 cm  External Length: 0 cm  Vein Occupancy: 26 %  Reason for Midline: ABX > days  Labs within procedure parameters.    Order confirmed. Risks and benefits of procedure explained to patient and education regarding line associated bloodstream infections provided. Questions answered. Vessel patency confirmed using ultrasound. 1 mL of 1% lidocaine injected intradermally, 21 gauge microintroducer needle and modified Seldinger technique used. Catheter cut to 11 cm. 4 Fr, single lumen Power Midline placed in Basilic vein after one attempt(s).  Secured at -0- cm marker. Each lumen flushed without resistance with 10 mL 0.9% normal saline. Midline secured with StatLock. Biopatch and Tegaderm placed over insertion site.     Midline placement is confirmed by nurse using ultrasound and ability to flush and draw blood. No X-ray is needed for placement confirmation.  Midline is appropriate for use at this time.  Procedure tolerated well (slept through procedure).  Patient condition relayed to unit RN or ordering physician via this post procedure note in the EMR.     Ultrasound images uploaded to PACS and viewable in the EMR - yes  Ultrasound images printed and placed in paper chart - no     BARD Power Midline Lot #: [id number]   Expiration Date: 03/31/2023

## 2023-04-07 NOTE — DIETARY
Nutrition Services Brief Update:    Day 4 of admit.  Latonia Clinton is a 72 y.o. female with admitting DX of Nausea & vomiting [R11.2]  Pneumonia in infectious disease [J18.9]    Current Diet: Regular; TID Greek yogurts    PO intake per ADLs varying <25% up to 50-75%, averaging ~50%. RD visited pt at bedside. She reports ongoing nausea and low appetite; agreeable to high protein broth with bland crackers w/ dinners, small portions at meals w/ protein snacks between meals. RD to update Comput[employer] w/ new interventions.    Problem: Nutritional:  Goal: Achieve adequate nutritional intake  Description: Patient will consume >50-75% of meals and snacks  Outcome: Progressing slower than expected    RD continues to follow.

## 2023-04-07 NOTE — THERAPY
"Speech Language Pathology   Daily Treatment     Patient Name: Latonia Clinton  AGE:  72 y.o., SEX:  female  Medical Record #: [id number]  Date of Service: 4/7/2023      Precautions:  Precautions: Fall Risk    Subjective  RN cleared patient for dysphagia tx. Patient awake and cooperative, but with flat affect for most of session. RN reported no noted difficulty with regular diet w/ thin liquids, but that patient does often refuse to sit up for meals or PO intake and has poor participation with several things throughout the day.     Assessment  Patient denied any current difficulty w/ regular diet w/ thin liquids. She reported her appetite has been reduced, but this is related to recent N/V and \"not expending much energy during the day\" rather than any difficulty w/ mastication. She did report she sometimes has difficulty cutting up foods and was encouraged to asked kitchen or staff to chop it.     Patient tolerated HOB elevation, but only to 40-45 degrees. Patient was provided with extensive education regarding universal swallow precautions to sit up as high as possible for ALL PO intake (including small sips of water throughout the day, meds, snacks on beside table, etc.) Patient nodded head to education, but appeared annoyed and resistant at times. Patient consumed PO trials of soft solids, mixed consistencies, dry solids, and thins via straw. Patient presented with adequate bolus acceptance and containment, mildly prolonged, but functional mastication, and no s/sx of aspiration on any textures trialed. Initiation of swallow trigger was timely. Patient denied any globus sensation.     Clinical Impressions  Patient still presents with grossly functional oropharyngeal swallow to continue regular diet w/ thin liquids. Patient is at elevated risk for aspiration d/t resistance to sit up for PO intake and/or meals and needs to continue to be encouraged to do so. Patient educated extensively on same. SLP is following to " "ensure continued diet tolerance and f/t of swallow precautions.     Recommendations  Continue regular diet w/ thin liquids w/ STRICT use of swallow precautions (primarily: SIT UP FOR ALL PO INTAKE). Small straw sips ok. Meds ok as tolerated. Patient may benefit from MBSS to further evaluate oropharyngeal swallow function to r/o aspiration, but unsure if she can tolerate procedure d/t body habitus and difficulty sitting upright.       Dysphagia Treatment    Diet Consistency: Regular diet w/ thin liquids  Instrumentation: Instrumental swallow study pending clinical progress  Medication: Whole with liquid  Supervision: Distant supervision - check on patient 2-3 times per meal  Positioning: Fully upright and midline during oral intake  Risk Management : Small bites/sips (Sit up as high as possible)  Oral Care: BID    SLP Treatment Plan  Treatment Plan: Dysphagia Treatment  SLP Frequency: 3x Per Week  Estimated Duration: Until Therapy Goals Met    Anticipated Discharge Needs  Discharge Recommendations: Anticipate that the patient will have no further speech therapy needs after discharge from the hospital  Therapy Recommendations Upon DC: Dysphagia Training, Community Re-Integration, Patient / Family / Caregiver Education    Patient / Family Goals  Patient / Family Goal #1: \"Can I get some broth\"  Goal #1 Outcome: Goal met  Short Term Goals  Short Term Goal # 1: Patient will consume a RG7/TN0 diet with no overt s/sx of aspiration.  Goal Outcome # 1: Progressing as expected    Jessenia Hawk, SLP  "

## 2023-04-07 NOTE — DISCHARGE PLANNING
Case Management Discharge Planning    Admission Date: 4/3/2023  GMLOS: 3.8  ALOS: 4    6-Clicks ADL Score: 15  6-Clicks Mobility Score: 6  PT and/or OT Eval ordered: Yes  Post-acute Referrals Ordered: Yes  Post-acute Choice Obtained: No  Has referral(s) been sent to post-acute provider:  NA      Anticipated Discharge Dispo: Discharge Disposition: D/T to SNF with Medicare cert in anticipation of skilled care (03)    DME Needed: No    Action(s) Taken: Updated Provider/Nurse on Discharge Plan    1216: ALLAN MORAN spoke with Bill who states he is not done visiting all SNFs and states he will need to discuss with patient.  RN CM to follow up tomorrow for choice.     Escalations Completed: Pending Discharge Destination    Medically Clear: No    Next Steps: Pending placement     Barriers to Discharge: Pending Placement

## 2023-04-07 NOTE — CARE PLAN
The patient is Stable - Low risk of patient condition declining or worsening    Shift Goals  Clinical Goals: decrease nausea  Patient Goals: sleep comfortably tonight  Family Goals: N/A    Progress made toward(s) clinical / shift goals: Decreased episodes and severity of nausea, sleeping comfortably so far throughout the shift    Patient is not progressing towards the following goals:

## 2023-04-07 NOTE — PROGRESS NOTES
Received patient report from ALLAN Turpin. Patient resting comfortably in bed, no complaints at this time. Safety precautions in place. Will continue to monitor.

## 2023-04-07 NOTE — PROGRESS NOTES
Pt in bed. Medications administered effective. Pt tolerated breakfast meal. Pt not complaining of any nausea of vomiting or pain at this time.

## 2023-04-07 NOTE — PROGRESS NOTES
Pt back to bed by use of latonya lift. Pillows and blankets provided for comfort. Belongings and oral fluids with in reach. SCDs on while in bed. No distress noted. Plan of care reviewed for remainder of shift.   Pt resting eyes closed, call light within reach, lights dimmed

## 2023-04-07 NOTE — PROGRESS NOTES
Pt awake in bed. Laying there eyes open. Pt declining to move or be turned in bed at this time. Pt c/o nausea and requesting her medications. I reviewed the plan of care with the pt regarding the need for IV access prior to administering any medications she was requesting such as compazine and antibiotic.   Plan of care for activities today reviewed. Pt appears dis-interested in participating with mobility repositioning. There has been no vomiting noted on this shift.   Pt has purewick in place with zero output at this time. Ice water provided.   Call light within reach bed in low position, belongings within reach. No additional requests

## 2023-04-07 NOTE — PROGRESS NOTES
Pt tolerating sitting up in chair. Pt denies need for medications at this time. Lunch meal tolerated no c/o nausea or vomiting.   Spouse at bed side. Plan of care reviewed. Call light within reach encourage to call when ready to return to bed to rest and reposition.

## 2023-04-08 NOTE — DISCHARGE PLANNING
1140: ALLAN MORAN spoke with Violet who state they have decided on [facility].  Choices faxed to [facility].

## 2023-04-08 NOTE — PROGRESS NOTES
Pt awake in bed ate some breakfast. Pt verbalizes plan of care for this shift. Denies nausea and c/o chronic pain and always having it. Skin tears on left arm assessed and redressed with non adherent dressing.   Plan of care for q2 turns this shift discussed. Pt verbalizes understanding

## 2023-04-08 NOTE — CARE PLAN
The patient is Stable - Low risk of patient condition declining or worsening    Shift Goals  Clinical Goals: Pt will be repositioned Q2 hrs; collect additional stool sample; continue IV abx  Patient Goals: Sleep and rest  Family Goals: NA    Progress made toward(s) clinical / shift goals:  Pt repositioned Q2 hrs and provided incontinence care; additional stool sample unable to obtain, no BM overnight. Complained of slight nausea in the morning, given PRN IV compazine. Progressing in other goals.      Problem: Pain - Standard  Goal: Alleviation of pain or a reduction in pain to the patient’s comfort goal  Outcome: Progressing     Problem: Knowledge Deficit - Standard  Goal: Patient and family/care givers will demonstrate understanding of plan of care, disease process/condition, diagnostic tests and medications  Outcome: Progressing     Problem: Skin Integrity  Goal: Skin integrity is maintained or improved  Outcome: Progressing     Problem: Fall Risk  Goal: Patient will remain free from falls  Outcome: Progressing     Problem: Gastrointestinal Irritability  Goal: Nausea and vomiting will be absent or improve  Outcome: Progressing  Goal: Diarrhea will be absent or improved  Outcome: Progressing     Patient is not progressing towards the following goals: NA

## 2023-04-08 NOTE — FLOWSHEET NOTE
04/08/23 0723   Events/Summary/Plan   Events/Summary/Plan MDI given   Vital Signs   Pulse 90   Respiration 17   Pulse Oximetry 97 %   $ Pulse Oximetry (Spot Check) Yes   Chest Exam   Work Of Breathing / Effort Within Normal Limits   Breath Sounds   RUL Breath Sounds Clear;Diminished   RML Breath Sounds Diminished   RLL Breath Sounds Diminished   AUSTIN Breath Sounds Clear;Diminished   LLL Breath Sounds Diminished   Secretions   Cough Dry   How Sputum Obtained Spontaneous   Oxygen   O2 (LPM) 3   O2 Delivery Device Silicone Nasal Cannula

## 2023-04-08 NOTE — DISCHARGE PLANNING
Received choice form @: 1144  Agency/Facility name: [facility]  Sent referral per choice form @: 1141

## 2023-04-08 NOTE — PROGRESS NOTES
Pt laying in bed. Eyes closed O2 3L nc non labored breathing. Call light within reach bedside report given from previous shift.

## 2023-04-08 NOTE — PROGRESS NOTES
New orders for lópez catheter, pt tolerated well. PIV patent IVF infusing.   Pt tolerating oral contrast at this time.   Pt complains of burping up Bile, new medications ordered and administered.

## 2023-04-08 NOTE — PROGRESS NOTES
1305-No urine output has been noted this shift. Plan of care for bladder scan procedure reviewed with patient. Pt brief dry, no leaks noted in female wick.   1330- bladder scan results 252.

## 2023-04-08 NOTE — PROGRESS NOTES
Hospital Medicine Daily Progress Note    Date of Service  4/8/2023    Chief Complaint  Latonia Clinton is a 72 y.o. female admitted 4/3/2023 with abdominal pain and diarrhea    Hospital Course  4/4/2023-patient admitted from skilled nursing facility because of ongoing diarrhea.  Patient's C. difficile testing is negative.  Possible colitis with aspiration pneumonia present.  Patient did have nausea and vomiting.  The patient now on antibiotics at this point procalcitonin level extremely elevated  We are awaiting culture results.  Continue at this point with antibiotic management and fluid resuscitation.  4/5/2023-patient today complaining of more diarrhea.  Patient's abdominal distention seems to be slightly worse.  Flatplate of the abdomen has been ordered which at this point is pending.  I will at this point request stool for culture, lactoferrin, norovirus, or ova and parasite.  Patient is technically immunocompromised given her history of radiation treatments and cancer.  Scans ordered today  4/6/2023-patient complaining today of decreased pain but increased distention in the abdomen.  She has less nausea and no vomiting.  She also has had decreased diarrhea.  Patient's abdominal pain distention most likely secondary to radiation-induced inflammatory disease.  Patient has however component of aspiration versus possible gastroenteritis.  The patient at this point is being ruled out for possible bacterial causes for infection.  Procalcitonin levels have decreased considerably.  Continue with fluid resuscitation and antibiotics.  4/7/2023 patient lost IV access and because of this the patient was not able to be given antiemetic management.  The patient now has had the IV replaced and at this point nausea is under better control.  Diarrhea still persists.  She has not been able to give us however liquidy diarrhea-like in the beginning.  Is more formed and they are not able to do a lot of the test that we  ordered in the beginning.  Aspiration pneumonia still high suspicion as the patient's lung status definitely worsened with initial evaluation and procalcitonin levels were also very high.  Speech therapy has not had a chance to evaluate the patient and recommend regular diet.  Physical therapy occupational therapy have had a chance to evaluate the patient they recommend placement.  Brother to decide with the patient which facility she is allowing us to refer her to.  4/8/2023-patient still complains of abdominal pain in the epigastric region.  Imaging studies show at this point a distended stomach.  Physical examination also confirms this and the patient does have a large gas bubble that is able to be percussed.  The patient still reports nausea vomiting and diarrhea.  She gets frequent antiemetic management.  Stool studies have not revealed any abnormalities.  Swallowing studies were normal.  And the patient is on a regular diet with thin liquids.  Patient did seem to aspirate with her nausea vomiting.  She is still getting antibiotics and day 6 of IV Zosyn.  I have asked gastroenterology to consult on the patient for possible evaluation of the gastric distention.    Interval Problem Update  IV Zosyn day #6  Negative cultures  Negative stool studies  Procalcitonin is improving  Regular diet per speech therapy  PT OT recommends placement  We will consult gastroenterology to evaluate for potential gastric distention evaluation.    I have discussed this patient's plan of care and discharge plan at IDT rounds today with Case Management, Nursing, Nursing leadership, and other members of the IDT team.    Consultants/Specialty  None    Code Status  Full Code    Disposition  Patient is not medically cleared for discharge.   Anticipate discharge to to skilled nursing facility.  I have placed the appropriate orders for post-discharge needs.    Review of Systems  Review of Systems   Constitutional:  Positive for  malaise/fatigue. Negative for weight loss.   HENT: Negative.  Negative for congestion and hearing loss.    Eyes: Negative.  Negative for blurred vision and double vision.   Respiratory:  Positive for shortness of breath. Negative for cough, sputum production and stridor.    Cardiovascular: Negative.  Negative for chest pain, orthopnea and claudication.   Gastrointestinal:  Positive for abdominal pain, diarrhea and nausea. Negative for heartburn and vomiting.   Genitourinary: Negative.  Negative for dysuria, frequency and urgency.   Musculoskeletal:  Positive for back pain. Negative for joint pain.   Skin: Negative.  Negative for itching and rash.   Neurological:  Positive for headaches. Negative for tingling, tremors and sensory change.   Endo/Heme/Allergies: Negative.    Psychiatric/Behavioral:  Positive for depression. Negative for substance abuse and suicidal ideas.    All other systems reviewed and are negative.     Physical Exam  Temp:  [36.4 °C (97.5 °F)-36.9 °C (98.5 °F)] 36.6 °C (97.9 °F)  Pulse:  [80-96] 93  Resp:  [17-18] 18  BP: ()/(63-89) 97/63  SpO2:  [90 %-99 %] 96 %    Physical Exam  Vitals and nursing note reviewed. Exam conducted with a chaperone present.   Constitutional:       Appearance: Normal appearance. She is well-developed. She is obese. She is ill-appearing.   HENT:      Head: Normocephalic and atraumatic.      Right Ear: External ear normal.      Left Ear: External ear normal.      Nose: Nose normal. No congestion or rhinorrhea.      Mouth/Throat:      Mouth: Mucous membranes are dry.      Pharynx: Oropharynx is clear. No oropharyngeal exudate or posterior oropharyngeal erythema.   Eyes:      General: No visual field deficit.        Right eye: No discharge.         Left eye: No discharge.      Extraocular Movements: Extraocular movements intact.      Conjunctiva/sclera: Conjunctivae normal.      Pupils: Pupils are equal, round, and reactive to light.   Neck:      Thyroid: No thyroid  mass.      Vascular: No carotid bruit or JVD.   Cardiovascular:      Rate and Rhythm: Normal rate and regular rhythm.      Pulses: Normal pulses.      Heart sounds: Normal heart sounds, S1 normal and S2 normal. No murmur heard.    No friction rub. No gallop.   Pulmonary:      Effort: Pulmonary effort is normal. No respiratory distress.      Breath sounds: Decreased air movement present. No stridor. Examination of the right-middle field reveals decreased breath sounds. Examination of the left-middle field reveals decreased breath sounds. Examination of the right-lower field reveals decreased breath sounds. Examination of the left-lower field reveals decreased breath sounds. Decreased breath sounds present.   Abdominal:      General: Abdomen is flat. Bowel sounds are increased. There is distension.      Palpations: Abdomen is soft.      Tenderness: There is generalized abdominal tenderness. There is no right CVA tenderness, left CVA tenderness or rebound.      Hernia: No hernia is present.   Musculoskeletal:         General: No swelling or tenderness. Normal range of motion.      Cervical back: Normal range of motion and neck supple. No edema or tenderness.      Right lower leg: Edema present.      Left lower leg: Edema present.      Right foot: Normal range of motion. No deformity or foot drop.      Left foot: Normal range of motion. No deformity or foot drop.   Feet:      Right foot:      Skin integrity: Skin integrity normal. No ulcer.      Left foot:      Skin integrity: Skin integrity normal. No ulcer.   Lymphadenopathy:      Head:      Right side of head: No submental adenopathy.      Left side of head: No submental adenopathy.      Cervical:      Right cervical: No superficial cervical adenopathy.     Left cervical: No superficial cervical adenopathy.      Upper Body:      Right upper body: No supraclavicular adenopathy.      Left upper body: No supraclavicular adenopathy.      Lower Body: No right inguinal  adenopathy. No left inguinal adenopathy.   Skin:     General: Skin is warm and dry.      Capillary Refill: Capillary refill takes less than 2 seconds.      Findings: Erythema present. No abrasion, bruising or wound.   Neurological:      General: No focal deficit present.      Mental Status: She is alert and oriented to person, place, and time. Mental status is at baseline.      GCS: GCS eye subscore is 4. GCS verbal subscore is 5. GCS motor subscore is 6.      Cranial Nerves: No cranial nerve deficit, dysarthria or facial asymmetry.      Sensory: Sensation is intact. No sensory deficit.      Motor: Weakness and atrophy present. No tremor, abnormal muscle tone, seizure activity or pronator drift.      Coordination: Romberg sign negative. Coordination abnormal. Finger-Nose-Finger Test and Heel to Shin Test normal. Impaired rapid alternating movements.      Gait: Gait abnormal.   Psychiatric:         Mood and Affect: Mood and affect normal.         Speech: Speech normal.         Behavior: Behavior normal. Behavior is cooperative.         Thought Content: Thought content normal.         Judgment: Judgment normal.   4/7/2023-patient lost IV access    Fluids    Intake/Output Summary (Last 24 hours) at 4/8/2023 1245  Last data filed at 4/8/2023 1240  Gross per 24 hour   Intake 840 ml   Output 200 ml   Net 640 ml         Laboratory  Recent Labs     04/06/23  0905 04/07/23  0747   WBC 12.1* 11.9*   RBC 3.22* 2.69*   HEMOGLOBIN 11.3* 9.5*   HEMATOCRIT 34.7* 28.8*   .8* 107.1*   MCH 35.1* 35.3*   MCHC 32.6* 33.0*   RDW 57.5* 56.8*   PLATELETCT 252 279   MPV 10.9 11.0       Recent Labs     04/06/23  0905 04/07/23  0747   SODIUM 137 137   POTASSIUM 3.4* 3.5*   CHLORIDE 109 109   CO2 16* 19*   GLUCOSE 140* 161*   BUN 46* 42*   CREATININE 1.76* 1.86*   CALCIUM 8.6 8.8       Recent Labs     04/07/23  0912   INR 2.21*                   Imaging  IR-MIDLINE CATHETER INSERTION WO GUIDANCE > AGE 3   Final Result                   Ultrasound-guided midline placement performed by qualified nursing staff    as above.          ME-IKIMIES-7 VIEW   Final Result      1.  Gastric dilation      2.  Multiple prior vertebroplasty      3.  Atelectasis at the right lung base      US-RUQ   Final Result      Echogenic mildly enlarged liver, a nonspecific finding that often is found to represent steatosis.  Other infiltrative processes could have an identical appearance      Cholecystectomy without biliary dilatation      Simple right renal cyst which does not require follow-up      DX-CHEST-PORTABLE (1 VIEW)   Final Result      Enlargement the cardiac silhouette and right basilar atelectasis      CT-RENAL COLIC EVALUATION(A/P W/O)   Final Result      1.  4 mm stone in the LEFT mid to proximal ureter without significant obstructive changes.   2.  Bilateral nonobstructing kidney stones present.   3.  Multiple bilateral kidney cysts of varying density.   4.  Increased fat within the colon wall may indicate chronic inflammatory process.   5.  No evidence for acute colitis or diverticulitis.   6.  Bilateral flank/abdominal wall edema, worse on the LEFT.                Assessment/Plan  * Nausea & vomiting- (present on admission)  Assessment & Plan  Continue antiemetic management      Pneumonia in infectious disease- (present on admission)  Assessment & Plan  With ongoing nausea vomiting suspect that the patient has had aspiration pneumonia  Speech therapy did see the patient and at this point they recommend a regular diet with thin liquids        Right upper quadrant abdominal pain  Assessment & Plan  Imaging studies show a distended and dilated stomach  Patient still complains of nausea and vomiting as well as diarrhea  Procalcitonin level initially very high and is coming down.  Possible infection behind the situation however no infectious source identified  Gastroenterology consulted for possible need to evaluate with EGD    Elevated  procalcitonin  Assessment & Plan  With ongoing antibiotic treatment procalcitonin level is down to 4.1 initially was over 30    Chronic inflammation of colon  Assessment & Plan  Unknown cause of colonic inflammation in the past.  Currently no stool studies came back positive.  Patient apparently has never seen gastroenterology  May benefit from colonoscopy    Recurrent non-small cell lung cancer (HCC)- (present on admission)  Assessment & Plan  Patient has completed radiation therapy  Oncology would like to repeat a CT scan of her chest in 6 to 8 weeks  In 2016 patient did have left upper lobe resection  Diagnosis was an adenocarcinoma  Recent CT-guided biopsy reveals a pulmonary nodule that still has adenocarcinoma in the left lingula  For this patient received radiation treatment and follow-up CT scan will show if this actually worked    Spoke with gastroenterology today.  Highly doubt that the patient has radiation-induced gastritis but other process could certainly be in the background of her distention.    Deep vein thrombosis (DVT) (formerly [facility])- (present on admission)  Assessment & Plan  Remains on Xarelto 20 mg daily    Chronic obstructive pulmonary disease (HCC)- (present on admission)  Assessment & Plan  The patient has very poor COPD  Continue with nebulizer treatment  Continue with RT protocol  Oxygen to keep oxygen saturations above 90%    Hypertension- (present on admission)  Assessment & Plan  Most recent blood pressure down to 97/63  Continue losartan 50 mg daily    Debility- (present on admission)  Assessment & Plan  PT OT recommends placement  Still deciding which skilled facility she would like to go to    Macrocytic anemia- (present on admission)  Assessment & Plan  Monitor H&H  Most recent B12 level was 1378    CKD (chronic kidney disease) stage 3, GFR 30-59 ml/min (formerly [facility])- (present on admission)  Assessment & Plan  Monitor renal functions  Most recently BUN is 42 with a creatinine 1.86 which has improved  from previous      Hyperlipidemia- (present on admission)  Assessment & Plan  Low-fat low-cholesterol diet  Statin in the form of Lipitor continue 20 mg nightly  Repeat fasting lipid panel pending  Lab Results   Component Value Date/Time    CHOLSTRLTOT 179 12/06/2022 03:02 PM    LDL 52 12/06/2022 03:02 PM    HDL 86 12/06/2022 03:02 PM    TRIGLYCERIDE 207 (H) 12/06/2022 03:02 PM                 VTE prophylaxis: SCDs/TEDs    I have performed a physical exam and reviewed and updated ROS and Plan today (4/8/2023). In review of yesterday's note (4/7/2023), there are no changes except as documented above.

## 2023-04-08 NOTE — CONSULTS
"Gastroenterology Consult Note:    QUE Dela Cruz  Date & Time note created:    4/8/2023   1:49 PM     Referring MD:  Dr. Bo Montes    Patient ID:  Name:             Latonia Clinton   YOB: 1950  Age:                 72 y.o.  female   MRN:               [id number]                                                             Reason for Consult:      Abdominal pain  N/V/D    History of Present Illness:    This is a 73 yo female PMH COPD, lung cancer s/p radiation, DVT on Xarelto, HTN, Hypothyroidism, HLD, nephrolithiasis, RA who was admitted to the hospital with a 2-3 week history of generalized abdominal pain, N/V/diarrhea. CT scan showed nephrolithiasis with no obstruction, fluid filled distension of proximal colon mild wall thickening changes consistent with ileus vs colitis. During hospitalization, may have aspirated from vomiting, WBC trended up. BUN: 42. Crt: 1.86 which is her baseline. Alkaline phosphatase: 145. Lipase: 200-->147. C diff: negative. Stool culture: negative.  US: mildly enlarged liver, no biliary dilatation.    Today, patient stated she is still  having bile \"come up into her throat\" but does not vomit.Has been tolerating some foods and liquids. No longer experiencing diarrhea over the past couple of day. Spoke with RN, patient has been receiving Compazine in the morning with no vomiting. Had a small brown smear of stool in her Depends, not enough to collect a sample. Patient has not been urinating. Now, hypotensive. Bilateral arms are weeping.    Review of Systems:      Review of Systems   Constitutional:  Positive for malaise/fatigue and weight loss.   HENT: Negative.     Eyes: Negative.    Respiratory:  Positive for shortness of breath.    Cardiovascular:  Positive for leg swelling.   Gastrointestinal:  Positive for abdominal pain, diarrhea, nausea and vomiting. Negative for blood in stool and melena.   Genitourinary: Negative.    Musculoskeletal: Negative. " "   Skin: Negative.    Neurological:  Positive for weakness.   Psychiatric/Behavioral: Negative.             Physical Exam:  Vitals/ General Appearance:   Weight/BMI: Body mass index is 37.61 kg/m².    Vitals:    04/08/23 0443 04/08/23 0500 04/08/23 0723 04/08/23 0921   BP: 128/89   97/63   Pulse: 96  90 93   Resp: 18  17 18   Temp: 36.4 °C (97.5 °F)   36.6 °C (97.9 °F)   TempSrc: Oral   Oral   SpO2: 98%  97% 96%   Weight:  99.4 kg (219 lb 2.2 oz)     Height:         Oxygen Therapy:  Pulse Oximetry: 96 %, O2 (LPM): 3, O2 Delivery Device: Silicone Nasal Cannula    Physical Exam  Vitals reviewed.   Constitutional:       Appearance: She is obese. She is ill-appearing.   HENT:      Head: Normocephalic and atraumatic.      Mouth/Throat:      Mouth: Mucous membranes are moist.   Eyes:      Extraocular Movements: Extraocular movements intact.      Pupils: Pupils are equal, round, and reactive to light.   Cardiovascular:      Rate and Rhythm: Normal rate and regular rhythm.      Pulses: Normal pulses.      Heart sounds: Normal heart sounds.   Pulmonary:      Breath sounds: Normal breath sounds. Decreased air movement present.   Abdominal:      General: Abdomen is protuberant. Bowel sounds are normal. There is distension.      Palpations: Abdomen is soft.      Tenderness: There is generalized abdominal tenderness.   Musculoskeletal:      Cervical back: Normal range of motion and neck supple.   Skin:     General: Skin is warm and dry.      Coloration: Skin is pale.   Neurological:      Mental Status: She is alert and oriented to person, place, and time.   Psychiatric:         Attention and Perception: Attention normal.         Mood and Affect: Affect normal.         Speech: Speech normal.       Past Medical History:   Past Medical History:   Diagnosis Date    Anesthesia     \"Abnormal blood pressure and breathing\"  \"couldn't breathe\"    Asthma     inhalers daily    Backpain 7/2017     thor. area    Blood clot in vein 07/06/2018 " "   \"Currently have a blood clot in my left eye\"    Bowel habit changes 07/06/2018    Constipation    Breath shortness     with exertion has O2 but does not use    Bronchitis     CAD (coronary artery disease)     palpatations    Cancer (HCC) 2016    left lung    Chickenpox     COPD (chronic obstructive pulmonary disease) (HCC)     Depression 2/26/2019    Dialysis 2005    transient renal failure due to sepsis    Emphysema of lung (HCC)     Glaucoma     right eye    Hemorrhagic disorder (HCC)     Hyperlipidemia     Hypertension     Hyperthyroidism     Kidney stone     bilateral    Lung cancer (HCC) 12/12/2016    Multiple thyroid nodules 7/9/2015    Nasal drainage     Obstruction of left ureteropelvic junction (UPJ) due to stone 6/17/2018    Osteoporosis     Pain 07/06/2018    Back pain    Personal history of venous thrombosis and embolism 2004, 2012    right leg 2012, left arm dvt 2004 left eye 2017    Pneumonia 7/2016    per patient    Renal disorder     had been on dialysis for 7 months for acute failure 2005    Renal stones 2013    post lithotripsy    Rheumatoid arthritis (HCC)     question    Rheumatoid arthritis (HCC)     S/P appendectomy 1998     S/P cholecystectomy 1998    S/P kyphoplasty 2006, 2007, 2013    Sepsis, unspecified 2005    Shortness of breath 07/06/2018    Chronic current problem. \"A couple of years now\".  O2 concentrator at night - pt states she doesn't use it.    Thyroid nodule, hot 2017    functional \"hot\" right thyroid nodule without thyrotoxicosis       Past Surgical History:  Past Surgical History:   Procedure Laterality Date    BRONCHOSCOPY, ROBOT-ASSISTED  10/11/2022    Procedure: FIBER OPTIC BRONCHOSCOPY WITH  WASH, BRUSH, BRONCHOALVEOLAR LAVAGE, BIOSPY, FINE NEEDLE ASPIRATION & NAVIGATION, ROBOTICS;  Surgeon: Donita Haque M.D.;  Location: SURGERY [facility];  Service: Pulmonary Robotic    CYSTOSCOPY STENT PLACEMENT  7/9/2018    Procedure: Cystoscopy,  Left removal of stent ,  " Left Stent Placement;  Surgeon: Beck Yang M.D.;  Location: [facility];  Service: Urology    URETEROSCOPY Left 7/9/2018    Procedure: URETEROSCOPY;  Surgeon: Beck Yang M.D.;  Location: [facility];  Service: Urology    LASERTRIPSY Left 7/9/2018    Procedure: LASERTRIPSY-LITHO;  Surgeon: Beck Yang M.D.;  Location: [facility];  Service: Urology    CYSTOSCOPY STENT PLACEMENT Left 6/18/2018    Procedure: CYSTOSCOPY STENT PLACEMENT;  Surgeon: Beck Yang M.D.;  Location: [facility];  Service: Urology    LITHOTRIPSY Left 6/18/2018    Procedure: LITHOTRIPSY;  Surgeon: Beck Yang M.D.;  Location: [facility];  Service: Urology    LASERTRIPSY Left 6/18/2018    Procedure: LASERTRIPSY;  Surgeon: Beck Yang M.D.;  Location: [facility];  Service: Urology    URETEROSCOPY Left 6/18/2018    Procedure: URETEROSCOPY;  Surgeon: Beck Yang M.D.;  Location: [facility];  Service: Urology    THORACOSCOPY Left 12/12/2016    Procedure: THORACOSCOPY W/WEDGE RESECTION UPPER LOBE MASS;  Surgeon: John H Ganser, M.D.;  Location: [facility];  Service:     OTHER ORTHOPEDIC SURGERY  2013    kyphoplasty X3    APPENDECTOMY      1990    CHOLECYSTECTOMY      1990    LAMINOTOMY      LUNG BIOPSY OPEN      OTHER      OTHER ABDOMINAL SURGERY      gall bladder disease    NE BREAST AUGMENTATION WITH IMPLANT      NE BREAST REDUCTION         Hospital Medications:    Current Facility-Administered Medications:     potassium chloride SA (Kdur) tablet 20 mEq, 20 mEq, Oral, TID, Bo Montes M.D., 20 mEq at 04/07/23 1743    Respiratory Therapy Consult, , Nebulization, Continuous RT, Bo Montes M.D.    albuterol (PROVENTIL) 2.5mg/0.5ml nebulizer solution 2.5 mg, 2.5 mg, Nebulization, Q4H PRN (RT), Bo Montes M.D.    methimazole (TAPAZOLE) tablet 5 mg, 5 mg, Oral, DAILY, Bo Montes M.D., 5 mg at 04/08/23 1992    Brimonidine  Tartrate-Timolol 0.2-0.5 % SOLN 1 Drop, 1 Drop, Both Eyes, BID, Bo Montes M.D., 1 Drop at 04/08/23 0554    [COMPLETED] piperacillin-tazobactam (Zosyn) 3.375 g in  mL IVPB, 3.375 g, Intravenous, Once, Stopped at 04/04/23 1534 **AND** piperacillin-tazobactam (Zosyn) 3.375 g in  mL IVPB, 3.375 g, Intravenous, Q8HRS, Bo Montes M.D., Last Rate: 25 mL/hr at 04/08/23 1141, 3.375 g at 04/08/23 1141    ondansetron (ZOFRAN) syringe/vial injection 4 mg, 4 mg, Intravenous, Q4HRS PRN, Luz Maria Deras M.D.    prochlorperazine (COMPAZINE) injection 10 mg, 10 mg, Intravenous, Q6HRS PRN, Luz Maria Deras M.D., 10 mg at 04/08/23 1141    melatonin tablet 5 mg, 5 mg, Oral, Nightly, Luz Maria Deras M.D.    ondansetron (ZOFRAN ODT) dispertab 4 mg, 4 mg, Oral, Q4HRS PRN, QUE Gomez, 4 mg at 04/07/23 0411    allopurinol (ZYLOPRIM) tablet 200 mg, 200 mg, Oral, QAM, Sadie Gaitan M.D., 200 mg at 04/08/23 0554    atorvastatin (LIPITOR) tablet 20 mg, 20 mg, Oral, Nightly, Sadie Gaitan M.D., 20 mg at 04/07/23 2050    [START ON 4/10/2023] vitamin D2 (Ergocalciferol) (Drisdol) capsule 50,000 Units, 50,000 Units, Oral, Q7 DAYS, Sadie Gaitan M.D.    HYDROcodone-acetaminophen (NORCO) 5-325 MG per tablet 1 Tablet, 1 Tablet, Oral, Q4HRS PRN, Sadie Gaitan M.D., 1 Tablet at 04/08/23 1141    rivaroxaban (XARELTO) tablet 20 mg, 20 mg, Oral, PM MEAL, Sadie Gaitan M.D., 20 mg at 04/07/23 1743    tiotropium (Spiriva Respimat) 2.5 mcg/Act inhalation spray 5 mcg, 5 mcg, Inhalation, DAILY, Sadie Gaitan M.D., 5 mcg at 04/08/23 0554    zolpidem (AMBIEN) tablet 5 mg, 5 mg, Oral, HS PRN, Sadie Gaitan M.D.    acetaminophen (Tylenol) tablet 650 mg, 650 mg, Oral, Q6HRS PRN, Sadie Gaitan M.D., 650 mg at 04/07/23 1341    budesonide-formoterol (SYMBICORT) 160-4.5 MCG/ACT inhaler 2 Puff, 2 Puff, Inhalation, BID (RT), Sadie Gaitan M.D., 2 Puff at 04/08/23 0127     pantoprazole (Protonix) injection 40 mg, 40 mg, Intravenous, DAILY, Sadie Gaitan M.D., 40 mg at 04/08/23 0554    calcium carbonate (Tums) chewable tab 500 mg, 500 mg, Oral, BID PRN, Marc Dickinson D.O., 500 mg at 04/04/23 0221    Current Outpatient Medications:  Current Facility-Administered Medications   Medication Dose Route Frequency Provider Last Rate Last Admin    potassium chloride SA (Kdur) tablet 20 mEq  20 mEq Oral TID Bo Montes M.D.   20 mEq at 04/07/23 1743    Respiratory Therapy Consult   Nebulization Continuous RT Bo Montes M.D.        albuterol (PROVENTIL) 2.5mg/0.5ml nebulizer solution 2.5 mg  2.5 mg Nebulization Q4H PRN (RT) Bo Montes M.D.        methimazole (TAPAZOLE) tablet 5 mg  5 mg Oral DAILY Bo Montes M.D.   5 mg at 04/08/23 0554    Brimonidine Tartrate-Timolol 0.2-0.5 % SOLN 1 Drop  1 Drop Both Eyes BID Bo Montes M.D.   1 Drop at 04/08/23 0554    piperacillin-tazobactam (Zosyn) 3.375 g in  mL IVPB  3.375 g Intravenous Q8HRS Bo Montes M.D. 25 mL/hr at 04/08/23 1141 3.375 g at 04/08/23 1141    ondansetron (ZOFRAN) syringe/vial injection 4 mg  4 mg Intravenous Q4HRS PRN LuzM aria Deras M.D.        prochlorperazine (COMPAZINE) injection 10 mg  10 mg Intravenous Q6HRS PRN Luz Maria Deras M.D.   10 mg at 04/08/23 1141    melatonin tablet 5 mg  5 mg Oral Nightly Luz Maria Deras M.D.        ondansetron (ZOFRAN ODT) dispertab 4 mg  4 mg Oral Q4HRS PRN QUE Gomez   4 mg at 04/07/23 0411    allopurinol (ZYLOPRIM) tablet 200 mg  200 mg Oral QAM Solaiman Algharbi, M.D.   200 mg at 04/08/23 0554    atorvastatin (LIPITOR) tablet 20 mg  20 mg Oral Nightly Sadie Gaitan M.D.   20 mg at 04/07/23 2050    [START ON 4/10/2023] vitamin D2 (Ergocalciferol) (Drisdol) capsule 50,000 Units  50,000 Units Oral Q7 DAYS Sadie Gaitan M.D.        HYDROcodone-acetaminophen (NORCO) 5-325 MG per tablet 1 Tablet  1 Tablet Oral Q4HRS PRN  Sadie Gaitan M.D.   1 Tablet at 23 1141    rivaroxaban (XARELTO) tablet 20 mg  20 mg Oral PM MEAL Sadie Gaitan M.D.   20 mg at 23 1743    tiotropium (Spiriva Respimat) 2.5 mcg/Act inhalation spray 5 mcg  5 mcg Inhalation DAILY Sadie Gaitan M.D.   5 mcg at 23 0554    zolpidem (AMBIEN) tablet 5 mg  5 mg Oral HS PRN Sadie Gaitan M.D.        acetaminophen (Tylenol) tablet 650 mg  650 mg Oral Q6HRS PRN Sadie Gaitan M.D.   650 mg at 23 1341    budesonide-formoterol (SYMBICORT) 160-4.5 MCG/ACT inhaler 2 Puff  2 Puff Inhalation BID (RT) Sadie Gaitan M.D.   2 Puff at 23 0723    pantoprazole (Protonix) injection 40 mg  40 mg Intravenous DAILY Sadie Gaitan M.D.   40 mg at 23 0554    calcium carbonate (Tums) chewable tab 500 mg  500 mg Oral BID PRN Marc Dickinson D.O.   500 mg at 23 0221       Medication Allergy:  No Known Allergies    Family History:  Family History   Problem Relation Age of Onset    Cancer Mother         kidney    Heart Failure Father     Heart Disease Brother     Cancer Maternal Uncle         liver    Cancer Paternal Aunt         ovarian    Cancer Paternal Uncle         lung       Social History:  Social History     Socioeconomic History    Marital status: Single     Spouse name: Not on file    Number of children: Not on file    Years of education: Not on file    Highest education level: Not on file   Occupational History    Not on file   Tobacco Use    Smoking status: Former     Packs/day: 1.00     Years: 30.00     Pack years: 30.00     Types: Cigarettes     Quit date: 2000     Years since quittin.2    Smokeless tobacco: Never    Tobacco comments:     7 years ago   Vaping Use    Vaping Use: Never used   Substance and Sexual Activity    Alcohol use: Yes     Alcohol/week: 0.0 oz     Comment: x2 a week    Drug use: No    Sexual activity: Not on file   Other Topics Concern    Not on file   Social History Narrative     Not on file     Social Determinants of Health     Financial Resource Strain: Not on file   Food Insecurity: Not on file   Transportation Needs: Not on file   Physical Activity: Not on file   Stress: Not on file   Social Connections: Not on file   Intimate Partner Violence: Not on file   Housing Stability: Not on file         MDM (Data Review):     Records reviewed and summarized in current documentation    Lab Data Review:  No results found for this or any previous visit (from the past 24 hour(s)).    Imaging/Procedures Review:      IR-MIDLINE CATHETER INSERTION WO GUIDANCE > AGE 3   Final Result                  Ultrasound-guided midline placement performed by qualified nursing staff    as above.          RE-[id number] VIEW   Final Result      1.  Gastric dilation      2.  Multiple prior vertebroplasty      3.  Atelectasis at the right lung base      US-RUQ   Final Result      Echogenic mildly enlarged liver, a nonspecific finding that often is found to represent steatosis.  Other infiltrative processes could have an identical appearance      Cholecystectomy without biliary dilatation      Simple right renal cyst which does not require follow-up      DX-CHEST-PORTABLE (1 VIEW)   Final Result      Enlargement the cardiac silhouette and right basilar atelectasis      CT-RENAL COLIC EVALUATION(A/P W/O)   Final Result      1.  4 mm stone in the LEFT mid to proximal ureter without significant obstructive changes.   2.  Bilateral nonobstructing kidney stones present.   3.  Multiple bilateral kidney cysts of varying density.   4.  Increased fat within the colon wall may indicate chronic inflammatory process.   5.  No evidence for acute colitis or diverticulitis.   6.  Bilateral flank/abdominal wall edema, worse on the LEFT.              MDM (Assessment and Plan):     Patient Active Problem List    Diagnosis Date Noted    Nausea & vomiting 04/03/2023    Right upper quadrant abdominal pain 04/03/2023    Chronic inflammation  of colon 04/03/2023    Pneumonia in infectious disease 04/03/2023    Hypercalcemia 03/24/2023    Recurrent non-small cell lung cancer (HCC) 10/11/2022    Chronic pain of left knee 08/10/2022    Debility 05/12/2022    Chronic anticoagulation 05/12/2022    Preventative health care 05/11/2022    Chronic bilateral low back pain without sciatica 02/16/2022    Postmenopausal osteoporosis 03/15/2021    Central retinal vein occlusion 12/19/2019    Hyperparathyroidism ([facility]) 11/13/2019    Gout of foot 04/29/2019    Other specified glaucoma 04/29/2019    Other insomnia 04/29/2019    Right ventricular dilation 04/24/2019    Depression, major, recurrent, moderate ([facility]) 02/26/2019    Macrocytic anemia 02/12/2019    Acute on chronic kidney failure ([facility]) 02/11/2019    Closed compression fracture of second lumbar vertebra ([facility]) 01/28/2019    Closed wedge compression fracture of first lumbar vertebra ([facility]) 01/24/2019    Closed T11 fracture ([facility]) 06/17/2018    Lung cancer ([facility]) 12/12/2016    Deep vein thrombosis (DVT) ([facility]) 07/28/2016    Thyrotoxicosis with toxic single thyroid nodule and without thyroid storm 07/25/2016    Chronic obstructive pulmonary disease ([facility]) 07/24/2016    CKD (chronic kidney disease) stage 3, GFR 30-59 ml/min ([facility]) 07/24/2016    Elevated procalcitonin 06/16/2015    Hyperlipidemia     Hypertension      This is a 73 yo female who was admitted to the hospital with generalized abdominal pain, N/V/D for the past 2-3 weeks, progressively getting worse. Stool studies:culture, C diff: negative. May have aspirated causing aspiration pneumonia. Started on IV Zosyn. Continues to complain of bile that she bring up into her throat but no more episodes of vomiting. No diarrhea for the past 2 days--per RN. Had a small smear yesterday but not enough to collect a stool sample. Abdomen more distended. CT scan 3/30/23 revealed mild wall thickening of the proximal colon likely ileus vs colitis. X ray 4/5/23: Gastric dilation.  History of lung cancer s/p radiation, last radiation treatment end of January 2023. She is on Xarelto for DVT.     ASSESSMENT:  Abdominal pain: patient is distended with air found on Xray  N/V: last episode of vomiting was over 2 days ago  Diarrhea: has improved. NO bowel movements over the past 2 days  Lung cancer s/p radiation  DVT on Xarelto  Chronic kidney disease  Anuria: RN stated patient has not urinated for over 24 hours  Hypotension: New finding    PLAN:  CT scan abdomen/pelvis  Start Sucralfate suspension QID for possible bile induced gastritis  Recommend obtaining lactoferrin, O&P, calprotectin if she experiences diarrhea again   I will defer to the hospitalist for management of the patient's other multiple comorbid conditions.  Recommend strict UA may need lópez      Thank your for the opportunity to assist in the care of your patient.  Please call for any questions or concerns.    ZAFAR Dela Cruz.

## 2023-04-08 NOTE — PROGRESS NOTES
Pt care assumed and assessment completed. Pt denies pain or nausea at this time. Repositioned on her right side. Pt updated with POC which includes continuing abx therapy and Q2 hr repositioning. Call light within reach, bed locked and in lowest position, pt declines additional needs or questions at this time.    Declines

## 2023-04-08 NOTE — PROGRESS NOTES
Pt tolerated a bed bath today. Dressing replaced. Drainage from skin tears required dressing changes to prevent increased moisture.   Pt sacrum assessed- see flow sheet, I will be requesting a wound RN consult to evaluate patient skin and open wounds to review appropriate dressings for pt. Sacral dressing for protection placed.   Pt remains bed bound and not able to turn self. Pt q2 turns provided by 2 person assist. Pillow and draw sheets for turning. Pt has a flat affect so does not express motivation or concern for any of these plans made, but is complaint with participating.   Medications administered as prescribed KENYETTA IV patent and infusing.

## 2023-04-09 NOTE — PROGRESS NOTES
Hospital Medicine Daily Progress Note    Date of Service  4/9/2023    Chief Complaint  Latonia Clinton is a 72 y.o. female admitted 4/3/2023 with abdominal pain and diarrhea    Hospital Course  4/4/2023-patient admitted from skilled nursing facility because of ongoing diarrhea.  Patient's C. difficile testing is negative.  Possible colitis with aspiration pneumonia present.  Patient did have nausea and vomiting.  The patient now on antibiotics at this point procalcitonin level extremely elevated  We are awaiting culture results.  Continue at this point with antibiotic management and fluid resuscitation.  4/5/2023-patient today complaining of more diarrhea.  Patient's abdominal distention seems to be slightly worse.  Flatplate of the abdomen has been ordered which at this point is pending.  I will at this point request stool for culture, lactoferrin, norovirus, or ova and parasite.  Patient is technically immunocompromised given her history of radiation treatments and cancer.  Scans ordered today  4/6/2023-patient complaining today of decreased pain but increased distention in the abdomen.  She has less nausea and no vomiting.  She also has had decreased diarrhea.  Patient's abdominal pain distention most likely secondary to radiation-induced inflammatory disease.  Patient has however component of aspiration versus possible gastroenteritis.  The patient at this point is being ruled out for possible bacterial causes for infection.  Procalcitonin levels have decreased considerably.  Continue with fluid resuscitation and antibiotics.  4/7/2023 patient lost IV access and because of this the patient was not able to be given antiemetic management.  The patient now has had the IV replaced and at this point nausea is under better control.  Diarrhea still persists.  She has not been able to give us however liquidy diarrhea-like in the beginning.  Is more formed and they are not able to do a lot of the test that we  ordered in the beginning.  Aspiration pneumonia still high suspicion as the patient's lung status definitely worsened with initial evaluation and procalcitonin levels were also very high.  Speech therapy has not had a chance to evaluate the patient and recommend regular diet.  Physical therapy occupational therapy have had a chance to evaluate the patient they recommend placement.  Brother to decide with the patient which facility she is allowing us to refer her to.  4/8/2023-patient still complains of abdominal pain in the epigastric region.  Imaging studies show at this point a distended stomach.  Physical examination also confirms this and the patient does have a large gas bubble that is able to be percussed.  The patient still reports nausea vomiting and diarrhea.  She gets frequent antiemetic management.  Stool studies have not revealed any abnormalities.  Swallowing studies were normal.  And the patient is on a regular diet with thin liquids.  Patient did seem to aspirate with her nausea vomiting.  She is still getting antibiotics and day 6 of IV Zosyn.  I have asked gastroenterology to consult on the patient for possible evaluation of the gastric distention.  4/9/2023-patient is still unfortunately complaining of left upper quadrant abdominal pain and epigastric pain.  She is still complaining of diarrhea.  She also is complaining of nausea.  Repeat CAT scan of the abdomen done and does not show any new findings.  GI at this point is signing off.  Stool studies are still pending.  Patient at this point receiving fluid resuscitation and we will need to monitor intake and output she has very poor appetite.  Once stabilized she can discharge to a skilled facility.    Interval Problem Update  IV Zosyn day #7, patient should be finishing after today the antibiotics  So far negative cultures  Stool studies still pending  Procalcitonin improving  Repeat CT of the abdomen negative  Continue fluid  resuscitation  Optimize pain management  Discharge planning to skilled facility    I have discussed this patient's plan of care and discharge plan at IDT rounds today with Case Management, Nursing, Nursing leadership, and other members of the IDT team.    Consultants/Specialty  None    Code Status  Full Code    Disposition  Patient is not medically cleared for discharge.   Anticipate discharge to to skilled nursing facility.  I have placed the appropriate orders for post-discharge needs.    Review of Systems  Review of Systems   Constitutional:  Positive for malaise/fatigue. Negative for chills and fever.   HENT: Negative.  Negative for congestion, ear pain and hearing loss.    Eyes: Negative.  Negative for blurred vision, double vision, pain and discharge.   Respiratory:  Positive for shortness of breath. Negative for cough, wheezing and stridor.    Cardiovascular: Negative.  Negative for chest pain, palpitations and leg swelling.   Gastrointestinal:  Positive for abdominal pain, diarrhea and nausea. Negative for heartburn and vomiting.   Genitourinary: Negative.  Negative for dysuria, flank pain, frequency and urgency.   Musculoskeletal:  Positive for back pain. Negative for joint pain and myalgias.   Skin: Negative.    Neurological:  Positive for headaches. Negative for tingling, tremors, sensory change, seizures and loss of consciousness.   Endo/Heme/Allergies: Negative.  Negative for polydipsia.   Psychiatric/Behavioral:  Positive for depression. Negative for substance abuse and suicidal ideas. The patient does not have insomnia.    All other systems reviewed and are negative.     Physical Exam  Temp:  [36.3 °C (97.4 °F)-37 °C (98.6 °F)] 37 °C (98.6 °F)  Pulse:  [] 96  Resp:  [16-20] 18  BP: (105-145)/(65-99) 118/83  SpO2:  [91 %-99 %] 96 %    Physical Exam  Vitals and nursing note reviewed. Exam conducted with a chaperone present.   Constitutional:       General: She is not in acute distress.      Appearance: Normal appearance. She is well-developed. She is obese. She is ill-appearing. She is not toxic-appearing.   HENT:      Head: Normocephalic and atraumatic.      Right Ear: External ear normal.      Left Ear: External ear normal.      Nose: Nose normal. No congestion or rhinorrhea.      Mouth/Throat:      Mouth: Mucous membranes are dry.      Pharynx: Oropharynx is clear. No oropharyngeal exudate or posterior oropharyngeal erythema.   Eyes:      General: No visual field deficit.        Right eye: No discharge.         Left eye: No discharge.      Extraocular Movements: Extraocular movements intact.      Conjunctiva/sclera: Conjunctivae normal.      Pupils: Pupils are equal, round, and reactive to light.   Neck:      Thyroid: No thyroid mass.      Vascular: No carotid bruit or JVD.   Cardiovascular:      Rate and Rhythm: Normal rate and regular rhythm.      Pulses: Normal pulses.      Heart sounds: Normal heart sounds, S1 normal and S2 normal. No murmur heard.    No friction rub. No gallop.   Pulmonary:      Effort: Pulmonary effort is normal. No respiratory distress.      Breath sounds: Decreased air movement present. No stridor. Examination of the right-middle field reveals decreased breath sounds. Examination of the left-middle field reveals decreased breath sounds. Examination of the right-lower field reveals decreased breath sounds. Examination of the left-lower field reveals decreased breath sounds. Decreased breath sounds present.   Abdominal:      General: Abdomen is flat. Bowel sounds are increased. There is distension.      Palpations: Abdomen is soft. There is no shifting dullness, fluid wave, hepatomegaly, splenomegaly, mass or pulsatile mass.      Tenderness: There is abdominal tenderness in the epigastric area and left upper quadrant. There is guarding.   Musculoskeletal:         General: No swelling or tenderness. Normal range of motion.      Cervical back: Normal range of motion and neck  supple. No edema or tenderness.      Right lower leg: Edema present.      Left lower leg: Edema present.      Right foot: Normal range of motion. No deformity or foot drop.      Left foot: Normal range of motion. No deformity or foot drop.   Feet:      Right foot:      Skin integrity: Skin integrity normal. No ulcer.      Left foot:      Skin integrity: Skin integrity normal. No ulcer.   Lymphadenopathy:      Head:      Right side of head: No submental adenopathy.      Left side of head: No submental adenopathy.      Cervical:      Right cervical: No superficial cervical adenopathy.     Left cervical: No superficial cervical adenopathy.      Upper Body:      Right upper body: No supraclavicular adenopathy.      Left upper body: No supraclavicular adenopathy.      Lower Body: No right inguinal adenopathy. No left inguinal adenopathy.   Skin:     General: Skin is warm and dry.      Capillary Refill: Capillary refill takes less than 2 seconds.      Findings: Erythema present. No abrasion, bruising or wound.   Neurological:      General: No focal deficit present.      Mental Status: She is alert and oriented to person, place, and time. Mental status is at baseline.      GCS: GCS eye subscore is 4. GCS verbal subscore is 5. GCS motor subscore is 6.      Cranial Nerves: No cranial nerve deficit, dysarthria or facial asymmetry.      Sensory: Sensation is intact. No sensory deficit.      Motor: Weakness and atrophy present. No tremor, abnormal muscle tone, seizure activity or pronator drift.      Coordination: Romberg sign negative. Coordination abnormal. Finger-Nose-Finger Test and Heel to Shin Test normal. Impaired rapid alternating movements.      Gait: Gait abnormal.   Psychiatric:         Mood and Affect: Mood and affect normal.         Speech: Speech normal.         Behavior: Behavior normal. Behavior is cooperative.         Thought Content: Thought content normal.         Judgment: Judgment normal.   4/7/2023-patient  lost IV access    Fluids    Intake/Output Summary (Last 24 hours) at 4/9/2023 1311  Last data filed at 4/9/2023 0918  Gross per 24 hour   Intake 1320 ml   Output 877 ml   Net 443 ml       Laboratory  Recent Labs     04/07/23  0747   WBC 11.9*   RBC 2.69*   HEMOGLOBIN 9.5*   HEMATOCRIT 28.8*   .1*   MCH 35.3*   MCHC 33.0*   RDW 56.8*   PLATELETCT 279   MPV 11.0     Recent Labs     04/07/23  0747   SODIUM 137   POTASSIUM 3.5*   CHLORIDE 109   CO2 19*   GLUCOSE 161*   BUN 42*   CREATININE 1.86*   CALCIUM 8.8     Recent Labs     04/07/23  0912   INR 2.21*                 Imaging  CT-ABDOMEN-PELVIS W/O   Final Result      1.  Stable CT scan findings.      2.  Left renal pelvic and proximal left ureteral stone without hydronephrosis.      3.  Bilateral cortical and renal cysts.      4.  Atherosclerotic disease.      5.  Anasarca with trace effusions.      IR-MIDLINE CATHETER INSERTION WO GUIDANCE > AGE 3   Final Result                  Ultrasound-guided midline placement performed by qualified nursing staff    as above.          [id number] VIEW   Final Result      1.  Gastric dilation      2.  Multiple prior vertebroplasty      3.  Atelectasis at the right lung base      US-RUQ   Final Result      Echogenic mildly enlarged liver, a nonspecific finding that often is found to represent steatosis.  Other infiltrative processes could have an identical appearance      Cholecystectomy without biliary dilatation      Simple right renal cyst which does not require follow-up      DX-CHEST-PORTABLE (1 VIEW)   Final Result      Enlargement the cardiac silhouette and right basilar atelectasis      CT-RENAL COLIC EVALUATION(A/P W/O)   Final Result      1.  4 mm stone in the LEFT mid to proximal ureter without significant obstructive changes.   2.  Bilateral nonobstructing kidney stones present.   3.  Multiple bilateral kidney cysts of varying density.   4.  Increased fat within the colon wall may indicate chronic inflammatory  process.   5.  No evidence for acute colitis or diverticulitis.   6.  Bilateral flank/abdominal wall edema, worse on the LEFT.                Assessment/Plan  * Nausea & vomiting- (present on admission)  Assessment & Plan  Patient reports still being nauseous continue with antiemetic management in the IV form      Pneumonia in infectious disease- (present on admission)  Assessment & Plan  Probable aspiration pneumonia  Patient is completing his Zosyn for antibiotic management        Right upper quadrant abdominal pain  Assessment & Plan  Patient still continues to have right upper quadrant abdominal pain  Patient still reports nausea  Patient states she had 3 episodes of diarrhea overnight  CT scan that was repeated shows no acute abnormalities  GI at this point is signing off  Stool studies still pending    Elevated procalcitonin  Assessment & Plan  Improving procalcitonin level as the infection is resolving    Chronic inflammation of colon  Assessment & Plan  Repeat CT does not demonstrate anything new.  GI at this point signing off  Patient still reporting pain and diarrhea    Recurrent non-small cell lung cancer (HCC)- (present on admission)  Assessment & Plan  Status post radiation treatment  Patient at this point will need continued outpatient follow-ups with oncology  Patient did have surgery in 2016  Repeat evaluation found a nodule in 2022 which they are keeping an eye on as an outpatient.    Deep vein thrombosis (DVT) (HCC)- (present on admission)  Assessment & Plan  Remains on Xarelto 20 mg daily    Chronic obstructive pulmonary disease (HCC)- (present on admission)  Assessment & Plan  Chronic COPD  No acute exacerbation  RT protocol  Nebulizer treatments  Oxygen to keep oxygen saturations above 90%    Hypertension- (present on admission)  Assessment & Plan  Blood pressure today stable at 118/63  Antihypertensive management currently being held  Fluid resuscitation    Debility- (present on  admission)  Assessment & Plan  Once medically cleared patient to discharge to a skilled facility  So far she has not made a choice    Macrocytic anemia- (present on admission)  Assessment & Plan  B12 level normal  Iron panel normal    CKD (chronic kidney disease) stage 3, GFR 30-59 ml/min ([facility])- (present on admission)  Assessment & Plan  Monitor renal functions and avoid nephrotoxic medications  Repeat BUN/creatinine pending      Hyperlipidemia- (present on admission)  Assessment & Plan  Low-fat low-cholesterol diet  Statin in the form of Lipitor continue 20 mg nightly  Lab Results   Component Value Date/Time    CHOLSTRLTOT 179 12/06/2022 03:02 PM    LDL 52 12/06/2022 03:02 PM    HDL 86 12/06/2022 03:02 PM    TRIGLYCERIDE 207 (H) 12/06/2022 03:02 PM                   VTE prophylaxis: SCDs/TEDs    I have performed a physical exam and reviewed and updated ROS and Plan today (4/9/2023). In review of yesterday's note (4/8/2023), there are no changes except as documented above.

## 2023-04-09 NOTE — PROGRESS NOTES
"Pt upright in bed having meal, calm, cooperative, pleasant affect. RN present for rounds at bedside bt DR Montes, POC reviewed which is for continued abx tx, wound care, discharge planning w/ case mgt. Fall and safety precautions in place, pt uses call light appropriately. RN collaborated w/ CNA for Q 2 turns for skin integrity.RN encouraged CDB w/ \"I.S.\" will reinforce t/o day. Pt has xarelto for VTE per MAR. Hourly rounds ongoing, call light w/in reach.   "

## 2023-04-09 NOTE — PROGRESS NOTES
Pt is resting in bed. Pt awakes to voice. Pt c/o 3/10 pain at this time. No n/v. Q2 turning in place. RN discussed POC with pt. All questions answered. Fall precautions in place.

## 2023-04-09 NOTE — CARE PLAN
The patient is Watcher - Medium risk of patient condition declining or worsening    Shift Goals  Clinical Goals: pt will not sustain any new skin breakdown this shift. pt will not sustain a fall this shift.  Patient Goals: sleep comfortably  Family Goals: NA    Progress made toward(s) clinical / shift goals:      Pt placed on high fall precautions. No falls this shift.    Q2 turns and bed bath performed. No new skin breakdown noted this shift.    Problem: Pain - Standard  Goal: Alleviation of pain or a reduction in pain to the patient’s comfort goal  Outcome: Progressing     Problem: Knowledge Deficit - Standard  Goal: Patient and family/care givers will demonstrate understanding of plan of care, disease process/condition, diagnostic tests and medications  Outcome: Progressing     Problem: Skin Integrity  Goal: Skin integrity is maintained or improved  Outcome: Progressing     Problem: Fall Risk  Goal: Patient will remain free from falls  Outcome: Progressing       Patient is not progressing towards the following goals:

## 2023-04-09 NOTE — PROGRESS NOTES
"Gastroenterology Consult Note:    QUE Dela Cruz  Date & Time note created:    4/9/2023   10:13 AM     Referring MD:  Dr. Bo Montes    Patient ID:  Name:             Latoina Clinton   YOB: 1950  Age:                 72 y.o.  female   MRN:               [id number]                                                             Reason for Consult:      Abdominal pain  N/V/D    History of Present Illness:    This is a 73 yo female PMH COPD, lung cancer s/p radiation, DVT on Xarelto, HTN, Hypothyroidism, HLD, nephrolithiasis, RA who was admitted to the hospital with a 2-3 week history of generalized abdominal pain, N/V/diarrhea. CT scan showed nephrolithiasis with no obstruction, fluid filled distension of proximal colon mild wall thickening changes consistent with ileus vs colitis. During hospitalization, may have aspirated from vomiting, WBC trended up. BUN: 42. Crt: 1.86 which is her baseline. Alkaline phosphatase: 145. Lipase: 200-->147. C diff: negative. Stool culture: negative.  US: mildly enlarged liver, no biliary dilatation.    Today, patient stated she is still  having bile \"come up into her throat\" but does not vomit.Has been tolerating some foods and liquids. No longer experiencing diarrhea over the past couple of day. Spoke with RN, patient has been receiving Compazine in the morning with no vomiting. Had a small brown smear of stool in her Depends, not enough to collect a sample. Patient has not been urinating. Now, hypotensive. Bilateral arms are weeping.    INTERVAL HISTORY:  4/9/23: Continues to have generalized abdominal pain. States she is having multiple episodes of diarrhea but spoke with the RN today and she only had 1 small soft stool this morning. Not sure if this is enough to be evaluated in lab. Still having some bile in her throat but no vomiting. Tolerating po intake. Labs stable. Still has mildly elevated alkaline phosphatase and lipase. CT scan 4/8/23 " with oral contrast was unremarkable for any inflammatory process of abdomen.    Review of Systems:      Review of Systems   Constitutional:  Positive for malaise/fatigue and weight loss.   HENT: Negative.     Eyes: Negative.    Respiratory:  Positive for shortness of breath.    Cardiovascular:  Positive for leg swelling.   Gastrointestinal:  Positive for abdominal pain, diarrhea, nausea and vomiting. Negative for blood in stool and melena.   Genitourinary: Negative.    Musculoskeletal: Negative.    Skin: Negative.    Neurological:  Positive for weakness.   Psychiatric/Behavioral: Negative.             Physical Exam:  Vitals/ General Appearance:   Weight/BMI: Body mass index is 37.61 kg/m².    Vitals:    04/08/23 1922 04/08/23 2326 04/09/23 0604 04/09/23 0918   BP:  (!) 145/99 105/65 118/83   Pulse: (!) 116 (!) 125 (!) 116 96   Resp: 18 20 16 18   Temp:  36.8 °C (98.3 °F) 36.3 °C (97.4 °F) 37 °C (98.6 °F)   TempSrc:  Axillary Oral Oral   SpO2: 99% 98% 97% 96%   Weight:       Height:         Oxygen Therapy:  Pulse Oximetry: 96 %, O2 (LPM): 3, O2 Delivery Device: Silicone Nasal Cannula    Physical Exam  Vitals reviewed.   Constitutional:       Appearance: She is obese. She is ill-appearing.   HENT:      Head: Normocephalic and atraumatic.      Mouth/Throat:      Mouth: Mucous membranes are moist.   Eyes:      Extraocular Movements: Extraocular movements intact.      Pupils: Pupils are equal, round, and reactive to light.   Cardiovascular:      Rate and Rhythm: Regular rhythm. Tachycardia present.      Pulses: Normal pulses.      Heart sounds: Normal heart sounds.   Pulmonary:      Breath sounds: Normal breath sounds. Decreased air movement present.   Abdominal:      General: Abdomen is protuberant. Bowel sounds are normal. There is distension.      Palpations: Abdomen is soft.      Tenderness: There is generalized abdominal tenderness.   Musculoskeletal:      Cervical back: Normal range of motion and neck supple.  "  Skin:     General: Skin is warm and dry.      Coloration: Skin is pale.   Neurological:      Mental Status: She is alert and oriented to person, place, and time.   Psychiatric:         Attention and Perception: Attention normal.         Mood and Affect: Affect normal.         Speech: Speech normal.       Past Medical History:   Past Medical History:   Diagnosis Date    Anesthesia     \"Abnormal blood pressure and breathing\"  \"couldn't breathe\"    Asthma     inhalers daily    Backpain 7/2017     thor. area    Blood clot in vein 07/06/2018    \"Currently have a blood clot in my left eye\"    Bowel habit changes 07/06/2018    Constipation    Breath shortness     with exertion has O2 but does not use    Bronchitis     CAD (coronary artery disease)     palpatations    Cancer ([facility]) 2016    left lung    Chickenpox     COPD (chronic obstructive pulmonary disease) ([facility])     Depression 2/26/2019    Dialysis 2005    transient renal failure due to sepsis    Emphysema of lung (HCC)     Glaucoma     right eye    Hemorrhagic disorder (HCC)     Hyperlipidemia     Hypertension     Hyperthyroidism     Kidney stone     bilateral    Lung cancer (HCC) 12/12/2016    Multiple thyroid nodules 7/9/2015    Nasal drainage     Obstruction of left ureteropelvic junction (UPJ) due to stone 6/17/2018    Osteoporosis     Pain 07/06/2018    Back pain    Personal history of venous thrombosis and embolism 2004, 2012    right leg 2012, left arm dvt 2004 left eye 2017    Pneumonia 7/2016    per patient    Renal disorder     had been on dialysis for 7 months for acute failure 2005    Renal stones 2013    post lithotripsy    Rheumatoid arthritis (HCC)     question    Rheumatoid arthritis (HCC)     S/P appendectomy 1998     S/P cholecystectomy 1998    S/P kyphoplasty 2006, 2007, 2013    Sepsis, unspecified 2005    Shortness of breath 07/06/2018    Chronic current problem. \"A couple of years now\".  O2 concentrator at night - pt states she doesn't use it.    " "Thyroid nodule, hot 2017    functional \"hot\" right thyroid nodule without thyrotoxicosis       Past Surgical History:  Past Surgical History:   Procedure Laterality Date    BRONCHOSCOPY, ROBOT-ASSISTED  10/11/2022    Procedure: FIBER OPTIC BRONCHOSCOPY WITH  WASH, BRUSH, BRONCHOALVEOLAR LAVAGE, BIOSPY, FINE NEEDLE ASPIRATION & NAVIGATION, ROBOTICS;  Surgeon: Donita Haque M.D.;  Location: [facility];  Service: Pulmonary Robotic    CYSTOSCOPY STENT PLACEMENT  7/9/2018    Procedure: Cystoscopy,  Left removal of stent ,  Left Stent Placement;  Surgeon: Beck Yang M.D.;  Location: [facility];  Service: Urology    URETEROSCOPY Left 7/9/2018    Procedure: URETEROSCOPY;  Surgeon: Beck Yang M.D.;  Location: [facility];  Service: Urology    LASERTRIPSY Left 7/9/2018    Procedure: LASERTRIPSY-LITHO;  Surgeon: Beck Yang M.D.;  Location: [facility];  Service: Urology    CYSTOSCOPY STENT PLACEMENT Left 6/18/2018    Procedure: CYSTOSCOPY STENT PLACEMENT;  Surgeon: Beck Yang M.D.;  Location: [facility];  Service: Urology    LITHOTRIPSY Left 6/18/2018    Procedure: LITHOTRIPSY;  Surgeon: Beck Yang M.D.;  Location: [facility];  Service: Urology    LASERTRIPSY Left 6/18/2018    Procedure: LASERTRIPSY;  Surgeon: Beck Yang M.D.;  Location: [facility];  Service: Urology    URETEROSCOPY Left 6/18/2018    Procedure: URETEROSCOPY;  Surgeon: Beck Yang M.D.;  Location: [facility];  Service: Urology    THORACOSCOPY Left 12/12/2016    Procedure: THORACOSCOPY W/WEDGE RESECTION UPPER LOBE MASS;  Surgeon: John H Ganser, M.D.;  Location: [facility];  Service:     OTHER ORTHOPEDIC SURGERY  2013    kyphoplasty X3    APPENDECTOMY      1990    CHOLECYSTECTOMY      1990    LAMINOTOMY      LUNG BIOPSY OPEN      OTHER      OTHER ABDOMINAL SURGERY      gall bladder disease    DE BREAST AUGMENTATION " WITH IMPLANT      SD BREAST REDUCTION         Hospital Medications:    Current Facility-Administered Medications:     potassium bicarbonate (KLYTE) effervescent tablet 25 mEq, 25 mEq, Oral, BID, Bo Montes M.D.    NS infusion, , Intravenous, Continuous, Bo Montes M.D., Last Rate: 100 mL/hr at 04/08/23 1442, New Bag at 04/08/23 1442    sucralfate (CARAFATE) 1 GM/10ML suspension 1 g, 1 g, Oral, Q6HRS, QUE Dela Cruz, 1 g at 04/09/23 0538    Respiratory Therapy Consult, , Nebulization, Continuous RT, Bo Montes M.D.    albuterol (PROVENTIL) 2.5mg/0.5ml nebulizer solution 2.5 mg, 2.5 mg, Nebulization, Q4H PRN (RT), Bo Montes M.D.    methimazole (TAPAZOLE) tablet 5 mg, 5 mg, Oral, DAILY, Bo Montes M.D., 5 mg at 04/09/23 0538    Brimonidine Tartrate-Timolol 0.2-0.5 % SOLN 1 Drop, 1 Drop, Both Eyes, BID, Bo Montes M.D., 1 Drop at 04/09/23 0540    [COMPLETED] piperacillin-tazobactam (Zosyn) 3.375 g in  mL IVPB, 3.375 g, Intravenous, Once, Stopped at 04/04/23 1534 **AND** piperacillin-tazobactam (Zosyn) 3.375 g in  mL IVPB, 3.375 g, Intravenous, Q8HRS, Bo Montes M.D., Stopped at 04/09/23 0658    ondansetron (ZOFRAN) syringe/vial injection 4 mg, 4 mg, Intravenous, Q4HRS PRN, Luz Maria Deras M.D., 4 mg at 04/08/23 2342    prochlorperazine (COMPAZINE) injection 10 mg, 10 mg, Intravenous, Q6HRS PRN, Luz Maria Deras M.D., 10 mg at 04/08/23 1732    melatonin tablet 5 mg, 5 mg, Oral, Nightly, Luz Maria Deras M.D.    ondansetron (ZOFRAN ODT) dispertab 4 mg, 4 mg, Oral, Q4HRS PRN, QUE Gomez, 4 mg at 04/07/23 0411    allopurinol (ZYLOPRIM) tablet 200 mg, 200 mg, Oral, QAM, Sadie Gaitan M.D., 200 mg at 04/09/23 0538    atorvastatin (LIPITOR) tablet 20 mg, 20 mg, Oral, Nightly, Sadie Gaitan M.D., 20 mg at 04/08/23 2030    [START ON 4/10/2023] vitamin D2 (Ergocalciferol) (Drisdol) capsule 50,000 Units, 50,000 Units, Oral, Q7  DAYS, Sadie aGitan M.D.    HYDROcodone-acetaminophen (NORCO) 5-325 MG per tablet 1 Tablet, 1 Tablet, Oral, Q4HRS PRN, Sadie Gaitan M.D., 1 Tablet at 04/08/23 1141    rivaroxaban (XARELTO) tablet 20 mg, 20 mg, Oral, PM MEAL, Sadie Gaitan M.D., 20 mg at 04/07/23 1743    tiotropium (Spiriva Respimat) 2.5 mcg/Act inhalation spray 5 mcg, 5 mcg, Inhalation, DAILY, Sadie Gaitan M.D., 5 mcg at 04/09/23 0538    zolpidem (AMBIEN) tablet 5 mg, 5 mg, Oral, HS PRN, Sadie Gaitan M.D.    acetaminophen (Tylenol) tablet 650 mg, 650 mg, Oral, Q6HRS PRN, Sadie Gaitan M.D., 650 mg at 04/07/23 1341    budesonide-formoterol (SYMBICORT) 160-4.5 MCG/ACT inhaler 2 Puff, 2 Puff, Inhalation, BID (RT), Sadie Gaitan M.D., 2 Puff at 04/09/23 0738    pantoprazole (Protonix) injection 40 mg, 40 mg, Intravenous, DAILY, Sadie Gaitan M.D., 40 mg at 04/09/23 0538    calcium carbonate (Tums) chewable tab 500 mg, 500 mg, Oral, BID PRN, Marc Dickinson DKLEBER, 500 mg at 04/04/23 0221    Current Outpatient Medications:  Current Facility-Administered Medications   Medication Dose Route Frequency Provider Last Rate Last Admin    potassium bicarbonate (KLYTE) effervescent tablet 25 mEq  25 mEq Oral BID Bo Montes M.D.        NS infusion   Intravenous Continuous Bo Montes M.D. 100 mL/hr at 04/08/23 1442 New Bag at 04/08/23 1442    sucralfate (CARAFATE) 1 GM/10ML suspension 1 g  1 g Oral Q6HRS Eliana Ramos, A.P.R.N.   1 g at 04/09/23 0538    Respiratory Therapy Consult   Nebulization Continuous RT Bo Montes M.D.        albuterol (PROVENTIL) 2.5mg/0.5ml nebulizer solution 2.5 mg  2.5 mg Nebulization Q4H PRN (RT) Bo Montes M.D.        methimazole (TAPAZOLE) tablet 5 mg  5 mg Oral DAILY Bo Mnotes M.D.   5 mg at 04/09/23 0538    Brimonidine Tartrate-Timolol 0.2-0.5 % SOLN 1 Drop  1 Drop Both Eyes BID Bo Montes M.D.   1 Drop at 04/09/23 0540    piperacillin-tazobactam (Zosyn) 3.375 g  in  mL IVPB  3.375 g Intravenous Q8HRS Bo Montes M.D.   Stopped at 04/09/23 0658    ondansetron (ZOFRAN) syringe/vial injection 4 mg  4 mg Intravenous Q4HRS PRN Luz Maria Deras M.D.   4 mg at 04/08/23 2342    prochlorperazine (COMPAZINE) injection 10 mg  10 mg Intravenous Q6HRS PRN Luz Maria Deras M.D.   10 mg at 04/08/23 1732    melatonin tablet 5 mg  5 mg Oral Nightly Luz Maria Deras M.D.        ondansetron (ZOFRAN ODT) dispertab 4 mg  4 mg Oral Q4HRS PRN Princess Damon A.P.R.NAnibal   4 mg at 04/07/23 0411    allopurinol (ZYLOPRIM) tablet 200 mg  200 mg Oral QAM Sadie Gaitan M.D.   200 mg at 04/09/23 0538    atorvastatin (LIPITOR) tablet 20 mg  20 mg Oral Nightly Sadie Gaitan M.D.   20 mg at 04/08/23 2030    [START ON 4/10/2023] vitamin D2 (Ergocalciferol) (Drisdol) capsule 50,000 Units  50,000 Units Oral Q7 DAYS Sadie Gaitan M.D.        HYDROcodone-acetaminophen (NORCO) 5-325 MG per tablet 1 Tablet  1 Tablet Oral Q4HRS PRN Sadie Gaitan M.D.   1 Tablet at 04/08/23 1141    rivaroxaban (XARELTO) tablet 20 mg  20 mg Oral PM MEAL Sadie Gaitan M.D.   20 mg at 04/07/23 1743    tiotropium (Spiriva Respimat) 2.5 mcg/Act inhalation spray 5 mcg  5 mcg Inhalation DAILY Sadie Gaitan M.D.   5 mcg at 04/09/23 0538    zolpidem (AMBIEN) tablet 5 mg  5 mg Oral HS PRN Sadie Gaitan M.D.        acetaminophen (Tylenol) tablet 650 mg  650 mg Oral Q6HRS PRN Sadie Gaitan M.D.   650 mg at 04/07/23 1341    budesonide-formoterol (SYMBICORT) 160-4.5 MCG/ACT inhaler 2 Puff  2 Puff Inhalation BID (RT) Sadie Gaitan M.D.   2 Puff at 04/09/23 0738    pantoprazole (Protonix) injection 40 mg  40 mg Intravenous DAILY Sadie Gaitan M.D.   40 mg at 04/09/23 0538    calcium carbonate (Tums) chewable tab 500 mg  500 mg Oral BID PRN Marc Dickinson D.O.   500 mg at 04/04/23 0221       Medication Allergy:  No Known Allergies    Family History:  Family History    Problem Relation Age of Onset    Cancer Mother         kidney    Heart Failure Father     Heart Disease Brother     Cancer Maternal Uncle         liver    Cancer Paternal Aunt         ovarian    Cancer Paternal Uncle         lung       Social History:  Social History     Socioeconomic History    Marital status: Single     Spouse name: Not on file    Number of children: Not on file    Years of education: Not on file    Highest education level: Not on file   Occupational History    Not on file   Tobacco Use    Smoking status: Former     Packs/day: 1.00     Years: 30.00     Pack years: 30.00     Types: Cigarettes     Quit date: 2000     Years since quittin.2    Smokeless tobacco: Never    Tobacco comments:     7 years ago   Vaping Use    Vaping Use: Never used   Substance and Sexual Activity    Alcohol use: Yes     Alcohol/week: 0.0 oz     Comment: x2 a week    Drug use: No    Sexual activity: Not on file   Other Topics Concern    Not on file   Social History Narrative    Not on file     Social Determinants of Health     Financial Resource Strain: Not on file   Food Insecurity: Not on file   Transportation Needs: Not on file   Physical Activity: Not on file   Stress: Not on file   Social Connections: Not on file   Intimate Partner Violence: Not on file   Housing Stability: Not on file         MDM (Data Review):     Records reviewed and summarized in current documentation    Lab Data Review:  Recent Results (from the past 24 hour(s))   URINALYSIS    Collection Time: 23  3:02 PM    Specimen: Urine, Gonzalez Cath   Result Value Ref Range    Color Dark Yellow     Character Sl Cloudy (A)     Specific Gravity 1.015 <1.035    Ph 6.0 5.0 - 8.0    Glucose Negative Negative mg/dL    Ketones Negative Negative mg/dL    Protein Negative Negative mg/dL    Bilirubin Negative Negative    Nitrite Negative Negative    Leukocyte Esterase Small (A) Negative    Occult Blood Large (A) Negative    Micro Urine Req Microscopic     URINE MICROSCOPIC (W/UA)    Collection Time: 04/08/23  3:02 PM   Result Value Ref Range    WBC 5-10 (A) /hpf    RBC 20-50 (A) /hpf    Bacteria Few (A) None /hpf    Epithelial Cells Rare Few /hpf    Mucous Threads Few /hpf    Ca Oxalate Crystal Few /hpf       Imaging/Procedures Review:      CT-ABDOMEN-PELVIS W/O   Final Result      1.  Stable CT scan findings.      2.  Left renal pelvic and proximal left ureteral stone without hydronephrosis.      3.  Bilateral cortical and renal cysts.      4.  Atherosclerotic disease.      5.  Anasarca with trace effusions.      IR-MIDLINE CATHETER INSERTION WO GUIDANCE > AGE 3   Final Result                  Ultrasound-guided midline placement performed by qualified nursing staff    as above.          [id number] VIEW   Final Result      1.  Gastric dilation      2.  Multiple prior vertebroplasty      3.  Atelectasis at the right lung base      US-RUQ   Final Result      Echogenic mildly enlarged liver, a nonspecific finding that often is found to represent steatosis.  Other infiltrative processes could have an identical appearance      Cholecystectomy without biliary dilatation      Simple right renal cyst which does not require follow-up      DX-CHEST-PORTABLE (1 VIEW)   Final Result      Enlargement the cardiac silhouette and right basilar atelectasis      CT-RENAL COLIC EVALUATION(A/P W/O)   Final Result      1.  4 mm stone in the LEFT mid to proximal ureter without significant obstructive changes.   2.  Bilateral nonobstructing kidney stones present.   3.  Multiple bilateral kidney cysts of varying density.   4.  Increased fat within the colon wall may indicate chronic inflammatory process.   5.  No evidence for acute colitis or diverticulitis.   6.  Bilateral flank/abdominal wall edema, worse on the LEFT.              MDM (Assessment and Plan):     Patient Active Problem List    Diagnosis Date Noted    Nausea & vomiting 04/03/2023    Right upper quadrant abdominal pain  04/03/2023    Chronic inflammation of colon 04/03/2023    Pneumonia in infectious disease 04/03/2023    Hypercalcemia 03/24/2023    Recurrent non-small cell lung cancer (HCC) 10/11/2022    Chronic pain of left knee 08/10/2022    Debility 05/12/2022    Chronic anticoagulation 05/12/2022    Preventative health care 05/11/2022    Chronic bilateral low back pain without sciatica 02/16/2022    Postmenopausal osteoporosis 03/15/2021    Central retinal vein occlusion 12/19/2019    Hyperparathyroidism ([facility]) 11/13/2019    Gout of foot 04/29/2019    Other specified glaucoma 04/29/2019    Other insomnia 04/29/2019    Right ventricular dilation 04/24/2019    Depression, major, recurrent, moderate ([facility]) 02/26/2019    Macrocytic anemia 02/12/2019    Acute on chronic kidney failure ([facility]) 02/11/2019    Closed compression fracture of second lumbar vertebra ([facility]) 01/28/2019    Closed wedge compression fracture of first lumbar vertebra ([facility]) 01/24/2019    Closed T11 fracture ([facility]) 06/17/2018    Lung cancer ([facility]) 12/12/2016    Deep vein thrombosis (DVT) ([facility]) 07/28/2016    Thyrotoxicosis with toxic single thyroid nodule and without thyroid storm 07/25/2016    Chronic obstructive pulmonary disease ([facility]) 07/24/2016    CKD (chronic kidney disease) stage 3, GFR 30-59 ml/min ([facility]) 07/24/2016    Elevated procalcitonin 06/16/2015    Hyperlipidemia     Hypertension      This is a 73 yo female who was admitted to the hospital with generalized abdominal pain, N/V/D for the past 2-3 weeks, progressively getting worse. Stool studies:culture, C diff: negative. May have aspirated causing aspiration pneumonia. Started on IV Zosyn. Continues to complain of bile that she bring up into her throat but no more episodes of vomiting. No diarrhea for the past 2 days--per RN. Had a small smear yesterday but not enough to collect a stool sample. Abdomen more distended. CT scan 3/30/23 revealed mild wall thickening of the proximal colon likely ileus vs colitis.  X ray 4/5/23: Gastric dilation. History of lung cancer s/p radiation, last radiation treatment end of January 2023. She is on Xarelto for DVT.     ASSESSMENT:  Abdominal pain: patient is distended with air found on Xray  N/V: last episode of vomiting was over 2 days ago  Diarrhea: has improved. NO bowel movements over the past 2 days  Lung cancer s/p radiation  DVT on Xarelto  Chronic kidney disease  Anuria: RN stated patient has not urinated for over 24 hours  Hypotension: New finding    PLAN:  Will defer Endoscopic procedures at this time; appears that her vomiting has subsided and having soft stools, no longer experiencing watery diarrhea.  Start Sucralfate suspension QID for possible bile induced gastritis  Recommend obtaining lactoferrin, O&P, calprotectin if she experiences diarrhea again   I will defer to the hospitalist for management of the patient's other multiple comorbid conditions.    GI WILL SIGN OFF. PLEASE CALL IF ANY QUESTIONS OR CONCERNS.     Thank your for the opportunity to assist in the care of your patient.  Please call for any questions or concerns.    ZAFAR Dela Cruz.

## 2023-04-09 NOTE — CARE PLAN
The patient is Watcher - Medium risk of patient condition declining or worsening    Shift Goals pt will tolerate abx tx per MAR w/out sx.   Clinical Goals: pt will not sustain any new skin breakdown this shift, wound care to be completed this shift. pt will not sustain a fall during day shift  Patient Goals: sleep comfortably in one hour intervals t/o day shift  Family Goals: NA    Progress made toward(s) clinical / shift goals:  pt has tolerated abx admin    Patient is not progressing towards the following goals: pt has not been consuming meals, calorie count started.       Problem: Skin Integrity  Goal: Skin integrity is maintained or improved  Outcome: Not Progressing     Problem: Gastrointestinal Irritability  Goal: Nausea and vomiting will be absent or improve  Outcome: Not Progressing  Goal: Diarrhea will be absent or improved  Outcome: Not Progressing

## 2023-04-10 NOTE — THERAPY
"Speech Language Pathology   Daily Treatment     Patient Name: Latonia Clinton  AGE:  72 y.o., SEX:  female  Medical Record #: [id number]  Date of Service: 4/10/2023      Precautions:  Precautions: Fall Risk, Swallow Precautions         Subjective  RN cleared patient for dysphagia tx. Patient awake and cooperative, but with flat affect for most of session. RN reported limited po food/liquid and refusal of medications.  Patient denies this, but says It is overwhelming to see/smell the large portions of food and that she is bringing bile up x3 per day which makes everything taste less appetizing.  She states she refused Potassium, but they are giving her a liquid form which she is taking well.  Patient does often refuse to sit up past 50 degrees for meals or PO intake.     Assessment  Patient denied any current difficulty w/ regular diet w/ thin liquids except she is \"not interested in meat\". She reported her appetite has been reduced, but this is related to recent N/V rather than any difficulty w/ swallowing. She did report she sometimes has difficulty cutting up foods and was encouraged to asked kitchen or staff to chop it.      Patient tolerated HOB elevation, to 50 degrees. Patient was provided with extensive education regarding universal swallow precautions to sit up as high as possible for ALL PO intake (including small sips of water throughout the day, meds, snacks on beside table, etc.) Patient verbalized understanding, but denied that she is not sitting upright for all po. Patient consumed PO trials of soft solids, mixed consistencies, dry solids, and thins via straw. Patient presented with adequate bolus acceptance and containment, mildly prolonged, but functional mastication.  Patient coughed x1 with cracker on inhale.  She reports that it was a crumb, but that doesn't normally happen to her.  No other overt s/s of aspiration were noted during this session.  I did recommend that she avoid crunchy dry foods " like crackers for now if she is unable to sit upright fully.  Initiation of swallow trigger was timely. Patient denied any globus sensation and oral clearance is complete after swallow.      Clinical Impressions  Patient still presents with grossly functional oropharyngeal swallow to continue regular diet w/ thin liquids. Patient is at elevated risk for aspiration d/t resistance to sit up for PO intake and/or meals and needs to continue to be encouraged to do so. Patient educated extensively on same. SLP is following to ensure continued diet tolerance and f/t of swallow precautions.      Recommendations  Continue regular diet w/ thin liquids w/ STRICT use of swallow precautions (primarily: SIT UP FOR ALL PO INTAKE). Small straw sips ok. Meds ok as tolerated. Patient may benefit from MBSS to further evaluate oropharyngeal swallow function to r/o aspiration, but unsure if she can tolerate procedure d/t body habitus and difficulty sitting upright.     Recommendations  Treatment Completed: Dysphagia Treatment       Dysphagia Treatment  Diet Consistency: Regular/thin  Instrumentation: Instrumental swallow study pending clinical progress (cannot sit upright due to discomfort with stomach distention per pt.)  Medication: Whole with liquid, As tolerated (per RN, has been refusing some medications due to n/v.)  Supervision: Distant supervision - check on patient 2-3 times per meal  Positioning: Fully upright and midline during oral intake  Risk Management : Small bites/sips (sitting up as high as possible)  Oral Care: BID       SLP Treatment Plan  Treatment Plan: Dysphagia Treatment, Patient/Family/Caregiver Training  SLP Frequency: 3x Per Week  Estimated Duration: Until Therapy Goals Met      Anticipated Discharge Needs  Discharge Recommendations: Anticipate that the patient will have no further speech therapy needs after discharge from the hospital  Therapy Recommendations Upon DC: Dysphagia Training, Patient / Family /  "Caregiver Education      Patient / Family Goals  Patient / Family Goal #1: \"Can I get some broth\"  Goal #1 Outcome: Goal met  Short Term Goals  Short Term Goal # 1: Patient will consume a RG7/TN0 diet with no overt s/sx of aspiration.  Goal Outcome # 1: Progressing slower than expected      Denita Valladares, SLP  "

## 2023-04-10 NOTE — CARE PLAN
The patient is Watcher - Medium risk of patient condition declining or worsening    Shift Goals  Clinical Goals: (P) Pt will state pain level less than 3/10 throughout shift  Patient Goals: (P) Pain management  Family Goals: no family present    Progress made toward(s) clinical / shift goals:    Problem: Pain - Standard  Goal: Alleviation of pain or a reduction in pain to the patient’s comfort goal  Outcome: Progressing     Problem: Knowledge Deficit - Standard  Goal: Patient and family/care givers will demonstrate understanding of plan of care, disease process/condition, diagnostic tests and medications  Outcome: Progressing     Problem: Skin Integrity  Goal: Skin integrity is maintained or improved  Outcome: Progressing       Patient is not progressing towards the following goals:

## 2023-04-10 NOTE — PROGRESS NOTES
Pt is resting in bed. Awakes to voice. No c/o pain or n/v at this time. Q2 turning in place. RN discussed POC with pt. All questions answered. Fall precautions in place.

## 2023-04-10 NOTE — DISCHARGE PLANNING
Case Management Discharge Planning    Admission Date: 4/3/2023  GMLOS: 3.8  ALOS: 7    6-Clicks ADL Score: 15  6-Clicks Mobility Score: 6  PT and/or OT Eval ordered: Yes  Post-acute Referrals Ordered: Yes  Post-acute Choice Obtained: Yes  Has referral(s) been sent to post-acute provider:  Yes      Anticipated Discharge Dispo: Discharge Disposition: D/T to SNF with Medicare cert in anticipation of skilled care (03)    DME Needed: No    Action(s) Taken: Updated Provider/Nurse on Discharge Plan    Pending SNF acceptance and medical clearance.     Escalations Completed: Pending Discharge Destination    Medically Clear: No    Next Steps: SNF acceptance & bed availability & Medical clearance    Barriers to Discharge: Medical clearance and Pending Placement

## 2023-04-10 NOTE — PROGRESS NOTES
Hospital Medicine Daily Progress Note    Date of Service  4/10/2023    Chief Complaint  Latonia Clinton is a 72 y.o. female admitted 4/3/2023 with abdominal pain and diarrhea    Hospital Course  4/4/2023-patient admitted from skilled nursing facility because of ongoing diarrhea.  Patient's C. difficile testing is negative.  Possible colitis with aspiration pneumonia present.  Patient did have nausea and vomiting.  The patient now on antibiotics at this point procalcitonin level extremely elevated  We are awaiting culture results.  Continue at this point with antibiotic management and fluid resuscitation.  4/5/2023-patient today complaining of more diarrhea.  Patient's abdominal distention seems to be slightly worse.  Flatplate of the abdomen has been ordered which at this point is pending.  I will at this point request stool for culture, lactoferrin, norovirus, or ova and parasite.  Patient is technically immunocompromised given her history of radiation treatments and cancer.  Scans ordered today  4/6/2023-patient complaining today of decreased pain but increased distention in the abdomen.  She has less nausea and no vomiting.  She also has had decreased diarrhea.  Patient's abdominal pain distention most likely secondary to radiation-induced inflammatory disease.  Patient has however component of aspiration versus possible gastroenteritis.  The patient at this point is being ruled out for possible bacterial causes for infection.  Procalcitonin levels have decreased considerably.  Continue with fluid resuscitation and antibiotics.  4/7/2023 patient lost IV access and because of this the patient was not able to be given antiemetic management.  The patient now has had the IV replaced and at this point nausea is under better control.  Diarrhea still persists.  She has not been able to give us however liquidy diarrhea-like in the beginning.  Is more formed and they are not able to do a lot of the test that we  ordered in the beginning.  Aspiration pneumonia still high suspicion as the patient's lung status definitely worsened with initial evaluation and procalcitonin levels were also very high.  Speech therapy has not had a chance to evaluate the patient and recommend regular diet.  Physical therapy occupational therapy have had a chance to evaluate the patient they recommend placement.  Brother to decide with the patient which facility she is allowing us to refer her to.  4/8/2023-patient still complains of abdominal pain in the epigastric region.  Imaging studies show at this point a distended stomach.  Physical examination also confirms this and the patient does have a large gas bubble that is able to be percussed.  The patient still reports nausea vomiting and diarrhea.  She gets frequent antiemetic management.  Stool studies have not revealed any abnormalities.  Swallowing studies were normal.  And the patient is on a regular diet with thin liquids.  Patient did seem to aspirate with her nausea vomiting.  She is still getting antibiotics and day 6 of IV Zosyn.  I have asked gastroenterology to consult on the patient for possible evaluation of the gastric distention.  4/9/2023-patient is still unfortunately complaining of left upper quadrant abdominal pain and epigastric pain.  She is still complaining of diarrhea.  She also is complaining of nausea.  Repeat CAT scan of the abdomen done and does not show any new findings.  GI at this point is signing off.  Stool studies are still pending.  Patient at this point receiving fluid resuscitation and we will need to monitor intake and output she has very poor appetite.  Once stabilized she can discharge to a skilled facility.  4/10/2023-patient has completed antibiotics.  Patient still having abdominal pain discussed with gastroenterology including findings of imaging studies as well as her present symptoms.  We will do at this point a upper GI with small bowel  follow-through.  We will start her on Reglan as well.  Patient at this point avoiding eating as this makes her tummy feel better and she does not have nausea or vomiting or diarrhea.  Stool studies at this point are not completed and we are pending results.  Procalcitonin level has improved.    Interval Problem Update  Upper GI with small bowel follow-through  Pain management  Fluid resuscitation  Antiemetic management  Start Reglan  Monitor H&H  Optimize blood pressure  Patient's request is to hold anticoagulation for now  Patient does not wish to take anything by mouth  Discharge planning to skilled facility    I have discussed this patient's plan of care and discharge plan at IDT rounds today with Case Management, Nursing, Nursing leadership, and other members of the IDT team.    Consultants/Specialty  None    Code Status  Full Code    Disposition  Patient is not medically cleared for discharge.   Anticipate discharge to to skilled nursing facility.  I have placed the appropriate orders for post-discharge needs.    Review of Systems  Review of Systems   Constitutional:  Positive for malaise/fatigue. Negative for chills and fever.   HENT: Negative.  Negative for hearing loss and sinus pain.    Eyes: Negative.  Negative for blurred vision, double vision and redness.   Respiratory:  Positive for shortness of breath. Negative for cough, hemoptysis and stridor.    Cardiovascular: Negative.  Negative for chest pain, palpitations, leg swelling and PND.   Gastrointestinal:  Positive for abdominal pain, diarrhea and nausea. Negative for blood in stool, heartburn, melena and vomiting.   Genitourinary: Negative.  Negative for dysuria, flank pain and hematuria.   Musculoskeletal:  Positive for back pain. Negative for falls and neck pain.   Skin: Negative.  Negative for itching and rash.   Neurological:  Positive for headaches. Negative for dizziness, tingling, tremors, sensory change, seizures and loss of consciousness.    Endo/Heme/Allergies: Negative.  Negative for polydipsia. Does not bruise/bleed easily.   Psychiatric/Behavioral:  Positive for depression. Negative for memory loss, substance abuse and suicidal ideas. The patient does not have insomnia.    All other systems reviewed and are negative.     Physical Exam  Temp:  [36.5 °C (97.7 °F)-36.7 °C (98 °F)] 36.7 °C (98 °F)  Pulse:  [76-89] 80  Resp:  [18] 18  BP: (108-122)/(62-76) 108/62  SpO2:  [93 %-95 %] 93 %    Physical Exam  Vitals and nursing note reviewed. Exam conducted with a chaperone present.   Constitutional:       Appearance: Normal appearance. She is well-developed. She is obese. She is ill-appearing. She is not diaphoretic.   HENT:      Head: Normocephalic and atraumatic.      Right Ear: External ear normal. There is no impacted cerumen.      Left Ear: External ear normal. There is no impacted cerumen.      Nose: Nose normal.      Mouth/Throat:      Mouth: Mucous membranes are dry.      Pharynx: Oropharynx is clear.   Eyes:      General: No visual field deficit.     Extraocular Movements: Extraocular movements intact.      Conjunctiva/sclera: Conjunctivae normal.      Pupils: Pupils are equal, round, and reactive to light.   Neck:      Thyroid: No thyroid mass.      Vascular: No JVD.   Cardiovascular:      Rate and Rhythm: Normal rate and regular rhythm.      Pulses: Normal pulses.      Heart sounds: Normal heart sounds, S1 normal and S2 normal.   Pulmonary:      Effort: Pulmonary effort is normal. No respiratory distress.      Breath sounds: Decreased air movement present. No stridor. Examination of the right-middle field reveals decreased breath sounds. Examination of the left-middle field reveals decreased breath sounds. Examination of the right-lower field reveals decreased breath sounds. Examination of the left-lower field reveals decreased breath sounds. Decreased breath sounds present. No rales.   Chest:      Chest wall: No tenderness.   Abdominal:       General: Abdomen is flat. Bowel sounds are increased. There is distension.      Palpations: Abdomen is soft. There is no shifting dullness, fluid wave, hepatomegaly, splenomegaly, mass or pulsatile mass.      Tenderness: There is abdominal tenderness in the epigastric area and left upper quadrant. There is guarding.   Musculoskeletal:         General: No swelling, tenderness, deformity or signs of injury. Normal range of motion.      Cervical back: Normal range of motion and neck supple. No edema or tenderness.      Right lower leg: Edema present.      Left lower leg: Edema present.      Right foot: Normal range of motion. No deformity or foot drop.      Left foot: Normal range of motion. No deformity or foot drop.   Feet:      Right foot:      Skin integrity: Skin integrity normal. No ulcer.      Left foot:      Skin integrity: Skin integrity normal. No ulcer.   Lymphadenopathy:      Head:      Right side of head: No submental adenopathy.      Left side of head: No submental adenopathy.      Cervical:      Right cervical: No superficial cervical adenopathy.     Left cervical: No superficial cervical adenopathy.      Upper Body:      Right upper body: No supraclavicular adenopathy.      Left upper body: No supraclavicular adenopathy.      Lower Body: No right inguinal adenopathy. No left inguinal adenopathy.   Skin:     General: Skin is warm and dry.      Capillary Refill: Capillary refill takes less than 2 seconds.      Coloration: Skin is not jaundiced or pale.      Findings: Erythema present. No abrasion or wound.   Neurological:      General: No focal deficit present.      Mental Status: She is alert and oriented to person, place, and time. Mental status is at baseline.      GCS: GCS eye subscore is 4. GCS verbal subscore is 5. GCS motor subscore is 6.      Cranial Nerves: No dysarthria or facial asymmetry.      Sensory: Sensation is intact.      Motor: Weakness and atrophy present. No tremor, abnormal muscle  tone, seizure activity or pronator drift.      Coordination: Romberg sign negative. Coordination abnormal. Finger-Nose-Finger Test and Heel to Shin Test normal. Impaired rapid alternating movements.      Gait: Gait abnormal.   Psychiatric:         Mood and Affect: Mood and affect normal.         Speech: Speech normal.         Behavior: Behavior normal. Behavior is cooperative.         Thought Content: Thought content normal.         Judgment: Judgment normal.   4/7/2023-patient lost IV access    Fluids    Intake/Output Summary (Last 24 hours) at 4/10/2023 1435  Last data filed at 4/10/2023 0912  Gross per 24 hour   Intake 3876.88 ml   Output 400 ml   Net 3476.88 ml         Laboratory  Recent Labs     04/10/23  0231   WBC 9.7   RBC 2.75*   HEMOGLOBIN 9.6*   HEMATOCRIT 32.3*   .5*   MCH 34.9*   MCHC 29.7*   RDW 63.0*   PLATELETCT 287   MPV 10.9       Recent Labs     04/10/23  0354   SODIUM 141   POTASSIUM 4.2   CHLORIDE 116*   CO2 18*   GLUCOSE 119*   BUN 34*   CREATININE 1.39   CALCIUM 9.3                         Imaging  CT-ABDOMEN-PELVIS W/O   Final Result      1.  Stable CT scan findings.      2.  Left renal pelvic and proximal left ureteral stone without hydronephrosis.      3.  Bilateral cortical and renal cysts.      4.  Atherosclerotic disease.      5.  Anasarca with trace effusions.      IR-MIDLINE CATHETER INSERTION WO GUIDANCE > AGE 3   Final Result                  Ultrasound-guided midline placement performed by qualified nursing staff    as above.          VA-WBHYDIU-1 VIEW   Final Result      1.  Gastric dilation      2.  Multiple prior vertebroplasty      3.  Atelectasis at the right lung base      US-RUQ   Final Result      Echogenic mildly enlarged liver, a nonspecific finding that often is found to represent steatosis.  Other infiltrative processes could have an identical appearance      Cholecystectomy without biliary dilatation      Simple right renal cyst which does not require follow-up       DX-CHEST-PORTABLE (1 VIEW)   Final Result      Enlargement the cardiac silhouette and right basilar atelectasis      CT-RENAL COLIC EVALUATION(A/P W/O)   Final Result      1.  4 mm stone in the LEFT mid to proximal ureter without significant obstructive changes.   2.  Bilateral nonobstructing kidney stones present.   3.  Multiple bilateral kidney cysts of varying density.   4.  Increased fat within the colon wall may indicate chronic inflammatory process.   5.  No evidence for acute colitis or diverticulitis.   6.  Bilateral flank/abdominal wall edema, worse on the LEFT.         DX-UPPER GI-SMALL BOWEL FOLLOW THRU    (Results Pending)          Assessment/Plan  * Nausea & vomiting- (present on admission)  Assessment & Plan  Still complains of nausea continue with antiemetic management  QT interval at this point monitored      Pneumonia in infectious disease- (present on admission)  Assessment & Plan  Most likely secondary to aspiration  Speech therapy has cleared her  Zosyn discontinued after a 7-day course yesterday        Right upper quadrant abdominal pain  Assessment & Plan  Patient still complains of generalized abdominal pain with the maximum in the right upper quadrant to mid epigastric region.  Patient still complaining of nausea and diarrhea and so at this point she is limiting how much she is eating.  Discussed with gastroenterology again and I will at this point order a upper GI with small bowel follow-through as well as initiate Reglan 10 mg every 6 hours  Stool studies so far have been negative but not all the stool studies are completed  Patient has completed Zosyn yesterday    Elevated procalcitonin  Assessment & Plan  Improved over time    Chronic inflammation of colon  Assessment & Plan  Upper GI with small bowel follow-through.  Imaging studies reviewed in detail  Pain management  Start Reglan    Recurrent non-small cell lung cancer (HCC)- (present on admission)  Assessment & Plan  Will need to  follow-up as an outpatient.  Status post radiation treatment  Patient at this point will need continued outpatient follow-ups with oncology  Patient did have surgery in 2016  Repeat evaluation found a nodule in 2022 which they are keeping an eye on as an outpatient.    Deep vein thrombosis (DVT) (HCC)- (present on admission)  Assessment & Plan  Patient would like Xarelto to be held and dose this was put on hold    Chronic obstructive pulmonary disease (HCC)- (present on admission)  Assessment & Plan  Chronic condition with no acute exacerbation  Continue RT protocol  Optimize nebulizer treatments    Hypertension- (present on admission)  Assessment & Plan  Continue to optimize blood pressure management  Currently blood pressure stable at 108/62    Debility- (present on admission)  Assessment & Plan  Patient will need to discharge to skilled facility once she is stabilized    Macrocytic anemia- (present on admission)  Assessment & Plan  Normal iron panel, normal B12 level    CKD (chronic kidney disease) stage 3, GFR 30-59 ml/min (HCC)- (present on admission)  Assessment & Plan  Avoid nephrotoxic medications  Monitor renal functions  Continue with fluid resuscitation and optimize intake and output      Hyperlipidemia- (present on admission)  Assessment & Plan  Low-fat low-cholesterol diet  Statin in the form of Lipitor continue 20 mg nightly  Lab Results   Component Value Date/Time    CHOLSTRLTOT 179 12/06/2022 03:02 PM    LDL 52 12/06/2022 03:02 PM    HDL 86 12/06/2022 03:02 PM    TRIGLYCERIDE 207 (H) 12/06/2022 03:02 PM                   VTE prophylaxis: SCDs/TEDs    I have performed a physical exam and reviewed and updated ROS and Plan today (4/10/2023). In review of yesterday's note (4/9/2023), there are no changes except as documented above.

## 2023-04-10 NOTE — DIETARY
Nutrition Services Brief Update:    Day 7 of admit.  Latonia Clinton is a 72 y.o. female with admitting DX of Nausea & vomiting [R11.2]  Pneumonia in infectious disease [J18.9]    Current Diet: Level 6-soft and bite sized  with Level 0 - thin liquids     Pt now has an order for calorie count. No RD consult for this. Per nurse, MD wants nursing to follow pt's PO intake closely and document in Epic. MD does not want formal calorie count w/ recorded PO of every item on pt's meal ticket.    Current PO intake is poor at 0 to 25-50% w/ avg of 33%. Pt w/ recorded PO intake of % of Boost. Pt said that she has not received Boost and she does not like it. It repeats on her. She likes the [employer]ooth[employer]- this may be the supplement recorded as %. She agreed to try Magic Cups for additional kcals and protein. Diet modified to add additional yogurts and C/C w/ fruit on lunch trays.     Problem: Nutritional:  Goal: Achieve adequate nutritional intake  Description: Patient will consume >50% of meals  Outcome: not progressing    RD following.

## 2023-04-10 NOTE — CARE PLAN
The patient is Watcher - Medium risk of patient condition declining or worsening    Shift Goals  Clinical Goals: pt will not sustain skin breakdown this shift.  Patient Goals: sleep comfortably  Family Goals: no family present    Progress made toward(s) clinical / shift goals:        Problem: Pain - Standard  Goal: Alleviation of pain or a reduction in pain to the patient’s comfort goal  Outcome: Progressing     Problem: Knowledge Deficit - Standard  Goal: Patient and family/care givers will demonstrate understanding of plan of care, disease process/condition, diagnostic tests and medications  Outcome: Progressing     Problem: Skin Integrity  Goal: Skin integrity is maintained or improved  Outcome: Progressing       Patient is not progressing towards the following goals:

## 2023-04-10 NOTE — PROGRESS NOTES
Report received from NOC RN, assumed care of pt.   POC and medications reviewed with pt. Pt verbalized understanding.   AOx4  Denies pain, SOB, or dizziness at this time.   Safety measures in place.  Hourly rounding in place.

## 2023-04-11 NOTE — PROGRESS NOTES
Received bedside report from NOC RN. Pt is A&O 4. Pt has no complaints of pain. Denies N/V as of assessment. Dressing to heels, and arms CDI and CMS noted. Assumed care. Safety precaution in place. All needs met at this time. Hourly rounding in place.

## 2023-04-11 NOTE — DISCHARGE PLANNING
DC Transport Scheduled    Received request at: 4/11/2023    Transport Company Scheduled:  1320  Spoke with Fely gray [facility] to schedule transport.    Scheduled Date: 4/11/2023  Scheduled Time: 1800    [facility] [address] [facility] [facility]     Notified care team of scheduled transport via Voalte.     If there are any changes needed to the DC transportation scheduled, please contact [facility] at ext. [phone] between the hours of 5828-7681 Mon-Fri. If outside those hours, contact the ED Case Manager at ext. [phone].

## 2023-04-11 NOTE — DISCHARGE PLANNING
Case Management Discharge Planning    Admission Date: 4/3/2023  GMLOS: 3.8  ALOS: 8    6-Clicks ADL Score: 15  6-Clicks Mobility Score: 6  PT and/or OT Eval ordered: Yes  Post-acute Referrals Ordered: Yes  Post-acute Choice Obtained: Yes  Has referral(s) been sent to post-acute provider:  Yes      Anticipated Discharge Dispo: Discharge Disposition: D/T to SNF with Medicare cert in anticipation of skilled care (03)    DME Needed: No    Action(s) Taken: Updated Provider/Nurse on Discharge Plan    1022: ALLAN MORAN called Rox[facility] with [facility] for acceptance.  Roxanna states she has a bed available and would like to know if patient is on IV abx. RN CM to follow up with MD at IDT rounds.     1125: ALLAN MORAN called Roxanna.  No answer, VM left  requesting call back.     1254: R[facility] with [facility] to follow up with DON and call RN CM back.     1320: Roxanna states she has a bed and Is requesting RN DEAN set it up for ASAP.  RN CM faxed transportation communication form to ride line.     Escalations Completed: Pending Discharge Destination    Medically Clear: Yes    Next Steps: SNF bed availability     Barriers to Discharge: Medical clearance and Pending Placement

## 2023-04-11 NOTE — THERAPY
Speech Language Therapy Contact Note    Patient Name: Latonia Clinton  Age:  72 y.o., Sex:  female  Medical Record #: [id number]  Today's Date: 4/11/2023    Discussed missed therapy with ALLAN Cano       04/11/23 0918   Treatment Variance   Reason For Missed Therapy Medical - Patient on Hold from Therapy  (Patient is NPO for a Small Bowel Series)   Interdisciplinary Plan of Care Collaboration   IDT Collaboration with  Nursing   Collaboration Comments RN stated patient is NPO for a small bowel series.

## 2023-04-11 NOTE — PROGRESS NOTES
Pt is resting in bed. Awakes to voice, VSS. No c/o pain or n/v at this time. RN discussed pOC with pt. All questions answered. Fall precautions in place.

## 2023-04-11 NOTE — DISCHARGE PLANNING
CM RN reached out to pts brother, Minh to notify of transfer to [facility]. CM RN awaiting confirmation on transport time.     Pt set to transport at 1800. [employer] packet given to bedside RN. Pain script in chart.

## 2023-04-11 NOTE — DISCHARGE SUMMARY
"Discharge Summary    CHIEF COMPLAINT ON ADMISSION  Chief Complaint   Patient presents with    N/V     States she has been Nauseated for 2 weeks, last night she vomited \"bile\" and [facility] staff thought it was blood.  Pt believes it was \"the coating to her xarelto\"         Reason for Admission  EMS     Admission Date  4/3/2023    CODE STATUS  Full Code    HPI & HOSPITAL COURSE  Per notes, \"72-year-old female with complicated medical history of COPD, DVT, lung cancer, hypertension, dyslipidemia, nephrolithiasis, hypothyroidism and rheumatoid arthritis who presented 4/3 with nausea and vomiting.  Patient has had nausea for more than 2 weeks, getting worse and recently started having abdominal pain around the right upper quadrant area with chills however no fever.  Denied any significant shortness of breath or chest however patient has been weaker.  Denies significant also patient has had diarrhea, no melena and no significant coffee-ground with vomitus.  On admission blood pressure was stable, patient is on 4 L nasal cannula, her baseline around 2, labs did not show leukocytosis.  Creatinine around baseline 1.5 however liver enzymes were elevated 86 and 56 with normal bilirubin and elevated lipase 200.  Chest x-ray showed possible infiltrate on the right lung.  CT renal showed nephrolithiasis with no obstruction also showed increased fat within the colon wall indicated chronic inflammatory process.  Procalcitonin was elevated, IV antibiotics with gently IV fluid were started.\"    Patient was admitted for ongoing diarrhea.  Patient difficile testing was negative.  Symptoms were thought to be due to colitis with a possible aspiration pneumonia present.  She was continued on IV antibiotics with significant improvement in overall symptoms.  She did have some ongoing abdominal pain, but nausea, diarrhea, vomiting have all improved.  Speech therapy did evaluate patient while in the hospital and recommended regular diet with " thin liquids with strict use of swallowing precautions (set up for all p.o. intake).  Physical therapy and Occupational Therapy both recommended postacute care placement.  Due to ongoing pain during hospitalization CT of abdomen was done, no acute findings.  Gastroenterology was consulted and did see patient, no further recommendations.  Additionally she did undergo a small bowel follow-through which was negative for acute findings, constipation or ileus.  At this time patient is safe to be transferred to the outpatient setting.  She will be transferred to a skilled nursing facility for further management.  All symptoms are controlled at this point.  Patient does have multiple underlying chronic medical conditions and is high risk for readmission.  She should follow-up very closely with PCP in the outpatient setting once discharged from skilled nursing facility.  Patient has completed a treatment course of antibiotics, no further antibiotics needed at this time.    Therefore, she is discharged in good and stable condition to skilled nursing facility.    The patient met 2-midnight criteria for an inpatient stay at the time of discharge.    Discharge Date  4/11/2023    FOLLOW UP ITEMS POST DISCHARGE  FU with GI  FU with PCP     DISCHARGE DIAGNOSES  Principal Problem:    Nausea & vomiting POA: Yes  Active Problems:    Elevated procalcitonin POA: Unknown    Hyperlipidemia POA: Yes      Overview: Established diagnosis. Currently taking Simvastatin 40 mg daily, reports       compliance and no side effects.       Lab Results       Component Value Date/Time        CHOLSTRLTOT 155 07/13/2021 03:37 PM        TRIGLYCERIDE 184 (H) 07/13/2021 03:37 PM        HDL 71 07/13/2021 03:37 PM        LDL 47 07/13/2021 03:37 PM                        Hypertension POA: Yes      Overview: This is a chronic condition, reasonably controlled losartan 50 mg tablet       daily.    Chronic obstructive pulmonary disease (HCC) (Chronic) POA: Yes       Overview: This is a chronic condition, fairly well controlled, managed by       pulmonology Dr. Mami Allan.  Her regimen includes albuterol nebulizer       as needed, Breo Ellipta, Spiriva and daily Azithormycin, however patient       does not think that those medications are helping her.  She is       experiencing shortness of breath while walking.            She is undergoing extensive work-up for left pulmonary nodules, attempting       IR guided lung biopsy next week.            Pending PFTs.                CKD (chronic kidney disease) stage 3, GFR 30-59 ml/min (HCC) (Chronic) POA: Yes      Overview: This is a chronic condition, relatively stable.  Patient follows with       nephrologist.      She has her next appointment on 12/07 and pending blood work.    Deep vein thrombosis (DVT) ([facility]) POA: Yes      Overview: Per patient she was on dialysis due to kidney failure back in 2004 and has       developed DVT in her upper extremity, she also had DVT in her right lower       extremity.  In 2017 she had left IV nose thrombosis.  Initially she was on       anticoagulation with warfarin, then was switched to Xarelto and currently       she is being followed at anticoagulation clinic.    Macrocytic anemia POA: Yes    Debility POA: Yes      Overview: Patient is presenting today for 3-month follow-up, complaining of fatigue.      Since Monday she has been feeling more short of breath and she has been       experiencing generalized weakness.  She felt like she had COPD       exacerbation and she has started taking prednisone and doxycycline.  She       has not felt significantly better.  She has been using her rescue       albuterol inhaler more frequently, she has not used her nebulizer yet.      She denies chest pain, worsening dizziness, dysuria, diarrhea or       constipation.      She had facet joint injection with spine surgeon last week and did not       have any significant improvement.      She had MRI of her  lumbar and thoracic spine in May 2022 -that showed       multiple chronic compression fractures of her thoracic spine.  No new       fractures.  Spinal stenosis.  Patient is still experiencing bilateral       lower extremity weakness, she denies saddle anesthesia, urinary or bowel       incontinence, fever, chills or night sweats.          Recurrent non-small cell lung cancer (HCC) POA: Yes      Overview: Repeat CT chest 5/9/22 showed stable lingular nodule but new 7 mm nodule       and repeat CT in 9/2022 showed increase in size of both lingular nodule       14x15 mm and RLL nodule 8mmx10 mm. She underwent bronchoscopy with ion       biopsy of the lingular nodule which returned as adenoCa. She was seen by       [facility] and referred to radiation oncology after PET showed FDG uptake in       lingular nodule and new, FDG avid RUL nodule. No FDG uptake in RLL. MRI       negative for metastasis.             Cancer Care Specialty note reviewed.  Briefly: History of adenocarcinoma       of the lung in 2016 status post resection of left upper lobe mass [id number].        30-pack-year smoking history quit in 2000, moderate to severe COPD.  Most       recent PET scan on 1/28/2022 was found to have a small lingular nodule       with mild FDG avid concerning for malignancy.  On 11/15/2022 she underwent       CT-guided biopsy of the left lingular pulmonary node, consistent with       adenocarcinoma.             Brain MRI showed no evidence of metastatic disease.  Follow-up PET CT scan       showed a lesion of concern in the left lingula and another 1 in the right       lung.            She has no signs of overt metastatic disease.             She has had a total of 5 sessions of radiation, she tolerated them well.        Following up with Dr. Rizvi on February 22, 2023, plan for repeat CT       scan in 6-8 weeks.          Right upper quadrant abdominal pain POA: Unknown    Chronic inflammation of colon POA: Unknown    Pneumonia in  infectious disease POA: Yes  Resolved Problems:    * No resolved hospital problems. *      FOLLOW UP  Future Appointments   Date Time Provider Department Center   5/9/2023  2:20 PM Liat Emerson M.D. [facility]   6/21/2023  2:10 PM Mami Allan M.D. [facility] None     No follow-up provider specified.    MEDICATIONS ON DISCHARGE     Medication List        CHANGE how you take these medications        Instructions   ergocalciferol 46002 UNIT capsule  What changed: additional instructions  Commonly known as: DRISDOL   Take 1 Capsule by mouth every 7 days.  Dose: 50,000 Units     rivaroxaban 20 MG Tabs tablet  What changed:   how much to take  how to take this  when to take this  Commonly known as: Kayla   Doctor's comments: This Rx has been ordered by a pharmacist working under a collaborative practice agreement.  TAKE ONE TABLET BY MOUTH DAILY WITH DINNER     Spiriva Respimat 2.5 mcg/Act Aers  What changed:   how much to take  how to take this  when to take this  Generic drug: tiotropium   INHALE TWO PUFFS BY MOUTH DAILY     zolpidem 5 MG Tabs  What changed: additional instructions  Commonly known as: AMBIEN   Doctor's comments: NOT A DUPLICATE!  Take from 3/21/2023 to 4/20/2023. Controlled substance 3-month supply, month #2/3, please fill according to the dates above. Do not send refill requests.  Take 1 Tablet by mouth at bedtime as needed for Sleep for up to 30 days.  Dose: 5 mg            CONTINUE taking these medications        Instructions   acetaminophen 500 MG Tabs  Commonly known as: TYLENOL   Take 1,000 mg by mouth 3 times a day.  Dose: 1,000 mg     * albuterol 108 (90 Base) MCG/ACT Aers inhalation aerosol   Inhale 2 Puffs every 6 hours as needed for Shortness of Breath.  Dose: 2 Puff     * albuterol 2.5mg/3ml Nebu solution for nebulization  Commonly known as: PROVENTIL   USE THREE MILLILITERS (1 VIAL) VIA NEBULIZATION BY MOUTH EVERY 4 HOURS AS NEEDED FOR SHORTNESS OF BREATH      allopurinol 100 MG Tabs  Commonly known as: ZYLOPRIM   Take 200 mg by mouth every morning.  Dose: 200 mg     atorvastatin 20 MG Tabs  Commonly known as: LIPITOR   Take 20 mg by mouth every evening.  Dose: 20 mg     bisacodyl 10 MG Supp  Commonly known as: DULCOLAX   Insert 1 Suppository into the rectum 1 time a day as needed (if magnesium hydroxide ineffective after 24 hours).  Dose: 10 mg     Combigan 0.2-0.5 % Soln  Generic drug: Brimonidine Tartrate-Timolol   Administer 1 Drop into both eyes 2 times a day.  Dose: 1 Drop     CULTURELLE PO   Take 1 Capsule by mouth every day.  Dose: 1 Capsule     fluticasone furoate-vilanterol 200-25 MCG/ACT Aepb  Commonly known as: Breo Ellipta   Inhale 1 Puff every day. Rinse mouth after use.  Dose: 1 Puff     HYDROcodone-acetaminophen 5-325 MG Tabs per tablet  Commonly known as: NORCO   Take 1 Tablet by mouth every four hours as needed.  Dose: 1 Tablet     Klor-Con M20 20 MEQ Tbcr  Generic drug: potassium chloride SA   Take 40 mEq by mouth every evening.  Dose: 40 mEq     losartan 50 MG Tabs  Commonly known as: COZAAR   Take 50 mg by mouth every morning.  Dose: 50 mg     magnesium oxide 400 MG Tabs tablet  Commonly known as: MAG-OX   Take 400 mg by mouth every evening.  Dose: 400 mg     methimazole 5 MG Tabs  Commonly known as: TAPAZOLE   TAKE ONE TABLET BY MOUTH DAILY     ondansetron 4 MG Tbdp  Commonly known as: ZOFRAN ODT   Take 1 Tablet by mouth every 6 hours as needed for Nausea.  Dose: 4 mg     polyethylene glycol/lytes 17 g Pack  Commonly known as: MIRALAX   Take 1 Packet by mouth 1 time a day as needed (if sennosides and docusate ineffective after 24 hours).  Dose: 17 g     promethazine 25 MG/ML Soln  Commonly known as: PHENERGAN   Inject 25 mg into the shoulder, thigh, or buttocks every 6 hours as needed for Nausea/Vomiting.  Dose: 25 mg     senna-docusate 8.6-50 MG Tabs  Commonly known as: PERICOLACE or SENOKOT S   Take 2 Tablets by mouth 2 times a day.  Dose: 2  Tablet     Zofran 4 MG Tabs tablet  Generic drug: ondansetron   Take 4 mg by mouth see administration instructions. Per MAR shows that pt takes every 6 hours as needed, on 4/3/2023 per MAR shows that pt is to take this medications 6 times for 2 days.  Dose: 4 mg           * This list has 2 medication(s) that are the same as other medications prescribed for you. Read the directions carefully, and ask your doctor or other care provider to review them with you.                STOP taking these medications      amoxicillin-clavulanate 875-125 MG Tabs  Commonly known as: AUGMENTIN     azithromycin 250 MG Tabs  Commonly known as: ZITHROMAX     magnesium hydroxide 400 MG/5ML Susp  Commonly known as: MILK OF MAGNESIA              Allergies  No Known Allergies    DIET  Orders Placed This Encounter   Procedures    Diet Order Diet: Level 6 - Soft and Bite Sized; Liquid level: Level 0 - Thin     Standing Status:   Standing     Number of Occurrences:   1     Order Specific Question:   Diet:     Answer:   Level 6 - Soft and Bite Sized [23]     Order Specific Question:   Liquid level     Answer:   Level 0 - Thin       ACTIVITY  As tolerated and directed by skilled nursing.  Weight bearing as tolerated    CONSULTATIONS  GI     PROCEDURES  None    LABORATORY  Lab Results   Component Value Date    SODIUM 141 04/10/2023    POTASSIUM 4.2 04/10/2023    CHLORIDE 116 (H) 04/10/2023    CO2 18 (L) 04/10/2023    GLUCOSE 119 (H) 04/10/2023    BUN 34 (H) 04/10/2023    CREATININE 1.39 04/10/2023    CREATININE 1.7 (H) 09/19/2008        Lab Results   Component Value Date    WBC 9.7 04/10/2023    HEMOGLOBIN 9.6 (L) 04/10/2023    HEMATOCRIT 32.3 (L) 04/10/2023    PLATELETCT 287 04/10/2023        Total time of the discharge process exceeds 45 minutes.

## 2023-04-11 NOTE — CARE PLAN
The patient is Stable - Low risk of patient condition declining or worsening    Shift Goals  Clinical Goals: pt will not sustain any new skin breakdown this shift.  Patient Goals: sleep comfortably  Family Goals: no family present    Progress made toward(s) clinical / shift goals:        Problem: Pain - Standard  Goal: Alleviation of pain or a reduction in pain to the patient’s comfort goal  Outcome: Progressing     Problem: Knowledge Deficit - Standard  Goal: Patient and family/care givers will demonstrate understanding of plan of care, disease process/condition, diagnostic tests and medications  Outcome: Progressing     Problem: Fall Risk  Goal: Patient will remain free from falls  Outcome: Progressing       Patient is not progressing towards the following goals:

## 2023-04-12 NOTE — PROGRESS NOTES
Discharge instructions reviewed and signed by the patient. All questions answered at this time. Report given to ALLAN Ponce in [facility]. IV was removed. Patient was transported with [facility] via stretcher for discharge.

## 2023-04-20 NOTE — ANESTHESIA TIME REPORT
Anesthesia Start and Stop Event Times     Date Time Event    10/11/2022 1517 Ready for Procedure     1541 Anesthesia Start     1720 Anesthesia Stop        Responsible Staff  10/11/22    Name Role Begin End    Pablo Daniels D.O. Anesth 1541 1720        Overtime Reason:  no overtime (within assigned shift)    Comments:                                                       Z Plasty Text: The lesion was extirpated to the level of the fat with a #15 scalpel blade.  Given the location of the defect, shape of the defect and the proximity to free margins a Z-plasty was deemed most appropriate for repair.  Using a sterile surgical marker, the appropriate transposition arms of the Z-plasty were drawn incorporating the defect and placing the expected incisions within the relaxed skin tension lines where possible.    The area thus outlined was incised deep to adipose tissue with a #15 scalpel blade.  The skin margins were undermined to an appropriate distance in all directions utilizing iris scissors.  The opposing transposition arms were then transposed into place in opposite direction and anchored with interrupted buried subcutaneous sutures.

## 2023-06-22 NOTE — TELEPHONE ENCOUNTER
06-22-23  1st NO SHOW  Date of No Show: 06-21-23  Provider: Jerrell  Reason For Visit: FV  Outcome of call:    no answer unable to LVM.    Reason Missed: N/A  ACM

## 2023-06-25 PROBLEM — I95.9 HYPOTENSION: Status: ACTIVE | Noted: 2023-01-01

## 2023-06-25 PROBLEM — N18.30 ACUTE RENAL FAILURE SUPERIMPOSED ON STAGE 3 CHRONIC KIDNEY DISEASE (HCC): Status: ACTIVE | Noted: 2019-02-11

## 2023-06-25 PROBLEM — E83.42 HYPOMAGNESEMIA: Status: ACTIVE | Noted: 2023-01-01

## 2023-06-25 PROBLEM — E83.42 HYPOMAGNESEMIA: Status: RESOLVED | Noted: 2023-01-01 | Resolved: 2023-01-01

## 2023-06-25 PROBLEM — K59.00 CONSTIPATION: Status: ACTIVE | Noted: 2023-01-01

## 2023-06-25 PROBLEM — R82.90 ABNORMAL URINALYSIS: Status: ACTIVE | Noted: 2023-01-01

## 2023-06-25 PROBLEM — E87.5 HYPERKALEMIA: Status: ACTIVE | Noted: 2023-01-01

## 2023-06-25 PROBLEM — E55.9 VITAMIN D DEFICIENCY: Status: ACTIVE | Noted: 2023-01-01

## 2023-06-25 NOTE — ED PROVIDER NOTES
"ER Provider Note    Scribed for Asad Mann D.O. by Brennon Aguilar. 6/25/2023  2:45 PM    Primary Care Provider: Liat Emerson M.D.    CHIEF COMPLAINT  Chief Complaint   Patient presents with    Constipation     X 5 days    Tremors     Bilat arms \"for weeks,\" states is having increasing difficulty holding objects / grasp objects r/t tremors     HPI/ROS  LIMITATION TO HISTORY   Poor Historian    Latonianavid Clinton is a 72 y.o. female who presents to the Emergency Department for tremors onset 2 weeks ago. She reports that the tremors are intermittent in her arms and hands, but she denies any episodes of similar symptoms. She reports living in a care facility as she is not ambulatory for a few years. She reports constipation onset 5 days ago and cough, but denies any nausea, vomiting. She reports having pressure sores. Even with the tremors she has continued to feed herself but states that \"it goes all over. She denies any pertinent medical history.     ROS as per HPI.    PAST MEDICAL HISTORY  Past Medical History:   Diagnosis Date    Anesthesia     \"Abnormal blood pressure and breathing\"  \"couldn't breathe\"    Asthma     inhalers daily    Backpain 7/2017     thor. area    Blood clot in vein 07/06/2018    \"Currently have a blood clot in my left eye\"    Bowel habit changes 07/06/2018    Constipation    Breath shortness     with exertion has O2 but does not use    Bronchitis     CAD (coronary artery disease)     palpatations    Cancer (HCC) 2016    left lung    Chickenpox     COPD (chronic obstructive pulmonary disease) (HCC)     Depression 2/26/2019    Dialysis 2005    transient renal failure due to sepsis    Emphysema of lung (HCC)     Glaucoma     right eye    Hemorrhagic disorder (HCC)     Hyperlipidemia     Hypertension     Hyperthyroidism     Kidney stone     bilateral    Lung cancer (HCC) 12/12/2016    Multiple thyroid nodules 7/9/2015    Nasal drainage     Obstruction of left ureteropelvic junction " "(UPJ) due to stone 6/17/2018    Osteoporosis     Pain 07/06/2018    Back pain    Personal history of venous thrombosis and embolism 2004, 2012    right leg 2012, left arm dvt 2004 left eye 2017    Pneumonia 7/2016    per patient    Renal disorder     had been on dialysis for 7 months for acute failure 2005    Renal stones 2013    post lithotripsy    Rheumatoid arthritis (HCC)     question    Rheumatoid arthritis (HCC)     S/P appendectomy 1998     S/P cholecystectomy 1998    S/P kyphoplasty 2006, 2007, 2013    Sepsis, unspecified 2005    Shortness of breath 07/06/2018    Chronic current problem. \"A couple of years now\".  O2 concentrator at night - pt states she doesn't use it.    Thyroid nodule, hot 2017    functional \"hot\" right thyroid nodule without thyrotoxicosis     SURGICAL HISTORY  Past Surgical History:   Procedure Laterality Date    BRONCHOSCOPY, ROBOT-ASSISTED  10/11/2022    Procedure: FIBER OPTIC BRONCHOSCOPY WITH  WASH, BRUSH, BRONCHOALVEOLAR LAVAGE, BIOSPY, FINE NEEDLE ASPIRATION & NAVIGATION, ROBOTICS;  Surgeon: Donita Haque M.D.;  Location: [facility];  Service: Pulmonary Robotic    CYSTOSCOPY STENT PLACEMENT  7/9/2018    Procedure: Cystoscopy,  Left removal of stent ,  Left Stent Placement;  Surgeon: Beck Yang M.D.;  Location: [facility];  Service: Urology    URETEROSCOPY Left 7/9/2018    Procedure: URETEROSCOPY;  Surgeon: Beck Yang M.D.;  Location: [facility];  Service: Urology    LASERTRIPSY Left 7/9/2018    Procedure: LASERTRIPSY-LITHO;  Surgeon: Beck Yang M.D.;  Location: [facility];  Service: Urology    CYSTOSCOPY STENT PLACEMENT Left 6/18/2018    Procedure: CYSTOSCOPY STENT PLACEMENT;  Surgeon: Beck Yang M.D.;  Location: [facility];  Service: Urology    LITHOTRIPSY Left 6/18/2018    Procedure: LITHOTRIPSY;  Surgeon: Beck Yang M.D.;  Location: [facility];  Service: Urology    LASERTRIPSY " Left 6/18/2018    Procedure: LASERTRIPSY;  Surgeon: Beck Yang M.D.;  Location: [facility];  Service: Urology    URETEROSCOPY Left 6/18/2018    Procedure: URETEROSCOPY;  Surgeon: Beck Yang M.D.;  Location: SUR[facility]ERY [facility];  Service: Urology    THORACOSCOPY Left 12/12/2016    Procedure: THORACOSCOPY W/WEDGE RESECTION UPPER LOBE MASS;  Surgeon: John H Ganser, M.D.;  Location: [facility];  Service:     OTHER ORTHOPEDIC SURGERY  2013    kyphoplasty X3    APPENDECTOMY      1990    CHOLECYSTECTOMY      1990    LAMINOTOMY      LUNG BIOPSY OPEN      OTHER      OTHER ABDOMINAL SURGERY      gall bladder disease    FL BREAST AUGMENTATION WITH IMPLANT      FL BREAST REDUCTION       FAMILY HISTORY  Family History   Problem Relation Age of Onset    Cancer Mother         kidney    Heart Failure Father     Heart Disease Brother     Cancer Maternal Uncle         liver    Cancer Paternal Aunt         ovarian    Cancer Paternal Uncle         lung     SOCIAL HISTORY   reports that she quit smoking about 23 years ago. Her smoking use included cigarettes. She has a 30.00 pack-year smoking history. She has never used smokeless tobacco. She reports that she does not currently use alcohol. She reports that she does not use drugs.    CURRENT MEDICATIONS  Previous Medications    ACETAMINOPHEN (TYLENOL) 500 MG TAB    Take 1,000 mg by mouth 3 times a day.    ALBUTEROL (PROVENTIL) 2.5MG/3ML NEBU SOLN SOLUTION FOR NEBULIZATION    USE THREE MILLILITERS (1 VIAL) VIA NEBULIZATION BY MOUTH EVERY 4 HOURS AS NEEDED FOR SHORTNESS OF BREATH    ALBUTEROL 108 (90 BASE) MCG/ACT AERO SOLN INHALATION AEROSOL    Inhale 2 Puffs every 6 hours as needed for Shortness of Breath.    ALLOPURINOL (ZYLOPRIM) 100 MG TAB    Take 200 mg by mouth every morning.    ATORVASTATIN (LIPITOR) 20 MG TAB    Take 20 mg by mouth every evening.    BISACODYL (DULCOLAX) 10 MG SUPPOS    Insert 1 Suppository into the rectum 1 time a day as  "needed (if magnesium hydroxide ineffective after 24 hours).    COMBIGAN 0.2-0.5 % SOLUTION    Administer 1 Drop into both eyes 2 times a day.    ERGOCALCIFEROL (DRISDOL) 04035 UNIT CAPSULE    Take 1 Capsule by mouth every 7 days.    FLUTICASONE FUROATE-VILANTEROL (BREO ELLIPTA) 200-25 MCG/ACT AEROSOL POWDER, BREATH ACTIVATED    Inhale 1 Puff every day. Rinse mouth after use.    HYDROCODONE-ACETAMINOPHEN (NORCO) 5-325 MG TAB PER TABLET    Take 1 Tablet by mouth every four hours as needed.    KLOR-CON M20 20 MEQ TAB CR    Take 40 mEq by mouth every evening.    LACTOBACILLUS RHAMNOSUS, GG, (CULTURELLE PO)    Take 1 Capsule by mouth every day.    LOSARTAN (COZAAR) 50 MG TAB    Take 50 mg by mouth every morning.    MAGNESIUM OXIDE (MAG-OX) 400 MG TAB TABLET    Take 400 mg by mouth every evening.    METHIMAZOLE (TAPAZOLE) 5 MG TAB    TAKE ONE TABLET BY MOUTH DAILY    ONDANSETRON (ZOFRAN ODT) 4 MG TABLET DISPERSIBLE    Take 1 Tablet by mouth every 6 hours as needed for Nausea.    ONDANSETRON (ZOFRAN) 4 MG TAB TABLET    Take 4 mg by mouth see administration instructions. Per MAR shows that pt takes every 6 hours as needed, on 4/3/2023 per MAR shows that pt is to take this medications 6 times for 2 days.    POLYETHYLENE GLYCOL/LYTES (MIRALAX) 17 G PACK    Take 1 Packet by mouth 1 time a day as needed (if sennosides and docusate ineffective after 24 hours).    PROMETHAZINE (PHENERGAN) 25 MG/ML SOLUTION    Inject 25 mg into the shoulder, thigh, or buttocks every 6 hours as needed for Nausea/Vomiting.    RIVAROXABAN (XARELTO) 20 MG TAB TABLET    TAKE ONE TABLET BY MOUTH DAILY WITH DINNER    SENNA-DOCUSATE (PERICOLACE OR SENOKOT S) 8.6-50 MG TAB    Take 2 Tablets by mouth 2 times a day.    TIOTROPIUM (SPIRIVA RESPIMAT) 2.5 MCG/ACT AERO SOLN    INHALE TWO PUFFS BY MOUTH DAILY     ALLERGIES  Patient has no known allergies.    PHYSICAL EXAM  /82   Pulse 82   Temp 36.1 °C (97 °F) (Temporal)   Resp 15   Ht 1.626 m (5' 4\")  "  Wt 99.4 kg (219 lb 2.2 oz)   SpO2 97%     General: No acute distress.  HENT: Normocephalic, Mucus membranes are moist.   Chest: Lungs have even and unlabored respirations, Clear to auscultation.   Cardiovascular: Regular rate and regular rhythm, No peripheral cyanosis.  Abdomen: Non distended.  Neuro: Awake, Conversive, Able to relay recent events, but forgetful. Speech is clear. Tremors present in bilateral upper extremities.   Psychiatric: Calm and cooperative.     EXTERNAL RECORDS REVIEWED  No history of Parkinson's    INITIAL ASSESSMENT  Patient present with constipation for 5 days and tremors present for 2 weeks. Lab tests to evaluate for infection source of tremor and weakness and imaging to evaluate the extent of the constipation.     ED Observation Status? Yes; I am placing the patient in to an observation status due to a diagnostic uncertainty as well as therapeutic intensity. Patient placed in observation status at 2:52 PM, 6/25/2023.     Observation plan is as follows: Monitor tremors while waiting for evaluation results.     Upon Reevaluation, the patient's condition has: not improved; and will be escalated to hospitalization.    Patient discharged from ED Observation status at 5:01 PM 6/25/2023.      DIAGNOSTIC STUDIES    Labs:   Results for orders placed or performed during the hospital encounter of 06/25/23   CBC WITH DIFFERENTIAL   Result Value Ref Range    WBC 7.0 4.8 - 10.8 K/uL    RBC 3.09 (L) 4.20 - 5.40 M/uL    Hemoglobin 9.6 (L) 12.0 - 16.0 g/dL    Hematocrit 31.7 (L) 37.0 - 47.0 %    .6 (H) 81.4 - 97.8 fL    MCH 31.1 27.0 - 33.0 pg    MCHC 30.3 (L) 32.2 - 35.5 g/dL    RDW 54.9 (H) 35.9 - 50.0 fL    Platelet Count 369 164 - 446 K/uL    MPV 11.3 9.0 - 12.9 fL    Neutrophils-Polys 70.60 44.00 - 72.00 %    Lymphocytes 12.80 (L) 22.00 - 41.00 %    Monocytes 11.60 0.00 - 13.40 %    Eosinophils 4.00 0.00 - 6.90 %    Basophils 0.40 0.00 - 1.80 %    Immature Granulocytes 0.60 0.00 - 0.90 %     Nucleated RBC 0.00 0.00 - 0.20 /100 WBC    Neutrophils (Absolute) 4.93 1.82 - 7.42 K/uL    Lymphs (Absolute) 0.89 (L) 1.00 - 4.80 K/uL    Monos (Absolute) 0.81 0.00 - 0.85 K/uL    Eos (Absolute) 0.28 0.00 - 0.51 K/uL    Baso (Absolute) 0.03 0.00 - 0.12 K/uL    Immature Granulocytes (abs) 0.04 0.00 - 0.11 K/uL    NRBC (Absolute) 0.00 K/uL   COMP METABOLIC PANEL   Result Value Ref Range    Sodium 134 (L) 135 - 145 mmol/L    Potassium 6.0 (H) 3.6 - 5.5 mmol/L    Chloride 100 96 - 112 mmol/L    Co2 23 20 - 33 mmol/L    Anion Gap 11.0 7.0 - 16.0    Glucose 118 (H) 65 - 99 mg/dL    Bun 56 (H) 8 - 22 mg/dL    Creatinine 3.46 (H) 0.50 - 1.40 mg/dL    Calcium 14.1 (HH) 8.4 - 10.2 mg/dL    AST(SGOT) 16 12 - 45 U/L    ALT(SGPT) 14 2 - 50 U/L    Alkaline Phosphatase 155 (H) 30 - 99 U/L    Total Bilirubin 0.3 0.1 - 1.5 mg/dL    Albumin 3.1 (L) 3.2 - 4.9 g/dL    Total Protein 5.8 (L) 6.0 - 8.2 g/dL    Globulin 2.7 1.9 - 3.5 g/dL    A-G Ratio 1.1 g/dL   URINALYSIS (UA)    Specimen: Urine   Result Value Ref Range    Color Yellow     Character Turbid (A)     Specific Gravity 1.015 <1.035    Ph 7.0 5.0 - 8.0    Glucose Negative Negative mg/dL    Ketones Negative Negative mg/dL    Protein Negative Negative mg/dL    Bilirubin Negative Negative    Nitrite Positive (A) Negative    Leukocyte Esterase Large (A) Negative    Occult Blood Moderate (A) Negative    Micro Urine Req Microscopic    URINE MICROSCOPIC (W/UA)   Result Value Ref Range    -150 (A) /hpf    RBC 20-50 (A) /hpf    Bacteria Moderate (A) None /hpf    Epithelial Cells Few Few /hpf   CORRECTED CALCIUM   Result Value Ref Range    Correct Calcium 14.8 (HH) 8.5 - 10.5 mg/dL   ESTIMATED GFR   Result Value Ref Range    GFR (CKD-EPI) 13 (A) >60 mL/min/1.73 m 2   Magnesium   Result Value Ref Range    Magnesium 3.6 (H) 1.5 - 2.5 mg/dL   TSH WITH REFLEX TO FT4   Result Value Ref Range    TSH 0.857 0.380 - 5.330 uIU/mL   EKG (NOW)   Result Value Ref Range    Report       [facility] Emergency Dept.    Test Date:  2023  Pt Name:    ROSARIO MARTINEZ              Department: E[facility]M  MRN:        [id number]                      Room:       -ROOM 7  Gender:     Female                       Technician: [id number]  :        [id number]                   Requested By:SREEDHAR BLOOD  Order #:    [id number]                    Reading MD: SREEDHAR BLOOD D.O.    Measurements  Intervals                                Axis  Rate:       79                           P:          80  OK:         217                          QRS:        57  QRSD:       78                           T:          88  QT:         347  QTc:        398    Interpretive Statements  Sinus rhythm    Low voltage, extremity and precordial leads  Nonspecific T abnrm, anterolateral leads  Compared to ECG 2023 22:22:11  Sinus tachycardia no longer present  ST (T wave) deviation no longer present  Electronically Signed On 2023 16:12:31 PDT by NEENA MOJICA     POCT glucose device results   Result Value Ref Range    POC Glucose, Blood 104 (H) 65 - 99 mg/dL   POCT glucose device results   Result Value Ref Range    POC Glucose, Blood 105 (H) 65 - 99 mg/dL      Radiology:   The attending emergency physician has independently interpreted the diagnostic imaging associated with this visit and am waiting the final reading from the radiologist.   Preliminary interpretation is as follows: CT shows no bleed  Radiologist interpretation:   CT-HEAD W/O   Final Result      1.  Cerebral atrophy.   2.  No acute intracranial abnormality.         [id number] VIEW   Final Result      Probably moderate colonic stool. Nonobstructive bowel gas pattern.         COURSE & MEDICAL DECISION MAKING     COURSE AND PLAN  2:45 PM - Patient was seen and evaluated at bedside. I discussed plan of care including lab work and imaging with the patient. Patient was given an opportunity to ask questions. Ordered labs: Complete  Metabolic Panel, CBC with differentials, and UA and imaging: DX-Abdomen to evaluate their symptoms. Patient verbalizes understanding and agreement to this plan of care.     3:58 PM - Patient was seen at bedside. I informed the patient that their potassium and calcium are high so I plan to admit her. Patient reports a history of dialysis and states that she has an old port with her last dialysis likely years ago. Patient verbalizes understanding and agreement to this plan of care.      ED Summary: Patient presents with complaints of weakness, and tremors, unable to eat.  There is also concerns of constipation.  On reevaluation her potassium is high, she has acute renal failure, and significantly elevated potassium.  EKG was done and shows no widening no concerns for arrhythmia caused by the potassium.  She was given IV insulin, IV glucose, IV bicarbonate.    She has been having difficulty eating due to the weakness in the arms, so it is difficult to say if the weakness caused the dehydration or dehydration because of weakness.  IV fluids are being initiated spoke with hospitalist for admission the patient will be admitted for further evaluation and treatment.    Critical care time 35 minutes    DISPOSITION AND DISCUSSIONS  I have discussed management of the patient with the following physicians and NINI's:  Dr. Honeycutt (Hospitalist)    DISPOSITION:  Patient will be hospitalized by Dr. Honeycutt (Hospitalist) in guarded condition.     FINAL DIAGNOSIS  1. Acute renal failure, unspecified acute renal failure type (HCC)    2. Hypercalcemia    3. Hyperkalemia      Brennon ALVARADO (Miryam), am scribing for, and in the presence of, Asad Mann D.O..    Electronically signed by: Brennon Aguilar (Miryam), 6/25/2023    Asad ALVARADO D.O. personally performed the services described in this documentation, as scribed by Brennon Aguilar in my presence, and it is both accurate and complete.    The note accurately reflects work and  decisions made by me.  Asad Mann D.O.  6/25/2023  6:52 PM

## 2023-06-25 NOTE — ED NOTES
Med rec updated and complete, per MAR from group home. Per pt reports that her group home name is [facility] ([phone])  Allergies reviewed, per MAR

## 2023-06-25 NOTE — ED NOTES
Lab from Lab called with critical result of  at Calcium 14.1 and Potassium 6.0. Critical lab result read back to 1556.   Dr. Mann notified of critical lab result at 1556.  Critical lab result read back by Dr. Mann.

## 2023-06-25 NOTE — ED TRIAGE NOTES
"Chief Complaint   Patient presents with    Constipation     X 5 days    Tremors     Bilat arms \"for weeks,\" states is having increasing difficulty holding objects / grasp objects r/t tremors     /82   Pulse 82   Temp 36.1 °C (97 °F) (Temporal)   Resp 15   Ht 1.626 m (5' 4\")   Wt 99.4 kg (219 lb 2.2 oz)   LMP  (LMP Unknown)   SpO2 97%   BMI 37.61 kg/m²     Pt to ED from [facility] via [facility] for c/o bilat arm tremors and constipation.  Pt on 2L oxygen, uses oxygen at home.  Pt arrived w/ lópez catheter in place.    "

## 2023-06-26 PROBLEM — E03.9 HYPOTHYROIDISM: Status: ACTIVE | Noted: 2023-01-01

## 2023-06-26 PROBLEM — I95.9 HYPOTENSION: Status: RESOLVED | Noted: 2023-01-01 | Resolved: 2023-01-01

## 2023-06-26 PROBLEM — R82.90 ABNORMAL URINALYSIS: Status: RESOLVED | Noted: 2023-01-01 | Resolved: 2023-01-01

## 2023-06-26 NOTE — DISCHARGE PLANNING
Case Management Discharge Planning    Admission Date: 6/25/2023  GMLOS: 3.1  ALOS: 1    6-Clicks ADL Score: 10  6-Clicks Mobility Score: 6  PT and/or OT Eval ordered: Yes  Post-acute Referrals Ordered: No  Post-acute Choice Obtained: NA  Has referral(s) been sent to post-acute provider:  NA      Anticipated Discharge Dispo: Discharge Disposition: D/T to hospice home (50)    DME Needed: No    Action(s) Taken: RNCM attended IDT rounds and reviewed chart. Per IDT discussion, anticipate DC back to [facility] , likely with hospice services. Pending GOC discussion.    Escalations Completed: None    Medically Clear: No    Next Steps:  f/u with medical team to discuss DC needs and barriers    Barriers to Discharge: Medical clearance

## 2023-06-26 NOTE — PROGRESS NOTES
[facility] -  PROGRESS NOTE               Author: Valerie Us M.D. Date & Time: 6/26/2023  6:58 AM     HPI:  72 y.o. female with Hx of CKD, HTN, lung cancer  s/p sx and XRT presenting with constipation and tremors admitted for ISAMAR and hypercalcemia.   Hospitalized in 3/23 with for  he same, hypercalcemia was felt to be sec to sec hyperparathyroidism given Hx of CKD.   Her Serum Ca has improved to normal on discharge. Work up for hypercalcemia during previous hospitalizations revealed normal PTHrP and vitamin 1,25 H D borderline elevated vitamin D level at 96, most recent  in 3/23.   Recently she has had a CT chest with contrast as OP 6/21 for lung cancer surveillance showed mildly enlarged right paratracheal lymph node measuring 11 mm  indeterminate but low suspicion finding, stable L adrenal mass, likely benign per radiology report.  Bone scan 6/21 with  findings consistent with focal osseous metastatic deposits in the left anterior fourth rib with corresponding sclerosis on CT, left ninth posterior rib with corresponding sclerosis on CT, and probably the right posterior acetabulum with a   corresponding focal sclerotic lesion seen on CT from 4/8/2023.  Focal uptake in the left femoral shaft most consistent with osseous metastatic deposit.Nonspecific uptake at the right posterior-medial 10th rib. No corresponding sclerotic lesion seen on CT.   She has not followed up up with nephrology since she was last hospitalized.   Initial ER labs showed Cr of 3.46  and Ca 14.8, K 5.6. EKG with out  any changes, normal QTc interval. On arrival   She has received IV fluid bolus and calcitonin.  Pt repoerts she has been having tremors for for the past 2-3 weeks,   She reports poor PO intake, She denies any change in her urine out put. She reports she is bed bound and does not ambulate.   No F/C/N/V/CP/SOB.  No melena, hematochezia, hematemesis.  No HA, visual changes, or abdominal  "pain.    DAILY NEPHROLOGY SUMMARY:  6/26 - No events, corrected Ca 13.6, Cr 3.25, BP stable, on 2L NC O2, fair UOP overnight, denies any CP/SOB/Abd pain    REVIEW OF SYSTEMS:    10 point ROS reviewed and is as per HPI/daily summary or otherwise negative    PMH/PSH/SH/FH:   Reviewed and unchanged since admission note    CURRENT MEDICATIONS:   Reviewed from admission to present day    VS:  /73   Pulse 94   Temp 36.7 °C (98 °F) (Oral)   Resp 18   Ht 1.626 m (5' 4\")   Wt 90.7 kg (199 lb 15.3 oz)   LMP  (LMP Unknown)   SpO2 94%   BMI 34.32 kg/m²     Physical Exam  Vitals and nursing note reviewed.   Constitutional:       Appearance: Normal appearance.   HENT:      Head: Normocephalic and atraumatic.      Mouth/Throat:      Mouth: Mucous membranes are dry.   Eyes:      General: No scleral icterus.  Cardiovascular:      Rate and Rhythm: Normal rate and regular rhythm.   Pulmonary:      Effort: Pulmonary effort is normal. No respiratory distress.      Breath sounds: Normal breath sounds.   Abdominal:      General: Bowel sounds are normal. There is no distension.      Palpations: Abdomen is soft.   Musculoskeletal:         General: No deformity.      Right lower leg: No edema.      Left lower leg: No edema.   Skin:     General: Skin is warm and dry.      Findings: No rash.   Neurological:      General: No focal deficit present.      Mental Status: She is alert and oriented to person, place, and time.   Psychiatric:         Speech: Speech is delayed.         Behavior: Behavior is cooperative.         Fluids:  In: 250.9 [I.V.:250.9]  Out: -     LABS:  Recent Labs     06/25/23  1923 06/26/23  0002 06/26/23  0400   SODIUM 137 142 138   POTASSIUM 5.6* 5.5 5.1   CHLORIDE 101 107 106   CO2 23 23 22   GLUCOSE 100* 89 101*   BUN 54* 51* 49*   CREATININE 3.39* 3.34* 3.25*   CALCIUM 13.8* 12.5* 12.6*       IMAGING:   All imaging reviewed from admission to present day    IMPRESSION:  # ISAMAR, nonoliguric  In the setting of " hypercalcemia, recent contrast exposure   # CKD:   Frequent fluctuations in creat  Baseline ~1.5  # Severe hypercalcemia:   Etiology  likely sec to hypercalcemia of malignancy in the setting of bone mets per bone   scan 6/21 with Hx of lung cancer, also hypercalcemia of immobility and vitamin     One would expect  to see high P and low Ca in sec hyperparathyroidism and less likely to      have severe hypercalcemia .PTH scan on last admission was negative for   adenoma   iPTH 271 6/25/23, 25-OH vitamin D and PTHrP pending  # HTN on Lasix ? 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  in the setting of ISAMAR   # Hx of DVT on NOACs     SUGGESTIONS:             Continue NS at 250cc x24hrs and then decrease maintenance IVF rate to 150cc/hr   Given pamidronate 6/25  Renal diet             Hold vitamin D2              No urgent indication for RRT              No dietary protein restrictions             Hold gabapentin              Hold midodrine               Hold Xarelto suggest alternative AC               Increase valtessa to 16.8grams daily until serum K less than 5.0              Strict I/O               Trend Ca levels               Repeat K     Clarify treatment plan with Oncology             Follow labs, further recommendations to follow

## 2023-06-26 NOTE — ASSESSMENT & PLAN NOTE
- PTH previously elevated up to 340s. Nuclear parathyroid scan without any suspicion for parathyroid adenoma. Patient was referred to endocrinology whom she has not seen yet.  - severely elevated Ca likely driven from a different process such as malignancy  - treat hypercalcemia as above

## 2023-06-26 NOTE — CONSULTS
[facility] -  CONSULTATION NOTE               Author: Eddie Humphreys M.D. Date & Time: 6/25/2023  9:32 PM       REASON FOR CONSULTATION:   ISAMAR, hypercalcemia ,    CHIEF COMPLAINT:   Constipation, tremors,  generalized  weakness     HISTORY OF PRESENT ILLNESS:    72 y.o. female with Hx of CKD, HTN, lung cancer  s/p sx and XRT presenting with constipation and tremors admitted for ISAMAR and hypercalcemia.   Hospitalized in 3/23 with for  he same, hypercalcemia was felt to be sec to sec hyperparathyroidism given Hx of CKD.   Her Serum Ca has improved to normal on discharge. Work up for hypercalcemia during previous hospitalizations revealed normal PTHrP and vitamin 1,25 H D borderline elevated vitamin D level at 96, most recent  in 3/23.   Recently she has had a CT chest with contrast as OP 6/21 for lung cancer surveillance showed mildly enlarged right paratracheal lymph node measuring 11 mm  indeterminate but low suspicion finding, stable L adrenal mass, likely benign per radiology report.  Bone scan 6/21 with  findings consistent with focal osseous metastatic deposits in the left anterior fourth rib with corresponding sclerosis on CT, left ninth posterior rib with corresponding sclerosis on CT, and probably the right posterior acetabulum with a   corresponding focal sclerotic lesion seen on CT from 4/8/2023.  Focal uptake in the left femoral shaft most consistent with osseous metastatic deposit.Nonspecific uptake at the right posterior-medial 10th rib. No corresponding sclerotic lesion seen on CT.   She has not followed up up with nephrology since she was last hospitalized.   Initial ER labs showed Cr of 3.46  and Ca 14.8, K 5.6. EKG with out  any changes, normal QTc interval. On arrival   She has received IV fluid bolus and calcitonin.  Pt repoerts she has been having tremors for for the past 2-3 weeks,   She reports poor PO intake, She denies any change in her urine out put. She  "reports she is bed bound and does not ambulate.   No F/C/N/V/CP/SOB.  No melena, hematochezia, hematemesis.  No HA, visual changes, or abdominal pain.    REVIEW OF SYSTEMS:    10 point ROS was performed and is as per HPI or otherwise negative    PAST MEDICAL HISTORY:   Past Medical History:   Diagnosis Date    Anesthesia     \"Abnormal blood pressure and breathing\"  \"couldn't breathe\"    Asthma     inhalers daily    Backpain 7/2017     thor. area    Blood clot in vein 07/06/2018    \"Currently have a blood clot in my left eye\"    Bowel habit changes 07/06/2018    Constipation    Breath shortness     with exertion has O2 but does not use    Bronchitis     CAD (coronary artery disease)     palpatations    Cancer (HCC) 2016    left lung    Chickenpox     COPD (chronic obstructive pulmonary disease) (HCC)     Depression 2/26/2019    Dialysis 2005    transient renal failure due to sepsis    Emphysema of lung (HCC)     Glaucoma     right eye    Hemorrhagic disorder (HCC)     Hyperlipidemia     Hypertension     Hyperthyroidism     Kidney stone     bilateral    Lung cancer (HCC) 12/12/2016    Multiple thyroid nodules 7/9/2015    Nasal drainage     Obstruction of left ureteropelvic junction (UPJ) due to stone 6/17/2018    Osteoporosis     Pain 07/06/2018    Back pain    Personal history of venous thrombosis and embolism 2004, 2012    right leg 2012, left arm dvt 2004 left eye 2017    Pneumonia 7/2016    per patient    Renal disorder     had been on dialysis for 7 months for acute failure 2005    Renal stones 2013    post lithotripsy    Rheumatoid arthritis (HCC)     question    Rheumatoid arthritis (HCC)     S/P appendectomy 1998     S/P cholecystectomy 1998    S/P kyphoplasty 2006, 2007, 2013    Sepsis, unspecified 2005    Shortness of breath 07/06/2018    Chronic current problem. \"A couple of years now\".  O2 concentrator at night - pt states she doesn't use it.    Thyroid nodule, hot 2017    functional \"hot\" right thyroid " nodule without thyrotoxicosis       PAST SURGICAL HISTORY:   Past Surgical History:   Procedure Laterality Date    BRONCHOSCOPY, ROBOT-ASSISTED  10/11/2022    Procedure: FIBER OPTIC BRONCHOSCOPY WITH  WASH, BRUSH, BRONCHOALVEOLAR LAVAGE, BIOSPY, FINE NEEDLE ASPIRATION & NAVIGATION, ROBOTICS;  Surgeon: Donita Haque M.D.;  Location: [facility];  Service: Pulmonary Robotic    CYSTOSCOPY STENT PLACEMENT  7/9/2018    Procedure: Cystoscopy,  Left removal of stent ,  Left Stent Placement;  Surgeon: Beck Yang M.D.;  Location: [facility];  Service: Urology    URETEROSCOPY Left 7/9/2018    Procedure: URETEROSCOPY;  Surgeon: Beck Yang M.D.;  Location: [facility];  Service: Urology    LASERTRIPSY Left 7/9/2018    Procedure: LASERTRIPSY-LITHO;  Surgeon: Beck Yang M.D.;  Location: [facility];  Service: Urology    CYSTOSCOPY STENT PLACEMENT Left 6/18/2018    Procedure: CYSTOSCOPY STENT PLACEMENT;  Surgeon: Beck Yang M.D.;  Location: [facility];  Service: Urology    LITHOTRIPSY Left 6/18/2018    Procedure: LITHOTRIPSY;  Surgeon: Beck Yang M.D.;  Location: [facility];  Service: Urology    LASERTRIPSY Left 6/18/2018    Procedure: LASERTRIPSY;  Surgeon: Beck Yang M.D.;  Location: [facility];  Service: Urology    URETEROSCOPY Left 6/18/2018    Procedure: URETEROSCOPY;  Surgeon: Beck Yang M.D.;  Location: [facility];  Service: Urology    THORACOSCOPY Left 12/12/2016    Procedure: THORACOSCOPY W/WEDGE RESECTION UPPER LOBE MASS;  Surgeon: John H Ganser, M.D.;  Location: [facility];  Service:     OTHER ORTHOPEDIC SURGERY  2013    kyphoplasty X3    APPENDECTOMY      1990    CHOLECYSTECTOMY      1990    LAMINOTOMY      LUNG BIOPSY OPEN      OTHER      OTHER ABDOMINAL SURGERY      gall bladder disease    DC BREAST AUGMENTATION WITH IMPLANT      DC BREAST REDUCTION         FAMILY  HISTORY:   family history includes Cancer in her maternal uncle, mother, paternal aunt, and paternal uncle; Heart Disease in her brother; Heart Failure in her father.    SOCIAL HISTORY:   Social History     Tobacco Use    Smoking status: Former     Packs/day: 1.00     Years: 30.00     Pack years: 30.00     Types: Cigarettes     Quit date: 2000     Years since quittin.4    Smokeless tobacco: Never    Tobacco comments:     7 years ago   Vaping Use    Vaping Use: Never used   Substance Use Topics    Alcohol use: Not Currently    Drug use: No       MEDICATIONS:   Home medications reviewed and documented in chart    No current facility-administered medications on file prior to encounter.     Current Outpatient Medications on File Prior to Encounter   Medication Sig Dispense Refill    furosemide (LASIX) 40 MG Tab Take 40 mg by mouth every day.      docusate sodium (COLACE) 100 MG Cap Take 100 mg by mouth every day.      Lidocaine HCl (ASPERCREME LIDOCAINE) 4 % Cream Apply 1 Application topically 2 times a day. Per MAR reports apply to affected area BID, per MAR pt started on 2023      Psyllium (METAMUCIL PO) Take 3.4 g by mouth every day.      Umeclidinium Bromide (INCRUSE ELLIPTA) 62.5 MCG/ACT AEROSOL POWDER, BREATH ACTIVATED Inhale 1 Puff every day.      brimonidine (ALPHAGAN) 0.2 % Solution Administer 1 Drop into both eyes 2 times a day.      timolol (TIMOPTIC) 0.5 % Solution Administer 1 Drop into both eyes 2 times a day.      fluticasone-salmeterol (WIXELA INHUB) 500-50 MCG/ACT AEROSOL POWDER, BREATH ACTIVATED Inhale 1 Puff every 12 hours.      midodrine (PROAMATINE) 5 MG Tab Take 5 mg by mouth 2 times a day.      gabapentin (NEURONTIN) 300 MG Cap Take 300 mg by mouth 2 times a day.      acetaminophen (TYLENOL) 325 MG Tab Take 650 mg by mouth every 6 hours as needed for Mild Pain.      zolpidem (AMBIEN) 5 MG Tab Take 5 mg by mouth at bedtime as needed for Sleep.      benzonatate (TESSALON) 100 MG Cap  Take 100 mg by mouth 3 times a day as needed for Cough.      guaiFENesin (ROBITUSSIN) 100 MG/5ML liquid Take 5 mL by mouth every four hours as needed for Cough.      HYDROcodone-acetaminophen (NORCO) 5-325 MG Tab per tablet Take 1 Tablet by mouth every 6 hours as needed. Indications: Pain      atorvastatin (LIPITOR) 20 MG Tab Take 20 mg by mouth every evening.      magnesium oxide (MAG-OX) 400 MG Tab tablet Take 400 mg by mouth every evening.      methimazole (TAPAZOLE) 5 MG Tab TAKE ONE TABLET BY MOUTH DAILY (Patient taking differently: Take 5 mg by mouth every day.) 90 Tablet 1    albuterol (PROVENTIL) 2.5mg/3ml Nebu Soln solution for nebulization USE THREE MILLILITERS (1 VIAL) VIA NEBULIZATION BY MOUTH EVERY 4 HOURS AS NEEDED FOR SHORTNESS OF BREATH 180 mL 11    ergocalciferol (DRISDOL) 16149 UNIT capsule Take 1 Capsule by mouth every 7 days. (Patient taking differently: Take 50,000 Units by mouth every 7 days. On Sunday) 14 Capsule 3    rivaroxaban (XARELTO) 20 MG Tab tablet TAKE ONE TABLET BY MOUTH DAILY WITH DINNER (Patient taking differently: Take 20 mg by mouth with dinner. TAKE ONE TABLET BY MOUTH DAILY WITH DINNER) 90 Tablet 1    KLOR-CON M20 20 MEQ Tab CR Take 20 mEq by mouth every day.      allopurinol (ZYLOPRIM) 100 MG Tab Take 100 mg by mouth every morning.         Current Facility-Administered Medications   Medication Dose Route Frequency Provider Last Rate Last Admin    patiromer (VELTASSA) powder for oral suspension 8.4 g  8.4 g Oral DAILY AT NOON Opal Honeycutt M.D.   8.4 g at 06/25/23 1711    senna-docusate (PERICOLACE or SENOKOT S) 8.6-50 MG per tablet 2 Tablet  2 Tablet Oral BID Opal Honeycutt M.D.   2 Tablet at 06/25/23 2124    And    polyethylene glycol/lytes (MIRALAX) PACKET 1 Packet  1 Packet Oral QDAY PRN Opal Honeycutt M.D.        And    magnesium hydroxide (MILK OF MAGNESIA) suspension 30 mL  30 mL Oral QDAY PRN Opal Honeycutt M.D.        And    bisacodyl (DULCOLAX)  suppository 10 mg  10 mg Rectal QDAY PRN Opal Honeycutt M.D.        acetaminophen (Tylenol) tablet 650 mg  650 mg Oral Q6HRS PRN Opal Honeycutt M.D.        ondansetron (ZOFRAN) syringe/vial injection 4 mg  4 mg Intravenous Q4HRS PRN Opal Honeycutt M.D.        ondansetron (ZOFRAN ODT) dispertab 4 mg  4 mg Oral Q4HRS PRN Opal Honeycutt M.D.        hydrALAZINE (APRESOLINE) injection 10 mg  10 mg Intravenous Q4HRS PRN Opal Honeycutt M.D.        pamidronate (AREDIA) 60 mg in  mL IVPB  60 mg Intravenous Once Opal Honeycutt M.D. 83.3 mL/hr at 06/25/23 2054 60 mg at 06/25/23 2054    NS infusion   Intravenous Continuous Opal Honeycutt M.D. 300 mL/hr at 06/25/23 1944 Rate Change at 06/25/23 1944    atorvastatin (LIPITOR) tablet 20 mg  20 mg Oral Nightly Opal Honeycutt M.D.   20 mg at 06/25/23 2124    brimonidine (ALPHAGAN) 0.2 % ophthalmic solution 1 Drop  1 Drop Both Eyes BID Opal Honeycutt M.D.   1 Drop at 06/25/23 2125    [START ON 6/26/2023] docusate sodium (COLACE) capsule 100 mg  100 mg Oral DAILY Opal Honeycutt M.D.        guaiFENesin (Robitussin) 100 MG/5ML liquid 100 mg  5 mL Oral Q4HRS PRN Opal Honeycutt M.D.        HYDROcodone-acetaminophen (NORCO) 5-325 MG per tablet 1 Tablet  1 Tablet Oral Q6HRS PRN Opal Honeycutt M.D.        [START ON 6/26/2023] methimazole (TAPAZOLE) tablet 5 mg  5 mg Oral DAILY Opal Honeycutt M.D.        timolol (TIMOPTIC) 0.5 % ophthalmic solution 1 Drop  1 Drop Both Eyes BID Opal Honeycutt M.D.   1 Drop at 06/25/23 2125    albuterol (PROVENTIL) 2.5mg/0.5ml nebulizer solution 2.5 mg  2.5 mg Nebulization Q4H PRN (RT) Opal Honeycutt M.D.        [START ON 6/26/2023] mometasone-formoterol (Dulera) 200-5 MCG/ACT inhaler 2 Puff  2 Puff Inhalation BID (RT) Opal Honeycutt M.D.        [START ON 6/26/2023] tiotropium (Spiriva Respimat) 2.5 mcg/Act inhalation spray 5 mcg  5 mcg Inhalation QDAILY (RT) Opal Honeycutt M.D.         "dextrose 10 % BOLUS 25 g  25 g Intravenous Q15 MIN PRN Opal Honeycutt M.D.        [START ON 6/26/2023] heparin injection 5,000 Units  5,000 Units Subcutaneous Q8HRS Opal Honeycutt M.D.        [START ON 6/26/2023] polyethylene glycol/lytes (MIRALAX) PACKET 1 Packet  1 Packet Oral DAILY Opal Honeycutt M.D.           ALLERGIES:  Patient has no known allergies.    VS:  /73   Pulse (!) 105   Temp 36.4 °C (97.5 °F) (Oral)   Resp 18   Ht 1.626 m (5' 4\")   Wt 90.7 kg (199 lb 15.3 oz)   LMP  (LMP Unknown)   SpO2 95%   BMI 34.32 kg/m²   Physical Exam  Constitutional:       Appearance: She is ill-appearing.   HENT:      Head: Normocephalic.      Mouth/Throat:      Mouth: Mucous membranes are dry.   Cardiovascular:      Rate and Rhythm: Regular rhythm. Tachycardia present.   Pulmonary:      Effort: Pulmonary effort is normal.      Breath sounds: Normal breath sounds.   Abdominal:      General: There is no distension.      Palpations: Abdomen is soft.      Tenderness: There is no abdominal tenderness.   Musculoskeletal:         General: Swelling present.   Skin:     General: Skin is dry.   Neurological:      General: No focal deficit present.      Mental Status: She is alert and oriented to person, place, and time.      Comments: Involuntary tremors    Psychiatric:         Behavior: Behavior normal.           FLUID BALANCE:    Intake/Output Summary (Last 24 hours) at 6/25/2023 1730  Last data filed at 6/25/2023 1655  Gross per 24 hour   Intake 250.87 ml   Output --   Net 250.87 ml       LABS:  Recent Labs     06/25/23  1510   RBC 3.09*   HEMOGLOBIN 9.6*   HEMATOCRIT 31.7*   PLATELETCT 369       Recent Labs     06/25/23  1510 06/25/23  1923   SODIUM 134* 137   POTASSIUM 6.0* 5.6*   CHLORIDE 100 101   CO2 23 23   GLUCOSE 118* 100*   BUN 56* 54*   CREATININE 3.46* 3.39*   CALCIUM 14.1* 13.8*        URINALYSIS:  Lab Results   Component Value Date/Time    COLORURINE Yellow 06/25/2023 1540    CLARITY Turbid " (A) 06/25/2023 1540    SPECGRAVITY 1.015 06/25/2023 1540    PHURINE 7.0 06/25/2023 1540    KETONES Negative 06/25/2023 1540    PROTEINURIN Negative 06/25/2023 1540    BILIRUBINUR Negative 06/25/2023 1540    UROBILU 1.0 07/06/2018 1537    NITRITE Positive (A) 06/25/2023 1540    LEUKESTERAS Large (A) 06/25/2023 1540    OCCULTBLOOD Moderate (A) 06/25/2023 1540       UP  Lab Results   Component Value Date/Time    TOTPROTUR 13.0 04/28/2022 1627      Lab Results   Component Value Date/Time    CREATININEU 116.61 04/28/2022 1627       IMAGING:  Imaging studies reviewed by me    CT-HEAD W/O   Final Result      1.  Cerebral atrophy.   2.  No acute intracranial abnormality.         [id number] VIEW   Final Result      Probably moderate colonic stool. Nonobstructive bowel gas pattern.      US-RENAL    (Results Pending)       IMPRESSION:  # ISAMAR, oliguric status not known   In the setting of hypercalcemia, recent contrast exposure   # CKD:   Frequent fluctuations in creat  Baseline ~1.5  # Severe hypercalcemia:   Etiology  likely sec to hypercalcemia of malignancy in the setting of bone mets per bone   scan 6/21 with Hx of lung cancer, also hypercalcemia of immobility and vitamin     One would expect  to see high P and low Ca in sec hyperparathyroidism and less likely to      have severe hypercalcemia .PTH scan on last admission was negative for   adenoma   # HTN on Lasix ? 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  in the setting of ISAMAR   # Hx of DVT on NOACs     SUGGESTIONS:             Aggressive IV hydration   cc/ hr   Check PTH, vitamin D 1,25 OH, vitamin D 25 OH and PTHrP  Renal diet             Hold vitamin D2              No urgent indication for RRT              No dietary protein restrictions             IV pamidronate 60 mg once (  Primary team confirmed this dose with inpatient pharmacy)              Hold gabapentin              Hold midodrine               Hold Xarelto suggest alternative AC               Veltassa 8.4 g once               Renal US               Strict I/O               Trend Ca levels               Repeat K              Follow labs, further recommendations to follow    Discussed above with hospitalist.     Thank you for the consultation!

## 2023-06-26 NOTE — THERAPY
"Occupational Therapy   Initial Evaluation     Patient Name: Latonia Clinton  Age:  72 y.o., Sex:  female  Medical Record #: [id number]  Today's Date: 6/26/2023     Precautions  Precautions: Fall Risk    Assessment  Patient is 72 y.o. female  presents with shakes, weakness, constipation. Patient says she can barely move due to weakness/deconditioning. Pt currently with hypercalcemia.  Pt with h/o non small cell lung cancer s/p resection '17 and radiation, admission in march for hypercalcemia and in April for n/v. Went to [facility] and now resides at Group home. Pt is currently limited by decreased functional mobility, activity tolerance, cognition, strength, AROM, coordination, and balance, which are affecting her ability to complete ADLs/IADLs at baseline. See grid for details. Pt will benefit from further therapy in SNF vs home with HH at Group Home depending on how much assist they are able to provide. Will continue to follow.       Plan    Occupational Therapy Initial Treatment Plan   Treatment Interventions: Self Care / Activities of Daily Living, Adaptive Equipment, Neuro Re-Education / Balance, Therapeutic Exercises, Therapeutic Activity  Treatment Frequency: 3 Times per Week  Duration: Until Therapy Goals Met    DC Equipment Recommendations: Unable to determine at this time  Discharge Recommendations: Recommend post-acute placement for additional occupational therapy services prior to discharge home (vs group home with HH)     Subjective    \"If I'd known I would have to do all this, I would have tried to remember things better.\"     Objective       06/26/23 0945   Prior Living Situation   Prior Services Continuous (24 Hour) Care Giving Per Service   Housing / Facility Group Home   Bathroom Set up   (pt reports bath tub)   Equipment Owned Wheelchair   Lives with - Patient's Self Care Capacity Attendant / Paid Care Giver   Comments Pt questionable historian.  Reports she uses a w/c at group home, SB or latoyna " lift for transfers.  She was confused as to whether she could dress herself PTA. reports using briefs, not being able to toilet self   Prior Level of ADL Function   Self Feeding Independent   Grooming / Hygiene Independent   Bathing Requires Assist   Dressing Requires Assist   Toileting Dependent   Prior Level of IADL Function   Medication Management Dependent   Laundry Dependent   Kitchen Mobility Dependent   Finances Dependent   Home Management Dependent   Shopping Dependent   Prior Level Of Mobility Uses Wheel Chair for in Home Mobility   Driving / Transportation Relatives / Others Provide Transportation   Occupation (Pre-Hospital Vocational) Not Employed   Leisure Interests Unable To Determine At This Time   Precautions   Precautions Fall Risk   Vitals   O2 (LPM) 2   O2 Delivery Device Silicone Nasal Cannula   Pain 0 - 10 Group   Therapist Pain Assessment 0;During Activity;Nurse Notified   Cognition    Cognition / Consciousness X   Speech/ Communication Delayed Responses   Level of Consciousness Alert   Ability To Follow Commands 1 Step  (with delay)   Safety Awareness Impaired   New Learning Impaired   Initiation Impaired   Comments Pt with delayed responses, confused about PLOF, poor historian   Active ROM Upper Body   Active ROM Upper Body  X   Dominant Hand Right   Comments limited by weakness and tremors   Strength Upper Body   Upper Body Strength  X   Gross Strength Generalized Weakness, Equal Bilaterally.    Coordination Upper Body   Comments GM and FM impaired BUE by weakness and tremors   Balance Assessment   Sitting Balance (Static) Trace +   Sitting Balance (Dynamic) Trace +   Standing Balance (Static)   (NT)   Standing Balance (Dynamic)   (NT)   Weight Shift Sitting Absent   Bed Mobility    Supine to Sit Maximal Assist  (x2)   Sit to Supine Maximal Assist  (x2)   Scooting Maximal Assist   Rolling Maximum Assist to Lt.;Maximal Assist to Rt.   ADL Assessment   Eating Minimal Assist   Grooming Moderate  Assist   Upper Body Dressing Maximal Assist   Lower Body Dressing Total Assist   Toileting Total Assist   How much help from another person does the patient currently need...   Putting on and taking off regular lower body clothing? 1   Bathing (including washing, rinsing, and drying)? 1   Toileting, which includes using a toilet, bedpan, or urinal? 1   Putting on and taking off regular upper body clothing? 2   Taking care of personal grooming such as brushing teeth? 2   Eating meals? 3   6 Clicks Daily Activity Score 10   Functional Mobility   Sit to Stand Unable to Participate   Bed, Chair, Wheelchair Transfer Unable to Participate   Toilet Transfers Unable to Participate   Mobility EOB sitting only   Visual Perception   Comments appears WFL   Edema / Skin Assessment   Comments cushioned bandages on B heels, ankles   Activity Tolerance   Sitting Edge of Bed 5 min   Patient / Family Goals   Patient / Family Goal #1 u/a to state   Short Term Goals   Short Term Goal # 1 Pt will dress UB with CGA seated in 3 visits   Short Term Goal # 2 pt will groom seated with setup  in 3 visits   Short Term Goal # 3 Pt will tolerate EOB sitting with SBA for 15 min duting ADL's in 4 visits   Short Term Goal # 4 pt will transfer to BSC or chair for ADL's with modA and SB as needed in 5 visits

## 2023-06-26 NOTE — PROGRESS NOTES
2012: on call provider notified of critical calcium    2350: Notified by monitor tech of ST elevation in leads II, III, and aVF, informed on call provider, EKG ordered.    0231: on call provider notified of critical calcium

## 2023-06-26 NOTE — ASSESSMENT & PLAN NOTE
This is due to kidney failure and also taking potassium supplements. On Veltassa now.  Hold home lasix/potassium

## 2023-06-26 NOTE — PROGRESS NOTES
Hospital Medicine Daily Progress Note    Date of Service  6/26/2023    Chief Complaint  Latonia Clinton is a 72 y.o. female admitted 6/25/2023 with tremors    Hospital Course  This is a 73yo female with a history of non small cell lung cancer s/p resection and radiation in 2017, CAD, COPD on 2L, HTN, hyperthyroidism, DVT and RA who presented to hospital with constipation and arm tremors from her group home. She'd reported poor PO intake for a few days. In the ER, her potassium was noted to be 6, Cr 3.46, and calcium 14.8.    Interval Problem Update  6/26 - Pt stable overnight, on her home 2L. CBC is stable, potassium is down from 6.0 to 5.1 today with Veltassa, Cr down from 3.39 to 3.25. Calcium has gone from 14.8 corrected to 13.6 corrected with IVFs, miacalcin and pamidronate. UA on admit demonstrates a UTI, start Rocephin. Recently had SPEP that did not demonstrate monoclonal gammopathy.     Pt did have lung biopsy in November that demonstrated adenocarcinoma which is the likely cause of her hypercalcemia. PET scan from this month shows osseous mets to the ribs, femur and acetabulum. Discussed with Dr Rizvi and patient who was unaware of her metastatic diagnosis (NM bone scan just done 5 days ago). We will continue to treat her hypercalcemia, and Palliative Care will come see her. She relates wanting to be DNR/DNI and has ACP docs. Unfortunately, cannot do MRI brain w/w/o or CT c/a/p to evaluate for further mets given her creatinine. Oddly, her PTHrP was negative in March and her PTH here is elevated.     I have discussed this patient's plan of care and discharge plan at IDT rounds today with Case Management, Nursing, Nursing leadership, and other members of the IDT team.    Consultants/Specialty  nephrology and palliative care    Code Status  Full Code    Disposition    Anticipate discharge to: hospice    I have placed the appropriate orders for post-discharge needs.    Review of Systems  ROS     Physical  Exam  Temp:  [36.1 °C (97 °F)-36.9 °C (98.5 °F)] 36.9 °C (98.4 °F)  Pulse:  [] 88  Resp:  [15-18] 18  BP: (102-160)/(54-89) 110/56  SpO2:  [94 %-98 %] 95 %    Physical Exam  Vitals and nursing note reviewed.   Constitutional:       Appearance: She is ill-appearing. She is not toxic-appearing.   HENT:      Head: Normocephalic and atraumatic.      Mouth/Throat:      Mouth: Mucous membranes are dry.   Cardiovascular:      Rate and Rhythm: Normal rate and regular rhythm.   Pulmonary:      Effort: Pulmonary effort is normal.      Breath sounds: Normal breath sounds.   Abdominal:      General: Abdomen is flat.   Musculoskeletal:         General: No swelling.   Skin:     General: Skin is warm and dry.   Neurological:      General: No focal deficit present.      Mental Status: She is alert and oriented to person, place, and time. Mental status is at baseline.      Comments: Tremors/myoclonus present   Psychiatric:         Mood and Affect: Mood normal.         Fluids    Intake/Output Summary (Last 24 hours) at 6/26/2023 0745  Last data filed at 6/26/2023 0400  Gross per 24 hour   Intake 3042.64 ml   Output 900 ml   Net 2142.64 ml       Laboratory  Recent Labs     06/25/23  1510 06/26/23  0002   WBC 7.0 6.2   RBC 3.09* 2.88*   HEMOGLOBIN 9.6* 9.0*   HEMATOCRIT 31.7* 30.4*   .6* 105.6*   MCH 31.1 31.3   MCHC 30.3* 29.6*   RDW 54.9* 58.2*   PLATELETCT 369 298   MPV 11.3 11.5     Recent Labs     06/25/23  1923 06/26/23  0002 06/26/23  0400   SODIUM 137 142 138   POTASSIUM 5.6* 5.5 5.1   CHLORIDE 101 107 106   CO2 23 23 22   GLUCOSE 100* 89 101*   BUN 54* 51* 49*   CREATININE 3.39* 3.34* 3.25*   CALCIUM 13.8* 12.5* 12.6*                   Imaging  CT-HEAD W/O   Final Result      1.  Cerebral atrophy.   2.  No acute intracranial abnormality.         [id number] VIEW   Final Result      Probably moderate colonic stool. Nonobstructive bowel gas pattern.      US-RENAL    (Results Pending)        Assessment/Plan  *  Hypercalcemia- (present on admission)  Assessment & Plan  - Concerning for malignancy as primary  of such a rise. Per nephrology, hyperparathyroidism shouldn't raise levels this high. PTH last was in 170s, in the past up to 350s. She did undergo nuclear parathyroid scan without any suspicious nodules.  - Bone scan from 5 days ago shows mets to the ribs, femur and acetabulum  - Continue highly rated IVFs  - s/p Miacalcin, Pamidronate   - Daily CMP   - Previous studies have been done in March - PTHrP was negative at that time   - I will hold home lasix given dehydration and ISAMAR        Hypothyroidism  Assessment & Plan  - TSH normal on home Methimazole     Constipation  Assessment & Plan  Due to hypercalcemia  Aggressive bowel regimen and hydration    Vitamin D deficiency  Assessment & Plan  Takes 50k vitamin D per week. Hold for now, check vit D level in setting of hypercalcemia    Hyperkalemia  Assessment & Plan  This is due to kidney failure and also taking potassium supplements. On Veltassa now.  Hold home lasix/potassium    Hyperparathyroidism (HCC)- (present on admission)  Assessment & Plan  - PTH previously elevated up to 340s. Nuclear parathyroid scan without any suspicion for parathyroid adenoma. Patient was referred to endocrinology whom she has not seen yet.  - severely elevated Ca likely driven from a different process such as malignancy  - treat hypercalcemia as above    Depression, major, recurrent, moderate (HCC)- (present on admission)  Assessment & Plan  Stable.    Macrocytic anemia- (present on admission)  Assessment & Plan  Noted, chronic. Previously b12 high, tsh nl (6/14)  B12 normal    Acute renal failure superimposed on stage 3 chronic kidney disease (HCC)  Assessment & Plan  Cr baseline around 1.7, most recently had improved further down to 1.2. Followed by [facility] but after her March hospitalization patient has not had further visits with Dr. Nunez. Etiology of current  renal failure likely multifactorial from hypovolemia and hypercalcemia.  - [facility] on board  - treat hypercalcemia as above  - aggressive fluid hydration as detailed above  - close monitoring of I/Os  - treat hyperkalemia as above  - trend CMP q4hr  - hold home lasix/potassium/gabapentin    Lung cancer (HCC)- (present on admission)  Assessment & Plan  - Pt with lung cancer s/p excision and radiation in 2017, then with focal recurrence in November of 2022, with radiation therapy done with Dr Ange Banks  - Following with Pulmonary and Dr Rizvi  - Reached out to Dr Rizvi today who agrees with suggestion of Hospice. Will discuss with Dr Banks as well.   - Cannot do CT c/a/p or MRI brain w/wo given her creatinine to further assess mets   - Palliative consulted     Deep vein thrombosis (DVT) (HCC)- (present on admission)  Assessment & Plan  Noted, has had multiple DVTs in the past  Due to kidney impairment, hold Xarelto, will given prophylactic dosing heparin until renal function returns to normal    Chronic obstructive pulmonary disease (HCC)- (present on admission)  Assessment & Plan  Noted, patient wears 2LNC at nighttime.   Currently stable without exacerbation  Continue home meds    Chronic hypoxemic respiratory failure (HCC)  Assessment & Plan  Noted, wears 2LNC at nighttime due to copd    Hyperlipidemia- (present on admission)  Assessment & Plan  Noted, on lipitor         VTE prophylaxis: SCDs/TEDs and heparin ppx    I have performed a physical exam and reviewed and updated ROS and Plan today (6/26/2023). In review of yesterday's note (6/25/2023), there are no changes except as documented above.

## 2023-06-26 NOTE — PROGRESS NOTES
Sushma from Lab called with critical result of Calcium of 12.6 at 0843. Critical lab result read back to Sushma.   Dr. Tripp notified of critical lab result at 0905.  Critical lab result read back by Dr. Tripp.

## 2023-06-26 NOTE — ASSESSMENT & PLAN NOTE
- Concerning for malignancy as primary  of such a rise. Per nephrology, hyperparathyroidism shouldn't raise levels this high. PTH last was in 170s, in the past up to 350s. She did undergo nuclear parathyroid scan without any suspicious nodules.  Calcium down to 9  Okay to discontinue IV fluids

## 2023-06-26 NOTE — PROGRESS NOTES
4 Eyes Skin Assessment Completed by ALLAN Ruiz and ALLAN Domínguez.    Head WDL  Ears WDL  Nose WDL  Mouth WDL  Neck WDL  Breast/Chest WDL  Shoulder Blades WDL  Spine WDL Scar  (R) Arm/Elbow/Hand Bruising, Abrasion, and Scab  (L) Arm/Elbow/Hand Bruising, Abrasion, and Scab  Abdomen WDL  Groin Redness  Scrotum/Coccyx/Buttocks Redness, Blanching, Non-Blanching, Excoriation, and Moisture Fissure  (R) Leg Scab and Bruising  (L) Leg Scab and Bruising  (R) Heel/Foot/Toe Swelling  (L) Heel/Foot/Toe Swelling          Devices In Places Tele Box and Gonzalez      Interventions In Place Pillows, Q2 Turns, and Heels Loaded W/Pillows    Possible Skin Injury Yes    Pictures Uploaded Into Epic Yes  Wound Consult Placed Yes  RN Wound Prevention Protocol Ordered No

## 2023-06-26 NOTE — ASSESSMENT & PLAN NOTE
Cr baseline around 1.7, most recently had improved further down to 1.2. Followed by [facility] but after her March hospitalization patient has not had further visits with Dr. Nunez. Etiology of current renal failure likely multifactorial from hypovolemia and hypercalcemia.  - [facility] consulted  Renal function continues to improve, creatinine 1.89  IV fluids discontinued per recommendations from nephrology  Recheck renal function again tomorrow

## 2023-06-26 NOTE — ASSESSMENT & PLAN NOTE
Noted, patient wears 2LNC at nighttime.   Currently stable without exacerbation  Continue home meds

## 2023-06-26 NOTE — THERAPY
Physical Therapy   Initial Evaluation     Patient Name: Latonia Clinton  Age:  72 y.o., Sex:  female  Medical Record #: [id number]  Today's Date: 6/26/2023     Precautions  Precautions: Fall Risk    Assessment  Patient is 72 y.o. female with a diagnosis of hypercalcemia, .  Additional factors influencing patient status / progress  complicated medical history of COPD, DVT, lung cancer,HTN,  dyslipidemia, nephrolithiasis, hypothyroidism and RA   Pt recently discharged from  to [facility] to group home setting. Pt presenting to PT at Max/Dep level of mobility due to impaired strength, impaired sitting balance,poor  activity tolerance and impaired cognition.  Recommend continued inpatient as well as post acute PT. Pt states she was having home health PT at her group home.     Plan    Physical Therapy Initial Treatment Plan   Treatment Plan : (P) Bed Mobility, Neuro Re-Education / Balance, Therapeutic Activities, Therapeutic Exercise  Treatment Frequency: (P) 3 Times per Week  Duration: (P) Until Therapy Goals Met    DC Equipment Recommendations: (P) Unable to determine at this time  Discharge Recommendations: (P) Recommend post-acute placement for additional physical therapy services prior to discharge home    Initial Contact Note    Initial Contact Note Order Received and Verified, Physical Therapy Evaluation in Progress with Full Report to Follow.   Pain 0 - 10 Group   Therapist Pain Assessment Post Activity Pain Same as Prior to Activity;Nurse Notified;0   Prior Living Situation   Prior Services Continuous (24 Hour) Care Giving Family   Housing / Facility Group Home   Equipment Owned Wheelchair   Lives with - Patient's Self Care Capacity Attendant / Paid Care Giver   Comments Questionable historian, pt  aware she is confused, States she uses a WC at group home, does not ambulate and does not know why or for how long she has been nonambulatory.   Prior Level of Functional Mobility   Bed Mobility Dependent   Transfer  Status Dependent   Ambulation Dependent   Assistive Devices Used Wheelchair   Wheelchair Required Assist   Cognition    Speech/ Communication Delayed Responses   Level of Consciousness Alert   Ability To Follow Commands 1 Step   Safety Awareness Impaired   New Learning Impaired   Initiation Impaired   Comments Encouragement required to participate in PT assessment.   Passive ROM Lower Body   Passive ROM Lower Body WDL   Active ROM Lower Body    Active ROM Lower Body  WDL   Strength Lower Body   Lower Body Strength  X   Gross Strength Generalized Weakness, Equal Bilaterally   Comments grossly 3/5 BLE. edema,   Neurological Concerns   Neurological Concerns Yes   Sitting Posture During ADL's Posterior Lean   Equilibrium Reaction Impaired   Comments unable to self correct balance, falls posterior, does not attempt to right self.   Coordination Lower Body    Comments tremors throughout, limbs and trunk.   Vision   Vision Comments NT   Balance Assessment   Sitting Balance (Static) Trace +   Sitting Balance (Dynamic) Trace   Standing Balance (Static)   (NT)   Standing Balance (Dynamic)   (NT)   Weight Shift Sitting Absent   Comments physical assistance required for EOB.   Bed Mobility    Supine to Sit Maximal Assist   Sit to Supine Maximal Assist   Scooting Maximal Assist   Rolling Maximal Assist to Rt.   Comments 2 person assistance for pt and staff safety.   Gait Analysis   Gait Level Of Assist Unable to Participate   Functional Mobility   Sit to Stand Unable to Participate   Bed, Chair, Wheelchair Transfer Unable to Participate   Toilet Transfers Unable to Participate   Mobility EOB with 2 person assistance. Unsafe to transfer w/o mechanical lift.   How much difficulty does the patient currently have...   Turning over in bed (including adjusting bedclothes, sheets and blankets)? 1   Sitting down on and standing up from a chair with arms (e.g., wheelchair, bedside commode, etc.) 1   Moving from lying on back to sitting  on the side of the bed? 1   How much help from another person does the patient currently need...   Moving to and from a bed to a chair (including a wheelchair)? 1   Need to walk in a hospital room? 1   Climbing 3-5 steps with a railing? 1   6 clicks Mobility Score 6   Activity Tolerance   Sitting Edge of Bed 8 min   Short Term Goals    Short Term Goal # 1 Pt will roll L<> R with Mod A and bed rail for ease of care by tx 6   Short Term Goal # 2 Pt will perform side to sit at EOB with Mod A 50% of trials and Max A 50% by tx 6   Short Term Goal # 3 Pt will maintain static sit at EOB with BUE support and CGA for 1 min by tx 6   Education Group   Education Provided Role of Physical Therapist   Role of Physical Therapist Patient Response Patient;Nonacceptance;Explanation;No Learning Evidence   Physical Therapy Initial Treatment Plan    Treatment Plan  Bed Mobility;Neuro Re-Education / Balance;Therapeutic Activities;Therapeutic Exercise   Treatment Frequency 3 Times per Week   Duration Until Therapy Goals Met   Problem List    Problems Impaired Bed Mobility;Impaired Transfers;Impaired Ambulation;Functional ROM Deficit;Functional Strength Deficit;Impaired Balance;Impaired Coordination;Decreased Activity Tolerance;Safety Awareness Deficits / Cognition   Anticipated Discharge Equipment and Recommendations   DC Equipment Recommendations Unable to determine at this time   Discharge Recommendations Recommend post-acute placement for additional physical therapy services prior to discharge home   Interdisciplinary Plan of Care Collaboration   IDT Collaboration with  Nursing;Occupational Therapist   Patient Position at End of Therapy In Bed;Bed Alarm On;Call Light within Reach;Tray Table within Reach;Phone within Reach   Collaboration Comments RN updated.   Session Information   Date / Session Number  6/26-1 ( 1/3, 7/2)

## 2023-06-26 NOTE — ASSESSMENT & PLAN NOTE
Noted, has had multiple DVTs in the past  Due to kidney impairment, hold Xarelto, will given prophylactic dosing heparin until renal function returns to normal

## 2023-06-26 NOTE — PROGRESS NOTES
Received report from night shift RN. Patient assessed, call light within reach, plan of care discussed, no needs at this time.

## 2023-06-26 NOTE — PROGRESS NOTES
Telemetry Shift Summary     Rhythm: SR/ST with 1st degree  HR:   Ectopy: rPAC/PVC  Measurements: 0.22/0.08/0.30       Normal Values  Rhythm: SR  HR:   Measurements: 0.12-0.20 / 0.04-0.10 / 0.30-0.52

## 2023-06-26 NOTE — DISCHARGE PLANNING
ER CM met with pt at bedside . [place] [facility] [place]   She is wheelchair mobility, She uses oxygen from [employer] [facility].  Home health from [facility] . She has a brother as support no other family in Care. She is a difficult historian due to labs. She is interested PT OT for strengthening.   Care Transition Team Assessment    Information Source  Orientation Level: Oriented X4  Information Given By: Patient  Informant's Name: alanna  Who is responsible for making decisions for patient? : Patient         Elopement Risk  Legal Hold: No  Ambulatory or Self Mobile in Wheelchair: No-Not an Elopement Risk    Interdisciplinary Discharge Planning  Primary Care Physician: Dr ZAYAS  Lives with - Patient's Self Care Capacity: Unrelated Adult  Support Systems: Family Member(s)  Housing / Facility: half-way  [place] [facility]  Do You Take your Prescribed Medications Regularly: Yes  Mobility Issues: Yes  Prior Services: Continuous (24 Hour) Care Giving Family  Assistance Needed: Yes  Durable Medical Equipment: Home Oxygen, Other - Specify    Discharge Preparedness  What is your plan after discharge?: skilled nursing  What are your discharge supports?: Sibling  Prior Functional Level: Needs Assist with Activities of Daily Living, Needs Assist with Medication Management    Functional Assesment  Prior Functional Level: Needs Assist with Activities of Daily Living, Needs Assist with Medication Management    Finances  Prescription Coverage: Yes                   Domestic Abuse  Have you ever been the victim of abuse or violence?: No              Anticipated Discharge Information  Discharge Disposition: D/T to another type of health care inst. (19)

## 2023-06-26 NOTE — CARE PLAN
The patient is Watcher - Medium risk of patient condition declining or worsening    Shift Goals  Clinical Goals: Q2 turns, lower calcium  Patient Goals: comfort, updates  Family Goals: josias    Progress made toward(s) clinical / shift goals:      Problem: Skin Integrity  Goal: Skin integrity is maintained or improved  Description: Target End Date:  Prior to discharge or change in level of care    Document interventions on Skin Risk/Cholo flowsheet groups and corresponding LDA    1.  Assess and monitor skin integrity, appearance and/or temperature  2.  Assess risk factors for impaired skin integrity and/or pressures ulcers  3.  Implement precautions to protect skin integrity in collaboration with interdisciplinary team  4.  Implement pressure ulcer prevention protocol if at risk for skin breakdown  5.  Confirm wound care consult if at risk for skin breakdown  6.  Ensure patient use of pressure relieving devices  (Low air loss bed, waffle overlay, heel protectors, ROHO cushion, etc)  Outcome: Progressing  Note: Pt turned Q2, pt on waffle, barrier cream in use     Problem: Fall Risk  Goal: Patient will remain free from falls  Description: Target End Date:  Prior to discharge or change in level of care    Document interventions on the Yanira Aden Fall Risk Assessment    1.  Assess for fall risk factors  2.  Implement fall precautions  Outcome: Progressing  Note: Bed low and locked, bed alarm on, call light within reach       Patient is not progressing towards the following goals:

## 2023-06-26 NOTE — H&P
"Hospital Medicine History & Physical Note    Date of Service  6/25/2023    Primary Care Physician  Liat Emerson M.D.    Consultants  nephrology    Specialist Names: [facility]    Code Status  Full Code    Chief Complaint  Chief Complaint   Patient presents with    Constipation     X 5 days    Tremors     Bilat arms \"for weeks,\" states is having increasing difficulty holding objects / grasp objects r/t tremors       History of Presenting Illness  Latonia Clinton is a 72 y.o. w/ no small cell lung cancer s/p resection '17 and radiation, hypercalcemia, admission in march for hypercalcemia and in April for n/,v who presents with shakes, weakness, constipation. Patient lives in a boarding home, says she can barely move due to weakness/deconditioning and has tried to get enrolled in Pt. She has had very poor PO intake, and notes for past 2 weeks feeling called, shaky. Hasn't had a BM in 5 days. She finally decided to come to ER due to worsening trembling. Denies abd pain. Reports urinating although less than usual. No hematuria. No nv. No appetite.     In the ER, VSS, labs notable for K of 6, Cr 3.46, Ca 14.8. Give 1L LR x 1 and admission requested for further care.    I discussed the plan of care with patient.    Review of Systems  Review of Systems   All other systems reviewed and are negative.      Past Medical History   has a past medical history of Anesthesia, Asthma, Backpain (7/2017), Blood clot in vein (07/06/2018), Bowel habit changes (07/06/2018), Breath shortness, Bronchitis, CAD (coronary artery disease), Cancer (HCC) (2016), Chickenpox, COPD (chronic obstructive pulmonary disease) (HCC), Depression (2/26/2019), Dialysis (2005), Emphysema of lung (HCC), Glaucoma, Hemorrhagic disorder (HCC), Hyperlipidemia, Hypertension, Hyperthyroidism, Kidney stone, Lung cancer (HCC) (12/12/2016), Multiple thyroid nodules (7/9/2015), Nasal drainage, Obstruction of left ureteropelvic junction " (UPJ) due to stone (6/17/2018), Osteoporosis, Pain (07/06/2018), Personal history of venous thrombosis and embolism (2004, 2012), Pneumonia (7/2016), Renal disorder, Renal stones (2013), Rheumatoid arthritis (HCC), Rheumatoid arthritis (HCC), S/P appendectomy (1998 ), S/P cholecystectomy (1998), S/P kyphoplasty (2006, 2007, 2013), Sepsis, unspecified (2005), Shortness of breath (07/06/2018), and Thyroid nodule, hot (2017).    Surgical History   has a past surgical history that includes other; other abdominal surgery; pr breast augmentation with implant; pr breast reduction; appendectomy; cholecystectomy; laminotomy; lung biopsy open; thoracoscopy (Left, 12/12/2016); other orthopedic surgery (2013); cystoscopy stent placement (Left, 6/18/2018); lithotripsy (Left, 6/18/2018); lasertripsy (Left, 6/18/2018); ureteroscopy (Left, 6/18/2018); cystoscopy stent placement (7/9/2018); ureteroscopy (Left, 7/9/2018); lasertripsy (Left, 7/9/2018); and bronchoscopy, robot-assisted (10/11/2022).     Family History  family history includes Cancer in her maternal uncle, mother, paternal aunt, and paternal uncle; Heart Disease in her brother; Heart Failure in her father.   Family history reviewed with patient. There is no family history that is pertinent to the chief complaint.     Social History   reports that she quit smoking about 23 years ago. Her smoking use included cigarettes. She has a 30.00 pack-year smoking history. She has never used smokeless tobacco. She reports that she does not currently use alcohol. She reports that she does not use drugs.    Allergies  No Known Allergies    Medications  Prior to Admission Medications   Prescriptions Last Dose Informant Patient Reported? Taking?   HYDROcodone-acetaminophen (NORCO) 5-325 MG Tab per tablet 6/6/2023 at Unknown MAR from Other Facility Yes Yes   Sig: Take 1 Tablet by mouth every 6 hours as needed. Indications: Pain   KLOR-CON M20 20 MEQ Tab CR 6/25/2023 at 0800 MAR from  Other Facility Yes No   Sig: Take 20 mEq by mouth every day.   Lidocaine HCl (ASPERCREME LIDOCAINE) 4 % Cream 6/25/2023 at 0800 MAR from Other Facility Yes Yes   Sig: Apply 1 Application topically 2 times a day. Per MAR reports apply to affected area BID, per MAR pt started on 6/19/2023   Psyllium (METAMUCIL PO) 6/25/2023 at 0800 MAR from Other Facility Yes Yes   Sig: Take 3.4 g by mouth every day.   Umeclidinium Bromide (INCRUSE ELLIPTA) 62.5 MCG/ACT AEROSOL POWDER, BREATH ACTIVATED 6/20/2023 at D/C MAR from Other Facility Yes Yes   Sig: Inhale 1 Puff every day.   acetaminophen (TYLENOL) 325 MG Tab 6/17/2023 at Unknown MAR from Other Facility Yes Yes   Sig: Take 650 mg by mouth every 6 hours as needed for Mild Pain.   albuterol (PROVENTIL) 2.5mg/3ml Nebu Soln solution for nebulization PRN MAR from Other Facility No No   Sig: USE THREE MILLILITERS (1 VIAL) VIA NEBULIZATION BY MOUTH EVERY 4 HOURS AS NEEDED FOR SHORTNESS OF BREATH   allopurinol (ZYLOPRIM) 100 MG Tab 6/25/2023 at 0800 MAR from Other Facility Yes No   Sig: Take 100 mg by mouth every morning.   atorvastatin (LIPITOR) 20 MG Tab 6/24/2023 at PM MAR from Other Facility Yes No   Sig: Take 20 mg by mouth every evening.   benzonatate (TESSALON) 100 MG Cap 6/6/2023 at D/C MAR from Other Facility Yes Yes   Sig: Take 100 mg by mouth 3 times a day as needed for Cough.   brimonidine (ALPHAGAN) 0.2 % Solution 6/25/2023 at 0800 MAR from Other Facility Yes Yes   Sig: Administer 1 Drop into both eyes 2 times a day.   docusate sodium (COLACE) 100 MG Cap 6/25/2023 at 0800 MAR from Other Facility Yes Yes   Sig: Take 100 mg by mouth every day.   ergocalciferol (DRISDOL) 41610 UNIT capsule 6/25/2023 at 0800 MAR from Other Facility No No   Sig: Take 1 Capsule by mouth every 7 days.   Patient taking differently: Take 50,000 Units by mouth every 7 days. On Sunday   fluticasone-salmeterol (WIXELA INHUB) 500-50 MCG/ACT AEROSOL POWDER, BREATH ACTIVATED 6/25/2023 at 0800 MAR from  Other Facility Yes Yes   Sig: Inhale 1 Puff every 12 hours.   furosemide (LASIX) 40 MG Tab 6/25/2023 at 0800 MAR from Other Facility Yes Yes   Sig: Take 40 mg by mouth every day.   gabapentin (NEURONTIN) 300 MG Cap 6/25/2023 at 0800 MAR from Other Facility Yes Yes   Sig: Take 300 mg by mouth 2 times a day.   guaiFENesin (ROBITUSSIN) 100 MG/5ML liquid 6/6/2023 at D/C MAR from Other Facility Yes Yes   Sig: Take 5 mL by mouth every four hours as needed for Cough.   magnesium oxide (MAG-OX) 400 MG Tab tablet 6/24/2023 at PM MAR from Other Facility Yes No   Sig: Take 400 mg by mouth every evening.   methimazole (TAPAZOLE) 5 MG Tab 6/25/2023 at 0800 MAR from Other Facility No No   Sig: TAKE ONE TABLET BY MOUTH DAILY   Patient taking differently: Take 5 mg by mouth every day.   midodrine (PROAMATINE) 5 MG Tab 6/25/2023 at 0800 MAR from Other Facility Yes Yes   Sig: Take 5 mg by mouth 2 times a day.   rivaroxaban (XARELTO) 20 MG Tab tablet 6/24/2023 at PM MAR from Other Facility No No   Sig: TAKE ONE TABLET BY MOUTH DAILY WITH DINNER   Patient taking differently: Take 20 mg by mouth with dinner. TAKE ONE TABLET BY MOUTH DAILY WITH DINNER   timolol (TIMOPTIC) 0.5 % Solution 6/25/2023 at 0800 MAR from Other Facility Yes Yes   Sig: Administer 1 Drop into both eyes 2 times a day.   zolpidem (AMBIEN) 5 MG Tab 6/19/2023 at PM MAR from Other Facility Yes Yes   Sig: Take 5 mg by mouth at bedtime as needed for Sleep.      Facility-Administered Medications: None       Physical Exam  Temp:  [36.1 °C (97 °F)-36.6 °C (97.8 °F)] 36.4 °C (97.5 °F)  Pulse:  [] 105  Resp:  [15-18] 18  BP: (106-160)/(54-89) 123/73  SpO2:  [95 %-97 %] 95 %  Blood Pressure : 123/73   Temperature: 36.4 °C (97.5 °F)   Pulse: (!) 105   Respiration: 18   Pulse Oximetry: 95 %       Physical Exam  General: NAD,drowsy, noted to have twitching  HEENT: PERRLA, EOMI  Cards: RRR, no murmur or gallops  Pulm: normal respiratory effort, CTAB, no wheezes or  rhonchi  Abdomen: soft, NTND, + bowel sounds, no rebound tenderness or guarding  MSK: normal ROM  Neuro: CN II-XII grossly intact, spontaneous twitching of shoulders arms noted, AAOx3  Psych: depressed mood  Laboratory:  Recent Labs     06/25/23  1510   WBC 7.0   RBC 3.09*   HEMOGLOBIN 9.6*   HEMATOCRIT 31.7*   .6*   MCH 31.1   MCHC 30.3*   RDW 54.9*   PLATELETCT 369   MPV 11.3     Recent Labs     06/25/23  1510 06/25/23 1923   SODIUM 134* 137   POTASSIUM 6.0* 5.6*   CHLORIDE 100 101   CO2 23 23   GLUCOSE 118* 100*   BUN 56* 54*   CREATININE 3.46* 3.39*   CALCIUM 14.1* 13.8*     Recent Labs     06/25/23  1510 06/25/23 1923   ALTSGPT 14 15   ASTSGOT 16 17   ALKPHOSPHAT 155* 159*   TBILIRUBIN 0.3 0.3   GLUCOSE 118* 100*         No results for input(s): NTPROBNP in the last 72 hours.      No results for input(s): TROPONINT in the last 72 hours.    Imaging:  CT-HEAD W/O   Final Result      1.  Cerebral atrophy.   2.  No acute intracranial abnormality.         [id number] VIEW   Final Result      Probably moderate colonic stool. Nonobstructive bowel gas pattern.          Assessment/Plan:  Justification for Admission Status  I anticipate this patient will require at least two midnights for appropriate medical management, necessitating inpatient admission because renal failure    Patient will need a Telemetry bed on MEDICAL service .  The need is secondary to renal failure.    * Hypercalcemia- (present on admission)  Assessment & Plan  Corrected calcium based on albumin level is 14.8 which is quite high, concerning for malignancy as primary  of such a rise. Per nephrology, hyperparathyroidism shouldn't raise levels this high. PTH last was in 170s, in the past up to 350s. She did undergo nuclear parathyroid scan without any suspicious nodules. Patient does have a bone scan scheduled as outpatient. Alk Phosph is also elevated.  - s/p 1 L LR x 1, I will order another bolus 1L LR x 1 and mIVF 200cc/hr with goal  UOP of 100-150cc/hr  - Initiate calcitonin 4units/kg, repeat calcium in 4 hours if good response can dose it every 12 hours for total of 24 to 48 hours  - Initiate bisphosphonate, I spoke with both pharmacy and nephrology ([facility]), and have ordered IV pomedronate 30mg over 6 hours  - Trend CMP every 4 hours for now  - check PTH, vitamin D levels  - I will hold home lasix given dehydration and ISAMAR        Hyperkalemia  Assessment & Plan  This is due to kidney failure and also taking potassium supplements. [facility] consulted who will see patient tomorrow.  EKG w/o changes  Given sodium bicarb x 1, insulin/d10  I have ordered patiromer  Trend K every 4 hours , if rising, nephrology will plan on dialyzing tonight  Hold home lasix/potassium    Acute renal failure superimposed on stage 3 chronic kidney disease (HCC)  Assessment & Plan  Cr baseline around 1.7, most recently had improved further down to 1.2. Followed by [facility] but after her March hospitalization patient has not had further visits with Dr. Nunez. Etiology of current renal failure likely multifactorial from hypovolemia and hypercalcemia.  - I have consulted [facility] who will see patient tomorrow  - treat hypercalcemia as above  - aggressive fluid hydration as detailed above  - close monitoring of I/Os  - treat hyperkalemia as above  - trend CMP q4hr  - hold home lasix/potassium/gabapentin    Chronic hypoxemic respiratory failure (HCC)  Assessment & Plan  Noted, wears 2LNC at nighttime due to copd    Constipation  Assessment & Plan  Due to hypercalcemia  Aggressive bowel regimen and hydration    Abnormal urinalysis  Assessment & Plan  UA concerning for a UTI. However pt w/o sxs. F/u UCX off abx    Hypotension  Assessment & Plan  Unclear etiology, patient takes midodrine, continue. Also concurrently takes lasix...    Vitamin D deficiency  Assessment & Plan  Takes 50k vitamin D per week. Hold for  now, check vit D level in setting of hypercalcemia    Hyperparathyroidism (HCC)- (present on admission)  Assessment & Plan  PTH previously elevated up to 340s. Nuclear parathyroid scan without any suspicion for parathyroid adenoma. Patient was referred to endocrinology whom she has not seen yet.  - recheck PTH, vitamin D  - severely elevated Ca likely driven from a different process such as malignancy  - treat hypercalcemia as above    Depression, major, recurrent, moderate (HCC)- (present on admission)  Assessment & Plan  Stable.    Macrocytic anemia- (present on admission)  Assessment & Plan  Noted, chronic. Previously b12 high, tsh nl (6/14)  Check b12 tsh    Lung cancer (HCC)- (present on admission)  Assessment & Plan  Patient has hx of lung nodule excision, it's unclear to me what further treatments she has gotten  Recent plan was bone scan and CT body  Given rising Ca, c/f bone mets        Deep vein thrombosis (DVT) (HCC)- (present on admission)  Assessment & Plan  Noted, has had multiple DVTs in the past. In setting of lung ca, high risk of VTEs  Due to kidney impairment, hold off on xarelto  If Cr not improving within 48 hours would consider hep gtt as patient is high risk for dvt  SQH for now    Chronic obstructive pulmonary disease (HCC)- (present on admission)  Assessment & Plan  Noted, patient wears 2LNC at nighttime.   Currently stable without exacerbation  Continue home meds    Hyperlipidemia- (present on admission)  Assessment & Plan  Noted, on lipitor        VTE prophylaxis: heparin ppx

## 2023-06-26 NOTE — ASSESSMENT & PLAN NOTE
UA concerning for a UTI. However pt w/o sxs.  Urine culture negative  Completed 3 days of empiric Rocephin

## 2023-06-26 NOTE — ASSESSMENT & PLAN NOTE
- Pt with lung cancer s/p excision and radiation in 2017, then with focal recurrence in November of 2022, with radiation therapy done with Dr Ange Banks  - Following with Pulmonary and Dr Rizvi  -Patient states she is not ready for hospice and wants to wait until she sees Dr. Rizvi on July 8  - Cannot do CT c/a/p or MRI brain w/wo given her creatinine to further assess mets   - Palliative consulted -Decadron initiated

## 2023-06-26 NOTE — PROGRESS NOTES
Removed lópez that patient arrived with to hospital.  Hand hygiene performed, area cleansed and sanitized, excoriation and redness noted.

## 2023-06-27 NOTE — DIETARY
"Nutrition services: Day 2 of admit.  Latonia Clinton is a 72 y.o. female with admitting DX of Hypercalcemia.  Consult received for Malnutrition Screening Tool score of 3 for unsure of unplanned weight loss and poor appetite.    Assessment:  Height: 162.6 cm (5' 4\")  Weight: 95.2 kg (209 lb 14.1 oz)  Body mass index is 36.03 kg/m²., BMI classification: Class 2 Obesity  Diet/Intake: Renal    Evaluation:   Pt states she has not lost weight. Per chart review, weight on 4/3/23 = 98.9 kg, which is above current weight. Pt has been on home Lasix, so some weight loss may be fluid loss.  Pt receiving Renal diet due to ISAMAR on CKD. Recorded PO intake at breakfast today was 25-50%. Pt states she ate most of the eggs and hot cereal. She states she ate about 3/4 of the fettuccine and a little of the vegetable at lunch today. Encouraged pt to eat at least 50% of meals.  Pt with lung cancer. Palliative consulted per notes.    Malnutrition Risk: ASPEN criteria not met.    Recommendations/Plan:   Encourage intake of meals >50%.  Document intake of all meals as % taken in ADL's to provide interdisciplinary communication across all shifts.   Monitor weight.  Nutrition rep will continue to see patient for ongoing meal and snack preferences.     RD following      "

## 2023-06-27 NOTE — PROGRESS NOTES
[facility] -  PROGRESS NOTE               Author: Finn Wilburn M.D. Date & Time: 6/27/2023  8:36 AM     HPI:  72 y.o. female with Hx of CKD, HTN, lung cancer  s/p sx and XRT presenting with constipation and tremors admitted for ISAMAR and hypercalcemia.   Hospitalized in 3/23 with for  he same, hypercalcemia was felt to be sec to sec hyperparathyroidism given Hx of CKD.   Her Serum Ca has improved to normal on discharge. Work up for hypercalcemia during previous hospitalizations revealed normal PTHrP and vitamin 1,25 H D borderline elevated vitamin D level at 96, most recent  in 3/23.   Recently she has had a CT chest with contrast as OP 6/21 for lung cancer surveillance showed mildly enlarged right paratracheal lymph node measuring 11 mm  indeterminate but low suspicion finding, stable L adrenal mass, likely benign per radiology report.  Bone scan 6/21 with  findings consistent with focal osseous metastatic deposits in the left anterior fourth rib with corresponding sclerosis on CT, left ninth posterior rib with corresponding sclerosis on CT, and probably the right posterior acetabulum with a   corresponding focal sclerotic lesion seen on CT from 4/8/2023.  Focal uptake in the left femoral shaft most consistent with osseous metastatic deposit.Nonspecific uptake at the right posterior-medial 10th rib. No corresponding sclerotic lesion seen on CT.   She has not followed up up with nephrology since she was last hospitalized.   Initial ER labs showed Cr of 3.46  and Ca 14.8, K 5.6. EKG with out  any changes, normal QTc interval. On arrival   She has received IV fluid bolus and calcitonin.  Pt repoerts she has been having tremors for for the past 2-3 weeks,   She reports poor PO intake, She denies any change in her urine out put. She reports she is bed bound and does not ambulate.   No F/C/N/V/CP/SOB.  No melena, hematochezia, hematemesis.  No HA, visual changes, or abdominal  "pain.    DAILY NEPHROLOGY SUMMARY:  6/26 - No events, corrected Ca 13.6, Cr 3.25, BP stable, on 2L NC O2, fair UOP overnight, denies any CP/SOB/Abd pain  6/27 - Labs are currently pending at the moment. Patient is in deep slumber.     REVIEW OF SYSTEMS:    Unable to assess given patient's clinical status     PMH/PSH/SH/FH:   Reviewed and unchanged since admission note    CURRENT MEDICATIONS:   Reviewed from admission to present day    VS:  /77   Pulse 65   Temp 36.9 °C (98.4 °F) (Oral)   Resp 18   Ht 1.626 m (5' 4\")   Wt 95.2 kg (209 lb 14.1 oz)   LMP  (LMP Unknown)   SpO2 97%   BMI 36.03 kg/m²     Physical Exam  Vitals and nursing note reviewed.   Constitutional:       Appearance: Normal appearance.   HENT:      Head: Normocephalic and atraumatic.      Mouth/Throat:      Mouth: Mucous membranes are dry.   Eyes:      General: No scleral icterus.  Cardiovascular:      Rate and Rhythm: Normal rate and regular rhythm.   Pulmonary:      Effort: Pulmonary effort is normal. No respiratory distress.      Breath sounds: Normal breath sounds.   Abdominal:      General: Bowel sounds are normal. There is no distension.      Palpations: Abdomen is soft.   Musculoskeletal:         General: No deformity.      Right lower leg: No edema.      Left lower leg: No edema.   Skin:     General: Skin is warm and dry.      Findings: No rash.   Neurological:      General: No focal deficit present.      Mental Status: She is alert and oriented to person, place, and time.   Psychiatric:         Speech: Speech is delayed.         Behavior: Behavior is cooperative.         Fluids:  In: 100 [P.O.:100]  Out: 2400     LABS:  Recent Labs     06/26/23  0002 06/26/23  0400 06/26/23  0748   SODIUM 142 138 140   POTASSIUM 5.5 5.1 5.5   CHLORIDE 107 106 110   CO2 23 22 17*   GLUCOSE 89 101* 92   BUN 51* 49* 48*   CREATININE 3.34* 3.25* 3.21*   CALCIUM 12.5* 12.6* 12.6*         IMAGING:   All imaging reviewed from admission to present " day    IMPRESSION:  # ISAMAR, nonoliguric  In the setting of hypercalcemia, recent contrast exposure   # CKD:   Frequent fluctuations in creat  Baseline ~1.5  # Severe hypercalcemia:   Etiology  likely sec to hypercalcemia of malignancy in the setting of bone mets per bone   scan  with Hx of lung cancer, also hypercalcemia of immobility and vitamin     One would expect  to see high P and low Ca in sec hyperparathyroidism and less likely to      have severe hypercalcemia .PTH scan on last admission was negative for   adenoma   iPTH 271 23, 25-OH vitamin D and PTHrP pending  # HTN on Lasix ? 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 If corrected Ca++ is < 12 then can  to 125mL/hr.   Given pamidronate   Renal diet             Hold vitamin D2              No urgent indication for RRT              No dietary protein restrictions             Hold gabapentin              Hold midodrine              Hold Xarelto suggest alternative AC              Increase valtessa to 16.8grams daily until serum K less than 5.0             Strict I/O              Trend Ca levels              Repeat K    Clarify treatment plan with Oncology             Follow labs, further recommendations to follow

## 2023-06-27 NOTE — PROGRESS NOTES
Received report from night shift RN. Patient assessed and resting comfortably, call light within reach, plan of care discussed, no needs at this time.

## 2023-06-27 NOTE — HOSPITAL COURSE
Patient is a 72-year-old female with history of non-small cell lung cancer status post resection and radiation in 2017 and repeat radiation in 2022.  She started having tremors while residing in her group home with poor p.o. intake for multiple days.  In the ER she was found a potassium of 6 creatinine of 3.46 and a calcium of 14.8.  Patient with a lung biopsy in November which showed adenocarcinoma and given the multiple mets seen on her bone scan this is likely the etiology of her hypercalcemia.  Patient with nephrology consulting to help manage the hypercalcemia as well as hyperkalemia.  Overall her numbers are improving with creatinine decreasing calcium decreasing and potassium decreasing unfortunately her prognosis is poor as the previous hospitalist reached out to her oncologist and his recommendation was hospice given the recurrence of her cancer as well as the extensive nature with bony involvement.

## 2023-06-27 NOTE — CARE PLAN
Problem: Skin Integrity  Goal: Skin integrity is maintained or improved  Outcome: Progressing  Note: Wound team at bedside to evaluate wounds that were POA     Problem: Fall Risk  Goal: Patient will remain free from falls  Outcome: Progressing  Note: Strip alarm in place, call light in reach   The patient is Stable - Low risk of patient condition declining or worsening    Shift Goals  Clinical Goals: Decrease calcium, work with OT  Patient Goals: Rest  Family Goals: josias    Progress made toward(s) clinical / shift goals:  Calcium remains the same, pt able to rest after placing lópez    Patient is not progressing towards the following goals: OT able to evaluate.

## 2023-06-27 NOTE — PROGRESS NOTES
Hospital Medicine Daily Progress Note    Date of Service  6/27/2023    Chief Complaint  Latonia Clinton is a 72 y.o. female admitted 6/25/2023 with tremors and weakness    Hospital Course  Patient is a 72-year-old female with history of non-small cell lung cancer status post resection and radiation in 2017 and repeat radiation in 2022.  She started having tremors while residing in her group home with poor p.o. intake for multiple days.  In the ER she was found a potassium of 6 creatinine of 3.46 and a calcium of 14.8.  Patient with a lung biopsy in November which showed adenocarcinoma and given the multiple mets seen on her bone scan this is likely the etiology of her hypercalcemia.  Patient with nephrology consulting to help manage the hypercalcemia as well as hyperkalemia.  Overall her numbers are improving with creatinine decreasing calcium decreasing and potassium decreasing unfortunately her prognosis is poor as the previous hospitalist reached out to her oncologist and his recommendation was hospice given the recurrence of her cancer as well as the extensive nature with bony involvement.    Interval Problem Update  6/27 patient states overall she is feeling improvement.  She is sad to hear of her recurrence of cancer and bony involvement as it was introduced to her yesterday.  Palliative care consultation is currently pending.    I have discussed this patient's plan of care and discharge plan at IDT rounds today with Case Management, Nursing, Nursing leadership, and other members of the IDT team.    Consultants/Specialty  nephrology and palliative care    Code Status  DNAR/DNI    Disposition  The patient is not medically cleared for discharge to home or a post-acute facility.  Anticipate discharge to: home with close outpatient follow-up    I have placed the appropriate orders for post-discharge needs.    Review of Systems  Review of Systems   Constitutional:  Negative for chills and fever.   HENT:   Negative for congestion.    Eyes:  Negative for blurred vision and photophobia.   Respiratory:  Negative for cough and shortness of breath.    Cardiovascular:  Negative for chest pain, claudication and leg swelling.   Gastrointestinal:  Negative for abdominal pain, constipation, diarrhea, heartburn and vomiting.   Genitourinary:  Negative for dysuria and hematuria.   Musculoskeletal:  Negative for joint pain and myalgias.   Skin:  Negative for itching and rash.   Neurological:  Negative for dizziness, sensory change, speech change, weakness and headaches.   Psychiatric/Behavioral:  Negative for depression. The patient is not nervous/anxious and does not have insomnia.         Physical Exam  Temp:  [36.6 °C (97.8 °F)-37.5 °C (99.5 °F)] 36.8 °C (98.3 °F)  Pulse:  [65-99] 72  Resp:  [16-18] 18  BP: (108-133)/(40-86) 113/82  SpO2:  [96 %-98 %] 96 %    Physical Exam  Vitals and nursing note reviewed.   Constitutional:       General: She is not in acute distress.     Appearance: Normal appearance. She is not ill-appearing.   HENT:      Head: Normocephalic and atraumatic.      Nose: Nose normal.   Cardiovascular:      Rate and Rhythm: Normal rate and regular rhythm.      Heart sounds: Normal heart sounds. No murmur heard.  Pulmonary:      Effort: Pulmonary effort is normal.      Breath sounds: Normal breath sounds.   Abdominal:      General: Bowel sounds are normal. There is no distension.      Palpations: Abdomen is soft.   Musculoskeletal:         General: No swelling or tenderness.      Cervical back: Neck supple.   Skin:     General: Skin is warm and dry.   Neurological:      General: No focal deficit present.      Mental Status: She is alert and oriented to person, place, and time.   Psychiatric:         Mood and Affect: Mood normal.         Fluids    Intake/Output Summary (Last 24 hours) at 6/27/2023 1254  Last data filed at 6/27/2023 0623  Gross per 24 hour   Intake 100 ml   Output 2400 ml   Net -2300 ml        Laboratory  Recent Labs     06/25/23  1510 06/26/23  0002 06/27/23  0730   WBC 7.0 6.2 5.2   RBC 3.09* 2.88* 2.46*   HEMOGLOBIN 9.6* 9.0* 7.8*   HEMATOCRIT 31.7* 30.4* 26.2*   .6* 105.6* 106.5*   MCH 31.1 31.3 31.7   MCHC 30.3* 29.6* 29.8*   RDW 54.9* 58.2* 58.2*   PLATELETCT 369 298 252   MPV 11.3 11.5 11.7     Recent Labs     06/26/23  0400 06/26/23  0748 06/27/23  0730   SODIUM 138 140 139   POTASSIUM 5.1 5.5 4.4   CHLORIDE 106 110 111   CO2 22 17* 18*   GLUCOSE 101* 92 91   BUN 49* 48* 43*   CREATININE 3.25* 3.21* 2.91*   CALCIUM 12.6* 12.6* 11.3*                   Imaging  US-RENAL   Final Result         1.  Bilateral nephrolithiasis without visualized obstructive changes.   2.  Echogenic kidneys with thinned cortex, appearance favoring medical renal disease.   3.  Large anechoic cystic structure adjacent to the right kidney, of uncertain origin and visible on prior CT April 8, 2023.   4.  Bilateral renal cysts without visualized complex features.   5.  Left nephrolithiasis without visualized obstructive changes.      CT-HEAD W/O   Final Result      1.  Cerebral atrophy.   2.  No acute intracranial abnormality.         [id number] VIEW   Final Result      Probably moderate colonic stool. Nonobstructive bowel gas pattern.           Assessment/Plan  * Hypercalcemia- (present on admission)  Assessment & Plan  - Concerning for malignancy as primary  of such a rise. Per nephrology, hyperparathyroidism shouldn't raise levels this high. PTH last was in 170s, in the past up to 350s. She did undergo nuclear parathyroid scan without any suspicious nodules.  - Bone scan from 5 days ago shows mets to the ribs, femur and acetabulum  - Continue highly rated IVFs  - s/p Miacalcin, Pamidronate   - Daily CMP   - Previous studies have been done in March - PTHrP was negative at that time   - I will hold home lasix given dehydration and ISAMAR        Hypothyroidism  Assessment & Plan  - TSH normal on home  Methimazole     Constipation  Assessment & Plan  Due to hypercalcemia  Aggressive bowel regimen and hydration    Vitamin D deficiency  Assessment & Plan  Takes 50k vitamin D per week. Hold for now, check vit D level in setting of hypercalcemia    Hyperkalemia  Assessment & Plan  This is due to kidney failure and also taking potassium supplements. On Veltassa now.  Hold home lasix/potassium    Hyperparathyroidism (HCC)- (present on admission)  Assessment & Plan  - PTH previously elevated up to 340s. Nuclear parathyroid scan without any suspicion for parathyroid adenoma. Patient was referred to endocrinology whom she has not seen yet.  - severely elevated Ca likely driven from a different process such as malignancy  - treat hypercalcemia as above    Depression, major, recurrent, moderate (HCC)- (present on admission)  Assessment & Plan  Stable.    Macrocytic anemia- (present on admission)  Assessment & Plan  Noted, chronic. Previously b12 high, tsh nl (6/14)  B12 normal    Acute renal failure superimposed on stage 3 chronic kidney disease (HCC)  Assessment & Plan  Cr baseline around 1.7, most recently had improved further down to 1.2. Followed by [facility] but after her March hospitalization patient has not had further visits with Dr. Nunez. Etiology of current renal failure likely multifactorial from hypovolemia and hypercalcemia.  - [facility] on board  - treat hypercalcemia as above  - aggressive fluid hydration as detailed above  - close monitoring of I/Os  - treat hyperkalemia as above  - trend CMP q4hr  - hold home lasix/potassium/gabapentin    Lung cancer (HCC)- (present on admission)  Assessment & Plan  - Pt with lung cancer s/p excision and radiation in 2017, then with focal recurrence in November of 2022, with radiation therapy done with Dr Ange Banks  - Following with Pulmonary and Dr Rizvi  - Reached out to Dr Rizvi today who agrees with suggestion of Hospice. Will discuss with   Darren as well.   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   Currently stable without exacerbation  Continue home meds    Chronic hypoxemic respiratory failure (HCC)  Assessment & Plan  Noted, wears 2LNC at nighttime due to copd    Hyperlipidemia- (present on admission)  Assessment & Plan  Noted, on lipitor         VTE prophylaxis: heparin ppx    I have performed a physical exam and reviewed and updated ROS and Plan today (6/27/2023). In review of yesterday's note (6/26/2023), there are no changes except as documented above.

## 2023-06-27 NOTE — CARE PLAN
The patient is Watcher - Medium risk of patient condition declining or worsening    Shift Goals  Clinical Goals: turns Q2, IV fluids  Patient Goals: comfort  Family Goals: josias    Progress made toward(s) clinical / shift goals:      Problem: Skin Integrity  Goal: Skin integrity is maintained or improved  Description: Target End Date:  Prior to discharge or change in level of care    Document interventions on Skin Risk/Cholo flowsheet groups and corresponding LDA    1.  Assess and monitor skin integrity, appearance and/or temperature  2.  Assess risk factors for impaired skin integrity and/or pressures ulcers  3.  Implement precautions to protect skin integrity in collaboration with interdisciplinary team  4.  Implement pressure ulcer prevention protocol if at risk for skin breakdown  5.  Confirm wound care consult if at risk for skin breakdown  6.  Ensure patient use of pressure relieving devices  (Low air loss bed, waffle overlay, heel protectors, ROHO cushion, etc)  Outcome: Progressing  Note: Turns Q2, waffle mattress, mepilex in place     Problem: Fall Risk  Goal: Patient will remain free from falls  Description: Target End Date:  Prior to discharge or change in level of care    Document interventions on the Yanira Aden Fall Risk Assessment    1.  Assess for fall risk factors  2.  Implement fall precautions  Outcome: Progressing  Note: Bed low and locked, bed alarm on, call light within reach       Patient is not progressing towards the following goals:

## 2023-06-27 NOTE — CARE PLAN
Problem: Knowledge Deficit - Standard  Goal: Patient and family/care givers will demonstrate understanding of plan of care, disease process/condition, diagnostic tests and medications  Outcome: Progressing  Note: Palliative care met with pt to discuss advancing planning; pt is wanting to wait for her oncology appointment on 7/8 before making decision.     Problem: Skin Integrity  Goal: Skin integrity is maintained or improved  Outcome: Progressing  Note: Pt on waffle cushion, been doing q2 turns.      Problem: Skin Integrity  Goal: Skin integrity is maintained or improved  Outcome: Progressing  Note: Pt on waffle cushion, been doing q2 turns.    The patient is Watcher - Medium risk of patient condition declining or worsening    Shift Goals  Clinical Goals: Q2 turns, decrease calcium  Patient Goals: Comfort  Family Goals: josias    Progress made toward(s) clinical / shift goals:  achieved    Patient is not progressing towards the following goals:

## 2023-06-27 NOTE — WOUND TEAM
[facility]  Inpatient Services  Initial Wound and Skin Care Evaluation    Admission Date: 6/25/2023     Last order of IP CONSULT TO WOUND CARE was found on 6/25/2023 from Hospital Encounter on 6/25/2023     HPI, PMH, SH: Reviewed    Past Surgical History:   Procedure Laterality Date    BRONCHOSCOPY, ROBOT-ASSISTED  10/11/2022    Procedure: FIBER OPTIC BRONCHOSCOPY WITH  WASH, BRUSH, BRONCHOALVEOLAR LAVAGE, BIOSPY, FINE NEEDLE ASPIRATION & NAVIGATION, ROBOTICS;  Surgeon: Donita Haque M.D.;  Location: [facility];  Service: Pulmonary Robotic    CYSTOSCOPY STENT PLACEMENT  7/9/2018    Procedure: Cystoscopy,  Left removal of stent ,  Left Stent Placement;  Surgeon: Beck Yang M.D.;  Location: [facility];  Service: Urology    URETEROSCOPY Left 7/9/2018    Procedure: URETEROSCOPY;  Surgeon: Beck Yang M.D.;  Location: [facility];  Service: Urology    LASERTRIPSY Left 7/9/2018    Procedure: LASERTRIPSY-LITHO;  Surgeon: Beck Yang M.D.;  Location: [facility];  Service: Urology    CYSTOSCOPY STENT PLACEMENT Left 6/18/2018    Procedure: CYSTOSCOPY STENT PLACEMENT;  Surgeon: Beck Yang M.D.;  Location: [facility];  Service: Urology    LITHOTRIPSY Left 6/18/2018    Procedure: LITHOTRIPSY;  Surgeon: Beck Yang M.D.;  Location: [facility];  Service: Urology    LASERTRIPSY Left 6/18/2018    Procedure: LASERTRIPSY;  Surgeon: Beck Yang M.D.;  Location: [facility];  Service: Urology    URETEROSCOPY Left 6/18/2018    Procedure: URETEROSCOPY;  Surgeon: Beck Yang M.D.;  Location: [facility];  Service: Urology    THORACOSCOPY Left 12/12/2016    Procedure: THORACOSCOPY W/WEDGE RESECTION UPPER LOBE MASS;  Surgeon: John H Ganser, M.D.;  Location: [facility];  Service:     OTHER ORTHOPEDIC SURGERY  2013    kyphoplasty X3    APPENDECTOMY      1990    CHOLECYSTECTOMY      1990  "   LAMINOTOMY      LUNG BIOPSY OPEN      OTHER      OTHER ABDOMINAL SURGERY      gall bladder disease    CT BREAST AUGMENTATION WITH IMPLANT      CT BREAST REDUCTION       Social History     Tobacco Use    Smoking status: Former     Packs/day: 1.00     Years: 30.00     Pack years: 30.00     Types: Cigarettes     Quit date: 2000     Years since quittin.4    Smokeless tobacco: Never    Tobacco comments:     7 years ago   Vaping Use    Vaping Use: Never used   Substance Use Topics    Alcohol use: Not Currently     Chief Complaint   Patient presents with    Constipation     X 5 days    Tremors     Bilat arms \"for weeks,\" states is having increasing difficulty holding objects / grasp objects r/t tremors     Diagnosis: Hypercalcemia [E83.52]    Unit where seen by Wound Team:      WOUND CONSULT/FOLLOW UP RELATED TO:  BUE, L coccyx     WOUND HISTORY:  pt admitted with wounds to BUE abd L coccyx    WOUND ASSESSMENT/LDA  Wound 23 Burn Arm Proximal (Active)      23 1800   Site Assessment Dry;Yellow    Periwound Assessment Intact    Margins Defined edges;Attached edges    Closure Open to air    Drainage Amount None    Treatments Cleansed    Wound Cleansing Normal Saline Irrigation    Periwound Protectant Mepitel    Dressing Cleansing/Solutions Not Applicable    Dressing Options Mepitel One;Open to Air    Dressing Changed New    Dressing Status Intact    Dressing Change/Treatment Frequency Weekly, and As Needed    NEXT Dressing Change/Treatment Date 23    NEXT Weekly Photo (Inpatient Only) 23    Wound Length (cm) 1 cm 23 1800   Wound Width (cm) 6.5 cm 23 1800   Wound Surface Area (cm^2) 6.5 cm^2 23 1800   Shape irregular line    Wound Odor None    Exposed Structures None    Number of days: 1       Wound 23 Skin Tear Arm (Active)      23 1800   Site Assessment Dry;Purple;Red    Periwound Assessment Ecchymosis    Margins Defined edges;Attached edges    Closure " Open to air    Drainage Amount None    Treatments Cleansed    Wound Cleansing Normal Saline Irrigation    Periwound Protectant Not Applicable    Dressing Cleansing/Solutions Not Applicable    Dressing Options Open to Air    NEXT Weekly Photo (Inpatient Only) 07/03/23    Shape irregular and circular    Number of days: 1       Wound 06/25/23 Pressure Injury Coccyx Left POA St 2 (Active)      06/25/23 1800   Site Assessment Pink    Periwound Assessment Intact;Fragile    Margins Defined edges    Closure Open to air    Drainage Amount Scant    Drainage Description Serosanguineous    Treatments Cleansed;Offloading    Wound Cleansing Foam Cleanser/Washcloth    Periwound Protectant Not Applicable    Dressing Cleansing/Solutions Not Applicable    Dressing Options Mepilex    Dressing Changed Changed    Dressing Status Intact    Dressing Change/Treatment Frequency Every 72 hrs, and As Needed    NEXT Dressing Change/Treatment Date 06/29/23    NEXT Weekly Photo (Inpatient Only) 07/03/23    WOUND NURSE ONLY - Pressure Injury Stage 2 06/26/23 1800   Wound Length (cm) 5.5 cm 06/26/23 1800   Wound Width (cm) 4 cm 06/26/23 1800   Wound Depth (cm) 0.1 cm 06/26/23 1800   Wound Surface Area (cm^2) 22 cm^2 06/26/23 1800   Wound Volume (cm^3) 2.2 cm^3 06/26/23 1800   Shape irregular    Wound Odor None    Exposed Structures None    Number of days: 1        Vascular:    CARROL:   No results found.    Lab Values:    Lab Results   Component Value Date/Time    WBC 6.2 06/26/2023 12:02 AM    RBC 2.88 (L) 06/26/2023 12:02 AM    HEMOGLOBIN 9.0 (L) 06/26/2023 12:02 AM    HEMATOCRIT 30.4 (L) 06/26/2023 12:02 AM    CREACTPROT 4.91 (H) 04/03/2023 12:28 PM    SEDRATEWES 26 01/24/2020 04:46 PM    HBA1C 4.9 06/14/2023 01:50 PM        Culture Results show:  No results found for this or any previous visit (from the past 720 hour(s)).    Pain Level/Medicated:  None, Tolerated without pain medication       INTERVENTIONS BY WOUND TEAM:  Chart and images  reviewed. Discussed with bedside RN. All areas of concern (based on picture review, LDA review and discussion with bedside RN) have been thoroughly assessed. Documentation of areas based on significant findings. This RN in to assess patient. Performed standard wound care which includes appropriate positioning, dressing removal and non-selective debridement. Pictures and measurements obtained weekly if/when required.  Preparation for Dressing removal: Dressing Removed without difficulty  Non-selectively Debrided with:  Normal Saline and Gauze   Sharp debridement: NA  Shirley wound: Cleansed with Moist warm washcloth and No rinse foam soap, Prepped with NA  Primary Dressing: Mepilex  Secondary (Outer) Dressing: Offloading Measures TAPs, waffle overlay    Advanced Wound Care Discharge Planning  Number of Clinicians necessary to complete wound care: 2 one to assist to turn pt  Is patient requiring IV pain medications for dressing changes: no  Length of time for dressing change 30 min. (This does not include chart review, pre-medication time, set up, clean up or time spent charting.)    Interdisciplinary consultation: Patient, Bedside RN      EVALUATION / RATIONALE FOR TREATMENT:  Most Recent Date:  6/26: Pt has a drying blister LUE and small tears to both arms. She has 2 scabs present to RUE. L coccyx with POA Stage 2 pressure injury. L IT/proximal thigh with small purple non-blanching area 0.5 x 0.4. Not exactly on a bony prominence. R IT area with partial thickness skin loss linear.      Goals: Steady decrease in wound area and depth weekly.    WOUND TEAM PLAN OF CARE ([X] for frequency of wound follow up,):   Nursing to follow dressing orders written for wound care. Contact wound team if area fails to progress, deteriorates or with any questions/concerns if something comes up before next scheduled follow up (See below as to whether wound is following and frequency of wound follow up)  Dressing changes by wound team:                    Follow up 3 times weekly:                NPWT change 3 times weekly:     Follow up 1-2 times weekly:    x  Follow up Bi-Monthly:           Follow up Monthly (High Risk):                        Follow up as needed:     Other (explain):     NURSING PLAN OF CARE ORDERS (X):  Dressing changes: See Dressing Care orders: x  Skin care: See Skin Care orders:   RN Prevention Protocol: x  Rectal tube care: See Rectal Tube Care orders:   Other orders:    RSKIN:   CURRENTLY IN PLACE (X), APPLIED THIS VISIT (A), ORDERED (O):   Q shift Cholo:  X  Q shift pressure point assessments:  X    Surface/Positioning   Standard Mattress/Trauma Bed  x         Low Airloss          ICU Low Airloss   Bariatric VIOLA     Waffle cushion        Waffle Overlay    x      Reposition q 2 hours   x   TAPs Turning system   x  Z Juanjo Pillow     Offloading/Redistribution   Sacral Offloading Dressing (Silicone dressing)   x  Heel Offloading Dressing (Silicone dressing)         Heel float boots (Prevalon boot)             Float Heels off Bed with Pillows     x      Respiratory   Silicone O2 tubing  x       Gray Foam Ear protectors   x  Cannula fixation Device (Tender )          High flow offloading Clip    Elastic head band offloading device      Anchorfast                                                         Trach with Optifoam split foam             Containment/Moisture Prevention     Rectal tube or BMS    Purwick/Condom Cath        Gonzalez Catheter  x  Barrier wipes           Barrier paste       Antifungal tx      Interdry        Mobilization not assessed      Up to chair        Ambulate      PT/OT      Nutrition       Dietician        Diabetes Education      PO  x   TF     TPN     NPO   # days     Other    regular    Anticipated discharge plans: TBD  LTACH:        SNF/Rehab:                  Home Health Care:           Outpatient Wound Center:            Self/Family Care:        Other:                  Vac Discharge Needs:   Vac  Discharge plan is purely a recommendation from wound team and not a requirement for discharge unless otherwise stated by physician.  Not Applicable Pt not on a wound vac:   x    Regular Vac while inpatient, alternative dressing at DC:        Regular Vac in use and continued at DC:            Reg. Vac w/ Skin Sub/Biologic in use. Will need to be changed 2x wkly:      Veraflo Vac while inpatient, ok to transition to Regular Vac on Discharge (Bedside RN to Clamp small instillation tubing at time of DC):           Veraflo Vac while inpatient, would benefit from remaining on Veraflo Vac upon discharge:

## 2023-06-27 NOTE — PROGRESS NOTES
Telemetry Shift Summary     Rhythm: SR with 1st degree AVB  HR: 62-99  Ectopy: rPVC  Measurements: 0.24/0.06/0.36       Normal Values  Rhythm: SR  HR:   Measurements: 0.12-0.20 / 0.04-0.10 / 0.30-0.52     no

## 2023-06-27 NOTE — PROGRESS NOTES
Tele strip at 1109 shows SR at 75.      Measurements from am strip were as follows:  MA=0.24 First Degree AVB  QRS=0.008  QT=0.38    Tele Shift Summary:    Rhythm : Sr to ST  Rate :   Ectopy : Per CCT Gloria, pt had rare PVCs.     Telemetry monitoring strips placed in pt's chart.

## 2023-06-27 NOTE — CONSULTS
Inpatient Palliative Medicine Evaluation     Latonia Clinton  72 y.o. female  [id number]    Location: [facility]  PCP: Liat Emerson M.D.  Referral Source: Mami Jordan D.o.    Reason for palliative medicine consultation and/or visit: Advance Care Planning       Assessment and Plan:     GOAL(S) OF CARE = LONGEVITY    SYMPTOMS ETIOLOGY/CAUSES = fatigue and generalized weakness secondary to progressive stage IV lung cancer    PROGNOSIS < 6mo  - YES hospice-eligible = metastatic lung cancer from lung to bones, low function PPS=30%<70%, ISAMAR on CKD, poor function overall, ALB=2.6 unlikely to tolerate significantly more cancer treatment to lengthen prognosis=  - NOT YET hospice-appropriate = patient wants to discuss with her oncologist Dr. Rizvi first on 7/8/23 prior to making any decision about hospice; not ready for hospice yet    CODE STATUS = DNAR DNI      ADVANCE CARE PLANNING =   Reviewed salient clinical updates.  Prognosticated patient based on current disease progression: <6mo even with interventions  Introduced hospice care and provided justification & my recommendation, as a very reasonable alternative.  Counseled patient on next phase of care.      PALLIATIVE CARE TEAM INTERVENTIONS =   - Trial of dexamethasone 4mg qD for: fatigue, weakness, cough & rhinorrhea, hypercalcemia (bone resorption)  - Increased Colace to 100mg bid   - Advance care planning, specifically hospice education/introduction  - Emotional support & therapeutic presence.    Summary:   Latonia Clinton  is a 72 y.o. 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  ---------------------------------------------------------------------------------------------------------------------------------------------    Introduced myself and role as a PCMD and scopes of practice. Patient was willing to  proceed with encounter. She has lived with her left lung CA since original diagnosis 2016. She has never had chemical systemic treatments, but so far just radiation and lung resections.      Patient is very much aware of her stage IV disease staging now, now that her cancer is affecting multiple ribs.  She denies noticeable pain nor discomfort, although elevated CA does make it harder to stool.    Patient has an upcoming oncologist visit on 7/8/2023 (Dr. Rizvi).  This visit is very important to her medical decision making going forward.    Patient admits she has NOT ambulated since FEB 2023... she has been bed and chair bound since that time. Patient also admits frustration with care at her currently group home, as outpatient clinic visits and transport remain very difficult.    We reviewed disease trajectory: advanced and approaching terminal.  We discussed patient's limited prognosis, likely <6mo.  These topics seemed to disappoint & upset patient, yet she remained calm and was able to follow the facts.    With her permission, I provided hospice introduction & education to  patent, with the following main points:  Advanced diagnosis and limited prognosis most likely, irregardless of any interventions now.  Her low functional status and constitutional state likely to limit the extent of treatments she can safely tolerate.  Her ISAMAR on CKD not only may limit cancer systemic treatment options, but also can be life-limiting too, if she cannot qualify as a dialysis candidate when disease does progress.    Introduced hospice philosophy and specialty provisions that may be helpful to patient and his wife.  Gave my recommendation as a very reasonable choice, as met cancer is generally incurable.  Spoke at length about pros and cons of hospice care transition.  All questions answered and concerns addressed.    Ultimately patient was not ready to make a hospice transition today as she was hoping to hear from her oncologist  7/8/2023 to formally discuss next step in care.       Advance care planning:  The patient and/or legal decision maker has provided voluntary consent to discuss advance care planning. We discussed code status.   DNAR DNI      Total time spent in ACP discussion 30 minutes, which is separate from the time spent completing the evaluation and management visit.     Thank you for allowing me the opportunity to participate in the care of Latonia Clinton     I spent a total of 60 minutes reviewing medical records, direct face-to-face time with the patient and/or family, documentation and coordination of care. This is separate from the time spent on advance care planning, which is documented above.         DO Stalin MIXON (TIM)[facility]   [id number]  [id number]  P: [phone]  F: [phone]  C: [phone]

## 2023-06-28 NOTE — PROGRESS NOTES
Telemetry Shift Summary     Rhythm SR/ST, 1st degree HB  HR Range   Ectopy rPAC, rPVC  Measurements 0.24/0.08/0.34           Normal Values  Rhythm SR  HR Range    Measurements 0.12-0.20 / 0.06-0.10  / 0.30-0.52

## 2023-06-28 NOTE — PROGRESS NOTES
IV infiltrated during med pass, stopped IVF.    Tele strip at 1046 shows SR at 83.      Measurements from am strip were as follows:  WA=0.22 First Degree AVB  QRS=0.08  QT=0.32    Tele Shift Summary:    Rhythm : SR  Rate : 71-94  Ectopy : Per CCT Rochelle, pt had rare PVCs.     Telemetry monitoring strips placed in pt's chart.

## 2023-06-28 NOTE — PROGRESS NOTES
[facility] -  PROGRESS NOTE               Author: Finn Wilburn M.D. Date & Time: 6/28/2023  8:37 AM     HPI:  72 y.o. female with Hx of CKD, HTN, lung cancer  s/p sx and XRT presenting with constipation and tremors admitted for ISAMAR and hypercalcemia.   Hospitalized in 3/23 with for  he same, hypercalcemia was felt to be sec to sec hyperparathyroidism given Hx of CKD.   Her Serum Ca has improved to normal on discharge. Work up for hypercalcemia during previous hospitalizations revealed normal PTHrP and vitamin 1,25 H D borderline elevated vitamin D level at 96, most recent  in 3/23.   Recently she has had a CT chest with contrast as OP 6/21 for lung cancer surveillance showed mildly enlarged right paratracheal lymph node measuring 11 mm  indeterminate but low suspicion finding, stable L adrenal mass, likely benign per radiology report.  Bone scan 6/21 with  findings consistent with focal osseous metastatic deposits in the left anterior fourth rib with corresponding sclerosis on CT, left ninth posterior rib with corresponding sclerosis on CT, and probably the right posterior acetabulum with a   corresponding focal sclerotic lesion seen on CT from 4/8/2023.  Focal uptake in the left femoral shaft most consistent with osseous metastatic deposit.Nonspecific uptake at the right posterior-medial 10th rib. No corresponding sclerotic lesion seen on CT.   She has not followed up up with nephrology since she was last hospitalized.   Initial ER labs showed Cr of 3.46  and Ca 14.8, K 5.6. EKG with out  any changes, normal QTc interval. On arrival   She has received IV fluid bolus and calcitonin.  Pt repoerts she has been having tremors for for the past 2-3 weeks,   She reports poor PO intake, She denies any change in her urine out put. She reports she is bed bound and does not ambulate.   No F/C/N/V/CP/SOB.  No melena, hematochezia, hematemesis.  No HA, visual changes, or abdominal  "pain.    DAILY NEPHROLOGY SUMMARY:  6/26 - No events, corrected Ca 13.6, Cr 3.25, BP stable, on 2L NC O2, fair UOP overnight, denies any CP/SOB/Abd pain  6/27 - Labs are currently pending at the moment. Patient is in deep slumber.   6/28 - Scr Ca++ continues to slowly improve. 11.3 today. Scr is also improving. K+ is normal.    REVIEW OF SYSTEMS:    Unable to assess given patient's clinical status     PMH/PSH/SH/FH:   Reviewed and unchanged since admission note    CURRENT MEDICATIONS:   Reviewed from admission to present day    VS:  /66   Pulse 84   Temp 36.3 °C (97.4 °F) (Oral)   Resp 20   Ht 1.626 m (5' 4\")   Wt 94.9 kg (209 lb 3.5 oz)   LMP  (LMP Unknown)   SpO2 97%   BMI 35.91 kg/m²     Physical Exam  Vitals and nursing note reviewed.   Constitutional:       Appearance: Normal appearance.   HENT:      Head: Normocephalic and atraumatic.      Mouth/Throat:      Mouth: Mucous membranes are dry.   Eyes:      General: No scleral icterus.  Cardiovascular:      Rate and Rhythm: Normal rate and regular rhythm.   Pulmonary:      Effort: Pulmonary effort is normal. No respiratory distress.      Breath sounds: Normal breath sounds.   Abdominal:      General: Bowel sounds are normal. There is no distension.      Palpations: Abdomen is soft.   Musculoskeletal:         General: No deformity.      Right lower leg: No edema.      Left lower leg: No edema.   Skin:     General: Skin is warm and dry.      Findings: No rash.   Neurological:      General: No focal deficit present.      Mental Status: She is alert and oriented to person, place, and time.   Psychiatric:         Speech: Speech is delayed.         Behavior: Behavior is cooperative.         Fluids:  In: -   Out: 2400     LABS:  Recent Labs     06/26/23  0748 06/27/23  0730 06/28/23  0317   SODIUM 140 139 144   POTASSIUM 5.5 4.4 4.5   CHLORIDE 110 111 113*   CO2 17* 18* 18*   GLUCOSE 92 91 172*   BUN 48* 43* 42*   CREATININE 3.21* 2.91* 2.74*   CALCIUM " 12.6* 11.3* 11.3*         IMAGING:   All imaging reviewed from admission to present day    IMPRESSION:  # ISAMAR, nonoliguric  In the setting of hypercalcemia, recent contrast exposure   # CKD:   Frequent fluctuations in creat  Baseline ~1.5  # Severe hypercalcemia:   Etiology  likely sec to hypercalcemia of malignancy in the setting of bone mets per bone   scan 6/21 with Hx of lung cancer, also hypercalcemia of immobility and vitamin     One would expect  to see high P and low Ca in sec hyperparathyroidism and less likely to      have severe hypercalcemia .PTH scan on last admission was negative for   adenoma   iPTH 271 6/25/23, 25-OH vitamin D and PTHrP pending  # HTN on Lasix ? 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 If SK+ <4.3 then can d/c Patiromer.   Given pamidronate 6/25  Renal diet             Hold vitamin D2              No urgent indication for RRT              No dietary protein restrictions             Hold gabapentin              Hold midodrine              Hold Xarelto suggest alternative AC              Increase valtessa to 16.8grams daily until serum K less than 5.0             Strict I/O              Trend Ca levels              Repeat K    Clarify treatment plan with Oncology             Follow labs, further recommendations to follow

## 2023-06-28 NOTE — DOCUMENTATION QUERY
"                                                                         [facility]                                                                       Query Response Note      PATIENT:               ROSARIO MARTINEZ  ACCT #:                  [id number]  MRN:                     [id number]  :                      1950  ADMIT DATE:       2023 2:26 PM  DISCH DATE:          RESPONDING  PROVIDER #:        [id number]           QUERY TEXT:    Documentation in the medical record indicates that this patient has been identified as having and/or is being treated for PRESSURE ULCER of the following sites:      Wound Care Consult: Stage 2 pressure injury coccyx present on admission     Based on your medical judgment, can you please confirm the location/site and the present on admission status of the ulcer?    The patient's clinical indicators include:  ED Provider Notes: \"She reports having pressure sores.\"   Wound Care Consult: \"Wound 23 Pressure Injury Coccyx Left POA St 2 (Active)?   Palliative Consult: \"Patient admits she has NOT ambulated since 2023... she has been bed and chair bound since that time.\"    Risk Factors: Bedbound, depression, metastatic cancer  Treatment: Wound care consult, dressing changes, turn q2h, q shift pressure point assessments    Thank You,  Mary Hidalgo RN  Clinical    Connect via [employer] or [email or web address]  Options provided:   -- Stage 2 pressure injury coccyx present on admission   -- Other explanation, (please specify other explanation)   -- Unable to Determine      Query created by: Mary Hidalgo on 2023 8:24 AM    RESPONSE TEXT:    Stage 2 pressure injury coccyx present on admission          Electronically signed by:  DANIA LORENZANA DO 2023 9:48 AM              "

## 2023-06-28 NOTE — CARE PLAN
The patient is Watcher - Medium risk of patient condition declining or worsening    Shift Goals  Clinical Goals: Q2 turns; monitor labs; monitor vital signs  Patient Goals: comfort, sleep well  Family Goals: josias    Progress made toward(s) clinical / shift goals:    Problem: Fall Risk  Goal: Patient will remain free from falls  Outcome: Progressing  Note: Bed alarm and strip alarm; bed locked and in low position; patient educated on fall risk prevention. Call light within reach pt calls appropriately      Problem: Communication  Goal: The ability to communicate needs accurately and effectively will improve  Outcome: Progressing  Note: Patient updated on plan of care, questions and concerns addressed.      Problem: Fluid Volume  Goal: Fluid volume balance will be maintained  Outcome: Progressing  Note: Monitoring I/O's, IV fluids administered. Offering fluids with hourly rounding.

## 2023-06-28 NOTE — CARE PLAN
The patient is Stable - Low risk of patient condition declining or worsening    Shift Goals  Clinical Goals: VSS, monitor labs, IV access, Q2hour turns, monitor skin integrity  Patient Goals: rest and sleep  Family Goals: n/a    Progress made toward(s) clinical / shift goals:  Updated patient on plan of care including medications and treatments. Encouraged verbalization of questions and concerns. All concerns have been addressed at this time.  Patient calls appropriately for assistance. Patient communicates needs appropriately. Restarted IVF for ISAMAR and hypercalcemia. Midline placed for reliable access.   Problem: Knowledge Deficit - Standard  Goal: Patient and family/care givers will demonstrate understanding of plan of care, disease process/condition, diagnostic tests and medications  Outcome: Progressing     Problem: Fall Risk  Goal: Patient will remain free from falls  Outcome: Progressing     Problem: Psychosocial  Goal: Patient's level of anxiety will decrease  Outcome: Progressing     Problem: Communication  Goal: The ability to communicate needs accurately and effectively will improve  Outcome: Progressing     Problem: Fluid Volume  Goal: Fluid volume balance will be maintained  Outcome: Progressing       Patient is not progressing towards the following goals:

## 2023-06-28 NOTE — PROGRESS NOTES
Hospital Medicine Daily Progress Note    Date of Service  6/28/2023    Chief Complaint  Latonia Clinton is a 72 y.o. female admitted 6/25/2023 with tremors and weakness    Hospital Course  Patient is a 72-year-old female with history of non-small cell lung cancer status post resection and radiation in 2017 and repeat radiation in 2022.  She started having tremors while residing in her group home with poor p.o. intake for multiple days.  In the ER she was found a potassium of 6 creatinine of 3.46 and a calcium of 14.8.  Patient with a lung biopsy in November which showed adenocarcinoma and given the multiple mets seen on her bone scan this is likely the etiology of her hypercalcemia.  Patient with nephrology consulting to help manage the hypercalcemia as well as hyperkalemia.  Overall her numbers are improving with creatinine decreasing calcium decreasing and potassium decreasing unfortunately her prognosis is poor as the previous hospitalist reached out to her oncologist and his recommendation was hospice given the recurrence of her cancer as well as the extensive nature with bony involvement.    Interval Problem Update  6/27 patient states overall she is feeling improvement.  She is sad to hear of her recurrence of cancer and bony involvement as it was introduced to her yesterday.  Palliative care consultation is currently pending.  6/28 -patient sleeping soundly and did not wake to examination at time of eval.  She lost IV access overnight and midline will be placed so she can continue on her IV fluids.  Creatinine still trending down is down to 2.74 today.  Calcium remains stable 11.3.    I have discussed this patient's plan of care and discharge plan at IDT rounds today with Case Management, Nursing, Nursing leadership, and other members of the IDT team.    Consultants/Specialty  nephrology and palliative care    Code Status  DNAR/DNI    Disposition  The patient is not medically cleared for discharge to  home or a post-acute facility.  Anticipate discharge to: home with organized home healthcare and close outpatient follow-up    I have placed the appropriate orders for post-discharge needs.    Review of Systems  Review of Systems   Constitutional:  Negative for chills and fever.   HENT:  Negative for congestion.    Eyes:  Negative for blurred vision and photophobia.   Respiratory:  Negative for cough and shortness of breath.    Cardiovascular:  Negative for chest pain, claudication and leg swelling.   Gastrointestinal:  Negative for abdominal pain, constipation, diarrhea, heartburn and vomiting.   Genitourinary:  Negative for dysuria and hematuria.   Musculoskeletal:  Negative for joint pain and myalgias.   Skin:  Negative for itching and rash.   Neurological:  Negative for dizziness, sensory change, speech change, weakness and headaches.   Psychiatric/Behavioral:  Negative for depression. The patient is not nervous/anxious and does not have insomnia.         Physical Exam  Temp:  [36.2 °C (97.2 °F)-36.8 °C (98.3 °F)] 36.4 °C (97.6 °F)  Pulse:  [] 62  Resp:  [17-20] 18  BP: (107-147)/(59-90) 107/59  SpO2:  [93 %-97 %] 96 %    Physical Exam  Vitals and nursing note reviewed.   Constitutional:       General: She is not in acute distress.     Appearance: Normal appearance. She is not ill-appearing.   HENT:      Head: Normocephalic and atraumatic.      Nose: Nose normal.   Cardiovascular:      Rate and Rhythm: Normal rate and regular rhythm.      Heart sounds: Normal heart sounds. No murmur heard.  Pulmonary:      Effort: Pulmonary effort is normal.      Breath sounds: Normal breath sounds.   Abdominal:      General: Bowel sounds are normal. There is no distension.      Palpations: Abdomen is soft.   Musculoskeletal:         General: No swelling or tenderness.      Cervical back: Neck supple.   Skin:     General: Skin is warm and dry.   Neurological:      General: No focal deficit present.      Mental Status: She  is alert and oriented to person, place, and time.   Psychiatric:         Mood and Affect: Mood normal.         Fluids    Intake/Output Summary (Last 24 hours) at 6/28/2023 1305  Last data filed at 6/28/2023 0320  Gross per 24 hour   Intake --   Output 2400 ml   Net -2400 ml         Laboratory  Recent Labs     06/26/23  0002 06/27/23  0730 06/28/23  0317   WBC 6.2 5.2 4.0*   RBC 2.88* 2.46* 2.60*   HEMOGLOBIN 9.0* 7.8* 8.1*   HEMATOCRIT 30.4* 26.2* 26.2*   .6* 106.5* 100.8*   MCH 31.3 31.7 31.2   MCHC 29.6* 29.8* 30.9*   RDW 58.2* 58.2* 53.1*   PLATELETCT 298 252 271   MPV 11.5 11.7 11.6       Recent Labs     06/26/23  0748 06/27/23  0730 06/28/23  0317   SODIUM 140 139 144   POTASSIUM 5.5 4.4 4.5   CHLORIDE 110 111 113*   CO2 17* 18* 18*   GLUCOSE 92 91 172*   BUN 48* 43* 42*   CREATININE 3.21* 2.91* 2.74*   CALCIUM 12.6* 11.3* 11.3*                     Imaging  US-RENAL   Final Result         1.  Bilateral nephrolithiasis without visualized obstructive changes.   2.  Echogenic kidneys with thinned cortex, appearance favoring medical renal disease.   3.  Large anechoic cystic structure adjacent to the right kidney, of uncertain origin and visible on prior CT April 8, 2023.   4.  Bilateral renal cysts without visualized complex features.   5.  Left nephrolithiasis without visualized obstructive changes.      CT-HEAD W/O   Final Result      1.  Cerebral atrophy.   2.  No acute intracranial abnormality.         [id number] VIEW   Final Result      Probably moderate colonic stool. Nonobstructive bowel gas pattern.      IR-MIDLINE CATHETER INSERTION WO GUIDANCE > AGE 3    (Results Pending)          Assessment/Plan  * Hypercalcemia- (present on admission)  Assessment & Plan  - Concerning for malignancy as primary  of such a rise. Per nephrology, hyperparathyroidism shouldn't raise levels this high. PTH last was in 170s, in the past up to 350s. She did undergo nuclear parathyroid scan without any suspicious  nodules.  - Bone scan from 5 days ago shows mets to the ribs, femur and acetabulum  - Continue highly rated IVFs  - s/p Miacalcin, Pamidronate   - Daily CMP   - Previous studies have been done in March - PTHrP was negative at that time   - I will hold home lasix given dehydration and ISAMAR        Hypothyroidism  Assessment & Plan  - TSH normal on home Methimazole     Constipation  Assessment & Plan  Due to hypercalcemia  Aggressive bowel regimen and hydration    Vitamin D deficiency  Assessment & Plan  Takes 50k vitamin D per week. Hold for now, check vit D level in setting of hypercalcemia    Hyperkalemia  Assessment & Plan  This is due to kidney failure and also taking potassium supplements. On Veltassa now.  Hold home lasix/potassium    Hyperparathyroidism (HCC)- (present on admission)  Assessment & Plan  - PTH previously elevated up to 340s. Nuclear parathyroid scan without any suspicion for parathyroid adenoma. Patient was referred to endocrinology whom she has not seen yet.  - severely elevated Ca likely driven from a different process such as malignancy  - treat hypercalcemia as above    Depression, major, recurrent, moderate (HCC)- (present on admission)  Assessment & Plan  Stable.    Macrocytic anemia- (present on admission)  Assessment & Plan  Noted, chronic. Previously b12 high, tsh nl (6/14)  B12 normal    Acute renal failure superimposed on stage 3 chronic kidney disease (HCC)  Assessment & Plan  Cr baseline around 1.7, most recently had improved further down to 1.2. Followed by [facility] but after her March hospitalization patient has not had further visits with Dr. Nunez. Etiology of current renal failure likely multifactorial from hypovolemia and hypercalcemia.  - [facility] on board  - treat hypercalcemia as above  - aggressive fluid hydration as detailed above -somehow fluids were discontinued overnight, restarted when this was found out.  IV access with midline to be placed.  -  close monitoring of I/Os  - treat hyperkalemia as above  - trend CMP q4hr  - hold home lasix/potassium/gabapentin    Lung cancer (HCC)- (present on admission)  Assessment & Plan  - Pt with lung cancer s/p excision and radiation in 2017, then with focal recurrence in November of 2022, with radiation therapy done with Dr Ange Banks  - Following with Pulmonary and Dr Rizvi  -Patient states she is not ready for hospice and wants to wait until she sees Dr. Vidal Dyson on July 8  - Cannot do CT c/a/p or MRI brain w/wo given her creatinine to further assess mets   - Palliative consulted -Decadron initiated    Deep vein thrombosis (DVT) (HCC)- (present on admission)  Assessment & Plan  Noted, has had multiple DVTs in the past  Due to kidney impairment, hold Xarelto, will given prophylactic dosing heparin until renal function returns to normal    Chronic obstructive pulmonary disease (HCC)- (present on admission)  Assessment & Plan  Noted, patient wears 2LNC at nighttime.   Currently stable without exacerbation  Continue home meds    Chronic hypoxemic respiratory failure (HCC)  Assessment & Plan  Noted, wears 2LNC at nighttime due to copd    Hyperlipidemia- (present on admission)  Assessment & Plan  Noted, on lipitor         VTE prophylaxis: heparin ppx    I have performed a physical exam and reviewed and updated ROS and Plan today (6/28/2023). In review of yesterday's note (6/27/2023), there are no changes except as documented above.

## 2023-06-28 NOTE — PROGRESS NOTES
"Power Midline Instructions    How to Care for your Power Midline  Do not take a bath, swim, or use hot tubs when you have a Power Midline. Cover Power Midline with clear plastic wrap and tape to keep it dry while showering.  Check the Power Midline insertion site daily for leakage, redness, swelling or pain.  Flush the Power Midline only if directed by your health care provider. Let your health care provider know immediately if the Power Midline is difficult to flush or does not flush. Do not use force to flush the Power Midline.  Do not use a syringe that is less than 10 mL to flush the Power Midline.  Avoid blood pressure checks on the arm with the Power Midline.  Do not take the Power Midline out yourself. Only a trained clinical professional should remove the Power Midline.  Make sure you or anyone who accesses your Power Midline Line washes their hands before using the line.  Make sure the hub of the line is \"scrubbed\" with alcohol or chlorhexidine prior to using the line.    Dressing Changes  Change the Power Midline dressing only if instructed to do so by your health care provider.  Have your Power Midline dressing changed if it becomes loose or wet.    When to seek medical attention  Power Midline is accidentally pulled all the way out  There is any type of drainage, redness, or swelling where the Power Midline enters the skin  There is noticeable increase in arm circumference due to swelling  You cannot flush the Power Midline, it is difficult to flush, or the Power Midline leaks around the insertion site when it is flushed  You notice a hole or tear in the Power Midline  You develop chills or a fever   "

## 2023-06-28 NOTE — PROGRESS NOTES
Telemetry Shift Summary     Rhythm: SR w/ 1st degree  HR: 61-91  Ectopy: rPVC  Measurements: 22/04/40    Normal Values  Rhythm: SR  HR:   Measurements: 0.12-0.20 / 0.04-0.10 / 0.30-0.52    Strip reviewed and placed in chart

## 2023-06-28 NOTE — PROGRESS NOTES
Received report from NOC shift RN. Assumed patient care. Patient denies pain at this time. On 2L O2, no signs of respiratory distress. Gonzalez catheter in place with adequate draining, no signs of discomfort. POC discussed with patient. Questions and concerns answered at this time. Call light and belongings within reach. Bed in lowest locked position. Upper side rails raised. Hourly rounding in place. Educated patient to call for assistance as needed, patient verbalized understanding.

## 2023-06-28 NOTE — PROCEDURES
Vascular Access Team    Date of Insertion: 6/28/2032  Internal length: 10  External Length: 0  Vein Occupancy %: 28  Reason for Midline: ACCESS  Labs: WBC 4.0, , BUN 42, Cr 2.74, GFR 18, INR 0.99    Orders confirmed, vessel patency confirmed with ultrasound. Risks and benefits of procedure explained to patient and education regarding line associated bloodstream infections provided. Questions answered.     Power Midline placed in LUE per licensed provider order with ultrasound guidance. 4 Fr, 1 lumen Power Midline placed in BASILIC vein after 1 attempt(s). 2 mL of 1% lidocaine injected intradermally, 21 gauge microintroducer needle was visualized entering the vein and modified Seldinger technique used. 10 cm catheter inserted with good blood return. Secured at 0 cm marker. Internal positioning stylet removed and verified to be intact. Each lumen flushed without resistance with 10 mL 0.9% normal saline. Midline secured with Biopatch and Tegaderm.     Midline placement is confirmed by nurse using ultrasound and ability to flush and draw blood. Midline is appropriate for use at this time.  Patient tolerated procedure well, without complications.  No X-ray is needed for placement confirmation.  Patient condition relayed to unit RN or ordering physician via this post procedure note in the EMR.     Ultrasound images uploaded to PACS and viewable in the EMR - yes  Ultrasound imaged printed and placed in paper chart - no     BARD Power Midline ref # [id number], Lot # [id number], Expiration Date 2024-03-31

## 2023-06-28 NOTE — PROGRESS NOTES
Reached out to Dr. Wilburn regarding approval of midline. Midline ordered by Dr. Jordan for access. OK to place per Dr. Wilburn. No arm preference.

## 2023-06-28 NOTE — DISCHARGE PLANNING
Case Management Discharge Planning    Admission Date: 6/25/2023  GMLOS: 3.1  ALOS: 3    6-Clicks ADL Score: 10  6-Clicks Mobility Score: 6  PT and/or OT Eval ordered: Yes  Post-acute Referrals Ordered: NA  Post-acute Choice Obtained: NA  Has referral(s) been sent to post-acute provider:  [facility]    Anticipated Discharge Dispo: Discharge Disposition: D/T to home under [facility] care in anticipation of covered skilled care (06)    DME Needed: No    Action(s) Taken: Updated Provider/Nurse on Discharge Plan  SW attended morning IDT rounds with team. Patient is not medically cleared for discharge at this time. Per palliative note yesterday, patient waiting for July appointment with oncologist before considering hospice. Patient lives in [facility]. Patient discussed in LOS meeting. Pending medical clearance to transfer back to [facility]. Choice form sent to [facility] for HH resumption    Escalations Completed: None    Medically Clear: No    Next Steps: Medical clearance    Barriers to Discharge: Medical clearance    Is the patient up for discharge tomorrow: No

## 2023-06-29 NOTE — CARE PLAN
The patient is Watcher - Medium risk of patient condition declining or worsening    Shift Goals  Clinical Goals: Monitor labs, pain control, encourage turns/mobility  Patient Goals: rest, sleep, pain control  Family Goals: josias    Progress made toward(s) clinical / shift goals:    Problem: Knowledge Deficit - Standard  Goal: Patient and family/care givers will demonstrate understanding of plan of care, disease process/condition, diagnostic tests and medications  Outcome: Progressing  Note: Continue to provide education to patient and family each shift.     Problem: Fall Risk  Goal: Patient will remain free from falls  Outcome: Progressing     Problem: Psychosocial  Goal: Patient's level of anxiety will decrease  Outcome: Progressing     Problem: Communication  Goal: The ability to communicate needs accurately and effectively will improve  Outcome: Progressing       Patient is not progressing towards the following goals:      Problem: Skin Integrity  Goal: Skin integrity is maintained or improved  Outcome: Not Progressing  Note: Encourage improved mobility. Encourage Q 2 hour turns

## 2023-06-29 NOTE — RESPIRATORY CARE
"   COPD EDUCATION by COPD CLINICAL EDUCATOR  6/29/2023 at 1:44 PM by Linn Velez, RRT     Patient reviewed by COPD education team. Patient does have a history or diagnosis of COPD and is a former smoker.  Patient does have diagnosis of lung cancer, therefore, patient does not qualify for the COPD program.     COPD Screen  COPD Risk Screening  Do you have a history of COPD?: Yes  Do you have a Pulmonologist?: Yes    COPD Assessment  COPD Clinical Specialists ONLY  COPD Education Initiated: No--Quick Screen (per chart - Patient w/ lung cancer,prognosis is poor, per oncologist his recommendation was hospice given the recurrence of her cancer involvement, Palliative care consultation is currently pending, pt sleeping did not want to awake. will check back.)  Interdisciplinary Rounds: Attendance at Rounds (30 Min)    PFT Results    No results found for: PFT    Meds to Beds  Would the patient like to opt in for Bedside Medication Delivery at Discharge?: Yes, interested     MY COPD ACTION PLAN     It is recommended that patients and physicians /healthcare providers complete this action plan together. This plan should be discussed at each physician visit and updated as needed.    The green, yellow and red zones show groups of symptoms of COPD. This list of symptoms is not comprehensive, and you may experience other symptoms. In the \"Actions\" column, your healthcare provider has recommended actions for you to take based on your symptoms.    Patient Name: Latonia Clinton   YOB: 1950   Last Updated on: 4/5/2023 11:33 AM   Green Zone:  I am doing well today Actions     Usual activitiy and exercise level   Take daily medications     Usual amounts of cough and phlegm/mucus   Use oxygen as prescribed     Sleep well at night   Continue regular exercise/diet plan     Appetite is good   At all times avoid cigarette smoke, inhaled irritants     Daily Medications (these medications are taken every day): " "  Fluticasone Furoate and Vilanterol trifenatate (Breo)  Tiotropium Bromide Monohydrate (Spiriva) 1 Puff  2 Puffs Once daily  Once daily        Yellow Zone:  I am having a bad day or a COPD flare Actions     More breathless than usual   Continue daily medications     I have less energy for my daily activities   Use quick relief inhaler as ordered     Increased or thicker phlegm/mucus   Use oxygen as prescribed     Using quick relief inhaler/nebulizer more often   Get plenty of rest     Swelling of ankles more than usual   Use pursed lip breathing     More coughing than usual   At all times avoid cigarette smoke, inhaled irritants     I feel like I have a \"chest cold\"     Poor sleep and my symptoms woke me up     My appetite is not good     My medicine is not helping      Call provider immediately if symptoms don’t improve     Continue daily medications, add rescue medications:               Medications to be used during a flare up, (as Discussed with Provider):              Red Zone:  I need urgent medical care Actions     Severe shortness of breath even at rest   Call 911 or seek medical care immediately     Not able to do any activity because of breathing      Fever or shaking chills      Feeling confused or very drowsy       Chest pains      Coughing up blood                  "

## 2023-06-29 NOTE — PROGRESS NOTES
0710: Report received from [facility] shift RN . Pt sleeping comfortably at the time of report. No signs of respiratory distress. Chest rise and fall observed.  Plan of care assumed. Safety precautions in place and call light/belongings within reach. Hourly and prn rounding in place. Patient educated to call for assistance.     Assessment complete at 0847. Patient complains of no pain. Questions and concerns addressed at this time. Medicated per orders.     Gave report to [facility] shift RN. All questions answered. Care handed off at this time.

## 2023-06-29 NOTE — PROGRESS NOTES
[facility] -  PROGRESS NOTE               Author: Finn Wilburn M.D. Date & Time: 6/29/2023  8:46 AM     HPI:  72 y.o. female with Hx of CKD, HTN, lung cancer  s/p sx and XRT presenting with constipation and tremors admitted for ISAMAR and hypercalcemia.   Hospitalized in 3/23 with for  he same, hypercalcemia was felt to be sec to sec hyperparathyroidism given Hx of CKD.   Her Serum Ca has improved to normal on discharge. Work up for hypercalcemia during previous hospitalizations revealed normal PTHrP and vitamin 1,25 H D borderline elevated vitamin D level at 96, most recent  in 3/23.   Recently she has had a CT chest with contrast as OP 6/21 for lung cancer surveillance showed mildly enlarged right paratracheal lymph node measuring 11 mm  indeterminate but low suspicion finding, stable L adrenal mass, likely benign per radiology report.  Bone scan 6/21 with  findings consistent with focal osseous metastatic deposits in the left anterior fourth rib with corresponding sclerosis on CT, left ninth posterior rib with corresponding sclerosis on CT, and probably the right posterior acetabulum with a   corresponding focal sclerotic lesion seen on CT from 4/8/2023.  Focal uptake in the left femoral shaft most consistent with osseous metastatic deposit.Nonspecific uptake at the right posterior-medial 10th rib. No corresponding sclerotic lesion seen on CT.   She has not followed up up with nephrology since she was last hospitalized.   Initial ER labs showed Cr of 3.46  and Ca 14.8, K 5.6. EKG with out  any changes, normal QTc interval. On arrival   She has received IV fluid bolus and calcitonin.  Pt repoerts she has been having tremors for for the past 2-3 weeks,   She reports poor PO intake, She denies any change in her urine out put. She reports she is bed bound and does not ambulate.   No F/C/N/V/CP/SOB.  No melena, hematochezia, hematemesis.  No HA, visual changes, or abdominal  "pain.    DAILY NEPHROLOGY SUMMARY:  6/26 - No events, corrected Ca 13.6, Cr 3.25, BP stable, on 2L NC O2, fair UOP overnight, denies any CP/SOB/Abd pain  6/27 - Labs are currently pending at the moment. Patient is in deep slumber.   6/28 - Scr Ca++ continues to slowly improve. 11.3 today. Scr is also improving. K+ is normal.  6/29 - No new overnight renal events. Sca++ is 11.3 (corrected)    REVIEW OF SYSTEMS:    Unable to assess given patient's clinical status     PMH/PSH/SH/FH:   Reviewed and unchanged since admission note    CURRENT MEDICATIONS:   Reviewed from admission to present day    VS:  BP 96/70   Pulse 66   Temp 36.4 °C (97.6 °F) (Oral)   Resp 20   Ht 1.626 m (5' 4\")   Wt 94.9 kg (209 lb 3.5 oz)   LMP  (LMP Unknown)   SpO2 98%   BMI 35.91 kg/m²     Physical Exam  Vitals and nursing note reviewed.   Constitutional:       Appearance: Normal appearance.   HENT:      Head: Normocephalic and atraumatic.      Mouth/Throat:      Mouth: Mucous membranes are dry.   Eyes:      General: No scleral icterus.  Cardiovascular:      Rate and Rhythm: Normal rate and regular rhythm.   Pulmonary:      Effort: Pulmonary effort is normal. No respiratory distress.      Breath sounds: Normal breath sounds.   Abdominal:      General: Bowel sounds are normal. There is no distension.      Palpations: Abdomen is soft.   Musculoskeletal:         General: No deformity.      Right lower leg: No edema.      Left lower leg: No edema.   Skin:     General: Skin is warm and dry.      Findings: No rash.   Neurological:      General: No focal deficit present.      Mental Status: She is alert and oriented to person, place, and time.   Psychiatric:         Speech: Speech is delayed.         Behavior: Behavior is cooperative.         Fluids:  In: -   Out: 850     LABS:  Recent Labs     06/27/23  0730 06/28/23  0317 06/29/23  0352   SODIUM 139 144 141   POTASSIUM 4.4 4.5 4.1   CHLORIDE 111 113* 112   CO2 18* 18* 20   GLUCOSE 91 172* 143* "   BUN 43* 42* 41*   CREATININE 2.91* 2.74* 2.79*   CALCIUM 11.3* 11.3* 10.3*         IMAGING:   All imaging reviewed from admission to present day    IMPRESSION:  # ISAMAR, nonoliguric  In the setting of hypercalcemia, recent contrast exposure   # CKD:   Frequent fluctuations in creat  Baseline ~1.5  # Severe hypercalcemia:   Etiology  likely sec to hypercalcemia of malignancy in the setting of bone mets per bone   scan 6/21 with Hx of lung cancer, also hypercalcemia of immobility and vitamin     One would expect  to see high P and low Ca in sec hyperparathyroidism and less likely to have severe hypercalcemia .PTH scan on last admission was negative for adenoma   iPTH 271 6/25/23  # HTN on Lasix ? 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  in the setting of ISAMAR   # Hx of DVT on NOACs     SUGGESTIONS:             Continue NS at maintenance rate to 75 cc/hr. One dose of Lasix 40mg today.   Given pamidronate 6/25             Hold vitamin D2              No urgent indication for RRT              No dietary protein restrictions             Strict I/O              Trend Ca levels              Follow labs, further recommendations to follow

## 2023-06-29 NOTE — THERAPY
Occupational Therapy Contact Note    Patient Name: Latonia Clinton  Age:  72 y.o., Sex:  female  Medical Record #: [id number]  Today's Date: 6/29/2023 06/29/23 1519   Treatment Variance   Reason For Missed Therapy Non-Medical - Patient Refused   Interdisciplinary Plan of Care Collaboration   IDT Collaboration with  Nursing   Collaboration Comments Attempted, pt refusing OOB activity.  Per nursing she has been refusing many things including feeding herself.  Plan appears to be return back to group home with HH as she had previously, question need for further skilled therapy at this time given pt's current prognosis and potential for eventual hospice per MD notes.  Will follow loosely for therapy needs.

## 2023-06-29 NOTE — PROGRESS NOTES
Hospital Medicine Daily Progress Note    Date of Service  6/29/2023    Chief Complaint  Latonia Clinton is a 72 y.o. female admitted 6/25/2023 with tremors and weakness    Hospital Course  Patient is a 72-year-old female with history of non-small cell lung cancer status post resection and radiation in 2017 and repeat radiation in 2022.  She started having tremors while residing in her group home with poor p.o. intake for multiple days.  In the ER she was found a potassium of 6 creatinine of 3.46 and a calcium of 14.8.  Patient with a lung biopsy in November which showed adenocarcinoma and given the multiple mets seen on her bone scan this is likely the etiology of her hypercalcemia.  Patient with nephrology consulting to help manage the hypercalcemia as well as hyperkalemia.  Overall her numbers are improving with creatinine decreasing calcium decreasing and potassium decreasing unfortunately her prognosis is poor as the previous hospitalist reached out to her oncologist and his recommendation was hospice given the recurrence of her cancer as well as the extensive nature with bony involvement.    Interval Problem Update  6/27 patient states overall she is feeling improvement.  She is sad to hear of her recurrence of cancer and bony involvement as it was introduced to her yesterday.  Palliative care consultation is currently pending.  6/28 -patient sleeping soundly and did not wake to examination at time of eval.  She lost IV access overnight and midline will be placed so she can continue on her IV fluids.  Creatinine still trending down is down to 2.74 today.  Calcium remains stable 11.3.  6/29 -patient wide-awake at time of examination today.  She is hoping to go home in the next few days.  I explained to her that we are still giving her IV fluids and still watching her calcium trend down.  Renal function is approximately the same as it was yesterday.  Ionized calcium continues to drop and is down to 1.38  today.  Continue with IV fluids, midline placed yesterday for IV access, will continue working on her calcium through the weekend.  She understands that she carries a very poor prognosis with the recurrent metastatic cancer causing hypercalcemia but states that she does not want to go on hospice until she sees Dr. Rizvi in the outpatient setting.    I have discussed this patient's plan of care and discharge plan at IDT rounds today with Case Management, Nursing, Nursing leadership, and other members of the IDT team.    Consultants/Specialty  nephrology and palliative care    Code Status  DNAR/DNI    Disposition  The patient is not medically cleared for discharge to home or a post-acute facility.  Anticipate discharge to: home with organized home healthcare and close outpatient follow-up    I have placed the appropriate orders for post-discharge needs.    Review of Systems  Review of Systems   Constitutional:  Negative for chills and fever.   HENT:  Negative for congestion.    Eyes:  Negative for blurred vision and photophobia.   Respiratory:  Negative for cough and shortness of breath.    Cardiovascular:  Negative for chest pain, claudication and leg swelling.   Gastrointestinal:  Negative for abdominal pain, constipation, diarrhea, heartburn and vomiting.   Genitourinary:  Negative for dysuria and hematuria.   Musculoskeletal:  Negative for joint pain and myalgias.   Skin:  Negative for itching and rash.   Neurological:  Negative for dizziness, sensory change, speech change, weakness and headaches.   Psychiatric/Behavioral:  Negative for depression. The patient is not nervous/anxious and does not have insomnia.         Physical Exam  Temp:  [36.2 °C (97.2 °F)-36.8 °C (98.2 °F)] 36.2 °C (97.2 °F)  Pulse:  [62-91] 66  Resp:  [18-20] 19  BP: ()/(54-79) 142/79  SpO2:  [95 %-98 %] 98 %    Physical Exam  Vitals and nursing note reviewed.   Constitutional:       General: She is not in acute distress.      Appearance: Normal appearance. She is not ill-appearing.   HENT:      Head: Normocephalic and atraumatic.      Nose: Nose normal.   Cardiovascular:      Rate and Rhythm: Normal rate and regular rhythm.      Heart sounds: Normal heart sounds. No murmur heard.  Pulmonary:      Effort: Pulmonary effort is normal.      Breath sounds: Normal breath sounds.   Abdominal:      General: Bowel sounds are normal. There is no distension.      Palpations: Abdomen is soft.   Musculoskeletal:         General: No swelling or tenderness.      Cervical back: Neck supple.   Skin:     General: Skin is warm and dry.   Neurological:      General: No focal deficit present.      Mental Status: She is alert and oriented to person, place, and time.   Psychiatric:         Mood and Affect: Mood normal.         Fluids    Intake/Output Summary (Last 24 hours) at 6/29/2023 1358  Last data filed at 6/28/2023 2215  Gross per 24 hour   Intake --   Output 850 ml   Net -850 ml         Laboratory  Recent Labs     06/27/23  0730 06/28/23  0317 06/29/23  0352   WBC 5.2 4.0* 6.2   RBC 2.46* 2.60* 2.51*   HEMOGLOBIN 7.8* 8.1* 7.7*   HEMATOCRIT 26.2* 26.2* 25.2*   .5* 100.8* 100.4*   MCH 31.7 31.2 30.7   MCHC 29.8* 30.9* 30.6*   RDW 58.2* 53.1* 53.1*   PLATELETCT 252 271 254   MPV 11.7 11.6 11.6       Recent Labs     06/27/23  0730 06/28/23  0317 06/29/23  0352   SODIUM 139 144 141   POTASSIUM 4.4 4.5 4.1   CHLORIDE 111 113* 112   CO2 18* 18* 20   GLUCOSE 91 172* 143*   BUN 43* 42* 41*   CREATININE 2.91* 2.74* 2.79*   CALCIUM 11.3* 11.3* 10.3*       Recent Labs     06/28/23  1253   INR 0.99               Imaging  IR-MIDLINE CATHETER INSERTION WO GUIDANCE > AGE 3   Final Result                  Ultrasound-guided midline placement performed by qualified nursing staff    as above.          US-RENAL   Final Result         1.  Bilateral nephrolithiasis without visualized obstructive changes.   2.  Echogenic kidneys with thinned cortex, appearance favoring  medical renal disease.   3.  Large anechoic cystic structure adjacent to the right kidney, of uncertain origin and visible on prior CT April 8, 2023.   4.  Bilateral renal cysts without visualized complex features.   5.  Left nephrolithiasis without visualized obstructive changes.      CT-HEAD W/O   Final Result      1.  Cerebral atrophy.   2.  No acute intracranial abnormality.         [id number] VIEW   Final Result      Probably moderate colonic stool. Nonobstructive bowel gas pattern.             Assessment/Plan  * Hypercalcemia- (present on admission)  Assessment & Plan  - Concerning for malignancy as primary  of such a rise. Per nephrology, hyperparathyroidism shouldn't raise levels this high. PTH last was in 170s, in the past up to 350s. She did undergo nuclear parathyroid scan without any suspicious nodules.  - Bone scan from 5 days ago shows mets to the ribs, femur and acetabulum  - Continue highly rated IVFs  - s/p Miacalcin, Pamidronate   - Daily CMP   - Previous studies have been done in March - PTHrP was negative at that time   - I will hold home lasix given dehydration and ISAMAR        Hypothyroidism  Assessment & Plan  - TSH normal on home Methimazole     Constipation  Assessment & Plan  Due to hypercalcemia  Aggressive bowel regimen and hydration    Vitamin D deficiency  Assessment & Plan  Takes 50k vitamin D per week. Hold for now, check vit D level in setting of hypercalcemia    Hyperkalemia  Assessment & Plan  This is due to kidney failure and also taking potassium supplements. On Veltassa now.  Hold home lasix/potassium    Hyperparathyroidism (HCC)- (present on admission)  Assessment & Plan  - PTH previously elevated up to 340s. Nuclear parathyroid scan without any suspicion for parathyroid adenoma. Patient was referred to endocrinology whom she has not seen yet.  - severely elevated Ca likely driven from a different process such as malignancy  - treat hypercalcemia as above    Depression,  major, recurrent, moderate (HCC)- (present on admission)  Assessment & Plan  Stable.    Macrocytic anemia- (present on admission)  Assessment & Plan  Noted, chronic. Previously b12 high, tsh nl (6/14)  B12 normal    Acute renal failure superimposed on stage 3 chronic kidney disease (HCC)  Assessment & Plan  Cr baseline around 1.7, most recently had improved further down to 1.2. Followed by [facility] but after her March hospitalization patient has not had further visits with Dr. Nunez. Etiology of current renal failure likely multifactorial from hypovolemia and hypercalcemia.  - [facility] on board  - treat hypercalcemia as above  - aggressive fluid hydration as detailed above -somehow fluids were discontinued overnight, restarted when this was found out.  IV access with midline to be placed.  - close monitoring of I/Os  - treat hyperkalemia as above  - trend CMP q4hr  - hold home lasix/potassium/gabapentin    Lung cancer (HCC)- (present on admission)  Assessment & Plan  - Pt with lung cancer s/p excision and radiation in 2017, then with focal recurrence in November of 2022, with radiation therapy done with Dr Ange Banks  - Following with Pulmonary and Dr Rizvi  -Patient states she is not ready for hospice and wants to wait until she sees Dr. Vidal Dyson on July 8  - Cannot do CT c/a/p or MRI brain w/wo given her creatinine to further assess mets   - Palliative consulted -Decadron initiated    Deep vein thrombosis (DVT) (HCC)- (present on admission)  Assessment & Plan  Noted, has had multiple DVTs in the past  Due to kidney impairment, hold Xarelto, will given prophylactic dosing heparin until renal function returns to normal    Chronic obstructive pulmonary disease (HCC)- (present on admission)  Assessment & Plan  Noted, patient wears 2LNC at nighttime.   Currently stable without exacerbation  Continue home meds    Chronic hypoxemic respiratory failure (HCC)  Assessment & Plan  Noted, wears  2LNC at nighttime due to copd    Hyperlipidemia- (present on admission)  Assessment & Plan  Noted, on lipitor         VTE prophylaxis: heparin ppx    I have performed a physical exam and reviewed and updated ROS and Plan today (6/29/2023). In review of yesterday's note (6/28/2023), there are no changes except as documented above.

## 2023-06-29 NOTE — FACE TO FACE
Face to Face Supporting Documentation - Home Health    The encounter with this patient was in whole or in part the primary reason for home health admission.    Date of encounter:   Patient:                    MRN:                       YOB: 2024  Latonia Clinton  [id number]  1950     Home health to see patient for:  Skilled Nursing care for assessment, interventions & education, Physical Therapy evaluation and treatment, and Occupational therapy evaluation and treatment    Skilled need for:  New Onset Medical Diagnosis hypercalcemia of malignancy    Skilled nursing interventions to include:  Comment: .    Homebound status evidenced by:  Needs the assistance of another person in order to leave the home. Leaving home requires a considerable and taxing effort. There is a normal inability to leave the home.    Community Physician to provide follow up care: Liat Emerson M.D.     Optional Interventions? No      I certify the face to face encounter for this home health care referral meets the CMS requirements and the encounter/clinical assessment with the patient was, in whole, or in part, for the medical condition(s) listed above, which is the primary reason for home health care. Based on my clinical findings: the service(s) are medically necessary, support the need for home health care, and the homebound criteria are met.  I certify that this patient has had a face to face encounter by myself.  Mami Jordan D.O. - NPI: [id number]

## 2023-06-29 NOTE — PROGRESS NOTES
12-hour chart check complete.    Monitor Summary  Rhythm: SR-ST  Rate: 's  Ectopy: rPAC  Measurements: 0.24/0.08/0.36

## 2023-06-30 NOTE — DIETARY
Nutrition Update:    Day 5 of admit.  Latonia Clinton is a 72 y.o. female with admitting DX of Hypercalcemia [E83.52].  Patient being followed to optimize nutrition.    Current Diet: Renal. ADL meal documentation shows PO % x 2 meals yesterday, which is an improvement from earlier this week. Limited PO documentation available for review. Encourage continued PO intake of meals >%. Please document PO intake at meals and snacks as % in ADL flowsheets. Nutrition rep to continue to see pt for ongoing meal and snack preferences.     Problem: Nutritional:  Goal: Achieve adequate nutritional intake  Description: Patient will consume >50% of meals  Outcome: Progressing    RD continues to follow.

## 2023-06-30 NOTE — CARE PLAN
The patient is Stable - Low risk of patient condition declining or worsening    Shift Goals  Clinical Goals: Monitor VS, I/Os  Patient Goals: Rest  Family Goals: josias    Progress made toward(s) clinical / shift goals:    Problem: Knowledge Deficit - Standard  Goal: Patient and family/care givers will demonstrate understanding of plan of care, disease process/condition, diagnostic tests and medications  Outcome: Progressing  Note: Updated patient on plan of care including medications and treatments. Encouraged verbalization of questions and concerns. All concerns have been addressed at this time.      Problem: Skin Integrity  Goal: Skin integrity is maintained or improved  Outcome: Progressing  Note: Skin integrity has been maintained.        Patient is not progressing towards the following goals:

## 2023-06-30 NOTE — DISCHARGE PLANNING
Received Choice form at: [id number]  Agency/Facility name: [facility]   Referral sent per Choice form at: [id number]

## 2023-06-30 NOTE — PROGRESS NOTES
Hospital Medicine Daily Progress Note    Date of Service  6/30/2023    Chief Complaint  Latonia Clinton is a 72 y.o. female admitted 6/25/2023 with tremors and weakness    Hospital Course  Patient is a 72-year-old female with history of non-small cell lung cancer status post resection and radiation in 2017 and repeat radiation in 2022.  She started having tremors while residing in her group home with poor p.o. intake for multiple days.  In the ER she was found a potassium of 6 creatinine of 3.46 and a calcium of 14.8.  Patient with a lung biopsy in November which showed adenocarcinoma and given the multiple mets seen on her bone scan this is likely the etiology of her hypercalcemia.  Patient with nephrology consulting to help manage the hypercalcemia as well as hyperkalemia.  Overall her numbers are improving with creatinine decreasing calcium decreasing and potassium decreasing unfortunately her prognosis is poor as the previous hospitalist reached out to her oncologist and his recommendation was hospice given the recurrence of her cancer as well as the extensive nature with bony involvement.    Interval Problem Update  6/27 patient states overall she is feeling improvement.  She is sad to hear of her recurrence of cancer and bony involvement as it was introduced to her yesterday.  Palliative care consultation is currently pending.  6/28 -patient sleeping soundly and did not wake to examination at time of eval.  She lost IV access overnight and midline will be placed so she can continue on her IV fluids.  Creatinine still trending down is down to 2.74 today.  Calcium remains stable 11.3.  6/29 -patient wide-awake at time of examination today.  She is hoping to go home in the next few days.  I explained to her that we are still giving her IV fluids and still watching her calcium trend down.  Renal function is approximately the same as it was yesterday.  Ionized calcium continues to drop and is down to 1.38  today.  Continue with IV fluids, midline placed yesterday for IV access, will continue working on her calcium through the weekend.  She understands that she carries a very poor prognosis with the recurrent metastatic cancer causing hypercalcemia but states that she does not want to go on hospice until she sees Dr. Rizvi in the outpatient setting.  6/30 patient without complaints today she is happy about the improvements in her kidneys and her calcium.  Calcium has also returned to normal.  Patient understands that she has significant recurrence of her cancer but wants to speak with her oncologist before agreeing to any type of hospice.  I anticipate that she will need IV fluids for an additional 24 hours then should be able to discontinue these will watch through the weekend and anticipate discharge home on Monday.    I have discussed this patient's plan of care and discharge plan at IDT rounds today with Case Management, Nursing, Nursing leadership, and other members of the IDT team.    Consultants/Specialty  nephrology and palliative care    Code Status  DNAR/DNI    Disposition  The patient is not medically cleared for discharge to home or a post-acute facility.  Anticipate discharge to: home with organized home healthcare and close outpatient follow-up    I have placed the appropriate orders for post-discharge needs.    Review of Systems  Review of Systems   Constitutional:  Negative for chills and fever.   HENT:  Negative for congestion.    Eyes:  Negative for blurred vision and photophobia.   Respiratory:  Negative for cough and shortness of breath.    Cardiovascular:  Negative for chest pain, claudication and leg swelling.   Gastrointestinal:  Negative for abdominal pain, constipation, diarrhea, heartburn and vomiting.   Genitourinary:  Negative for dysuria and hematuria.   Musculoskeletal:  Negative for joint pain and myalgias.   Skin:  Negative for itching and rash.   Neurological:  Negative for dizziness,  sensory change, speech change, weakness and headaches.   Psychiatric/Behavioral:  Negative for depression. The patient is not nervous/anxious and does not have insomnia.         Physical Exam  Temp:  [36.4 °C (97.6 °F)-37.1 °C (98.7 °F)] 36.5 °C (97.7 °F)  Pulse:  [] 66  Resp:  [16-19] 17  BP: (118-144)/(59-81) 129/77  SpO2:  [90 %-98 %] 98 %    Physical Exam  Vitals and nursing note reviewed.   Constitutional:       General: She is not in acute distress.     Appearance: Normal appearance. She is not ill-appearing.   HENT:      Head: Normocephalic and atraumatic.      Nose: Nose normal.   Cardiovascular:      Rate and Rhythm: Normal rate and regular rhythm.      Heart sounds: Normal heart sounds. No murmur heard.  Pulmonary:      Effort: Pulmonary effort is normal.      Breath sounds: Normal breath sounds.   Abdominal:      General: Bowel sounds are normal. There is no distension.      Palpations: Abdomen is soft.   Musculoskeletal:         General: No swelling or tenderness.      Cervical back: Neck supple.   Skin:     General: Skin is warm and dry.   Neurological:      General: No focal deficit present.      Mental Status: She is alert and oriented to person, place, and time.   Psychiatric:         Mood and Affect: Mood normal.         Fluids    Intake/Output Summary (Last 24 hours) at 6/30/2023 1359  Last data filed at 6/30/2023 0510  Gross per 24 hour   Intake 2404.46 ml   Output 2300 ml   Net 104.46 ml         Laboratory  Recent Labs     06/28/23 0317 06/29/23  0352 06/30/23  0134   WBC 4.0* 6.2 5.4   RBC 2.60* 2.51* 2.43*   HEMOGLOBIN 8.1* 7.7* 7.4*   HEMATOCRIT 26.2* 25.2* 24.2*   .8* 100.4* 99.6*   MCH 31.2 30.7 30.5   MCHC 30.9* 30.6* 30.6*   RDW 53.1* 53.1* 54.1*   PLATELETCT 271 254 262   MPV 11.6 11.6 11.6       Recent Labs     06/28/23 0317 06/29/23  0352 06/30/23  0134   SODIUM 144 141 140   POTASSIUM 4.5 4.1 3.8   CHLORIDE 113* 112 111   CO2 18* 20 18*   GLUCOSE 172* 143* 160*   BUN 42*  41* 41*   CREATININE 2.74* 2.79* 2.40*   CALCIUM 11.3* 10.3* 9.5       Recent Labs     06/28/23  1253   INR 0.99                 Imaging  IR-MIDLINE CATHETER INSERTION WO GUIDANCE > AGE 3   Final Result                  Ultrasound-guided midline placement performed by qualified nursing staff    as above.          US-RENAL   Final Result         1.  Bilateral nephrolithiasis without visualized obstructive changes.   2.  Echogenic kidneys with thinned cortex, appearance favoring medical renal disease.   3.  Large anechoic cystic structure adjacent to the right kidney, of uncertain origin and visible on prior CT April 8, 2023.   4.  Bilateral renal cysts without visualized complex features.   5.  Left nephrolithiasis without visualized obstructive changes.      CT-HEAD W/O   Final Result      1.  Cerebral atrophy.   2.  No acute intracranial abnormality.         [id number] VIEW   Final Result      Probably moderate colonic stool. Nonobstructive bowel gas pattern.             Assessment/Plan  * Hypercalcemia- (present on admission)  Assessment & Plan  - Concerning for malignancy as primary  of such a rise. Per nephrology, hyperparathyroidism shouldn't raise levels this high. PTH last was in 170s, in the past up to 350s. She did undergo nuclear parathyroid scan without any suspicious nodules.  Calcium has normalized down to 9.5  Additional 24 hours of fluids then discontinue      Hypothyroidism  Assessment & Plan  - TSH normal on home Methimazole     Constipation  Assessment & Plan  Due to hypercalcemia  Aggressive bowel regimen and hydration    Vitamin D deficiency  Assessment & Plan  Takes 50k vitamin D per week. Hold for now, check vit D level in setting of hypercalcemia    Hyperkalemia  Assessment & Plan  This is due to kidney failure and also taking potassium supplements. On Veltassa now.  Hold home lasix/potassium    Hyperparathyroidism (HCC)- (present on admission)  Assessment & Plan  - PTH previously  elevated up to 340s. Nuclear parathyroid scan without any suspicion for parathyroid adenoma. Patient was referred to endocrinology whom she has not seen yet.  - severely elevated Ca likely driven from a different process such as malignancy  - treat hypercalcemia as above    Depression, major, recurrent, moderate (HCC)- (present on admission)  Assessment & Plan  Stable.    Macrocytic anemia- (present on admission)  Assessment & Plan  Noted, chronic. Previously b12 high, tsh nl (6/14)  B12 normal    Acute renal failure superimposed on stage 3 chronic kidney disease (HCC)  Assessment & Plan  Cr baseline around 1.7, most recently had improved further down to 1.2. Followed by [facility] but after her March hospitalization patient has not had further visits with Dr. Nunez. Etiology of current renal failure likely multifactorial from hypovolemia and hypercalcemia.  - [facility] nephrology consulted  Renal function continues to improve, creatinine down to 2.4  Additional 24 hours of IV fluids then discontinue per nephrology recommendations      Lung cancer (HCC)- (present on admission)  Assessment & Plan  - Pt with lung cancer s/p excision and radiation in 2017, then with focal recurrence in November of 2022, with radiation therapy done with Dr Ange Banks  - Following with Pulmonary and Dr Rizvi  -Patient states she is not ready for hospice and wants to wait until she sees Dr. Rizvi on July 8  - Cannot do CT c/a/p or MRI brain w/wo given her creatinine to further assess mets   - Palliative consulted -Decadron initiated    Deep vein thrombosis (DVT) ([facility])- (present on admission)  Assessment & Plan  Noted, has had multiple DVTs in the past  Due to kidney impairment, hold Xarelto, will given prophylactic dosing heparin until renal function returns to normal    Chronic obstructive pulmonary disease (HCC)- (present on admission)  Assessment & Plan  Noted, patient wears 2LNC at nighttime.   Currently  stable without exacerbation  Continue home meds    Chronic hypoxemic respiratory failure (HCC)  Assessment & Plan  Noted, wears 2LNC at nighttime due to copd    Hyperlipidemia- (present on admission)  Assessment & Plan  Noted, on lipitor         VTE prophylaxis: heparin ppx    I have performed a physical exam and reviewed and updated ROS and Plan today (6/30/2023). In review of yesterday's note (6/29/2023), there are no changes except as documented above.

## 2023-06-30 NOTE — DOCUMENTATION QUERY
"                                                                         [facility]                                                                       Query Response Note      PATIENT:               ROSARIO MARTINEZ  ACCT #:                  [id number]  MRN:                     [id number]  :                      1950  ADMIT DATE:       2023 2:26 PM  DISCH DATE:          RESPONDING  PROVIDER #:        [id number]           QUERY TEXT:    Acute renal failure \"likely multifactorial from hypovolemia and hypercalcemia\" and \"in the setting of hypercalcemia, recent contrast exposure\" is documented in the medical record.     Can the etiology of the acute renal failure be further specified based on the clinical indicators above?    The patient's clinical indicators include:  H&P: \"Etiology of current renal failure likely multifactorial from hypovolemia and hypercalcemia.\"   Nephrology: \"#ISAMAR, nonoliguric. In the setting of hypercalcemia, recent contrast exposure ... Baseline ~1.5\"     Creatinine: 3.46 () > 3.39 > 3.34 > 3.25 > 3.21 > 2.91 > 2.74 > 2.79 > 2.4 ()    Risk Factors: Hypovolemia, recent contrast exposure, hypercalcemia, >72 hours for renal function to return to baseline  Treatment: IV fluids, nephrology consult, renal US, strict I/O    Mary Hidalgo RN  Clinical    Connect via [employer] or [email or web address]  Options provided:   -- Acute renal failure with tubular necrosis (ATN)   -- Acute renal failure unspecified   -- Other explanation, (please specify other explanation)   -- Unable to determine      Query created by: Mary Hidalgo on 2023 8:34 AM    RESPONSE TEXT:    Acute renal failure with tubular necrosis (ATN)          Electronically signed by:  DANIA LORENZANA DO 2023 11:40 AM              "

## 2023-06-30 NOTE — PROGRESS NOTES
12 hour chart check complete.     Monitor Summary  Rhythm: SB/SR  HR: 50-80s  Ectopy: PAC, PVC, close to 1st degree block  TX: 0.20  QRS: 0.08  QT: 0.40

## 2023-06-30 NOTE — PROGRESS NOTES
Telemetry Shift Summary     Rhythm: SR/SB  HR: 51-79  Ectopy: rPVC, rPAC  Measurements: 20/08/36    Normal Values  Rhythm: SR  HR:   Measurements: 0.12-0.20 / 0.04-0.10 / 0.30-0.52    Strip reviewed and placed in chart

## 2023-06-30 NOTE — CARE PLAN
The patient is Watcher - Medium risk of patient condition declining or worsening    Shift Goals  Clinical Goals: Pain control, comfort  Patient Goals: Pain control, sleep  Family Goals: SUMAN    Progress made toward(s) clinical / shift goals:     Problem: Skin Integrity  Goal: Skin integrity is maintained or improved  6/30/2023 0403 by Jemma Mckeon RJULISA.  Outcome: Progressing  Note: Q2h turning and repositioning with TAPS. Heels floated. Waffle mattress in place. Medical devices padded/alternated to reduce risk of skin breakdown.  6/30/2023 0403 by Jemma Mckeon RAnibalN.  Outcome: Progressing     Problem: Psychosocial  Goal: Patient's ability to verbalize feelings about condition will improve  6/30/2023 0403 by Jemma Mckeon RAnibalN.  Outcome: Progressing  Flowsheets (Taken 6/30/2023 0403)  Condition Will Improve:   Encouraged patient participation in care   Encouraged acknowledgement of body changes and emotions  Note: Patient demonstrates ability to voice thoughts and feelings r/t disease process and plan of care. Patient appears understanding and accepting at this time. Pt looking forward to Oncology appt on July 8th.  6/30/2023 0403 by Jemma Mckeon RAnibalN.  Outcome: Progressing

## 2023-06-30 NOTE — CARE PLAN
The patient is Stable - Low risk of patient condition declining or worsening    Shift Goals  Clinical Goals: comfort  Patient Goals: comfort, rest  Family Goals: SUMAN    Progress made toward(s) clinical / shift goals:    Problem: Knowledge Deficit - Standard  Goal: Patient and family/care givers will demonstrate understanding of plan of care, disease process/condition, diagnostic tests and medications  Outcome: Progressing  Note: Updated patient on plan of care including medications and treatments. Encouraged verbalization of questions and concerns. All concerns have been addressed at this time.        Patient is not progressing towards the following goals:

## 2023-06-30 NOTE — PROGRESS NOTES
[facility] -  PROGRESS NOTE               Author: Finn Wilburn M.D. Date & Time: 6/30/2023  8:50 AM     HPI:  72 y.o. female with Hx of CKD, HTN, lung cancer  s/p sx and XRT presenting with constipation and tremors admitted for ISAMAR and hypercalcemia.   Hospitalized in 3/23 with for  he same, hypercalcemia was felt to be sec to sec hyperparathyroidism given Hx of CKD.   Her Serum Ca has improved to normal on discharge. Work up for hypercalcemia during previous hospitalizations revealed normal PTHrP and vitamin 1,25 H D borderline elevated vitamin D level at 96, most recent  in 3/23.   Recently she has had a CT chest with contrast as OP 6/21 for lung cancer surveillance showed mildly enlarged right paratracheal lymph node measuring 11 mm  indeterminate but low suspicion finding, stable L adrenal mass, likely benign per radiology report.  Bone scan 6/21 with  findings consistent with focal osseous metastatic deposits in the left anterior fourth rib with corresponding sclerosis on CT, left ninth posterior rib with corresponding sclerosis on CT, and probably the right posterior acetabulum with a   corresponding focal sclerotic lesion seen on CT from 4/8/2023.  Focal uptake in the left femoral shaft most consistent with osseous metastatic deposit.Nonspecific uptake at the right posterior-medial 10th rib. No corresponding sclerotic lesion seen on CT.   She has not followed up up with nephrology since she was last hospitalized.   Initial ER labs showed Cr of 3.46  and Ca 14.8, K 5.6. EKG with out  any changes, normal QTc interval. On arrival   She has received IV fluid bolus and calcitonin.  Pt repoerts she has been having tremors for for the past 2-3 weeks,   She reports poor PO intake, She denies any change in her urine out put. She reports she is bed bound and does not ambulate.   No F/C/N/V/CP/SOB.  No melena, hematochezia, hematemesis.  No HA, visual changes, or abdominal  "pain.    DAILY NEPHROLOGY SUMMARY:  6/26 - No events, corrected Ca 13.6, Cr 3.25, BP stable, on 2L NC O2, fair UOP overnight, denies any CP/SOB/Abd pain  6/27 - Labs are currently pending at the moment. Patient is in deep slumber.   6/28 - Scr Ca++ continues to slowly improve. 11.3 today. Scr is also improving. K+ is normal.  6/29 - No new overnight renal events. Sca++ is 11.3 (corrected)  6/30 - Ca++ down to 10.6 and Scr improved to 2.4. Feels ok. Patient does not want to go on hospice until she sees Dr. Rizvi in the outpatient setting.     REVIEW OF SYSTEMS:    Unable to assess given patient's clinical status     PMH/PSH/SH/FH:   Reviewed and unchanged since admission note    CURRENT MEDICATIONS:   Reviewed from admission to present day    VS:  /74   Pulse 74   Temp 36.5 °C (97.7 °F) (Oral)   Resp 18   Ht 1.626 m (5' 4\")   Wt 99.2 kg (218 lb 11.1 oz)   LMP  (LMP Unknown)   SpO2 98%   BMI 37.54 kg/m²     Physical Exam  Vitals and nursing note reviewed.   Constitutional:       Appearance: Normal appearance.   HENT:      Head: Normocephalic and atraumatic.      Mouth/Throat:      Mouth: Mucous membranes are dry.   Eyes:      General: No scleral icterus.  Cardiovascular:      Rate and Rhythm: Normal rate and regular rhythm.   Pulmonary:      Effort: Pulmonary effort is normal. No respiratory distress.      Breath sounds: Normal breath sounds.   Abdominal:      General: Bowel sounds are normal. There is no distension.      Palpations: Abdomen is soft.   Musculoskeletal:         General: No deformity.      Right lower leg: No edema.      Left lower leg: No edema.   Skin:     General: Skin is warm and dry.      Findings: No rash.   Neurological:      General: No focal deficit present.      Mental Status: She is alert and oriented to person, place, and time.   Psychiatric:         Speech: Speech is delayed.         Behavior: Behavior is cooperative.         Fluids:  In: 2404.5 [P.O.:125; I.V.:2279.5]  Out: " 2300     LABS:  Recent Labs     06/28/23  0317 06/29/23  0352 06/30/23  0134   SODIUM 144 141 140   POTASSIUM 4.5 4.1 3.8   CHLORIDE 113* 112 111   CO2 18* 20 18*   GLUCOSE 172* 143* 160*   BUN 42* 41* 41*   CREATININE 2.74* 2.79* 2.40*   CALCIUM 11.3* 10.3* 9.5         IMAGING:   All imaging reviewed from admission to present day    IMPRESSION:  # ISAMAR, nonoliguric  In the setting of hypercalcemia, recent contrast exposure   # CKD:   Frequent fluctuations in creat  Baseline ~1.5  # Severe hypercalcemia:   Etiology  likely sec to hypercalcemia of malignancy in the setting of bone mets per bone scan 6/21 with Hx of lung cancer, also hypercalcemia of  immobility and vitamin. One would expect  to see high P and low Ca in sec hyperparathyroidism and less likely to have severe hypercalcemia .PTH scan on last admission was negative for adenoma iPTH 271 6/25/23  # HTN on Lasix ? 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   Given pamidronate 6/25             Hold vitamin D2              No urgent indication for RRT              No dietary protein restrictions             Strict I/O              Trend Ca levels              Follow labs, further recommendations to follow

## 2023-06-30 NOTE — PROGRESS NOTES
Telemetry Shift Summary     Rhythm: SR with 1st degree  HR: 62-88  Ectopy: rPAC  Measurements: 22/08/36    Normal Values  Rhythm: SR  HR:   Measurements: 0.12-0.20 / 0.04-0.10 / 0.30-0.52    Strip reviewed and placed in chart  `

## 2023-07-01 NOTE — PROGRESS NOTES
Pt repositioned on her left side with x2 assist using TAPS. SPO2 at 94% on RA, HR of 74. Hydration offered, all needs met at this time.

## 2023-07-01 NOTE — CARE PLAN
The patient is Stable - Low risk of patient condition declining or worsening    Shift Goals  Clinical Goals: Pt pain will decrease to 3/10, monitor for lab works  Patient Goals: pain control, rest comfortably  Family Goals: SUMAN    Progress made toward(s) clinical / shift goals:  Pain has been controlled with PRN pain medication. Repositioning every 2 hours with TAPS system. Fall precaution in place.     Patient is not progressing towards the following goals:      Problem: Skin Integrity  Goal: Skin integrity is maintained or improved  Outcome: Progressing     Problem: Fall Risk  Goal: Patient will remain free from falls  Outcome: Progressing     Problem: Fluid Volume  Goal: Fluid volume balance will be maintained  Outcome: Progressing     Problem: Pain - Standard  Goal: Alleviation of pain or a reduction in pain to the patient’s comfort goal  Outcome: Progressing

## 2023-07-01 NOTE — PROGRESS NOTES
Hospital Medicine Daily Progress Note    Date of Service  7/1/2023    Chief Complaint  Latonia Clinton is a 72 y.o. female admitted 6/25/2023 with tremors and weakness    Hospital Course  Patient is a 72-year-old female with history of non-small cell lung cancer status post resection and radiation in 2017 and repeat radiation in 2022.  She started having tremors while residing in her group home with poor p.o. intake for multiple days.  In the ER she was found a potassium of 6 creatinine of 3.46 and a calcium of 14.8.  Patient with a lung biopsy in November which showed adenocarcinoma and given the multiple mets seen on her bone scan this is likely the etiology of her hypercalcemia.  Patient with nephrology consulting to help manage the hypercalcemia as well as hyperkalemia.  Overall her numbers are improving with creatinine decreasing calcium decreasing and potassium decreasing unfortunately her prognosis is poor as the previous hospitalist reached out to her oncologist and his recommendation was hospice given the recurrence of her cancer as well as the extensive nature with bony involvement.    Interval Problem Update  6/27 patient states overall she is feeling improvement.  She is sad to hear of her recurrence of cancer and bony involvement as it was introduced to her yesterday.  Palliative care consultation is currently pending.  6/28 -patient sleeping soundly and did not wake to examination at time of eval.  She lost IV access overnight and midline will be placed so she can continue on her IV fluids.  Creatinine still trending down is down to 2.74 today.  Calcium remains stable 11.3.  6/29 -patient wide-awake at time of examination today.  She is hoping to go home in the next few days.  I explained to her that we are still giving her IV fluids and still watching her calcium trend down.  Renal function is approximately the same as it was yesterday.  Ionized calcium continues to drop and is down to 1.38  today.  Continue with IV fluids, midline placed yesterday for IV access, will continue working on her calcium through the weekend.  She understands that she carries a very poor prognosis with the recurrent metastatic cancer causing hypercalcemia but states that she does not want to go on hospice until she sees Dr. Rizvi in the outpatient setting.  6/30 patient without complaints today she is happy about the improvements in her kidneys and her calcium.  Calcium has also returned to normal.  Patient understands that she has significant recurrence of her cancer but wants to speak with her oncologist before agreeing to any type of hospice.  I anticipate that she will need IV fluids for an additional 24 hours then should be able to discontinue these will watch through the weekend and anticipate discharge home on Monday.  7/1 patient continues to show improvement with her renal function as well as her calcium.  Calcium is down to 9 and creatinine is down to 1.89.  IV fluids to be discontinued per nephrology's recommendations.  Patient's main complaint is abdominal distention.  She states she is having good bowel movements and she is likely having complications of her metastatic cancer.  Only way to confirm this would be to do a CT abdomen pelvis with contrast however her kidney function is just starting to improve and is still not good enough to receive contrast at this time.  Patient likely to discharge on Monday back to her living facility and will follow-up with Dr. Bains for further recommendations regarding her metastatic recurrent lung cancer.    I have discussed this patient's plan of care and discharge plan at IDT rounds today with Case Management, Nursing, Nursing leadership, and other members of the IDT team.    Consultants/Specialty  nephrology and palliative care    Code Status  DNAR/DNI    Disposition  The patient is not medically cleared for discharge to home or a post-acute facility.  Anticipate  discharge to: home with organized home healthcare and close outpatient follow-up    I have placed the appropriate orders for post-discharge needs.    Review of Systems  Review of Systems   Constitutional:  Negative for chills and fever.   HENT:  Negative for congestion.    Eyes:  Negative for blurred vision and photophobia.   Respiratory:  Negative for cough and shortness of breath.    Cardiovascular:  Negative for chest pain, claudication and leg swelling.   Gastrointestinal:  Negative for abdominal pain, constipation, diarrhea, heartburn and vomiting.   Genitourinary:  Negative for dysuria and hematuria.   Musculoskeletal:  Negative for joint pain and myalgias.   Skin:  Negative for itching and rash.   Neurological:  Negative for dizziness, sensory change, speech change, weakness and headaches.   Psychiatric/Behavioral:  Negative for depression. The patient is not nervous/anxious and does not have insomnia.         Physical Exam  Temp:  [36.2 °C (97.2 °F)-36.7 °C (98.1 °F)] 36.7 °C (98.1 °F)  Pulse:  [60-85] 78  Resp:  [16-20] 18  BP: (101-145)/(58-82) 135/72  SpO2:  [93 %-97 %] 95 %    Physical Exam  Vitals and nursing note reviewed.   Constitutional:       General: She is not in acute distress.     Appearance: Normal appearance. She is not ill-appearing.   HENT:      Head: Normocephalic and atraumatic.      Nose: Nose normal.   Cardiovascular:      Rate and Rhythm: Normal rate and regular rhythm.      Heart sounds: Normal heart sounds. No murmur heard.  Pulmonary:      Effort: Pulmonary effort is normal.      Breath sounds: Normal breath sounds.   Abdominal:      General: Bowel sounds are normal. There is no distension.      Palpations: Abdomen is soft.   Musculoskeletal:         General: No swelling or tenderness.      Cervical back: Neck supple.   Skin:     General: Skin is warm and dry.   Neurological:      General: No focal deficit present.      Mental Status: She is alert and oriented to person, place, and  time.   Psychiatric:         Mood and Affect: Mood normal.         Fluids    Intake/Output Summary (Last 24 hours) at 7/1/2023 1259  Last data filed at 7/1/2023 1122  Gross per 24 hour   Intake 3091.56 ml   Output 2300 ml   Net 791.56 ml         Laboratory  Recent Labs     06/29/23  0352 06/30/23  0134 07/01/23  0908   WBC 6.2 5.4 6.1   RBC 2.51* 2.43* 2.72*   HEMOGLOBIN 7.7* 7.4* 8.3*   HEMATOCRIT 25.2* 24.2* 27.5*   .4* 99.6* 101.1*   MCH 30.7 30.5 30.5   MCHC 30.6* 30.6* 30.2*   RDW 53.1* 54.1* 54.5*   PLATELETCT 254 262 179   MPV 11.6 11.6 12.3       Recent Labs     06/29/23  0352 06/30/23  0134 07/01/23  0908   SODIUM 141 140 139   POTASSIUM 4.1 3.8 3.5*   CHLORIDE 112 111 110   CO2 20 18* 17*   GLUCOSE 143* 160* 120*   BUN 41* 41* 37*   CREATININE 2.79* 2.40* 1.89*   CALCIUM 10.3* 9.5 9.0                       Imaging  IR-MIDLINE CATHETER INSERTION WO GUIDANCE > AGE 3   Final Result                  Ultrasound-guided midline placement performed by qualified nursing staff    as above.          US-RENAL   Final Result         1.  Bilateral nephrolithiasis without visualized obstructive changes.   2.  Echogenic kidneys with thinned cortex, appearance favoring medical renal disease.   3.  Large anechoic cystic structure adjacent to the right kidney, of uncertain origin and visible on prior CT April 8, 2023.   4.  Bilateral renal cysts without visualized complex features.   5.  Left nephrolithiasis without visualized obstructive changes.      CT-HEAD W/O   Final Result      1.  Cerebral atrophy.   2.  No acute intracranial abnormality.         [id number] VIEW   Final Result      Probably moderate colonic stool. Nonobstructive bowel gas pattern.             Assessment/Plan  * Hypercalcemia- (present on admission)  Assessment & Plan  - Concerning for malignancy as primary  of such a rise. Per nephrology, hyperparathyroidism shouldn't raise levels this high. PTH last was in 170s, in the past up to 350s.  She did undergo nuclear parathyroid scan without any suspicious nodules.  Calcium down to 9  Okay to discontinue IV fluids        Hypothyroidism  Assessment & Plan  - TSH normal on home Methimazole     Constipation  Assessment & Plan  Due to hypercalcemia  Aggressive bowel regimen and hydration    Vitamin D deficiency  Assessment & Plan  Takes 50k vitamin D per week. Hold for now, check vit D level in setting of hypercalcemia    Hyperkalemia  Assessment & Plan  This is due to kidney failure and also taking potassium supplements. On Veltassa now.  Hold home lasix/potassium    Hyperparathyroidism (HCC)- (present on admission)  Assessment & Plan  - PTH previously elevated up to 340s. Nuclear parathyroid scan without any suspicion for parathyroid adenoma. Patient was referred to endocrinology whom she has not seen yet.  - severely elevated Ca likely driven from a different process such as malignancy  - treat hypercalcemia as above    Depression, major, recurrent, moderate (HCC)- (present on admission)  Assessment & Plan  Stable.    Macrocytic anemia- (present on admission)  Assessment & Plan  Noted, chronic. Previously b12 high, tsh nl (6/14)  B12 normal    Acute renal failure superimposed on stage 3 chronic kidney disease (HCC)  Assessment & Plan  Cr baseline around 1.7, most recently had improved further down to 1.2. Followed by [facility] but after her March hospitalization patient has not had further visits with Dr. Nunez. Etiology of current renal failure likely multifactorial from hypovolemia and hypercalcemia.  - [facility] consulted  Renal function continues to improve, creatinine 1.89  IV fluids discontinued per recommendations from nephrology  Recheck renal function again tomorrow      Lung cancer (HCC)- (present on admission)  Assessment & Plan  - Pt with lung cancer s/p excision and radiation in 2017, then with focal recurrence in November of 2022, with radiation therapy done  with Dr Ange Banks  - Following with Pulmonary and Dr Rizvi  -Patient states she is not ready for hospice and wants to wait until she sees Dr. Rizvi on July 8  - Cannot do CT c/a/p or MRI brain w/wo given her creatinine to further assess mets   - Palliative consulted -Decadron initiated    Deep vein thrombosis (DVT) ([facility])- (present on admission)  Assessment & Plan  Noted, has had multiple DVTs in the past  Due to kidney impairment, hold Xarelto, will given prophylactic dosing heparin until renal function returns to normal    Chronic obstructive pulmonary disease (HCC)- (present on admission)  Assessment & Plan  Noted, patient wears 2LNC at nighttime.   Currently stable without exacerbation  Continue home meds    Chronic hypoxemic respiratory failure (HCC)  Assessment & Plan  Noted, wears 2LNC at nighttime due to copd    Hyperlipidemia- (present on admission)  Assessment & Plan  Noted, on lipitor         VTE prophylaxis: heparin ppx    I have performed a physical exam and reviewed and updated ROS and Plan today (7/1/2023). In review of yesterday's note (6/30/2023), there are no changes except as documented above.

## 2023-07-01 NOTE — PROGRESS NOTES
Received report from ALLAN [place]on. Pt sleeping, on 2L NC, non labored breathing observed. Gonzalez catheter in place, IVF running NS at 75 ml/hr. Call light within reach, safety precautions in place, hourly rounding in progress.

## 2023-07-01 NOTE — PROGRESS NOTES
Received bedside report from night shift RN.  Assumed pt care.   Assessment and chart check complete.  Pt is AA0X4, no signs of distress, denies nausea/vomiting.  Updated for the  POC of the day.  Pt pain currently 7/10, medicated per MAR.   IVF infusing.  Gonzalez catheter in place, draining well.  Midline  KENYETTA in place  Fall precautions in place, treaded socks on pt, bed in low position.   Call light within reach.   Educated pt to call if needing anything.   Hourly rounding in place.

## 2023-07-01 NOTE — PROGRESS NOTES
Telemetry Shift Summary     Rhythm SB-SR with BBB  HR Range 57-75  Ectopy rPAC  Measurements 0.22/0.08/0.38           Normal Values  Rhythm SR  HR Range    Measurements 0.12-0.20 / 0.06-0.10  / 0.30-0.52

## 2023-07-01 NOTE — PROGRESS NOTES
[facility] -  PROGRESS NOTE               Author: Marilee Stinson D.O. Date & Time: 7/1/2023  2:06 PM     HPI:  72 y.o. female with Hx of CKD, HTN, lung cancer  s/p sx and XRT presenting with constipation and tremors admitted for ISAMAR and hypercalcemia.   Hospitalized in 3/23 with for  he same, hypercalcemia was felt to be sec to sec hyperparathyroidism given Hx of CKD.   Her Serum Ca has improved to normal on discharge. Work up for hypercalcemia during previous hospitalizations revealed normal PTHrP and vitamin 1,25 H D borderline elevated vitamin D level at 96, most recent  in 3/23.   Recently she has had a CT chest with contrast as OP 6/21 for lung cancer surveillance showed mildly enlarged right paratracheal lymph node measuring 11 mm  indeterminate but low suspicion finding, stable L adrenal mass, likely benign per radiology report.  Bone scan 6/21 with  findings consistent with focal osseous metastatic deposits in the left anterior fourth rib with corresponding sclerosis on CT, left ninth posterior rib with corresponding sclerosis on CT, and probably the right posterior acetabulum with a   corresponding focal sclerotic lesion seen on CT from 4/8/2023.  Focal uptake in the left femoral shaft most consistent with osseous metastatic deposit.Nonspecific uptake at the right posterior-medial 10th rib. No corresponding sclerotic lesion seen on CT.   She has not followed up up with nephrology since she was last hospitalized.   Initial ER labs showed Cr of 3.46  and Ca 14.8, K 5.6. EKG with out  any changes, normal QTc interval. On arrival   She has received IV fluid bolus and calcitonin.  Pt repoerts she has been having tremors for for the past 2-3 weeks,   She reports poor PO intake, She denies any change in her urine out put. She reports she is bed bound and does not ambulate.   No F/C/N/V/CP/SOB.  No melena, hematochezia, hematemesis.  No HA, visual changes, or abdominal  "pain.    DAILY NEPHROLOGY SUMMARY:  6/26 - No events, corrected Ca 13.6, Cr 3.25, BP stable, on 2L NC O2, fair UOP overnight, denies any CP/SOB/Abd pain  6/27 - Labs are currently pending at the moment. Patient is in deep slumber.   6/28 - Scr Ca++ continues to slowly improve. 11.3 today. Scr is also improving. K+ is normal.  6/29 - No new overnight renal events. Sca++ is 11.3 (corrected)  6/30 - Ca++ down to 10.6 and Scr improved to 2.4. Feels ok. Patient does not want to go on hospice until she sees Dr. Rizvi in the outpatient setting.   07/01 - SCr trended down, good UOP, SBP 120s-130s, CO2 and K trending down, c/o edema, otherwise no new c/o    REVIEW OF SYSTEMS:    No cp/sob, no n/v, no abd pain, +weakness    PMH/PSH/SH/FH:   Reviewed and unchanged since admission note    CURRENT MEDICATIONS:   Reviewed from admission to present day    VS:  /72   Pulse 67   Temp 36.7 °C (98.1 °F) (Oral)   Resp 20   Ht 1.626 m (5' 4\")   Wt 99.2 kg (218 lb 11.1 oz)   LMP  (LMP Unknown)   SpO2 97%   BMI 37.54 kg/m²     Physical Exam  Vitals and nursing note reviewed.   Constitutional:       Appearance: She is ill-appearing.   HENT:      Head: Normocephalic and atraumatic.      Right Ear: External ear normal.      Left Ear: External ear normal.      Mouth/Throat:      Pharynx: Oropharynx is clear.   Eyes:      General: No scleral icterus.  Cardiovascular:      Rate and Rhythm: Normal rate and regular rhythm.   Pulmonary:      Effort: Pulmonary effort is normal. No respiratory distress.      Breath sounds: Normal breath sounds.   Abdominal:      General: Bowel sounds are normal. There is no distension.      Palpations: Abdomen is soft.   Musculoskeletal:      Right lower leg: Edema present.      Left lower leg: Edema present.   Skin:     General: Skin is warm and dry.      Findings: No rash.   Neurological:      General: No focal deficit present.      Mental Status: She is alert and oriented to person, place, and " time.      Cranial Nerves: No cranial nerve deficit.   Psychiatric:         Mood and Affect: Mood normal.         Speech: Speech is delayed.         Behavior: Behavior normal. Behavior is cooperative.         Fluids:  In: 1865 [P.O.:100; I.V.:1765]  Out: 2300     LABS:  Recent Labs     06/29/23  0352 06/30/23  0134 07/01/23  0908   SODIUM 141 140 139   POTASSIUM 4.1 3.8 3.5*   CHLORIDE 112 111 110   CO2 20 18* 17*   GLUCOSE 143* 160* 120*   BUN 41* 41* 37*   CREATININE 2.79* 2.40* 1.89*   CALCIUM 10.3* 9.5 9.0         IMAGING:   All imaging reviewed from admission to present day    IMPRESSION:  # ISAMAR, nonoliguric  In the setting of hypercalcemia, recent contrast exposure   # CKD:   Frequent fluctuations in creat  Baseline ~1.5  # Severe hypercalcemia:   Etiology  likely sec to hypercalcemia of malignancy in the setting of bone mets per bone scan 6/21 with Hx of lung cancer, also hypercalcemia of  immobility and vitamin. One would expect  to see high P and low Ca in sec hyperparathyroidism and less likely to have severe hypercalcemia .PTH scan on last admission was negative for adenoma iPTH 271 6/25/23  # HTN on Lasix ? 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

## 2023-07-02 NOTE — CARE PLAN
The patient is Watcher - Medium risk of patient condition declining or worsening    Shift Goals  Clinical Goals: [id number], pain management, monitor labs  Patient Goals: pain control, sleep well  Family Goals: SUMAN    Progress made toward(s) clinical / shift goals:    Problem: Knowledge Deficit - Standard  Goal: Patient and family/care givers will demonstrate understanding of plan of care, disease process/condition, diagnostic tests and medications  Outcome: Progressing     Problem: Skin Integrity  Goal: Skin integrity is maintained or improved  Outcome: Progressing     Problem: Fall Risk  Goal: Patient will remain free from falls  Outcome: Progressing     Problem: Psychosocial  Goal: Patient's level of anxiety will decrease  Outcome: Progressing       Patient is not progressing towards the following goals:

## 2023-07-02 NOTE — PROGRESS NOTES
12-hour chart check complete.    Monitor Summary  Rhythm: SR  Rate: 61-82  Ectopy: Rpvc  Measurements: 0.20/0.08/0.46

## 2023-07-02 NOTE — PROGRESS NOTES
Discharge instructions reviewed with patient, report called to [facility]. Telemetry/IV site removed. Off the floor via GMT on [place], family present and aware of transfer.

## 2023-07-02 NOTE — DISCHARGE PLANNING
note:  Accepted by [facility].     Talked to Galen FELDER caregiver.   Address confirmed with him: [address] [address]. Confirmed information with Akbar at T .     GMT able to  at 3-3:30pm  Cost $272.18  Ref# [id number]    MD aware.  Galen aware.  Nursing aware.  Pt aware. Pt signed IMM.

## 2023-07-02 NOTE — DISCHARGE SUMMARY
"Discharge Summary    CHIEF COMPLAINT ON ADMISSION  Chief Complaint   Patient presents with    Constipation     X 5 days    Tremors     Bilat arms \"for weeks,\" states is having increasing difficulty holding objects / grasp objects r/t tremors       Reason for Admission  EMS     Admission Date  6/25/2023    CODE STATUS  DNAR/DNI    HPI & HOSPITAL COURSE  Patient is a 72-year-old female with history of non-small cell lung cancer status post resection and radiation in 2017 and repeat radiation in 2022.  She started having tremors while residing in her group home with poor p.o. intake for multiple days.  In the ER she was found a potassium of 6 creatinine of 3.46 and a calcium of 14.8.  Patient with a lung biopsy in November which showed adenocarcinoma and given the multiple mets seen on her bone scan this is likely the etiology of her hypercalcemia.  Patient with nephrology consulting to help manage the hypercalcemia as well as hyperkalemia.  Overall her numbers are improving with creatinine decreasing calcium decreasing and potassium decreasing unfortunately her prognosis is poor as the previous hospitalist reached out to her oncologist and his recommendation was hospice given the recurrence of her cancer as well as the extensive nature with bony involvement.  Patient was not interested in hospice at this time until she meets with her oncologist Dr. Rizvi.  With the interventions she received regarding her potassium and calcium and acute renal failure all of her numbers have improved.  Calcium is down to 9.0.  Potassium has normalized and her creatinine on day of discharge is 1.82.  At this time she can resume her Xarelto for anticoagulation.  Unfortunately I do believe that the patient will have recurrence of her hypercalcemia as it is likely derived from the metastatic nature of the bony involvement from the recurrence of her cancer.  Her renal function did not improve to the point where she can have a CT chest " abdomen pelvis for staging however if Dr. Rizvi feels that she will need this and if she is not a hospice candidate then she will need to follow-up with nephrology however her renal function should continue to improve.  Patient okay to discharge back to the group home she has an appointment with her oncologist on the eighth and further recommendations forthcoming.  Patient is agreeable to return back to her group home.    Therefore, she is discharged in fair and stable condition to home with organized home healthcare and close outpatient follow-up.    The patient met 2-midnight criteria for an inpatient stay at the time of discharge.    Discharge Date  7/2/2023    FOLLOW UP ITEMS POST DISCHARGE  Oncology, nephrology, PCP    DISCHARGE DIAGNOSES  Principal Problem:    Hypercalcemia (POA: Yes)  Active Problems:    Hyperlipidemia (POA: Yes)      Overview: Established diagnosis. Currently taking Simvastatin 40 mg daily, reports       compliance and no side effects.       Lab Results       Component Value Date/Time        CHOLSTRLTOT 155 07/13/2021 03:37 PM        TRIGLYCERIDE 184 (H) 07/13/2021 03:37 PM        HDL 71 07/13/2021 03:37 PM        LDL 47 07/13/2021 03:37 PM                        Chronic hypoxemic respiratory failure (HCC) (POA: Unknown)    Chronic obstructive pulmonary disease (HCC) (Chronic) (POA: Yes)      Overview: This is a chronic condition, fairly well controlled, managed by       pulmonology Dr. Mami Allan.  Her regimen includes albuterol nebulizer       as needed, Breo Ellipta, Spiriva and daily Azithormycin, however patient       does not think that those medications are helping her.  She is       experiencing shortness of breath while walking.            She is undergoing extensive work-up for left pulmonary nodules, attempting       IR guided lung biopsy next week.            Pending PFTs.                Deep vein thrombosis (DVT) (HCC) (POA: Yes)      Overview: Per patient she was on  dialysis due to kidney failure back in 2004 and has       developed DVT in her upper extremity, she also had DVT in her right lower       extremity.  In 2017 she had left IV nose thrombosis.  Initially she was on       anticoagulation with warfarin, then was switched to Xarelto and currently       she is being followed at anticoagulation clinic.    Lung cancer (HCC) (POA: Yes)      Overview: S/p excision in 2017 per patient.  Patient follow-up closely every 4 to 5       months with pulmonology.  Next CT scan in January.  Next appointment with       Mami Allan on 11/12/2021.     Acute renal failure superimposed on stage 3 chronic kidney disease (HCC) (POA: Unknown)    Macrocytic anemia (POA: Yes)    Depression, major, recurrent, moderate (HCC) (POA: Yes)      Overview: PHQ-9 Screening 8/10/2022 11/24/2021       Little interest or pleasure in doing things 3 - nearly every day 0 - not       at all       Feeling down, depressed, or hopeless 2 - more than half the days 0 - not       at all       Trouble falling or staying asleep, or sleeping too much 3 - nearly every       day -       Feeling tired or having little energy 2 - more than half the days -       Poor appetite or overeating 2 - more than half the days -       Feeling bad about yourself - or that you are a failure or have let       yourself or your family down 2 - more than half the days -       Trouble concentrating on things, such as reading the newspaper or watching       television 0 - not at all -       Moving or speaking so slowly that other people could have noticed. Or the       opposite - being so fidgety or restless that you have been moving around a       lot more than usual 2 - more than half the days -       Thoughts that you would be better off dead, or of hurting yourself in some       way 0 - not at all -       PHQ-2 Total Score 5 0       PHQ-9 Total Score 16 -                   Interpretation of PHQ-9 Total Score       Score Severity        1-4 No Depression       5-9 Mild Depression       10-14 Moderate Depression       15-19 Moderately Severe Depression       20-27 Severe Depression            Patient has no suicidal thoughts or ideations, she is not interested in       taking medication for her symptoms at this time.  She will let me know if       she decides to proceed with pharmacotherapy or psychotherapy.                Hyperparathyroidism (HCC) (Chronic) (POA: Yes)      Overview: This condition is managed by endocrinology, not on any medications as of       now.            Component          Latest Ref Rng 12/6/2022       Calcium          8.5 - 10.5 mg/dL 9.6        Pth, Intact          14.0 - 72.0 pg/mL 346.0 (H)               (H) High          Hyperkalemia (POA: Unknown)    Vitamin D deficiency (POA: Unknown)    Constipation (POA: Unknown)    Hypothyroidism (POA: Unknown)  Resolved Problems:    Hypertension (POA: Yes)      Overview: This is a chronic condition, reasonably controlled losartan 50 mg tablet       daily.    Hypomagnesemia (POA: Unknown)    Hypotension (POA: Unknown)    Abnormal urinalysis (POA: Unknown)      FOLLOW UP  No future appointments.  [facility]  [address]  [place] [address]  [phone]        Quincy Rizvi III, M.D.  [address]  [facility] [address]  [phone]    Follow up      Liat Emerson M.D.  [address]  [address]  [facility] [id number] [phone]    Schedule an appointment as soon as possible for a visit in 2 week(s)        MEDICATIONS ON DISCHARGE     Medication List        CHANGE how you take these medications        Instructions   ergocalciferol 53853 UNIT capsule  What changed: additional instructions  Commonly known as: Drisdol   Take 1 Capsule by mouth every 7 days.  Dose: 50,000 Units     rivaroxaban 20 MG Tabs tablet  What changed:   how much to take  how to take this  when to take this  Commonly known as: Kayla   Doctor's comments: This Rx  has been ordered by a pharmacist working under a collaborative practice agreement.  TAKE ONE TABLET BY MOUTH DAILY WITH DINNER            CONTINUE taking these medications        Instructions   acetaminophen 325 MG Tabs  Commonly known as: Tylenol   Take 650 mg by mouth every 6 hours as needed for Mild Pain.  Dose: 650 mg     albuterol 2.5mg/3ml Nebu solution for nebulization  Commonly known as: Proventil   USE THREE MILLILITERS (1 VIAL) VIA NEBULIZATION BY MOUTH EVERY 4 HOURS AS NEEDED FOR SHORTNESS OF BREATH     allopurinol 100 MG Tabs  Commonly known as: Zyloprim   Take 100 mg by mouth every morning.  Dose: 100 mg     Aspercreme Lidocaine 4 % Crea  Generic drug: Lidocaine HCl   Apply 1 Application topically 2 times a day. Per MAR reports apply to affected area BID, per MAR pt started on 6/19/2023  Dose: 1 Application     atorvastatin 20 MG Tabs  Commonly known as: Lipitor   Take 20 mg by mouth every evening.  Dose: 20 mg     benzonatate 100 MG Caps  Commonly known as: Tessalon   Take 100 mg by mouth 3 times a day as needed for Cough.  Dose: 100 mg     brimonidine 0.2 % Soln  Commonly known as: Alphagan   Administer 1 Drop into both eyes 2 times a day.  Dose: 1 Drop     docusate sodium 100 MG Caps  Commonly known as: Colace   Take 100 mg by mouth every day.  Dose: 100 mg     furosemide 40 MG Tabs  Commonly known as: Lasix   Take 40 mg by mouth every day.  Dose: 40 mg     gabapentin 300 MG Caps  Commonly known as: Neurontin   Take 300 mg by mouth 2 times a day.  Dose: 300 mg     guaiFENesin 100 MG/5ML liquid  Commonly known as: Robitussin   Take 5 mL by mouth every four hours as needed for Cough.  Dose: 5 mL     HYDROcodone-acetaminophen 5-325 MG Tabs per tablet  Commonly known as: Norco   Take 1 Tablet by mouth every 6 hours as needed. Indications: Pain  Dose: 1 Tablet     Incruse Ellipta 62.5 MCG/ACT Aepb  Generic drug: Umeclidinium Bromide   Inhale 1 Puff every day.  Dose: 1 Puff     Klor-Con M20 20 MEQ  Tbcr  Generic drug: potassium chloride SA   Take 20 mEq by mouth every day.  Dose: 20 mEq     magnesium oxide 400 MG Tabs tablet  Commonly known as: Mag-Ox   Take 400 mg by mouth every evening.  Dose: 400 mg     METAMUCIL PO   Take 3.4 g by mouth every day.  Dose: 3.4 g     methimazole 5 MG Tabs  Commonly known as: Tapazole   TAKE ONE TABLET BY MOUTH DAILY     midodrine 5 MG Tabs  Commonly known as: Proamatine   Take 5 mg by mouth 2 times a day.  Dose: 5 mg     timolol 0.5 % Soln  Commonly known as: Timoptic   Administer 1 Drop into both eyes 2 times a day.  Dose: 1 Drop     Wixela Inhub 500-50 MCG/ACT Aepb  Generic drug: fluticasone-salmeterol   Inhale 1 Puff every 12 hours.  Dose: 1 Puff     zolpidem 5 MG Tabs  Commonly known as: Ambien   Take 5 mg by mouth at bedtime as needed for Sleep.  Dose: 5 mg              Allergies  No Known Allergies    DIET  Orders Placed This Encounter   Procedures    Diet Order Diet: Regular     Standing Status:   Standing     Number of Occurrences:   1     Order Specific Question:   Diet:     Answer:   Regular [1]       ACTIVITY  As tolerated.  Weight bearing as tolerated    CONSULTATIONS  Nephrology - [facility]  Palliative care - [facility]    PROCEDURES  none    LABORATORY  Lab Results   Component Value Date    SODIUM 141 07/02/2023    POTASSIUM 4.1 07/02/2023    CHLORIDE 111 07/02/2023    CO2 19 (L) 07/02/2023    GLUCOSE 129 (H) 07/02/2023    BUN 36 (H) 07/02/2023    CREATININE 1.82 (H) 07/02/2023    CREATININE 1.7 (H) 09/19/2008        Lab Results   Component Value Date    WBC 6.3 07/02/2023    HEMOGLOBIN 8.3 (L) 07/02/2023    HEMATOCRIT 26.7 (L) 07/02/2023    PLATELETCT 247 07/02/2023        Total time of the discharge process exceeds 38 minutes.

## 2023-07-03 NOTE — PROGRESS NOTES
Patient outreach for TCM.  NO VM available. 7/3/23  Patient outreach for TCM.  No VM available.  7/5/23

## 2023-07-27 NOTE — ASSESSMENT & PLAN NOTE
Patient was supposed to have a follow-up appoint with oncology today  Patient's PET scan results have been reviewed  Discussed with oncology  Their recommendation is hospice.  They have no further treatment that is available for her condition.

## 2023-07-27 NOTE — H&P
Hospital Medicine History & Physical Note    Date of Service  7/27/2023    Primary Care Physician  ZAFAR Mitchell.    Consultants  oncology    Specialist Names: Dr Rizvi    Code Status  Full Code    Chief Complaint  Chief Complaint   Patient presents with    Abnormal Labs     Pt sent to ER from assisted living home for High Calcium level - had labs drawn yesterday and MD called to have her brought to ER for eval        History of Presenting Illness  Latonia Clinton is a 72 y.o. female who presented 7/27/2023 with abnormal labs.  Patient was supposed to have a follow-up with oncology today however she had labs done yesterday and this showed calcium of 12.6 corrected 13.1.  To the patient was advised to come to the emergency room for evaluation.  We will give her for pamidronate and monitor calcium levels and give her fluid resuscitation.  In reviewing the PET scan results and discussing it with the oncologist the patient is only a hospice candidate and they have no further additional options for her.  We will try to get her pain under control and we will need to discuss with her long-term management and discharge options.    I discussed the plan of care with patient, bedside RN, and emergency room physician Dr Madrid .    Review of Systems  Review of Systems   Constitutional: Negative.  Negative for chills, diaphoresis and fever.   HENT: Negative.     Eyes: Negative.  Negative for double vision.   Respiratory: Negative.  Negative for cough, hemoptysis and wheezing.    Cardiovascular: Negative.  Negative for chest pain, palpitations and leg swelling.   Gastrointestinal: Negative.  Negative for abdominal pain, blood in stool, constipation, diarrhea, heartburn, nausea and vomiting.   Genitourinary: Negative.  Negative for frequency, hematuria and urgency.   Musculoskeletal:  Positive for back pain. Negative for joint pain.        Tailbone pain and hip pain   Skin: Negative.  Negative for itching and  rash.   Neurological:  Positive for dizziness. Negative for focal weakness, seizures, loss of consciousness and headaches.   Endo/Heme/Allergies: Negative.  Does not bruise/bleed easily.   Psychiatric/Behavioral: Negative.  Negative for suicidal ideas. The patient is not nervous/anxious.    All other systems reviewed and are negative.      Past Medical History   has a past medical history of Anesthesia, Asthma, Backpain (07/2017), Blood clot in vein (07/06/2018), Bowel habit changes (07/06/2018), Breath shortness, Bronchitis, CAD (coronary artery disease), Cancer ([facility]) (2016), Chickenpox, COPD (chronic obstructive pulmonary disease) (HCC), Depression (02/26/2019), Dialysis (2005), Emphysema of lung (HCC), Glaucoma, Hemorrhagic disorder (HCC), Hyperlipidemia, Hypertension, Hyperthyroidism, Kidney stone, Lung cancer ([facility]) (12/12/2016), Multiple thyroid nodules (07/09/2015), Nasal drainage, Obstruction of left ureteropelvic junction (UPJ) due to stone (06/17/2018), Osteoporosis, Pain (07/06/2018), Personal history of venous thrombosis and embolism (2004, 2012), Pneumonia (07/2016), Renal disorder, Renal stones (2013), Rheumatoid arthritis (HCC), Rheumatoid arthritis (HCC), S/P appendectomy (1998), S/P cholecystectomy (1998), S/P kyphoplasty (2006, 2007, 2013), Sepsis, unspecified (2005), Shortness of breath (07/06/2018), and Thyroid nodule, hot (2017).    Surgical History   has a past surgical history that includes other; other abdominal surgery; pr breast augmentation with implant; pr breast reduction; appendectomy; cholecystectomy; laminotomy; lung biopsy open; thoracoscopy (Left, 12/12/2016); other orthopedic surgery (2013); cystoscopy stent placement (Left, 6/18/2018); lithotripsy (Left, 6/18/2018); lasertripsy (Left, 6/18/2018); ureteroscopy (Left, 6/18/2018); cystoscopy stent placement (7/9/2018); ureteroscopy (Left, 7/9/2018); lasertripsy (Left, 7/9/2018); and bronchoscopy, robot-assisted (10/11/2022).      Family History  family history includes Cancer in her maternal uncle, mother, paternal aunt, and paternal uncle; Heart Disease in her brother; Heart Failure in her father.   Family history reviewed with patient. There is no family history that is pertinent to the chief complaint.     Social History   reports that she quit smoking about 23 years ago. Her smoking use included cigarettes. She has a 30.00 pack-year smoking history. She has never used smokeless tobacco. She reports that she does not currently use alcohol. She reports that she does not use drugs.    Allergies  No Known Allergies    Medications  Prior to Admission Medications   Prescriptions Last Dose Informant Patient Reported? Taking?   Brimonidine Tartrate-Timolol (COMBIGAN) 0.2-0.5 % Solution 7/27/2023 at 0800 MAR from Other Facility Yes Yes   Sig: Administer 1 Drop into both eyes 2 times a day.   KLOR-CON M20 20 MEQ Tab CR 7/10/2023 at DC'D MAR from Other Facility Yes No   Sig: Take 20 mEq by mouth every day.   Lidocaine HCl (ASPERCREME LIDOCAINE) 4 % Cream 7/27/2023 at 0800 MAR from Other Facility Yes Yes   Sig: Apply 1 Application topically 2 times a day.   acetaminophen (TYLENOL) 325 MG Tab 7/27/2023 at PRN MAR from Other Facility Yes No   Sig: Take 650 mg by mouth every 6 hours as needed for Mild Pain.   albuterol 108 (90 Base) MCG/ACT Aero Soln inhalation aerosol PRN at PRN MAR from Other Facility Yes No   Sig: Inhale 2 Puffs every 6 hours as needed for Shortness of Breath.   allopurinol (ZYLOPRIM) 100 MG Tab 7/27/2023 at 0800 MAR from Other Facility Yes No   Sig: Take 100 mg by mouth every day.   atorvastatin (LIPITOR) 20 MG Tab 7/26/2023 at PM MAR from Other Facility Yes No   Sig: Take 20 mg by mouth every evening.   brimonidine (ALPHAGAN) 0.2 % Solution 7/17/2023 at 0800 MAR from Other Facility Yes No   Sig: Administer 1 Drop into both eyes 2 times a day.   ergocalciferol (DRISDOL) 83686 UNIT capsule 7/23/2023 at 0800 MAR from Other  Facility No No   Sig: Take 1 Capsule by mouth every 7 days.   fluticasone-salmeterol (WIXELA INHUB) 500-50 MCG/ACT AEROSOL POWDER, BREATH ACTIVATED 7/27/2023 at 0800 MAR from Other Facility Yes No   Sig: Inhale 1 Puff 2 times a day.   furosemide (LASIX) 40 MG Tab 7/10/2023 at DC'D MAR from Other Facility Yes No   Sig: Take 40 mg by mouth every day.   gabapentin (NEURONTIN) 300 MG Cap 7/27/2023 at 0800 MAR from Other Facility Yes No   Sig: Take 300 mg by mouth 2 times a day.   guaifenesin dextromethorphan sugar free (TUSSIN DM)  MG/5ML Liquid soln 7/27/2023 at PRN MAR from Other Facility Yes Yes   Sig: Take 10 mL by mouth every four hours as needed for Cough.   magnesium oxide (MAG-OX) 400 MG Tab tablet 7/26/2023 at PM MAR from Other Facility Yes No   Sig: Take 400 mg by mouth every evening.   methimazole (TAPAZOLE) 5 MG Tab 7/27/2023 at 0800 MAR from Other Facility No No   Sig: TAKE ONE TABLET BY MOUTH DAILY   Patient taking differently: Take 5 mg by mouth every day.   midodrine (PROAMATINE) 5 MG Tab 7/27/2023 at 0800 MAR from Other Facility Yes No   Sig: Take 5 mg by mouth 2 times a day.   rivaroxaban (XARELTO) 20 MG Tab tablet 7/26/2023 at PM MAR from Other Facility No No   Sig: TAKE ONE TABLET BY MOUTH DAILY WITH DINNER   Patient taking differently: Take 20 mg by mouth at bedtime.   senna-docusate (PERICOLACE OR SENOKOT S) 8.6-50 MG Tab 7/27/2023 at 0800 MAR from Other Facility Yes Yes   Sig: Take 1 Tablet by mouth every day.   timolol (TIMOPTIC) 0.5 % Solution 7/17/2023 at 0800 MAR from Other Facility Yes No   Sig: Administer 1 Drop into both eyes 2 times a day.   tiotropium (SPIRIVA) 18 MCG Cap 7/27/2023 at 0800 MAR from Other Facility Yes Yes   Sig: Place 18 mcg into inhaler and inhale every day.   zolpidem (AMBIEN) 5 MG Tab 7/25/2023 at PM MAR from Other Facility Yes No   Sig: Take 5 mg by mouth at bedtime as needed for Sleep.      Facility-Administered Medications: None       Physical Exam  Pulse:   [60-68] 68  BP: (100)/(61) 100/61  SpO2:  [94 %-99 %] 95 %  Blood Pressure : 100/61       Pulse: 68       Pulse Oximetry: 95 %       Physical Exam  Vitals and nursing note reviewed. Exam conducted with a chaperone present.   Constitutional:       General: She is awake.      Appearance: Normal appearance. She is well-developed, well-groomed and normal weight.   HENT:      Head: Normocephalic and atraumatic.      Jaw: There is normal jaw occlusion. No trismus.      Salivary Glands: Right salivary gland is not tender. Left salivary gland is not tender.      Right Ear: External ear normal.      Left Ear: External ear normal.      Nose: Nose normal.      Mouth/Throat:      Mouth: Mucous membranes are moist.      Pharynx: Oropharynx is clear.   Eyes:      General: Lids are normal. Vision grossly intact.      Extraocular Movements: Extraocular movements intact.      Conjunctiva/sclera: Conjunctivae normal.      Right eye: Right conjunctiva is not injected. No exudate.     Left eye: Left conjunctiva is not injected. No exudate.     Pupils: Pupils are equal, round, and reactive to light.   Neck:      Thyroid: No thyroid mass.      Vascular: No hepatojugular reflux or JVD.      Trachea: No abnormal tracheal secretions or tracheal deviation.   Cardiovascular:      Rate and Rhythm: Normal rate and regular rhythm. Occasional Extrasystoles are present.     Pulses: Normal pulses.      Heart sounds: Normal heart sounds. No murmur heard.     No friction rub.   Pulmonary:      Effort: Pulmonary effort is normal.      Breath sounds: Examination of the right-lower field reveals decreased breath sounds. Examination of the left-lower field reveals decreased breath sounds. Decreased breath sounds present. No wheezing or rhonchi.   Abdominal:      General: Abdomen is flat.      Palpations: Abdomen is soft.      Tenderness: There is no abdominal tenderness. There is no right CVA tenderness or left CVA tenderness.      Hernia: No hernia is  present.   Musculoskeletal:      Cervical back: Full passive range of motion without pain, normal range of motion and neck supple. No rigidity. No muscular tenderness.      Right lower leg: No edema.      Left lower leg: No edema.   Lymphadenopathy:      Head:      Right side of head: No submental adenopathy.      Left side of head: No submental adenopathy.      Cervical:      Right cervical: No superficial cervical adenopathy.     Left cervical: No superficial cervical adenopathy.      Upper Body:      Right upper body: No supraclavicular adenopathy.      Left upper body: No supraclavicular adenopathy.   Skin:     General: Skin is warm and dry.      Capillary Refill: Capillary refill takes less than 2 seconds.      Coloration: Skin is not cyanotic or pale.      Findings: No abrasion or bruising.   Neurological:      General: No focal deficit present.      Mental Status: She is alert and oriented to person, place, and time. Mental status is at baseline.      GCS: GCS eye subscore is 4. GCS verbal subscore is 5. GCS motor subscore is 6.      Cranial Nerves: No cranial nerve deficit.      Sensory: No sensory deficit.      Motor: Motor function is intact.      Deep Tendon Reflexes:      Reflex Scores:       Tricep reflexes are 2+ on the right side and 2+ on the left side.       Bicep reflexes are 2+ on the right side and 2+ on the left side.       Brachioradialis reflexes are 2+ on the right side and 2+ on the left side.       Patellar reflexes are 2+ on the right side and 2+ on the left side.       Achilles reflexes are 2+ on the right side and 2+ on the left side.  Psychiatric:         Attention and Perception: Attention and perception normal.         Mood and Affect: Mood normal.         Speech: Speech normal.         Behavior: Behavior is cooperative.         Thought Content: Thought content normal.         Cognition and Memory: Cognition and memory normal.         Judgment: Judgment normal.          Laboratory:  Recent Labs     07/26/23  1030   WBC 5.2   RBC 5.80*   HEMOGLOBIN 17.2*   HEMATOCRIT 58.1*   .2*   MCH 29.7   MCHC 29.6*   RDW 59.6*   PLATELETCT 175   MPV 12.1     Recent Labs     07/26/23  1030 07/27/23  1305   SODIUM 141 139   POTASSIUM 5.0 5.0   CHLORIDE 102 102   CO2 27 26   GLUCOSE 99 106*   BUN 36* 33*   CREATININE 1.31 1.35   CALCIUM 12.5* 12.6*     Recent Labs     07/26/23  1030 07/27/23  1305   ALTSGPT 15 11   ASTSGOT 15 16   ALKPHOSPHAT 130* 149*   TBILIRUBIN <0.2 0.2   GLUCOSE 99 106*         No results for input(s): NTPROBNP in the last 72 hours.      No results for input(s): TROPONINT in the last 72 hours.    Imaging:  No orders to display       X-Ray:  I have personally reviewed the images and compared with prior images.  EKG:  I have personally reviewed the images and compared with prior images.  PET scan images were reviewed and discussed with patient    Assessment/Plan:  Justification for Admission Status  I anticipate this patient will require at least two midnights for appropriate medical management, necessitating inpatient admission because patient has hypercalcemia that at this point is not responding to treatment and will require at least 48 hours of inpatient management.    Patient will need a Med/Surg bed on MEDICAL service .  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    * Hypercalcemia- (present on admission)  Assessment & Plan  Hypercalcemia is related to ongoing small cell lung cancer that is now metastatic to the L4 vertebrae, right acetabulum, and sacrum.  Patient at this point will be given pamidronate to stabilize her calcium levels.  Spoke with Dr. Rizvi of oncology at this point he has no further recommendations and recommends hospice only.    Recurrent non-small cell lung cancer (HCC)- (present on admission)  Assessment & Plan  Patient was supposed to have a follow-up appoint with oncology today  Patient's PET scan results have been  reviewed  Discussed with oncology  Their recommendation is hospice.  They have no further treatment that is available for her condition.    Hyperparathyroidism (HCC)- (present on admission)  Assessment & Plan  Most likely secondary to the small cell lung cancer and paraneoplastic process  We will try at this point Sensipar to control her hyperparathyroidism    Other specified glaucoma- (present on admission)  Assessment & Plan  Continue at this point with Alphagan and timolol eyedrops    Chronic obstructive pulmonary disease (HCC)- (present on admission)  Assessment & Plan  RT protocol  Nebulizer treatments  Oxygen support    Chronic anticoagulation- (present on admission)  Assessment & Plan  Continue with anticoagulation    Debility- (present on admission)  Assessment & Plan  Severe debility and has not done well in the past with rehab hospitals    Other insomnia- (present on admission)  Assessment & Plan  Chronic insomnia continue with Ambien    Depression, major, recurrent, moderate ([facility])- (present on admission)  Assessment & Plan  Currently not suicidal homicidal  Continue with antidepressant management    CKD (chronic kidney disease) stage 3, GFR 30-59 ml/min ([facility])- (present on admission)  Assessment & Plan  Monitor renal functions avoid nephrotoxic medications        VTE prophylaxis: SCDs/TEDs

## 2023-07-27 NOTE — ASSESSMENT & PLAN NOTE
Hypercalcemia is related to ongoing small cell lung cancer that is now metastatic to the L4 vertebrae, right acetabulum, and sacrum.  Patient at this point will be given pamidronate to stabilize her calcium levels.  Spoke with Dr. Rizvi of oncology at this point he has no further recommendations and recommends hospice only.  Calcium down to 10.7, decrease IVF

## 2023-07-27 NOTE — PROGRESS NOTES
Pt arrived to floor from ER. Assumed care of patient. Discussed daily plan of care, oriented patient to floor. VSS. Pt resting comfortably, wakes easily to voice. Pt denies complaints or concerns at this time. Call light and belongings within reach. Hourly rounding in place.      1807 pt  in bed, due meds given     1900 Report given to NOC RN.

## 2023-07-27 NOTE — ASSESSMENT & PLAN NOTE
Most likely secondary to the small cell lung cancer and paraneoplastic process  We will try at this point Sensipar to control her hyperparathyroidism

## 2023-07-27 NOTE — ED PROVIDER NOTES
"ED Provider Note    CHIEF COMPLAINT  Chief Complaint   Patient presents with    Abnormal Labs     Pt sent to ER from assisted living home for High Calcium level - had labs drawn yesterday and MD called to have her brought to ER for eval        EXTERNAL RECORDS REVIEWED  Inpatient Notes patient was admitted here from June 25  until July 2, 2023 for hypercalcemia, hyperkalemia and acute kidney injury.  She has history of nonsmall cell lung cancer status post resection and radiation in 2017 and repeat radiation in 2022.  Biopsy of the lung in November showed adenocarcinoma.  She has multiple mets seen on bone scan which was thought to be the cause of her hypercalcemia.  Patient was not interested in hospice during that hospitalization.  It was thought that patient may have recurrence of her hypercalcemia due to the metastatic nature of the bony involvement of her recurrent cancer.    HPI/ROS  LIMITATION TO HISTORY   Select: : None  OUTSIDE HISTORIAN(S):  None    Latonia Clinton is a 72 y.o. female who presents to the ER at the request of her oncologist for hypercalcemia.  Patient had some lab work done yesterday in preparation for an appointment with her oncologist today.  However, her oncologist called her and told her to cancel the appointment and instead come to the ER for her elevated calcium level.  Patient denies feeling poorly.  She denies diffuse pain.  No abdominal pain.  No nausea or vomiting.  She does not feel confused.  No headache.  She is making urine.  No fevers or chills.  Patient has a catheter in place because she is \"bedbound.\"  Patient has not walked in multiple months.  She says she has chronic low back pain due to multiple fractures due to osteoporosis.  Patient is on methimazole for hyperthyroidism.  She said she had a PET scan done in the [facility] system last week but is yet to receive those results.    PAST MEDICAL HISTORY   has a past medical history of Anesthesia, Asthma, Backpain " (07/2017), Blood clot in vein (07/06/2018), Bowel habit changes (07/06/2018), Breath shortness, Bronchitis, CAD (coronary artery disease), Cancer ([facility]) (2016), Chickenpox, COPD (chronic obstructive pulmonary disease) (HCC), Depression (02/26/2019), Dialysis (2005), Emphysema of lung (HCC), Glaucoma, Hemorrhagic disorder ([facility]), Hyperlipidemia, Hypertension, Hyperthyroidism, Kidney stone, Lung cancer ([facility]) (12/12/2016), Multiple thyroid nodules (07/09/2015), Nasal drainage, Obstruction of left ureteropelvic junction (UPJ) due to stone (06/17/2018), Osteoporosis, Pain (07/06/2018), Personal history of venous thrombosis and embolism (2004, 2012), Pneumonia (07/2016), Renal disorder, Renal stones (2013), Rheumatoid arthritis ([facility]), Rheumatoid arthritis ([facility]), S/P appendectomy (1998), S/P cholecystectomy (1998), S/P kyphoplasty (2006, 2007, 2013), Sepsis, unspecified (2005), Shortness of breath (07/06/2018), and Thyroid nodule, hot (2017).    SURGICAL HISTORY   has a past surgical history that includes other; other abdominal surgery; breast augmentation with implant; breast reduction; appendectomy; cholecystectomy; laminotomy; lung biopsy open; thoracoscopy (Left, 12/12/2016); other orthopedic surgery (2013); cystoscopy stent placement (Left, 6/18/2018); lithotripsy (Left, 6/18/2018); lasertripsy (Left, 6/18/2018); ureteroscopy (Left, 6/18/2018); cystoscopy stent placement (7/9/2018); ureteroscopy (Left, 7/9/2018); lasertripsy (Left, 7/9/2018); and bronchoscopy, robot-assisted (10/11/2022).    FAMILY HISTORY  Family History   Problem Relation Age of Onset    Cancer Mother         kidney    Heart Failure Father     Heart Disease Brother     Cancer Maternal Uncle         liver    Cancer Paternal Aunt         ovarian    Cancer Paternal Uncle         lung       SOCIAL HISTORY  Social History     Tobacco Use    Smoking status: Former     Packs/day: 1.00     Years: 30.00     Pack years: 30.00     Types: Cigarettes     Quit  date: 2000     Years since quittin.5    Smokeless tobacco: Never    Tobacco comments:     7 years ago   Vaping Use    Vaping Use: Never used   Substance and Sexual Activity    Alcohol use: Not Currently    Drug use: No    Sexual activity: Not on file       CURRENT MEDICATIONS  Home Medications       Reviewed by Rosalina Blanchard (Pharmacy Tech) on 23 at 1233  Med List Status: Complete     Medication Last Dose Status   acetaminophen (TYLENOL) 325 MG Tab 2023 Active   albuterol 108 (90 Base) MCG/ACT Aero Soln inhalation aerosol PRN Active   allopurinol (ZYLOPRIM) 100 MG Tab 2023 Active   atorvastatin (LIPITOR) 20 MG Tab 2023 Active   brimonidine (ALPHAGAN) 0.2 % Solution 2023 Active   Brimonidine Tartrate-Timolol (COMBIGAN) 0.2-0.5 % Solution 2023 Active   ergocalciferol (DRISDOL) 90838 UNIT capsule 2023 Active   fluticasone-salmeterol (WIXELA INHUB) 500-50 MCG/ACT AEROSOL POWDER, BREATH ACTIVATED 2023 Active   furosemide (LASIX) 40 MG Tab 7/10/2023 Active   gabapentin (NEURONTIN) 300 MG Cap 2023 Active   guaifenesin dextromethorphan sugar free (TUSSIN DM)  MG/5ML Liquid soln 2023 Active   KLOR-CON M20 20 MEQ Tab CR 7/10/2023 Active   Lidocaine HCl (ASPERCREME LIDOCAINE) 4 % Cream 2023 Active   magnesium oxide (MAG-OX) 400 MG Tab tablet 2023 Active   methimazole (TAPAZOLE) 5 MG Tab 2023 Active   midodrine (PROAMATINE) 5 MG Tab 2023 Active   rivaroxaban (XARELTO) 20 MG Tab tablet 2023 Active   senna-docusate (PERICOLACE OR SENOKOT S) 8.6-50 MG Tab 2023 Active   timolol (TIMOPTIC) 0.5 % Solution 2023 Active   tiotropium (SPIRIVA) 18 MCG Cap 2023 Active   zolpidem (AMBIEN) 5 MG Tab 2023 Active                    ALLERGIES  No Known Allergies    PHYSICAL EXAM  VITAL SIGNS: /61   Pulse 60   Wt 99.2 kg (218 lb 11.1 oz)   LMP  (LMP Unknown)   SpO2 98%   BMI 37.54 kg/m²    Constitutional:  Well developed,  well nourished; No acute distress   HENT: Normocephalic, Atraumatic, Bilateral external ears normal, slightly dry mucous membranes  Eyes: PERRL, EOMI, Conjunctiva normal, No discharge.   Neck: Normal range of motion, supple, nontender  Lymphatic: No lymphadenopathy noted.   Cardiovascular: Normal heart rate, Normal rhythm, No murmurs, rubs or gallops   Thorax & Lungs: Decreased breath sounds throughout.  No respiratory distress,  No wheezing rales, or rhonchi; No chest tenderness. No crepitus or subQ air  Abdomen: no guarding no rebound, no masses, no pulsatile mass, no tenderness, no distention  Skin: Warm, Dry, No erythema, No rash.   Back: No tenderness, No CVA tenderness.   Extremities: 2+ dp and pt pulses bilateral LEs;  Nontender; no pretibial edema  Neurologic: Alert & oriented x 4, clear speech.  Patient has difficult time elevating bilateral legs up off the bed, right greater than left.  She says this is normal for her.  Soft, good bowel sounds,  Psychiatric: appropriate, normal affect     DIAGNOSTIC STUDIES / PROCEDURES  EKG  I have independently interpreted this EKG  Please see my EKG interpretation below    LABS  Results for orders placed or performed during the hospital encounter of 07/27/23   COMP METABOLIC PANEL   Result Value Ref Range    Sodium 139 135 - 145 mmol/L    Potassium 5.0 3.6 - 5.5 mmol/L    Chloride 102 96 - 112 mmol/L    Co2 26 20 - 33 mmol/L    Anion Gap 11.0 7.0 - 16.0    Glucose 106 (H) 65 - 99 mg/dL    Bun 33 (H) 8 - 22 mg/dL    Creatinine 1.35 0.50 - 1.40 mg/dL    Calcium 12.6 (HH) 8.4 - 10.2 mg/dL    Correct Calcium 13.1 (HH) 8.5 - 10.5 mg/dL    AST(SGOT) 16 12 - 45 U/L    ALT(SGPT) 11 2 - 50 U/L    Alkaline Phosphatase 149 (H) 30 - 99 U/L    Total Bilirubin 0.2 0.1 - 1.5 mg/dL    Albumin 3.4 3.2 - 4.9 g/dL    Total Protein 5.8 (L) 6.0 - 8.2 g/dL    Globulin 2.4 1.9 - 3.5 g/dL    A-G Ratio 1.4 g/dL   ESTIMATED GFR   Result Value Ref Range    GFR (CKD-EPI) 41 (A) >60 mL/min/1.73 m 2  "         COURSE & MEDICAL DECISION MAKING    ED Observation Status? No; Patient does not meet criteria for ED Observation.     INITIAL ASSESSMENT, COURSE AND PLAN  Care Narrative:   patient presents at the request of her oncologist, Dr. Rizvi, for hypercalcemia.  The patient had some basic labs done yesterday.  She was post to have an appointment today, however, she was called by her oncologist and told to come to the emergency department for her elevated calcium level.  Patient has history of hypercalcemia for which she was admitted here for about a week at the end of June 2023.  Her hypercalcemia at that time was thought to be secondary to bony metastasis from a non-small cell lung cancer which has most recently metastasis to her bones.  At that time patient had acute renal failure as well as  hyperkalemia as well.  Patient does not have any hyperkalemia at this time.  Her renal function is normal today.  She is making urine.  She has an indwelling Gonzalez catheter she is bedbound.  Overall patient says she feels fine.  She is not particularly symptomatic with this hypercalcemia.  No diffuse pains.  No abdominal pain.  No vomiting.  She does not appear to be confused.  Patient was given IV fluids here in the ER.  She will need to come into the hospital for management of her hypercalcemia today.  I spoke with the hospitalist on-call and he will kindly evaluate the patient hospitalization.    1400: Marielena hospitalist    1405: Discussed with Dr. Montes, hospitalist on call, and he will kindly evaluate patient for hospitalization.    We were able to obtain the PET scan results from [facility].   Impression \"progression of disease.  Few new hypermetabolic bone lesions consistent with metastatic disease.  Postsurgical changes of the left lung.  Postradiation changes of both lungs.\"      With respect to the bones, there appears to be left anterior fourth rib bone lesion, L4 vertebral body, left sacrum and " "posterior acetabular lesions, all new when compared to prior PET scan dated December 20, 2022.\"      ADDITIONAL PROBLEM LIST  Problem #1: Hypercalcemia  Problem #2: Non-small cell lung cancer with metastasis to bone    DISPOSITION AND DISCUSSIONS  I have discussed management of the patient with the following physicians and NINI's: Hospitalist    Discussion of management with other Q or appropriate source(s): None     Escalation of care considered, and ultimately not performed: Patient has no complaints of pain.  At this time I do not think she needs any imaging of her chest, abdomen or pelvis. She just had PET scan done a week ago.    Barriers to care at this time, including but not limited to: None known   .     Decision tools and prescription drugs considered including, but not limited to:  We will defer any medication management to the hospitalist.  At this time we will give the patient IV fluids to help with her hypercalcemia. .    FINAL DIAGNOSIS  1. Hypercalcemia Acute        This dictation has been created using voice recognition software. The accuracy of the dictation is limited by the abilities of the software. I expect there may be some errors of grammar and possibly content. I made every attempt to manually correct the errors within my dictation. However, errors related to voice recognition software may still exist and should be interpreted within the appropriate context.     Electronically signed by: Deedee Madrid M.D., 7/27/2023 12:45 PM      "

## 2023-07-27 NOTE — PROGRESS NOTES
4 Eyes Skin Assessment Completed by ALLAN Herrmann and ALLAN Nicolas.    Head WDL  Ears WDL  Nose WDL  Mouth WDL  Neck WDL  Breast/Chest WDL  Shoulder Blades WDL  Spine WDL  (R) Arm/Elbow/Hand Bruising  (L) Arm/Elbow/Hand Redness, Scab, and Scar, bruising  Abdomen Scar  Groin WDL  Scrotum/Coccyx/Buttocks Redness, Blanching, and Excoriation  (R) Leg Bruising  (L) Leg Bruising  (R) Heel/Foot/Toe WDL  (L) Heel/Foot/Toe WDL          Devices In Places Pulse Ox, nasal canula      Interventions In Place Gray Ear Foams and Pillows    Possible Skin Injury Yes    Pictures Uploaded Into Epic Yes  Wound Consult Placed Yes  RN Wound Prevention Protocol Ordered Yes

## 2023-07-28 NOTE — PROGRESS NOTES
Bedside report received from night RN. Assumed care of patient. Alert and oriented X4, wakes easily to voice. VSS. Daily plan of care discussed. Pt complains 9/10 pain to her neck and low back, medicated per MAR. Safety and fall precautions in place. Hourly rounding ongoing.     0900 Skin checks with 2 RN done.     1013 Pt resting in bed, no needs at this time.     1220 Pt eating lunch, visitors at bedside.    1300- 1500 Epic down    1500 pain medications given per pt request. Oral care done.   1734 Due meds given, pt eating dinner.     1920 Report given to NOC RN

## 2023-07-28 NOTE — PROGRESS NOTES
4 Eyes Skin Assessment Completed by ALLAN Herrmann and ALLAN Ortez.    Head WDL  Ears WDL  Nose WDL  Mouth WDL  Neck WDL  Breast/Chest WDL  Shoulder Blades WDL  Spine WDL  (R) Arm/Elbow/Hand Redness and Bruising  (L) Arm/Elbow/Hand Redness, Bruising, Scab, and Scar  Abdomen Scar  Groin WDL  Scrotum/Coccyx/Buttocks Redness, Blanching, and Excoriation  (R) Leg Bruising  (L) Leg Bruising  (R) Heel/Foot/Toe WDL  (L) Heel/Foot/Toe WDL          Devices In Places Pulse Ox and Nasal Cannula      Interventions In Place NC W/Ear Foams, TAP System, Pillows, Q2 Turns, Barrier Cream, and Heels Loaded W/Pillows    Possible Skin Injury Yes    Pictures Uploaded Into Epic Yes  Wound Consult Placed Yes  RN Wound Prevention Protocol Ordered Yes

## 2023-07-28 NOTE — PROGRESS NOTES
[facility] Medicine Daily Progress Note    Date of Service  7/28/2023    Chief Complaint  Latonia Clinton is a 72 y.o. female admitted 7/27/2023 with elevated calcium.    Hospital Course  Latonia Clinton is a 72 y.o. female who presented 7/27/2023 with abnormal labs.  Patient was supposed to have a follow-up with oncology today however she had labs done yesterday and this showed calcium of 12.6 corrected 13.1.  To the patient was advised to come to the emergency room for evaluation. She was given pamidronate and monitor calcium levels and give her fluid resuscitation.  In reviewing the PET scan results and discussing it with the oncologist the patient is only a hospice candidate and they have no further additional options for her.  She is doing better with pain management with oxycodone.  Hospice was discussed and patient is okay with discussing with the liaison for more information.    Interval Problem Update  7/28 Patient doing well today, calcium is slightly lower than on admission today with hydration and post dose of pamidronate.  We dicussed that her hypercalcemia will keep coming back because of her malignancy in her bones and that oncology has no options of treatment for her.  She is too frail for chemotherapy and not appropriate for radiation and Dr Rizvi recommended hospice.  Patient states she understands the recommendation and is willing to speak with the hospice liaisons but otherwise she is feeling well and has a good appetite.    I have discussed this patient's plan of care and discharge plan at IDT rounds today with Case Management, Nursing, Nursing leadership, and other members of the IDT team.    Consultants/Specialty  none    Code Status  Full Code    Disposition  The patient is not medically cleared for discharge to home or a post-acute facility.  Anticipate discharge to: hospice    I have placed the appropriate orders for post-discharge needs.    Review of Systems  Review of Systems    Constitutional:  Negative for chills and fever.   HENT:  Negative for congestion.    Eyes:  Negative for blurred vision and photophobia.   Respiratory:  Negative for cough and shortness of breath.    Cardiovascular:  Negative for chest pain, claudication and leg swelling.   Gastrointestinal:  Negative for abdominal pain, constipation, diarrhea, heartburn and vomiting.   Genitourinary:  Negative for dysuria and hematuria.   Musculoskeletal:  Negative for joint pain and myalgias.   Skin:  Negative for itching and rash.   Neurological:  Negative for dizziness, sensory change, speech change, weakness and headaches.   Psychiatric/Behavioral:  Negative for depression. The patient is not nervous/anxious and does not have insomnia.         Physical Exam  Temp:  [36 °C (96.8 °F)-36.7 °C (98 °F)] 36 °C (96.8 °F)  Pulse:  [55-86] 86  Resp:  [16-18] 18  BP: (105-156)/(56-87) 120/71  SpO2:  [93 %-99 %] 93 %    Physical Exam  Vitals and nursing note reviewed.   Constitutional:       General: She is not in acute distress.     Appearance: Normal appearance. She is not ill-appearing.   HENT:      Head: Normocephalic and atraumatic.      Nose: Nose normal.   Cardiovascular:      Rate and Rhythm: Normal rate and regular rhythm.      Heart sounds: Normal heart sounds. No murmur heard.  Pulmonary:      Effort: Pulmonary effort is normal.      Breath sounds: Normal breath sounds.   Abdominal:      General: Bowel sounds are normal. There is no distension.      Palpations: Abdomen is soft.   Musculoskeletal:         General: No swelling or tenderness.      Cervical back: Neck supple.   Skin:     General: Skin is warm and dry.   Neurological:      General: No focal deficit present.      Mental Status: She is alert and oriented to person, place, and time.   Psychiatric:         Mood and Affect: Mood normal.         Fluids    Intake/Output Summary (Last 24 hours) at 7/28/2023 1416  Last data filed at 7/28/2023 1234  Gross per 24 hour    Intake 720 ml   Output 900 ml   Net -180 ml       Laboratory  Recent Labs     07/26/23  1030 07/27/23  1539   WBC 5.2 5.9   RBC 5.80* 3.60*   HEMOGLOBIN 17.2* 10.5*   HEMATOCRIT 58.1* 35.2*   .2* 97.8   MCH 29.7 29.2   MCHC 29.6* 29.8*   RDW 59.6* 55.2*   PLATELETCT 175 320   MPV 12.1 11.2     Recent Labs     07/26/23  1030 07/27/23  1305 07/27/23  1708 07/28/23  0041 07/28/23  0907   SODIUM 141 139  --  138  --    POTASSIUM 5.0 5.0  --  4.9  --    CHLORIDE 102 102  --  102  --    CO2 27 26  --  24  --    GLUCOSE 99 106*  --  119*  --    BUN 36* 33*  --  32*  --    CREATININE 1.31 1.35  --  1.22  --    CALCIUM 12.5* 12.6* 12.3* 12.0*  11.8* 12.4*                   Imaging  No orders to display        Assessment/Plan  * Hypercalcemia- (present on admission)  Assessment & Plan  Hypercalcemia is related to ongoing small cell lung cancer that is now metastatic to the L4 vertebrae, right acetabulum, and sacrum.  Patient at this point will be given pamidronate to stabilize her calcium levels.  Spoke with Dr. Rizvi of oncology at this point he has no further recommendations and recommends hospice only.    Recurrent non-small cell lung cancer (HCC)- (present on admission)  Assessment & Plan  Patient was supposed to have a follow-up appoint with oncology today  Patient's PET scan results have been reviewed  Discussed with oncology  Their recommendation is hospice.  They have no further treatment that is available for her condition.    Chronic anticoagulation- (present on admission)  Assessment & Plan  Continue with anticoagulation    Debility- (present on admission)  Assessment & Plan  Severe debility and has not done well in the past with rehab hospitals    Hyperparathyroidism (HCC)- (present on admission)  Assessment & Plan  Most likely secondary to the small cell lung cancer and paraneoplastic process  We will try at this point Sensipar to control her hyperparathyroidism    Other insomnia- (present on  admission)  Assessment & Plan  Chronic insomnia continue with Ambien    Other specified glaucoma- (present on admission)  Assessment & Plan  Continue at this point with Alphagan and timolol eyedrops    Depression, major, recurrent, moderate (HCC)- (present on admission)  Assessment & Plan  Currently not suicidal homicidal  Continue with antidepressant management    CKD (chronic kidney disease) stage 3, GFR 30-59 ml/min (HCC)- (present on admission)  Assessment & Plan  Monitor renal functions avoid nephrotoxic medications    Chronic obstructive pulmonary disease (HCC)- (present on admission)  Assessment & Plan  RT protocol  Nebulizer treatments  Oxygen support         VTE prophylaxis:    therapeutic anticoagulation with xarelto 20 mg daily witih meals      I have performed a physical exam and reviewed and updated ROS and Plan today (7/28/2023). In review of yesterday's note (7/27/2023), there are no changes except as documented above.

## 2023-07-28 NOTE — DIETARY
"Nutrition services: Day 1 of admit.  Latonia Clinton is a 72 y.o. female with admitting DX of Hypercalcemia [E83.52]     Consult received for MST 3: unsure wt loss, +poor PO intake PTA.    Assessment:  Height: 165.1 cm (5' 5\")  Weight: 97.9 kg (215 lb 13.3 oz)  Body mass index is 35.92 kg/m²., BMI classification: Obesity class II  Diet/Intake: Regular; % x 1 meal per ADLs    Evaluation:   Pt admitted w/ hypercalcemia, PMHx includes recurrent lung cancer, COPD, CKD  Labs: gluc 119, BUN 32, GFR 47, ca+ 11.8-12.0  MAR: cinacalcet, oxycodone, pericolace  Skin: excoriation to buttocks; wound team consult pending. No documented edema.  +2L O2 via nasal cannula  Last BM: 7/26   Per chart hx, pt's wt down insinificantly over past month, 1.3% (3 lb). Wt is overall +14 lb from March 2023.    Malnutrition Risk: Wt overall 12-month trend up, recent 3-lb wt loss in 1 month non-significant. PO intake here % x 1 meal per ADLs.    Recommendations/Plan:  RD will await wound staging to make recommendations if appropriate. RD will monitor per dept policy.    Encourage intake of meals >75%.  Document intake of all PO as % taken in ADL's to provide interdisciplinary communication across all shifts.   Monitor weight.  Nutrition rep will continue to see patient for ongoing meal and snack preferences.     RD will screen weekly per department policy; available PRN.       "

## 2023-07-28 NOTE — PROGRESS NOTES
2000, pt refused to be turned to the other side for Q2 turning, educated still refused and said will do it later, CNA at bedside as well.      2140 pt drank her meds and was talking that she saw her mom and dad, pt was just awoken from a good sleep; reoriented pt; charge nurse aware and will do skin checks on her.   0000 - pt sleeping in bed; normal RR  0200, pt sleeping in bed; O2 sats WNL  0400 - pt sleeping in bed, VSS.  0600 - pt sleeping in bed; no needs at this time, RR nrmal

## 2023-07-28 NOTE — PROGRESS NOTES
4 Eyes Skin Assessment Completed by ALLAN jim and abdi RN.    Head WDL  Ears WDL  Nose WDL  Mouth WDL  Neck WDL  Breast/Chest WDL  Shoulder Blades WDL  Spine WDL  (R) Arm/Elbow/Hand Redness and Bruising  (L) Arm/Elbow/Hand Redness, Bruising, Scab, and Scar  Abdomen Scar  Groin WDL  Scrotum/Coccyx/Buttocks Redness, Blanching, and Excoriation  (R) Leg Bruising  (L) Leg Bruising  (R) Heel/Foot/Toe WDL  (L) Heel/Foot/Toe WDL          Devices In Places Pulse Ox and Nasal Cannula      Interventions In Place NC W/Ear Foams, Pillows, Barrier Cream, and Heels Loaded W/Pillows    Possible Skin Injury Yes    Pictures Uploaded Into Epic Yes  Wound Consult Placed Yes  RN Wound Prevention Protocol Ordered Yes

## 2023-07-28 NOTE — CARE PLAN
The patient is Stable - Low risk of patient condition declining or worsening    Shift Goals  Clinical Goals: Pain management  Patient Goals: rest and comfort  Family Goals: NA    Progress made toward(s) clinical / shift goals:  PRN pain medications administered per MAR with relief.     Patient is not progressing towards the following goals:NA

## 2023-07-28 NOTE — PROGRESS NOTES
Rounding     1906 Introduction, no needs at this time   2115 Patient sleeping   2213 Vitals check   0001 Patient sleeping   0100 Reposition patient   0300 reposition patient   0527 Vitals check   0623 Patient sleeping

## 2023-07-28 NOTE — CARE PLAN
The patient is Stable - Low risk of patient condition declining or worsening    Shift Goals  Clinical Goals: manage pain, maintain SPO2 >90%  Patient Goals: rest and comfort  Family Goals: NA    Progress made toward(s) clinical / shift goals:  Pain control post intervention. Pt able to maintain SPO2 >90% with 2LPM oxygen via NC. No SOB/respiratory distress noted.     Patient is not progressing towards the following goals:NA

## 2023-07-28 NOTE — HOSPITAL COURSE
Latonia Clinton is a 72 y.o. female who presented 7/27/2023 with abnormal labs.  Patient was supposed to have a follow-up with oncology today however she had labs done yesterday and this showed calcium of 12.6 corrected 13.1.  To the patient was advised to come to the emergency room for evaluation. She was given pamidronate and monitor calcium levels and give her fluid resuscitation.  In reviewing the PET scan results and discussing it with the oncologist the patient is only a hospice candidate and they have no further additional options for her.  She is doing better with pain management with oxycodone.  Hospice was discussed and patient is okay with discussing with the liaison for more information.

## 2023-07-28 NOTE — RESPIRATORY CARE
"   COPD EDUCATION by COPD CLINICAL EDUCATOR  7/28/2023 at 7:30 AM by Linn Velez, RRT     Patient reviewed by COPD education team. Patient does have a history or diagnosis of COPD and is a former smoker.  However, patient does not qualify for the COPD program.     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 Spoke with Dr. Rizvi of oncology at this point he has no further recommendations and recommends hospice only.)  $ Demo/Eval of SVN's, MDI's and Aerosols: Yes    PFT Results    No results found for: PFT    Meds to Beds  Would the patient like to opt in for Bedside Medication Delivery at Discharge?: No     MY COPD ACTION PLAN     It is recommended that patients and physicians /healthcare providers complete this action plan together. This plan should be discussed at each physician visit and updated as needed.    The green, yellow and red zones show groups of symptoms of COPD. This list of symptoms is not comprehensive, and you may experience other symptoms. In the \"Actions\" column, your healthcare provider has recommended actions for you to take based on your symptoms.    Patient Name: Latonia Clinton   YOB: 1950   Last Updated on: 4/5/2023 11:33 AM   Green Zone:  I am doing well today Actions     Usual activitiy and exercise level   Take daily medications     Usual amounts of cough and phlegm/mucus   Use oxygen as prescribed     Sleep well at night   Continue regular exercise/diet plan     Appetite is good   At all times avoid cigarette smoke, inhaled irritants     Daily Medications (these medications are taken every day):   Fluticasone Furoate and Vilanterol trifenatate (Breo)  Tiotropium Bromide Monohydrate (Spiriva) 1 Puff  2 Puffs Once daily  Once daily        Yellow Zone:  I am having a bad day or a COPD " "flare Actions     More breathless than usual   Continue daily medications     I have less energy for my daily activities   Use quick relief inhaler as ordered     Increased or thicker phlegm/mucus   Use oxygen as prescribed     Using quick relief inhaler/nebulizer more often   Get plenty of rest     Swelling of ankles more than usual   Use pursed lip breathing     More coughing than usual   At all times avoid cigarette smoke, inhaled irritants     I feel like I have a \"chest cold\"     Poor sleep and my symptoms woke me up     My appetite is not good     My medicine is not helping      Call provider immediately if symptoms don’t improve     Continue daily medications, add rescue medications:               Medications to be used during a flare up, (as Discussed with Provider):              Red Zone:  I need urgent medical care Actions     Severe shortness of breath even at rest   Call 911 or seek medical care immediately     Not able to do any activity because of breathing      Fever or shaking chills      Feeling confused or very drowsy       Chest pains      Coughing up blood                  "

## 2023-07-28 NOTE — PROGRESS NOTES
Received report from Alize LEMUS. Pt resting in bed, awake. Pt A&O x4, on RA. Pt denies pain at this time. Pt updated with POC which includes Q 2 turns and pain control. Call light within reach, fall precautions in place, bed alarm on, all needs met at this time.

## 2023-07-28 NOTE — CARE PLAN
The patient is Stable - Low risk of patient condition declining or worsening    Shift Goals  Clinical Goals: Pt's pain will be managed and continue IV fluids.  Patient Goals: Sleep and rest.  Family Goals: na    Progress made toward(s) clinical / shift goals:      Pt turned Q2 hrs w/ TAPS system; continued on IV fluids; lópez draining well to urine bag. Pt now at 2 lpm via NC  from 4 lpm.     Patient is not progressing towards the following goals:

## 2023-07-28 NOTE — PROGRESS NOTES
Rounding's    0711- Pt sleeping  0800-Pt resting in bed. Breakfast at bedside  1000- Vitals taken  1200- Pt eating lunch  1400- Pt resting  1600- Vitals taken  1800- Pt ate dinner. Collected tray

## 2023-07-28 NOTE — PROGRESS NOTES
Pat from Lab called with critical result of Calcium at 12.4. Critical lab result read back.   Dr. Jordan notified of critical lab result at 1011.  Critical lab result read back by Dr. Jordan.

## 2023-07-29 NOTE — WOUND TEAM
[facility]  Inpatient Services  Wound and Skin Care Brief Evaluation    Admission Date: 7/27/2023     Last order of IP CONSULT TO WOUND CARE was found on 7/27/2023 from Hospital Encounter on 7/27/2023     HPI, PMH, SH: Reviewed    Chief Complaint   Patient presents with    Abnormal Labs     Pt sent to ER from assisted living home for High Calcium level - had labs drawn yesterday and MD called to have her brought to ER for eval      Diagnosis: Hypercalcemia [E83.52]    Unit where seen by Wound Team: 1123/01     Wound consult placed regarding sacrum. Chart and images reviewed. This discussed with bedside RN. This RN in to assess patient. Pt pleasant and agreeable. Sacrum assessed and patient with diffuse area of redness likely due to moisture. All areas of redness were blanching Non-selectively debrided with Moist warm washcloth. Barrier paste applied.     No pressure injuries or advanced wound care needs identified. Wound consult completed. No further follow up unless indicated and consulted.     Wound 07/27/23 Buttocks MASD (Active)   Date First Assessed/Time First Assessed: 07/27/23 1857   Present on Original Admission: Yes  Location: Buttocks  Wound Description (Comments): MASD      Assessments 7/29/2023  1:00 PM   Wound Image     Site Assessment Red   Periwound Assessment Denuded   Margins Undefined edges   Closure Open to air   Drainage Amount None   Treatments Cleansed   Wound Cleansing Foam Cleanser/Washcloth   Periwound Protectant Barrier Paste   Dressing Status Open to Air   Wound Team Following Not following       PREVENTATIVE INTERVENTIONS:    Q shift Cholo - performed per nursing policy  Q shift pressure point assessments - performed per nursing policy    Surface/Positioning  Reposition q 2 hours - Currently in Place  TAPs Turning system - Currently in Place  Waffle overlay  - Currently in Place    Offloading/Redistribution  Float Heels off Bed with Pillows - Currently in Place            Containment/Moisture Prevention    Dri-peggy pad - Currently in Place  Gonzalez Catheter - Currently in Place

## 2023-07-29 NOTE — PROGRESS NOTES
[facility] Medicine Daily Progress Note    Date of Service  7/29/2023    Chief Complaint  Latonia Clinton is a 72 y.o. female admitted 7/27/2023 with elevated calcium.    Hospital Course  Latonia Clinton is a 72 y.o. female who presented 7/27/2023 with abnormal labs.  Patient was supposed to have a follow-up with oncology today however she had labs done yesterday and this showed calcium of 12.6 corrected 13.1.  To the patient was advised to come to the emergency room for evaluation. She was given pamidronate and monitor calcium levels and give her fluid resuscitation.  In reviewing the PET scan results and discussing it with the oncologist the patient is only a hospice candidate and they have no further additional options for her.  She is doing better with pain management with oxycodone.  Hospice was discussed and patient is okay with discussing with the liaison for more information.    Interval Problem Update  7/28 Patient doing well today, calcium is slightly lower than on admission today with hydration and post dose of pamidronate.  We dicussed that her hypercalcemia will keep coming back because of her malignancy in her bones and that oncology has no options of treatment for her.  She is too frail for chemotherapy and not appropriate for radiation and Dr Rizvi recommended hospice.  Patient states she understands the recommendation and is willing to speak with the hospice liaisons but otherwise she is feeling well and has a good appetite.  7/29 patient states overall she feels the same.  She did have a higher dose of Ambien overnight with minimal improvement of her sleep.  She met with 1 hospice company yesterday and is meeting with another one today and once hospice company and care decided upon she will be appropriate to discharge home.  Her calcium is down to 10.7 therefore I will do reduce her IV fluids.    I have discussed this patient's plan of care and discharge plan at IDT rounds today with Case  Management, Nursing, Nursing leadership, and other members of the IDT team.    Consultants/Specialty  none    Code Status  Full Code    Disposition  The patient is medically cleared for discharge to home or a post-acute facility.  Anticipate discharge to: hospice    I have placed the appropriate orders for post-discharge needs.    Review of Systems  Review of Systems   Constitutional:  Negative for chills and fever.   HENT:  Negative for congestion.    Eyes:  Negative for blurred vision and photophobia.   Respiratory:  Negative for cough and shortness of breath.    Cardiovascular:  Negative for chest pain, claudication and leg swelling.   Gastrointestinal:  Negative for abdominal pain, constipation, diarrhea, heartburn and vomiting.   Genitourinary:  Negative for dysuria and hematuria.   Musculoskeletal:  Negative for joint pain and myalgias.   Skin:  Negative for itching and rash.   Neurological:  Negative for dizziness, sensory change, speech change, weakness and headaches.   Psychiatric/Behavioral:  Negative for depression. The patient is not nervous/anxious and does not have insomnia.         Physical Exam  Temp:  [35.8 °C (96.5 °F)-36.8 °C (98.3 °F)] 35.8 °C (96.5 °F)  Pulse:  [77-98] 83  Resp:  [16-18] 16  BP: (117-138)/(68-85) 135/70  SpO2:  [91 %-96 %] 93 %    Physical Exam  Vitals and nursing note reviewed.   Constitutional:       General: She is not in acute distress.     Appearance: Normal appearance. She is not ill-appearing.   HENT:      Head: Normocephalic and atraumatic.      Nose: Nose normal.   Cardiovascular:      Rate and Rhythm: Normal rate and regular rhythm.      Heart sounds: Normal heart sounds. No murmur heard.  Pulmonary:      Effort: Pulmonary effort is normal.      Breath sounds: Normal breath sounds.   Abdominal:      General: Bowel sounds are normal. There is no distension.      Palpations: Abdomen is soft.   Musculoskeletal:         General: No swelling or tenderness.      Cervical  back: Neck supple.   Skin:     General: Skin is warm and dry.   Neurological:      General: No focal deficit present.      Mental Status: She is alert and oriented to person, place, and time.   Psychiatric:         Mood and Affect: Mood normal.         Fluids    Intake/Output Summary (Last 24 hours) at 7/29/2023 1213  Last data filed at 7/29/2023 1051  Gross per 24 hour   Intake 3383.02 ml   Output 1415 ml   Net 1968.02 ml         Laboratory  Recent Labs     07/27/23  1539 07/28/23  1652   WBC 5.9 6.6   RBC 3.60* 3.18*   HEMOGLOBIN 10.5* 9.2*   HEMATOCRIT 35.2* 31.4*   MCV 97.8 98.7*   MCH 29.2 28.9   MCHC 29.8* 29.3*   RDW 55.2* 55.3*   PLATELETCT 320 300   MPV 11.2 11.1       Recent Labs     07/27/23  1305 07/27/23  1708 07/28/23  0041 07/28/23  0907 07/28/23  1652 07/29/23  0321   SODIUM 139  --  138  --   --   --    POTASSIUM 5.0  --  4.9  --   --   --    CHLORIDE 102  --  102  --   --   --    CO2 26  --  24  --   --   --    GLUCOSE 106*  --  119*  --   --   --    BUN 33*  --  32*  --   --   --    CREATININE 1.35  --  1.22  --   --   --    CALCIUM 12.6*   < > 12.0*  11.8* 12.4* 11.9* 10.7*    < > = values in this interval not displayed.                     Imaging  No orders to display          Assessment/Plan  * Hypercalcemia- (present on admission)  Assessment & Plan  Hypercalcemia is related to ongoing small cell lung cancer that is now metastatic to the L4 vertebrae, right acetabulum, and sacrum.  Patient at this point will be given pamidronate to stabilize her calcium levels.  Spoke with Dr. Rizvi of oncology at this point he has no further recommendations and recommends hospice only.  Calcium down to 10.7, decrease IVF    Recurrent non-small cell lung cancer (HCC)- (present on admission)  Assessment & Plan  Patient was supposed to have a follow-up appoint with oncology today  Patient's PET scan results have been reviewed  Discussed with oncology  Their recommendation is hospice.  They have no further  treatment that is available for her condition.    Chronic anticoagulation- (present on admission)  Assessment & Plan  Continue with anticoagulation    Debility- (present on admission)  Assessment & Plan  Severe debility and has not done well in the past with rehab hospitals    Hyperparathyroidism (HCC)- (present on admission)  Assessment & Plan  Most likely secondary to the small cell lung cancer and paraneoplastic process  We will try at this point Sensipar to control her hyperparathyroidism    Other insomnia- (present on admission)  Assessment & Plan  Chronic insomnia continue with Ambien    Other specified glaucoma- (present on admission)  Assessment & Plan  Continue at this point with Alphagan and timolol eyedrops    Depression, major, recurrent, moderate (HCC)- (present on admission)  Assessment & Plan  Currently not suicidal homicidal  Continue with antidepressant management    CKD (chronic kidney disease) stage 3, GFR 30-59 ml/min (HCC)- (present on admission)  Assessment & Plan  Monitor renal functions avoid nephrotoxic medications    Chronic obstructive pulmonary disease (HCC)- (present on admission)  Assessment & Plan  RT protocol  Nebulizer treatments  Oxygen support         VTE prophylaxis:    therapeutic anticoagulation with xarelto 20 mg daily witih meals      I have performed a physical exam and reviewed and updated ROS and Plan today (7/29/2023). In review of yesterday's note (7/28/2023), there are no changes except as documented above.

## 2023-07-29 NOTE — DISCHARGE PLANNING
"Case Management Discharge Planning    Admission Date: 7/27/2023  GMLOS: 2.6  ALOS: 2    Anticipated Discharge Dispo: Discharge Disposition: D/T to hospice home (50)    DME Needed: Yes    DME Ordered: Yes    Action(s) Taken: Acceptance Received    Spoke with Maisha [phone]  with [facility]. She has arranged transport for tomorrow, 7/30 between . Maisha notified that  is wanting an \"alternating pressure mattress\" for pt's hospital bed.     Pt updated at bedside and is in agreement with d/c.     Called  owner,Kiersten to provide transport time     Escalations Completed: None    Medically Clear: Yes    Next Steps: D/C home with hospice on 7/30    Barriers to Discharge: None    Is the patient up for discharge tomorrow: Yes,        "

## 2023-07-29 NOTE — PROGRESS NOTES
Received bedside report from night RN. Patient A&Ox4. Denies any complains at this time. Discussed POC and understood. Safety and fall precaution in place. Call light placed within reach with instructions to call anytime for assistance. Will continue to monitor.

## 2023-07-29 NOTE — PROGRESS NOTES
1220 - Bedside report received from MEGAN Guy RN. Alert and oriented X4, on 2.5L NC in no acute respiratory distress. Daily plan of care discussed. Pt complains of tolerable pain, declines medication. No other needs at this time. Call light and personal belongings within reach. Hourly rounding in place. Chart reviewed for recent labs, notes, and orders.    1326 - wound care    1438 - skin note done with MEGAN Guy RN and SUHAIL Perea RN

## 2023-07-29 NOTE — CARE PLAN
The patient is Stable - Low risk of patient condition declining or worsening    Shift Goals  Clinical Goals: pain control 5/10 or less  Patient Goals: rest  Family Goals: n/a    Progress made toward(s) clinical / shift goals: patient is reporting tolerable pain no medications requested.    Patient is not progressing towards the following goals:

## 2023-07-29 NOTE — CARE PLAN
The patient is Stable - Low risk of patient condition declining or worsening    Shift Goals  Clinical Goals: Pt will be able to control pain and report pain less than 5/10 post interventions. Pt will be able to sleep at least 4 hours within the shift.  Patient Goals: sleep and rest  Family Goals: n/a    Progress made toward(s) clinical / shift goals:  Pt required prn pain medication throughout the shift. Pt states relief post interventions. Pt refused to be turned 2x within the shift; education provided and pt states understanding but prefers it because of comfort. Hourly rounding in progress.    Patient is not progressing towards the following goals:

## 2023-07-29 NOTE — PROGRESS NOTES
Rounding     1952 Introduction, no needs at this time   2200 Reposition patient   2259 Vitals check   0120 Patient sleeping   0322 Patient sleeping   0405 Vitals check   0625 Sleeping

## 2023-07-29 NOTE — PROGRESS NOTES
Received report from day shift nurse, Jimmy LEMUS. Assumed pt care at 1915.   Pt is A&Ox4, resting comfortably in bed. Pt on room air; no signs of SOB/respiratory distress. Labs noted. Pt states chronic pain at 6/10 at this moment; declines need of prn pain medication. Pt refused to be repositioned at this time.   Needs attended well. Fall precautions in place. Bed at lowest position. Call light and personal belongings within reach.   Hourly rounding in progress.    2008 Pt repositioned and made comfortable with charge nurse.  Scheduled medication given as per MAR.   Pt states she will call if needs prn pain medication and for sleep. Needs attended well.  Hourly rounding in progress.    2138 Pt complains of pain at 9/10 and asking for pain medication; prn medicine given for pain as per MAR.  BP checked prior to administration.  Pt requesting for zolpidem for sleep. Informed pt that this RN will give it after 30 mins post taking pain medicine; pt states understanding.    2209 Pt repositioned and made comfortable.   Zolpidem given as per MAR.   Hourly rounding in progress.    0010 Pt sleeping comfortably on bed; easily aroused. Even and unlabored breathing.   Pt declines to be turned at this time. Pt states she is comfortable. Education provided and informed that this RN and CNA will come back later to turn pt; pt states not to wake her up but we can check if she is awake to turn her.   Hourly rounding in progress.    0130 Pt awake.  at bedside  Pt states okay to turn her at this time. Pt repositioned and made comfortable. Lights dimmed to promote sleep and rest.    0330 Pt declines to be turn and repositioned; states she is comfortable at this time.    0445 Pt sleeping comfortably on bed; even and unlabored breathing noted.     0535 Scheduled medications given as per MAR.  Midodrine refused; not given at this time.  Pt placed on comfortable position. Pt still refused to be turned at this time.  Hourly rounding  in progress.

## 2023-07-29 NOTE — PROGRESS NOTES
Received from night shift RN. Pt is A&Ox4, max x2, regular diet, Q2 turns (will refuse sometimes), 2L. Precautions put in place bed in the lowest position, call light within reach, and bed alarm is on.     0700: Seen pt at bedside. Pt is asleep chest observed rising up/down and breathing. No other needs at the moment.     0800:Pt is asleep chest observed rising up/down and breathing. No other needs at the moment.   0840: RN notified CNA pt could possibly be discharged today if they can get her setup at hospice faculty.     0900: Pt is awake talking to family at bedside. No other needs at the moment.    1000: Hospice RN at bedside. No other needs at the moment.  1015: Pt complains of pain RN notified.   1030: Pt is sitting up in bed comfortably eating breakfast and CNA took vitals.     1100:Pt is asleep chest observed rising up/down and breathing. No other needs at the moment.     1200: Pt is awake and pt requested to be reposition. No other needs at the moment.   1244: Reposition pt. Pt is now comfort and family is at bedside.   1253:  at bedside.    1300:Pt is awake watching TV comfortably in bed. No other needs at the moment.   1326: Wound care RN at bedside. CNA and wound care RN reposition the pt again, did elena care, and wound care RN applied cream to the pt bottom.    1400:Pt is asleep chest observed rising up/down and breathing. No other needs at the moment.     1500:Pt is asleep chest observed rising up/down and breathing. No other needs at the moment.     1600: CNA took vitals. Pt is awake watching TV comfortably in bed. No other needs at the moment.     1700: Pt is awake eating dinner comfortably in bed. No other needs at the moment.     1800:Pt is awake eating dinner comfortably in bed. No other needs at the moment.     1900: RN to night shift CNA report given.

## 2023-07-29 NOTE — PROGRESS NOTES
4 Eyes Skin Assessment Completed by ALLAN Bower and LALAN Solares.    Head WDL  Ears WDL  Nose WDL  Mouth WDL  Neck WDL  Breast/Chest WDL  Shoulder Blades WDL  Spine WDL  (R) Arm/Elbow/Hand Redness and Bruising  (L) Arm/Elbow/Hand Bruising, Scab, and Scar  Abdomen Scar  Groin WDL  Scrotum/Coccyx/Buttocks Redness, Blanching, and Excoriation  (R) Leg Bruising  (L) Leg Bruising  (R) Heel/Foot/Toe WDL  (L) Heel/Foot/Toe WDL          Devices In Places Pulse Ox, SCD's, and Nasal Cannula      Interventions In Place NC W/Ear Foams, Waffle Overlay, TAP System, Pillows, Q2 Turns, Barrier Cream, and Heels Loaded W/Pillows    Possible Skin Injury Yes    Pictures Uploaded Into Epic Yes, already in  Wound Consult Placed Yes  RN Wound Prevention Protocol Ordered Yes

## 2023-07-29 NOTE — PROGRESS NOTES
4 Eyes Skin Assessment Completed by ALLAN Cano and Fidencio RN.    Head WDL  Ears WDL  Nose WDL  Mouth WDL  Neck WDL  Breast/Chest Scar old incision   Shoulder Blades WDL  Spine WDL  (R) Arm/Elbow/Hand Redness and Bruising  (L) Arm/Elbow/Hand Redness, Bruising, and Scab  Abdomen WDL  Groin WDL  Scrotum/Coccyx/Buttocks Redness, Blanching, and Excoriation  (R) Leg Bruising  (L) Leg Bruising  (R) Heel/Foot/Toe WDL  (L) Heel/Foot/Toe WDL          Devices In Places Blood Pressure Cuff, Pulse Ox, and Nasal Cannula      Interventions In Place NC W/Ear Foams, Waffle Overlay, TAP System, Pillows, Q2 Turns, and Pressure Redistribution Mattress    Possible Skin Injury Yes    Pictures Uploaded Into Epic Yes  Wound Consult Placed Yes  RN Wound Prevention Protocol Ordered Yes

## 2023-07-30 NOTE — PROGRESS NOTES
Pt discharged to [facility] home via [facility] medical transport with escort. Discharge paperwork reviewed and signed. Prescribed home medications reviewed with pt per discharge summary. VSS. Pt escorted off unit via [facility] with transport staff. IV removed.

## 2023-07-30 NOTE — PROGRESS NOTES
1930--Pt was in bed, eyes closed, chest rising. Repositioned.   2145-- Pt was in bed relaxing, eyes closed, unlabored breathing.  2345--Pt sleeping, refused repositioning  0200-Pt sleeping, refused  repositioning.   0400- pt sleeping with mouth open and snore  0525--clustered care with RN , v/s taken, water provided

## 2023-07-30 NOTE — PROGRESS NOTES
Bedside report received from night RN. Assumed care of patient. Daily plan of care discussed. Pt resting comfortably, wakes easily to voice. Pt aware of plan to discharge home today around 1100. 12 hour chart check complete. Hourly rounding in place.

## 2023-07-30 NOTE — PROGRESS NOTES
4 Eyes Skin Assessment Completed by ALLAN Bower and ALLAN Solares.    Head WDL  Ears WDL  Nose WDL  Mouth WDL  Neck WDL  Breast/Chest WDL  Shoulder Blades WDL  Spine WDL  (R) Arm/Elbow/Hand Redness and Bruising  (L) Arm/Elbow/Hand Redness, Bruising, and Scabs, healing wounds  Abdomen WDL  Groin WDL  Scrotum/Coccyx/Buttocks Redness, Blanching, and Excoriation  (R) Leg Bruising  (L) Leg Bruising  (R) Heel/Foot/Toe Swelling  (L) Heel/Foot/Toe Swelling          Devices In Places Pulse Ox, SCD's, and Nasal Cannula      Interventions In Place NC W/Ear Foams, Waffle Overlay, Pillows, Q2 Turns, Barrier Cream, and Heels Loaded W/Pillows    Possible Skin Injury Yes    Pictures Uploaded Into Epic Yes  Wound Consult Placed Yes  RN Wound Prevention Protocol Ordered Yes

## 2023-07-30 NOTE — PROGRESS NOTES
Received report from RN. Pt is A&O x4, max x2, regular diet, 2L, and possibly could be discharge to hospice. Precautions put in place bed in the lowest position, call light within reach, and bed alarm is on.     0700: Seen pt at bedside. Reposition pt and pt is awake eating breakfast comfortably in bed. No other needs at the moment.     0800: Pt is comfortably eating breakfast. No other needs at the moment.    0900: Pt is asleep chest observed rising up/down and breathing. No other needs at the moment.    1000:Pt is asleep chest observed rising up/down and breathing. No other needs at the moment.    1100: Pt is awake RN notified pt will be discharging back home. CNA helping pt getting all personal belongings together.  1120: [facility] has arrived [facility]    1200:    1300:    1400:    1500:    1600:    1700:    1800:    1900:

## 2023-07-30 NOTE — PROGRESS NOTES
Received report from day shift nurse, Rachael LEMUS. Assumed pt care at 1915.   Pt is awake, resting comfortably on bed. Pt received with ongoing oxygen at 2.5 Lpm via nasal cannula; no signs of SOB/respiratory distress.   Pt states she still feels shaky that started even before admission. Informed pt that her calcium is still high at 10.7 latest result. Pt states it is also the reason when she was previously admitted. Vital signs taken and recorded.   Pt declines need of prn pain medication at this time. Needs attended well. Fall precautions in place. Bed at lowest position. Call light and personal belongings within reach.   Hourly rounding in progress.     2020 Pt repositioned and made comfortable.   Scheduled medications given as per MAR.  Pt requested zolpidem; given as per MAR.     2135 Attempted to reinsert IV x3 unsuccessful. Escalated to charge nurse for assistance.   Pt tolerated procedure; not in any form of distress.    2300 Pt resting with eyes closed; not in any form of distress.  IV removed with tip intact. New IV 20G  reinserted by charge nurse at left AC; patent and infusing well. Pt tolerated procedure.   Hourly rounding in progress.     0000 Pt sleeping comfortably; even and unlabored breathing noted. Pt declines to be repositioned at this time.    0200 Pt sleeping comfortably; even and unlabored breathing noted.  Pt declines to be repositioned at this time.    0430 Pt repositioned and made comfortable. Scheduled medications given as per MAR.    0600 Pt sleeping comfortably; even and unlabored breathinf noted; pt declines to be repositioned at this time.  Hourly rounding in progress.    Report given to Demian LEMUS.

## 2023-07-30 NOTE — DISCHARGE PLANNING
Anticipated Discharge Disposition: gMT to  [facility]    Action: CM spoke with Maisha at [facility] and they have all they need orders etc., GMT set for 11-11:30 today.  Please send AVS , facesheets with pt    Barriers to Discharge: GMT transport    Plan: No further needs will follow along

## 2023-07-30 NOTE — CARE PLAN
The patient is Stable - Low risk of patient condition declining or worsening    Shift Goals  Clinical Goals: Pt will be able to sleep at least 3 hours within the shift. Pt will remain free from falls or injury throughout the shift.  Patient Goals: sleep tonight  Family Goals: n/a    Progress made toward(s) clinical / shift goals:  Pt seen sleeping comfortably in between rounds for more than 3 hours. Pt has zero falls or injury throughout the shift. Hourly rounding in progress.    Patient is not progressing towards the following goals: n/a

## 2023-07-30 NOTE — DISCHARGE SUMMARY
Discharge Summary    CHIEF COMPLAINT ON ADMISSION  Chief Complaint   Patient presents with    Abnormal Labs     Pt sent to ER from assisted living home for High Calcium level - had labs drawn yesterday and MD called to have her brought to ER for eval        Reason for Admission  EMS     Admission Date  7/27/2023    CODE STATUS  Full Code    HPI & HOSPITAL COURSE  Latonia Clinton is a 72 y.o. female who presented 7/27/2023 with abnormal labs.  Patient was supposed to have a follow-up with oncology today however she had labs done yesterday and this showed calcium of 12.6 corrected 13.1.  To the patient was advised to come to the emergency room for evaluation. She was given pamidronate and monitor calcium levels and give her fluid resuscitation.  In reviewing the PET scan results and discussing it with the oncologist the patient is only a hospice candidate and they have no further additional options for her.  She is doing better with pain management with oxycodone.  Hospice was discussed and patient agreeable to transition over to hospice.  On day of discharge her calcium was down to 10.7.  She will continue per hospice care.    Therefore, she is discharged in good and stable condition to hospice.    The patient met 2-midnight criteria for an inpatient stay at the time of discharge.    Discharge Date  7/30/2023    FOLLOW UP ITEMS POST DISCHARGE  [facility]    DISCHARGE DIAGNOSES  Principal Problem:    Hypercalcemia (POA: Yes)  Active Problems:    Chronic obstructive pulmonary disease (HCC) (Chronic) (POA: Yes)      Overview: This is a chronic condition, fairly well controlled, managed by       pulmonology Dr. Mami Allan.  Her regimen includes albuterol nebulizer       as needed, Breo Ellipta, Spiriva and daily Azithormycin, however patient       does not think that those medications are helping her.  She is       experiencing shortness of breath while walking.            She is undergoing extensive  work-up for left pulmonary nodules, attempting       IR guided lung biopsy next week.            Pending PFTs.                CKD (chronic kidney disease) stage 3, GFR 30-59 ml/min (HCC) (Chronic) (POA: Yes)      Overview: This is a chronic condition, relatively stable.  Patient follows with       nephrologist.      She has her next appointment on 12/07 and pending blood work.    Depression, major, recurrent, moderate (HCC) (POA: Yes)      Overview: PHQ-9 Screening 8/10/2022 11/24/2021       Little interest or pleasure in doing things 3 - nearly every day 0 - not       at all       Feeling down, depressed, or hopeless 2 - more than half the days 0 - not       at all       Trouble falling or staying asleep, or sleeping too much 3 - nearly every       day -       Feeling tired or having little energy 2 - more than half the days -       Poor appetite or overeating 2 - more than half the days -       Feeling bad about yourself - or that you are a failure or have let       yourself or your family down 2 - more than half the days -       Trouble concentrating on things, such as reading the newspaper or watching       television 0 - not at all -       Moving or speaking so slowly that other people could have noticed. Or the       opposite - being so fidgety or restless that you have been moving around a       lot more than usual 2 - more than half the days -       Thoughts that you would be better off dead, or of hurting yourself in some       way 0 - not at all -       PHQ-2 Total Score 5 0       PHQ-9 Total Score 16 -                   Interpretation of PHQ-9 Total Score       Score Severity       1-4 No Depression       5-9 Mild Depression       10-14 Moderate Depression       15-19 Moderately Severe Depression       20-27 Severe Depression            Patient has no suicidal thoughts or ideations, she is not interested in       taking medication for her symptoms at this time.  She will let me know if       she decides  to proceed with pharmacotherapy or psychotherapy.                Other specified glaucoma (POA: Yes)    Other insomnia (Chronic) (POA: Yes)      Overview: This is a chronic condition, patient has been taking Ambien 5mg tab       nightly for many months now.       The medication is helping her to improve her symptoms.       No early refills on controlled substances.      No history of lost or stolen substance prescriptions      Compliant with treatment recommendations and plan: Yes      Any major health change to the patient: No      Concerns for misuse, abuse or addiction: No      /NarxCheck report reviewed: Yes      History of abnormal drug screening: Yes 11/08/2022 - pos for alcohol, will       return for recollect       PDMP reviewed: Last filled on 1/14/2023, 30 tab, 30 days supply.                                   Hyperparathyroidism (HCC) (Chronic) (POA: Yes)      Overview: This condition is managed by endocrinology, not on any medications as of       now.            Component          Latest Ref Rng 12/6/2022       Calcium          8.5 - 10.5 mg/dL 9.6        Pth, Intact          14.0 - 72.0 pg/mL 346.0 (H)               (H) High          Debility (POA: Yes)      Overview: Patient is presenting today for 3-month follow-up, complaining of fatigue.      Since Monday she has been feeling more short of breath and she has been       experiencing generalized weakness.  She felt like she had COPD       exacerbation and she has started taking prednisone and doxycycline.  She       has not felt significantly better.  She has been using her rescue       albuterol inhaler more frequently, she has not used her nebulizer yet.      She denies chest pain, worsening dizziness, dysuria, diarrhea or       constipation.      She had facet joint injection with spine surgeon last week and did not       have any significant improvement.      She had MRI of her lumbar and thoracic spine in May 2022 -that showed       multiple  chronic compression fractures of her thoracic spine.  No new       fractures.  Spinal stenosis.  Patient is still experiencing bilateral       lower extremity weakness, she denies saddle anesthesia, urinary or bowel       incontinence, fever, chills or night sweats.          Chronic anticoagulation (POA: Yes)      Overview: Patient is chronically on Xarelto due to chronic DVT.  She started       noticing easy bruising recently.  She is wondering if her dose is       adequate.    Recurrent non-small cell lung cancer (HCC) (POA: Yes)      Overview: Repeat CT chest 5/9/22 showed stable lingular nodule but new 7 mm nodule       and repeat CT in 9/2022 showed increase in size of both lingular nodule       14x15 mm and RLL nodule 8mmx10 mm. She underwent bronchoscopy with ion       biopsy of the lingular nodule which returned as adenoCa. She was seen by       [facility] and referred to radiation oncology after PET showed FDG uptake in       lingular nodule and new, FDG avid RUL nodule. No FDG uptake in RLL. MRI       negative for metastasis.             [facility] note reviewed.  Briefly: History of adenocarcinoma       of the lung in 2016 status post resection of left upper lobe mass [id number].        30-pack-year smoking history quit in 2000, moderate to severe COPD.  Most       recent PET scan on 1/28/2022 was found to have a small lingular nodule       with mild FDG avid concerning for malignancy.  On 11/15/2022 she underwent       CT-guided biopsy of the left lingular pulmonary node, consistent with       adenocarcinoma.             Brain MRI showed no evidence of metastatic disease.  Follow-up PET CT scan       showed a lesion of concern in the left lingula and another 1 in the right       lung.            She has no signs of overt metastatic disease.             She has had a total of 5 sessions of radiation, she tolerated them well.        Following up with Dr. Rizvi on February 22, 2023, plan for repeat CT        scan in 6-8 weeks.        Resolved Problems:    * No resolved hospital problems. *      FOLLOW UP  No future appointments.  [facility] - [address]  [place] [address]  [phone]  Follow up        MEDICATIONS ON DISCHARGE     Medication List        START taking these medications        Instructions   cinacalcet 30 MG Tabs  Start taking on: July 31, 2023  Commonly known as: Sensipar   Take 1 Tablet by mouth every day.  Dose: 30 mg            CHANGE how you take these medications        Instructions   rivaroxaban 20 MG Tabs tablet  What changed:   how much to take  how to take this  when to take this  additional instructions  Commonly known as: Xarelto   Doctor's comments: This Rx has been ordered by a pharmacist working under a collaborative practice agreement.  TAKE ONE TABLET BY MOUTH DAILY WITH DINNER            CONTINUE taking these medications        Instructions   acetaminophen 325 MG Tabs  Commonly known as: Tylenol   Take 650 mg by mouth every 6 hours as needed for Mild Pain.  Dose: 650 mg     albuterol 108 (90 Base) MCG/ACT Aers inhalation aerosol   Inhale 2 Puffs every 6 hours as needed for Shortness of Breath.  Dose: 2 Puff     allopurinol 100 MG Tabs  Commonly known as: Zyloprim   Take 100 mg by mouth every day.  Dose: 100 mg     Aspercreme Lidocaine 4 % Crea  Generic drug: Lidocaine HCl   Apply 1 Application topically 2 times a day.  Dose: 1 Application     atorvastatin 20 MG Tabs  Commonly known as: Lipitor   Take 20 mg by mouth every evening.  Dose: 20 mg     brimonidine 0.2 % Soln  Commonly known as: Alphagan   Administer 1 Drop into both eyes 2 times a day.  Dose: 1 Drop     Combigan 0.2-0.5 % Soln  Generic drug: Brimonidine Tartrate-Timolol   Administer 1 Drop into both eyes 2 times a day.  Dose: 1 Drop     ergocalciferol 31531 UNIT capsule  Commonly known as: Drisdol   Take 1 Capsule by mouth every 7 days.  Dose: 50,000 Units     gabapentin 300 MG Caps  Commonly  known as: Neurontin   Take 300 mg by mouth 2 times a day.  Dose: 300 mg     Klor-Con M20 20 MEQ Tbcr  Generic drug: potassium chloride SA   Take 20 mEq by mouth every day.  Dose: 20 mEq     magnesium oxide 400 MG Tabs tablet  Commonly known as: Mag-Ox   Take 400 mg by mouth every evening.  Dose: 400 mg     methimazole 5 MG Tabs  Commonly known as: Tapazole   TAKE ONE TABLET BY MOUTH DAILY     midodrine 5 MG Tabs  Commonly known as: Proamatine   Take 5 mg by mouth 2 times a day.  Dose: 5 mg     senna-docusate 8.6-50 MG Tabs  Commonly known as: Pericolace Or Senokot S   Take 1 Tablet by mouth every day.  Dose: 1 Tablet     timolol 0.5 % Soln  Commonly known as: Timoptic   Administer 1 Drop into both eyes 2 times a day.  Dose: 1 Drop     tiotropium 18 MCG Caps  Commonly known as: Spiriva   Place 18 mcg into inhaler and inhale every day.  Dose: 18 mcg     Tussin DM  MG/5ML Liqd soln  Generic drug: guaifenesin dextromethorphan sugar free   Take 10 mL by mouth every four hours as needed for Cough.  Dose: 10 mL     Wixela Inhub 500-50 MCG/ACT Aepb  Generic drug: fluticasone-salmeterol   Inhale 1 Puff 2 times a day.  Dose: 1 Puff     zolpidem 5 MG Tabs  Commonly known as: Ambien   Take 5 mg by mouth at bedtime as needed for Sleep.  Dose: 5 mg            STOP taking these medications      furosemide 40 MG Tabs  Commonly known as: Lasix              Allergies  No Known Allergies    DIET  Orders Placed This Encounter   Procedures    Diet Order Diet: Regular     Standing Status:   Standing     Number of Occurrences:   1     Order Specific Question:   Diet:     Answer:   Regular [1]       ACTIVITY  As tolerated.  Weight bearing as tolerated    CONSULTATIONS  None    PROCEDURES  None    LABORATORY  Lab Results   Component Value Date    SODIUM 140 07/30/2023    POTASSIUM 4.4 07/30/2023    CHLORIDE 110 07/30/2023    CO2 22 07/30/2023    GLUCOSE 106 (H) 07/30/2023    BUN 23 (H) 07/30/2023    CREATININE 1.18 07/30/2023     CREATININE 1.7 (H) 09/19/2008        Lab Results   Component Value Date    WBC 5.7 07/30/2023    HEMOGLOBIN 9.0 (L) 07/30/2023    HEMATOCRIT 30.7 (L) 07/30/2023    PLATELETCT 278 07/30/2023        Total time of the discharge process exceeds 38 minutes.

## 2023-08-29 NOTE — ED NOTES
Patient arrived ambulatory to room with mother. Symptoms started 2 days ago. +sore throat +nasal congestion +slight cough. +fatigue +chills. Pt denies taking temperatures at home. 2 episodes of vomiting today. No diarrhea. Easy non labored respirations. Xray at bedside.

## 2023-10-01 PROBLEM — Z66 DNR (DO NOT RESUSCITATE): Status: ACTIVE | Noted: 2023-01-01

## 2023-10-01 PROBLEM — Z71.89 ACP (ADVANCE CARE PLANNING): Status: ACTIVE | Noted: 2023-01-01

## 2023-10-01 PROBLEM — Z78.9 DNI (DO NOT INTUBATE): Status: ACTIVE | Noted: 2023-01-01

## 2023-10-01 PROBLEM — N17.9 AKI (ACUTE KIDNEY INJURY) (HCC): Status: ACTIVE | Noted: 2023-01-01

## 2023-10-01 NOTE — ED NOTES
Medication history reviewed with MAR from [facility] ([phone]). Med rec is complete.  Allergies reviewed, per MAR from [facility]    Called [facility] @ [phone] to verify if pt took her AM medications today, per facility pt did take all her morning medications today @ 0800    Patient has had outpatient antibiotics in the last 30 days.  Pt was on FLUCONAZOLE 100MG on 9/13/2023 for 5 day course, per MAR shows that pt did finish course of antibiotic     Pt is on anticoagulants, pt is on XARELTO 20MG 1 tablet QDAY last does was 9/30/2023 in the evening

## 2023-10-01 NOTE — ASSESSMENT & PLAN NOTE
Mostly due to hypercalcemia, and severe dehydration on top of chronic kidney disease  Renal ultrasound done June 2023 reviewed, it shows bilateral nephrolithiasis without evidence of obstruction.  Echogenic kidneys with thin cortex favoring medical renal disease  I will start intravenous fluids  Avoid / minimize nephrotoxins as much as possible.  Monitor inputs and outputs  Has a Gonzalez catheter, we will replace.

## 2023-10-01 NOTE — ASSESSMENT & PLAN NOTE
Leading to [facility]   EKG shows sinus rhythm with a rate of 67 there is no ST elevation, there is T wave inversions in lead V2-V4.  Diffuse T wave abnormalities consistent with hypercalcemia.  QTc is 372.  Pamidronate given  Calcium down to 10.9  Explained to patient that this is recurrent and will continue to be recurrent while she is still alive given the bony involvement from her stage IV metastatic lung cancer, hospice recommended

## 2023-10-01 NOTE — H&P
Hospital Medicine History & Physical Note    Date of Service  10/1/2023    Primary Care Physician  No primary care provider on file.    Consultants  None     Code Status  DNAR/DNI    Chief Complaint  Chief Complaint   Patient presents with    Weakness     X 4 days worse today    Loss of Appetite     History of Presenting Illness  Latonianavid Clinton is a 73 y.o. female with a past medical history of metastatic lung cancer, chronic obstructive pulmonary disease, rheumatoid arthritis, chronic kidney disease, primary hypertension and thromboembolic disease on anticoagulation who presented 10/1/2023 with progressively worsening generalized weakness and shortness of breath.  Patient reports that she has not been feeling well over the past 4-5 days.  She denies having fevers or chills.     I discussed the plan of care with emergency department physician, the patient and emergency room nursing staff    Review of Systems  Review of Systems   Constitutional:  Positive for malaise/fatigue. Negative for chills and fever.   Eyes:  Negative for discharge and redness.   Respiratory:  Negative for cough, shortness of breath and stridor.    Cardiovascular:  Negative for chest pain and leg swelling.   Gastrointestinal:  Positive for nausea. Negative for abdominal pain and vomiting.        Anorexia   Genitourinary:  Negative for flank pain.   Musculoskeletal:  Negative for myalgias.   Skin: Negative.    Neurological:  Negative for focal weakness.   Endo/Heme/Allergies:  Does not bruise/bleed easily.   Psychiatric/Behavioral:  The patient is not nervous/anxious.      Past Medical History   has a past medical history of Anesthesia, Asthma, Backpain (07/2017), Blood clot in vein (07/06/2018), Bowel habit changes (07/06/2018), Breath shortness, Bronchitis, CAD (coronary artery disease), Cancer ([facility]) (2016), Chickenpox, COPD (chronic obstructive pulmonary disease) ([facility]), Depression (02/26/2019), Dialysis (2005), Emphysema of lung ([facility]),  Glaucoma, Hemorrhagic disorder (HCC), Hyperlipidemia, Hypertension, Hyperthyroidism, Kidney stone, Lung cancer (HCC) (12/12/2016), Multiple thyroid nodules (07/09/2015), Nasal drainage, Obstruction of left ureteropelvic junction (UPJ) due to stone (06/17/2018), Osteoporosis, Pain (07/06/2018), Personal history of venous thrombosis and embolism (2004, 2012), Pneumonia (07/2016), Renal disorder, Renal stones (2013), Rheumatoid arthritis (HCC), Rheumatoid arthritis (HCC), S/P appendectomy (1998), S/P cholecystectomy (1998), S/P kyphoplasty (2006, 2007, 2013), Sepsis, unspecified (2005), Shortness of breath (07/06/2018), and Thyroid nodule, hot (2017).    Surgical History   has a past surgical history that includes other; other abdominal surgery; pr breast augmentation with implant; pr breast reduction; appendectomy; cholecystectomy; laminotomy; lung biopsy open; thoracoscopy (Left, 12/12/2016); other orthopedic surgery (2013); cystoscopy stent placement (Left, 6/18/2018); lithotripsy (Left, 6/18/2018); lasertripsy (Left, 6/18/2018); ureteroscopy (Left, 6/18/2018); cystoscopy stent placement (7/9/2018); ureteroscopy (Left, 7/9/2018); lasertripsy (Left, 7/9/2018); and bronchoscopy, robot-assisted (10/11/2022).     Family History  family history includes Cancer in her maternal uncle, mother, paternal aunt, and paternal uncle; Heart Disease in her brother; Heart Failure in her father.      Social History   reports that she quit smoking about 23 years ago. Her smoking use included cigarettes. She started smoking about 53 years ago. She has a 30.0 pack-year smoking history. She has never used smokeless tobacco. She reports that she does not currently use alcohol. She reports that she does not use drugs.    Allergies  No Known Allergies    Medications  Prior to Admission Medications   Prescriptions Last Dose Informant Patient Reported? Taking?   Brimonidine Tartrate-Timolol (COMBIGAN) 0.2-0.5 % Solution 10/1/2023 at 0800 MAR  from Other Facility Yes No   Sig: Administer 1 Drop into both eyes 2 times a day.   Dextromethorphan-guaiFENesin (TUSSIN DM PO) 9/29/2023 at Unknown MAR from Other Facility Yes Yes   Sig: Take 10 mL by mouth every four hours as needed (For cough).   Lidocaine HCl (ASPERCREME LIDOCAINE) 4 % Cream 9/14/2023 at D/C MAR from Other Facility Yes No   Sig: Apply 1 Application topically 2 times a day.   acetaminophen (TYLENOL) 325 MG Tab 9/7/2023 at Unknown MAR from Other Facility Yes No   Sig: Take 650 mg by mouth every 6 hours as needed for Mild Pain.   albuterol (PROVENTIL) 2.5mg/3ml Nebu Soln solution for nebulization 9/27/2023 at Unknown MAR from Other Facility Yes Yes   Sig: Take 2.5 mg by nebulization every four hours as needed for Shortness of Breath.   albuterol 108 (90 Base) MCG/ACT Aero Soln inhalation aerosol 9/20/2023 at Unknown MAR from Other Facility Yes No   Sig: Inhale 2 Puffs every 6 hours as needed for Shortness of Breath.   allopurinol (ZYLOPRIM) 100 MG Tab 9/18/2023 at D/C MAR from Other Facility Yes No   Sig: Take 100 mg by mouth every day.   atorvastatin (LIPITOR) 20 MG Tab 9/18/2023 at D/C MAR from Other Facility Yes No   Sig: Take 20 mg by mouth every evening.   bisacodyl (DULCOLAX) 10 MG Suppos 9/17/2023 at Unknown MAR from Other Facility Yes Yes   Sig: Insert 10 mg into the rectum as needed. Indications: Constipation   cinacalcet (SENSIPAR) 30 MG Tab 10/1/2023 at 0800 MAR from Other Facility No No   Sig: Take 1 Tablet by mouth every day.   diphenhydrAMINE (BENADRYL) 25 MG Tab 9/6/2023 at D/C MAR from Other Facility Yes Yes   Sig: Take 25 mg by mouth every 6 hours as needed for Itching.   docusate sodium (COLACE) 100 MG Cap 10/1/2023 at 0800 MAR from Other Facility Yes Yes   Sig: Take 100 mg by mouth every day.   fluconazole (DIFLUCAN) 100 MG Tab 9/17/2023 at FINISHED MAR from Other Facility Yes Yes   Sig: Take 100-200 mg by mouth every day. Per MAR, pt started on 9/13/2023 for 5 day course, pt  took 200MG on the first day and then 100MG for the next 4 days   fluticasone-salmeterol (WIXELA INHUB) 500-50 MCG/ACT AEROSOL POWDER, BREATH ACTIVATED 10/1/2023 at 0800 MAR from Other Facility Yes No   Sig: Inhale 1 Puff 2 times a day.   gabapentin (NEURONTIN) 300 MG Cap 10/1/2023 at 0800 MAR from Other Facility Yes No   Sig: Take 300 mg by mouth 2 times a day.   hydrocortisone 1 % Cream 9/16/2023 at Unknown MAR from Other Facility Yes Yes   Sig: Apply 1 Application topically every four hours as needed (Noemi MAR apply's to itchy area).   linaCLOtide (LINZESS) 145 MCG Cap 9/16/2023 at D/C MAR from Other Facility Yes Yes   Sig: Take 145 mcg by mouth 2 times a day.   magnesium citrate Solution 9/26/2023 at Unknown MAR from Other Facility Yes Yes   Sig: Take 300 mL by mouth one time. Per MAR pt started on 9/24/2023 for 3 day course   magnesium oxide (MAG-OX) 400 MG Tab tablet 9/30/2023 at PM MAR from Other Facility Yes No   Sig: Take 400 mg by mouth every evening.   methimazole (TAPAZOLE) 5 MG Tab 10/1/2023 at 0800 MAR from Other Facility No No   Sig: TAKE ONE TABLET BY MOUTH DAILY   Patient taking differently: Take 5 mg by mouth every day.   midodrine (PROAMATINE) 5 MG Tab 10/1/2023 at 0800 MAR from Other Facility Yes No   Sig: Take 5 mg by mouth 2 times a day.   morphine 100 MG/5ML Solution 9/29/2023 at Unknown MAR from Other Facility Yes Yes   Sig: Take 5 mg by mouth every 1 hour as needed. Per MAR gives 0.25ML   oxyCODONE immediate-release (ROXICODONE) 5 MG Tab 10/1/2023 at 0800 MAR from Other Facility Yes Yes   Sig: Take 5-10 mg by mouth 4 times a day. Pt takes 10MG in AM and 5MG @ 1200, 1600, and 2000   prochlorperazine (COMPAZINE) 10 MG Tab 9/29/2023 at Unknown MAR from Other Facility Yes Yes   Sig: Take 10 mg by mouth every 8 hours as needed for Nausea/Vomiting.   rivaroxaban (XARELTO) 20 MG Tab tablet 9/30/2023 at PM MAR from Other Facility No No   Sig: TAKE ONE TABLET BY MOUTH DAILY WITH DINNER   Patient  taking differently: Take 20 mg by mouth at bedtime.   sennosides (SENOKOT) 8.6 MG Tab 10/1/2023 at 0800 MAR from Other Facility Yes Yes   Sig: Take 8.6 mg by mouth 2 times a day.   sodium phosphate (FLEET) 7-19 GM/118ML Enema 9/28/2023 at Unknown MAR from Other Facility Yes Yes   Sig: Insert 1 Each into the rectum one time. Per MAR pt started on 9/28/2023 for 3 day course   tiotropium (SPIRIVA RESPIMAT) 2.5 mcg/Act Aero Soln 9/17/2023 at D/C MAR from Other Facility Yes Yes   Sig: Inhale 5 mcg every day.   zolpidem (AMBIEN) 5 MG Tab 9/30/2023 at PM MAR from Other Facility Yes No   Sig: Take 5 mg by mouth at bedtime as needed for Sleep.      Facility-Administered Medications: None     Physical Exam  Temp:  [36.7 °C (98.1 °F)] 36.7 °C (98.1 °F)  Pulse:  [61-79] 64  Resp:  [12-29] 29  BP: (119-173)/(68-89) 127/70  SpO2:  [94 %-98 %] 97 %  Blood Pressure : (!) 141/79   Temperature: 36.7 °C (98.1 °F)   Pulse: 71   Respiration: 12   Pulse Oximetry: 96 %     Physical Exam  Constitutional:       General: She is not in acute distress.  HENT:      Head: Normocephalic and atraumatic.      Right Ear: External ear normal.      Left Ear: External ear normal.      Nose: No congestion or rhinorrhea.      Mouth/Throat:      Mouth: Mucous membranes are moist.      Pharynx: No oropharyngeal exudate or posterior oropharyngeal erythema.   Eyes:      General: No scleral icterus.        Right eye: No discharge.         Left eye: No discharge.      Conjunctiva/sclera: Conjunctivae normal.      Pupils: Pupils are equal, round, and reactive to light.   Cardiovascular:      Heart sounds:      No friction rub. No gallop.   Pulmonary:      Comments: Requiring 3 L of oxygen to achieve adequate saturation.  Abdominal:      General: Abdomen is flat. There is no distension.      Tenderness: There is no guarding.   Musculoskeletal:         General: No swelling.      Cervical back: Neck supple. No rigidity. No muscular tenderness.      Right lower  "leg: No edema.      Left lower leg: No edema.   Skin:     Capillary Refill: Capillary refill takes 2 to 3 seconds.      Coloration: Skin is not jaundiced or pale.      Findings: No bruising or erythema.   Neurological:      Mental Status: She is alert and oriented to person, place, and time.   Psychiatric:         Mood and Affect: Mood normal.         Judgment: Judgment normal.       Laboratory:  Recent Labs     10/01/23  1304   WBC 8.3   RBC 3.33*   HEMOGLOBIN 8.9*   HEMATOCRIT 29.6*   MCV 88.9   MCH 26.7*   MCHC 30.1*   RDW 55.6*   PLATELETCT 277   MPV 10.6     Recent Labs     10/01/23  1304 10/01/23  1854   SODIUM 133* 131*   POTASSIUM 4.3 5.0   CHLORIDE 91* 93*   CO2 30 25   GLUCOSE 92 120*   BUN 50* 51*   CREATININE 3.62* 3.63*   CALCIUM 12.9* 12.7*     Recent Labs     10/01/23  1304 10/01/23  1854   ALTSGPT 5  --    ASTSGOT 12  --    ALKPHOSPHAT 124*  --    TBILIRUBIN 0.3  --    LIPASE 9*  --    GLUCOSE 92 120*         No results for input(s): \"NTPROBNP\" in the last 72 hours.      No results for input(s): \"TROPONINT\" in the last 72 hours.    Imaging:  DX-CHEST-PORTABLE (1 VIEW)   Final Result      1.  Enlarged cardiac silhouette with changes of vascular congestion/edema.   2.  Bilateral parenchymal opacities could be due to edema, atelectasis or pneumonitis.        I personally reviewed patient EKG, it shows sinus rhythm with a rate of 67 there is no ST elevation, there is T wave inversions in lead V2-V4.  Diffuse T wave abnormalities consistent with hypercalcemia.  QTc is 372.    Assessment/Plan:  Justification for Admission Status  I anticipate this patient will require at least two midnights for appropriate medical management, necessitating inpatient admission because the patient has hypercalcemia and acute kidney injury, will require intravenous fluids and monitoring calcium level , renal function and volume status    Patient will need a  bed on telemetry floor.  Medical service.    * Hypercalcemia- " (present on admission)  Assessment & Plan  Leading to ISAMAR   Corrected Ca 13.2     EKG shows sinus rhythm with a rate of 67 there is no ST elevation, there is T wave inversions in lead V2-V4.  Diffuse T wave abnormalities consistent with hypercalcemia.  QTc is 372.  I will start aggressive intravenous fluids  I will start intravenous diuresis with Lasix  I will start intravenous steroids  Consider starting palindromic acid if above did not improve calcium levels    Acute kidney injury on top of chronic kidney disease (HCC)- (present on admission)  Assessment & Plan  Mostly due to hypercalcemia, and severe dehydration on top of chronic kidney disease  Renal ultrasound done June 2023 reviewed, it shows bilateral nephrolithiasis without evidence of obstruction.  Echogenic kidneys with thin cortex favoring medical renal disease  I will start intravenous fluids  Avoid / minimize nephrotoxins as much as possible.  Monitor inputs and outputs  Has a Gonzalez catheter, we will replace.  Consider further diagnostic testing if renal function did not improve with above measures    ACP (advance care planning)- (present on admission)  Assessment & Plan  I had a prolonged discussion with the patient regarding goals of care, diagnoses, prognosis, and CODE STATUS. We discussed her prognosis and comorbidities.  The patient has metastatic lung cancer with hypercalcemia.  She is currently on hospice.  There are no further options.  Oncology/Dr. Rizvi.  She has hypercalcemia and severe dehydration.  At this point the patient would like to have her calcium lowered with medication/fluids.  She still wants to maintain a DNAR/DNI code status.     DNR (do not resuscitate)- (present on admission)  Assessment & Plan  POLST 01/28/19. DNR/DNI code status confirmed with patient on admission       DNI (do not intubate)- (present on admission)  Assessment & Plan  POLST 01/28/19. DNR/DNI code status confirmed with patient on admission      Recurrent  non-small cell lung cancer (HCC)- (present on admission)  Assessment & Plan  Used to follows w Dr Rizvi, nothing further to offer from chart review   Now on hospice     Deep vein thrombosis (DVT) (HCC)- (present on admission)  Assessment & Plan  Takes rivaroxaban at home.  I will switch to apixaban given her current renal function.  Discussed with pharmacy    Thyrotoxicosis with toxic single thyroid nodule and without thyroid storm- (present on admission)  Assessment & Plan  Resume methimazole    Chronic obstructive pulmonary disease (HCC)- (present on admission)  Assessment & Plan  Baseline O2 is 1.5-2L  Now requiring 3L  Oxygen as needed, Respiratory protocol, Bronchodilators, Incentive spirometry, Steroids     VTE prophylaxis: SCDs/TEDs and therapeutic anticoagulation with switching to Eliquis given her current renal function    I had a prolonged discussion with the patient regarding goals of care, diagnoses, prognosis, and CODE STATUS. We discussed her prognosis and comorbidities.  The patient has metastatic lung cancer with hypercalcemia.  She is currently on hospice.  There are no further options.  Oncology/Dr. Rizvi.  She has hypercalcemia and severe dehydration.  At this point the patient would like to have her calcium lowered with medication/fluids.  She still wants to maintain a DNAR/DNI code status. I spent 21 minutes on advanced care planning in addition to the time spent managing the other medical problems.  Advance care planning start time 4:21 PM. Advance care planning end time 4:42 PM.

## 2023-10-01 NOTE — ED PROVIDER NOTES
Emergency Physician Note    Chief Concern:  Chief Complaint   Patient presents with    Weakness     X 4 days worse today    Loss of Appetite         Limitation to History:  Select: Limitation: Patient does not recall her entire past medical history    HPI/ROS   Outside Historians:   None.     External Records Reviewed:  Ms. Clinton was admitted to this facility June 2023.  Hospital medicine discharge summary reviewed from 7/30/2023.  She initially presented to the emergency department for evaluation of abnormal outpatient labs.  Noted that she had labs done for an oncology follow-up appointment which showed a calcium of 12.6, corrected to 13.1.  Noted that she was given pamidronate, calcium levels were monitored, she also received fluid resuscitation.  PET scan results were reviewed, this was discussed with oncology and it is noted that she is a hospice candidate with no further additional treatment options for her.     During hospitalization in June 2023, she received a palliative medicine consult.  That consult note was reviewed from 6/27/2023.  She is noted to have a history of metastatic lung cancer from lung to bones, unlikely to tolerate any significant amount of further cancer treatment to lengthen prognosis.  At that time, was noted that she wanted to discuss this with her oncologist, Dr. Rizvi.    HPI:  Latonia Clinton is a 73 y.o. female who presents to the emergency department today with concern for feeling generally unwell.  She had symptoms for about the past 4 days, states she had some generalized fatigue, diffuse abdominal pain localized predominantly in the upper abdomen.  She has had some nausea and loss of appetite but no active vomiting.  She also reports mild cough, with no significant shortness of breath.  She states she does have a history of COPD, does not know of any other significant past medical history, the medical record review notes that she has a history of metastatic lung cancer.   "She reports no new lower extremity edema, no extremity pain.  She has not noticed any fevers, no night sweats, no unintentional weight loss.  She reports no sick contacts.  States that there was no specific worsening symptom in particular that caused her to activate EMS.  She also states that she is on home oxygen at about 2-1/2 L 24/7.    PAST MEDICAL HISTORY  Past Medical History:   Diagnosis Date    Anesthesia     \"Abnormal blood pressure and breathing\"  \"couldn't breathe\"    Asthma     inhalers daily    Backpain 07/2017     thor. area    Blood clot in vein 07/06/2018    \"Currently have a blood clot in my left eye\"    Bowel habit changes 07/06/2018    Constipation    Breath shortness     with exertion has O2 but does not use    Bronchitis     CAD (coronary artery disease)     palpatations    Cancer (HCC) 2016    left lung    Chickenpox     COPD (chronic obstructive pulmonary disease) (HCC)     Depression 02/26/2019    Dialysis 2005    transient renal failure due to sepsis    Emphysema of lung (HCC)     Glaucoma     right eye    Hemorrhagic disorder (HCC)     Hyperlipidemia     Hypertension     Hyperthyroidism     Kidney stone     bilateral    Lung cancer (HCC) 12/12/2016    Multiple thyroid nodules 07/09/2015    Nasal drainage     Obstruction of left ureteropelvic junction (UPJ) due to stone 06/17/2018    Osteoporosis     Pain 07/06/2018    Back pain    Personal history of venous thrombosis and embolism 2004, 2012    right leg 2012, left arm dvt 2004 left eye 2017    Pneumonia 07/2016    per patient    Renal disorder     had been on dialysis for 7 months for acute failure 2005    Renal stones 2013    post lithotripsy    Rheumatoid arthritis (HCC)     question    Rheumatoid arthritis (HCC)     S/P appendectomy 1998    S/P cholecystectomy 1998    S/P kyphoplasty 2006, 2007, 2013    Sepsis, unspecified 2005    Shortness of breath 07/06/2018    Chronic current problem. \"A couple of years now\".  O2 concentrator at " "night - pt states she doesn't use it.    Thyroid nodule, hot 2017    functional \"hot\" right thyroid nodule without thyrotoxicosis       SURGICAL HISTORY  Past Surgical History:   Procedure Laterality Date    BRONCHOSCOPY, ROBOT-ASSISTED  10/11/2022    Procedure: FIBER OPTIC BRONCHOSCOPY WITH  WASH, BRUSH, BRONCHOALVEOLAR LAVAGE, BIOSPY, FINE NEEDLE ASPIRATION & NAVIGATION, ROBOTICS;  Surgeon: Donita Haque M.D.;  Location: [facility];  Service: Pulmonary Robotic    CYSTOSCOPY STENT PLACEMENT  7/9/2018    Procedure: Cystoscopy,  Left removal of stent ,  Left Stent Placement;  Surgeon: Beck Yang M.D.;  Location: [facility];  Service: Urology    URETEROSCOPY Left 7/9/2018    Procedure: URETEROSCOPY;  Surgeon: Beck Yang M.D.;  Location: [facility];  Service: Urology    LASERTRIPSY Left 7/9/2018    Procedure: LASERTRIPSY-LITHO;  Surgeon: Beck Yang M.D.;  Location: [facility];  Service: Urology    CYSTOSCOPY STENT PLACEMENT Left 6/18/2018    Procedure: CYSTOSCOPY STENT PLACEMENT;  Surgeon: Beck Yang M.D.;  Location: [facility];  Service: Urology    LITHOTRIPSY Left 6/18/2018    Procedure: LITHOTRIPSY;  Surgeon: Beck Yang M.D.;  Location: [facility];  Service: Urology    LASERTRIPSY Left 6/18/2018    Procedure: LASERTRIPSY;  Surgeon: Beck Yang M.D.;  Location: [facility];  Service: Urology    URETEROSCOPY Left 6/18/2018    Procedure: URETEROSCOPY;  Surgeon: Beck Yang M.D.;  Location: [facility];  Service: Urology    THORACOSCOPY Left 12/12/2016    Procedure: THORACOSCOPY W/WEDGE RESECTION UPPER LOBE MASS;  Surgeon: John H Ganser, M.D.;  Location: [facility];  Service:     OTHER ORTHOPEDIC SURGERY  2013    kyphoplasty X3    APPENDECTOMY      1990    CHOLECYSTECTOMY      1990    LAMINOTOMY      LUNG BIOPSY OPEN      OTHER      OTHER ABDOMINAL SURGERY      gall bladder " disease    KY BREAST AUGMENTATION WITH IMPLANT      KY BREAST REDUCTION         FAMILY HISTORY  Family History   Problem Relation Age of Onset    Cancer Mother         kidney    Heart Failure Father     Heart Disease Brother     Cancer Maternal Uncle         liver    Cancer Paternal Aunt         ovarian    Cancer Paternal Uncle         lung       SOCIAL HISTORY   reports that she quit smoking about 23 years ago. Her smoking use included cigarettes. She started smoking about 53 years ago. She has a 30.0 pack-year smoking history. She has never used smokeless tobacco. She reports that she does not currently use alcohol. She reports that she does not use drugs.    CURRENT MEDICATIONS  Previous Medications    ACETAMINOPHEN (TYLENOL) 325 MG TAB    Take 650 mg by mouth every 6 hours as needed for Mild Pain.    ALBUTEROL (PROVENTIL) 2.5MG/3ML NEBU SOLN SOLUTION FOR NEBULIZATION    Take 2.5 mg by nebulization every four hours as needed for Shortness of Breath.    ALBUTEROL 108 (90 BASE) MCG/ACT AERO SOLN INHALATION AEROSOL    Inhale 2 Puffs every 6 hours as needed for Shortness of Breath.    ALLOPURINOL (ZYLOPRIM) 100 MG TAB    Take 100 mg by mouth every day.    ATORVASTATIN (LIPITOR) 20 MG TAB    Take 20 mg by mouth every evening.    BISACODYL (DULCOLAX) 10 MG SUPPOS    Insert 10 mg into the rectum as needed. Indications: Constipation    BRIMONIDINE TARTRATE-TIMOLOL (COMBIGAN) 0.2-0.5 % SOLUTION    Administer 1 Drop into both eyes 2 times a day.    CINACALCET (SENSIPAR) 30 MG TAB    Take 1 Tablet by mouth every day.    DEXTROMETHORPHAN-GUAIFENESIN (TUSSIN DM PO)    Take 10 mL by mouth every four hours as needed (For cough).    DIPHENHYDRAMINE (BENADRYL) 25 MG TAB    Take 25 mg by mouth every 6 hours as needed for Itching.    DOCUSATE SODIUM (COLACE) 100 MG CAP    Take 100 mg by mouth every day.    FLUCONAZOLE (DIFLUCAN) 100 MG TAB    Take 100-200 mg by mouth every day. Per MAR, pt started on 9/13/2023 for 5 day course, pt  "took 200MG on the first day and then 100MG for the next 4 days    FLUTICASONE-SALMETEROL (WIXELA INHUB) 500-50 MCG/ACT AEROSOL POWDER, BREATH ACTIVATED    Inhale 1 Puff 2 times a day.    GABAPENTIN (NEURONTIN) 300 MG CAP    Take 300 mg by mouth 2 times a day.    HYDROCORTISONE 1 % CREAM    Apply 1 Application topically every four hours as needed (Noemi MAR apply's to itchy area).    LIDOCAINE HCL (ASPERCREME LIDOCAINE) 4 % CREAM    Apply 1 Application topically 2 times a day.    LINACLOTIDE (LINZESS) 145 MCG CAP    Take 145 mcg by mouth 2 times a day.    MAGNESIUM CITRATE SOLUTION    Take 300 mL by mouth one time. Per MAR pt started on 9/24/2023 for 3 day course    MAGNESIUM OXIDE (MAG-OX) 400 MG TAB TABLET    Take 400 mg by mouth every evening.    METHIMAZOLE (TAPAZOLE) 5 MG TAB    TAKE ONE TABLET BY MOUTH DAILY    MIDODRINE (PROAMATINE) 5 MG TAB    Take 5 mg by mouth 2 times a day.    MORPHINE 100 MG/5ML SOLUTION    Take 5 mg by mouth every 1 hour as needed. Per MAR gives 0.25ML    OXYCODONE IMMEDIATE-RELEASE (ROXICODONE) 5 MG TAB    Take 5-10 mg by mouth 4 times a day. Pt takes 10MG in AM and 5MG @ 1200, 1600, and 2000    PROCHLORPERAZINE (COMPAZINE) 10 MG TAB    Take 10 mg by mouth every 8 hours as needed for Nausea/Vomiting.    RIVAROXABAN (XARELTO) 20 MG TAB TABLET    TAKE ONE TABLET BY MOUTH DAILY WITH DINNER    SENNOSIDES (SENOKOT) 8.6 MG TAB    Take 8.6 mg by mouth 2 times a day.    SODIUM PHOSPHATE (FLEET) 7-19 GM/118ML ENEMA    Insert 1 Each into the rectum one time. Per MAR pt started on 9/28/2023 for 3 day course    TIOTROPIUM (SPIRIVA RESPIMAT) 2.5 MCG/ACT AERO SOLN    Inhale 5 mcg every day.    ZOLPIDEM (AMBIEN) 5 MG TAB    Take 5 mg by mouth at bedtime as needed for Sleep.       ALLERGIES  Patient has no known allergies.    PHYSICAL EXAM  Vital Signs: /77   Pulse 65   Temp 36.7 °C (98.1 °F) (Temporal)   Resp 20   Ht 1.651 m (5' 5\")   Wt 90.7 kg (200 lb)   LMP  (LMP Unknown)   SpO2 98%   " BMI 33.28 kg/m²   Constitutional: Afebrile  Cardiovascular: Regular rate and rhythm, no murmur appreciated, no tachycardia  Pulmonary: Breath sounds quiet and equal bilaterally, do not appreciate any wheezing, though she does not have strong inspiratory effort which somewhat limits exam, oxygen saturation 96% on 3 L which is patient's home oxygen requirement  Abdomen: Soft, no abnormal distention, mild diffuse discomfort on abdominal palpation without rebound or guarding, no peritoneal signs  Skin: Warm, dry, no rashes or lesions  Extremities: Warm and well-perfused  Musculoskeletal: Normal range of motion in all extremities, no swelling or deformity noted  Neurologic: Alert, oriented, normal motor function, no speech deficits    Diagnostic Studies & Procedures    Labs:  All labs reviewed by me as noted below.    EK Lead EKG interpreted by me as noted below  Results for orders placed or performed during the hospital encounter of 10/01/23   EKG   Result Value Ref Range    Report       [facility]    Test Date:  2023-10-01  Pt Name:    ROSARIO MARTINEZ              Department: [facility]  MRN:        [id number]                      [facility]  Gender:     Female                       Technician: [id number]  :        [id number]                   Requested By:SHAY CAMPBELL  Order #:    [id number]                    Reading MD: SHAY CAMPBELL MD    Measurements  Intervals                                Axis  Rate:       67                           P:          64  NY:         256                          QRS:        48  QRSD:       92                           T:          45  QT:         352  QTc:        372    Interpretive Statements  Rate 67, sinus rhythm, no ST elevation or depression, T wave inversions  present in V2 and V3 which are changed from prior, no deep Wellens T wave  inversions.  No reciprocal changes.  Electronically Signed On 10- 14:25:33 PDT by SHAY SHEN  MD ADRIAN          Radiology:  The attending Emergency Physician has independently interpreted the following imaging:  I independently interpreted the chest x-ray, there appears to be poor inspiratory effort.  Perihilar opacities may be present on the right.    DX-CHEST-PORTABLE (1 VIEW)   Final Result      1.  Enlarged cardiac silhouette with changes of vascular congestion/edema.   2.  Bilateral parenchymal opacities could be due to edema, atelectasis or pneumonitis.          Course and Medical Decision Making    ED Observation Status? Yes; Differential diagnosis includes severe or life threatening conditions including: hypercalcemia.  Due to diagnostic uncertainty at this time, patient placed in observation status at 1:29 PM, 10/1/2023.     Therapeutic intensity: oxicodone tablet 5 mg    Observation plan is as follows: Serial re-assessments of the patient, Hemodynamic monitoring, Continuous pulse oximetry monitoring, Monitor for response to therapeutic interventions, Laboratory studies or Advanced imaging was ordered    Upon Reevaluation, the patient's condition has: not improved; and will be escalated to hospitalization.    Discharged from ED observation at 4:01 PM, 10/1/2023.    Initial Assessment and Plan  Ms. Clinton presents to the emergency department today for evaluation of feeling generally unwell with generalized fatigue and loss of appetite.  On arrival to the emergency department her vital signs are reassuring, she has no tachycardia, no fever, no hypotension.  Oxygen saturation is at baseline.  There is no evidence of Sirs or sepsis with regard to her vital signs.  She does have some discomfort on abdominal palpation, there are no peritoneal signs.  She has no pain out of proportion to exam.    On laboratory evaluation her hemoglobin today is 8.9 which is consistent with her most recent prior baseline values.  No evidence of recent blood loss.  Urinalysis is positive for many bacteria, urine is nitrite  negative.  She does have an indwelling Gonzalez catheter, this may represent colonization, as she does not report urinary symptoms.  I received a call from the laboratory technician regarding critical report of calcium of 12.9, and corrected calcium of 13.2.  Lipase today is not significantly elevated, no evidence of pancreatitis.  TSH is within normal limits.  COVID-19 testing resulted negative.    Chest x-ray demonstrates bilateral parenchymal opacities, possibly due to edema, atelectasis, or pneumonitis.    Plan at this time is for hospitalization for treatment of hypercalcemia.  Presentation is similar to prior hospitalization for the same concern.    Additional Problems and Disposition    I have discussed management of the patient with the following physician:  1. Dr. Gonzalez (hospitalist)    DISPOSITION:  Patient will be hospitalized by Dr. Gonzalez in guarded condition.     FINAL IMPRESSION   1. Hypercalcemia    2. Weakness        The note accurately reflects work and decisions made by me.  Daisha Hilton M.D.  10/1/2023  7:46 PM      Kuldip ALVARADO (Miryam), am scribing for, and in the presence of, Daisha Hilton M.D.    Electronically signed by: Kuldip Payne (Miryam), 10/1/2023    Daisha ALVARADO M.D. personally performed the services described in this documentation, as scribed by Kuldip Payne in my presence, and it is both accurate and complete.

## 2023-10-01 NOTE — DISCHARGE PLANNING
Anticipated Discharge Disposition:  Return to hospice care.    Action: She is on service with [facility] spoke with rN Jane, they were unaware of her admission to ER .  She is Pale and sallow. Hospice service DR Eve Ace with [facility] she is [facility] [address] [facility] H[facility] Dx Malignant neoplasm of the lung unspecified. C34.90. O2 at group and hospital bed. Dx  Major depressive do, COPD Asthma dvt empysema also.    Barriers to Discharge: None, will need to restart hospice AOC.    Plan:  Floor RN to floor.  Care Transition Team Assessment    Information Source  Orientation Level: Oriented X4  Information Given By: Patient  Informant's Name: Meaghan  Who is responsible for making decisions for patient? : Patient         Elopement Risk  Legal Hold: No  Ambulatory or Self Mobile in Wheelchair: Yes  Disoriented: No  Psychiatric Symptoms: None  History of Wandering: No  Elopement this Admit: No  Vocalizing Wanting to Leave: No  Displays Behaviors, Body Language Wanting to Leave: No-Not at Risk for Elopement    Interdisciplinary Discharge Planning  Lives with - Patient's Self Care Capacity: Other (Comments)  Support Systems: Other (Comments)  Housing / Facility: Other (Comments) [facility])  Name of Care Facility: [facility] [place]  Do You Take your Prescribed Medications Regularly: Yes  Mobility Issues: Yes  Prior Services: Other (Comments)  Assistance Needed: Yes  Durable Medical Equipment: Other - Specify, Home Oxygen (hospice services o2 hospital bed)    Discharge Preparedness  What is your plan after discharge?: Other (comment)  What are your discharge supports?: Other (comment)  Prior Functional Level: Other (Comments), Needs Assist with Medication Management, Needs Assist with Activities of Daily Living    Functional Assesment  Prior Functional Level: Other (Comments), Needs Assist with Medication Management, Needs Assist with Activities of  Daily Living    Finances  Prescription Coverage: Yes                   Domestic Abuse  Have you ever been the victim of abuse or violence?: No              Anticipated Discharge Information  Discharge Disposition: D/T to hospice home (50)

## 2023-10-01 NOTE — ED TRIAGE NOTES
Pt DANNIE Sevilla from her group home  Chief Complaint   Patient presents with    Weakness     X 4 days worse today    Loss of Appetite     Pt reports she has been non ambulatory chronically, bilateral foot drop, lópez in place PTA    Pt resting on gurney, pt in no acute distress, pt provided call light, instructed to call if needing any assistance, instructed not to get up by self, gurney in lowest position.   
,DirectAddress_Unknown,DirectAddress_Unknown

## 2023-10-01 NOTE — ASSESSMENT & PLAN NOTE
Baseline O2 is 1.5-2L  Now requiring 3L  Oxygen as needed, Respiratory protocol, Bronchodilators, Incentive spirometry  Steroids are making her anxious, discontinued  Patient has metastatic stage IV lung disease without further treatment, confirmed with her oncologist on previous hospitalization

## 2023-10-01 NOTE — ASSESSMENT & PLAN NOTE
Takes rivaroxaban at home.  I will switch to apixaban given her current renal function.  Discussed with pharmacy

## 2023-10-01 NOTE — ED NOTES
Pt updated on plan of care  Pt resting on halle, pt in no acute distress, pt provided call light, instructed to call if needing any assistance, instructed not to get up by self, halle in lowest position.

## 2023-10-02 PROBLEM — J96.21 ACUTE ON CHRONIC RESPIRATORY FAILURE WITH HYPOXIA (HCC): Status: ACTIVE | Noted: 2023-01-01

## 2023-10-02 PROBLEM — G82.20 PARAPLEGIA (HCC): Status: ACTIVE | Noted: 2023-01-01

## 2023-10-02 NOTE — ED NOTES
Pt to floor with all belongings, no belonging left in room  Pt assisted eating 3 bites  yogurt  Caregiver also assisted pt sipping soup she brought from home

## 2023-10-02 NOTE — PROGRESS NOTES
Telemetry Shift Summary    Rhythm SR/SB, 1*AVB  HR Range 50s-70s  Ectopy rare PVC/PAC  Measurements 0.24/0.08/0.38        Normal Values  Rhythm SR  HR Range    Measurements 0.12-0.20 / 0.06-0.10  / 0.30-0.52

## 2023-10-02 NOTE — DISCHARGE PLANNING
Case Management Discharge Planning    Admission Date: 10/1/2023  GMLOS: 2.6  ALOS: 1    6-Clicks ADL Score: 14  6-Clicks Mobility Score: 10  PT and/or OT Eval ordered: Yes  Post-acute Referrals Ordered: Yes  Post-acute Choice Obtained: NA  Has referral(s) been sent to post-acute provider:  [facility]      Anticipated Discharge Dispo: Discharge Disposition: D/T to home under [facility] care in anticipation of covered skilled care (06)    DME Needed: No    Action(s) Taken: RNCM attended IDT rounds and reviewed chart. Per chart, pt previously on service with [facility] but terminated services with them prior to arriving at hospital.     Per MD, pt would like to pursue treatment while in hospital and resume hospice services upon DC.    RNCM spoke to pt at bedside. Education provided regarding hospice services vs treatment. Pt stated she would like to pursue treatment and would not like to resume hospice services. RNCM offered option to establish with different hospice company if she is not satisfied with current company on service. Pt stated she would prefer to DC back to  with  services and discuss treatment options with MD.      Escalations Completed: Provider    Medically Clear: No    Next Steps:  f/u with medical team to discuss DC needs and barriers\    Barriers to Discharge: Medical clearance

## 2023-10-02 NOTE — PROGRESS NOTES
Saundra from lab called with critical calcium at 12.1. Results read back by Michelle. Dr. Dickinson notified of critical calcium at 12.1. Results read back by Dr. Dickinson.

## 2023-10-02 NOTE — CARE PLAN
The patient is Watcher - Medium risk of patient condition declining or worsening    Shift Goals  Clinical Goals: q2 turns, IVF, monitor labs, keep O2 sat >90%  Patient Goals: get better    Progress made toward(s) clinical / shift goals:    Problem: Pain - Standard  Goal: Alleviation of pain or a reduction in pain to the patient’s comfort goal  Outcome: Progressing     Problem: Skin Integrity  Goal: Skin integrity is maintained or improved  Outcome: Progressing     Problem: Fall Risk  Goal: Patient will remain free from falls  Outcome: Progressing     Problem: Respiratory  Goal: Patient will achieve/maintain optimum respiratory ventilation and gas exchange  Outcome: Progressing       Patient is not progressing towards the following goals:

## 2023-10-02 NOTE — PROGRESS NOTES
Assumed care of patient at bedside report from NOC RN. Updated on POC. Patient currently A & O x 4. Pt on 3 L O2 94%, up in bed, with complaints of acute pain. Pt medicated per MAR. Assessment completed. Call light within reach. Whiteboard updated. Fall precautions in place. Bed locked and in lowest position. All questions answered. No other needs indicated at this time.

## 2023-10-02 NOTE — DIETARY
"Nutrition services: Day 1 of admit.  Latonia Clinton is a 73 y.o. female with admitting DX of Hypercalcemia    Consult received for MST of 3 on nutrition screen due to report of wt loss of unsure amount x < 1 week and poor PO PTA.       Assessment:  Height: 165.1 cm (5' 5\")  Weight: 90.7 kg (199 lb 15.3 oz)  Admit weight: 86.9 kg (191 lb 9.3 oz)  Body mass index based on admit wt is 31.9 kg/m²., BMI classification: class 1 obesity  Diet/Intake: cardiac, renal diet/50-75% of breakfast this morning    Evaluation:   Loss of appetite noted upon presentation. Hx includes metastatic lung cancer, COPD, CKD. She has not been feeling well x 4-5 days.   Skin: redness, blanching, and excoriation on groin and buttocks. Wound consult pending.  Edema: 2+ to BLEs.  Labs: 10/2: potassium=5.2, Bun=54, creatinine=3.65   Meds: Lasix, prednisone  Pt reports that her UBW is 180 lb. This was her weight at her last oncology visit a few months ago. Her admit wt is > than her UBW but this may be due to presence of edema.  Pt said that she was eating <50% of normal over the past 4-5 days. Prior to this she was eating her meals at the group home.   Pt did not appear malnourished.  Pt said that she is having difficulty feeding herself because her hands are shaky. It may be easier for her to drink than feed herself. She does not like Boost supplements. She agreed to Chobani Smoothie w/ breakfast for additional kcals and protein.     Malnutrition Risk: ASPEN criteria for malnutrition not met.     Recommendations/Plan:  Add Chobani Smoothies to breakfast tray.   Encourage intake of >50%  Document intake of all meals as % taken in ADL's to provide interdisciplinary communication across all shifts.   Monitor weight.  Nutrition rep will continue to see patient for ongoing meal and snack preferences.       RD following.   "

## 2023-10-02 NOTE — RESPIRATORY CARE
"   COPD EDUCATION by COPD CLINICAL EDUCATOR  10/2/2023 at 7:17 AM by Linn Veelz, RRT     Patient reviewed by COPD education team. Patient does have a history or diagnosis of COPD and is a former smoker.  However, patient does not qualify for the COPD program.     COPD Screen  COPD Risk Screening  Do you have a history of COPD?: Yes  Do you have a Pulmonologist?: Yes    COPD Assessment  COPD Clinical Specialists ONLY  COPD Education Initiated: No--Quick Screen (Per note prognosis and comorbidities,the patient has metastatic lung cancer w/hypercalcemia,is currently on hospice,there are no further options, per Oncology/Dr. Adam pt DNR/DNI)    PFT Results    No results found for: \"PFT\"    Meds to Beds  Would the patient like to opt in for Bedside Medication Delivery at Discharge?: No     MY COPD ACTION PLAN     It is recommended that patients and physicians /healthcare providers complete this action plan together. This plan should be discussed at each physician visit and updated as needed.    The green, yellow and red zones show groups of symptoms of COPD. This list of symptoms is not comprehensive, and you may experience other symptoms. In the \"Actions\" column, your healthcare provider has recommended actions for you to take based on your symptoms.    Patient Name: Latonia Clinton   YOB: 1950   Last Updated on: 4/5/2023 11:33 AM   Green Zone:  I am doing well today Actions     Usual activitiy and exercise level   Take daily medications     Usual amounts of cough and phlegm/mucus   Use oxygen as prescribed     Sleep well at night   Continue regular exercise/diet plan     Appetite is good   At all times avoid cigarette smoke, inhaled irritants     Daily Medications (these medications are taken every day):   Fluticasone Furoate and Vilanterol trifenatate (Breo)  Tiotropium Bromide Monohydrate (Spiriva) 1 Puff  2 Puffs Once daily  Once daily        Yellow Zone:  I am having a bad day or a COPD " "flare Actions     More breathless than usual   Continue daily medications     I have less energy for my daily activities   Use quick relief inhaler as ordered     Increased or thicker phlegm/mucus   Use oxygen as prescribed     Using quick relief inhaler/nebulizer more often   Get plenty of rest     Swelling of ankles more than usual   Use pursed lip breathing     More coughing than usual   At all times avoid cigarette smoke, inhaled irritants     I feel like I have a \"chest cold\"     Poor sleep and my symptoms woke me up     My appetite is not good     My medicine is not helping      Call provider immediately if symptoms don’t improve     Continue daily medications, add rescue medications:               Medications to be used during a flare up, (as Discussed with Provider):              Red Zone:  I need urgent medical care Actions     Severe shortness of breath even at rest   Call 911 or seek medical care immediately     Not able to do any activity because of breathing      Fever or shaking chills      Feeling confused or very drowsy       Chest pains      Coughing up blood                  "

## 2023-10-02 NOTE — PROGRESS NOTES
4 Eyes Skin Assessment Completed by Michelle RN and ALLAN Ovalles.    Head Scab left side of head behind left ear  Ears WDL  Nose WDL  Mouth WDL  Neck WDL  Breast/Chest WDL  Shoulder Blades WDL  Spine WDL  (R) Arm/Elbow/Hand Bruising  (L) Arm/Elbow/Hand Bruising  Abdomen distended  Groin Redness, Blanching, and Excoriation  Scrotum/Coccyx/Buttocks Redness, Blanching, and Excoriation  (R) Leg Swelling and Edema  (L) Leg Swelling and Edema  (R) Heel/Foot/Toe Edema, 3rd toe scab  (L) Heel/Foot/Toe Redness, Blanching, and Edema          Devices In Places Tele Box, Pulse Ox, Gonzalez, and Nasal Cannula      Interventions In Place Gray Ear Foams, NC W/Ear Foams, Waffle Overlay, TAP System, Pillows, Q2 Turns, and Barrier Cream    Possible Skin Injury Yes    Pictures Uploaded Into Epic Yes  Wound Consult Placed Yes  RN Wound Prevention Protocol Ordered Yes

## 2023-10-02 NOTE — ASSESSMENT & PLAN NOTE
"Per previous hospitalist   \"I had a prolonged discussion with the patient regarding goals of care, diagnoses, prognosis, and CODE STATUS. We discussed her prognosis and comorbidities.  The patient has metastatic lung cancer with hypercalcemia.  She is currently on hospice.  There are no further options.  Oncology/Dr. Rizvi.  She has hypercalcemia and severe dehydration.  At this point the patient would like to have her calcium lowered with medication/fluids.  She still wants to maintain a DNAR/DNI code status.\"  "

## 2023-10-02 NOTE — PROGRESS NOTES
Di from lab called with critical calcium at 12.7. Results read back by Michelle. Dr. Gonzalez notified of critical calcium at 12.7. Results read back by Dr. Gonzalez.

## 2023-10-02 NOTE — PROGRESS NOTES
Telemetry Shift Summary     Rhythm SB, SR, 1st deg AVB  HR Range 53-67  Ectopy rPAC, rPVC  Measurements  0.26/0.08/0.40           Normal Values  Rhythm SR  HR Range    Measurements 0.12-0.20 / 0.06-0.10  / 0.30-0.52

## 2023-10-02 NOTE — PROGRESS NOTES
Saundra from lab called with critical calcium at 12.3. Results read back by Michelle. Dr. Dickinson notified of critical calcium at 12.3. Results read back by Dr. Dickinson.

## 2023-10-02 NOTE — CARE PLAN
The patient is Watcher - Medium risk of patient condition declining or worsening    Shift Goals  Clinical Goals: Q2 turns, wound care, and monitor labs  Patient Goals: Feel better  Family Goals: SUMAN    Progress made toward(s) clinical / shift goals:    Problem: Knowledge Deficit - Standard  Goal: Patient and family/care givers will demonstrate understanding of plan of care, disease process/condition, diagnostic tests and medications  Outcome: Progressing   Pt updated on POC, pt verbalized an understanding. Pt educated on medications administered.   Problem: Pain - Standard  Goal: Alleviation of pain or a reduction in pain to the patient’s comfort goal  Outcome: Progressing   Pt pain is being alleviated per MAR.   Problem: Fall Risk  Goal: Patient will remain free from falls  Outcome: Progressing   All fall risk precautions are in place. Pt has remained free from falls during this shift.     Patient is not progressing towards the following goals:

## 2023-10-03 NOTE — PROGRESS NOTES
Hospital Medicine Daily Progress Note    Date of Service  10/3/2023    Chief Complaint  Latonia Clinton is a 73 y.o. female admitted 10/1/2023 with tingling, weakness, confusion    Hospital Course  Latonia Clinton is a 73 y.o. female who presented on 10/20/2023 with progressively worsening generalized weakness and shortness of breath.  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  She resides at a group home.  She was previously on hospice but decided to come off of hospice.  She called EMS because she was not feeling well having fevers and chills.  She had not been feeling well for the past 4 to 5 days.    In the emergency room, patient was noted to have acute on chronic kidney disease with elevated calcium of 13.2.  She was admitted for further medical management.    Interval Problem Update  10/3 patient well-known to me from 2 previous admissions.  Last encounter I had saw her and sent her back to her group home with hospice.  I explained to the patient that her symptoms that she had would recur including shortness of breath, brain fog, tingling and tremor as her calcium lorrie again.  This was an expected occurrence and this is also why she was placed on hospice.  Patient states she does not remember our previous conversations but she does remember me from the conversation that she had lung cancer and that there was no further treatment that could be offered.  She states after our conversation today that she is willing to go back on hospice as she feels that she understands slightly better.    I have discussed this patient's plan of care and discharge plan at IDT rounds today with Case Management, Nursing, Nursing leadership, and other members of the IDT  team.    Consultants/Specialty  none    Code Status  DNAR/DNI    Disposition  The patient is medically cleared for discharge to home or a post-acute facility.  Anticipate discharge to: hospice    I have placed the appropriate orders for post-discharge needs.    Review of Systems  Review of Systems   Constitutional:  Negative for chills and fever.   HENT:  Negative for congestion.    Eyes:  Negative for blurred vision and photophobia.   Respiratory:  Negative for cough and shortness of breath.    Cardiovascular:  Negative for chest pain, claudication and leg swelling.   Gastrointestinal:  Negative for abdominal pain, constipation, diarrhea, heartburn and vomiting.   Genitourinary:  Negative for dysuria and hematuria.   Musculoskeletal:  Negative for joint pain and myalgias.   Skin:  Negative for itching and rash.   Neurological:  Negative for dizziness, sensory change, speech change, weakness and headaches.   Psychiatric/Behavioral:  Negative for depression. The patient is nervous/anxious. The patient does not have insomnia.         Physical Exam  Temp:  [36.4 °C (97.6 °F)-37.2 °C (98.9 °F)] 36.8 °C (98.3 °F)  Pulse:  [47-78] 60  Resp:  [16-19] 16  BP: ()/(48-64) 111/54  SpO2:  [94 %-98 %] 98 %    Physical Exam  Vitals and nursing note reviewed.   Constitutional:       General: She is not in acute distress.     Appearance: Normal appearance. She is not ill-appearing.   HENT:      Head: Normocephalic and atraumatic.      Nose: Nose normal.   Cardiovascular:      Rate and Rhythm: Normal rate and regular rhythm.      Heart sounds: Normal heart sounds. No murmur heard.  Pulmonary:      Effort: Pulmonary effort is normal.      Breath sounds: Normal breath sounds.   Abdominal:      General: Bowel sounds are normal. There is no distension.      Palpations: Abdomen is soft.   Musculoskeletal:         General: No swelling or tenderness.      Cervical back: Neck supple.   Skin:     General: Skin is warm and dry.    Neurological:      General: No focal deficit present.      Mental Status: She is alert and oriented to person, place, and time.   Psychiatric:         Mood and Affect: Mood normal.         Fluids    Intake/Output Summary (Last 24 hours) at 10/3/2023 1329  Last data filed at 10/3/2023 0800  Gross per 24 hour   Intake 1516.05 ml   Output 1000 ml   Net 516.05 ml       Laboratory  Recent Labs     10/01/23  1304 10/02/23  0307 10/03/23  0130 10/03/23  0743   WBC 8.3 5.4 5.6  --    RBC 3.33* 3.17* 2.74*  --    HEMOGLOBIN 8.9* 8.5* 7.3* 8.0*   HEMATOCRIT 29.6* 28.5* 24.2* 26.9*   MCV 88.9 89.9 88.3  --    MCH 26.7* 26.8* 26.6*  --    MCHC 30.1* 29.8* 30.2*  --    RDW 55.6* 55.9* 54.5*  --    PLATELETCT 277 255 243  --    MPV 10.6 11.8 11.5  --      Recent Labs     10/02/23  0307 10/02/23  0640 10/03/23  0130   SODIUM 133* 133* 134*   POTASSIUM 5.4 5.2 4.4   CHLORIDE 97 97 98   CO2 25 27 25   GLUCOSE 153* 149* 127*   BUN 51* 54* 61*   CREATININE 3.57* 3.65* 3.53*   CALCIUM 12.3* 11.8* 10.9*                   Imaging  DX-CHEST-PORTABLE (1 VIEW)   Final Result      1.  Enlarged cardiac silhouette with changes of vascular congestion/edema.   2.  Bilateral parenchymal opacities could be due to edema, atelectasis or pneumonitis.           Assessment/Plan  * Hypercalcemia- (present on admission)  Assessment & Plan  Leading to ISAMAR   EKG shows sinus rhythm with a rate of 67 there is no ST elevation, there is T wave inversions in lead V2-V4.  Diffuse T wave abnormalities consistent with hypercalcemia.  QTc is 372.  Pamidronate given  Calcium down to 10.9  Explained to patient that this is recurrent and will continue to be recurrent while she is still alive given the bony involvement from her stage IV metastatic lung cancer, hospice recommended    Paraplegia (HCC)- (present on admission)  Assessment & Plan  Likely due to progression of metastatic disease.    Goals of care discussion  Follow-up questions    Acute on chronic  "respiratory failure with hypoxia (HCC)- (present on admission)  Assessment & Plan  As per presentation.  Per report baseline is 2 LPM oxygen mask,.  She is requiring 3-4 LPM.  10/02/23    Increase furosemide to 40 mg IV twice daily.    ACP (advance care planning)- (present on admission)  Assessment & Plan  Per previous hospitalist   \"I had a prolonged discussion with the patient regarding goals of care, diagnoses, prognosis, and CODE STATUS. We discussed her prognosis and comorbidities.  The patient has metastatic lung cancer with hypercalcemia.  She is currently on hospice.  There are no further options.  Oncology/Dr. Rizvi.  She has hypercalcemia and severe dehydration.  At this point the patient would like to have her calcium lowered with medication/fluids.  She still wants to maintain a DNAR/DNI code status.\"    DNR (do not resuscitate)- (present on admission)  Assessment & Plan  POLST 01/28/19. DNR/DNI code status confirmed with patient on admission       DNI (do not intubate)- (present on admission)  Assessment & Plan  POLST 01/28/19. DNR/DNI code status confirmed with patient on admission      Acute kidney injury on top of chronic kidney disease (HCC)- (present on admission)  Assessment & Plan  Mostly due to hypercalcemia, and severe dehydration on top of chronic kidney disease  Renal ultrasound done June 2023 reviewed, it shows bilateral nephrolithiasis without evidence of obstruction.  Echogenic kidneys with thin cortex favoring medical renal disease  I will start intravenous fluids  Avoid / minimize nephrotoxins as much as possible.  Monitor inputs and outputs  Has a Gonzalez catheter, we will replace.  Consider further diagnostic testing if renal function did not improve with above measures    10/02/23  Creatinine increasing slightly.  Continue to treat hypercalcemia.    Recurrent non-small cell lung cancer (HCC)- (present on admission)  Assessment & Plan  Stage IV causing hypercalcemia given her bony " involvement    Deep vein thrombosis (DVT) (HCC)- (present on admission)  Assessment & Plan  Takes rivaroxaban at home.  I will switch to apixaban given her current renal function.  Discussed with pharmacy    Thyrotoxicosis with toxic single thyroid nodule and without thyroid storm- (present on admission)  Assessment & Plan  Resume methimazole    Chronic obstructive pulmonary disease (HCC)- (present on admission)  Assessment & Plan  Baseline O2 is 1.5-2L  Now requiring 3L  Oxygen as needed, Respiratory protocol, Bronchodilators, Incentive spirometry  Steroids are making her anxious, discontinued  Patient has metastatic stage IV lung disease without further treatment, confirmed with her oncologist on previous hospitalization         VTE prophylaxis:    therapeutic anticoagulation with eliquis 5 mg BID      I have performed a physical exam and reviewed and updated ROS and Plan today (10/3/2023). In review of yesterday's note (10/2/2023), there are no changes except as documented above.

## 2023-10-03 NOTE — PROGRESS NOTES
Telemetry Shift Summary     Rhythm SB, SR, 1st deg AVB  HR Range 45-61, touched down to 42  Ectopy rPVC, r-oPAC  Measurements  0.26/0.08/0.38           Normal Values  Rhythm SR  HR Range    Measurements 0.12-0.20 / 0.06-0.10  / 0.30-0.52

## 2023-10-03 NOTE — HOSPITAL COURSE
Latonia Clinton is a 73 y.o. female who presented on 10/20/2023 with progressively worsening generalized weakness and shortness of breath.  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  She resides at a group home.  She was previously on hospice but decided to come off of hospice.  She called EMS because she was not feeling well having fevers and chills.  She had not been feeling well for the past 4 to 5 days.    In the emergency room, patient was noted to have acute on chronic kidney disease with elevated calcium of 13.2.  She was admitted for further medical management.

## 2023-10-03 NOTE — PROGRESS NOTES
[facility] Medicine Daily Progress Note    Date of Service  10/2/2023    Chief Complaint  Latonia Clinton is a 73 y.o. female admitted 10/1/2023 with hypercalcemia and acute on chronic kidney injury.    Hospital Course  Latonia Clinton is a 73 y.o. female who presented on 10/20/2023 with progressively worsening generalized weakness and shortness of breath.  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  She resides at a group home.  She was previously on hospice but decided to come off of hospice.  She called EMS because she was not feeling well having fevers and chills.  She had not been feeling well for the past 4 to 5 days.    In the emergency room, patient was noted to have acute on chronic kidney disease with elevated calcium of 13.2.  She was admitted for further medical management.    Interval Problem Update  10/02/23  Patient was seen and examined on the telemetry floor.  She continues on continuous cardiac monitoring.  Calcium improved to 11.8, potassium of 5.2, creatinine 3.65.  Continues on normal saline NS at 83 mils per hour continues on furosemide 20 mg IV twice daily.  I have ordered IV pamidronate per discussion with pharmacy.  Holding off calcitonin for now.    Patient states that she was previously on hospice but decided to no longer with work with the hospice people.  She stated that she would like to go back on hospice when she is discharged but wants to be medically treated right now for her hypercalcemia.  However, she told the  that she is not interested in hospice.  Dr. Rizvi is her oncologist and stated that there were no treatments for her.  She has become paraplegic in the bilateral lower extremities.  I placed palliative care consultation.    I have discussed  this patient's plan of care and discharge plan at IDT rounds today with Case Management, Nursing, Nursing leadership, and other members of the IDT team.    Consultants/Specialty      Code Status  DNAR/DNI    Disposition  The patient is not medically cleared for discharge to home or a post-acute facility.  Anticipate discharge to: home with close outpatient follow-up    I have placed the appropriate orders for post-discharge needs.    Review of Systems  Review of Systems   Constitutional:  Positive for malaise/fatigue. Negative for chills and fever.   Respiratory:  Positive for shortness of breath. Negative for cough.    Cardiovascular:  Negative for chest pain and palpitations.   Gastrointestinal:  Negative for abdominal pain, nausea and vomiting.   Genitourinary:  Negative for dysuria and hematuria.   Musculoskeletal:  Negative for joint pain and myalgias.   Neurological:  Negative for dizziness and headaches.        Physical Exam  Temp:  [36.5 °C (97.7 °F)-37.1 °C (98.8 °F)] 36.9 °C (98.4 °F)  Pulse:  [56-78] 78  Resp:  [16-20] 18  BP: (111-152)/(50-73) 139/64  SpO2:  [93 %-98 %] 94 %    Physical Exam  Vitals and nursing note reviewed.   Constitutional:       Appearance: She is normal weight. She is ill-appearing. She is not diaphoretic.   HENT:      Head: Normocephalic and atraumatic.      Mouth/Throat:      Mouth: Mucous membranes are moist.      Pharynx: Oropharynx is clear. No oropharyngeal exudate.   Eyes:      General:         Right eye: No discharge.         Left eye: No discharge.      Conjunctiva/sclera: Conjunctivae normal.      Pupils: Pupils are equal, round, and reactive to light.   Cardiovascular:      Rate and Rhythm: Normal rate and regular rhythm.      Pulses: Normal pulses.      Heart sounds: Normal heart sounds. No murmur heard.  Pulmonary:      Effort: Pulmonary effort is normal. No respiratory distress.      Breath sounds: Normal breath sounds.   Abdominal:      General: Abdomen is flat. Bowel  sounds are normal. There is no distension.      Palpations: Abdomen is soft.      Tenderness: There is no abdominal tenderness.   Musculoskeletal:      Cervical back: Neck supple. No tenderness.      Right lower leg: No edema.      Left lower leg: No edema.   Skin:     General: Skin is warm and dry.      Coloration: Skin is pale.   Neurological:      Mental Status: She is alert and oriented to person, place, and time.      Sensory: Sensory deficit present.      Motor: Weakness (4/5 weakness in the bilateral lower extremities) present.      Deep Tendon Reflexes: Reflexes abnormal.         Fluids    Intake/Output Summary (Last 24 hours) at 10/2/2023 1913  Last data filed at 10/2/2023 1825  Gross per 24 hour   Intake 360 ml   Output 1300 ml   Net -940 ml       Laboratory  Recent Labs     10/01/23  1304 10/02/23  0307   WBC 8.3 5.4   RBC 3.33* 3.17*   HEMOGLOBIN 8.9* 8.5*   HEMATOCRIT 29.6* 28.5*   MCV 88.9 89.9   MCH 26.7* 26.8*   MCHC 30.1* 29.8*   RDW 55.6* 55.9*   PLATELETCT 277 255   MPV 10.6 11.8     Recent Labs     10/01/23  2242 10/02/23  0307 10/02/23  0640   SODIUM 132* 133* 133*   POTASSIUM 5.2 5.4 5.2   CHLORIDE 93* 97 97   CO2 25 25 27   GLUCOSE 145* 153* 149*   BUN 53* 51* 54*   CREATININE 3.58* 3.57* 3.65*   CALCIUM 12.1* 12.3* 11.8*                   Imaging  DX-CHEST-PORTABLE (1 VIEW)   Final Result      1.  Enlarged cardiac silhouette with changes of vascular congestion/edema.   2.  Bilateral parenchymal opacities could be due to edema, atelectasis or pneumonitis.           Assessment/Plan  * Hypercalcemia- (present on admission)  Assessment & Plan  Leading to ISAMAR   Corrected Ca 13.2     EKG shows sinus rhythm with a rate of 67 there is no ST elevation, there is T wave inversions in lead V2-V4.  Diffuse T wave abnormalities consistent with hypercalcemia.  QTc is 372.  I will start aggressive intravenous fluids  I will start intravenous diuresis with Lasix  I will start intravenous steroids  Consider  "starting palindromic acid if above did not improve calcium levels    10/02/23  Improving with IV Lasix and IV fluids.  I have started IV palindromic acid per discussion with pharmacy.  Holding off calcitonin for now.  Continuing IV fluids and Lasix.    Acute on chronic respiratory failure with hypoxia (HCC)- (present on admission)  Assessment & Plan  As per presentation.  Per report baseline is 2 LPM oxygen mask,.  She is requiring 3-4 LPM.  10/02/23    Increase furosemide to 40 mg IV twice daily.    Acute kidney injury on top of chronic kidney disease (HCC)- (present on admission)  Assessment & Plan  Mostly due to hypercalcemia, and severe dehydration on top of chronic kidney disease  Renal ultrasound done June 2023 reviewed, it shows bilateral nephrolithiasis without evidence of obstruction.  Echogenic kidneys with thin cortex favoring medical renal disease  I will start intravenous fluids  Avoid / minimize nephrotoxins as much as possible.  Monitor inputs and outputs  Has a Gonzalez catheter, we will replace.  Consider further diagnostic testing if renal function did not improve with above measures    10/02/23  Creatinine increasing slightly.  Continue to treat hypercalcemia.    Paraplegia (HCC)- (present on admission)  Assessment & Plan  Likely due to progression of metastatic disease.    Goals of care discussion  Follow-up questions    ACP (advance care planning)- (present on admission)  Assessment & Plan  Per previous hospitalist   \"I had a prolonged discussion with the patient regarding goals of care, diagnoses, prognosis, and CODE STATUS. We discussed her prognosis and comorbidities.  The patient has metastatic lung cancer with hypercalcemia.  She is currently on hospice.  There are no further options.  Oncology/Dr. Rizvi.  She has hypercalcemia and severe dehydration.  At this point the patient would like to have her calcium lowered with medication/fluids.  She still wants to maintain a DNAR/DNI code " "status.\"    DNR (do not resuscitate)- (present on admission)  Assessment & Plan  POLST 01/28/19. DNR/DNI code status confirmed with patient on admission       DNI (do not intubate)- (present on admission)  Assessment & Plan  POLST 01/28/19. DNR/DNI code status confirmed with patient on admission      Recurrent non-small cell lung cancer (HCC)- (present on admission)  Assessment & Plan  Used to follows w Dr Rizvi, nothing further to offer from chart review   Now on hospice     Deep vein thrombosis (DVT) (HCC)- (present on admission)  Assessment & Plan  Takes rivaroxaban at home.  I will switch to apixaban given her current renal function.  Discussed with pharmacy    Thyrotoxicosis with toxic single thyroid nodule and without thyroid storm- (present on admission)  Assessment & Plan  Resume methimazole    Chronic obstructive pulmonary disease (HCC)- (present on admission)  Assessment & Plan  Baseline O2 is 1.5-2L  Now requiring 3L  Oxygen as needed, Respiratory protocol, Bronchodilators, Incentive spirometry, Steroids          VTE prophylaxis:   SCDs/TEDs   therapeutic anticoagulation with eliquis 5 mg BID      I have performed a physical exam and reviewed and updated ROS and Plan today (10/2/2023). In review of yesterday's note (10/1/2023), there are no changes except as documented above.      Patient is medically complex and high risk of deterioration and death.  "

## 2023-10-03 NOTE — CARE PLAN
The patient is Watcher - Medium risk of patient condition declining or worsening    Shift Goals  Clinical Goals: q2 turns, monitor labs  Patient Goals: rest  Family Goals: SUMAN    Progress made toward(s) clinical / shift goals:    Problem: Pain - Standard  Goal: Alleviation of pain or a reduction in pain to the patient’s comfort goal  Outcome: Progressing     Problem: Skin Integrity  Goal: Skin integrity is maintained or improved  Outcome: Progressing     Problem: Fall Risk  Goal: Patient will remain free from falls  Outcome: Progressing     Problem: Respiratory  Goal: Patient will achieve/maintain optimum respiratory ventilation and gas exchange  Outcome: Progressing       Patient is not progressing towards the following goals:

## 2023-10-03 NOTE — CARE PLAN
The patient is Stable - Low risk of patient condition declining or worsening    Shift Goals  Clinical Goals: Improved Ca  Patient Goals: Pain control, discuss plan  Family Goals: SUMAN    Progress made toward(s) clinical / shift goals:    Problem: Self Care  Goal: Patient will have the ability to perform ADLs independently or with assistance (bathe, groom, dress, toilet and feed)  Outcome: Progressing  Note: Encouraged patient to perform ADL's such as feeding self and brushing teeth independently.      Problem: Respiratory  Goal: Patient will achieve/maintain optimum respiratory ventilation and gas exchange  Outcome: Progressing  Flowsheets  Taken 10/3/2023 0800 by Rosanne Rich RAnibalN.  Deep Breathe and Cough: Performs Correctly  Taken 10/3/2023 0714 by Jah Birch RRT  O2 Delivery Device: Silicone Nasal Cannula       Patient is not progressing towards the following goals:

## 2023-10-03 NOTE — PROGRESS NOTES
Report received from ALLAN Lay. Pt is awake. Call light and belongings within reach. Strip alarm on.

## 2023-10-03 NOTE — THERAPY
OT Note:   10/03/23 1201   Interdisciplinary Plan of Care Collaboration   Collaboration Comments Pt currently has no acute skilled OT needs; lives at group home. Staff provides full care, uses latonya lift fpr transfers. Order canceled.

## 2023-10-03 NOTE — HOSPICE
[facility] referral/consult response    Is this patient accepted to [facility]?: Yes  What hospice level of care?: Community  Approved by provider: Marjorie Yu APRN    Anticipated DC date:   DC Barriers: Consents and possible DME  Additional Information:

## 2023-10-03 NOTE — WOUND TEAM
[facility]  Inpatient Services  Initial Wound and Skin Care Evaluation    Admission Date: 10/1/2023     Last order of IP CONSULT TO WOUND CARE was found on 10/1/2023 from Hospital Encounter on 10/1/2023     HPI, PMH, SH: Reviewed    Past Surgical History:   Procedure Laterality Date    BRONCHOSCOPY, ROBOT-ASSISTED  10/11/2022    Procedure: FIBER OPTIC BRONCHOSCOPY WITH  WASH, BRUSH, BRONCHOALVEOLAR LAVAGE, BIOSPY, FINE NEEDLE ASPIRATION & NAVIGATION, ROBOTICS;  Surgeon: Donita Haque M.D.;  Location: [facility];  Service: Pulmonary Robotic    CYSTOSCOPY STENT PLACEMENT  7/9/2018    Procedure: Cystoscopy,  Left removal of stent ,  Left Stent Placement;  Surgeon: Beck Yang M.D.;  Location: [facility];  Service: Urology    URETEROSCOPY Left 7/9/2018    Procedure: URETEROSCOPY;  Surgeon: Beck Yang M.D.;  Location: [facility];  Service: Urology    LASERTRIPSY Left 7/9/2018    Procedure: LASERTRIPSY-LITHO;  Surgeon: Beck Yang M.D.;  Location: [facility];  Service: Urology    CYSTOSCOPY STENT PLACEMENT Left 6/18/2018    Procedure: CYSTOSCOPY STENT PLACEMENT;  Surgeon: Beck Yang M.D.;  Location: [facility];  Service: Urology    LITHOTRIPSY Left 6/18/2018    Procedure: LITHOTRIPSY;  Surgeon: Beck Yang M.D.;  Location: [facility];  Service: Urology    LASERTRIPSY Left 6/18/2018    Procedure: LASERTRIPSY;  Surgeon: Beck Yang M.D.;  Location: [facility];  Service: Urology    URETEROSCOPY Left 6/18/2018    Procedure: URETEROSCOPY;  Surgeon: Beck Yang M.D.;  Location: [facility];  Service: Urology    THORACOSCOPY Left 12/12/2016    Procedure: THORACOSCOPY W/WEDGE RESECTION UPPER LOBE MASS;  Surgeon: John H Ganser, M.D.;  Location: [facility];  Service:     OTHER ORTHOPEDIC SURGERY  2013    kyphoplasty X3    APPENDECTOMY      1990    CHOLECYSTECTOMY      1990     LAMINOTOMY      LUNG BIOPSY OPEN      OTHER      OTHER ABDOMINAL SURGERY      gall bladder disease    CA BREAST AUGMENTATION WITH IMPLANT      CA BREAST REDUCTION       Social History     Tobacco Use    Smoking status: Former     Current packs/day: 0.00     Average packs/day: 1 pack/day for 30.0 years (30.0 ttl pk-yrs)     Types: Cigarettes     Start date: 1970     Quit date: 2000     Years since quittin.7    Smokeless tobacco: Never    Tobacco comments:     7 years ago   Substance Use Topics    Alcohol use: Not Currently     Chief Complaint   Patient presents with    Weakness     X 4 days worse today    Loss of Appetite     Diagnosis: Hypercalcemia [E83.52]    Unit where seen by Wound Team:      WOUND CONSULT RELATED TO:  abdomen, buttocks, thighs, sacrum    WOUND TEAM PLAN OF CARE - Frequency of Follow-up:   Nursing to follow dressing orders written for wound care. Contact wound team if area fails to progress, deteriorates or with any questions/concerns if something comes up before next scheduled follow up (See below as to whether wound is following and frequency of wound follow up)   Not following, consult as needed  - abdomen, buttocks, thighs, sacrum    WOUND HISTORY:   Pt admitted with moisture r/t skin damage to skin folds of trunk. Also irritant contact dermatitis r/t incontinence of bowel.        WOUND ASSESSMENT/LDA  Moisture Associated Skin Damage 10/01/23 Thigh (Active)   First Observed Date/First Observed Time: 10/01/23 2011   Laterality: Right  Wound Location : Thigh                              Assessments 10/2/2023  6:30 PM   NEXT Weekly Photo (Inpatient Only) 10/08/23   Drainage Amount None   Periwound Assessment Intact   IAD Cleansing Foam Cleanser/Washcloth   Periwound Protectant Antifungal Therapy   IAD Containment Device Indwelling Catheter   Length (cm) 10.5   Width (cm) 6       Wound 10/01/23 Buttocks Irritant contact dermatitis from stool (Active)   Date First  Assessed/Time First Assessed: 10/01/23 2014   Location: Buttocks  Wound Description (Comments): Irritant contact dermatitis from stool                                    Assessments 10/2/2023  6:30 PM   Site Assessment Red;Fragile   Periwound Assessment Pink   Margins Defined edges;Attached edges   Closure Secondary intention   Drainage Amount None   Treatments Cleansed;Nonselective debridement   Wound Cleansing Foam Cleanser/Washcloth   Periwound Protectant Antifungal Therapy   Dressing Status Open to Air   NEXT Weekly Photo (Inpatient Only) 10/08/23   Wound Team Following Not following   Wound Length (cm) 13.7 cm   Wound Width (cm) 21.2 cm   Wound Depth (cm) 0.1 cm   Wound Surface Area (cm^2) 290.44 cm^2   Wound Volume (cm^3) 29.044 cm^3       Wound 10/01/23 Groin (Active)   Date First Assessed/Time First Assessed: 10/01/23 2016   Location: Groin      Assessments 10/2/2023  6:30 PM   Site Assessment Red;Pink   Periwound Assessment Intact   Margins Defined edges;Attached edges   Closure Open to air   Drainage Amount None   Treatments Cleansed;Nonselective debridement   Wound Cleansing Foam Cleanser/Washcloth   Periwound Protectant Antifungal Therapy   Dressing Status Open to Air   NEXT Weekly Photo (Inpatient Only) 10/08/23   Wound Team Following Not following        Vascular:    CARROL:   No results found.    Lab Values:    Lab Results   Component Value Date/Time    WBC 5.4 10/02/2023 03:07 AM    RBC 3.17 (L) 10/02/2023 03:07 AM    HEMOGLOBIN 8.5 (L) 10/02/2023 03:07 AM    HEMATOCRIT 28.5 (L) 10/02/2023 03:07 AM    CREACTPROT 4.91 (H) 04/03/2023 12:28 PM    SEDRATEWES 26 01/24/2020 04:46 PM    HBA1C 4.9 06/14/2023 01:50 PM         Culture Results show:  No results found for this or any previous visit (from the past 720 hour(s)).    Pain Level/Medicated:  None, Tolerated without pain medication       INTERVENTIONS BY WOUND TEAM:  Chart and images reviewed. Discussed with bedside RN. All areas of concern (based on  picture review, LDA review and discussion with bedside RN) have been thoroughly assessed. Documentation of areas based on significant findings. This RN in to assess patient. Performed standard wound care which includes appropriate positioning, dressing removal and non-selective debridement. Pictures and measurements obtained weekly if/when required.    Wound:  buttocks, groin, thigh, abdominal skin folds  Preparation for Dressing removal: Open to air  Cleansed/Non-selectively Debrided with:  No rinse foam soap and Moist warm washcloth  Shirley wound: Cleansed with No rinse foam soap and Moist warm washcloth, Prepped with Nystatin Powder and Miconazole JAMIE  Primary Dressing:  wicking cloth in skin folds, buttocks, groin, thigh ALEJANDRO    Advanced Wound Care Discharge Planning  Number of Clinicians necessary to complete wound care: 2 one needed to roll pt  Is patient requiring IV pain medications for dressing changes:  No   Length of time for dressing change 40 min. (This does not include chart review, pre-medication time, set up, clean up or time spent charting.)    Interdisciplinary consultation: Patient, Bedside RN (Rosanne)    EVALUATION / RATIONALE FOR TREATMENT:     Date:  10/02/23  Wound Status:  Initial evaluation    Pt has moisture associated skin damage to skin folds abdomen, breasts with antifungal powder and wicking cloth  to dry skin and decrease satellite lesions.   She has irritant contact dermatitis r/t incontinence of bowel to buttocks, medial thighs and sacrococcygeal area. Skin is red and blanching. Antifungal barrier paste in use.          Goals: Steady decrease in wound area and depth weekly.    NURSING PLAN OF CARE ORDERS:  RN Prevention Protocol    NUTRITION RECOMMENDATIONS   Wound Team Recommendations:  N/A    DIET ORDERS (From admission to next 24h)       Start     Ordered    10/01/23 4294  Diet Order Diet: Cardiac; Second Modifier: (optional): Renal  ALL MEALS        Question Answer Comment   Diet:  Cardiac    Second Modifier: (optional) Renal        10/01/23 9240                    PREVENTATIVE INTERVENTIONS:    Q shift Cholo - performed per nursing policy  Q shift pressure point assessments - performed per nursing policy    Surface/Positioning  Standard/trauma mattress - Currently in Place  Reposition q 2 hours - Currently in Place  TAPs Turning system - Currently in Place  Waffle overlay  - Currently in Place    Offloading/Redistribution  Float Heels off Bed with Pillows - Currently in Place           Respiratory  Silicone O2 tubing - Currently in Place  Gray Foam Ear protectors - Currently in Place    Containment/Moisture Prevention    Gonzalez Catheter - Currently in Place  Antifungal treatment - Currently in Place    Anticipated discharge plans:  TBD        Vac Discharge Needs:  Vac Discharge plan is purely a recommendation from wound team and not a requirement for discharge unless otherwise stated by physician.  Not Applicable Pt not on a wound vac

## 2023-10-03 NOTE — DISCHARGE PLANNING
Received Choice form at: [id number]  Agency/Facility name: [facility]  Referral sent per Choice form at: [phone]

## 2023-10-03 NOTE — CONSULTS
MRN: [id number]  Date of palliative consult: October 2  Reason for consult: Advance care planning (complex)  Referring provider: Socorro  Location of consult: [facility] room 3309 bed 1  Additional consulting services: Respiratory therapy, pharmacy.    HPI:   Latonia Clinton is a 73 y.o. female with a past medical history of metastatic lung cancer, COPD (on 3 L home O2 24/7), rheumatoid arthritis, chronic kidney disease, primary hypertension, and thromboembolic disease on anticoagulation presented to the emergency department at [facility] on October 1 with progressive worsening generalized weakness and shortness of breath.  Patient reports feeling ill over the past 4 to 5 days but denies fever or chills.  She was found to be hypercalcemic with SIAMAR.  She was admitted to the floor for further work-up.    Patient is seen today lying in bed.  She has bilateral foot drop.  She appears weak and deconditioned.  She is on oxygen at 3 L.    Pertinent past medical history: Asthma, back pain, blood clot, bronchitis, CAD, depression, dialysis, glaucoma, hyperlipidemia, kidney disorder, former smoker quit 23 years ago after 30-pack-year history, no current alcohol use or drug use.    Pain History: Patient reports ongoing pain to neck and back for which she has been taking oxycodone and Roxanol per previous hospice agency.  Onset:   Location:  Duration:   Characteristics:   Aggravating factors:   Alleviating factors:   Radiation:   Treatments:   Severity:     Additional symptoms: Intermittent poor appetite and dyspnea.      Interval History: Notes from yesterday reviewed, 10/2: Patient remains on continuous cardiac monitoring, calcium improved to 11.8 from high of 13.2, potassium 5.2, creatinine 3.65.  She remains on normal saline and IV Lasix twice daily.  Primary hospitalist has ordered IV pamidronate.  Patient is reportedly pathology patient of Dr. Rizvi, who has stated she has no further treatment options available.   Patient is paraplegic in bilateral lower extremities.     Medication Allergy/Sensitivities:  No Known Allergies      ROS:    Review of Systems   Constitutional:  Positive for malaise/fatigue. Negative for fever.   Respiratory:  Positive for cough and shortness of breath.    Cardiovascular:  Negative for chest pain.   Neurological:  Positive for focal weakness.       PE:   Recent vital signs  BMI: Body mass index is 34.41 kg/m².    Temp (24hrs), Av.7 °C (98.1 °F), Min:36.4 °C (97.6 °F), Max:37.2 °C (98.9 °F)  Temperature: 36.4 °C (97.6 °F)  Pulse  Av.2  Min: 47  Max: 79   Blood Pressure : 122/54       Physical Exam  Vitals and nursing note reviewed.   Constitutional:       Appearance: She is obese. She is ill-appearing.      Interventions: Nasal cannula in place.      Comments: 3L   Pulmonary:      Effort: Pulmonary effort is normal.   Skin:     General: Skin is warm.      Capillary Refill: Capillary refill takes less than 2 seconds.   Neurological:      Mental Status: She is alert and oriented to person, place, and time.      Motor: Weakness and atrophy present.      Comments: Bilateral foot drop functional paraplegia.   Psychiatric:         Attention and Perception: Attention normal.         Mood and Affect: Mood normal.         Speech: Speech normal.         Behavior: Behavior normal.         Thought Content: Thought content normal.         Cognition and Memory: Memory is impaired. She exhibits impaired recent memory.         Judgment: Judgment normal.       Recent Labs     10/02/23  0307 10/02/23  0640 10/03/23  013   SODIUM 133* 133* 134*   POTASSIUM 5.4 5.2 4.4   CHLORIDE 97 97 98   CO2 25 27 25   GLUCOSE 153* 149* 127*   BUN 51* 54* 61*   CREATININE 3.57* 3.65* 3.53*   CALCIUM 12.3* 11.8* 10.9*     Recent Labs     10/01/23  1304 10/02/23  0307 10/03/23  013   WBC 8.3 5.4 5.6   RBC 3.33* 3.17* 2.74*   HEMOGLOBIN 8.9* 8.5* 7.3*   HEMATOCRIT 29.6* 28.5* 24.2*   MCV 88.9 89.9 88.3   MCH 26.7* 26.8*  26.6*   MCHC 30.1* 29.8* 30.2*   RDW 55.6* 55.9* 54.5*   PLATELETCT 277 255 243   MPV 10.6 11.8 11.5       ASSESSMENT/PLAN WITH SHARED DECISION MAKING:   Review  Pertinent imaging reviewed.    PHYSICAL ASPECTS OF CARE  Palliative Performance Scale: 40%    #Hypercalcemia  #Acute on chronic kidney injury  #Metastatic lung cancer  #Paraplegia  #COPD, chronic hypoxemia on home O2  #Frailty    SOCIAL ASPECTS OF CARE  Patient resides in a group home was previously on hospice and then revoked.  Patient owns her own home and lives there alone until recently when she was placed in a group home for inability to care for self.  She is a retired personal care attendant, , banker among other things.  She reports she is from the [facility] and moved to [place] in 1967.  She lived in [place] for a while but returned to [place] in order to care for her parents.  She is to care for her mother until she passed in 2004 and her father passed shortly thereafter.  She does become tearful when she speaks of this.  She is a divorce and has no children.  She has 1 brother, Barber, who is her healthcare agent as listed on her most current advance directive in [facility].    SPIRITUAL ASPECTS OF CARE   Patient is a Orthodox.  She watches mass on TV every Sunday.  She appreciates visits from hospice chaplain as well.    GOALS OF CARE/SERIOUS ILLNESS CONVERSATION  0715: Consult and EMR reviewed.  12:00: Met with patient.  Introduced myself and role of palliative care including discussion of goals of care, hospice, CODE STATUS, symptom management.  Patient has just concluded discussing options for treatment with primary hospitalist, Dr. Jordan.  We further discussed philosophy and goals of hospice care to include anticipatory guidance through end-of-life process.  Patient expresses frustration with previous hospice agency as she felt she was not properly guided regarding her symptoms relative to her disease progression.  She tells me that she  has only been at the group home for short time but she feels they take good care of her there and despite not having any other women her age to talk to, she is happy and wants to stay there.  She is very aware she cannot live in her home any longer as she cannot care for herself.  She reports her brother is a good source of support as well as her friend Idalia.  She understands that her disease is terminal.  We discussed prognosis is anywhere from weeks to months and worst case scenario and months in best case scenario.  Given the level of her hypercalcemia and confusion with tremor prior to her admission to the hospital, it is more likely the worst case scenario.  Patient reports that functionally she is unable to do anything for herself other than eat.  She is up in a chair via Berna lift and dressed by her caregivers at the group home.    Patient confirms DNR/DNI status as per current orders and POLST.  She is agreeable to meet with representatives for [facility] as she feels they will be more appropriate for her after speaking to this provider.  Outcome: Choice obtained for [facility] hospice, discussed with ALLAN Prado, with [facility] hospice who will come and speak with patient regarding discharge back to [facility] with [facility].  Patient remains DNR/DNI.  We will sign off for now please reconsult for further needs..    Code Status: DNR/DNI    ACP Documents: Advanced directive and POLST on file    17 minutes spent discussing advance care planning, this time excludes any other billed services.    I spent a total of 81 minutes reviewing medical records, direct face-to-face time with the patient and/or family, documentation and coordination of care. This is separate from the time spent on advance care planning, which is documented above.    Helena Purvis, MSN, APRN, ACNPC-AG.  Palliative Care Nurse Practitioner  [phone]

## 2023-10-03 NOTE — DOCUMENTATION QUERY
[facility]                                                                       Query Response Note      PATIENT:               ROSARIO MARTINEZ  ACCT #:                  [id number]  MRN:                     [id number]  :                      1950  ADMIT DATE:       10/1/2023 12:34 PM  DISCH DATE:          RESPONDING  PROVIDER #:        [id number]           QUERY TEXT:    Chronic home 02 use is documented in the Medical Record. Please further specify the condition this patient has      The patient's Clinical Indicators include:  - Findings:  ED provider note:  she is on home oxygen at about 2-1/2 L .  Pulmonary: Breath sounds quiet and equal bilaterally, do not appreciate any wheezing, though she does not have strong inspiratory effort which somewhat limits exam, oxygen saturation 96% on 3 L which is patient's home oxygen requirement    - Treatments:  oxygen as needed, Respiratory protocol, Bronchodilators, Incentive spirometry, Steroids    - Risk factors:  malignant neoplasm of lung, emphysema, 02 dependence     Thank You,  Angelica Krishnamurthy RN  Clinical Documentation   [email or web address]  Connect via 3 day Blinds  Options provided:   -- Chronic Respiratory Failure   -- Chronic Hypoxemia   -- Other explanation, Please specify other explanation   -- Unable to determine      Query created by: Angelica Krishnamurthy on 10/2/2023 6:24 PM    RESPONSE TEXT:    Chronic Respiratory Failure          Electronically signed by:  YESSICA MCCLURE MD 10/2/2023 7:00 PM

## 2023-10-03 NOTE — DISCHARGE PLANNING
Case Management Discharge Planning    Admission Date: 10/1/2023  GMLOS: 3.4  ALOS: 2    6-Clicks ADL Score: 14  6-Clicks Mobility Score: 10  PT and/or OT Eval ordered: Yes  Post-acute Referrals Ordered: Yes  Post-acute Choice Obtained: Yes  Has referral(s) been sent to post-acute provider:  Yes      Anticipated Discharge Dispo: Discharge Disposition: D/T to hospice home (50)    DME Needed: No    Action(s) Taken: Spoke to pt at bedside. Pt stated that after speaking to MD, she obtained better understanding of overall prognosis and has decided to resume hospice services. Pt stated she would like to establish care with [facility] and DC back to [facility] , preferably tomorrow afternoon. Choice form completed and faxed to [facility].     Escalations Completed: None    Medically Clear: No    Next Steps: f/u with [facility] regarding acceptance    Barriers to Discharge: hospice setup

## 2023-10-03 NOTE — THERAPY
Physical Therapy Contact Note    Patient Name: Latonia Clinton  Age:  73 y.o., Sex:  female  Medical Record #: [id number]  Today's Date: 10/3/2023         10/03/23 0967   Initial Contact Note    Initial Contact Note Order Received and Verified. Physical Therapy Evaluation NOT Completed Because Patient Does Not Require Acute Physical Therapy at this Time.   Interdisciplinary Plan of Care Collaboration   IDT Collaboration with  Nursing   Collaboration Comments PT orders received, updated RN: pt does not require skilled therapy services at this time, per pt the  provides full care and uses a latonya lift for transfers, pt states stays up in her w/c most of the day. Will DC PT at this time.

## 2023-10-03 NOTE — ASSESSMENT & PLAN NOTE
As per presentation.  Per report baseline is 2 LPM oxygen mask,.  She is requiring 3-4 LPM.  10/02/23    Increase furosemide to 40 mg IV twice daily.

## 2023-10-04 NOTE — PROGRESS NOTES
Telemetry Shift Summary     Rhythm SB/ SR  HR Range 53-60  Ectopy r-o PAC/ PVC  Measurements  0.22/0.08/0.38  Per strip printed 0400     Normal Values  Rhythm SR  HR Range    Measurements 0.12-0.20 / 0.06-0.10  / 0.30-0.52

## 2023-10-04 NOTE — PROGRESS NOTES
Telemetry Shift Summary     Rhythm SB 1st deg AVB  HR Range 41-51, touched down to 39  Ectopy rPAC  Measurements  0.22/0.08/0.44           Normal Values  Rhythm SR  HR Range    Measurements 0.12-0.20 / 0.06-0.10  / 0.30-0.52

## 2023-10-04 NOTE — DISCHARGE PLANNING
Case Management Discharge Planning    Admission Date: 10/1/2023  GMLOS: 3.4  ALOS: 3    6-Clicks ADL Score: 14  6-Clicks Mobility Score: 10  PT and/or OT Eval ordered: Yes  Post-acute Referrals Ordered: Yes  Post-acute Choice Obtained: Yes  Has referral(s) been sent to post-acute provider:  Yes      Anticipated Discharge Dispo: Discharge Disposition: D/T to hospice home (50)    DME Needed: No    Action(s) Taken:     Spoke to Eve from [facility]. Per Eve DME set up at pt's , requested CM schedule transport back to [facility].    RNCM spoke to [facility] owner Kiersten. Kiersten agreeable to pt discharging back to  this afternoon.    Transport confirmed by Néstor for today 10/04/2023 at 1400 via [facility].      Escalations Completed: None    Medically Clear: Yes    Next Steps: DC back to [facility]  today with [facility] at 1400    Barriers to Discharge: None

## 2023-10-04 NOTE — CARE PLAN
The patient is Stable - Low risk of patient condition declining or worsening    Shift Goals  Clinical Goals: q2 turns  Patient Goals: pain control  Family Goals: SUMAN    Progress made toward(s) clinical / shift goals:    Problem: Pain - Standard  Goal: Alleviation of pain or a reduction in pain to the patient’s comfort goal  Outcome: Progressing     Problem: Skin Integrity  Goal: Skin integrity is maintained or improved  Outcome: Progressing     Problem: Fall Risk  Goal: Patient will remain free from falls  Outcome: Progressing     Problem: Respiratory  Goal: Patient will achieve/maintain optimum respiratory ventilation and gas exchange  Outcome: Progressing       Patient is not progressing towards the following goals:

## 2023-10-04 NOTE — CARE PLAN
CATARACT, OS- VISUALLY SIGNIFICANT. SCHEDULE SX. DISPENSED ANOTHER SAMPLE OF TIMOLOL BID, OU. The patient is Stable - Low risk of patient condition declining or worsening    Shift Goals  Clinical Goals: q2h turns, O2  Patient Goals: pain control, comfort  Family Goals: SUMAN    Progress made toward(s) clinical / shift goals:  pt medically cleared for dc, to group home with [facility]

## 2023-10-04 NOTE — PROGRESS NOTES
Patient discussed with Eve Jay RN. Patient admitting to [facility] today with primary diagnosis of COPD. Comfort medications ordered.

## 2023-10-04 NOTE — DISCHARGE PLANNING
DC Transport Scheduled    Received request at: 10/4/2023 at 1118    Transport Company Scheduled:  [employer]  Spoke with Felix at [facility] to schedule transport.    Scheduled Date: 10/4/2023  Scheduled Time: 1400    Destination: [facility] at [address] [facility] [address]     Notified care team of scheduled transport via [employer].     If there are any changes needed to the DC transportation scheduled, please contact [facility] at ext. [phone] between the hours of 6414-8827 Mon-Fri. If outside those hours, contact the ED Case Manager at ext. [phone].

## 2023-10-04 NOTE — DISCHARGE SUMMARY
Discharge Summary    CHIEF COMPLAINT ON ADMISSION  Chief Complaint   Patient presents with    Weakness     X 4 days worse today    Loss of Appetite       Reason for Admission  EMS     Admission Date  10/1/2023    CODE STATUS  DNAR/DNI    HPI & HOSPITAL COURSE  Latonia Clinton is a 73 y.o. female who presented on 10/20/2023 with progressively worsening generalized weakness and shortness of breath.  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  She resides at a group home.  She was previously on hospice but decided to come off of hospice.  She called EMS because she was not feeling well having fevers and chills.  She had not been feeling well for the past 4 to 5 days.  In the emergency room, patient was noted to have acute on chronic kidney disease with elevated calcium of 13.2.  She was admitted for further medical management.  Patient received pamidronate and IV hydration with diuresis and her calcium is back down to normal.  Patient was again explained that she has a terminal condition with her stage IV metastatic breast cancer that will cause recurrent hypercalcemia indefinitely and that hospice is recommended.  Patient stated after further explanation that she agreed to again go back on hospice.    Therefore, she is discharged in fair and stable condition to hospice.    The patient met 2-midnight criteria for an inpatient stay at the time of discharge.    Discharge Date  10/4/2023    FOLLOW UP ITEMS POST DISCHARGE  [facility]    DISCHARGE DIAGNOSES  Principal Problem:    Hypercalcemia (POA: Yes)  Active Problems:    Chronic obstructive pulmonary disease (HCC) (Chronic) (POA: Yes)      Overview: This is a chronic condition, fairly well controlled, managed by       pulmonology Dr. Mami Allan.   Her regimen includes albuterol nebulizer       as needed, Breo Ellipta, Spiriva and daily Azithormycin, however patient       does not think that those medications are helping her.  She is       experiencing shortness of breath while walking.            She is undergoing extensive work-up for left pulmonary nodules, attempting       IR guided lung biopsy next week.            Pending PFTs.                Thyrotoxicosis with toxic single thyroid nodule and without thyroid storm (POA: Yes)      Overview: This is a chronic condition, managed by endocrinology.      Currently patient is on methimazole 10 mg tablet daily.      To continue close follow-up with endocrinology.      Lab Results       Component Value Date/Time        TSHULTRASEN 0.134 (L) 09/29/2021 1554             Lab Results       Component Value Date/Time        FREET4 1.05 09/29/2021 1554              Lab Results       Component Value Date/Time        FREET3 2.55 09/29/2021 1554                 Deep vein thrombosis (DVT) (HCC) (POA: Yes)      Overview: Per patient she was on dialysis due to kidney failure back in 2004 and has       developed DVT in her upper extremity, she also had DVT in her right lower       extremity.  In 2017 she had left IV nose thrombosis.  Initially she was on       anticoagulation with warfarin, then was switched to Xarelto and currently       she is being followed at anticoagulation clinic.    Recurrent non-small cell lung cancer (HCC) (POA: Yes)      Overview: Repeat CT chest 5/9/22 showed stable lingular nodule but new 7 mm nodule       and repeat CT in 9/2022 showed increase in size of both lingular nodule       14x15 mm and RLL nodule 8mmx10 mm. She underwent bronchoscopy with ion       biopsy of the lingular nodule which returned as adenoCa. She was seen by       [facility] and referred to radiation oncology after PET showed FDG uptake in       lingular nodule and new, FDG avid RUL nodule. No FDG uptake in RLL. MRI       negative  for metastasis.             Cancer Care Specialty note reviewed.  Briefly: History of adenocarcinoma       of the lung in 2016 status post resection of left upper lobe mass [id number].        30-pack-year smoking history quit in 2000, moderate to severe COPD.  Most       recent PET scan on 1/28/2022 was found to have a small lingular nodule       with mild FDG avid concerning for malignancy.  On 11/15/2022 she underwent       CT-guided biopsy of the left lingular pulmonary node, consistent with       adenocarcinoma.             Brain MRI showed no evidence of metastatic disease.  Follow-up PET CT scan       showed a lesion of concern in the left lingula and another 1 in the right       lung.            She has no signs of overt metastatic disease.             She has had a total of 5 sessions of radiation, she tolerated them well.        Following up with Dr. Rizvi on February 22, 2023, plan for repeat CT       scan in 6-8 weeks.          Acute kidney injury on top of chronic kidney disease (HCC) (POA: Yes)    DNI (do not intubate) (POA: Yes)    DNR (do not resuscitate) (POA: Yes)    ACP (advance care planning) (POA: Yes)    Acute on chronic respiratory failure with hypoxia (HCC) (POA: Yes)    Paraplegia (HCC) (POA: Yes)  Resolved Problems:    * No resolved hospital problems. *      FOLLOW UP  Future Appointments   Date Time Provider Department Center   10/4/2023  2:00 PM [facility] None     [facility]  [id number] [address] [address]  [phone]          MEDICATIONS ON DISCHARGE     Medication List        START taking these medications        Instructions   * acetaminophen 500 MG Tabs  Commonly known as: Tylenol   Doctor's comments: 10/4/23 please fill and deliver.HOSPICE MEDICATION DO NOT DISCONTINUE.  Take 2 tablets by mouth every 8 hours.  Dose: 1,000 mg     * acetaminophen 650 MG Supp  Commonly known as: Tylenol   Doctor's comments: 10/4/23 please fill and deliver HOSPICE  MEDICATION DO NOT DISCONTINUE.  Insert 1 suppository into the rectum every 6 hours as needed for Mild Pain or Fever (If unable to swallow tablets).  Dose: 650 mg     bisacodyl 10 MG Supp  Commonly known as: Dulcolax   Doctor's comments: 10/4/23 please fill and deliver.  HOSPICE MEDICATION DO NOT DISCONTINUE  Insert 1 suppository into the rectum as needed (No BM (bowel movement) in 3 days.)..  Indications: Constipation  Dose: 10 mg     Dextromethorphan-guaiFENesin  MG/5ML Syrp  Commonly known as: Tussin DM  Replaces: TUSSIN DM PO   Doctor's comments: 10/4/23 please fill and deliver  Take 10 mL by mouth every four hours as needed (For cough). Indications: Cough  Dose: 10 mL     Fleet Enema Enem   Doctor's comments: 10/4/23 please fill and deliver  HOSPICE MED DO NOT DISCONTINUE  Indications: Constipation  Insert 1 enema into the rectum 1 time a day as needed for no BM X 3 days despite senna, mag citrate, dulcolax suppository.  Dose: 1 Enema     LORazepam 2 MG/ML Conc  Commonly known as: Ativan   Doctor's comments: Please fill 30 ml Q 7 days. 180 day RX.10/4/23 please fill and deliver HOSPICE MEDICATION DO NOT DISCONTINUE.  Indications: Feeling Anxious  Take 0.5 mL by mouth every 2 hours as needed (anxiety or shortness of breath despite morphine).  Dose: 1 mg     nystatin powder  Commonly known as: Mycostatin   Doctor's comments: 10/4/23 please fill and deliver. HOSPICE MED DO NOT DISCONTINUE  Apply 1 g topically 3 times a day.   Indications: candidiasis  Dose: 1 Application     * oxyCODONE HCl 10 MG/0.5ML Conc   Doctor's comments: Patient has a terminal diagnosis. Please dispense 30 ml Q 3 days. 60 day RX. HOSPICE MEDICATION DO NOT DISCONTINUE.  Indications: Acute Pain, Chronic Pain  Take 0.5 mL by mouth every 1 hour as needed (moderate to severe pain or shortness of breath).  Dose: 10 mg     * oxyCODONE HCl ER 15 MG T12a   Doctor's comments: Patient has a terminal diagnosis. Please dispense 28 tabs Q 14 days.  60 day RX.  Take 1 tablet (15 mg) by mouth every 12 hours. Indications: Chronic Pain  Dose: 15 mg     sennosides-docusate sodium 8.6-50 MG tablet  Commonly known as: Senokot-S   Doctor's comments: 10/4/23 please fill and deliver. HOSPICE MEDICATION DO NOT DISCONTINUE.  Take 2 Tablets by mouth 2 times a day.  Indications: Constipation  Dose: 2 Tablet           * This list has 4 medication(s) that are the same as other medications prescribed for you. Read the directions carefully, and ask your doctor or other care provider to review them with you.                CHANGE how you take these medications        Instructions   magnesium citrate Soln  What changed:   when to take this  reasons to take this  additional instructions   Doctor's comments: 10/4/23 please fill and deliver ORANGE FLAVOR IF AVAILABLE Please deliver 2 bottles  Take 1 bottle by mouth 1 time a day as needed (If no BM X 2 days). Indications: Constipation  Dose: 300 mL     zolpidem 5 MG Tabs  What changed:   when to take this  reasons to take this  Commonly known as: Ambien   Doctor's comments: Please fill 14 tabs Q 14 days. 180 day RX.  Take 1 Tablet by mouth at bedtime. Indications: Trouble Sleeping  Dose: 5 mg            CONTINUE taking these medications        Instructions   albuterol 2.5mg/3ml Nebu solution for nebulization  Commonly known as: Proventil   Doctor's comments: 10/4/23 please fill and deliver  Take 3 mL by nebulization every four hours as needed for Shortness of Breath. Indications: Spasm of Lung Air Passages  Dose: 2.5 mg     allopurinol 100 MG Tabs  Commonly known as: Zyloprim   Doctor's comments: 10/4/23 please fill and deliver  Take 1 tablet by mouth every day. Indications: Gout  Dose: 100 mg     Brimonidine Tartrate-Timolol 0.2-0.5 % Soln  Commonly known as: Combigan   Doctor's comments: 10/4/23 profile only  Administer 1 drop into both eyes 2 times a day. Indications: Glaucoma  Dose: 1 Drop     cinacalcet 30 MG Tabs  Commonly  known as: Sensipar   Doctor's comments: 10/4/23 profile only  Take 1 tablet by mouth every day. Indications: Overactive Parathyroid Gland  Dose: 30 mg     diphenhydrAMINE 25 MG Tabs  Commonly known as: Benadryl   Doctor's comments: 10/4/23 please fill and deliver  Take 1 tablet by mouth every 6 hours as needed for Itching. Indications: Itching  Dose: 25 mg     fluticasone-salmeterol 500-50 MCG/ACT Aepb  Commonly known as: Wixela Inhub   Doctor's comments: Non-Hospice Covered Med 10/4/23 profile only  Inhale 1 puff 2 times a day. Indications: Asthma  Dose: 1 Puff     gabapentin 300 MG Caps  Commonly known as: Neurontin   Doctor's comments: 10/4/23 please fill and deliver  Take 1 capsule by mouth 2 times a day. Indications: Neuropathic Pain  Dose: 300 mg     hydrocortisone 1 % Crea   Doctor's comments: 10/4/23 profile only  Apply 1 application topically every four hours as needed (Noemi MAR apply's to itchy area). Indications: Itching, Skin Inflammation  Dose: 1 Application     Lidocaine HCl 4 % Crea  Commonly known as: Aspercreme Lidocaine   Doctor's comments: 10/4/23 profile only  Apply 1 application topically 2 times a day. Indications: muscle aches  Dose: 1 Application     magnesium oxide 400 MG Tabs tablet  Commonly known as: Mag-Ox   Doctor's comments: 10/4/23 please fill and deliver  Take 1 Tablet by mouth every evening. Indications: Constipation  Dose: 400 mg     methimazole 5 MG Tabs  Commonly known as: Tapazole   Doctor's comments: 10/4/23 profile only  Take 1 Tablet by mouth every day. Indications: Overactive Thyroid Gland  Dose: 5 mg     prochlorperazine 10 MG Tabs  Commonly known as: Compazine   Doctor's comments: 10/4/23 please fill and deliver  Take 1 Tablet by mouth every 8 hours as needed for Nausea/Vomiting. Indications: Nausea and Vomiting  Dose: 10 mg     rivaroxaban 20 MG Tabs tablet  Commonly known as: Xarelto   Doctor's comments: Non-hospice covered 10/4/23 profile only  Take 1 Tablet by mouth at  bedtime.  Dose: 20 mg     Spiriva Respimat 2.5 mcg/Act Aers  Generic drug: tiotropium   Doctor's comments: Non-hospice covered. 10/4/23 profile only  Inhale 2 Inhalations every day. Indications: Chronic Obstructive Lung Disease  Dose: 5 mcg            STOP taking these medications      TUSSIN DM PO  Replaced by: Dextromethorphan-guaiFENesin  MG/5ML Syrp              Allergies  No Known Allergies    DIET  Orders Placed This Encounter   Procedures    Diet Order Diet: Cardiac; Second Modifier: (optional): Renal     Standing Status:   Standing     Number of Occurrences:   1     Order Specific Question:   Diet:     Answer:   Cardiac [6]     Order Specific Question:   Second Modifier: (optional)     Answer:   Renal [8]       ACTIVITY  As tolerated.  Weight bearing as tolerated    CONSULTATIONS  none    PROCEDURES  none    LABORATORY  Lab Results   Component Value Date    SODIUM 132 (L) 10/04/2023    POTASSIUM 4.4 10/04/2023    CHLORIDE 98 10/04/2023    CO2 19 (L) 10/04/2023    GLUCOSE 166 (H) 10/04/2023    BUN 70 (H) 10/04/2023    CREATININE 3.39 (H) 10/04/2023    CREATININE 1.7 (H) 09/19/2008        Lab Results   Component Value Date    WBC 4.7 (L) 10/04/2023    HEMOGLOBIN 7.8 (L) 10/04/2023    HEMATOCRIT 25.7 (L) 10/04/2023    PLATELETCT 251 10/04/2023        Total time of the discharge process exceeds 36 minutes.

## 2023-10-04 NOTE — PROGRESS NOTES
Discharge instructions given and discussed, signed copy in chart. Pt verbalized understanding and all questions answered. Yes, group home owner at bedside. Prescriptions discussed, going home with [facility]. Pt discharged home in stable condition on 2L O2 via gurney escorted by EMS. Personal belongings sent with patient. IV removed and tolerated well. Tele box removed, monitor tech notified.

## 2023-10-30 NOTE — PROGRESS NOTES
Patient discussed with Courtney Tony RN. Patient having increased pain, taking oxyfast 30-40 mg daily in addition to MSER 30 mg BID. New order for MSER 30 mg TID for delivery today.

## 2023-11-08 NOTE — PROGRESS NOTES
Patient has significant swelling along right jaw line, firm in nature and painful to touch. Unclear time course, although caregiver states has gotten much worse since yesterday. No dental pain, sore throat, fevers, chills, trouble swallowing, drooling or pooling of secretions, stridor. Does have known paratracheal lymphadenopathy associated with her metastatic lung cancer, which this most likely is. Given concern for infectious etiology, will start on broad spectrum abx targeting typical odontogenic bacteria. D/w at length with hospice ALLAN Valdez.

## 2023-12-18 NOTE — PROGRESS NOTES
Patient discussed with Courtney Tony RN. Patient having an increase in coughing despite use of inhalers (Fluticasone/salm and Spiriva) and Tussin DM. She also has a cherry red excoriated area on her buttocks, per nursing patient's spouse is using hydrocortisone on the area.   Plan:  DC spiriva and fluticasone inhalers  New order for duonebs QID and Budesonide neb tx BID.   New order for dexamethasone 2 mg PO BID for decrease in lung inflammation. Coughing is likely due to lung cancer/inflammation.  Nystatin powder ordered for buttocks rash. Education provided by RNCM to avoid hydrocortisone to this area.  Medications ordered for delivery today.

## 2024-01-01 ENCOUNTER — PHARMACY VISIT (OUTPATIENT)
Dept: PHARMACY | Facility: MEDICAL CENTER | Age: 74
End: 2024-01-01
Payer: COMMERCIAL

## 2024-01-01 ENCOUNTER — HOME CARE VISIT (OUTPATIENT)
Dept: HOSPICE | Facility: HOSPICE | Age: 74
End: 2024-01-01
Payer: MEDICARE

## 2024-01-01 VITALS — HEART RATE: 88 BPM | DIASTOLIC BLOOD PRESSURE: 60 MMHG | SYSTOLIC BLOOD PRESSURE: 106 MMHG | RESPIRATION RATE: 20 BRPM

## 2024-01-01 VITALS — RESPIRATION RATE: 16 BRPM | SYSTOLIC BLOOD PRESSURE: 118 MMHG | DIASTOLIC BLOOD PRESSURE: 58 MMHG | HEART RATE: 84 BPM

## 2024-01-01 VITALS — RESPIRATION RATE: 14 BRPM | HEART RATE: 84 BPM

## 2024-01-01 DIAGNOSIS — G89.3 CANCER ASSOCIATED PAIN: ICD-10-CM

## 2024-01-01 DIAGNOSIS — C34.90 RECURRENT NON-SMALL CELL LUNG CANCER (HCC): Primary | ICD-10-CM

## 2024-01-01 DIAGNOSIS — Z51.5 HOSPICE CARE: ICD-10-CM

## 2024-01-01 PROCEDURE — RXMED WILLOW AMBULATORY MEDICATION CHARGE: Performed by: REHABILITATION PRACTITIONER

## 2024-01-01 PROCEDURE — RXMED WILLOW AMBULATORY MEDICATION CHARGE: Performed by: STUDENT IN AN ORGANIZED HEALTH CARE EDUCATION/TRAINING PROGRAM

## 2024-01-01 PROCEDURE — G0299 HHS/HOSPICE OF RN EA 15 MIN: HCPCS

## 2024-01-01 PROCEDURE — S9126 HOSPICE CARE, IN THE HOME, P: HCPCS

## 2024-01-01 RX ORDER — BRIMONIDINE TARTRATE AND TIMOLOL MALEATE 2; 5 MG/ML; MG/ML
1 SOLUTION OPHTHALMIC 2 TIMES DAILY
Qty: 5 ML | Refills: 11 | Status: SHIPPED | OUTPATIENT
Start: 2024-01-01 | End: 2024-01-01

## 2024-01-01 RX ORDER — GUAIFENESIN/DEXTROMETHORPHAN 100-10MG/5
5 SYRUP ORAL EVERY 6 HOURS PRN
Qty: 840 ML | Refills: 3 | Status: SHIPPED | OUTPATIENT
Start: 2024-01-01

## 2024-01-01 RX ORDER — SENNA AND DOCUSATE SODIUM 50; 8.6 MG/1; MG/1
2 TABLET, FILM COATED ORAL 2 TIMES DAILY
Qty: 240 TABLET | Refills: 6 | Status: SHIPPED | OUTPATIENT
Start: 2024-01-01 | End: 2025-01-02

## 2024-01-01 RX ORDER — FENTANYL 75 UG/1
1 PATCH TRANSDERMAL
Qty: 20 PATCH | Refills: 0 | Status: SHIPPED | OUTPATIENT
Start: 2024-01-01 | End: 2025-01-01

## 2024-01-01 SDOH — ECONOMIC STABILITY: HOUSING INSECURITY: EVIDENCE OF SMOKING MATERIAL: 0

## 2024-01-01 ASSESSMENT — ENCOUNTER SYMPTOMS
STOOL FREQUENCY: LESS THAN DAILY
CONSTIPATION: 1
PERSON REPORTING PAIN: PATIENT
LAST BOWEL MOVEMENT: 66845
FATIGUES EASILY: 1
PAIN LOCATION: COCCYX
SHORTNESS OF BREATH: 1
CONSTIPATION: 1
SLEEP QUALITY: ADEQUATE
DYSPNEA ACTIVITY LEVEL: WHILE SPEAKING
SUBJECTIVE PAIN PROGRESSION: WAXING AND WANING
SHORTNESS OF BREATH: 1
MUSCLE WEAKNESS: 1
MUSCLE WEAKNESS: 1
BOWEL INCONTINENCE: 1
COUGH CHARACTERISTICS: NON-PRODUCTIVE
SLEEP QUALITY: FAIR
PAIN LOCATION - RELIEVING FACTORS: PAIN MEDS
FORGETFULNESS: 1
DYSPNEA ACTIVITY LEVEL: WHILE SPEAKING
COUGH CHARACTERISTICS: MOIST
LOWEST PAIN SEVERITY IN PAST 24 HOURS: 2/10
LAST BOWEL MOVEMENT: 66844
PAIN SEVERITY GOAL: 1/10
FATIGUE: 1
PAIN: 1
PAIN LOCATION - PAIN DURATION: WEEK
PAIN SEVERITY GOAL: 2/10
DYSPNEA ACTIVITY LEVEL: WHILE SPEAKING
PAIN LOCATION - PAIN QUALITY: BURNING
COUGH: 1
DIARRHEA: 1
HIGHEST PAIN SEVERITY IN PAST 24 HOURS: 10/10
LOWER EXTREMITY EDEMA: 1
COUGH CHARACTERISTICS: MOIST
STOOL FREQUENCY: LESS THAN DAILY
PAIN LOCATION - PAIN DURATION: WEEKS
FATIGUES EASILY: 1
LAST BOWEL MOVEMENT: 66840
DIARRHEA: 1
COUGH: 1
LOWER EXTREMITY EDEMA: 1
COUGH: 1
FATIGUES EASILY: 1
PAIN LOCATION - PAIN SEVERITY: 10/10
COUGH CHARACTERISTICS: STRONG
PAIN LOCATION - PAIN FREQUENCY: CONSTANT
SLEEP QUALITY: ADEQUATE
COUGH CHARACTERISTICS: NON-PRODUCTIVE
PAIN: 1
DYSPNEA ACTIVITY LEVEL: AT REST
COUGH CHARACTERISTICS: NON-PRODUCTIVE
PERSON REPORTING PAIN: PATIENT
LOWER EXTREMITY EDEMA: 1
PAIN LOCATION - PAIN QUALITY: ACHE
LOWEST PAIN SEVERITY IN PAST 24 HOURS: 1/10
SUBJECTIVE PAIN PROGRESSION: WAXING AND WANING
LIMITED RANGE OF MOTION: 1
FATIGUE: 1
FLATUS: 1
HIGHEST PAIN SEVERITY IN PAST 24 HOURS: 5/10
SHORTNESS OF BREATH: 1
PAIN: 1
LIMITED RANGE OF MOTION: 1
PAIN LOCATION - PAIN SEVERITY: 1/10
PERSON REPORTING PAIN: PATIENT
PAIN LOCATION - PAIN FREQUENCY: CONSTANT
FLATUS: 1
COUGH CHARACTERISTICS: MOIST

## 2024-01-01 ASSESSMENT — SOCIAL DETERMINANTS OF HEALTH (SDOH)
ACTIVE STRESSOR - HEALTH CHANGES: 1
ACTIVE STRESSOR - EXPRESSED EMOTIONAL NEED: 1
ACTIVE STRESSOR - EXPRESSED EMOTIONAL NEED: 1
ACTIVE STRESSOR - HEALTH CHANGES: 1
ACTIVE STRESSOR - HEALTH CHANGES: 1
ACTIVE STRESSOR - LOSS OF CONTROL: 1

## 2024-01-01 ASSESSMENT — ACTIVITIES OF DAILY LIVING (ADL)
AMBULATION ASSISTANCE: NON-AMBULATORY
AMBULATION ASSISTANCE: NON-AMBULATORY

## 2024-02-09 NOTE — PROGRESS NOTES
Orders received to d/c tele and add lactulose to medication regimen. Orders received from Dr. RACHAEL Alford.Orders acknowledged and implemented.     1130- Noted left arm swelling. No pain reported by pt. IV fluids stopped. Pt hard stick, needs US guided PIV.    Rest, increase fluids, lots of water and liquids.  Warm compresses as needed.  Call office for recheck.  Return as needed

## 2025-06-16 NOTE — ED TRIAGE NOTES
"Chief Complaint   Patient presents with    Abnormal Labs     Pt sent to ER from assisted living home for High Calcium level - had labs drawn yesterday and MD called to have her brought to ER for eval      /61   Pulse 63   Wt 99.2 kg (218 lb 11.1 oz)   LMP  (LMP Unknown)   SpO2 95%   BMI 37.54 kg/m²     Pt BIB [facility] from group home for above; no interventions done PTA    Per chart review pt has Calcium level - 12.5; drawn yesterday outpatient    Pt has no complaints, states she feels \"my normal baseline, only thing I can think of is my R hand is a little shaky\"; pt on baseline 4L O2; has lópez in place d/t being bed-bound per pt; pt also states she was recently seen here at this facility and hospitalized  "
1982

## (undated) DEVICE — BAG URODRAIN WITH TUBING - (20/CA)

## (undated) DEVICE — SODIUM CHL. IRRIGATION 0.9% 3000ML (4EA/CA 65CA/PF)

## (undated) DEVICE — SLEEVE, VASO, THIGH, MED

## (undated) DEVICE — TUBE CONNECT SUCTION CLEAR 120 X 1/4" (50EA/CA)"

## (undated) DEVICE — SPONGE GAUZESTER 4 X 4 4PLY - (128PK/CA)

## (undated) DEVICE — CONNECTOR HOSE NEPTUNE FOR CYSTO ROOM

## (undated) DEVICE — LASER TRAC TIP 200 MIRCON FOR 100 WATT LASER

## (undated) DEVICE — JELLY, KY 2 0Z STERILE

## (undated) DEVICE — ELECTRODE 850 FOAM ADHESIVE - HYDROGEL RADIOTRNSPRNT (50/PK)

## (undated) DEVICE — CATHETER IV SAFETY 20 GA X 1-1/4 (50/BX)

## (undated) DEVICE — TUBE CONNECTING SUCTION - CLEAR PLASTIC STERILE 72 IN (50EA/CA)

## (undated) DEVICE — ELECTRODE DUAL RETURN W/ CORD - (50/PK)

## (undated) DEVICE — HEAD HOLDER JUNIOR/ADULT

## (undated) DEVICE — TUBING CLEARLINK DUO-VENT - C-FLO (48EA/CA)

## (undated) DEVICE — SYRINGE SAFETY 5 ML 18 GA X 1-1/2 BLUNT LL (100/BX 4BX/CA)

## (undated) DEVICE — NEPTUNE 4 PORT MANIFOLD - (20/PK)

## (undated) DEVICE — CATHETER URET OPEN END 6FR (10EA/BX)

## (undated) DEVICE — WATER IRRIGATION STERILE 1000ML (12EA/CA)

## (undated) DEVICE — KIT ANESTHESIA W/CIRCUIT & 3/LT BAG W/FILTER (20EA/CA)

## (undated) DEVICE — COVER FOOT UNIVERSAL DISP. - (25EA/CA)

## (undated) DEVICE — GOWN SURGEONS X-LARGE - DISP. (30/CA)

## (undated) DEVICE — GOWN SURGICAL X-LARGE ULTRA - FILM-REINFORCED (20/CA)

## (undated) DEVICE — GLOVE, LITE (PAIR)

## (undated) DEVICE — SET EXTENSION WITH 2 PORTS (48EA/CA) ***PART #2C8610 IS A SUBSTITUTE*****

## (undated) DEVICE — GOWN SURGEONS LARGE - (32/CA)

## (undated) DEVICE — TUBE SUCTION YANKAUER  1/4 X 6FT (20EA/CA)"

## (undated) DEVICE — NEEDLE BIOPSY FLEXISION OD21 GA IF1000 (5EA/BX)

## (undated) DEVICE — SENSOR SPO2 NEO LNCS ADHESIVE (20/BX) SEE USER NOTES

## (undated) DEVICE — MASK WITH FACE SHIELD (25/BX 4BX/CA)

## (undated) DEVICE — SET IRRIGATION CYSTOSCOPY Y-TYPE L81 IN (20EA/CA)

## (undated) DEVICE — GLOVE BIOGEL SZ 7 SURGICAL PF LTX - (50PR/BX 4BX/CA)

## (undated) DEVICE — CATHETER URET DUAL LUMEN

## (undated) DEVICE — KIT CUSTOM PROCEDURE SINGLE FOR ENDO  (15/CA)

## (undated) DEVICE — LACTATED RINGERS INJ 1000 ML - (14EA/CA 60CA/PF)

## (undated) DEVICE — CONTAINER, COLLECTION 10-LITER

## (undated) DEVICE — KIT  I.V. START (100EA/CA)

## (undated) DEVICE — PACK CYSTOSCOPY III - (8/CA)

## (undated) DEVICE — JELLY, KY 5GM TUBES

## (undated) DEVICE — SUCTION INSTRUMENT YANKAUER BULBOUS TIP W/O VENT (50EA/CA)

## (undated) DEVICE — CANISTER SUCTION 3000ML MECHANICAL FILTER AUTO SHUTOFF MEDI-VAC NONSTERILE LF DISP  (40EA/CA)

## (undated) DEVICE — KIT ROOM DECONTAMINATION

## (undated) DEVICE — SENSOR OXIMETER ADULT SPO2 RD SET (20EA/BX)

## (undated) DEVICE — SYRINGE 20 ML LL (50EA/BX 4BX/CA)

## (undated) DEVICE — TOWELS CLOTH SURGICAL - (4/PK 20PK/CA)

## (undated) DEVICE — HUMID-VENT HEAT AND MOISTURE EXCHANGE- (50/BX)

## (undated) DEVICE — GLOVE BIOGEL PI ORTHO SZ 7 PF LF (40PR/BX)

## (undated) DEVICE — MASK AIRWAY SIZE 4 UNIQUE SILICON (10EA/BX)

## (undated) DEVICE — SYRINGE SAFETY 10 ML 18 GA X 1 1/2 BLUNT LL (100/BX 4BX/CA)

## (undated) DEVICE — SYRINGE SAFETY 3 ML 18 GA X 1 1/2 BLUNT LL (100/BX 8BX/CA)

## (undated) DEVICE — CANISTER SUCTION RIGID RED 1500CC (40EA/CA)

## (undated) DEVICE — GLOVE BIOGEL PI ULTRATOUCH SZ 7.0 SURGICAL PF LF- POWDER FREE (50/BX 4BX/CA)

## (undated) DEVICE — MASK ANESTHESIA ADULT  - (100/CA)

## (undated) DEVICE — GOWN WARMING STANDARD FLEX - (30/CA)

## (undated) DEVICE — WATER IRRIG. STER 3000 ML - (4/CA)

## (undated) DEVICE — TUBE E-T HI-LO CUFF 8.0MM (10EA/PK)

## (undated) DEVICE — ZIPWIRE STIFF .035 STRAIGHT TIP (5EA/BX)

## (undated) DEVICE — SCOPE DIGITAL URETEROSCOPE DISPOSABLE

## (undated) DEVICE — BAG, SPONGE COUNT 50600

## (undated) DEVICE — SYRINGE DISP. 60 CC LL - (30/BX, 12BX/CA)**WHEN THESE ARE GONE ORDER #500206**

## (undated) DEVICE — BITE BLOCK ADULT 60FR (100EA/CA)

## (undated) DEVICE — SET LEADWIRE 5 LEAD BEDSIDE DISPOSABLE ECG (1SET OF 5/EA)

## (undated) DEVICE — GLOVE BIOGEL PI ORTHO SZ 6 SURGICAL PF LF (40PR/BX)

## (undated) DEVICE — SODIUM CHL IRRIGATION 0.9% 1000ML (12EA/CA)

## (undated) DEVICE — MASK AIRWAY SIZE 3 UNIQUE SILICON (10/BX)

## (undated) DEVICE — SPONGE GAUZE NON-STERILE 4X4 - (2000/CA 10PK/CA)

## (undated) DEVICE — WIRE GUIDE SENSOR DUAL FLEX - 5/BX

## (undated) DEVICE — SUTURE GENERAL

## (undated) DEVICE — ROBOTIC SURGERY SERVICES

## (undated) DEVICE — GLOVE BIOGEL PI INDICATOR SZ 7.0 SURGICAL PF LF - (50/BX 4BX/CA)

## (undated) DEVICE — PROTECTOR ULNA NERVE - (36PR/CA)